# Patient Record
Sex: FEMALE | Race: BLACK OR AFRICAN AMERICAN | NOT HISPANIC OR LATINO | Employment: OTHER | URBAN - METROPOLITAN AREA
[De-identification: names, ages, dates, MRNs, and addresses within clinical notes are randomized per-mention and may not be internally consistent; named-entity substitution may affect disease eponyms.]

---

## 2017-03-16 ENCOUNTER — HOSPITAL ENCOUNTER (EMERGENCY)
Facility: OTHER | Age: 67
Discharge: HOME | End: 2017-03-17
Attending: EMERGENCY MEDICINE
Payer: MEDICARE

## 2017-03-16 DIAGNOSIS — R11.0 NAUSEA ALONE: ICD-10-CM

## 2017-03-16 DIAGNOSIS — R42 DIZZINESS: Primary | ICD-10-CM

## 2017-03-16 DIAGNOSIS — N28.9 ACUTE RENAL INSUFFICIENCY: ICD-10-CM

## 2017-03-16 LAB
ALBUMIN SERPL BCP-MCNC: 3.7 G/DL
ALP SERPL-CCNC: 99 U/L
ALT SERPL W/O P-5'-P-CCNC: 13 U/L
ANION GAP SERPL CALC-SCNC: 10 MMOL/L
AST SERPL-CCNC: 14 U/L
B-OH-BUTYR BLD STRIP-SCNC: 0.2 MMOL/L
BACTERIA #/AREA URNS HPF: ABNORMAL /HPF
BASOPHILS # BLD AUTO: 0.02 K/UL
BASOPHILS NFR BLD: 0.2 %
BILIRUB SERPL-MCNC: 0.7 MG/DL
BILIRUB UR QL STRIP: NEGATIVE
BUN SERPL-MCNC: 18 MG/DL
CALCIUM SERPL-MCNC: 10.2 MG/DL
CHLORIDE SERPL-SCNC: 97 MMOL/L
CLARITY UR: CLEAR
CO2 SERPL-SCNC: 30 MMOL/L
COLOR UR: YELLOW
CREAT SERPL-MCNC: 1.7 MG/DL
DIFFERENTIAL METHOD: NORMAL
EOSINOPHIL # BLD AUTO: 0.2 K/UL
EOSINOPHIL NFR BLD: 1.6 %
ERYTHROCYTE [DISTWIDTH] IN BLOOD BY AUTOMATED COUNT: 13.8 %
EST. GFR  (AFRICAN AMERICAN): 36 ML/MIN/1.73 M^2
EST. GFR  (NON AFRICAN AMERICAN): 31 ML/MIN/1.73 M^2
GLUCOSE SERPL-MCNC: 264 MG/DL
GLUCOSE UR QL STRIP: ABNORMAL
HCT VFR BLD AUTO: 39.7 %
HGB BLD-MCNC: 13.2 G/DL
HGB UR QL STRIP: ABNORMAL
HYALINE CASTS #/AREA URNS LPF: 6 /LPF
KETONES UR QL STRIP: ABNORMAL
LEUKOCYTE ESTERASE UR QL STRIP: NEGATIVE
LIPASE SERPL-CCNC: <3 U/L
LYMPHOCYTES # BLD AUTO: 3.3 K/UL
LYMPHOCYTES NFR BLD: 28 %
MCH RBC QN AUTO: 29.4 PG
MCHC RBC AUTO-ENTMCNC: 33.2 %
MCV RBC AUTO: 88 FL
MICROSCOPIC COMMENT: ABNORMAL
MONOCYTES # BLD AUTO: 0.8 K/UL
MONOCYTES NFR BLD: 6.6 %
NEUTROPHILS # BLD AUTO: 7.4 K/UL
NEUTROPHILS NFR BLD: 63.4 %
NITRITE UR QL STRIP: NEGATIVE
PH UR STRIP: 6 [PH] (ref 5–8)
PLATELET # BLD AUTO: 206 K/UL
PMV BLD AUTO: 12.9 FL
POTASSIUM SERPL-SCNC: 3.6 MMOL/L
PROT SERPL-MCNC: 8.7 G/DL
PROT UR QL STRIP: ABNORMAL
RBC # BLD AUTO: 4.49 M/UL
RBC #/AREA URNS HPF: 8 /HPF (ref 0–4)
SODIUM SERPL-SCNC: 137 MMOL/L
SP GR UR STRIP: 1.02 (ref 1–1.03)
SQUAMOUS #/AREA URNS HPF: 10 /HPF
URN SPEC COLLECT METH UR: ABNORMAL
UROBILINOGEN UR STRIP-ACNC: NEGATIVE EU/DL
WBC # BLD AUTO: 11.6 K/UL
WBC #/AREA URNS HPF: 2 /HPF (ref 0–5)
WBC CLUMPS URNS QL MICRO: ABNORMAL
YEAST URNS QL MICRO: ABNORMAL

## 2017-03-16 PROCEDURE — 99285 EMERGENCY DEPT VISIT HI MDM: CPT | Mod: 25

## 2017-03-16 PROCEDURE — 80053 COMPREHEN METABOLIC PANEL: CPT

## 2017-03-16 PROCEDURE — 85025 COMPLETE CBC W/AUTO DIFF WBC: CPT

## 2017-03-16 PROCEDURE — 84484 ASSAY OF TROPONIN QUANT: CPT

## 2017-03-16 PROCEDURE — 83690 ASSAY OF LIPASE: CPT

## 2017-03-16 PROCEDURE — 96361 HYDRATE IV INFUSION ADD-ON: CPT

## 2017-03-16 PROCEDURE — 96365 THER/PROPH/DIAG IV INF INIT: CPT

## 2017-03-16 PROCEDURE — 63600175 PHARM REV CODE 636 W HCPCS: Performed by: EMERGENCY MEDICINE

## 2017-03-16 PROCEDURE — 81000 URINALYSIS NONAUTO W/SCOPE: CPT

## 2017-03-16 PROCEDURE — 25000003 PHARM REV CODE 250: Performed by: EMERGENCY MEDICINE

## 2017-03-16 PROCEDURE — 93005 ELECTROCARDIOGRAM TRACING: CPT

## 2017-03-16 PROCEDURE — 82962 GLUCOSE BLOOD TEST: CPT

## 2017-03-16 PROCEDURE — 82010 KETONE BODYS QUAN: CPT

## 2017-03-16 PROCEDURE — 96375 TX/PRO/DX INJ NEW DRUG ADDON: CPT

## 2017-03-16 RX ORDER — SODIUM CHLORIDE 9 MG/ML
500 INJECTION, SOLUTION INTRAVENOUS
Status: COMPLETED | OUTPATIENT
Start: 2017-03-16 | End: 2017-03-16

## 2017-03-16 RX ORDER — ONDANSETRON 2 MG/ML
4 INJECTION INTRAMUSCULAR; INTRAVENOUS
Status: COMPLETED | OUTPATIENT
Start: 2017-03-16 | End: 2017-03-16

## 2017-03-16 RX ORDER — SODIUM CHLORIDE 9 MG/ML
1000 INJECTION, SOLUTION INTRAVENOUS
Status: COMPLETED | OUTPATIENT
Start: 2017-03-16 | End: 2017-03-17

## 2017-03-16 RX ADMIN — ONDANSETRON 4 MG: 2 INJECTION, SOLUTION INTRAMUSCULAR; INTRAVENOUS at 10:03

## 2017-03-16 RX ADMIN — SODIUM CHLORIDE 1000 ML: 0.9 INJECTION, SOLUTION INTRAVENOUS at 11:03

## 2017-03-16 RX ADMIN — SODIUM CHLORIDE 500 ML: 0.9 INJECTION, SOLUTION INTRAVENOUS at 10:03

## 2017-03-16 NOTE — ED AVS SNAPSHOT
[facility]  [address]  [facility] [address]               Beatriz Adame   3/16/2017 10:16 PM   ED    Description:  Female : 1950   Department:  [facility]           Your Care was Coordinated By:     Provider Role From To    Lynda Salmeron MD Attending Provider [phone] --      Reason for Visit     Dizziness           Diagnoses this Visit        Comments    Dizziness    -  Primary     Nausea alone         Acute renal insufficiency           ED Disposition     None           To Do List           Follow-up Information     Schedule an appointment as soon as possible for a visit with Your primary care doctor.    Why:  for re-evaluation of your kidney function      [facility]     [facility] -  Assistance  Registered nurses in the [facility] provide clinical advisement, health education, appointment booking, and other advisory services.  Call for this free service at [phone].             Medications           These medications were administered today        Dose Freq    0.9%  NaCl infusion 500 mL ED 1 Time    Sig: Inject 500 mLs into the vein ED 1 Time.    Class: Normal    Route: Intravenous    ondansetron injection 4 mg 4 mg ED 1 Time    Sig: Inject 4 mg into the vein ED 1 Time.    Class: Normal    Route: Intravenous    0.9%  NaCl infusion 1,000 mL ED 1 Time    Sig: Inject 1,000 mLs into the vein ED 1 Time.    Class: Normal    Route: Intravenous    fluconazole tablet 200 mg 200 mg ED 1 Time    Sig: Take 2 tablets (200 mg total) by mouth ED 1 Time.    Class: Normal    Route: Oral    promethazine (PHENERGAN) 12.5 mg in dextrose 5 % 50 mL IVPB 12.5 mg ED 1 Time    Sig: Inject 12.5 mg into the vein ED 1 Time.    Class: Normal    Route: Intravenous      STOP taking these medications     atorvastatin (LIPITOR) 20 MG tablet Take 1 tablet (20 mg total) by mouth every evening.    lisinopril 10 MG tablet Take  "1 tablet (10 mg total) by mouth once daily.           Verify that the below list of medications is an accurate representation of the medications you are currently taking.  If none reported, the list may be blank. If incorrect, please contact your healthcare provider. Carry this list with you in case of emergency.           Current Medications     amlodipine (NORVASC) 2.5 MG tablet Take 2.5 mg by mouth once daily.    carvedilol (COREG) 25 MG tablet Take 1 tablet (25 mg total) by mouth 2 (two) times daily.    gabapentin (NEURONTIN) 300 MG capsule Take 1 capsule (300 mg total) by mouth 3 (three) times daily.    insulin glargine (LANTUS SOLOSTAR) 100 unit/mL (3 mL) InPn pen Inject 15 Units into the skin every evening.    insulin lispro (HUMALOG KWIKPEN) 100 unit/mL InPn pen Inject 3 Units into the skin 3 (three) times daily before meals.    amitriptyline (ELAVIL) 25 MG tablet Take 1 tablet (25 mg total) by mouth nightly as needed for Insomnia.           Clinical Reference Information           Your Vitals Were     BP Pulse Temp Resp Height Weight    188/84 86 98.1 °F (36.7 °C) (Oral) 16 5' 7" (1.702 m) 81.6 kg (180 lb)    SpO2 BMI             97% 28.19 kg/m2         Allergies as of 3/17/2017        Reactions    Tomato (Solanum Lycopersicum) Hives, Itching      Immunizations Administered on Date of Encounter - 3/17/2017     None      ED Micro, Lab, POCT     Start Ordered       Status Ordering Provider    03/17/17 0017 03/17/17 0017    Add-on,   Status:  Canceled      Canceled     03/17/17 0014 03/17/17 0014  POCT glucose  Once      Final result     03/17/17 0003 03/17/17 0002  POCT glucose  Once      Acknowledged     03/16/17 2237 03/16/17 2236  Beta - Hydroxybutyrate, Serum  Once      Final result     03/16/17 2236 03/16/17 2236  Comprehensive metabolic panel  STAT      Final result     03/16/17 2236 03/16/17 2236  Lipase  STAT      Final result     03/16/17 2236 03/16/17 2236  Urinalysis  STAT      Final result     " 03/16/17 2236 03/16/17 2236  Urinalysis Microscopic  Once      Final result     03/16/17 2236 03/16/17 2236  Troponin I  Once      Final result     03/16/17 2235 03/16/17 2236  CBC auto differential  STAT      Final result       ED Imaging Orders     None        Discharge Instructions         Possible Causes of Dizziness or Fainting  Dizziness and fainting can have many causes. Below are some examples of possible causes your healthcare provider will look to rule out.    Benign paroxysmal positional vertigo (BPPV)  BPPV results when calcium crystals inside the inner ear shift into the wrong position. BPPV causes episodes of vertigo (a spinning sensation). Episodes most often happen when the head is moved in a certain way. This is more common in people 65 and older.   Infection or inflammation  The semicircular canals of the ear may become infected or inflamed. In this case, they can send the wrong balance signals. This can cause vertigo.  Meniere disease  Meniere disease happens when there is too much fluid in the semicircular canals. This can cause vertigo. It also can cause hearing problems and buzzing or ringing in the ears (called tinnitus). You may also have a feeling of pressure or fullness in the ear.  Syncope  Syncope is fainting that happens when the brain doesnt get enough oxygen-rich blood. It can be caused by low heart rate or low blood pressure. This is called vasovagal syncope. It can also be caused by sitting or standing up too quickly. This is called orthostatic hypotension. Syncope may also be due to a heart valve problem, an abnormal heart rhythm, or other heart problems. Dizziness can also happen from stroke, hemorrhage in the brain, or other problems in the brain. Your healthcare provider may do certain tests to rule out these conditions.  Other causes  Other causes include:  · Medicines. Certain medicines can cause dizziness and even fainting. In some cases, stopping a medicine too quickly can  lead to withdrawal symptoms, including dizziness and fainting.  · Anxiety. Being anxious can lead to breathing changes, such as hyperventilation. These can lead to dizziness and fainting.  Additional causes for dizziness and fainting also exist. Talk to your healthcare provider for more information.     Date Last Reviewed: 10/6/2015  © 2342-1965 [employer]. [address] [place] [address], [place] [address]. All rights reserved. This information is not intended as a substitute for professional medical care. Always follow your healthcare professional's instructions.          Renal Insufficiency    Your kidneys remove waste products and extra water from your body. When your kidneys dont work as they should, waste products build up in your blood. The early stage of this process is called renal insufficiency. If renal insufficiency gets worse, you can develop chronic renal failure. This allows extra water, waste, and toxic substances to build up in your body. This can become life threatening. You may need dialysis or a kidney transplant. The most serious form of renal insufficiency is end-stage renal disease.  Diabetes is the main cause of renal insufficiency.  Other causes include:  · High blood pressure  · Hardening of the arteries  · Lupus  · Inflammation of the blood vessels (vasculitis)  · Viral or bacterial infection  Some over-the-counter (OTC) pain medicines can cause renal failure if you take them for a long time. These include aspirin, ibuprofen, naproxen, and other nonsteroidal anti-inflammatory drugs (NSAIDs).  Home care  Follow these tips when caring for yourself at home:  · If you have diabetes, talk with your healthcare provider about controlling your blood sugar. Ask if you need to make any changes to your diet, lifestyle, or medicines.  · If you have high blood pressure:  ¨ Take your prescribed medicine. Your goal is to lower your blood pressure to less than 130/80, or as recommended by your  provider.  ¨ Start a regular exercise program that you enjoy. Check with your healthcare provider to be sure your planned exercise program is right for you.  ¨ Cut back on the amount of salt (sodium) you eat. Your healthcare provider can tell you how much salt each day is safe for you.  · If you are overweight, talk with your healthcare provider about a weight loss plan.  · If you smoke, quit. Smoking makes kidney disease worse. Talk with your healthcare provider about ways to help you quit. For more information, visit:  ¨ smokefree.gov/sites/default/files/pdf/clearing-the-air-accessible.pdf  ¨ www.smokefree.gov  ¨ www.cancer.org/healthy/stayawayfromtobacco/guidetoquittingsmoking/  · Talk with your healthcare provider about any restrictions you should make in your diet. In general, you should limit the amount of protein, salt, potassium, and phosphorus. Dont drink too many fluids. Dont add salt at the table, and stay away from salty foods. You may need a calcium supplement to help prevent osteoporosis.  · Talk with your healthcare provider about any medicines you are taking to find out if they need to be reduced or stopped.  · Dont take the following OTC medicines, or talk with your healthcare provider before you take them:  ¨ Aspirin, ibuprofen, naproxen, and other NSAIDs. You may be able to use these for a short time to help with fever or pain.  ¨ Laxatives and antacids with magnesium or aluminum  ¨ Phospho soda enemas with phosphorus  ¨ Certain stomach acid-blocking medicine such as cimetidine or ranitidine  ¨ Decongestants with pseudoephedrine  ¨ Herbal supplements  Follow-up care  Follow up with your healthcare provider, or as advised.  Contact one of the following for more information:  · American Association of Kidney Patients www.aakp.org  · National Kidney Foundation www.kidney.org  · American Kidney Fund www.kidneyfund.org  · National Kidney Disease Education Program www.nkdep.nih.gov  Call 911  Call  911 if any of the following occur:  · Severe weakness, dizziness, fainting, drowsiness, or confusion  · Chest pain or shortness of breath  · Heart beating fast, slowly, or irregularly  When to seek medical advice  Call your healthcare provider right away if any of these occur:  · Nausea or vomiting  · Fever of 100.4°F (38°C) or higher, or as directed by your healthcare provider  · Unexpected weight gain or swelling in the legs, ankles, or around your eyes  · You dont urinate as much as normal, or you arent able to urinate  Date Last Reviewed: 10/1/2016  © 1811-3404 [employer]. [address] [place] [address], [place] [address]. All rights reserved. This information is not intended as a substitute for professional medical care. Always follow your healthcare professional's instructions.          [facility] Sign-Up     Activating your [facility] account is as easy as 1-2-3!     1) Visit [email or web address], select Sign Up Now, enter this activation code and your date of birth, then select Next.  [email or web address]  Expires: 5/1/2017  2:40 AM      2) Create a username and password to use when you visit [facility] in the future and select a security question in case you lose your password and select Next.    3) Enter your e-mail address and click Sign Up!    Additional Information  If you have questions, please e-mail [email or web address] or call [phone] to talk to our [facility] staff. Remember, [facility] is NOT to be used for urgent needs. For medical emergencies, dial 911.          [facility] complies with applicable Federal civil rights laws and does not discriminate on the basis of race, color, national origin, age, disability, or sex.        Language Assistance Services     ATTENTION: Language assistance services are available, free of charge. Please call [phone].      ATENCIÓN: Si habla español, tiene a garza disposición servicios gratuitos de asistencia lingüística. Llame al  [phone].     CHARISMA Ý: N?u b?n nói Ti?ng Vi?t, có các d?ch v? h? tr? ngôn ng? mi?n phí dành cho b?n. G?i s? [phone].

## 2017-03-17 VITALS
TEMPERATURE: 98 F | RESPIRATION RATE: 16 BRPM | DIASTOLIC BLOOD PRESSURE: 84 MMHG | HEIGHT: 67 IN | WEIGHT: 180 LBS | OXYGEN SATURATION: 97 % | SYSTOLIC BLOOD PRESSURE: 188 MMHG | BODY MASS INDEX: 28.25 KG/M2 | HEART RATE: 86 BPM

## 2017-03-17 LAB
POCT GLUCOSE: 267 MG/DL (ref 70–110)
TROPONIN I SERPL DL<=0.01 NG/ML-MCNC: 0.02 NG/ML

## 2017-03-17 PROCEDURE — 63600175 PHARM REV CODE 636 W HCPCS: Performed by: EMERGENCY MEDICINE

## 2017-03-17 PROCEDURE — 93010 ELECTROCARDIOGRAM REPORT: CPT | Mod: ,,, | Performed by: INTERNAL MEDICINE

## 2017-03-17 PROCEDURE — 25000003 PHARM REV CODE 250: Performed by: EMERGENCY MEDICINE

## 2017-03-17 RX ORDER — AMLODIPINE BESYLATE 2.5 MG/1
10 TABLET ORAL DAILY
COMMUNITY
End: 2017-08-14 | Stop reason: SDUPTHER

## 2017-03-17 RX ORDER — FLUCONAZOLE 100 MG/1
200 TABLET ORAL
Status: COMPLETED | OUTPATIENT
Start: 2017-03-17 | End: 2017-03-17

## 2017-03-17 RX ORDER — ONDANSETRON 4 MG/1
4 TABLET, ORALLY DISINTEGRATING ORAL EVERY 8 HOURS PRN
Qty: 1 TABLET | Refills: 0 | Status: SHIPPED | OUTPATIENT
Start: 2017-03-17 | End: 2017-08-14

## 2017-03-17 RX ADMIN — FLUCONAZOLE 200 MG: 100 TABLET ORAL at 12:03

## 2017-03-17 RX ADMIN — PROMETHAZINE HYDROCHLORIDE 12.5 MG: 25 INJECTION INTRAMUSCULAR; INTRAVENOUS at 12:03

## 2017-03-17 NOTE — ED NOTES
Received report from Maryam CLEMENTE, assumed care of pt at this time, pt resting in bed calmly, RR easy non labored, NAD. Negative complaints of pain at this time, side rails up x2, call light within reach, bed in lowest locked position, will continue to monitor for changes

## 2017-03-17 NOTE — DISCHARGE INSTRUCTIONS
Possible Causes of Dizziness or Fainting  Dizziness and fainting can have many causes. Below are some examples of possible causes your healthcare provider will look to rule out.    Benign paroxysmal positional vertigo (BPPV)  BPPV results when calcium crystals inside the inner ear shift into the wrong position. BPPV causes episodes of vertigo (a spinning sensation). Episodes most often happen when the head is moved in a certain way. This is more common in people 65 and older.   Infection or inflammation  The semicircular canals of the ear may become infected or inflamed. In this case, they can send the wrong balance signals. This can cause vertigo.  Meniere disease  Meniere disease happens when there is too much fluid in the semicircular canals. This can cause vertigo. It also can cause hearing problems and buzzing or ringing in the ears (called tinnitus). You may also have a feeling of pressure or fullness in the ear.  Syncope  Syncope is fainting that happens when the brain doesnt get enough oxygen-rich blood. It can be caused by low heart rate or low blood pressure. This is called vasovagal syncope. It can also be caused by sitting or standing up too quickly. This is called orthostatic hypotension. Syncope may also be due to a heart valve problem, an abnormal heart rhythm, or other heart problems. Dizziness can also happen from stroke, hemorrhage in the brain, or other problems in the brain. Your healthcare provider may do certain tests to rule out these conditions.  Other causes  Other causes include:  · Medicines. Certain medicines can cause dizziness and even fainting. In some cases, stopping a medicine too quickly can lead to withdrawal symptoms, including dizziness and fainting.  · Anxiety. Being anxious can lead to breathing changes, such as hyperventilation. These can lead to dizziness and fainting.  Additional causes for dizziness and fainting also exist. Talk to your healthcare provider for more  information.     Date Last Reviewed: 10/6/2015  © 6160-3405 [employer]. [address] [place] [address], [place] [address]. All rights reserved. This information is not intended as a substitute for professional medical care. Always follow your healthcare professional's instructions.          Renal Insufficiency    Your kidneys remove waste products and extra water from your body. When your kidneys dont work as they should, waste products build up in your blood. The early stage of this process is called renal insufficiency. If renal insufficiency gets worse, you can develop chronic renal failure. This allows extra water, waste, and toxic substances to build up in your body. This can become life threatening. You may need dialysis or a kidney transplant. The most serious form of renal insufficiency is end-stage renal disease.  Diabetes is the main cause of renal insufficiency.  Other causes include:  · High blood pressure  · Hardening of the arteries  · Lupus  · Inflammation of the blood vessels (vasculitis)  · Viral or bacterial infection  Some over-the-counter (OTC) pain medicines can cause renal failure if you take them for a long time. These include aspirin, ibuprofen, naproxen, and other nonsteroidal anti-inflammatory drugs (NSAIDs).  Home care  Follow these tips when caring for yourself at home:  · If you have diabetes, talk with your healthcare provider about controlling your blood sugar. Ask if you need to make any changes to your diet, lifestyle, or medicines.  · If you have high blood pressure:  ¨ Take your prescribed medicine. Your goal is to lower your blood pressure to less than 130/80, or as recommended by your provider.  ¨ Start a regular exercise program that you enjoy. Check with your healthcare provider to be sure your planned exercise program is right for you.  ¨ Cut back on the amount of salt (sodium) you eat. Your healthcare provider can tell you how much salt each day is safe for you.  · If  you are overweight, talk with your healthcare provider about a weight loss plan.  · If you smoke, quit. Smoking makes kidney disease worse. Talk with your healthcare provider about ways to help you quit. For more information, visit:  ¨ smokefree.gov/sites/default/files/pdf/clearing-the-air-accessible.pdf  ¨ www.smokefree.gov  ¨ www.cancer.org/healthy/stayawayfromtobacco/guidetoquittingsmoking/  · Talk with your healthcare provider about any restrictions you should make in your diet. In general, you should limit the amount of protein, salt, potassium, and phosphorus. Dont drink too many fluids. Dont add salt at the table, and stay away from salty foods. You may need a calcium supplement to help prevent osteoporosis.  · Talk with your healthcare provider about any medicines you are taking to find out if they need to be reduced or stopped.  · Dont take the following OTC medicines, or talk with your healthcare provider before you take them:  ¨ Aspirin, ibuprofen, naproxen, and other NSAIDs. You may be able to use these for a short time to help with fever or pain.  ¨ Laxatives and antacids with magnesium or aluminum  ¨ Phospho soda enemas with phosphorus  ¨ Certain stomach acid-blocking medicine such as cimetidine or ranitidine  ¨ Decongestants with pseudoephedrine  ¨ Herbal supplements  Follow-up care  Follow up with your healthcare provider, or as advised.  Contact one of the following for more information:  · American Association of Kidney Patients www.aakp.org  · National Kidney Foundation www.kidney.org  · American Kidney Fund www.kidneyfund.org  · National Kidney Disease Education Program www.nkdep.nih.gov  Call 911  Call 911 if any of the following occur:  · Severe weakness, dizziness, fainting, drowsiness, or confusion  · Chest pain or shortness of breath  · Heart beating fast, slowly, or irregularly  When to seek medical advice  Call your healthcare provider right away if any of these occur:  · Nausea or  vomiting  · Fever of 100.4°F (38°C) or higher, or as directed by your healthcare provider  · Unexpected weight gain or swelling in the legs, ankles, or around your eyes  · You dont urinate as much as normal, or you arent able to urinate  Date Last Reviewed: 10/1/2016  © 8373-5672 [employer]. [address] [place] [address], [place] [address]. All rights reserved. This information is not intended as a substitute for professional medical care. Always follow your healthcare professional's instructions.

## 2017-03-17 NOTE — ED NOTES
Pt presents to ED with c/o nausea and vomiting since 1700 today, with vomiting x 2 episodes. Pt reporting she feels nauseas at this time, pt is not actively vomiting. Pt denies diarrhea, abdominal pain, or CP. Pt AAOx4 and appropriate at this time. Respirations even and unlabored. No acute distress noted. Pt denies pain and pt only complaint is nausea and vomiting. Awaiting further orders. Pt updated on POC. Bed is locked and in lowest position with side rails up x2. Call bell within reach and pt oriented to use of call bell. Pt on continuous cardiac monitoring, continuous pulse ox, and continuous BP cuff. Will continue to monitor.

## 2017-03-17 NOTE — ED PROVIDER NOTES
Encounter Date: 3/16/2017    SCRIBE #1 NOTE: I, Angelita Green , am scribing for, and in the presence of, Dr. Salmeron.       History     Chief Complaint   Patient presents with    Dizziness     pt reports dizziness with  2 episodes of n/v.      Review of patient's allergies indicates:   Allergen Reactions    Tomato (solanum lycopersicum) Hives and Itching     HPI Comments: Time seen by provider: 10:39 PM    This is a 66 y.o. female who presents with complaint of dizziness. Symptoms began five hours ago. Pt reports chills, nausea, and vomiting, but denies fever, appetite change, congestion, rhinorrhea, sore throat, abdominal pain, myalgias, or urinary symptoms. Dizziness began after onset of nausea and two episodes of emesis, and is described as constant.  Pt did not consume liquids after emesis. She took one dose of insulin this morning (5 units), but is instructed to use insulin TID. Symptoms are consistent to prior episodes she has experienced secondary to CBG.         The history is provided by the patient.     Past Medical History:   Diagnosis Date    Arthritis     Diabetes mellitus     Encounter for blood transfusion     Hypercholesteremia     Hypertension     Stroke      Past Surgical History:   Procedure Laterality Date    TUBAL LIGATION       Family History   Problem Relation Age of Onset    Arthritis Mother     Diabetes Mother     Heart disease Mother     Hypertension Mother     Kidney disease Mother     Stroke Mother     Vision loss Mother     Alcohol abuse Father     Diabetes Sister     Asthma Brother     Diabetes Brother     Early death Brother     Heart disease Brother      Social History   Substance Use Topics    Smoking status: Never Smoker    Smokeless tobacco: Never Used    Alcohol use No      Comment: socially     Review of Systems   Constitutional: Positive for chills. Negative for appetite change and fever.   HENT: Negative for congestion, rhinorrhea and sore throat.    Eyes:  Negative for redness and visual disturbance.   Respiratory: Negative for cough and shortness of breath.    Cardiovascular: Negative for chest pain and palpitations.   Gastrointestinal: Positive for nausea and vomiting. Negative for abdominal pain and diarrhea.   Genitourinary: Negative for decreased urine volume, difficulty urinating, dysuria, frequency, hematuria and urgency.   Musculoskeletal: Negative for back pain and myalgias.   Skin: Negative for rash.   Neurological: Positive for dizziness. Negative for weakness and headaches.   Psychiatric/Behavioral: Negative for confusion.       Physical Exam   Initial Vitals   BP Pulse Resp Temp SpO2   03/16/17 2222 03/16/17 2222 03/16/17 2222 03/16/17 2222 03/16/17 2222   180/84 78 18 98.1 °F (36.7 °C) 96 %     Physical Exam    Nursing note and vitals reviewed.  Constitutional: She appears well-developed and well-nourished. She is not diaphoretic. No distress.   HENT:   Head: Normocephalic and atraumatic.   Right Ear: External ear normal.   Left Ear: External ear normal.   Eyes: Conjunctivae and EOM are normal. Pupils are equal, round, and reactive to light. Right eye exhibits no discharge. Left eye exhibits no discharge. No scleral icterus.   Neck: Normal range of motion. Neck supple.   Cardiovascular: Normal rate, regular rhythm, normal heart sounds and intact distal pulses. Exam reveals no gallop and no friction rub.    No murmur heard.  Pulmonary/Chest: Breath sounds normal. No stridor. No respiratory distress. She has no wheezes. She has no rhonchi. She has no rales.   Abdominal: Soft. She exhibits no distension. There is no tenderness. There is no rebound and no guarding.   Musculoskeletal: Normal range of motion. She exhibits no edema or tenderness.   Neurological: She is alert and oriented to person, place, and time. She has normal strength. No cranial nerve deficit.   Skin: Skin is warm and dry. No rash noted. No erythema. No pallor.   Psychiatric: She has a  normal mood and affect. Her behavior is normal. Judgment and thought content normal.         ED Course   Procedures  Labs Reviewed   COMPREHENSIVE METABOLIC PANEL - Abnormal; Notable for the following:        Result Value    CO2 30 (*)     Glucose 264 (*)     Creatinine 1.7 (*)     Total Protein 8.7 (*)     eGFR if  36 (*)     eGFR if non  31 (*)     All other components within normal limits   LIPASE - Abnormal; Notable for the following:     Lipase <3 (*)     All other components within normal limits   URINALYSIS - Abnormal; Notable for the following:     Protein, UA 3+ (*)     Glucose, UA 1+ (*)     Ketones, UA Trace (*)     Occult Blood UA 2+ (*)     All other components within normal limits   URINALYSIS MICROSCOPIC - Abnormal; Notable for the following:     RBC, UA 8 (*)     Yeast, UA Occasional (*)     Hyaline Casts, UA 6 (*)     All other components within normal limits   POCT GLUCOSE - Abnormal; Notable for the following:     POCT Glucose 267 (*)     All other components within normal limits   CBC W/ AUTO DIFFERENTIAL   BETA - HYDROXYBUTYRATE, SERUM   TROPONIN I   POCT GLUCOSE MONITORING CONTINUOUS     EKG Readings: (Independently Interpreted)   Normal sinus rhythm at rate of 83 bpm. Normal MT-QRS. No ST-T wave changes.           Medical Decision Making:   Clinical Tests:   Lab Tests: Ordered and Reviewed    Additional MDM:   Comments:  66-year-old diabtc female presents complaining of nausea and vomiting ×2 episodes followed by dizziness.  Symptoms occurred hours prior to her ED visit.  Her initial vital signs were significant for elevated blood pressure.  Her exam was unremarkable.  Blood glucose was less than 300.  CBC, CMP, and lipase were remarkable for a creatinine of 1.7 increased from 1.  There were no other significant lab abnormalities.  Urinalysis showed no evidence of urinary tract infection but there was he presents.  For this she received fluconazole.  Patient  received Zofran and Phenergan as well as 2 L of IV fluids.  On reassessment her nausea and dizziness had resolved and she was able tolerate a by mouth challenge.  Patient was instructed to follow-up with her primary care doctor for further evaluation of her acute renal insufficiency.  She was given a prescription for Zofran and discharged home in stable condition..          Scribe Attestation:   Scribe #1: I performed the above scribed service and the documentation accurately describes the services I performed. I attest to the accuracy of the note.    Attending Attestation:           Physician Attestation for Scribe:  Physician Attestation Statement for Scribe #1: I, Dr. Salmeron, reviewed documentation, as scribed by Angelita Green in my presence, and it is both accurate and complete.                 ED Course     Clinical Impression:     1. Dizziness    2. Nausea alone    3. Acute renal insufficiency                Lynda Salmeron MD  03/17/17 0254

## 2017-08-14 ENCOUNTER — HOSPITAL ENCOUNTER (OUTPATIENT)
Facility: OTHER | Age: 67
Discharge: HOME HEALTH | End: 2017-08-15
Attending: EMERGENCY MEDICINE | Admitting: INTERNAL MEDICINE
Payer: COMMERCIAL

## 2017-08-14 DIAGNOSIS — Z79.4 CONTROLLED TYPE 2 DIABETES MELLITUS WITH HYPERGLYCEMIA, WITH LONG-TERM CURRENT USE OF INSULIN: ICD-10-CM

## 2017-08-14 DIAGNOSIS — E11.65 CONTROLLED TYPE 2 DIABETES MELLITUS WITH HYPERGLYCEMIA, WITH LONG-TERM CURRENT USE OF INSULIN: ICD-10-CM

## 2017-08-14 DIAGNOSIS — R42 DIZZINESS: Primary | ICD-10-CM

## 2017-08-14 PROBLEM — R29.90 STROKE-LIKE SYMPTOMS: Status: ACTIVE | Noted: 2017-08-14

## 2017-08-14 PROBLEM — B37.9 YEAST INFECTION: Status: ACTIVE | Noted: 2017-08-14

## 2017-08-14 LAB
ALBUMIN SERPL BCP-MCNC: 3.3 G/DL
ALP SERPL-CCNC: 104 U/L
ALT SERPL W/O P-5'-P-CCNC: 16 U/L
ANION GAP SERPL CALC-SCNC: 14 MMOL/L
AST SERPL-CCNC: 16 U/L
BACTERIA #/AREA URNS HPF: ABNORMAL /HPF
BASOPHILS # BLD AUTO: 0.03 K/UL
BASOPHILS NFR BLD: 0.2 %
BILIRUB SERPL-MCNC: 0.7 MG/DL
BILIRUB UR QL STRIP: NEGATIVE
BUN SERPL-MCNC: 18 MG/DL
CALCIUM SERPL-MCNC: 9.6 MG/DL
CHLORIDE SERPL-SCNC: 102 MMOL/L
CHOLEST/HDLC SERPL: 4.9 {RATIO}
CLARITY UR: CLEAR
CO2 SERPL-SCNC: 24 MMOL/L
COLOR UR: YELLOW
CREAT SERPL-MCNC: 1.4 MG/DL
DIFFERENTIAL METHOD: ABNORMAL
EOSINOPHIL # BLD AUTO: 0.4 K/UL
EOSINOPHIL NFR BLD: 2.9 %
ERYTHROCYTE [DISTWIDTH] IN BLOOD BY AUTOMATED COUNT: 13.8 %
EST. GFR  (AFRICAN AMERICAN): 45 ML/MIN/1.73 M^2
EST. GFR  (NON AFRICAN AMERICAN): 39 ML/MIN/1.73 M^2
GLUCOSE SERPL-MCNC: 346 MG/DL
GLUCOSE UR QL STRIP: ABNORMAL
HCT VFR BLD AUTO: 33.1 %
HDL/CHOLESTEROL RATIO: 20.5 %
HDLC SERPL-MCNC: 210 MG/DL
HDLC SERPL-MCNC: 43 MG/DL
HGB BLD-MCNC: 11.1 G/DL
HGB UR QL STRIP: ABNORMAL
HYALINE CASTS #/AREA URNS LPF: 0 /LPF
INR PPP: 1
KETONES UR QL STRIP: NEGATIVE
LDLC SERPL CALC-MCNC: 123.2 MG/DL
LEUKOCYTE ESTERASE UR QL STRIP: NEGATIVE
LYMPHOCYTES # BLD AUTO: 4.3 K/UL
LYMPHOCYTES NFR BLD: 35.3 %
MCH RBC QN AUTO: 29.4 PG
MCHC RBC AUTO-ENTMCNC: 33.5 G/DL
MCV RBC AUTO: 88 FL
MICROSCOPIC COMMENT: ABNORMAL
MONOCYTES # BLD AUTO: 0.9 K/UL
MONOCYTES NFR BLD: 7.6 %
NEUTROPHILS # BLD AUTO: 6.6 K/UL
NEUTROPHILS NFR BLD: 53.7 %
NITRITE UR QL STRIP: NEGATIVE
NON-SQ EPI CELLS #/AREA URNS HPF: 2 /HPF
NONHDLC SERPL-MCNC: 167 MG/DL
PH UR STRIP: 6 [PH] (ref 5–8)
PLATELET # BLD AUTO: 189 K/UL
PMV BLD AUTO: 12.8 FL
POCT GLUCOSE: 271 MG/DL (ref 70–110)
POCT GLUCOSE: 274 MG/DL (ref 70–110)
POCT GLUCOSE: 281 MG/DL (ref 70–110)
POCT GLUCOSE: 324 MG/DL (ref 70–110)
POTASSIUM SERPL-SCNC: 4.2 MMOL/L
PROT SERPL-MCNC: 7.7 G/DL
PROT UR QL STRIP: ABNORMAL
PROTHROMBIN TIME: 10.7 SEC
RBC # BLD AUTO: 3.78 M/UL
RBC #/AREA URNS HPF: 2 /HPF (ref 0–4)
SODIUM SERPL-SCNC: 140 MMOL/L
SP GR UR STRIP: 1.02 (ref 1–1.03)
SQUAMOUS #/AREA URNS HPF: 4 /HPF
T4 FREE SERPL-MCNC: 0.93 NG/DL
TRIGL SERPL-MCNC: 219 MG/DL
TROPONIN I SERPL DL<=0.01 NG/ML-MCNC: 0.01 NG/ML
TSH SERPL DL<=0.005 MIU/L-ACNC: 4.17 UIU/ML
URN SPEC COLLECT METH UR: ABNORMAL
UROBILINOGEN UR STRIP-ACNC: NEGATIVE EU/DL
WBC # BLD AUTO: 12.29 K/UL
WBC #/AREA URNS HPF: 3 /HPF (ref 0–5)
YEAST URNS QL MICRO: ABNORMAL

## 2017-08-14 PROCEDURE — 63600175 PHARM REV CODE 636 W HCPCS: Performed by: PHYSICIAN ASSISTANT

## 2017-08-14 PROCEDURE — 99285 EMERGENCY DEPT VISIT HI MDM: CPT | Mod: 25

## 2017-08-14 PROCEDURE — 82962 GLUCOSE BLOOD TEST: CPT

## 2017-08-14 PROCEDURE — 85025 COMPLETE CBC W/AUTO DIFF WBC: CPT

## 2017-08-14 PROCEDURE — 96360 HYDRATION IV INFUSION INIT: CPT

## 2017-08-14 PROCEDURE — 80061 LIPID PANEL: CPT

## 2017-08-14 PROCEDURE — 80053 COMPREHEN METABOLIC PANEL: CPT

## 2017-08-14 PROCEDURE — 81000 URINALYSIS NONAUTO W/SCOPE: CPT

## 2017-08-14 PROCEDURE — G0378 HOSPITAL OBSERVATION PER HR: HCPCS

## 2017-08-14 PROCEDURE — 25500020 PHARM REV CODE 255: Performed by: HOSPITALIST

## 2017-08-14 PROCEDURE — 93005 ELECTROCARDIOGRAM TRACING: CPT

## 2017-08-14 PROCEDURE — 84484 ASSAY OF TROPONIN QUANT: CPT

## 2017-08-14 PROCEDURE — 96361 HYDRATE IV INFUSION ADD-ON: CPT

## 2017-08-14 PROCEDURE — 85610 PROTHROMBIN TIME: CPT

## 2017-08-14 PROCEDURE — 25000003 PHARM REV CODE 250: Performed by: PHYSICIAN ASSISTANT

## 2017-08-14 PROCEDURE — 99220 PR INITIAL OBSERVATION CARE,LEVL III: CPT | Mod: ,,, | Performed by: PHYSICIAN ASSISTANT

## 2017-08-14 PROCEDURE — 84439 ASSAY OF FREE THYROXINE: CPT

## 2017-08-14 PROCEDURE — 84443 ASSAY THYROID STIM HORMONE: CPT

## 2017-08-14 PROCEDURE — 25000003 PHARM REV CODE 250: Performed by: EMERGENCY MEDICINE

## 2017-08-14 PROCEDURE — A9585 GADOBUTROL INJECTION: HCPCS | Performed by: HOSPITALIST

## 2017-08-14 RX ORDER — ENOXAPARIN SODIUM 100 MG/ML
40 INJECTION SUBCUTANEOUS EVERY 24 HOURS
Status: DISCONTINUED | OUTPATIENT
Start: 2017-08-14 | End: 2017-08-15 | Stop reason: HOSPADM

## 2017-08-14 RX ORDER — ACETAMINOPHEN 325 MG/1
650 TABLET ORAL EVERY 8 HOURS PRN
Status: DISCONTINUED | OUTPATIENT
Start: 2017-08-14 | End: 2017-08-15 | Stop reason: HOSPADM

## 2017-08-14 RX ORDER — HYDRALAZINE HYDROCHLORIDE 20 MG/ML
10 INJECTION INTRAMUSCULAR; INTRAVENOUS EVERY 8 HOURS PRN
Status: DISCONTINUED | OUTPATIENT
Start: 2017-08-14 | End: 2017-08-15 | Stop reason: HOSPADM

## 2017-08-14 RX ORDER — ONDANSETRON 8 MG/1
8 TABLET, ORALLY DISINTEGRATING ORAL EVERY 8 HOURS PRN
Status: DISCONTINUED | OUTPATIENT
Start: 2017-08-14 | End: 2017-08-15 | Stop reason: HOSPADM

## 2017-08-14 RX ORDER — AMLODIPINE BESYLATE 5 MG/1
10 TABLET ORAL DAILY
Status: DISCONTINUED | OUTPATIENT
Start: 2017-08-15 | End: 2017-08-15 | Stop reason: HOSPADM

## 2017-08-14 RX ORDER — GABAPENTIN 300 MG/1
300 CAPSULE ORAL 3 TIMES DAILY
Status: DISCONTINUED | OUTPATIENT
Start: 2017-08-14 | End: 2017-08-15 | Stop reason: HOSPADM

## 2017-08-14 RX ORDER — GLUCAGON 1 MG
1 KIT INJECTION
Status: DISCONTINUED | OUTPATIENT
Start: 2017-08-14 | End: 2017-08-15 | Stop reason: HOSPADM

## 2017-08-14 RX ORDER — INSULIN ASPART 100 [IU]/ML
0-5 INJECTION, SOLUTION INTRAVENOUS; SUBCUTANEOUS
Status: DISCONTINUED | OUTPATIENT
Start: 2017-08-14 | End: 2017-08-15 | Stop reason: HOSPADM

## 2017-08-14 RX ORDER — CARVEDILOL 12.5 MG/1
25 TABLET ORAL 2 TIMES DAILY
Status: DISCONTINUED | OUTPATIENT
Start: 2017-08-14 | End: 2017-08-15 | Stop reason: HOSPADM

## 2017-08-14 RX ORDER — AMITRIPTYLINE HYDROCHLORIDE 25 MG/1
25 TABLET, FILM COATED ORAL NIGHTLY PRN
Status: DISCONTINUED | OUTPATIENT
Start: 2017-08-14 | End: 2017-08-15 | Stop reason: HOSPADM

## 2017-08-14 RX ORDER — SODIUM CHLORIDE 9 MG/ML
INJECTION, SOLUTION INTRAVENOUS CONTINUOUS
Status: DISCONTINUED | OUTPATIENT
Start: 2017-08-14 | End: 2017-08-15

## 2017-08-14 RX ORDER — GADOBUTROL 604.72 MG/ML
10 INJECTION INTRAVENOUS
Status: COMPLETED | OUTPATIENT
Start: 2017-08-14 | End: 2017-08-14

## 2017-08-14 RX ORDER — FLUCONAZOLE 150 MG/1
150 TABLET ORAL DAILY
Status: DISCONTINUED | OUTPATIENT
Start: 2017-08-14 | End: 2017-08-15 | Stop reason: HOSPADM

## 2017-08-14 RX ORDER — INSULIN ASPART 100 [IU]/ML
3 INJECTION, SOLUTION INTRAVENOUS; SUBCUTANEOUS
Status: DISCONTINUED | OUTPATIENT
Start: 2017-08-15 | End: 2017-08-15 | Stop reason: HOSPADM

## 2017-08-14 RX ORDER — IBUPROFEN 200 MG
16 TABLET ORAL
Status: DISCONTINUED | OUTPATIENT
Start: 2017-08-14 | End: 2017-08-15 | Stop reason: HOSPADM

## 2017-08-14 RX ORDER — SODIUM CHLORIDE 9 MG/ML
1000 INJECTION, SOLUTION INTRAVENOUS
Status: COMPLETED | OUTPATIENT
Start: 2017-08-14 | End: 2017-08-14

## 2017-08-14 RX ORDER — INSULIN ASPART 100 [IU]/ML
2 INJECTION, SOLUTION INTRAVENOUS; SUBCUTANEOUS
Status: DISCONTINUED | OUTPATIENT
Start: 2017-08-14 | End: 2017-08-14

## 2017-08-14 RX ORDER — ATORVASTATIN CALCIUM 80 MG/1
80 TABLET, FILM COATED ORAL DAILY
Status: ON HOLD | COMMUNITY
End: 2017-08-15 | Stop reason: HOSPADM

## 2017-08-14 RX ORDER — OMEPRAZOLE 20 MG/1
20 CAPSULE, DELAYED RELEASE ORAL DAILY
Status: ON HOLD | COMMUNITY
End: 2017-08-15 | Stop reason: HOSPADM

## 2017-08-14 RX ORDER — IBUPROFEN 200 MG
24 TABLET ORAL
Status: DISCONTINUED | OUTPATIENT
Start: 2017-08-14 | End: 2017-08-15 | Stop reason: HOSPADM

## 2017-08-14 RX ORDER — ASPIRIN 325 MG
325 TABLET ORAL
Status: COMPLETED | OUTPATIENT
Start: 2017-08-14 | End: 2017-08-14

## 2017-08-14 RX ORDER — POLYETHYLENE GLYCOL 3350 17 G/17G
17 POWDER, FOR SOLUTION ORAL DAILY
Status: DISCONTINUED | OUTPATIENT
Start: 2017-08-15 | End: 2017-08-15 | Stop reason: HOSPADM

## 2017-08-14 RX ORDER — FAMOTIDINE 20 MG/1
20 TABLET, FILM COATED ORAL 2 TIMES DAILY
Status: DISCONTINUED | OUTPATIENT
Start: 2017-08-14 | End: 2017-08-15 | Stop reason: HOSPADM

## 2017-08-14 RX ORDER — RAMELTEON 8 MG/1
8 TABLET ORAL NIGHTLY PRN
Status: DISCONTINUED | OUTPATIENT
Start: 2017-08-14 | End: 2017-08-15 | Stop reason: HOSPADM

## 2017-08-14 RX ORDER — AMLODIPINE BESYLATE 10 MG/1
10 TABLET ORAL DAILY
Status: ON HOLD | COMMUNITY
End: 2017-08-15

## 2017-08-14 RX ORDER — ATORVASTATIN CALCIUM 20 MG/1
80 TABLET, FILM COATED ORAL DAILY
Status: DISCONTINUED | OUTPATIENT
Start: 2017-08-15 | End: 2017-08-15 | Stop reason: HOSPADM

## 2017-08-14 RX ADMIN — FLUCONAZOLE 150 MG: 150 TABLET ORAL at 09:08

## 2017-08-14 RX ADMIN — FAMOTIDINE 20 MG: 20 TABLET, FILM COATED ORAL at 09:08

## 2017-08-14 RX ADMIN — CARVEDILOL 25 MG: 12.5 TABLET, FILM COATED ORAL at 09:08

## 2017-08-14 RX ADMIN — ASPIRIN 325 MG ORAL TABLET 325 MG: 325 PILL ORAL at 11:08

## 2017-08-14 RX ADMIN — SODIUM CHLORIDE 1000 ML: 0.9 INJECTION, SOLUTION INTRAVENOUS at 06:08

## 2017-08-14 RX ADMIN — INSULIN ASPART 1 UNITS: 100 INJECTION, SOLUTION INTRAVENOUS; SUBCUTANEOUS at 09:08

## 2017-08-14 RX ADMIN — GABAPENTIN 300 MG: 300 CAPSULE ORAL at 09:08

## 2017-08-14 RX ADMIN — INSULIN ASPART 3 UNITS: 100 INJECTION, SOLUTION INTRAVENOUS; SUBCUTANEOUS at 07:08

## 2017-08-14 RX ADMIN — INSULIN ASPART 3 UNITS: 100 INJECTION, SOLUTION INTRAVENOUS; SUBCUTANEOUS at 02:08

## 2017-08-14 RX ADMIN — GADOBUTROL 10 ML: 604.72 INJECTION INTRAVENOUS at 05:08

## 2017-08-14 RX ADMIN — SODIUM CHLORIDE: 0.9 INJECTION, SOLUTION INTRAVENOUS at 08:08

## 2017-08-14 RX ADMIN — ENOXAPARIN SODIUM 40 MG: 100 INJECTION SUBCUTANEOUS at 07:08

## 2017-08-14 RX ADMIN — AMITRIPTYLINE HYDROCHLORIDE 25 MG: 25 TABLET, FILM COATED ORAL at 09:08

## 2017-08-14 RX ADMIN — SODIUM CHLORIDE 1000 ML: 0.9 INJECTION, SOLUTION INTRAVENOUS at 01:08

## 2017-08-14 NOTE — ASSESSMENT & PLAN NOTE
- including left mouth droop, change in gait   - CT of head negative   - MRI brain / MRA Brain without contrast / MRA neck with contrast    - Neuro consult pending

## 2017-08-14 NOTE — ASSESSMENT & PLAN NOTE
-   Lab Results   Component Value Date    HGBA1C 11.9 (H) 10/28/2016     -likely 2/2 poor compliance with medications and diet   - blood glucose upon admission 346, no DKA (no anion gap)  - 1L NS administered in ED   - 2nd L NS to be administered upon admission, followed by NS running at 75cc/hour  - diabetic diet  - SSRI   - accuchecks AC/HS  - A1C pending

## 2017-08-14 NOTE — HPI
"Ms. Beatriz Adame is a 67 y.o. female, with PMH of CVA (no deficits), IDDM-II with neuropathy, HTN, HLP, who presented to the ED with symptoms reminiscent of her previous stroke on 8/14/17. She states when she awoke, her legs were numb and she was unable to move them to get out of bed. When she did get out of bed, she had a wobbly gait.  Additionally, she noted a crooked smile, with the left side of the mouth drooping, and had "sluggish speech." She denies light headedness, dizziness, headaches, vision changes from baseline (baseline is blurred vision 2/2 uncorrected nearsightedness), or baseline tinnitus.   "

## 2017-08-14 NOTE — ASSESSMENT & PLAN NOTE
- moderately adequate control   - continue home meds:    - amlodipine (NORVASC) 10 MG tablet QD    - carvedilol (COREG) 25 MG tablet BID   - hydralazine for SBP >170

## 2017-08-14 NOTE — ED NOTES
"Patient identifiers verified and correct for Beatriz Adame.    LOC: The patient is awake, alert and aware of environment with an appropriate affect, the patient is oriented x 3 and speaking appropriately.  APPEARANCE: Patient resting comfortably and in no acute distress, patient is clean and well groomed, patient's clothing is properly fastened.  SKIN: The skin is warm and dry, color consistent with ethnicity, lips dry and cracked, skin intact, no breakdown or bruising noted.  MUSCULOSKELETAL: Patient moving all extremities spontaneously, no obvious swelling or deformities noted. Pt reports feeling "wobbly" when walking this morning, upon assessment pt able to ambulate with steady gait   RESPIRATORY: Airway is open and patent, respirations are spontaneous, patient has a normal effort and rate, no accessory muscle use noted, bilateral breath sounds clear and equal, denies SOB  CARDIAC: Patient has a normal rate and regular rhythm, no periphreal edema noted, capillary refill < 3 seconds. Denies CP  ABDOMEN: Soft and non tender to palpation, no distention noted. Denies N/V/D  NEUROLOGIC: PERRL, 2mm bilaterally, eyes open spontaneously, behavior appropriate to situation, follows commands, facial expression symmetrical, bilateral hand grasp equal and even, purposeful motor response noted, normal sensation in all extremities when touched with a finger, no drift on bilateral upper and lower extremities, denies headache, vision changes, pt reports dizziness x 45 minutes    "

## 2017-08-14 NOTE — PLAN OF CARE
08/14/17 1536   ED Admissions Case Approval   ED Admissions Case Approval (!) CM Approved  (OBS )

## 2017-08-14 NOTE — ED PROVIDER NOTES
Encounter Date: 8/14/2017       History     Chief Complaint   Patient presents with    Dizziness     x45 min, orthostatic in nature, ambulatory  for EMS w/ reported steady gait     HPI  Review of patient's allergies indicates:   Allergen Reactions    Tomato (solanum lycopersicum) Hives and Itching     Past Medical History:   Diagnosis Date    Arthritis     Diabetes mellitus     Encounter for blood transfusion     Hypercholesteremia     Hypertension     Stroke      Past Surgical History:   Procedure Laterality Date    TUBAL LIGATION       Family History   Problem Relation Age of Onset    Arthritis Mother     Diabetes Mother     Heart disease Mother     Hypertension Mother     Kidney disease Mother     Stroke Mother     Vision loss Mother     Alcohol abuse Father     Diabetes Sister     Asthma Brother     Diabetes Brother     Early death Brother     Heart disease Brother      Social History   Substance Use Topics    Smoking status: Never Smoker    Smokeless tobacco: Never Used    Alcohol use No      Comment: socially     Review of Systems    Physical Exam     Initial Vitals   BP Pulse Resp Temp SpO2   08/14/17 1117 08/14/17 1117 08/14/17 1117 08/14/17 1117 08/14/17 1117   (!) 168/78 88 16 98.8 °F (37.1 °C) 100 %      MAP       08/14/17 1117       108         Physical Exam    ED Course   Procedures  Labs Reviewed   CBC W/ AUTO DIFFERENTIAL - Abnormal; Notable for the following:        Result Value    RBC 3.78 (*)     Hemoglobin 11.1 (*)     Hematocrit 33.1 (*)     All other components within normal limits   COMPREHENSIVE METABOLIC PANEL - Abnormal; Notable for the following:     Glucose 346 (*)     Albumin 3.3 (*)     eGFR if  45 (*)     eGFR if non  39 (*)     All other components within normal limits   TSH - Abnormal; Notable for the following:     TSH 4.166 (*)     All other components within normal limits   LIPID PANEL - Abnormal; Notable for the following:      Cholesterol 210 (*)     Triglycerides 219 (*)     All other components within normal limits   URINALYSIS - Abnormal; Notable for the following:     Protein, UA 2+ (*)     Glucose, UA 3+ (*)     Occult Blood UA Trace (*)     All other components within normal limits   URINALYSIS MICROSCOPIC - Abnormal; Notable for the following:     Yeast, UA Occasional (*)     Non-Squam Epith 2 (*)     All other components within normal limits   POCT GLUCOSE - Abnormal; Notable for the following:     POCT Glucose 324 (*)     All other components within normal limits   POCT GLUCOSE - Abnormal; Notable for the following:     POCT Glucose 281 (*)     All other components within normal limits   PROTIME-INR   TROPONIN I   T4, FREE   POCT GLUCOSE                               ED Course     Clinical Impression:   The encounter diagnosis was Dizziness.        1. Dizziness

## 2017-08-14 NOTE — SUBJECTIVE & OBJECTIVE
Past Medical History:   Diagnosis Date    Arthritis     Diabetes mellitus     Encounter for blood transfusion     Hypercholesteremia     Hypertension     Stroke        Past Surgical History:   Procedure Laterality Date    TUBAL LIGATION         Review of patient's allergies indicates:   Allergen Reactions    Tomato (solanum lycopersicum) Hives and Itching       No current facility-administered medications on file prior to encounter.      Current Outpatient Prescriptions on File Prior to Encounter   Medication Sig    amitriptyline (ELAVIL) 25 MG tablet Take 1 tablet (25 mg total) by mouth nightly as needed for Insomnia.    carvedilol (COREG) 25 MG tablet Take 1 tablet (25 mg total) by mouth 2 (two) times daily.    gabapentin (NEURONTIN) 300 MG capsule Take 1 capsule (300 mg total) by mouth 3 (three) times daily.    insulin glargine (LANTUS SOLOSTAR) 100 unit/mL (3 mL) InPn pen Inject 15 Units into the skin every evening. (Patient taking differently: Inject 30 Units into the skin every evening. )    insulin lispro (HUMALOG KWIKPEN) 100 unit/mL InPn pen Inject 3 Units into the skin 3 (three) times daily before meals. (Patient taking differently: Inject 5 Units into the skin 3 (three) times daily before meals. )    [DISCONTINUED] amlodipine (NORVASC) 2.5 MG tablet Take 10 mg by mouth once daily.     [DISCONTINUED] ondansetron (ZOFRAN-ODT) 4 MG TbDL Take 1 tablet (4 mg total) by mouth every 8 (eight) hours as needed (nausea).     Family History     Problem Relation (Age of Onset)    Alcohol abuse Father    Arthritis Mother    Asthma Brother    Diabetes Mother, Sister, Brother    Early death Brother    Heart disease Mother, Brother    Hypertension Mother    Kidney disease Mother    Stroke Mother    Vision loss Mother        Social History Main Topics    Smoking status: Never Smoker    Smokeless tobacco: Never Used    Alcohol use No      Comment: socially    Drug use: No    Sexual activity: Not  Currently     Review of Systems   Constitutional: Negative for chills, diaphoresis and fever.   HENT: Positive for tinnitus. Negative for trouble swallowing and voice change.    Eyes: Negative for photophobia and visual disturbance.   Respiratory: Negative for cough, shortness of breath and wheezing.    Cardiovascular: Negative for chest pain, palpitations and leg swelling.   Gastrointestinal: Negative for abdominal pain, constipation, diarrhea, nausea and vomiting.   Genitourinary: Negative for enuresis.   Neurological: Positive for facial asymmetry, speech difficulty and weakness. Negative for dizziness, syncope, light-headedness, numbness and headaches.     Objective:     Vital Signs (Most Recent):  Temp: 98.8 °F (37.1 °C) (08/14/17 1117)  Pulse: 78 (08/14/17 1526)  Resp: 16 (08/14/17 1526)  BP: (!) 159/70 (08/14/17 1526)  SpO2: 99 % (08/14/17 1526) Vital Signs (24h Range):  Temp:  [98.8 °F (37.1 °C)] 98.8 °F (37.1 °C)  Pulse:  [74-88] 78  Resp:  [16-18] 16  SpO2:  [99 %-100 %] 99 %  BP: (136-168)/(65-78) 159/70     Weight: 72.6 kg (160 lb)  Body mass index is 25.06 kg/m².    Physical Exam   Constitutional: She is oriented to person, place, and time. She appears well-developed and well-nourished. No distress.   HENT:   Head: Normocephalic and atraumatic.   Eyes: Conjunctivae and EOM are normal. Pupils are equal, round, and reactive to light. No scleral icterus.   Neck: Normal range of motion. Neck supple. No JVD present. No tracheal deviation present.   Cardiovascular: Normal rate, regular rhythm, normal heart sounds and intact distal pulses.  Exam reveals no gallop and no friction rub.    No murmur heard.  Pulmonary/Chest: Effort normal and breath sounds normal. No stridor. No respiratory distress. She has no wheezes. She has no rales.   Abdominal: Soft. Bowel sounds are normal. She exhibits no distension. There is no tenderness. There is no guarding.   Musculoskeletal: Normal range of motion. She exhibits no  deformity.   Neurological: She is alert and oriented to person, place, and time. A cranial nerve deficit (left sided mouth droop) is present. She exhibits normal muscle tone. Coordination normal.   The patient was alert, relaxed and cooperative with coherent thought process. AAOx4. CN II-XII were intact. The patient had good muscle bulk and tone with 5/5 strength throughout. Good finger-to-nose task ability. Gait had a shortened and shuffling base. Romberg was normal. Sensation was intact to light touch.     Skin: Skin is warm and dry. No rash noted. She is not diaphoretic. No erythema. No pallor.   Psychiatric: She has a normal mood and affect. Her behavior is normal. Judgment and thought content normal.   Nursing note and vitals reviewed.       Significant Labs:   BMP:   Recent Labs  Lab 08/14/17  1112   *      K 4.2      CO2 24   BUN 18   CREATININE 1.4   CALCIUM 9.6     CBC:   Recent Labs  Lab 08/14/17  1112   WBC 12.29   HGB 11.1*   HCT 33.1*        CMP:   Recent Labs  Lab 08/14/17  1112      K 4.2      CO2 24   *   BUN 18   CREATININE 1.4   CALCIUM 9.6   PROT 7.7   ALBUMIN 3.3*   BILITOT 0.7   ALKPHOS 104   AST 16   ALT 16   ANIONGAP 14   EGFRNONAA 39*     All pertinent labs within the past 24 hours have been reviewed.    Significant Imaging: I have reviewed all pertinent imaging results/findings within the past 24 hours.

## 2017-08-14 NOTE — ED NOTES
"Pt to ED c/o dizziness x 45 minutes. Pt reports hx of CVA in past 2 years with no remaining deficits. Pt states "I woke up and was walking around and started feeling like I was drunk just wobbling". Pt denies blurred vision or vision changes, headache, pain, SOB, CP, fever, chills, N/V/D, cough congestion. Pt AAOx4 and appropriate at this time. Respirations even and unlabored. No acute distress noted.  Awaiting further orders. Pt updated on POC. Bed is locked and in lowest position with side rails up x2. Call bell within reach and pt oriented to use of call bell. Pt on continuous cardiac monitoring, continuous pulse ox, and continuous BP cuff. Fall risk and allergy bands applied per protocol. Will continue to monitor.     "

## 2017-08-14 NOTE — ASSESSMENT & PLAN NOTE
-   Lab Results   Component Value Date    CHOL 210 (H) 08/14/2017    CHOL 225 (H) 06/02/2014    CHOL 254 (H) 01/19/2013     Lab Results   Component Value Date    HDL 43 08/14/2017    HDL 41 06/02/2014    HDL 43 01/19/2013     Lab Results   Component Value Date    LDLCALC 123.2 08/14/2017    LDLCALC 155.4 06/02/2014    LDLCALC 185.0 (H) 01/19/2013     Lab Results   Component Value Date    TRIG 219 (H) 08/14/2017    TRIG 143 06/02/2014    TRIG 131 01/19/2013     Lab Results   Component Value Date    CHOLHDL 20.5 08/14/2017    CHOLHDL 18.2 (L) 06/02/2014    CHOLHDL 16.9 (L) 01/19/2013     - continue home meds: atorvastatin (LIPITOR) 80 MG tablet QD

## 2017-08-14 NOTE — ED PROVIDER NOTES
"Encounter Date: 8/14/2017    SCRIBE #1 NOTE: I, Wanda Snider , am scribing for, and in the presence of,  Dr. Naidu . I have scribed the entire note.       History     Chief Complaint   Patient presents with    Dizziness     x45 min, orthostatic in nature, ambulatory  for EMS w/ reported steady gait       08/14/2017  11:01 AM     Chief Complaint: Dizziness       The patient is a 67 y.o. female who is presenting per EMS with the acute onset of dizziness that began x 1 hour PTA after getting up from bed. The pt reports that she feels lightheaded, dizzy, and "off balance when she walks" as well as bilaterally LE weakness. The pt denies experiencing any changes in speech, facial, asymmetry or unilateral focal neurological deficits. No exacerbating factors. Of note pt reports that her previous stroke presented with similar sx. Pertinent PMHx includes arthritis, DM, HTN, and stroke. No pertinent past surgical hx.            The history is provided by the patient, the EMS personnel and medical records.     Review of patient's allergies indicates:   Allergen Reactions    Tomato (solanum lycopersicum) Hives and Itching     Past Medical History:   Diagnosis Date    Arthritis     Diabetes mellitus     Encounter for blood transfusion     Hypercholesteremia     Hypertension     Stroke      Past Surgical History:   Procedure Laterality Date    TUBAL LIGATION       Family History   Problem Relation Age of Onset    Arthritis Mother     Diabetes Mother     Heart disease Mother     Hypertension Mother     Kidney disease Mother     Stroke Mother     Vision loss Mother     Alcohol abuse Father     Diabetes Sister     Asthma Brother     Diabetes Brother     Early death Brother     Heart disease Brother      Social History   Substance Use Topics    Smoking status: Never Smoker    Smokeless tobacco: Never Used    Alcohol use No      Comment: socially     Review of Systems   Constitutional: Negative for fever. "   HENT: Negative for sore throat.    Eyes: Negative for visual disturbance.   Respiratory: Negative for shortness of breath.    Cardiovascular: Negative for chest pain.   Gastrointestinal: Negative for nausea.   Genitourinary: Negative for dysuria.   Musculoskeletal: Negative for back pain.   Skin: Negative for rash.   Neurological: Positive for dizziness, weakness (BLE) and light-headedness.   Hematological: Does not bruise/bleed easily.   Psychiatric/Behavioral: Negative for confusion.       Physical Exam     Initial Vitals   BP Pulse Resp Temp SpO2   -- -- -- -- --      MAP       --         Physical Exam    Nursing note and vitals reviewed.  Constitutional: She appears well-developed and well-nourished.   HENT:   Head: Normocephalic and atraumatic.   Eyes: EOM are normal. Pupils are equal, round, and reactive to light.   Neck: Normal range of motion. Neck supple.   Pulmonary/Chest: Breath sounds normal. She has no wheezes. She has no rhonchi. She has no rales.   Abdominal: Soft. She exhibits no distension.   Musculoskeletal: Normal range of motion.   Neurological: She is alert and oriented to person, place, and time. She has normal strength. No sensory deficit.   Normal speech. No facial droop. Pt had a negative Romberg. Very mild ataxic gait. Full strength and sensation to all extremities.    Skin: Skin is warm and dry. Capillary refill takes less than 2 seconds.         ED Course   Procedures  Labs Reviewed   CBC W/ AUTO DIFFERENTIAL - Abnormal; Notable for the following:        Result Value    RBC 3.78 (*)     Hemoglobin 11.1 (*)     Hematocrit 33.1 (*)     All other components within normal limits   COMPREHENSIVE METABOLIC PANEL - Abnormal; Notable for the following:     Glucose 346 (*)     Albumin 3.3 (*)     eGFR if  45 (*)     eGFR if non  39 (*)     All other components within normal limits   TSH - Abnormal; Notable for the following:     TSH 4.166 (*)     All other  components within normal limits   URINALYSIS - Abnormal; Notable for the following:     Protein, UA 2+ (*)     Glucose, UA 3+ (*)     Occult Blood UA Trace (*)     All other components within normal limits   URINALYSIS MICROSCOPIC - Abnormal; Notable for the following:     Yeast, UA Occasional (*)     Non-Squam Epith 2 (*)     All other components within normal limits   POCT GLUCOSE - Abnormal; Notable for the following:     POCT Glucose 324 (*)     All other components within normal limits   PROTIME-INR   TROPONIN I   T4, FREE   LIPID PANEL   POCT GLUCOSE     EKG Readings: (Independently Interpreted)   Rhythm: Normal Sinus Rhythm. Heart Rate: 68. Ectopy: No Ectopy. Conduction: Normal. ST Segments: Normal ST Segments. T Waves: Normal. Clinical Impression: Normal Sinus Rhythm          Medical Decision Making:   History:   Old Medical Records: I decided to obtain old medical records.  Old Records Summarized: records from previous admission(s).       <> Summary of Records: 2006 the pt was admitted for DKA   2014 TIA and underwent stroke evaluation with unremarkable MRI     ED Management:  Beatriz Adame is a 67 y.o. female  has symptoms consistent with a stroke/TIA. The patient was last seen normal at 1000am  per self . I have independently reviewed the CT brain at 11:22am  and it shows no acute bleed or pathology.  This is a preliminary reading contingent upon confirmation by radiology or vascular neurology prior to tPA.  tPA is not indicated due to to minimal symptoms after discussion with Dr Gale, neurology, at 11:20am. He recommended monitor and MRI/MRA head and neck.     11:10 contacted transfer center about neurological consult.     1:20 PM reviewed the blood work that'll scans.  CT is negative for acute injury.  The patient's glucose is 324 otherwise no significant abnormality.  Patient started on fluids.  We'll speak with Hospital medicine for further admission.  Updated the patient and answered all  questions.                        Scribe Attestation:   Scribe #1: I performed the above scribed service and the documentation accurately describes the services I performed. I attest to the accuracy of the note.    Attending Attestation:           Physician Attestation for Scribe:  Physician Attestation Statement for Scribe #1: I, Dr. Naidu , reviewed documentation, as scribed by Wanda Snider  in my presence, and it is both accurate and complete.                 ED Course     Clinical Impression:     1. Dizziness                              Kash Naidu, DO  08/14/17 1769

## 2017-08-15 VITALS
BODY MASS INDEX: 25.11 KG/M2 | HEART RATE: 78 BPM | WEIGHT: 160 LBS | RESPIRATION RATE: 18 BRPM | OXYGEN SATURATION: 99 % | SYSTOLIC BLOOD PRESSURE: 144 MMHG | HEIGHT: 67 IN | DIASTOLIC BLOOD PRESSURE: 67 MMHG | TEMPERATURE: 99 F

## 2017-08-15 LAB
ANION GAP SERPL CALC-SCNC: 13 MMOL/L
BASOPHILS # BLD AUTO: ABNORMAL K/UL
BASOPHILS NFR BLD: 0 %
BUN SERPL-MCNC: 14 MG/DL
CALCIUM SERPL-MCNC: 9.4 MG/DL
CHLORIDE SERPL-SCNC: 103 MMOL/L
CO2 SERPL-SCNC: 23 MMOL/L
CREAT SERPL-MCNC: 1.2 MG/DL
DIFFERENTIAL METHOD: ABNORMAL
EOSINOPHIL # BLD AUTO: ABNORMAL K/UL
EOSINOPHIL NFR BLD: 3 %
ERYTHROCYTE [DISTWIDTH] IN BLOOD BY AUTOMATED COUNT: 13.8 %
EST. GFR  (AFRICAN AMERICAN): 54 ML/MIN/1.73 M^2
EST. GFR  (NON AFRICAN AMERICAN): 47 ML/MIN/1.73 M^2
ESTIMATED AVG GLUCOSE: 280 MG/DL
GLUCOSE SERPL-MCNC: 311 MG/DL
HBA1C MFR BLD HPLC: 11.4 %
HCT VFR BLD AUTO: 32.9 %
HGB BLD-MCNC: 11.5 G/DL
LYMPHOCYTES # BLD AUTO: ABNORMAL K/UL
LYMPHOCYTES NFR BLD: 34 %
MCH RBC QN AUTO: 30.6 PG
MCHC RBC AUTO-ENTMCNC: 35 G/DL
MCV RBC AUTO: 88 FL
MONOCYTES # BLD AUTO: ABNORMAL K/UL
MONOCYTES NFR BLD: 5 %
NEUTROPHILS # BLD AUTO: ABNORMAL K/UL
NEUTROPHILS NFR BLD: 58 %
PLATELET # BLD AUTO: 172 K/UL
PLATELET BLD QL SMEAR: ABNORMAL
PMV BLD AUTO: 12.7 FL
POCT GLUCOSE: 238 MG/DL (ref 70–110)
POCT GLUCOSE: 267 MG/DL (ref 70–110)
POTASSIUM SERPL-SCNC: 3.5 MMOL/L
RBC # BLD AUTO: 3.76 M/UL
SODIUM SERPL-SCNC: 139 MMOL/L
WBC # BLD AUTO: 12.6 K/UL

## 2017-08-15 PROCEDURE — 97161 PT EVAL LOW COMPLEX 20 MIN: CPT

## 2017-08-15 PROCEDURE — G8988 SELF CARE GOAL STATUS: HCPCS | Mod: CI

## 2017-08-15 PROCEDURE — G8978 MOBILITY CURRENT STATUS: HCPCS | Mod: CJ

## 2017-08-15 PROCEDURE — 36415 COLL VENOUS BLD VENIPUNCTURE: CPT

## 2017-08-15 PROCEDURE — 25000003 PHARM REV CODE 250: Performed by: PHYSICIAN ASSISTANT

## 2017-08-15 PROCEDURE — 97116 GAIT TRAINING THERAPY: CPT

## 2017-08-15 PROCEDURE — 85027 COMPLETE CBC AUTOMATED: CPT

## 2017-08-15 PROCEDURE — G0378 HOSPITAL OBSERVATION PER HR: HCPCS

## 2017-08-15 PROCEDURE — 83036 HEMOGLOBIN GLYCOSYLATED A1C: CPT

## 2017-08-15 PROCEDURE — 97165 OT EVAL LOW COMPLEX 30 MIN: CPT

## 2017-08-15 PROCEDURE — 25000003 PHARM REV CODE 250: Performed by: HOSPITALIST

## 2017-08-15 PROCEDURE — 85007 BL SMEAR W/DIFF WBC COUNT: CPT | Mod: NCS

## 2017-08-15 PROCEDURE — 80048 BASIC METABOLIC PNL TOTAL CA: CPT

## 2017-08-15 PROCEDURE — 99217 PR OBSERVATION CARE DISCHARGE: CPT | Mod: ,,, | Performed by: HOSPITALIST

## 2017-08-15 PROCEDURE — G8989 SELF CARE D/C STATUS: HCPCS | Mod: CI

## 2017-08-15 PROCEDURE — G8987 SELF CARE CURRENT STATUS: HCPCS | Mod: CI

## 2017-08-15 PROCEDURE — G8979 MOBILITY GOAL STATUS: HCPCS | Mod: CI

## 2017-08-15 PROCEDURE — 63600175 PHARM REV CODE 636 W HCPCS: Performed by: PHYSICIAN ASSISTANT

## 2017-08-15 RX ORDER — ASPIRIN 325 MG
325 TABLET, DELAYED RELEASE (ENTERIC COATED) ORAL DAILY
Status: DISCONTINUED | OUTPATIENT
Start: 2017-08-15 | End: 2017-08-15 | Stop reason: HOSPADM

## 2017-08-15 RX ORDER — AMLODIPINE BESYLATE 10 MG/1
10 TABLET ORAL DAILY
Qty: 30 TABLET | Refills: 0 | Status: SHIPPED | OUTPATIENT
Start: 2017-08-15 | End: 2021-01-01

## 2017-08-15 RX ORDER — INSULIN GLARGINE 100 [IU]/ML
30 INJECTION, SOLUTION SUBCUTANEOUS NIGHTLY
Qty: 9 ML | Refills: 0 | Status: SHIPPED | OUTPATIENT
Start: 2017-08-15 | End: 2018-08-15

## 2017-08-15 RX ORDER — ASPIRIN 325 MG
325 TABLET, DELAYED RELEASE (ENTERIC COATED) ORAL DAILY
Refills: 0 | COMMUNITY
Start: 2017-08-15 | End: 2021-01-01

## 2017-08-15 RX ORDER — AMITRIPTYLINE HYDROCHLORIDE 25 MG/1
25 TABLET, FILM COATED ORAL NIGHTLY
Qty: 30 TABLET | Refills: 0 | Status: SHIPPED | OUTPATIENT
Start: 2017-08-15 | End: 2021-01-01

## 2017-08-15 RX ORDER — CARVEDILOL 25 MG/1
25 TABLET ORAL 2 TIMES DAILY
Qty: 60 TABLET | Refills: 0 | Status: ON HOLD | OUTPATIENT
Start: 2017-08-15 | End: 2022-01-01

## 2017-08-15 RX ORDER — ATORVASTATIN CALCIUM 80 MG/1
80 TABLET, FILM COATED ORAL DAILY
Qty: 30 TABLET | Refills: 0 | Status: SHIPPED | OUTPATIENT
Start: 2017-08-15 | End: 2021-01-01

## 2017-08-15 RX ORDER — ACETAMINOPHEN 325 MG/1
650 TABLET ORAL EVERY 8 HOURS PRN
Refills: 0 | COMMUNITY
Start: 2017-08-15 | End: 2021-01-01

## 2017-08-15 RX ORDER — GABAPENTIN 300 MG/1
300 CAPSULE ORAL 3 TIMES DAILY
Qty: 90 CAPSULE | Refills: 0 | Status: ON HOLD | OUTPATIENT
Start: 2017-08-15 | End: 2021-01-01

## 2017-08-15 RX ORDER — INSULIN LISPRO 100 [IU]/ML
5 INJECTION, SOLUTION INTRAVENOUS; SUBCUTANEOUS
Qty: 4.5 ML | Refills: 0 | Status: SHIPPED | OUTPATIENT
Start: 2017-08-15 | End: 2021-01-01

## 2017-08-15 RX ORDER — HYDROCHLOROTHIAZIDE 12.5 MG/1
12.5 TABLET ORAL DAILY
Qty: 30 TABLET | Refills: 0 | Status: SHIPPED | OUTPATIENT
Start: 2017-08-15 | End: 2021-01-01

## 2017-08-15 RX ADMIN — INSULIN DETEMIR 20 UNITS: 100 INJECTION, SOLUTION SUBCUTANEOUS at 08:08

## 2017-08-15 RX ADMIN — INSULIN ASPART 3 UNITS: 100 INJECTION, SOLUTION INTRAVENOUS; SUBCUTANEOUS at 12:08

## 2017-08-15 RX ADMIN — INSULIN ASPART 2 UNITS: 100 INJECTION, SOLUTION INTRAVENOUS; SUBCUTANEOUS at 08:08

## 2017-08-15 RX ADMIN — FAMOTIDINE 20 MG: 20 TABLET, FILM COATED ORAL at 08:08

## 2017-08-15 RX ADMIN — POLYETHYLENE GLYCOL 3350 17 G: 17 POWDER, FOR SOLUTION ORAL at 08:08

## 2017-08-15 RX ADMIN — AMLODIPINE BESYLATE 10 MG: 5 TABLET ORAL at 08:08

## 2017-08-15 RX ADMIN — GABAPENTIN 300 MG: 300 CAPSULE ORAL at 01:08

## 2017-08-15 RX ADMIN — GABAPENTIN 300 MG: 300 CAPSULE ORAL at 05:08

## 2017-08-15 RX ADMIN — CARVEDILOL 25 MG: 12.5 TABLET, FILM COATED ORAL at 08:08

## 2017-08-15 RX ADMIN — ASPIRIN 325 MG: 325 TABLET, DELAYED RELEASE ORAL at 12:08

## 2017-08-15 RX ADMIN — INSULIN ASPART 3 UNITS: 100 INJECTION, SOLUTION INTRAVENOUS; SUBCUTANEOUS at 08:08

## 2017-08-15 RX ADMIN — ATORVASTATIN CALCIUM 80 MG: 20 TABLET, FILM COATED ORAL at 08:08

## 2017-08-15 RX ADMIN — FLUCONAZOLE 150 MG: 150 TABLET ORAL at 08:08

## 2017-08-15 NOTE — DISCHARGE INSTRUCTIONS
Thank you for choosing [facility] as your Health Care Provider. [facility] strives to provide the best healthcare available to you. In the next few days you may receive a Survey, either by mail or email,  asking you to rate our care that was provided to you during your stay.  Please return the survey to us, as your feedback is important. We aim to meet your expectations of safe, quality health care.    From your [facility] Care Team.      Understanding Type 2 Diabetes  When your body is working normally, the food you eat is digested and used as fuel. This fuel supplies energy to the bodys cells. When you have diabetes, the fuel cant enter the cells. Without treatment, diabetes can cause serious long-term health problems.     Your body breaks down the food you eat into glucose.         How the body gets energy  The digestive system breaks down food, resulting in a sugar called glucose. Some of this glucose is stored in the liver. But most of it enters the bloodstream and travels to the cells to be used as fuel. Glucose needs the help of a hormone called insulin to enter the cells. Insulin is made in the pancreas. It is released into the bloodstream in response to the presence of glucose in the blood. Think of insulin as a key. When insulin reaches a cell, it attaches to the cell wall. This signals the cell to create an opening that allows glucose to enter the cell.  When you have type 2 diabetes  Early in type 2 diabetes, your cells dont respond properly to insulin. Because of this, less glucose than normal moves into cells. This is called insulin resistance. In response, the pancreas makes more insulin. But eventually, the pancreas cant produce enough insulin to overcome insulin resistance. As less and less glucose enters cells, it builds up to a harmful level in the bloodstream. This is known as high blood sugar or hyperglycemia. The result is type 2 diabetes. The cells become starved  for energy, which can leave you feeling tired and rundown.  Why high blood sugar is a problem  If high blood sugar is not controlled, blood vessels throughout the body become damaged. Prolonged high blood sugar affects organs, blood vessels, and nerves. As a result, the risks of damage to the heart, kidneys, eyes, and limbs increase. Diabetes also makes other problems, such as high blood pressure and high cholesterol, more dangerous. Over time, people with uncontrolled high blood sugar have an increase in risk of dying of, or being disabled by, heart attack or stroke.  Date Last Reviewed: 7/1/2016  © 6450-7683 [employer]. [address] [place] [address], [place] [address]. All rights reserved. This information is not intended as a substitute for professional medical care. Always follow your healthcare professional's instructions.

## 2017-08-15 NOTE — PLAN OF CARE
Problem: Physical Therapy Goal  Goal: Physical Therapy Goal  Goals to be met by 8-19-17.  1. Sup<>sit mod I  2. Sit<>stand with SC mod I  3. amb 150' with SC mod I  4. Up/down 3 steps B rail mod I  -    Comments: Physical therapy eval completed.  Recommend home with HH PT   Will need RW for home use.

## 2017-08-15 NOTE — PLAN OF CARE
[facility]    HOME HEALTH ORDERS  FACE TO FACE ENCOUNTER    Patient Name: Beatriz Aadme  YOB: 1950    PCP: Medardo Paulino NP   PCP Address: [address] [place] [address] / [place]IVERA  PCP Phone Number: [phone]  PCP Fax: [phone]    Encounter Date: 08/15/2017    Admit to Home Health    Diagnoses:  Active Hospital Problems    Diagnosis  POA    *Stroke-like symptoms [R29.90]  Yes    Yeast infection [B37.9]  Yes    Type 2 diabetes mellitus with hyperosmolarity, uncontrolled [E11.00, E11.65]  Yes    HLD (hyperlipidemia) [E78.5]  Yes    HTN (hypertension) [I10]  Yes      Resolved Hospital Problems    Diagnosis Date Resolved POA   No resolved problems to display.       No future appointments.  Follow-up Information     Medardo Paulino NP. Schedule an appointment as soon as possible for a visit in 1 week.    Specialty:  Family Medicine  Contact information:  [address] [place] [address]  [place] [address]  [phone]             Please follow up.    Why:  Repeat MRI in 2 weeks and improving co-morbidies including hypertension and diabetes.                   I have seen and examined this patient face to face today. My clinical findings that support the need for the home health skilled services and home bound status are the following:  Weakness/numbness causing balance and gait disturbance due to Weakness/Debility making it taxing to leave home.    Allergies:  Review of patient's allergies indicates:   Allergen Reactions    Tomato (solanum lycopersicum) Hives and Itching       Diet: diabetic diet: 1800 calorie    Activities: activity as tolerated    Nursing:   SN to complete comprehensive assessment including routine vital signs. Instruct on disease process and s/s of complications to report to MD. Review/verify medication list sent home with the patient at time of discharge  and instruct patient/caregiver as needed.  Frequency may be adjusted depending on start of care date.    Notify MD if SBP > 160 or < 90; DBP > 90 or < 50; HR > 120 or < 50; Temp > 101;      CONSULTS:    Physical Therapy to evaluate and treat. Evaluate for home safety and equipment needs; Establish/upgrade home exercise program. Perform / instruct on therapeutic exercises, gait training, transfer training, and Range of Motion.  Occupational Therapy to evaluate and treat. Evaluate home environment for safety and equipment needs. Perform/Instruct on transfers, ADL training, ROM, and therapeutic exercises.   to evaluate for community resources/long-range planning.  Aide to provide assistance with personal care, ADLs, and vital signs.    MISCELLANEOUS CARE:  Diabetic Care:   SN to perform and educate Diabetic management with blood glucose monitoring:, Fingerstick blood sugar AC and HS and Report CBG < 60 or > 350 to physician.    Medications: Review discharge medications with patient and family and provide education.      Current Discharge Medication List      START taking these medications    Details   acetaminophen (TYLENOL) 325 MG tablet Take 2 tablets (650 mg total) by mouth every 8 (eight) hours as needed.  Refills: 0      aspirin (ECOTRIN) 325 MG EC tablet Take 1 tablet (325 mg total) by mouth once daily.  Refills: 0      hydrochlorothiazide (HYDRODIURIL) 12.5 MG Tab Take 1 tablet (12.5 mg total) by mouth once daily.  Qty: 30 tablet, Refills: 0         CONTINUE these medications which have CHANGED    Details   amitriptyline (ELAVIL) 25 MG tablet Take 1 tablet (25 mg total) by mouth every evening.  Qty: 30 tablet, Refills: 0      amlodipine (NORVASC) 10 MG tablet Take 1 tablet (10 mg total) by mouth once daily.  Qty: 30 tablet, Refills: 0      atorvastatin (LIPITOR) 80 MG tablet Take 1 tablet (80 mg total) by mouth once daily.  Qty: 30 tablet, Refills: 0      carvedilol (COREG) 25 MG tablet Take 1 tablet (25 mg total) by mouth 2 (two) times  daily.  Qty: 60 tablet, Refills: 0      gabapentin (NEURONTIN) 300 MG capsule Take 1 capsule (300 mg total) by mouth 3 (three) times daily.  Qty: 90 capsule, Refills: 0      insulin glargine (LANTUS SOLOSTAR) 100 unit/mL (3 mL) InPn pen Inject 30 Units into the skin every evening.  Qty: 9 mL, Refills: 0      insulin lispro (HUMALOG KWIKPEN) 100 unit/mL InPn pen Inject 5 Units into the skin 3 (three) times daily before meals.  Qty: 4.5 mL, Refills: 0    Associated Diagnoses: Controlled type 2 diabetes mellitus with hyperglycemia, with long-term current use of insulin         STOP taking these medications       omeprazole (PRILOSEC) 20 MG capsule Comments:   Reason for Stopping:               I certify that this patient is confined to her home and needs intermittent skilled nursing care, physical therapy and occupational therapy.

## 2017-08-15 NOTE — PLAN OF CARE
08/15/17 1302   Final Note   Assessment Type Final Discharge Note   Discharge Disposition Home-Health   Discharge planning education complete? Yes   Hospital Follow Up  Appt(s) scheduled? No   Discharge plans and expectations educations in teach back method with documentation complete? Yes   Offered [facility] -- Bedside Delivery? n/a   Discharge/Hospital Encounter Summary to (non[facility]) PCP n/a   Referral to Outpatient Case Management complete? n/a   Referral to / orders for Home Health Complete? Yes   30 day supply of medicines given at discharge, if documented non-compliance / non-adherence? n/a   Any social issues identified prior to discharge? No   Did you assess the readiness or willingness of the family or caregiver to support self management of care? Yes   Right Care Referral Info   Post Acute Recommendation Home-care

## 2017-08-15 NOTE — PLAN OF CARE
Problem: Patient Care Overview  Goal: Plan of Care Review  Outcome: Ongoing (interventions implemented as appropriate)  Patient lying in bed watching television. Denies any discomfort. No distress noted. Sinus rhythm on telemetry. She has had 2 incontinent episodes of urine where she urinated on the floor. Bed alarm on, call light within reach. Encouraged patient to call for any and all needs. She verbalized understanding of instructions. Will continue to monitor patient.

## 2017-08-15 NOTE — PT/OT/SLP EVAL
Physical Therapy  Evaluation and Treatment    Beatriz Adame   MRN: [id number]   Admitting Diagnosis: Stroke-like symptoms    PT Received On: 08/15/17  PT Start Time: 0844     PT Stop Time: 0915    PT Total Time (min): 31 min       Billable Minutes:  Evaluation 16 and Gait [id number]    Diagnosis: Stroke-like symptoms      Past Medical History:   Diagnosis Date    Arthritis     Diabetes mellitus     Encounter for blood transfusion     Hypercholesteremia     Hypertension     Stroke       Past Surgical History:   Procedure Laterality Date    TUBAL LIGATION         Referring physician: RODERICK Islas  Date referred to PT: 8-15-17    General Precautions: Standard, fall (pt reports recent history of multiple falls)  Orthopedic Precautions: N/A   Braces:              Patient History:  Lives With: alone  Living Arrangements: apartment  Home Accessibility:  (elevator access )  Home Layout: Able to live on 1st floor  Transportation Available: family or friend will provide  Living Environment Comment: has brother that lives nearby to assist with adls  Equipment Currently Used at Home: cane, straight (occasionally)  DME owned (not currently used): none    Previous Level of Function:  Ambulation Skills: independent (has straight cane that she uses occasionally)  Transfer Skills: independent  ADL Skills: independent  Work/Leisure Activity: independent    Subjective:  Communicated with patient prior to session.    Chief Complaint: feels dizzy upon sitting and standing  Patient goals: to go home    Pain/Comfort  Pain Rating 1: 0/10      Objective:   Patient found with: peripheral IV, telemetry     Cognitive Exam:  Oriented to: Person, Place, Time and Situation    Follows Commands/attention: Follows multistep  commands  Communication: clear/fluent  Safety awareness/insight to disability: intact    Physical Exam:  Postural examination/scapula alignment: Head forward    Skin integrity: Visible skin intact  Edema: None noted B  LE    Sensation:   Intact    Upper Extremity Range of Motion:  Right Upper Extremity: WFL  Left Upper Extremity: WFL    Upper Extremity Strength:  Right Upper Extremity: WFL  Left Upper Extremity: WFL except triceps 3/5 and reduced  vs R    Lower Extremity Range of Motion:  Right Lower Extremity: WFL  Left Lower Extremity: WFL    Lower Extremity Strength:  Right Lower Extremity: WFL  Left Lower Extremity: WFL except HS and DF 3/5     Fine motor coordination:  Impaired  LLE heel shin -    Gross motor coordination: WFL    Functional Mobility:  Bed Mobility:  Supine to Sit: Stand by Assistance    Transfers:  Sit <> Stand Assistance: Contact Guard Assistance  Sit <> Stand Assistive Device: No Assistive Device    Gait:   Gait Distance: 75  Assistance 1: Contact Guard Assistance, Minimum assistance  Gait Assistive Device: No device  Gait Pattern: swing-through gait  Gait Deviation(s): decreased jarred, decreased velocity of limb motion, decreased step length, decreased toe-to-floor clearance      Balance:   Static Sit: FAIR+: Able to take MINIMAL challenges from all directions  Dynamic Sit: FAIR+: Maintains balance through MINIMAL excursions of active trunk motion  Static Stand: FAIR: Maintains without assist but unable to take challenges  Dynamic stand: FAIR+: Needs CLOSE SUPERVISION during gait and is able to right self with minor LOB      AM-PAC 6 CLICK MOBILITY  How much help from another person does this patient currently need?   1 = Unable, Total/Dependent Assistance  2 = A lot, Maximum/Moderate Assistance  3 = A little, Minimum/Contact Guard/Supervision  4 = None, Modified [place]/Independent    Turning over in bed (including adjusting bedclothes, sheets and blankets)?: 4  Sitting down on and standing up from a chair with arms (e.g., wheelchair, bedside commode, etc.): 3  Moving from lying on back to sitting on the side of the bed?: 4  Moving to and from a bed to a chair (including a wheelchair)?: 3  Need  to walk in hospital room?: 3  Climbing 3-5 steps with a railing?: 3  Total Score: 20     AM-PAC Raw Score CMS G-Code Modifier Level of Impairment Assistance   6 % Total / Unable   7 - 9 CM 80 - 100% Maximal Assist   10 - 14 CL 60 - 80% Moderate Assist   15 - 19 CK 40 - 60% Moderate Assist   20 - 22 CJ 20 - 40% Minimal Assist   23 CI 1-20% SBA / CGA   24 CH 0% Independent/ Mod I     Patient left up in chair with all lines intact, call button in reach and nurse notified.    Assessment:   Beatriz Adame is a 67 y.o. female with a medical diagnosis of Stroke-like symptoms and presents with strength and coordination deficits on L UE and LE.  She describes dizzyness upon sitting or standing that has presented in the last couple months.  In addition, she reports multiple falls in the last couple months.  Thus, she presents as a high fall risk.  She will benefit from hospital PT and HH PT to improve balance and safety.      Rehab identified problem list/impairments: Rehab identified problem list/impairments: weakness, gait instability, impaired balance, impaired endurance, impaired functional mobilty, decreased lower extremity function, decreased upper extremity function    Rehab potential is excellent.    Activity tolerance: Excellent    Discharge recommendations: Discharge Facility/Level Of Care Needs: home with home health, home health PT, home health OT     Barriers to discharge:      Equipment recommendations: Equipment Needed After Discharge: walker, rolling, bath bench     GOALS:    Physical Therapy Goals        Problem: Physical Therapy Goal    Goal Priority Disciplines Outcome Goal Variances Interventions   Physical Therapy Goal     PT/OT, PT      Description:  Goals to be met by 8-19-17.  1. Sup<>sit mod I  2. Sit<>stand with SC mod I  3. amb 150' with SC mod I  4. Up/down 3 steps B rail mod I                    PLAN:    Patient to be seen daily to address the above listed problems via gait training,  therapeutic activities, therapeutic exercises, neuromuscular re-education  Plan of Care expires: 09/15/17  Plan of Care reviewed with: patient    Functional Assessment Tool Used: AM-PAC  Score: 20  Functional Limitation: Mobility: Walking and moving around  Mobility: Walking and Moving Around Current Status (): FABIOLA  Mobility: Walking and Moving Around Goal Status (): KAREN Cowan, PT  08/15/2017

## 2017-08-15 NOTE — PLAN OF CARE
Problem: Occupational Therapy Goal  Goal: Occupational Therapy Goal  Outcome: Outcome(s) achieved Date Met: 08/15/17  Pt. demonstrated Nel -indep ADLS except simulated bathing with SBA 2/2 fall risk due to impaired standing balance. Recommended pt to sponge bath until HH able to assess and make safety recommendations.  Pt. Verbalized agreement.  OT recommend HHPT/OT/HH aide; DME rec:  Bath bench and RW.  DC OT 2/2 pt. Discharged home  And awaiting transport.

## 2017-08-15 NOTE — PLAN OF CARE
Discharge Planning    VILLAFANA form signed. DC Assessment completed. Referral sent to [facility] via [facility]. Patient agrees with choice of home health.    GAVIN PalN,RN    Care Management Dept.  [facility]  [phone]  8/15/2017 12:58 PM

## 2017-08-15 NOTE — PT/OT/SLP EVAL
Occupational Therapy  Evaluation/Discharge Summary    Beatriz Adame   MRN: [id number]   Admitting Diagnosis: Stroke-like symptoms    OT Date of Treatment: 08/15/17   OT Start Time: 1530  OT Stop Time: 1544  OT Total Time (min): 14 min    Billable Minutes:  Evaluation \14  Total Time 14    Diagnosis: Stroke-like symptoms   The primary encounter diagnosis was Dizziness. A diagnosis of Controlled type 2 diabetes mellitus with hyperglycemia, with long-term current use of insulin was also pertinent to this visit.      Past Medical History:   Diagnosis Date    Arthritis     Diabetes mellitus     Encounter for blood transfusion     Hypercholesteremia     Hypertension     Stroke       Past Surgical History:   Procedure Laterality Date    TUBAL LIGATION         Referring physician: Janel  Date referred to OT: 5/15/2017    General Precautions: Standard, fall  Orthopedic Precautions: N/A  Braces: N/A    Do you have any cultural, spiritual, Holiness conflicts, given your current situation?: none     Patient History:  Living Environment  Lives With: alone  Living Arrangements: apartment  Home Accessibility:  (elevator access)  Home Layout: Able to live on 1st floor  Transportation Available: family or friend will provide  Living Environment Comment: brother and friends provides transportation; pt has tub/shower combo with garb bars  Equipment Currently Used at Home: cane, straight (says she may have a RW but  unsure as to where it is in her home)    Prior level of function:   Bed Mobility/Transfers: needs device  Grooming: independent  Bathing: needs device  Upper Body Dressing: independent  Lower Body Dressing: independent  Toileting: independent  Home Management Skills: needs device  Homemaking Responsibilities: Yes  Meal Prep Responsibility: Primary  Laundry Responsibility: Primary  Cleaning Responsibility: Primary  Bill Paying/Finance Responsibility: Primary  Shopping Responsibility: Primary  Driving License:  No  Mode of Transportation: Friends     Dominant hand: right    Subjective:  Communicated with nurse prior to session.    Chief Complaint: none  Patient/Family stated goals: none    Pain/Comfort  Pain Rating 1: 0/10  Pain Rating Post-Intervention 1: 0/10    Objective:       Cognitive Exam:  Oriented to: Person, Place, Time and Situation  Follows Commands/attention: Follows two-step commands  Communication: clear/fluent  Memory:  Poor immediate recall  Safety awareness/insight to disability: intact  Coping skills/emotional control: Appropriate to situation    Visual/perceptual:  Intact    Physical Exam:  Postural examination/scapula alignment: No postural abnormalities identified  Skin integrity: Visible skin intact  Edema: None noted BUE    Sensation:   Intact    Upper Extremity Range of Motion:  Right Upper Extremity: WFL  Left Upper Extremity: WFL    Upper Extremity Strength:  Right Upper Extremity: WFL  Left Upper Extremity: WFL   Strength: WFL    Fine motor coordination:   Impaired  Left hand, finger to nose slightly    Gross motor coordination: slightly delayed    Functional Mobility:  Bed Mobility:       Transfers:  Sit <> Stand Assistance: Modified Independent  Sit <> Stand Assistive Device: Rolling Walker  Bed <> Chair Technique: Stand Pivot  Bed <> Chair Transfer Assistance: Modified Independent  Bed <> Chair Assistive Device: Rolling Walker  Toilet Transfer Technique: Stand Pivot  Toilet Transfer Assistance: Modified Independent  Toilet Transfer Assistive Device: Rolling Walker, bedside commode (initially vc for hand palcment then repeated trial Nel)    Functional Ambulation: Supervision with RW in room bedside commode>window>sink>window and bedside chair myranda min vc for turns and to keep wheels of walker on floor when turning; slow pace to minimize unsteadiness; slight ataxia    Activities of Daily Living:  Feeding Level of Assistance: Independent  UE Dressing Level of Assistance: Independent  LE  "Dressing Level of Assistance: Independent (socks/shoes)  \Grooming Position: Standing at sink  Grooming Level of Assistance: Independent (with Rw)  Toileting Where Assessed: Bedside commode  Toileting Level of Assistance: Independent  Bathing Where Assessed:  (reviewwd safety and fall risk currently if stands to shower at home; recommended sponge bath until HH assess tub bath and make recommendations)       Balance:   Static Sit: NORMAL: No deviations seen in posture held statically  Dynamic Sit: NORMAL: No deviations seen in posture held dynamically  Static Stand: GOOD: Takes MODERATE challenges from all directions  Dynamic stand: FAIR+: Needs CLOSE SUPERVISION during gait and is able to right self with minor LOB    Therapeutic Activities and Exercises:   Recommended pt to sponge bath until HH able to assess and make safety recommendations. OT recommend HHPT/OT/HH aide; DME rec:  Bath bench and RW.        AM-PAC 6 CLICK ADL  How much help from another person does this patient currently need?  1 = Unable, Total/Dependent Assistance  2 = A lot, Maximum/Moderate Assistance  3 = A little, Minimum/Contact Guard/Supervision  4 = None, Modified [place]/Independent    Putting on and taking off regular lower body clothing? : 4  Bathing (including washing, rinsing, drying)?: 3  Toileting, which includes using toilet, bedpan, or urinal? : 4  Putting on and taking off regular upper body clothing?: 4  Taking care of personal grooming such as brushing teeth?: 4  Eating meals?: 4  Total Score: 23    AM-PAC Raw Score CMS "G-Code Modifier Level of Impairment Assistance   6 % Total / Unable   7 - 9 CM 80 - 100% Maximal Assist   10-14 CL 60 - 80% Moderate Assist   15 - 19 CK 40 - 60% Moderate Assist   20 - 22 CJ 20 - 40% Minimal Assist   23 CI 1-20% SBA / CGA   24 CH 0% Independent/ Mod I       Patient left up in chair with all lines intact, call button in reach and nurse notified    Assessment:  Beatriz Adame is a 67 " y.o. female with a medical diagnosis of Stroke-like symptoms and presents with Nel -indep ADLS except simulated bathing with SBA 2/2 fall risk due to impaired standing balance. Recommended pt to sponge bath until HH able to assess and make safety recommendations.  Pt. Verbalized agreement.  OT recommend HHPT/OT/HH aide; DME rec:  Bath bench and RW.  DC OT 2/2 pt. Discharged home  And awaiting transport.        Rehab identified problem list/impairments: Rehab identified problem list/impairments: gait instability, impaired balance, decreased coordination    Rehab potential is good.    Activity tolerance: Good    Discharge recommendations: Discharge Facility/Level Of Care Needs: home with home health, home, home health PT, home health OT     Barriers to discharge: Barriers to Discharge: None    Equipment recommendations: bath bench, walker, rolling     GOALS:    Occupational Therapy Goals     Not on file          Multidisciplinary Problems (Resolved)        Problem: Occupational Therapy Goal    Goal Priority Disciplines Outcome Interventions   Occupational Therapy Goal   (Resolved)     OT, PT/OT Outcome(s) achieved                    PLAN:  Patient to be seen   to address the above listed problems via    Plan of Care expires:    Plan of Care reviewed with: patient    OT G-codes  Functional Assessment Tool Used: AM-PAC  Score: 23  Functional Limitation: Self care  Self Care Current Status (): CI  Self Care Goal Status (): CI  Self Care Discharge Status (): KAREN Ca OT  08/15/2017

## 2017-08-15 NOTE — NURSING
Discharge instructions reviewed with patient, all questions answered and understanding verbalized.  DM education provided to patient.  Carb counting discussed, keeping a BG and food log encouraged, and checking her BG 4 times a day strongly encouraged.  Diet and lifestyle modifications discussed.  Long term complications of uncontrolled DM reviewed.   Pt states she currently does not have a glucometer at home and has been using her neighbors to check her BG but not on a consistent basis.  Dr. Islas notified and will order glucometer for patient.  IVs removed and catheter tip intact.  Tele box removed and returned to monitor station.  Pt states her brother will be coming to pick her up.  WC transport will be requested when family arrives.      Glucometer given to patient from  closet.

## 2017-08-15 NOTE — PROGRESS NOTES
Physical Therapy Discharge Summary    Beatriz Adame  MRN: [id number]   Stroke-like symptoms   Patient Discharged from acute Physical Therapy on 8-15-17.  Please refer to prior PT noted date on 8-15-17 for functional status.     Assessment:   Patient appropriate for care in another setting.  GOALS:    Physical Therapy Goals        Problem: Physical Therapy Goal    Goal Priority Disciplines Outcome Goal Variances Interventions   Physical Therapy Goal     PT/OT, PT      Description:  Goals to be met by 8-19-17.  1. Sup<>sit mod I  2. Sit<>stand with SC mod I  3. amb 150' with SC mod I  4. Up/down 3 steps B rail mod I                  Reasons for Discontinuation of Therapy Services  Transfer to alternate level of care.      Plan:  Patient Discharged to: Home with Home Health Service.

## 2017-08-15 NOTE — HOSPITAL COURSE
Patient admitted to the hospital under observation due to concern for possible acute stroke.  However MRI of the brain did not reveal evidence of acute stroke.  However there was a 7 mm enhancing lesion in the left cerebellum consistent with either subacute stroke verus possible metastatic disease.  In addition, the MRI revealed stable chronic infarcts in the basal ganglia and right aspect of the magalie. Patient's exam this morning reveals disequilibrium however she reports that she is currently at her baseline neurologic function.  Patient reports that she is unsteady on her feet and normally walks with the assistance of a walker at home.  Case reviewed with Dr. Lopez Gonzales and suspects lesion on MRI most likely prior stroke however he advised that the patient undergo repeat MRI of the brain in about 2 weeks to assess for stability.  Physical and occupational therapy evaluated patient and felt that the patient was most appropriate for therapy in the home setting.  I suspect patient most likely has had recurrent ischemic insults to her brain mostly due to inadequately controlled comorbidities including poorly controlled blood pressure and diabetes due to non-compliance.  Patient counseled on the importance of adhering to recommended treatment.  Physical therapist recommended that patient continue to receive physical therapy in the home setting and I have ordered home health for therapy also to provide some assistance in addressing her non-compliance with prescription medications including her insulin.  Patient provided with glucometer as she reports her prior glucometer was not functional.  Patient discharge home with home health and advised to follow-up with her primary care provider in about 1 week.

## 2017-08-15 NOTE — PLAN OF CARE
Resting comfortably in bed at this time. HTN on RA and afebrile this shift. Telemetry monitored.  Good appetite and minimal UOP this shift, BRP x1 assist.  Repositions self independently in bed and ambulating around room x1 person assist.     Free from injury or skin breakdown; Fall precautions maintained and call light in reach.  POC updated questions answered and comments acknowledged.  Purposeful hourly rounding completed this shift.  Neurology consulted and Christian will round in AM,.

## 2017-08-15 NOTE — DISCHARGE SUMMARY
"[facility] Hospital Medicine  Discharge Summary      Patient Name: Beatriz Adame  MRN: [id number]  Admission Date: 8/14/2017  Hospital Length of Stay: 0 days  Discharge Date and Time:  08/15/2017 3:00 PM  Attending Physician: John Islas MD   Discharging Provider: John Islas MD  Primary Care Provider: Medardo Paulino NP      HPI:   Ms. Beatriz Adame is a 67 y.o. female, with PMH of CVA (no deficits), IDDM-II with neuropathy, HTN, HLP, who presented to the ED with symptoms reminiscent of her previous stroke on 8/14/17. She states when she awoke, her legs were numb and she was unable to move them to get out of bed. When she did get out of bed, she had a wobbly gait.  Additionally, she noted a crooked smile, with the left side of the mouth drooping, and had "sluggish speech." She denies light headedness, dizziness, headaches, vision changes from baseline (baseline is blurred vision 2/2 uncorrected nearsightedness), or baseline tinnitus.      Indwelling Lines/Drains at time of discharge:   Lines/Drains/Airways          No matching active lines, drains, or airways        Hospital Course:   Patient admitted to the hospital under observation due to concern for possible acute stroke.  However MRI of the brain did not reveal evidence of acute stroke.  However there was a 7 mm enhancing lesion in the left cerebellum consistent with either subacute stroke verus possible metastatic disease.  In addition, the MRI revealed stable chronic infarcts in the basal ganglia and right aspect of the magalie. Patient's exam this morning reveals disequilibrium however she reports that she is currently at her baseline neurologic function.  Patient reports that she is unsteady on her feet and normally walks with the assistance of a walker at home.  Case reviewed with Dr. Lopez Gonzales and suspects lesion on MRI most likely prior stroke however he advised that the patient undergo repeat MRI of the brain in " Date & Time: 7/15/2021, 11:22 AM  Patient: Liz Viveros  Encounter Provider(s):    Lisy Topete MD       To Whom It May Concern:    Nasrin Edgar was seen and treated in our department on 7/15/2021.  Hewill be on crutches and needs to rest his left about 2 weeks to assess for stability.  Physical and occupational therapy evaluated patient and felt that the patient was most appropriate for therapy in the home setting.  I suspect patient most likely has had recurrent ischemic insults to her brain mostly due to inadequately controlled comorbidities including poorly controlled blood pressure and diabetes due to non-compliance.  Patient counseled on the importance of adhering to recommended treatment.  Physical therapist recommended that patient continue to receive physical therapy in the home setting and I have ordered home health for therapy also to provide some assistance in addressing her non-compliance with prescription medications including her insulin.  Patient provided with glucometer as she reports her prior glucometer was not functional.  Patient discharge home with home health and advised to follow-up with her primary care provider in about 1 week.     Consults:   Consults         Status Ordering Provider     Inpatient consult to Neurology  Once     Provider:  Albert Gonzales MD    Acknowledged ZHANNA ROSENBERG     Inpatient consult to Telemedicine-Stroke  Once     Provider:  (Not yet assigned)    Acknowledged CHANTELLE OBRIEN        Pending Diagnostic Studies:     Procedure Component Value Units Date/Time    MRA Brain without contrast [[id number]]     Order Status:  Sent Lab Status:  No result         Final Active Diagnoses:    Diagnosis Date Noted POA    PRINCIPAL PROBLEM:  Stroke-like symptoms [R29.90] 08/14/2017 Yes    Yeast infection [B37.9] 08/14/2017 Yes    Type 2 diabetes mellitus with hyperosmolarity, uncontrolled [E11.00, E11.65] 10/27/2016 Yes    HLD (hyperlipidemia) [E78.5]  Yes    HTN (hypertension) [I10]  Yes      Problems Resolved During this Admission:    Diagnosis Date Noted Date Resolved POA      Discharged Condition: Stable    [place] [facility] [place]:  Follow-up Information     Medardo Paulino  NP. Schedule an appointment as soon as possible for a visit in 1 week.    Specialty:  Family Medicine  Contact information:  [address]  [facility] [place]  [place] [address]  [phone]             Please follow up.    Why:  Repeat MRI in 2 weeks and improving co-morbidies including hypertension and diabetes.           [facility].    Specialty:  Home Health Services  Why:  Home Health  Contact information:  [address] [place] [address]  [phone]                 Patient Instructions:     Diet Diabetic 1800 Calories     Activity as tolerated     Call MD for:  temperature >100.4     Call MD for:  persistent nausea and vomiting or diarrhea     Call MD for:  severe uncontrolled pain     Call MD for:  difficulty breathing or increased cough     Call MD for:  severe persistent headache     Call MD for:  worsening rash     Call MD for:  persistent dizziness, light-headedness, or visual disturbances     Call MD for:  increased confusion or weakness       Medications:  Reconciled Home Medications:   Current Discharge Medication List      START taking these medications    Details   acetaminophen (TYLENOL) 325 MG tablet Take 2 tablets (650 mg total) by mouth every 8 (eight) hours as needed.  Refills: 0      aspirin (ECOTRIN) 325 MG EC tablet Take 1 tablet (325 mg total) by mouth once daily.  Refills: 0      hydrochlorothiazide (HYDRODIURIL) 12.5 MG Tab Take 1 tablet (12.5 mg total) by mouth once daily.  Qty: 30 tablet, Refills: 0         CONTINUE these medications which have CHANGED    Details   amitriptyline (ELAVIL) 25 MG tablet Take 1 tablet (25 mg total) by mouth every evening.  Qty: 30 tablet, Refills: 0      amlodipine (NORVASC) 10 MG tablet Take 1 tablet (10 mg total) by mouth once daily.  Qty: 30 tablet, Refills: 0      atorvastatin (LIPITOR) 80 MG tablet Take 1 tablet (80 mg total) by mouth once daily.  Qty: 30 tablet, Refills: 0      carvedilol (COREG) 25 MG  tablet Take 1 tablet (25 mg total) by mouth 2 (two) times daily.  Qty: 60 tablet, Refills: 0      gabapentin (NEURONTIN) 300 MG capsule Take 1 capsule (300 mg total) by mouth 3 (three) times daily.  Qty: 90 capsule, Refills: 0      insulin glargine (LANTUS SOLOSTAR) 100 unit/mL (3 mL) InPn pen Inject 30 Units into the skin every evening.  Qty: 9 mL, Refills: 0      insulin lispro (HUMALOG KWIKPEN) 100 unit/mL InPn pen Inject 5 Units into the skin 3 (three) times daily before meals.  Qty: 4.5 mL, Refills: 0    Associated Diagnoses: Controlled type 2 diabetes mellitus with hyperglycemia, with long-term current use of insulin         STOP taking these medications       omeprazole (PRILOSEC) 20 MG capsule Comments:   Reason for Stopping:             Time spent on the discharge of patient: 35 minutes    John Islas MD  Department of Hospital Medicine  [facility]

## 2017-08-15 NOTE — NURSING
Spoke with Dr. Peres regarding outstanding Neuro consult; Dr. Islas states he has spoken with Dr. Gonzales and no need for pt to be seen by neuro prior to discharge.

## 2017-08-15 NOTE — PLAN OF CARE
08/15/17 1302   Final Note   Assessment Type Final Discharge Note   Discharge Disposition Home-Health   Discharge planning education complete? Yes   Hospital Follow Up  Appt(s) scheduled? No   Discharge plans and expectations educations in teach back method with documentation complete? Yes   Offered [facility] -- Bedside Delivery? n/a   Discharge/Hospital Encounter Summary to (non-[facility]) PCP n/a   Referral to Outpatient Case Management complete? n/a   Referral to / orders for Home Health Complete? Yes   30 day supply of medicines given at discharge, if documented non-compliance / non-adherence? n/a   Any social issues identified prior to discharge? No   Did you assess the readiness or willingness of the family or caregiver to support self management of care? Yes   Right Care Referral Info   Post Acute Recommendation Home-care

## 2017-08-15 NOTE — PLAN OF CARE
08/15/17 1353   Final Note   Assessment Type Final Discharge Note   Discharge Disposition Home-Health   [facility] accepted the patient.

## 2019-07-07 ENCOUNTER — HOSPITAL ENCOUNTER (EMERGENCY)
Facility: OTHER | Age: 69
Discharge: HOME | End: 2019-07-07
Attending: EMERGENCY MEDICINE
Payer: COMMERCIAL

## 2019-07-07 VITALS
HEART RATE: 86 BPM | OXYGEN SATURATION: 96 % | SYSTOLIC BLOOD PRESSURE: 191 MMHG | HEIGHT: 66 IN | DIASTOLIC BLOOD PRESSURE: 85 MMHG | WEIGHT: 180 LBS | TEMPERATURE: 98 F | RESPIRATION RATE: 17 BRPM | BODY MASS INDEX: 28.93 KG/M2

## 2019-07-07 DIAGNOSIS — R73.9 HYPERGLYCEMIA WITHOUT KETOSIS: ICD-10-CM

## 2019-07-07 DIAGNOSIS — R42 DIZZINESS: Primary | ICD-10-CM

## 2019-07-07 LAB
ALBUMIN SERPL BCP-MCNC: 3.1 G/DL (ref 3.5–5.2)
ALP SERPL-CCNC: 108 U/L (ref 55–135)
ALT SERPL W/O P-5'-P-CCNC: 17 U/L (ref 10–44)
ANION GAP SERPL CALC-SCNC: 12 MMOL/L (ref 8–16)
AST SERPL-CCNC: 14 U/L (ref 10–40)
B-OH-BUTYR BLD STRIP-SCNC: 0.1 MMOL/L (ref 0–0.5)
BACTERIA #/AREA URNS HPF: ABNORMAL /HPF
BASOPHILS # BLD AUTO: 0.03 K/UL (ref 0–0.2)
BASOPHILS NFR BLD: 0.3 % (ref 0–1.9)
BILIRUB SERPL-MCNC: 0.4 MG/DL (ref 0.1–1)
BILIRUB UR QL STRIP: NEGATIVE
BUN SERPL-MCNC: 23 MG/DL (ref 8–23)
CALCIUM SERPL-MCNC: 9.5 MG/DL (ref 8.7–10.5)
CHLORIDE SERPL-SCNC: 101 MMOL/L (ref 95–110)
CLARITY UR: ABNORMAL
CO2 SERPL-SCNC: 25 MMOL/L (ref 23–29)
COLOR UR: YELLOW
CREAT SERPL-MCNC: 2.1 MG/DL (ref 0.5–1.4)
DIFFERENTIAL METHOD: ABNORMAL
EOSINOPHIL # BLD AUTO: 0.3 K/UL (ref 0–0.5)
EOSINOPHIL NFR BLD: 3.3 % (ref 0–8)
ERYTHROCYTE [DISTWIDTH] IN BLOOD BY AUTOMATED COUNT: 13.1 % (ref 11.5–14.5)
EST. GFR  (AFRICAN AMERICAN): 27 ML/MIN/1.73 M^2
EST. GFR  (NON AFRICAN AMERICAN): 24 ML/MIN/1.73 M^2
GLUCOSE SERPL-MCNC: 325 MG/DL (ref 70–110)
GLUCOSE UR QL STRIP: ABNORMAL
HCT VFR BLD AUTO: 31.7 % (ref 37–48.5)
HGB BLD-MCNC: 10.6 G/DL (ref 12–16)
HGB UR QL STRIP: ABNORMAL
HYALINE CASTS #/AREA URNS LPF: 0 /LPF
KETONES UR QL STRIP: NEGATIVE
LEUKOCYTE ESTERASE UR QL STRIP: NEGATIVE
LYMPHOCYTES # BLD AUTO: 3.8 K/UL (ref 1–4.8)
LYMPHOCYTES NFR BLD: 38.3 % (ref 18–48)
MAGNESIUM SERPL-MCNC: 1.7 MG/DL (ref 1.6–2.6)
MCH RBC QN AUTO: 29.4 PG (ref 27–31)
MCHC RBC AUTO-ENTMCNC: 33.4 G/DL (ref 32–36)
MCV RBC AUTO: 88 FL (ref 82–98)
MICROSCOPIC COMMENT: ABNORMAL
MONOCYTES # BLD AUTO: 0.5 K/UL (ref 0.3–1)
MONOCYTES NFR BLD: 5.5 % (ref 4–15)
NEUTROPHILS # BLD AUTO: 5.2 K/UL (ref 1.8–7.7)
NEUTROPHILS NFR BLD: 52.6 % (ref 38–73)
NITRITE UR QL STRIP: NEGATIVE
PH UR STRIP: 6 [PH] (ref 5–8)
PHOSPHATE SERPL-MCNC: 3.1 MG/DL (ref 2.7–4.5)
PLATELET # BLD AUTO: 199 K/UL (ref 150–350)
PMV BLD AUTO: 12.7 FL (ref 9.2–12.9)
POCT GLUCOSE: 176 MG/DL (ref 70–110)
POCT GLUCOSE: 293 MG/DL (ref 70–110)
POCT GLUCOSE: 307 MG/DL (ref 70–110)
POTASSIUM SERPL-SCNC: 3.5 MMOL/L (ref 3.5–5.1)
PROT SERPL-MCNC: 7.6 G/DL (ref 6–8.4)
PROT UR QL STRIP: ABNORMAL
RBC # BLD AUTO: 3.61 M/UL (ref 4–5.4)
RBC #/AREA URNS HPF: 4 /HPF (ref 0–4)
SODIUM SERPL-SCNC: 138 MMOL/L (ref 136–145)
SP GR UR STRIP: 1.02 (ref 1–1.03)
SQUAMOUS #/AREA URNS HPF: 39 /HPF
URN SPEC COLLECT METH UR: ABNORMAL
UROBILINOGEN UR STRIP-ACNC: NEGATIVE EU/DL
WBC # BLD AUTO: 9.83 K/UL (ref 3.9–12.7)
WBC #/AREA URNS HPF: 7 /HPF (ref 0–5)
YEAST URNS QL MICRO: ABNORMAL

## 2019-07-07 PROCEDURE — 99284 EMERGENCY DEPT VISIT MOD MDM: CPT | Mod: 25

## 2019-07-07 PROCEDURE — 82010 KETONE BODYS QUAN: CPT

## 2019-07-07 PROCEDURE — 63600175 PHARM REV CODE 636 W HCPCS: Performed by: EMERGENCY MEDICINE

## 2019-07-07 PROCEDURE — 96374 THER/PROPH/DIAG INJ IV PUSH: CPT

## 2019-07-07 PROCEDURE — 93010 EKG 12-LEAD: ICD-10-PCS | Mod: ,,, | Performed by: INTERNAL MEDICINE

## 2019-07-07 PROCEDURE — 85025 COMPLETE CBC W/AUTO DIFF WBC: CPT

## 2019-07-07 PROCEDURE — 83735 ASSAY OF MAGNESIUM: CPT

## 2019-07-07 PROCEDURE — 84100 ASSAY OF PHOSPHORUS: CPT

## 2019-07-07 PROCEDURE — 81000 URINALYSIS NONAUTO W/SCOPE: CPT

## 2019-07-07 PROCEDURE — 80053 COMPREHEN METABOLIC PANEL: CPT

## 2019-07-07 PROCEDURE — 93010 ELECTROCARDIOGRAM REPORT: CPT | Mod: ,,, | Performed by: INTERNAL MEDICINE

## 2019-07-07 PROCEDURE — 93005 ELECTROCARDIOGRAM TRACING: CPT

## 2019-07-07 RX ADMIN — INSULIN HUMAN 4 UNITS: 100 INJECTION, SOLUTION PARENTERAL at 07:07

## 2019-07-07 NOTE — ED PROVIDER NOTES
"Encounter Date: 7/7/2019    SCRIBE #1 NOTE: I, Lennyjessica Negrete, am scribing for, and in the presence of, Dr. Landeros.       History     Chief Complaint   Patient presents with    Dizziness     +dizziness x2 hours. pt reports "blurred vision all the time". no slurred speech/facial droop. pt  per EMS. pt reports taking insulin this morning. pt reports hx DKA      Time seen by provider: 5:26 PM    This is a 69 y.o. female, arriving via EMS, who presents with complaint of dizziness with a spinning sensation persisting since onset two hours ago. The patient reports she was at home sitting on the sofa then proceeded to get up, but she began stumbling as she was trying to walk followed by the dizziness and feeling like she was about to pass out. She then reports waking up and feeling generalized weakness and she continued to stumble as she tried to walk again. The patient reports feeling normal this morning before symptoms began. She reports she lives alone. The patient reports blurry vision, but this is her baseline and "definitely no worse". The patient also reports she sometimes experiences short term memory loss and is unable to recall what she ate earlier today, "but I know it was good". She denies fever, chills, cough, chest pain, shortness of breath, nausea, vomiting, diarrhea, or urinary symptoms. She reports current Plavix use. She denies history of vertigo. She denies known drug allergies. She denies tobacco or drug use.    The history is provided by the patient.     Review of patient's allergies indicates:   Allergen Reactions    Tomato (solanum lycopersicum) Hives and Itching     Past Medical History:   Diagnosis Date    Arthritis     Diabetes mellitus     Encounter for blood transfusion     Hypercholesteremia     Hypertension     Stroke      Past Surgical History:   Procedure Laterality Date    TUBAL LIGATION       Family History   Problem Relation Age of Onset    Arthritis Mother     Diabetes " Mother     Heart disease Mother     Hypertension Mother     Kidney disease Mother     Stroke Mother     Vision loss Mother     Alcohol abuse Father     Diabetes Sister     Asthma Brother     Diabetes Brother     Early death Brother     Heart disease Brother      Social History     Tobacco Use    Smoking status: Never Smoker    Smokeless tobacco: Never Used   Substance Use Topics    Alcohol use: No     Comment: socially    Drug use: No     Review of Systems   Constitutional: Positive for fatigue. Negative for chills and fever.   HENT: Negative for sore throat.    Respiratory: Negative for cough and shortness of breath.    Cardiovascular: Negative for chest pain.   Gastrointestinal: Negative for diarrhea, nausea and vomiting.   Genitourinary: Negative for dysuria, frequency, hematuria and urgency.   Musculoskeletal: Positive for gait problem. Negative for back pain.   Skin: Negative for rash.   Neurological: Positive for dizziness and syncope (near). Negative for weakness.   Hematological: Does not bruise/bleed easily.   Psychiatric/Behavioral: Negative for behavioral problems. The patient is not nervous/anxious.        Physical Exam     Initial Vitals [07/07/19 1709]   BP Pulse Resp Temp SpO2   (!) 149/72 77 18 98.4 °F (36.9 °C) 99 %      MAP       --         Physical Exam    Nursing note and vitals reviewed.  Constitutional: She appears well-developed and well-nourished. She is not diaphoretic. No distress.   HENT:   Head: Normocephalic and atraumatic.   Nose: Nose normal.   Mouth/Throat: Uvula is midline, oropharynx is clear and moist and mucous membranes are normal. No oropharyngeal exudate.   Eyes: Conjunctivae and EOM are normal. Pupils are equal, round, and reactive to light.   No nystagmus.   Neck: Normal range of motion. Neck supple.   Cardiovascular: Normal rate, regular rhythm and normal heart sounds. Exam reveals no gallop and no friction rub.    No murmur heard.  Pulmonary/Chest: Breath  sounds normal. No respiratory distress. She has no wheezes. She has no rhonchi. She has no rales.   Abdominal: Soft. Bowel sounds are normal. She exhibits no distension. There is no tenderness. There is no rebound and no guarding.   Musculoskeletal: Normal range of motion. She exhibits no edema or tenderness.   Neurological: She is alert and oriented to person, place, and time. She has normal strength. No cranial nerve deficit or sensory deficit. GCS score is 15. GCS eye subscore is 4. GCS verbal subscore is 5. GCS motor subscore is 6.   Normal gait, normal finger-to-nose testing.   Skin: Skin is warm and dry. No rash noted. No erythema. No pallor.   Psychiatric: She has a normal mood and affect. Thought content normal.         ED Course   Procedures  Labs Reviewed   CBC W/ AUTO DIFFERENTIAL - Abnormal; Notable for the following components:       Result Value    RBC 3.61 (*)     Hemoglobin 10.6 (*)     Hematocrit 31.7 (*)     All other components within normal limits   COMPREHENSIVE METABOLIC PANEL - Abnormal; Notable for the following components:    Glucose 325 (*)     Creatinine 2.1 (*)     Albumin 3.1 (*)     eGFR if  27 (*)     eGFR if non  24 (*)     All other components within normal limits   URINALYSIS, REFLEX TO URINE CULTURE - Abnormal; Notable for the following components:    Appearance, UA Hazy (*)     Protein, UA 2+ (*)     Glucose, UA 3+ (*)     Occult Blood UA 2+ (*)     All other components within normal limits    Narrative:     Preferred Collection Type->Urine, Clean Catch   URINALYSIS MICROSCOPIC - Abnormal; Notable for the following components:    WBC, UA 7 (*)     Bacteria Moderate (*)     Yeast, UA Rare (*)     All other components within normal limits    Narrative:     Preferred Collection Type->Urine, Clean Catch   POCT GLUCOSE - Abnormal; Notable for the following components:    POCT Glucose 307 (*)     All other components within normal limits   POCT GLUCOSE -  Abnormal; Notable for the following components:    POCT Glucose 293 (*)     All other components within normal limits   POCT GLUCOSE - Abnormal; Notable for the following components:    POCT Glucose 176 (*)     All other components within normal limits   MAGNESIUM   PHOSPHORUS   BETA - HYDROXYBUTYRATE, SERUM   POCT GLUCOSE MONITORING CONTINUOUS     EKG Readings: (Independently Interpreted)   Normal sinus rhythm at a rate of 76 bpm. No STEMI. No ectopy.       Imaging Results    None          Medical Decision Making:   Independently Interpreted Test(s):   I have ordered and independently interpreted EKG Reading(s) - see prior notes  Clinical Tests:   Lab Tests: Ordered and Reviewed  Medical Tests: Ordered and Reviewed  ED Management:  Emergent evaluation of 69-year-old female who presents with chief complaint of dizziness and a near syncopal episode.  Description of dizziness is unclear, some descriptors indicate near syncope, others consistent with vertigo.  Patient has no neurologic abnormality on exam, no nystagmus or cerebellar signs.  She was able to ambulate normally.  Workup reveals hyperglycemia without ketosis, elevated creatinine.  Last creatinine a year ago is considerably less, but no elevated BUN or recent labs, and I suspect this is chronic insufficiency rather than acute kidney injury. EKG showed no acute ischemic change or dysrhythmia.  I am comfortable with discharge home.  She was treated with 1 L IV fluids hung by paramedics and also given 4 units of IV insulin.  She is discharged into the care of her brother with all symptoms resolved and feeling much improved.  I did encourage her to follow up with her PCP or to return for worsening.            Scribe Attestation:   Scribe #1: I performed the above scribed service and the documentation accurately describes the services I performed. I attest to the accuracy of the note.    Attending Attestation:           Physician Attestation for  Scribe:  Physician Attestation Statement for Scribe #1: I, Dr. Landeros, reviewed documentation, as scribed by Franky Negrete in my presence, and it is both accurate and complete.                    Clinical Impression:     1. Dizziness    2. Hyperglycemia without ketosis                                   Maryam Landeros MD  07/07/19 2191

## 2019-07-07 NOTE — ED TRIAGE NOTES
Pt reports feeling like the room is spinning x 2 hrs. She went to get off of the couch and felt dizzy like she was going to pass out. States she has blurred vision but reports this is normal for her. No CP or SOB. No PMH of vertigo. AAO X 3, answers questions appropriately, appears in no acute distress. Placed on continuous cardiac monitor, pulse ox, BP cycling q 30.Bed in lowest, locked position, side rails up x 2, call light within reach.

## 2019-07-08 NOTE — ED NOTES
Received report from CHYNA Red. Assumed care of patient at this time. Patient lying on stretcher with HOB elevated.  AAOx4 and appropriate at this time. Restroom and comfort needs addressed.  Pt updated on POC. RR even and unlabored, NAD noted. No acute distress noted.  SRx2 up, bed in lowest position, call light within reach.  Will continue to monitor.

## 2019-09-15 ENCOUNTER — HOSPITAL ENCOUNTER (EMERGENCY)
Facility: OTHER | Age: 69
Discharge: HOME | End: 2019-09-15
Attending: EMERGENCY MEDICINE
Payer: COMMERCIAL

## 2019-09-15 VITALS
WEIGHT: 180 LBS | BODY MASS INDEX: 28.93 KG/M2 | HEART RATE: 78 BPM | SYSTOLIC BLOOD PRESSURE: 195 MMHG | OXYGEN SATURATION: 100 % | RESPIRATION RATE: 20 BRPM | HEIGHT: 66 IN | DIASTOLIC BLOOD PRESSURE: 93 MMHG

## 2019-09-15 DIAGNOSIS — I10 HYPERTENSION, UNSPECIFIED TYPE: ICD-10-CM

## 2019-09-15 DIAGNOSIS — Z79.4 TYPE 2 DIABETES MELLITUS WITH HYPERGLYCEMIA, WITH LONG-TERM CURRENT USE OF INSULIN: ICD-10-CM

## 2019-09-15 DIAGNOSIS — N28.9 RENAL INSUFFICIENCY: ICD-10-CM

## 2019-09-15 DIAGNOSIS — R11.2 NON-INTRACTABLE VOMITING WITH NAUSEA, UNSPECIFIED VOMITING TYPE: Primary | ICD-10-CM

## 2019-09-15 DIAGNOSIS — R10.9 ABDOMINAL PAIN: ICD-10-CM

## 2019-09-15 DIAGNOSIS — E11.65 TYPE 2 DIABETES MELLITUS WITH HYPERGLYCEMIA, WITH LONG-TERM CURRENT USE OF INSULIN: ICD-10-CM

## 2019-09-15 LAB
ALBUMIN SERPL BCP-MCNC: 3.4 G/DL (ref 3.5–5.2)
ALP SERPL-CCNC: 87 U/L (ref 55–135)
ALT SERPL W/O P-5'-P-CCNC: 11 U/L (ref 10–44)
ANION GAP SERPL CALC-SCNC: 13 MMOL/L (ref 8–16)
AST SERPL-CCNC: 20 U/L (ref 10–40)
BACTERIA #/AREA URNS HPF: ABNORMAL /HPF
BASOPHILS # BLD AUTO: 0.05 K/UL (ref 0–0.2)
BASOPHILS NFR BLD: 0.4 % (ref 0–1.9)
BILIRUB SERPL-MCNC: 0.4 MG/DL (ref 0.1–1)
BILIRUB UR QL STRIP: NEGATIVE
BUN SERPL-MCNC: 26 MG/DL (ref 8–23)
CALCIUM SERPL-MCNC: 9.5 MG/DL (ref 8.7–10.5)
CHLORIDE SERPL-SCNC: 97 MMOL/L (ref 95–110)
CLARITY UR: CLEAR
CO2 SERPL-SCNC: 29 MMOL/L (ref 23–29)
COLOR UR: YELLOW
CREAT SERPL-MCNC: 2.5 MG/DL (ref 0.5–1.4)
DIFFERENTIAL METHOD: NORMAL
EOSINOPHIL # BLD AUTO: 0.3 K/UL (ref 0–0.5)
EOSINOPHIL NFR BLD: 2 % (ref 0–8)
ERYTHROCYTE [DISTWIDTH] IN BLOOD BY AUTOMATED COUNT: 13.9 % (ref 11.5–14.5)
EST. GFR  (AFRICAN AMERICAN): 22 ML/MIN/1.73 M^2
EST. GFR  (NON AFRICAN AMERICAN): 19 ML/MIN/1.73 M^2
GLUCOSE SERPL-MCNC: 293 MG/DL (ref 70–110)
GLUCOSE UR QL STRIP: ABNORMAL
HCT VFR BLD AUTO: 37.6 % (ref 37–48.5)
HGB BLD-MCNC: 12.1 G/DL (ref 12–16)
HGB UR QL STRIP: ABNORMAL
HYALINE CASTS #/AREA URNS LPF: 0 /LPF
IMM GRANULOCYTES # BLD AUTO: 0.03 K/UL (ref 0–0.04)
IMM GRANULOCYTES NFR BLD AUTO: 0.2 % (ref 0–0.5)
KETONES UR QL STRIP: NEGATIVE
LEUKOCYTE ESTERASE UR QL STRIP: NEGATIVE
LIPASE SERPL-CCNC: <3 U/L (ref 4–60)
LYMPHOCYTES # BLD AUTO: 4.3 K/UL (ref 1–4.8)
LYMPHOCYTES NFR BLD: 34.5 % (ref 18–48)
MCH RBC QN AUTO: 29.4 PG (ref 27–31)
MCHC RBC AUTO-ENTMCNC: 32.2 G/DL (ref 32–36)
MCV RBC AUTO: 92 FL (ref 82–98)
MICROSCOPIC COMMENT: ABNORMAL
MONOCYTES # BLD AUTO: 0.9 K/UL (ref 0.3–1)
MONOCYTES NFR BLD: 6.9 % (ref 4–15)
NEUTROPHILS # BLD AUTO: 6.9 K/UL (ref 1.8–7.7)
NEUTROPHILS NFR BLD: 56 % (ref 38–73)
NITRITE UR QL STRIP: NEGATIVE
NRBC BLD-RTO: 0 /100 WBC
PH UR STRIP: 6 [PH] (ref 5–8)
PLATELET # BLD AUTO: 200 K/UL (ref 150–350)
PMV BLD AUTO: 12.9 FL (ref 9.2–12.9)
POCT GLUCOSE: 131 MG/DL (ref 70–110)
POCT GLUCOSE: 255 MG/DL (ref 70–110)
POTASSIUM SERPL-SCNC: 3.3 MMOL/L (ref 3.5–5.1)
PROT SERPL-MCNC: 8 G/DL (ref 6–8.4)
PROT UR QL STRIP: ABNORMAL
RBC # BLD AUTO: 4.11 M/UL (ref 4–5.4)
RBC #/AREA URNS HPF: 5 /HPF (ref 0–4)
SODIUM SERPL-SCNC: 139 MMOL/L (ref 136–145)
SP GR UR STRIP: 1.02 (ref 1–1.03)
SQUAMOUS #/AREA URNS HPF: 2 /HPF
URN SPEC COLLECT METH UR: ABNORMAL
UROBILINOGEN UR STRIP-ACNC: NEGATIVE EU/DL
WBC # BLD AUTO: 12.36 K/UL (ref 3.9–12.7)
WBC #/AREA URNS HPF: 1 /HPF (ref 0–5)

## 2019-09-15 PROCEDURE — 85025 COMPLETE CBC W/AUTO DIFF WBC: CPT

## 2019-09-15 PROCEDURE — 80053 COMPREHEN METABOLIC PANEL: CPT

## 2019-09-15 PROCEDURE — 81000 URINALYSIS NONAUTO W/SCOPE: CPT

## 2019-09-15 PROCEDURE — 93010 ELECTROCARDIOGRAM REPORT: CPT | Mod: ,,, | Performed by: INTERNAL MEDICINE

## 2019-09-15 PROCEDURE — 96374 THER/PROPH/DIAG INJ IV PUSH: CPT

## 2019-09-15 PROCEDURE — 96361 HYDRATE IV INFUSION ADD-ON: CPT

## 2019-09-15 PROCEDURE — 96375 TX/PRO/DX INJ NEW DRUG ADDON: CPT

## 2019-09-15 PROCEDURE — 63600175 PHARM REV CODE 636 W HCPCS: Performed by: EMERGENCY MEDICINE

## 2019-09-15 PROCEDURE — 93010 EKG 12-LEAD: ICD-10-PCS | Mod: ,,, | Performed by: INTERNAL MEDICINE

## 2019-09-15 PROCEDURE — 83690 ASSAY OF LIPASE: CPT

## 2019-09-15 PROCEDURE — 36415 COLL VENOUS BLD VENIPUNCTURE: CPT

## 2019-09-15 PROCEDURE — 99284 EMERGENCY DEPT VISIT MOD MDM: CPT | Mod: 25

## 2019-09-15 PROCEDURE — 82962 GLUCOSE BLOOD TEST: CPT

## 2019-09-15 PROCEDURE — 93005 ELECTROCARDIOGRAM TRACING: CPT

## 2019-09-15 RX ORDER — ONDANSETRON 2 MG/ML
8 INJECTION INTRAMUSCULAR; INTRAVENOUS ONCE
Status: COMPLETED | OUTPATIENT
Start: 2019-09-15 | End: 2019-09-15

## 2019-09-15 RX ORDER — ONDANSETRON 4 MG/1
4 TABLET, ORALLY DISINTEGRATING ORAL EVERY 6 HOURS PRN
Qty: 21 TABLET | Refills: 0 | Status: SHIPPED | OUTPATIENT
Start: 2019-09-15 | End: 2021-01-01

## 2019-09-15 RX ADMIN — ONDANSETRON 8 MG: 2 INJECTION INTRAMUSCULAR; INTRAVENOUS at 07:09

## 2019-09-15 RX ADMIN — SODIUM CHLORIDE 1000 ML: 0.9 INJECTION, SOLUTION INTRAVENOUS at 07:09

## 2019-09-15 RX ADMIN — INSULIN HUMAN 4 UNITS: 100 INJECTION, SOLUTION PARENTERAL at 08:09

## 2019-09-16 NOTE — ED PROVIDER NOTES
Encounter Date: 9/15/2019    SCRIBE #1 NOTE: I, Franky Negrete, am scribing for, and in the presence of, Dr. Narayanan.       History     Chief Complaint   Patient presents with    Abdominal Pain     in epigastric area with nausea and vomiting and hyperglycemia     Time seen by provider: 7:16 PM    This is a 69 y.o. female who presents with complaint of nausea and vomiting that began yesterday. She reports she was unable to keep her food down yesterday or today. She also reports having abdominal pain that has alleviated. She reports her last normal bowel movement was this morning. She denies diarrhea or burning sensaitons when urinating. She denies taking medication for the nausea. She denies sick contact or international travel. She reports compliance with her normal medications. She reports surgical history of a tubal ligation.    The history is provided by the patient.     Review of patient's allergies indicates:   Allergen Reactions    Tomato (solanum lycopersicum) Hives and Itching     Past Medical History:   Diagnosis Date    Arthritis     Diabetes mellitus     Encounter for blood transfusion     Hypercholesteremia     Hypertension     Stroke      Past Surgical History:   Procedure Laterality Date    TUBAL LIGATION       Family History   Problem Relation Age of Onset    Arthritis Mother     Diabetes Mother     Heart disease Mother     Hypertension Mother     Kidney disease Mother     Stroke Mother     Vision loss Mother     Alcohol abuse Father     Diabetes Sister     Asthma Brother     Diabetes Brother     Early death Brother     Heart disease Brother      Social History     Tobacco Use    Smoking status: Never Smoker    Smokeless tobacco: Never Used   Substance Use Topics    Alcohol use: No     Comment: socially    Drug use: No     Review of Systems   Constitutional: Negative for fever.   HENT: Negative for sore throat.    Respiratory: Negative for shortness of breath.     Cardiovascular: Negative for chest pain.   Gastrointestinal: Positive for abdominal pain, nausea and vomiting. Negative for diarrhea.   Genitourinary: Negative for dysuria (Burning sensations.).   Musculoskeletal: Negative for back pain.   Skin: Negative for rash.   Neurological: Negative for weakness.   Hematological: Does not bruise/bleed easily.   All other systems reviewed and are negative.      Physical Exam     Initial Vitals   BP Pulse Resp Temp SpO2   09/15/19 1900 09/15/19 1900 09/15/19 1900 -- 09/15/19 2032   (!) 183/98 88 20  97 %      MAP       --                Physical Exam    Nursing note and vitals reviewed.  Constitutional: She appears well-developed. She is cooperative.   HENT:   Head: Atraumatic.   Dry mucous membranes.   Eyes: Conjunctivae and EOM are normal.   No jaundice. No scleral icterus.   Neck: Normal range of motion and phonation normal. Neck supple.   Cardiovascular: Normal rate and normal pulses.   Abdominal: She exhibits no distension.   Obese abdomen.   Musculoskeletal: Normal range of motion.   Neurological: She is alert.   Skin: No rash noted.   Psychiatric: She has a normal mood and affect. Her speech is normal and behavior is normal.         ED Course   Procedures  Labs Reviewed   COMPREHENSIVE METABOLIC PANEL - Abnormal; Notable for the following components:       Result Value    Potassium 3.3 (*)     Glucose 293 (*)     BUN, Bld 26 (*)     Creatinine 2.5 (*)     Albumin 3.4 (*)     eGFR if  22 (*)     eGFR if non  19 (*)     All other components within normal limits   URINALYSIS, REFLEX TO URINE CULTURE - Abnormal; Notable for the following components:    Protein, UA 3+ (*)     Glucose, UA 2+ (*)     Occult Blood UA 2+ (*)     All other components within normal limits    Narrative:     Preferred Collection Type->Urine, Clean Catch   LIPASE - Abnormal; Notable for the following components:    Lipase <3 (*)     All other components within normal  limits   URINALYSIS MICROSCOPIC - Abnormal; Notable for the following components:    RBC, UA 5 (*)     All other components within normal limits    Narrative:     Preferred Collection Type->Urine, Clean Catch   POCT GLUCOSE - Abnormal; Notable for the following components:    POCT Glucose 255 (*)     All other components within normal limits   POCT GLUCOSE - Abnormal; Notable for the following components:    POCT Glucose 131 (*)     All other components within normal limits   CBC W/ AUTO DIFFERENTIAL     EKG Readings: (Independently Interpreted)   Normal sinus rhythm at a rate of 80 bpm. Normal axis. Normal intervals. No STEMI.       Imaging Results    None          Medical Decision Making:   Initial Assessment:   69-year-old female with hypertension, insulin-dependent diabetes and known CKD here with complaints of nausea and vomiting.  Patient's abdominal exam is unremarkable, vital signs with elevated blood pressure.  Labs obtained and without UTI, no significant metabolic disturbances.  Creatinine near baseline, patient given IV fluids, no emesis in the emergency department, and feeling improved.  I do not suspect serious intra abdominal pathology at this time to warrant further imaging or evaluation, patient discharged home with follow-up PCP, strict return precautions given, and script for antiemetics.  Independently Interpreted Test(s):   I have ordered and independently interpreted EKG Reading(s) - see prior notes  Clinical Tests:   Lab Tests: Ordered and Reviewed  Medical Tests: Ordered and Reviewed            Scribe Attestation:   Scribe #1: I performed the above scribed service and the documentation accurately describes the services I performed. I attest to the accuracy of the note.    Attending Attestation:           Physician Attestation for Scribe:  Physician Attestation Statement for Scribe #1: I, Dr. Narayanan, reviewed documentation, as scribed by Franky Negrete in my presence, and it is both  accurate and complete.                 ED Course as of Sep 16 0001   Sun Sep 15, 2019   1904 69 to F with known htn, IDDM, and ckd here with complaints of abdominal pain and vomiting.       [DM]      ED Course User Index  [DM] Jyotsna Narayanan MD     Clinical Impression:     1. Non-intractable vomiting with nausea, unspecified vomiting type    2. Abdominal pain    3. Type 2 diabetes mellitus with hyperglycemia, with long-term current use of insulin    4. Hypertension, unspecified type    5. Renal insufficiency            Disposition:   Disposition: Discharged  Condition: Fair                        Jyotsna Narayanan MD  09/16/19 0001

## 2019-09-16 NOTE — ED TRIAGE NOTES
Pt to ED with CO nausea and vomiting since Friday. PT states she vomits after eating, and denies constant nausea. PT also reports associated abd pain when vomiting. Pt currently has no symptoms. She is calm cooperative, respirations even and unlabored, NAD noted.

## 2020-12-19 ENCOUNTER — HOSPITAL ENCOUNTER (EMERGENCY)
Facility: OTHER | Age: 70
Discharge: HOME | End: 2020-12-19
Attending: EMERGENCY MEDICINE
Payer: MEDICAID

## 2020-12-19 VITALS
RESPIRATION RATE: 18 BRPM | DIASTOLIC BLOOD PRESSURE: 76 MMHG | TEMPERATURE: 98 F | OXYGEN SATURATION: 99 % | SYSTOLIC BLOOD PRESSURE: 159 MMHG | BODY MASS INDEX: 22.5 KG/M2 | HEART RATE: 76 BPM | WEIGHT: 140 LBS | HEIGHT: 66 IN

## 2020-12-19 DIAGNOSIS — R51.9 NONINTRACTABLE HEADACHE, UNSPECIFIED CHRONICITY PATTERN, UNSPECIFIED HEADACHE TYPE: Primary | ICD-10-CM

## 2020-12-19 LAB
ALBUMIN SERPL BCP-MCNC: 3.3 G/DL (ref 3.5–5.2)
ALP SERPL-CCNC: 134 U/L (ref 55–135)
ALT SERPL W/O P-5'-P-CCNC: 10 U/L (ref 10–44)
ANION GAP SERPL CALC-SCNC: 13 MMOL/L (ref 8–16)
AST SERPL-CCNC: 10 U/L (ref 10–40)
BASOPHILS # BLD AUTO: 0.04 K/UL (ref 0–0.2)
BASOPHILS NFR BLD: 0.3 % (ref 0–1.9)
BILIRUB SERPL-MCNC: 0.3 MG/DL (ref 0.1–1)
BUN SERPL-MCNC: 32 MG/DL (ref 8–23)
CALCIUM SERPL-MCNC: 9.8 MG/DL (ref 8.7–10.5)
CHLORIDE SERPL-SCNC: 98 MMOL/L (ref 95–110)
CO2 SERPL-SCNC: 27 MMOL/L (ref 23–29)
CREAT SERPL-MCNC: 2.5 MG/DL (ref 0.5–1.4)
DIFFERENTIAL METHOD: ABNORMAL
EOSINOPHIL # BLD AUTO: 0.3 K/UL (ref 0–0.5)
EOSINOPHIL NFR BLD: 2.3 % (ref 0–8)
ERYTHROCYTE [DISTWIDTH] IN BLOOD BY AUTOMATED COUNT: 13.2 % (ref 11.5–14.5)
EST. GFR  (AFRICAN AMERICAN): 22 ML/MIN/1.73 M^2
EST. GFR  (NON AFRICAN AMERICAN): 19 ML/MIN/1.73 M^2
GLUCOSE SERPL-MCNC: 190 MG/DL (ref 70–110)
HCT VFR BLD AUTO: 31.3 % (ref 37–48.5)
HGB BLD-MCNC: 10.1 G/DL (ref 12–16)
IMM GRANULOCYTES # BLD AUTO: 0.03 K/UL (ref 0–0.04)
IMM GRANULOCYTES NFR BLD AUTO: 0.3 % (ref 0–0.5)
LYMPHOCYTES # BLD AUTO: 3.1 K/UL (ref 1–4.8)
LYMPHOCYTES NFR BLD: 25.4 % (ref 18–48)
MCH RBC QN AUTO: 28.9 PG (ref 27–31)
MCHC RBC AUTO-ENTMCNC: 32.3 G/DL (ref 32–36)
MCV RBC AUTO: 89 FL (ref 82–98)
MONOCYTES # BLD AUTO: 1.2 K/UL (ref 0.3–1)
MONOCYTES NFR BLD: 9.7 % (ref 4–15)
NEUTROPHILS # BLD AUTO: 7.4 K/UL (ref 1.8–7.7)
NEUTROPHILS NFR BLD: 62 % (ref 38–73)
NRBC BLD-RTO: 0 /100 WBC
PLATELET # BLD AUTO: 204 K/UL (ref 150–350)
PMV BLD AUTO: 12.1 FL (ref 9.2–12.9)
POTASSIUM SERPL-SCNC: 3.6 MMOL/L (ref 3.5–5.1)
PROT SERPL-MCNC: 8.8 G/DL (ref 6–8.4)
RBC # BLD AUTO: 3.5 M/UL (ref 4–5.4)
SODIUM SERPL-SCNC: 138 MMOL/L (ref 136–145)
WBC # BLD AUTO: 12 K/UL (ref 3.9–12.7)

## 2020-12-19 PROCEDURE — 96375 TX/PRO/DX INJ NEW DRUG ADDON: CPT

## 2020-12-19 PROCEDURE — 63600175 PHARM REV CODE 636 W HCPCS: Performed by: EMERGENCY MEDICINE

## 2020-12-19 PROCEDURE — 96374 THER/PROPH/DIAG INJ IV PUSH: CPT

## 2020-12-19 PROCEDURE — 96361 HYDRATE IV INFUSION ADD-ON: CPT

## 2020-12-19 PROCEDURE — 25000003 PHARM REV CODE 250: Performed by: EMERGENCY MEDICINE

## 2020-12-19 PROCEDURE — 80053 COMPREHEN METABOLIC PANEL: CPT

## 2020-12-19 PROCEDURE — 99285 EMERGENCY DEPT VISIT HI MDM: CPT | Mod: 25

## 2020-12-19 PROCEDURE — 85025 COMPLETE CBC W/AUTO DIFF WBC: CPT

## 2020-12-19 RX ORDER — CLOPIDOGREL BISULFATE 75 MG/1
75 TABLET ORAL
Status: ON HOLD | COMMUNITY
Start: 2020-01-09 | End: 2022-01-01

## 2020-12-19 RX ORDER — INSULIN GLARGINE 100 [IU]/ML
8 INJECTION, SOLUTION SUBCUTANEOUS NIGHTLY
Status: ON HOLD | COMMUNITY
End: 2021-01-01 | Stop reason: SDUPTHER

## 2020-12-19 RX ORDER — KETOROLAC TROMETHAMINE 30 MG/ML
10 INJECTION, SOLUTION INTRAMUSCULAR; INTRAVENOUS
Status: COMPLETED | OUTPATIENT
Start: 2020-12-19 | End: 2020-12-19

## 2020-12-19 RX ORDER — BUTALBITAL, ACETAMINOPHEN AND CAFFEINE 50; 325; 40 MG/1; MG/1; MG/1
1 TABLET ORAL EVERY 4 HOURS PRN
Qty: 10 TABLET | Refills: 0 | Status: SHIPPED | OUTPATIENT
Start: 2020-12-19 | End: 2021-01-18

## 2020-12-19 RX ORDER — DIPHENHYDRAMINE HYDROCHLORIDE 50 MG/ML
25 INJECTION INTRAMUSCULAR; INTRAVENOUS
Status: COMPLETED | OUTPATIENT
Start: 2020-12-19 | End: 2020-12-19

## 2020-12-19 RX ADMIN — KETOROLAC TROMETHAMINE 10 MG: 30 INJECTION, SOLUTION INTRAMUSCULAR at 11:12

## 2020-12-19 RX ADMIN — SODIUM CHLORIDE 1000 ML: 0.9 INJECTION, SOLUTION INTRAVENOUS at 11:12

## 2020-12-19 RX ADMIN — DIPHENHYDRAMINE HYDROCHLORIDE 25 MG: 50 INJECTION, SOLUTION INTRAMUSCULAR; INTRAVENOUS at 11:12

## 2020-12-19 NOTE — ED TRIAGE NOTES
Pt presents to the ED w/ c/o headache x 3 days.  Hx of HTN and compliant with medications.  Denies taking any medications OTC for relief.  Denies weakness, fever, chills, n/v, CP, SOB, injury/trauma.  Hx of stroke affecting left side.

## 2020-12-19 NOTE — ED PROVIDER NOTES
Encounter Date: 12/19/2020    SCRIBE #1 NOTE: Yoselin BARROS am scribing for, and in the presence of, Dr. Cabral.       History     Chief Complaint   Patient presents with    Headache     headache for 3 days. No other symptoms reported.      Time seen by provider: 10:52 AM    This is a 70 y.o. female with history of HTN, DM, and stroke with left sided extremity weakness who presents with complaint of gradual onset headache that began 3 days ago. The pain is located to posterior head and worsened this morning. Patient states she has been unable to sleep secondary to pain. States she has only slept for approximately 1.5 hours over the last 3 days. She denies history of insomnia. She denies vision changes, numbness or worsening weakness. She denies fever, chills, nausea, vomiting, cough, SOB or change in taste or smell. She has had no positive COVID contacts. Denies any recent falls or head trauma. She has taken tylenol with no improvement of pain. Patient states her blood glucose levels have been well controlled, but notes her blood pressure has been high despite compliance with her home medications. She denies use of tobacco or alcohol. She denies any known drug allergies. Her PCP is Dr. Qureshi.    The history is provided by the patient.     Review of patient's allergies indicates:   Allergen Reactions    Tomato (solanum lycopersicum) Hives and Itching     Past Medical History:   Diagnosis Date    Arthritis     Diabetes mellitus     Encounter for blood transfusion     Hypercholesteremia     Hypertension     Stroke      Past Surgical History:   Procedure Laterality Date    TUBAL LIGATION       Family History   Problem Relation Age of Onset    Arthritis Mother     Diabetes Mother     Heart disease Mother     Hypertension Mother     Kidney disease Mother     Stroke Mother     Vision loss Mother     Alcohol abuse Father     Diabetes Sister     Asthma Brother     Diabetes Brother     Early death  Brother     Heart disease Brother      Social History     Tobacco Use    Smoking status: Never Smoker    Smokeless tobacco: Never Used   Substance Use Topics    Alcohol use: No     Comment: socially    Drug use: No     Review of Systems   Constitutional: Negative for chills and fever.   HENT: Negative for congestion, sore throat and trouble swallowing.         Negative for change in taste or smell.   Eyes: Negative for photophobia and visual disturbance.   Respiratory: Negative for cough and shortness of breath.    Cardiovascular: Negative for chest pain.   Gastrointestinal: Negative for abdominal pain, diarrhea, nausea and vomiting.   Genitourinary: Negative for dysuria and hematuria.   Musculoskeletal: Negative for back pain and neck pain.   Skin: Negative for rash and wound.   Neurological: Positive for weakness (chronic, left extremities) and headaches. Negative for numbness.   Psychiatric/Behavioral: Negative.        Physical Exam     Initial Vitals [12/19/20 1017]   BP Pulse Resp Temp SpO2   (!) 153/74 74 18 98.2 °F (36.8 °C) 98 %      MAP       --         Physical Exam    Nursing note and vitals reviewed.  Constitutional: She appears well-developed and well-nourished. No distress.   Well appearing.    HENT:   Head: Normocephalic and atraumatic.   Mouth/Throat: Oropharynx is clear and moist.   Mucous membranes are moist.   Eyes: Conjunctivae and EOM are normal. Pupils are equal, round, and reactive to light. No scleral icterus.   No pallor or icterus. Pupils 4 mm. No photophobia.   Neck: Normal range of motion. Neck supple. No JVD present.   No meningismus.   Cardiovascular: Normal rate, regular rhythm and normal heart sounds. Exam reveals no gallop and no friction rub.    No murmur heard.  Pulmonary/Chest: Breath sounds normal. No respiratory distress. She has no wheezes. She has no rhonchi. She has no rales.   Musculoskeletal: Normal range of motion. No tenderness or edema.   Neurological: She is alert  and oriented to person, place, and time.   Chronic weakness left upper greater than lower extremity. 5/5 strength on right. No facial asymmetry. Normal speech.   Skin: Skin is warm and dry. No rash noted.   Psychiatric: She has a normal mood and affect. Her behavior is normal. Judgment and thought content normal.         ED Course   Procedures  Labs Reviewed   CBC W/ AUTO DIFFERENTIAL - Abnormal; Notable for the following components:       Result Value    RBC 3.50 (*)     Hemoglobin 10.1 (*)     Hematocrit 31.3 (*)     Mono # 1.2 (*)     All other components within normal limits   COMPREHENSIVE METABOLIC PANEL - Abnormal; Notable for the following components:    Glucose 190 (*)     BUN 32 (*)     Creatinine 2.5 (*)     Total Protein 8.8 (*)     Albumin 3.3 (*)     eGFR if  22 (*)     eGFR if non  19 (*)     All other components within normal limits          Imaging Results          CT Head Without Contrast (Final result)  Result time 12/19/20 12:18:31    Final result by Marquis Quezada MD (12/19/20 12:18:31)                 Impression:      No acute large vascular territory infarct or intracranial hemorrhage identified.  Further evaluation/follow-up as warranted.    Multifocal remote infarcts, senescent change and chronic microvascular ischemic change as above.      Electronically signed by: Marquis Quezada MD  Date:    12/19/2020  Time:    12:18             Narrative:    EXAMINATION:  CT HEAD WITHOUT CONTRAST    CLINICAL HISTORY:  Headache, acute, normal neuro exam;    TECHNIQUE:  Low dose axial CT images obtained throughout the head without intravenous contrast. Sagittal and coronal reconstructions were performed.    COMPARISON:  MRI brain 08/14/2017 and head CT 08/14/2017    FINDINGS:  Intracranial compartment:    Mild generalized cerebral volume loss.  Remote infarcts at the right basal ganglia, corona radiata, internal and external capsules and thalamus with compensatory ex vacuo  dilatation of the adjacent right lateral ventricle, left basal ganglia, and right magalie appear unchanged.  Interval increased area of volume loss at the left cerebellar hemisphere suggesting temporal evolution of remote infarct.  Superimposed mild degree of patchy hypoattenuation of the subcortical and periventricular white matter elsewhere consistent with chronic microvascular ischemic change.  No definite new focal areas of abnormal parenchymal attenuation.  No extra-axial blood or fluid collections.    No parenchymal mass, hemorrhage, edema or acute major vascular distribution infarct.  Skull base atherosclerotic vascular calcifications noted.    Skull/extracranial contents (limited evaluation): Hyperostosis frontalis interna.  No fracture. Mastoid air cells and paranasal sinuses are essentially clear.                                 Medical Decision Making:   History:   Old Medical Records: I decided to obtain old medical records.  Clinical Tests:   Lab Tests: Ordered and Reviewed  Radiological Study: Ordered and Reviewed            Scribe Attestation:   Scribe #1: I performed the above scribed service and the documentation accurately describes the services I performed. I attest to the accuracy of the note.    Attending Attestation:           Physician Attestation for Scribe:  Physician Attestation Statement for Scribe #1: I, Dr. Cabral, reviewed documentation, as scribed by Yoselin Ross in my presence, and it is both accurate and complete.                 ED Course as of Dec 19 1645   Sat Dec 19, 2020   1246 Pt feels better after analgesics. Pain now a 0 and resting comfortably.  Her PCP Dr. Dimas Qureshi call to check on her and will follow up in clinic on Monday or Tuesday.    [LT]      ED Course User Index  [LT] Yoselin Ross     Patient presents with headache for the past 3 days.  It was not sudden in onset/thunderclap.  It is not the worst of her life, although she does described as unusual for in that has  lasted so long.  Is primarily posterior.  Also with pain radiating down the neck, headache worsened with movement of the neck.  However no fever.  No associated neurologic symptoms.  She did try over-the-counter medication once with inadequate relief.  On my exam she is well-appearing not acutely ill or toxic appearing.  She has no meningeal signs, nor photophobia.  Mild hypertension but vital signs otherwise unremarkable.  Laboratory studies show stable chronic anemia and chronic renal insufficiency.  CT scan of the head unremarkable.  Patient's pain is fully resolved after fluids and analgesics.  I did discuss the case with the primary care physician, no further requests at this time.  He will follow her up as an outpatient.  Return precautions discussed with the patient       Clinical Impression:     ICD-10-CM ICD-9-CM   1. Nonintractable headache, unspecified chronicity pattern, unspecified headache type  R51.9 784.0                          ED Disposition Condition    Discharge Stable        ED Prescriptions     Medication Sig Dispense Start Date End Date Auth. Provider    butalbital-acetaminophen-caffeine -40 mg (FIORICET, ESGIC) -40 mg per tablet Take 1 tablet by mouth every 4 (four) hours as needed for Pain. 10 tablet 12/19/2020 1/18/2021 Albert Cabral II, MD        Follow-up Information     Follow up With Specialties Details Why Contact Info    Dimas Qureshi MD Internal Medicine Call in 2 days  [address]  [facility] [address]  [phone]                                         Albert Cabral II, MD  12/19/20 3747

## 2021-01-01 ENCOUNTER — TELEPHONE (OUTPATIENT)
Dept: ORTHOPEDICS | Facility: CLINIC | Age: 71
End: 2021-01-01

## 2021-01-01 ENCOUNTER — HOSPITAL ENCOUNTER (INPATIENT)
Facility: OTHER | Age: 71
LOS: 6 days | Discharge: HOME HEALTH | DRG: 341 | End: 2021-07-28
Attending: EMERGENCY MEDICINE | Admitting: INTERNAL MEDICINE
Payer: MEDICARE

## 2021-01-01 ENCOUNTER — HOSPITAL ENCOUNTER (INPATIENT)
Facility: OTHER | Age: 71
LOS: 12 days | Discharge: NURSING, REHAB OR LONG-TERM CARE | DRG: 616 | End: 2022-01-14
Attending: EMERGENCY MEDICINE | Admitting: EMERGENCY MEDICINE
Payer: MEDICARE

## 2021-01-01 ENCOUNTER — PATIENT OUTREACH (OUTPATIENT)
Dept: ADMINISTRATIVE | Facility: OTHER | Age: 71
End: 2021-01-01

## 2021-01-01 ENCOUNTER — EXTERNAL HOME HEALTH (OUTPATIENT)
Dept: HOME HEALTH SERVICES | Facility: HOSPITAL | Age: 71
End: 2021-01-01
Payer: MEDICARE

## 2021-01-01 ENCOUNTER — ANESTHESIA (OUTPATIENT)
Dept: SURGERY | Facility: OTHER | Age: 71
DRG: 341 | End: 2021-01-01
Payer: MEDICARE

## 2021-01-01 ENCOUNTER — DOCUMENT SCAN (OUTPATIENT)
Dept: HOME HEALTH SERVICES | Facility: HOSPITAL | Age: 71
End: 2021-01-01
Payer: MEDICARE

## 2021-01-01 ENCOUNTER — HOSPITAL ENCOUNTER (OUTPATIENT)
Facility: OTHER | Age: 71
Discharge: HOME | End: 2021-12-22
Attending: UROLOGY | Admitting: UROLOGY
Payer: MEDICARE

## 2021-01-01 ENCOUNTER — TELEPHONE (OUTPATIENT)
Dept: UROLOGY | Facility: CLINIC | Age: 71
End: 2021-01-01
Payer: MEDICARE

## 2021-01-01 ENCOUNTER — HOSPITAL ENCOUNTER (INPATIENT)
Facility: OTHER | Age: 71
LOS: 5 days | Discharge: HOME HEALTH | DRG: 659 | End: 2021-10-17
Attending: EMERGENCY MEDICINE | Admitting: INTERNAL MEDICINE
Payer: MEDICARE

## 2021-01-01 ENCOUNTER — ANESTHESIA (OUTPATIENT)
Dept: ENDOSCOPY | Facility: HOSPITAL | Age: 71
DRG: 682 | End: 2021-01-01
Payer: MEDICARE

## 2021-01-01 ENCOUNTER — DOCUMENT SCAN (OUTPATIENT)
Dept: HOME HEALTH SERVICES | Facility: HOSPITAL | Age: 71
End: 2021-01-01

## 2021-01-01 ENCOUNTER — HOSPITAL ENCOUNTER (OUTPATIENT)
Dept: PREADMISSION TESTING | Facility: OTHER | Age: 71
Discharge: HOME | End: 2021-12-16
Attending: UROLOGY
Payer: MEDICARE

## 2021-01-01 ENCOUNTER — ANESTHESIA EVENT (OUTPATIENT)
Dept: ENDOSCOPY | Facility: HOSPITAL | Age: 71
DRG: 682 | End: 2021-01-01
Payer: MEDICARE

## 2021-01-01 ENCOUNTER — ANESTHESIA EVENT (OUTPATIENT)
Dept: SURGERY | Facility: OTHER | Age: 71
End: 2021-01-01
Payer: MEDICARE

## 2021-01-01 ENCOUNTER — PATIENT OUTREACH (OUTPATIENT)
Dept: ADMINISTRATIVE | Facility: CLINIC | Age: 71
End: 2021-01-01

## 2021-01-01 ENCOUNTER — ANESTHESIA EVENT (OUTPATIENT)
Dept: SURGERY | Facility: OTHER | Age: 71
DRG: 341 | End: 2021-01-01
Payer: MEDICARE

## 2021-01-01 ENCOUNTER — HOSPITAL ENCOUNTER (INPATIENT)
Facility: HOSPITAL | Age: 71
LOS: 1 days | Discharge: HOME HEALTH | DRG: 682 | End: 2021-11-23
Attending: EMERGENCY MEDICINE | Admitting: INTERNAL MEDICINE
Payer: MEDICARE

## 2021-01-01 ENCOUNTER — ANESTHESIA EVENT (OUTPATIENT)
Dept: SURGERY | Facility: OTHER | Age: 71
DRG: 659 | End: 2021-01-01
Payer: MEDICARE

## 2021-01-01 ENCOUNTER — ANESTHESIA (OUTPATIENT)
Dept: SURGERY | Facility: OTHER | Age: 71
End: 2021-01-01
Payer: MEDICARE

## 2021-01-01 ENCOUNTER — ANESTHESIA (OUTPATIENT)
Dept: SURGERY | Facility: OTHER | Age: 71
DRG: 659 | End: 2021-01-01
Payer: MEDICARE

## 2021-01-01 ENCOUNTER — PATIENT OUTREACH (OUTPATIENT)
Dept: ADMINISTRATIVE | Facility: CLINIC | Age: 71
End: 2021-01-01
Payer: MEDICARE

## 2021-01-01 ENCOUNTER — TELEPHONE (OUTPATIENT)
Dept: ADMINISTRATIVE | Facility: OTHER | Age: 71
End: 2021-01-01

## 2021-01-01 ENCOUNTER — OFFICE VISIT (OUTPATIENT)
Dept: UROLOGY | Facility: CLINIC | Age: 71
End: 2021-01-01
Attending: UROLOGY
Payer: MEDICAID

## 2021-01-01 VITALS
TEMPERATURE: 98 F | HEART RATE: 78 BPM | WEIGHT: 122 LBS | SYSTOLIC BLOOD PRESSURE: 128 MMHG | DIASTOLIC BLOOD PRESSURE: 67 MMHG | HEIGHT: 67 IN | OXYGEN SATURATION: 100 % | RESPIRATION RATE: 16 BRPM | BODY MASS INDEX: 19.15 KG/M2

## 2021-01-01 VITALS
OXYGEN SATURATION: 100 % | BODY MASS INDEX: 19.15 KG/M2 | HEIGHT: 67 IN | DIASTOLIC BLOOD PRESSURE: 63 MMHG | HEART RATE: 79 BPM | WEIGHT: 122 LBS | TEMPERATURE: 99 F | SYSTOLIC BLOOD PRESSURE: 120 MMHG

## 2021-01-01 VITALS
OXYGEN SATURATION: 100 % | SYSTOLIC BLOOD PRESSURE: 146 MMHG | RESPIRATION RATE: 16 BRPM | BODY MASS INDEX: 25.44 KG/M2 | HEIGHT: 66 IN | DIASTOLIC BLOOD PRESSURE: 68 MMHG | TEMPERATURE: 99 F | HEART RATE: 86 BPM | WEIGHT: 158.31 LBS

## 2021-01-01 VITALS
HEIGHT: 66 IN | TEMPERATURE: 98 F | SYSTOLIC BLOOD PRESSURE: 160 MMHG | DIASTOLIC BLOOD PRESSURE: 71 MMHG | OXYGEN SATURATION: 100 % | BODY MASS INDEX: 19.67 KG/M2 | RESPIRATION RATE: 20 BRPM | HEART RATE: 78 BPM | WEIGHT: 122.38 LBS

## 2021-01-01 VITALS
RESPIRATION RATE: 16 BRPM | HEART RATE: 75 BPM | DIASTOLIC BLOOD PRESSURE: 64 MMHG | BODY MASS INDEX: 23.46 KG/M2 | TEMPERATURE: 98 F | HEIGHT: 66 IN | WEIGHT: 145.94 LBS | OXYGEN SATURATION: 99 % | SYSTOLIC BLOOD PRESSURE: 144 MMHG

## 2021-01-01 VITALS
BODY MASS INDEX: 19.69 KG/M2 | WEIGHT: 122.5 LBS | SYSTOLIC BLOOD PRESSURE: 109 MMHG | HEIGHT: 66 IN | DIASTOLIC BLOOD PRESSURE: 55 MMHG

## 2021-01-01 DIAGNOSIS — R11.2 NAUSEA AND VOMITING, INTRACTABILITY OF VOMITING NOT SPECIFIED, UNSPECIFIED VOMITING TYPE: Primary | ICD-10-CM

## 2021-01-01 DIAGNOSIS — E11.621 DIABETIC FOOT ULCER WITH OSTEOMYELITIS: ICD-10-CM

## 2021-01-01 DIAGNOSIS — N30.00 ACUTE CYSTITIS WITHOUT HEMATURIA: ICD-10-CM

## 2021-01-01 DIAGNOSIS — N17.9 ACUTE KIDNEY INJURY: ICD-10-CM

## 2021-01-01 DIAGNOSIS — N13.2 URETERAL STONE WITH HYDRONEPHROSIS: ICD-10-CM

## 2021-01-01 DIAGNOSIS — N13.2 URETERAL STONE WITH HYDRONEPHROSIS: Primary | ICD-10-CM

## 2021-01-01 DIAGNOSIS — N17.9 ACUTE RENAL FAILURE SUPERIMPOSED ON STAGE 4 CHRONIC KIDNEY DISEASE, UNSPECIFIED ACUTE RENAL FAILURE TYPE: ICD-10-CM

## 2021-01-01 DIAGNOSIS — Z01.818 PRE-OP EXAMINATION: ICD-10-CM

## 2021-01-01 DIAGNOSIS — L97.516 DIABETIC ULCER OF TOE OF RIGHT FOOT ASSOCIATED WITH TYPE 1 DIABETES MELLITUS, WITH BONE INVOLVEMENT WITHOUT EVIDENCE OF NECROSIS: Primary | ICD-10-CM

## 2021-01-01 DIAGNOSIS — Z01.818 PRE-OP TESTING: ICD-10-CM

## 2021-01-01 DIAGNOSIS — R07.9 CHEST PAIN: ICD-10-CM

## 2021-01-01 DIAGNOSIS — D64.9 ANEMIA, UNSPECIFIED TYPE: ICD-10-CM

## 2021-01-01 DIAGNOSIS — R06.02 SHORTNESS OF BREATH: ICD-10-CM

## 2021-01-01 DIAGNOSIS — N20.1 RIGHT URETERAL CALCULUS: Primary | ICD-10-CM

## 2021-01-01 DIAGNOSIS — L97.519: ICD-10-CM

## 2021-01-01 DIAGNOSIS — K35.30 ACUTE APPENDICITIS WITH LOCALIZED PERITONITIS, WITHOUT PERFORATION, ABSCESS, OR GANGRENE: Primary | ICD-10-CM

## 2021-01-01 DIAGNOSIS — R10.9 ABDOMINAL PAIN: ICD-10-CM

## 2021-01-01 DIAGNOSIS — N20.1 RIGHT URETERAL CALCULUS: ICD-10-CM

## 2021-01-01 DIAGNOSIS — N17.9 AKI (ACUTE KIDNEY INJURY): Primary | ICD-10-CM

## 2021-01-01 DIAGNOSIS — R11.10 VOMITING: ICD-10-CM

## 2021-01-01 DIAGNOSIS — M86.9 DIABETIC FOOT ULCER WITH OSTEOMYELITIS: ICD-10-CM

## 2021-01-01 DIAGNOSIS — N13.9 ACUTE UNILATERAL OBSTRUCTIVE UROPATHY: ICD-10-CM

## 2021-01-01 DIAGNOSIS — Z01.818 PREOP TESTING: Primary | ICD-10-CM

## 2021-01-01 DIAGNOSIS — R33.9 URINARY RETENTION: ICD-10-CM

## 2021-01-01 DIAGNOSIS — E10.621 DIABETIC ULCER OF TOE OF RIGHT FOOT ASSOCIATED WITH TYPE 1 DIABETES MELLITUS, WITH BONE INVOLVEMENT WITHOUT EVIDENCE OF NECROSIS: Primary | ICD-10-CM

## 2021-01-01 DIAGNOSIS — E11.69 DIABETIC FOOT ULCER WITH OSTEOMYELITIS: ICD-10-CM

## 2021-01-01 DIAGNOSIS — N18.4 ACUTE RENAL FAILURE SUPERIMPOSED ON STAGE 4 CHRONIC KIDNEY DISEASE, UNSPECIFIED ACUTE RENAL FAILURE TYPE: ICD-10-CM

## 2021-01-01 DIAGNOSIS — L97.509 DIABETIC FOOT ULCER WITH OSTEOMYELITIS: ICD-10-CM

## 2021-01-01 LAB
ABO + RH BLD: NORMAL
ABO + RH BLD: NORMAL
ALBUMIN SERPL BCP-MCNC: 2.3 G/DL (ref 3.5–5.2)
ALBUMIN SERPL BCP-MCNC: 2.9 G/DL (ref 3.5–5.2)
ALBUMIN SERPL BCP-MCNC: 3 G/DL (ref 3.5–5.2)
ALBUMIN SERPL BCP-MCNC: 3 G/DL (ref 3.5–5.2)
ALBUMIN SERPL BCP-MCNC: 3.1 G/DL (ref 3.5–5.2)
ALBUMIN SERPL BCP-MCNC: 3.2 G/DL (ref 3.5–5.2)
ALBUMIN SERPL BCP-MCNC: 3.3 G/DL (ref 3.5–5.2)
ALBUMIN SERPL BCP-MCNC: 3.4 G/DL (ref 3.5–5.2)
ALBUMIN SERPL BCP-MCNC: 3.5 G/DL (ref 3.5–5.2)
ALBUMIN SERPL BCP-MCNC: 3.7 G/DL (ref 3.5–5.2)
ALBUMIN SERPL BCP-MCNC: 4.1 G/DL (ref 3.5–5.2)
ALLENS TEST: ABNORMAL
ALP SERPL-CCNC: 48 U/L (ref 55–135)
ALP SERPL-CCNC: 64 U/L (ref 55–135)
ALP SERPL-CCNC: 65 U/L (ref 55–135)
ALP SERPL-CCNC: 68 U/L (ref 55–135)
ALP SERPL-CCNC: 69 U/L (ref 55–135)
ALP SERPL-CCNC: 69 U/L (ref 55–135)
ALP SERPL-CCNC: 74 U/L (ref 55–135)
ALP SERPL-CCNC: 75 U/L (ref 55–135)
ALP SERPL-CCNC: 78 U/L (ref 55–135)
ALP SERPL-CCNC: 78 U/L (ref 55–135)
ALP SERPL-CCNC: 81 U/L (ref 55–135)
ALP SERPL-CCNC: 95 U/L (ref 55–135)
ALT SERPL W/O P-5'-P-CCNC: 11 U/L (ref 10–44)
ALT SERPL W/O P-5'-P-CCNC: 12 U/L (ref 10–44)
ALT SERPL W/O P-5'-P-CCNC: 5 U/L (ref 10–44)
ALT SERPL W/O P-5'-P-CCNC: 8 U/L (ref 10–44)
ALT SERPL W/O P-5'-P-CCNC: 9 U/L (ref 10–44)
ALT SERPL W/O P-5'-P-CCNC: <5 U/L (ref 10–44)
ANION GAP SERPL CALC-SCNC: 10 MMOL/L (ref 8–16)
ANION GAP SERPL CALC-SCNC: 11 MMOL/L (ref 8–16)
ANION GAP SERPL CALC-SCNC: 12 MMOL/L (ref 8–16)
ANION GAP SERPL CALC-SCNC: 13 MMOL/L (ref 8–16)
ANION GAP SERPL CALC-SCNC: 14 MMOL/L (ref 8–16)
ANION GAP SERPL CALC-SCNC: 15 MMOL/L (ref 8–16)
ANION GAP SERPL CALC-SCNC: 18 MMOL/L (ref 8–16)
ANION GAP SERPL CALC-SCNC: 20 MMOL/L (ref 8–16)
APTT BLDCRRT: 26.8 SEC (ref 21–32)
AST SERPL-CCNC: 11 U/L (ref 10–40)
AST SERPL-CCNC: 12 U/L (ref 10–40)
AST SERPL-CCNC: 13 U/L (ref 10–40)
AST SERPL-CCNC: 17 U/L (ref 10–40)
AST SERPL-CCNC: 6 U/L (ref 10–40)
AST SERPL-CCNC: 7 U/L (ref 10–40)
AST SERPL-CCNC: 8 U/L (ref 10–40)
AST SERPL-CCNC: 9 U/L (ref 10–40)
AST SERPL-CCNC: 9 U/L (ref 10–40)
B-OH-BUTYR BLD STRIP-SCNC: 1.2 MMOL/L (ref 0–0.5)
BACTERIA #/AREA URNS AUTO: ABNORMAL /HPF
BACTERIA #/AREA URNS HPF: ABNORMAL /HPF
BACTERIA UR CULT: NO GROWTH
BACTERIA UR CULT: NORMAL
BACTERIA UR CULT: NORMAL
BASOPHILS # BLD AUTO: 0 K/UL (ref 0–0.2)
BASOPHILS # BLD AUTO: 0.02 K/UL (ref 0–0.2)
BASOPHILS # BLD AUTO: 0.03 K/UL (ref 0–0.2)
BASOPHILS # BLD AUTO: 0.04 K/UL (ref 0–0.2)
BASOPHILS # BLD AUTO: 0.05 K/UL (ref 0–0.2)
BASOPHILS # BLD AUTO: 0.06 K/UL (ref 0–0.2)
BASOPHILS # BLD AUTO: 0.06 K/UL (ref 0–0.2)
BASOPHILS # BLD AUTO: 0.07 K/UL (ref 0–0.2)
BASOPHILS # BLD AUTO: 0.07 K/UL (ref 0–0.2)
BASOPHILS # BLD AUTO: 0.08 K/UL (ref 0–0.2)
BASOPHILS NFR BLD: 0 % (ref 0–1.9)
BASOPHILS NFR BLD: 0.1 % (ref 0–1.9)
BASOPHILS NFR BLD: 0.3 % (ref 0–1.9)
BASOPHILS NFR BLD: 0.4 % (ref 0–1.9)
BASOPHILS NFR BLD: 0.5 % (ref 0–1.9)
BASOPHILS NFR BLD: 0.5 % (ref 0–1.9)
BILIRUB SERPL-MCNC: 0.4 MG/DL (ref 0.1–1)
BILIRUB SERPL-MCNC: 0.5 MG/DL (ref 0.1–1)
BILIRUB SERPL-MCNC: 0.6 MG/DL (ref 0.1–1)
BILIRUB SERPL-MCNC: 0.7 MG/DL (ref 0.1–1)
BILIRUB UR QL STRIP: NEGATIVE
BLD GP AB SCN CELLS X3 SERPL QL: NORMAL
BLD GP AB SCN CELLS X3 SERPL QL: NORMAL
BLD PROD TYP BPU: NORMAL
BLD PROD TYP BPU: NORMAL
BLOOD UNIT EXPIRATION DATE: NORMAL
BLOOD UNIT EXPIRATION DATE: NORMAL
BLOOD UNIT TYPE CODE: 5100
BLOOD UNIT TYPE CODE: 5100
BLOOD UNIT TYPE: NORMAL
BLOOD UNIT TYPE: NORMAL
BNP SERPL-MCNC: 24 PG/ML (ref 0–99)
BNP SERPL-MCNC: 75 PG/ML (ref 0–99)
BUN SERPL-MCNC: 13 MG/DL (ref 8–23)
BUN SERPL-MCNC: 16 MG/DL (ref 8–23)
BUN SERPL-MCNC: 17 MG/DL (ref 8–23)
BUN SERPL-MCNC: 19 MG/DL (ref 8–23)
BUN SERPL-MCNC: 19 MG/DL (ref 8–23)
BUN SERPL-MCNC: 20 MG/DL (ref 8–23)
BUN SERPL-MCNC: 21 MG/DL (ref 8–23)
BUN SERPL-MCNC: 22 MG/DL (ref 8–23)
BUN SERPL-MCNC: 22 MG/DL (ref 8–23)
BUN SERPL-MCNC: 24 MG/DL (ref 8–23)
BUN SERPL-MCNC: 24 MG/DL (ref 8–23)
BUN SERPL-MCNC: 25 MG/DL (ref 8–23)
BUN SERPL-MCNC: 26 MG/DL (ref 8–23)
BUN SERPL-MCNC: 27 MG/DL (ref 8–23)
BUN SERPL-MCNC: 31 MG/DL (ref 6–30)
BUN SERPL-MCNC: 31 MG/DL (ref 8–23)
BUN SERPL-MCNC: 35 MG/DL (ref 8–23)
BUN SERPL-MCNC: 36 MG/DL (ref 6–30)
BUN SERPL-MCNC: 36 MG/DL (ref 8–23)
BUN SERPL-MCNC: 38 MG/DL (ref 8–23)
C DIFF GDH STL QL: POSITIVE
C DIFF TOX A+B STL QL IA: POSITIVE
CALCIUM SERPL-MCNC: 10.3 MG/DL (ref 8.7–10.5)
CALCIUM SERPL-MCNC: 7.2 MG/DL (ref 8.7–10.5)
CALCIUM SERPL-MCNC: 8 MG/DL (ref 8.7–10.5)
CALCIUM SERPL-MCNC: 8.3 MG/DL (ref 8.7–10.5)
CALCIUM SERPL-MCNC: 8.7 MG/DL (ref 8.7–10.5)
CALCIUM SERPL-MCNC: 8.8 MG/DL (ref 8.7–10.5)
CALCIUM SERPL-MCNC: 8.9 MG/DL (ref 8.7–10.5)
CALCIUM SERPL-MCNC: 9 MG/DL (ref 8.7–10.5)
CALCIUM SERPL-MCNC: 9.1 MG/DL (ref 8.7–10.5)
CALCIUM SERPL-MCNC: 9.3 MG/DL (ref 8.7–10.5)
CALCIUM SERPL-MCNC: 9.4 MG/DL (ref 8.7–10.5)
CALCIUM SERPL-MCNC: 9.5 MG/DL (ref 8.7–10.5)
CHLORIDE SERPL-SCNC: 103 MMOL/L (ref 95–110)
CHLORIDE SERPL-SCNC: 104 MMOL/L (ref 95–110)
CHLORIDE SERPL-SCNC: 105 MMOL/L (ref 95–110)
CHLORIDE SERPL-SCNC: 106 MMOL/L (ref 95–110)
CHLORIDE SERPL-SCNC: 107 MMOL/L (ref 95–110)
CHLORIDE SERPL-SCNC: 108 MMOL/L (ref 95–110)
CHLORIDE SERPL-SCNC: 109 MMOL/L (ref 95–110)
CHLORIDE SERPL-SCNC: 110 MMOL/L (ref 95–110)
CLARITY UR REFRACT.AUTO: ABNORMAL
CLARITY UR: ABNORMAL
CLARITY UR: ABNORMAL
CLARITY UR: CLEAR
CO2 SERPL-SCNC: 15 MMOL/L (ref 23–29)
CO2 SERPL-SCNC: 18 MMOL/L (ref 23–29)
CO2 SERPL-SCNC: 18 MMOL/L (ref 23–29)
CO2 SERPL-SCNC: 19 MMOL/L (ref 23–29)
CO2 SERPL-SCNC: 19 MMOL/L (ref 23–29)
CO2 SERPL-SCNC: 20 MMOL/L (ref 23–29)
CO2 SERPL-SCNC: 21 MMOL/L (ref 23–29)
CO2 SERPL-SCNC: 21 MMOL/L (ref 23–29)
CO2 SERPL-SCNC: 22 MMOL/L (ref 23–29)
CO2 SERPL-SCNC: 22 MMOL/L (ref 23–29)
CO2 SERPL-SCNC: 23 MMOL/L (ref 23–29)
CO2 SERPL-SCNC: 23 MMOL/L (ref 23–29)
CO2 SERPL-SCNC: 24 MMOL/L (ref 23–29)
CO2 SERPL-SCNC: 25 MMOL/L (ref 23–29)
CO2 SERPL-SCNC: 25 MMOL/L (ref 23–29)
CO2 SERPL-SCNC: 26 MMOL/L (ref 23–29)
CODING SYSTEM: NORMAL
CODING SYSTEM: NORMAL
COLOR UR AUTO: YELLOW
COLOR UR: YELLOW
COMPN STONE: NORMAL
CREAT SERPL-MCNC: 1.7 MG/DL (ref 0.5–1.4)
CREAT SERPL-MCNC: 1.8 MG/DL (ref 0.5–1.4)
CREAT SERPL-MCNC: 2.1 MG/DL (ref 0.5–1.4)
CREAT SERPL-MCNC: 2.1 MG/DL (ref 0.5–1.4)
CREAT SERPL-MCNC: 2.3 MG/DL (ref 0.5–1.4)
CREAT SERPL-MCNC: 2.4 MG/DL (ref 0.5–1.4)
CREAT SERPL-MCNC: 2.5 MG/DL (ref 0.5–1.4)
CREAT SERPL-MCNC: 2.6 MG/DL (ref 0.5–1.4)
CREAT SERPL-MCNC: 2.7 MG/DL (ref 0.5–1.4)
CREAT SERPL-MCNC: 2.7 MG/DL (ref 0.5–1.4)
CREAT SERPL-MCNC: 2.8 MG/DL (ref 0.5–1.4)
CREAT SERPL-MCNC: 2.9 MG/DL (ref 0.5–1.4)
CREAT SERPL-MCNC: 2.9 MG/DL (ref 0.5–1.4)
CREAT SERPL-MCNC: 3 MG/DL (ref 0.5–1.4)
CREAT SERPL-MCNC: 3.4 MG/DL (ref 0.5–1.4)
CREAT SERPL-MCNC: 3.6 MG/DL (ref 0.5–1.4)
CREAT SERPL-MCNC: 3.6 MG/DL (ref 0.5–1.4)
CREAT SERPL-MCNC: 3.9 MG/DL (ref 0.5–1.4)
CREAT SERPL-MCNC: 4 MG/DL (ref 0.5–1.4)
CREAT SERPL-MCNC: 4.1 MG/DL (ref 0.5–1.4)
CREAT SERPL-MCNC: 4.2 MG/DL (ref 0.5–1.4)
CREAT SERPL-MCNC: 4.3 MG/DL (ref 0.5–1.4)
CREAT UR-MCNC: 70.2 MG/DL (ref 15–325)
CRP SERPL-MCNC: 53.3 MG/L (ref 0–8.2)
CTP QC/QA: YES
DELSYS: ABNORMAL
DIFFERENTIAL METHOD: ABNORMAL
DISPENSE STATUS: NORMAL
DISPENSE STATUS: NORMAL
EOSINOPHIL # BLD AUTO: 0 K/UL (ref 0–0.5)
EOSINOPHIL # BLD AUTO: 0.1 K/UL (ref 0–0.5)
EOSINOPHIL # BLD AUTO: 0.2 K/UL (ref 0–0.5)
EOSINOPHIL # BLD AUTO: 0.3 K/UL (ref 0–0.5)
EOSINOPHIL NFR BLD: 0 % (ref 0–8)
EOSINOPHIL NFR BLD: 0.5 % (ref 0–8)
EOSINOPHIL NFR BLD: 0.8 % (ref 0–8)
EOSINOPHIL NFR BLD: 1 % (ref 0–8)
EOSINOPHIL NFR BLD: 1.2 % (ref 0–8)
EOSINOPHIL NFR BLD: 1.3 % (ref 0–8)
EOSINOPHIL NFR BLD: 1.4 % (ref 0–8)
EOSINOPHIL NFR BLD: 1.6 % (ref 0–8)
EOSINOPHIL NFR BLD: 1.7 % (ref 0–8)
EOSINOPHIL NFR BLD: 1.7 % (ref 0–8)
EOSINOPHIL NFR BLD: 1.8 % (ref 0–8)
EOSINOPHIL NFR BLD: 2 % (ref 0–8)
EOSINOPHIL NFR BLD: 2 % (ref 0–8)
EOSINOPHIL NFR BLD: 2.2 % (ref 0–8)
EOSINOPHIL NFR BLD: 2.3 % (ref 0–8)
ERYTHROCYTE [DISTWIDTH] IN BLOOD BY AUTOMATED COUNT: 13.5 % (ref 11.5–14.5)
ERYTHROCYTE [DISTWIDTH] IN BLOOD BY AUTOMATED COUNT: 13.8 % (ref 11.5–14.5)
ERYTHROCYTE [DISTWIDTH] IN BLOOD BY AUTOMATED COUNT: 13.9 % (ref 11.5–14.5)
ERYTHROCYTE [DISTWIDTH] IN BLOOD BY AUTOMATED COUNT: 14 % (ref 11.5–14.5)
ERYTHROCYTE [DISTWIDTH] IN BLOOD BY AUTOMATED COUNT: 14 % (ref 11.5–14.5)
ERYTHROCYTE [DISTWIDTH] IN BLOOD BY AUTOMATED COUNT: 14.1 % (ref 11.5–14.5)
ERYTHROCYTE [DISTWIDTH] IN BLOOD BY AUTOMATED COUNT: 14.3 % (ref 11.5–14.5)
ERYTHROCYTE [DISTWIDTH] IN BLOOD BY AUTOMATED COUNT: 14.3 % (ref 11.5–14.5)
ERYTHROCYTE [DISTWIDTH] IN BLOOD BY AUTOMATED COUNT: 14.7 % (ref 11.5–14.5)
ERYTHROCYTE [DISTWIDTH] IN BLOOD BY AUTOMATED COUNT: 15.1 % (ref 11.5–14.5)
ERYTHROCYTE [DISTWIDTH] IN BLOOD BY AUTOMATED COUNT: 15.5 % (ref 11.5–14.5)
ERYTHROCYTE [DISTWIDTH] IN BLOOD BY AUTOMATED COUNT: 15.6 % (ref 11.5–14.5)
ERYTHROCYTE [DISTWIDTH] IN BLOOD BY AUTOMATED COUNT: 15.6 % (ref 11.5–14.5)
ERYTHROCYTE [DISTWIDTH] IN BLOOD BY AUTOMATED COUNT: 15.8 % (ref 11.5–14.5)
ERYTHROCYTE [SEDIMENTATION RATE] IN BLOOD: 120 MM/HR (ref 0–20)
EST. GFR  (AFRICAN AMERICAN): 11 ML/MIN/1.73 M^2
EST. GFR  (AFRICAN AMERICAN): 12 ML/MIN/1.73 M^2
EST. GFR  (AFRICAN AMERICAN): 12 ML/MIN/1.73 M^2
EST. GFR  (AFRICAN AMERICAN): 13 ML/MIN/1.73 M^2
EST. GFR  (AFRICAN AMERICAN): 14 ML/MIN/1.73 M^2
EST. GFR  (AFRICAN AMERICAN): 14 ML/MIN/1.73 M^2
EST. GFR  (AFRICAN AMERICAN): 15 ML/MIN/1.73 M^2
EST. GFR  (AFRICAN AMERICAN): 17 ML/MIN/1.73 M^2
EST. GFR  (AFRICAN AMERICAN): 17.5 ML/MIN/1.73 M^2
EST. GFR  (AFRICAN AMERICAN): 18 ML/MIN/1.73 M^2
EST. GFR  (AFRICAN AMERICAN): 19 ML/MIN/1.73 M^2
EST. GFR  (AFRICAN AMERICAN): 19.8 ML/MIN/1.73 M^2
EST. GFR  (AFRICAN AMERICAN): 20 ML/MIN/1.73 M^2
EST. GFR  (AFRICAN AMERICAN): 20.8 ML/MIN/1.73 M^2
EST. GFR  (AFRICAN AMERICAN): 21 ML/MIN/1.73 M^2
EST. GFR  (AFRICAN AMERICAN): 21 ML/MIN/1.73 M^2
EST. GFR  (AFRICAN AMERICAN): 21.8 ML/MIN/1.73 M^2
EST. GFR  (AFRICAN AMERICAN): 22.9 ML/MIN/1.73 M^2
EST. GFR  (AFRICAN AMERICAN): 24 ML/MIN/1.73 M^2
EST. GFR  (AFRICAN AMERICAN): 27 ML/MIN/1.73 M^2
EST. GFR  (AFRICAN AMERICAN): 27 ML/MIN/1.73 M^2
EST. GFR  (AFRICAN AMERICAN): 32 ML/MIN/1.73 M^2
EST. GFR  (AFRICAN AMERICAN): 35 ML/MIN/1.73 M^2
EST. GFR  (NON AFRICAN AMERICAN): 10 ML/MIN/1.73 M^2
EST. GFR  (NON AFRICAN AMERICAN): 11 ML/MIN/1.73 M^2
EST. GFR  (NON AFRICAN AMERICAN): 12 ML/MIN/1.73 M^2
EST. GFR  (NON AFRICAN AMERICAN): 12 ML/MIN/1.73 M^2
EST. GFR  (NON AFRICAN AMERICAN): 13 ML/MIN/1.73 M^2
EST. GFR  (NON AFRICAN AMERICAN): 15 ML/MIN/1.73 M^2
EST. GFR  (NON AFRICAN AMERICAN): 15.2 ML/MIN/1.73 M^2
EST. GFR  (NON AFRICAN AMERICAN): 16 ML/MIN/1.73 M^2
EST. GFR  (NON AFRICAN AMERICAN): 16.5 ML/MIN/1.73 M^2
EST. GFR  (NON AFRICAN AMERICAN): 17 ML/MIN/1.73 M^2
EST. GFR  (NON AFRICAN AMERICAN): 17.2 ML/MIN/1.73 M^2
EST. GFR  (NON AFRICAN AMERICAN): 18 ML/MIN/1.73 M^2
EST. GFR  (NON AFRICAN AMERICAN): 18.9 ML/MIN/1.73 M^2
EST. GFR  (NON AFRICAN AMERICAN): 19.9 ML/MIN/1.73 M^2
EST. GFR  (NON AFRICAN AMERICAN): 21 ML/MIN/1.73 M^2
EST. GFR  (NON AFRICAN AMERICAN): 23 ML/MIN/1.73 M^2
EST. GFR  (NON AFRICAN AMERICAN): 23 ML/MIN/1.73 M^2
EST. GFR  (NON AFRICAN AMERICAN): 28 ML/MIN/1.73 M^2
EST. GFR  (NON AFRICAN AMERICAN): 30 ML/MIN/1.73 M^2
ESTIMATED AVG GLUCOSE: 108 MG/DL (ref 68–131)
ESTIMATED AVG GLUCOSE: 134 MG/DL (ref 68–131)
FERRITIN SERPL-MCNC: 463 NG/ML (ref 20–300)
FINAL PATHOLOGIC DIAGNOSIS: NORMAL
FOLATE SERPL-MCNC: 8.3 NG/ML (ref 4–24)
GLUCOSE SERPL-MCNC: 102 MG/DL (ref 70–110)
GLUCOSE SERPL-MCNC: 105 MG/DL (ref 70–110)
GLUCOSE SERPL-MCNC: 105 MG/DL (ref 70–110)
GLUCOSE SERPL-MCNC: 108 MG/DL (ref 70–110)
GLUCOSE SERPL-MCNC: 110 MG/DL (ref 70–110)
GLUCOSE SERPL-MCNC: 115 MG/DL (ref 70–110)
GLUCOSE SERPL-MCNC: 116 MG/DL (ref 70–110)
GLUCOSE SERPL-MCNC: 122 MG/DL (ref 70–110)
GLUCOSE SERPL-MCNC: 135 MG/DL (ref 70–110)
GLUCOSE SERPL-MCNC: 159 MG/DL (ref 70–110)
GLUCOSE SERPL-MCNC: 188 MG/DL (ref 70–110)
GLUCOSE SERPL-MCNC: 196 MG/DL (ref 70–110)
GLUCOSE SERPL-MCNC: 204 MG/DL (ref 70–110)
GLUCOSE SERPL-MCNC: 72 MG/DL (ref 70–110)
GLUCOSE SERPL-MCNC: 82 MG/DL (ref 70–110)
GLUCOSE SERPL-MCNC: 84 MG/DL (ref 70–110)
GLUCOSE SERPL-MCNC: 86 MG/DL (ref 70–110)
GLUCOSE SERPL-MCNC: 87 MG/DL (ref 70–110)
GLUCOSE SERPL-MCNC: 87 MG/DL (ref 70–110)
GLUCOSE SERPL-MCNC: 91 MG/DL (ref 70–110)
GLUCOSE SERPL-MCNC: 93 MG/DL (ref 70–110)
GLUCOSE SERPL-MCNC: 94 MG/DL (ref 70–110)
GLUCOSE SERPL-MCNC: 98 MG/DL (ref 70–110)
GLUCOSE SERPL-MCNC: 99 MG/DL (ref 70–110)
GLUCOSE SERPL-MCNC: 99 MG/DL (ref 70–110)
GLUCOSE UR QL STRIP: NEGATIVE
GROSS: NORMAL
HBA1C MFR BLD: 5.4 % (ref 4–5.6)
HBA1C MFR BLD: 6.3 % (ref 4–5.6)
HCO3 UR-SCNC: 25 MMOL/L (ref 24–28)
HCT VFR BLD AUTO: 19.9 % (ref 37–48.5)
HCT VFR BLD AUTO: 21.2 % (ref 37–48.5)
HCT VFR BLD AUTO: 22.2 % (ref 37–48.5)
HCT VFR BLD AUTO: 22.5 % (ref 37–48.5)
HCT VFR BLD AUTO: 22.9 % (ref 37–48.5)
HCT VFR BLD AUTO: 23.4 % (ref 37–48.5)
HCT VFR BLD AUTO: 23.6 % (ref 37–48.5)
HCT VFR BLD AUTO: 24.3 % (ref 37–48.5)
HCT VFR BLD AUTO: 24.3 % (ref 37–48.5)
HCT VFR BLD AUTO: 24.6 % (ref 37–48.5)
HCT VFR BLD AUTO: 25.6 % (ref 37–48.5)
HCT VFR BLD AUTO: 25.9 % (ref 37–48.5)
HCT VFR BLD AUTO: 26.2 % (ref 37–48.5)
HCT VFR BLD AUTO: 27.2 % (ref 37–48.5)
HCT VFR BLD AUTO: 27.6 % (ref 37–48.5)
HCT VFR BLD AUTO: 28.3 % (ref 37–48.5)
HCT VFR BLD AUTO: 28.5 % (ref 37–48.5)
HCT VFR BLD AUTO: 28.8 % (ref 37–48.5)
HCT VFR BLD AUTO: 28.9 % (ref 37–48.5)
HCT VFR BLD AUTO: 29.2 % (ref 37–48.5)
HCT VFR BLD CALC: 28 %PCV (ref 36–54)
HCT VFR BLD CALC: 29 %PCV (ref 36–54)
HCV AB SERPL QL IA: NEGATIVE
HGB BLD-MCNC: 6.5 G/DL (ref 12–16)
HGB BLD-MCNC: 7.1 G/DL (ref 12–16)
HGB BLD-MCNC: 7.1 G/DL (ref 12–16)
HGB BLD-MCNC: 7.2 G/DL (ref 12–16)
HGB BLD-MCNC: 7.3 G/DL (ref 12–16)
HGB BLD-MCNC: 7.5 G/DL (ref 12–16)
HGB BLD-MCNC: 7.5 G/DL (ref 12–16)
HGB BLD-MCNC: 7.6 G/DL (ref 12–16)
HGB BLD-MCNC: 7.8 G/DL (ref 12–16)
HGB BLD-MCNC: 7.9 G/DL (ref 12–16)
HGB BLD-MCNC: 7.9 G/DL (ref 12–16)
HGB BLD-MCNC: 8.1 G/DL (ref 12–16)
HGB BLD-MCNC: 8.2 G/DL (ref 12–16)
HGB BLD-MCNC: 8.8 G/DL (ref 12–16)
HGB BLD-MCNC: 8.9 G/DL (ref 12–16)
HGB BLD-MCNC: 9.1 G/DL (ref 12–16)
HGB BLD-MCNC: 9.1 G/DL (ref 12–16)
HGB BLD-MCNC: 9.2 G/DL (ref 12–16)
HGB BLD-MCNC: 9.3 G/DL (ref 12–16)
HGB BLD-MCNC: 9.4 G/DL (ref 12–16)
HGB UR QL STRIP: ABNORMAL
HYALINE CASTS #/AREA URNS LPF: 0 /LPF
HYALINE CASTS UR QL AUTO: 0 /LPF
IMM GRANULOCYTES # BLD AUTO: 0.02 K/UL (ref 0–0.04)
IMM GRANULOCYTES # BLD AUTO: 0.02 K/UL (ref 0–0.04)
IMM GRANULOCYTES # BLD AUTO: 0.03 K/UL (ref 0–0.04)
IMM GRANULOCYTES # BLD AUTO: 0.03 K/UL (ref 0–0.04)
IMM GRANULOCYTES # BLD AUTO: 0.04 K/UL (ref 0–0.04)
IMM GRANULOCYTES # BLD AUTO: 0.05 K/UL (ref 0–0.04)
IMM GRANULOCYTES # BLD AUTO: 0.05 K/UL (ref 0–0.04)
IMM GRANULOCYTES # BLD AUTO: 0.08 K/UL (ref 0–0.04)
IMM GRANULOCYTES # BLD AUTO: 0.09 K/UL (ref 0–0.04)
IMM GRANULOCYTES # BLD AUTO: 0.1 K/UL (ref 0–0.04)
IMM GRANULOCYTES # BLD AUTO: 0.11 K/UL (ref 0–0.04)
IMM GRANULOCYTES # BLD AUTO: 0.15 K/UL (ref 0–0.04)
IMM GRANULOCYTES # BLD AUTO: 0.18 K/UL (ref 0–0.04)
IMM GRANULOCYTES NFR BLD AUTO: 0.2 % (ref 0–0.5)
IMM GRANULOCYTES NFR BLD AUTO: 0.3 % (ref 0–0.5)
IMM GRANULOCYTES NFR BLD AUTO: 0.4 % (ref 0–0.5)
IMM GRANULOCYTES NFR BLD AUTO: 0.4 % (ref 0–0.5)
IMM GRANULOCYTES NFR BLD AUTO: 0.5 % (ref 0–0.5)
IMM GRANULOCYTES NFR BLD AUTO: 0.6 % (ref 0–0.5)
IMM GRANULOCYTES NFR BLD AUTO: 0.7 % (ref 0–0.5)
IMM GRANULOCYTES NFR BLD AUTO: 0.8 % (ref 0–0.5)
IMM GRANULOCYTES NFR BLD AUTO: 0.8 % (ref 0–0.5)
IMM GRANULOCYTES NFR BLD AUTO: 0.9 % (ref 0–0.5)
INR PPP: 1 (ref 0.8–1.2)
IRON SERPL-MCNC: 17 UG/DL (ref 30–160)
KETONES UR QL STRIP: NEGATIVE
LACTATE SERPL-SCNC: 1.1 MMOL/L (ref 0.5–2.2)
LACTATE SERPL-SCNC: 1.4 MMOL/L (ref 0.5–2.2)
LEUKOCYTE ESTERASE UR QL STRIP: ABNORMAL
LEUKOCYTE ESTERASE UR QL STRIP: NEGATIVE
LIPASE SERPL-CCNC: 5 U/L (ref 4–60)
LIPASE SERPL-CCNC: 5 U/L (ref 4–60)
LYMPHOCYTES # BLD AUTO: 1 K/UL (ref 1–4.8)
LYMPHOCYTES # BLD AUTO: 1.4 K/UL (ref 1–4.8)
LYMPHOCYTES # BLD AUTO: 1.6 K/UL (ref 1–4.8)
LYMPHOCYTES # BLD AUTO: 1.6 K/UL (ref 1–4.8)
LYMPHOCYTES # BLD AUTO: 1.9 K/UL (ref 1–4.8)
LYMPHOCYTES # BLD AUTO: 2.3 K/UL (ref 1–4.8)
LYMPHOCYTES # BLD AUTO: 2.5 K/UL (ref 1–4.8)
LYMPHOCYTES # BLD AUTO: 2.5 K/UL (ref 1–4.8)
LYMPHOCYTES # BLD AUTO: 2.6 K/UL (ref 1–4.8)
LYMPHOCYTES # BLD AUTO: 2.6 K/UL (ref 1–4.8)
LYMPHOCYTES # BLD AUTO: 2.7 K/UL (ref 1–4.8)
LYMPHOCYTES # BLD AUTO: 3 K/UL (ref 1–4.8)
LYMPHOCYTES # BLD AUTO: 3 K/UL (ref 1–4.8)
LYMPHOCYTES # BLD AUTO: 3.3 K/UL (ref 1–4.8)
LYMPHOCYTES # BLD AUTO: 3.3 K/UL (ref 1–4.8)
LYMPHOCYTES # BLD AUTO: 3.8 K/UL (ref 1–4.8)
LYMPHOCYTES # BLD AUTO: 3.8 K/UL (ref 1–4.8)
LYMPHOCYTES NFR BLD: 12.1 % (ref 18–48)
LYMPHOCYTES NFR BLD: 12.8 % (ref 18–48)
LYMPHOCYTES NFR BLD: 14 % (ref 18–48)
LYMPHOCYTES NFR BLD: 14.1 % (ref 18–48)
LYMPHOCYTES NFR BLD: 15.6 % (ref 18–48)
LYMPHOCYTES NFR BLD: 16.1 % (ref 18–48)
LYMPHOCYTES NFR BLD: 17 % (ref 18–48)
LYMPHOCYTES NFR BLD: 21.3 % (ref 18–48)
LYMPHOCYTES NFR BLD: 21.4 % (ref 18–48)
LYMPHOCYTES NFR BLD: 22.6 % (ref 18–48)
LYMPHOCYTES NFR BLD: 25.1 % (ref 18–48)
LYMPHOCYTES NFR BLD: 25.2 % (ref 18–48)
LYMPHOCYTES NFR BLD: 26.9 % (ref 18–48)
LYMPHOCYTES NFR BLD: 29.3 % (ref 18–48)
LYMPHOCYTES NFR BLD: 34 % (ref 18–48)
LYMPHOCYTES NFR BLD: 35.5 % (ref 18–48)
LYMPHOCYTES NFR BLD: 8.8 % (ref 18–48)
Lab: NORMAL
MAGNESIUM SERPL-MCNC: 1.2 MG/DL (ref 1.6–2.6)
MAGNESIUM SERPL-MCNC: 1.5 MG/DL (ref 1.6–2.6)
MAGNESIUM SERPL-MCNC: 1.6 MG/DL (ref 1.6–2.6)
MAGNESIUM SERPL-MCNC: 1.7 MG/DL (ref 1.6–2.6)
MAGNESIUM SERPL-MCNC: 1.8 MG/DL (ref 1.6–2.6)
MAGNESIUM SERPL-MCNC: 2.2 MG/DL (ref 1.6–2.6)
MCH RBC QN AUTO: 27.7 PG (ref 27–31)
MCH RBC QN AUTO: 27.9 PG (ref 27–31)
MCH RBC QN AUTO: 28 PG (ref 27–31)
MCH RBC QN AUTO: 28.1 PG (ref 27–31)
MCH RBC QN AUTO: 28.1 PG (ref 27–31)
MCH RBC QN AUTO: 28.2 PG (ref 27–31)
MCH RBC QN AUTO: 28.4 PG (ref 27–31)
MCH RBC QN AUTO: 28.4 PG (ref 27–31)
MCH RBC QN AUTO: 28.5 PG (ref 27–31)
MCH RBC QN AUTO: 28.7 PG (ref 27–31)
MCH RBC QN AUTO: 28.8 PG (ref 27–31)
MCH RBC QN AUTO: 28.9 PG (ref 27–31)
MCH RBC QN AUTO: 28.9 PG (ref 27–31)
MCH RBC QN AUTO: 29.1 PG (ref 27–31)
MCH RBC QN AUTO: 29.1 PG (ref 27–31)
MCH RBC QN AUTO: 29.3 PG (ref 27–31)
MCH RBC QN AUTO: 29.7 PG (ref 27–31)
MCH RBC QN AUTO: 30.2 PG (ref 27–31)
MCHC RBC AUTO-ENTMCNC: 30.5 G/DL (ref 32–36)
MCHC RBC AUTO-ENTMCNC: 30.5 G/DL (ref 32–36)
MCHC RBC AUTO-ENTMCNC: 30.9 G/DL (ref 32–36)
MCHC RBC AUTO-ENTMCNC: 31.3 G/DL (ref 32–36)
MCHC RBC AUTO-ENTMCNC: 31.6 G/DL (ref 32–36)
MCHC RBC AUTO-ENTMCNC: 31.8 G/DL (ref 32–36)
MCHC RBC AUTO-ENTMCNC: 31.9 G/DL (ref 32–36)
MCHC RBC AUTO-ENTMCNC: 32 G/DL (ref 32–36)
MCHC RBC AUTO-ENTMCNC: 32 G/DL (ref 32–36)
MCHC RBC AUTO-ENTMCNC: 32.1 G/DL (ref 32–36)
MCHC RBC AUTO-ENTMCNC: 32.2 G/DL (ref 32–36)
MCHC RBC AUTO-ENTMCNC: 32.2 G/DL (ref 32–36)
MCHC RBC AUTO-ENTMCNC: 32.4 G/DL (ref 32–36)
MCHC RBC AUTO-ENTMCNC: 32.6 G/DL (ref 32–36)
MCHC RBC AUTO-ENTMCNC: 32.6 G/DL (ref 32–36)
MCHC RBC AUTO-ENTMCNC: 32.7 G/DL (ref 32–36)
MCHC RBC AUTO-ENTMCNC: 33 G/DL (ref 32–36)
MCHC RBC AUTO-ENTMCNC: 33.5 G/DL (ref 32–36)
MCV RBC AUTO: 87 FL (ref 82–98)
MCV RBC AUTO: 88 FL (ref 82–98)
MCV RBC AUTO: 89 FL (ref 82–98)
MCV RBC AUTO: 90 FL (ref 82–98)
MCV RBC AUTO: 90 FL (ref 82–98)
MCV RBC AUTO: 91 FL (ref 82–98)
MCV RBC AUTO: 91 FL (ref 82–98)
MCV RBC AUTO: 94 FL (ref 82–98)
MCV RBC AUTO: 94 FL (ref 82–98)
MCV RBC AUTO: 95 FL (ref 82–98)
MICROSCOPIC COMMENT: ABNORMAL
MODE: ABNORMAL
MONOCYTES # BLD AUTO: 0.3 K/UL (ref 0.3–1)
MONOCYTES # BLD AUTO: 0.4 K/UL (ref 0.3–1)
MONOCYTES # BLD AUTO: 0.7 K/UL (ref 0.3–1)
MONOCYTES # BLD AUTO: 0.8 K/UL (ref 0.3–1)
MONOCYTES # BLD AUTO: 0.8 K/UL (ref 0.3–1)
MONOCYTES # BLD AUTO: 0.9 K/UL (ref 0.3–1)
MONOCYTES # BLD AUTO: 1 K/UL (ref 0.3–1)
MONOCYTES # BLD AUTO: 1.1 K/UL (ref 0.3–1)
MONOCYTES # BLD AUTO: 1.3 K/UL (ref 0.3–1)
MONOCYTES # BLD AUTO: 1.4 K/UL (ref 0.3–1)
MONOCYTES # BLD AUTO: 1.5 K/UL (ref 0.3–1)
MONOCYTES # BLD AUTO: 1.6 K/UL (ref 0.3–1)
MONOCYTES # BLD AUTO: 1.8 K/UL (ref 0.3–1)
MONOCYTES NFR BLD: 10.2 % (ref 4–15)
MONOCYTES NFR BLD: 10.8 % (ref 4–15)
MONOCYTES NFR BLD: 3.3 % (ref 4–15)
MONOCYTES NFR BLD: 3.6 % (ref 4–15)
MONOCYTES NFR BLD: 4.4 % (ref 4–15)
MONOCYTES NFR BLD: 8 % (ref 4–15)
MONOCYTES NFR BLD: 8.1 % (ref 4–15)
MONOCYTES NFR BLD: 8.3 % (ref 4–15)
MONOCYTES NFR BLD: 8.3 % (ref 4–15)
MONOCYTES NFR BLD: 8.4 % (ref 4–15)
MONOCYTES NFR BLD: 8.5 % (ref 4–15)
MONOCYTES NFR BLD: 8.8 % (ref 4–15)
MONOCYTES NFR BLD: 8.8 % (ref 4–15)
MONOCYTES NFR BLD: 8.9 % (ref 4–15)
MONOCYTES NFR BLD: 8.9 % (ref 4–15)
MONOCYTES NFR BLD: 9.1 % (ref 4–15)
MONOCYTES NFR BLD: 9.5 % (ref 4–15)
NEUTROPHILS # BLD AUTO: 11.6 K/UL (ref 1.8–7.7)
NEUTROPHILS # BLD AUTO: 13.3 K/UL (ref 1.8–7.7)
NEUTROPHILS # BLD AUTO: 13.6 K/UL (ref 1.8–7.7)
NEUTROPHILS # BLD AUTO: 13.6 K/UL (ref 1.8–7.7)
NEUTROPHILS # BLD AUTO: 16.7 K/UL (ref 1.8–7.7)
NEUTROPHILS # BLD AUTO: 5.2 K/UL (ref 1.8–7.7)
NEUTROPHILS # BLD AUTO: 5.3 K/UL (ref 1.8–7.7)
NEUTROPHILS # BLD AUTO: 5.7 K/UL (ref 1.8–7.7)
NEUTROPHILS # BLD AUTO: 6 K/UL (ref 1.8–7.7)
NEUTROPHILS # BLD AUTO: 6.3 K/UL (ref 1.8–7.7)
NEUTROPHILS # BLD AUTO: 6.4 K/UL (ref 1.8–7.7)
NEUTROPHILS # BLD AUTO: 6.5 K/UL (ref 1.8–7.7)
NEUTROPHILS # BLD AUTO: 7.4 K/UL (ref 1.8–7.7)
NEUTROPHILS # BLD AUTO: 8.3 K/UL (ref 1.8–7.7)
NEUTROPHILS # BLD AUTO: 8.3 K/UL (ref 1.8–7.7)
NEUTROPHILS # BLD AUTO: 9.1 K/UL (ref 1.8–7.7)
NEUTROPHILS # BLD AUTO: 9.7 K/UL (ref 1.8–7.7)
NEUTROPHILS NFR BLD: 52.8 % (ref 38–73)
NEUTROPHILS NFR BLD: 53.9 % (ref 38–73)
NEUTROPHILS NFR BLD: 59.1 % (ref 38–73)
NEUTROPHILS NFR BLD: 62.7 % (ref 38–73)
NEUTROPHILS NFR BLD: 63.3 % (ref 38–73)
NEUTROPHILS NFR BLD: 63.5 % (ref 38–73)
NEUTROPHILS NFR BLD: 64.8 % (ref 38–73)
NEUTROPHILS NFR BLD: 67.5 % (ref 38–73)
NEUTROPHILS NFR BLD: 69.3 % (ref 38–73)
NEUTROPHILS NFR BLD: 70.8 % (ref 38–73)
NEUTROPHILS NFR BLD: 72.1 % (ref 38–73)
NEUTROPHILS NFR BLD: 73.5 % (ref 38–73)
NEUTROPHILS NFR BLD: 75.3 % (ref 38–73)
NEUTROPHILS NFR BLD: 77.1 % (ref 38–73)
NEUTROPHILS NFR BLD: 81.1 % (ref 38–73)
NEUTROPHILS NFR BLD: 83.6 % (ref 38–73)
NEUTROPHILS NFR BLD: 86.1 % (ref 38–73)
NITRITE UR QL STRIP: NEGATIVE
NRBC BLD-RTO: 0 /100 WBC
NUM UNITS TRANS PACKED RBC: NORMAL
OSMOLALITY SERPL: 312 MOSM/KG (ref 275–295)
OSMOLALITY UR: 367 MOSM/KG (ref 50–1200)
PCO2 BLDA: 39.7 MMHG (ref 35–45)
PH SMN: 7.41 [PH] (ref 7.35–7.45)
PH UR STRIP: 5 [PH] (ref 5–8)
PH UR STRIP: 6 [PH] (ref 5–8)
PHOSPHATE SERPL-MCNC: 2.4 MG/DL (ref 2.7–4.5)
PHOSPHATE SERPL-MCNC: 2.5 MG/DL (ref 2.7–4.5)
PHOSPHATE SERPL-MCNC: 2.6 MG/DL (ref 2.7–4.5)
PHOSPHATE SERPL-MCNC: 2.7 MG/DL (ref 2.7–4.5)
PHOSPHATE SERPL-MCNC: 3.8 MG/DL (ref 2.7–4.5)
PLATELET # BLD AUTO: 127 K/UL (ref 150–450)
PLATELET # BLD AUTO: 143 K/UL (ref 150–450)
PLATELET # BLD AUTO: 143 K/UL (ref 150–450)
PLATELET # BLD AUTO: 149 K/UL (ref 150–450)
PLATELET # BLD AUTO: 149 K/UL (ref 150–450)
PLATELET # BLD AUTO: 152 K/UL (ref 150–450)
PLATELET # BLD AUTO: 153 K/UL (ref 150–450)
PLATELET # BLD AUTO: 156 K/UL (ref 150–450)
PLATELET # BLD AUTO: 159 K/UL (ref 150–450)
PLATELET # BLD AUTO: 167 K/UL (ref 150–450)
PLATELET # BLD AUTO: 169 K/UL (ref 150–450)
PLATELET # BLD AUTO: 170 K/UL (ref 150–450)
PLATELET # BLD AUTO: 172 K/UL (ref 150–450)
PLATELET # BLD AUTO: 181 K/UL (ref 150–450)
PLATELET # BLD AUTO: 191 K/UL (ref 150–450)
PLATELET # BLD AUTO: 203 K/UL (ref 150–450)
PLATELET # BLD AUTO: 205 K/UL (ref 150–450)
PLATELET # BLD AUTO: 211 K/UL (ref 150–450)
PLATELET # BLD AUTO: 218 K/UL (ref 150–450)
PLATELET # BLD AUTO: 221 K/UL (ref 150–450)
PMV BLD AUTO: 11.4 FL (ref 9.2–12.9)
PMV BLD AUTO: 11.6 FL (ref 9.2–12.9)
PMV BLD AUTO: 11.7 FL (ref 9.2–12.9)
PMV BLD AUTO: 11.9 FL (ref 9.2–12.9)
PMV BLD AUTO: 11.9 FL (ref 9.2–12.9)
PMV BLD AUTO: 12 FL (ref 9.2–12.9)
PMV BLD AUTO: 12.1 FL (ref 9.2–12.9)
PMV BLD AUTO: 12.2 FL (ref 9.2–12.9)
PMV BLD AUTO: 12.2 FL (ref 9.2–12.9)
PMV BLD AUTO: 12.4 FL (ref 9.2–12.9)
PMV BLD AUTO: 12.4 FL (ref 9.2–12.9)
PMV BLD AUTO: 12.5 FL (ref 9.2–12.9)
PMV BLD AUTO: 13 FL (ref 9.2–12.9)
PO2 BLDA: 41 MMHG (ref 40–60)
POC BE: 0 MMOL/L
POC IONIZED CALCIUM: 1.19 MMOL/L (ref 1.06–1.42)
POC IONIZED CALCIUM: 1.23 MMOL/L (ref 1.06–1.42)
POC SATURATED O2: 77 % (ref 95–100)
POC TCO2 (MEASURED): 26 MMOL/L (ref 23–29)
POC TCO2 (MEASURED): 27 MMOL/L (ref 23–29)
POC TCO2: 26 MMOL/L (ref 24–29)
POCT GLUCOSE: 100 MG/DL (ref 70–110)
POCT GLUCOSE: 101 MG/DL (ref 70–110)
POCT GLUCOSE: 101 MG/DL (ref 70–110)
POCT GLUCOSE: 102 MG/DL (ref 70–110)
POCT GLUCOSE: 103 MG/DL (ref 70–110)
POCT GLUCOSE: 105 MG/DL (ref 70–110)
POCT GLUCOSE: 105 MG/DL (ref 70–110)
POCT GLUCOSE: 106 MG/DL (ref 70–110)
POCT GLUCOSE: 107 MG/DL (ref 70–110)
POCT GLUCOSE: 109 MG/DL (ref 70–110)
POCT GLUCOSE: 110 MG/DL (ref 70–110)
POCT GLUCOSE: 112 MG/DL (ref 70–110)
POCT GLUCOSE: 113 MG/DL (ref 70–110)
POCT GLUCOSE: 114 MG/DL (ref 70–110)
POCT GLUCOSE: 114 MG/DL (ref 70–110)
POCT GLUCOSE: 117 MG/DL (ref 70–110)
POCT GLUCOSE: 118 MG/DL (ref 70–110)
POCT GLUCOSE: 123 MG/DL (ref 70–110)
POCT GLUCOSE: 124 MG/DL (ref 70–110)
POCT GLUCOSE: 125 MG/DL (ref 70–110)
POCT GLUCOSE: 126 MG/DL (ref 70–110)
POCT GLUCOSE: 128 MG/DL (ref 70–110)
POCT GLUCOSE: 129 MG/DL (ref 70–110)
POCT GLUCOSE: 130 MG/DL (ref 70–110)
POCT GLUCOSE: 130 MG/DL (ref 70–110)
POCT GLUCOSE: 133 MG/DL (ref 70–110)
POCT GLUCOSE: 134 MG/DL (ref 70–110)
POCT GLUCOSE: 135 MG/DL (ref 70–110)
POCT GLUCOSE: 135 MG/DL (ref 70–110)
POCT GLUCOSE: 137 MG/DL (ref 70–110)
POCT GLUCOSE: 141 MG/DL (ref 70–110)
POCT GLUCOSE: 142 MG/DL (ref 70–110)
POCT GLUCOSE: 145 MG/DL (ref 70–110)
POCT GLUCOSE: 156 MG/DL (ref 70–110)
POCT GLUCOSE: 164 MG/DL (ref 70–110)
POCT GLUCOSE: 166 MG/DL (ref 70–110)
POCT GLUCOSE: 167 MG/DL (ref 70–110)
POCT GLUCOSE: 176 MG/DL (ref 70–110)
POCT GLUCOSE: 188 MG/DL (ref 70–110)
POCT GLUCOSE: 201 MG/DL (ref 70–110)
POCT GLUCOSE: 204 MG/DL (ref 70–110)
POCT GLUCOSE: 228 MG/DL (ref 70–110)
POCT GLUCOSE: 59 MG/DL (ref 70–110)
POCT GLUCOSE: 78 MG/DL (ref 70–110)
POCT GLUCOSE: 81 MG/DL (ref 70–110)
POCT GLUCOSE: 82 MG/DL (ref 70–110)
POCT GLUCOSE: 83 MG/DL (ref 70–110)
POCT GLUCOSE: 84 MG/DL (ref 70–110)
POCT GLUCOSE: 84 MG/DL (ref 70–110)
POCT GLUCOSE: 86 MG/DL (ref 70–110)
POCT GLUCOSE: 87 MG/DL (ref 70–110)
POCT GLUCOSE: 89 MG/DL (ref 70–110)
POCT GLUCOSE: 91 MG/DL (ref 70–110)
POCT GLUCOSE: 91 MG/DL (ref 70–110)
POCT GLUCOSE: 92 MG/DL (ref 70–110)
POCT GLUCOSE: 93 MG/DL (ref 70–110)
POCT GLUCOSE: 94 MG/DL (ref 70–110)
POCT GLUCOSE: 94 MG/DL (ref 70–110)
POCT GLUCOSE: 95 MG/DL (ref 70–110)
POCT GLUCOSE: 96 MG/DL (ref 70–110)
POCT GLUCOSE: 96 MG/DL (ref 70–110)
POCT GLUCOSE: 98 MG/DL (ref 70–110)
POCT GLUCOSE: 99 MG/DL (ref 70–110)
POTASSIUM BLD-SCNC: 3 MMOL/L (ref 3.5–5.1)
POTASSIUM BLD-SCNC: 3.8 MMOL/L (ref 3.5–5.1)
POTASSIUM SERPL-SCNC: 2.9 MMOL/L (ref 3.5–5.1)
POTASSIUM SERPL-SCNC: 3.1 MMOL/L (ref 3.5–5.1)
POTASSIUM SERPL-SCNC: 3.2 MMOL/L (ref 3.5–5.1)
POTASSIUM SERPL-SCNC: 3.3 MMOL/L (ref 3.5–5.1)
POTASSIUM SERPL-SCNC: 3.4 MMOL/L (ref 3.5–5.1)
POTASSIUM SERPL-SCNC: 3.5 MMOL/L (ref 3.5–5.1)
POTASSIUM SERPL-SCNC: 3.6 MMOL/L (ref 3.5–5.1)
POTASSIUM SERPL-SCNC: 3.7 MMOL/L (ref 3.5–5.1)
POTASSIUM SERPL-SCNC: 3.8 MMOL/L (ref 3.5–5.1)
POTASSIUM SERPL-SCNC: 4.4 MMOL/L (ref 3.5–5.1)
POTASSIUM SERPL-SCNC: 4.9 MMOL/L (ref 3.5–5.1)
POTASSIUM SERPL-SCNC: 5.5 MMOL/L (ref 3.5–5.1)
PROT SERPL-MCNC: 5.6 G/DL (ref 6–8.4)
PROT SERPL-MCNC: 6.1 G/DL (ref 6–8.4)
PROT SERPL-MCNC: 6.8 G/DL (ref 6–8.4)
PROT SERPL-MCNC: 6.8 G/DL (ref 6–8.4)
PROT SERPL-MCNC: 6.9 G/DL (ref 6–8.4)
PROT SERPL-MCNC: 7.2 G/DL (ref 6–8.4)
PROT SERPL-MCNC: 7.4 G/DL (ref 6–8.4)
PROT SERPL-MCNC: 7.4 G/DL (ref 6–8.4)
PROT SERPL-MCNC: 7.6 G/DL (ref 6–8.4)
PROT SERPL-MCNC: 8.2 G/DL (ref 6–8.4)
PROT SERPL-MCNC: 8.3 G/DL (ref 6–8.4)
PROT SERPL-MCNC: 9.6 G/DL (ref 6–8.4)
PROT UR QL STRIP: ABNORMAL
PROT UR-MCNC: 280 MG/DL (ref 0–15)
PROT/CREAT UR: 3.99 MG/G{CREAT} (ref 0–0.2)
PROTHROMBIN TIME: 11.1 SEC (ref 9–12.5)
RBC # BLD AUTO: 2.23 M/UL (ref 4–5.4)
RBC # BLD AUTO: 2.35 M/UL (ref 4–5.4)
RBC # BLD AUTO: 2.53 M/UL (ref 4–5.4)
RBC # BLD AUTO: 2.55 M/UL (ref 4–5.4)
RBC # BLD AUTO: 2.58 M/UL (ref 4–5.4)
RBC # BLD AUTO: 2.6 M/UL (ref 4–5.4)
RBC # BLD AUTO: 2.62 M/UL (ref 4–5.4)
RBC # BLD AUTO: 2.65 M/UL (ref 4–5.4)
RBC # BLD AUTO: 2.66 M/UL (ref 4–5.4)
RBC # BLD AUTO: 2.77 M/UL (ref 4–5.4)
RBC # BLD AUTO: 2.81 M/UL (ref 4–5.4)
RBC # BLD AUTO: 2.92 M/UL (ref 4–5.4)
RBC # BLD AUTO: 2.93 M/UL (ref 4–5.4)
RBC # BLD AUTO: 3.06 M/UL (ref 4–5.4)
RBC # BLD AUTO: 3.06 M/UL (ref 4–5.4)
RBC # BLD AUTO: 3.09 M/UL (ref 4–5.4)
RBC # BLD AUTO: 3.2 M/UL (ref 4–5.4)
RBC # BLD AUTO: 3.22 M/UL (ref 4–5.4)
RBC # BLD AUTO: 3.24 M/UL (ref 4–5.4)
RBC # BLD AUTO: 3.25 M/UL (ref 4–5.4)
RBC #/AREA URNS AUTO: 13 /HPF (ref 0–4)
RBC #/AREA URNS HPF: 11 /HPF (ref 0–4)
RBC #/AREA URNS HPF: 20 /HPF (ref 0–4)
RBC #/AREA URNS HPF: 3 /HPF (ref 0–4)
SALICYLATES SERPL-MCNC: <5 MG/DL (ref 15–30)
SAMPLE: ABNORMAL
SARS-COV-2 RDRP RESP QL NAA+PROBE: NEGATIVE
SATURATED IRON: 8 % (ref 20–50)
SITE: ABNORMAL
SODIUM BLD-SCNC: 143 MMOL/L (ref 136–145)
SODIUM BLD-SCNC: 143 MMOL/L (ref 136–145)
SODIUM SERPL-SCNC: 138 MMOL/L (ref 136–145)
SODIUM SERPL-SCNC: 138 MMOL/L (ref 136–145)
SODIUM SERPL-SCNC: 139 MMOL/L (ref 136–145)
SODIUM SERPL-SCNC: 140 MMOL/L (ref 136–145)
SODIUM SERPL-SCNC: 141 MMOL/L (ref 136–145)
SODIUM SERPL-SCNC: 142 MMOL/L (ref 136–145)
SODIUM SERPL-SCNC: 144 MMOL/L (ref 136–145)
SODIUM UR-SCNC: 96 MMOL/L (ref 20–250)
SP GR UR STRIP: 1.01 (ref 1–1.03)
SP GR UR STRIP: 1.02 (ref 1–1.03)
SP GR UR STRIP: 1.02 (ref 1–1.03)
SP GR UR STRIP: >=1.03 (ref 1–1.03)
SPECIMEN SOURCE: NORMAL
SQUAMOUS #/AREA URNS AUTO: 1 /HPF
SQUAMOUS #/AREA URNS HPF: 0 /HPF
SQUAMOUS #/AREA URNS HPF: >100 /HPF
STFR SERPL-MCNC: 3.8 MG/L (ref 1.8–4.6)
STONE ANALYSIS IR-IMP: NORMAL
TOTAL IRON BINDING CAPACITY: 218 UG/DL (ref 250–450)
TRANS ERYTHROCYTES VOL PATIENT: NORMAL ML
TRANSFERRIN SERPL-MCNC: 147 MG/DL (ref 200–375)
TROPONIN I SERPL DL<=0.01 NG/ML-MCNC: 0.01 NG/ML (ref 0–0.03)
TROPONIN I SERPL DL<=0.01 NG/ML-MCNC: 0.01 NG/ML (ref 0–0.03)
TSH SERPL DL<=0.005 MIU/L-ACNC: 1.59 UIU/ML (ref 0.4–4)
URN SPEC COLLECT METH UR: ABNORMAL
UROBILINOGEN UR STRIP-ACNC: NEGATIVE EU/DL
VANCOMYCIN SERPL-MCNC: 5.3 UG/ML
VIT B12 SERPL-MCNC: 1012 PG/ML (ref 210–950)
WBC # BLD AUTO: 10.13 K/UL (ref 3.9–12.7)
WBC # BLD AUTO: 10.75 K/UL (ref 3.9–12.7)
WBC # BLD AUTO: 10.95 K/UL (ref 3.9–12.7)
WBC # BLD AUTO: 11.26 K/UL (ref 3.9–12.7)
WBC # BLD AUTO: 12 K/UL (ref 3.9–12.7)
WBC # BLD AUTO: 13.05 K/UL (ref 3.9–12.7)
WBC # BLD AUTO: 15.25 K/UL (ref 3.9–12.7)
WBC # BLD AUTO: 15.8 K/UL (ref 3.9–12.7)
WBC # BLD AUTO: 15.84 K/UL (ref 3.9–12.7)
WBC # BLD AUTO: 17.99 K/UL (ref 3.9–12.7)
WBC # BLD AUTO: 18.48 K/UL (ref 3.9–12.7)
WBC # BLD AUTO: 21.75 K/UL (ref 3.9–12.7)
WBC # BLD AUTO: 7.67 K/UL (ref 3.9–12.7)
WBC # BLD AUTO: 8.2 K/UL (ref 3.9–12.7)
WBC # BLD AUTO: 8.76 K/UL (ref 3.9–12.7)
WBC # BLD AUTO: 9.14 K/UL (ref 3.9–12.7)
WBC # BLD AUTO: 9.7 K/UL (ref 3.9–12.7)
WBC # BLD AUTO: 9.87 K/UL (ref 3.9–12.7)
WBC # BLD AUTO: 9.98 K/UL (ref 3.9–12.7)
WBC # BLD AUTO: 9.99 K/UL (ref 3.9–12.7)
WBC #/AREA URNS AUTO: 44 /HPF (ref 0–5)
WBC #/AREA URNS HPF: 1 /HPF (ref 0–5)
WBC #/AREA URNS HPF: 5 /HPF (ref 0–5)
WBC #/AREA URNS HPF: 60 /HPF (ref 0–5)
WBC CLUMPS UR QL AUTO: ABNORMAL

## 2021-01-01 PROCEDURE — 37000008 HC ANESTHESIA 1ST 15 MINUTES: Performed by: UROLOGY

## 2021-01-01 PROCEDURE — 63600175 PHARM REV CODE 636 W HCPCS: Performed by: INTERNAL MEDICINE

## 2021-01-01 PROCEDURE — 71000033 HC RECOVERY, INTIAL HOUR: Performed by: UROLOGY

## 2021-01-01 PROCEDURE — 80053 COMPREHEN METABOLIC PANEL: CPT | Performed by: SPECIALIST

## 2021-01-01 PROCEDURE — 83605 ASSAY OF LACTIC ACID: CPT | Performed by: EMERGENCY MEDICINE

## 2021-01-01 PROCEDURE — 88304 TISSUE EXAM BY PATHOLOGIST: CPT | Performed by: PATHOLOGY

## 2021-01-01 PROCEDURE — 83540 ASSAY OF IRON: CPT | Performed by: INTERNAL MEDICINE

## 2021-01-01 PROCEDURE — U0002 COVID-19 LAB TEST NON-CDC: HCPCS | Performed by: EMERGENCY MEDICINE

## 2021-01-01 PROCEDURE — 11000001 HC ACUTE MED/SURG PRIVATE ROOM

## 2021-01-01 PROCEDURE — 83735 ASSAY OF MAGNESIUM: CPT | Performed by: INTERNAL MEDICINE

## 2021-01-01 PROCEDURE — 83605 ASSAY OF LACTIC ACID: CPT | Performed by: STUDENT IN AN ORGANIZED HEALTH CARE EDUCATION/TRAINING PROGRAM

## 2021-01-01 PROCEDURE — 25000003 PHARM REV CODE 250: Performed by: PHYSICIAN ASSISTANT

## 2021-01-01 PROCEDURE — 86920 COMPATIBILITY TEST SPIN: CPT | Performed by: INTERNAL MEDICINE

## 2021-01-01 PROCEDURE — 88304 TISSUE EXAM BY PATHOLOGIST: CPT | Mod: 26,,, | Performed by: PATHOLOGY

## 2021-01-01 PROCEDURE — 25000003 PHARM REV CODE 250: Performed by: INTERNAL MEDICINE

## 2021-01-01 PROCEDURE — 86803 HEPATITIS C AB TEST: CPT | Performed by: EMERGENCY MEDICINE

## 2021-01-01 PROCEDURE — 85025 COMPLETE CBC W/AUTO DIFF WBC: CPT | Performed by: PHYSICIAN ASSISTANT

## 2021-01-01 PROCEDURE — 87449 NOS EACH ORGANISM AG IA: CPT | Performed by: INTERNAL MEDICINE

## 2021-01-01 PROCEDURE — 99233 SBSQ HOSP IP/OBS HIGH 50: CPT | Mod: ,,, | Performed by: INTERNAL MEDICINE

## 2021-01-01 PROCEDURE — 85610 PROTHROMBIN TIME: CPT | Performed by: EMERGENCY MEDICINE

## 2021-01-01 PROCEDURE — 37000008 HC ANESTHESIA 1ST 15 MINUTES: Performed by: STUDENT IN AN ORGANIZED HEALTH CARE EDUCATION/TRAINING PROGRAM

## 2021-01-01 PROCEDURE — 25000003 PHARM REV CODE 250: Performed by: STUDENT IN AN ORGANIZED HEALTH CARE EDUCATION/TRAINING PROGRAM

## 2021-01-01 PROCEDURE — 36000709 HC OR TIME LEV III EA ADD 15 MIN: Performed by: SPECIALIST

## 2021-01-01 PROCEDURE — 83735 ASSAY OF MAGNESIUM: CPT | Performed by: EMERGENCY MEDICINE

## 2021-01-01 PROCEDURE — 80048 BASIC METABOLIC PNL TOTAL CA: CPT | Performed by: ANESTHESIOLOGY

## 2021-01-01 PROCEDURE — 80053 COMPREHEN METABOLIC PANEL: CPT | Performed by: EMERGENCY MEDICINE

## 2021-01-01 PROCEDURE — 80048 BASIC METABOLIC PNL TOTAL CA: CPT | Performed by: PHYSICIAN ASSISTANT

## 2021-01-01 PROCEDURE — 36415 COLL VENOUS BLD VENIPUNCTURE: CPT | Performed by: UROLOGY

## 2021-01-01 PROCEDURE — 1111F DSCHRG MED/CURRENT MED MERGE: CPT | Mod: CPTII,,, | Performed by: INTERNAL MEDICINE

## 2021-01-01 PROCEDURE — 52356 PR CYSTO/URETERO W/LITHOTRIPSY: ICD-10-PCS | Mod: RT,,, | Performed by: UROLOGY

## 2021-01-01 PROCEDURE — 99233 PR SUBSEQUENT HOSPITAL CARE,LEVL III: ICD-10-PCS | Mod: ,,, | Performed by: INTERNAL MEDICINE

## 2021-01-01 PROCEDURE — 84443 ASSAY THYROID STIM HORMONE: CPT | Performed by: INTERNAL MEDICINE

## 2021-01-01 PROCEDURE — 93005 ELECTROCARDIOGRAM TRACING: CPT | Mod: 59

## 2021-01-01 PROCEDURE — 99203 OFFICE O/P NEW LOW 30 MIN: CPT | Mod: ,,, | Performed by: PODIATRIST

## 2021-01-01 PROCEDURE — 63600175 PHARM REV CODE 636 W HCPCS: Performed by: STUDENT IN AN ORGANIZED HEALTH CARE EDUCATION/TRAINING PROGRAM

## 2021-01-01 PROCEDURE — 63600175 PHARM REV CODE 636 W HCPCS: Performed by: SPECIALIST

## 2021-01-01 PROCEDURE — 36415 COLL VENOUS BLD VENIPUNCTURE: CPT | Performed by: PHYSICIAN ASSISTANT

## 2021-01-01 PROCEDURE — A4216 STERILE WATER/SALINE, 10 ML: HCPCS | Performed by: UROLOGY

## 2021-01-01 PROCEDURE — 96361 HYDRATE IV INFUSION ADD-ON: CPT | Mod: 59

## 2021-01-01 PROCEDURE — 25000003 PHARM REV CODE 250: Performed by: NURSE PRACTITIONER

## 2021-01-01 PROCEDURE — 85025 COMPLETE CBC W/AUTO DIFF WBC: CPT | Performed by: UROLOGY

## 2021-01-01 PROCEDURE — 37000009 HC ANESTHESIA EA ADD 15 MINS: Performed by: UROLOGY

## 2021-01-01 PROCEDURE — 63600175 PHARM REV CODE 636 W HCPCS: Performed by: EMERGENCY MEDICINE

## 2021-01-01 PROCEDURE — 25000003 PHARM REV CODE 250: Performed by: UROLOGY

## 2021-01-01 PROCEDURE — 81001 URINALYSIS AUTO W/SCOPE: CPT

## 2021-01-01 PROCEDURE — 83735 ASSAY OF MAGNESIUM: CPT | Performed by: UROLOGY

## 2021-01-01 PROCEDURE — 25000003 PHARM REV CODE 250: Performed by: EMERGENCY MEDICINE

## 2021-01-01 PROCEDURE — 99220 PR INITIAL OBSERVATION CARE,LEVL III: CPT | Mod: ,,, | Performed by: PHYSICIAN ASSISTANT

## 2021-01-01 PROCEDURE — C2617 STENT, NON-COR, TEM W/O DEL: HCPCS | Performed by: UROLOGY

## 2021-01-01 PROCEDURE — 36000706: Performed by: UROLOGY

## 2021-01-01 PROCEDURE — 80048 BASIC METABOLIC PNL TOTAL CA: CPT | Performed by: UROLOGY

## 2021-01-01 PROCEDURE — 84484 ASSAY OF TROPONIN QUANT: CPT

## 2021-01-01 PROCEDURE — 36415 COLL VENOUS BLD VENIPUNCTURE: CPT | Performed by: SPECIALIST

## 2021-01-01 PROCEDURE — 21400001 HC TELEMETRY ROOM

## 2021-01-01 PROCEDURE — 82607 VITAMIN B-12: CPT | Performed by: INTERNAL MEDICINE

## 2021-01-01 PROCEDURE — 94761 N-INVAS EAR/PLS OXIMETRY MLT: CPT

## 2021-01-01 PROCEDURE — 37000008 HC ANESTHESIA 1ST 15 MINUTES: Performed by: SPECIALIST

## 2021-01-01 PROCEDURE — 80053 COMPREHEN METABOLIC PANEL: CPT | Performed by: PHYSICIAN ASSISTANT

## 2021-01-01 PROCEDURE — 99222 PR INITIAL HOSPITAL CARE,LEVL II: ICD-10-PCS | Mod: GC,,, | Performed by: STUDENT IN AN ORGANIZED HEALTH CARE EDUCATION/TRAINING PROGRAM

## 2021-01-01 PROCEDURE — 74420 PR  X-RAY RETROGRADE PYELOGRAM: ICD-10-PCS | Mod: 26,,, | Performed by: UROLOGY

## 2021-01-01 PROCEDURE — 37000009 HC ANESTHESIA EA ADD 15 MINS: Performed by: SPECIALIST

## 2021-01-01 PROCEDURE — 71000039 HC RECOVERY, EACH ADD'L HOUR: Performed by: UROLOGY

## 2021-01-01 PROCEDURE — 99232 SBSQ HOSP IP/OBS MODERATE 35: CPT | Mod: ,,, | Performed by: INTERNAL MEDICINE

## 2021-01-01 PROCEDURE — 25000003 PHARM REV CODE 250: Performed by: SPECIALIST

## 2021-01-01 PROCEDURE — 85025 COMPLETE CBC W/AUTO DIFF WBC: CPT | Performed by: EMERGENCY MEDICINE

## 2021-01-01 PROCEDURE — 85027 COMPLETE CBC AUTOMATED: CPT | Performed by: PHYSICIAN ASSISTANT

## 2021-01-01 PROCEDURE — 63600175 PHARM REV CODE 636 W HCPCS: Performed by: PHYSICIAN ASSISTANT

## 2021-01-01 PROCEDURE — 99223 PR INITIAL HOSPITAL CARE,LEVL III: ICD-10-PCS | Mod: ,,, | Performed by: PHYSICIAN ASSISTANT

## 2021-01-01 PROCEDURE — 86900 BLOOD TYPING SEROLOGIC ABO: CPT | Performed by: INTERNAL MEDICINE

## 2021-01-01 PROCEDURE — 80048 BASIC METABOLIC PNL TOTAL CA: CPT | Performed by: INTERNAL MEDICINE

## 2021-01-01 PROCEDURE — 97535 SELF CARE MNGMENT TRAINING: CPT

## 2021-01-01 PROCEDURE — 1111F PR DISCHARGE MEDS RECONCILED W/ CURRENT OUTPATIENT MED LIST: ICD-10-PCS | Mod: CPTII,,, | Performed by: INTERNAL MEDICINE

## 2021-01-01 PROCEDURE — 83690 ASSAY OF LIPASE: CPT | Performed by: EMERGENCY MEDICINE

## 2021-01-01 PROCEDURE — 99239 HOSP IP/OBS DSCHRG MGMT >30: CPT | Mod: ,,, | Performed by: INTERNAL MEDICINE

## 2021-01-01 PROCEDURE — 99900035 HC TECH TIME PER 15 MIN (STAT)

## 2021-01-01 PROCEDURE — 43235 EGD DIAGNOSTIC BRUSH WASH: CPT | Performed by: STUDENT IN AN ORGANIZED HEALTH CARE EDUCATION/TRAINING PROGRAM

## 2021-01-01 PROCEDURE — 25000003 PHARM REV CODE 250

## 2021-01-01 PROCEDURE — 87040 BLOOD CULTURE FOR BACTERIA: CPT | Mod: 59 | Performed by: EMERGENCY MEDICINE

## 2021-01-01 PROCEDURE — 96361 HYDRATE IV INFUSION ADD-ON: CPT

## 2021-01-01 PROCEDURE — P9021 RED BLOOD CELLS UNIT: HCPCS | Performed by: INTERNAL MEDICINE

## 2021-01-01 PROCEDURE — 99285 EMERGENCY DEPT VISIT HI MDM: CPT | Mod: 25

## 2021-01-01 PROCEDURE — 99223 1ST HOSP IP/OBS HIGH 75: CPT | Mod: ,,, | Performed by: INTERNAL MEDICINE

## 2021-01-01 PROCEDURE — 82010 KETONE BODYS QUAN: CPT | Performed by: EMERGENCY MEDICINE

## 2021-01-01 PROCEDURE — C1769 GUIDE WIRE: HCPCS | Performed by: UROLOGY

## 2021-01-01 PROCEDURE — 36415 COLL VENOUS BLD VENIPUNCTURE: CPT | Performed by: INTERNAL MEDICINE

## 2021-01-01 PROCEDURE — 93010 ELECTROCARDIOGRAM REPORT: CPT | Mod: ,,, | Performed by: INTERNAL MEDICINE

## 2021-01-01 PROCEDURE — 80053 COMPREHEN METABOLIC PANEL: CPT | Performed by: NURSE PRACTITIONER

## 2021-01-01 PROCEDURE — 99220 PR INITIAL OBSERVATION CARE,LEVL III: CPT | Mod: ,,, | Performed by: NURSE PRACTITIONER

## 2021-01-01 PROCEDURE — 99232 PR SUBSEQUENT HOSPITAL CARE,LEVL II: ICD-10-PCS | Mod: GC,,, | Performed by: UROLOGY

## 2021-01-01 PROCEDURE — 85025 COMPLETE CBC W/AUTO DIFF WBC: CPT | Performed by: NURSE PRACTITIONER

## 2021-01-01 PROCEDURE — 71000016 HC POSTOP RECOV ADDL HR: Performed by: UROLOGY

## 2021-01-01 PROCEDURE — 36000708 HC OR TIME LEV III 1ST 15 MIN: Performed by: SPECIALIST

## 2021-01-01 PROCEDURE — 80053 COMPREHEN METABOLIC PANEL: CPT

## 2021-01-01 PROCEDURE — 85025 COMPLETE CBC W/AUTO DIFF WBC: CPT | Performed by: SPECIALIST

## 2021-01-01 PROCEDURE — 97110 THERAPEUTIC EXERCISES: CPT

## 2021-01-01 PROCEDURE — 63600175 PHARM REV CODE 636 W HCPCS: Performed by: NURSE ANESTHETIST, CERTIFIED REGISTERED

## 2021-01-01 PROCEDURE — D9220A PRA ANESTHESIA: ICD-10-PCS | Mod: ANES,,, | Performed by: ANESTHESIOLOGY

## 2021-01-01 PROCEDURE — 99222 1ST HOSP IP/OBS MODERATE 55: CPT | Mod: GC,,, | Performed by: STUDENT IN AN ORGANIZED HEALTH CARE EDUCATION/TRAINING PROGRAM

## 2021-01-01 PROCEDURE — C9399 UNCLASSIFIED DRUGS OR BIOLOG: HCPCS | Performed by: PHYSICIAN ASSISTANT

## 2021-01-01 PROCEDURE — G0378 HOSPITAL OBSERVATION PER HR: HCPCS

## 2021-01-01 PROCEDURE — 96374 THER/PROPH/DIAG INJ IV PUSH: CPT

## 2021-01-01 PROCEDURE — G0180 MD CERTIFICATION HHA PATIENT: HCPCS | Mod: ,,, | Performed by: INTERNAL MEDICINE

## 2021-01-01 PROCEDURE — 99220 PR INITIAL OBSERVATION CARE,LEVL III: CPT | Mod: ,,, | Performed by: INTERNAL MEDICINE

## 2021-01-01 PROCEDURE — 63600175 PHARM REV CODE 636 W HCPCS: Performed by: NURSE PRACTITIONER

## 2021-01-01 PROCEDURE — 99285 PR EMERGENCY DEPT VISIT,LEVEL V: ICD-10-PCS | Mod: CR,CS,, | Performed by: EMERGENCY MEDICINE

## 2021-01-01 PROCEDURE — 71000039 HC RECOVERY, EACH ADD'L HOUR: Performed by: SPECIALIST

## 2021-01-01 PROCEDURE — S5010 5% DEXTROSE AND 0.45% SALINE: HCPCS | Performed by: INTERNAL MEDICINE

## 2021-01-01 PROCEDURE — 43235 PR EGD, FLEX, DIAGNOSTIC: ICD-10-PCS | Mod: GC,,, | Performed by: STUDENT IN AN ORGANIZED HEALTH CARE EDUCATION/TRAINING PROGRAM

## 2021-01-01 PROCEDURE — 99203 PR OFFICE/OUTPT VISIT, NEW, LEVL III, 30-44 MIN: ICD-10-PCS | Mod: ,,, | Performed by: PODIATRIST

## 2021-01-01 PROCEDURE — 71000015 HC POSTOP RECOV 1ST HR: Performed by: UROLOGY

## 2021-01-01 PROCEDURE — 43235 EGD DIAGNOSTIC BRUSH WASH: CPT | Mod: GC,,, | Performed by: STUDENT IN AN ORGANIZED HEALTH CARE EDUCATION/TRAINING PROGRAM

## 2021-01-01 PROCEDURE — D9220A PRA ANESTHESIA: ICD-10-PCS | Mod: CRNA,,, | Performed by: STUDENT IN AN ORGANIZED HEALTH CARE EDUCATION/TRAINING PROGRAM

## 2021-01-01 PROCEDURE — 36415 COLL VENOUS BLD VENIPUNCTURE: CPT | Performed by: NURSE PRACTITIONER

## 2021-01-01 PROCEDURE — 63600175 PHARM REV CODE 636 W HCPCS

## 2021-01-01 PROCEDURE — 25500020 PHARM REV CODE 255: Performed by: UROLOGY

## 2021-01-01 PROCEDURE — D9220A PRA ANESTHESIA: Mod: ANES,,, | Performed by: ANESTHESIOLOGY

## 2021-01-01 PROCEDURE — 97530 THERAPEUTIC ACTIVITIES: CPT

## 2021-01-01 PROCEDURE — 27201423 OPTIME MED/SURG SUP & DEVICES STERILE SUPPLY: Performed by: UROLOGY

## 2021-01-01 PROCEDURE — 84156 ASSAY OF PROTEIN URINE: CPT | Performed by: PHYSICIAN ASSISTANT

## 2021-01-01 PROCEDURE — 82962 GLUCOSE BLOOD TEST: CPT | Performed by: STUDENT IN AN ORGANIZED HEALTH CARE EDUCATION/TRAINING PROGRAM

## 2021-01-01 PROCEDURE — 83735 ASSAY OF MAGNESIUM: CPT | Performed by: SPECIALIST

## 2021-01-01 PROCEDURE — 27201423 OPTIME MED/SURG SUP & DEVICES STERILE SUPPLY: Performed by: SPECIALIST

## 2021-01-01 PROCEDURE — 84100 ASSAY OF PHOSPHORUS: CPT | Performed by: UROLOGY

## 2021-01-01 PROCEDURE — 99232 PR SUBSEQUENT HOSPITAL CARE,LEVL II: ICD-10-PCS | Mod: ,,, | Performed by: INTERNAL MEDICINE

## 2021-01-01 PROCEDURE — 27000221 HC OXYGEN, UP TO 24 HOURS

## 2021-01-01 PROCEDURE — 84100 ASSAY OF PHOSPHORUS: CPT | Performed by: INTERNAL MEDICINE

## 2021-01-01 PROCEDURE — 80179 DRUG ASSAY SALICYLATE: CPT | Performed by: STUDENT IN AN ORGANIZED HEALTH CARE EDUCATION/TRAINING PROGRAM

## 2021-01-01 PROCEDURE — 51702 INSERT TEMP BLADDER CATH: CPT

## 2021-01-01 PROCEDURE — 96376 TX/PRO/DX INJ SAME DRUG ADON: CPT

## 2021-01-01 PROCEDURE — 97161 PT EVAL LOW COMPLEX 20 MIN: CPT

## 2021-01-01 PROCEDURE — 85730 THROMBOPLASTIN TIME PARTIAL: CPT | Performed by: EMERGENCY MEDICINE

## 2021-01-01 PROCEDURE — 52332 PR CYSTOSCOPY,INSERT URETERAL STENT: ICD-10-PCS | Mod: RT,,, | Performed by: UROLOGY

## 2021-01-01 PROCEDURE — C1758 CATHETER, URETERAL: HCPCS | Performed by: UROLOGY

## 2021-01-01 PROCEDURE — 93010 EKG 12-LEAD: ICD-10-PCS | Mod: ,,, | Performed by: INTERNAL MEDICINE

## 2021-01-01 PROCEDURE — 83930 ASSAY OF BLOOD OSMOLALITY: CPT | Performed by: STUDENT IN AN ORGANIZED HEALTH CARE EDUCATION/TRAINING PROGRAM

## 2021-01-01 PROCEDURE — 93005 ELECTROCARDIOGRAM TRACING: CPT

## 2021-01-01 PROCEDURE — 83036 HEMOGLOBIN GLYCOSYLATED A1C: CPT | Performed by: INTERNAL MEDICINE

## 2021-01-01 PROCEDURE — 36415 COLL VENOUS BLD VENIPUNCTURE: CPT | Performed by: EMERGENCY MEDICINE

## 2021-01-01 PROCEDURE — 82365 CALCULUS SPECTROSCOPY: CPT | Performed by: UROLOGY

## 2021-01-01 PROCEDURE — 86140 C-REACTIVE PROTEIN: CPT | Performed by: EMERGENCY MEDICINE

## 2021-01-01 PROCEDURE — 84238 ASSAY NONENDOCRINE RECEPTOR: CPT | Performed by: INTERNAL MEDICINE

## 2021-01-01 PROCEDURE — 83935 ASSAY OF URINE OSMOLALITY: CPT | Performed by: PHYSICIAN ASSISTANT

## 2021-01-01 PROCEDURE — 96365 THER/PROPH/DIAG IV INF INIT: CPT | Mod: 59

## 2021-01-01 PROCEDURE — 80202 ASSAY OF VANCOMYCIN: CPT | Performed by: INTERNAL MEDICINE

## 2021-01-01 PROCEDURE — 99239 PR HOSPITAL DISCHARGE DAY,>30 MIN: ICD-10-PCS | Mod: ,,, | Performed by: PHYSICIAN ASSISTANT

## 2021-01-01 PROCEDURE — 82728 ASSAY OF FERRITIN: CPT | Performed by: INTERNAL MEDICINE

## 2021-01-01 PROCEDURE — 52356 CYSTO/URETERO W/LITHOTRIPSY: CPT | Mod: RT,,, | Performed by: UROLOGY

## 2021-01-01 PROCEDURE — 99239 PR HOSPITAL DISCHARGE DAY,>30 MIN: ICD-10-PCS | Mod: ,,, | Performed by: INTERNAL MEDICINE

## 2021-01-01 PROCEDURE — 63600175 PHARM REV CODE 636 W HCPCS: Performed by: UROLOGY

## 2021-01-01 PROCEDURE — 99232 SBSQ HOSP IP/OBS MODERATE 35: CPT | Mod: GC,,, | Performed by: UROLOGY

## 2021-01-01 PROCEDURE — U0002 COVID-19 LAB TEST NON-CDC: HCPCS

## 2021-01-01 PROCEDURE — 99223 1ST HOSP IP/OBS HIGH 75: CPT | Mod: 25,,, | Performed by: UROLOGY

## 2021-01-01 PROCEDURE — 96375 TX/PRO/DX INJ NEW DRUG ADDON: CPT

## 2021-01-01 PROCEDURE — 99220 PR INITIAL OBSERVATION CARE,LEVL III: ICD-10-PCS | Mod: ,,, | Performed by: INTERNAL MEDICINE

## 2021-01-01 PROCEDURE — 74420 UROGRAPHY RTRGR +-KUB: CPT | Mod: 26,,, | Performed by: UROLOGY

## 2021-01-01 PROCEDURE — 99239 HOSP IP/OBS DSCHRG MGMT >30: CPT | Mod: ,,, | Performed by: PHYSICIAN ASSISTANT

## 2021-01-01 PROCEDURE — 96374 THER/PROPH/DIAG INJ IV PUSH: CPT | Mod: 59

## 2021-01-01 PROCEDURE — 83880 ASSAY OF NATRIURETIC PEPTIDE: CPT

## 2021-01-01 PROCEDURE — 63600175 PHARM REV CODE 636 W HCPCS: Performed by: ANESTHESIOLOGY

## 2021-01-01 PROCEDURE — 36000707: Performed by: UROLOGY

## 2021-01-01 PROCEDURE — 81000 URINALYSIS NONAUTO W/SCOPE: CPT | Performed by: EMERGENCY MEDICINE

## 2021-01-01 PROCEDURE — 83036 HEMOGLOBIN GLYCOSYLATED A1C: CPT | Performed by: PHYSICIAN ASSISTANT

## 2021-01-01 PROCEDURE — 99214 PR OFFICE/OUTPT VISIT, EST, LEVL IV, 30-39 MIN: ICD-10-PCS | Mod: S$GLB,,, | Performed by: NURSE PRACTITIONER

## 2021-01-01 PROCEDURE — 87324 CLOSTRIDIUM AG IA: CPT | Performed by: INTERNAL MEDICINE

## 2021-01-01 PROCEDURE — 82746 ASSAY OF FOLIC ACID SERUM: CPT | Performed by: INTERNAL MEDICINE

## 2021-01-01 PROCEDURE — 96375 TX/PRO/DX INJ NEW DRUG ADDON: CPT | Mod: 59

## 2021-01-01 PROCEDURE — 85025 COMPLETE CBC W/AUTO DIFF WBC: CPT | Performed by: INTERNAL MEDICINE

## 2021-01-01 PROCEDURE — 87086 URINE CULTURE/COLONY COUNT: CPT | Performed by: EMERGENCY MEDICINE

## 2021-01-01 PROCEDURE — 99226 PR SUBSEQUENT OBSERVATION CARE,LEVEL III: ICD-10-PCS | Mod: ,,, | Performed by: PHYSICIAN ASSISTANT

## 2021-01-01 PROCEDURE — 25000003 PHARM REV CODE 250: Performed by: NURSE ANESTHETIST, CERTIFIED REGISTERED

## 2021-01-01 PROCEDURE — 97162 PT EVAL MOD COMPLEX 30 MIN: CPT

## 2021-01-01 PROCEDURE — 99226 PR SUBSEQUENT OBSERVATION CARE,LEVEL III: ICD-10-PCS | Mod: ,,, | Performed by: INTERNAL MEDICINE

## 2021-01-01 PROCEDURE — 82803 BLOOD GASES ANY COMBINATION: CPT

## 2021-01-01 PROCEDURE — 36430 TRANSFUSION BLD/BLD COMPNT: CPT

## 2021-01-01 PROCEDURE — 84132 ASSAY OF SERUM POTASSIUM: CPT | Performed by: UROLOGY

## 2021-01-01 PROCEDURE — 87086 URINE CULTURE/COLONY COUNT: CPT

## 2021-01-01 PROCEDURE — 99223 1ST HOSP IP/OBS HIGH 75: CPT | Mod: ,,, | Performed by: PHYSICIAN ASSISTANT

## 2021-01-01 PROCEDURE — 99223 PR INITIAL HOSPITAL CARE,LEVL III: ICD-10-PCS | Mod: 25,,, | Performed by: UROLOGY

## 2021-01-01 PROCEDURE — 83735 ASSAY OF MAGNESIUM: CPT | Performed by: NURSE PRACTITIONER

## 2021-01-01 PROCEDURE — 25000003 PHARM REV CODE 250: Performed by: ANESTHESIOLOGY

## 2021-01-01 PROCEDURE — 85651 RBC SED RATE NONAUTOMATED: CPT | Performed by: EMERGENCY MEDICINE

## 2021-01-01 PROCEDURE — 51798 US URINE CAPACITY MEASURE: CPT | Performed by: UROLOGY

## 2021-01-01 PROCEDURE — 81000 URINALYSIS NONAUTO W/SCOPE: CPT | Performed by: INTERNAL MEDICINE

## 2021-01-01 PROCEDURE — 99214 OFFICE O/P EST MOD 30 MIN: CPT | Mod: S$GLB,,, | Performed by: NURSE PRACTITIONER

## 2021-01-01 PROCEDURE — 37000009 HC ANESTHESIA EA ADD 15 MINS: Performed by: STUDENT IN AN ORGANIZED HEALTH CARE EDUCATION/TRAINING PROGRAM

## 2021-01-01 PROCEDURE — 99226 PR SUBSEQUENT OBSERVATION CARE,LEVEL III: CPT | Mod: ,,, | Performed by: PHYSICIAN ASSISTANT

## 2021-01-01 PROCEDURE — 84484 ASSAY OF TROPONIN QUANT: CPT | Performed by: EMERGENCY MEDICINE

## 2021-01-01 PROCEDURE — 94760 N-INVAS EAR/PLS OXIMETRY 1: CPT

## 2021-01-01 PROCEDURE — 25500020 PHARM REV CODE 255: Performed by: STUDENT IN AN ORGANIZED HEALTH CARE EDUCATION/TRAINING PROGRAM

## 2021-01-01 PROCEDURE — 85025 COMPLETE CBC W/AUTO DIFF WBC: CPT

## 2021-01-01 PROCEDURE — 84300 ASSAY OF URINE SODIUM: CPT | Performed by: PHYSICIAN ASSISTANT

## 2021-01-01 PROCEDURE — 36415 COLL VENOUS BLD VENIPUNCTURE: CPT | Performed by: ANESTHESIOLOGY

## 2021-01-01 PROCEDURE — 99226 PR SUBSEQUENT OBSERVATION CARE,LEVEL III: CPT | Mod: ,,, | Performed by: INTERNAL MEDICINE

## 2021-01-01 PROCEDURE — 71000033 HC RECOVERY, INTIAL HOUR: Performed by: SPECIALIST

## 2021-01-01 PROCEDURE — G0180 PR HOME HEALTH MD CERTIFICATION: ICD-10-PCS | Mod: ,,, | Performed by: INTERNAL MEDICINE

## 2021-01-01 PROCEDURE — D9220A PRA ANESTHESIA: Mod: CRNA,,, | Performed by: STUDENT IN AN ORGANIZED HEALTH CARE EDUCATION/TRAINING PROGRAM

## 2021-01-01 PROCEDURE — 99285 EMERGENCY DEPT VISIT HI MDM: CPT | Mod: CR,CS,, | Performed by: EMERGENCY MEDICINE

## 2021-01-01 PROCEDURE — 99220 PR INITIAL OBSERVATION CARE,LEVL III: ICD-10-PCS | Mod: ,,, | Performed by: NURSE PRACTITIONER

## 2021-01-01 PROCEDURE — 96365 THER/PROPH/DIAG IV INF INIT: CPT

## 2021-01-01 PROCEDURE — 88304 PR  SURG PATH,LEVEL III: ICD-10-PCS | Mod: 26,,, | Performed by: PATHOLOGY

## 2021-01-01 PROCEDURE — 52332 CYSTOSCOPY AND TREATMENT: CPT | Mod: RT,,, | Performed by: UROLOGY

## 2021-01-01 PROCEDURE — 97166 OT EVAL MOD COMPLEX 45 MIN: CPT

## 2021-01-01 PROCEDURE — 99223 PR INITIAL HOSPITAL CARE,LEVL III: ICD-10-PCS | Mod: ,,, | Performed by: INTERNAL MEDICINE

## 2021-01-01 PROCEDURE — 83880 ASSAY OF NATRIURETIC PEPTIDE: CPT | Performed by: EMERGENCY MEDICINE

## 2021-01-01 PROCEDURE — 85025 COMPLETE CBC W/AUTO DIFF WBC: CPT | Performed by: ANESTHESIOLOGY

## 2021-01-01 PROCEDURE — 82962 GLUCOSE BLOOD TEST: CPT

## 2021-01-01 PROCEDURE — 99220 PR INITIAL OBSERVATION CARE,LEVL III: ICD-10-PCS | Mod: ,,, | Performed by: PHYSICIAN ASSISTANT

## 2021-01-01 PROCEDURE — 85025 COMPLETE CBC W/AUTO DIFF WBC: CPT | Mod: 91 | Performed by: SPECIALIST

## 2021-01-01 DEVICE — STENT URETERAL UNIV 6FR 24CM: Type: IMPLANTABLE DEVICE | Site: URETER | Status: FUNCTIONAL

## 2021-01-01 RX ORDER — HYDROCODONE BITARTRATE AND ACETAMINOPHEN 5; 325 MG/1; MG/1
1 TABLET ORAL EVERY 6 HOURS PRN
Qty: 6 TABLET | Refills: 0 | Status: ON HOLD | OUTPATIENT
Start: 2021-01-01 | End: 2022-01-01 | Stop reason: SDUPTHER

## 2021-01-01 RX ORDER — INSULIN ASPART 100 [IU]/ML
INJECTION, SOLUTION INTRAVENOUS; SUBCUTANEOUS
Status: ON HOLD | COMMUNITY
End: 2021-01-01

## 2021-01-01 RX ORDER — ATORVASTATIN CALCIUM 20 MG/1
80 TABLET, FILM COATED ORAL DAILY
Status: DISCONTINUED | OUTPATIENT
Start: 2021-01-01 | End: 2022-01-01 | Stop reason: HOSPADM

## 2021-01-01 RX ORDER — FENTANYL CITRATE 50 UG/ML
INJECTION, SOLUTION INTRAMUSCULAR; INTRAVENOUS
Status: DISCONTINUED | OUTPATIENT
Start: 2021-01-01 | End: 2021-01-01

## 2021-01-01 RX ORDER — IBUPROFEN 200 MG
16 TABLET ORAL
Status: DISCONTINUED | OUTPATIENT
Start: 2021-01-01 | End: 2021-01-01 | Stop reason: HOSPADM

## 2021-01-01 RX ORDER — ACETAMINOPHEN 325 MG/1
650 TABLET ORAL EVERY 4 HOURS PRN
Status: DISCONTINUED | OUTPATIENT
Start: 2021-01-01 | End: 2022-01-01 | Stop reason: HOSPADM

## 2021-01-01 RX ORDER — ONDANSETRON 2 MG/ML
4 INJECTION INTRAMUSCULAR; INTRAVENOUS
Status: COMPLETED | OUTPATIENT
Start: 2021-01-01 | End: 2021-01-01

## 2021-01-01 RX ORDER — TAMSULOSIN HYDROCHLORIDE 0.4 MG/1
0.4 CAPSULE ORAL DAILY
Status: DISCONTINUED | OUTPATIENT
Start: 2021-01-01 | End: 2022-01-01

## 2021-01-01 RX ORDER — VANCOMYCIN HYDROCHLORIDE 125 MG/1
125 CAPSULE ORAL 4 TIMES DAILY
Qty: 28 CAPSULE | Refills: 0 | Status: SHIPPED | OUTPATIENT
Start: 2021-01-01 | End: 2021-01-01 | Stop reason: HOSPADM

## 2021-01-01 RX ORDER — PROPOFOL 10 MG/ML
VIAL (ML) INTRAVENOUS
Status: DISCONTINUED | OUTPATIENT
Start: 2021-01-01 | End: 2021-01-01

## 2021-01-01 RX ORDER — GLUCAGON 1 MG
1 KIT INJECTION
Status: DISCONTINUED | OUTPATIENT
Start: 2021-01-01 | End: 2022-01-01 | Stop reason: HOSPADM

## 2021-01-01 RX ORDER — TALC
6 POWDER (GRAM) TOPICAL NIGHTLY PRN
Status: DISCONTINUED | OUTPATIENT
Start: 2021-01-01 | End: 2021-01-01

## 2021-01-01 RX ORDER — SIMETHICONE 80 MG
1 TABLET,CHEWABLE ORAL 4 TIMES DAILY PRN
Status: DISCONTINUED | OUTPATIENT
Start: 2021-01-01 | End: 2021-01-01 | Stop reason: HOSPADM

## 2021-01-01 RX ORDER — FERROUS SULFATE 325(65) MG
325 TABLET ORAL
Status: ON HOLD | COMMUNITY
End: 2022-01-01

## 2021-01-01 RX ORDER — POTASSIUM CHLORIDE 7.45 MG/ML
10 INJECTION INTRAVENOUS
Status: COMPLETED | OUTPATIENT
Start: 2021-01-01 | End: 2021-01-01

## 2021-01-01 RX ORDER — PROCHLORPERAZINE EDISYLATE 5 MG/ML
5 INJECTION INTRAMUSCULAR; INTRAVENOUS EVERY 30 MIN PRN
Status: DISCONTINUED | OUTPATIENT
Start: 2021-01-01 | End: 2021-01-01 | Stop reason: HOSPADM

## 2021-01-01 RX ORDER — DEXAMETHASONE SODIUM PHOSPHATE 4 MG/ML
INJECTION, SOLUTION INTRA-ARTICULAR; INTRALESIONAL; INTRAMUSCULAR; INTRAVENOUS; SOFT TISSUE
Status: DISCONTINUED | OUTPATIENT
Start: 2021-01-01 | End: 2021-01-01

## 2021-01-01 RX ORDER — MAGNESIUM SULFATE HEPTAHYDRATE 40 MG/ML
2 INJECTION, SOLUTION INTRAVENOUS ONCE
Status: COMPLETED | OUTPATIENT
Start: 2021-01-01 | End: 2021-01-01

## 2021-01-01 RX ORDER — PHENYLEPHRINE HYDROCHLORIDE 10 MG/ML
INJECTION INTRAVENOUS
Status: DISCONTINUED | OUTPATIENT
Start: 2021-01-01 | End: 2021-01-01

## 2021-01-01 RX ORDER — IBUPROFEN 200 MG
24 TABLET ORAL
Status: DISCONTINUED | OUTPATIENT
Start: 2021-01-01 | End: 2022-01-01 | Stop reason: HOSPADM

## 2021-01-01 RX ORDER — AMLODIPINE BESYLATE 5 MG/1
10 TABLET ORAL DAILY
Status: DISCONTINUED | OUTPATIENT
Start: 2021-01-01 | End: 2021-01-01 | Stop reason: HOSPADM

## 2021-01-01 RX ORDER — GLUCAGON 1 MG
1 KIT INJECTION
Status: DISCONTINUED | OUTPATIENT
Start: 2021-01-01 | End: 2021-01-01 | Stop reason: HOSPADM

## 2021-01-01 RX ORDER — GABAPENTIN 300 MG/1
300 CAPSULE ORAL DAILY
Status: DISCONTINUED | OUTPATIENT
Start: 2021-01-01 | End: 2021-01-01 | Stop reason: HOSPADM

## 2021-01-01 RX ORDER — ASPIRIN 81 MG/1
81 TABLET ORAL DAILY
Status: DISCONTINUED | OUTPATIENT
Start: 2021-01-01 | End: 2021-01-01

## 2021-01-01 RX ORDER — SODIUM CHLORIDE 0.9 % (FLUSH) 0.9 %
10 SYRINGE (ML) INJECTION
Status: DISCONTINUED | OUTPATIENT
Start: 2021-01-01 | End: 2021-01-01

## 2021-01-01 RX ORDER — DULOXETIN HYDROCHLORIDE 60 MG/1
60 CAPSULE, DELAYED RELEASE ORAL DAILY
Status: ON HOLD | COMMUNITY
End: 2021-01-01 | Stop reason: SDUPTHER

## 2021-01-01 RX ORDER — IBUPROFEN 200 MG
16 TABLET ORAL
Status: DISCONTINUED | OUTPATIENT
Start: 2021-01-01 | End: 2022-01-01 | Stop reason: HOSPADM

## 2021-01-01 RX ORDER — DEXTROSE MONOHYDRATE AND SODIUM CHLORIDE 5; .45 G/100ML; G/100ML
INJECTION, SOLUTION INTRAVENOUS CONTINUOUS
Status: DISCONTINUED | OUTPATIENT
Start: 2021-01-01 | End: 2021-01-01

## 2021-01-01 RX ORDER — MEPERIDINE HYDROCHLORIDE 25 MG/ML
12.5 INJECTION INTRAMUSCULAR; INTRAVENOUS; SUBCUTANEOUS ONCE AS NEEDED
Status: DISCONTINUED | OUTPATIENT
Start: 2021-01-01 | End: 2021-01-01 | Stop reason: HOSPADM

## 2021-01-01 RX ORDER — POLYETHYLENE GLYCOL 3350 17 G/17G
17 POWDER, FOR SOLUTION ORAL 2 TIMES DAILY PRN
Status: DISCONTINUED | OUTPATIENT
Start: 2021-01-01 | End: 2022-01-01 | Stop reason: HOSPADM

## 2021-01-01 RX ORDER — MULTIVIT WITH MINERALS/HERBS
1 TABLET ORAL DAILY
Status: ON HOLD | COMMUNITY
End: 2022-01-01

## 2021-01-01 RX ORDER — LIDOCAINE HYDROCHLORIDE 10 MG/ML
0.5 INJECTION, SOLUTION EPIDURAL; INFILTRATION; INTRACAUDAL; PERINEURAL ONCE
Status: CANCELLED | OUTPATIENT
Start: 2021-01-01 | End: 2021-01-01

## 2021-01-01 RX ORDER — ONDANSETRON 2 MG/ML
4 INJECTION INTRAMUSCULAR; INTRAVENOUS EVERY 6 HOURS PRN
Status: DISCONTINUED | OUTPATIENT
Start: 2021-01-01 | End: 2021-01-01 | Stop reason: HOSPADM

## 2021-01-01 RX ORDER — LIDOCAINE HYDROCHLORIDE 20 MG/ML
INJECTION INTRAVENOUS
Status: DISCONTINUED | OUTPATIENT
Start: 2021-01-01 | End: 2021-01-01

## 2021-01-01 RX ORDER — METOCLOPRAMIDE 10 MG/1
10 TABLET ORAL
Status: DISCONTINUED | OUTPATIENT
Start: 2021-01-01 | End: 2021-01-01 | Stop reason: HOSPADM

## 2021-01-01 RX ORDER — CEFAZOLIN SODIUM 1 G/3ML
2 INJECTION, POWDER, FOR SOLUTION INTRAMUSCULAR; INTRAVENOUS
Status: DISCONTINUED | OUTPATIENT
Start: 2021-01-01 | End: 2021-01-01 | Stop reason: HOSPADM

## 2021-01-01 RX ORDER — ROSUVASTATIN CALCIUM 40 MG/1
40 TABLET, COATED ORAL NIGHTLY
Status: ON HOLD | COMMUNITY
Start: 2021-01-01 | End: 2022-01-01

## 2021-01-01 RX ORDER — AMOXICILLIN 250 MG
1 CAPSULE ORAL 2 TIMES DAILY
Status: DISCONTINUED | OUTPATIENT
Start: 2021-01-01 | End: 2022-01-01 | Stop reason: HOSPADM

## 2021-01-01 RX ORDER — ONDANSETRON 2 MG/ML
INJECTION INTRAMUSCULAR; INTRAVENOUS
Status: DISCONTINUED | OUTPATIENT
Start: 2021-01-01 | End: 2021-01-01

## 2021-01-01 RX ORDER — FUROSEMIDE 20 MG/1
20 TABLET ORAL DAILY PRN
Status: ON HOLD | COMMUNITY
End: 2021-01-01 | Stop reason: HOSPADM

## 2021-01-01 RX ORDER — SODIUM,POTASSIUM PHOSPHATES 280-250MG
1 POWDER IN PACKET (EA) ORAL ONCE
Status: COMPLETED | OUTPATIENT
Start: 2021-01-01 | End: 2021-01-01

## 2021-01-01 RX ORDER — POTASSIUM CHLORIDE 20 MEQ/1
40 TABLET, EXTENDED RELEASE ORAL ONCE
Status: COMPLETED | OUTPATIENT
Start: 2021-01-01 | End: 2021-01-01

## 2021-01-01 RX ORDER — LIDOCAINE HYDROCHLORIDE 10 MG/ML
0.5 INJECTION, SOLUTION EPIDURAL; INFILTRATION; INTRACAUDAL; PERINEURAL ONCE
Status: DISCONTINUED | OUTPATIENT
Start: 2021-01-01 | End: 2021-01-01 | Stop reason: HOSPADM

## 2021-01-01 RX ORDER — SODIUM CHLORIDE 0.9 % (FLUSH) 0.9 %
10 SYRINGE (ML) INJECTION
Status: DISCONTINUED | OUTPATIENT
Start: 2021-01-01 | End: 2022-01-01 | Stop reason: HOSPADM

## 2021-01-01 RX ORDER — DIPHENHYDRAMINE HYDROCHLORIDE 50 MG/ML
25 INJECTION INTRAMUSCULAR; INTRAVENOUS EVERY 6 HOURS PRN
Status: DISCONTINUED | OUTPATIENT
Start: 2021-01-01 | End: 2021-01-01 | Stop reason: HOSPADM

## 2021-01-01 RX ORDER — SODIUM CHLORIDE 0.9 % (FLUSH) 0.9 %
10 SYRINGE (ML) INJECTION
Status: DISCONTINUED | OUTPATIENT
Start: 2021-01-01 | End: 2021-01-01 | Stop reason: HOSPADM

## 2021-01-01 RX ORDER — ACETAMINOPHEN 325 MG/1
650 TABLET ORAL EVERY 4 HOURS PRN
Status: DISCONTINUED | OUTPATIENT
Start: 2021-01-01 | End: 2021-01-01 | Stop reason: HOSPADM

## 2021-01-01 RX ORDER — ACETAMINOPHEN 325 MG/1
650 TABLET ORAL EVERY 8 HOURS PRN
Status: DISCONTINUED | OUTPATIENT
Start: 2021-01-01 | End: 2021-01-01 | Stop reason: HOSPADM

## 2021-01-01 RX ORDER — LOSARTAN POTASSIUM 25 MG/1
25 TABLET ORAL DAILY
Status: ON HOLD | COMMUNITY
End: 2021-01-01 | Stop reason: HOSPADM

## 2021-01-01 RX ORDER — CEFTRIAXONE 1 G/1
1 INJECTION, POWDER, FOR SOLUTION INTRAMUSCULAR; INTRAVENOUS
Status: COMPLETED | OUTPATIENT
Start: 2021-01-01 | End: 2021-01-01

## 2021-01-01 RX ORDER — TAMSULOSIN HYDROCHLORIDE 0.4 MG/1
0.4 CAPSULE ORAL DAILY
Qty: 30 CAPSULE | Refills: 1 | Status: ON HOLD | OUTPATIENT
Start: 2021-01-01 | End: 2022-01-01

## 2021-01-01 RX ORDER — OXYCODONE HYDROCHLORIDE 5 MG/1
5 TABLET ORAL
Status: DISCONTINUED | OUTPATIENT
Start: 2021-01-01 | End: 2021-01-01 | Stop reason: HOSPADM

## 2021-01-01 RX ORDER — L. ACIDOPHILUS/L.BULGARICUS 100MM CELL
1 GRANULES IN PACKET (EA) ORAL 2 TIMES DAILY
Status: DISCONTINUED | OUTPATIENT
Start: 2021-01-01 | End: 2021-01-01 | Stop reason: HOSPADM

## 2021-01-01 RX ORDER — POTASSIUM CHLORIDE 7.45 MG/ML
10 INJECTION INTRAVENOUS ONCE
Status: COMPLETED | OUTPATIENT
Start: 2021-01-01 | End: 2021-01-01

## 2021-01-01 RX ORDER — TRAZODONE HYDROCHLORIDE 100 MG/1
100 TABLET ORAL DAILY
Status: ON HOLD | COMMUNITY
End: 2022-01-01 | Stop reason: SDUPTHER

## 2021-01-01 RX ORDER — HYDROCODONE BITARTRATE AND ACETAMINOPHEN 5; 325 MG/1; MG/1
1 TABLET ORAL EVERY 4 HOURS PRN
Status: DISCONTINUED | OUTPATIENT
Start: 2021-01-01 | End: 2022-01-01 | Stop reason: HOSPADM

## 2021-01-01 RX ORDER — ONDANSETRON 2 MG/ML
4 INJECTION INTRAMUSCULAR; INTRAVENOUS EVERY 8 HOURS PRN
Status: DISCONTINUED | OUTPATIENT
Start: 2021-01-01 | End: 2021-01-01 | Stop reason: SDUPTHER

## 2021-01-01 RX ORDER — TALC
6 POWDER (GRAM) TOPICAL NIGHTLY PRN
Status: DISCONTINUED | OUTPATIENT
Start: 2021-01-01 | End: 2022-01-01

## 2021-01-01 RX ORDER — INSULIN ASPART 100 [IU]/ML
1-10 INJECTION, SOLUTION INTRAVENOUS; SUBCUTANEOUS
Status: DISCONTINUED | OUTPATIENT
Start: 2021-01-01 | End: 2021-01-01 | Stop reason: HOSPADM

## 2021-01-01 RX ORDER — POLYETHYLENE GLYCOL 3350 17 G/17G
17 POWDER, FOR SOLUTION ORAL DAILY PRN
Status: DISCONTINUED | OUTPATIENT
Start: 2021-01-01 | End: 2021-01-01 | Stop reason: HOSPADM

## 2021-01-01 RX ORDER — INSULIN GLARGINE 100 [IU]/ML
5 INJECTION, SOLUTION SUBCUTANEOUS DAILY
Refills: 0 | Status: ON HOLD
Start: 2021-01-01 | End: 2022-01-01

## 2021-01-01 RX ORDER — HYDROCODONE BITARTRATE AND ACETAMINOPHEN 10; 325 MG/1; MG/1
1 TABLET ORAL EVERY 4 HOURS PRN
Status: DISCONTINUED | OUTPATIENT
Start: 2021-01-01 | End: 2022-01-01 | Stop reason: HOSPADM

## 2021-01-01 RX ORDER — AMOXICILLIN 250 MG
1 CAPSULE ORAL 2 TIMES DAILY PRN
Status: DISCONTINUED | OUTPATIENT
Start: 2021-01-01 | End: 2021-01-01 | Stop reason: HOSPADM

## 2021-01-01 RX ORDER — VANCOMYCIN HCL IN 5 % DEXTROSE 1G/250ML
1000 PLASTIC BAG, INJECTION (ML) INTRAVENOUS
Status: COMPLETED | OUTPATIENT
Start: 2021-01-01 | End: 2021-01-01

## 2021-01-01 RX ORDER — SODIUM CHLORIDE 9 MG/ML
INJECTION, SOLUTION INTRAVENOUS CONTINUOUS
Status: DISCONTINUED | OUTPATIENT
Start: 2021-01-01 | End: 2021-01-01

## 2021-01-01 RX ORDER — LEVOTHYROXINE SODIUM 75 UG/1
75 TABLET ORAL DAILY
Status: DISCONTINUED | OUTPATIENT
Start: 2021-01-01 | End: 2022-01-01 | Stop reason: HOSPADM

## 2021-01-01 RX ORDER — CALCIUM CARBONATE 200(500)MG
1000 TABLET,CHEWABLE ORAL 3 TIMES DAILY PRN
Status: DISCONTINUED | OUTPATIENT
Start: 2021-01-01 | End: 2021-01-01 | Stop reason: HOSPADM

## 2021-01-01 RX ORDER — TAMSULOSIN HYDROCHLORIDE 0.4 MG/1
0.4 CAPSULE ORAL DAILY
Status: DISCONTINUED | OUTPATIENT
Start: 2021-01-01 | End: 2021-01-01 | Stop reason: HOSPADM

## 2021-01-01 RX ORDER — CARVEDILOL 12.5 MG/1
25 TABLET ORAL 2 TIMES DAILY
Status: DISCONTINUED | OUTPATIENT
Start: 2021-01-01 | End: 2022-01-01 | Stop reason: HOSPADM

## 2021-01-01 RX ORDER — ASPIRIN 81 MG/1
162 TABLET ORAL
Status: COMPLETED | OUTPATIENT
Start: 2021-01-01 | End: 2021-01-01

## 2021-01-01 RX ORDER — ROCURONIUM BROMIDE 10 MG/ML
INJECTION, SOLUTION INTRAVENOUS
Status: DISCONTINUED | OUTPATIENT
Start: 2021-01-01 | End: 2021-01-01

## 2021-01-01 RX ORDER — ONDANSETRON 2 MG/ML
4 INJECTION INTRAMUSCULAR; INTRAVENOUS DAILY PRN
Status: DISCONTINUED | OUTPATIENT
Start: 2021-01-01 | End: 2021-01-01 | Stop reason: HOSPADM

## 2021-01-01 RX ORDER — CEPHALEXIN 500 MG/1
500 CAPSULE ORAL EVERY 12 HOURS
Qty: 4 CAPSULE | Refills: 0 | Status: ON HOLD | OUTPATIENT
Start: 2021-01-01 | End: 2022-01-01 | Stop reason: HOSPADM

## 2021-01-01 RX ORDER — MORPHINE SULFATE 4 MG/ML
4 INJECTION, SOLUTION INTRAMUSCULAR; INTRAVENOUS EVERY 4 HOURS PRN
Status: DISCONTINUED | OUTPATIENT
Start: 2021-01-01 | End: 2021-01-01 | Stop reason: HOSPADM

## 2021-01-01 RX ORDER — TALC
6 POWDER (GRAM) TOPICAL NIGHTLY PRN
Status: DISCONTINUED | OUTPATIENT
Start: 2021-01-01 | End: 2021-01-01 | Stop reason: HOSPADM

## 2021-01-01 RX ORDER — POTASSIUM CHLORIDE 750 MG/1
10 TABLET, EXTENDED RELEASE ORAL ONCE
Status: COMPLETED | OUTPATIENT
Start: 2021-01-01 | End: 2021-01-01

## 2021-01-01 RX ORDER — ONDANSETRON 4 MG/1
4 TABLET, FILM COATED ORAL EVERY 8 HOURS PRN
Qty: 30 TABLET | Refills: 0 | Status: ON HOLD | OUTPATIENT
Start: 2021-01-01 | End: 2021-01-01 | Stop reason: SDUPTHER

## 2021-01-01 RX ORDER — AMLODIPINE BESYLATE 5 MG/1
10 TABLET ORAL NIGHTLY
Status: DISCONTINUED | OUTPATIENT
Start: 2021-01-01 | End: 2022-01-01 | Stop reason: HOSPADM

## 2021-01-01 RX ORDER — CLOPIDOGREL BISULFATE 75 MG/1
75 TABLET ORAL DAILY
Status: DISCONTINUED | OUTPATIENT
Start: 2021-01-01 | End: 2021-01-01

## 2021-01-01 RX ORDER — HYDROCODONE BITARTRATE AND ACETAMINOPHEN 5; 325 MG/1; MG/1
1 TABLET ORAL EVERY 4 HOURS PRN
Status: DISCONTINUED | OUTPATIENT
Start: 2021-01-01 | End: 2021-01-01 | Stop reason: HOSPADM

## 2021-01-01 RX ORDER — ONDANSETRON 2 MG/ML
4 INJECTION INTRAMUSCULAR; INTRAVENOUS EVERY 8 HOURS PRN
Status: DISCONTINUED | OUTPATIENT
Start: 2021-01-01 | End: 2021-01-01 | Stop reason: HOSPADM

## 2021-01-01 RX ORDER — DULOXETIN HYDROCHLORIDE 30 MG/1
30 CAPSULE, DELAYED RELEASE ORAL DAILY
COMMUNITY
Start: 2021-01-01 | End: 2021-01-01

## 2021-01-01 RX ORDER — DULOXETIN HYDROCHLORIDE 30 MG/1
30 CAPSULE, DELAYED RELEASE ORAL DAILY
Status: ON HOLD
Start: 2021-01-01 | End: 2022-01-01

## 2021-01-01 RX ORDER — TRAZODONE HYDROCHLORIDE 100 MG/1
100 TABLET ORAL NIGHTLY
Status: DISCONTINUED | OUTPATIENT
Start: 2021-01-01 | End: 2022-01-01 | Stop reason: HOSPADM

## 2021-01-01 RX ORDER — HYDROMORPHONE HYDROCHLORIDE 2 MG/ML
0.4 INJECTION, SOLUTION INTRAMUSCULAR; INTRAVENOUS; SUBCUTANEOUS EVERY 5 MIN PRN
Status: DISCONTINUED | OUTPATIENT
Start: 2021-01-01 | End: 2021-01-01 | Stop reason: HOSPADM

## 2021-01-01 RX ORDER — ONDANSETRON 2 MG/ML
4 INJECTION INTRAMUSCULAR; INTRAVENOUS EVERY 8 HOURS PRN
Status: DISCONTINUED | OUTPATIENT
Start: 2021-01-01 | End: 2022-01-01 | Stop reason: HOSPADM

## 2021-01-01 RX ORDER — MAG HYDROX/ALUMINUM HYD/SIMETH 200-200-20
30 SUSPENSION, ORAL (FINAL DOSE FORM) ORAL 4 TIMES DAILY PRN
Status: DISCONTINUED | OUTPATIENT
Start: 2021-01-01 | End: 2021-01-01 | Stop reason: HOSPADM

## 2021-01-01 RX ORDER — TRAZODONE HYDROCHLORIDE 100 MG/1
100 TABLET ORAL NIGHTLY
Status: DISCONTINUED | OUTPATIENT
Start: 2021-01-01 | End: 2021-01-01 | Stop reason: HOSPADM

## 2021-01-01 RX ORDER — HEPARIN SODIUM 5000 [USP'U]/ML
5000 INJECTION, SOLUTION INTRAVENOUS; SUBCUTANEOUS EVERY 12 HOURS
Status: DISCONTINUED | OUTPATIENT
Start: 2021-01-01 | End: 2021-01-01 | Stop reason: HOSPADM

## 2021-01-01 RX ORDER — INSULIN ASPART 100 [IU]/ML
1-10 INJECTION, SOLUTION INTRAVENOUS; SUBCUTANEOUS EVERY 6 HOURS PRN
Status: DISCONTINUED | OUTPATIENT
Start: 2021-01-01 | End: 2021-01-01

## 2021-01-01 RX ORDER — ONDANSETRON 4 MG/1
4 TABLET, FILM COATED ORAL EVERY 8 HOURS PRN
Qty: 30 TABLET | Refills: 3 | Status: ON HOLD | OUTPATIENT
Start: 2021-01-01 | End: 2022-01-01

## 2021-01-01 RX ORDER — SODIUM CHLORIDE, SODIUM LACTATE, POTASSIUM CHLORIDE, CALCIUM CHLORIDE 600; 310; 30; 20 MG/100ML; MG/100ML; MG/100ML; MG/100ML
INJECTION, SOLUTION INTRAVENOUS CONTINUOUS
Status: CANCELLED | OUTPATIENT
Start: 2021-01-01

## 2021-01-01 RX ORDER — MORPHINE SULFATE 4 MG/ML
4 INJECTION, SOLUTION INTRAMUSCULAR; INTRAVENOUS
Status: COMPLETED | OUTPATIENT
Start: 2021-01-01 | End: 2021-01-01

## 2021-01-01 RX ORDER — IPRATROPIUM BROMIDE AND ALBUTEROL SULFATE 2.5; .5 MG/3ML; MG/3ML
3 SOLUTION RESPIRATORY (INHALATION) EVERY 4 HOURS PRN
Status: DISCONTINUED | OUTPATIENT
Start: 2021-01-01 | End: 2021-01-01 | Stop reason: HOSPADM

## 2021-01-01 RX ORDER — POTASSIUM CHLORIDE 20 MEQ/1
20 TABLET, EXTENDED RELEASE ORAL ONCE
Status: DISCONTINUED | OUTPATIENT
Start: 2021-01-01 | End: 2021-01-01

## 2021-01-01 RX ORDER — TIZANIDINE 4 MG/1
4 TABLET ORAL NIGHTLY
Status: ON HOLD | COMMUNITY
Start: 2021-01-01 | End: 2022-01-01 | Stop reason: HOSPADM

## 2021-01-01 RX ORDER — SODIUM CHLORIDE, SODIUM LACTATE, POTASSIUM CHLORIDE, CALCIUM CHLORIDE 600; 310; 30; 20 MG/100ML; MG/100ML; MG/100ML; MG/100ML
INJECTION, SOLUTION INTRAVENOUS CONTINUOUS
Status: DISCONTINUED | OUTPATIENT
Start: 2021-01-01 | End: 2021-01-01

## 2021-01-01 RX ORDER — SODIUM CHLORIDE 0.9 % (FLUSH) 0.9 %
10 SYRINGE (ML) INJECTION EVERY 8 HOURS PRN
Status: DISCONTINUED | OUTPATIENT
Start: 2021-01-01 | End: 2021-01-01 | Stop reason: HOSPADM

## 2021-01-01 RX ORDER — LANOLIN ALCOHOL/MO/W.PET/CERES
1 CREAM (GRAM) TOPICAL DAILY
Status: DISCONTINUED | OUTPATIENT
Start: 2021-01-01 | End: 2022-01-01 | Stop reason: HOSPADM

## 2021-01-01 RX ORDER — LIDOCAINE HYDROCHLORIDE 10 MG/ML
1 INJECTION, SOLUTION EPIDURAL; INFILTRATION; INTRACAUDAL; PERINEURAL ONCE AS NEEDED
Status: DISCONTINUED | OUTPATIENT
Start: 2021-01-01 | End: 2021-01-01 | Stop reason: HOSPADM

## 2021-01-01 RX ORDER — ONDANSETRON 2 MG/ML
4 INJECTION INTRAMUSCULAR; INTRAVENOUS
Status: DISCONTINUED | OUTPATIENT
Start: 2021-01-01 | End: 2021-01-01

## 2021-01-01 RX ORDER — L. ACIDOPHILUS/L.BULGARICUS 100MM CELL
1 GRANULES IN PACKET (EA) ORAL 2 TIMES DAILY
Qty: 20 PACKET | Refills: 0 | Status: SHIPPED | OUTPATIENT
Start: 2021-01-01 | End: 2021-01-01 | Stop reason: CLARIF

## 2021-01-01 RX ORDER — LEVOTHYROXINE SODIUM 75 UG/1
75 TABLET ORAL DAILY
Status: DISCONTINUED | OUTPATIENT
Start: 2021-01-01 | End: 2021-01-01 | Stop reason: HOSPADM

## 2021-01-01 RX ORDER — EPHEDRINE SULFATE 50 MG/ML
INJECTION, SOLUTION INTRAVENOUS
Status: DISCONTINUED | OUTPATIENT
Start: 2021-01-01 | End: 2021-01-01

## 2021-01-01 RX ORDER — ASPIRIN 81 MG/1
81 TABLET ORAL DAILY
Status: DISCONTINUED | OUTPATIENT
Start: 2021-01-01 | End: 2021-01-01 | Stop reason: HOSPADM

## 2021-01-01 RX ORDER — AMLODIPINE BESYLATE 5 MG/1
10 TABLET ORAL NIGHTLY
Status: ON HOLD | COMMUNITY
End: 2022-01-01

## 2021-01-01 RX ORDER — POTASSIUM CHLORIDE 20 MEQ/1
20 TABLET, EXTENDED RELEASE ORAL ONCE
Status: COMPLETED | OUTPATIENT
Start: 2021-01-01 | End: 2021-01-01

## 2021-01-01 RX ORDER — METOCLOPRAMIDE 5 MG/1
5 TABLET ORAL EVERY 6 HOURS
Status: DISCONTINUED | OUTPATIENT
Start: 2021-01-01 | End: 2022-01-01 | Stop reason: HOSPADM

## 2021-01-01 RX ORDER — METOCLOPRAMIDE 10 MG/1
10 TABLET ORAL
Qty: 90 TABLET | Refills: 3 | Status: ON HOLD | OUTPATIENT
Start: 2021-01-01 | End: 2022-01-01

## 2021-01-01 RX ORDER — SODIUM CHLORIDE 9 MG/ML
INJECTION, SOLUTION INTRAVENOUS CONTINUOUS
Status: DISCONTINUED | OUTPATIENT
Start: 2021-01-01 | End: 2021-01-01 | Stop reason: HOSPADM

## 2021-01-01 RX ORDER — ATORVASTATIN CALCIUM 20 MG/1
80 TABLET, FILM COATED ORAL DAILY
Status: DISCONTINUED | OUTPATIENT
Start: 2021-01-01 | End: 2021-01-01 | Stop reason: HOSPADM

## 2021-01-01 RX ORDER — METOCLOPRAMIDE 10 MG/1
10 TABLET ORAL
Status: DISCONTINUED | OUTPATIENT
Start: 2021-01-01 | End: 2021-01-01

## 2021-01-01 RX ORDER — GABAPENTIN 300 MG/1
300 CAPSULE ORAL DAILY
Status: DISCONTINUED | OUTPATIENT
Start: 2021-01-01 | End: 2022-01-01 | Stop reason: HOSPADM

## 2021-01-01 RX ORDER — INSULIN ASPART 100 [IU]/ML
1-10 INJECTION, SOLUTION INTRAVENOUS; SUBCUTANEOUS EVERY 6 HOURS PRN
Status: DISCONTINUED | OUTPATIENT
Start: 2021-01-01 | End: 2021-01-01 | Stop reason: HOSPADM

## 2021-01-01 RX ORDER — CLOPIDOGREL BISULFATE 75 MG/1
75 TABLET ORAL DAILY
Status: DISCONTINUED | OUTPATIENT
Start: 2021-01-01 | End: 2022-01-01 | Stop reason: HOSPADM

## 2021-01-01 RX ORDER — BISACODYL 10 MG
10 SUPPOSITORY, RECTAL RECTAL DAILY PRN
Status: DISCONTINUED | OUTPATIENT
Start: 2021-01-01 | End: 2022-01-01 | Stop reason: HOSPADM

## 2021-01-01 RX ORDER — IBUPROFEN 200 MG
24 TABLET ORAL
Status: DISCONTINUED | OUTPATIENT
Start: 2021-01-01 | End: 2021-01-01 | Stop reason: HOSPADM

## 2021-01-01 RX ORDER — TIZANIDINE 4 MG/1
4 TABLET ORAL NIGHTLY
Status: DISCONTINUED | OUTPATIENT
Start: 2021-01-01 | End: 2021-01-01 | Stop reason: HOSPADM

## 2021-01-01 RX ORDER — LACTULOSE 10 G/15ML
10 SOLUTION ORAL; RECTAL 2 TIMES DAILY
Status: ON HOLD | COMMUNITY
End: 2022-01-01

## 2021-01-01 RX ORDER — POTASSIUM CHLORIDE 750 MG/1
40 CAPSULE, EXTENDED RELEASE ORAL ONCE
Status: DISCONTINUED | OUTPATIENT
Start: 2021-01-01 | End: 2021-01-01

## 2021-01-01 RX ORDER — LACTULOSE 10 G/15ML
15 SOLUTION ORAL ONCE
Status: COMPLETED | OUTPATIENT
Start: 2021-01-01 | End: 2021-01-01

## 2021-01-01 RX ORDER — CEFAZOLIN SODIUM 2 G/50ML
2 SOLUTION INTRAVENOUS
Status: DISCONTINUED | OUTPATIENT
Start: 2021-01-01 | End: 2021-01-01

## 2021-01-01 RX ORDER — SODIUM CHLORIDE 0.9 % (FLUSH) 0.9 %
3 SYRINGE (ML) INJECTION
Status: DISCONTINUED | OUTPATIENT
Start: 2021-01-01 | End: 2021-01-01 | Stop reason: HOSPADM

## 2021-01-01 RX ORDER — OMEPRAZOLE 20 MG/1
20 CAPSULE, DELAYED RELEASE ORAL 2 TIMES DAILY
Status: ON HOLD | COMMUNITY
Start: 2021-01-01 | End: 2022-01-01

## 2021-01-01 RX ORDER — METOCLOPRAMIDE 5 MG/1
5 TABLET ORAL 2 TIMES DAILY
Status: ON HOLD | COMMUNITY
End: 2021-01-01 | Stop reason: HOSPADM

## 2021-01-01 RX ORDER — BUPIVACAINE HCL/EPINEPHRINE 0.25-.0005
VIAL (ML) INJECTION
Status: DISCONTINUED | OUTPATIENT
Start: 2021-01-01 | End: 2021-01-01 | Stop reason: HOSPADM

## 2021-01-01 RX ORDER — MORPHINE SULFATE 4 MG/ML
4 INJECTION, SOLUTION INTRAMUSCULAR; INTRAVENOUS
Status: DISCONTINUED | OUTPATIENT
Start: 2021-01-01 | End: 2021-01-01

## 2021-01-01 RX ORDER — SODIUM CHLORIDE 450 MG/100ML
INJECTION, SOLUTION INTRAVENOUS CONTINUOUS
Status: DISCONTINUED | OUTPATIENT
Start: 2021-01-01 | End: 2021-01-01 | Stop reason: HOSPADM

## 2021-01-01 RX ORDER — BISACODYL 10 MG
10 SUPPOSITORY, RECTAL RECTAL ONCE
Status: COMPLETED | OUTPATIENT
Start: 2021-01-01 | End: 2021-01-01

## 2021-01-01 RX ORDER — PANTOPRAZOLE SODIUM 40 MG/1
40 TABLET, DELAYED RELEASE ORAL DAILY
Status: DISCONTINUED | OUTPATIENT
Start: 2021-01-01 | End: 2021-01-01 | Stop reason: HOSPADM

## 2021-01-01 RX ORDER — TAMSULOSIN HYDROCHLORIDE 0.4 MG/1
0.4 CAPSULE ORAL DAILY
Status: DISCONTINUED | OUTPATIENT
Start: 2021-01-01 | End: 2021-01-01

## 2021-01-01 RX ORDER — NALOXONE HCL 0.4 MG/ML
0.02 VIAL (ML) INJECTION
Status: DISCONTINUED | OUTPATIENT
Start: 2021-01-01 | End: 2021-01-01 | Stop reason: HOSPADM

## 2021-01-01 RX ORDER — CARVEDILOL 6.25 MG/1
6.25 TABLET ORAL 2 TIMES DAILY
Status: DISCONTINUED | OUTPATIENT
Start: 2021-01-01 | End: 2021-01-01 | Stop reason: HOSPADM

## 2021-01-01 RX ORDER — DULOXETIN HYDROCHLORIDE 30 MG/1
30 CAPSULE, DELAYED RELEASE ORAL DAILY
Status: DISCONTINUED | OUTPATIENT
Start: 2021-01-01 | End: 2022-01-01 | Stop reason: HOSPADM

## 2021-01-01 RX ORDER — AMOXICILLIN AND CLAVULANATE POTASSIUM 500; 125 MG/1; MG/1
1 TABLET, FILM COATED ORAL 2 TIMES DAILY
Qty: 6 TABLET | Refills: 0 | Status: SHIPPED | OUTPATIENT
Start: 2021-01-01 | End: 2021-01-01 | Stop reason: HOSPADM

## 2021-01-01 RX ORDER — PROPOFOL 10 MG/ML
VIAL (ML) INTRAVENOUS CONTINUOUS PRN
Status: DISCONTINUED | OUTPATIENT
Start: 2021-01-01 | End: 2021-01-01

## 2021-01-01 RX ORDER — ONDANSETRON 8 MG/1
8 TABLET, ORALLY DISINTEGRATING ORAL EVERY 8 HOURS PRN
Status: DISCONTINUED | OUTPATIENT
Start: 2021-01-01 | End: 2021-01-01

## 2021-01-01 RX ORDER — CEPHALEXIN 500 MG/1
500 CAPSULE ORAL EVERY 8 HOURS
Qty: 20 CAPSULE | Refills: 0 | Status: SHIPPED | OUTPATIENT
Start: 2021-01-01 | End: 2021-01-01 | Stop reason: HOSPADM

## 2021-01-01 RX ORDER — ASPIRIN 81 MG/1
81 TABLET ORAL DAILY
Status: ON HOLD | COMMUNITY
End: 2022-01-01

## 2021-01-01 RX ORDER — INSULIN ASPART 100 [IU]/ML
0-5 INJECTION, SOLUTION INTRAVENOUS; SUBCUTANEOUS
Status: DISCONTINUED | OUTPATIENT
Start: 2021-01-01 | End: 2022-01-01 | Stop reason: HOSPADM

## 2021-01-01 RX ORDER — ATORVASTATIN CALCIUM 40 MG/1
40 TABLET, FILM COATED ORAL DAILY
Status: DISCONTINUED | OUTPATIENT
Start: 2021-01-01 | End: 2021-01-01 | Stop reason: HOSPADM

## 2021-01-01 RX ORDER — LEVOTHYROXINE SODIUM 75 UG/1
75 TABLET ORAL DAILY
Status: ON HOLD | COMMUNITY
Start: 2021-01-01 | End: 2022-01-01

## 2021-01-01 RX ORDER — HYDROCODONE BITARTRATE AND ACETAMINOPHEN 500; 5 MG/1; MG/1
TABLET ORAL
Status: DISCONTINUED | OUTPATIENT
Start: 2021-01-01 | End: 2021-01-01 | Stop reason: HOSPADM

## 2021-01-01 RX ORDER — LACTULOSE 10 G/15ML
10 SOLUTION ORAL 2 TIMES DAILY
Status: DISCONTINUED | OUTPATIENT
Start: 2021-01-01 | End: 2022-01-01 | Stop reason: HOSPADM

## 2021-01-01 RX ORDER — CARVEDILOL 25 MG/1
25 TABLET ORAL 2 TIMES DAILY
Status: DISCONTINUED | OUTPATIENT
Start: 2021-01-01 | End: 2021-01-01 | Stop reason: HOSPADM

## 2021-01-01 RX ORDER — SODIUM CHLORIDE 9 MG/ML
INJECTION, SOLUTION INTRAVENOUS
Status: COMPLETED | OUTPATIENT
Start: 2021-01-01 | End: 2021-01-01

## 2021-01-01 RX ORDER — ONDANSETRON 8 MG/1
8 TABLET, ORALLY DISINTEGRATING ORAL EVERY 8 HOURS PRN
Status: DISCONTINUED | OUTPATIENT
Start: 2021-01-01 | End: 2022-01-01 | Stop reason: HOSPADM

## 2021-01-01 RX ORDER — CEFTRIAXONE 1 G/1
1 INJECTION, POWDER, FOR SOLUTION INTRAMUSCULAR; INTRAVENOUS
Status: DISCONTINUED | OUTPATIENT
Start: 2021-01-01 | End: 2021-01-01

## 2021-01-01 RX ORDER — AMITRIPTYLINE HYDROCHLORIDE 25 MG/1
25 TABLET, FILM COATED ORAL NIGHTLY
Status: DISCONTINUED | OUTPATIENT
Start: 2021-01-01 | End: 2021-01-01

## 2021-01-01 RX ORDER — LIDOCAINE HYDROCHLORIDE 10 MG/ML
1 INJECTION, SOLUTION EPIDURAL; INFILTRATION; INTRACAUDAL; PERINEURAL ONCE AS NEEDED
Status: CANCELLED | OUTPATIENT
Start: 2021-01-01 | End: 2033-05-14

## 2021-01-01 RX ORDER — SODIUM CHLORIDE 0.9 % (FLUSH) 0.9 %
10 SYRINGE (ML) INJECTION EVERY 8 HOURS
Status: DISCONTINUED | OUTPATIENT
Start: 2021-01-01 | End: 2021-01-01 | Stop reason: HOSPADM

## 2021-01-01 RX ORDER — ATORVASTATIN CALCIUM 20 MG/1
80 TABLET, FILM COATED ORAL NIGHTLY
Status: DISCONTINUED | OUTPATIENT
Start: 2021-01-01 | End: 2021-01-01 | Stop reason: HOSPADM

## 2021-01-01 RX ORDER — CARVEDILOL 12.5 MG/1
12.5 TABLET ORAL 2 TIMES DAILY
Status: DISCONTINUED | OUTPATIENT
Start: 2021-01-01 | End: 2021-01-01 | Stop reason: HOSPADM

## 2021-01-01 RX ORDER — SODIUM CHLORIDE 9 MG/ML
INJECTION, SOLUTION INTRAVENOUS CONTINUOUS
Status: CANCELLED | OUTPATIENT
Start: 2021-01-01

## 2021-01-01 RX ORDER — OXYCODONE HYDROCHLORIDE 5 MG/1
5 TABLET ORAL EVERY 6 HOURS PRN
Status: DISCONTINUED | OUTPATIENT
Start: 2021-01-01 | End: 2021-01-01 | Stop reason: HOSPADM

## 2021-01-01 RX ORDER — PANTOPRAZOLE SODIUM 40 MG/1
40 TABLET, DELAYED RELEASE ORAL DAILY
Status: DISCONTINUED | OUTPATIENT
Start: 2021-01-01 | End: 2022-01-01 | Stop reason: HOSPADM

## 2021-01-01 RX ORDER — PROCHLORPERAZINE EDISYLATE 5 MG/ML
5 INJECTION INTRAMUSCULAR; INTRAVENOUS EVERY 6 HOURS PRN
Status: DISCONTINUED | OUTPATIENT
Start: 2021-01-01 | End: 2021-01-01 | Stop reason: HOSPADM

## 2021-01-01 RX ORDER — INSULIN ASPART 100 [IU]/ML
0-5 INJECTION, SOLUTION INTRAVENOUS; SUBCUTANEOUS
Status: DISCONTINUED | OUTPATIENT
Start: 2021-01-01 | End: 2021-01-01 | Stop reason: HOSPADM

## 2021-01-01 RX ORDER — HYDROCODONE BITARTRATE AND ACETAMINOPHEN 5; 325 MG/1; MG/1
1 TABLET ORAL EVERY 6 HOURS PRN
Status: DISCONTINUED | OUTPATIENT
Start: 2021-01-01 | End: 2021-01-01 | Stop reason: HOSPADM

## 2021-01-01 RX ORDER — ASPIRIN 81 MG/1
81 TABLET ORAL DAILY
Status: DISCONTINUED | OUTPATIENT
Start: 2021-01-01 | End: 2022-01-01 | Stop reason: HOSPADM

## 2021-01-01 RX ORDER — POTASSIUM CHLORIDE 7.45 MG/ML
10 INJECTION INTRAVENOUS
Status: DISPENSED | OUTPATIENT
Start: 2021-01-01 | End: 2021-01-01

## 2021-01-01 RX ORDER — SODIUM CHLORIDE, SODIUM LACTATE, POTASSIUM CHLORIDE, CALCIUM CHLORIDE 600; 310; 30; 20 MG/100ML; MG/100ML; MG/100ML; MG/100ML
INJECTION, SOLUTION INTRAVENOUS CONTINUOUS
Status: DISCONTINUED | OUTPATIENT
Start: 2021-01-01 | End: 2021-01-01 | Stop reason: HOSPADM

## 2021-01-01 RX ORDER — ONDANSETRON 8 MG/1
8 TABLET, ORALLY DISINTEGRATING ORAL EVERY 6 HOURS PRN
Status: DISCONTINUED | OUTPATIENT
Start: 2021-01-01 | End: 2021-01-01 | Stop reason: HOSPADM

## 2021-01-01 RX ORDER — GABAPENTIN 300 MG/1
300 CAPSULE ORAL DAILY
Qty: 90 CAPSULE | Refills: 0 | Status: ON HOLD
Start: 2021-01-01 | End: 2022-01-01

## 2021-01-01 RX ORDER — SUCCINYLCHOLINE CHLORIDE 20 MG/ML
INJECTION INTRAMUSCULAR; INTRAVENOUS
Status: DISCONTINUED | OUTPATIENT
Start: 2021-01-01 | End: 2021-01-01

## 2021-01-01 RX ORDER — MUPIROCIN 20 MG/G
OINTMENT TOPICAL 2 TIMES DAILY
Status: DISCONTINUED | OUTPATIENT
Start: 2021-01-01 | End: 2021-01-01 | Stop reason: HOSPADM

## 2021-01-01 RX ORDER — CEPHALEXIN 500 MG/1
500 CAPSULE ORAL EVERY 12 HOURS
Qty: 14 CAPSULE | Refills: 0 | Status: SHIPPED | OUTPATIENT
Start: 2021-01-01 | End: 2021-01-01

## 2021-01-01 RX ADMIN — PHENYLEPHRINE HYDROCHLORIDE 100 MCG: 10 INJECTION INTRAVENOUS at 09:11

## 2021-01-01 RX ADMIN — Medication 6 MG: at 08:10

## 2021-01-01 RX ADMIN — GABAPENTIN 300 MG: 300 CAPSULE ORAL at 10:11

## 2021-01-01 RX ADMIN — HEPARIN SODIUM 5000 UNITS: 5000 INJECTION INTRAVENOUS; SUBCUTANEOUS at 08:10

## 2021-01-01 RX ADMIN — Medication 10 ML: at 04:10

## 2021-01-01 RX ADMIN — ONDANSETRON 4 MG: 2 INJECTION INTRAMUSCULAR; INTRAVENOUS at 08:10

## 2021-01-01 RX ADMIN — TIZANIDINE 4 MG: 4 TABLET ORAL at 08:10

## 2021-01-01 RX ADMIN — ASPIRIN 81 MG: 81 TABLET, COATED ORAL at 10:11

## 2021-01-01 RX ADMIN — CARVEDILOL 6.25 MG: 6.25 TABLET, FILM COATED ORAL at 10:07

## 2021-01-01 RX ADMIN — AMLODIPINE BESYLATE 10 MG: 5 TABLET ORAL at 09:10

## 2021-01-01 RX ADMIN — SODIUM CHLORIDE: 9 INJECTION, SOLUTION INTRAVENOUS at 08:11

## 2021-01-01 RX ADMIN — CALCIUM CARBONATE (ANTACID) CHEW TAB 500 MG 1000 MG: 500 CHEW TAB at 12:10

## 2021-01-01 RX ADMIN — ONDANSETRON HYDROCHLORIDE 4 MG: 2 INJECTION INTRAMUSCULAR; INTRAVENOUS at 08:12

## 2021-01-01 RX ADMIN — INSULIN ASPART 4 UNITS: 100 INJECTION, SOLUTION INTRAVENOUS; SUBCUTANEOUS at 07:07

## 2021-01-01 RX ADMIN — Medication 10 ML: at 10:10

## 2021-01-01 RX ADMIN — MUPIROCIN: 20 OINTMENT TOPICAL at 09:10

## 2021-01-01 RX ADMIN — SODIUM CHLORIDE: 0.9 INJECTION, SOLUTION INTRAVENOUS at 05:07

## 2021-01-01 RX ADMIN — INSULIN DETEMIR 6 UNITS: 100 INJECTION, SOLUTION SUBCUTANEOUS at 10:10

## 2021-01-01 RX ADMIN — AMLODIPINE BESYLATE 10 MG: 5 TABLET ORAL at 10:12

## 2021-01-01 RX ADMIN — TAMSULOSIN HYDROCHLORIDE 0.4 MG: 0.4 CAPSULE ORAL at 08:10

## 2021-01-01 RX ADMIN — AMLODIPINE BESYLATE 10 MG: 5 TABLET ORAL at 10:07

## 2021-01-01 RX ADMIN — MUPIROCIN: 20 OINTMENT TOPICAL at 10:10

## 2021-01-01 RX ADMIN — IRON SUCROSE 200 MG: 20 INJECTION, SOLUTION INTRAVENOUS at 04:07

## 2021-01-01 RX ADMIN — POTASSIUM CHLORIDE 10 MEQ: 7.46 INJECTION, SOLUTION INTRAVENOUS at 12:10

## 2021-01-01 RX ADMIN — POTASSIUM CHLORIDE 10 MEQ: 7.46 INJECTION, SOLUTION INTRAVENOUS at 04:10

## 2021-01-01 RX ADMIN — HEPARIN SODIUM 5000 UNITS: 5000 INJECTION INTRAVENOUS; SUBCUTANEOUS at 10:10

## 2021-01-01 RX ADMIN — TAMSULOSIN HYDROCHLORIDE 0.4 MG: 0.4 CAPSULE ORAL at 08:12

## 2021-01-01 RX ADMIN — PANTOPRAZOLE SODIUM 40 MG: 40 TABLET, DELAYED RELEASE ORAL at 08:12

## 2021-01-01 RX ADMIN — ONDANSETRON 4 MG: 2 INJECTION INTRAMUSCULAR; INTRAVENOUS at 09:11

## 2021-01-01 RX ADMIN — PIPERACILLIN AND TAZOBACTAM 4.5 G: 4; .5 INJECTION, POWDER, FOR SOLUTION INTRAVENOUS at 01:07

## 2021-01-01 RX ADMIN — PIPERACILLIN AND TAZOBACTAM 4.5 G: 4; .5 INJECTION, POWDER, FOR SOLUTION INTRAVENOUS at 12:07

## 2021-01-01 RX ADMIN — SODIUM CHLORIDE: 0.9 INJECTION, SOLUTION INTRAVENOUS at 05:10

## 2021-01-01 RX ADMIN — GLYCOPYRROLATE 0.2 MG: 0.2 INJECTION, SOLUTION INTRAMUSCULAR; INTRAVITREAL at 08:12

## 2021-01-01 RX ADMIN — FENTANYL CITRATE 50 MCG: 50 INJECTION, SOLUTION INTRAMUSCULAR; INTRAVENOUS at 04:10

## 2021-01-01 RX ADMIN — Medication 125 MG: at 09:07

## 2021-01-01 RX ADMIN — FENTANYL CITRATE 50 MCG: 50 INJECTION, SOLUTION INTRAMUSCULAR; INTRAVENOUS at 08:12

## 2021-01-01 RX ADMIN — CEFTRIAXONE 1 G: 1 INJECTION, POWDER, FOR SOLUTION INTRAMUSCULAR; INTRAVENOUS at 07:10

## 2021-01-01 RX ADMIN — PROPOFOL 140 MG: 10 INJECTION, EMULSION INTRAVENOUS at 08:12

## 2021-01-01 RX ADMIN — TRAZODONE HYDROCHLORIDE 100 MG: 100 TABLET ORAL at 08:12

## 2021-01-01 RX ADMIN — IOHEXOL 15 ML: 350 INJECTION, SOLUTION INTRAVENOUS at 12:11

## 2021-01-01 RX ADMIN — POTASSIUM & SODIUM PHOSPHATES POWDER PACK 280-160-250 MG 1 PACKET: 280-160-250 PACK at 09:10

## 2021-01-01 RX ADMIN — ATORVASTATIN CALCIUM 80 MG: 20 TABLET, FILM COATED ORAL at 09:10

## 2021-01-01 RX ADMIN — POTASSIUM CHLORIDE 10 MEQ: 750 TABLET, EXTENDED RELEASE ORAL at 02:07

## 2021-01-01 RX ADMIN — EPHEDRINE SULFATE 10 MG: 50 INJECTION INTRAVENOUS at 09:12

## 2021-01-01 RX ADMIN — SODIUM CHLORIDE: 0.45 INJECTION, SOLUTION INTRAVENOUS at 10:10

## 2021-01-01 RX ADMIN — ATORVASTATIN CALCIUM 40 MG: 40 TABLET, FILM COATED ORAL at 10:11

## 2021-01-01 RX ADMIN — CARVEDILOL 12.5 MG: 12.5 TABLET, FILM COATED ORAL at 09:10

## 2021-01-01 RX ADMIN — CARVEDILOL 6.25 MG: 6.25 TABLET, FILM COATED ORAL at 09:07

## 2021-01-01 RX ADMIN — MORPHINE SULFATE 4 MG: 4 INJECTION INTRAVENOUS at 12:10

## 2021-01-01 RX ADMIN — ATORVASTATIN CALCIUM 80 MG: 20 TABLET, FILM COATED ORAL at 09:07

## 2021-01-01 RX ADMIN — AMLODIPINE BESYLATE 10 MG: 5 TABLET ORAL at 08:10

## 2021-01-01 RX ADMIN — LIDOCAINE HYDROCHLORIDE 100 MG: 20 INJECTION, SOLUTION INTRAVENOUS at 04:10

## 2021-01-01 RX ADMIN — PHENYLEPHRINE HYDROCHLORIDE 100 MCG: 10 INJECTION INTRAVENOUS at 08:12

## 2021-01-01 RX ADMIN — TAMSULOSIN HYDROCHLORIDE 0.4 MG: 0.4 CAPSULE ORAL at 08:11

## 2021-01-01 RX ADMIN — HEPARIN SODIUM 5000 UNITS: 5000 INJECTION INTRAVENOUS; SUBCUTANEOUS at 09:10

## 2021-01-01 RX ADMIN — ROCURONIUM BROMIDE 20 MG: 10 INJECTION, SOLUTION INTRAVENOUS at 01:07

## 2021-01-01 RX ADMIN — CARVEDILOL 25 MG: 12.5 TABLET, FILM COATED ORAL at 08:12

## 2021-01-01 RX ADMIN — Medication 6 MG: at 01:07

## 2021-01-01 RX ADMIN — SODIUM CHLORIDE, SODIUM LACTATE, POTASSIUM CHLORIDE, AND CALCIUM CHLORIDE 1000 ML: .6; .31; .03; .02 INJECTION, SOLUTION INTRAVENOUS at 11:11

## 2021-01-01 RX ADMIN — POTASSIUM CHLORIDE 40 MEQ: 1500 TABLET, EXTENDED RELEASE ORAL at 10:10

## 2021-01-01 RX ADMIN — GABAPENTIN 300 MG: 300 CAPSULE ORAL at 08:12

## 2021-01-01 RX ADMIN — TAMSULOSIN HYDROCHLORIDE 0.4 MG: 0.4 CAPSULE ORAL at 09:10

## 2021-01-01 RX ADMIN — ONDANSETRON HYDROCHLORIDE 4 MG: 2 INJECTION INTRAMUSCULAR; INTRAVENOUS at 04:10

## 2021-01-01 RX ADMIN — CARVEDILOL 12.5 MG: 12.5 TABLET, FILM COATED ORAL at 10:10

## 2021-01-01 RX ADMIN — ACETAMINOPHEN 650 MG: 325 TABLET, FILM COATED ORAL at 11:07

## 2021-01-01 RX ADMIN — SODIUM CHLORIDE, SODIUM LACTATE, POTASSIUM CHLORIDE, AND CALCIUM CHLORIDE: .6; .31; .03; .02 INJECTION, SOLUTION INTRAVENOUS at 12:07

## 2021-01-01 RX ADMIN — MELATONIN TAB 3 MG 6 MG: 3 TAB at 10:12

## 2021-01-01 RX ADMIN — POTASSIUM BICARBONATE 50 MEQ: 978 TABLET, EFFERVESCENT ORAL at 12:10

## 2021-01-01 RX ADMIN — IOHEXOL 15 ML: 350 INJECTION, SOLUTION INTRAVENOUS at 01:11

## 2021-01-01 RX ADMIN — POTASSIUM CHLORIDE 20 MEQ: 1500 TABLET, EXTENDED RELEASE ORAL at 10:07

## 2021-01-01 RX ADMIN — DOCUSATE SODIUM - SENNOSIDES 1 TABLET: 50; 8.6 TABLET, FILM COATED ORAL at 10:12

## 2021-01-01 RX ADMIN — POTASSIUM CHLORIDE 10 MEQ: 7.46 INJECTION, SOLUTION INTRAVENOUS at 11:10

## 2021-01-01 RX ADMIN — METOCLOPRAMIDE 5 MG: 5 TABLET ORAL at 05:12

## 2021-01-01 RX ADMIN — TRAZODONE HYDROCHLORIDE 100 MG: 100 TABLET ORAL at 08:10

## 2021-01-01 RX ADMIN — SODIUM CHLORIDE 500 ML: 0.9 INJECTION, SOLUTION INTRAVENOUS at 09:11

## 2021-01-01 RX ADMIN — POTASSIUM CHLORIDE 10 MEQ: 7.46 INJECTION, SOLUTION INTRAVENOUS at 08:10

## 2021-01-01 RX ADMIN — CEFTRIAXONE 1 G: 1 INJECTION, SOLUTION INTRAVENOUS at 10:12

## 2021-01-01 RX ADMIN — LEVOTHYROXINE SODIUM 75 MCG: 75 TABLET ORAL at 08:10

## 2021-01-01 RX ADMIN — ONDANSETRON 4 MG: 2 INJECTION INTRAMUSCULAR; INTRAVENOUS at 09:07

## 2021-01-01 RX ADMIN — ATORVASTATIN CALCIUM 80 MG: 20 TABLET, FILM COATED ORAL at 08:10

## 2021-01-01 RX ADMIN — LIDOCAINE HYDROCHLORIDE 100 MG: 20 INJECTION, SOLUTION INTRAVENOUS at 09:11

## 2021-01-01 RX ADMIN — DOCUSATE SODIUM - SENNOSIDES 1 TABLET: 50; 8.6 TABLET, FILM COATED ORAL at 08:12

## 2021-01-01 RX ADMIN — ACETAMINOPHEN 650 MG: 325 TABLET, FILM COATED ORAL at 03:07

## 2021-01-01 RX ADMIN — CARVEDILOL 12.5 MG: 12.5 TABLET, FILM COATED ORAL at 08:10

## 2021-01-01 RX ADMIN — TIZANIDINE 4 MG: 4 TABLET ORAL at 10:10

## 2021-01-01 RX ADMIN — CLOPIDOGREL BISULFATE 75 MG: 75 TABLET, FILM COATED ORAL at 08:12

## 2021-01-01 RX ADMIN — TRAZODONE HYDROCHLORIDE 100 MG: 100 TABLET ORAL at 10:12

## 2021-01-01 RX ADMIN — DEXAMETHASONE SODIUM PHOSPHATE 4 MG: 4 INJECTION, SOLUTION INTRAMUSCULAR; INTRAVENOUS at 02:07

## 2021-01-01 RX ADMIN — CEFTRIAXONE 1 G: 1 INJECTION, POWDER, FOR SOLUTION INTRAMUSCULAR; INTRAVENOUS at 06:10

## 2021-01-01 RX ADMIN — POTASSIUM BICARBONATE 50 MEQ: 978 TABLET, EFFERVESCENT ORAL at 01:11

## 2021-01-01 RX ADMIN — ACETAMINOPHEN 650 MG: 325 TABLET, FILM COATED ORAL at 01:07

## 2021-01-01 RX ADMIN — LACTULOSE 10 G: 10 SOLUTION ORAL at 10:12

## 2021-01-01 RX ADMIN — ASPIRIN 81 MG: 81 TABLET, DELAYED RELEASE ORAL at 08:12

## 2021-01-01 RX ADMIN — Medication 6 MG: at 10:10

## 2021-01-01 RX ADMIN — PANTOPRAZOLE SODIUM 40 MG: 40 TABLET, DELAYED RELEASE ORAL at 10:07

## 2021-01-01 RX ADMIN — POTASSIUM & SODIUM PHOSPHATES POWDER PACK 280-160-250 MG 1 PACKET: 280-160-250 PACK at 10:10

## 2021-01-01 RX ADMIN — PANTOPRAZOLE SODIUM 40 MG: 40 TABLET, DELAYED RELEASE ORAL at 09:07

## 2021-01-01 RX ADMIN — ONDANSETRON HYDROCHLORIDE 4 MG: 2 INJECTION INTRAMUSCULAR; INTRAVENOUS at 02:07

## 2021-01-01 RX ADMIN — POTASSIUM CHLORIDE 40 MEQ: 1500 TABLET, EXTENDED RELEASE ORAL at 09:10

## 2021-01-01 RX ADMIN — PANTOPRAZOLE SODIUM 40 MG: 40 TABLET, DELAYED RELEASE ORAL at 02:07

## 2021-01-01 RX ADMIN — ONDANSETRON 4 MG: 2 INJECTION INTRAMUSCULAR; INTRAVENOUS at 12:11

## 2021-01-01 RX ADMIN — AMLODIPINE BESYLATE 10 MG: 5 TABLET ORAL at 09:07

## 2021-01-01 RX ADMIN — SODIUM CHLORIDE: 0.45 INJECTION, SOLUTION INTRAVENOUS at 06:10

## 2021-01-01 RX ADMIN — BISACODYL 10 MG: 10 SUPPOSITORY RECTAL at 10:10

## 2021-01-01 RX ADMIN — SODIUM CHLORIDE: 0.45 INJECTION, SOLUTION INTRAVENOUS at 12:10

## 2021-01-01 RX ADMIN — PIPERACILLIN AND TAZOBACTAM 4.5 G: 4; .5 INJECTION, POWDER, LYOPHILIZED, FOR SOLUTION INTRAVENOUS; PARENTERAL at 02:12

## 2021-01-01 RX ADMIN — Medication 6 MG: at 12:10

## 2021-01-01 RX ADMIN — DEXTROSE 2 G: 50 INJECTION, SOLUTION INTRAVENOUS at 04:10

## 2021-01-01 RX ADMIN — PANTOPRAZOLE SODIUM 40 MG: 40 TABLET, DELAYED RELEASE ORAL at 09:10

## 2021-01-01 RX ADMIN — LIDOCAINE HYDROCHLORIDE 40 MG: 20 INJECTION, SOLUTION INTRAVENOUS at 08:12

## 2021-01-01 RX ADMIN — POTASSIUM CHLORIDE 10 MEQ: 7.46 INJECTION, SOLUTION INTRAVENOUS at 03:10

## 2021-01-01 RX ADMIN — GLYCOPYRROLATE 0.2 MG: 0.2 INJECTION, SOLUTION INTRAMUSCULAR; INTRAVITREAL at 08:11

## 2021-01-01 RX ADMIN — ONDANSETRON 4 MG: 2 INJECTION INTRAMUSCULAR; INTRAVENOUS at 12:07

## 2021-01-01 RX ADMIN — SUCCINYLCHOLINE CHLORIDE 100 MG: 20 INJECTION, SOLUTION INTRAMUSCULAR; INTRAVENOUS at 01:07

## 2021-01-01 RX ADMIN — PROPOFOL 120 MG: 10 INJECTION, EMULSION INTRAVENOUS at 01:07

## 2021-01-01 RX ADMIN — INSULIN ASPART 1 UNITS: 100 INJECTION, SOLUTION INTRAVENOUS; SUBCUTANEOUS at 08:10

## 2021-01-01 RX ADMIN — ONDANSETRON 4 MG: 2 INJECTION INTRAMUSCULAR; INTRAVENOUS at 04:07

## 2021-01-01 RX ADMIN — DEXTROSE AND SODIUM CHLORIDE: 5; .45 INJECTION, SOLUTION INTRAVENOUS at 12:10

## 2021-01-01 RX ADMIN — CEFAZOLIN 2 G: 330 INJECTION, POWDER, FOR SOLUTION INTRAMUSCULAR; INTRAVENOUS at 08:12

## 2021-01-01 RX ADMIN — POTASSIUM CHLORIDE 40 MEQ: 1500 TABLET, EXTENDED RELEASE ORAL at 10:11

## 2021-01-01 RX ADMIN — Medication 10 ML: at 07:10

## 2021-01-01 RX ADMIN — POTASSIUM CHLORIDE 10 MEQ: 7.46 INJECTION, SOLUTION INTRAVENOUS at 03:07

## 2021-01-01 RX ADMIN — Medication 6 MG: at 09:07

## 2021-01-01 RX ADMIN — PROPOFOL 200 MCG/KG/MIN: 10 INJECTION, EMULSION INTRAVENOUS at 09:11

## 2021-01-01 RX ADMIN — PROPOFOL 40 MG: 10 INJECTION, EMULSION INTRAVENOUS at 09:11

## 2021-01-01 RX ADMIN — PANTOPRAZOLE SODIUM 40 MG: 40 TABLET, DELAYED RELEASE ORAL at 08:10

## 2021-01-01 RX ADMIN — ROCURONIUM BROMIDE 20 MG: 10 INJECTION, SOLUTION INTRAVENOUS at 02:07

## 2021-01-01 RX ADMIN — DEXAMETHASONE SODIUM PHOSPHATE 4 MG: 4 INJECTION, SOLUTION INTRAMUSCULAR; INTRAVENOUS at 04:10

## 2021-01-01 RX ADMIN — CARVEDILOL 25 MG: 25 TABLET, FILM COATED ORAL at 10:11

## 2021-01-01 RX ADMIN — VANCOMYCIN HYDROCHLORIDE 1000 MG: 1 INJECTION, POWDER, LYOPHILIZED, FOR SOLUTION INTRAVENOUS at 05:12

## 2021-01-01 RX ADMIN — MAGNESIUM SULFATE 2 G: 2 INJECTION INTRAVENOUS at 09:10

## 2021-01-01 RX ADMIN — MAGNESIUM SULFATE 2 G: 2 INJECTION INTRAVENOUS at 12:07

## 2021-01-01 RX ADMIN — ACETAMINOPHEN 650 MG: 325 TABLET ORAL at 09:10

## 2021-01-01 RX ADMIN — SODIUM CHLORIDE 500 ML: 0.9 INJECTION, SOLUTION INTRAVENOUS at 10:10

## 2021-01-01 RX ADMIN — CARVEDILOL 25 MG: 25 TABLET, FILM COATED ORAL at 08:11

## 2021-01-01 RX ADMIN — Medication 125 MG: at 02:07

## 2021-01-01 RX ADMIN — ONDANSETRON 4 MG: 2 INJECTION INTRAMUSCULAR; INTRAVENOUS at 10:10

## 2021-01-01 RX ADMIN — Medication 125 MG: at 10:07

## 2021-01-01 RX ADMIN — LACTULOSE 15 G: 20 SOLUTION ORAL at 10:10

## 2021-01-01 RX ADMIN — ONDANSETRON 4 MG: 2 INJECTION INTRAMUSCULAR; INTRAVENOUS at 12:10

## 2021-01-01 RX ADMIN — TAMSULOSIN HYDROCHLORIDE 0.4 MG: 0.4 CAPSULE ORAL at 10:11

## 2021-01-01 RX ADMIN — Medication 125 MG: at 06:07

## 2021-01-01 RX ADMIN — SODIUM CHLORIDE, SODIUM LACTATE, POTASSIUM CHLORIDE, AND CALCIUM CHLORIDE 1000 ML: .6; .31; .03; .02 INJECTION, SOLUTION INTRAVENOUS at 08:11

## 2021-01-01 RX ADMIN — Medication 125 MG: at 04:07

## 2021-01-01 RX ADMIN — DULOXETINE 30 MG: 30 CAPSULE, DELAYED RELEASE ORAL at 03:12

## 2021-01-01 RX ADMIN — PROCHLORPERAZINE EDISYLATE 5 MG: 5 INJECTION INTRAMUSCULAR; INTRAVENOUS at 02:11

## 2021-01-01 RX ADMIN — CEFTRIAXONE 1 G: 1 INJECTION, POWDER, FOR SOLUTION INTRAMUSCULAR; INTRAVENOUS at 09:11

## 2021-01-01 RX ADMIN — CARVEDILOL 25 MG: 12.5 TABLET, FILM COATED ORAL at 10:12

## 2021-01-01 RX ADMIN — ASPIRIN 81 MG: 81 TABLET, COATED ORAL at 09:11

## 2021-01-01 RX ADMIN — DEXTROSE MONOHYDRATE 12.5 G: 25 INJECTION, SOLUTION INTRAVENOUS at 11:10

## 2021-01-01 RX ADMIN — CARVEDILOL 25 MG: 25 TABLET, FILM COATED ORAL at 09:11

## 2021-01-01 RX ADMIN — FENTANYL CITRATE 50 MCG: 50 INJECTION, SOLUTION INTRAMUSCULAR; INTRAVENOUS at 05:10

## 2021-01-01 RX ADMIN — FENTANYL CITRATE 100 MCG: 50 INJECTION, SOLUTION INTRAMUSCULAR; INTRAVENOUS at 01:07

## 2021-01-01 RX ADMIN — SODIUM CHLORIDE: 0.9 INJECTION, SOLUTION INTRAVENOUS at 12:07

## 2021-01-01 RX ADMIN — ASPIRIN 81 MG: 81 TABLET, COATED ORAL at 08:11

## 2021-01-01 RX ADMIN — CEFAZOLIN SODIUM 2 G: 2 SOLUTION INTRAVENOUS at 05:10

## 2021-01-01 RX ADMIN — POTASSIUM CHLORIDE 40 MEQ: 1500 TABLET, EXTENDED RELEASE ORAL at 12:07

## 2021-01-01 RX ADMIN — POTASSIUM CHLORIDE 10 MEQ: 7.46 INJECTION, SOLUTION INTRAVENOUS at 04:07

## 2021-01-01 RX ADMIN — MUPIROCIN: 20 OINTMENT TOPICAL at 08:10

## 2021-01-01 RX ADMIN — SODIUM CHLORIDE: 0.45 INJECTION, SOLUTION INTRAVENOUS at 01:10

## 2021-01-01 RX ADMIN — ONDANSETRON 8 MG: 8 TABLET, ORALLY DISINTEGRATING ORAL at 05:11

## 2021-01-01 RX ADMIN — ATORVASTATIN CALCIUM 40 MG: 40 TABLET, FILM COATED ORAL at 09:11

## 2021-01-01 RX ADMIN — FERROUS SULFATE TAB 325 MG (65 MG ELEMENTAL FE) 1 EACH: 325 (65 FE) TAB at 08:12

## 2021-01-01 RX ADMIN — ATORVASTATIN CALCIUM 40 MG: 40 TABLET, FILM COATED ORAL at 08:11

## 2021-01-01 RX ADMIN — ACETAMINOPHEN 650 MG: 325 TABLET, FILM COATED ORAL at 09:07

## 2021-01-01 RX ADMIN — ACETAMINOPHEN 650 MG: 325 TABLET ORAL at 10:10

## 2021-01-01 RX ADMIN — PROPOFOL 140 MG: 10 INJECTION, EMULSION INTRAVENOUS at 04:10

## 2021-01-01 RX ADMIN — LEVOTHYROXINE SODIUM 75 MCG: 75 TABLET ORAL at 06:12

## 2021-01-01 RX ADMIN — LACTULOSE 10 G: 10 SOLUTION ORAL at 08:12

## 2021-01-01 RX ADMIN — LEVOTHYROXINE SODIUM 75 MCG: 75 TABLET ORAL at 09:10

## 2021-01-01 RX ADMIN — ONDANSETRON 8 MG: 8 TABLET, ORALLY DISINTEGRATING ORAL at 07:11

## 2021-01-01 RX ADMIN — ASPIRIN 81 MG: 81 TABLET, COATED ORAL at 09:10

## 2021-01-01 RX ADMIN — LACTOBACILLUS ACIDOPHILUS / LACTOBACILLUS BULGARICUS 1 EACH: 100 MILLION CFU STRENGTH GRANULES at 09:10

## 2021-01-01 RX ADMIN — PIPERACILLIN AND TAZOBACTAM 4.5 G: 4; .5 INJECTION, POWDER, LYOPHILIZED, FOR SOLUTION INTRAVENOUS; PARENTERAL at 05:12

## 2021-01-01 RX ADMIN — ACETAMINOPHEN 650 MG: 325 TABLET ORAL at 03:10

## 2021-01-01 RX ADMIN — ASPIRIN 162 MG: 81 TABLET, COATED ORAL at 06:11

## 2021-01-01 RX ADMIN — SODIUM CHLORIDE: 0.9 INJECTION, SOLUTION INTRAVENOUS at 04:07

## 2021-01-01 RX ADMIN — POTASSIUM CHLORIDE 10 MEQ: 7.46 INJECTION, SOLUTION INTRAVENOUS at 09:10

## 2021-01-01 RX ADMIN — POTASSIUM CHLORIDE 10 MEQ: 7.46 INJECTION, SOLUTION INTRAVENOUS at 01:10

## 2021-01-01 RX ADMIN — POTASSIUM CHLORIDE 10 MEQ: 7.46 INJECTION, SOLUTION INTRAVENOUS at 03:11

## 2021-01-01 RX ADMIN — PHENYLEPHRINE HYDROCHLORIDE 100 MCG: 10 INJECTION INTRAVENOUS at 05:10

## 2021-01-01 RX ADMIN — SUGAMMADEX 400 MG: 100 INJECTION, SOLUTION INTRAVENOUS at 02:07

## 2021-01-01 RX ADMIN — Medication 125 MG: at 01:07

## 2021-01-01 RX ADMIN — ACETAMINOPHEN 650 MG: 325 TABLET ORAL at 08:10

## 2021-01-01 RX ADMIN — METOCLOPRAMIDE 10 MG: 10 TABLET ORAL at 06:12

## 2021-01-01 RX ADMIN — SODIUM CHLORIDE, SODIUM LACTATE, POTASSIUM CHLORIDE, AND CALCIUM CHLORIDE: .6; .31; .03; .02 INJECTION, SOLUTION INTRAVENOUS at 01:07

## 2021-01-01 RX ADMIN — TIZANIDINE 4 MG: 4 TABLET ORAL at 12:10

## 2021-01-01 RX ADMIN — AMLODIPINE BESYLATE 10 MG: 5 TABLET ORAL at 12:07

## 2021-01-01 RX ADMIN — GABAPENTIN 300 MG: 300 CAPSULE ORAL at 08:11

## 2021-01-01 RX ADMIN — ASPIRIN 81 MG: 81 TABLET, COATED ORAL at 08:10

## 2021-01-01 RX ADMIN — LACTOBACILLUS ACIDOPHILUS / LACTOBACILLUS BULGARICUS 1 EACH: 100 MILLION CFU STRENGTH GRANULES at 04:10

## 2021-01-01 RX ADMIN — PHENYLEPHRINE HYDROCHLORIDE 200 MCG: 10 INJECTION INTRAVENOUS at 09:12

## 2021-01-01 RX ADMIN — ONDANSETRON 8 MG: 8 TABLET, ORALLY DISINTEGRATING ORAL at 08:11

## 2021-01-01 RX ADMIN — METOCLOPRAMIDE 10 MG: 10 TABLET ORAL at 10:11

## 2021-01-01 RX ADMIN — CEFTRIAXONE 1 G: 1 INJECTION, POWDER, FOR SOLUTION INTRAMUSCULAR; INTRAVENOUS at 11:11

## 2021-01-01 RX ADMIN — CARVEDILOL 6.25 MG: 6.25 TABLET, FILM COATED ORAL at 12:07

## 2021-01-01 RX ADMIN — TRAZODONE HYDROCHLORIDE 100 MG: 100 TABLET ORAL at 12:10

## 2021-01-01 RX ADMIN — SODIUM CHLORIDE, SODIUM LACTATE, POTASSIUM CHLORIDE, AND CALCIUM CHLORIDE 500 ML: .6; .31; .03; .02 INJECTION, SOLUTION INTRAVENOUS at 10:11

## 2021-01-01 RX ADMIN — TRAZODONE HYDROCHLORIDE 100 MG: 100 TABLET ORAL at 10:10

## 2021-01-01 RX ADMIN — PIPERACILLIN AND TAZOBACTAM 4.5 G: 4; .5 INJECTION, POWDER, FOR SOLUTION INTRAVENOUS at 02:07

## 2021-01-01 RX ADMIN — TAMSULOSIN HYDROCHLORIDE 0.4 MG: 0.4 CAPSULE ORAL at 09:11

## 2021-01-01 RX ADMIN — ATORVASTATIN CALCIUM 80 MG: 20 TABLET, FILM COATED ORAL at 08:12

## 2021-01-01 RX ADMIN — ACETAMINOPHEN 650 MG: 325 TABLET, FILM COATED ORAL at 02:07

## 2021-01-01 RX ADMIN — LIDOCAINE HYDROCHLORIDE 100 MG: 20 INJECTION, SOLUTION INTRAVENOUS at 01:07

## 2021-01-01 RX ADMIN — Medication 125 MG: at 07:07

## 2021-01-01 RX ADMIN — PIPERACILLIN AND TAZOBACTAM 4.5 G: 4; .5 INJECTION, POWDER, LYOPHILIZED, FOR SOLUTION INTRAVENOUS; PARENTERAL at 04:07

## 2021-01-01 RX ADMIN — AMLODIPINE BESYLATE 10 MG: 5 TABLET ORAL at 08:12

## 2021-01-01 RX ADMIN — SODIUM CHLORIDE 150 ML/HR: 0.9 INJECTION, SOLUTION INTRAVENOUS at 10:10

## 2021-01-01 RX ADMIN — SODIUM CHLORIDE: 0.45 INJECTION, SOLUTION INTRAVENOUS at 09:10

## 2021-01-01 RX ADMIN — LACTOBACILLUS ACIDOPHILUS / LACTOBACILLUS BULGARICUS 1 EACH: 100 MILLION CFU STRENGTH GRANULES at 08:10

## 2021-01-01 RX ADMIN — SODIUM CHLORIDE: 0.9 INJECTION, SOLUTION INTRAVENOUS at 11:07

## 2021-01-01 RX ADMIN — GABAPENTIN 300 MG: 300 CAPSULE ORAL at 09:11

## 2021-04-18 ENCOUNTER — HOSPITAL ENCOUNTER (EMERGENCY)
Facility: OTHER | Age: 71
Discharge: HOME | End: 2021-04-18
Attending: EMERGENCY MEDICINE
Payer: MEDICARE

## 2021-04-18 VITALS
TEMPERATURE: 98 F | HEART RATE: 81 BPM | WEIGHT: 160 LBS | BODY MASS INDEX: 25.82 KG/M2 | OXYGEN SATURATION: 98 % | SYSTOLIC BLOOD PRESSURE: 160 MMHG | RESPIRATION RATE: 20 BRPM | DIASTOLIC BLOOD PRESSURE: 77 MMHG

## 2021-04-18 DIAGNOSIS — L97.321 SKIN ULCER OF LEFT ANKLE, LIMITED TO BREAKDOWN OF SKIN: Primary | ICD-10-CM

## 2021-04-18 DIAGNOSIS — L03.116 CELLULITIS OF LEFT LOWER EXTREMITY: ICD-10-CM

## 2021-04-18 PROCEDURE — 25000003 PHARM REV CODE 250: Performed by: EMERGENCY MEDICINE

## 2021-04-18 PROCEDURE — 99283 EMERGENCY DEPT VISIT LOW MDM: CPT

## 2021-04-18 RX ORDER — CLINDAMYCIN HYDROCHLORIDE 150 MG/1
300 CAPSULE ORAL
Status: COMPLETED | OUTPATIENT
Start: 2021-04-18 | End: 2021-04-18

## 2021-04-18 RX ORDER — MUPIROCIN 20 MG/G
1 OINTMENT TOPICAL
Status: COMPLETED | OUTPATIENT
Start: 2021-04-18 | End: 2021-04-18

## 2021-04-18 RX ORDER — CLINDAMYCIN HYDROCHLORIDE 300 MG/1
300 CAPSULE ORAL EVERY 8 HOURS
Qty: 15 CAPSULE | Refills: 0 | Status: SHIPPED | OUTPATIENT
Start: 2021-04-18 | End: 2021-01-01

## 2021-04-18 RX ADMIN — MUPIROCIN 22 G: 20 OINTMENT TOPICAL at 12:04

## 2021-04-18 RX ADMIN — CLINDAMYCIN HYDROCHLORIDE 300 MG: 150 CAPSULE ORAL at 10:04

## 2021-07-22 PROBLEM — N18.4 ACUTE RENAL FAILURE SUPERIMPOSED ON STAGE 4 CHRONIC KIDNEY DISEASE: Status: ACTIVE | Noted: 2021-01-01

## 2021-07-22 PROBLEM — K35.30 ACUTE APPENDICITIS WITH LOCALIZED PERITONITIS, WITHOUT PERFORATION, ABSCESS, OR GANGRENE: Status: ACTIVE | Noted: 2021-01-01

## 2021-07-22 PROBLEM — D64.9 NORMOCYTIC ANEMIA: Status: ACTIVE | Noted: 2021-01-01

## 2021-07-22 PROBLEM — Z86.73 HISTORY OF STROKE: Status: ACTIVE | Noted: 2021-01-01

## 2021-07-22 PROBLEM — N17.9 ACUTE RENAL FAILURE SUPERIMPOSED ON STAGE 4 CHRONIC KIDNEY DISEASE: Status: ACTIVE | Noted: 2021-01-01

## 2021-07-25 PROBLEM — A49.8 CLOSTRIDIUM DIFFICILE INFECTION: Status: ACTIVE | Noted: 2021-01-01

## 2021-07-28 PROBLEM — N18.4 ACUTE RENAL FAILURE SUPERIMPOSED ON STAGE 4 CHRONIC KIDNEY DISEASE: Status: RESOLVED | Noted: 2021-01-01 | Resolved: 2021-01-01

## 2021-07-28 PROBLEM — N17.9 ACUTE RENAL FAILURE SUPERIMPOSED ON STAGE 4 CHRONIC KIDNEY DISEASE: Status: RESOLVED | Noted: 2021-01-01 | Resolved: 2021-01-01

## 2021-07-28 PROBLEM — K35.30 ACUTE APPENDICITIS WITH LOCALIZED PERITONITIS, WITHOUT PERFORATION, ABSCESS, OR GANGRENE: Status: RESOLVED | Noted: 2021-01-01 | Resolved: 2021-01-01

## 2021-10-12 PROBLEM — N17.9 AKI (ACUTE KIDNEY INJURY): Status: ACTIVE | Noted: 2021-01-01

## 2021-10-12 PROBLEM — N13.9 ACUTE UNILATERAL OBSTRUCTIVE UROPATHY: Status: ACTIVE | Noted: 2021-01-01

## 2021-10-13 PROBLEM — N20.1 RIGHT URETERAL CALCULUS: Status: ACTIVE | Noted: 2021-01-01

## 2021-10-15 PROBLEM — R11.2 NAUSEA & VOMITING: Status: ACTIVE | Noted: 2021-01-01

## 2021-10-15 PROBLEM — E87.6 HYPOKALEMIA: Status: ACTIVE | Noted: 2021-01-01

## 2021-10-15 PROBLEM — Z71.89 ADVANCE CARE PLANNING: Status: ACTIVE | Noted: 2021-01-01

## 2021-11-20 PROBLEM — E87.29 INCREASED ANION GAP METABOLIC ACIDOSIS: Status: ACTIVE | Noted: 2021-01-01

## 2021-11-20 PROBLEM — Z86.73 HISTORY OF STROKE: Chronic | Status: ACTIVE | Noted: 2021-01-01

## 2021-11-20 PROBLEM — N30.01 ACUTE CYSTITIS WITH HEMATURIA: Status: ACTIVE | Noted: 2021-01-01

## 2021-11-21 PROBLEM — E43 SEVERE MALNUTRITION: Status: ACTIVE | Noted: 2021-01-01

## 2021-11-21 PROBLEM — K90.49 FOOD INTOLERANCE: Status: ACTIVE | Noted: 2021-01-01

## 2021-11-21 PROBLEM — R63.0 ANOREXIA: Status: ACTIVE | Noted: 2021-01-01

## 2021-11-21 PROBLEM — R82.81 STERILE PYURIA: Status: ACTIVE | Noted: 2021-01-01

## 2021-12-30 PROBLEM — E03.9 HYPOTHYROIDISM: Status: ACTIVE | Noted: 2021-01-01

## 2021-12-30 PROBLEM — N18.4 CKD (CHRONIC KIDNEY DISEASE), STAGE IV: Status: ACTIVE | Noted: 2021-01-01

## 2021-12-30 PROBLEM — L97.516: Status: ACTIVE | Noted: 2021-01-01

## 2021-12-30 PROBLEM — E10.621: Status: ACTIVE | Noted: 2021-01-01

## 2021-12-30 NOTE — ED NOTES
Pt to er 1 via ems with c.o wound to 2nd toe on right foot. Home health came by home today and sent pt to er to evaluate for wound infection. Pt states she started noticing foul smell today.  Pt has wound to 2 nd toe right foot with blackened area and drainage, small area on great toe that touches 2nd toe.  3rd digit red.  AAO x  3 NADN skin w.d   Monitors in place

## 2021-12-30 NOTE — ED PROVIDER NOTES
Encounter Date: 12/30/2021    SCRIBE #1 NOTE: I, Graciela Frost, am scribing for, and in the presence of, Maryam Landeros MD .       History     Chief Complaint   Patient presents with    Wound Infection     Pt had home health come by today to change  dressing on a right foot wound. Sent here for possible infection      This is a 71 y.o. female with a hx of DM and stroke who presents with complaint of wound infection to her right 2nd toe.  She reports a chronic wound has been present there for some time, and home health comes twice a week to clean and dress it.  She denies fever, chills, fatigue, arthralgias, cough, sore throat, or diarrhea. She lives by herself but states her neighbors help her take care of herself. She reports no modifying factors or medications.     The history is provided by the patient.     Review of patient's allergies indicates:   Allergen Reactions    Tomato (solanum lycopersicum) Hives and Itching     Past Medical History:   Diagnosis Date    Anemia     Arthritis     Diabetes mellitus     Encounter for blood transfusion     Hypercholesteremia     Hypertension     Renal disorder     kidney stone    Stroke     left side paralysis     Past Surgical History:   Procedure Laterality Date    APPENDECTOMY      CYSTOSCOPY W/ URETERAL STENT PLACEMENT Right 10/13/2021    Procedure: CYSTOSCOPY, WITH URETERAL STENT INSERTION;  Surgeon: Wanda Arroyo MD;  Location: [facility];  Service: Urology;  Laterality: Right;    ESOPHAGOGASTRODUODENOSCOPY N/A 11/23/2021    Procedure: EGD (ESOPHAGOGASTRODUODENOSCOPY);  Surgeon: Obed Jeong MD;  Location: [address]);  Service: Endoscopy;  Laterality: N/A;    LAPAROSCOPIC APPENDECTOMY N/A 7/22/2021    Procedure: APPENDECTOMY, LAPAROSCOPIC;  Surgeon: Paulo Calvo Jr., MD;  Location: [facility];  Service: General;  Laterality: N/A;    TUBAL LIGATION      URETEROSCOPIC REMOVAL OF URETERIC CALCULUS Right 12/22/2021    Procedure: REMOVAL,  CALCULUS, URETER, URETEROSCOPIC;  Surgeon: Wanda Arroyo MD;  Location: [facility];  Service: Urology;  Laterality: Right;     Family History   Problem Relation Age of Onset    Arthritis Mother     Diabetes Mother     Heart disease Mother     Hypertension Mother     Kidney disease Mother     Stroke Mother     Vision loss Mother     Alcohol abuse Father     Diabetes Sister     Asthma Brother     Diabetes Brother     Early death Brother     Heart disease Brother      Social History     Tobacco Use    Smoking status: Never Smoker    Smokeless tobacco: Never Used   Substance Use Topics    Alcohol use: No     Comment: socially    Drug use: No     Review of Systems   Constitutional: Negative for chills, fatigue and fever.   HENT: Negative for congestion and sore throat.    Eyes: Negative for visual disturbance.   Respiratory: Negative for cough and shortness of breath.    Cardiovascular: Negative for chest pain and palpitations.   Gastrointestinal: Negative for abdominal pain, diarrhea and vomiting.   Genitourinary: Negative for decreased urine volume, dysuria and vaginal discharge.   Musculoskeletal: Negative for arthralgias, joint swelling, neck pain and neck stiffness.   Skin: Positive for wound. Negative for rash.   Neurological: Negative for weakness, numbness and headaches.   Psychiatric/Behavioral: Negative for confusion.       Physical Exam     Initial Vitals [12/30/21 1416]   BP Pulse Resp Temp SpO2   137/64 76 17 98.3 °F (36.8 °C) 100 %      MAP       --         Physical Exam    Nursing note and vitals reviewed.  Constitutional: She appears well-developed and well-nourished. No distress.   HENT:   Head: Normocephalic and atraumatic.   Mouth/Throat: Oropharynx is clear and moist. No oropharyngeal exudate.   Eyes: Conjunctivae and EOM are normal. Pupils are equal, round, and reactive to light.   Neck: Neck supple.   Normal range of motion.  Cardiovascular: Normal rate and normal heart sounds.    No murmur heard.  Pulses:       Dorsalis pedis pulses are 1+ on the right side.   Pulmonary/Chest: Breath sounds normal. No respiratory distress. She has no wheezes. She has no rhonchi. She has no rales.   Abdominal: Abdomen is soft. There is no abdominal tenderness. There is no rebound and no guarding.   Musculoskeletal:      Cervical back: Normal range of motion and neck supple.      Comments: RLE: Exposed wound to medial aspect of 2nd toe. Foot warm with mild swelling. Dried gangrene to dorsal aspect of 2nd toe.      Neurological: She is alert and oriented to person, place, and time. She has normal strength. GCS score is 15. GCS eye subscore is 4. GCS verbal subscore is 5. GCS motor subscore is 6.   Skin: Skin is warm and dry. No rash noted.   Psychiatric: She has a normal mood and affect. Thought content normal.             ED Course   Procedures  Labs Reviewed   CBC W/ AUTO DIFFERENTIAL - Abnormal; Notable for the following components:       Result Value    RBC 2.60 (*)     Hemoglobin 7.5 (*)     Hematocrit 24.3 (*)     MCHC 30.9 (*)     Gran # (ANC) 8.3 (*)     All other components within normal limits   COMPREHENSIVE METABOLIC PANEL - Abnormal; Notable for the following components:    Creatinine 1.8 (*)     Albumin 3.2 (*)     ALT <5 (*)     eGFR if  32 (*)     eGFR if non  28 (*)     All other components within normal limits   SEDIMENTATION RATE - Abnormal; Notable for the following components:    Sed Rate 120 (*)     All other components within normal limits   C-REACTIVE PROTEIN - Abnormal; Notable for the following components:    CRP 53.3 (*)     All other components within normal limits   POCT GLUCOSE - Abnormal; Notable for the following components:    POCT Glucose 114 (*)     All other components within normal limits   PROTIME-INR   APTT   SARS-COV-2 RDRP GENE    Narrative:     This test utilizes isothermal nucleic acid amplification   technology to detect the  SARS-CoV-2 RdRp nucleic acid segment.   The analytical sensitivity (limit of detection) is 125 genome   equivalents/mL.   A POSITIVE result implies infection with the SARS-CoV-2 virus;   the patient is presumed to be contagious.     A NEGATIVE result means that SARS-CoV-2 nucleic acids are not   present above the limit of detection. A NEGATIVE result should be   treated as presumptive. It does not rule out the possibility of   COVID-19 and should not be the sole basis for treatment decisions.   If COVID-19 is strongly suspected based on clinical and exposure   history, re-testing using an alternate molecular assay should be   considered.   This test is only for use under the Food and Drug   Administration s Emergency Use Authorization (EUA).   Commercial kits are provided by [employer].   Performance characteristics of the EUA have been independently   verified by [facility] Department of   Pathology and Laboratory Medicine.   _________________________________________________________________   The authorized Fact Sheet for Healthcare Providers and the authorized Fact   Sheet for Patients of the ID NOW COVID-19 are available on the FDA   website:     https://www.fda.gov/media/755652/download  https://www.fda.gov/media/633051/download         POCT GLUCOSE MONITORING CONTINUOUS          Imaging Results          MRI Foot Toes Without Contrast Right (Final result)  Result time 12/30/21 21:10:16    Final result by Lizbeth Bustos MD (12/30/21 21:10:16)                 Impression:      Acute osteomyelitis involving the 2nd toe middle and distal phalanx.      Electronically signed by: Lizbeth Bustos MD  Date:    12/30/2021  Time:    21:10             Narrative:    EXAMINATION:  MRI FOOT TOES WITHOUT CONTRAST RIGHT    CLINICAL HISTORY:  Osteomyelitis, foot;    TECHNIQUE:  Multiplanar, multisequence images were obtained of the right foot without the use of IV contrast.    COMPARISON:  Right foot  radiographs from the same date.    FINDINGS:  Abnormal T1 hypointense marrow signal and osseous edema are seen involving the 2nd toe middle and distal phalanx.  Remaining visualized osseous structures show no fracture, abnormal marrow signal, or osseous edema.  Mild soft tissue edema is seen involving the forefoot and the 2nd toe.  No focal fluid collections or abscess seen, allowing for lack of intravenous contrast.                               X-Ray Foot Complete Right (Final result)  Result time 12/30/21 16:18:31    Final result by Erlin Martinez Jr., MD (12/30/21 16:18:31)                 Impression:      No convincing radiographic evidence of osteomyelitis    Hallux valgus and erosive deformity/remodeling of the medial 1st metatarsal head possibly secondary to sequela of gout or bunion formation      Electronically signed by: Erlin Underwood Jr  Date:    12/30/2021  Time:    16:18             Narrative:    EXAMINATION:  XR FOOT COMPLETE 3 VIEW RIGHT    CLINICAL HISTORY:  Non-pressure chronic ulcer of other part of right foot with unspecified severity    TECHNIQUE:  XR FOOT COMPLETE 3 VIEW RIGHT    COMPARISON:  None    FINDINGS:  There is hallux valgus deformity with well-marginated remottling of the medial cortex of the 1st metatarsal head which may reflect healed erosion related to sequela of gouty arthropathy or sequela of chronic altered weight-bearing.  First MTP joint spaces preserved.  The remainder of the visualized joint spaces are also preserved.  Minimal posterior calcaneal enthesopathic spurring is noted.  There is nonspecific forefoot soft tissue swelling and atherosclerotic vascular calcification.                              X-Rays:   Independently Interpreted Readings:   Other Readings:  Right foot x-ray: No fractures or dislocations. No foreign bodies. Will defer to Radiology.     Medications   melatonin tablet 6 mg (6 mg Oral Given 12/30/21 2233)   piperacillin-tazobactam 4.5 g in  dextrose 5 % 100 mL IVPB (ready to mix system) (has no administration in time range)   vancomycin - pharmacy to dose (has no administration in time range)   amLODIPine tablet 10 mg (10 mg Oral Given 12/30/21 2233)   aspirin EC tablet 81 mg (has no administration in time range)   carvediloL tablet 25 mg (25 mg Oral Given 12/30/21 2233)   clopidogreL tablet 75 mg (has no administration in time range)   DULoxetine DR capsule 30 mg (has no administration in time range)   ferrous sulfate tablet 1 each (has no administration in time range)   gabapentin capsule 300 mg (has no administration in time range)   lactulose 20 gram/30 mL solution Soln 10 g (10 g Oral Given 12/30/21 2237)   levothyroxine tablet 75 mcg (has no administration in time range)   metoclopramide HCl tablet 10 mg (has no administration in time range)   pantoprazole EC tablet 40 mg (has no administration in time range)   atorvastatin tablet 80 mg (has no administration in time range)   tamsulosin 24 hr capsule 0.4 mg (has no administration in time range)   traZODone tablet 100 mg (100 mg Oral Given 12/30/21 2233)   sodium chloride 0.9% flush 10 mL (has no administration in time range)   acetaminophen tablet 650 mg (has no administration in time range)   HYDROcodone-acetaminophen 5-325 mg per tablet 1 tablet (has no administration in time range)   HYDROcodone-acetaminophen  mg per tablet 1 tablet (has no administration in time range)   senna-docusate 8.6-50 mg per tablet 1 tablet (1 tablet Oral Given 12/30/21 2234)   polyethylene glycol packet 17 g (has no administration in time range)   bisacodyL suppository 10 mg (has no administration in time range)   ondansetron disintegrating tablet 8 mg (has no administration in time range)   ondansetron injection 4 mg (has no administration in time range)   glucose chewable tablet 16 g (has no administration in time range)   glucose chewable tablet 24 g (has no administration in time range)   dextrose 50%  injection 12.5 g (has no administration in time range)   dextrose 50% injection 25 g (has no administration in time range)   glucagon (human recombinant) injection 1 mg (has no administration in time range)   insulin aspart U-100 pen 0-5 Units (has no administration in time range)   vancomycin in dextrose 5 % 1 gram/250 mL IVPB 1,000 mg (0 mg Intravenous Stopped 12/30/21 1906)   piperacillin-tazobactam 4.5 g in dextrose 5 % 100 mL IVPB (ready to mix system) (0 g Intravenous Stopped 12/30/21 1745)     Medical Decision Making:   History:   Old Medical Records: I decided to obtain old medical records.  Independently Interpreted Test(s):   I have ordered and independently interpreted X-rays - see prior notes.  Clinical Tests:   Lab Tests: Reviewed  Radiological Study: Ordered and Reviewed  ED Management:  Emergent evaluation of 71-year-old female who presents with complaint of infected diabetic foot ulcer.  She reports that she has had a chronic wound which has become worsened per home health.  She denies any fever chills or pain to the area but does have diabetic neuropathy does not typically feel much sensation to her feet.  She denies any new trauma.  Vital signs are benign, afebrile.  On examination there is exposed bone with a large wound to the right 2nd toe.  There is a distal pulse present, no evidence of vascular compromise.  The foot has some mild swelling and edema as well.  I am suspicious for osteomyelitis versus limb-threatening cellulitis.  X-ray does not show any definite osteomyelitis but given probe to bone I think it is likely.  Will admit for MRI, IV antibiotics, and further care.          Scribe Attestation:   Scribe #1: I performed the above scribed service and the documentation accurately describes the services I performed. I attest to the accuracy of the note.               Physician Attestation for Scribe: I, Maryam Landeros, reviewed documentation as scribed in my presence, which is both accurate  and complete.   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

## 2021-12-31 PROBLEM — E11.621 DIABETIC FOOT ULCER WITH OSTEOMYELITIS: Status: ACTIVE | Noted: 2021-01-01

## 2021-12-31 PROBLEM — M86.9 DIABETIC FOOT ULCER WITH OSTEOMYELITIS: Status: ACTIVE | Noted: 2021-01-01

## 2021-12-31 PROBLEM — E11.69 DIABETIC FOOT ULCER WITH OSTEOMYELITIS: Status: ACTIVE | Noted: 2021-01-01

## 2021-12-31 PROBLEM — L97.509 DIABETIC FOOT ULCER WITH OSTEOMYELITIS: Status: ACTIVE | Noted: 2021-01-01

## 2021-12-31 NOTE — PLAN OF CARE
POC reviewed w/ pt. AAOx4. No significant events this shift. Cardiac monitor maintained. VSS on RA. No complaints of pain. Turn Q2/frequent weight shift assistance provided. No injuries, falls, or trauma occurred during shift. Purposeful rounding completed.. Pt voids and shifts in bed independently. Bed alarm set, bed low and locked, side rails up x3, call light within reach.

## 2021-12-31 NOTE — ASSESSMENT & PLAN NOTE
Right 2nd toe, with necrosis and osteomyelitis of the 2nd intermediate and distal phalanx on MRI, with exposed bone and signs of local infection. Arterial US   Showed slow velocities with abnormal waveforms in the arteries of the calf suggestive of vessel hardening such as from diffuse atherosclerosis. X ray shows erosive changes to the R 1st metatarsal head, likely gout related although patient denies history of gout and symptoms at the 1st MT head.     Plan/Recommendations:   - Recommend Vascular/Interventional cardiology consult to evaluate circulation and determine if revasc is required/may optimize wound healing potential  - Patient will require amputation of the right 2nd digit  - NPO at midnight for possible amputation tomorrow, will update plan tomorrow in the AM regarding surgery   - Dressed in betadine soaked gauze, 4 x 4, kerlix, and tape.  - Currently no WB restrictions    - Nursing wound care routine ordered   - Podiatry will follow

## 2021-12-31 NOTE — CONSULTS
[facility] ([place])  Podiatry  Consult Note    Patient Name: Beatriz Adame  MRN: [id number]  Admission Date: 12/30/2021  Hospital Length of Stay: 0 days  Attending Physician: Kathya Ortiz MD  Primary Care Provider: Primary Doctor No     Inpatient consult to Podiatry  Consult performed by: Haris Doyle DPM  Consult ordered by: Shakira Og NP        Subjective:     History of Present Illness:  71-year-old female with T2 DM, hx of CVA with left sided paralysis, HTN, HLD, CKD, presented with right 2nd toe wound of wound infection to her right 2nd toe. She reports a chronic wound has been present there for some time, and home health comes twice a week to clean and dress it. She was evaluated by home health nurse today and felt wound worsened and needed to be evaluated. Patient reports she developed the wound over the last month or so due to dragging her feet while in her wheelchair. She lives alone; uses wheelchair for mobility, is able to transfer from chair to bed/toilet/chair independently however her neighbors help her with ADLs and outings. Right foot x-ray notes no convincing radiographic evidence of osteomyelitis.  MRI right foot notes acute osteomyelitis involving the 2nd toe middle and distal phalanx. She was initiated on vancomycin and Zosyn. Podiatry was consulted for right foot osteomyelitis.       Scheduled Meds:   amLODIPine  10 mg Oral QHS    aspirin  81 mg Oral Daily    atorvastatin  80 mg Oral Daily    carvediloL  25 mg Oral BID    cefTRIAXone (ROCEPHIN) IVPB  1 g Intravenous Q24H    clopidogreL  75 mg Oral Daily    DULoxetine  30 mg Oral Daily    ferrous sulfate  1 tablet Oral Daily    gabapentin  300 mg Oral Daily    lactulose  10 g Oral BID    levothyroxine  75 mcg Oral Daily    metoclopramide HCl  5 mg Oral Q6H    pantoprazole  40 mg Oral Daily    senna-docusate 8.6-50 mg  1 tablet Oral BID    tamsulosin  0.4 mg Oral Daily    traZODone  100 mg Oral QHS      Continuous Infusions:  PRN Meds:acetaminophen, bisacodyL, dextrose 50%, dextrose 50%, glucagon (human recombinant), glucose, glucose, HYDROcodone-acetaminophen, HYDROcodone-acetaminophen, insulin aspart U-100, melatonin, ondansetron, ondansetron, polyethylene glycol, sodium chloride 0.9%, Pharmacy to dose Vancomycin consult **AND** vancomycin - pharmacy to dose    Review of patient's allergies indicates:   Allergen Reactions    Tomato (solanum lycopersicum) Hives and Itching        Past Medical History:   Diagnosis Date    Anemia     Arthritis     Diabetes mellitus     Encounter for blood transfusion     Hypercholesteremia     Hypertension     Renal disorder     kidney stone    Stroke     left side paralysis     Past Surgical History:   Procedure Laterality Date    APPENDECTOMY      CYSTOSCOPY W/ URETERAL STENT PLACEMENT Right 10/13/2021    Procedure: CYSTOSCOPY, WITH URETERAL STENT INSERTION;  Surgeon: Wanda Arroyo MD;  Location: [facility];  Service: Urology;  Laterality: Right;    ESOPHAGOGASTRODUODENOSCOPY N/A 11/23/2021    Procedure: EGD (ESOPHAGOGASTRODUODENOSCOPY);  Surgeon: Obed Jeong MD;  Location: [address]);  Service: Endoscopy;  Laterality: N/A;    LAPAROSCOPIC APPENDECTOMY N/A 7/22/2021    Procedure: APPENDECTOMY, LAPAROSCOPIC;  Surgeon: Paulo Calvo Jr., MD;  Location: [facility];  Service: General;  Laterality: N/A;    TUBAL LIGATION      URETEROSCOPIC REMOVAL OF URETERIC CALCULUS Right 12/22/2021    Procedure: REMOVAL, CALCULUS, URETER, URETEROSCOPIC;  Surgeon: Wanda Arroyo MD;  Location: [facility];  Service: Urology;  Laterality: Right;       Family History     Problem Relation (Age of Onset)    Alcohol abuse Father    Arthritis Mother    Asthma Brother    Diabetes Mother, Sister, Brother    Early death Brother    Heart disease Mother, Brother    Hypertension Mother    Kidney disease Mother    Stroke Mother    Vision loss Mother        Tobacco Use     Smoking status: Never Smoker    Smokeless tobacco: Never Used   Substance and Sexual Activity    Alcohol use: No     Comment: socially    Drug use: No    Sexual activity: Not Currently     Review of Systems   Constitutional: Negative for activity change, appetite change, chills, diaphoresis, fatigue, fever and unexpected weight change.   HENT: Negative.    Eyes: Negative.    Respiratory: Negative for cough, chest tightness, shortness of breath and wheezing.    Cardiovascular: Negative for chest pain, palpitations and leg swelling.   Gastrointestinal: Negative for abdominal distention, abdominal pain, constipation, diarrhea, nausea and vomiting.   Endocrine: Negative.    Genitourinary: Negative.    Musculoskeletal: Positive for gait problem.   Skin: Positive for color change and wound.   Allergic/Immunologic: Negative.    Neurological: Positive for weakness. Negative for dizziness and numbness.     Objective:     Vital Signs (Most Recent):  Temp: 97.4 °F (36.3 °C) (12/31/21 1137)  Pulse: 73 (12/31/21 1137)  Resp: 16 (12/31/21 1137)  BP: 124/64 (12/31/21 1137)  SpO2: 100 % (12/31/21 1137) Vital Signs (24h Range):  Temp:  [97.4 °F (36.3 °C)-98.6 °F (37 °C)] 97.4 °F (36.3 °C)  Pulse:  [68-75] 73  Resp:  [16-18] 16  SpO2:  [98 %-100 %] 100 %  BP: (124-178)/(59-79) 124/64     Weight: 56 kg (123 lb 7 oz)  Body mass index is 19.33 kg/m².    Foot Exam    General  General Appearance: appears stated age and healthy   Orientation: alert and oriented to person, place, and time   Affect: appropriate       Right Foot/Ankle     Inspection and Palpation  Skin Exam: drainage, maceration, skin changes, abnormal color, ulcer and erythema; skin not intact and no cellulitis     Neurovascular  Dorsalis pedis: absent  Posterior tibial: absent  Saphenous nerve sensation: diminished  Tibial nerve sensation: diminished  Superficial peroneal nerve sensation: diminished  Deep peroneal nerve sensation: diminished  Sural nerve sensation:  diminished    Comments  R 2nd toe   - Mal odor noted  - Mild serous drainage noted   - Necrotic changes appreciated   - Bone exposed at medial aspect, abbuting against hallux and causing wound  - Nails extremely elongated and dystrophic        Left Foot/Ankle      Inspection and Palpation  Ecchymosis: none  Tenderness: none   Swelling: none   Skin Exam: skin intact;     Neurovascular  Dorsalis pedis: absent  Posterior tibial: absent  Saphenous nerve sensation: diminished  Tibial nerve sensation: diminished  Superficial peroneal nerve sensation: diminished  Deep peroneal nerve sensation: diminished  Sural nerve sensation: diminished            Laboratory:  A1C:   Recent Labs   Lab 07/22/21  0648 11/21/21  0334   HGBA1C 6.3* 5.4     CBC:   Recent Labs   Lab 12/31/21  0519   WBC 10.95   RBC 2.77*   HGB 7.9*   HCT 25.9*      MCV 94   MCH 28.5   MCHC 30.5*     CMP:   Recent Labs   Lab 12/31/21  0519   *   CALCIUM 8.9   ALBUMIN 2.9*   PROT 7.2      K 3.8   CO2 24      BUN 19   CREATININE 1.7*   ALKPHOS 74   ALT <5*   AST 6*   BILITOT 0.7     CRP:   Recent Labs   Lab 12/30/21  1525   CRP 53.3*     ESR:   Recent Labs   Lab 12/30/21  1525   SEDRATE 120*     All pertinent labs reviewed within the last 24 hours.    Diagnostic Results:  I have reviewed all pertinent imaging results/findings within the past 24 hours.    Clinical Findings:  R 2nd necrotic toe osteomyelitis with exposed bone                     Assessment/Plan:     Diabetic foot ulcer with osteomyelitis  Right 2nd toe, with necrosis and osteomyelitis of the 2nd intermediate and distal phalanx on MRI, with exposed bone and signs of local infection. Arterial US   Showed slow velocities with abnormal waveforms in the arteries of the calf suggestive of vessel hardening such as from diffuse atherosclerosis. X ray shows erosive changes to the R 1st metatarsal head, likely gout related although patient denies history of gout and symptoms at the  1st MT head.     Plan/Recommendations:   - Recommend Vascular/Interventional cardiology consult to evaluate circulation and determine if revasc is required/may optimize wound healing potential  - Patient will require amputation of the right 2nd digit  - NPO at midnight for possible amputation tomorrow, will update plan tomorrow in the AM regarding surgery   - Dressed in betadine soaked gauze, 4 x 4, kerlix, and tape.  - Currently no WB restrictions    - Nursing wound care routine ordered   - Podiatry will follow           Thank you for your consult. I will follow-up with patient. Please contact us if you have any additional questions.    Haris Doyle DPM  Podiatry  [facility] ([place])

## 2021-12-31 NOTE — ASSESSMENT & PLAN NOTE
-wound **  -at admission CBC with known anemia hemoglobin 7.5 and hematocrit 24.3 (baseline 8-9 and 26-29).  Chemistry with serum creatinine 1.8 (better than recent baseline), BUN 22.  LFTs WNLs.  .  CRP 53.3.  Blood cultures obtained.  COVID negative.  -right foot x-ray notes no convincing radiographic evidence of osteomyelitis  -MRI right foot notes acute osteomyelitis involving the 2nd toe middle and distal phalanx  -initiated on vancomycin and Zosyn  -Podiatry consulted  -PRN supportive care  -monitor

## 2021-12-31 NOTE — NURSING
Patient admitted from ED. Alert and oriented x4. Wears glasses. On face. Verbal; able to make needs known. Speech clear. Denies Pain and discomfort at this time. No signs and symptoms of acute distress noted. Denies SOB. Left hand and arm contracted. Right foot noted with foul odor. Right Great toe has a small area to inner tip of toe side touching 2nd toe. 2nd toe noted with drainage to lateral side, dark in color, drainage grey in color. Foul smelling. 3rd toe noted swollen and redden, tender to touch.

## 2021-12-31 NOTE — HPI
Ms. 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    She presents to the ED with complaint of right 2nd toe wound infection.  She was evaluated by home health nurse today and felt wound worsened and needed to be evaluated.  Patient reports she developed the wound over the last month or so due to dragging her feet while in her wheelchair. She denies fever, chills, fatigue, muscle aches, cough, sore throat, nausea, vomiting, diarrhea, chest pain, shortness of breath, lightheadedness, dizziness, or syncope. She lives alone; uses wheelchair for mobility, is able to transfer from chair to bed/toilet/chair independently however her neighbors help her with ADLs and outings.     In the ED she is hemodynamically stable on room air and afebrile.  CBC with known anemia hemoglobin 7.5 and hematocrit 24.3 (baseline 8-9 and 26-29).  Chemistry with serum creatinine 1.8 (better than recent baseline), BUN 22.  LFTs WNLs.  .  CRP 53.3.  Blood cultures obtained.  COVID negative.  Right foot x-ray notes no convincing radiographic evidence of osteomyelitis.  MRI right foot notes acute osteomyelitis involving the 2nd toe middle and distal phalanx.  She was initiated on vancomycin and Zosyn.  The patient was placed in observation to the Hospital Medicine Service for further evaluation and treatment.

## 2021-12-31 NOTE — ASSESSMENT & PLAN NOTE
-chronic  -BP stable at admission  -continue home amlodipine and Coreg  -dose/medication adjustment as appropriate   -monitor

## 2021-12-31 NOTE — PROGRESS NOTES
Pharmacokinetic Initial Assessment: IV Vancomycin    Assessment/Plan:    Initiate intravenous vancomycin with loading dose of 1000 mg once with subsequent doses when random concentrations are less than 20 mcg/mL  Desired empiric serum trough concentration is 10 to 20 mcg/mL  Draw vancomycin random level on 12/31/21 at 1800.  Pharmacy will continue to follow and monitor vancomycin.      Please contact pharmacy at extension [phone] with any questions regarding this assessment.     Thank you for the consult,   Lucero Lim       Patient brief summary:  Beatriz Adame is a 71 y.o. female initiated on antimicrobial therapy with IV Vancomycin for treatment of suspected bone/joint infection    Drug Allergies:   Review of patient's allergies indicates:   Allergen Reactions    Tomato (solanum lycopersicum) Hives and Itching       Actual Body Weight:   56 kg    Renal Function:   Estimated Creatinine Clearance: 25.7 mL/min (A) (based on SCr of 1.8 mg/dL (H)).,     Dialysis Method (if applicable):  N/A    CBC (last 72 hours):  Recent Labs   Lab Result Units 12/30/21  1525   WBC K/uL 12.00   Hemoglobin g/dL 7.5*   Hematocrit % 24.3*   Platelets K/uL 181   Gran % % 69.3   Lymph % % 21.3   Mono % % 8.0   Eosinophil % % 0.8   Basophil % % 0.3   Differential Method  Automated       Metabolic Panel (last 72 hours):  Recent Labs   Lab Result Units 12/30/21  1525   Sodium mmol/L 141   Potassium mmol/L 4.9   Chloride mmol/L 105   CO2 mmol/L 24   Glucose mg/dL 102   BUN mg/dL 22   Creatinine mg/dL 1.8*   Albumin g/dL 3.2*   Total Bilirubin mg/dL 0.5   Alkaline Phosphatase U/L 74   AST U/L 11   ALT U/L <5*       Drug levels (last 3 results):  No results for input(s): VANCOMYCINRA, VANCOMYCINPE, VANCOMYCINTR in the last 72 hours.    Microbiologic Results:  Microbiology Results (last 7 days)     Procedure Component Value Units Date/Time    Blood Culture #2 **CANNOT BE ORDERED STAT** [[id number]] Collected: 12/30/21 1536    Order Status:  Completed Specimen: Blood from Peripheral, Antecubital, Right Updated: 12/31/21 0315     Blood Culture, Routine No Growth to date    Blood Culture #1 **CANNOT BE ORDERED STAT** [[id number]] Collected: 12/30/21 1523    Order Status: Completed Specimen: Blood from Peripheral, Antecubital, Right Updated: 12/31/21 0315     Blood Culture, Routine No Growth to date

## 2021-12-31 NOTE — SUBJECTIVE & OBJECTIVE
Scheduled Meds:   amLODIPine  10 mg Oral QHS    aspirin  81 mg Oral Daily    atorvastatin  80 mg Oral Daily    carvediloL  25 mg Oral BID    cefTRIAXone (ROCEPHIN) IVPB  1 g Intravenous Q24H    clopidogreL  75 mg Oral Daily    DULoxetine  30 mg Oral Daily    ferrous sulfate  1 tablet Oral Daily    gabapentin  300 mg Oral Daily    lactulose  10 g Oral BID    levothyroxine  75 mcg Oral Daily    metoclopramide HCl  5 mg Oral Q6H    pantoprazole  40 mg Oral Daily    senna-docusate 8.6-50 mg  1 tablet Oral BID    tamsulosin  0.4 mg Oral Daily    traZODone  100 mg Oral QHS     Continuous Infusions:  PRN Meds:acetaminophen, bisacodyL, dextrose 50%, dextrose 50%, glucagon (human recombinant), glucose, glucose, HYDROcodone-acetaminophen, HYDROcodone-acetaminophen, insulin aspart U-100, melatonin, ondansetron, ondansetron, polyethylene glycol, sodium chloride 0.9%, Pharmacy to dose Vancomycin consult **AND** vancomycin - pharmacy to dose    Review of patient's allergies indicates:   Allergen Reactions    Tomato (solanum lycopersicum) Hives and Itching        Past Medical History:   Diagnosis Date    Anemia     Arthritis     Diabetes mellitus     Encounter for blood transfusion     Hypercholesteremia     Hypertension     Renal disorder     kidney stone    Stroke     left side paralysis     Past Surgical History:   Procedure Laterality Date    APPENDECTOMY      CYSTOSCOPY W/ URETERAL STENT PLACEMENT Right 10/13/2021    Procedure: CYSTOSCOPY, WITH URETERAL STENT INSERTION;  Surgeon: Wanda Arroyo MD;  Location: [facility];  Service: Urology;  Laterality: Right;    ESOPHAGOGASTRODUODENOSCOPY N/A 11/23/2021    Procedure: EGD (ESOPHAGOGASTRODUODENOSCOPY);  Surgeon: Obed Jeong MD;  Location: [address]);  Service: Endoscopy;  Laterality: N/A;    LAPAROSCOPIC APPENDECTOMY N/A 7/22/2021    Procedure: APPENDECTOMY, LAPAROSCOPIC;  Surgeon: Paulo Calvo Jr., MD;  Location: [facility];   Service: General;  Laterality: N/A;    TUBAL LIGATION      URETEROSCOPIC REMOVAL OF URETERIC CALCULUS Right 12/22/2021    Procedure: REMOVAL, CALCULUS, URETER, URETEROSCOPIC;  Surgeon: Wanda Arroyo MD;  Location: [facility];  Service: Urology;  Laterality: Right;       Family History     Problem Relation (Age of Onset)    Alcohol abuse Father    Arthritis Mother    Asthma Brother    Diabetes Mother, Sister, Brother    Early death Brother    Heart disease Mother, Brother    Hypertension Mother    Kidney disease Mother    Stroke Mother    Vision loss Mother        Tobacco Use    Smoking status: Never Smoker    Smokeless tobacco: Never Used   Substance and Sexual Activity    Alcohol use: No     Comment: socially    Drug use: No    Sexual activity: Not Currently     Review of Systems   Constitutional: Negative for activity change, appetite change, chills, diaphoresis, fatigue, fever and unexpected weight change.   HENT: Negative.    Eyes: Negative.    Respiratory: Negative for cough, chest tightness, shortness of breath and wheezing.    Cardiovascular: Negative for chest pain, palpitations and leg swelling.   Gastrointestinal: Negative for abdominal distention, abdominal pain, constipation, diarrhea, nausea and vomiting.   Endocrine: Negative.    Genitourinary: Negative.    Musculoskeletal: Positive for gait problem.   Skin: Positive for color change and wound.   Allergic/Immunologic: Negative.    Neurological: Positive for weakness. Negative for dizziness and numbness.     Objective:     Vital Signs (Most Recent):  Temp: 97.4 °F (36.3 °C) (12/31/21 1137)  Pulse: 73 (12/31/21 1137)  Resp: 16 (12/31/21 1137)  BP: 124/64 (12/31/21 1137)  SpO2: 100 % (12/31/21 1137) Vital Signs (24h Range):  Temp:  [97.4 °F (36.3 °C)-98.6 °F (37 °C)] 97.4 °F (36.3 °C)  Pulse:  [68-75] 73  Resp:  [16-18] 16  SpO2:  [98 %-100 %] 100 %  BP: (124-178)/(59-79) 124/64     Weight: 56 kg (123 lb 7 oz)  Body mass index is 19.33  kg/m².    Foot Exam    General  General Appearance: appears stated age and healthy   Orientation: alert and oriented to person, place, and time   Affect: appropriate       Right Foot/Ankle     Inspection and Palpation  Skin Exam: drainage, maceration, skin changes, abnormal color, ulcer and erythema; skin not intact and no cellulitis     Neurovascular  Dorsalis pedis: absent  Posterior tibial: absent  Saphenous nerve sensation: diminished  Tibial nerve sensation: diminished  Superficial peroneal nerve sensation: diminished  Deep peroneal nerve sensation: diminished  Sural nerve sensation: diminished    Comments  R 2nd toe   - Mal odor noted  - Mild serous drainage noted   - Necrotic changes appreciated   - Bone exposed at medial aspect, abbuting against hallux and causing wound  - Nails extremely elongated and dystrophic        Left Foot/Ankle      Inspection and Palpation  Ecchymosis: none  Tenderness: none   Swelling: none   Skin Exam: skin intact;     Neurovascular  Dorsalis pedis: absent  Posterior tibial: absent  Saphenous nerve sensation: diminished  Tibial nerve sensation: diminished  Superficial peroneal nerve sensation: diminished  Deep peroneal nerve sensation: diminished  Sural nerve sensation: diminished            Laboratory:  A1C:   Recent Labs   Lab 07/22/21  0648 11/21/21  0334   HGBA1C 6.3* 5.4     CBC:   Recent Labs   Lab 12/31/21  0519   WBC 10.95   RBC 2.77*   HGB 7.9*   HCT 25.9*      MCV 94   MCH 28.5   MCHC 30.5*     CMP:   Recent Labs   Lab 12/31/21  0519   *   CALCIUM 8.9   ALBUMIN 2.9*   PROT 7.2      K 3.8   CO2 24      BUN 19   CREATININE 1.7*   ALKPHOS 74   ALT <5*   AST 6*   BILITOT 0.7     CRP:   Recent Labs   Lab 12/30/21  1525   CRP 53.3*     ESR:   Recent Labs   Lab 12/30/21  1525   SEDRATE 120*     All pertinent labs reviewed within the last 24 hours.    Diagnostic Results:  I have reviewed all pertinent imaging results/findings within the past 24  hours.    Clinical Findings:  R 2nd necrotic toe osteomyelitis with exposed bone

## 2021-12-31 NOTE — ASSESSMENT & PLAN NOTE
-chronic  -hold home insulin  -begin low-dose sliding scale insulin  -dose/medication adjustment as appropriate   -monitor accuchecks AC/HS and PRN hypoglycemic protocol

## 2021-12-31 NOTE — H&P
[facility] Hospital Medicine  History & Physical    Patient Name: Beatriz Adame  MRN: [id number]  Patient Class: OP- Observation  Admission Date: 12/30/2021  Attending Physician: Kathya Ortiz MD   Primary Care Provider: Primary Doctor No    Patient information was obtained from patient, past medical records and ER records.     Subjective:     Principal Problem:Diabetic ulcer of toe of right foot associated with type 1 diabetes mellitus, with bone involvement without evidence of necrosis    Chief Complaint:   Chief Complaint   Patient presents with    Wound Infection     Pt had home health come by today to change  dressing on a right foot wound. Sent here for possible infection       HPI: Ms. 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    She presents to the ED with complaint of right 2nd toe wound infection.  She was evaluated by home health nurse today and felt wound worsened and needed to be evaluated.  Patient reports she developed the wound over the last month or so due to dragging her feet while in her wheelchair. She denies fever, chills, fatigue, muscle aches, cough, sore throat, nausea, vomiting, diarrhea, chest pain, shortness of breath, lightheadedness, dizziness, or syncope. She lives alone; uses wheelchair for mobility, is able to transfer from chair to bed/toilet/chair independently however her neighbors help her with ADLs and outings.     In the ED she is hemodynamically stable on room air and afebrile.  CBC with known anemia hemoglobin 7.5 and hematocrit 24.3 (baseline 8-9 and 26-29).  Chemistry with serum  creatinine 1.8 (better than recent baseline), BUN 22.  LFTs WNLs.  .  CRP 53.3.  Blood cultures obtained.  COVID negative.  Right foot x-ray notes no convincing radiographic evidence of osteomyelitis.  MRI right foot notes acute osteomyelitis involving the 2nd toe middle and distal phalanx.  She was initiated on vancomycin and Zosyn.  The patient was placed in observation to the Hospital Medicine Service for further evaluation and treatment.     Past Medical History:   Diagnosis Date    Anemia     Arthritis     Diabetes mellitus     Encounter for blood transfusion     Hypercholesteremia     Hypertension     Renal disorder     kidney stone    Stroke     left side paralysis       Past Surgical History:   Procedure Laterality Date    APPENDECTOMY      CYSTOSCOPY W/ URETERAL STENT PLACEMENT Right 10/13/2021    Procedure: CYSTOSCOPY, WITH URETERAL STENT INSERTION;  Surgeon: Wanda Arroyo MD;  Location: [facility];  Service: Urology;  Laterality: Right;    ESOPHAGOGASTRODUODENOSCOPY N/A 11/23/2021    Procedure: EGD (ESOPHAGOGASTRODUODENOSCOPY);  Surgeon: Obed Jeong MD;  Location: [address]);  Service: Endoscopy;  Laterality: N/A;    LAPAROSCOPIC APPENDECTOMY N/A 7/22/2021    Procedure: APPENDECTOMY, LAPAROSCOPIC;  Surgeon: Paulo Calvo Jr., MD;  Location: [facility];  Service: General;  Laterality: N/A;    TUBAL LIGATION      URETEROSCOPIC REMOVAL OF URETERIC CALCULUS Right 12/22/2021    Procedure: REMOVAL, CALCULUS, URETER, URETEROSCOPIC;  Surgeon: Wanda Arroyo MD;  Location: [facility];  Service: Urology;  Laterality: Right;       Review of patient's allergies indicates:   Allergen Reactions    Tomato (solanum lycopersicum) Hives and Itching       No current facility-administered medications on file prior to encounter.     Current Outpatient Medications on File Prior to Encounter   Medication Sig    amLODIPine (NORVASC) 5 MG tablet Take 10 mg by mouth every evening. Take  5 mg every morning and 10 mg at night    aspirin (ECOTRIN) 81 MG EC tablet Take 81 mg by mouth once daily.    b complex vitamins tablet Take 1 tablet by mouth once daily.    carvedilol (COREG) 25 MG tablet Take 1 tablet (25 mg total) by mouth 2 (two) times daily.    cephALEXin (KEFLEX) 500 MG capsule Take 1 capsule (500 mg total) by mouth every 12 (twelve) hours.    clopidogreL (PLAVIX) 75 mg tablet Take 75 mg by mouth.    DULoxetine (CYMBALTA) 30 MG capsule Take 1 capsule (30 mg total) by mouth once daily.    ferrous sulfate (FEOSOL) 325 mg (65 mg iron) Tab tablet Take 325 mg by mouth daily with breakfast. Off at present    gabapentin (NEURONTIN) 300 MG capsule Take 1 capsule (300 mg total) by mouth once daily.    HYDROcodone-acetaminophen (NORCO) 5-325 mg per tablet Take 1 tablet by mouth every 6 (six) hours as needed for Pain.    insulin (BASAGLAR KWIKPEN U-100 INSULIN) glargine 100 units/mL (3mL) SubQ pen Inject 5 Units into the skin once daily.    lactulose (CHRONULAC) 10 gram/15 mL solution Take 10 g by mouth 2 (two) times a day.    levothyroxine (SYNTHROID) 75 MCG tablet Take 75 mcg by mouth once daily.    metoclopramide HCl (REGLAN) 10 MG tablet Take 1 tablet (10 mg total) by mouth 3 (three) times daily before meals.    omeprazole (PRILOSEC) 20 MG capsule Take 20 mg by mouth 2 (two) times daily.    ondansetron (ZOFRAN) 4 MG tablet Take 1 tablet (4 mg total) by mouth every 8 (eight) hours as needed for Nausea.    rosuvastatin (CRESTOR) 40 MG Tab Take 40 mg by mouth once daily.    tamsulosin (FLOMAX) 0.4 mg Cap Take 1 capsule (0.4 mg total) by mouth once daily.    tiZANidine (ZANAFLEX) 4 MG tablet Take 4 mg by mouth nightly.    traZODone (DESYREL) 100 MG tablet Take 100 mg by mouth once daily.     Family History     Problem Relation (Age of Onset)    Alcohol abuse Father    Arthritis Mother    Asthma Brother    Diabetes Mother, Sister, Brother    Early death Brother    Heart disease Mother,  Brother    Hypertension Mother    Kidney disease Mother    Stroke Mother    Vision loss Mother        Tobacco Use    Smoking status: Never Smoker    Smokeless tobacco: Never Used   Substance and Sexual Activity    Alcohol use: No     Comment: socially    Drug use: No    Sexual activity: Not Currently     Review of Systems   Constitutional: Negative for activity change, appetite change, chills, diaphoresis, fatigue, fever and unexpected weight change.   HENT: Negative.    Eyes: Negative.    Respiratory: Negative for cough, chest tightness, shortness of breath and wheezing.    Cardiovascular: Negative for chest pain, palpitations and leg swelling.   Gastrointestinal: Negative for abdominal distention, abdominal pain, constipation, diarrhea, nausea and vomiting.   Endocrine: Negative.    Genitourinary: Negative.    Musculoskeletal: Positive for gait problem.   Skin: Positive for wound.   Allergic/Immunologic: Negative.    Neurological: Negative for dizziness, syncope, weakness, light-headedness and headaches.     Objective:     Vital Signs (Most Recent):  Temp: 97.5 °F (36.4 °C) (12/30/21 1945)  Pulse: 75 (12/30/21 1945)  Resp: 16 (12/30/21 1945)  BP: (!) 178/79 (12/30/21 1945)  SpO2: 99 % (12/30/21 1945) Vital Signs (24h Range):  Temp:  [97.5 °F (36.4 °C)-98.3 °F (36.8 °C)] 97.5 °F (36.4 °C)  Pulse:  [73-76] 75  Resp:  [16-18] 16  SpO2:  [99 %-100 %] 99 %  BP: (137-178)/(64-79) 178/79        There is no height or weight on file to calculate BMI.    Physical Exam  Vitals and nursing note reviewed.   Constitutional:       Appearance: Normal appearance. She is well-developed, normal weight and well-nourished.   HENT:      Head: Normocephalic and atraumatic.      Mouth/Throat:      Mouth: Mucous membranes are normal.      Dentition: Normal dentition.   Eyes:      General: Lids are normal.      Extraocular Movements: Extraocular movements intact.      Conjunctiva/sclera: Conjunctivae normal.   Cardiovascular:       Rate and Rhythm: Normal rate and regular rhythm.      Pulses: Intact distal pulses.      Heart sounds: Normal heart sounds. No murmur heard.  No friction rub.   Pulmonary:      Effort: Pulmonary effort is normal.      Breath sounds: Normal breath sounds.   Chest:      Chest wall: No tenderness.   Abdominal:      General: Bowel sounds are normal. There is no distension.      Palpations: Abdomen is soft.      Tenderness: There is no abdominal tenderness.   Musculoskeletal:         General: No edema.      Cervical back: Neck supple.   Skin:     General: Skin is warm and dry.      Findings: Lesion present. No erythema or rash.      Comments: See media for wound.    Neurological:      Mental Status: She is alert and oriented to person, place, and time.      Cranial Nerves: No cranial nerve deficit.   Psychiatric:         Mood and Affect: Mood and affect normal.              Significant Labs:   All pertinent labs within the past 24 hours have been reviewed.  CBC:   Recent Labs   Lab 12/30/21  1525   WBC 12.00   HGB 7.5*   HCT 24.3*        CMP:   Recent Labs   Lab 12/30/21  1525      K 4.9      CO2 24      BUN 22   CREATININE 1.8*   CALCIUM 9.0   PROT 8.2   ALBUMIN 3.2*   BILITOT 0.5   ALKPHOS 74   AST 11   ALT <5*   ANIONGAP 12   EGFRNONAA 28*       Significant Imaging: I have reviewed all pertinent imaging results/findings within the past 24 hours.    Assessment/Plan:     * Diabetic ulcer of toe of right foot associated with type 1 diabetes mellitus, with bone involvement without evidence of necrosis  -wound **  -at admission CBC with known anemia hemoglobin 7.5 and hematocrit 24.3 (baseline 8-9 and 26-29).  Chemistry with serum creatinine 1.8 (better than recent baseline), BUN 22.  LFTs WNLs.  .  CRP 53.3.  Blood cultures obtained.  COVID negative.  -right foot x-ray notes no convincing radiographic evidence of osteomyelitis  -MRI right foot notes acute osteomyelitis involving the 2nd  toe middle and distal phalanx  -initiated on vancomycin and Zosyn  -Podiatry consulted  -PRN supportive care  -monitor    Diabetes mellitus, type 2  -chronic  -hold home insulin  -begin low-dose sliding scale insulin  -dose/medication adjustment as appropriate   -monitor accuchecks AC/HS and PRN hypoglycemic protocol     Hypertension associated with stage 4 chronic kidney disease due to type 2 diabetes mellitus  -chronic  -BP stable at admission  -continue home amlodipine and Coreg  -dose/medication adjustment as appropriate   -monitor     Hyperlipidemia associated with type 2 diabetes mellitus  History of CVA  -chronic  -continue home ASA, Plavix, and statin    CKD (chronic kidney disease), stage IV  -chronic  -serum creatinine at admission 1.8 which is better than recent baseline  -avoid nephrotoxins and hypotension as able, renally dose medications  -monitor     Normocytic anemia  -chronic  -admission CBC with known anemia hemoglobin 7.5 and hematocrit 24.3 (baseline 8-9 and 26-29)  -monitor over hospital course, plan transfusion if appropriate    Hypothyroidism  -chronic  -continue home levothyroxine       VTE Risk Mitigation (From admission, onward)         Ordered     Place sequential compression device  Until discontinued         12/30/21 1828     Reason for No Pharmacological VTE Prophylaxis  Once        Question:  Reasons:  Answer:  Risk of Bleeding    12/30/21 1828                Shakira Og, BERTRAND, AG-ACNP, BC  Department of Hospital Medicine  [facility]

## 2021-12-31 NOTE — HPI
71-year-old female with T2 DM, hx of CVA with left sided paralysis, HTN, HLD, CKD, presented with right 2nd toe wound of wound infection to her right 2nd toe. She reports a chronic wound has been present there for some time, and home health comes twice a week to clean and dress it. She was evaluated by home health nurse today and felt wound worsened and needed to be evaluated. Patient reports she developed the wound over the last month or so due to dragging her feet while in her wheelchair. She lives alone; uses wheelchair for mobility, is able to transfer from chair to bed/toilet/chair independently however her neighbors help her with ADLs and outings. Right foot x-ray notes no convincing radiographic evidence of osteomyelitis.  MRI right foot notes acute osteomyelitis involving the 2nd toe middle and distal phalanx. She was initiated on vancomycin and Zosyn. Podiatry was consulted for right foot osteomyelitis.

## 2021-12-31 NOTE — ASSESSMENT & PLAN NOTE
-chronic  -serum creatinine at admission 1.8 which is better than recent baseline  -avoid nephrotoxins and hypotension as able, renally dose medications  -monitor

## 2021-12-31 NOTE — ASSESSMENT & PLAN NOTE
-chronic  -admission CBC with known anemia hemoglobin 7.5 and hematocrit 24.3 (baseline 8-9 and 26-29)  -monitor over hospital course, plan transfusion if appropriate

## 2021-12-31 NOTE — NURSING
Transport left floor with patient headed to MRI. No O2 in place. IV site: 20 G RAC. No signs and symptoms of warmth or redness noted to IV site. Denies pain and discomfort to site.

## 2021-12-31 NOTE — PROGRESS NOTES
Pharmacist Renal Dose Adjustment Note    Beatriz Adame is a 71 y.o. female being treated with the medication metoclopramide    Patient Data:    Vital Signs (Most Recent):  Temp: 97.4 °F (36.3 °C) (12/31/21 1137)  Pulse: 73 (12/31/21 1137)  Resp: 16 (12/31/21 1137)  BP: 124/64 (12/31/21 1137)  SpO2: 100 % (12/31/21 1137) Vital Signs (72h Range):  Temp:  [97.4 °F (36.3 °C)-98.6 °F (37 °C)]   Pulse:  [68-76]   Resp:  [16-18]   BP: (124-178)/(59-79)   SpO2:  [98 %-100 %]      Recent Labs   Lab 12/30/21  1525 12/31/21  0519   CREATININE 1.8* 1.7*     Serum creatinine: 1.7 mg/dL (H) 12/31/21 0519  Estimated creatinine clearance: 27.2 mL/min (A)     Medication Dose Route Frequency   Previous Order Metoclopramide 10mg TID before meals.   New Order Metoclopramide 5mg every 6 hours     Renal dose adjustments performed as noted above.  We will continue monitoring and adjusting as necessary.    Pharmacist: Rachael Matthews, PharmD  [phone]

## 2021-12-31 NOTE — H&P (VIEW-ONLY)
[facility] ([place])  Podiatry  Consult Note    Patient Name: Beatriz Adame  MRN: [id number]  Admission Date: 12/30/2021  Hospital Length of Stay: 0 days  Attending Physician: Kathya Ortiz MD  Primary Care Provider: Primary Doctor No     Inpatient consult to Podiatry  Consult performed by: Haris Doyle DPM  Consult ordered by: Shakira Og NP        Subjective:     History of Present Illness:  71-year-old female with T2 DM, hx of CVA with left sided paralysis, HTN, HLD, CKD, presented with right 2nd toe wound of wound infection to her right 2nd toe. She reports a chronic wound has been present there for some time, and home health comes twice a week to clean and dress it. She was evaluated by home health nurse today and felt wound worsened and needed to be evaluated. Patient reports she developed the wound over the last month or so due to dragging her feet while in her wheelchair. She lives alone; uses wheelchair for mobility, is able to transfer from chair to bed/toilet/chair independently however her neighbors help her with ADLs and outings. Right foot x-ray notes no convincing radiographic evidence of osteomyelitis.  MRI right foot notes acute osteomyelitis involving the 2nd toe middle and distal phalanx. She was initiated on vancomycin and Zosyn. Podiatry was consulted for right foot osteomyelitis.       Scheduled Meds:   amLODIPine  10 mg Oral QHS    aspirin  81 mg Oral Daily    atorvastatin  80 mg Oral Daily    carvediloL  25 mg Oral BID    cefTRIAXone (ROCEPHIN) IVPB  1 g Intravenous Q24H    clopidogreL  75 mg Oral Daily    DULoxetine  30 mg Oral Daily    ferrous sulfate  1 tablet Oral Daily    gabapentin  300 mg Oral Daily    lactulose  10 g Oral BID    levothyroxine  75 mcg Oral Daily    metoclopramide HCl  5 mg Oral Q6H    pantoprazole  40 mg Oral Daily    senna-docusate 8.6-50 mg  1 tablet Oral BID    tamsulosin  0.4 mg Oral Daily    traZODone  100 mg Oral QHS      Continuous Infusions:  PRN Meds:acetaminophen, bisacodyL, dextrose 50%, dextrose 50%, glucagon (human recombinant), glucose, glucose, HYDROcodone-acetaminophen, HYDROcodone-acetaminophen, insulin aspart U-100, melatonin, ondansetron, ondansetron, polyethylene glycol, sodium chloride 0.9%, Pharmacy to dose Vancomycin consult **AND** vancomycin - pharmacy to dose    Review of patient's allergies indicates:   Allergen Reactions    Tomato (solanum lycopersicum) Hives and Itching        Past Medical History:   Diagnosis Date    Anemia     Arthritis     Diabetes mellitus     Encounter for blood transfusion     Hypercholesteremia     Hypertension     Renal disorder     kidney stone    Stroke     left side paralysis     Past Surgical History:   Procedure Laterality Date    APPENDECTOMY      CYSTOSCOPY W/ URETERAL STENT PLACEMENT Right 10/13/2021    Procedure: CYSTOSCOPY, WITH URETERAL STENT INSERTION;  Surgeon: Wanda Arroyo MD;  Location: [facility];  Service: Urology;  Laterality: Right;    ESOPHAGOGASTRODUODENOSCOPY N/A 11/23/2021    Procedure: EGD (ESOPHAGOGASTRODUODENOSCOPY);  Surgeon: Obed Jeong MD;  Location: [address]);  Service: Endoscopy;  Laterality: N/A;    LAPAROSCOPIC APPENDECTOMY N/A 7/22/2021    Procedure: APPENDECTOMY, LAPAROSCOPIC;  Surgeon: Paulo Calvo Jr., MD;  Location: [facility];  Service: General;  Laterality: N/A;    TUBAL LIGATION      URETEROSCOPIC REMOVAL OF URETERIC CALCULUS Right 12/22/2021    Procedure: REMOVAL, CALCULUS, URETER, URETEROSCOPIC;  Surgeon: Wanda Arroyo MD;  Location: [facility];  Service: Urology;  Laterality: Right;       Family History     Problem Relation (Age of Onset)    Alcohol abuse Father    Arthritis Mother    Asthma Brother    Diabetes Mother, Sister, Brother    Early death Brother    Heart disease Mother, Brother    Hypertension Mother    Kidney disease Mother    Stroke Mother    Vision loss Mother        Tobacco Use     Smoking status: Never Smoker    Smokeless tobacco: Never Used   Substance and Sexual Activity    Alcohol use: No     Comment: socially    Drug use: No    Sexual activity: Not Currently     Review of Systems   Constitutional: Negative for activity change, appetite change, chills, diaphoresis, fatigue, fever and unexpected weight change.   HENT: Negative.    Eyes: Negative.    Respiratory: Negative for cough, chest tightness, shortness of breath and wheezing.    Cardiovascular: Negative for chest pain, palpitations and leg swelling.   Gastrointestinal: Negative for abdominal distention, abdominal pain, constipation, diarrhea, nausea and vomiting.   Endocrine: Negative.    Genitourinary: Negative.    Musculoskeletal: Positive for gait problem.   Skin: Positive for color change and wound.   Allergic/Immunologic: Negative.    Neurological: Positive for weakness. Negative for dizziness and numbness.     Objective:     Vital Signs (Most Recent):  Temp: 97.4 °F (36.3 °C) (12/31/21 1137)  Pulse: 73 (12/31/21 1137)  Resp: 16 (12/31/21 1137)  BP: 124/64 (12/31/21 1137)  SpO2: 100 % (12/31/21 1137) Vital Signs (24h Range):  Temp:  [97.4 °F (36.3 °C)-98.6 °F (37 °C)] 97.4 °F (36.3 °C)  Pulse:  [68-75] 73  Resp:  [16-18] 16  SpO2:  [98 %-100 %] 100 %  BP: (124-178)/(59-79) 124/64     Weight: 56 kg (123 lb 7 oz)  Body mass index is 19.33 kg/m².    Foot Exam    General  General Appearance: appears stated age and healthy   Orientation: alert and oriented to person, place, and time   Affect: appropriate       Right Foot/Ankle     Inspection and Palpation  Skin Exam: drainage, maceration, skin changes, abnormal color, ulcer and erythema; skin not intact and no cellulitis     Neurovascular  Dorsalis pedis: absent  Posterior tibial: absent  Saphenous nerve sensation: diminished  Tibial nerve sensation: diminished  Superficial peroneal nerve sensation: diminished  Deep peroneal nerve sensation: diminished  Sural nerve sensation:  diminished    Comments  R 2nd toe   - Mal odor noted  - Mild serous drainage noted   - Necrotic changes appreciated   - Bone exposed at medial aspect, abbuting against hallux and causing wound  - Nails extremely elongated and dystrophic        Left Foot/Ankle      Inspection and Palpation  Ecchymosis: none  Tenderness: none   Swelling: none   Skin Exam: skin intact;     Neurovascular  Dorsalis pedis: absent  Posterior tibial: absent  Saphenous nerve sensation: diminished  Tibial nerve sensation: diminished  Superficial peroneal nerve sensation: diminished  Deep peroneal nerve sensation: diminished  Sural nerve sensation: diminished            Laboratory:  A1C:   Recent Labs   Lab 07/22/21  0648 11/21/21  0334   HGBA1C 6.3* 5.4     CBC:   Recent Labs   Lab 12/31/21  0519   WBC 10.95   RBC 2.77*   HGB 7.9*   HCT 25.9*      MCV 94   MCH 28.5   MCHC 30.5*     CMP:   Recent Labs   Lab 12/31/21  0519   *   CALCIUM 8.9   ALBUMIN 2.9*   PROT 7.2      K 3.8   CO2 24      BUN 19   CREATININE 1.7*   ALKPHOS 74   ALT <5*   AST 6*   BILITOT 0.7     CRP:   Recent Labs   Lab 12/30/21  1525   CRP 53.3*     ESR:   Recent Labs   Lab 12/30/21  1525   SEDRATE 120*     All pertinent labs reviewed within the last 24 hours.    Diagnostic Results:  I have reviewed all pertinent imaging results/findings within the past 24 hours.    Clinical Findings:  R 2nd necrotic toe osteomyelitis with exposed bone                     Assessment/Plan:     Diabetic foot ulcer with osteomyelitis  Right 2nd toe, with necrosis and osteomyelitis of the 2nd intermediate and distal phalanx on MRI, with exposed bone and signs of local infection. Arterial US   Showed slow velocities with abnormal waveforms in the arteries of the calf suggestive of vessel hardening such as from diffuse atherosclerosis. X ray shows erosive changes to the R 1st metatarsal head, likely gout related although patient denies history of gout and symptoms at the  1st MT head.     Plan/Recommendations:   - Recommend Vascular/Interventional cardiology consult to evaluate circulation and determine if revasc is required/may optimize wound healing potential  - Patient will require amputation of the right 2nd digit  - NPO at midnight for possible amputation tomorrow, will update plan tomorrow in the AM regarding surgery   - Dressed in betadine soaked gauze, 4 x 4, kerlix, and tape.  - Currently no WB restrictions    - Nursing wound care routine ordered   - Podiatry will follow           Thank you for your consult. I will follow-up with patient. Please contact us if you have any additional questions.    Haris Doyle DPM  Podiatry  [facility] ([place])

## 2021-12-31 NOTE — SUBJECTIVE & OBJECTIVE
Past Medical History:   Diagnosis Date    Anemia     Arthritis     Diabetes mellitus     Encounter for blood transfusion     Hypercholesteremia     Hypertension     Renal disorder     kidney stone    Stroke     left side paralysis       Past Surgical History:   Procedure Laterality Date    APPENDECTOMY      CYSTOSCOPY W/ URETERAL STENT PLACEMENT Right 10/13/2021    Procedure: CYSTOSCOPY, WITH URETERAL STENT INSERTION;  Surgeon: Wanda Arroyo MD;  Location: [facility];  Service: Urology;  Laterality: Right;    ESOPHAGOGASTRODUODENOSCOPY N/A 11/23/2021    Procedure: EGD (ESOPHAGOGASTRODUODENOSCOPY);  Surgeon: Obed Jeong MD;  [facility]  Service: Endoscopy;  Laterality: N/A;    LAPAROSCOPIC APPENDECTOMY N/A 7/22/2021    Procedure: APPENDECTOMY, LAPAROSCOPIC;  Surgeon: Paulo Calvo Jr., MD;  Location: [facility];  Service: General;  Laterality: N/A;    TUBAL LIGATION      URETEROSCOPIC REMOVAL OF URETERIC CALCULUS Right 12/22/2021    Procedure: REMOVAL, CALCULUS, URETER, URETEROSCOPIC;  Surgeon: Wanda Arroyo MD;  Location: [facility];  Service: Urology;  Laterality: Right;       Review of patient's allergies indicates:   Allergen Reactions    Tomato (solanum lycopersicum) Hives and Itching       No current facility-administered medications on file prior to encounter.     Current Outpatient Medications on File Prior to Encounter   Medication Sig    amLODIPine (NORVASC) 5 MG tablet Take 10 mg by mouth every evening. Take 5 mg every morning and 10 mg at night    aspirin (ECOTRIN) 81 MG EC tablet Take 81 mg by mouth once daily.    b complex vitamins tablet Take 1 tablet by mouth once daily.    carvedilol (COREG) 25 MG tablet Take 1 tablet (25 mg total) by mouth 2 (two) times daily.    cephALEXin (KEFLEX) 500 MG capsule Take 1 capsule (500 mg total) by mouth every 12 (twelve) hours.    clopidogreL (PLAVIX) 75 mg tablet Take 75 mg by mouth.    DULoxetine (CYMBALTA)  30 MG capsule Take 1 capsule (30 mg total) by mouth once daily.    ferrous sulfate (FEOSOL) 325 mg (65 mg iron) Tab tablet Take 325 mg by mouth daily with breakfast. Off at present    gabapentin (NEURONTIN) 300 MG capsule Take 1 capsule (300 mg total) by mouth once daily.    HYDROcodone-acetaminophen (NORCO) 5-325 mg per tablet Take 1 tablet by mouth every 6 (six) hours as needed for Pain.    insulin (BASAGLAR KWIKPEN U-100 INSULIN) glargine 100 units/mL (3mL) SubQ pen Inject 5 Units into the skin once daily.    lactulose (CHRONULAC) 10 gram/15 mL solution Take 10 g by mouth 2 (two) times a day.    levothyroxine (SYNTHROID) 75 MCG tablet Take 75 mcg by mouth once daily.    metoclopramide HCl (REGLAN) 10 MG tablet Take 1 tablet (10 mg total) by mouth 3 (three) times daily before meals.    omeprazole (PRILOSEC) 20 MG capsule Take 20 mg by mouth 2 (two) times daily.    ondansetron (ZOFRAN) 4 MG tablet Take 1 tablet (4 mg total) by mouth every 8 (eight) hours as needed for Nausea.    rosuvastatin (CRESTOR) 40 MG Tab Take 40 mg by mouth once daily.    tamsulosin (FLOMAX) 0.4 mg Cap Take 1 capsule (0.4 mg total) by mouth once daily.    tiZANidine (ZANAFLEX) 4 MG tablet Take 4 mg by mouth nightly.    traZODone (DESYREL) 100 MG tablet Take 100 mg by mouth once daily.     Family History     Problem Relation (Age of Onset)    Alcohol abuse Father    Arthritis Mother    Asthma Brother    Diabetes Mother, Sister, Brother    Early death Brother    Heart disease Mother, Brother    Hypertension Mother    Kidney disease Mother    Stroke Mother    Vision loss Mother        Tobacco Use    Smoking status: Never Smoker    Smokeless tobacco: Never Used   Substance and Sexual Activity    Alcohol use: No     Comment: socially    Drug use: No    Sexual activity: Not Currently     Review of Systems   Constitutional: Negative for activity change, appetite change, chills, diaphoresis, fatigue, fever and unexpected weight  change.   HENT: Negative.    Eyes: Negative.    Respiratory: Negative for cough, chest tightness, shortness of breath and wheezing.    Cardiovascular: Negative for chest pain, palpitations and leg swelling.   Gastrointestinal: Negative for abdominal distention, abdominal pain, constipation, diarrhea, nausea and vomiting.   Endocrine: Negative.    Genitourinary: Negative.    Musculoskeletal: Positive for gait problem.   Skin: Positive for wound.   Allergic/Immunologic: Negative.    Neurological: Negative for dizziness, syncope, weakness, light-headedness and headaches.     Objective:     Vital Signs (Most Recent):  Temp: 97.5 °F (36.4 °C) (12/30/21 1945)  Pulse: 75 (12/30/21 1945)  Resp: 16 (12/30/21 1945)  BP: (!) 178/79 (12/30/21 1945)  SpO2: 99 % (12/30/21 1945) Vital Signs (24h Range):  Temp:  [97.5 °F (36.4 °C)-98.3 °F (36.8 °C)] 97.5 °F (36.4 °C)  Pulse:  [73-76] 75  Resp:  [16-18] 16  SpO2:  [99 %-100 %] 99 %  BP: (137-178)/(64-79) 178/79        There is no height or weight on file to calculate BMI.    Physical Exam  Vitals and nursing note reviewed.   Constitutional:       Appearance: Normal appearance. She is well-developed, normal weight and well-nourished.   HENT:      Head: Normocephalic and atraumatic.      Mouth/Throat:      Mouth: Mucous membranes are normal.      Dentition: Normal dentition.   Eyes:      General: Lids are normal.      Extraocular Movements: Extraocular movements intact.      Conjunctiva/sclera: Conjunctivae normal.   Cardiovascular:      Rate and Rhythm: Normal rate and regular rhythm.      Pulses: Intact distal pulses.      Heart sounds: Normal heart sounds. No murmur heard.  No friction rub.   Pulmonary:      Effort: Pulmonary effort is normal.      Breath sounds: Normal breath sounds.   Chest:      Chest wall: No tenderness.   Abdominal:      General: Bowel sounds are normal. There is no distension.      Palpations: Abdomen is soft.      Tenderness: There is no abdominal  tenderness.   Musculoskeletal:         General: No edema.      Cervical back: Neck supple.   Skin:     General: Skin is warm and dry.      Findings: Lesion present. No erythema or rash.      Comments: See media for wound.    Neurological:      Mental Status: She is alert and oriented to person, place, and time.      Cranial Nerves: No cranial nerve deficit.   Psychiatric:         Mood and Affect: Mood and affect normal.              Significant Labs:   All pertinent labs within the past 24 hours have been reviewed.  CBC:   Recent Labs   Lab 12/30/21  1525   WBC 12.00   HGB 7.5*   HCT 24.3*        CMP:   Recent Labs   Lab 12/30/21  1525      K 4.9      CO2 24      BUN 22   CREATININE 1.8*   CALCIUM 9.0   PROT 8.2   ALBUMIN 3.2*   BILITOT 0.5   ALKPHOS 74   AST 11   ALT <5*   ANIONGAP 12   EGFRNONAA 28*       Significant Imaging: I have reviewed all pertinent imaging results/findings within the past 24 hours.

## 2022-01-01 ENCOUNTER — LAB VISIT (OUTPATIENT)
Dept: LAB | Facility: OTHER | Age: 72
End: 2022-01-01
Payer: MEDICARE

## 2022-01-01 ENCOUNTER — HOSPITAL ENCOUNTER (INPATIENT)
Facility: HOSPITAL | Age: 72
LOS: 9 days | Discharge: NURSING, REHAB OR LONG-TERM CARE | DRG: 616 | End: 2022-03-30
Attending: EMERGENCY MEDICINE | Admitting: HOSPITALIST
Payer: MEDICARE

## 2022-01-01 ENCOUNTER — PATIENT OUTREACH (OUTPATIENT)
Dept: ADMINISTRATIVE | Facility: OTHER | Age: 72
End: 2022-01-01
Payer: MEDICARE

## 2022-01-01 ENCOUNTER — DOCUMENT SCAN (OUTPATIENT)
Dept: HOME HEALTH SERVICES | Facility: HOSPITAL | Age: 72
End: 2022-01-01
Payer: MEDICARE

## 2022-01-01 ENCOUNTER — ANESTHESIA (OUTPATIENT)
Dept: SURGERY | Facility: HOSPITAL | Age: 72
DRG: 025 | End: 2022-01-01
Payer: MEDICARE

## 2022-01-01 ENCOUNTER — HOSPITAL ENCOUNTER (EMERGENCY)
Facility: HOSPITAL | Age: 72
Discharge: HOME | End: 2022-01-27
Attending: EMERGENCY MEDICINE
Payer: MEDICARE

## 2022-01-01 ENCOUNTER — EXTERNAL HOSPITAL ADMISSION (OUTPATIENT)
Dept: SKILLED NURSING FACILITY | Facility: HOSPITAL | Age: 72
End: 2022-01-01
Payer: MEDICARE

## 2022-01-01 ENCOUNTER — ANESTHESIA (OUTPATIENT)
Dept: SURGERY | Facility: OTHER | Age: 72
DRG: 616 | End: 2022-01-01
Payer: MEDICARE

## 2022-01-01 ENCOUNTER — TELEPHONE (OUTPATIENT)
Dept: UROLOGY | Facility: CLINIC | Age: 72
End: 2022-01-01
Payer: MEDICARE

## 2022-01-01 ENCOUNTER — ANESTHESIA (OUTPATIENT)
Dept: INTERVENTIONAL RADIOLOGY/VASCULAR | Facility: HOSPITAL | Age: 72
DRG: 025 | End: 2022-01-01
Payer: MEDICARE

## 2022-01-01 ENCOUNTER — ANESTHESIA EVENT (OUTPATIENT)
Dept: NEUROLOGY | Facility: HOSPITAL | Age: 72
DRG: 025 | End: 2022-01-01
Payer: MEDICARE

## 2022-01-01 ENCOUNTER — ANESTHESIA EVENT (OUTPATIENT)
Dept: SURGERY | Facility: HOSPITAL | Age: 72
DRG: 025 | End: 2022-01-01
Payer: MEDICARE

## 2022-01-01 ENCOUNTER — ANESTHESIA EVENT (OUTPATIENT)
Dept: SURGERY | Facility: HOSPITAL | Age: 72
DRG: 616 | End: 2022-01-01
Payer: MEDICARE

## 2022-01-01 ENCOUNTER — ANESTHESIA (OUTPATIENT)
Dept: NEUROLOGY | Facility: HOSPITAL | Age: 72
DRG: 025 | End: 2022-01-01
Payer: MEDICARE

## 2022-01-01 ENCOUNTER — ANESTHESIA (OUTPATIENT)
Dept: SURGERY | Facility: HOSPITAL | Age: 72
DRG: 616 | End: 2022-01-01
Payer: MEDICARE

## 2022-01-01 ENCOUNTER — ANESTHESIA EVENT (OUTPATIENT)
Dept: SURGERY | Facility: OTHER | Age: 72
DRG: 616 | End: 2022-01-01
Payer: MEDICARE

## 2022-01-01 ENCOUNTER — HOSPITAL ENCOUNTER (INPATIENT)
Facility: HOSPITAL | Age: 72
LOS: 32 days | Discharge: HOSPICE | DRG: 025 | End: 2022-05-05
Attending: EMERGENCY MEDICINE | Admitting: PSYCHIATRY & NEUROLOGY
Payer: MEDICARE

## 2022-01-01 ENCOUNTER — HOSPITAL ENCOUNTER (OUTPATIENT)
Facility: HOSPITAL | Age: 72
Discharge: NURSING, REHAB OR LONG-TERM CARE | End: 2022-02-11
Attending: EMERGENCY MEDICINE | Admitting: INTERNAL MEDICINE
Payer: MEDICARE

## 2022-01-01 ENCOUNTER — HOSPITAL ENCOUNTER (EMERGENCY)
Facility: HOSPITAL | Age: 72
Discharge: HOME | End: 2022-01-25
Attending: EMERGENCY MEDICINE
Payer: MEDICARE

## 2022-01-01 VITALS
RESPIRATION RATE: 14 BRPM | SYSTOLIC BLOOD PRESSURE: 118 MMHG | BODY MASS INDEX: 19.37 KG/M2 | OXYGEN SATURATION: 97 % | WEIGHT: 123.44 LBS | TEMPERATURE: 98 F | HEART RATE: 73 BPM | HEIGHT: 67 IN | DIASTOLIC BLOOD PRESSURE: 56 MMHG

## 2022-01-01 VITALS
BODY MASS INDEX: 17.2 KG/M2 | HEIGHT: 67 IN | HEART RATE: 85 BPM | RESPIRATION RATE: 18 BRPM | SYSTOLIC BLOOD PRESSURE: 137 MMHG | TEMPERATURE: 98 F | DIASTOLIC BLOOD PRESSURE: 65 MMHG | OXYGEN SATURATION: 99 % | WEIGHT: 109.56 LBS

## 2022-01-01 VITALS
HEART RATE: 77 BPM | OXYGEN SATURATION: 100 % | BODY MASS INDEX: 19.89 KG/M2 | DIASTOLIC BLOOD PRESSURE: 51 MMHG | RESPIRATION RATE: 16 BRPM | TEMPERATURE: 98 F | WEIGHT: 127 LBS | SYSTOLIC BLOOD PRESSURE: 107 MMHG

## 2022-01-01 VITALS
RESPIRATION RATE: 16 BRPM | OXYGEN SATURATION: 85 % | DIASTOLIC BLOOD PRESSURE: 55 MMHG | SYSTOLIC BLOOD PRESSURE: 99 MMHG | HEART RATE: 90 BPM | TEMPERATURE: 98 F | WEIGHT: 108.94 LBS | BODY MASS INDEX: 17.1 KG/M2 | HEIGHT: 67 IN

## 2022-01-01 VITALS
WEIGHT: 125 LBS | DIASTOLIC BLOOD PRESSURE: 70 MMHG | BODY MASS INDEX: 19.58 KG/M2 | HEART RATE: 67 BPM | RESPIRATION RATE: 17 BRPM | SYSTOLIC BLOOD PRESSURE: 129 MMHG | TEMPERATURE: 98 F | OXYGEN SATURATION: 99 %

## 2022-01-01 VITALS
OXYGEN SATURATION: 100 % | BODY MASS INDEX: 19.58 KG/M2 | HEART RATE: 69 BPM | RESPIRATION RATE: 18 BRPM | DIASTOLIC BLOOD PRESSURE: 65 MMHG | TEMPERATURE: 99 F | WEIGHT: 125 LBS | SYSTOLIC BLOOD PRESSURE: 138 MMHG

## 2022-01-01 DIAGNOSIS — Z20.822 ENCOUNTER FOR LABORATORY TESTING FOR COVID-19 VIRUS: ICD-10-CM
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
## 2022-01-01 DIAGNOSIS — R53.81 DEBILITY: Primary | ICD-10-CM

## 2022-01-01 DIAGNOSIS — R41.0 CONFUSION: ICD-10-CM

## 2022-01-01 DIAGNOSIS — E11.9 TYPE 2 DIABETES MELLITUS, WITH LONG-TERM CURRENT USE OF INSULIN: ICD-10-CM

## 2022-01-01 DIAGNOSIS — D64.9 CHRONIC ANEMIA: Primary | ICD-10-CM

## 2022-01-01 DIAGNOSIS — I96 GANGRENE: ICD-10-CM

## 2022-01-01 DIAGNOSIS — N18.4 CKD (CHRONIC KIDNEY DISEASE), STAGE IV: ICD-10-CM

## 2022-01-01 DIAGNOSIS — Z79.4 TYPE 2 DIABETES MELLITUS, WITH LONG-TERM CURRENT USE OF INSULIN: ICD-10-CM

## 2022-01-01 DIAGNOSIS — E10.621 DIABETIC ULCER OF TOE OF RIGHT FOOT ASSOCIATED WITH TYPE 1 DIABETES MELLITUS, WITH BONE INVOLVEMENT WITHOUT EVIDENCE OF NECROSIS: Primary | ICD-10-CM

## 2022-01-01 DIAGNOSIS — G62.9 PERIPHERAL NEUROPATHY: ICD-10-CM

## 2022-01-01 DIAGNOSIS — D64.9 ACUTE ON CHRONIC ANEMIA: ICD-10-CM

## 2022-01-01 DIAGNOSIS — D50.8 OTHER IRON DEFICIENCY ANEMIA: ICD-10-CM

## 2022-01-01 DIAGNOSIS — I70.229 CRITICAL LOWER LIMB ISCHEMIA: ICD-10-CM

## 2022-01-01 DIAGNOSIS — D64.9 ANEMIA, UNSPECIFIED TYPE: Primary | ICD-10-CM

## 2022-01-01 DIAGNOSIS — E55.9 VITAMIN D DEFICIENCY: Primary | ICD-10-CM

## 2022-01-01 DIAGNOSIS — G93.40 ENCEPHALOPATHY: ICD-10-CM

## 2022-01-01 DIAGNOSIS — M86.271 SUBACUTE OSTEOMYELITIS OF RIGHT FOOT: Primary | ICD-10-CM

## 2022-01-01 DIAGNOSIS — L97.516 DIABETIC ULCER OF TOE OF RIGHT FOOT ASSOCIATED WITH TYPE 1 DIABETES MELLITUS, WITH BONE INVOLVEMENT WITHOUT EVIDENCE OF NECROSIS: Primary | ICD-10-CM

## 2022-01-01 DIAGNOSIS — R33.9 URINARY RETENTION: Primary | ICD-10-CM

## 2022-01-01 DIAGNOSIS — Z79.4 TYPE 2 DIABETES MELLITUS WITH HYPEROSMOLARITY WITHOUT COMA, WITH LONG-TERM CURRENT USE OF INSULIN: ICD-10-CM

## 2022-01-01 DIAGNOSIS — N30.00 ACUTE CYSTITIS WITHOUT HEMATURIA: ICD-10-CM

## 2022-01-01 DIAGNOSIS — M86.9 OSTEOMYELITIS OF RIGHT FOOT: ICD-10-CM

## 2022-01-01 DIAGNOSIS — R07.9 CHEST PAIN: ICD-10-CM

## 2022-01-01 DIAGNOSIS — G40.909 RECURRENT SEIZURES: ICD-10-CM

## 2022-01-01 DIAGNOSIS — Z91.89 AT RISK FOR LONG QT SYNDROME: ICD-10-CM

## 2022-01-01 DIAGNOSIS — R53.81 DEBILITY: ICD-10-CM

## 2022-01-01 DIAGNOSIS — E11.00 TYPE 2 DIABETES MELLITUS WITH HYPEROSMOLARITY WITHOUT COMA, WITH LONG-TERM CURRENT USE OF INSULIN: ICD-10-CM

## 2022-01-01 DIAGNOSIS — S06.5XAA SDH (SUBDURAL HEMATOMA): ICD-10-CM

## 2022-01-01 DIAGNOSIS — N17.9 AKI (ACUTE KIDNEY INJURY): Primary | ICD-10-CM

## 2022-01-01 DIAGNOSIS — D64.9 SEVERE ANEMIA: ICD-10-CM

## 2022-01-01 LAB
ABO + RH BLD: NORMAL
ALBUMIN SERPL BCP-MCNC: 1.6 G/DL (ref 3.5–5.2)
ALBUMIN SERPL BCP-MCNC: 1.7 G/DL (ref 3.5–5.2)
ALBUMIN SERPL BCP-MCNC: 1.7 G/DL (ref 3.5–5.2)
ALBUMIN SERPL BCP-MCNC: 1.8 G/DL (ref 3.5–5.2)
ALBUMIN SERPL BCP-MCNC: 1.9 G/DL (ref 3.5–5.2)
ALBUMIN SERPL BCP-MCNC: 2 G/DL (ref 3.5–5.2)
ALBUMIN SERPL BCP-MCNC: 2.1 G/DL (ref 3.5–5.2)
ALBUMIN SERPL BCP-MCNC: 2.2 G/DL (ref 3.5–5.2)
ALBUMIN SERPL BCP-MCNC: 2.3 G/DL (ref 3.5–5.2)
ALBUMIN SERPL BCP-MCNC: 2.3 G/DL (ref 3.5–5.2)
ALBUMIN SERPL BCP-MCNC: 2.4 G/DL (ref 3.5–5.2)
ALBUMIN SERPL BCP-MCNC: 2.5 G/DL (ref 3.5–5.2)
ALBUMIN SERPL BCP-MCNC: 2.8 G/DL (ref 3.5–5.2)
ALBUMIN SERPL BCP-MCNC: 3 G/DL (ref 3.5–5.2)
ALLENS TEST: ABNORMAL
ALP SERPL-CCNC: 117 U/L (ref 55–135)
ALP SERPL-CCNC: 52 U/L (ref 55–135)
ALP SERPL-CCNC: 52 U/L (ref 55–135)
ALP SERPL-CCNC: 53 U/L (ref 55–135)
ALP SERPL-CCNC: 53 U/L (ref 55–135)
ALP SERPL-CCNC: 55 U/L (ref 55–135)
ALP SERPL-CCNC: 55 U/L (ref 55–135)
ALP SERPL-CCNC: 56 U/L (ref 55–135)
ALP SERPL-CCNC: 56 U/L (ref 55–135)
ALP SERPL-CCNC: 57 U/L (ref 55–135)
ALP SERPL-CCNC: 58 U/L (ref 55–135)
ALP SERPL-CCNC: 59 U/L (ref 55–135)
ALP SERPL-CCNC: 59 U/L (ref 55–135)
ALP SERPL-CCNC: 60 U/L (ref 55–135)
ALP SERPL-CCNC: 60 U/L (ref 55–135)
ALP SERPL-CCNC: 63 U/L (ref 55–135)
ALP SERPL-CCNC: 63 U/L (ref 55–135)
ALP SERPL-CCNC: 64 U/L (ref 55–135)
ALP SERPL-CCNC: 66 U/L (ref 55–135)
ALP SERPL-CCNC: 67 U/L (ref 55–135)
ALP SERPL-CCNC: 69 U/L (ref 55–135)
ALP SERPL-CCNC: 70 U/L (ref 55–135)
ALP SERPL-CCNC: 71 U/L (ref 55–135)
ALP SERPL-CCNC: 71 U/L (ref 55–135)
ALP SERPL-CCNC: 72 U/L (ref 55–135)
ALP SERPL-CCNC: 73 U/L (ref 55–135)
ALP SERPL-CCNC: 74 U/L (ref 55–135)
ALP SERPL-CCNC: 75 U/L (ref 55–135)
ALP SERPL-CCNC: 75 U/L (ref 55–135)
ALP SERPL-CCNC: 76 U/L (ref 55–135)
ALP SERPL-CCNC: 77 U/L (ref 55–135)
ALP SERPL-CCNC: 78 U/L (ref 55–135)
ALP SERPL-CCNC: 80 U/L (ref 55–135)
ALP SERPL-CCNC: 81 U/L (ref 55–135)
ALP SERPL-CCNC: 82 U/L (ref 55–135)
ALP SERPL-CCNC: 82 U/L (ref 55–135)
ALT SERPL W/O P-5'-P-CCNC: 10 U/L (ref 10–44)
ALT SERPL W/O P-5'-P-CCNC: 11 U/L (ref 10–44)
ALT SERPL W/O P-5'-P-CCNC: 12 U/L (ref 10–44)
ALT SERPL W/O P-5'-P-CCNC: 13 U/L (ref 10–44)
ALT SERPL W/O P-5'-P-CCNC: 14 U/L (ref 10–44)
ALT SERPL W/O P-5'-P-CCNC: 14 U/L (ref 10–44)
ALT SERPL W/O P-5'-P-CCNC: 16 U/L (ref 10–44)
ALT SERPL W/O P-5'-P-CCNC: 6 U/L (ref 10–44)
ALT SERPL W/O P-5'-P-CCNC: 7 U/L (ref 10–44)
ALT SERPL W/O P-5'-P-CCNC: 7 U/L (ref 10–44)
ALT SERPL W/O P-5'-P-CCNC: 8 U/L (ref 10–44)
ALT SERPL W/O P-5'-P-CCNC: 9 U/L (ref 10–44)
ALT SERPL W/O P-5'-P-CCNC: <5 U/L (ref 10–44)
ALT SERPL W/O P-5'-P-CCNC: <5 U/L (ref 10–44)
AMORPH CRY UR QL COMP ASSIST: ABNORMAL
ANION GAP SERPL CALC-SCNC: 10 MMOL/L (ref 8–16)
ANION GAP SERPL CALC-SCNC: 12 MMOL/L (ref 8–16)
ANION GAP SERPL CALC-SCNC: 13 MMOL/L (ref 8–16)
ANION GAP SERPL CALC-SCNC: 14 MMOL/L (ref 8–16)
ANION GAP SERPL CALC-SCNC: 4 MMOL/L (ref 8–16)
ANION GAP SERPL CALC-SCNC: 6 MMOL/L (ref 8–16)
ANION GAP SERPL CALC-SCNC: 7 MMOL/L (ref 8–16)
ANION GAP SERPL CALC-SCNC: 8 MMOL/L (ref 8–16)
ANION GAP SERPL CALC-SCNC: 9 MMOL/L (ref 8–16)
ANISOCYTOSIS BLD QL SMEAR: SLIGHT
APTT BLDCRRT: 24.8 SEC (ref 21–32)
APTT BLDCRRT: 25.9 SEC (ref 21–32)
APTT BLDCRRT: 26.2 SEC (ref 21–32)
ASCENDING AORTA: 2.47 CM
AST SERPL-CCNC: 10 U/L (ref 10–40)
AST SERPL-CCNC: 10 U/L (ref 10–40)
AST SERPL-CCNC: 11 U/L (ref 10–40)
AST SERPL-CCNC: 11 U/L (ref 10–40)
AST SERPL-CCNC: 12 U/L (ref 10–40)
AST SERPL-CCNC: 13 U/L (ref 10–40)
AST SERPL-CCNC: 14 U/L (ref 10–40)
AST SERPL-CCNC: 15 U/L (ref 10–40)
AST SERPL-CCNC: 16 U/L (ref 10–40)
AST SERPL-CCNC: 17 U/L (ref 10–40)
AST SERPL-CCNC: 18 U/L (ref 10–40)
AST SERPL-CCNC: 19 U/L (ref 10–40)
AST SERPL-CCNC: 21 U/L (ref 10–40)
AST SERPL-CCNC: 22 U/L (ref 10–40)
AST SERPL-CCNC: 23 U/L (ref 10–40)
AST SERPL-CCNC: 26 U/L (ref 10–40)
AST SERPL-CCNC: 29 U/L (ref 10–40)
AST SERPL-CCNC: 34 U/L (ref 10–40)
AST SERPL-CCNC: 36 U/L (ref 10–40)
AST SERPL-CCNC: 9 U/L (ref 10–40)
AST SERPL-CCNC: 9 U/L (ref 10–40)
AV INDEX (PROSTH): 0.66
AV MEAN GRADIENT: 4 MMHG
AV PEAK GRADIENT: 7 MMHG
AV VALVE AREA: 2.09 CM2
AV VELOCITY RATIO: 0.65
BACTERIA #/AREA URNS AUTO: ABNORMAL /HPF
BACTERIA #/AREA URNS HPF: ABNORMAL /HPF
BACTERIA BLD CULT: NORMAL
BACTERIA SPEC AEROBE CULT: ABNORMAL
BACTERIA SPEC ANAEROBE CULT: NORMAL
BACTERIA SPEC ANAEROBE CULT: NORMAL
BACTERIA TISS AEROBE CULT: ABNORMAL
BACTERIA UR CULT: ABNORMAL
BACTERIA UR CULT: NO GROWTH
BASO STIPL BLD QL SMEAR: ABNORMAL
BASOPHILS # BLD AUTO: 0.01 K/UL (ref 0–0.2)
BASOPHILS # BLD AUTO: 0.02 K/UL (ref 0–0.2)
BASOPHILS # BLD AUTO: 0.03 K/UL (ref 0–0.2)
BASOPHILS # BLD AUTO: 0.04 K/UL (ref 0–0.2)
BASOPHILS # BLD AUTO: 0.05 K/UL (ref 0–0.2)
BASOPHILS # BLD AUTO: 0.06 K/UL (ref 0–0.2)
BASOPHILS # BLD AUTO: 0.07 K/UL (ref 0–0.2)
BASOPHILS NFR BLD: 0.1 % (ref 0–1.9)
BASOPHILS NFR BLD: 0.1 % (ref 0–1.9)
BASOPHILS NFR BLD: 0.2 % (ref 0–1.9)
BASOPHILS NFR BLD: 0.3 % (ref 0–1.9)
BASOPHILS NFR BLD: 0.4 % (ref 0–1.9)
BASOPHILS NFR BLD: 0.5 % (ref 0–1.9)
BASOPHILS NFR BLD: 0.5 % (ref 0–1.9)
BASOPHILS NFR BLD: 0.6 % (ref 0–1.9)
BASOPHILS NFR BLD: 1 % (ref 0–1.9)
BILIRUB SERPL-MCNC: 0.2 MG/DL (ref 0.1–1)
BILIRUB SERPL-MCNC: 0.3 MG/DL (ref 0.1–1)
BILIRUB SERPL-MCNC: 0.4 MG/DL (ref 0.1–1)
BILIRUB SERPL-MCNC: 0.4 MG/DL (ref 0.1–1)
BILIRUB SERPL-MCNC: 0.5 MG/DL (ref 0.1–1)
BILIRUB SERPL-MCNC: 0.6 MG/DL (ref 0.1–1)
BILIRUB SERPL-MCNC: 0.7 MG/DL (ref 0.1–1)
BILIRUB SERPL-MCNC: 0.8 MG/DL (ref 0.1–1)
BILIRUB UR QL STRIP: NEGATIVE
BLD GP AB SCN CELLS X3 SERPL QL: NORMAL
BLD PROD TYP BPU: NORMAL
BLOOD UNIT EXPIRATION DATE: NORMAL
BLOOD UNIT TYPE CODE: 5100
BLOOD UNIT TYPE CODE: 6200
BLOOD UNIT TYPE CODE: 6200
BLOOD UNIT TYPE CODE: 9500
BLOOD UNIT TYPE CODE: 9500
BLOOD UNIT TYPE: NORMAL
BSA FOR ECHO PROCEDURE: 1.53 M2
BUN SERPL-MCNC: 14 MG/DL (ref 8–23)
BUN SERPL-MCNC: 14 MG/DL (ref 8–23)
BUN SERPL-MCNC: 16 MG/DL (ref 8–23)
BUN SERPL-MCNC: 18 MG/DL (ref 8–23)
BUN SERPL-MCNC: 19 MG/DL (ref 8–23)
BUN SERPL-MCNC: 20 MG/DL (ref 8–23)
BUN SERPL-MCNC: 21 MG/DL (ref 8–23)
BUN SERPL-MCNC: 22 MG/DL (ref 8–23)
BUN SERPL-MCNC: 23 MG/DL (ref 8–23)
BUN SERPL-MCNC: 24 MG/DL (ref 8–23)
BUN SERPL-MCNC: 24 MG/DL (ref 8–23)
BUN SERPL-MCNC: 25 MG/DL (ref 8–23)
BUN SERPL-MCNC: 26 MG/DL (ref 8–23)
BUN SERPL-MCNC: 27 MG/DL (ref 8–23)
BUN SERPL-MCNC: 28 MG/DL (ref 8–23)
BUN SERPL-MCNC: 29 MG/DL (ref 8–23)
BUN SERPL-MCNC: 31 MG/DL (ref 8–23)
BUN SERPL-MCNC: 31 MG/DL (ref 8–23)
BUN SERPL-MCNC: 32 MG/DL (ref 8–23)
BUN SERPL-MCNC: 34 MG/DL (ref 8–23)
BUN SERPL-MCNC: 34 MG/DL (ref 8–23)
BUN SERPL-MCNC: 35 MG/DL (ref 8–23)
BUN SERPL-MCNC: 39 MG/DL (ref 8–23)
BUN SERPL-MCNC: 47 MG/DL (ref 8–23)
BUN SERPL-MCNC: 52 MG/DL (ref 8–23)
BUN SERPL-MCNC: 60 MG/DL (ref 8–23)
BURR CELLS BLD QL SMEAR: ABNORMAL
CALCIUM SERPL-MCNC: 7.6 MG/DL (ref 8.7–10.5)
CALCIUM SERPL-MCNC: 7.6 MG/DL (ref 8.7–10.5)
CALCIUM SERPL-MCNC: 7.8 MG/DL (ref 8.7–10.5)
CALCIUM SERPL-MCNC: 7.9 MG/DL (ref 8.7–10.5)
CALCIUM SERPL-MCNC: 8 MG/DL (ref 8.7–10.5)
CALCIUM SERPL-MCNC: 8.1 MG/DL (ref 8.7–10.5)
CALCIUM SERPL-MCNC: 8.2 MG/DL (ref 8.7–10.5)
CALCIUM SERPL-MCNC: 8.3 MG/DL (ref 8.7–10.5)
CALCIUM SERPL-MCNC: 8.4 MG/DL (ref 8.7–10.5)
CALCIUM SERPL-MCNC: 8.5 MG/DL (ref 8.7–10.5)
CALCIUM SERPL-MCNC: 8.6 MG/DL (ref 8.7–10.5)
CALCIUM SERPL-MCNC: 8.7 MG/DL (ref 8.7–10.5)
CALCIUM SERPL-MCNC: 8.7 MG/DL (ref 8.7–10.5)
CALCIUM SERPL-MCNC: 8.8 MG/DL (ref 8.7–10.5)
CALCIUM SERPL-MCNC: 8.8 MG/DL (ref 8.7–10.5)
CALCIUM SERPL-MCNC: 8.9 MG/DL (ref 8.7–10.5)
CALCIUM SERPL-MCNC: 8.9 MG/DL (ref 8.7–10.5)
CALCIUM SERPL-MCNC: 9 MG/DL (ref 8.7–10.5)
CALCIUM SERPL-MCNC: 9.1 MG/DL (ref 8.7–10.5)
CALCIUM SERPL-MCNC: 9.2 MG/DL (ref 8.7–10.5)
CALCIUM SERPL-MCNC: 9.3 MG/DL (ref 8.7–10.5)
CALCIUM SERPL-MCNC: 9.4 MG/DL (ref 8.7–10.5)
CALCIUM SERPL-MCNC: 9.5 MG/DL (ref 8.7–10.5)
CALCIUM SERPL-MCNC: 9.5 MG/DL (ref 8.7–10.5)
CALCIUM SERPL-MCNC: 9.7 MG/DL (ref 8.7–10.5)
CALCIUM SERPL-MCNC: 9.7 MG/DL (ref 8.7–10.5)
CALCIUM SERPL-MCNC: 9.8 MG/DL (ref 8.7–10.5)
CHLORIDE SERPL-SCNC: 103 MMOL/L (ref 95–110)
CHLORIDE SERPL-SCNC: 103 MMOL/L (ref 95–110)
CHLORIDE SERPL-SCNC: 104 MMOL/L (ref 95–110)
CHLORIDE SERPL-SCNC: 105 MMOL/L (ref 95–110)
CHLORIDE SERPL-SCNC: 106 MMOL/L (ref 95–110)
CHLORIDE SERPL-SCNC: 107 MMOL/L (ref 95–110)
CHLORIDE SERPL-SCNC: 108 MMOL/L (ref 95–110)
CHLORIDE SERPL-SCNC: 109 MMOL/L (ref 95–110)
CHLORIDE SERPL-SCNC: 110 MMOL/L (ref 95–110)
CHLORIDE SERPL-SCNC: 111 MMOL/L (ref 95–110)
CHLORIDE SERPL-SCNC: 112 MMOL/L (ref 95–110)
CHLORIDE SERPL-SCNC: 113 MMOL/L (ref 95–110)
CHLORIDE SERPL-SCNC: 113 MMOL/L (ref 95–110)
CHLORIDE SERPL-SCNC: 114 MMOL/L (ref 95–110)
CHLORIDE SERPL-SCNC: 115 MMOL/L (ref 95–110)
CHLORIDE SERPL-SCNC: 116 MMOL/L (ref 95–110)
CHLORIDE SERPL-SCNC: 117 MMOL/L (ref 95–110)
CHLORIDE SERPL-SCNC: 118 MMOL/L (ref 95–110)
CHLORIDE SERPL-SCNC: 118 MMOL/L (ref 95–110)
CHOLEST SERPL-MCNC: 138 MG/DL (ref 120–199)
CHOLEST/HDLC SERPL: 3.6 {RATIO} (ref 2–5)
CK SERPL-CCNC: 499 U/L (ref 20–180)
CLARITY UR REFRACT.AUTO: ABNORMAL
CLARITY UR: ABNORMAL
CO2 SERPL-SCNC: 18 MMOL/L (ref 23–29)
CO2 SERPL-SCNC: 19 MMOL/L (ref 23–29)
CO2 SERPL-SCNC: 20 MMOL/L (ref 23–29)
CO2 SERPL-SCNC: 21 MMOL/L (ref 23–29)
CO2 SERPL-SCNC: 22 MMOL/L (ref 23–29)
CO2 SERPL-SCNC: 23 MMOL/L (ref 23–29)
CO2 SERPL-SCNC: 24 MMOL/L (ref 23–29)
CO2 SERPL-SCNC: 25 MMOL/L (ref 23–29)
CO2 SERPL-SCNC: 26 MMOL/L (ref 23–29)
CO2 SERPL-SCNC: 27 MMOL/L (ref 23–29)
CO2 SERPL-SCNC: 27 MMOL/L (ref 23–29)
CODING SYSTEM: NORMAL
COLOR UR AUTO: YELLOW
COLOR UR: YELLOW
CREAT SERPL-MCNC: 1 MG/DL (ref 0.5–1.4)
CREAT SERPL-MCNC: 1.1 MG/DL (ref 0.5–1.4)
CREAT SERPL-MCNC: 1.2 MG/DL (ref 0.5–1.4)
CREAT SERPL-MCNC: 1.3 MG/DL (ref 0.5–1.4)
CREAT SERPL-MCNC: 1.4 MG/DL (ref 0.5–1.4)
CREAT SERPL-MCNC: 1.5 MG/DL (ref 0.5–1.4)
CREAT SERPL-MCNC: 1.5 MG/DL (ref 0.5–1.4)
CREAT SERPL-MCNC: 1.6 MG/DL (ref 0.5–1.4)
CREAT SERPL-MCNC: 1.6 MG/DL (ref 0.5–1.4)
CREAT SERPL-MCNC: 1.7 MG/DL (ref 0.5–1.4)
CREAT SERPL-MCNC: 1.8 MG/DL (ref 0.5–1.4)
CREAT SERPL-MCNC: 2 MG/DL (ref 0.5–1.4)
CREAT SERPL-MCNC: 2 MG/DL (ref 0.5–1.4)
CREAT SERPL-MCNC: 2.1 MG/DL (ref 0.5–1.4)
CREAT SERPL-MCNC: 2.2 MG/DL (ref 0.5–1.4)
CREAT SERPL-MCNC: 2.3 MG/DL (ref 0.5–1.4)
CREAT SERPL-MCNC: 2.3 MG/DL (ref 0.5–1.4)
CREAT SERPL-MCNC: 2.5 MG/DL (ref 0.5–1.4)
CREAT SERPL-MCNC: 2.5 MG/DL (ref 0.5–1.4)
CREAT SERPL-MCNC: 2.6 MG/DL (ref 0.5–1.4)
CRP SERPL-MCNC: 64 MG/L (ref 0–8.2)
CRP SERPL-MCNC: 85.6 MG/L (ref 0–8.2)
CRP SERPL-MCNC: 87.1 MG/L (ref 0–8.2)
CTP QC/QA: YES
CTP QC/QA: YES
CV ECHO LV RWT: 0.76 CM
DELSYS: ABNORMAL
DIFFERENTIAL METHOD: ABNORMAL
DISPENSE STATUS: NORMAL
DOP CALC AO PEAK VEL: 1.33 M/S
DOP CALC AO VTI: 29.44 CM
DOP CALC LVOT AREA: 3.1 CM2
DOP CALC LVOT DIAMETER: 2 CM
DOP CALC LVOT PEAK VEL: 0.86 M/S
DOP CALC LVOT STROKE VOLUME: 61.45 CM3
DOP CALCLVOT PEAK VEL VTI: 19.57 CM
ECHO LV POSTERIOR WALL: 1.32 CM (ref 0.6–1.1)
EJECTION FRACTION: 65 %
EOSINOPHIL # BLD AUTO: 0 K/UL (ref 0–0.5)
EOSINOPHIL # BLD AUTO: 0.1 K/UL (ref 0–0.5)
EOSINOPHIL # BLD AUTO: 0.2 K/UL (ref 0–0.5)
EOSINOPHIL # BLD AUTO: 0.3 K/UL (ref 0–0.5)
EOSINOPHIL # BLD AUTO: 0.4 K/UL (ref 0–0.5)
EOSINOPHIL NFR BLD: 0 % (ref 0–8)
EOSINOPHIL NFR BLD: 0.1 % (ref 0–8)
EOSINOPHIL NFR BLD: 0.1 % (ref 0–8)
EOSINOPHIL NFR BLD: 0.2 % (ref 0–8)
EOSINOPHIL NFR BLD: 0.3 % (ref 0–8)
EOSINOPHIL NFR BLD: 0.4 % (ref 0–8)
EOSINOPHIL NFR BLD: 0.4 % (ref 0–8)
EOSINOPHIL NFR BLD: 0.5 % (ref 0–8)
EOSINOPHIL NFR BLD: 0.6 % (ref 0–8)
EOSINOPHIL NFR BLD: 0.7 % (ref 0–8)
EOSINOPHIL NFR BLD: 0.9 % (ref 0–8)
EOSINOPHIL NFR BLD: 0.9 % (ref 0–8)
EOSINOPHIL NFR BLD: 1 % (ref 0–8)
EOSINOPHIL NFR BLD: 1.1 % (ref 0–8)
EOSINOPHIL NFR BLD: 1.2 % (ref 0–8)
EOSINOPHIL NFR BLD: 1.2 % (ref 0–8)
EOSINOPHIL NFR BLD: 1.4 % (ref 0–8)
EOSINOPHIL NFR BLD: 1.5 % (ref 0–8)
EOSINOPHIL NFR BLD: 1.5 % (ref 0–8)
EOSINOPHIL NFR BLD: 1.6 % (ref 0–8)
EOSINOPHIL NFR BLD: 1.6 % (ref 0–8)
EOSINOPHIL NFR BLD: 1.7 % (ref 0–8)
EOSINOPHIL NFR BLD: 1.8 % (ref 0–8)
EOSINOPHIL NFR BLD: 1.9 % (ref 0–8)
EOSINOPHIL NFR BLD: 2.1 % (ref 0–8)
EOSINOPHIL NFR BLD: 2.2 % (ref 0–8)
EOSINOPHIL NFR BLD: 2.2 % (ref 0–8)
EOSINOPHIL NFR BLD: 2.3 % (ref 0–8)
EOSINOPHIL NFR BLD: 2.3 % (ref 0–8)
EOSINOPHIL NFR BLD: 2.4 % (ref 0–8)
EOSINOPHIL NFR BLD: 2.4 % (ref 0–8)
EOSINOPHIL NFR BLD: 3.3 % (ref 0–8)
EOSINOPHIL NFR BLD: 3.4 % (ref 0–8)
EOSINOPHIL NFR BLD: 4.8 % (ref 0–8)
ERYTHROCYTE [DISTWIDTH] IN BLOOD BY AUTOMATED COUNT: 12.9 % (ref 11.5–14.5)
ERYTHROCYTE [DISTWIDTH] IN BLOOD BY AUTOMATED COUNT: 12.9 % (ref 11.5–14.5)
ERYTHROCYTE [DISTWIDTH] IN BLOOD BY AUTOMATED COUNT: 13.4 % (ref 11.5–14.5)
ERYTHROCYTE [DISTWIDTH] IN BLOOD BY AUTOMATED COUNT: 13.6 % (ref 11.5–14.5)
ERYTHROCYTE [DISTWIDTH] IN BLOOD BY AUTOMATED COUNT: 13.7 % (ref 11.5–14.5)
ERYTHROCYTE [DISTWIDTH] IN BLOOD BY AUTOMATED COUNT: 13.7 % (ref 11.5–14.5)
ERYTHROCYTE [DISTWIDTH] IN BLOOD BY AUTOMATED COUNT: 13.8 % (ref 11.5–14.5)
ERYTHROCYTE [DISTWIDTH] IN BLOOD BY AUTOMATED COUNT: 13.9 % (ref 11.5–14.5)
ERYTHROCYTE [DISTWIDTH] IN BLOOD BY AUTOMATED COUNT: 14 % (ref 11.5–14.5)
ERYTHROCYTE [DISTWIDTH] IN BLOOD BY AUTOMATED COUNT: 14.1 % (ref 11.5–14.5)
ERYTHROCYTE [DISTWIDTH] IN BLOOD BY AUTOMATED COUNT: 14.2 % (ref 11.5–14.5)
ERYTHROCYTE [DISTWIDTH] IN BLOOD BY AUTOMATED COUNT: 14.3 % (ref 11.5–14.5)
ERYTHROCYTE [DISTWIDTH] IN BLOOD BY AUTOMATED COUNT: 14.4 % (ref 11.5–14.5)
ERYTHROCYTE [DISTWIDTH] IN BLOOD BY AUTOMATED COUNT: 14.4 % (ref 11.5–14.5)
ERYTHROCYTE [DISTWIDTH] IN BLOOD BY AUTOMATED COUNT: 14.5 % (ref 11.5–14.5)
ERYTHROCYTE [DISTWIDTH] IN BLOOD BY AUTOMATED COUNT: 14.5 % (ref 11.5–14.5)
ERYTHROCYTE [DISTWIDTH] IN BLOOD BY AUTOMATED COUNT: 14.6 % (ref 11.5–14.5)
ERYTHROCYTE [DISTWIDTH] IN BLOOD BY AUTOMATED COUNT: 14.7 % (ref 11.5–14.5)
ERYTHROCYTE [DISTWIDTH] IN BLOOD BY AUTOMATED COUNT: 14.8 % (ref 11.5–14.5)
ERYTHROCYTE [DISTWIDTH] IN BLOOD BY AUTOMATED COUNT: 14.9 % (ref 11.5–14.5)
ERYTHROCYTE [DISTWIDTH] IN BLOOD BY AUTOMATED COUNT: 15 % (ref 11.5–14.5)
ERYTHROCYTE [DISTWIDTH] IN BLOOD BY AUTOMATED COUNT: 15.1 % (ref 11.5–14.5)
ERYTHROCYTE [DISTWIDTH] IN BLOOD BY AUTOMATED COUNT: 15.2 % (ref 11.5–14.5)
ERYTHROCYTE [DISTWIDTH] IN BLOOD BY AUTOMATED COUNT: 15.3 % (ref 11.5–14.5)
ERYTHROCYTE [DISTWIDTH] IN BLOOD BY AUTOMATED COUNT: 15.4 % (ref 11.5–14.5)
ERYTHROCYTE [DISTWIDTH] IN BLOOD BY AUTOMATED COUNT: 15.5 % (ref 11.5–14.5)
ERYTHROCYTE [DISTWIDTH] IN BLOOD BY AUTOMATED COUNT: 15.6 % (ref 11.5–14.5)
ERYTHROCYTE [DISTWIDTH] IN BLOOD BY AUTOMATED COUNT: 15.8 % (ref 11.5–14.5)
ERYTHROCYTE [DISTWIDTH] IN BLOOD BY AUTOMATED COUNT: 15.9 % (ref 11.5–14.5)
ERYTHROCYTE [DISTWIDTH] IN BLOOD BY AUTOMATED COUNT: 16 % (ref 11.5–14.5)
ERYTHROCYTE [DISTWIDTH] IN BLOOD BY AUTOMATED COUNT: 16.9 % (ref 11.5–14.5)
ERYTHROCYTE [DISTWIDTH] IN BLOOD BY AUTOMATED COUNT: 17.1 % (ref 11.5–14.5)
ERYTHROCYTE [SEDIMENTATION RATE] IN BLOOD BY WESTERGREN METHOD: 95 MM/HR (ref 0–36)
EST. GFR  (AFRICAN AMERICAN): 20.8 ML/MIN/1.73 M^2
EST. GFR  (AFRICAN AMERICAN): 21.8 ML/MIN/1.73 M^2
EST. GFR  (AFRICAN AMERICAN): 21.8 ML/MIN/1.73 M^2
EST. GFR  (AFRICAN AMERICAN): 24.1 ML/MIN/1.73 M^2
EST. GFR  (AFRICAN AMERICAN): 24.1 ML/MIN/1.73 M^2
EST. GFR  (AFRICAN AMERICAN): 25 ML/MIN/1.73 M^2
EST. GFR  (AFRICAN AMERICAN): 27 ML/MIN/1.73 M^2
EST. GFR  (AFRICAN AMERICAN): 28.5 ML/MIN/1.73 M^2
EST. GFR  (AFRICAN AMERICAN): 29 ML/MIN/1.73 M^2
EST. GFR  (AFRICAN AMERICAN): 32 ML/MIN/1.73 M^2
EST. GFR  (AFRICAN AMERICAN): 32 ML/MIN/1.73 M^2
EST. GFR  (AFRICAN AMERICAN): 32.4 ML/MIN/1.73 M^2
EST. GFR  (AFRICAN AMERICAN): 35 ML/MIN/1.73 M^2
EST. GFR  (AFRICAN AMERICAN): 37.4 ML/MIN/1.73 M^2
EST. GFR  (AFRICAN AMERICAN): 37.4 ML/MIN/1.73 M^2
EST. GFR  (AFRICAN AMERICAN): 40.4 ML/MIN/1.73 M^2
EST. GFR  (AFRICAN AMERICAN): 40.4 ML/MIN/1.73 M^2
EST. GFR  (AFRICAN AMERICAN): 43.9 ML/MIN/1.73 M^2
EST. GFR  (AFRICAN AMERICAN): 44 ML/MIN/1.73 M^2
EST. GFR  (AFRICAN AMERICAN): 48 ML/MIN/1.73 M^2
EST. GFR  (AFRICAN AMERICAN): 52.9 ML/MIN/1.73 M^2
EST. GFR  (AFRICAN AMERICAN): 58.8 ML/MIN/1.73 M^2
EST. GFR  (AFRICAN AMERICAN): >60 ML/MIN/1.73 M^2
EST. GFR  (NON AFRICAN AMERICAN): 18 ML/MIN/1.73 M^2
EST. GFR  (NON AFRICAN AMERICAN): 18.9 ML/MIN/1.73 M^2
EST. GFR  (NON AFRICAN AMERICAN): 18.9 ML/MIN/1.73 M^2
EST. GFR  (NON AFRICAN AMERICAN): 20.9 ML/MIN/1.73 M^2
EST. GFR  (NON AFRICAN AMERICAN): 20.9 ML/MIN/1.73 M^2
EST. GFR  (NON AFRICAN AMERICAN): 22 ML/MIN/1.73 M^2
EST. GFR  (NON AFRICAN AMERICAN): 23 ML/MIN/1.73 M^2
EST. GFR  (NON AFRICAN AMERICAN): 24.8 ML/MIN/1.73 M^2
EST. GFR  (NON AFRICAN AMERICAN): 25 ML/MIN/1.73 M^2
EST. GFR  (NON AFRICAN AMERICAN): 28 ML/MIN/1.73 M^2
EST. GFR  (NON AFRICAN AMERICAN): 28 ML/MIN/1.73 M^2
EST. GFR  (NON AFRICAN AMERICAN): 28.1 ML/MIN/1.73 M^2
EST. GFR  (NON AFRICAN AMERICAN): 30 ML/MIN/1.73 M^2
EST. GFR  (NON AFRICAN AMERICAN): 32.4 ML/MIN/1.73 M^2
EST. GFR  (NON AFRICAN AMERICAN): 32.4 ML/MIN/1.73 M^2
EST. GFR  (NON AFRICAN AMERICAN): 35 ML/MIN/1.73 M^2
EST. GFR  (NON AFRICAN AMERICAN): 35 ML/MIN/1.73 M^2
EST. GFR  (NON AFRICAN AMERICAN): 38 ML/MIN/1.73 M^2
EST. GFR  (NON AFRICAN AMERICAN): 38.1 ML/MIN/1.73 M^2
EST. GFR  (NON AFRICAN AMERICAN): 41.7 ML/MIN/1.73 M^2
EST. GFR  (NON AFRICAN AMERICAN): 45.9 ML/MIN/1.73 M^2
EST. GFR  (NON AFRICAN AMERICAN): 51 ML/MIN/1.73 M^2
EST. GFR  (NON AFRICAN AMERICAN): 57.2 ML/MIN/1.73 M^2
ESTIMATED AVG GLUCOSE: 91 MG/DL (ref 68–131)
ESTIMATED AVG GLUCOSE: 97 MG/DL (ref 68–131)
FERRITIN SERPL-MCNC: 815 NG/ML (ref 20–300)
FINAL PATHOLOGIC DIAGNOSIS: NORMAL
FINAL PATHOLOGIC DIAGNOSIS: NORMAL
FLOW: 2
FOLATE SERPL-MCNC: 2.3 NG/ML (ref 4–24)
FRACTIONAL SHORTENING: 23 % (ref 28–44)
FUNGUS SPEC CULT: NORMAL
FUNGUS SPEC CULT: NORMAL
GLUCOSE SERPL-MCNC: 100 MG/DL (ref 70–110)
GLUCOSE SERPL-MCNC: 101 MG/DL (ref 70–110)
GLUCOSE SERPL-MCNC: 101 MG/DL (ref 70–110)
GLUCOSE SERPL-MCNC: 103 MG/DL (ref 70–110)
GLUCOSE SERPL-MCNC: 104 MG/DL (ref 70–110)
GLUCOSE SERPL-MCNC: 104 MG/DL (ref 70–110)
GLUCOSE SERPL-MCNC: 105 MG/DL (ref 70–110)
GLUCOSE SERPL-MCNC: 107 MG/DL (ref 70–110)
GLUCOSE SERPL-MCNC: 108 MG/DL (ref 70–110)
GLUCOSE SERPL-MCNC: 111 MG/DL (ref 70–110)
GLUCOSE SERPL-MCNC: 113 MG/DL (ref 70–110)
GLUCOSE SERPL-MCNC: 116 MG/DL (ref 70–110)
GLUCOSE SERPL-MCNC: 119 MG/DL (ref 70–110)
GLUCOSE SERPL-MCNC: 121 MG/DL (ref 70–110)
GLUCOSE SERPL-MCNC: 121 MG/DL (ref 70–110)
GLUCOSE SERPL-MCNC: 122 MG/DL (ref 70–110)
GLUCOSE SERPL-MCNC: 123 MG/DL (ref 70–110)
GLUCOSE SERPL-MCNC: 124 MG/DL (ref 70–110)
GLUCOSE SERPL-MCNC: 124 MG/DL (ref 70–110)
GLUCOSE SERPL-MCNC: 126 MG/DL (ref 70–110)
GLUCOSE SERPL-MCNC: 128 MG/DL (ref 70–110)
GLUCOSE SERPL-MCNC: 130 MG/DL (ref 70–110)
GLUCOSE SERPL-MCNC: 132 MG/DL (ref 70–110)
GLUCOSE SERPL-MCNC: 135 MG/DL (ref 70–110)
GLUCOSE SERPL-MCNC: 136 MG/DL (ref 70–110)
GLUCOSE SERPL-MCNC: 144 MG/DL (ref 70–110)
GLUCOSE SERPL-MCNC: 148 MG/DL (ref 70–110)
GLUCOSE SERPL-MCNC: 150 MG/DL (ref 70–110)
GLUCOSE SERPL-MCNC: 164 MG/DL (ref 70–110)
GLUCOSE SERPL-MCNC: 172 MG/DL (ref 70–110)
GLUCOSE SERPL-MCNC: 174 MG/DL (ref 70–110)
GLUCOSE SERPL-MCNC: 175 MG/DL (ref 70–110)
GLUCOSE SERPL-MCNC: 177 MG/DL (ref 70–110)
GLUCOSE SERPL-MCNC: 242 MG/DL (ref 70–110)
GLUCOSE SERPL-MCNC: 66 MG/DL (ref 70–110)
GLUCOSE SERPL-MCNC: 73 MG/DL (ref 70–110)
GLUCOSE SERPL-MCNC: 78 MG/DL (ref 70–110)
GLUCOSE SERPL-MCNC: 79 MG/DL (ref 70–110)
GLUCOSE SERPL-MCNC: 79 MG/DL (ref 70–110)
GLUCOSE SERPL-MCNC: 80 MG/DL (ref 70–110)
GLUCOSE SERPL-MCNC: 84 MG/DL (ref 70–110)
GLUCOSE SERPL-MCNC: 87 MG/DL (ref 70–110)
GLUCOSE SERPL-MCNC: 88 MG/DL (ref 70–110)
GLUCOSE SERPL-MCNC: 89 MG/DL (ref 70–110)
GLUCOSE SERPL-MCNC: 91 MG/DL (ref 70–110)
GLUCOSE SERPL-MCNC: 94 MG/DL (ref 70–110)
GLUCOSE SERPL-MCNC: 96 MG/DL (ref 70–110)
GLUCOSE SERPL-MCNC: 96 MG/DL (ref 70–110)
GLUCOSE SERPL-MCNC: 97 MG/DL (ref 70–110)
GLUCOSE SERPL-MCNC: 97 MG/DL (ref 70–110)
GLUCOSE SERPL-MCNC: 98 MG/DL (ref 70–110)
GLUCOSE UR QL STRIP: NEGATIVE
GRAM STN SPEC: ABNORMAL
GRAM STN SPEC: ABNORMAL
GRAM STN SPEC: NORMAL
HBA1C MFR BLD: 4.8 % (ref 4–5.6)
HBA1C MFR BLD: 5 % (ref 4–5.6)
HCO3 UR-SCNC: 18.1 MMOL/L (ref 24–28)
HCO3 UR-SCNC: 18.7 MMOL/L (ref 24–28)
HCO3 UR-SCNC: 22.9 MMOL/L (ref 24–28)
HCT VFR BLD AUTO: 17.7 % (ref 37–48.5)
HCT VFR BLD AUTO: 19.2 % (ref 37–48.5)
HCT VFR BLD AUTO: 19.2 % (ref 37–48.5)
HCT VFR BLD AUTO: 20.5 % (ref 37–48.5)
HCT VFR BLD AUTO: 21 % (ref 37–48.5)
HCT VFR BLD AUTO: 21.8 % (ref 37–48.5)
HCT VFR BLD AUTO: 21.9 % (ref 37–48.5)
HCT VFR BLD AUTO: 22.1 % (ref 37–48.5)
HCT VFR BLD AUTO: 22.9 % (ref 37–48.5)
HCT VFR BLD AUTO: 23 % (ref 37–48.5)
HCT VFR BLD AUTO: 23 % (ref 37–48.5)
HCT VFR BLD AUTO: 23.2 % (ref 37–48.5)
HCT VFR BLD AUTO: 23.3 % (ref 37–48.5)
HCT VFR BLD AUTO: 23.3 % (ref 37–48.5)
HCT VFR BLD AUTO: 23.5 % (ref 37–48.5)
HCT VFR BLD AUTO: 23.6 % (ref 37–48.5)
HCT VFR BLD AUTO: 23.7 % (ref 37–48.5)
HCT VFR BLD AUTO: 23.8 % (ref 37–48.5)
HCT VFR BLD AUTO: 23.8 % (ref 37–48.5)
HCT VFR BLD AUTO: 23.9 % (ref 37–48.5)
HCT VFR BLD AUTO: 24.1 % (ref 37–48.5)
HCT VFR BLD AUTO: 24.2 % (ref 37–48.5)
HCT VFR BLD AUTO: 24.5 % (ref 37–48.5)
HCT VFR BLD AUTO: 24.6 % (ref 37–48.5)
HCT VFR BLD AUTO: 24.6 % (ref 37–48.5)
HCT VFR BLD AUTO: 24.7 % (ref 37–48.5)
HCT VFR BLD AUTO: 24.7 % (ref 37–48.5)
HCT VFR BLD AUTO: 25.2 % (ref 37–48.5)
HCT VFR BLD AUTO: 25.4 % (ref 37–48.5)
HCT VFR BLD AUTO: 25.7 % (ref 37–48.5)
HCT VFR BLD AUTO: 25.8 % (ref 37–48.5)
HCT VFR BLD AUTO: 26.2 % (ref 37–48.5)
HCT VFR BLD AUTO: 26.2 % (ref 37–48.5)
HCT VFR BLD AUTO: 26.5 % (ref 37–48.5)
HCT VFR BLD AUTO: 26.9 % (ref 37–48.5)
HCT VFR BLD AUTO: 27.2 % (ref 37–48.5)
HCT VFR BLD AUTO: 27.4 % (ref 37–48.5)
HCT VFR BLD AUTO: 27.8 % (ref 37–48.5)
HCT VFR BLD AUTO: 27.9 % (ref 37–48.5)
HCT VFR BLD AUTO: 28.1 % (ref 37–48.5)
HCT VFR BLD AUTO: 28.2 % (ref 37–48.5)
HCT VFR BLD AUTO: 28.3 % (ref 37–48.5)
HCT VFR BLD AUTO: 28.6 % (ref 37–48.5)
HCT VFR BLD AUTO: 28.7 % (ref 37–48.5)
HCT VFR BLD AUTO: 28.7 % (ref 37–48.5)
HCT VFR BLD AUTO: 29.7 % (ref 37–48.5)
HCT VFR BLD AUTO: 29.9 % (ref 37–48.5)
HCT VFR BLD AUTO: 30.5 % (ref 37–48.5)
HCT VFR BLD AUTO: 30.7 % (ref 37–48.5)
HCT VFR BLD AUTO: 31.3 % (ref 37–48.5)
HCT VFR BLD AUTO: 32.2 % (ref 37–48.5)
HCT VFR BLD AUTO: 32.7 % (ref 37–48.5)
HDLC SERPL-MCNC: 38 MG/DL (ref 40–75)
HDLC SERPL: 27.5 % (ref 20–50)
HGB BLD-MCNC: 10.1 G/DL (ref 12–16)
HGB BLD-MCNC: 10.1 G/DL (ref 12–16)
HGB BLD-MCNC: 10.2 G/DL (ref 12–16)
HGB BLD-MCNC: 10.3 G/DL (ref 12–16)
HGB BLD-MCNC: 10.4 G/DL (ref 12–16)
HGB BLD-MCNC: 10.7 G/DL (ref 12–16)
HGB BLD-MCNC: 6.1 G/DL (ref 12–16)
HGB BLD-MCNC: 6.5 G/DL (ref 12–16)
HGB BLD-MCNC: 6.6 G/DL (ref 12–16)
HGB BLD-MCNC: 6.9 G/DL (ref 12–16)
HGB BLD-MCNC: 7 G/DL (ref 12–16)
HGB BLD-MCNC: 7.1 G/DL (ref 12–16)
HGB BLD-MCNC: 7.1 G/DL (ref 12–16)
HGB BLD-MCNC: 7.2 G/DL (ref 12–16)
HGB BLD-MCNC: 7.3 G/DL (ref 12–16)
HGB BLD-MCNC: 7.3 G/DL (ref 12–16)
HGB BLD-MCNC: 7.4 G/DL (ref 12–16)
HGB BLD-MCNC: 7.4 G/DL (ref 12–16)
HGB BLD-MCNC: 7.5 G/DL (ref 12–16)
HGB BLD-MCNC: 7.6 G/DL (ref 12–16)
HGB BLD-MCNC: 7.6 G/DL (ref 12–16)
HGB BLD-MCNC: 7.7 G/DL (ref 12–16)
HGB BLD-MCNC: 7.7 G/DL (ref 12–16)
HGB BLD-MCNC: 7.8 G/DL (ref 12–16)
HGB BLD-MCNC: 7.9 G/DL (ref 12–16)
HGB BLD-MCNC: 8 G/DL (ref 12–16)
HGB BLD-MCNC: 8.1 G/DL (ref 12–16)
HGB BLD-MCNC: 8.2 G/DL (ref 12–16)
HGB BLD-MCNC: 8.2 G/DL (ref 12–16)
HGB BLD-MCNC: 8.3 G/DL (ref 12–16)
HGB BLD-MCNC: 8.4 G/DL (ref 12–16)
HGB BLD-MCNC: 8.5 G/DL (ref 12–16)
HGB BLD-MCNC: 8.6 G/DL (ref 12–16)
HGB BLD-MCNC: 8.7 G/DL (ref 12–16)
HGB BLD-MCNC: 8.7 G/DL (ref 12–16)
HGB BLD-MCNC: 8.9 G/DL (ref 12–16)
HGB BLD-MCNC: 9 G/DL (ref 12–16)
HGB BLD-MCNC: 9 G/DL (ref 12–16)
HGB BLD-MCNC: 9.1 G/DL (ref 12–16)
HGB BLD-MCNC: 9.1 G/DL (ref 12–16)
HGB BLD-MCNC: 9.2 G/DL (ref 12–16)
HGB BLD-MCNC: 9.2 G/DL (ref 12–16)
HGB BLD-MCNC: 9.4 G/DL (ref 12–16)
HGB BLD-MCNC: 9.8 G/DL (ref 12–16)
HGB UR QL STRIP: ABNORMAL
HYALINE CASTS #/AREA URNS LPF: 3 /LPF
HYALINE CASTS UR QL AUTO: 0 /LPF
HYALINE CASTS UR QL AUTO: 3 /LPF
HYPOCHROMIA BLD QL SMEAR: ABNORMAL
HYPOCHROMIA BLD QL SMEAR: ABNORMAL
IMM GRANULOCYTES # BLD AUTO: 0.02 K/UL (ref 0–0.04)
IMM GRANULOCYTES # BLD AUTO: 0.03 K/UL (ref 0–0.04)
IMM GRANULOCYTES # BLD AUTO: 0.04 K/UL (ref 0–0.04)
IMM GRANULOCYTES # BLD AUTO: 0.05 K/UL (ref 0–0.04)
IMM GRANULOCYTES # BLD AUTO: 0.06 K/UL (ref 0–0.04)
IMM GRANULOCYTES # BLD AUTO: 0.07 K/UL (ref 0–0.04)
IMM GRANULOCYTES # BLD AUTO: 0.08 K/UL (ref 0–0.04)
IMM GRANULOCYTES # BLD AUTO: 0.09 K/UL (ref 0–0.04)
IMM GRANULOCYTES # BLD AUTO: 0.09 K/UL (ref 0–0.04)
IMM GRANULOCYTES # BLD AUTO: 0.13 K/UL (ref 0–0.04)
IMM GRANULOCYTES # BLD AUTO: 0.17 K/UL (ref 0–0.04)
IMM GRANULOCYTES # BLD AUTO: 0.17 K/UL (ref 0–0.04)
IMM GRANULOCYTES NFR BLD AUTO: 0.2 % (ref 0–0.5)
IMM GRANULOCYTES NFR BLD AUTO: 0.3 % (ref 0–0.5)
IMM GRANULOCYTES NFR BLD AUTO: 0.4 % (ref 0–0.5)
IMM GRANULOCYTES NFR BLD AUTO: 0.5 % (ref 0–0.5)
IMM GRANULOCYTES NFR BLD AUTO: 0.6 % (ref 0–0.5)
IMM GRANULOCYTES NFR BLD AUTO: 0.7 % (ref 0–0.5)
IMM GRANULOCYTES NFR BLD AUTO: 0.8 % (ref 0–0.5)
IMM GRANULOCYTES NFR BLD AUTO: 0.8 % (ref 0–0.5)
IMM GRANULOCYTES NFR BLD AUTO: 1.1 % (ref 0–0.5)
INR PPP: 1.1 (ref 0.8–1.2)
INTERVENTRICULAR SEPTUM: 1.14 CM (ref 0.6–1.1)
IRON SERPL-MCNC: 24 UG/DL (ref 30–160)
KETONES UR QL STRIP: NEGATIVE
LA MAJOR: 4.8 CM
LA MINOR: 4.8 CM
LA WIDTH: 3.7 CM
LACTATE SERPL-SCNC: 0.7 MMOL/L (ref 0.5–2.2)
LACTATE SERPL-SCNC: 0.9 MMOL/L (ref 0.5–2.2)
LDLC SERPL CALC-MCNC: 81.8 MG/DL (ref 63–159)
LEFT ATRIUM SIZE: 3.7 CM
LEFT ATRIUM VOLUME INDEX: 35.8 ML/M2
LEFT ATRIUM VOLUME: 55.86 CM3
LEFT INTERNAL DIMENSION IN SYSTOLE: 2.67 CM (ref 2.1–4)
LEFT VENTRICLE DIASTOLIC VOLUME INDEX: 32.46 ML/M2
LEFT VENTRICLE DIASTOLIC VOLUME: 50.63 ML
LEFT VENTRICLE MASS INDEX: 90 G/M2
LEFT VENTRICLE SYSTOLIC VOLUME INDEX: 16.8 ML/M2
LEFT VENTRICLE SYSTOLIC VOLUME: 26.26 ML
LEFT VENTRICULAR INTERNAL DIMENSION IN DIASTOLE: 3.49 CM (ref 3.5–6)
LEFT VENTRICULAR MASS: 140.49 G
LEUKOCYTE ESTERASE UR QL STRIP: ABNORMAL
LYMPHOCYTES # BLD AUTO: 1.6 K/UL (ref 1–4.8)
LYMPHOCYTES # BLD AUTO: 1.9 K/UL (ref 1–4.8)
LYMPHOCYTES # BLD AUTO: 2 K/UL (ref 1–4.8)
LYMPHOCYTES # BLD AUTO: 2.1 K/UL (ref 1–4.8)
LYMPHOCYTES # BLD AUTO: 2.2 K/UL (ref 1–4.8)
LYMPHOCYTES # BLD AUTO: 2.3 K/UL (ref 1–4.8)
LYMPHOCYTES # BLD AUTO: 2.4 K/UL (ref 1–4.8)
LYMPHOCYTES # BLD AUTO: 2.5 K/UL (ref 1–4.8)
LYMPHOCYTES # BLD AUTO: 2.5 K/UL (ref 1–4.8)
LYMPHOCYTES # BLD AUTO: 2.6 K/UL (ref 1–4.8)
LYMPHOCYTES # BLD AUTO: 2.6 K/UL (ref 1–4.8)
LYMPHOCYTES # BLD AUTO: 2.7 K/UL (ref 1–4.8)
LYMPHOCYTES # BLD AUTO: 2.8 K/UL (ref 1–4.8)
LYMPHOCYTES # BLD AUTO: 2.9 K/UL (ref 1–4.8)
LYMPHOCYTES # BLD AUTO: 3 K/UL (ref 1–4.8)
LYMPHOCYTES # BLD AUTO: 3.1 K/UL (ref 1–4.8)
LYMPHOCYTES # BLD AUTO: 3.1 K/UL (ref 1–4.8)
LYMPHOCYTES # BLD AUTO: 3.4 K/UL (ref 1–4.8)
LYMPHOCYTES # BLD AUTO: 3.8 K/UL (ref 1–4.8)
LYMPHOCYTES # BLD AUTO: 3.8 K/UL (ref 1–4.8)
LYMPHOCYTES NFR BLD: 10.5 % (ref 18–48)
LYMPHOCYTES NFR BLD: 10.6 % (ref 18–48)
LYMPHOCYTES NFR BLD: 13.3 % (ref 18–48)
LYMPHOCYTES NFR BLD: 13.5 % (ref 18–48)
LYMPHOCYTES NFR BLD: 13.8 % (ref 18–48)
LYMPHOCYTES NFR BLD: 13.9 % (ref 18–48)
LYMPHOCYTES NFR BLD: 14 % (ref 18–48)
LYMPHOCYTES NFR BLD: 15.3 % (ref 18–48)
LYMPHOCYTES NFR BLD: 15.5 % (ref 18–48)
LYMPHOCYTES NFR BLD: 16.7 % (ref 18–48)
LYMPHOCYTES NFR BLD: 16.7 % (ref 18–48)
LYMPHOCYTES NFR BLD: 17 % (ref 18–48)
LYMPHOCYTES NFR BLD: 17.8 % (ref 18–48)
LYMPHOCYTES NFR BLD: 17.8 % (ref 18–48)
LYMPHOCYTES NFR BLD: 18 % (ref 18–48)
LYMPHOCYTES NFR BLD: 18 % (ref 18–48)
LYMPHOCYTES NFR BLD: 18.5 % (ref 18–48)
LYMPHOCYTES NFR BLD: 19.1 % (ref 18–48)
LYMPHOCYTES NFR BLD: 19.2 % (ref 18–48)
LYMPHOCYTES NFR BLD: 19.6 % (ref 18–48)
LYMPHOCYTES NFR BLD: 20.2 % (ref 18–48)
LYMPHOCYTES NFR BLD: 20.5 % (ref 18–48)
LYMPHOCYTES NFR BLD: 20.5 % (ref 18–48)
LYMPHOCYTES NFR BLD: 21.1 % (ref 18–48)
LYMPHOCYTES NFR BLD: 21.4 % (ref 18–48)
LYMPHOCYTES NFR BLD: 22 % (ref 18–48)
LYMPHOCYTES NFR BLD: 22.6 % (ref 18–48)
LYMPHOCYTES NFR BLD: 22.7 % (ref 18–48)
LYMPHOCYTES NFR BLD: 22.9 % (ref 18–48)
LYMPHOCYTES NFR BLD: 23.3 % (ref 18–48)
LYMPHOCYTES NFR BLD: 23.5 % (ref 18–48)
LYMPHOCYTES NFR BLD: 23.7 % (ref 18–48)
LYMPHOCYTES NFR BLD: 23.8 % (ref 18–48)
LYMPHOCYTES NFR BLD: 24.2 % (ref 18–48)
LYMPHOCYTES NFR BLD: 24.3 % (ref 18–48)
LYMPHOCYTES NFR BLD: 24.9 % (ref 18–48)
LYMPHOCYTES NFR BLD: 25.7 % (ref 18–48)
LYMPHOCYTES NFR BLD: 25.9 % (ref 18–48)
LYMPHOCYTES NFR BLD: 26 % (ref 18–48)
LYMPHOCYTES NFR BLD: 26.1 % (ref 18–48)
LYMPHOCYTES NFR BLD: 26.2 % (ref 18–48)
LYMPHOCYTES NFR BLD: 26.3 % (ref 18–48)
LYMPHOCYTES NFR BLD: 27.5 % (ref 18–48)
LYMPHOCYTES NFR BLD: 30.2 % (ref 18–48)
LYMPHOCYTES NFR BLD: 32.6 % (ref 18–48)
LYMPHOCYTES NFR BLD: 32.6 % (ref 18–48)
LYMPHOCYTES NFR BLD: 37.8 % (ref 18–48)
LYMPHOCYTES NFR BLD: 39 % (ref 18–48)
LYMPHOCYTES NFR BLD: 40.9 % (ref 18–48)
LYMPHOCYTES NFR BLD: 42.1 % (ref 18–48)
LYMPHOCYTES NFR BLD: 42.2 % (ref 18–48)
LYMPHOCYTES NFR BLD: 42.4 % (ref 18–48)
Lab: NORMAL
Lab: NORMAL
MAGNESIUM SERPL-MCNC: 1.7 MG/DL (ref 1.6–2.6)
MAGNESIUM SERPL-MCNC: 1.7 MG/DL (ref 1.6–2.6)
MAGNESIUM SERPL-MCNC: 1.8 MG/DL (ref 1.6–2.6)
MAGNESIUM SERPL-MCNC: 1.9 MG/DL (ref 1.6–2.6)
MAGNESIUM SERPL-MCNC: 2 MG/DL (ref 1.6–2.6)
MAGNESIUM SERPL-MCNC: 2.1 MG/DL (ref 1.6–2.6)
MAGNESIUM SERPL-MCNC: 2.1 MG/DL (ref 1.6–2.6)
MAGNESIUM SERPL-MCNC: 2.2 MG/DL (ref 1.6–2.6)
MAGNESIUM SERPL-MCNC: 2.2 MG/DL (ref 1.6–2.6)
MAGNESIUM SERPL-MCNC: 2.3 MG/DL (ref 1.6–2.6)
MCH RBC QN AUTO: 28.1 PG (ref 27–31)
MCH RBC QN AUTO: 28.3 PG (ref 27–31)
MCH RBC QN AUTO: 28.4 PG (ref 27–31)
MCH RBC QN AUTO: 28.5 PG (ref 27–31)
MCH RBC QN AUTO: 28.6 PG (ref 27–31)
MCH RBC QN AUTO: 28.6 PG (ref 27–31)
MCH RBC QN AUTO: 28.7 PG (ref 27–31)
MCH RBC QN AUTO: 28.8 PG (ref 27–31)
MCH RBC QN AUTO: 28.8 PG (ref 27–31)
MCH RBC QN AUTO: 29 PG (ref 27–31)
MCH RBC QN AUTO: 29 PG (ref 27–31)
MCH RBC QN AUTO: 29.1 PG (ref 27–31)
MCH RBC QN AUTO: 29.2 PG (ref 27–31)
MCH RBC QN AUTO: 29.3 PG (ref 27–31)
MCH RBC QN AUTO: 29.4 PG (ref 27–31)
MCH RBC QN AUTO: 29.5 PG (ref 27–31)
MCH RBC QN AUTO: 29.5 PG (ref 27–31)
MCH RBC QN AUTO: 29.7 PG (ref 27–31)
MCH RBC QN AUTO: 29.8 PG (ref 27–31)
MCH RBC QN AUTO: 29.9 PG (ref 27–31)
MCH RBC QN AUTO: 30 PG (ref 27–31)
MCH RBC QN AUTO: 30.1 PG (ref 27–31)
MCH RBC QN AUTO: 30.2 PG (ref 27–31)
MCH RBC QN AUTO: 30.2 PG (ref 27–31)
MCH RBC QN AUTO: 30.3 PG (ref 27–31)
MCH RBC QN AUTO: 30.5 PG (ref 27–31)
MCH RBC QN AUTO: 30.5 PG (ref 27–31)
MCH RBC QN AUTO: 30.6 PG (ref 27–31)
MCH RBC QN AUTO: 30.7 PG (ref 27–31)
MCH RBC QN AUTO: 30.8 PG (ref 27–31)
MCH RBC QN AUTO: 30.9 PG (ref 27–31)
MCH RBC QN AUTO: 31 PG (ref 27–31)
MCH RBC QN AUTO: 31.1 PG (ref 27–31)
MCH RBC QN AUTO: 31.1 PG (ref 27–31)
MCH RBC QN AUTO: 31.2 PG (ref 27–31)
MCH RBC QN AUTO: 31.3 PG (ref 27–31)
MCHC RBC AUTO-ENTMCNC: 28.9 G/DL (ref 32–36)
MCHC RBC AUTO-ENTMCNC: 30.5 G/DL (ref 32–36)
MCHC RBC AUTO-ENTMCNC: 30.6 G/DL (ref 32–36)
MCHC RBC AUTO-ENTMCNC: 30.8 G/DL (ref 32–36)
MCHC RBC AUTO-ENTMCNC: 30.9 G/DL (ref 32–36)
MCHC RBC AUTO-ENTMCNC: 31 G/DL (ref 32–36)
MCHC RBC AUTO-ENTMCNC: 31 G/DL (ref 32–36)
MCHC RBC AUTO-ENTMCNC: 31.1 G/DL (ref 32–36)
MCHC RBC AUTO-ENTMCNC: 31.2 G/DL (ref 32–36)
MCHC RBC AUTO-ENTMCNC: 31.3 G/DL (ref 32–36)
MCHC RBC AUTO-ENTMCNC: 31.3 G/DL (ref 32–36)
MCHC RBC AUTO-ENTMCNC: 31.4 G/DL (ref 32–36)
MCHC RBC AUTO-ENTMCNC: 31.5 G/DL (ref 32–36)
MCHC RBC AUTO-ENTMCNC: 31.5 G/DL (ref 32–36)
MCHC RBC AUTO-ENTMCNC: 31.6 G/DL (ref 32–36)
MCHC RBC AUTO-ENTMCNC: 31.7 G/DL (ref 32–36)
MCHC RBC AUTO-ENTMCNC: 31.7 G/DL (ref 32–36)
MCHC RBC AUTO-ENTMCNC: 31.8 G/DL (ref 32–36)
MCHC RBC AUTO-ENTMCNC: 31.8 G/DL (ref 32–36)
MCHC RBC AUTO-ENTMCNC: 32 G/DL (ref 32–36)
MCHC RBC AUTO-ENTMCNC: 32 G/DL (ref 32–36)
MCHC RBC AUTO-ENTMCNC: 32.1 G/DL (ref 32–36)
MCHC RBC AUTO-ENTMCNC: 32.2 G/DL (ref 32–36)
MCHC RBC AUTO-ENTMCNC: 32.3 G/DL (ref 32–36)
MCHC RBC AUTO-ENTMCNC: 32.4 G/DL (ref 32–36)
MCHC RBC AUTO-ENTMCNC: 32.5 G/DL (ref 32–36)
MCHC RBC AUTO-ENTMCNC: 32.6 G/DL (ref 32–36)
MCHC RBC AUTO-ENTMCNC: 32.7 G/DL (ref 32–36)
MCHC RBC AUTO-ENTMCNC: 32.9 G/DL (ref 32–36)
MCHC RBC AUTO-ENTMCNC: 33 G/DL (ref 32–36)
MCHC RBC AUTO-ENTMCNC: 33.1 G/DL (ref 32–36)
MCHC RBC AUTO-ENTMCNC: 33.2 G/DL (ref 32–36)
MCHC RBC AUTO-ENTMCNC: 33.2 G/DL (ref 32–36)
MCHC RBC AUTO-ENTMCNC: 33.5 G/DL (ref 32–36)
MCHC RBC AUTO-ENTMCNC: 33.8 G/DL (ref 32–36)
MCHC RBC AUTO-ENTMCNC: 34.2 G/DL (ref 32–36)
MCHC RBC AUTO-ENTMCNC: 34.5 G/DL (ref 32–36)
MCHC RBC AUTO-ENTMCNC: 34.8 G/DL (ref 32–36)
MCV RBC AUTO: 88 FL (ref 82–98)
MCV RBC AUTO: 89 FL (ref 82–98)
MCV RBC AUTO: 90 FL (ref 82–98)
MCV RBC AUTO: 91 FL (ref 82–98)
MCV RBC AUTO: 92 FL (ref 82–98)
MCV RBC AUTO: 93 FL (ref 82–98)
MCV RBC AUTO: 94 FL (ref 82–98)
MCV RBC AUTO: 95 FL (ref 82–98)
MCV RBC AUTO: 96 FL (ref 82–98)
MCV RBC AUTO: 96 FL (ref 82–98)
MCV RBC AUTO: 97 FL (ref 82–98)
MCV RBC AUTO: 98 FL (ref 82–98)
MICROSCOPIC COMMENT: ABNORMAL
MODE: ABNORMAL
MONOCYTES # BLD AUTO: 0.5 K/UL (ref 0.3–1)
MONOCYTES # BLD AUTO: 0.6 K/UL (ref 0.3–1)
MONOCYTES # BLD AUTO: 0.7 K/UL (ref 0.3–1)
MONOCYTES # BLD AUTO: 0.8 K/UL (ref 0.3–1)
MONOCYTES # BLD AUTO: 0.9 K/UL (ref 0.3–1)
MONOCYTES # BLD AUTO: 1 K/UL (ref 0.3–1)
MONOCYTES # BLD AUTO: 1.1 K/UL (ref 0.3–1)
MONOCYTES # BLD AUTO: 1.2 K/UL (ref 0.3–1)
MONOCYTES # BLD AUTO: 1.3 K/UL (ref 0.3–1)
MONOCYTES # BLD AUTO: 1.4 K/UL (ref 0.3–1)
MONOCYTES # BLD AUTO: 1.5 K/UL (ref 0.3–1)
MONOCYTES # BLD AUTO: 1.6 K/UL (ref 0.3–1)
MONOCYTES NFR BLD: 10 % (ref 4–15)
MONOCYTES NFR BLD: 10.1 % (ref 4–15)
MONOCYTES NFR BLD: 10.4 % (ref 4–15)
MONOCYTES NFR BLD: 10.5 % (ref 4–15)
MONOCYTES NFR BLD: 10.7 % (ref 4–15)
MONOCYTES NFR BLD: 10.8 % (ref 4–15)
MONOCYTES NFR BLD: 11.5 % (ref 4–15)
MONOCYTES NFR BLD: 11.5 % (ref 4–15)
MONOCYTES NFR BLD: 14 % (ref 4–15)
MONOCYTES NFR BLD: 3.6 % (ref 4–15)
MONOCYTES NFR BLD: 4.7 % (ref 4–15)
MONOCYTES NFR BLD: 5.4 % (ref 4–15)
MONOCYTES NFR BLD: 5.5 % (ref 4–15)
MONOCYTES NFR BLD: 6.7 % (ref 4–15)
MONOCYTES NFR BLD: 6.9 % (ref 4–15)
MONOCYTES NFR BLD: 7 % (ref 4–15)
MONOCYTES NFR BLD: 7.3 % (ref 4–15)
MONOCYTES NFR BLD: 7.3 % (ref 4–15)
MONOCYTES NFR BLD: 7.4 % (ref 4–15)
MONOCYTES NFR BLD: 7.5 % (ref 4–15)
MONOCYTES NFR BLD: 7.5 % (ref 4–15)
MONOCYTES NFR BLD: 7.6 % (ref 4–15)
MONOCYTES NFR BLD: 7.6 % (ref 4–15)
MONOCYTES NFR BLD: 7.7 % (ref 4–15)
MONOCYTES NFR BLD: 7.8 % (ref 4–15)
MONOCYTES NFR BLD: 7.9 % (ref 4–15)
MONOCYTES NFR BLD: 8 % (ref 4–15)
MONOCYTES NFR BLD: 8.1 % (ref 4–15)
MONOCYTES NFR BLD: 8.3 % (ref 4–15)
MONOCYTES NFR BLD: 8.3 % (ref 4–15)
MONOCYTES NFR BLD: 8.4 % (ref 4–15)
MONOCYTES NFR BLD: 8.4 % (ref 4–15)
MONOCYTES NFR BLD: 8.5 % (ref 4–15)
MONOCYTES NFR BLD: 8.5 % (ref 4–15)
MONOCYTES NFR BLD: 8.6 % (ref 4–15)
MONOCYTES NFR BLD: 8.7 % (ref 4–15)
MONOCYTES NFR BLD: 8.7 % (ref 4–15)
MONOCYTES NFR BLD: 8.8 % (ref 4–15)
MONOCYTES NFR BLD: 8.9 % (ref 4–15)
MONOCYTES NFR BLD: 9 % (ref 4–15)
MONOCYTES NFR BLD: 9.2 % (ref 4–15)
MONOCYTES NFR BLD: 9.2 % (ref 4–15)
MONOCYTES NFR BLD: 9.4 % (ref 4–15)
MONOCYTES NFR BLD: 9.5 % (ref 4–15)
MONOCYTES NFR BLD: 9.5 % (ref 4–15)
MONOCYTES NFR BLD: 9.6 % (ref 4–15)
MONOCYTES NFR BLD: 9.7 % (ref 4–15)
NEUTROPHILS # BLD AUTO: 10.1 K/UL (ref 1.8–7.7)
NEUTROPHILS # BLD AUTO: 10.2 K/UL (ref 1.8–7.7)
NEUTROPHILS # BLD AUTO: 10.3 K/UL (ref 1.8–7.7)
NEUTROPHILS # BLD AUTO: 11 K/UL (ref 1.8–7.7)
NEUTROPHILS # BLD AUTO: 11.2 K/UL (ref 1.8–7.7)
NEUTROPHILS # BLD AUTO: 11.7 K/UL (ref 1.8–7.7)
NEUTROPHILS # BLD AUTO: 12 K/UL (ref 1.8–7.7)
NEUTROPHILS # BLD AUTO: 12.2 K/UL (ref 1.8–7.7)
NEUTROPHILS # BLD AUTO: 12.7 K/UL (ref 1.8–7.7)
NEUTROPHILS # BLD AUTO: 12.8 K/UL (ref 1.8–7.7)
NEUTROPHILS # BLD AUTO: 14.9 K/UL (ref 1.8–7.7)
NEUTROPHILS # BLD AUTO: 16.4 K/UL (ref 1.8–7.7)
NEUTROPHILS # BLD AUTO: 3.1 K/UL (ref 1.8–7.7)
NEUTROPHILS # BLD AUTO: 3.1 K/UL (ref 1.8–7.7)
NEUTROPHILS # BLD AUTO: 3.3 K/UL (ref 1.8–7.7)
NEUTROPHILS # BLD AUTO: 4.2 K/UL (ref 1.8–7.7)
NEUTROPHILS # BLD AUTO: 4.3 K/UL (ref 1.8–7.7)
NEUTROPHILS # BLD AUTO: 5 K/UL (ref 1.8–7.7)
NEUTROPHILS # BLD AUTO: 5 K/UL (ref 1.8–7.7)
NEUTROPHILS # BLD AUTO: 5.1 K/UL (ref 1.8–7.7)
NEUTROPHILS # BLD AUTO: 5.1 K/UL (ref 1.8–7.7)
NEUTROPHILS # BLD AUTO: 5.6 K/UL (ref 1.8–7.7)
NEUTROPHILS # BLD AUTO: 5.7 K/UL (ref 1.8–7.7)
NEUTROPHILS # BLD AUTO: 5.8 K/UL (ref 1.8–7.7)
NEUTROPHILS # BLD AUTO: 5.8 K/UL (ref 1.8–7.7)
NEUTROPHILS # BLD AUTO: 6.2 K/UL (ref 1.8–7.7)
NEUTROPHILS # BLD AUTO: 6.5 K/UL (ref 1.8–7.7)
NEUTROPHILS # BLD AUTO: 6.6 K/UL (ref 1.8–7.7)
NEUTROPHILS # BLD AUTO: 6.8 K/UL (ref 1.8–7.7)
NEUTROPHILS # BLD AUTO: 6.8 K/UL (ref 1.8–7.7)
NEUTROPHILS # BLD AUTO: 6.9 K/UL (ref 1.8–7.7)
NEUTROPHILS # BLD AUTO: 7 K/UL (ref 1.8–7.7)
NEUTROPHILS # BLD AUTO: 7.2 K/UL (ref 1.8–7.7)
NEUTROPHILS # BLD AUTO: 7.4 K/UL (ref 1.8–7.7)
NEUTROPHILS # BLD AUTO: 7.4 K/UL (ref 1.8–7.7)
NEUTROPHILS # BLD AUTO: 7.5 K/UL (ref 1.8–7.7)
NEUTROPHILS # BLD AUTO: 7.6 K/UL (ref 1.8–7.7)
NEUTROPHILS # BLD AUTO: 7.7 K/UL (ref 1.8–7.7)
NEUTROPHILS # BLD AUTO: 7.7 K/UL (ref 1.8–7.7)
NEUTROPHILS # BLD AUTO: 7.8 K/UL (ref 1.8–7.7)
NEUTROPHILS # BLD AUTO: 7.8 K/UL (ref 1.8–7.7)
NEUTROPHILS # BLD AUTO: 7.9 K/UL (ref 1.8–7.7)
NEUTROPHILS # BLD AUTO: 8 K/UL (ref 1.8–7.7)
NEUTROPHILS # BLD AUTO: 8.2 K/UL (ref 1.8–7.7)
NEUTROPHILS # BLD AUTO: 8.5 K/UL (ref 1.8–7.7)
NEUTROPHILS # BLD AUTO: 9 K/UL (ref 1.8–7.7)
NEUTROPHILS # BLD AUTO: 9.1 K/UL (ref 1.8–7.7)
NEUTROPHILS # BLD AUTO: 9.4 K/UL (ref 1.8–7.7)
NEUTROPHILS # BLD AUTO: 9.5 K/UL (ref 1.8–7.7)
NEUTROPHILS # BLD AUTO: 9.9 K/UL (ref 1.8–7.7)
NEUTROPHILS NFR BLD: 42.9 % (ref 38–73)
NEUTROPHILS NFR BLD: 43.7 % (ref 38–73)
NEUTROPHILS NFR BLD: 44.6 % (ref 38–73)
NEUTROPHILS NFR BLD: 47.2 % (ref 38–73)
NEUTROPHILS NFR BLD: 48.8 % (ref 38–73)
NEUTROPHILS NFR BLD: 50.4 % (ref 38–73)
NEUTROPHILS NFR BLD: 55.4 % (ref 38–73)
NEUTROPHILS NFR BLD: 55.4 % (ref 38–73)
NEUTROPHILS NFR BLD: 56.7 % (ref 38–73)
NEUTROPHILS NFR BLD: 58.4 % (ref 38–73)
NEUTROPHILS NFR BLD: 60 % (ref 38–73)
NEUTROPHILS NFR BLD: 60.2 % (ref 38–73)
NEUTROPHILS NFR BLD: 61.2 % (ref 38–73)
NEUTROPHILS NFR BLD: 61.6 % (ref 38–73)
NEUTROPHILS NFR BLD: 61.9 % (ref 38–73)
NEUTROPHILS NFR BLD: 63 % (ref 38–73)
NEUTROPHILS NFR BLD: 63.3 % (ref 38–73)
NEUTROPHILS NFR BLD: 63.5 % (ref 38–73)
NEUTROPHILS NFR BLD: 64.1 % (ref 38–73)
NEUTROPHILS NFR BLD: 64.3 % (ref 38–73)
NEUTROPHILS NFR BLD: 64.6 % (ref 38–73)
NEUTROPHILS NFR BLD: 64.9 % (ref 38–73)
NEUTROPHILS NFR BLD: 65.2 % (ref 38–73)
NEUTROPHILS NFR BLD: 65.2 % (ref 38–73)
NEUTROPHILS NFR BLD: 65.3 % (ref 38–73)
NEUTROPHILS NFR BLD: 65.7 % (ref 38–73)
NEUTROPHILS NFR BLD: 66.4 % (ref 38–73)
NEUTROPHILS NFR BLD: 66.6 % (ref 38–73)
NEUTROPHILS NFR BLD: 67.1 % (ref 38–73)
NEUTROPHILS NFR BLD: 67.1 % (ref 38–73)
NEUTROPHILS NFR BLD: 68.2 % (ref 38–73)
NEUTROPHILS NFR BLD: 68.5 % (ref 38–73)
NEUTROPHILS NFR BLD: 68.8 % (ref 38–73)
NEUTROPHILS NFR BLD: 68.9 % (ref 38–73)
NEUTROPHILS NFR BLD: 69.3 % (ref 38–73)
NEUTROPHILS NFR BLD: 69.3 % (ref 38–73)
NEUTROPHILS NFR BLD: 69.6 % (ref 38–73)
NEUTROPHILS NFR BLD: 69.6 % (ref 38–73)
NEUTROPHILS NFR BLD: 71.2 % (ref 38–73)
NEUTROPHILS NFR BLD: 71.4 % (ref 38–73)
NEUTROPHILS NFR BLD: 71.5 % (ref 38–73)
NEUTROPHILS NFR BLD: 71.8 % (ref 38–73)
NEUTROPHILS NFR BLD: 72.9 % (ref 38–73)
NEUTROPHILS NFR BLD: 73.7 % (ref 38–73)
NEUTROPHILS NFR BLD: 74.3 % (ref 38–73)
NEUTROPHILS NFR BLD: 74.7 % (ref 38–73)
NEUTROPHILS NFR BLD: 75.2 % (ref 38–73)
NEUTROPHILS NFR BLD: 75.2 % (ref 38–73)
NEUTROPHILS NFR BLD: 75.6 % (ref 38–73)
NEUTROPHILS NFR BLD: 75.8 % (ref 38–73)
NEUTROPHILS NFR BLD: 77.3 % (ref 38–73)
NEUTROPHILS NFR BLD: 78.7 % (ref 38–73)
NEUTROPHILS NFR BLD: 80.1 % (ref 38–73)
NEUTROPHILS NFR BLD: 80.3 % (ref 38–73)
NEUTROPHILS NFR BLD: 80.3 % (ref 38–73)
NEUTROPHILS NFR BLD: 83.3 % (ref 38–73)
NITRITE UR QL STRIP: NEGATIVE
NITRITE UR QL STRIP: POSITIVE
NITRITE UR QL STRIP: POSITIVE
NONHDLC SERPL-MCNC: 100 MG/DL
NRBC BLD-RTO: 0 /100 WBC
NUM UNITS TRANS PACKED RBC: NORMAL
NUM UNITS TRANS PACKED RBC: NORMAL
OB PNL STL: NEGATIVE
OVALOCYTES BLD QL SMEAR: ABNORMAL
OVALOCYTES BLD QL SMEAR: ABNORMAL
PCO2 BLDA: 29.3 MMHG (ref 35–45)
PCO2 BLDA: 30.1 MMHG (ref 35–45)
PCO2 BLDA: 31.1 MMHG (ref 35–45)
PH SMN: 7.37 [PH] (ref 7.35–7.45)
PH SMN: 7.4 [PH] (ref 7.35–7.45)
PH SMN: 7.5 [PH] (ref 7.35–7.45)
PH UR STRIP: 5 [PH] (ref 5–8)
PH UR STRIP: 6 [PH] (ref 5–8)
PH UR STRIP: 6 [PH] (ref 5–8)
PHOSPHATE SERPL-MCNC: 2.1 MG/DL (ref 2.7–4.5)
PHOSPHATE SERPL-MCNC: 2.3 MG/DL (ref 2.7–4.5)
PHOSPHATE SERPL-MCNC: 2.3 MG/DL (ref 2.7–4.5)
PHOSPHATE SERPL-MCNC: 2.6 MG/DL (ref 2.7–4.5)
PHOSPHATE SERPL-MCNC: 2.8 MG/DL (ref 2.7–4.5)
PHOSPHATE SERPL-MCNC: 2.9 MG/DL (ref 2.7–4.5)
PHOSPHATE SERPL-MCNC: 3 MG/DL (ref 2.7–4.5)
PHOSPHATE SERPL-MCNC: 3.2 MG/DL (ref 2.7–4.5)
PHOSPHATE SERPL-MCNC: 3.2 MG/DL (ref 2.7–4.5)
PHOSPHATE SERPL-MCNC: 3.3 MG/DL (ref 2.7–4.5)
PHOSPHATE SERPL-MCNC: 3.3 MG/DL (ref 2.7–4.5)
PHOSPHATE SERPL-MCNC: 3.4 MG/DL (ref 2.7–4.5)
PHOSPHATE SERPL-MCNC: 3.5 MG/DL (ref 2.7–4.5)
PHOSPHATE SERPL-MCNC: 3.7 MG/DL (ref 2.7–4.5)
PHOSPHATE SERPL-MCNC: 4.6 MG/DL (ref 2.7–4.5)
PISA TR MAX VEL: 2.78 M/S
PLATELET # BLD AUTO: 106 K/UL (ref 150–450)
PLATELET # BLD AUTO: 107 K/UL (ref 150–450)
PLATELET # BLD AUTO: 107 K/UL (ref 150–450)
PLATELET # BLD AUTO: 108 K/UL (ref 150–450)
PLATELET # BLD AUTO: 113 K/UL (ref 150–450)
PLATELET # BLD AUTO: 115 K/UL (ref 150–450)
PLATELET # BLD AUTO: 119 K/UL (ref 150–450)
PLATELET # BLD AUTO: 125 K/UL (ref 150–450)
PLATELET # BLD AUTO: 128 K/UL (ref 150–450)
PLATELET # BLD AUTO: 131 K/UL (ref 150–450)
PLATELET # BLD AUTO: 139 K/UL (ref 150–450)
PLATELET # BLD AUTO: 140 K/UL (ref 150–450)
PLATELET # BLD AUTO: 144 K/UL (ref 150–450)
PLATELET # BLD AUTO: 146 K/UL (ref 150–450)
PLATELET # BLD AUTO: 147 K/UL (ref 150–450)
PLATELET # BLD AUTO: 148 K/UL (ref 150–450)
PLATELET # BLD AUTO: 149 K/UL (ref 150–450)
PLATELET # BLD AUTO: 151 K/UL (ref 150–450)
PLATELET # BLD AUTO: 154 K/UL (ref 150–450)
PLATELET # BLD AUTO: 154 K/UL (ref 150–450)
PLATELET # BLD AUTO: 155 K/UL (ref 150–450)
PLATELET # BLD AUTO: 155 K/UL (ref 150–450)
PLATELET # BLD AUTO: 156 K/UL (ref 150–450)
PLATELET # BLD AUTO: 158 K/UL (ref 150–450)
PLATELET # BLD AUTO: 158 K/UL (ref 150–450)
PLATELET # BLD AUTO: 159 K/UL (ref 150–450)
PLATELET # BLD AUTO: 160 K/UL (ref 150–450)
PLATELET # BLD AUTO: 162 K/UL (ref 150–450)
PLATELET # BLD AUTO: 162 K/UL (ref 150–450)
PLATELET # BLD AUTO: 165 K/UL (ref 150–450)
PLATELET # BLD AUTO: 165 K/UL (ref 150–450)
PLATELET # BLD AUTO: 167 K/UL (ref 150–450)
PLATELET # BLD AUTO: 168 K/UL (ref 150–450)
PLATELET # BLD AUTO: 169 K/UL (ref 150–450)
PLATELET # BLD AUTO: 175 K/UL (ref 150–450)
PLATELET # BLD AUTO: 178 K/UL (ref 150–450)
PLATELET # BLD AUTO: 178 K/UL (ref 150–450)
PLATELET # BLD AUTO: 179 K/UL (ref 150–450)
PLATELET # BLD AUTO: 179 K/UL (ref 150–450)
PLATELET # BLD AUTO: 180 K/UL (ref 150–450)
PLATELET # BLD AUTO: 181 K/UL (ref 150–450)
PLATELET # BLD AUTO: 181 K/UL (ref 150–450)
PLATELET # BLD AUTO: 183 K/UL (ref 150–450)
PLATELET # BLD AUTO: 185 K/UL (ref 150–450)
PLATELET # BLD AUTO: 187 K/UL (ref 150–450)
PLATELET # BLD AUTO: 190 K/UL (ref 150–450)
PLATELET # BLD AUTO: 193 K/UL (ref 150–450)
PLATELET # BLD AUTO: 194 K/UL (ref 150–450)
PLATELET # BLD AUTO: 198 K/UL (ref 150–450)
PLATELET # BLD AUTO: 202 K/UL (ref 150–450)
PLATELET # BLD AUTO: 209 K/UL (ref 150–450)
PLATELET BLD QL SMEAR: ABNORMAL
PLATELET BLD QL SMEAR: ABNORMAL
PMV BLD AUTO: 10.8 FL (ref 9.2–12.9)
PMV BLD AUTO: 11.1 FL (ref 9.2–12.9)
PMV BLD AUTO: 11.2 FL (ref 9.2–12.9)
PMV BLD AUTO: 11.3 FL (ref 9.2–12.9)
PMV BLD AUTO: 11.4 FL (ref 9.2–12.9)
PMV BLD AUTO: 11.6 FL (ref 9.2–12.9)
PMV BLD AUTO: 11.7 FL (ref 9.2–12.9)
PMV BLD AUTO: 11.8 FL (ref 9.2–12.9)
PMV BLD AUTO: 11.9 FL (ref 9.2–12.9)
PMV BLD AUTO: 12 FL (ref 9.2–12.9)
PMV BLD AUTO: 12.1 FL (ref 9.2–12.9)
PMV BLD AUTO: 12.2 FL (ref 9.2–12.9)
PMV BLD AUTO: 12.2 FL (ref 9.2–12.9)
PMV BLD AUTO: 12.3 FL (ref 9.2–12.9)
PMV BLD AUTO: 12.3 FL (ref 9.2–12.9)
PMV BLD AUTO: 12.4 FL (ref 9.2–12.9)
PMV BLD AUTO: 12.4 FL (ref 9.2–12.9)
PMV BLD AUTO: 12.5 FL (ref 9.2–12.9)
PMV BLD AUTO: 12.6 FL (ref 9.2–12.9)
PMV BLD AUTO: 12.7 FL (ref 9.2–12.9)
PMV BLD AUTO: 12.9 FL (ref 9.2–12.9)
PO2 BLDA: 133 MMHG (ref 80–100)
PO2 BLDA: 198 MMHG (ref 80–100)
PO2 BLDA: 66 MMHG (ref 80–100)
POC BE: -6 MMOL/L
POC BE: -7 MMOL/L
POC BE: 0 MMOL/L
POC SATURATED O2: 100 % (ref 95–100)
POC SATURATED O2: 93 % (ref 95–100)
POC SATURATED O2: 99 % (ref 95–100)
POC TCO2: 19 MMOL/L (ref 23–27)
POC TCO2: 20 MMOL/L (ref 23–27)
POC TCO2: 24 MMOL/L (ref 23–27)
POCT GLUCOSE: 100 MG/DL (ref 70–110)
POCT GLUCOSE: 101 MG/DL (ref 70–110)
POCT GLUCOSE: 102 MG/DL (ref 70–110)
POCT GLUCOSE: 103 MG/DL (ref 70–110)
POCT GLUCOSE: 104 MG/DL (ref 70–110)
POCT GLUCOSE: 104 MG/DL (ref 70–110)
POCT GLUCOSE: 105 MG/DL (ref 70–110)
POCT GLUCOSE: 105 MG/DL (ref 70–110)
POCT GLUCOSE: 106 MG/DL (ref 70–110)
POCT GLUCOSE: 106 MG/DL (ref 70–110)
POCT GLUCOSE: 107 MG/DL (ref 70–110)
POCT GLUCOSE: 108 MG/DL (ref 70–110)
POCT GLUCOSE: 109 MG/DL (ref 70–110)
POCT GLUCOSE: 109 MG/DL (ref 70–110)
POCT GLUCOSE: 110 MG/DL (ref 70–110)
POCT GLUCOSE: 110 MG/DL (ref 70–110)
POCT GLUCOSE: 111 MG/DL (ref 70–110)
POCT GLUCOSE: 112 MG/DL (ref 70–110)
POCT GLUCOSE: 113 MG/DL (ref 70–110)
POCT GLUCOSE: 113 MG/DL (ref 70–110)
POCT GLUCOSE: 114 MG/DL (ref 70–110)
POCT GLUCOSE: 115 MG/DL (ref 70–110)
POCT GLUCOSE: 116 MG/DL (ref 70–110)
POCT GLUCOSE: 117 MG/DL (ref 70–110)
POCT GLUCOSE: 118 MG/DL (ref 70–110)
POCT GLUCOSE: 119 MG/DL (ref 70–110)
POCT GLUCOSE: 120 MG/DL (ref 70–110)
POCT GLUCOSE: 121 MG/DL (ref 70–110)
POCT GLUCOSE: 121 MG/DL (ref 70–110)
POCT GLUCOSE: 122 MG/DL (ref 70–110)
POCT GLUCOSE: 123 MG/DL (ref 70–110)
POCT GLUCOSE: 124 MG/DL (ref 70–110)
POCT GLUCOSE: 125 MG/DL (ref 70–110)
POCT GLUCOSE: 126 MG/DL (ref 70–110)
POCT GLUCOSE: 126 MG/DL (ref 70–110)
POCT GLUCOSE: 128 MG/DL (ref 70–110)
POCT GLUCOSE: 128 MG/DL (ref 70–110)
POCT GLUCOSE: 130 MG/DL (ref 70–110)
POCT GLUCOSE: 131 MG/DL (ref 70–110)
POCT GLUCOSE: 132 MG/DL (ref 70–110)
POCT GLUCOSE: 132 MG/DL (ref 70–110)
POCT GLUCOSE: 133 MG/DL (ref 70–110)
POCT GLUCOSE: 134 MG/DL (ref 70–110)
POCT GLUCOSE: 135 MG/DL (ref 70–110)
POCT GLUCOSE: 137 MG/DL (ref 70–110)
POCT GLUCOSE: 137 MG/DL (ref 70–110)
POCT GLUCOSE: 138 MG/DL (ref 70–110)
POCT GLUCOSE: 139 MG/DL (ref 70–110)
POCT GLUCOSE: 141 MG/DL (ref 70–110)
POCT GLUCOSE: 142 MG/DL (ref 70–110)
POCT GLUCOSE: 144 MG/DL (ref 70–110)
POCT GLUCOSE: 145 MG/DL (ref 70–110)
POCT GLUCOSE: 145 MG/DL (ref 70–110)
POCT GLUCOSE: 146 MG/DL (ref 70–110)
POCT GLUCOSE: 146 MG/DL (ref 70–110)
POCT GLUCOSE: 147 MG/DL (ref 70–110)
POCT GLUCOSE: 149 MG/DL (ref 70–110)
POCT GLUCOSE: 149 MG/DL (ref 70–110)
POCT GLUCOSE: 152 MG/DL (ref 70–110)
POCT GLUCOSE: 154 MG/DL (ref 70–110)
POCT GLUCOSE: 155 MG/DL (ref 70–110)
POCT GLUCOSE: 156 MG/DL (ref 70–110)
POCT GLUCOSE: 157 MG/DL (ref 70–110)
POCT GLUCOSE: 157 MG/DL (ref 70–110)
POCT GLUCOSE: 158 MG/DL (ref 70–110)
POCT GLUCOSE: 158 MG/DL (ref 70–110)
POCT GLUCOSE: 159 MG/DL (ref 70–110)
POCT GLUCOSE: 160 MG/DL (ref 70–110)
POCT GLUCOSE: 161 MG/DL (ref 70–110)
POCT GLUCOSE: 162 MG/DL (ref 70–110)
POCT GLUCOSE: 164 MG/DL (ref 70–110)
POCT GLUCOSE: 164 MG/DL (ref 70–110)
POCT GLUCOSE: 165 MG/DL (ref 70–110)
POCT GLUCOSE: 166 MG/DL (ref 70–110)
POCT GLUCOSE: 169 MG/DL (ref 70–110)
POCT GLUCOSE: 169 MG/DL (ref 70–110)
POCT GLUCOSE: 170 MG/DL (ref 70–110)
POCT GLUCOSE: 171 MG/DL (ref 70–110)
POCT GLUCOSE: 173 MG/DL (ref 70–110)
POCT GLUCOSE: 173 MG/DL (ref 70–110)
POCT GLUCOSE: 174 MG/DL (ref 70–110)
POCT GLUCOSE: 175 MG/DL (ref 70–110)
POCT GLUCOSE: 176 MG/DL (ref 70–110)
POCT GLUCOSE: 176 MG/DL (ref 70–110)
POCT GLUCOSE: 177 MG/DL (ref 70–110)
POCT GLUCOSE: 179 MG/DL (ref 70–110)
POCT GLUCOSE: 180 MG/DL (ref 70–110)
POCT GLUCOSE: 186 MG/DL (ref 70–110)
POCT GLUCOSE: 189 MG/DL (ref 70–110)
POCT GLUCOSE: 194 MG/DL (ref 70–110)
POCT GLUCOSE: 196 MG/DL (ref 70–110)
POCT GLUCOSE: 197 MG/DL (ref 70–110)
POCT GLUCOSE: 200 MG/DL (ref 70–110)
POCT GLUCOSE: 204 MG/DL (ref 70–110)
POCT GLUCOSE: 206 MG/DL (ref 70–110)
POCT GLUCOSE: 206 MG/DL (ref 70–110)
POCT GLUCOSE: 217 MG/DL (ref 70–110)
POCT GLUCOSE: 220 MG/DL (ref 70–110)
POCT GLUCOSE: 220 MG/DL (ref 70–110)
POCT GLUCOSE: 223 MG/DL (ref 70–110)
POCT GLUCOSE: 249 MG/DL (ref 70–110)
POCT GLUCOSE: 37 MG/DL (ref 70–110)
POCT GLUCOSE: 67 MG/DL (ref 70–110)
POCT GLUCOSE: 76 MG/DL (ref 70–110)
POCT GLUCOSE: 76 MG/DL (ref 70–110)
POCT GLUCOSE: 77 MG/DL (ref 70–110)
POCT GLUCOSE: 78 MG/DL (ref 70–110)
POCT GLUCOSE: 79 MG/DL (ref 70–110)
POCT GLUCOSE: 80 MG/DL (ref 70–110)
POCT GLUCOSE: 81 MG/DL (ref 70–110)
POCT GLUCOSE: 81 MG/DL (ref 70–110)
POCT GLUCOSE: 82 MG/DL (ref 70–110)
POCT GLUCOSE: 83 MG/DL (ref 70–110)
POCT GLUCOSE: 85 MG/DL (ref 70–110)
POCT GLUCOSE: 86 MG/DL (ref 70–110)
POCT GLUCOSE: 86 MG/DL (ref 70–110)
POCT GLUCOSE: 88 MG/DL (ref 70–110)
POCT GLUCOSE: 89 MG/DL (ref 70–110)
POCT GLUCOSE: 90 MG/DL (ref 70–110)
POCT GLUCOSE: 90 MG/DL (ref 70–110)
POCT GLUCOSE: 91 MG/DL (ref 70–110)
POCT GLUCOSE: 91 MG/DL (ref 70–110)
POCT GLUCOSE: 92 MG/DL (ref 70–110)
POCT GLUCOSE: 92 MG/DL (ref 70–110)
POCT GLUCOSE: 93 MG/DL (ref 70–110)
POCT GLUCOSE: 93 MG/DL (ref 70–110)
POCT GLUCOSE: 95 MG/DL (ref 70–110)
POCT GLUCOSE: 96 MG/DL (ref 70–110)
POCT GLUCOSE: 96 MG/DL (ref 70–110)
POCT GLUCOSE: 97 MG/DL (ref 70–110)
POCT GLUCOSE: 98 MG/DL (ref 70–110)
POCT GLUCOSE: 98 MG/DL (ref 70–110)
POCT GLUCOSE: 99 MG/DL (ref 70–110)
POCT GLUCOSE: <20 MG/DL (ref 70–110)
POIKILOCYTOSIS BLD QL SMEAR: SLIGHT
POLYCHROMASIA BLD QL SMEAR: ABNORMAL
POTASSIUM SERPL-SCNC: 3.1 MMOL/L (ref 3.5–5.1)
POTASSIUM SERPL-SCNC: 3.2 MMOL/L (ref 3.5–5.1)
POTASSIUM SERPL-SCNC: 3.3 MMOL/L (ref 3.5–5.1)
POTASSIUM SERPL-SCNC: 3.4 MMOL/L (ref 3.5–5.1)
POTASSIUM SERPL-SCNC: 3.5 MMOL/L (ref 3.5–5.1)
POTASSIUM SERPL-SCNC: 3.6 MMOL/L (ref 3.5–5.1)
POTASSIUM SERPL-SCNC: 3.7 MMOL/L (ref 3.5–5.1)
POTASSIUM SERPL-SCNC: 3.7 MMOL/L (ref 3.5–5.1)
POTASSIUM SERPL-SCNC: 3.8 MMOL/L (ref 3.5–5.1)
POTASSIUM SERPL-SCNC: 3.9 MMOL/L (ref 3.5–5.1)
POTASSIUM SERPL-SCNC: 4 MMOL/L (ref 3.5–5.1)
POTASSIUM SERPL-SCNC: 4.1 MMOL/L (ref 3.5–5.1)
POTASSIUM SERPL-SCNC: 4.2 MMOL/L (ref 3.5–5.1)
POTASSIUM SERPL-SCNC: 4.3 MMOL/L (ref 3.5–5.1)
POTASSIUM SERPL-SCNC: 4.3 MMOL/L (ref 3.5–5.1)
POTASSIUM SERPL-SCNC: 4.4 MMOL/L (ref 3.5–5.1)
POTASSIUM SERPL-SCNC: 4.6 MMOL/L (ref 3.5–5.1)
POTASSIUM SERPL-SCNC: 4.7 MMOL/L (ref 3.5–5.1)
PROCALCITONIN SERPL IA-MCNC: 0.88 NG/ML
PROT SERPL-MCNC: 5.1 G/DL (ref 6–8.4)
PROT SERPL-MCNC: 5.2 G/DL (ref 6–8.4)
PROT SERPL-MCNC: 5.3 G/DL (ref 6–8.4)
PROT SERPL-MCNC: 5.4 G/DL (ref 6–8.4)
PROT SERPL-MCNC: 5.5 G/DL (ref 6–8.4)
PROT SERPL-MCNC: 5.7 G/DL (ref 6–8.4)
PROT SERPL-MCNC: 5.7 G/DL (ref 6–8.4)
PROT SERPL-MCNC: 5.8 G/DL (ref 6–8.4)
PROT SERPL-MCNC: 5.9 G/DL (ref 6–8.4)
PROT SERPL-MCNC: 6 G/DL (ref 6–8.4)
PROT SERPL-MCNC: 6.1 G/DL (ref 6–8.4)
PROT SERPL-MCNC: 6.3 G/DL (ref 6–8.4)
PROT SERPL-MCNC: 6.4 G/DL (ref 6–8.4)
PROT SERPL-MCNC: 6.4 G/DL (ref 6–8.4)
PROT SERPL-MCNC: 6.5 G/DL (ref 6–8.4)
PROT SERPL-MCNC: 6.5 G/DL (ref 6–8.4)
PROT SERPL-MCNC: 6.7 G/DL (ref 6–8.4)
PROT SERPL-MCNC: 6.8 G/DL (ref 6–8.4)
PROT SERPL-MCNC: 6.8 G/DL (ref 6–8.4)
PROT SERPL-MCNC: 7 G/DL (ref 6–8.4)
PROT SERPL-MCNC: 7.1 G/DL (ref 6–8.4)
PROT SERPL-MCNC: 7.1 G/DL (ref 6–8.4)
PROT SERPL-MCNC: 7.4 G/DL (ref 6–8.4)
PROT SERPL-MCNC: 7.4 G/DL (ref 6–8.4)
PROT SERPL-MCNC: 7.5 G/DL (ref 6–8.4)
PROT SERPL-MCNC: 7.5 G/DL (ref 6–8.4)
PROT SERPL-MCNC: 7.6 G/DL (ref 6–8.4)
PROT UR QL STRIP: ABNORMAL
PROTHROMBIN TIME: 11 SEC (ref 9–12.5)
PROTHROMBIN TIME: 11.3 SEC (ref 9–12.5)
PROTHROMBIN TIME: 11.3 SEC (ref 9–12.5)
RA MAJOR: 4.02 CM
RA WIDTH: 3.32 CM
RBC # BLD AUTO: 1.97 M/UL (ref 4–5.4)
RBC # BLD AUTO: 2.04 M/UL (ref 4–5.4)
RBC # BLD AUTO: 2.04 M/UL (ref 4–5.4)
RBC # BLD AUTO: 2.17 M/UL (ref 4–5.4)
RBC # BLD AUTO: 2.31 M/UL (ref 4–5.4)
RBC # BLD AUTO: 2.33 M/UL (ref 4–5.4)
RBC # BLD AUTO: 2.34 M/UL (ref 4–5.4)
RBC # BLD AUTO: 2.38 M/UL (ref 4–5.4)
RBC # BLD AUTO: 2.44 M/UL (ref 4–5.4)
RBC # BLD AUTO: 2.49 M/UL (ref 4–5.4)
RBC # BLD AUTO: 2.5 M/UL (ref 4–5.4)
RBC # BLD AUTO: 2.5 M/UL (ref 4–5.4)
RBC # BLD AUTO: 2.52 M/UL (ref 4–5.4)
RBC # BLD AUTO: 2.52 M/UL (ref 4–5.4)
RBC # BLD AUTO: 2.54 M/UL (ref 4–5.4)
RBC # BLD AUTO: 2.56 M/UL (ref 4–5.4)
RBC # BLD AUTO: 2.57 M/UL (ref 4–5.4)
RBC # BLD AUTO: 2.57 M/UL (ref 4–5.4)
RBC # BLD AUTO: 2.59 M/UL (ref 4–5.4)
RBC # BLD AUTO: 2.62 M/UL (ref 4–5.4)
RBC # BLD AUTO: 2.65 M/UL (ref 4–5.4)
RBC # BLD AUTO: 2.65 M/UL (ref 4–5.4)
RBC # BLD AUTO: 2.66 M/UL (ref 4–5.4)
RBC # BLD AUTO: 2.69 M/UL (ref 4–5.4)
RBC # BLD AUTO: 2.71 M/UL (ref 4–5.4)
RBC # BLD AUTO: 2.74 M/UL (ref 4–5.4)
RBC # BLD AUTO: 2.75 M/UL (ref 4–5.4)
RBC # BLD AUTO: 2.79 M/UL (ref 4–5.4)
RBC # BLD AUTO: 2.8 M/UL (ref 4–5.4)
RBC # BLD AUTO: 2.82 M/UL (ref 4–5.4)
RBC # BLD AUTO: 2.85 M/UL (ref 4–5.4)
RBC # BLD AUTO: 2.85 M/UL (ref 4–5.4)
RBC # BLD AUTO: 2.86 M/UL (ref 4–5.4)
RBC # BLD AUTO: 2.88 M/UL (ref 4–5.4)
RBC # BLD AUTO: 2.91 M/UL (ref 4–5.4)
RBC # BLD AUTO: 2.93 M/UL (ref 4–5.4)
RBC # BLD AUTO: 3 M/UL (ref 4–5.4)
RBC # BLD AUTO: 3 M/UL (ref 4–5.4)
RBC # BLD AUTO: 3.06 M/UL (ref 4–5.4)
RBC # BLD AUTO: 3.06 M/UL (ref 4–5.4)
RBC # BLD AUTO: 3.07 M/UL (ref 4–5.4)
RBC # BLD AUTO: 3.09 M/UL (ref 4–5.4)
RBC # BLD AUTO: 3.15 M/UL (ref 4–5.4)
RBC # BLD AUTO: 3.17 M/UL (ref 4–5.4)
RBC # BLD AUTO: 3.25 M/UL (ref 4–5.4)
RBC # BLD AUTO: 3.28 M/UL (ref 4–5.4)
RBC # BLD AUTO: 3.29 M/UL (ref 4–5.4)
RBC # BLD AUTO: 3.38 M/UL (ref 4–5.4)
RBC # BLD AUTO: 3.38 M/UL (ref 4–5.4)
RBC # BLD AUTO: 3.42 M/UL (ref 4–5.4)
RBC # BLD AUTO: 3.55 M/UL (ref 4–5.4)
RBC #/AREA URNS AUTO: 13 /HPF (ref 0–4)
RBC #/AREA URNS AUTO: 14 /HPF (ref 0–4)
RBC #/AREA URNS AUTO: 5 /HPF (ref 0–4)
RBC #/AREA URNS AUTO: 7 /HPF (ref 0–4)
RBC #/AREA URNS HPF: 7 /HPF (ref 0–4)
RIGHT VENTRICULAR END-DIASTOLIC DIMENSION: 2.84 CM
SAMPLE: ABNORMAL
SARS-COV-2 RDRP RESP QL NAA+PROBE: NEGATIVE
SARS-COV-2 RNA RESP QL NAA+PROBE: NOT DETECTED
SARS-COV-2- CYCLE NUMBER: NORMAL
SATURATED IRON: 16 % (ref 20–50)
SINUS: 2.49 CM
SITE: ABNORMAL
SODIUM SERPL-SCNC: 133 MMOL/L (ref 136–145)
SODIUM SERPL-SCNC: 134 MMOL/L (ref 136–145)
SODIUM SERPL-SCNC: 135 MMOL/L (ref 136–145)
SODIUM SERPL-SCNC: 137 MMOL/L (ref 136–145)
SODIUM SERPL-SCNC: 137 MMOL/L (ref 136–145)
SODIUM SERPL-SCNC: 138 MMOL/L (ref 136–145)
SODIUM SERPL-SCNC: 139 MMOL/L (ref 136–145)
SODIUM SERPL-SCNC: 140 MMOL/L (ref 136–145)
SODIUM SERPL-SCNC: 141 MMOL/L (ref 136–145)
SODIUM SERPL-SCNC: 142 MMOL/L (ref 136–145)
SODIUM SERPL-SCNC: 143 MMOL/L (ref 136–145)
SODIUM SERPL-SCNC: 144 MMOL/L (ref 136–145)
SODIUM SERPL-SCNC: 145 MMOL/L (ref 136–145)
SODIUM SERPL-SCNC: 146 MMOL/L (ref 136–145)
SODIUM SERPL-SCNC: 148 MMOL/L (ref 136–145)
SP GR UR STRIP: 1 (ref 1–1.03)
SP GR UR STRIP: 1.01 (ref 1–1.03)
SP GR UR STRIP: 1.01 (ref 1–1.03)
SP GR UR STRIP: 1.02 (ref 1–1.03)
SP GR UR STRIP: 1.02 (ref 1–1.03)
SP02: 100
SPHEROCYTES BLD QL SMEAR: ABNORMAL
SQUAMOUS #/AREA URNS AUTO: 0 /HPF
SQUAMOUS #/AREA URNS AUTO: 5 /HPF
SQUAMOUS #/AREA URNS HPF: 5 /HPF
STJ: 2.52 CM
TB INDURATION 48 - 72 HR READ: 0 MM
TDI LATERAL: 0.06 M/S
TDI SEPTAL: 0.07 M/S
TDI: 0.07 M/S
TOTAL IRON BINDING CAPACITY: 152 UG/DL (ref 250–450)
TR MAX PG: 31 MMHG
TRANS ERYTHROCYTES VOL PATIENT: NORMAL ML
TRANSFERRIN SERPL-MCNC: 103 MG/DL (ref 200–375)
TRICUSPID ANNULAR PLANE SYSTOLIC EXCURSION: 3.34 CM
TRIGL SERPL-MCNC: 91 MG/DL (ref 30–150)
TROPONIN I SERPL DL<=0.01 NG/ML-MCNC: 0.02 NG/ML (ref 0–0.03)
TROPONIN I SERPL DL<=0.01 NG/ML-MCNC: 0.03 NG/ML (ref 0–0.03)
TSH SERPL DL<=0.005 MIU/L-ACNC: 1.52 UIU/ML (ref 0.4–4)
TSH SERPL DL<=0.005 MIU/L-ACNC: 2.27 UIU/ML (ref 0.4–4)
UNIT NUMBER: NORMAL
URATE SERPL-MCNC: 7.8 MG/DL (ref 2.4–5.7)
URN SPEC COLLECT METH UR: ABNORMAL
UROBILINOGEN UR STRIP-ACNC: NEGATIVE EU/DL
VANCOMYCIN SERPL-MCNC: 11.6 UG/ML
VANCOMYCIN SERPL-MCNC: 13.1 UG/ML
VANCOMYCIN SERPL-MCNC: 14 UG/ML
VANCOMYCIN SERPL-MCNC: 15.6 UG/ML
VANCOMYCIN SERPL-MCNC: 16.7 UG/ML
VANCOMYCIN SERPL-MCNC: 17 UG/ML
VANCOMYCIN SERPL-MCNC: 18.2 UG/ML
VANCOMYCIN SERPL-MCNC: 18.6 UG/ML
VANCOMYCIN SERPL-MCNC: 18.7 UG/ML
VANCOMYCIN SERPL-MCNC: 18.8 UG/ML
VANCOMYCIN SERPL-MCNC: 19 UG/ML
VANCOMYCIN SERPL-MCNC: 19.3 UG/ML
VANCOMYCIN SERPL-MCNC: 19.4 UG/ML
VANCOMYCIN SERPL-MCNC: 19.7 UG/ML
VANCOMYCIN SERPL-MCNC: 19.9 UG/ML
VANCOMYCIN SERPL-MCNC: 19.9 UG/ML
VANCOMYCIN SERPL-MCNC: 2.7 UG/ML
VANCOMYCIN SERPL-MCNC: 20.8 UG/ML
VANCOMYCIN SERPL-MCNC: 21 UG/ML
VANCOMYCIN SERPL-MCNC: 21.8 UG/ML
VANCOMYCIN SERPL-MCNC: 22 UG/ML
VANCOMYCIN SERPL-MCNC: 23.1 UG/ML
VANCOMYCIN SERPL-MCNC: 23.4 UG/ML
VANCOMYCIN SERPL-MCNC: 25.9 UG/ML
VANCOMYCIN SERPL-MCNC: 26.6 UG/ML
VANCOMYCIN SERPL-MCNC: 27.1 UG/ML
VANCOMYCIN SERPL-MCNC: 3.5 UG/ML
VANCOMYCIN TROUGH SERPL-MCNC: 17.4 UG/ML (ref 10–22)
VANCOMYCIN TROUGH SERPL-MCNC: 19.6 UG/ML (ref 10–22)
VANCOMYCIN TROUGH SERPL-MCNC: 21.2 UG/ML (ref 10–22)
VANCOMYCIN TROUGH SERPL-MCNC: 22 UG/ML (ref 10–22)
VANCOMYCIN TROUGH SERPL-MCNC: 22.3 UG/ML (ref 10–22)
VANCOMYCIN TROUGH SERPL-MCNC: 23.2 UG/ML (ref 10–22)
VANCOMYCIN TROUGH SERPL-MCNC: 24.2 UG/ML (ref 10–22)
VANCOMYCIN TROUGH SERPL-MCNC: 41.3 UG/ML (ref 10–22)
VIT B12 SERPL-MCNC: 390 PG/ML (ref 210–950)
WBC # BLD AUTO: 10 K/UL (ref 3.9–12.7)
WBC # BLD AUTO: 10.01 K/UL (ref 3.9–12.7)
WBC # BLD AUTO: 10.38 K/UL (ref 3.9–12.7)
WBC # BLD AUTO: 10.41 K/UL (ref 3.9–12.7)
WBC # BLD AUTO: 10.45 K/UL (ref 3.9–12.7)
WBC # BLD AUTO: 10.49 K/UL (ref 3.9–12.7)
WBC # BLD AUTO: 10.63 K/UL (ref 3.9–12.7)
WBC # BLD AUTO: 10.65 K/UL (ref 3.9–12.7)
WBC # BLD AUTO: 10.76 K/UL (ref 3.9–12.7)
WBC # BLD AUTO: 10.85 K/UL (ref 3.9–12.7)
WBC # BLD AUTO: 11 K/UL (ref 3.9–12.7)
WBC # BLD AUTO: 11.12 K/UL (ref 3.9–12.7)
WBC # BLD AUTO: 11.22 K/UL (ref 3.9–12.7)
WBC # BLD AUTO: 11.32 K/UL (ref 3.9–12.7)
WBC # BLD AUTO: 11.44 K/UL (ref 3.9–12.7)
WBC # BLD AUTO: 11.46 K/UL (ref 3.9–12.7)
WBC # BLD AUTO: 11.48 K/UL (ref 3.9–12.7)
WBC # BLD AUTO: 11.49 K/UL (ref 3.9–12.7)
WBC # BLD AUTO: 11.83 K/UL (ref 3.9–12.7)
WBC # BLD AUTO: 11.87 K/UL (ref 3.9–12.7)
WBC # BLD AUTO: 11.9 K/UL (ref 3.9–12.7)
WBC # BLD AUTO: 12 K/UL (ref 3.9–12.7)
WBC # BLD AUTO: 12.06 K/UL (ref 3.9–12.7)
WBC # BLD AUTO: 12.08 K/UL (ref 3.9–12.7)
WBC # BLD AUTO: 12.51 K/UL (ref 3.9–12.7)
WBC # BLD AUTO: 12.53 K/UL (ref 3.9–12.7)
WBC # BLD AUTO: 12.62 K/UL (ref 3.9–12.7)
WBC # BLD AUTO: 12.65 K/UL (ref 3.9–12.7)
WBC # BLD AUTO: 12.83 K/UL (ref 3.9–12.7)
WBC # BLD AUTO: 13.05 K/UL (ref 3.9–12.7)
WBC # BLD AUTO: 13.16 K/UL (ref 3.9–12.7)
WBC # BLD AUTO: 13.52 K/UL (ref 3.9–12.7)
WBC # BLD AUTO: 13.58 K/UL (ref 3.9–12.7)
WBC # BLD AUTO: 13.84 K/UL (ref 3.9–12.7)
WBC # BLD AUTO: 14.82 K/UL (ref 3.9–12.7)
WBC # BLD AUTO: 15.38 K/UL (ref 3.9–12.7)
WBC # BLD AUTO: 15.42 K/UL (ref 3.9–12.7)
WBC # BLD AUTO: 15.52 K/UL (ref 3.9–12.7)
WBC # BLD AUTO: 15.82 K/UL (ref 3.9–12.7)
WBC # BLD AUTO: 15.89 K/UL (ref 3.9–12.7)
WBC # BLD AUTO: 16.1 K/UL (ref 3.9–12.7)
WBC # BLD AUTO: 19.3 K/UL (ref 3.9–12.7)
WBC # BLD AUTO: 20.44 K/UL (ref 3.9–12.7)
WBC # BLD AUTO: 7.01 K/UL (ref 3.9–12.7)
WBC # BLD AUTO: 7.23 K/UL (ref 3.9–12.7)
WBC # BLD AUTO: 7.31 K/UL (ref 3.9–12.7)
WBC # BLD AUTO: 8.76 K/UL (ref 3.9–12.7)
WBC # BLD AUTO: 8.9 K/UL (ref 3.9–12.7)
WBC # BLD AUTO: 8.92 K/UL (ref 3.9–12.7)
WBC # BLD AUTO: 8.97 K/UL (ref 3.9–12.7)
WBC # BLD AUTO: 8.98 K/UL (ref 3.9–12.7)
WBC # BLD AUTO: 8.98 K/UL (ref 3.9–12.7)
WBC # BLD AUTO: 9.27 K/UL (ref 3.9–12.7)
WBC # BLD AUTO: 9.39 K/UL (ref 3.9–12.7)
WBC # BLD AUTO: 9.58 K/UL (ref 3.9–12.7)
WBC # BLD AUTO: 9.83 K/UL (ref 3.9–12.7)
WBC #/AREA URNS AUTO: 28 /HPF (ref 0–5)
WBC #/AREA URNS AUTO: 84 /HPF (ref 0–5)
WBC #/AREA URNS AUTO: >100 /HPF (ref 0–5)
WBC #/AREA URNS AUTO: >100 /HPF (ref 0–5)
WBC #/AREA URNS HPF: 35 /HPF (ref 0–5)
WBC CLUMPS UR QL AUTO: ABNORMAL
WBC CLUMPS UR QL AUTO: ABNORMAL
WBC CLUMPS URNS QL MICRO: ABNORMAL
YEAST UR QL AUTO: ABNORMAL
YEAST URNS QL MICRO: ABNORMAL

## 2022-01-01 PROCEDURE — 80053 COMPREHEN METABOLIC PANEL: CPT

## 2022-01-01 PROCEDURE — 63600175 PHARM REV CODE 636 W HCPCS: Performed by: PSYCHIATRY & NEUROLOGY

## 2022-01-01 PROCEDURE — 27000207 HC ISOLATION

## 2022-01-01 PROCEDURE — 85025 COMPLETE CBC W/AUTO DIFF WBC: CPT | Performed by: PHYSICIAN ASSISTANT

## 2022-01-01 PROCEDURE — 99024 PR POST-OP FOLLOW-UP VISIT: ICD-10-PCS | Mod: ,,, | Performed by: PODIATRIST

## 2022-01-01 PROCEDURE — 99223 PR INITIAL HOSPITAL CARE,LEVL III: ICD-10-PCS | Mod: ,,, | Performed by: PODIATRIST

## 2022-01-01 PROCEDURE — 20600001 HC STEP DOWN PRIVATE ROOM

## 2022-01-01 PROCEDURE — 25000003 PHARM REV CODE 250

## 2022-01-01 PROCEDURE — 95720 PR EEG, W/VIDEO, CONT RECORD, I&R, >12<26 HRS: ICD-10-PCS | Mod: ,,, | Performed by: PSYCHIATRY & NEUROLOGY

## 2022-01-01 PROCEDURE — 11000001 HC ACUTE MED/SURG PRIVATE ROOM

## 2022-01-01 PROCEDURE — 25000003 PHARM REV CODE 250: Performed by: NURSE PRACTITIONER

## 2022-01-01 PROCEDURE — 63600175 PHARM REV CODE 636 W HCPCS: Performed by: PHYSICIAN ASSISTANT

## 2022-01-01 PROCEDURE — 86900 BLOOD TYPING SEROLOGIC ABO: CPT | Performed by: INTERNAL MEDICINE

## 2022-01-01 PROCEDURE — 63600175 PHARM REV CODE 636 W HCPCS: Performed by: INTERNAL MEDICINE

## 2022-01-01 PROCEDURE — 80053 COMPREHEN METABOLIC PANEL: CPT | Performed by: STUDENT IN AN ORGANIZED HEALTH CARE EDUCATION/TRAINING PROGRAM

## 2022-01-01 PROCEDURE — 84100 ASSAY OF PHOSPHORUS: CPT | Performed by: NURSE PRACTITIONER

## 2022-01-01 PROCEDURE — 25000003 PHARM REV CODE 250: Performed by: PSYCHIATRY & NEUROLOGY

## 2022-01-01 PROCEDURE — 85025 COMPLETE CBC W/AUTO DIFF WBC: CPT | Performed by: STUDENT IN AN ORGANIZED HEALTH CARE EDUCATION/TRAINING PROGRAM

## 2022-01-01 PROCEDURE — 95720 EEG PHY/QHP EA INCR W/VEEG: CPT | Mod: ,,, | Performed by: PSYCHIATRY & NEUROLOGY

## 2022-01-01 PROCEDURE — 99222 1ST HOSP IP/OBS MODERATE 55: CPT | Mod: ,,, | Performed by: INTERNAL MEDICINE

## 2022-01-01 PROCEDURE — 88307 PR  SURG PATH,LEVEL V: ICD-10-PCS | Mod: 26,,, | Performed by: PATHOLOGY

## 2022-01-01 PROCEDURE — D9220A PRA ANESTHESIA: ICD-10-PCS | Mod: ,,, | Performed by: ANESTHESIOLOGY

## 2022-01-01 PROCEDURE — 99291 PR CRITICAL CARE, E/M 30-74 MINUTES: ICD-10-PCS | Mod: ,,, | Performed by: PSYCHIATRY & NEUROLOGY

## 2022-01-01 PROCEDURE — 99900035 HC TECH TIME PER 15 MIN (STAT)

## 2022-01-01 PROCEDURE — 81001 URINALYSIS AUTO W/SCOPE: CPT | Performed by: PHYSICIAN ASSISTANT

## 2022-01-01 PROCEDURE — 51798 US URINE CAPACITY MEASURE: CPT

## 2022-01-01 PROCEDURE — 99232 SBSQ HOSP IP/OBS MODERATE 35: CPT | Mod: GC,,, | Performed by: PSYCHIATRY & NEUROLOGY

## 2022-01-01 PROCEDURE — 36415 COLL VENOUS BLD VENIPUNCTURE: CPT | Performed by: INTERNAL MEDICINE

## 2022-01-01 PROCEDURE — 25000003 PHARM REV CODE 250: Performed by: STUDENT IN AN ORGANIZED HEALTH CARE EDUCATION/TRAINING PROGRAM

## 2022-01-01 PROCEDURE — 99233 SBSQ HOSP IP/OBS HIGH 50: CPT | Mod: ,,, | Performed by: INTERNAL MEDICINE

## 2022-01-01 PROCEDURE — 94761 N-INVAS EAR/PLS OXIMETRY MLT: CPT

## 2022-01-01 PROCEDURE — 37000009 HC ANESTHESIA EA ADD 15 MINS: Performed by: NEUROLOGICAL SURGERY

## 2022-01-01 PROCEDURE — 87070 CULTURE OTHR SPECIMN AEROBIC: CPT | Mod: 59 | Performed by: PODIATRIST

## 2022-01-01 PROCEDURE — 36415 COLL VENOUS BLD VENIPUNCTURE: CPT | Performed by: PHYSICIAN ASSISTANT

## 2022-01-01 PROCEDURE — 99024 POSTOP FOLLOW-UP VISIT: CPT | Mod: ,,, | Performed by: PODIATRIST

## 2022-01-01 PROCEDURE — 99232 PR SUBSEQUENT HOSPITAL CARE,LEVL II: ICD-10-PCS | Mod: ,,, | Performed by: STUDENT IN AN ORGANIZED HEALTH CARE EDUCATION/TRAINING PROGRAM

## 2022-01-01 PROCEDURE — 25000003 PHARM REV CODE 250: Performed by: INTERNAL MEDICINE

## 2022-01-01 PROCEDURE — 99291 CRITICAL CARE FIRST HOUR: CPT

## 2022-01-01 PROCEDURE — 99231 PR SUBSEQUENT HOSPITAL CARE,LEVL I: ICD-10-PCS | Mod: 95,,, | Performed by: STUDENT IN AN ORGANIZED HEALTH CARE EDUCATION/TRAINING PROGRAM

## 2022-01-01 PROCEDURE — 99233 PR SUBSEQUENT HOSPITAL CARE,LEVL III: ICD-10-PCS | Mod: ,,, | Performed by: NURSE PRACTITIONER

## 2022-01-01 PROCEDURE — 85025 COMPLETE CBC W/AUTO DIFF WBC: CPT

## 2022-01-01 PROCEDURE — 84100 ASSAY OF PHOSPHORUS: CPT

## 2022-01-01 PROCEDURE — 99233 SBSQ HOSP IP/OBS HIGH 50: CPT | Mod: ,,, | Performed by: PHYSICIAN ASSISTANT

## 2022-01-01 PROCEDURE — 36415 COLL VENOUS BLD VENIPUNCTURE: CPT | Performed by: HOSPITALIST

## 2022-01-01 PROCEDURE — 27000221 HC OXYGEN, UP TO 24 HOURS

## 2022-01-01 PROCEDURE — 25000003 PHARM REV CODE 250: Performed by: NEUROLOGICAL SURGERY

## 2022-01-01 PROCEDURE — 63600175 PHARM REV CODE 636 W HCPCS: Performed by: NEUROLOGICAL SURGERY

## 2022-01-01 PROCEDURE — 87077 CULTURE AEROBIC IDENTIFY: CPT | Mod: 59 | Performed by: PHYSICIAN ASSISTANT

## 2022-01-01 PROCEDURE — 25000003 PHARM REV CODE 250: Performed by: HOSPITALIST

## 2022-01-01 PROCEDURE — 94640 AIRWAY INHALATION TREATMENT: CPT

## 2022-01-01 PROCEDURE — 97161 PT EVAL LOW COMPLEX 20 MIN: CPT

## 2022-01-01 PROCEDURE — 83735 ASSAY OF MAGNESIUM: CPT

## 2022-01-01 PROCEDURE — 96372 THER/PROPH/DIAG INJ SC/IM: CPT | Performed by: INTERNAL MEDICINE

## 2022-01-01 PROCEDURE — 95714 VEEG EA 12-26 HR UNMNTR: CPT

## 2022-01-01 PROCEDURE — 99231 SBSQ HOSP IP/OBS SF/LOW 25: CPT | Mod: 95,,, | Performed by: STUDENT IN AN ORGANIZED HEALTH CARE EDUCATION/TRAINING PROGRAM

## 2022-01-01 PROCEDURE — 80202 ASSAY OF VANCOMYCIN: CPT | Performed by: PSYCHIATRY & NEUROLOGY

## 2022-01-01 PROCEDURE — 99233 SBSQ HOSP IP/OBS HIGH 50: CPT | Mod: GC,,, | Performed by: PSYCHIATRY & NEUROLOGY

## 2022-01-01 PROCEDURE — 27000190 HC CPAP FULL FACE MASK W/VALVE

## 2022-01-01 PROCEDURE — 99220 PR INITIAL OBSERVATION CARE,LEVL III: ICD-10-PCS | Mod: ,,, | Performed by: INTERNAL MEDICINE

## 2022-01-01 PROCEDURE — 25000003 PHARM REV CODE 250: Performed by: PHYSICIAN ASSISTANT

## 2022-01-01 PROCEDURE — 63600175 PHARM REV CODE 636 W HCPCS: Performed by: STUDENT IN AN ORGANIZED HEALTH CARE EDUCATION/TRAINING PROGRAM

## 2022-01-01 PROCEDURE — 27100245 HC EEG CAPS, DISPOSABLE

## 2022-01-01 PROCEDURE — 99233 SBSQ HOSP IP/OBS HIGH 50: CPT | Mod: ,,, | Performed by: STUDENT IN AN ORGANIZED HEALTH CARE EDUCATION/TRAINING PROGRAM

## 2022-01-01 PROCEDURE — 27201037 HC PRESSURE MONITORING SET UP

## 2022-01-01 PROCEDURE — 36000707: Performed by: PODIATRIST

## 2022-01-01 PROCEDURE — 99232 SBSQ HOSP IP/OBS MODERATE 35: CPT | Mod: ,,, | Performed by: STUDENT IN AN ORGANIZED HEALTH CARE EDUCATION/TRAINING PROGRAM

## 2022-01-01 PROCEDURE — 97535 SELF CARE MNGMENT TRAINING: CPT

## 2022-01-01 PROCEDURE — U0003 INFECTIOUS AGENT DETECTION BY NUCLEIC ACID (DNA OR RNA); SEVERE ACUTE RESPIRATORY SYNDROME CORONAVIRUS 2 (SARS-COV-2) (CORONAVIRUS DISEASE [COVID-19]), AMPLIFIED PROBE TECHNIQUE, MAKING USE OF HIGH THROUGHPUT TECHNOLOGIES AS DESCRIBED BY CMS-2020-01-R: HCPCS | Performed by: EMERGENCY MEDICINE

## 2022-01-01 PROCEDURE — 99233 PR SUBSEQUENT HOSPITAL CARE,LEVL III: ICD-10-PCS | Mod: ,,, | Performed by: PHYSICIAN ASSISTANT

## 2022-01-01 PROCEDURE — 99024 POSTOP FOLLOW-UP VISIT: CPT | Mod: ,,,

## 2022-01-01 PROCEDURE — 85610 PROTHROMBIN TIME: CPT | Performed by: EMERGENCY MEDICINE

## 2022-01-01 PROCEDURE — 51701 INSERT BLADDER CATHETER: CPT

## 2022-01-01 PROCEDURE — 99233 SBSQ HOSP IP/OBS HIGH 50: CPT | Mod: ,,, | Performed by: HOSPITALIST

## 2022-01-01 PROCEDURE — 84443 ASSAY THYROID STIM HORMONE: CPT | Performed by: INTERNAL MEDICINE

## 2022-01-01 PROCEDURE — 83735 ASSAY OF MAGNESIUM: CPT | Performed by: HOSPITALIST

## 2022-01-01 PROCEDURE — 84100 ASSAY OF PHOSPHORUS: CPT | Performed by: PHYSICIAN ASSISTANT

## 2022-01-01 PROCEDURE — 1111F DSCHRG MED/CURRENT MED MERGE: CPT | Mod: CPTII,,, | Performed by: PHYSICIAN ASSISTANT

## 2022-01-01 PROCEDURE — 87070 CULTURE OTHR SPECIMN AEROBIC: CPT

## 2022-01-01 PROCEDURE — 99220 PR INITIAL OBSERVATION CARE,LEVL III: CPT | Mod: ,,, | Performed by: INTERNAL MEDICINE

## 2022-01-01 PROCEDURE — 63600175 PHARM REV CODE 636 W HCPCS

## 2022-01-01 PROCEDURE — 36600 WITHDRAWAL OF ARTERIAL BLOOD: CPT

## 2022-01-01 PROCEDURE — C9254 INJECTION, LACOSAMIDE: HCPCS | Performed by: NURSE PRACTITIONER

## 2022-01-01 PROCEDURE — 99239 HOSP IP/OBS DSCHRG MGMT >30: CPT | Mod: ,,, | Performed by: HOSPITALIST

## 2022-01-01 PROCEDURE — 71000033 HC RECOVERY, INTIAL HOUR: Performed by: PODIATRIST

## 2022-01-01 PROCEDURE — 93010 EKG 12-LEAD: ICD-10-PCS | Mod: ,,, | Performed by: INTERNAL MEDICINE

## 2022-01-01 PROCEDURE — 83735 ASSAY OF MAGNESIUM: CPT | Performed by: NURSE PRACTITIONER

## 2022-01-01 PROCEDURE — 87070 CULTURE OTHR SPECIMN AEROBIC: CPT | Performed by: INTERNAL MEDICINE

## 2022-01-01 PROCEDURE — 87186 SC STD MICRODIL/AGAR DIL: CPT | Mod: 59

## 2022-01-01 PROCEDURE — 86901 BLOOD TYPING SEROLOGIC RH(D): CPT | Performed by: NEUROLOGICAL SURGERY

## 2022-01-01 PROCEDURE — 85025 COMPLETE CBC W/AUTO DIFF WBC: CPT | Performed by: NEUROLOGICAL SURGERY

## 2022-01-01 PROCEDURE — 99233 PR SUBSEQUENT HOSPITAL CARE,LEVL III: ICD-10-PCS | Mod: ,,, | Performed by: HOSPITALIST

## 2022-01-01 PROCEDURE — 86850 RBC ANTIBODY SCREEN: CPT | Performed by: NURSE PRACTITIONER

## 2022-01-01 PROCEDURE — 80053 COMPREHEN METABOLIC PANEL: CPT | Performed by: INTERNAL MEDICINE

## 2022-01-01 PROCEDURE — 84550 ASSAY OF BLOOD/URIC ACID: CPT | Performed by: INTERNAL MEDICINE

## 2022-01-01 PROCEDURE — 99239 PR HOSPITAL DISCHARGE DAY,>30 MIN: ICD-10-PCS | Mod: ,,, | Performed by: HOSPITALIST

## 2022-01-01 PROCEDURE — 84132 ASSAY OF SERUM POTASSIUM: CPT | Performed by: INTERNAL MEDICINE

## 2022-01-01 PROCEDURE — G0378 HOSPITAL OBSERVATION PER HR: HCPCS

## 2022-01-01 PROCEDURE — 28820 PR AMPUTATION TOE,MT-P JT: ICD-10-PCS | Mod: T6,,, | Performed by: PODIATRIST

## 2022-01-01 PROCEDURE — 27200966 HC CLOSED SUCTION SYSTEM

## 2022-01-01 PROCEDURE — 88307 TISSUE EXAM BY PATHOLOGIST: CPT | Mod: 26,,, | Performed by: PATHOLOGY

## 2022-01-01 PROCEDURE — 99232 SBSQ HOSP IP/OBS MODERATE 35: CPT | Mod: ,,, | Performed by: HOSPITALIST

## 2022-01-01 PROCEDURE — 87205 SMEAR GRAM STAIN: CPT | Performed by: INTERNAL MEDICINE

## 2022-01-01 PROCEDURE — 83735 ASSAY OF MAGNESIUM: CPT | Performed by: PHYSICIAN ASSISTANT

## 2022-01-01 PROCEDURE — 84443 ASSAY THYROID STIM HORMONE: CPT

## 2022-01-01 PROCEDURE — 94799 UNLISTED PULMONARY SVC/PX: CPT

## 2022-01-01 PROCEDURE — 93010 ELECTROCARDIOGRAM REPORT: CPT | Mod: ,,, | Performed by: INTERNAL MEDICINE

## 2022-01-01 PROCEDURE — 83735 ASSAY OF MAGNESIUM: CPT | Mod: 91 | Performed by: PSYCHIATRY & NEUROLOGY

## 2022-01-01 PROCEDURE — 80053 COMPREHEN METABOLIC PANEL: CPT | Performed by: HOSPITALIST

## 2022-01-01 PROCEDURE — 99283 EMERGENCY DEPT VISIT LOW MDM: CPT | Mod: 25

## 2022-01-01 PROCEDURE — 99291 CRITICAL CARE FIRST HOUR: CPT | Mod: CS,,, | Performed by: EMERGENCY MEDICINE

## 2022-01-01 PROCEDURE — 87088 URINE BACTERIA CULTURE: CPT | Performed by: NURSE PRACTITIONER

## 2022-01-01 PROCEDURE — 36415 COLL VENOUS BLD VENIPUNCTURE: CPT

## 2022-01-01 PROCEDURE — C1713 ANCHOR/SCREW BN/BN,TIS/BN: HCPCS | Performed by: NEUROLOGICAL SURGERY

## 2022-01-01 PROCEDURE — 94668 MNPJ CHEST WALL SBSQ: CPT

## 2022-01-01 PROCEDURE — 94760 N-INVAS EAR/PLS OXIMETRY 1: CPT

## 2022-01-01 PROCEDURE — 92526 ORAL FUNCTION THERAPY: CPT

## 2022-01-01 PROCEDURE — 83036 HEMOGLOBIN GLYCOSYLATED A1C: CPT

## 2022-01-01 PROCEDURE — 92507 TX SP LANG VOICE COMM INDIV: CPT

## 2022-01-01 PROCEDURE — 80202 ASSAY OF VANCOMYCIN: CPT | Performed by: NEUROLOGICAL SURGERY

## 2022-01-01 PROCEDURE — 20000000 HC ICU ROOM

## 2022-01-01 PROCEDURE — C1751 CATH, INF, PER/CENT/MIDLINE: HCPCS

## 2022-01-01 PROCEDURE — 85730 THROMBOPLASTIN TIME PARTIAL: CPT | Performed by: EMERGENCY MEDICINE

## 2022-01-01 PROCEDURE — 99232 PR SUBSEQUENT HOSPITAL CARE,LEVL II: ICD-10-PCS | Mod: ,,, | Performed by: HOSPITALIST

## 2022-01-01 PROCEDURE — 61312 PR OPEN SKULL EVAC HEMATOMA, SUPRATENTORIAL, SUB/ EXTRADURAL: ICD-10-PCS | Mod: 58,,, | Performed by: NEUROLOGICAL SURGERY

## 2022-01-01 PROCEDURE — 36430 TRANSFUSION BLD/BLD COMPNT: CPT

## 2022-01-01 PROCEDURE — 99291 PR CRITICAL CARE, E/M 30-74 MINUTES: ICD-10-PCS | Mod: CS,,, | Performed by: EMERGENCY MEDICINE

## 2022-01-01 PROCEDURE — 99232 PR SUBSEQUENT HOSPITAL CARE,LEVL II: ICD-10-PCS | Mod: ,,, | Performed by: INTERNAL MEDICINE

## 2022-01-01 PROCEDURE — 99226 PR SUBSEQUENT OBSERVATION CARE,LEVEL III: ICD-10-PCS | Mod: ,,, | Performed by: INTERNAL MEDICINE

## 2022-01-01 PROCEDURE — 97530 THERAPEUTIC ACTIVITIES: CPT

## 2022-01-01 PROCEDURE — 99291 CRITICAL CARE FIRST HOUR: CPT | Mod: GC,,, | Performed by: PSYCHIATRY & NEUROLOGY

## 2022-01-01 PROCEDURE — 99214 PR OFFICE/OUTPT VISIT, EST, LEVL IV, 30-39 MIN: ICD-10-PCS | Mod: ,,, | Performed by: INTERNAL MEDICINE

## 2022-01-01 PROCEDURE — 99233 SBSQ HOSP IP/OBS HIGH 50: CPT | Mod: ,,,

## 2022-01-01 PROCEDURE — 63600175 PHARM REV CODE 636 W HCPCS: Performed by: NURSE PRACTITIONER

## 2022-01-01 PROCEDURE — 36000706: Performed by: PODIATRIST

## 2022-01-01 PROCEDURE — 63600175 PHARM REV CODE 636 W HCPCS: Performed by: HOSPITALIST

## 2022-01-01 PROCEDURE — 25000003 PHARM REV CODE 250: Performed by: NURSE ANESTHETIST, CERTIFIED REGISTERED

## 2022-01-01 PROCEDURE — 82962 GLUCOSE BLOOD TEST: CPT

## 2022-01-01 PROCEDURE — 85025 COMPLETE CBC W/AUTO DIFF WBC: CPT | Performed by: EMERGENCY MEDICINE

## 2022-01-01 PROCEDURE — 99024 PR POST-OP FOLLOW-UP VISIT: ICD-10-PCS | Mod: ,,, | Performed by: PHYSICIAN ASSISTANT

## 2022-01-01 PROCEDURE — P9035 PLATELET PHERES LEUKOREDUCED: HCPCS | Performed by: STUDENT IN AN ORGANIZED HEALTH CARE EDUCATION/TRAINING PROGRAM

## 2022-01-01 PROCEDURE — D9220A PRA ANESTHESIA: ICD-10-PCS | Mod: CRNA,,, | Performed by: NURSE ANESTHETIST, CERTIFIED REGISTERED

## 2022-01-01 PROCEDURE — 99233 PR SUBSEQUENT HOSPITAL CARE,LEVL III: ICD-10-PCS | Mod: GC,,, | Performed by: PSYCHIATRY & NEUROLOGY

## 2022-01-01 PROCEDURE — 86920 COMPATIBILITY TEST SPIN: CPT | Performed by: STUDENT IN AN ORGANIZED HEALTH CARE EDUCATION/TRAINING PROGRAM

## 2022-01-01 PROCEDURE — 36415 COLL VENOUS BLD VENIPUNCTURE: CPT | Performed by: STUDENT IN AN ORGANIZED HEALTH CARE EDUCATION/TRAINING PROGRAM

## 2022-01-01 PROCEDURE — 88311 DECALCIFY TISSUE: CPT | Performed by: PATHOLOGY

## 2022-01-01 PROCEDURE — 99291 PR CRITICAL CARE, E/M 30-74 MINUTES: ICD-10-PCS | Mod: ,,,

## 2022-01-01 PROCEDURE — 96366 THER/PROPH/DIAG IV INF ADDON: CPT

## 2022-01-01 PROCEDURE — 99232 SBSQ HOSP IP/OBS MODERATE 35: CPT | Mod: 95,,, | Performed by: HOSPITALIST

## 2022-01-01 PROCEDURE — 86580 TB INTRADERMAL TEST: CPT | Performed by: HOSPITALIST

## 2022-01-01 PROCEDURE — 99232 SBSQ HOSP IP/OBS MODERATE 35: CPT | Mod: 95,,, | Performed by: STUDENT IN AN ORGANIZED HEALTH CARE EDUCATION/TRAINING PROGRAM

## 2022-01-01 PROCEDURE — 99900026 HC AIRWAY MAINTENANCE (STAT)

## 2022-01-01 PROCEDURE — 97110 THERAPEUTIC EXERCISES: CPT | Mod: CQ

## 2022-01-01 PROCEDURE — A4216 STERILE WATER/SALINE, 10 ML: HCPCS | Performed by: PHYSICIAN ASSISTANT

## 2022-01-01 PROCEDURE — 80202 ASSAY OF VANCOMYCIN: CPT | Performed by: INTERNAL MEDICINE

## 2022-01-01 PROCEDURE — 80048 BASIC METABOLIC PNL TOTAL CA: CPT | Performed by: PHYSICIAN ASSISTANT

## 2022-01-01 PROCEDURE — 99232 SBSQ HOSP IP/OBS MODERATE 35: CPT | Mod: ,,, | Performed by: INTERNAL MEDICINE

## 2022-01-01 PROCEDURE — 37000008 HC ANESTHESIA 1ST 15 MINUTES: Performed by: PODIATRIST

## 2022-01-01 PROCEDURE — 80048 BASIC METABOLIC PNL TOTAL CA: CPT | Performed by: STUDENT IN AN ORGANIZED HEALTH CARE EDUCATION/TRAINING PROGRAM

## 2022-01-01 PROCEDURE — 99291 CRITICAL CARE FIRST HOUR: CPT | Mod: 25

## 2022-01-01 PROCEDURE — 99223 1ST HOSP IP/OBS HIGH 75: CPT | Mod: ,,, | Performed by: INTERNAL MEDICINE

## 2022-01-01 PROCEDURE — 87086 URINE CULTURE/COLONY COUNT: CPT

## 2022-01-01 PROCEDURE — 80202 ASSAY OF VANCOMYCIN: CPT | Performed by: HOSPITALIST

## 2022-01-01 PROCEDURE — 87186 SC STD MICRODIL/AGAR DIL: CPT | Performed by: NURSE PRACTITIONER

## 2022-01-01 PROCEDURE — P9038 RBC IRRADIATED: HCPCS | Performed by: PHYSICIAN ASSISTANT

## 2022-01-01 PROCEDURE — 99217 PR OBSERVATION CARE DISCHARGE: ICD-10-PCS | Mod: ,,, | Performed by: PHYSICIAN ASSISTANT

## 2022-01-01 PROCEDURE — 80048 BASIC METABOLIC PNL TOTAL CA: CPT | Performed by: INTERNAL MEDICINE

## 2022-01-01 PROCEDURE — 99239 PR HOSPITAL DISCHARGE DAY,>30 MIN: ICD-10-PCS | Mod: ,,, | Performed by: PHYSICIAN ASSISTANT

## 2022-01-01 PROCEDURE — 99233 PR SUBSEQUENT HOSPITAL CARE,LEVL III: ICD-10-PCS | Mod: 24,,, | Performed by: PODIATRIST

## 2022-01-01 PROCEDURE — 82728 ASSAY OF FERRITIN: CPT | Performed by: PHYSICIAN ASSISTANT

## 2022-01-01 PROCEDURE — 97129 THER IVNTJ 1ST 15 MIN: CPT

## 2022-01-01 PROCEDURE — C1729 CATH, DRAINAGE: HCPCS | Performed by: NEUROLOGICAL SURGERY

## 2022-01-01 PROCEDURE — 87040 BLOOD CULTURE FOR BACTERIA: CPT | Mod: 59

## 2022-01-01 PROCEDURE — 88305 TISSUE EXAM BY PATHOLOGIST: ICD-10-PCS | Mod: 26,,, | Performed by: PATHOLOGY

## 2022-01-01 PROCEDURE — 36000710: Performed by: NEUROLOGICAL SURGERY

## 2022-01-01 PROCEDURE — 80202 ASSAY OF VANCOMYCIN: CPT | Performed by: STUDENT IN AN ORGANIZED HEALTH CARE EDUCATION/TRAINING PROGRAM

## 2022-01-01 PROCEDURE — 99223 PR INITIAL HOSPITAL CARE,LEVL III: ICD-10-PCS | Mod: ,,, | Performed by: INTERNAL MEDICINE

## 2022-01-01 PROCEDURE — 86140 C-REACTIVE PROTEIN: CPT | Performed by: PHYSICIAN ASSISTANT

## 2022-01-01 PROCEDURE — 84484 ASSAY OF TROPONIN QUANT: CPT | Mod: 91

## 2022-01-01 PROCEDURE — C9399 UNCLASSIFIED DRUGS OR BIOLOG: HCPCS | Performed by: PHYSICIAN ASSISTANT

## 2022-01-01 PROCEDURE — 99232 PR SUBSEQUENT HOSPITAL CARE,LEVL II: ICD-10-PCS | Mod: GC,,, | Performed by: HOSPITALIST

## 2022-01-01 PROCEDURE — 99233 SBSQ HOSP IP/OBS HIGH 50: CPT | Mod: ,,, | Performed by: NURSE PRACTITIONER

## 2022-01-01 PROCEDURE — 99233 PR SUBSEQUENT HOSPITAL CARE,LEVL III: ICD-10-PCS | Mod: ,,, | Performed by: INTERNAL MEDICINE

## 2022-01-01 PROCEDURE — 99223 1ST HOSP IP/OBS HIGH 75: CPT | Mod: ,,, | Performed by: PODIATRIST

## 2022-01-01 PROCEDURE — 88305 TISSUE EXAM BY PATHOLOGIST: CPT | Performed by: PATHOLOGY

## 2022-01-01 PROCEDURE — 99291 PR CRITICAL CARE, E/M 30-74 MINUTES: ICD-10-PCS | Mod: ,,, | Performed by: EMERGENCY MEDICINE

## 2022-01-01 PROCEDURE — 25000242 PHARM REV CODE 250 ALT 637 W/ HCPCS

## 2022-01-01 PROCEDURE — 99222 PR INITIAL HOSPITAL CARE,LEVL II: ICD-10-PCS | Mod: ,,, | Performed by: INTERNAL MEDICINE

## 2022-01-01 PROCEDURE — 99900031 HC PATIENT EDUCATION (STAT)

## 2022-01-01 PROCEDURE — U0003 INFECTIOUS AGENT DETECTION BY NUCLEIC ACID (DNA OR RNA); SEVERE ACUTE RESPIRATORY SYNDROME CORONAVIRUS 2 (SARS-COV-2) (CORONAVIRUS DISEASE [COVID-19]), AMPLIFIED PROBE TECHNIQUE, MAKING USE OF HIGH THROUGHPUT TECHNOLOGIES AS DESCRIBED BY CMS-2020-01-R: HCPCS | Performed by: STUDENT IN AN ORGANIZED HEALTH CARE EDUCATION/TRAINING PROGRAM

## 2022-01-01 PROCEDURE — 96372 THER/PROPH/DIAG INJ SC/IM: CPT | Performed by: PHYSICIAN ASSISTANT

## 2022-01-01 PROCEDURE — C9399 UNCLASSIFIED DRUGS OR BIOLOG: HCPCS | Performed by: INTERNAL MEDICINE

## 2022-01-01 PROCEDURE — 86920 COMPATIBILITY TEST SPIN: CPT | Performed by: EMERGENCY MEDICINE

## 2022-01-01 PROCEDURE — 99291 CRITICAL CARE FIRST HOUR: CPT | Mod: ,,, | Performed by: PSYCHIATRY & NEUROLOGY

## 2022-01-01 PROCEDURE — 99233 SBSQ HOSP IP/OBS HIGH 50: CPT | Mod: ,,, | Performed by: PSYCHIATRY & NEUROLOGY

## 2022-01-01 PROCEDURE — 80069 RENAL FUNCTION PANEL: CPT | Performed by: STUDENT IN AN ORGANIZED HEALTH CARE EDUCATION/TRAINING PROGRAM

## 2022-01-01 PROCEDURE — P9021 RED BLOOD CELLS UNIT: HCPCS | Performed by: STUDENT IN AN ORGANIZED HEALTH CARE EDUCATION/TRAINING PROGRAM

## 2022-01-01 PROCEDURE — C9254 INJECTION, LACOSAMIDE: HCPCS

## 2022-01-01 PROCEDURE — 99497 ADVNCD CARE PLAN 30 MIN: CPT | Mod: ,,, | Performed by: INTERNAL MEDICINE

## 2022-01-01 PROCEDURE — 83605 ASSAY OF LACTIC ACID: CPT

## 2022-01-01 PROCEDURE — U0002 COVID-19 LAB TEST NON-CDC: HCPCS | Performed by: STUDENT IN AN ORGANIZED HEALTH CARE EDUCATION/TRAINING PROGRAM

## 2022-01-01 PROCEDURE — 86901 BLOOD TYPING SEROLOGIC RH(D): CPT | Performed by: PHYSICIAN ASSISTANT

## 2022-01-01 PROCEDURE — 97164 PT RE-EVAL EST PLAN CARE: CPT

## 2022-01-01 PROCEDURE — 87040 BLOOD CULTURE FOR BACTERIA: CPT | Mod: 59 | Performed by: NURSE PRACTITIONER

## 2022-01-01 PROCEDURE — 99024 POSTOP FOLLOW-UP VISIT: CPT | Mod: ,,, | Performed by: PHYSICIAN ASSISTANT

## 2022-01-01 PROCEDURE — 97110 THERAPEUTIC EXERCISES: CPT

## 2022-01-01 PROCEDURE — 87075 CULTR BACTERIA EXCEPT BLOOD: CPT | Mod: 59 | Performed by: PODIATRIST

## 2022-01-01 PROCEDURE — 80202 ASSAY OF VANCOMYCIN: CPT | Performed by: PHYSICIAN ASSISTANT

## 2022-01-01 PROCEDURE — 25000242 PHARM REV CODE 250 ALT 637 W/ HCPCS: Performed by: STUDENT IN AN ORGANIZED HEALTH CARE EDUCATION/TRAINING PROGRAM

## 2022-01-01 PROCEDURE — D9220A PRA ANESTHESIA: ICD-10-PCS | Mod: CRNA,,, | Performed by: STUDENT IN AN ORGANIZED HEALTH CARE EDUCATION/TRAINING PROGRAM

## 2022-01-01 PROCEDURE — 37799 UNLISTED PX VASCULAR SURGERY: CPT

## 2022-01-01 PROCEDURE — 82746 ASSAY OF FOLIC ACID SERUM: CPT | Performed by: PHYSICIAN ASSISTANT

## 2022-01-01 PROCEDURE — 99233 SBSQ HOSP IP/OBS HIGH 50: CPT | Mod: 95,,, | Performed by: PSYCHIATRY & NEUROLOGY

## 2022-01-01 PROCEDURE — P9021 RED BLOOD CELLS UNIT: HCPCS | Performed by: PHYSICIAN ASSISTANT

## 2022-01-01 PROCEDURE — 87186 SC STD MICRODIL/AGAR DIL: CPT | Performed by: INTERNAL MEDICINE

## 2022-01-01 PROCEDURE — 99226 PR SUBSEQUENT OBSERVATION CARE,LEVEL III: CPT | Mod: ,,, | Performed by: PHYSICIAN ASSISTANT

## 2022-01-01 PROCEDURE — 86850 RBC ANTIBODY SCREEN: CPT | Performed by: EMERGENCY MEDICINE

## 2022-01-01 PROCEDURE — 87077 CULTURE AEROBIC IDENTIFY: CPT | Performed by: PHYSICIAN ASSISTANT

## 2022-01-01 PROCEDURE — 85610 PROTHROMBIN TIME: CPT | Performed by: STUDENT IN AN ORGANIZED HEALTH CARE EDUCATION/TRAINING PROGRAM

## 2022-01-01 PROCEDURE — 96365 THER/PROPH/DIAG IV INF INIT: CPT

## 2022-01-01 PROCEDURE — 87077 CULTURE AEROBIC IDENTIFY: CPT

## 2022-01-01 PROCEDURE — 84484 ASSAY OF TROPONIN QUANT: CPT | Performed by: EMERGENCY MEDICINE

## 2022-01-01 PROCEDURE — 99900017 HC EXTUBATION W/PARAMETERS (STAT)

## 2022-01-01 PROCEDURE — 99232 PR SUBSEQUENT HOSPITAL CARE,LEVL II: ICD-10-PCS | Mod: 57,GC,, | Performed by: NEUROLOGICAL SURGERY

## 2022-01-01 PROCEDURE — 85730 THROMBOPLASTIN TIME PARTIAL: CPT | Performed by: STUDENT IN AN ORGANIZED HEALTH CARE EDUCATION/TRAINING PROGRAM

## 2022-01-01 PROCEDURE — 97112 NEUROMUSCULAR REEDUCATION: CPT

## 2022-01-01 PROCEDURE — 88307 TISSUE EXAM BY PATHOLOGIST: CPT | Performed by: PATHOLOGY

## 2022-01-01 PROCEDURE — 97530 THERAPEUTIC ACTIVITIES: CPT | Mod: CQ

## 2022-01-01 PROCEDURE — 61312 PR OPEN SKULL EVAC HEMATOMA, SUPRATENTORIAL, SUB/ EXTRADURAL: ICD-10-PCS | Mod: ,,, | Performed by: NEUROLOGICAL SURGERY

## 2022-01-01 PROCEDURE — 95718 EEG PHYS/QHP 2-12 HR W/VEEG: CPT | Mod: ,,, | Performed by: PSYCHIATRY & NEUROLOGY

## 2022-01-01 PROCEDURE — 85652 RBC SED RATE AUTOMATED: CPT

## 2022-01-01 PROCEDURE — 99233 PR SUBSEQUENT HOSPITAL CARE,LEVL III: ICD-10-PCS | Mod: ,,, | Performed by: STUDENT IN AN ORGANIZED HEALTH CARE EDUCATION/TRAINING PROGRAM

## 2022-01-01 PROCEDURE — 99291 PR CRITICAL CARE, E/M 30-74 MINUTES: ICD-10-PCS | Mod: ,,, | Performed by: NURSE PRACTITIONER

## 2022-01-01 PROCEDURE — 85025 COMPLETE CBC W/AUTO DIFF WBC: CPT | Performed by: HOSPITALIST

## 2022-01-01 PROCEDURE — 80202 ASSAY OF VANCOMYCIN: CPT | Mod: 91 | Performed by: INTERNAL MEDICINE

## 2022-01-01 PROCEDURE — 85025 COMPLETE CBC W/AUTO DIFF WBC: CPT | Performed by: INTERNAL MEDICINE

## 2022-01-01 PROCEDURE — 95700 EEG CONT REC W/VID EEG TECH: CPT

## 2022-01-01 PROCEDURE — 84100 ASSAY OF PHOSPHORUS: CPT | Mod: 91 | Performed by: PSYCHIATRY & NEUROLOGY

## 2022-01-01 PROCEDURE — 84466 ASSAY OF TRANSFERRIN: CPT | Performed by: PHYSICIAN ASSISTANT

## 2022-01-01 PROCEDURE — 99285 PR EMERGENCY DEPT VISIT,LEVEL V: ICD-10-PCS | Mod: CS,,, | Performed by: EMERGENCY MEDICINE

## 2022-01-01 PROCEDURE — D9220A PRA ANESTHESIA: Mod: ANES,,, | Performed by: ANESTHESIOLOGY

## 2022-01-01 PROCEDURE — 94002 VENT MGMT INPAT INIT DAY: CPT

## 2022-01-01 PROCEDURE — 80053 COMPREHEN METABOLIC PANEL: CPT | Performed by: EMERGENCY MEDICINE

## 2022-01-01 PROCEDURE — 25000003 PHARM REV CODE 250: Performed by: EMERGENCY MEDICINE

## 2022-01-01 PROCEDURE — 92610 EVALUATE SWALLOWING FUNCTION: CPT

## 2022-01-01 PROCEDURE — 97165 OT EVAL LOW COMPLEX 30 MIN: CPT

## 2022-01-01 PROCEDURE — 37000009 HC ANESTHESIA EA ADD 15 MINS: Performed by: PODIATRIST

## 2022-01-01 PROCEDURE — 99291 CRITICAL CARE FIRST HOUR: CPT | Mod: ,,, | Performed by: NURSE PRACTITIONER

## 2022-01-01 PROCEDURE — 86850 RBC ANTIBODY SCREEN: CPT | Performed by: STUDENT IN AN ORGANIZED HEALTH CARE EDUCATION/TRAINING PROGRAM

## 2022-01-01 PROCEDURE — 94667 MNPJ CHEST WALL 1ST: CPT

## 2022-01-01 PROCEDURE — 88305 TISSUE EXAM BY PATHOLOGIST: CPT | Mod: 59 | Performed by: PATHOLOGY

## 2022-01-01 PROCEDURE — 80053 COMPREHEN METABOLIC PANEL: CPT | Performed by: NURSE PRACTITIONER

## 2022-01-01 PROCEDURE — P9021 RED BLOOD CELLS UNIT: HCPCS | Performed by: EMERGENCY MEDICINE

## 2022-01-01 PROCEDURE — 99291 CRITICAL CARE FIRST HOUR: CPT | Mod: ,,, | Performed by: EMERGENCY MEDICINE

## 2022-01-01 PROCEDURE — 87077 CULTURE AEROBIC IDENTIFY: CPT | Performed by: NURSE PRACTITIONER

## 2022-01-01 PROCEDURE — 99239 HOSP IP/OBS DSCHRG MGMT >30: CPT | Mod: 95,,, | Performed by: STUDENT IN AN ORGANIZED HEALTH CARE EDUCATION/TRAINING PROGRAM

## 2022-01-01 PROCEDURE — 80053 COMPREHEN METABOLIC PANEL: CPT | Performed by: NEUROLOGICAL SURGERY

## 2022-01-01 PROCEDURE — 99285 EMERGENCY DEPT VISIT HI MDM: CPT | Mod: CS,,, | Performed by: EMERGENCY MEDICINE

## 2022-01-01 PROCEDURE — 92523 SPEECH SOUND LANG COMPREHEN: CPT

## 2022-01-01 PROCEDURE — 80053 COMPREHEN METABOLIC PANEL: CPT | Performed by: PHYSICIAN ASSISTANT

## 2022-01-01 PROCEDURE — 86920 COMPATIBILITY TEST SPIN: CPT | Performed by: PHYSICIAN ASSISTANT

## 2022-01-01 PROCEDURE — 87088 URINE BACTERIA CULTURE: CPT

## 2022-01-01 PROCEDURE — 36569 INSJ PICC 5 YR+ W/O IMAGING: CPT

## 2022-01-01 PROCEDURE — 99233 PR SUBSEQUENT HOSPITAL CARE,LEVL III: ICD-10-PCS | Mod: GC,,, | Performed by: HOSPITALIST

## 2022-01-01 PROCEDURE — 80048 BASIC METABOLIC PNL TOTAL CA: CPT | Performed by: HOSPITALIST

## 2022-01-01 PROCEDURE — 87206 SMEAR FLUORESCENT/ACID STAI: CPT | Performed by: PODIATRIST

## 2022-01-01 PROCEDURE — D9220A PRA ANESTHESIA: Mod: ,,, | Performed by: ANESTHESIOLOGY

## 2022-01-01 PROCEDURE — 99239 PR HOSPITAL DISCHARGE DAY,>30 MIN: ICD-10-PCS | Mod: 95,,, | Performed by: STUDENT IN AN ORGANIZED HEALTH CARE EDUCATION/TRAINING PROGRAM

## 2022-01-01 PROCEDURE — 25000003 PHARM REV CODE 250: Performed by: PODIATRIST

## 2022-01-01 PROCEDURE — 84100 ASSAY OF PHOSPHORUS: CPT | Performed by: HOSPITALIST

## 2022-01-01 PROCEDURE — 63600175 PHARM REV CODE 636 W HCPCS: Mod: JG | Performed by: STUDENT IN AN ORGANIZED HEALTH CARE EDUCATION/TRAINING PROGRAM

## 2022-01-01 PROCEDURE — 97166 OT EVAL MOD COMPLEX 45 MIN: CPT

## 2022-01-01 PROCEDURE — D9220A PRA ANESTHESIA: ICD-10-PCS | Mod: ANES,,, | Performed by: ANESTHESIOLOGY

## 2022-01-01 PROCEDURE — 80061 LIPID PANEL: CPT

## 2022-01-01 PROCEDURE — 87077 CULTURE AEROBIC IDENTIFY: CPT | Performed by: INTERNAL MEDICINE

## 2022-01-01 PROCEDURE — 36415 COLL VENOUS BLD VENIPUNCTURE: CPT | Performed by: PSYCHIATRY & NEUROLOGY

## 2022-01-01 PROCEDURE — 36000711: Performed by: NEUROLOGICAL SURGERY

## 2022-01-01 PROCEDURE — 71000015 HC POSTOP RECOV 1ST HR: Performed by: PODIATRIST

## 2022-01-01 PROCEDURE — 82803 BLOOD GASES ANY COMBINATION: CPT

## 2022-01-01 PROCEDURE — 99232 PR SUBSEQUENT HOSPITAL CARE,LEVL II: ICD-10-PCS | Mod: 95,,, | Performed by: STUDENT IN AN ORGANIZED HEALTH CARE EDUCATION/TRAINING PROGRAM

## 2022-01-01 PROCEDURE — 99024 PR POST-OP FOLLOW-UP VISIT: ICD-10-PCS | Mod: GC,,, | Performed by: NEUROLOGICAL SURGERY

## 2022-01-01 PROCEDURE — 95718 PR EEG, W/VIDEO, CONT RECORD, I&R, 2-12 HRS: ICD-10-PCS | Mod: ,,, | Performed by: PSYCHIATRY & NEUROLOGY

## 2022-01-01 PROCEDURE — 99233 SBSQ HOSP IP/OBS HIGH 50: CPT | Mod: GC,,, | Performed by: HOSPITALIST

## 2022-01-01 PROCEDURE — U0005 INFEC AGEN DETEC AMPLI PROBE: HCPCS | Performed by: PHYSICIAN ASSISTANT

## 2022-01-01 PROCEDURE — 85025 COMPLETE CBC W/AUTO DIFF WBC: CPT | Performed by: NURSE PRACTITIONER

## 2022-01-01 PROCEDURE — 93005 ELECTROCARDIOGRAM TRACING: CPT

## 2022-01-01 PROCEDURE — 99233 PR SUBSEQUENT HOSPITAL CARE,LEVL III: ICD-10-PCS | Mod: ,,, | Performed by: PSYCHIATRY & NEUROLOGY

## 2022-01-01 PROCEDURE — 28810 PR AMPUTATION METATARSAL+TOE,SINGLE: ICD-10-PCS | Mod: T8,,, | Performed by: PODIATRIST

## 2022-01-01 PROCEDURE — 82962 GLUCOSE BLOOD TEST: CPT | Performed by: PODIATRIST

## 2022-01-01 PROCEDURE — 27201423 OPTIME MED/SURG SUP & DEVICES STERILE SUPPLY: Performed by: NEUROLOGICAL SURGERY

## 2022-01-01 PROCEDURE — 99233 SBSQ HOSP IP/OBS HIGH 50: CPT | Mod: 24,,, | Performed by: PODIATRIST

## 2022-01-01 PROCEDURE — U0005 INFEC AGEN DETEC AMPLI PROBE: HCPCS | Performed by: STUDENT IN AN ORGANIZED HEALTH CARE EDUCATION/TRAINING PROGRAM

## 2022-01-01 PROCEDURE — 88311 DECALCIFY TISSUE: CPT | Mod: 26,,, | Performed by: PATHOLOGY

## 2022-01-01 PROCEDURE — 99497 PR ADVNCD CARE PLAN 30 MIN: ICD-10-PCS | Mod: ,,, | Performed by: INTERNAL MEDICINE

## 2022-01-01 PROCEDURE — 99024 POSTOP FOLLOW-UP VISIT: CPT | Mod: GC,,, | Performed by: NEUROLOGICAL SURGERY

## 2022-01-01 PROCEDURE — 99284 EMERGENCY DEPT VISIT MOD MDM: CPT | Mod: ,,, | Performed by: EMERGENCY MEDICINE

## 2022-01-01 PROCEDURE — 99223 1ST HOSP IP/OBS HIGH 75: CPT | Mod: ,,, | Performed by: PSYCHIATRY & NEUROLOGY

## 2022-01-01 PROCEDURE — 87077 CULTURE AEROBIC IDENTIFY: CPT | Performed by: PODIATRIST

## 2022-01-01 PROCEDURE — 99285 EMERGENCY DEPT VISIT HI MDM: CPT | Mod: 25

## 2022-01-01 PROCEDURE — 99232 SBSQ HOSP IP/OBS MODERATE 35: CPT | Mod: ,,, | Performed by: NURSE PRACTITIONER

## 2022-01-01 PROCEDURE — U0002 COVID-19 LAB TEST NON-CDC: HCPCS

## 2022-01-01 PROCEDURE — 36415 COLL VENOUS BLD VENIPUNCTURE: CPT | Performed by: NEUROLOGICAL SURGERY

## 2022-01-01 PROCEDURE — 99239 HOSP IP/OBS DSCHRG MGMT >30: CPT | Mod: ,,, | Performed by: PHYSICIAN ASSISTANT

## 2022-01-01 PROCEDURE — P9035 PLATELET PHERES LEUKOREDUCED: HCPCS | Performed by: EMERGENCY MEDICINE

## 2022-01-01 PROCEDURE — 80202 ASSAY OF VANCOMYCIN: CPT

## 2022-01-01 PROCEDURE — 83735 ASSAY OF MAGNESIUM: CPT | Performed by: STUDENT IN AN ORGANIZED HEALTH CARE EDUCATION/TRAINING PROGRAM

## 2022-01-01 PROCEDURE — 85025 COMPLETE CBC W/AUTO DIFF WBC: CPT | Mod: 91 | Performed by: NURSE PRACTITIONER

## 2022-01-01 PROCEDURE — 99233 PR SUBSEQUENT HOSPITAL CARE,LEVL III: ICD-10-PCS | Mod: 95,,, | Performed by: PSYCHIATRY & NEUROLOGY

## 2022-01-01 PROCEDURE — 97167 OT EVAL HIGH COMPLEX 60 MIN: CPT

## 2022-01-01 PROCEDURE — 94003 VENT MGMT INPAT SUBQ DAY: CPT

## 2022-01-01 PROCEDURE — U0003 INFECTIOUS AGENT DETECTION BY NUCLEIC ACID (DNA OR RNA); SEVERE ACUTE RESPIRATORY SYNDROME CORONAVIRUS 2 (SARS-COV-2) (CORONAVIRUS DISEASE [COVID-19]), AMPLIFIED PROBE TECHNIQUE, MAKING USE OF HIGH THROUGHPUT TECHNOLOGIES AS DESCRIBED BY CMS-2020-01-R: HCPCS | Performed by: PHYSICIAN ASSISTANT

## 2022-01-01 PROCEDURE — 99232 PR SUBSEQUENT HOSPITAL CARE,LEVL II: ICD-10-PCS | Mod: GC,,, | Performed by: PSYCHIATRY & NEUROLOGY

## 2022-01-01 PROCEDURE — 88311 PR  DECALCIFY TISSUE: ICD-10-PCS | Mod: 26,,, | Performed by: PATHOLOGY

## 2022-01-01 PROCEDURE — D9220A PRA ANESTHESIA: Mod: CRNA,,, | Performed by: STUDENT IN AN ORGANIZED HEALTH CARE EDUCATION/TRAINING PROGRAM

## 2022-01-01 PROCEDURE — 61312 CRNEC/CRNOT STTL XDRL/SDRL: CPT | Mod: ,,, | Performed by: NEUROLOGICAL SURGERY

## 2022-01-01 PROCEDURE — 85025 COMPLETE CBC W/AUTO DIFF WBC: CPT | Mod: 91 | Performed by: STUDENT IN AN ORGANIZED HEALTH CARE EDUCATION/TRAINING PROGRAM

## 2022-01-01 PROCEDURE — 87088 URINE BACTERIA CULTURE: CPT | Performed by: PHYSICIAN ASSISTANT

## 2022-01-01 PROCEDURE — 63600175 PHARM REV CODE 636 W HCPCS: Performed by: NURSE ANESTHETIST, CERTIFIED REGISTERED

## 2022-01-01 PROCEDURE — 96367 TX/PROPH/DG ADDL SEQ IV INF: CPT

## 2022-01-01 PROCEDURE — 99233 PR SUBSEQUENT HOSPITAL CARE,LEVL III: ICD-10-PCS | Mod: ,,,

## 2022-01-01 PROCEDURE — 1111F DSCHRG MED/CURRENT MED MERGE: CPT | Mod: 95,CPTII,, | Performed by: STUDENT IN AN ORGANIZED HEALTH CARE EDUCATION/TRAINING PROGRAM

## 2022-01-01 PROCEDURE — P9016 RBC LEUKOCYTES REDUCED: HCPCS | Performed by: PHYSICIAN ASSISTANT

## 2022-01-01 PROCEDURE — 99226 PR SUBSEQUENT OBSERVATION CARE,LEVEL III: ICD-10-PCS | Mod: ,,, | Performed by: PHYSICIAN ASSISTANT

## 2022-01-01 PROCEDURE — 97168 OT RE-EVAL EST PLAN CARE: CPT

## 2022-01-01 PROCEDURE — 99214 OFFICE O/P EST MOD 30 MIN: CPT | Mod: ,,, | Performed by: PHYSICIAN ASSISTANT

## 2022-01-01 PROCEDURE — 99284 PR EMERGENCY DEPT VISIT,LEVEL IV: ICD-10-PCS | Mod: ,,, | Performed by: EMERGENCY MEDICINE

## 2022-01-01 PROCEDURE — 1111F PR DISCHARGE MEDS RECONCILED W/ CURRENT OUTPATIENT MED LIST: ICD-10-PCS | Mod: 95,CPTII,, | Performed by: STUDENT IN AN ORGANIZED HEALTH CARE EDUCATION/TRAINING PROGRAM

## 2022-01-01 PROCEDURE — 99222 1ST HOSP IP/OBS MODERATE 55: CPT | Mod: ,,, | Performed by: NURSE PRACTITIONER

## 2022-01-01 PROCEDURE — 86140 C-REACTIVE PROTEIN: CPT

## 2022-01-01 PROCEDURE — 63600175 PHARM REV CODE 636 W HCPCS: Mod: JG | Performed by: NURSE PRACTITIONER

## 2022-01-01 PROCEDURE — 51702 INSERT TEMP BLADDER CATH: CPT

## 2022-01-01 PROCEDURE — 84100 ASSAY OF PHOSPHORUS: CPT | Performed by: INTERNAL MEDICINE

## 2022-01-01 PROCEDURE — U0002 COVID-19 LAB TEST NON-CDC: HCPCS | Performed by: INTERNAL MEDICINE

## 2022-01-01 PROCEDURE — 87186 SC STD MICRODIL/AGAR DIL: CPT | Performed by: PODIATRIST

## 2022-01-01 PROCEDURE — 86920 COMPATIBILITY TEST SPIN: CPT | Performed by: NEUROLOGICAL SURGERY

## 2022-01-01 PROCEDURE — 87186 SC STD MICRODIL/AGAR DIL: CPT | Performed by: PHYSICIAN ASSISTANT

## 2022-01-01 PROCEDURE — 1111F PR DISCHARGE MEDS RECONCILED W/ CURRENT OUTPATIENT MED LIST: ICD-10-PCS | Mod: CPTII,,, | Performed by: PHYSICIAN ASSISTANT

## 2022-01-01 PROCEDURE — 82607 VITAMIN B-12: CPT | Performed by: PHYSICIAN ASSISTANT

## 2022-01-01 PROCEDURE — 81001 URINALYSIS AUTO W/SCOPE: CPT

## 2022-01-01 PROCEDURE — 61312 CRNEC/CRNOT STTL XDRL/SDRL: CPT | Mod: 58,,, | Performed by: NEUROLOGICAL SURGERY

## 2022-01-01 PROCEDURE — 30200315 PPD INTRADERMAL TEST REV CODE 302: Performed by: HOSPITALIST

## 2022-01-01 PROCEDURE — 85610 PROTHROMBIN TIME: CPT | Mod: 91 | Performed by: STUDENT IN AN ORGANIZED HEALTH CARE EDUCATION/TRAINING PROGRAM

## 2022-01-01 PROCEDURE — 99233 PR SUBSEQUENT HOSPITAL CARE,LEVL III: ICD-10-PCS | Mod: ,,, | Performed by: PODIATRIST

## 2022-01-01 PROCEDURE — 37000008 HC ANESTHESIA 1ST 15 MINUTES: Performed by: NEUROLOGICAL SURGERY

## 2022-01-01 PROCEDURE — 93923 UPR/LXTR ART STDY 3+ LVLS: CPT | Mod: 26,,, | Performed by: SURGERY

## 2022-01-01 PROCEDURE — 36620 ARTERIAL: ICD-10-PCS | Mod: 59,,, | Performed by: ANESTHESIOLOGY

## 2022-01-01 PROCEDURE — 87086 URINE CULTURE/COLONY COUNT: CPT | Performed by: PHYSICIAN ASSISTANT

## 2022-01-01 PROCEDURE — 80053 COMPREHEN METABOLIC PANEL: CPT | Mod: 91 | Performed by: PSYCHIATRY & NEUROLOGY

## 2022-01-01 PROCEDURE — 81000 URINALYSIS NONAUTO W/SCOPE: CPT | Performed by: NURSE PRACTITIONER

## 2022-01-01 PROCEDURE — 87040 BLOOD CULTURE FOR BACTERIA: CPT | Performed by: PHYSICIAN ASSISTANT

## 2022-01-01 PROCEDURE — 99226 PR SUBSEQUENT OBSERVATION CARE,LEVEL III: CPT | Mod: ,,, | Performed by: INTERNAL MEDICINE

## 2022-01-01 PROCEDURE — 80202 ASSAY OF VANCOMYCIN: CPT | Performed by: EMERGENCY MEDICINE

## 2022-01-01 PROCEDURE — 99499 NO LOS: ICD-10-PCS | Mod: ,,, | Performed by: PHYSICIAN ASSISTANT

## 2022-01-01 PROCEDURE — 82272 OCCULT BLD FECES 1-3 TESTS: CPT | Performed by: STUDENT IN AN ORGANIZED HEALTH CARE EDUCATION/TRAINING PROGRAM

## 2022-01-01 PROCEDURE — 99223 PR INITIAL HOSPITAL CARE,LEVL III: ICD-10-PCS | Mod: ,,, | Performed by: PSYCHIATRY & NEUROLOGY

## 2022-01-01 PROCEDURE — 99214 PR OFFICE/OUTPT VISIT, EST, LEVL IV, 30-39 MIN: ICD-10-PCS | Mod: ,,, | Performed by: PHYSICIAN ASSISTANT

## 2022-01-01 PROCEDURE — 99232 PR SUBSEQUENT HOSPITAL CARE,LEVL II: ICD-10-PCS | Mod: ,,, | Performed by: NURSE PRACTITIONER

## 2022-01-01 PROCEDURE — 82550 ASSAY OF CK (CPK): CPT | Performed by: PHYSICIAN ASSISTANT

## 2022-01-01 PROCEDURE — 99220 PR INITIAL OBSERVATION CARE,LEVL III: ICD-10-PCS | Mod: ,,, | Performed by: PHYSICIAN ASSISTANT

## 2022-01-01 PROCEDURE — D9220A PRA ANESTHESIA: Mod: CRNA,,, | Performed by: NURSE ANESTHETIST, CERTIFIED REGISTERED

## 2022-01-01 PROCEDURE — 99233 SBSQ HOSP IP/OBS HIGH 50: CPT | Mod: 57,,, | Performed by: PODIATRIST

## 2022-01-01 PROCEDURE — 87186 SC STD MICRODIL/AGAR DIL: CPT | Mod: 59 | Performed by: PHYSICIAN ASSISTANT

## 2022-01-01 PROCEDURE — 99233 PR SUBSEQUENT HOSPITAL CARE,LEVL III: ICD-10-PCS | Mod: 57,,, | Performed by: PODIATRIST

## 2022-01-01 PROCEDURE — 86901 BLOOD TYPING SEROLOGIC RH(D): CPT | Performed by: INTERNAL MEDICINE

## 2022-01-01 PROCEDURE — 28810 AMPUTATION TOE & METATARSAL: CPT | Mod: T8,,, | Performed by: PODIATRIST

## 2022-01-01 PROCEDURE — 88305 TISSUE EXAM BY PATHOLOGIST: CPT | Mod: 26,,, | Performed by: PATHOLOGY

## 2022-01-01 PROCEDURE — 99291 PR CRITICAL CARE, E/M 30-74 MINUTES: ICD-10-PCS | Mod: GC,,, | Performed by: PSYCHIATRY & NEUROLOGY

## 2022-01-01 PROCEDURE — 99232 SBSQ HOSP IP/OBS MODERATE 35: CPT | Mod: GC,,, | Performed by: HOSPITALIST

## 2022-01-01 PROCEDURE — 86900 BLOOD TYPING SEROLOGIC ABO: CPT | Performed by: NURSE PRACTITIONER

## 2022-01-01 PROCEDURE — 76937 US GUIDE VASCULAR ACCESS: CPT

## 2022-01-01 PROCEDURE — 93923 PR NON-INVASIVE PHYSIOLOGIC STUDY EXTREMITY 3 LEVELS: ICD-10-PCS | Mod: 26,,, | Performed by: SURGERY

## 2022-01-01 PROCEDURE — 87102 FUNGUS ISOLATION CULTURE: CPT | Performed by: PODIATRIST

## 2022-01-01 PROCEDURE — 80048 BASIC METABOLIC PNL TOTAL CA: CPT | Performed by: EMERGENCY MEDICINE

## 2022-01-01 PROCEDURE — 99233 SBSQ HOSP IP/OBS HIGH 50: CPT | Mod: ,,, | Performed by: PODIATRIST

## 2022-01-01 PROCEDURE — 87205 SMEAR GRAM STAIN: CPT | Performed by: PODIATRIST

## 2022-01-01 PROCEDURE — 87086 URINE CULTURE/COLONY COUNT: CPT | Performed by: NURSE PRACTITIONER

## 2022-01-01 PROCEDURE — 97162 PT EVAL MOD COMPLEX 30 MIN: CPT

## 2022-01-01 PROCEDURE — 99223 1ST HOSP IP/OBS HIGH 75: CPT | Mod: 24,,, | Performed by: PODIATRIST

## 2022-01-01 PROCEDURE — 99220 PR INITIAL OBSERVATION CARE,LEVL III: CPT | Mod: ,,, | Performed by: PHYSICIAN ASSISTANT

## 2022-01-01 PROCEDURE — U0002 COVID-19 LAB TEST NON-CDC: HCPCS | Performed by: HOSPITALIST

## 2022-01-01 PROCEDURE — 99217 PR OBSERVATION CARE DISCHARGE: CPT | Mod: ,,, | Performed by: PHYSICIAN ASSISTANT

## 2022-01-01 PROCEDURE — 99283 EMERGENCY DEPT VISIT LOW MDM: CPT

## 2022-01-01 PROCEDURE — 99291 CRITICAL CARE FIRST HOUR: CPT | Mod: ,,,

## 2022-01-01 PROCEDURE — 99024 PR POST-OP FOLLOW-UP VISIT: ICD-10-PCS | Mod: ,,,

## 2022-01-01 PROCEDURE — 84145 PROCALCITONIN (PCT): CPT | Performed by: STUDENT IN AN ORGANIZED HEALTH CARE EDUCATION/TRAINING PROGRAM

## 2022-01-01 PROCEDURE — 99214 OFFICE O/P EST MOD 30 MIN: CPT | Mod: ,,, | Performed by: INTERNAL MEDICINE

## 2022-01-01 PROCEDURE — 28820 AMPUTATION OF TOE: CPT | Mod: T6,,, | Performed by: PODIATRIST

## 2022-01-01 PROCEDURE — 87116 MYCOBACTERIA CULTURE: CPT | Mod: 59 | Performed by: PODIATRIST

## 2022-01-01 PROCEDURE — 94660 CPAP INITIATION&MGMT: CPT

## 2022-01-01 PROCEDURE — 96372 THER/PROPH/DIAG INJ SC/IM: CPT | Mod: 59

## 2022-01-01 PROCEDURE — 99222 1ST HOSP IP/OBS MODERATE 55: CPT | Mod: GC,,, | Performed by: HOSPITALIST

## 2022-01-01 PROCEDURE — 83735 ASSAY OF MAGNESIUM: CPT | Performed by: INTERNAL MEDICINE

## 2022-01-01 PROCEDURE — 85730 THROMBOPLASTIN TIME PARTIAL: CPT | Mod: 91 | Performed by: STUDENT IN AN ORGANIZED HEALTH CARE EDUCATION/TRAINING PROGRAM

## 2022-01-01 PROCEDURE — 99232 SBSQ HOSP IP/OBS MODERATE 35: CPT | Mod: 57,GC,, | Performed by: NEUROLOGICAL SURGERY

## 2022-01-01 PROCEDURE — 36410 VNPNXR 3YR/> PHY/QHP DX/THER: CPT

## 2022-01-01 PROCEDURE — 99223 PR INITIAL HOSPITAL CARE,LEVL III: ICD-10-PCS | Mod: 24,,, | Performed by: PODIATRIST

## 2022-01-01 PROCEDURE — 99232 PR SUBSEQUENT HOSPITAL CARE,LEVL II: ICD-10-PCS | Mod: 95,,, | Performed by: HOSPITALIST

## 2022-01-01 PROCEDURE — 99222 PR INITIAL HOSPITAL CARE,LEVL II: ICD-10-PCS | Mod: ,,, | Performed by: NURSE PRACTITIONER

## 2022-01-01 PROCEDURE — 36620 INSERTION CATHETER ARTERY: CPT | Mod: 59,,, | Performed by: ANESTHESIOLOGY

## 2022-01-01 PROCEDURE — 99499 UNLISTED E&M SERVICE: CPT | Mod: ,,, | Performed by: PHYSICIAN ASSISTANT

## 2022-01-01 PROCEDURE — 85025 COMPLETE CBC W/AUTO DIFF WBC: CPT | Mod: 91 | Performed by: PHYSICIAN ASSISTANT

## 2022-01-01 PROCEDURE — 99222 PR INITIAL HOSPITAL CARE,LEVL II: ICD-10-PCS | Mod: GC,,, | Performed by: HOSPITALIST

## 2022-01-01 DEVICE — SCREW UN3 AXS SD 1.5X4MM: Type: IMPLANTABLE DEVICE | Site: CRANIAL | Status: FUNCTIONAL

## 2022-01-01 RX ORDER — LACOSAMIDE 10 MG/ML
200 SOLUTION ORAL EVERY 12 HOURS
Status: DISCONTINUED | OUTPATIENT
Start: 2022-01-01 | End: 2022-01-01

## 2022-01-01 RX ORDER — HYDROCODONE BITARTRATE AND ACETAMINOPHEN 500; 5 MG/1; MG/1
TABLET ORAL
Status: DISCONTINUED | OUTPATIENT
Start: 2022-01-01 | End: 2022-01-01 | Stop reason: HOSPADM

## 2022-01-01 RX ORDER — AMLODIPINE BESYLATE 10 MG/1
10 TABLET ORAL NIGHTLY
Status: DISCONTINUED | OUTPATIENT
Start: 2022-01-01 | End: 2022-01-01 | Stop reason: HOSPADM

## 2022-01-01 RX ORDER — HYDROMORPHONE HYDROCHLORIDE 1 MG/ML
0.2 INJECTION, SOLUTION INTRAMUSCULAR; INTRAVENOUS; SUBCUTANEOUS EVERY 5 MIN PRN
Status: DISCONTINUED | OUTPATIENT
Start: 2022-01-01 | End: 2022-01-01 | Stop reason: HOSPADM

## 2022-01-01 RX ORDER — GLUCAGON 1 MG
1 KIT INJECTION
Status: DISCONTINUED | OUTPATIENT
Start: 2022-01-01 | End: 2022-01-01 | Stop reason: HOSPADM

## 2022-01-01 RX ORDER — LEVOFLOXACIN 750 MG/1
750 TABLET ORAL EVERY OTHER DAY
Start: 2022-01-01 | End: 2022-01-01

## 2022-01-01 RX ORDER — LORAZEPAM 2 MG/ML
0.25 INJECTION INTRAMUSCULAR ONCE AS NEEDED
Status: DISCONTINUED | OUTPATIENT
Start: 2022-01-01 | End: 2022-01-01 | Stop reason: HOSPADM

## 2022-01-01 RX ORDER — TALC
6 POWDER (GRAM) TOPICAL NIGHTLY PRN
Status: CANCELLED | OUTPATIENT
Start: 2022-01-01

## 2022-01-01 RX ORDER — NICARDIPINE HYDROCHLORIDE 0.2 MG/ML
0-15 INJECTION INTRAVENOUS CONTINUOUS
Status: DISCONTINUED | OUTPATIENT
Start: 2022-01-01 | End: 2022-01-01

## 2022-01-01 RX ORDER — SODIUM CHLORIDE 9 MG/ML
INJECTION, SOLUTION INTRAVENOUS CONTINUOUS
Status: DISCONTINUED | OUTPATIENT
Start: 2022-01-01 | End: 2022-01-01

## 2022-01-01 RX ORDER — LIDOCAINE HYDROCHLORIDE 20 MG/ML
INJECTION, SOLUTION EPIDURAL; INFILTRATION; INTRACAUDAL; PERINEURAL
Status: DISCONTINUED | OUTPATIENT
Start: 2022-01-01 | End: 2022-01-01

## 2022-01-01 RX ORDER — VANCOMYCIN HCL IN 5 % DEXTROSE 1G/250ML
1000 PLASTIC BAG, INJECTION (ML) INTRAVENOUS ONCE
Start: 2022-01-01 | End: 2022-01-01 | Stop reason: SDUPTHER

## 2022-01-01 RX ORDER — AMOXICILLIN 250 MG/1
500 CAPSULE ORAL EVERY 12 HOURS
Status: DISCONTINUED | OUTPATIENT
Start: 2022-01-01 | End: 2022-01-01 | Stop reason: HOSPADM

## 2022-01-01 RX ORDER — LOSARTAN POTASSIUM 50 MG/1
50 TABLET ORAL DAILY
Status: DISCONTINUED | OUTPATIENT
Start: 2022-01-01 | End: 2022-01-01

## 2022-01-01 RX ORDER — HYDROCODONE BITARTRATE AND ACETAMINOPHEN 500; 5 MG/1; MG/1
TABLET ORAL
Status: DISCONTINUED | OUTPATIENT
Start: 2022-01-01 | End: 2022-01-01

## 2022-01-01 RX ORDER — SODIUM,POTASSIUM PHOSPHATES 280-250MG
2 POWDER IN PACKET (EA) ORAL
Status: DISCONTINUED | OUTPATIENT
Start: 2022-01-01 | End: 2022-01-01

## 2022-01-01 RX ORDER — TAMSULOSIN HYDROCHLORIDE 0.4 MG/1
0.8 CAPSULE ORAL DAILY
Status: DISCONTINUED | OUTPATIENT
Start: 2022-01-01 | End: 2022-01-01 | Stop reason: HOSPADM

## 2022-01-01 RX ORDER — INSULIN ASPART 100 [IU]/ML
0-5 INJECTION, SOLUTION INTRAVENOUS; SUBCUTANEOUS
Status: DISCONTINUED | OUTPATIENT
Start: 2022-01-01 | End: 2022-01-01 | Stop reason: HOSPADM

## 2022-01-01 RX ORDER — VANCOMYCIN HCL IN 5 % DEXTROSE 1G/250ML
1000 PLASTIC BAG, INJECTION (ML) INTRAVENOUS ONCE
Status: COMPLETED | OUTPATIENT
Start: 2022-01-01 | End: 2022-01-01

## 2022-01-01 RX ORDER — SODIUM CHLORIDE 0.9 % (FLUSH) 0.9 %
10 SYRINGE (ML) INJECTION EVERY 8 HOURS
Status: DISCONTINUED | OUTPATIENT
Start: 2022-01-01 | End: 2022-01-01 | Stop reason: HOSPADM

## 2022-01-01 RX ORDER — ACETAMINOPHEN 325 MG/1
650 TABLET ORAL EVERY 4 HOURS PRN
Status: DISCONTINUED | OUTPATIENT
Start: 2022-01-01 | End: 2022-01-01

## 2022-01-01 RX ORDER — ASCORBIC ACID 500 MG
500 TABLET ORAL DAILY
Status: ON HOLD
Start: 2022-01-01 | End: 2022-01-01

## 2022-01-01 RX ORDER — IBUPROFEN 200 MG
24 TABLET ORAL
Status: DISCONTINUED | OUTPATIENT
Start: 2022-01-01 | End: 2022-01-01 | Stop reason: HOSPADM

## 2022-01-01 RX ORDER — HYDROCODONE BITARTRATE AND ACETAMINOPHEN 5; 325 MG/1; MG/1
1 TABLET ORAL EVERY 4 HOURS PRN
Status: DISCONTINUED | OUTPATIENT
Start: 2022-01-01 | End: 2022-01-01

## 2022-01-01 RX ORDER — DEXAMETHASONE SODIUM PHOSPHATE 4 MG/ML
INJECTION, SOLUTION INTRA-ARTICULAR; INTRALESIONAL; INTRAMUSCULAR; INTRAVENOUS; SOFT TISSUE
Status: DISCONTINUED | OUTPATIENT
Start: 2022-01-01 | End: 2022-01-01

## 2022-01-01 RX ORDER — LANOLIN ALCOHOL/MO/W.PET/CERES
1 CREAM (GRAM) TOPICAL DAILY
Status: DISCONTINUED | OUTPATIENT
Start: 2022-01-01 | End: 2022-01-01 | Stop reason: HOSPADM

## 2022-01-01 RX ORDER — SODIUM CHLORIDE 0.9 % (FLUSH) 0.9 %
10 SYRINGE (ML) INJECTION
Status: DISCONTINUED | OUTPATIENT
Start: 2022-01-01 | End: 2022-01-01 | Stop reason: HOSPADM

## 2022-01-01 RX ORDER — POLYETHYLENE GLYCOL 3350 17 G/17G
17 POWDER, FOR SOLUTION ORAL DAILY PRN
Status: DISCONTINUED | OUTPATIENT
Start: 2022-01-01 | End: 2022-01-01 | Stop reason: HOSPADM

## 2022-01-01 RX ORDER — FENTANYL CITRATE 50 UG/ML
INJECTION, SOLUTION INTRAMUSCULAR; INTRAVENOUS
Status: DISCONTINUED | OUTPATIENT
Start: 2022-01-01 | End: 2022-01-01

## 2022-01-01 RX ORDER — LEVETIRACETAM 100 MG/ML
500 SOLUTION ORAL 2 TIMES DAILY
Status: COMPLETED | OUTPATIENT
Start: 2022-01-01 | End: 2022-01-01

## 2022-01-01 RX ORDER — FAMOTIDINE 20 MG/1
20 TABLET, FILM COATED ORAL 2 TIMES DAILY
Status: DISCONTINUED | OUTPATIENT
Start: 2022-01-01 | End: 2022-01-01

## 2022-01-01 RX ORDER — CARVEDILOL 12.5 MG/1
12.5 TABLET ORAL 2 TIMES DAILY
Status: DISCONTINUED | OUTPATIENT
Start: 2022-01-01 | End: 2022-01-01

## 2022-01-01 RX ORDER — BUPIVACAINE HYDROCHLORIDE 5 MG/ML
INJECTION, SOLUTION EPIDURAL; INTRACAUDAL
Status: DISCONTINUED | OUTPATIENT
Start: 2022-01-01 | End: 2022-01-01 | Stop reason: HOSPADM

## 2022-01-01 RX ORDER — GABAPENTIN 300 MG/1
300 CAPSULE ORAL DAILY
Status: DISCONTINUED | OUTPATIENT
Start: 2022-01-01 | End: 2022-01-01 | Stop reason: HOSPADM

## 2022-01-01 RX ORDER — LABETALOL HCL 20 MG/4 ML
10 SYRINGE (ML) INTRAVENOUS
Status: DISCONTINUED | OUTPATIENT
Start: 2022-01-01 | End: 2022-01-01

## 2022-01-01 RX ORDER — AMLODIPINE BESYLATE 5 MG/1
5 TABLET ORAL DAILY
Status: DISCONTINUED | OUTPATIENT
Start: 2022-01-01 | End: 2022-01-01

## 2022-01-01 RX ORDER — ACETYLCYSTEINE 200 MG/ML
2 SOLUTION ORAL; RESPIRATORY (INHALATION)
Status: DISCONTINUED | OUTPATIENT
Start: 2022-01-01 | End: 2022-01-01

## 2022-01-01 RX ORDER — LIDOCAINE HYDROCHLORIDE AND EPINEPHRINE 10; 10 MG/ML; UG/ML
INJECTION, SOLUTION INFILTRATION; PERINEURAL
Status: DISCONTINUED | OUTPATIENT
Start: 2022-01-01 | End: 2022-01-01 | Stop reason: HOSPADM

## 2022-01-01 RX ORDER — FOLIC ACID 1 MG/1
1 TABLET ORAL DAILY
Status: DISCONTINUED | OUTPATIENT
Start: 2022-01-01 | End: 2022-01-01 | Stop reason: HOSPADM

## 2022-01-01 RX ORDER — ASCORBIC ACID 500 MG
500 TABLET ORAL DAILY
Status: DISCONTINUED | OUTPATIENT
Start: 2022-01-01 | End: 2022-01-01 | Stop reason: HOSPADM

## 2022-01-01 RX ORDER — METOCLOPRAMIDE HYDROCHLORIDE 5 MG/ML
5 INJECTION INTRAMUSCULAR; INTRAVENOUS EVERY 6 HOURS PRN
Status: DISCONTINUED | OUTPATIENT
Start: 2022-01-01 | End: 2022-01-01 | Stop reason: HOSPADM

## 2022-01-01 RX ORDER — ATORVASTATIN CALCIUM 40 MG/1
80 TABLET, FILM COATED ORAL NIGHTLY
Status: DISCONTINUED | OUTPATIENT
Start: 2022-01-01 | End: 2022-01-01 | Stop reason: HOSPADM

## 2022-01-01 RX ORDER — CARVEDILOL 25 MG/1
25 TABLET ORAL 2 TIMES DAILY
Status: DISCONTINUED | OUTPATIENT
Start: 2022-01-01 | End: 2022-01-01 | Stop reason: HOSPADM

## 2022-01-01 RX ORDER — ONDANSETRON 2 MG/ML
4 INJECTION INTRAMUSCULAR; INTRAVENOUS EVERY 6 HOURS PRN
Status: DISCONTINUED | OUTPATIENT
Start: 2022-01-01 | End: 2022-01-01 | Stop reason: HOSPADM

## 2022-01-01 RX ORDER — PROPOFOL 10 MG/ML
VIAL (ML) INTRAVENOUS
Status: DISCONTINUED | OUTPATIENT
Start: 2022-01-01 | End: 2022-01-01

## 2022-01-01 RX ORDER — POTASSIUM CHLORIDE 20 MEQ/1
40 TABLET, EXTENDED RELEASE ORAL
Status: COMPLETED | OUTPATIENT
Start: 2022-01-01 | End: 2022-01-01

## 2022-01-01 RX ORDER — LOSARTAN POTASSIUM 25 MG/1
25 TABLET ORAL ONCE
Status: COMPLETED | OUTPATIENT
Start: 2022-01-01 | End: 2022-01-01

## 2022-01-01 RX ORDER — DEXMEDETOMIDINE HYDROCHLORIDE 100 UG/ML
INJECTION, SOLUTION INTRAVENOUS
Status: DISCONTINUED | OUTPATIENT
Start: 2022-01-01 | End: 2022-01-01

## 2022-01-01 RX ORDER — IBUPROFEN 200 MG
16 TABLET ORAL
Status: DISCONTINUED | OUTPATIENT
Start: 2022-01-01 | End: 2022-01-01 | Stop reason: HOSPADM

## 2022-01-01 RX ORDER — ROCURONIUM BROMIDE 10 MG/ML
INJECTION, SOLUTION INTRAVENOUS
Status: DISCONTINUED | OUTPATIENT
Start: 2022-01-01 | End: 2022-01-01

## 2022-01-01 RX ORDER — LEVOTHYROXINE SODIUM 75 UG/1
75 TABLET ORAL
Status: DISCONTINUED | OUTPATIENT
Start: 2022-01-01 | End: 2022-01-01

## 2022-01-01 RX ORDER — MAGNESIUM SULFATE HEPTAHYDRATE 40 MG/ML
2 INJECTION, SOLUTION INTRAVENOUS
Status: DISCONTINUED | OUTPATIENT
Start: 2022-01-01 | End: 2022-01-01

## 2022-01-01 RX ORDER — IODIXANOL 320 MG/ML
200 INJECTION, SOLUTION INTRAVASCULAR
Status: DISCONTINUED | OUTPATIENT
Start: 2022-01-01 | End: 2022-01-01

## 2022-01-01 RX ORDER — LEVETIRACETAM 100 MG/ML
500 SOLUTION ORAL 2 TIMES DAILY
Status: DISCONTINUED | OUTPATIENT
Start: 2022-01-01 | End: 2022-01-01

## 2022-01-01 RX ORDER — CARVEDILOL 25 MG/1
25 TABLET ORAL 2 TIMES DAILY
Status: DISCONTINUED | OUTPATIENT
Start: 2022-01-01 | End: 2022-01-01

## 2022-01-01 RX ORDER — TAMSULOSIN HYDROCHLORIDE 0.4 MG/1
0.4 CAPSULE ORAL DAILY
Status: DISCONTINUED | OUTPATIENT
Start: 2022-01-01 | End: 2022-01-01 | Stop reason: HOSPADM

## 2022-01-01 RX ORDER — ALBUTEROL SULFATE 2.5 MG/.5ML
2.5 SOLUTION RESPIRATORY (INHALATION) EVERY 6 HOURS
Status: DISCONTINUED | OUTPATIENT
Start: 2022-01-01 | End: 2022-01-01

## 2022-01-01 RX ORDER — INSULIN ASPART 100 [IU]/ML
1-10 INJECTION, SOLUTION INTRAVENOUS; SUBCUTANEOUS EVERY 6 HOURS PRN
Status: DISCONTINUED | OUTPATIENT
Start: 2022-01-01 | End: 2022-01-01

## 2022-01-01 RX ORDER — VASOPRESSIN 20 [USP'U]/ML
INJECTION, SOLUTION INTRAMUSCULAR; SUBCUTANEOUS
Status: DISCONTINUED | OUTPATIENT
Start: 2022-01-01 | End: 2022-01-01

## 2022-01-01 RX ORDER — AMLODIPINE BESYLATE 10 MG/1
10 TABLET ORAL DAILY
Status: DISCONTINUED | OUTPATIENT
Start: 2022-01-01 | End: 2022-01-01

## 2022-01-01 RX ORDER — TAMSULOSIN HYDROCHLORIDE 0.4 MG/1
0.4 CAPSULE ORAL ONCE
Status: COMPLETED | OUTPATIENT
Start: 2022-01-01 | End: 2022-01-01

## 2022-01-01 RX ORDER — MAGNESIUM SULFATE HEPTAHYDRATE 40 MG/ML
4 INJECTION, SOLUTION INTRAVENOUS
Status: DISCONTINUED | OUTPATIENT
Start: 2022-01-01 | End: 2022-01-01

## 2022-01-01 RX ORDER — SODIUM CHLORIDE 0.9 % (FLUSH) 0.9 %
2 SYRINGE (ML) INJECTION EVERY 12 HOURS PRN
Status: DISCONTINUED | OUTPATIENT
Start: 2022-01-01 | End: 2022-01-01 | Stop reason: HOSPADM

## 2022-01-01 RX ORDER — ACETAMINOPHEN 10 MG/ML
INJECTION, SOLUTION INTRAVENOUS
Status: DISCONTINUED | OUTPATIENT
Start: 2022-01-01 | End: 2022-01-01

## 2022-01-01 RX ORDER — ACETAMINOPHEN 500 MG
1000 TABLET ORAL 3 TIMES DAILY
Status: COMPLETED | OUTPATIENT
Start: 2022-01-01 | End: 2022-01-01

## 2022-01-01 RX ORDER — HYDROCODONE BITARTRATE AND ACETAMINOPHEN 5; 325 MG/1; MG/1
1 TABLET ORAL EVERY 6 HOURS PRN
Status: DISCONTINUED | OUTPATIENT
Start: 2022-01-01 | End: 2022-01-01 | Stop reason: HOSPADM

## 2022-01-01 RX ORDER — ALBUTEROL SULFATE 2.5 MG/.5ML
2.5 SOLUTION RESPIRATORY (INHALATION) EVERY 4 HOURS PRN
Status: DISCONTINUED | OUTPATIENT
Start: 2022-01-01 | End: 2022-01-01

## 2022-01-01 RX ORDER — IPRATROPIUM BROMIDE AND ALBUTEROL SULFATE 2.5; .5 MG/3ML; MG/3ML
3 SOLUTION RESPIRATORY (INHALATION)
Status: DISCONTINUED | OUTPATIENT
Start: 2022-01-01 | End: 2022-01-01

## 2022-01-01 RX ORDER — ACETYLCYSTEINE 100 MG/ML
4 SOLUTION ORAL; RESPIRATORY (INHALATION)
Status: DISCONTINUED | OUTPATIENT
Start: 2022-01-01 | End: 2022-01-01

## 2022-01-01 RX ORDER — MAG HYDROX/ALUMINUM HYD/SIMETH 200-200-20
30 SUSPENSION, ORAL (FINAL DOSE FORM) ORAL 4 TIMES DAILY PRN
Status: DISCONTINUED | OUTPATIENT
Start: 2022-01-01 | End: 2022-01-01 | Stop reason: HOSPADM

## 2022-01-01 RX ORDER — TRAZODONE HYDROCHLORIDE 100 MG/1
100 TABLET ORAL NIGHTLY PRN
Status: DISCONTINUED | OUTPATIENT
Start: 2022-01-01 | End: 2022-01-01 | Stop reason: HOSPADM

## 2022-01-01 RX ORDER — ACETAMINOPHEN 325 MG/1
650 TABLET ORAL EVERY 4 HOURS PRN
Status: DISCONTINUED | OUTPATIENT
Start: 2022-01-01 | End: 2022-01-01 | Stop reason: HOSPADM

## 2022-01-01 RX ORDER — CEFEPIME HYDROCHLORIDE 1 G/50ML
2 INJECTION, SOLUTION INTRAVENOUS
Status: DISCONTINUED | OUTPATIENT
Start: 2022-01-01 | End: 2022-01-01

## 2022-01-01 RX ORDER — IRON POLYSACCHARIDE COMPLEX 150 MG
150 CAPSULE ORAL EVERY OTHER DAY
Status: DISCONTINUED | OUTPATIENT
Start: 2022-01-01 | End: 2022-01-01 | Stop reason: HOSPADM

## 2022-01-01 RX ORDER — HYDROCODONE BITARTRATE AND ACETAMINOPHEN 5; 325 MG/1; MG/1
1 TABLET ORAL EVERY 6 HOURS PRN
Qty: 10 TABLET | Refills: 0 | Status: ON HOLD | OUTPATIENT
Start: 2022-01-01 | End: 2022-01-01 | Stop reason: HOSPADM

## 2022-01-01 RX ORDER — LEVETIRACETAM 500 MG/5ML
500 INJECTION, SOLUTION, CONCENTRATE INTRAVENOUS EVERY 12 HOURS
Status: DISCONTINUED | OUTPATIENT
Start: 2022-01-01 | End: 2022-01-01

## 2022-01-01 RX ORDER — FAMOTIDINE 20 MG/1
20 TABLET, FILM COATED ORAL DAILY
Status: DISCONTINUED | OUTPATIENT
Start: 2022-01-01 | End: 2022-01-01

## 2022-01-01 RX ORDER — CARVEDILOL 25 MG/1
25 TABLET ORAL 2 TIMES DAILY WITH MEALS
Status: DISCONTINUED | OUTPATIENT
Start: 2022-01-01 | End: 2022-01-01 | Stop reason: HOSPADM

## 2022-01-01 RX ORDER — LIDOCAINE HYDROCHLORIDE 10 MG/ML
INJECTION, SOLUTION EPIDURAL; INFILTRATION; INTRACAUDAL; PERINEURAL
Status: DISCONTINUED | OUTPATIENT
Start: 2022-01-01 | End: 2022-01-01 | Stop reason: HOSPADM

## 2022-01-01 RX ORDER — HEPARIN SODIUM 5000 [USP'U]/ML
5000 INJECTION, SOLUTION INTRAVENOUS; SUBCUTANEOUS EVERY 8 HOURS
Status: DISCONTINUED | OUTPATIENT
Start: 2022-01-01 | End: 2022-01-01

## 2022-01-01 RX ORDER — NALOXONE HCL 0.4 MG/ML
0.02 VIAL (ML) INJECTION
Status: DISCONTINUED | OUTPATIENT
Start: 2022-01-01 | End: 2022-01-01 | Stop reason: HOSPADM

## 2022-01-01 RX ORDER — HYDROMORPHONE HYDROCHLORIDE 1 MG/ML
0.5 INJECTION, SOLUTION INTRAMUSCULAR; INTRAVENOUS; SUBCUTANEOUS EVERY 4 HOURS PRN
Status: DISCONTINUED | OUTPATIENT
Start: 2022-01-01 | End: 2022-01-01 | Stop reason: HOSPADM

## 2022-01-01 RX ORDER — GLUCAGON 1 MG
1 KIT INJECTION
Status: DISCONTINUED | OUTPATIENT
Start: 2022-01-01 | End: 2022-01-01

## 2022-01-01 RX ORDER — ONDANSETRON 2 MG/ML
4 INJECTION INTRAMUSCULAR; INTRAVENOUS DAILY PRN
Status: DISCONTINUED | OUTPATIENT
Start: 2022-01-01 | End: 2022-01-01 | Stop reason: HOSPADM

## 2022-01-01 RX ORDER — LOSARTAN POTASSIUM 25 MG/1
25 TABLET ORAL DAILY
Status: DISCONTINUED | OUTPATIENT
Start: 2022-01-01 | End: 2022-01-01

## 2022-01-01 RX ORDER — LEVOFLOXACIN 750 MG/1
750 TABLET ORAL EVERY OTHER DAY
Status: DISCONTINUED | OUTPATIENT
Start: 2022-01-01 | End: 2022-01-01 | Stop reason: HOSPADM

## 2022-01-01 RX ORDER — LIDOCAINE HYDROCHLORIDE 10 MG/ML
INJECTION INFILTRATION; PERINEURAL
Status: DISCONTINUED | OUTPATIENT
Start: 2022-01-01 | End: 2022-01-01 | Stop reason: HOSPADM

## 2022-01-01 RX ORDER — SODIUM CHLORIDE 0.9 % (FLUSH) 0.9 %
3 SYRINGE (ML) INJECTION
Status: DISCONTINUED | OUTPATIENT
Start: 2022-01-01 | End: 2022-01-01 | Stop reason: HOSPADM

## 2022-01-01 RX ORDER — PHENYLEPHRINE HYDROCHLORIDE 10 MG/ML
INJECTION INTRAVENOUS
Status: DISCONTINUED | OUTPATIENT
Start: 2022-01-01 | End: 2022-01-01

## 2022-01-01 RX ORDER — SILODOSIN 4 MG/1
4 CAPSULE ORAL DAILY
Status: DISCONTINUED | OUTPATIENT
Start: 2022-01-01 | End: 2022-01-01

## 2022-01-01 RX ORDER — HYDROMORPHONE HYDROCHLORIDE 1 MG/ML
0.5 INJECTION, SOLUTION INTRAMUSCULAR; INTRAVENOUS; SUBCUTANEOUS
Status: DISCONTINUED | OUTPATIENT
Start: 2022-01-01 | End: 2022-01-01

## 2022-01-01 RX ORDER — KETOROLAC TROMETHAMINE 15 MG/ML
15 INJECTION, SOLUTION INTRAMUSCULAR; INTRAVENOUS ONCE
Status: COMPLETED | OUTPATIENT
Start: 2022-01-01 | End: 2022-01-01

## 2022-01-01 RX ORDER — TALC
9 POWDER (GRAM) TOPICAL NIGHTLY PRN
Status: DISCONTINUED | OUTPATIENT
Start: 2022-01-01 | End: 2022-01-01 | Stop reason: HOSPADM

## 2022-01-01 RX ORDER — LIDOCAINE HYDROCHLORIDE 20 MG/ML
INJECTION INTRAVENOUS
Status: DISCONTINUED | OUTPATIENT
Start: 2022-01-01 | End: 2022-01-01

## 2022-01-01 RX ORDER — NICARDIPINE HYDROCHLORIDE 2.5 MG/ML
INJECTION INTRAVENOUS
Status: DISCONTINUED | OUTPATIENT
Start: 2022-01-01 | End: 2022-01-01

## 2022-01-01 RX ORDER — AMOXICILLIN 250 MG
1 CAPSULE ORAL 2 TIMES DAILY
Status: DISCONTINUED | OUTPATIENT
Start: 2022-01-01 | End: 2022-01-01

## 2022-01-01 RX ORDER — ACETYLCYSTEINE 100 MG/ML
4 SOLUTION ORAL; RESPIRATORY (INHALATION) EVERY 6 HOURS
Status: DISCONTINUED | OUTPATIENT
Start: 2022-01-01 | End: 2022-01-01

## 2022-01-01 RX ORDER — TALC
6 POWDER (GRAM) TOPICAL NIGHTLY PRN
Status: DISCONTINUED | OUTPATIENT
Start: 2022-01-01 | End: 2022-01-01 | Stop reason: HOSPADM

## 2022-01-01 RX ORDER — POTASSIUM CHLORIDE 20 MEQ/1
40 TABLET, EXTENDED RELEASE ORAL EVERY 4 HOURS
Status: COMPLETED | OUTPATIENT
Start: 2022-01-01 | End: 2022-01-01

## 2022-01-01 RX ORDER — SODIUM CHLORIDE, SODIUM LACTATE, POTASSIUM CHLORIDE, CALCIUM CHLORIDE 600; 310; 30; 20 MG/100ML; MG/100ML; MG/100ML; MG/100ML
INJECTION, SOLUTION INTRAVENOUS CONTINUOUS
Status: DISCONTINUED | OUTPATIENT
Start: 2022-01-01 | End: 2022-01-01

## 2022-01-01 RX ORDER — HYDRALAZINE HYDROCHLORIDE 20 MG/ML
10 INJECTION INTRAMUSCULAR; INTRAVENOUS EVERY 4 HOURS PRN
Status: DISCONTINUED | OUTPATIENT
Start: 2022-01-01 | End: 2022-01-01

## 2022-01-01 RX ORDER — LACOSAMIDE 10 MG/ML
250 SOLUTION ORAL EVERY 12 HOURS
Status: DISCONTINUED | OUTPATIENT
Start: 2022-01-01 | End: 2022-01-01 | Stop reason: HOSPADM

## 2022-01-01 RX ORDER — OXYCODONE HYDROCHLORIDE 5 MG/1
5 TABLET ORAL
Status: DISCONTINUED | OUTPATIENT
Start: 2022-01-01 | End: 2022-01-01 | Stop reason: HOSPADM

## 2022-01-01 RX ORDER — METOCLOPRAMIDE 10 MG/1
10 TABLET ORAL
Status: DISCONTINUED | OUTPATIENT
Start: 2022-01-01 | End: 2022-01-01 | Stop reason: HOSPADM

## 2022-01-01 RX ORDER — TRAZODONE HYDROCHLORIDE 100 MG/1
100 TABLET ORAL DAILY
Qty: 30 TABLET | Refills: 0 | Status: ON HOLD | OUTPATIENT
Start: 2022-01-01 | End: 2022-01-01

## 2022-01-01 RX ORDER — PROCHLORPERAZINE EDISYLATE 5 MG/ML
5 INJECTION INTRAMUSCULAR; INTRAVENOUS EVERY 6 HOURS PRN
Status: DISCONTINUED | OUTPATIENT
Start: 2022-01-01 | End: 2022-01-01 | Stop reason: HOSPADM

## 2022-01-01 RX ORDER — ASPIRIN 81 MG/1
81 TABLET ORAL DAILY
Status: DISCONTINUED | OUTPATIENT
Start: 2022-01-01 | End: 2022-01-01 | Stop reason: HOSPADM

## 2022-01-01 RX ORDER — VANCOMYCIN HCL IN 5 % DEXTROSE 1G/250ML
500 PLASTIC BAG, INJECTION (ML) INTRAVENOUS ONCE
Start: 2022-01-01 | End: 2022-01-01

## 2022-01-01 RX ORDER — AMLODIPINE BESYLATE 5 MG/1
10 TABLET ORAL NIGHTLY
Status: DISCONTINUED | OUTPATIENT
Start: 2022-01-01 | End: 2022-01-01

## 2022-01-01 RX ORDER — LORAZEPAM 2 MG/ML
1 INJECTION INTRAMUSCULAR
Status: DISCONTINUED | OUTPATIENT
Start: 2022-01-01 | End: 2022-01-01 | Stop reason: HOSPADM

## 2022-01-01 RX ORDER — MUPIROCIN 20 MG/G
OINTMENT TOPICAL 2 TIMES DAILY
Status: DISCONTINUED | OUTPATIENT
Start: 2022-01-01 | End: 2022-01-01

## 2022-01-01 RX ORDER — CEFEPIME HYDROCHLORIDE 1 G/50ML
1 INJECTION, SOLUTION INTRAVENOUS
Status: DISCONTINUED | OUTPATIENT
Start: 2022-01-01 | End: 2022-01-01

## 2022-01-01 RX ORDER — ATORVASTATIN CALCIUM 40 MG/1
80 TABLET, FILM COATED ORAL NIGHTLY
Status: DISCONTINUED | OUTPATIENT
Start: 2022-01-01 | End: 2022-01-01

## 2022-01-01 RX ORDER — ONDANSETRON 2 MG/ML
INJECTION INTRAMUSCULAR; INTRAVENOUS
Status: DISCONTINUED | OUTPATIENT
Start: 2022-01-01 | End: 2022-01-01

## 2022-01-01 RX ORDER — ATORVASTATIN CALCIUM 20 MG/1
40 TABLET, FILM COATED ORAL DAILY
Status: DISCONTINUED | OUTPATIENT
Start: 2022-01-01 | End: 2022-01-01

## 2022-01-01 RX ORDER — MORPHINE SULFATE 2 MG/ML
2 INJECTION, SOLUTION INTRAMUSCULAR; INTRAVENOUS
Status: DISCONTINUED | OUTPATIENT
Start: 2022-01-01 | End: 2022-01-01 | Stop reason: HOSPADM

## 2022-01-01 RX ORDER — FENTANYL CITRATE 50 UG/ML
25 INJECTION, SOLUTION INTRAMUSCULAR; INTRAVENOUS
Status: DISCONTINUED | OUTPATIENT
Start: 2022-01-01 | End: 2022-01-01

## 2022-01-01 RX ORDER — LEVETIRACETAM 500 MG/5ML
1000 INJECTION, SOLUTION, CONCENTRATE INTRAVENOUS ONCE
Status: COMPLETED | OUTPATIENT
Start: 2022-01-01 | End: 2022-01-01

## 2022-01-01 RX ORDER — CYANOCOBALAMIN 1000 UG/ML
1000 INJECTION, SOLUTION INTRAMUSCULAR; SUBCUTANEOUS ONCE
Status: COMPLETED | OUTPATIENT
Start: 2022-01-01 | End: 2022-01-01

## 2022-01-01 RX ORDER — PROPOFOL 10 MG/ML
VIAL (ML) INTRAVENOUS CONTINUOUS PRN
Status: DISCONTINUED | OUTPATIENT
Start: 2022-01-01 | End: 2022-01-01

## 2022-01-01 RX ORDER — PANTOPRAZOLE SODIUM 40 MG/1
40 TABLET, DELAYED RELEASE ORAL DAILY
Status: DISCONTINUED | OUTPATIENT
Start: 2022-01-01 | End: 2022-01-01 | Stop reason: HOSPADM

## 2022-01-01 RX ORDER — LIDOCAINE HYDROCHLORIDE 10 MG/ML
INJECTION, SOLUTION EPIDURAL; INFILTRATION; INTRACAUDAL; PERINEURAL
Status: DISPENSED
Start: 2022-01-01 | End: 2022-01-01

## 2022-01-01 RX ORDER — ONDANSETRON 4 MG/1
4 TABLET, FILM COATED ORAL EVERY 8 HOURS PRN
Status: DISCONTINUED | OUTPATIENT
Start: 2022-01-01 | End: 2022-01-01 | Stop reason: HOSPADM

## 2022-01-01 RX ORDER — ONDANSETRON 8 MG/1
8 TABLET, ORALLY DISINTEGRATING ORAL EVERY 8 HOURS PRN
Status: DISCONTINUED | OUTPATIENT
Start: 2022-01-01 | End: 2022-01-01 | Stop reason: HOSPADM

## 2022-01-01 RX ORDER — INSULIN ASPART 100 [IU]/ML
1-10 INJECTION, SOLUTION INTRAVENOUS; SUBCUTANEOUS EVERY 4 HOURS PRN
Status: DISCONTINUED | OUTPATIENT
Start: 2022-01-01 | End: 2022-01-01

## 2022-01-01 RX ORDER — OXYCODONE HYDROCHLORIDE 5 MG/1
5 TABLET ORAL EVERY 6 HOURS PRN
Qty: 20 TABLET | Refills: 0 | Status: SHIPPED | OUTPATIENT
Start: 2022-01-01 | End: 2022-01-01 | Stop reason: SDUPTHER

## 2022-01-01 RX ORDER — OXYCODONE HYDROCHLORIDE 5 MG/1
5 TABLET ORAL EVERY 6 HOURS PRN
Qty: 20 TABLET | Refills: 0 | Status: ON HOLD | OUTPATIENT
Start: 2022-01-01 | End: 2022-01-01

## 2022-01-01 RX ORDER — LEVOTHYROXINE SODIUM 75 UG/1
75 TABLET ORAL DAILY
Status: DISCONTINUED | OUTPATIENT
Start: 2022-01-01 | End: 2022-01-01 | Stop reason: HOSPADM

## 2022-01-01 RX ORDER — SODIUM CHLORIDE 0.9 % (FLUSH) 0.9 %
10 SYRINGE (ML) INJECTION
Status: CANCELLED | OUTPATIENT
Start: 2022-01-01

## 2022-01-01 RX ORDER — HYDRALAZINE HYDROCHLORIDE 20 MG/ML
10 INJECTION INTRAMUSCULAR; INTRAVENOUS EVERY 8 HOURS PRN
Status: DISCONTINUED | OUTPATIENT
Start: 2022-01-01 | End: 2022-01-01

## 2022-01-01 RX ORDER — ONDANSETRON 2 MG/ML
4 INJECTION INTRAMUSCULAR; INTRAVENOUS EVERY 12 HOURS PRN
Status: DISCONTINUED | OUTPATIENT
Start: 2022-01-01 | End: 2022-01-01

## 2022-01-01 RX ORDER — SUCCINYLCHOLINE CHLORIDE 20 MG/ML
INJECTION INTRAMUSCULAR; INTRAVENOUS
Status: DISCONTINUED | OUTPATIENT
Start: 2022-01-01 | End: 2022-01-01

## 2022-01-01 RX ORDER — AMOXICILLIN 500 MG/1
500 CAPSULE ORAL EVERY 12 HOURS
Qty: 6 CAPSULE | Refills: 0 | Status: SHIPPED | OUTPATIENT
Start: 2022-01-01 | End: 2022-01-01

## 2022-01-01 RX ORDER — MEPERIDINE HYDROCHLORIDE 25 MG/ML
12.5 INJECTION INTRAMUSCULAR; INTRAVENOUS; SUBCUTANEOUS ONCE AS NEEDED
Status: ACTIVE | OUTPATIENT
Start: 2022-01-01 | End: 2022-01-01

## 2022-01-01 RX ORDER — CLOPIDOGREL BISULFATE 75 MG/1
75 TABLET ORAL DAILY
Status: DISCONTINUED | OUTPATIENT
Start: 2022-01-01 | End: 2022-01-01 | Stop reason: HOSPADM

## 2022-01-01 RX ORDER — CLOPIDOGREL BISULFATE 75 MG/1
75 TABLET ORAL DAILY
Status: DISCONTINUED | OUTPATIENT
Start: 2022-01-01 | End: 2022-01-01

## 2022-01-01 RX ORDER — LIDOCAINE HYDROCHLORIDE 20 MG/ML
INJECTION, SOLUTION EPIDURAL; INFILTRATION; INTRACAUDAL; PERINEURAL CODE/TRAUMA/SEDATION MEDICATION
Status: COMPLETED | OUTPATIENT
Start: 2022-01-01 | End: 2022-01-01

## 2022-01-01 RX ORDER — METOCLOPRAMIDE 10 MG/1
10 TABLET ORAL
Status: DISCONTINUED | OUTPATIENT
Start: 2022-01-01 | End: 2022-01-01

## 2022-01-01 RX ORDER — FOLIC ACID 1 MG/1
1 TABLET ORAL DAILY
Refills: 0 | Status: ON HOLD
Start: 2022-01-01 | End: 2022-01-01

## 2022-01-01 RX ORDER — SODIUM CHLORIDE 0.9 % (FLUSH) 0.9 %
10 SYRINGE (ML) INJECTION EVERY 6 HOURS
Status: DISCONTINUED | OUTPATIENT
Start: 2022-01-01 | End: 2022-01-01 | Stop reason: HOSPADM

## 2022-01-01 RX ORDER — MORPHINE SULFATE/0.9% NACL/PF 1 MG/ML
2 PLASTIC BAG, INJECTION (ML) INTRAVENOUS CONTINUOUS
Status: DISCONTINUED | OUTPATIENT
Start: 2022-01-01 | End: 2022-01-01 | Stop reason: HOSPADM

## 2022-01-01 RX ORDER — INSULIN ASPART 100 [IU]/ML
1-10 INJECTION, SOLUTION INTRAVENOUS; SUBCUTANEOUS
Status: DISCONTINUED | OUTPATIENT
Start: 2022-01-01 | End: 2022-01-01

## 2022-01-01 RX ORDER — FUROSEMIDE 10 MG/ML
40 INJECTION INTRAMUSCULAR; INTRAVENOUS ONCE
Status: COMPLETED | OUTPATIENT
Start: 2022-01-01 | End: 2022-01-01

## 2022-01-01 RX ORDER — HYDROMORPHONE HYDROCHLORIDE 2 MG/ML
0.4 INJECTION, SOLUTION INTRAMUSCULAR; INTRAVENOUS; SUBCUTANEOUS EVERY 5 MIN PRN
Status: DISCONTINUED | OUTPATIENT
Start: 2022-01-01 | End: 2022-01-01 | Stop reason: HOSPADM

## 2022-01-01 RX ORDER — LEVETIRACETAM 500 MG/5ML
750 INJECTION, SOLUTION, CONCENTRATE INTRAVENOUS EVERY 12 HOURS
Status: DISCONTINUED | OUTPATIENT
Start: 2022-01-01 | End: 2022-01-01

## 2022-01-01 RX ORDER — LOSARTAN POTASSIUM 25 MG/1
25 TABLET ORAL DAILY
Status: ON HOLD | COMMUNITY
Start: 2022-01-01 | End: 2022-01-01

## 2022-01-01 RX ORDER — ONDANSETRON 2 MG/ML
4 INJECTION INTRAMUSCULAR; INTRAVENOUS EVERY 8 HOURS PRN
Status: DISCONTINUED | OUTPATIENT
Start: 2022-01-01 | End: 2022-01-01 | Stop reason: SDUPTHER

## 2022-01-01 RX ORDER — DULOXETIN HYDROCHLORIDE 30 MG/1
30 CAPSULE, DELAYED RELEASE ORAL DAILY
Status: DISCONTINUED | OUTPATIENT
Start: 2022-01-01 | End: 2022-01-01 | Stop reason: HOSPADM

## 2022-01-01 RX ORDER — NICARDIPINE HYDROCHLORIDE 0.2 MG/ML
INJECTION INTRAVENOUS CONTINUOUS PRN
Status: DISCONTINUED | OUTPATIENT
Start: 2022-01-01 | End: 2022-01-01

## 2022-01-01 RX ORDER — IPRATROPIUM BROMIDE AND ALBUTEROL SULFATE 2.5; .5 MG/3ML; MG/3ML
3 SOLUTION RESPIRATORY (INHALATION) EVERY 6 HOURS
Status: DISCONTINUED | OUTPATIENT
Start: 2022-01-01 | End: 2022-01-01

## 2022-01-01 RX ORDER — TRAZODONE HYDROCHLORIDE 50 MG/1
100 TABLET ORAL DAILY
Status: DISCONTINUED | OUTPATIENT
Start: 2022-01-01 | End: 2022-01-01

## 2022-01-01 RX ORDER — IPRATROPIUM BROMIDE AND ALBUTEROL SULFATE 2.5; .5 MG/3ML; MG/3ML
3 SOLUTION RESPIRATORY (INHALATION) EVERY 6 HOURS PRN
Status: DISCONTINUED | OUTPATIENT
Start: 2022-01-01 | End: 2022-01-01 | Stop reason: HOSPADM

## 2022-01-01 RX ORDER — METOCLOPRAMIDE HYDROCHLORIDE 5 MG/ML
10 INJECTION INTRAMUSCULAR; INTRAVENOUS EVERY 10 MIN PRN
Status: DISCONTINUED | OUTPATIENT
Start: 2022-01-01 | End: 2022-01-01 | Stop reason: HOSPADM

## 2022-01-01 RX ORDER — NEOSTIGMINE METHYLSULFATE 0.5 MG/ML
INJECTION, SOLUTION INTRAVENOUS
Status: DISCONTINUED | OUTPATIENT
Start: 2022-01-01 | End: 2022-01-01

## 2022-01-01 RX ORDER — SIMETHICONE 80 MG
1 TABLET,CHEWABLE ORAL 4 TIMES DAILY PRN
Status: DISCONTINUED | OUTPATIENT
Start: 2022-01-01 | End: 2022-01-01 | Stop reason: HOSPADM

## 2022-01-01 RX ORDER — SODIUM CHLORIDE, SODIUM LACTATE, POTASSIUM CHLORIDE, CALCIUM CHLORIDE 600; 310; 30; 20 MG/100ML; MG/100ML; MG/100ML; MG/100ML
INJECTION, SOLUTION INTRAVENOUS CONTINUOUS
Status: ACTIVE | OUTPATIENT
Start: 2022-01-01 | End: 2022-01-01

## 2022-01-01 RX ORDER — LEVETIRACETAM 500 MG/5ML
1500 INJECTION, SOLUTION, CONCENTRATE INTRAVENOUS EVERY 12 HOURS
Status: DISCONTINUED | OUTPATIENT
Start: 2022-01-01 | End: 2022-01-01

## 2022-01-01 RX ORDER — ASPIRIN 81 MG/1
81 TABLET ORAL DAILY
Status: DISCONTINUED | OUTPATIENT
Start: 2022-01-01 | End: 2022-01-01

## 2022-01-01 RX ORDER — DEXTROSE MONOHYDRATE AND SODIUM CHLORIDE 5; .9 G/100ML; G/100ML
INJECTION, SOLUTION INTRAVENOUS CONTINUOUS
Status: DISCONTINUED | OUTPATIENT
Start: 2022-01-01 | End: 2022-01-01

## 2022-01-01 RX ORDER — HEPARIN SODIUM 5000 [USP'U]/ML
5000 INJECTION, SOLUTION INTRAVENOUS; SUBCUTANEOUS EVERY 8 HOURS
Status: DISCONTINUED | OUTPATIENT
Start: 2022-01-01 | End: 2022-01-01 | Stop reason: HOSPADM

## 2022-01-01 RX ORDER — ATORVASTATIN CALCIUM 40 MG/1
80 TABLET, FILM COATED ORAL DAILY
Status: DISCONTINUED | OUTPATIENT
Start: 2022-01-01 | End: 2022-01-01 | Stop reason: HOSPADM

## 2022-01-01 RX ORDER — DEXMEDETOMIDINE HYDROCHLORIDE 4 UG/ML
0-1.4 INJECTION, SOLUTION INTRAVENOUS CONTINUOUS
Status: DISCONTINUED | OUTPATIENT
Start: 2022-01-01 | End: 2022-01-01

## 2022-01-01 RX ORDER — FUROSEMIDE 10 MG/ML
80 INJECTION INTRAMUSCULAR; INTRAVENOUS ONCE
Status: COMPLETED | OUTPATIENT
Start: 2022-01-01 | End: 2022-01-01

## 2022-01-01 RX ORDER — ACETAMINOPHEN 325 MG/1
650 TABLET ORAL EVERY 6 HOURS PRN
Status: DISCONTINUED | OUTPATIENT
Start: 2022-01-01 | End: 2022-01-01 | Stop reason: SDUPTHER

## 2022-01-01 RX ORDER — BACITRACIN ZINC 500 UNIT/G
OINTMENT (GRAM) TOPICAL
Status: DISCONTINUED | OUTPATIENT
Start: 2022-01-01 | End: 2022-01-01 | Stop reason: HOSPADM

## 2022-01-01 RX ORDER — OXYCODONE HYDROCHLORIDE 5 MG/1
5 TABLET ORAL EVERY 4 HOURS PRN
Status: DISCONTINUED | OUTPATIENT
Start: 2022-01-01 | End: 2022-01-01 | Stop reason: HOSPADM

## 2022-01-01 RX ORDER — PHENYLEPHRINE HCL IN 0.9% NACL 1 MG/10 ML
SYRINGE (ML) INTRAVENOUS
Status: DISCONTINUED | OUTPATIENT
Start: 2022-01-01 | End: 2022-01-01

## 2022-01-01 RX ORDER — ACETAMINOPHEN 325 MG/1
650 TABLET ORAL EVERY 6 HOURS PRN
Status: DISCONTINUED | OUTPATIENT
Start: 2022-01-01 | End: 2022-01-01

## 2022-01-01 RX ORDER — LIDOCAINE HYDROCHLORIDE 10 MG/ML
1 INJECTION INFILTRATION; PERINEURAL ONCE
Status: DISCONTINUED | OUTPATIENT
Start: 2022-01-01 | End: 2022-01-01

## 2022-01-01 RX ORDER — VANCOMYCIN HCL IN 5 % DEXTROSE 1G/250ML
20 PLASTIC BAG, INJECTION (ML) INTRAVENOUS ONCE
Status: COMPLETED | OUTPATIENT
Start: 2022-01-01 | End: 2022-01-01

## 2022-01-01 RX ORDER — VANCOMYCIN HCL IN 5 % DEXTROSE 1G/250ML
20 PLASTIC BAG, INJECTION (ML) INTRAVENOUS ONCE
Status: DISCONTINUED | OUTPATIENT
Start: 2022-01-01 | End: 2022-01-01

## 2022-01-01 RX ORDER — LABETALOL HCL 20 MG/4 ML
10 SYRINGE (ML) INTRAVENOUS EVERY 6 HOURS PRN
Status: DISCONTINUED | OUTPATIENT
Start: 2022-01-01 | End: 2022-01-01

## 2022-01-01 RX ORDER — LEVETIRACETAM 100 MG/ML
1500 SOLUTION ORAL 2 TIMES DAILY
Status: DISCONTINUED | OUTPATIENT
Start: 2022-01-01 | End: 2022-01-01 | Stop reason: HOSPADM

## 2022-01-01 RX ADMIN — DULOXETINE 30 MG: 30 CAPSULE, DELAYED RELEASE ORAL at 08:01

## 2022-01-01 RX ADMIN — Medication 10 MG: at 04:04

## 2022-01-01 RX ADMIN — CARVEDILOL 25 MG: 25 TABLET, FILM COATED ORAL at 08:04

## 2022-01-01 RX ADMIN — SODIUM CHLORIDE 200 MG: 9 INJECTION, SOLUTION INTRAVENOUS at 06:04

## 2022-01-01 RX ADMIN — HYDRALAZINE HYDROCHLORIDE 10 MG: 20 INJECTION, SOLUTION INTRAMUSCULAR; INTRAVENOUS at 06:04

## 2022-01-01 RX ADMIN — SODIUM CHLORIDE: 0.9 INJECTION, SOLUTION INTRAVENOUS at 01:04

## 2022-01-01 RX ADMIN — FENTANYL CITRATE 25 MCG: 50 INJECTION INTRAMUSCULAR; INTRAVENOUS at 06:04

## 2022-01-01 RX ADMIN — METOCLOPRAMIDE 5 MG: 5 TABLET ORAL at 05:01

## 2022-01-01 RX ADMIN — POTASSIUM BICARBONATE 50 MEQ: 978 TABLET, EFFERVESCENT ORAL at 04:04

## 2022-01-01 RX ADMIN — VANCOMYCIN HYDROCHLORIDE 750 MG: 750 INJECTION, POWDER, LYOPHILIZED, FOR SOLUTION INTRAVENOUS at 10:04

## 2022-01-01 RX ADMIN — ATORVASTATIN CALCIUM 80 MG: 40 TABLET, FILM COATED ORAL at 10:03

## 2022-01-01 RX ADMIN — DULOXETINE 30 MG: 30 CAPSULE, DELAYED RELEASE ORAL at 10:01

## 2022-01-01 RX ADMIN — METOCLOPRAMIDE 10 MG: 10 TABLET ORAL at 03:03

## 2022-01-01 RX ADMIN — SENNOSIDES AND DOCUSATE SODIUM 1 TABLET: 50; 8.6 TABLET ORAL at 08:04

## 2022-01-01 RX ADMIN — METOCLOPRAMIDE 10 MG: 10 TABLET ORAL at 06:03

## 2022-01-01 RX ADMIN — LEVOTHYROXINE SODIUM 75 MCG: 0.03 TABLET ORAL at 05:04

## 2022-01-01 RX ADMIN — HEPARIN SODIUM 5000 UNITS: 5000 INJECTION INTRAVENOUS; SUBCUTANEOUS at 06:04

## 2022-01-01 RX ADMIN — METOCLOPRAMIDE 5 MG: 5 TABLET ORAL at 12:01

## 2022-01-01 RX ADMIN — ONDANSETRON 4 MG: 2 INJECTION INTRAMUSCULAR; INTRAVENOUS at 09:04

## 2022-01-01 RX ADMIN — CARVEDILOL 25 MG: 12.5 TABLET, FILM COATED ORAL at 09:01

## 2022-01-01 RX ADMIN — INSULIN DETEMIR 3 UNITS: 100 INJECTION, SOLUTION SUBCUTANEOUS at 08:03

## 2022-01-01 RX ADMIN — VANCOMYCIN HYDROCHLORIDE 500 MG: 500 INJECTION, POWDER, LYOPHILIZED, FOR SOLUTION INTRAVENOUS at 10:04

## 2022-01-01 RX ADMIN — LEVETIRACETAM 1500 MG: 500 INJECTION, SOLUTION INTRAVENOUS at 09:04

## 2022-01-01 RX ADMIN — LOSARTAN POTASSIUM 25 MG: 25 TABLET, FILM COATED ORAL at 10:04

## 2022-01-01 RX ADMIN — METOCLOPRAMIDE 5 MG: 5 TABLET ORAL at 11:01

## 2022-01-01 RX ADMIN — METOCLOPRAMIDE 10 MG: 10 TABLET ORAL at 11:03

## 2022-01-01 RX ADMIN — OXYCODONE 5 MG: 5 TABLET ORAL at 09:03

## 2022-01-01 RX ADMIN — ALBUTEROL SULFATE 2.5 MG: 2.5 SOLUTION RESPIRATORY (INHALATION) at 12:04

## 2022-01-01 RX ADMIN — DOCUSATE SODIUM - SENNOSIDES 1 TABLET: 50; 8.6 TABLET, FILM COATED ORAL at 10:01

## 2022-01-01 RX ADMIN — ERTAPENEM 500 MG: 1 INJECTION INTRAMUSCULAR; INTRAVENOUS at 12:03

## 2022-01-01 RX ADMIN — SILODOSIN 4 MG: 4 CAPSULE ORAL at 09:04

## 2022-01-01 RX ADMIN — TAMSULOSIN HYDROCHLORIDE 0.4 MG: 0.4 CAPSULE ORAL at 08:03

## 2022-01-01 RX ADMIN — AMLODIPINE BESYLATE 10 MG: 10 TABLET ORAL at 10:04

## 2022-01-01 RX ADMIN — HEPARIN SODIUM 5000 UNITS: 5000 INJECTION INTRAVENOUS; SUBCUTANEOUS at 03:04

## 2022-01-01 RX ADMIN — SODIUM CHLORIDE 200 MG: 9 INJECTION, SOLUTION INTRAVENOUS at 05:04

## 2022-01-01 RX ADMIN — LEVOTHYROXINE SODIUM 75 MCG: 75 TABLET ORAL at 08:03

## 2022-01-01 RX ADMIN — INSULIN ASPART 4 UNITS: 100 INJECTION, SOLUTION INTRAVENOUS; SUBCUTANEOUS at 11:04

## 2022-01-01 RX ADMIN — SENNOSIDES AND DOCUSATE SODIUM 1 TABLET: 50; 8.6 TABLET ORAL at 09:04

## 2022-01-01 RX ADMIN — VANCOMYCIN HYDROCHLORIDE 500 MG: 500 INJECTION, POWDER, LYOPHILIZED, FOR SOLUTION INTRAVENOUS at 05:04

## 2022-01-01 RX ADMIN — AMLODIPINE BESYLATE 10 MG: 5 TABLET ORAL at 08:01

## 2022-01-01 RX ADMIN — INSULIN DETEMIR 3 UNITS: 100 INJECTION, SOLUTION SUBCUTANEOUS at 09:03

## 2022-01-01 RX ADMIN — PHENYLEPHRINE HYDROCHLORIDE 100 MCG: 10 INJECTION INTRAVENOUS at 10:04

## 2022-01-01 RX ADMIN — SODIUM CHLORIDE, SODIUM LACTATE, POTASSIUM CHLORIDE, AND CALCIUM CHLORIDE: 600; 310; 30; 20 INJECTION, SOLUTION INTRAVENOUS at 06:04

## 2022-01-01 RX ADMIN — HEPARIN SODIUM 5000 UNITS: 5000 INJECTION INTRAVENOUS; SUBCUTANEOUS at 10:03

## 2022-01-01 RX ADMIN — CARVEDILOL 25 MG: 25 TABLET, FILM COATED ORAL at 09:03

## 2022-01-01 RX ADMIN — DOCUSATE SODIUM - SENNOSIDES 1 TABLET: 50; 8.6 TABLET, FILM COATED ORAL at 09:01

## 2022-01-01 RX ADMIN — LIDOCAINE HYDROCHLORIDE 60 MG: 20 INJECTION, SOLUTION INTRAVENOUS at 09:04

## 2022-01-01 RX ADMIN — LACOSAMIDE 250 MG: 10 SOLUTION ORAL at 09:04

## 2022-01-01 RX ADMIN — MUPIROCIN: 20 OINTMENT TOPICAL at 08:04

## 2022-01-01 RX ADMIN — LIDOCAINE HYDROCHLORIDE 100 MG: 20 INJECTION, SOLUTION INTRAVENOUS at 11:04

## 2022-01-01 RX ADMIN — TAMSULOSIN HYDROCHLORIDE 0.4 MG: 0.4 CAPSULE ORAL at 02:01

## 2022-01-01 RX ADMIN — CARVEDILOL 25 MG: 12.5 TABLET, FILM COATED ORAL at 07:02

## 2022-01-01 RX ADMIN — ATORVASTATIN CALCIUM 40 MG: 20 TABLET, FILM COATED ORAL at 09:04

## 2022-01-01 RX ADMIN — CARVEDILOL 25 MG: 12.5 TABLET, FILM COATED ORAL at 08:01

## 2022-01-01 RX ADMIN — LEVETIRACETAM 1500 MG: 500 SOLUTION ORAL at 09:04

## 2022-01-01 RX ADMIN — CARVEDILOL 12.5 MG: 12.5 TABLET, FILM COATED ORAL at 08:04

## 2022-01-01 RX ADMIN — LEVOTHYROXINE SODIUM 75 MCG: 75 TABLET ORAL at 06:04

## 2022-01-01 RX ADMIN — HYDRALAZINE HYDROCHLORIDE 10 MG: 20 INJECTION, SOLUTION INTRAMUSCULAR; INTRAVENOUS at 11:04

## 2022-01-01 RX ADMIN — FERROUS SULFATE TAB 325 MG (65 MG ELEMENTAL FE) 1 EACH: 325 (65 FE) TAB at 09:03

## 2022-01-01 RX ADMIN — GABAPENTIN 300 MG: 300 CAPSULE ORAL at 09:03

## 2022-01-01 RX ADMIN — IPRATROPIUM BROMIDE AND ALBUTEROL SULFATE 3 ML: 2.5; .5 SOLUTION RESPIRATORY (INHALATION) at 07:04

## 2022-01-01 RX ADMIN — SILODOSIN 4 MG: 4 CAPSULE ORAL at 08:04

## 2022-01-01 RX ADMIN — AMLODIPINE BESYLATE 10 MG: 10 TABLET ORAL at 09:03

## 2022-01-01 RX ADMIN — ASPIRIN 81 MG: 81 TABLET, COATED ORAL at 09:03

## 2022-01-01 RX ADMIN — NICARDIPINE HYDROCHLORIDE 5 MG/HR: 0.2 INJECTION INTRAVENOUS at 05:04

## 2022-01-01 RX ADMIN — LEVOTHYROXINE SODIUM 75 MCG: 75 TABLET ORAL at 05:04

## 2022-01-01 RX ADMIN — INSULIN ASPART 0 UNITS: 100 INJECTION, SOLUTION INTRAVENOUS; SUBCUTANEOUS at 09:03

## 2022-01-01 RX ADMIN — PROPOFOL 20 MG: 10 INJECTION, EMULSION INTRAVENOUS at 05:04

## 2022-01-01 RX ADMIN — TAMSULOSIN HYDROCHLORIDE 0.4 MG: 0.4 CAPSULE ORAL at 09:01

## 2022-01-01 RX ADMIN — IPRATROPIUM BROMIDE AND ALBUTEROL SULFATE 3 ML: 2.5; .5 SOLUTION RESPIRATORY (INHALATION) at 03:04

## 2022-01-01 RX ADMIN — PANTOPRAZOLE SODIUM 40 MG: 40 TABLET, DELAYED RELEASE ORAL at 09:01

## 2022-01-01 RX ADMIN — HEPARIN SODIUM 5000 UNITS: 5000 INJECTION INTRAVENOUS; SUBCUTANEOUS at 02:04

## 2022-01-01 RX ADMIN — ASPIRIN 81 MG: 81 TABLET, DELAYED RELEASE ORAL at 08:01

## 2022-01-01 RX ADMIN — INSULIN ASPART 1 UNITS: 100 INJECTION, SOLUTION INTRAVENOUS; SUBCUTANEOUS at 05:05

## 2022-01-01 RX ADMIN — LACOSAMIDE 250 MG: 10 SOLUTION ORAL at 09:05

## 2022-01-01 RX ADMIN — AMLODIPINE BESYLATE 10 MG: 10 TABLET ORAL at 09:05

## 2022-01-01 RX ADMIN — SODIUM CHLORIDE 100 MG: 9 INJECTION, SOLUTION INTRAVENOUS at 05:04

## 2022-01-01 RX ADMIN — METOCLOPRAMIDE 10 MG: 10 TABLET ORAL at 07:03

## 2022-01-01 RX ADMIN — AMOXICILLIN 500 MG: 250 CAPSULE ORAL at 10:01

## 2022-01-01 RX ADMIN — LEVETIRACETAM 1500 MG: 500 INJECTION, SOLUTION INTRAVENOUS at 08:04

## 2022-01-01 RX ADMIN — ACETAMINOPHEN 1000 MG: 500 TABLET ORAL at 03:03

## 2022-01-01 RX ADMIN — METOCLOPRAMIDE 10 MG: 10 TABLET ORAL at 06:02

## 2022-01-01 RX ADMIN — AMOXICILLIN 500 MG: 250 CAPSULE ORAL at 08:01

## 2022-01-01 RX ADMIN — HEPARIN SODIUM 5000 UNITS: 5000 INJECTION INTRAVENOUS; SUBCUTANEOUS at 09:04

## 2022-01-01 RX ADMIN — ACETYLCYSTEINE 2 ML: 200 INHALANT RESPIRATORY (INHALATION) at 09:05

## 2022-01-01 RX ADMIN — SODIUM CHLORIDE 0.5 MCG/KG/MIN: 9 INJECTION, SOLUTION INTRAVENOUS at 04:04

## 2022-01-01 RX ADMIN — CARVEDILOL 25 MG: 25 TABLET, FILM COATED ORAL at 10:04

## 2022-01-01 RX ADMIN — SODIUM CHLORIDE 100 MG: 9 INJECTION, SOLUTION INTRAVENOUS at 06:04

## 2022-01-01 RX ADMIN — VANCOMYCIN HYDROCHLORIDE 750 MG: 750 INJECTION, POWDER, LYOPHILIZED, FOR SOLUTION INTRAVENOUS at 04:04

## 2022-01-01 RX ADMIN — LIDOCAINE HYDROCHLORIDE 5 ML: 20 INJECTION, SOLUTION EPIDURAL; INFILTRATION; INTRACAUDAL; PERINEURAL at 11:04

## 2022-01-01 RX ADMIN — LIDOCAINE HYDROCHLORIDE 40 MG: 20 INJECTION, SOLUTION EPIDURAL; INFILTRATION; INTRACAUDAL at 03:03

## 2022-01-01 RX ADMIN — HYDRALAZINE HYDROCHLORIDE 10 MG: 20 INJECTION, SOLUTION INTRAMUSCULAR; INTRAVENOUS at 07:04

## 2022-01-01 RX ADMIN — HEPARIN SODIUM 5000 UNITS: 5000 INJECTION INTRAVENOUS; SUBCUTANEOUS at 05:04

## 2022-01-01 RX ADMIN — INSULIN ASPART 2 UNITS: 100 INJECTION, SOLUTION INTRAVENOUS; SUBCUTANEOUS at 12:05

## 2022-01-01 RX ADMIN — Medication 10 ML: at 06:02

## 2022-01-01 RX ADMIN — CLOPIDOGREL BISULFATE 75 MG: 75 TABLET, FILM COATED ORAL at 08:01

## 2022-01-01 RX ADMIN — AMLODIPINE BESYLATE 10 MG: 10 TABLET ORAL at 08:04

## 2022-01-01 RX ADMIN — HYDROMORPHONE HYDROCHLORIDE 0.5 MG: 1 INJECTION, SOLUTION INTRAMUSCULAR; INTRAVENOUS; SUBCUTANEOUS at 01:04

## 2022-01-01 RX ADMIN — TRAZODONE HYDROCHLORIDE 100 MG: 100 TABLET ORAL at 11:01

## 2022-01-01 RX ADMIN — PANTOPRAZOLE SODIUM 40 MG: 40 TABLET, DELAYED RELEASE ORAL at 10:01

## 2022-01-01 RX ADMIN — CARVEDILOL 25 MG: 25 TABLET, FILM COATED ORAL at 09:04

## 2022-01-01 RX ADMIN — NICARDIPINE HYDROCHLORIDE 5 MG/HR: 0.2 INJECTION INTRAVENOUS at 09:04

## 2022-01-01 RX ADMIN — LEVETIRACETAM 500 MG: 500 INJECTION, SOLUTION INTRAVENOUS at 09:04

## 2022-01-01 RX ADMIN — SENNOSIDES AND DOCUSATE SODIUM 1 TABLET: 50; 8.6 TABLET ORAL at 10:05

## 2022-01-01 RX ADMIN — HYDROCODONE BITARTRATE AND ACETAMINOPHEN 1 TABLET: 5; 325 TABLET ORAL at 08:04

## 2022-01-01 RX ADMIN — FERROUS SULFATE TAB 325 MG (65 MG ELEMENTAL FE) 1 EACH: 325 (65 FE) TAB at 09:02

## 2022-01-01 RX ADMIN — ACETYLCYSTEINE 4 ML: 100 SOLUTION ORAL; RESPIRATORY (INHALATION) at 08:05

## 2022-01-01 RX ADMIN — ATORVASTATIN CALCIUM 40 MG: 20 TABLET, FILM COATED ORAL at 08:04

## 2022-01-01 RX ADMIN — Medication 10 ML: at 12:03

## 2022-01-01 RX ADMIN — SODIUM CHLORIDE 125 MG: 9 INJECTION, SOLUTION INTRAVENOUS at 11:03

## 2022-01-01 RX ADMIN — TAMSULOSIN HYDROCHLORIDE 0.4 MG: 0.4 CAPSULE ORAL at 08:01

## 2022-01-01 RX ADMIN — LABETALOL HYDROCHLORIDE 10 MG: 5 INJECTION, SOLUTION INTRAVENOUS at 06:04

## 2022-01-01 RX ADMIN — LEVETIRACETAM 1500 MG: 500 SOLUTION ORAL at 08:04

## 2022-01-01 RX ADMIN — POTASSIUM & SODIUM PHOSPHATES POWDER PACK 280-160-250 MG 2 PACKET: 280-160-250 PACK at 05:04

## 2022-01-01 RX ADMIN — CLOPIDOGREL 75 MG: 75 TABLET, FILM COATED ORAL at 09:03

## 2022-01-01 RX ADMIN — ALBUTEROL SULFATE 2.5 MG: 2.5 SOLUTION RESPIRATORY (INHALATION) at 01:04

## 2022-01-01 RX ADMIN — Medication 10 ML: at 11:03

## 2022-01-01 RX ADMIN — IPRATROPIUM BROMIDE AND ALBUTEROL SULFATE 3 ML: 2.5; .5 SOLUTION RESPIRATORY (INHALATION) at 08:04

## 2022-01-01 RX ADMIN — SILODOSIN 4 MG: 4 CAPSULE ORAL at 08:05

## 2022-01-01 RX ADMIN — CEFEPIME HYDROCHLORIDE 1 G: 1 INJECTION, SOLUTION INTRAVENOUS at 11:02

## 2022-01-01 RX ADMIN — ACETYLCYSTEINE 4 ML: 100 SOLUTION ORAL; RESPIRATORY (INHALATION) at 01:04

## 2022-01-01 RX ADMIN — ATORVASTATIN CALCIUM 80 MG: 40 TABLET, FILM COATED ORAL at 09:03

## 2022-01-01 RX ADMIN — LOSARTAN POTASSIUM 25 MG: 25 TABLET, FILM COATED ORAL at 09:04

## 2022-01-01 RX ADMIN — HEPARIN SODIUM 5000 UNITS: 5000 INJECTION INTRAVENOUS; SUBCUTANEOUS at 05:05

## 2022-01-01 RX ADMIN — ASPIRIN 81 MG: 81 TABLET, COATED ORAL at 08:03

## 2022-01-01 RX ADMIN — AMLODIPINE BESYLATE 10 MG: 10 TABLET ORAL at 08:03

## 2022-01-01 RX ADMIN — IPRATROPIUM BROMIDE AND ALBUTEROL SULFATE 3 ML: 2.5; .5 SOLUTION RESPIRATORY (INHALATION) at 01:04

## 2022-01-01 RX ADMIN — CEFTRIAXONE SODIUM 2 G: 1 INJECTION, SOLUTION INTRAVENOUS at 04:04

## 2022-01-01 RX ADMIN — METOCLOPRAMIDE 10 MG: 10 TABLET ORAL at 11:02

## 2022-01-01 RX ADMIN — DEXMEDETOMIDINE HYDROCHLORIDE 0.6 MCG/KG/HR: 4 INJECTION, SOLUTION INTRAVENOUS at 05:04

## 2022-01-01 RX ADMIN — DEXAMETHASONE SODIUM PHOSPHATE 4 MG: 4 INJECTION, SOLUTION INTRAMUSCULAR; INTRAVENOUS at 11:04

## 2022-01-01 RX ADMIN — HYDROCODONE BITARTRATE AND ACETAMINOPHEN 1 TABLET: 5; 325 TABLET ORAL at 08:01

## 2022-01-01 RX ADMIN — HEPARIN SODIUM 5000 UNITS: 5000 INJECTION INTRAVENOUS; SUBCUTANEOUS at 06:03

## 2022-01-01 RX ADMIN — FERROUS SULFATE TAB 325 MG (65 MG ELEMENTAL FE) 1 EACH: 325 (65 FE) TAB at 08:03

## 2022-01-01 RX ADMIN — POTASSIUM BICARBONATE 35 MEQ: 391 TABLET, EFFERVESCENT ORAL at 08:04

## 2022-01-01 RX ADMIN — HEPARIN SODIUM 5000 UNITS: 5000 INJECTION INTRAVENOUS; SUBCUTANEOUS at 10:04

## 2022-01-01 RX ADMIN — ERTAPENEM 500 MG: 1 INJECTION INTRAMUSCULAR; INTRAVENOUS at 01:03

## 2022-01-01 RX ADMIN — LEVOTHYROXINE SODIUM 75 MCG: 75 TABLET ORAL at 05:01

## 2022-01-01 RX ADMIN — GABAPENTIN 300 MG: 300 CAPSULE ORAL at 10:03

## 2022-01-01 RX ADMIN — PHENYLEPHRINE HYDROCHLORIDE 200 MCG: 10 INJECTION INTRAVENOUS at 11:04

## 2022-01-01 RX ADMIN — GABAPENTIN 300 MG: 300 CAPSULE ORAL at 09:01

## 2022-01-01 RX ADMIN — HYDROCODONE BITARTRATE AND ACETAMINOPHEN 1 TABLET: 5; 325 TABLET ORAL at 04:01

## 2022-01-01 RX ADMIN — Medication 10 ML: at 02:02

## 2022-01-01 RX ADMIN — PANTOPRAZOLE SODIUM 40 MG: 40 TABLET, DELAYED RELEASE ORAL at 09:03

## 2022-01-01 RX ADMIN — SODIUM CHLORIDE, SODIUM LACTATE, POTASSIUM CHLORIDE, AND CALCIUM CHLORIDE 1000 ML: .6; .31; .03; .02 INJECTION, SOLUTION INTRAVENOUS at 04:03

## 2022-01-01 RX ADMIN — FOLIC ACID 1 MG: 1 TABLET ORAL at 09:03

## 2022-01-01 RX ADMIN — FERROUS SULFATE TAB 325 MG (65 MG ELEMENTAL FE) 1 EACH: 325 (65 FE) TAB at 08:01

## 2022-01-01 RX ADMIN — AMOXICILLIN 500 MG: 250 CAPSULE ORAL at 09:01

## 2022-01-01 RX ADMIN — TRAZODONE HYDROCHLORIDE 100 MG: 100 TABLET ORAL at 08:01

## 2022-01-01 RX ADMIN — ATORVASTATIN CALCIUM 80 MG: 20 TABLET, FILM COATED ORAL at 09:01

## 2022-01-01 RX ADMIN — LACTULOSE 10 G: 10 SOLUTION ORAL at 10:01

## 2022-01-01 RX ADMIN — LEVOTHYROXINE SODIUM 75 MCG: 75 TABLET ORAL at 09:03

## 2022-01-01 RX ADMIN — HEPARIN SODIUM 5000 UNITS: 5000 INJECTION INTRAVENOUS; SUBCUTANEOUS at 09:05

## 2022-01-01 RX ADMIN — Medication 10 MG: at 08:04

## 2022-01-01 RX ADMIN — ATORVASTATIN CALCIUM 40 MG: 20 TABLET, FILM COATED ORAL at 10:04

## 2022-01-01 RX ADMIN — VANCOMYCIN HYDROCHLORIDE 500 MG: 500 INJECTION, POWDER, LYOPHILIZED, FOR SOLUTION INTRAVENOUS at 09:04

## 2022-01-01 RX ADMIN — PANTOPRAZOLE SODIUM 40 MG: 40 TABLET, DELAYED RELEASE ORAL at 09:02

## 2022-01-01 RX ADMIN — LEVETIRACETAM 500 MG: 500 SOLUTION ORAL at 09:04

## 2022-01-01 RX ADMIN — LACOSAMIDE 200 MG: 10 SOLUTION ORAL at 09:04

## 2022-01-01 RX ADMIN — METOCLOPRAMIDE 10 MG: 10 TABLET ORAL at 12:03

## 2022-01-01 RX ADMIN — ASPIRIN 81 MG: 81 TABLET, DELAYED RELEASE ORAL at 09:01

## 2022-01-01 RX ADMIN — TRAZODONE HYDROCHLORIDE 100 MG: 100 TABLET ORAL at 09:01

## 2022-01-01 RX ADMIN — HEPARIN SODIUM 5000 UNITS: 5000 INJECTION INTRAVENOUS; SUBCUTANEOUS at 09:03

## 2022-01-01 RX ADMIN — CLOPIDOGREL BISULFATE 75 MG: 75 TABLET, FILM COATED ORAL at 10:01

## 2022-01-01 RX ADMIN — FENTANYL CITRATE 25 MCG: 50 INJECTION INTRAMUSCULAR; INTRAVENOUS at 01:04

## 2022-01-01 RX ADMIN — HYDROCODONE BITARTRATE AND ACETAMINOPHEN 1 TABLET: 10; 325 TABLET ORAL at 08:01

## 2022-01-01 RX ADMIN — Medication 10 ML: at 05:03

## 2022-01-01 RX ADMIN — METOCLOPRAMIDE 10 MG: 10 TABLET ORAL at 04:03

## 2022-01-01 RX ADMIN — CARVEDILOL 25 MG: 12.5 TABLET, FILM COATED ORAL at 10:01

## 2022-01-01 RX ADMIN — Medication 1 CAPSULE: at 08:03

## 2022-01-01 RX ADMIN — PANTOPRAZOLE SODIUM 40 MG: 40 TABLET, DELAYED RELEASE ORAL at 08:02

## 2022-01-01 RX ADMIN — ASPIRIN 81 MG: 81 TABLET, DELAYED RELEASE ORAL at 10:01

## 2022-01-01 RX ADMIN — LEVOTHYROXINE SODIUM 75 MCG: 0.03 TABLET ORAL at 06:04

## 2022-01-01 RX ADMIN — ASPIRIN 81 MG: 81 TABLET, COATED ORAL at 10:03

## 2022-01-01 RX ADMIN — LACOSAMIDE 250 MG: 10 SOLUTION ORAL at 08:04

## 2022-01-01 RX ADMIN — AMLODIPINE BESYLATE 10 MG: 10 TABLET ORAL at 09:04

## 2022-01-01 RX ADMIN — SODIUM CHLORIDE, SODIUM LACTATE, POTASSIUM CHLORIDE, AND CALCIUM CHLORIDE: .6; .31; .03; .02 INJECTION, SOLUTION INTRAVENOUS at 02:04

## 2022-01-01 RX ADMIN — SENNOSIDES AND DOCUSATE SODIUM 1 TABLET: 50; 8.6 TABLET ORAL at 10:04

## 2022-01-01 RX ADMIN — NICARDIPINE HYDROCHLORIDE 200 MCG: 2.5 INJECTION INTRAVENOUS at 06:04

## 2022-01-01 RX ADMIN — METOCLOPRAMIDE 10 MG: 10 TABLET ORAL at 05:03

## 2022-01-01 RX ADMIN — METOCLOPRAMIDE 5 MG: 5 TABLET ORAL at 06:01

## 2022-01-01 RX ADMIN — DOCUSATE SODIUM - SENNOSIDES 1 TABLET: 50; 8.6 TABLET, FILM COATED ORAL at 08:01

## 2022-01-01 RX ADMIN — LEVOTHYROXINE SODIUM 75 MCG: 75 TABLET ORAL at 06:01

## 2022-01-01 RX ADMIN — FERROUS SULFATE TAB 325 MG (65 MG ELEMENTAL FE) 1 EACH: 325 (65 FE) TAB at 09:01

## 2022-01-01 RX ADMIN — Medication 1 CAPSULE: at 09:03

## 2022-01-01 RX ADMIN — HYDROMORPHONE HYDROCHLORIDE 0.5 MG: 1 INJECTION, SOLUTION INTRAMUSCULAR; INTRAVENOUS; SUBCUTANEOUS at 09:04

## 2022-01-01 RX ADMIN — LEVETIRACETAM 500 MG: 500 INJECTION, SOLUTION INTRAVENOUS at 10:04

## 2022-01-01 RX ADMIN — SODIUM CHLORIDE, SODIUM LACTATE, POTASSIUM CHLORIDE, AND CALCIUM CHLORIDE: .6; .31; .03; .02 INJECTION, SOLUTION INTRAVENOUS at 09:04

## 2022-01-01 RX ADMIN — LEVOTHYROXINE SODIUM 75 MCG: 75 TABLET ORAL at 08:02

## 2022-01-01 RX ADMIN — ATORVASTATIN CALCIUM 80 MG: 20 TABLET, FILM COATED ORAL at 08:01

## 2022-01-01 RX ADMIN — LEVOTHYROXINE SODIUM 75 MCG: 75 TABLET ORAL at 10:03

## 2022-01-01 RX ADMIN — SODIUM CHLORIDE, SODIUM LACTATE, POTASSIUM CHLORIDE, AND CALCIUM CHLORIDE: .6; .31; .03; .02 INJECTION, SOLUTION INTRAVENOUS at 06:01

## 2022-01-01 RX ADMIN — DEXMEDETOMIDINE HYDROCHLORIDE 0.8 MCG/KG/HR: 4 INJECTION, SOLUTION INTRAVENOUS at 05:04

## 2022-01-01 RX ADMIN — FENTANYL CITRATE 25 MCG: 50 INJECTION INTRAMUSCULAR; INTRAVENOUS at 08:04

## 2022-01-01 RX ADMIN — CEFEPIME HYDROCHLORIDE 1 G: 1 INJECTION, SOLUTION INTRAVENOUS at 02:03

## 2022-01-01 RX ADMIN — FENTANYL CITRATE 100 MCG: 50 INJECTION, SOLUTION INTRAMUSCULAR; INTRAVENOUS at 04:04

## 2022-01-01 RX ADMIN — FERROUS SULFATE TAB 325 MG (65 MG ELEMENTAL FE) 1 EACH: 325 (65 FE) TAB at 10:01

## 2022-01-01 RX ADMIN — POTASSIUM CHLORIDE 40 MEQ: 1500 TABLET, EXTENDED RELEASE ORAL at 05:02

## 2022-01-01 RX ADMIN — INSULIN ASPART 2 UNITS: 100 INJECTION, SOLUTION INTRAVENOUS; SUBCUTANEOUS at 08:04

## 2022-01-01 RX ADMIN — ACETYLCYSTEINE 4 ML: 100 SOLUTION ORAL; RESPIRATORY (INHALATION) at 08:04

## 2022-01-01 RX ADMIN — ACETAMINOPHEN 1000 MG: 10 INJECTION, SOLUTION INTRAVENOUS at 04:04

## 2022-01-01 RX ADMIN — LEVETIRACETAM 1500 MG: 500 SOLUTION ORAL at 10:04

## 2022-01-01 RX ADMIN — VANCOMYCIN HYDROCHLORIDE 750 MG: 750 INJECTION, POWDER, LYOPHILIZED, FOR SOLUTION INTRAVENOUS at 05:05

## 2022-01-01 RX ADMIN — TAMSULOSIN HYDROCHLORIDE 0.4 MG: 0.4 CAPSULE ORAL at 10:01

## 2022-01-01 RX ADMIN — HEPARIN SODIUM 5000 UNITS: 5000 INJECTION INTRAVENOUS; SUBCUTANEOUS at 03:03

## 2022-01-01 RX ADMIN — GABAPENTIN 300 MG: 300 CAPSULE ORAL at 10:01

## 2022-01-01 RX ADMIN — ACETYLCYSTEINE 4 ML: 100 SOLUTION ORAL; RESPIRATORY (INHALATION) at 04:04

## 2022-01-01 RX ADMIN — HYDRALAZINE HYDROCHLORIDE 10 MG: 20 INJECTION, SOLUTION INTRAMUSCULAR; INTRAVENOUS at 08:04

## 2022-01-01 RX ADMIN — INSULIN ASPART 2 UNITS: 100 INJECTION, SOLUTION INTRAVENOUS; SUBCUTANEOUS at 01:04

## 2022-01-01 RX ADMIN — LACTULOSE 10 G: 10 SOLUTION ORAL at 11:01

## 2022-01-01 RX ADMIN — POTASSIUM CHLORIDE 40 MEQ: 1500 TABLET, EXTENDED RELEASE ORAL at 11:03

## 2022-01-01 RX ADMIN — PHENYLEPHRINE HYDROCHLORIDE 50 MCG: 10 INJECTION INTRAVENOUS at 10:04

## 2022-01-01 RX ADMIN — AMLODIPINE BESYLATE 10 MG: 5 TABLET ORAL at 09:01

## 2022-01-01 RX ADMIN — SODIUM CHLORIDE, SODIUM LACTATE, POTASSIUM CHLORIDE, AND CALCIUM CHLORIDE: .6; .31; .03; .02 INJECTION, SOLUTION INTRAVENOUS at 11:04

## 2022-01-01 RX ADMIN — LEVETIRACETAM 500 MG: 500 SOLUTION ORAL at 08:04

## 2022-01-01 RX ADMIN — LEVETIRACETAM 500 MG: 500 SOLUTION ORAL at 10:04

## 2022-01-01 RX ADMIN — INSULIN ASPART 4 UNITS: 100 INJECTION, SOLUTION INTRAVENOUS; SUBCUTANEOUS at 08:05

## 2022-01-01 RX ADMIN — CARVEDILOL 25 MG: 25 TABLET, FILM COATED ORAL at 08:03

## 2022-01-01 RX ADMIN — ACETAMINOPHEN 1000 MG: 500 TABLET ORAL at 09:03

## 2022-01-01 RX ADMIN — IPRATROPIUM BROMIDE AND ALBUTEROL SULFATE 3 ML: 2.5; .5 SOLUTION RESPIRATORY (INHALATION) at 12:04

## 2022-01-01 RX ADMIN — SODIUM CHLORIDE 200 MG: 9 INJECTION, SOLUTION INTRAVENOUS at 09:04

## 2022-01-01 RX ADMIN — LEVOTHYROXINE SODIUM 75 MCG: 75 TABLET ORAL at 07:03

## 2022-01-01 RX ADMIN — CARVEDILOL 25 MG: 25 TABLET, FILM COATED ORAL at 10:03

## 2022-01-01 RX ADMIN — IPRATROPIUM BROMIDE AND ALBUTEROL SULFATE 3 ML: 2.5; .5 SOLUTION RESPIRATORY (INHALATION) at 12:05

## 2022-01-01 RX ADMIN — ONDANSETRON HYDROCHLORIDE 4 MG: 2 INJECTION INTRAMUSCULAR; INTRAVENOUS at 01:01

## 2022-01-01 RX ADMIN — IPRATROPIUM BROMIDE AND ALBUTEROL SULFATE 3 ML: 2.5; .5 SOLUTION RESPIRATORY (INHALATION) at 08:05

## 2022-01-01 RX ADMIN — VANCOMYCIN HYDROCHLORIDE 500 MG: 500 INJECTION, POWDER, LYOPHILIZED, FOR SOLUTION INTRAVENOUS at 01:04

## 2022-01-01 RX ADMIN — LACOSAMIDE 250 MG: 10 SOLUTION ORAL at 10:04

## 2022-01-01 RX ADMIN — NICARDIPINE HYDROCHLORIDE 5 MG/HR: 0.2 INJECTION, SOLUTION INTRAVENOUS at 11:04

## 2022-01-01 RX ADMIN — ACETYLCYSTEINE 4 ML: 100 SOLUTION ORAL; RESPIRATORY (INHALATION) at 10:04

## 2022-01-01 RX ADMIN — ONDANSETRON 4 MG: 2 INJECTION INTRAMUSCULAR; INTRAVENOUS at 01:04

## 2022-01-01 RX ADMIN — MAGNESIUM SULFATE HEPTAHYDRATE 2 G: 2 INJECTION, SOLUTION INTRAVENOUS at 01:04

## 2022-01-01 RX ADMIN — CEFTRIAXONE 1 G: 1 INJECTION, SOLUTION INTRAVENOUS at 10:01

## 2022-01-01 RX ADMIN — ATORVASTATIN CALCIUM 40 MG: 20 TABLET, FILM COATED ORAL at 09:05

## 2022-01-01 RX ADMIN — CLOPIDOGREL 75 MG: 75 TABLET, FILM COATED ORAL at 10:03

## 2022-01-01 RX ADMIN — TAMSULOSIN HYDROCHLORIDE 0.4 MG: 0.4 CAPSULE ORAL at 09:03

## 2022-01-01 RX ADMIN — ACETAMINOPHEN 650 MG: 325 TABLET ORAL at 07:04

## 2022-01-01 RX ADMIN — AMLODIPINE BESYLATE 10 MG: 5 TABLET ORAL at 11:01

## 2022-01-01 RX ADMIN — SODIUM CHLORIDE 75 ML/HR: 0.9 INJECTION, SOLUTION INTRAVENOUS at 01:04

## 2022-01-01 RX ADMIN — TAMSULOSIN HYDROCHLORIDE 0.4 MG: 0.4 CAPSULE ORAL at 10:03

## 2022-01-01 RX ADMIN — SODIUM CHLORIDE, SODIUM GLUCONATE, SODIUM ACETATE, POTASSIUM CHLORIDE, MAGNESIUM CHLORIDE, SODIUM PHOSPHATE, DIBASIC, AND POTASSIUM PHOSPHATE: .53; .5; .37; .037; .03; .012; .00082 INJECTION, SOLUTION INTRAVENOUS at 11:04

## 2022-01-01 RX ADMIN — HEPARIN SODIUM 5000 UNITS: 5000 INJECTION INTRAVENOUS; SUBCUTANEOUS at 11:03

## 2022-01-01 RX ADMIN — METOCLOPRAMIDE 10 MG: 10 TABLET ORAL at 05:02

## 2022-01-01 RX ADMIN — INSULIN ASPART 1 UNITS: 100 INJECTION, SOLUTION INTRAVENOUS; SUBCUTANEOUS at 11:05

## 2022-01-01 RX ADMIN — VANCOMYCIN HYDROCHLORIDE 1000 MG: 1 INJECTION, POWDER, LYOPHILIZED, FOR SOLUTION INTRAVENOUS at 04:03

## 2022-01-01 RX ADMIN — HYDRALAZINE HYDROCHLORIDE 10 MG: 20 INJECTION, SOLUTION INTRAMUSCULAR; INTRAVENOUS at 03:04

## 2022-01-01 RX ADMIN — DOCUSATE SODIUM - SENNOSIDES 1 TABLET: 50; 8.6 TABLET, FILM COATED ORAL at 11:01

## 2022-01-01 RX ADMIN — DULOXETINE 30 MG: 30 CAPSULE, DELAYED RELEASE ORAL at 09:01

## 2022-01-01 RX ADMIN — IPRATROPIUM BROMIDE AND ALBUTEROL SULFATE 3 ML: 2.5; .5 SOLUTION RESPIRATORY (INHALATION) at 10:05

## 2022-01-01 RX ADMIN — IPRATROPIUM BROMIDE AND ALBUTEROL SULFATE 3 ML: 2.5; .5 SOLUTION RESPIRATORY (INHALATION) at 01:05

## 2022-01-01 RX ADMIN — ROCURONIUM BROMIDE 25 MG: 10 INJECTION, SOLUTION INTRAVENOUS at 11:04

## 2022-01-01 RX ADMIN — INSULIN ASPART 2 UNITS: 100 INJECTION, SOLUTION INTRAVENOUS; SUBCUTANEOUS at 06:04

## 2022-01-01 RX ADMIN — LIDOCAINE HYDROCHLORIDE 80 MG: 20 INJECTION, SOLUTION EPIDURAL; INFILTRATION; INTRACAUDAL; PERINEURAL at 04:04

## 2022-01-01 RX ADMIN — AMLODIPINE BESYLATE 5 MG: 5 TABLET ORAL at 08:04

## 2022-01-01 RX ADMIN — Medication 10 ML: at 10:02

## 2022-01-01 RX ADMIN — LACTULOSE 10 G: 10 SOLUTION ORAL at 09:01

## 2022-01-01 RX ADMIN — VANCOMYCIN HYDROCHLORIDE 500 MG: 500 INJECTION, POWDER, LYOPHILIZED, FOR SOLUTION INTRAVENOUS at 12:04

## 2022-01-01 RX ADMIN — ATORVASTATIN CALCIUM 80 MG: 20 TABLET, FILM COATED ORAL at 10:01

## 2022-01-01 RX ADMIN — DEXTROSE 125 ML: 10 SOLUTION INTRAVENOUS at 09:04

## 2022-01-01 RX ADMIN — DESMOPRESSIN ACETATE 18.16 MCG: 4 SOLUTION INTRAVENOUS at 07:04

## 2022-01-01 RX ADMIN — INSULIN ASPART 1 UNITS: 100 INJECTION, SOLUTION INTRAVENOUS; SUBCUTANEOUS at 08:04

## 2022-01-01 RX ADMIN — METOCLOPRAMIDE 10 MG: 10 TABLET ORAL at 10:03

## 2022-01-01 RX ADMIN — ACETAMINOPHEN 650 MG: 325 TABLET ORAL at 09:04

## 2022-01-01 RX ADMIN — ROCURONIUM BROMIDE 10 MG: 10 INJECTION, SOLUTION INTRAVENOUS at 10:04

## 2022-01-01 RX ADMIN — LEVETIRACETAM 1500 MG: 500 INJECTION, SOLUTION INTRAVENOUS at 10:04

## 2022-01-01 RX ADMIN — FOLIC ACID 1 MG: 1 TABLET ORAL at 10:03

## 2022-01-01 RX ADMIN — ACETYLCYSTEINE 4 ML: 100 SOLUTION ORAL; RESPIRATORY (INHALATION) at 01:05

## 2022-01-01 RX ADMIN — ACETYLCYSTEINE 4 ML: 100 SOLUTION ORAL; RESPIRATORY (INHALATION) at 12:04

## 2022-01-01 RX ADMIN — IPRATROPIUM BROMIDE AND ALBUTEROL SULFATE 3 ML: 2.5; .5 SOLUTION RESPIRATORY (INHALATION) at 10:04

## 2022-01-01 RX ADMIN — ATORVASTATIN CALCIUM 40 MG: 20 TABLET, FILM COATED ORAL at 08:05

## 2022-01-01 RX ADMIN — SODIUM CHLORIDE 125 MG: 9 INJECTION, SOLUTION INTRAVENOUS at 10:03

## 2022-01-01 RX ADMIN — IPRATROPIUM BROMIDE AND ALBUTEROL SULFATE 3 ML: 2.5; .5 SOLUTION RESPIRATORY (INHALATION) at 09:05

## 2022-01-01 RX ADMIN — HEPARIN SODIUM 5000 UNITS: 5000 INJECTION INTRAVENOUS; SUBCUTANEOUS at 08:03

## 2022-01-01 RX ADMIN — LEVETIRACETAM 1500 MG: 500 SOLUTION ORAL at 09:05

## 2022-01-01 RX ADMIN — Medication 100 MCG/KG/MIN: at 03:03

## 2022-01-01 RX ADMIN — LEVETIRACETAM 1500 MG: 500 SOLUTION ORAL at 01:05

## 2022-01-01 RX ADMIN — CLOPIDOGREL BISULFATE 75 MG: 75 TABLET, FILM COATED ORAL at 09:01

## 2022-01-01 RX ADMIN — VANCOMYCIN HYDROCHLORIDE 500 MG: 500 INJECTION, POWDER, LYOPHILIZED, FOR SOLUTION INTRAVENOUS at 08:03

## 2022-01-01 RX ADMIN — VANCOMYCIN HYDROCHLORIDE 500 MG: 500 INJECTION, POWDER, LYOPHILIZED, FOR SOLUTION INTRAVENOUS at 03:04

## 2022-01-01 RX ADMIN — Medication 10 ML: at 06:03

## 2022-01-01 RX ADMIN — Medication 100 MCG: at 04:04

## 2022-01-01 RX ADMIN — LABETALOL HYDROCHLORIDE 10 MG: 5 INJECTION, SOLUTION INTRAVENOUS at 04:04

## 2022-01-01 RX ADMIN — LEVETIRACETAM 1500 MG: 500 SOLUTION ORAL at 11:05

## 2022-01-01 RX ADMIN — GABAPENTIN 300 MG: 300 CAPSULE ORAL at 08:03

## 2022-01-01 RX ADMIN — HEPARIN SODIUM 5000 UNITS: 5000 INJECTION INTRAVENOUS; SUBCUTANEOUS at 11:05

## 2022-01-01 RX ADMIN — FENTANYL CITRATE 25 MCG: 50 INJECTION INTRAMUSCULAR; INTRAVENOUS at 05:04

## 2022-01-01 RX ADMIN — ACETYLCYSTEINE 4 ML: 100 SOLUTION ORAL; RESPIRATORY (INHALATION) at 07:04

## 2022-01-01 RX ADMIN — DEXMEDETOMIDINE HYDROCHLORIDE 4 MCG: 100 INJECTION, SOLUTION INTRAVENOUS at 03:03

## 2022-01-01 RX ADMIN — PANTOPRAZOLE SODIUM 40 MG: 40 TABLET, DELAYED RELEASE ORAL at 08:03

## 2022-01-01 RX ADMIN — SODIUM CHLORIDE: 0.9 INJECTION, SOLUTION INTRAVENOUS at 11:04

## 2022-01-01 RX ADMIN — LACOSAMIDE 250 MG: 10 SOLUTION ORAL at 01:05

## 2022-01-01 RX ADMIN — FUROSEMIDE 80 MG: 10 INJECTION, SOLUTION INTRAMUSCULAR; INTRAVENOUS at 11:05

## 2022-01-01 RX ADMIN — VANCOMYCIN HYDROCHLORIDE 500 MG: 500 INJECTION, POWDER, LYOPHILIZED, FOR SOLUTION INTRAVENOUS at 05:03

## 2022-01-01 RX ADMIN — LEVOTHYROXINE SODIUM 75 MCG: 75 TABLET ORAL at 09:02

## 2022-01-01 RX ADMIN — LEVOTHYROXINE SODIUM 75 MCG: 75 TABLET ORAL at 06:05

## 2022-01-01 RX ADMIN — Medication 10 MG: at 05:04

## 2022-01-01 RX ADMIN — METOCLOPRAMIDE 5 MG: 5 TABLET ORAL at 07:01

## 2022-01-01 RX ADMIN — VANCOMYCIN HYDROCHLORIDE 1500 MG: 1.5 INJECTION, POWDER, LYOPHILIZED, FOR SOLUTION INTRAVENOUS at 05:01

## 2022-01-01 RX ADMIN — METOCLOPRAMIDE 5 MG: 5 TABLET ORAL at 01:01

## 2022-01-01 RX ADMIN — LEVOTHYROXINE SODIUM 75 MCG: 75 TABLET ORAL at 05:05

## 2022-01-01 RX ADMIN — ERTAPENEM 500 MG: 1 INJECTION INTRAMUSCULAR; INTRAVENOUS at 02:03

## 2022-01-01 RX ADMIN — PHENYLEPHRINE HYDROCHLORIDE 100 MCG: 10 INJECTION INTRAVENOUS at 09:04

## 2022-01-01 RX ADMIN — INSULIN ASPART 2 UNITS: 100 INJECTION, SOLUTION INTRAVENOUS; SUBCUTANEOUS at 11:05

## 2022-01-01 RX ADMIN — VANCOMYCIN HYDROCHLORIDE 500 MG: 500 INJECTION, POWDER, LYOPHILIZED, FOR SOLUTION INTRAVENOUS at 02:03

## 2022-01-01 RX ADMIN — CARVEDILOL 12.5 MG: 12.5 TABLET, FILM COATED ORAL at 11:04

## 2022-01-01 RX ADMIN — CLOPIDOGREL 75 MG: 75 TABLET, FILM COATED ORAL at 08:03

## 2022-01-01 RX ADMIN — ROCURONIUM BROMIDE 10 MG: 10 INJECTION, SOLUTION INTRAVENOUS at 05:04

## 2022-01-01 RX ADMIN — CYANOCOBALAMIN 1000 MCG: 1000 INJECTION, SOLUTION INTRAMUSCULAR at 09:03

## 2022-01-01 RX ADMIN — HYDROCODONE BITARTRATE AND ACETAMINOPHEN 1 TABLET: 5; 325 TABLET ORAL at 04:04

## 2022-01-01 RX ADMIN — FAMOTIDINE 20 MG: 20 TABLET, FILM COATED ORAL at 01:04

## 2022-01-01 RX ADMIN — CEFEPIME HYDROCHLORIDE 2 G: 2 INJECTION, SOLUTION INTRAVENOUS at 05:04

## 2022-01-01 RX ADMIN — ATORVASTATIN CALCIUM 80 MG: 40 TABLET, FILM COATED ORAL at 08:03

## 2022-01-01 RX ADMIN — ACETAMINOPHEN 650 MG: 325 TABLET ORAL at 12:04

## 2022-01-01 RX ADMIN — SODIUM CHLORIDE, SODIUM LACTATE, POTASSIUM CHLORIDE, AND CALCIUM CHLORIDE 75 ML/HR: 600; 310; 30; 20 INJECTION, SOLUTION INTRAVENOUS at 05:04

## 2022-01-01 RX ADMIN — ACETAMINOPHEN 650 MG: 325 TABLET ORAL at 05:04

## 2022-01-01 RX ADMIN — FERROUS SULFATE TAB 325 MG (65 MG ELEMENTAL FE) 1 EACH: 325 (65 FE) TAB at 08:02

## 2022-01-01 RX ADMIN — GLYCOPYRROLATE 0.4 MG: 0.2 INJECTION, SOLUTION INTRAMUSCULAR; INTRAVENOUS at 12:04

## 2022-01-01 RX ADMIN — Medication 1 CAPSULE: at 10:03

## 2022-01-01 RX ADMIN — SILODOSIN 4 MG: 4 CAPSULE ORAL at 10:05

## 2022-01-01 RX ADMIN — VANCOMYCIN HYDROCHLORIDE 500 MG: 500 INJECTION, POWDER, LYOPHILIZED, FOR SOLUTION INTRAVENOUS at 03:03

## 2022-01-01 RX ADMIN — FENTANYL CITRATE 50 MCG: 50 INJECTION, SOLUTION INTRAMUSCULAR; INTRAVENOUS at 09:04

## 2022-01-01 RX ADMIN — ROCURONIUM BROMIDE 5 MG: 10 INJECTION, SOLUTION INTRAVENOUS at 11:04

## 2022-01-01 RX ADMIN — INSULIN DETEMIR 5 UNITS: 100 INJECTION, SOLUTION SUBCUTANEOUS at 09:02

## 2022-01-01 RX ADMIN — GABAPENTIN 300 MG: 300 CAPSULE ORAL at 08:02

## 2022-01-01 RX ADMIN — INSULIN ASPART 2 UNITS: 100 INJECTION, SOLUTION INTRAVENOUS; SUBCUTANEOUS at 05:04

## 2022-01-01 RX ADMIN — GABAPENTIN 300 MG: 300 CAPSULE ORAL at 08:01

## 2022-01-01 RX ADMIN — SILODOSIN 4 MG: 4 CAPSULE ORAL at 01:04

## 2022-01-01 RX ADMIN — HYDROMORPHONE HYDROCHLORIDE 0.5 MG: 1 INJECTION, SOLUTION INTRAMUSCULAR; INTRAVENOUS; SUBCUTANEOUS at 07:04

## 2022-01-01 RX ADMIN — AMLODIPINE BESYLATE 5 MG: 5 TABLET ORAL at 09:04

## 2022-01-01 RX ADMIN — SODIUM CHLORIDE 200 MG: 9 INJECTION, SOLUTION INTRAVENOUS at 07:04

## 2022-01-01 RX ADMIN — FENTANYL CITRATE 25 MCG: 50 INJECTION, SOLUTION INTRAMUSCULAR; INTRAVENOUS at 04:03

## 2022-01-01 RX ADMIN — VANCOMYCIN HYDROCHLORIDE 1000 MG: 1 INJECTION, POWDER, LYOPHILIZED, FOR SOLUTION INTRAVENOUS at 01:03

## 2022-01-01 RX ADMIN — ATORVASTATIN CALCIUM 40 MG: 20 TABLET, FILM COATED ORAL at 10:05

## 2022-01-01 RX ADMIN — LACOSAMIDE 200 MG: 10 SOLUTION ORAL at 08:04

## 2022-01-01 RX ADMIN — POTASSIUM BICARBONATE 35 MEQ: 391 TABLET, EFFERVESCENT ORAL at 01:04

## 2022-01-01 RX ADMIN — ONDANSETRON 4 MG: 2 INJECTION INTRAMUSCULAR; INTRAVENOUS at 11:04

## 2022-01-01 RX ADMIN — ACETYLCYSTEINE 4 ML: 100 SOLUTION ORAL; RESPIRATORY (INHALATION) at 12:05

## 2022-01-01 RX ADMIN — LOSARTAN POTASSIUM 25 MG: 25 TABLET, FILM COATED ORAL at 08:04

## 2022-01-01 RX ADMIN — CEFEPIME HYDROCHLORIDE 1 G: 1 INJECTION, SOLUTION INTRAVENOUS at 12:03

## 2022-01-01 RX ADMIN — SODIUM CHLORIDE 200 MG: 9 INJECTION, SOLUTION INTRAVENOUS at 11:04

## 2022-01-01 RX ADMIN — VANCOMYCIN HYDROCHLORIDE 750 MG: 750 INJECTION, POWDER, LYOPHILIZED, FOR SOLUTION INTRAVENOUS at 05:04

## 2022-01-01 RX ADMIN — LACOSAMIDE 250 MG: 10 SOLUTION ORAL at 10:05

## 2022-01-01 RX ADMIN — AMLODIPINE BESYLATE 10 MG: 10 TABLET ORAL at 01:04

## 2022-01-01 RX ADMIN — MUPIROCIN: 20 OINTMENT TOPICAL at 09:04

## 2022-01-01 RX ADMIN — TAMSULOSIN HYDROCHLORIDE 0.8 MG: 0.4 CAPSULE ORAL at 08:01

## 2022-01-01 RX ADMIN — GABAPENTIN 300 MG: 300 CAPSULE ORAL at 09:02

## 2022-01-01 RX ADMIN — FENTANYL CITRATE 50 MCG: 50 INJECTION, SOLUTION INTRAMUSCULAR; INTRAVENOUS at 11:04

## 2022-01-01 RX ADMIN — DEXTROSE 1 G: 50 INJECTION, SOLUTION INTRAVENOUS at 01:04

## 2022-01-01 RX ADMIN — POTASSIUM & SODIUM PHOSPHATES POWDER PACK 280-160-250 MG 2 PACKET: 280-160-250 PACK at 01:04

## 2022-01-01 RX ADMIN — SODIUM CHLORIDE 0.2 MCG/KG/MIN: 9 INJECTION, SOLUTION INTRAVENOUS at 10:04

## 2022-01-01 RX ADMIN — INSULIN ASPART 4 UNITS: 100 INJECTION, SOLUTION INTRAVENOUS; SUBCUTANEOUS at 04:05

## 2022-01-01 RX ADMIN — DEXTROSE AND SODIUM CHLORIDE: 5; .9 INJECTION, SOLUTION INTRAVENOUS at 10:04

## 2022-01-01 RX ADMIN — HYDROCODONE BITARTRATE AND ACETAMINOPHEN 1 TABLET: 10; 325 TABLET ORAL at 11:01

## 2022-01-01 RX ADMIN — INSULIN ASPART 2 UNITS: 100 INJECTION, SOLUTION INTRAVENOUS; SUBCUTANEOUS at 04:04

## 2022-01-01 RX ADMIN — HYDROCODONE BITARTRATE AND ACETAMINOPHEN 1 TABLET: 10; 325 TABLET ORAL at 09:01

## 2022-01-01 RX ADMIN — DAPTOMYCIN 285 MG: 350 INJECTION, POWDER, LYOPHILIZED, FOR SOLUTION INTRAVENOUS at 05:03

## 2022-01-01 RX ADMIN — ASPIRIN 81 MG: 81 TABLET, COATED ORAL at 09:02

## 2022-01-01 RX ADMIN — MELATONIN TAB 3 MG 6 MG: 3 TAB at 11:01

## 2022-01-01 RX ADMIN — ASPIRIN 81 MG: 81 TABLET, COATED ORAL at 08:02

## 2022-01-01 RX ADMIN — HYDRALAZINE HYDROCHLORIDE 10 MG: 20 INJECTION, SOLUTION INTRAMUSCULAR; INTRAVENOUS at 12:04

## 2022-01-01 RX ADMIN — VASOPRESSIN 1 UNITS: 20 INJECTION INTRAVENOUS at 11:04

## 2022-01-01 RX ADMIN — PANTOPRAZOLE SODIUM 40 MG: 40 TABLET, DELAYED RELEASE ORAL at 08:01

## 2022-01-01 RX ADMIN — LACTULOSE 10 G: 10 SOLUTION ORAL at 08:01

## 2022-01-01 RX ADMIN — HYDROCODONE BITARTRATE AND ACETAMINOPHEN 1 TABLET: 10; 325 TABLET ORAL at 12:01

## 2022-01-01 RX ADMIN — SODIUM CHLORIDE: 0.9 INJECTION, SOLUTION INTRAVENOUS at 05:04

## 2022-01-01 RX ADMIN — PROPOFOL 100 MG: 10 INJECTION, EMULSION INTRAVENOUS at 09:04

## 2022-01-01 RX ADMIN — SODIUM CHLORIDE 200 MG: 9 INJECTION, SOLUTION INTRAVENOUS at 04:04

## 2022-01-01 RX ADMIN — ACETAMINOPHEN 650 MG: 325 TABLET ORAL at 10:04

## 2022-01-01 RX ADMIN — ACETYLCYSTEINE 4 ML: 100 SOLUTION ORAL; RESPIRATORY (INHALATION) at 06:04

## 2022-01-01 RX ADMIN — SUCCINYLCHOLINE CHLORIDE 120 MG: 20 INJECTION, SOLUTION INTRAMUSCULAR; INTRAVENOUS at 11:04

## 2022-01-01 RX ADMIN — ROCURONIUM BROMIDE 60 MG: 10 INJECTION, SOLUTION INTRAVENOUS at 09:04

## 2022-01-01 RX ADMIN — INSULIN ASPART 1 UNITS: 100 INJECTION, SOLUTION INTRAVENOUS; SUBCUTANEOUS at 12:04

## 2022-01-01 RX ADMIN — FENTANYL CITRATE 25 MCG: 50 INJECTION, SOLUTION INTRAMUSCULAR; INTRAVENOUS at 10:04

## 2022-01-01 RX ADMIN — VANCOMYCIN HYDROCHLORIDE 1500 MG: 1.5 INJECTION, POWDER, LYOPHILIZED, FOR SOLUTION INTRAVENOUS at 03:01

## 2022-01-01 RX ADMIN — LOSARTAN POTASSIUM 25 MG: 25 TABLET, FILM COATED ORAL at 09:05

## 2022-01-01 RX ADMIN — POTASSIUM BICARBONATE 35 MEQ: 391 TABLET, EFFERVESCENT ORAL at 05:04

## 2022-01-01 RX ADMIN — LABETALOL HYDROCHLORIDE 10 MG: 5 INJECTION, SOLUTION INTRAVENOUS at 09:04

## 2022-01-01 RX ADMIN — INSULIN ASPART 2 UNITS: 100 INJECTION, SOLUTION INTRAVENOUS; SUBCUTANEOUS at 09:05

## 2022-01-01 RX ADMIN — HYDROCODONE BITARTRATE AND ACETAMINOPHEN 1 TABLET: 5; 325 TABLET ORAL at 09:01

## 2022-01-01 RX ADMIN — MELATONIN TAB 3 MG 6 MG: 3 TAB at 09:01

## 2022-01-01 RX ADMIN — ALTEPLASE 2 MG: 2.2 INJECTION, POWDER, LYOPHILIZED, FOR SOLUTION INTRAVENOUS at 10:04

## 2022-01-01 RX ADMIN — SODIUM CHLORIDE: 9 INJECTION, SOLUTION INTRAVENOUS at 03:04

## 2022-01-01 RX ADMIN — VANCOMYCIN HYDROCHLORIDE 1000 MG: 1 INJECTION, POWDER, LYOPHILIZED, FOR SOLUTION INTRAVENOUS at 10:04

## 2022-01-01 RX ADMIN — FUROSEMIDE 40 MG: 10 INJECTION, SOLUTION INTRAMUSCULAR; INTRAVENOUS at 09:05

## 2022-01-01 RX ADMIN — PROPOFOL 120 MG: 10 INJECTION, EMULSION INTRAVENOUS at 11:04

## 2022-01-01 RX ADMIN — SODIUM CHLORIDE, SODIUM GLUCONATE, SODIUM ACETATE, POTASSIUM CHLORIDE, MAGNESIUM CHLORIDE, SODIUM PHOSPHATE, DIBASIC, AND POTASSIUM PHOSPHATE: .53; .5; .37; .037; .03; .012; .00082 INJECTION, SOLUTION INTRAVENOUS at 09:04

## 2022-01-01 RX ADMIN — LABETALOL HYDROCHLORIDE 10 MG: 5 INJECTION, SOLUTION INTRAVENOUS at 01:04

## 2022-01-01 RX ADMIN — SODIUM CHLORIDE: 9 INJECTION, SOLUTION INTRAVENOUS at 02:03

## 2022-01-01 RX ADMIN — ATORVASTATIN CALCIUM 80 MG: 40 TABLET, FILM COATED ORAL at 09:02

## 2022-01-01 RX ADMIN — TAMSULOSIN HYDROCHLORIDE 0.8 MG: 0.4 CAPSULE ORAL at 09:01

## 2022-01-01 RX ADMIN — SUGAMMADEX 200 MG: 100 INJECTION, SOLUTION INTRAVENOUS at 06:04

## 2022-01-01 RX ADMIN — VANCOMYCIN HYDROCHLORIDE 750 MG: 750 INJECTION, POWDER, LYOPHILIZED, FOR SOLUTION INTRAVENOUS at 06:03

## 2022-01-01 RX ADMIN — SODIUM CHLORIDE: 0.9 INJECTION, SOLUTION INTRAVENOUS at 10:04

## 2022-01-01 RX ADMIN — VANCOMYCIN HYDROCHLORIDE 750 MG: 750 INJECTION, POWDER, LYOPHILIZED, FOR SOLUTION INTRAVENOUS at 09:01

## 2022-01-01 RX ADMIN — LABETALOL HYDROCHLORIDE 10 MG: 5 INJECTION, SOLUTION INTRAVENOUS at 03:04

## 2022-01-01 RX ADMIN — ATORVASTATIN CALCIUM 40 MG: 20 TABLET, FILM COATED ORAL at 01:04

## 2022-01-01 RX ADMIN — NICARDIPINE HYDROCHLORIDE 200 MCG: 2.5 INJECTION INTRAVENOUS at 05:04

## 2022-01-01 RX ADMIN — PROPOFOL 50 MCG/KG/MIN: 10 INJECTION, EMULSION INTRAVENOUS at 01:01

## 2022-01-01 RX ADMIN — IPRATROPIUM BROMIDE AND ALBUTEROL SULFATE 3 ML: 2.5; .5 SOLUTION RESPIRATORY (INHALATION) at 04:04

## 2022-01-01 RX ADMIN — DEXTROSE 125 ML: 10 SOLUTION INTRAVENOUS at 04:04

## 2022-01-01 RX ADMIN — AMLODIPINE BESYLATE 10 MG: 10 TABLET ORAL at 10:03

## 2022-01-01 RX ADMIN — SODIUM CHLORIDE, SODIUM GLUCONATE, SODIUM ACETATE, POTASSIUM CHLORIDE, MAGNESIUM CHLORIDE, SODIUM PHOSPHATE, DIBASIC, AND POTASSIUM PHOSPHATE: .53; .5; .37; .037; .03; .012; .00082 INJECTION, SOLUTION INTRAVENOUS at 10:04

## 2022-01-01 RX ADMIN — DEXMEDETOMIDINE HYDROCHLORIDE 4 MCG: 100 INJECTION, SOLUTION INTRAVENOUS at 04:03

## 2022-01-01 RX ADMIN — Medication 10 MG: at 06:04

## 2022-01-01 RX ADMIN — FOLIC ACID 1 MG: 1 TABLET ORAL at 08:03

## 2022-01-01 RX ADMIN — HEPARIN SODIUM 5000 UNITS: 5000 INJECTION INTRAVENOUS; SUBCUTANEOUS at 06:05

## 2022-01-01 RX ADMIN — SODIUM CHLORIDE 500 ML: 0.9 INJECTION, SOLUTION INTRAVENOUS at 12:04

## 2022-01-01 RX ADMIN — HEPARIN SODIUM 5000 UNITS: 5000 INJECTION INTRAVENOUS; SUBCUTANEOUS at 08:04

## 2022-01-01 RX ADMIN — POTASSIUM BICARBONATE 50 MEQ: 978 TABLET, EFFERVESCENT ORAL at 05:04

## 2022-01-01 RX ADMIN — ATORVASTATIN CALCIUM 80 MG: 40 TABLET, FILM COATED ORAL at 08:02

## 2022-01-01 RX ADMIN — LEVETIRACETAM 1500 MG: 500 SOLUTION ORAL at 08:05

## 2022-01-01 RX ADMIN — PROPOFOL 50 MCG/KG/MIN: 10 INJECTION, EMULSION INTRAVENOUS at 11:04

## 2022-01-01 RX ADMIN — VANCOMYCIN HYDROCHLORIDE 500 MG: 500 INJECTION, POWDER, LYOPHILIZED, FOR SOLUTION INTRAVENOUS at 09:03

## 2022-01-01 RX ADMIN — FENTANYL CITRATE 25 MCG: 50 INJECTION, SOLUTION INTRAMUSCULAR; INTRAVENOUS at 01:01

## 2022-01-01 RX ADMIN — HYDROCODONE BITARTRATE AND ACETAMINOPHEN 1 TABLET: 5; 325 TABLET ORAL at 09:04

## 2022-01-01 RX ADMIN — HEPARIN SODIUM 5000 UNITS: 5000 INJECTION INTRAVENOUS; SUBCUTANEOUS at 01:03

## 2022-01-01 RX ADMIN — NICARDIPINE HYDROCHLORIDE 2.5 MG/HR: 0.2 INJECTION INTRAVENOUS at 07:04

## 2022-01-01 RX ADMIN — INSULIN DETEMIR 3 UNITS: 100 INJECTION, SOLUTION SUBCUTANEOUS at 10:03

## 2022-01-01 RX ADMIN — DULOXETINE 30 MG: 30 CAPSULE, DELAYED RELEASE ORAL at 09:02

## 2022-01-01 RX ADMIN — SUGAMMADEX 200 MG: 100 INJECTION, SOLUTION INTRAVENOUS at 12:04

## 2022-01-01 RX ADMIN — HEPARIN SODIUM 5000 UNITS: 5000 INJECTION INTRAVENOUS; SUBCUTANEOUS at 02:05

## 2022-01-01 RX ADMIN — HEPARIN SODIUM 5000 UNITS: 5000 INJECTION INTRAVENOUS; SUBCUTANEOUS at 01:04

## 2022-01-01 RX ADMIN — FUROSEMIDE 40 MG: 10 INJECTION, SOLUTION INTRAMUSCULAR; INTRAVENOUS at 06:05

## 2022-01-01 RX ADMIN — FENTANYL CITRATE 25 MCG: 50 INJECTION INTRAMUSCULAR; INTRAVENOUS at 11:04

## 2022-01-01 RX ADMIN — VASOPRESSIN 1 UNITS: 20 INJECTION, SOLUTION INTRAMUSCULAR; SUBCUTANEOUS at 04:04

## 2022-01-01 RX ADMIN — HEPARIN SODIUM 5000 UNITS: 5000 INJECTION INTRAVENOUS; SUBCUTANEOUS at 02:03

## 2022-01-01 RX ADMIN — NEOSTIGMINE METHYLSULFATE 2 MG: 0.5 INJECTION INTRAVENOUS at 12:04

## 2022-01-01 RX ADMIN — POTASSIUM CHLORIDE 40 MEQ: 1500 TABLET, EXTENDED RELEASE ORAL at 09:02

## 2022-01-01 RX ADMIN — AMOXICILLIN 500 MG: 250 CAPSULE ORAL at 12:01

## 2022-01-01 RX ADMIN — TUBERCULIN PURIFIED PROTEIN DERIVATIVE 5 UNITS: 5 INJECTION, SOLUTION INTRADERMAL at 11:01

## 2022-01-01 RX ADMIN — PROPOFOL 30 MG: 10 INJECTION, EMULSION INTRAVENOUS at 03:03

## 2022-01-01 RX ADMIN — ALBUTEROL SULFATE 2.5 MG: 2.5 SOLUTION RESPIRATORY (INHALATION) at 07:04

## 2022-01-01 RX ADMIN — VANCOMYCIN HYDROCHLORIDE 1000 MG: 1 INJECTION, POWDER, LYOPHILIZED, FOR SOLUTION INTRAVENOUS at 06:03

## 2022-01-01 RX ADMIN — SENNOSIDES AND DOCUSATE SODIUM 1 TABLET: 50; 8.6 TABLET ORAL at 09:05

## 2022-01-01 RX ADMIN — INSULIN DETEMIR 5 UNITS: 100 INJECTION, SOLUTION SUBCUTANEOUS at 08:02

## 2022-01-01 RX ADMIN — AMLODIPINE BESYLATE 10 MG: 10 TABLET ORAL at 10:05

## 2022-01-01 RX ADMIN — CARVEDILOL 25 MG: 12.5 TABLET, FILM COATED ORAL at 11:01

## 2022-01-01 RX ADMIN — SODIUM CHLORIDE: 0.9 INJECTION, SOLUTION INTRAVENOUS at 09:04

## 2022-01-01 RX ADMIN — SENNOSIDES AND DOCUSATE SODIUM 1 TABLET: 50; 8.6 TABLET ORAL at 11:05

## 2022-01-01 RX ADMIN — Medication 2 MG/HR: at 10:05

## 2022-01-01 RX ADMIN — HEPARIN SODIUM 5000 UNITS: 5000 INJECTION INTRAVENOUS; SUBCUTANEOUS at 04:03

## 2022-01-01 RX ADMIN — SODIUM CHLORIDE 200 MG: 9 INJECTION, SOLUTION INTRAVENOUS at 01:04

## 2022-01-01 RX ADMIN — FERROUS SULFATE TAB 325 MG (65 MG ELEMENTAL FE) 1 EACH: 325 (65 FE) TAB at 10:03

## 2022-01-01 RX ADMIN — POTASSIUM CHLORIDE 40 MEQ: 1500 TABLET, EXTENDED RELEASE ORAL at 04:03

## 2022-01-01 RX ADMIN — PROPOFOL 30 MG: 10 INJECTION, EMULSION INTRAVENOUS at 10:04

## 2022-01-01 RX ADMIN — LEVETIRACETAM 1500 MG: 500 SOLUTION ORAL at 10:05

## 2022-01-01 RX ADMIN — SODIUM CHLORIDE, SODIUM LACTATE, POTASSIUM CHLORIDE, AND CALCIUM CHLORIDE: 600; 310; 30; 20 INJECTION, SOLUTION INTRAVENOUS at 11:04

## 2022-01-01 RX ADMIN — PROPOFOL 150 MG: 10 INJECTION, EMULSION INTRAVENOUS at 04:04

## 2022-01-01 RX ADMIN — TAMSULOSIN HYDROCHLORIDE 0.4 MG: 0.4 CAPSULE ORAL at 09:02

## 2022-01-01 RX ADMIN — ACETYLCYSTEINE 4 ML: 100 INHALANT RESPIRATORY (INHALATION) at 01:05

## 2022-01-01 RX ADMIN — SILODOSIN 4 MG: 4 CAPSULE ORAL at 09:05

## 2022-01-01 RX ADMIN — ATORVASTATIN CALCIUM 80 MG: 40 TABLET, FILM COATED ORAL at 11:03

## 2022-01-01 RX ADMIN — DEXAMETHASONE SODIUM PHOSPHATE 4 MG: 4 INJECTION, SOLUTION INTRAMUSCULAR; INTRAVENOUS at 10:04

## 2022-01-01 RX ADMIN — HYDROCODONE BITARTRATE AND ACETAMINOPHEN 1 TABLET: 10; 325 TABLET ORAL at 05:01

## 2022-01-01 RX ADMIN — LOSARTAN POTASSIUM 25 MG: 25 TABLET, FILM COATED ORAL at 10:05

## 2022-01-01 RX ADMIN — INSULIN ASPART 2 UNITS: 100 INJECTION, SOLUTION INTRAVENOUS; SUBCUTANEOUS at 01:05

## 2022-01-01 RX ADMIN — KETOROLAC TROMETHAMINE 15 MG: 15 INJECTION, SOLUTION INTRAMUSCULAR; INTRAVENOUS at 03:03

## 2022-01-01 RX ADMIN — INSULIN ASPART 2 UNITS: 100 INJECTION, SOLUTION INTRAVENOUS; SUBCUTANEOUS at 08:05

## 2022-01-01 RX ADMIN — ACETAMINOPHEN 1000 MG: 10 INJECTION INTRAVENOUS at 04:03

## 2022-01-01 RX ADMIN — ONDANSETRON 4 MG: 2 INJECTION, SOLUTION INTRAMUSCULAR; INTRAVENOUS at 11:04

## 2022-01-01 RX ADMIN — NICARDIPINE HYDROCHLORIDE 5 MG/HR: 0.2 INJECTION INTRAVENOUS at 01:04

## 2022-01-01 RX ADMIN — INSULIN ASPART 2 UNITS: 100 INJECTION, SOLUTION INTRAVENOUS; SUBCUTANEOUS at 12:04

## 2022-01-01 RX ADMIN — LACOSAMIDE 250 MG: 10 SOLUTION ORAL at 11:05

## 2022-01-01 RX ADMIN — DULOXETINE 30 MG: 30 CAPSULE, DELAYED RELEASE ORAL at 08:02

## 2022-01-01 RX ADMIN — NICARDIPINE HYDROCHLORIDE 2.5 MG/HR: 0.2 INJECTION INTRAVENOUS at 05:04

## 2022-01-01 RX ADMIN — FAMOTIDINE 20 MG: 20 TABLET, FILM COATED ORAL at 08:04

## 2022-01-01 RX ADMIN — LEVETIRACETAM 1000 MG: 500 INJECTION, SOLUTION INTRAVENOUS at 04:04

## 2022-01-01 RX ADMIN — SILODOSIN 4 MG: 4 CAPSULE ORAL at 10:04

## 2022-01-01 RX ADMIN — CEFTRIAXONE 1 G: 1 INJECTION, SOLUTION INTRAVENOUS at 09:01

## 2022-01-01 RX ADMIN — NICARDIPINE HYDROCHLORIDE 5 MG/HR: 0.2 INJECTION INTRAVENOUS at 06:04

## 2022-01-01 RX ADMIN — LABETALOL HYDROCHLORIDE 10 MG: 5 INJECTION, SOLUTION INTRAVENOUS at 05:04

## 2022-01-01 RX ADMIN — CEFTRIAXONE 1 G: 1 INJECTION, SOLUTION INTRAVENOUS at 04:03

## 2022-01-01 RX ADMIN — VANCOMYCIN HYDROCHLORIDE 500 MG: 500 INJECTION, POWDER, LYOPHILIZED, FOR SOLUTION INTRAVENOUS at 02:04

## 2022-01-01 RX ADMIN — SENNOSIDES AND DOCUSATE SODIUM 1 TABLET: 50; 8.6 TABLET ORAL at 01:04

## 2022-01-01 RX ADMIN — HEPARIN SODIUM 5000 UNITS: 5000 INJECTION INTRAVENOUS; SUBCUTANEOUS at 05:03

## 2022-01-01 RX ADMIN — ROCURONIUM BROMIDE 40 MG: 10 INJECTION, SOLUTION INTRAVENOUS at 04:04

## 2022-01-01 RX ADMIN — CEFEPIME HYDROCHLORIDE 2 G: 2 INJECTION, SOLUTION INTRAVENOUS at 04:04

## 2022-01-01 RX ADMIN — CARVEDILOL 25 MG: 25 TABLET, FILM COATED ORAL at 01:03

## 2022-01-01 RX ADMIN — AMLODIPINE BESYLATE 10 MG: 10 TABLET ORAL at 08:05

## 2022-01-01 RX ADMIN — HEPARIN SODIUM 5000 UNITS: 5000 INJECTION INTRAVENOUS; SUBCUTANEOUS at 01:05

## 2022-01-01 RX ADMIN — LEVETIRACETAM 500 MG: 500 INJECTION, SOLUTION INTRAVENOUS at 08:04

## 2022-01-01 RX ADMIN — SENNOSIDES AND DOCUSATE SODIUM 1 TABLET: 50; 8.6 TABLET ORAL at 08:05

## 2022-01-01 RX ADMIN — CEFEPIME HYDROCHLORIDE 1 G: 1 INJECTION, SOLUTION INTRAVENOUS at 04:03

## 2022-01-01 RX ADMIN — HYDRALAZINE HYDROCHLORIDE 10 MG: 20 INJECTION, SOLUTION INTRAMUSCULAR; INTRAVENOUS at 10:04

## 2022-01-01 RX ADMIN — OXYCODONE HYDROCHLORIDE AND ACETAMINOPHEN 500 MG: 500 TABLET ORAL at 03:03

## 2022-01-01 RX ADMIN — PANTOPRAZOLE SODIUM 40 MG: 40 TABLET, DELAYED RELEASE ORAL at 10:03

## 2022-01-01 RX ADMIN — HYDROMORPHONE HYDROCHLORIDE 0.5 MG: 1 INJECTION, SOLUTION INTRAMUSCULAR; INTRAVENOUS; SUBCUTANEOUS at 12:04

## 2022-01-01 RX ADMIN — ACETAMINOPHEN 650 MG: 325 TABLET ORAL at 11:05

## 2022-01-01 RX ADMIN — SENNOSIDES AND DOCUSATE SODIUM 1 TABLET: 50; 8.6 TABLET ORAL at 01:05

## 2022-01-01 RX ADMIN — HYDROCODONE BITARTRATE AND ACETAMINOPHEN 1 TABLET: 10; 325 TABLET ORAL at 06:01

## 2022-01-01 RX ADMIN — AMLODIPINE BESYLATE 10 MG: 10 TABLET ORAL at 05:02

## 2022-01-01 RX ADMIN — ONDANSETRON 4 MG: 2 INJECTION INTRAMUSCULAR; INTRAVENOUS at 05:04

## 2022-01-01 RX ADMIN — ONDANSETRON 4 MG: 2 INJECTION INTRAMUSCULAR; INTRAVENOUS at 08:04

## 2022-01-01 RX ADMIN — POTASSIUM BICARBONATE 35 MEQ: 391 TABLET, EFFERVESCENT ORAL at 03:04

## 2022-01-01 RX ADMIN — SODIUM CHLORIDE: 0.9 INJECTION, SOLUTION INTRAVENOUS at 07:04

## 2022-01-01 RX ADMIN — ACETAMINOPHEN 650 MG: 325 TABLET ORAL at 08:04

## 2022-01-01 RX ADMIN — HYDROCODONE BITARTRATE AND ACETAMINOPHEN 1 TABLET: 5; 325 TABLET ORAL at 06:01

## 2022-01-01 RX ADMIN — DEXAMETHASONE SODIUM PHOSPHATE 4 MG: 4 INJECTION, SOLUTION INTRAMUSCULAR; INTRAVENOUS at 04:04

## 2022-01-01 RX ADMIN — HYDROCODONE BITARTRATE AND ACETAMINOPHEN 1 TABLET: 10; 325 TABLET ORAL at 10:01

## 2022-01-01 RX ADMIN — SODIUM CHLORIDE: 0.9 INJECTION, SOLUTION INTRAVENOUS at 05:03

## 2022-01-01 NOTE — OP NOTE
[facility] ([place])  Operative Note      Date of Procedure: 1/1/2022     Procedure: Procedure(s) (LRB):  AMPUTATION, TOE (Right)   Staged procedure to allow infection to drain/resolve and lack of soft tissue coverage.  Multiple wound debridements anticipated outpatient for wound healing.    Surgeon(s) and Role:     * Genaro Mason, DPM - Primary       Norman Higgins DPM PGY-2 - Assist    Pre-Operative Diagnosis: Ulcer of right second toe [L97.519]  Diabetic foot ulcer with osteomyelitis [E11.621, E11.69, L97.509, M86.9]    Post-Operative Diagnosis: Post-Op Diagnosis Codes:     * Ulcer of right second toe [L97.519]     * Diabetic foot ulcer with osteomyelitis [E11.621, E11.69, L97.509, M86.9]    Anesthesia: Local MAC    Operative Findings (including complications, if any): Right 2nd toe with dry gangrene, grossly infected, foul odor; amputated at level of healthy grossly healthy tissue at MTPJ    Description of Technical Procedures: The patient was brought to the operating room on a stretcher and remained on the stretcher in supine position for the entirety of the procedure. Anesthesia was induced.   10 mL of a 1:1 mixture of 1% lidocaine plain and 0.50% bupivacaine plain was locally infiltrated. The right foot was prepped and draped in a sterile manner.     Attention was directed to the right 2nd toe. The toe was grasped with a towel clamp. Using a 15 blade a full thickness racquet shaped incision was made at the level of the MTPJ. Soft tissue envelope was sharply elevated from the bone. Using a 15 blade a circumferential capsulotomy was performed including transection of all associated tendons and ligaments, the toe was removed from the field. Tissue specimen collected from proximal phalanx of amputated portion of 2nd toe; sent to microbiology and pathology labs.     The surgical site was irrigated with saline solution via bulb syringe.  Two 3-0 prolene sutures were used to hold skin flaps together, wound  was packed between suture with gauze. Dressed with 4x4 gauze, kerlix, and tape.       The patient was transferred to the recovery room with vital signs stable. Following a period of post op monitoring, the patient will be transferred to the floor with the following postop instructions:   1. Keep dressing clean, dry, and intact until next seen by podiatry.   2. Weightbearing status: nonweightbearing.   3. All necessary prescriptions were ordered and medical management will continue.   4. Contact podiatry with any postop questions or concerns.       Estimated Blood Loss (EBL): 1 mL    Specimens:   Specimen (24h ago, onward)                None                    Condition: Good    Disposition: PACU - hemodynamically stable.    Attestation: I was present and scrubbed for the entire procedure.

## 2022-01-01 NOTE — ANESTHESIA POSTPROCEDURE EVALUATION
Anesthesia Post Evaluation    Patient: Beatriz Adame    Procedure(s) Performed: Procedure(s) (LRB):  AMPUTATION, TOE (Right)    Final Anesthesia Type: general      Patient location during evaluation: PACU  Patient participation: Yes- Able to Participate  Level of consciousness: awake and alert  Post-procedure vital signs: reviewed and stable  Pain management: adequate  Airway patency: patent    PONV status at discharge: No PONV  Anesthetic complications: no      Cardiovascular status: blood pressure returned to baseline  Respiratory status: unassisted and spontaneous ventilation  Hydration status: euvolemic  Follow-up not needed.          Vitals Value Taken Time   /59 01/01/22 1357   Temp 36.5 °C (97.7 °F) 01/01/22 1340   Pulse 74 01/01/22 1357   Resp 16 01/01/22 1340   SpO2 98 % 01/01/22 1357   Vitals shown include unvalidated device data.      No case tracking events are documented in the log.      Pain/Luis Alfredo Score: Luis Alfredo Score: 10 (1/1/2022  1:40 PM)

## 2022-01-01 NOTE — HOSPITAL COURSE
"Ms. Adame presented with right toe ulcer.  Empirically treated with Zosyn and vancomycin.  MRI showed "acute osteomyelitis involving the 2nd toe middle and distal phalanx." Arterial us showed slow velocities with abnormal waveforms in the arteries of the calf suggestive of vessel hardening such as from diffuse atherosclerosis.  Podiatry and Cardiology consulted.  She underwent right 2nd toe amputation on 01/01/2022.  Patient declined further workup with angiogram for PAD at this time.  Podiatry plans on staged debridement and closure. PT and OT working with patient and recommending SNF placement. She was discharged on PO abx to SNF as well as delgado catheter due to issues with urinary retention.  "

## 2022-01-01 NOTE — SUBJECTIVE & OBJECTIVE
Interval History: pain in right foot stable    Review of Systems   Constitutional: Negative for chills and fever.   HENT: Negative for trouble swallowing.    Respiratory: Negative for cough and shortness of breath.    Cardiovascular: Positive for leg swelling. Negative for chest pain.   Gastrointestinal: Negative for abdominal pain, blood in stool, nausea and vomiting.   Genitourinary: Negative for dysuria and hematuria.   Musculoskeletal: Positive for gait problem.   Skin: Positive for wound.   Neurological: Negative for headaches.   Psychiatric/Behavioral: Negative for confusion.     Objective:     Vital Signs (Most Recent):  Temp: 97.8 °F (36.6 °C) (01/01/22 1458)  Pulse: 72 (01/01/22 1458)  Resp: 16 (01/01/22 1458)  BP: (!) 123/59 (01/01/22 1458)  SpO2: 98 % (01/01/22 1458) Vital Signs (24h Range):  Temp:  [97.7 °F (36.5 °C)-98.7 °F (37.1 °C)] 97.8 °F (36.6 °C)  Pulse:  [72-80] 72  Resp:  [16-18] 16  SpO2:  [98 %-100 %] 98 %  BP: (108-133)/(53-63) 123/59     Weight: 56 kg (123 lb 7 oz)  Body mass index is 19.33 kg/m².    Intake/Output Summary (Last 24 hours) at 1/1/2022 1745  Last data filed at 12/31/2021 2044  Gross per 24 hour   Intake 49.89 ml   Output 200 ml   Net -150.11 ml      Physical Exam  Vitals reviewed.   Constitutional:       General: She is not in acute distress.     Appearance: She is well-developed.   HENT:      Head: Normocephalic and atraumatic.   Eyes:      Extraocular Movements: Extraocular movements intact.      Pupils: Pupils are equal, round, and reactive to light.   Cardiovascular:      Rate and Rhythm: Normal rate and regular rhythm.   Pulmonary:      Effort: Pulmonary effort is normal. No respiratory distress.      Breath sounds: Normal breath sounds.   Abdominal:      General: Bowel sounds are normal. There is no distension.      Palpations: Abdomen is soft.      Tenderness: There is no abdominal tenderness.   Musculoskeletal:         General: Swelling present.      Cervical back:  Normal range of motion and neck supple.      Comments: Left arm contracture, left leg strength weaker than right   Skin:     General: Skin is warm.      Comments: Right foot , swollen and dark second toe with some discharge at interdigital space, foul smell, swelling of foot and lower leg.   Neurological:      General: No focal deficit present.      Mental Status: She is alert and oriented to person, place, and time.   Psychiatric:         Behavior: Behavior normal.         Significant Labs: All pertinent labs within the past 24 hours have been reviewed.    Significant Imaging: I have reviewed all pertinent imaging results/findings within the past 24 hours.

## 2022-01-01 NOTE — INTERVAL H&P NOTE
The patient has been examined and the H&P has been reviewed:    I concur with the findings and no changes have occurred since H&P was written.    Surgery risks, benefits and alternative options discussed and understood by patient/family.    Written informed consent obtained.    Staged procedure - allow to drain infection, inadequate soft tissue to cover wound, await vascular optimization this week.    Marked right foot 2nd toe as surgical site.      Active Hospital Problems    Diagnosis  POA    *Diabetic ulcer of toe of right foot associated with type 1 diabetes mellitus, with bone involvement without evidence of necrosis [E10.621, L97.516]  Yes    Diabetic foot ulcer with osteomyelitis [E11.621, E11.69, L97.509, M86.9]  Yes    CKD (chronic kidney disease), stage IV [N18.4]  Yes    Hypothyroidism [E03.9]  Yes    History of stroke [Z86.73]  Not Applicable     Chronic    Normocytic anemia [D64.9]  Yes    Diabetes mellitus, type 2 [E11.9]  Yes    Hyperlipidemia associated with type 2 diabetes mellitus [E11.69, E78.5]  Yes     Chronic    Hypertension associated with stage 4 chronic kidney disease due to type 2 diabetes mellitus [E11.22, I12.9, N18.4]  Yes     Chronic      Resolved Hospital Problems   No resolved problems to display.

## 2022-01-01 NOTE — ASSESSMENT & PLAN NOTE
-chronic  -hold home insulin on 8 units daily  -begin low-dose sliding scale insulin  -dose/medication adjustment as appropriate   -monitor accuchecks AC/HS and PRN hypoglycemic protocol   -   Lab Results   Component Value Date    HGBA1C 5.4 11/21/2021

## 2022-01-01 NOTE — ASSESSMENT & PLAN NOTE
-at admission CBC with known anemia hemoglobin 7.5 and hematocrit 24.3 (baseline 8-9 and 26-29).  Chemistry with serum creatinine 1.8 (better than recent baseline), BUN 22.  LFTs WNLs.  .  CRP 53.3.  Blood cultures obtained.  COVID negative.  -right foot x-ray notes no convincing radiographic evidence of osteomyelitis  -MRI right foot notes acute osteomyelitis involving the 2nd toe middle and distal phalanx  -initiated on vancomycin and Zosyn, changed to rocephin, vanc  -Podiatry consulted  -PRN supportive care  -monitor  - check arterial studies with non palpable pulses.

## 2022-01-01 NOTE — TRANSFER OF CARE
"Anesthesia Transfer of Care Note    Patient: Beatriz Adame    Procedure(s) Performed: Procedure(s) (LRB):  AMPUTATION, TOE (Right)    Patient location: PACU    Anesthesia Type: general    Transport from OR: Transported from OR on 2-3 L/min O2 by NC with adequate spontaneous ventilation    Post pain: adequate analgesia    Post assessment: no apparent anesthetic complications    Post vital signs: stable    Level of consciousness: sedated and responds to stimulation    Nausea/Vomiting: no nausea/vomiting    Complications: none    Transfer of care protocol was followed      Last vitals:   Visit Vitals  BP (!) 116/56 (Patient Position: Lying)   Pulse 80   Temp 36.9 °C (98.5 °F) (Oral)   Resp 18   Ht 5' 7" (1.702 m)   Wt 56 kg (123 lb 7 oz)   SpO2 99%   Breastfeeding No   BMI 19.33 kg/m²     "

## 2022-01-01 NOTE — ANESTHESIA PREPROCEDURE EVALUATION
01/01/2022  Beatriz Adame is a 71 y.o., female.    Anesthesia Evaluation    I have reviewed the Patient Summary Reports.    I have reviewed the Nursing Notes. I have reviewed the NPO Status.   I have reviewed the Medications.     Review of Systems  Anesthesia Hx:  Denies Family Hx of Anesthesia complications.   Denies Personal Hx of Anesthesia complications.   Social:  Non-Smoker    Hematology/Oncology:     Oncology Normal    -- Anemia: Hematology Comments: Chronic anemia  Hct 25.9      Cardiovascular:   Hypertension hyperlipidemia    Pulmonary:  Pulmonary Normal    Renal/:   Chronic Renal Disease, CRI renal calculi    Hepatic/GI:  Hepatic/GI Normal    Neurological:   CVA Neuromuscular Disease,    Endocrine:   Diabetes Hypothyroidism        Physical Exam  General:  Well nourished    Airway/Jaw/Neck:  Airway Findings: Mouth Opening: Normal Tongue: Normal  General Airway Assessment: Adult  Mallampati: II  TM Distance: Normal, at least 6 cm            Mental Status:  Mental Status Findings:  Alert and Oriented, Cooperative         Anesthesia Plan  Type of Anesthesia, risks & benefits discussed:  Anesthesia Type:  MAC    Patient's Preference:   Plan Factors:          Intra-op Monitoring Plan: standard ASA monitors  Intra-op Monitoring Plan Comments:   Post Op Pain Control Plan: multimodal analgesia  Post Op Pain Control Plan Comments:     Induction:   IV  Beta Blocker:         Informed Consent: Patient understands risks and agrees with Anesthesia plan.  Questions answered. Anesthesia consent signed with patient.  ASA Score: 3  emergent   Day of Surgery Review of History & Physical:    H&P update referred to the surgeon.         Ready For Surgery From Anesthesia Perspective.

## 2022-01-01 NOTE — CONSULTS
Patient admitted with diabetic toe ulcer and associated osteomyelitis.  Right leg involvement.  Has left-sided weakness from prior stroke.  Uses right leg to transfer from bed to chair.  Consulted for peripheral arterial disease.  Arterial Doppler shows sluggish monophasic flow below the knee.  No localized areas of stenosis demonstrated.  Patient is currently in surgery for amputation and debridement.  Will tentatively plan for lower extremity angiography on Monday.  (Catheterization lab not available on weekends, nights, or holidays.)  Will discuss in detail with patient tomorrow with full consult to follow at that time.

## 2022-01-01 NOTE — SUBJECTIVE & OBJECTIVE
Interval History: pain in right foot stable    Review of Systems   Constitutional: Negative for chills and fever.   HENT: Negative for trouble swallowing.    Respiratory: Negative for cough and shortness of breath.    Cardiovascular: Positive for leg swelling. Negative for chest pain.   Gastrointestinal: Negative for abdominal pain, blood in stool, nausea and vomiting.   Genitourinary: Negative for dysuria and hematuria.   Musculoskeletal: Positive for gait problem.   Skin: Positive for wound.   Neurological: Negative for headaches.   Psychiatric/Behavioral: Negative for confusion.     Objective:     Vital Signs (Most Recent):  Temp: 97.4 °F (36.3 °C) (12/31/21 1653)  Pulse: 71 (12/31/21 1653)  Resp: 16 (12/31/21 1653)  BP: 122/60 (12/31/21 1653)  SpO2: 98 % (12/31/21 1653) Vital Signs (24h Range):  Temp:  [97.4 °F (36.3 °C)-98.6 °F (37 °C)] 97.4 °F (36.3 °C)  Pulse:  [68-75] 71  Resp:  [16-17] 16  SpO2:  [98 %-100 %] 98 %  BP: (122-178)/(59-79) 122/60     Weight: 56 kg (123 lb 7 oz)  Body mass index is 19.33 kg/m².    Intake/Output Summary (Last 24 hours) at 12/31/2021 1939  Last data filed at 12/31/2021 1840  Gross per 24 hour   Intake 529.89 ml   Output 400 ml   Net 129.89 ml      Physical Exam  Vitals reviewed.   Constitutional:       General: She is not in acute distress.     Appearance: She is well-developed.   HENT:      Head: Normocephalic and atraumatic.   Eyes:      Extraocular Movements: Extraocular movements intact.      Pupils: Pupils are equal, round, and reactive to light.   Cardiovascular:      Rate and Rhythm: Normal rate and regular rhythm.   Pulmonary:      Effort: Pulmonary effort is normal. No respiratory distress.      Breath sounds: Normal breath sounds.   Abdominal:      General: Bowel sounds are normal. There is no distension.      Palpations: Abdomen is soft.      Tenderness: There is no abdominal tenderness.   Musculoskeletal:         General: Swelling present.      Cervical back: Normal  range of motion and neck supple.      Comments: Left arm contracture, left leg strength weaker than right   Skin:     General: Skin is warm.      Comments: Right foot , swollen and dark second toe with some discharge at interdigital space, foul smell, swelling of foot and lower leg.   Neurological:      General: No focal deficit present.      Mental Status: She is alert and oriented to person, place, and time.   Psychiatric:         Behavior: Behavior normal.         Significant Labs: All pertinent labs within the past 24 hours have been reviewed.    Significant Imaging: I have reviewed all pertinent imaging results/findings within the past 24 hours.

## 2022-01-01 NOTE — ASSESSMENT & PLAN NOTE
-chronic  -admission CBC with known anemia hemoglobin 7.5 and hematocrit 24.3 (baseline 8-9 and 26-29)  -monitor over hospital course, plan transfusion if appropriate  Recent iron studies showed iron deficiency with normal b12 and folate

## 2022-01-01 NOTE — PROGRESS NOTES
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    She presents to the ED with complaint of right 2nd toe wound infection.  She was evaluated by home health nurse today and felt wound worsened and needed to be evaluated.  Patient reports she developed the wound over the last month or so due to dragging her feet while in her wheelchair. She denies fever, chills, fatigue, muscle aches, cough, sore throat, nausea, vomiting, diarrhea, chest pain, shortness of breath, lightheadedness, dizziness, or syncope. She lives alone; uses wheelchair for mobility, is able to transfer from chair to bed/toilet/chair independently however her neighbors help her with ADLs and outings.     In the ED she is hemodynamically stable on room air and afebrile.  CBC with known anemia hemoglobin 7.5 and hematocrit 24.3 (baseline 8-9 and 26-29).  Chemistry with serum creatinine 1.8 (better than recent baseline), BUN 22.  LFTs WNLs.  .  CRP 53.3.  Blood cultures obtained.  COVID negative.  Right foot x-ray notes no convincing radiographic evidence of osteomyelitis.  MRI right foot notes acute osteomyelitis involving  the 2nd toe middle and distal phalanx.  She was initiated on vancomycin and Zosyn.  The patient was placed in observation to the Hospital Medicine Service for further evaluation and treatment.       Overview/Hospital Course:  Arterial us showed Slow velocities with abnormal waveforms in the arteries of the calf suggestive of vessel hardening such as from diffuse atherosclerosis.       No new subjective & objective note has been filed under this hospital service since the last note was generated.      Assessment/Plan:      * Diabetic ulcer of toe of right foot associated with type 1 diabetes mellitus, with bone involvement without evidence of necrosis    -at admission CBC with known anemia hemoglobin 7.5 and hematocrit 24.3 (baseline 8-9 and 26-29).  Chemistry with serum creatinine 1.8 (better than recent baseline), BUN 22.  LFTs WNLs.  .  CRP 53.3.  Blood cultures obtained.  COVID negative.  -right foot x-ray notes no convincing radiographic evidence of osteomyelitis  -MRI right foot notes acute osteomyelitis involving the 2nd toe middle and distal phalanx  -initiated on vancomycin and Zosyn, changed to rocephin, vanc  -Podiatry consulted, plan for amputation toe 1/1/21.  -PRN supportive care  -monitor  -  arterial studies with non palpable pulses results above, cards consulted.    Diabetic foot ulcer with osteomyelitis    As above    Hypothyroidism  -chronic  -continue home levothyroxine     CKD (chronic kidney disease), stage IV  -chronic  -serum creatinine at admission 1.8 which is better than recent baseline  -avoid nephrotoxins and hypotension as able, renally dose medications  -monitor     Normocytic anemia  -chronic  -admission CBC with known anemia hemoglobin 7.5 and hematocrit 24.3 (baseline 8-9 and 26-29)  -monitor over hospital course, plan transfusion if appropriate  Recent iron studies showed iron deficiency with normal b12 and folate    History of stroke  With left side weakness  Continue statin  and asa, plavix  - patient states not on plavix, need to confirm med list from brother    Diabetes mellitus, type 2  -chronic  -hold home insulin on 8 units daily  -begin low-dose sliding scale insulin  -dose/medication adjustment as appropriate   -monitor accuchecks AC/HS and PRN hypoglycemic protocol   -   Lab Results   Component Value Date    HGBA1C 5.4 11/21/2021         Hyperlipidemia associated with type 2 diabetes mellitus  History of CVA  -chronic  -continue home ASA, Plavix, and statin    Hypertension associated with stage 4 chronic kidney disease due to type 2 diabetes mellitus  -chronic  -BP stable at admission  -continue home amlodipine and Coreg  -dose/medication adjustment as appropriate   -monitor     VTE Risk Mitigation (From admission, onward)         Ordered     Place sequential compression device  Until discontinued         12/30/21 1828     Reason for No Pharmacological VTE Prophylaxis  Once        Question:  Reasons:  Answer:  Risk of Bleeding    12/30/21 1828                Discharge Planning   MARY:      Code Status: Full Code   Is the patient medically ready for discharge?:     Reason for patient still in hospital (select all that apply): Patient trending condition and Treatment                     Kathya Ortiz MD  Department of Hospital Medicine   [facility] ([place])

## 2022-01-01 NOTE — PROGRESS NOTES
[facility] Medicine  Progress Note    Patient Name: Beatriz Adame  MRN: [id number]  Patient Class: OP- Observation   Admission Date: 12/30/2021  Length of Stay: 0 days  Attending Physician: Kathya Ortiz MD  Primary Care Provider: Primary Doctor No        Subjective:     Principal Problem:Diabetic ulcer of toe of right foot associated with type 1 diabetes mellitus, with bone involvement without evidence of necrosis        HPI:  Ms. 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    She presents to the ED with complaint of right 2nd toe wound infection.  She was evaluated by home health nurse today and felt wound worsened and needed to be evaluated.  Patient reports she developed the wound over the last month or so due to dragging her feet while in her wheelchair. She denies fever, chills, fatigue, muscle aches, cough, sore throat, nausea, vomiting, diarrhea, chest pain, shortness of breath, lightheadedness, dizziness, or syncope. She lives alone; uses wheelchair for mobility, is able to transfer from chair to bed/toilet/chair independently however her neighbors help her with ADLs and outings.     In the ED she is hemodynamically stable on room air and afebrile.  CBC with known anemia hemoglobin 7.5 and hematocrit 24.3 (baseline 8-9 and 26-29).  Chemistry with serum creatinine 1.8 (better than recent baseline), BUN 22.  LFTs WNLs.  .  CRP 53.3.  Blood cultures obtained.  COVID negative.  Right foot x-ray notes no convincing radiographic evidence of osteomyelitis.  MRI right foot notes acute osteomyelitis involving the  2nd toe middle and distal phalanx.  She was initiated on vancomycin and Zosyn.  The patient was placed in observation to the Hospital Medicine Service for further evaluation and treatment.       Overview/Hospital Course:  No notes on file    Interval History: pain in right foot stable    Review of Systems   Constitutional: Negative for chills and fever.   HENT: Negative for trouble swallowing.    Respiratory: Negative for cough and shortness of breath.    Cardiovascular: Positive for leg swelling. Negative for chest pain.   Gastrointestinal: Negative for abdominal pain, blood in stool, nausea and vomiting.   Genitourinary: Negative for dysuria and hematuria.   Musculoskeletal: Positive for gait problem.   Skin: Positive for wound.   Neurological: Negative for headaches.   Psychiatric/Behavioral: Negative for confusion.     Objective:     Vital Signs (Most Recent):  Temp: 97.4 °F (36.3 °C) (12/31/21 1653)  Pulse: 71 (12/31/21 1653)  Resp: 16 (12/31/21 1653)  BP: 122/60 (12/31/21 1653)  SpO2: 98 % (12/31/21 1653) Vital Signs (24h Range):  Temp:  [97.4 °F (36.3 °C)-98.6 °F (37 °C)] 97.4 °F (36.3 °C)  Pulse:  [68-75] 71  Resp:  [16-17] 16  SpO2:  [98 %-100 %] 98 %  BP: (122-178)/(59-79) 122/60     Weight: 56 kg (123 lb 7 oz)  Body mass index is 19.33 kg/m².    Intake/Output Summary (Last 24 hours) at 12/31/2021 1939  Last data filed at 12/31/2021 1840  Gross per 24 hour   Intake 529.89 ml   Output 400 ml   Net 129.89 ml      Physical Exam  Vitals reviewed.   Constitutional:       General: She is not in acute distress.     Appearance: She is well-developed.   HENT:      Head: Normocephalic and atraumatic.   Eyes:      Extraocular Movements: Extraocular movements intact.      Pupils: Pupils are equal, round, and reactive to light.   Cardiovascular:      Rate and Rhythm: Normal rate and regular rhythm.   Pulmonary:      Effort: Pulmonary effort is normal. No respiratory distress.      Breath sounds: Normal breath sounds.    Abdominal:      General: Bowel sounds are normal. There is no distension.      Palpations: Abdomen is soft.      Tenderness: There is no abdominal tenderness.   Musculoskeletal:         General: Swelling present.      Cervical back: Normal range of motion and neck supple.      Comments: Left arm contracture, left leg strength weaker than right   Skin:     General: Skin is warm.      Comments: Right foot , swollen and dark second toe with some discharge at interdigital space, foul smell, swelling of foot and lower leg.   Neurological:      General: No focal deficit present.      Mental Status: She is alert and oriented to person, place, and time.   Psychiatric:         Behavior: Behavior normal.         Significant Labs: All pertinent labs within the past 24 hours have been reviewed.    Significant Imaging: I have reviewed all pertinent imaging results/findings within the past 24 hours.      Assessment/Plan:      * Diabetic ulcer of toe of right foot associated with type 1 diabetes mellitus, with bone involvement without evidence of necrosis    -at admission CBC with known anemia hemoglobin 7.5 and hematocrit 24.3 (baseline 8-9 and 26-29).  Chemistry with serum creatinine 1.8 (better than recent baseline), BUN 22.  LFTs WNLs.  .  CRP 53.3.  Blood cultures obtained.  COVID negative.  -right foot x-ray notes no convincing radiographic evidence of osteomyelitis  -MRI right foot notes acute osteomyelitis involving the 2nd toe middle and distal phalanx  -initiated on vancomycin and Zosyn, changed to rocephin, vanc  -Podiatry consulted  -PRN supportive care  -monitor  - check arterial studies with non palpable pulses.    Diabetic foot ulcer with osteomyelitis    As above    Hypothyroidism  -chronic  -continue home levothyroxine     CKD (chronic kidney disease), stage IV  -chronic  -serum creatinine at admission 1.8 which is better than recent baseline  -avoid nephrotoxins and hypotension as able, renally dose  medications  -monitor     Normocytic anemia  -chronic  -admission CBC with known anemia hemoglobin 7.5 and hematocrit 24.3 (baseline 8-9 and 26-29)  -monitor over hospital course, plan transfusion if appropriate  Recent iron studies showed iron deficiency with normal b12 and folate    History of stroke  With left side weakness  Continue statin and asa, plavix    Diabetes mellitus, type 2  -chronic  -hold home insulin on 8 units daily  -begin low-dose sliding scale insulin  -dose/medication adjustment as appropriate   -monitor accuchecks AC/HS and PRN hypoglycemic protocol   -   Lab Results   Component Value Date    HGBA1C 5.4 11/21/2021         Hyperlipidemia associated with type 2 diabetes mellitus  History of CVA  -chronic  -continue home ASA, Plavix, and statin    Hypertension associated with stage 4 chronic kidney disease due to type 2 diabetes mellitus  -chronic  -BP stable at admission  -continue home amlodipine and Coreg  -dose/medication adjustment as appropriate   -monitor       VTE Risk Mitigation (From admission, onward)         Ordered     Place sequential compression device  Until discontinued         12/30/21 1828     Reason for No Pharmacological VTE Prophylaxis  Once        Question:  Reasons:  Answer:  Risk of Bleeding    12/30/21 1828                Discharge Planning   MARY:      Code Status: Full Code   Is the patient medically ready for discharge?:     Reason for patient still in hospital (select all that apply): Patient trending condition and Treatment                     Kathya Ortiz MD  Department of Hospital Medicine   [facility] ([place])

## 2022-01-01 NOTE — PLAN OF CARE
[facility] - Med Surg ([place])  Initial Discharge Assessment       Primary Care Provider: Dante Olivier MD   Last seen Adeola Lozada NP two months ago      Admission Diagnosis: Ulcer of right second toe [L97.519]  Diabetic ulcer of toe of right foot associated with type 1 diabetes mellitus, with bone involvement without evidence of necrosis [E10.621, L97.516]    Admission Date: 12/30/2021  Expected Discharge Date: TBD  Trending condtion and treatment  Pending Podiatry Consult  IV abx- Van      Payor: [employer] MANAGED MEDICARE / Plan: [employer] (SPECIAL NEEDS PLAN) / Product Type: Medicare Advantage /     Extended Emergency Contact Information  Primary Emergency Contact: Gabino Adame  Mobile Phone: [phone]  Relation: Brother  Preferred language: English   needed? No  Secondary Emergency Contact: Alysa Adame  Mobile Phone: [phone]  Relation: Son    Preferred Pharmacy:    [facility] -   [address] [address]  Phone: [phone] Fax: [phone]    [facility]  [address]  [place] [place] [facility] [address]  Phone: [phone] Fax: [phone]    [id number] - [address]  [place] [address]  Phone: [phone] Fax: [phone]    Discharge planner met with patient at the bedside; AAOX, sitting in bed on personal cell phone.   Active with [facility]; signed patient choice disclosure form to resume; reside at home alone, requires assistance (brother) with bathing,meal prepping and grocery shopping.  BRODERICK explained to patient; patient provided the information needed on Outpatient Observation Status; verbalizes understanding and signed.   12/31/21 1204   Discharge Assessment   Assessment Type Discharge Planning Assessment   Confirmed/corrected address, phone number and insurance Yes   Source of Information Patient    Does patient/caregiver understand observation status Yes   Communicated MARY with  patient/caregiver Yes   Lives With alone   Do you expect to return to your current living situation? Yes   Do you have help at home or someone to help you manage your care at home? Yes   Prior to hospitalization cognitive status: Alert/Oriented   Current cognitive status: Alert/Oriented   Walking or Climbing Stairs Difficulty ambulation difficulty, requires equipment    Dressing/Bathing Difficulty bathing difficulty, requires eqipment   Home Layout Able to live on 1st floor   Equipment Currently Used at Home wheelchair   Readmission within 30 days? No   Patient currently being followed by outpatient case management? No   Do you currently have service(s) that help you manage your care at home? Yes    Do you take prescription medications? Yes   Do you have prescription coverage? Yes   Do you have any problems affording any of your prescribed medications? No    Is the patient taking medications as prescribed? yes   How do you get to doctors appointments? family or friend will provide   Are you on dialysis? No   Do you take coumadin? Yes    Discharge Plan A Home Health   Discharge Plan B Home with family   DME Needed Upon Discharge  TBD   Discharge Plan discussed with: Patient    Discharge Barriers Identified None

## 2022-01-01 NOTE — NURSING
RLE reinfored with kerlix.  No additional bleeding noted.  Patient denies any nausea at this time.  Also remains free from c/o pain at this time. VSS.  NAD noted.  Will continue monitoring.

## 2022-01-01 NOTE — PROGRESS NOTES
Pharmacokinetic Assessment Follow Up: IV Vancomycin    Vancomycin serum concentration assessment(s):    The random level was drawn correctly and can be used to guide therapy at this time. The measurement is below the desired definitive target range of 10 to 20 mcg/mL.    Vancomycin Regimen Plan:    Administer one dose of vancomycin 1500 mg IV now 1/1/22 at 0030. Re-dose when the random level is less than 20 mcg/mL, next level to be drawn at 2300 on 1/1/22    Drug levels (last 3 results):  Recent Labs   Lab Result Units 12/31/21  1945   Vancomycin, Random ug/mL 5.3       Pharmacy will continue to follow and monitor vancomycin.    Please contact pharmacy at extension [phone] for questions regarding this assessment.    Thank you for the consult,   Lucero Lim       Patient brief summary:  Beatriz Adame is a 71 y.o. female initiated on antimicrobial therapy with IV Vancomycin for treatment of bone/joint infection      Drug Allergies:   Review of patient's allergies indicates:   Allergen Reactions    Tomato (solanum lycopersicum) Hives and Itching       Actual Body Weight:   56 kg    Renal Function:   Estimated Creatinine Clearance: 27.2 mL/min (A) (based on SCr of 1.7 mg/dL (H)).,     Dialysis Method (if applicable):  N/A    CBC (last 72 hours):  Recent Labs   Lab Result Units 12/30/21  1525 12/31/21  0519   WBC K/uL 12.00 10.95   Hemoglobin g/dL 7.5* 7.9*   Hematocrit % 24.3* 25.9*   Platelets K/uL 181 170   Gran % % 69.3 67.5   Lymph % % 21.3 21.4   Mono % % 8.0 8.8   Eosinophil % % 0.8 1.6   Basophil % % 0.3 0.3   Differential Method  Automated Automated       Metabolic Panel (last 72 hours):  Recent Labs   Lab Result Units 12/30/21  1525 12/31/21  0519   Sodium mmol/L 141 141   Potassium mmol/L 4.9 3.8   Chloride mmol/L 105 106   CO2 mmol/L 24 24   Glucose mg/dL 102 115*   BUN mg/dL 22 19   Creatinine mg/dL 1.8* 1.7*   Albumin g/dL 3.2* 2.9*   Total Bilirubin mg/dL 0.5 0.7   Alkaline Phosphatase U/L 74 74    AST U/L 11 6*   ALT U/L <5* <5*   Magnesium mg/dL  --  1.8       Vancomycin Administrations:  vancomycin given in the last 96 hours                   vancomycin in dextrose 5 % 1 gram/250 mL IVPB 1,000 mg (mg) 1,000 mg New Bag 12/30/21 1751                Microbiologic Results:  Microbiology Results (last 7 days)     Procedure Component Value Units Date/Time    Blood Culture #2 **CANNOT BE ORDERED STAT** [[id number]] Collected: 12/30/21 1536    Order Status: Completed Specimen: Blood from Peripheral, Antecubital, Right Updated: 12/31/21 2212     Blood Culture, Routine No Growth to date      No Growth to date    Blood Culture #1 **CANNOT BE ORDERED STAT** [[id number]] Collected: 12/30/21 1523    Order Status: Completed Specimen: Blood from Peripheral, Antecubital, Right Updated: 12/31/21 2212     Blood Culture, Routine No Growth to date      No Growth to date

## 2022-01-01 NOTE — ASSESSMENT & PLAN NOTE
- Residual left sided weakness at baseline  - Continue statin and ASA, plavix  - Patient states not on plavix, need to confirm med list from brother

## 2022-01-01 NOTE — ASSESSMENT & PLAN NOTE
-at admission CBC with known anemia hemoglobin 7.5 and hematocrit 24.3 (baseline 8-9 and 26-29).  Chemistry with serum creatinine 1.8 (better than recent baseline), BUN 22.  LFTs WNLs.  .  CRP 53.3.  Blood cultures obtained.  COVID negative.  -right foot x-ray notes no convincing radiographic evidence of osteomyelitis  -MRI right foot notes acute osteomyelitis involving the 2nd toe middle and distal phalanx  -initiated on vancomycin and Zosyn, changed to rocephin, vanc  -Podiatry consulted, plan for amputation toe 1/1/21.  -PRN supportive care  -monitor  -  arterial studies with non palpable pulses results above, cards consulted.

## 2022-01-01 NOTE — NURSING
Patient received on unit from PACU via bed.  AAOx4. VSS. Dsg to rt foot with small amt of serosanguinous drainage noted to kerlix.  Dsg marked, will continue to monitor.

## 2022-01-02 NOTE — PLAN OF CARE
Initial Discharge Planning Assessment:  Patient admitted on: 1/30/21     Chart reviewed, Care plan discussed with treatment team,  attending Dr. Mccloud     PCP updated in Epic: Yes   Pharmacy, updated in Epic: Yes      DME at home: Wheel Chair       Current dispo: Home with family       Transportation: Family can kprovide      Power of  or Living Will: Family      Anticipated DC needs from  perspective:       01/02/22 1447   Discharge Assessment   Assessment Type Discharge Planning Assessment   Confirmed/corrected address, phone number and insurance Yes   Confirmed Demographics Correct on Facesheet   Source of Information family;health record   Lives With child(rupesh), adult   Do you expect to return to your current living situation? Yes   Do you have help at home or someone to help you manage your care at home? Yes   Who are your caregiver(s) and their phone number(s)? Family can provide   Prior to hospitilization cognitive status: Alert/Oriented   Current cognitive status: Alert/Oriented   Walking or Climbing Stairs Difficulty none   Dressing/Bathing Difficulty none   Equipment Currently Used at Home wheelchair   Readmission within 30 days? Yes   Patient currently being followed by outpatient case management? Unable to determine (comments)   Do you currently have service(s) that help you manage your care at home? Yes   Name and Contact number of [facility]   Do you take prescription medications? Yes   Do you have prescription coverage? Yes   Do you have any problems affording any of your prescribed medications? No   Is the patient taking medications as prescribed? yes   How do you get to doctors appointments? family or friend will provide;health plan transportation   Are you on dialysis? No   Do you take coumadin? No   Discharge Plan A Home with family   Discharge Plan B Home Health   DME Needed Upon Discharge  none   Discharge Plan discussed with: Adult children   Discharge Barriers  Identified None

## 2022-01-02 NOTE — SUBJECTIVE & OBJECTIVE
Subjective:     Interval History: 1 day s/p R 2nd toe amputation. Vitals stable. WBC mildly elevated. Pain controlled. Sanguinous strikethrough of dressing. No new pedal complaints.     Follow-up For: Procedure(s) (LRB):  AMPUTATION, TOE (Right)    Post-Operative Day: 1 Day Post-Op    Scheduled Meds:   amLODIPine  10 mg Oral QHS    aspirin  81 mg Oral Daily    atorvastatin  80 mg Oral Daily    carvediloL  25 mg Oral BID    cefTRIAXone (ROCEPHIN) IVPB  1 g Intravenous Q24H    clopidogreL  75 mg Oral Daily    DULoxetine  30 mg Oral Daily    ferrous sulfate  1 tablet Oral Daily    gabapentin  300 mg Oral Daily    lactulose  10 g Oral BID    levothyroxine  75 mcg Oral Daily    metoclopramide HCl  5 mg Oral Q6H    pantoprazole  40 mg Oral Daily    senna-docusate 8.6-50 mg  1 tablet Oral BID    tamsulosin  0.4 mg Oral Daily    traZODone  100 mg Oral QHS     Continuous Infusions:  PRN Meds:acetaminophen, bisacodyL, dextrose 50%, dextrose 50%, glucagon (human recombinant), glucose, glucose, HYDROcodone-acetaminophen, HYDROcodone-acetaminophen, HYDROmorphone, insulin aspart U-100, melatonin, meperidine, ondansetron, ondansetron, ondansetron, oxyCODONE, polyethylene glycol, sodium chloride 0.9%, sodium chloride 0.9%, Pharmacy to dose Vancomycin consult **AND** vancomycin - pharmacy to dose    Review of Systems   Constitutional: Negative for activity change, appetite change, chills, diaphoresis, fatigue, fever and unexpected weight change.   HENT: Negative.    Eyes: Negative.    Respiratory: Negative for cough, chest tightness, shortness of breath and wheezing.    Cardiovascular: Negative for chest pain, palpitations and leg swelling.   Gastrointestinal: Negative for abdominal distention, abdominal pain, constipation, diarrhea, nausea and vomiting.   Endocrine: Negative.    Genitourinary: Negative.    Musculoskeletal: Positive for gait problem.   Skin: Positive for color change and wound.    Allergic/Immunologic: Negative.    Neurological: Positive for weakness. Negative for dizziness and numbness.     Objective:     Vital Signs (Most Recent):  Temp: 98.6 °F (37 °C) (01/02/22 0743)  Pulse: 76 (01/02/22 0743)  Resp: 16 (01/02/22 0743)  BP: 126/60 (01/02/22 0743)  SpO2: 97 % (01/02/22 0743) Vital Signs (24h Range):  Temp:  [97 °F (36.1 °C)-98.6 °F (37 °C)] 98.6 °F (37 °C)  Pulse:  [70-80] 76  Resp:  [12-18] 16  SpO2:  [97 %-100 %] 97 %  BP: (110-131)/(56-63) 126/60     Weight: 56 kg (123 lb 7 oz)  Body mass index is 19.33 kg/m².    Foot Exam    General  General Appearance: appears stated age and healthy   Orientation: alert and oriented to person, place, and time   Affect: appropriate       Right Foot/Ankle     Inspection and Palpation  Tenderness: (Surgical site; mild)  Swelling: (Surgical site; mild)  Skin Exam: drainage (bloody), maceration, skin changes, abnormal color and ulcer; skin not intact, no cellulitis and no erythema     Neurovascular  Dorsalis pedis: absent  Posterior tibial: absent  Saphenous nerve sensation: diminished  Tibial nerve sensation: diminished  Superficial peroneal nerve sensation: diminished  Deep peroneal nerve sensation: diminished  Sural nerve sensation: diminished    Comments        Left Foot/Ankle      Inspection and Palpation  Ecchymosis: none  Tenderness: none   Swelling: none   Skin Exam: skin intact;     Neurovascular  Dorsalis pedis: absent  Posterior tibial: absent  Saphenous nerve sensation: diminished  Tibial nerve sensation: diminished  Superficial peroneal nerve sensation: diminished  Deep peroneal nerve sensation: diminished  Sural nerve sensation: diminished            Laboratory:  Blood Cultures: No results for input(s): LABBLOO in the last 48 hours.  CBC:   Recent Labs   Lab 01/02/22  0511   WBC 13.58*   RBC 2.59*   HGB 7.5*   HCT 24.5*      MCV 95   MCH 29.0   MCHC 30.6*     CMP:   Recent Labs   Lab 12/31/21  0519 01/01/22  0503 01/02/22  0510   GLU  115*   < > 91   CALCIUM 8.9   < > 8.5*   ALBUMIN 2.9*  --   --    PROT 7.2  --   --       < > 139   K 3.8   < > 4.1   CO2 24   < > 21*      < > 105   BUN 19   < > 19   CREATININE 1.7*   < > 2.1*   ALKPHOS 74  --   --    ALT <5*  --   --    AST 6*  --   --    BILITOT 0.7  --   --     < > = values in this interval not displayed.     CRP:   Recent Labs   Lab 12/30/21  1525   CRP 53.3*     ESR:   Recent Labs   Lab 12/30/21  1525   SEDRATE 120*     Microbiology Results (last 7 days)     Procedure Component Value Units Date/Time    Blood Culture #2 **CANNOT BE ORDERED STAT** [[id number]] Collected: 12/30/21 1536    Order Status: Completed Specimen: Blood from Peripheral, Antecubital, Right Updated: 01/01/22 2212     Blood Culture, Routine No Growth to date      No Growth to date      No Growth to date    Blood Culture #1 **CANNOT BE ORDERED STAT** [[id number]] Collected: 12/30/21 1523    Order Status: Completed Specimen: Blood from Peripheral, Antecubital, Right Updated: 01/01/22 2212     Blood Culture, Routine No Growth to date      No Growth to date      No Growth to date    Tissue culture [[id number]] Collected: 01/01/22 1345    Order Status: Completed Specimen: Tissue from Toe, Right Foot Updated: 01/01/22 2141     Gram Stain Result No WBC's      No organisms seen    Tissue culture [[id number]] Collected: 01/01/22 1345    Order Status: Sent Specimen: Tissue from Toe, Right Foot Updated: 01/01/22 1345        Specimen (24h ago, onward)             Start     Ordered    01/01/22 1337  Specimen to Pathology, Surgery Other  Once        Comments: Pre-op Diagnosis: Ulcer of right second toe [L97.519]  Diabetic foot ulcer with osteomyelitis [E11.621, E11.69, L97.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     Clinical Findings:  -s/p R 2nd toe amputation. Retention sutures intact. No active drainage; bloody drainage in dressing. Mild surgical site tenderness and swelling. Erythema improved. No purulent drainage, no malodor.         01/02 01/02 12/31 12/31

## 2022-01-02 NOTE — NURSING
Several BMs over night. VSS on room air. Vancomycin redosed and administered. Dressing reinforced. Turned q 2. Refused SCDs at 0330. Educated on importance of SCDs but still refused. L side remains weak from prior stroke.

## 2022-01-02 NOTE — CONSULTS
[facility]    Date of admission:  12/30/2021     Reason for Consult:    Peripheral arterial disease with gangrene    Chief Complaint    Chief Complaint   Patient presents with    Wound Infection     Pt had home health come by today to change  dressing on a right foot wound. Sent here for possible infection        HPI:    This 71-year-old female developed a wound of her right 2nd toe.  She developed gangrene and osteomyelitis.  Yesterday, she underwent amputation of that toe.  She is mostly in a wheelchair because a prior stroke and left hemiplegia.  She uses her right leg to transfer.  She reports that she injured the toe because she dragged when she is in her wheelchair.    Medications  Current Facility-Administered Medications   Medication Dose Route Frequency Provider Last Rate Last Admin    acetaminophen tablet 650 mg  650 mg Oral Q4H PRN Shakira Og NP        amLODIPine tablet 10 mg  10 mg Oral QHS Shakira Og NP   10 mg at 01/01/22 2132    aspirin EC tablet 81 mg  81 mg Oral Daily Shakira Og NP   81 mg at 01/01/22 0849    atorvastatin tablet 80 mg  80 mg Oral Daily Shakira Og NP   80 mg at 01/01/22 0849    bisacodyL suppository 10 mg  10 mg Rectal Daily PRN Shakira Og NP        carvediloL tablet 25 mg  25 mg Oral BID Shakira Og NP   25 mg at 01/01/22 2132    cefTRIAXone (ROCEPHIN) 1 g/50 mL D5W IVPB  1 g Intravenous Q24H Kathya Ortiz MD   Stopped at 01/01/22 0959    clopidogreL tablet 75 mg  75 mg Oral Daily Shakira Og NP   75 mg at 12/31/21 0841    dextrose 50% injection 12.5 g  12.5 g Intravenous PRN Shakira gO NP        dextrose 50% injection 25 g  25 g Intravenous PRN Shakira Og NP        DULoxetine DR capsule 30 mg  30 mg Oral Daily Shakira Og NP   30 mg at 01/01/22 0849    ferrous sulfate tablet 1 each  1 tablet Oral Daily Shakira Og NP   1 each at 01/01/22 0850    gabapentin  capsule 300 mg  300 mg Oral Daily Shakira Og, NP   300 mg at 01/01/22 0849    glucagon (human recombinant) injection 1 mg  1 mg Intramuscular PRN Shakira Og NP        glucose chewable tablet 16 g  16 g Oral PRN Shakira Og, NP        glucose chewable tablet 24 g  24 g Oral PRN Shakira Og, KARLA        HYDROcodone-acetaminophen  mg per tablet 1 tablet  1 tablet Oral Q4H PRN Shakira Og, KARLA        HYDROcodone-acetaminophen 5-325 mg per tablet 1 tablet  1 tablet Oral Q4H PRN Shakira Og NP        HYDROmorphone (PF) injection 0.4 mg  0.4 mg Intravenous Q5 Min PRN Joshua Bernabe MD        insulin aspart U-100 pen 0-5 Units  0-5 Units Subcutaneous QID (AC + HS) PRN Shakira Og NP        lactulose 20 gram/30 mL solution Soln 10 g  10 g Oral BID Shakira Og NP   10 g at 01/01/22 2132    levothyroxine tablet 75 mcg  75 mcg Oral Daily Shakira Og, NP   75 mcg at 01/02/22 0500    melatonin tablet 6 mg  6 mg Oral Nightly PRN Maryam Landeros MD   6 mg at 01/01/22 2132    meperidine (PF) injection 12.5 mg  12.5 mg Intravenous Once PRN Joshua Bernabe MD        metoclopramide HCl tablet 5 mg  5 mg Oral Q6H Shakira Og, KARLA   5 mg at 01/02/22 0500    ondansetron disintegrating tablet 8 mg  8 mg Oral Q8H PRN Shakira Og NP        ondansetron injection 4 mg  4 mg Intravenous Q8H PRN Shakira Og NP        ondansetron injection 4 mg  4 mg Intravenous Daily PRN Joshua Bernabe MD        oxyCODONE immediate release tablet 5 mg  5 mg Oral Q3H PRN Joshua Bernabe MD        pantoprazole EC tablet 40 mg  40 mg Oral Daily Shakira Og, NP   40 mg at 01/01/22 0849    polyethylene glycol packet 17 g  17 g Oral BID PRN Shakira Og, KARLA        senna-docusate 8.6-50 mg per tablet 1 tablet  1 tablet Oral BID Shakira Og NP   1 tablet at 01/01/22 0729    sodium chloride 0.9% flush 10 mL  10 mL Intravenous  PRN Shakira Og NP        sodium chloride 0.9% flush 3 mL  3 mL Intravenous PRN Joshua Bernabe MD        tamsulosin 24 hr capsule 0.4 mg  0.4 mg Oral Daily Shakira Og NP   0.4 mg at 01/01/22 0849    traZODone tablet 100 mg  100 mg Oral QHS Shakira Og NP   100 mg at 01/01/22 2132    vancomycin - pharmacy to dose   Intravenous pharmacy to manage frequency Shakira Og NP          Prior to Admission medications    Medication Sig Start Date End Date Taking? Authorizing Provider   amLODIPine (NORVASC) 5 MG tablet Take 10 mg by mouth every evening. Take 5 mg every morning and 10 mg at night    Historical Provider   aspirin (ECOTRIN) 81 MG EC tablet Take 81 mg by mouth once daily.    Historical Provider   b complex vitamins tablet Take 1 tablet by mouth once daily.    Historical Provider   carvedilol (COREG) 25 MG tablet Take 1 tablet (25 mg total) by mouth 2 (two) times daily. 8/15/17 7/22/21  John Islas MD   cephALEXin (KEFLEX) 500 MG capsule Take 1 capsule (500 mg total) by mouth every 12 (twelve) hours. 12/22/21   Wanda Arroyo MD   clopidogreL (PLAVIX) 75 mg tablet Take 75 mg by mouth. 1/9/20   Historical Provider   DULoxetine (CYMBALTA) 30 MG capsule Take 1 capsule (30 mg total) by mouth once daily. 10/17/21   Kathya Ortiz MD   ferrous sulfate (FEOSOL) 325 mg (65 mg iron) Tab tablet Take 325 mg by mouth daily with breakfast. Off at present    Historical Provider   gabapentin (NEURONTIN) 300 MG capsule Take 1 capsule (300 mg total) by mouth once daily. 10/17/21 10/17/22  Kathya Ortiz MD   HYDROcodone-acetaminophen (NORCO) 5-325 mg per tablet Take 1 tablet by mouth every 6 (six) hours as needed for Pain. 12/22/21   Wanda Arroyo MD   insulin (BASAGLAR KWIKPEN U-100 INSULIN) glargine 100 units/mL (3mL) SubQ pen Inject 5 Units into the skin once daily. 10/17/21   Kathya Ortiz MD   lactulose (CHRONULAC) 10 gram/15 mL solution Take 10 g by mouth 2 (two)  times a day.    Historical Provider   levothyroxine (SYNTHROID) 75 MCG tablet Take 75 mcg by mouth once daily. 5/16/21   Historical Provider   metoclopramide HCl (REGLAN) 10 MG tablet Take 1 tablet (10 mg total) by mouth 3 (three) times daily before meals. 11/23/21   Kellie Bianchi PA-C   omeprazole (PRILOSEC) 20 MG capsule Take 20 mg by mouth 2 (two) times daily. 5/28/21   Historical Provider   ondansetron (ZOFRAN) 4 MG tablet Take 1 tablet (4 mg total) by mouth every 8 (eight) hours as needed for Nausea. 11/23/21   Kellie Bianchi PA-C   rosuvastatin (CRESTOR) 40 MG Tab Take 40 mg by mouth once daily. 6/23/21   Historical Provider   tamsulosin (FLOMAX) 0.4 mg Cap Take 1 capsule (0.4 mg total) by mouth once daily. 10/18/21   Kathya Ortiz MD   tiZANidine (ZANAFLEX) 4 MG tablet Take 4 mg by mouth nightly. 5/28/21   Historical Provider   traZODone (DESYREL) 100 MG tablet Take 100 mg by mouth once daily.    Historical Provider       History  Past Medical History:   Diagnosis Date    Anemia     Arthritis     Diabetes mellitus     Encounter for blood transfusion     Hypercholesteremia     Hypertension     Renal disorder     kidney stone    Stroke     left side paralysis     Past Surgical History:   Procedure Laterality Date    APPENDECTOMY      CYSTOSCOPY W/ URETERAL STENT PLACEMENT Right 10/13/2021    Procedure: CYSTOSCOPY, WITH URETERAL STENT INSERTION;  Surgeon: Wanda Arroyo MD;  Location: [facility];  Service: Urology;  Laterality: Right;    ESOPHAGOGASTRODUODENOSCOPY N/A 11/23/2021    Procedure: EGD (ESOPHAGOGASTRODUODENOSCOPY);  Surgeon: Obed Jeong MD;  Location: [facility] ([address] [place] [address]);  Service: Endoscopy;  Laterality: N/A;    LAPAROSCOPIC APPENDECTOMY N/A 7/22/2021    Procedure: APPENDECTOMY, LAPAROSCOPIC;  Surgeon: Paulo Calvo Jr., MD;  Location: [facility];  Service: General;  Laterality: N/A;    TUBAL LIGATION      URETEROSCOPIC REMOVAL OF URETERIC CALCULUS Right 12/22/2021     Procedure: REMOVAL, CALCULUS, URETER, URETEROSCOPIC;  Surgeon: Wanda Arroyo MD;  Location: [facility];  Service: Urology;  Laterality: Right;     Social History     Socioeconomic History    Marital status: Single   Tobacco Use    Smoking status: Never Smoker    Smokeless tobacco: Never Used   Substance and Sexual Activity    Alcohol use: No     Comment: socially    Drug use: No    Sexual activity: Not Currently     Social Determinants of Health     Financial Resource Strain: Low Risk     Difficulty of Paying Living Expenses: Not very hard   Food Insecurity: No Food Insecurity    Worried About Running Out of Food in the Last Year: Never true    Ran Out of Food in the Last Year: Never true   Transportation Needs: No Transportation Needs    Lack of Transportation (Medical): No    Lack of Transportation (Non-Medical): No   Physical Activity: Inactive    Days of Exercise per Week: 0 days    Minutes of Exercise per Session: 0 min   Stress: No Stress Concern Present    Feeling of Stress : Not at all   Social Connections: Moderately Isolated    Frequency of Communication with Friends and Family: More than three times a week    Frequency of Social Gatherings with Friends and Family: Once a week    Attends Hoahaoism Services: More than 4 times per year    Active Member of Clubs or Organizations: No    Attends Club or Organization Meetings: Never    Marital Status: Never    Housing Stability: Low Risk     Unable to Pay for Housing in the Last Year: No    Number of Places Lived in the Last Year: 1    Unstable Housing in the Last Year: No     Family History   Problem Relation Age of Onset    Arthritis Mother     Diabetes Mother     Heart disease Mother     Hypertension Mother     Kidney disease Mother     Stroke Mother     Vision loss Mother     Alcohol abuse Father     Diabetes Sister     Asthma Brother     Diabetes Brother     Early death Brother     Heart disease Brother          Allergies  Review of patient's allergies indicates:   Allergen Reactions    Tomato (solanum lycopersicum) Hives and Itching       Review of Systems   Review of Systems   Constitutional: Negative for malaise/fatigue, weight gain and weight loss.   Eyes: Negative for visual disturbance.   Cardiovascular: Negative for chest pain, claudication, cyanosis, dyspnea on exertion, irregular heartbeat, leg swelling, near-syncope, orthopnea, palpitations, paroxysmal nocturnal dyspnea and syncope.   Respiratory: Negative for cough, hemoptysis, shortness of breath, sleep disturbances due to breathing and wheezing.    Hematologic/Lymphatic: Negative for bleeding problem. Does not bruise/bleed easily.   Skin: Positive for poor wound healing.   Musculoskeletal: Negative for muscle cramps and myalgias.   Gastrointestinal: Negative for abdominal pain, anorexia, diarrhea, heartburn, hematemesis, hematochezia, melena, nausea and vomiting.   Genitourinary: Negative for hematuria and nocturia.   Neurological: Negative for excessive daytime sleepiness, dizziness and light-headedness.       Physical Exam    Temp:  [98.2 °F (36.8 °C)-98.6 °F (37 °C)]   Pulse:  [72-77]   Resp:  [16-18]   BP: (110-131)/(58-63)   SpO2:  [97 %-100 %]    Wt Readings from Last 1 Encounters:   12/30/21 56 kg (123 lb 7 oz)     Physical Exam  Vitals and nursing note reviewed.   Constitutional:       General: She is not in acute distress.     Appearance: She is not toxic-appearing or diaphoretic.   HENT:      Head: Normocephalic and atraumatic.      Mouth/Throat:      Lips: Pink.      Mouth: Mucous membranes are moist.   Eyes:      General: No scleral icterus.     Conjunctiva/sclera: Conjunctivae normal.   Neck:      Thyroid: No thyromegaly.      Vascular: No carotid bruit, hepatojugular reflux or JVD.      Trachea: Trachea normal.   Cardiovascular:      Rate and Rhythm: Normal rate and regular rhythm.  No extrasystoles are present.     Chest Wall: PMI is not  displaced.      Pulses:           Carotid pulses are 2+ on the right side and 2+ on the left side with bruit.       Radial pulses are 2+ on the right side.        Right dorsalis pedis pulse not accessible.        Posterior tibial pulses are 2+ on the right side.      Heart sounds: S1 normal and S2 normal. No murmur heard.  No friction rub. Gallop present. S4 sounds present. No S3 sounds.    Pulmonary:      Effort: Pulmonary effort is normal. No accessory muscle usage or respiratory distress.      Breath sounds: Normal breath sounds and air entry. No decreased breath sounds, wheezing, rhonchi or rales.   Abdominal:      General: Bowel sounds are normal. There is no distension or abdominal bruit.      Palpations: Abdomen is soft. There is no hepatomegaly, splenomegaly or pulsatile mass.      Tenderness: There is no abdominal tenderness.   Musculoskeletal:         General: No tenderness or deformity.      Right lower leg: No edema.      Left lower leg: No edema.   Skin:     General: Skin is warm and dry.      Coloration: Skin is not cyanotic or pale.      Nails: There is no clubbing.   Neurological:      Mental Status: She is alert and oriented to person, place, and time.   Psychiatric:         Behavior: Behavior is cooperative.           Labs  Recent Results (from the past 72 hour(s))   POCT glucose    Collection Time: 12/30/21  3:07 PM   Result Value Ref Range    POCT Glucose 114 (H) 70 - 110 mg/dL   Blood Culture #1 **CANNOT BE ORDERED STAT**    Collection Time: 12/30/21  3:23 PM    Specimen: Peripheral, Antecubital, Right; Blood   Result Value Ref Range    Blood Culture, Routine No Growth to date     Blood Culture, Routine No Growth to date     Blood Culture, Routine No Growth to date    CBC auto differential    Collection Time: 12/30/21  3:25 PM   Result Value Ref Range    WBC 12.00 3.90 - 12.70 K/uL    RBC 2.60 (L) 4.00 - 5.40 M/uL    Hemoglobin 7.5 (L) 12.0 - 16.0 g/dL    Hematocrit 24.3 (L) 37.0 - 48.5 %    MCV  94 82 - 98 fL    MCH 28.8 27.0 - 31.0 pg    MCHC 30.9 (L) 32.0 - 36.0 g/dL    RDW 14.3 11.5 - 14.5 %    Platelets 181 150 - 450 K/uL    MPV 12.0 9.2 - 12.9 fL    Immature Granulocytes 0.3 0.0 - 0.5 %    Gran # (ANC) 8.3 (H) 1.8 - 7.7 K/uL    Immature Grans (Abs) 0.04 0.00 - 0.04 K/uL    Lymph # 2.6 1.0 - 4.8 K/uL    Mono # 1.0 0.3 - 1.0 K/uL    Eos # 0.1 0.0 - 0.5 K/uL    Baso # 0.03 0.00 - 0.20 K/uL    nRBC 0 0 /100 WBC    Gran % 69.3 38.0 - 73.0 %    Lymph % 21.3 18.0 - 48.0 %    Mono % 8.0 4.0 - 15.0 %    Eosinophil % 0.8 0.0 - 8.0 %    Basophil % 0.3 0.0 - 1.9 %    Differential Method Automated    Comprehensive metabolic panel    Collection Time: 12/30/21  3:25 PM   Result Value Ref Range    Sodium 141 136 - 145 mmol/L    Potassium 4.9 3.5 - 5.1 mmol/L    Chloride 105 95 - 110 mmol/L    CO2 24 23 - 29 mmol/L    Glucose 102 70 - 110 mg/dL    BUN 22 8 - 23 mg/dL    Creatinine 1.8 (H) 0.5 - 1.4 mg/dL    Calcium 9.0 8.7 - 10.5 mg/dL    Total Protein 8.2 6.0 - 8.4 g/dL    Albumin 3.2 (L) 3.5 - 5.2 g/dL    Total Bilirubin 0.5 0.1 - 1.0 mg/dL    Alkaline Phosphatase 74 55 - 135 U/L    AST 11 10 - 40 U/L    ALT <5 (L) 10 - 44 U/L    Anion Gap 12 8 - 16 mmol/L    eGFR if African American 32 (A) >60 mL/min/1.73 m^2    eGFR if non African American 28 (A) >60 mL/min/1.73 m^2   Protime-INR    Collection Time: 12/30/21  3:25 PM   Result Value Ref Range    Prothrombin Time 11.1 9.0 - 12.5 sec    INR 1.0 0.8 - 1.2   APTT    Collection Time: 12/30/21  3:25 PM   Result Value Ref Range    aPTT 26.8 21.0 - 32.0 sec   Sedimentation rate    Collection Time: 12/30/21  3:25 PM   Result Value Ref Range    Sed Rate 120 (H) 0 - 20 mm/Hr   C-reactive protein    Collection Time: 12/30/21  3:25 PM   Result Value Ref Range    CRP 53.3 (H) 0.0 - 8.2 mg/L   POCT COVID-19 Rapid Screening    Collection Time: 12/30/21  3:32 PM   Result Value Ref Range    POC Rapid COVID Negative Negative     Acceptable Yes    Blood Culture #2  **CANNOT BE ORDERED STAT**    Collection Time: 12/30/21  3:36 PM    Specimen: Peripheral, Antecubital, Right; Blood   Result Value Ref Range    Blood Culture, Routine No Growth to date     Blood Culture, Routine No Growth to date     Blood Culture, Routine No Growth to date    CBC Auto Differential    Collection Time: 12/31/21  5:19 AM   Result Value Ref Range    WBC 10.95 3.90 - 12.70 K/uL    RBC 2.77 (L) 4.00 - 5.40 M/uL    Hemoglobin 7.9 (L) 12.0 - 16.0 g/dL    Hematocrit 25.9 (L) 37.0 - 48.5 %    MCV 94 82 - 98 fL    MCH 28.5 27.0 - 31.0 pg    MCHC 30.5 (L) 32.0 - 36.0 g/dL    RDW 14.1 11.5 - 14.5 %    Platelets 170 150 - 450 K/uL    MPV 12.2 9.2 - 12.9 fL    Immature Granulocytes 0.4 0.0 - 0.5 %    Gran # (ANC) 7.4 1.8 - 7.7 K/uL    Immature Grans (Abs) 0.04 0.00 - 0.04 K/uL    Lymph # 2.3 1.0 - 4.8 K/uL    Mono # 1.0 0.3 - 1.0 K/uL    Eos # 0.2 0.0 - 0.5 K/uL    Baso # 0.03 0.00 - 0.20 K/uL    nRBC 0 0 /100 WBC    Gran % 67.5 38.0 - 73.0 %    Lymph % 21.4 18.0 - 48.0 %    Mono % 8.8 4.0 - 15.0 %    Eosinophil % 1.6 0.0 - 8.0 %    Basophil % 0.3 0.0 - 1.9 %    Differential Method Automated    Comprehensive Metabolic Panel    Collection Time: 12/31/21  5:19 AM   Result Value Ref Range    Sodium 141 136 - 145 mmol/L    Potassium 3.8 3.5 - 5.1 mmol/L    Chloride 106 95 - 110 mmol/L    CO2 24 23 - 29 mmol/L    Glucose 115 (H) 70 - 110 mg/dL    BUN 19 8 - 23 mg/dL    Creatinine 1.7 (H) 0.5 - 1.4 mg/dL    Calcium 8.9 8.7 - 10.5 mg/dL    Total Protein 7.2 6.0 - 8.4 g/dL    Albumin 2.9 (L) 3.5 - 5.2 g/dL    Total Bilirubin 0.7 0.1 - 1.0 mg/dL    Alkaline Phosphatase 74 55 - 135 U/L    AST 6 (L) 10 - 40 U/L    ALT <5 (L) 10 - 44 U/L    Anion Gap 11 8 - 16 mmol/L    eGFR if African American 35 (A) >60 mL/min/1.73 m^2    eGFR if non African American 30 (A) >60 mL/min/1.73 m^2   Magnesium    Collection Time: 12/31/21  5:19 AM   Result Value Ref Range    Magnesium 1.8 1.6 - 2.6 mg/dL   POCT glucose    Collection Time:  12/31/21  7:37 AM   Result Value Ref Range    POCT Glucose 107 70 - 110 mg/dL   POCT glucose    Collection Time: 12/31/21 11:36 AM   Result Value Ref Range    POCT Glucose 118 (H) 70 - 110 mg/dL   POCT glucose    Collection Time: 12/31/21  4:53 PM   Result Value Ref Range    POCT Glucose 96 70 - 110 mg/dL   Vancomycin, random    Collection Time: 12/31/21  7:45 PM   Result Value Ref Range    Vancomycin, Random 5.3 Not established ug/mL   POCT glucose    Collection Time: 12/31/21  8:53 PM   Result Value Ref Range    POCT Glucose 84 70 - 110 mg/dL   Basic Metabolic Panel    Collection Time: 01/01/22  5:03 AM   Result Value Ref Range    Sodium 140 136 - 145 mmol/L    Potassium 3.6 3.5 - 5.1 mmol/L    Chloride 107 95 - 110 mmol/L    CO2 23 23 - 29 mmol/L    Glucose 101 70 - 110 mg/dL    BUN 18 8 - 23 mg/dL    Creatinine 1.8 (H) 0.5 - 1.4 mg/dL    Calcium 8.2 (L) 8.7 - 10.5 mg/dL    Anion Gap 10 8 - 16 mmol/L    eGFR if African American 32 (A) >60 mL/min/1.73 m^2    eGFR if non African American 28 (A) >60 mL/min/1.73 m^2   Uric acid    Collection Time: 01/01/22  5:03 AM   Result Value Ref Range    Uric Acid 7.8 (H) 2.4 - 5.7 mg/dL   POCT glucose    Collection Time: 01/01/22  7:13 AM   Result Value Ref Range    POCT Glucose 90 70 - 110 mg/dL   POCT glucose    Collection Time: 01/01/22 11:21 AM   Result Value Ref Range    POCT Glucose 95 70 - 110 mg/dL   Tissue culture    Collection Time: 01/01/22  1:45 PM    Specimen: Toe, Right Foot; Tissue   Result Value Ref Range    Gram Stain Result No WBC's     Gram Stain Result No organisms seen    POCT glucose    Collection Time: 01/01/22  2:06 PM   Result Value Ref Range    POCT Glucose 86 70 - 110 mg/dL   POCT glucose    Collection Time: 01/01/22  5:17 PM   Result Value Ref Range    POCT Glucose 93 70 - 110 mg/dL   POCT glucose    Collection Time: 01/01/22 10:40 PM   Result Value Ref Range    POCT Glucose 133 (H) 70 - 110 mg/dL   Vancomycin, random    Collection Time: 01/01/22  11:17 PM   Result Value Ref Range    Vancomycin, Random 18.7 Not established ug/mL   Basic Metabolic Panel    Collection Time: 01/02/22  5:10 AM   Result Value Ref Range    Sodium 139 136 - 145 mmol/L    Potassium 4.1 3.5 - 5.1 mmol/L    Chloride 105 95 - 110 mmol/L    CO2 21 (L) 23 - 29 mmol/L    Glucose 91 70 - 110 mg/dL    BUN 19 8 - 23 mg/dL    Creatinine 2.1 (H) 0.5 - 1.4 mg/dL    Calcium 8.5 (L) 8.7 - 10.5 mg/dL    Anion Gap 13 8 - 16 mmol/L    eGFR if African American 27 (A) >60 mL/min/1.73 m^2    eGFR if non African American 23 (A) >60 mL/min/1.73 m^2   Magnesium    Collection Time: 01/02/22  5:10 AM   Result Value Ref Range    Magnesium 1.7 1.6 - 2.6 mg/dL   Phosphorus    Collection Time: 01/02/22  5:10 AM   Result Value Ref Range    Phosphorus 3.2 2.7 - 4.5 mg/dL   CBC Auto Differential    Collection Time: 01/02/22  5:11 AM   Result Value Ref Range    WBC 13.58 (H) 3.90 - 12.70 K/uL    RBC 2.59 (L) 4.00 - 5.40 M/uL    Hemoglobin 7.5 (L) 12.0 - 16.0 g/dL    Hematocrit 24.5 (L) 37.0 - 48.5 %    MCV 95 82 - 98 fL    MCH 29.0 27.0 - 31.0 pg    MCHC 30.6 (L) 32.0 - 36.0 g/dL    RDW 14.1 11.5 - 14.5 %    Platelets 159 150 - 450 K/uL    MPV 12.1 9.2 - 12.9 fL    Immature Granulocytes 0.3 0.0 - 0.5 %    Gran # (ANC) 10.2 (H) 1.8 - 7.7 K/uL    Immature Grans (Abs) 0.04 0.00 - 0.04 K/uL    Lymph # 1.9 1.0 - 4.8 K/uL    Mono # 1.2 (H) 0.3 - 1.0 K/uL    Eos # 0.2 0.0 - 0.5 K/uL    Baso # 0.03 0.00 - 0.20 K/uL    nRBC 0 0 /100 WBC    Gran % 75.2 (H) 38.0 - 73.0 %    Lymph % 13.8 (L) 18.0 - 48.0 %    Mono % 8.9 4.0 - 15.0 %    Eosinophil % 1.6 0.0 - 8.0 %    Basophil % 0.2 0.0 - 1.9 %    Differential Method Automated    POCT glucose    Collection Time: 01/02/22  7:36 AM   Result Value Ref Range    POCT Glucose 103 70 - 110 mg/dL        Imaging  X-Ray Foot Complete Right    Result Date: 1/1/2022  EXAMINATION: XR FOOT COMPLETE 3 VIEW RIGHT CLINICAL HISTORY: s/p 2nd toe amputation;. TECHNIQUE: AP, lateral, and oblique  views of the right foot were performed. COMPARISON: 12/30/2021. FINDINGS: Postoperative changes of 2nd toe amputation beyond the metatarsophalangeal joint.  Expected recent operative changes in the adjacent soft tissues.  Examination is otherwise unchanged when compared with 12/30/2021.  No displaced fracture.  No dislocation.  Advanced vascular calcifications are noted.  No unexpected radiopaque foreign body.     Interval amputation of 2nd toe. Electronically signed by: Abram Chacon MD Date:    01/01/2022 Time:    14:35    X-Ray Foot Complete Right    Result Date: 12/30/2021  EXAMINATION: XR FOOT COMPLETE 3 VIEW RIGHT CLINICAL HISTORY: Non-pressure chronic ulcer of other part of right foot with unspecified severity TECHNIQUE: XR FOOT COMPLETE 3 VIEW RIGHT COMPARISON: None FINDINGS: There is hallux valgus deformity with well-marginated remottling of the medial cortex of the 1st metatarsal head which may reflect healed erosion related to sequela of gouty arthropathy or sequela of chronic altered weight-bearing.  First MTP joint spaces preserved.  The remainder of the visualized joint spaces are also preserved.  Minimal posterior calcaneal enthesopathic spurring is noted.  There is nonspecific forefoot soft tissue swelling and atherosclerotic vascular calcification.     No convincing radiographic evidence of osteomyelitis Hallux valgus and erosive deformity/remodeling of the medial 1st metatarsal head possibly secondary to sequela of gout or bunion formation Electronically signed by: Erlin Underwood Jr Date:    12/30/2021 Time:    16:18    US Lower Extremity Arteries Bilateral    Result Date: 12/31/2021  EXAMINATION: US LOWER EXTREMITY ARTERIES BILATERAL CLINICAL HISTORY: non healing right foot wound; TECHNIQUE: Real-time ultrasound as well as Doppler spectral waveform analysis and color flow imaging was performed of the large vessels of both lower extremities. COMPARISON: None. FINDINGS: Right lower extremity:  CFA: 80 cm/sec, biphasic Profundus: 28, biphasic Prox SFA: 73 cm/sec, biphasic Mid SFA: 94 cm/sec, biphasic Dist SFA: 140 cm/sec, triphasic Pop A: 107 cm/sec, triphasic Peroneal artery: 68, monophasic PTA: 14 cm/sec, monophasic DAVID: 44 cm/sec, monophasic DPA: 22 cm/sec, monophasic Left lower extremity: CFA: 83 cm/sec, biphasic Profundus: 52, biphasic Prox SFA: 82 cm/sec, biphasic Mid SFA: 88 cm/sec, biphasic Dist SFA: 85 cm/sec, biphasic Pop A: 89 cm/sec, biphasic; 68, monophasic distally Peroneal artery: 43, biphasic PTA: 13 cm/sec, monophasic DAVID: 40 cm/sec, biphasic DPA: 18 cm/sec, and monophasic     Slow velocities with abnormal waveforms in the arteries of the calf suggestive of vessel hardening such as from diffuse atherosclerosis. Electronically signed by: Emperatriz Cleaning Date:    12/31/2021 Time:    14:03    MRI Foot Toes Without Contrast Right    Result Date: 12/30/2021  EXAMINATION: MRI FOOT TOES WITHOUT CONTRAST RIGHT CLINICAL HISTORY: Osteomyelitis, foot; TECHNIQUE: Multiplanar, multisequence images were obtained of the right foot without the use of IV contrast. COMPARISON: Right foot radiographs from the same date. FINDINGS: Abnormal T1 hypointense marrow signal and osseous edema are seen involving the 2nd toe middle and distal phalanx.  Remaining visualized osseous structures show no fracture, abnormal marrow signal, or osseous edema.  Mild soft tissue edema is seen involving the forefoot and the 2nd toe.  No focal fluid collections or abscess seen, allowing for lack of intravenous contrast.     Acute osteomyelitis involving the 2nd toe middle and distal phalanx. Electronically signed by: Lizbeth Bustos MD Date:    12/30/2021 Time:    21:10    SURG [place] Surgery Fluoro Usage    Result Date: 12/22/2021  See OP Notes for results. IMPRESSION: See OP Notes for results. This procedure was auto-finalized by: Virtual Radiologist      Assessment    Peripheral arterial disease with gangrene  Now status post  amputation.  Doppler study shows diffuse atherosclerotic disease without localized areas of stenosis.    Left carotid bruit  MRA in 2017 showed no obstructive disease.    Diabetes mellitus, type 2 with chronic kidney disease, stage IV  Need to proceed cautiously with contrast administration.    Plan and Discussion    Doppler interrogation did not show localized areas of stenosis.  She has a palpable posterior tibialis pulse.  Limb salvage is important as this is her functioning leg necessary for bed-to-chair transfers.  Less, would be reasonable to pursue angiography.  I discussed with the patient.  She is not ready to consent.  So, will follow clinically for now.  If any signs of delayed wound healing, that may change her thoughts.  Could consider CTA of the leg.  Definitely needs to be prehydrated prior to any contrast administration.      Paul Piña MD

## 2022-01-02 NOTE — PROGRESS NOTES
[facility] ([place])  Podiatry  Progress Note    Patient Name: Beatriz Adame  MRN: [id number]  Admission Date: 12/30/2021  Hospital Length of Stay: 0 days  Attending Physician: Nasreen Mccloud MD  Primary Care Provider: Dante Olivier MD     Subjective:     Interval History: 1 day s/p R 2nd toe amputation. Vitals stable. WBC mildly elevated. Pain controlled. Sanguinous strikethrough of dressing. No new pedal complaints.     Follow-up For: Procedure(s) (LRB):  AMPUTATION, TOE (Right)    Post-Operative Day: 1 Day Post-Op    Scheduled Meds:   amLODIPine  10 mg Oral QHS    aspirin  81 mg Oral Daily    atorvastatin  80 mg Oral Daily    carvediloL  25 mg Oral BID    cefTRIAXone (ROCEPHIN) IVPB  1 g Intravenous Q24H    clopidogreL  75 mg Oral Daily    DULoxetine  30 mg Oral Daily    ferrous sulfate  1 tablet Oral Daily    gabapentin  300 mg Oral Daily    lactulose  10 g Oral BID    levothyroxine  75 mcg Oral Daily    metoclopramide HCl  5 mg Oral Q6H    pantoprazole  40 mg Oral Daily    senna-docusate 8.6-50 mg  1 tablet Oral BID    tamsulosin  0.4 mg Oral Daily    traZODone  100 mg Oral QHS     Continuous Infusions:  PRN Meds:acetaminophen, bisacodyL, dextrose 50%, dextrose 50%, glucagon (human recombinant), glucose, glucose, HYDROcodone-acetaminophen, HYDROcodone-acetaminophen, HYDROmorphone, insulin aspart U-100, melatonin, meperidine, ondansetron, ondansetron, ondansetron, oxyCODONE, polyethylene glycol, sodium chloride 0.9%, sodium chloride 0.9%, Pharmacy to dose Vancomycin consult **AND** vancomycin - pharmacy to dose    Review of Systems   Constitutional: Negative for activity change, appetite change, chills, diaphoresis, fatigue, fever and unexpected weight change.   HENT: Negative.    Eyes: Negative.    Respiratory: Negative for cough, chest tightness, shortness of breath and wheezing.    Cardiovascular: Negative for chest pain, palpitations and leg swelling.   Gastrointestinal: Negative  for abdominal distention, abdominal pain, constipation, diarrhea, nausea and vomiting.   Endocrine: Negative.    Genitourinary: Negative.    Musculoskeletal: Positive for gait problem.   Skin: Positive for color change and wound.   Allergic/Immunologic: Negative.    Neurological: Positive for weakness. Negative for dizziness and numbness.     Objective:     Vital Signs (Most Recent):  Temp: 98.6 °F (37 °C) (01/02/22 0743)  Pulse: 76 (01/02/22 0743)  Resp: 16 (01/02/22 0743)  BP: 126/60 (01/02/22 0743)  SpO2: 97 % (01/02/22 0743) Vital Signs (24h Range):  Temp:  [97 °F (36.1 °C)-98.6 °F (37 °C)] 98.6 °F (37 °C)  Pulse:  [70-80] 76  Resp:  [12-18] 16  SpO2:  [97 %-100 %] 97 %  BP: (110-131)/(56-63) 126/60     Weight: 56 kg (123 lb 7 oz)  Body mass index is 19.33 kg/m².    Foot Exam    General  General Appearance: appears stated age and healthy   Orientation: alert and oriented to person, place, and time   Affect: appropriate       Right Foot/Ankle     Inspection and Palpation  Tenderness: (Surgical site; mild)  Swelling: (Surgical site; mild)  Skin Exam: drainage (bloody), maceration, skin changes, abnormal color and ulcer; skin not intact, no cellulitis and no erythema     Neurovascular  Dorsalis pedis: absent  Posterior tibial: absent  Saphenous nerve sensation: diminished  Tibial nerve sensation: diminished  Superficial peroneal nerve sensation: diminished  Deep peroneal nerve sensation: diminished  Sural nerve sensation: diminished    Comments        Left Foot/Ankle      Inspection and Palpation  Ecchymosis: none  Tenderness: none   Swelling: none   Skin Exam: skin intact;     Neurovascular  Dorsalis pedis: absent  Posterior tibial: absent  Saphenous nerve sensation: diminished  Tibial nerve sensation: diminished  Superficial peroneal nerve sensation: diminished  Deep peroneal nerve sensation: diminished  Sural nerve sensation: diminished            Laboratory:  Blood Cultures: No results for input(s): LABBLOO in  the last 48 hours.  CBC:   Recent Labs   Lab 01/02/22  0511   WBC 13.58*   RBC 2.59*   HGB 7.5*   HCT 24.5*      MCV 95   MCH 29.0   MCHC 30.6*     CMP:   Recent Labs   Lab 12/31/21  0519 01/01/22  0503 01/02/22  0510   *   < > 91   CALCIUM 8.9   < > 8.5*   ALBUMIN 2.9*  --   --    PROT 7.2  --   --       < > 139   K 3.8   < > 4.1   CO2 24   < > 21*      < > 105   BUN 19   < > 19   CREATININE 1.7*   < > 2.1*   ALKPHOS 74  --   --    ALT <5*  --   --    AST 6*  --   --    BILITOT 0.7  --   --     < > = values in this interval not displayed.     CRP:   Recent Labs   Lab 12/30/21  1525   CRP 53.3*     ESR:   Recent Labs   Lab 12/30/21  1525   SEDRATE 120*     Microbiology Results (last 7 days)     Procedure Component Value Units Date/Time    Blood Culture #2 **CANNOT BE ORDERED STAT** [[id number]] Collected: 12/30/21 1536    Order Status: Completed Specimen: Blood from Peripheral, Antecubital, Right Updated: 01/01/22 2212     Blood Culture, Routine No Growth to date      No Growth to date      No Growth to date    Blood Culture #1 **CANNOT BE ORDERED STAT** [[id number]] Collected: 12/30/21 1523    Order Status: Completed Specimen: Blood from Peripheral, Antecubital, Right Updated: 01/01/22 2212     Blood Culture, Routine No Growth to date      No Growth to date      No Growth to date    Tissue culture [[id number]] Collected: 01/01/22 1345    Order Status: Completed Specimen: Tissue from Toe, Right Foot Updated: 01/01/22 2141     Gram Stain Result No WBC's      No organisms seen    Tissue culture [[id number]] Collected: 01/01/22 1345    Order Status: Sent Specimen: Tissue from Toe, Right Foot Updated: 01/01/22 1345        Specimen (24h ago, onward)             Start     Ordered    01/01/22 1337  Specimen to Pathology, Surgery Other  Once        Comments: Pre-op Diagnosis: Ulcer of right second toe [L97.724]  Diabetic foot ulcer with osteomyelitis [E11.621, E11.69, L97.509,  M86.9]    Procedure(s):  AMPUTATION, TOE     Number of specimens: 1    Name of specimens: 1) right foot second toe   References:    Click here for ordering Quick Tip   Question Answer Comment   Procedure Type: Other    Specimen Class: Routine/Screening    Release to patient Immediate        01/01/22 1345                Diagnostic Results  1/1 R Foot X Ray:   Interval amputation of 2nd toe.     Clinical Findings:  -s/p R 2nd toe amputation. Retention sutures intact. No active drainage; bloody drainage in dressing. Mild surgical site tenderness and swelling. Erythema improved. No purulent drainage, no malodor.         01/02 01/02 12/31 12/31          Assessment/Plan:     Diabetic foot ulcer with osteomyelitis  Plan/Recommendations:   - Staged toe amputation performed 1/1; patient will require OR washout with delayed primary closure of amputation site this week; timing to be determined.   - Antibiotic plan per primary team; OR cultures pending.   - Appreciate cardiology recs; possible angio 1/3.   - NWB RLE except heel WB for transfers; wound is open and will bleed if patient bears weight.    - Dressing changes by nursing.   - Podiatry will follow         Norman Higgins DPM PGY-2  Podiatric Medicine & Surgery  [facility]  Secure Chat Preferred  Mobile: [phone]  Pager: [phone]

## 2022-01-02 NOTE — ASSESSMENT & PLAN NOTE
Plan/Recommendations:   - Staged toe amputation performed 1/2; patient will require OR washout with delayed primary closure of amputation site this week; timing to be determined.   - Antibiotic plan per primary team; OR cultures pending.   - Appreciate cardiology recs; possible angio 1/3.   - NWB RLE except heel WB for transfers; wound is open and will bleed if patient bears weight.    - Dressing changes by nursing.   - Podiatry will follow

## 2022-01-02 NOTE — PROGRESS NOTES
Pharmacokinetic Assessment Follow Up: IV Vancomycin    Vancomycin serum concentration assessment(s):    The random level was drawn correctly and can be used to guide therapy at this time. The measurement is within the desired definitive target range of 10 to 20 mcg/mL.    Vancomycin Regimen Plan:    Re-dose when the random level is less than 20 mcg/mL, next level to be drawn at 0430 on 1/3/22    Drug levels (last 3 results):  Recent Labs   Lab Result Units 12/31/21  1945 01/01/22  2317   Vancomycin, Random ug/mL 5.3 18.7       Pharmacy will continue to follow and monitor vancomycin.    Please contact pharmacy at extension [phone] for questions regarding this assessment.    Thank you for the consult,   Lucero Lim       Patient brief summary:  Beatriz Adame is a 71 y.o. female initiated on antimicrobial therapy with IV Vancomycin for treatment of bone/joint infection    The patient's current regimen is pulse dosing    Drug Allergies:   Review of patient's allergies indicates:   Allergen Reactions    Tomato (solanum lycopersicum) Hives and Itching       Actual Body Weight:   56 kg    Renal Function:   Estimated Creatinine Clearance: 25.7 mL/min (A) (based on SCr of 1.8 mg/dL (H)).,     Dialysis Method (if applicable):  N/A    CBC (last 72 hours):  Recent Labs   Lab Result Units 12/30/21  1525 12/31/21  0519   WBC K/uL 12.00 10.95   Hemoglobin g/dL 7.5* 7.9*   Hematocrit % 24.3* 25.9*   Platelets K/uL 181 170   Gran % % 69.3 67.5   Lymph % % 21.3 21.4   Mono % % 8.0 8.8   Eosinophil % % 0.8 1.6   Basophil % % 0.3 0.3   Differential Method  Automated Automated       Metabolic Panel (last 72 hours):  Recent Labs   Lab Result Units 12/30/21  1525 12/31/21  0519 01/01/22  0503   Sodium mmol/L 141 141 140   Potassium mmol/L 4.9 3.8 3.6   Chloride mmol/L 105 106 107   CO2 mmol/L 24 24 23   Glucose mg/dL 102 115* 101   BUN mg/dL 22 19 18   Creatinine mg/dL 1.8* 1.7* 1.8*   Albumin g/dL 3.2* 2.9*  --    Total Bilirubin  mg/dL 0.5 0.7  --    Alkaline Phosphatase U/L 74 74  --    AST U/L 11 6*  --    ALT U/L <5* <5*  --    Magnesium mg/dL  --  1.8  --        Vancomycin Administrations:  vancomycin given in the last 96 hours                   vancomycin 1.5 g in dextrose 5 % 250 mL IVPB (ready to mix) (mg) 1,500 mg New Bag 01/01/22 0314    vancomycin in dextrose 5 % 1 gram/250 mL IVPB 1,000 mg (mg) 1,000 mg New Bag 12/30/21 5511                Microbiologic Results:  Microbiology Results (last 7 days)     Procedure Component Value Units Date/Time    Blood Culture #2 **CANNOT BE ORDERED STAT** [[id number]] Collected: 12/30/21 1536    Order Status: Completed Specimen: Blood from Peripheral, Antecubital, Right Updated: 01/01/22 2212     Blood Culture, Routine No Growth to date      No Growth to date      No Growth to date    Blood Culture #1 **CANNOT BE ORDERED STAT** [[id number]] Collected: 12/30/21 1523    Order Status: Completed Specimen: Blood from Peripheral, Antecubital, Right Updated: 01/01/22 2212     Blood Culture, Routine No Growth to date      No Growth to date      No Growth to date    Tissue culture [[id number]] Collected: 01/01/22 1345    Order Status: Completed Specimen: Tissue from Toe, Right Foot Updated: 01/01/22 2141     Gram Stain Result No WBC's      No organisms seen    Tissue culture [[id number]] Collected: 01/01/22 1345    Order Status: Sent Specimen: Tissue from Toe, Right Foot Updated: 01/01/22 1340

## 2022-01-03 NOTE — ASSESSMENT & PLAN NOTE
"Osteomyelitis of right 2nd toe  - Right foot x-ray notes no convincing radiographic evidence of osteomyelitis  - Workup with MRI right foot notes "acute osteomyelitis involving the 2nd toe middle and distal phalanx"  - Initiated on vancomycin and Zosyn, then changed to Rocephin and vancomycin  - Arterial studies with non palpable pulses results above, Cardiology consulted  - Patient decline further workup PAD with angiogram at this time  - s/p amputation right 2nd toe on 1/1/22  - Podiatry plan further debridement with closure sometime this week  - Consult PT and OT  "

## 2022-01-03 NOTE — PROGRESS NOTES
[facility] ([place])  Adult Nutrition  Progress Note    SUMMARY       Recommendations   1.Continue 2000kcal DM diet with Boost Glucose Control (chcolate) TID to promote PO intake.   2.Recommend Frederic BID to promote wound healing.   3.Encourage good PO intake.    -monitor PO intake/ tolerance/ ONS/ weight changes.     Goals: Pt to meet 50-75% EEN/EPN via PO intake + ONS by RD f/u.  Nutrition Goal Status: new  Communication of RD Recs:  (POC)    Assessment and Plan    Severe protein-calorie malnutrition  Nutrition Problem:  Severe Protein-Calorie Malnutrition  Malnutrition in the context of Chronic Illness/Injury    Related to (etiology):  Altered GI function- chronic diarrhea/ nausea     Signs and Symptoms (as evidenced by):  Energy Intake: <75% of estimated energy requirement for > 1 month  Body Fat Depletion: mild depletion of orbitals, triceps and thoracic and lumbar region   Muscle Mass Depletion: mild depletion of clavicle region, scapular region, interosseous muscle, lower extremities and severe depletion of temples   Weight Loss: -22.88% x 9 months      Interventions(treatment strategy):  Collaboration with other healthcare providers   DM  ONS    Nutrition Diagnosis Status:  New    Malnutrition Assessment    Malnutrition Type: chronic illness  Energy Intake: severe energy intake  Weight Loss (Malnutrition):  (-22.88% X  9 months)  Energy Intake (Malnutrition): less than or equal to 75% for greater than or equal to 1 month   Orbital Region (Subcutaneous Fat Loss): mild depletion  Upper Arm Region (Subcutaneous Fat Loss): mild depletion  Thoracic and Lumbar Region: well nourished   [place] Region (Muscle Loss): severe depletion  Clavicle Bone Region (Muscle Loss): mild depletion  Clavicle and Acromion Bone Region (Muscle Loss): mild depletion  Scapular Bone Region (Muscle Loss): mild depletion  Dorsal Hand (Muscle Loss): mild depletion  Patellar Region (Muscle Loss): mild depletion  Anterior Thigh Region  "(Muscle Loss): mild depletion  Posterior Calf Region (Muscle Loss): mild depletion   Subcutaneous Fat Loss (Final Summary): mild protein-calorie malnutrition  Muscle Loss Evaluation (Final Summary): mild protein-calorie malnutrition       Reason for Assessment    Reason For Assessment: identified at risk by screening criteria  Diagnosis:  (Diabetic ulcer of toe of right foot associated with type 1 diabetes mellitus, with bone involvement without evidence of necrosis)  Relevant Medical History: osteoarthritis, history of C diff, hypothyroidism, 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, DM type 2, HTN, HLD, CKD, recurrent UTIs, history of left ureteral stent placement in 10/2021 with recent cystoscopy and finding of 7 mm right mid to distal ureteral calculi that was fragmented and removed as well as a stent on string placed, and history of appendectomy   Interdisciplinary Rounds: attended  General Information Comments: 1/3- Pt seen due to MST of 3- unsure of wt loss but reports poor PO intake 2/2 decreased appetite. 2 day S/p  R 2nd toe amputation. Pt on 2000kcal DM diet with no ONS ordered. Reports of chronic diarrhea and nausea. Decreased in appetite is 2/2 the diarrhea and nausea. Reports UBW of 130-150lb. Reports of drinking Glucerna at home approx. 2 a day. NFPE performed- see malnutrition section for details.    Nutrition Discharge Planning: Pt to follow a DM/ cardiac diet with ONS as tolerated.    Nutrition Risk Screen    Nutrition Risk Screen: no indicators present    Nutrition/Diet History    Factors Affecting Nutritional Intake: diarrhea,decreased appetite    Anthropometrics    Temp: 98.1 °F (36.7 °C)  Height: 5' 7" (170.2 cm)  Height (inches): 67 in  Weight: 56 kg (123 lb 7 oz)  Weight (lb): 123.44 lb  Ideal Body Weight (IBW), Female: 135 lb  % Ideal Body Weight, Female (lb): 91.44 %  BMI (Calculated): 19.3  BMI Grade: 18.5-24.9 - normal  Usual Body Weight (UBW), k.6 kg (9 " months)  % Usual Body Weight: 77.28  % Weight Change From Usual Weight: -22.88 %     Lab/Procedures/Meds    Pertinent Labs Reviewed: reviewed  Pertinent Labs Comments: Na 135, creatinine 2.1, H/H 7.5/23.8  Pertinent Medications Reviewed: reviewed  Pertinent Medications Comments: amlodipine, atorvastatin, carvedilol, clopidogrel, ceftriaxone,  duloxetine, ferrous sulfate, lactulose, levothyroxine,  metoclopramide HCl, pantoprazole, senna-docusate, tamsulosin, trazodone    Estimated/Assessed Needs    Weight Used For Calorie Calculations: 56 kg (123 lb 7.3 oz)  Energy Calorie Requirements (kcal): 8508-2116 kcal day (30-40 kcal /kg)  Energy Need Method: Kcal/kg  Protein Requirements: 67-84 g/ day (1.2-1.5 g/kg)  Weight Used For Protein Calculations: 56 kg (123 lb 7.3 oz)  Estimated Fluid Requirement Method: RDA Method  RDA Method (mL): 1680  CHO Requirement: 210 g day    Nutrition Prescription Ordered    Current Diet Order: 2000 kcal DM diet    Evaluation of Received Nutrient/Fluid Intake    Energy Calories Required: not meeting needs  Protein Required: not meeting needs  Fluid Required: not meeting needs  % Intake of Estimated Energy Needs: 25 - 50 %  % Meal Intake: 25 - 50 %    Nutrition Risk    Level of Risk/Frequency of Follow-up:  (1-2 x weekly)     Monitor and Evaluation    Food and Nutrient Intake: food and beverage intake  Food and Nutrient Adminstration: diet order  Knowledge/Beliefs/Attitudes: food and nutrition knowledge/skill  Physical Activity and Function: nutrition-related ADLs and IADLs  Anthropometric Measurements: weight,weight change,body mass index  Biochemical Data, Medical Tests and Procedures: glucose/endocrine profile,electrolyte and renal panel,gastrointestinal profile,inflammatory profile,lipid profile  Nutrition-Focused Physical Findings: overall appearance     Nutrition Follow-Up    RD Follow-up?: Yes

## 2022-01-03 NOTE — ASSESSMENT & PLAN NOTE
- Serum creatinine at admission 1.8 which is better than recent baseline  - Creatinine with slight trend up, closer to her baseline  - Avoid nephrotoxins and hypotension as able, renally dose medications  - Continue to monitor

## 2022-01-03 NOTE — SUBJECTIVE & OBJECTIVE
Interval History:  No acute events, does not really complain of any foot pain to the right leg.    Review of Systems   Constitutional: Negative for chills and fever.   Respiratory: Negative for shortness of breath.    Cardiovascular: Negative for chest pain.     Objective:     Vital Signs (Most Recent):  Temp: 97.5 °F (36.4 °C) (01/03/22 0854)  Pulse: 85 (01/03/22 0854)  Resp: 16 (01/03/22 0854)  BP: (!) 146/69 (01/03/22 0854)  SpO2: 98 % (01/03/22 0854) Vital Signs (24h Range):  Temp:  [97.5 °F (36.4 °C)-99.1 °F (37.3 °C)] 97.5 °F (36.4 °C)  Pulse:  [72-85] 85  Resp:  [16-17] 16  SpO2:  [97 %-99 %] 98 %  BP: (119-146)/(58-69) 146/69     Weight: 56 kg (123 lb 7 oz)  Body mass index is 19.33 kg/m².    Intake/Output Summary (Last 24 hours) at 1/3/2022 1128  Last data filed at 1/3/2022 0614  Gross per 24 hour   Intake 280 ml   Output 800 ml   Net -520 ml      Physical Exam  Vitals reviewed.   Constitutional:       General: She is not in acute distress.     Appearance: She is well-developed.   HENT:      Head: Normocephalic and atraumatic.   Eyes:      Extraocular Movements: Extraocular movements intact.      Pupils: Pupils are equal, round, and reactive to light.   Cardiovascular:      Rate and Rhythm: Normal rate and regular rhythm.   Pulmonary:      Effort: Pulmonary effort is normal. No respiratory distress.      Breath sounds: Normal breath sounds.   Abdominal:      General: Bowel sounds are normal. There is no distension.      Palpations: Abdomen is soft.      Tenderness: There is no abdominal tenderness.   Musculoskeletal:         General: Swelling and deformity present.      Cervical back: Normal range of motion and neck supple.      Comments: Left arm contracture, left leg strength weaker than right   Skin:     General: Skin is warm.      Comments: Right foot s/p 2nd toe amputation covered in dressing   Neurological:      General: No focal deficit present.      Mental Status: She is alert and oriented to  person, place, and time.   Psychiatric:         Mood and Affect: Mood normal.         Behavior: Behavior normal.         Thought Content: Thought content normal.         Significant Labs: All pertinent labs within the past 24 hours have been reviewed.    Significant Imaging: I have reviewed all pertinent imaging results/findings within the past 24 hours.

## 2022-01-03 NOTE — PROGRESS NOTES
[facility] Medicine  Progress Note    Patient Name: Beatriz Adame  MRN: [id number]  Patient Class: IP- Inpatient   Admission Date: 12/30/2021  Length of Stay: 1 days  Attending Physician: Nasreen Mccloud MD  Primary Care Provider: Dante Olivier MD        Subjective:     Principal Problem:Diabetic ulcer of toe of right foot associated with type 1 diabetes mellitus, with bone involvement without evidence of necrosis        HPI:  Ms. 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    She presents to the ED with complaint of right 2nd toe wound infection.  She was evaluated by home health nurse today and felt wound worsened and needed to be evaluated.  Patient reports she developed the wound over the last month or so due to dragging her feet while in her wheelchair. She denies fever, chills, fatigue, muscle aches, cough, sore throat, nausea, vomiting, diarrhea, chest pain, shortness of breath, lightheadedness, dizziness, or syncope. She lives alone; uses wheelchair for mobility, is able to transfer from chair to bed/toilet/chair independently however her neighbors help her with ADLs and outings.     In the ED she is hemodynamically stable on room air and afebrile.  CBC with known anemia hemoglobin 7.5 and hematocrit 24.3 (baseline 8-9 and 26-29).  Chemistry with serum creatinine 1.8 (better than recent baseline), BUN 22.  LFTs WNLs.  .  CRP 53.3.  Blood cultures obtained.  COVID negative.  Right foot x-ray notes no convincing radiographic evidence of osteomyelitis.  MRI right foot notes acute osteomyelitis involving the  "2nd toe middle and distal phalanx.  She was initiated on vancomycin and Zosyn.  The patient was placed in observation to the Hospital Medicine Service for further evaluation and treatment.       Overview/Hospital Course:  Ms. Adame presented with right toe ulcer.  Empirically treated with Zosyn and vancomycin.  MRI showed "acute osteomyelitis involving the 2nd toe middle and distal phalanx." Arterial us showed slow velocities with abnormal waveforms in the arteries of the calf suggestive of vessel hardening such as from diffuse atherosclerosis.  Podiatry and Cardiology consulted.  She underwent right 2nd toe amputation on 01/01/2022.  Patient declined further workup with angiogram for PAD at this time.  Podiatry plans on stage debridement and closure.      Interval History:  No acute events, does not really complain of any foot pain to the right leg.    Review of Systems   Constitutional: Negative for chills and fever.   Respiratory: Negative for shortness of breath.    Cardiovascular: Negative for chest pain.     Objective:     Vital Signs (Most Recent):  Temp: 97.5 °F (36.4 °C) (01/03/22 0854)  Pulse: 85 (01/03/22 0854)  Resp: 16 (01/03/22 0854)  BP: (!) 146/69 (01/03/22 0854)  SpO2: 98 % (01/03/22 0854) Vital Signs (24h Range):  Temp:  [97.5 °F (36.4 °C)-99.1 °F (37.3 °C)] 97.5 °F (36.4 °C)  Pulse:  [72-85] 85  Resp:  [16-17] 16  SpO2:  [97 %-99 %] 98 %  BP: (119-146)/(58-69) 146/69     Weight: 56 kg (123 lb 7 oz)  Body mass index is 19.33 kg/m².    Intake/Output Summary (Last 24 hours) at 1/3/2022 1128  Last data filed at 1/3/2022 0614  Gross per 24 hour   Intake 280 ml   Output 800 ml   Net -520 ml      Physical Exam  Vitals reviewed.   Constitutional:       General: She is not in acute distress.     Appearance: She is well-developed.   HENT:      Head: Normocephalic and atraumatic.   Eyes:      Extraocular Movements: Extraocular movements intact.      Pupils: Pupils are equal, round, and reactive to light. " "  Cardiovascular:      Rate and Rhythm: Normal rate and regular rhythm.   Pulmonary:      Effort: Pulmonary effort is normal. No respiratory distress.      Breath sounds: Normal breath sounds.   Abdominal:      General: Bowel sounds are normal. There is no distension.      Palpations: Abdomen is soft.      Tenderness: There is no abdominal tenderness.   Musculoskeletal:         General: Swelling and deformity present.      Cervical back: Normal range of motion and neck supple.      Comments: Left arm contracture, left leg strength weaker than right   Skin:     General: Skin is warm.      Comments: Right foot s/p 2nd toe amputation covered in dressing   Neurological:      General: No focal deficit present.      Mental Status: She is alert and oriented to person, place, and time.   Psychiatric:         Mood and Affect: Mood normal.         Behavior: Behavior normal.         Thought Content: Thought content normal.         Significant Labs: All pertinent labs within the past 24 hours have been reviewed.    Significant Imaging: I have reviewed all pertinent imaging results/findings within the past 24 hours.      Assessment/Plan:      * Diabetic ulcer of toe of right foot associated with type 1 diabetes mellitus, with bone involvement without evidence of necrosis  Osteomyelitis of right 2nd toe  - Right foot x-ray notes no convincing radiographic evidence of osteomyelitis  - Workup with MRI right foot notes "acute osteomyelitis involving the 2nd toe middle and distal phalanx"  - Initiated on vancomycin and Zosyn, then changed to Rocephin and vancomycin  - Arterial studies with non palpable pulses results above, Cardiology consulted  - Patient decline further workup PAD with angiogram at this time  - s/p amputation right 2nd toe on 1/1/22  - Podiatry plan further debridement with closure sometime this week  - Consult PT and OT    Diabetic foot ulcer with osteomyelitis  - As above    Hypothyroidism  - Continue " levothyroxine     CKD (chronic kidney disease), stage IV  - Serum creatinine at admission 1.8 which is better than recent baseline  - Creatinine with slight trend up, closer to her baseline  - Avoid nephrotoxins and hypotension as able, renally dose medications  - Continue to monitor     Normocytic anemia  - On admission, hemoglobin 7.5 and hematocrit 24.3 (baseline 8-9 and 26-29)  - Recent iron studies showed iron deficiency with normal B12 and folate  - H&H has been stable, no indication for transfusion at this time, continue to monitor    History of stroke  - Residual left sided weakness at baseline  - Continue statin and ASA, plavix  - Patient states not on plavix, need to confirm med list from brother    Diabetes mellitus, type 2  Hemoglobin A1C   Date Value Ref Range Status   11/21/2021 5.4 4.0 - 5.6 % Final   - Hold home insulin, on 8 units daily  - Continue low-dose sliding scale insulin  - Glucose levels were reviewed and currently at targets, goal 140-180 during hospitalization  - Dose/medication adjustment as appropriate   - Monitor accuchecks AC/HS and PRN hypoglycemic protocol     Hyperlipidemia associated with type 2 diabetes mellitus  - Continue atorvastatin    Hypertension associated with stage 4 chronic kidney disease due to type 2 diabetes mellitus  - Continue home amlodipine and carvedilol  - Monitor, adjust medications as needed for adequate blood pressure control      VTE Risk Mitigation (From admission, onward)         Ordered     Place sequential compression device  Until discontinued         12/30/21 1828     Reason for No Pharmacological VTE Prophylaxis  Once        Question:  Reasons:  Answer:  Risk of Bleeding    12/30/21 1828                      Nasreen Mccloud MD  Department of Hospital Medicine   [facility] Surg ([place])

## 2022-01-03 NOTE — PROGRESS NOTES
"[facility] Surg ([place])  Podiatry  Progress Note    Patient Name: Beatriz Adame  MRN: [id number]  Admission Date: 12/30/2021  Hospital Length of Stay: 1 days  Attending Physician: Nasreen Mccloud MD  Primary Care Provider: Dante Olivier MD     S:   Post op right 2nd toe amputation DOS: 1/1/22.  Post op pain controlled.  No calf pain.  Dressings intact from yesterday.     O:  S/p right 2nd toe amputation, partially closed.   Open wound packed.  No active drainage.  Post op swelling as expected.   Pulses diminished.  But no ischemic changes evident at the surgical site at this time.      1/3/2022        MICROBIOLOGY:    Surgical culture final results PENDING      LABS:  CRP   Date Value Ref Range Status   12/30/2021 53.3 (H) 0.0 - 8.2 mg/L Final       Sed Rate   Date Value Ref Range Status   12/30/2021 120 (H) 0 - 20 mm/Hr Final       WBC   Date Value Ref Range Status   01/03/2022 9.27 3.90 - 12.70 K/uL Final       Hemoglobin A1C   Date Value Ref Range Status   11/21/2021 5.4 4.0 - 5.6 % Final     Comment:     ADA Screening Guidelines:  5.7-6.4%  Consistent with prediabetes  >or=6.5%  Consistent with diabetes    High levels of fetal hemoglobin interfere with the HbA1C  assay. Heterozygous hemoglobin variants (HbS, HgC, etc)do  not significantly interfere with this assay.   However, presence of multiple variants may affect accuracy.                   Assessment/Plan:     Diabetic foot ulcer with osteomyelitis, S/p right 2nd toe amputation.     Plan/Recommendations:   - Staged procedure toe amputation performed 1/1;   Further debridement and/or delayed closure TBD.     - Surgical culture results pending.  Continue Antibiotic plan per primary team    - Per cardiology note (Dr. Piña):  "..would be reasonable to pursue angiography.  I discussed with the patient.  She is not ready to consent.  So, will follow clinically for now.  If any signs of delayed wound healing, that may change her thoughts.  Could consider " "CTA of the leg.  Definitely needs to be prehydrated prior to any contrast administration"     - NWB RLE except heel WB for transfers.     - Continue dressing changes by nursing.     - Podiatry will follow               Sheila Duenas DPM  NPI: [id number]         [facility] LOCATION ([facility])  [facility] - MED SURG [facility]  [address] [place] [id number]  Dept: [phone]  Dept Fax: [phone]  Loc: [phone]     "

## 2022-01-03 NOTE — ASSESSMENT & PLAN NOTE
Nutrition Problem:  Severe Protein-Calorie Malnutrition  Malnutrition in the context of Chronic Illness/Injury    Related to (etiology):  Altered GI function- chronic diarrhea/ nausea     Signs and Symptoms (as evidenced by):  Energy Intake: <75% of estimated energy requirement for > 1 month  Body Fat Depletion: mild depletion of orbitals, triceps and thoracic and lumbar region   Muscle Mass Depletion: mild depletion of clavicle region, scapular region, interosseous muscle, lower extremities and severe depletion of temples   Weight Loss: -22.88% x 9 months      Interventions(treatment strategy):  Collaboration with other healthcare providers   DM  ONS    Nutrition Diagnosis Status:  New

## 2022-01-03 NOTE — ASSESSMENT & PLAN NOTE
- On admission, hemoglobin 7.5 and hematocrit 24.3 (baseline 8-9 and 26-29)  - Recent iron studies showed iron deficiency with normal B12 and folate  - H&H has been stable, no indication for transfusion at this time, continue to monitor

## 2022-01-03 NOTE — PLAN OF CARE
Recommendations   1.Continue 2000kcal DM diet with Boost Glucose Control (chcolate) TID to promote PO intake.   2.Recommend Frederic BID to promote wound healing.   3.Encourage good PO intake.    -monitor PO intake/ tolerance/ ONS/ weight changes.     Goals: Pt to meet 50-75% EEN/EPN via PO intake + ONS by RD f/u.  Nutrition Goal Status: new  Communication of RD Recs:  (POC)

## 2022-01-03 NOTE — PLAN OF CARE
POC reviewed with patient. AAOx4. VS stable on room air.  No  complaints verbalized during shift. Turn Q2/frequent weight shift encouraged by RN. Low air loss mattress in use. Instructed to call for help ambulating. No injuries, falls, or trauma occurred during shift. Purposeful rounding completed. Bed low and locked with side rails up x 3 and call light within reach.  No significant events during shift.

## 2022-01-03 NOTE — ASSESSMENT & PLAN NOTE
- Continue home amlodipine and carvedilol  - Monitor, adjust medications as needed for adequate blood pressure control

## 2022-01-03 NOTE — ASSESSMENT & PLAN NOTE
Hemoglobin A1C   Date Value Ref Range Status   11/21/2021 5.4 4.0 - 5.6 % Final   - Hold home insulin, on 8 units daily  - Continue low-dose sliding scale insulin  - Glucose levels were reviewed and currently at targets, goal 140-180 during hospitalization  - Dose/medication adjustment as appropriate   - Monitor accuchecks AC/HS and PRN hypoglycemic protocol

## 2022-01-03 NOTE — PROGRESS NOTES
Pharmacokinetic Assessment Follow Up: IV Vancomycin    Vancomycin serum concentration assessment(s):    The random level was drawn correctly and can be used to guide therapy at this time. The measurement is above the desired definitive target range of 15 to 20 mcg/mL.    Vancomycin Regimen Plan:    Re-dose when the random level is less than 20 mcg/mL, next level to be drawn at 0430 on 1/4/21    Drug levels (last 3 results):  Recent Labs   Lab Result Units 12/31/21  1945 01/01/22  2317 01/03/22  0502   Vancomycin, Random ug/mL 5.3 18.7 27.1       Pharmacy will continue to follow and monitor vancomycin.    Please contact pharmacy at extension [phone] for questions regarding this assessment.    Thank you for the consult,   Angélica Rankin       Patient brief summary:  Beatriz Adame is a 71 y.o. female initiated on antimicrobial therapy with IV Vancomycin for treatment of bone/joint infection    The patient's current regimen is random dosing by levels    Drug Allergies:   Review of patient's allergies indicates:   Allergen Reactions    Tomato (solanum lycopersicum) Hives and Itching       Actual Body Weight:   56 kg    Renal Function:   Estimated Creatinine Clearance: 22 mL/min (A) (based on SCr of 2.1 mg/dL (H)).,     Dialysis Method (if applicable):  N/A    CBC (last 72 hours):  Recent Labs   Lab Result Units 01/02/22  0511 01/03/22  0703   WBC K/uL 13.58* 9.27   Hemoglobin g/dL 7.5* 7.5*   Hematocrit % 24.5* 23.8*   Platelets K/uL 159 167   Gran % % 75.2* 60.2   Lymph % % 13.8* 26.1   Mono % % 8.9 10.8   Eosinophil % % 1.6 2.3   Basophil % % 0.2 0.3   Differential Method  Automated Automated       Metabolic Panel (last 72 hours):  Recent Labs   Lab Result Units 01/01/22  0503 01/02/22  0510 01/03/22  0703   Sodium mmol/L 140 139 135*   Potassium mmol/L 3.6 4.1 3.6   Chloride mmol/L 107 105 103   CO2 mmol/L 23 21* 23   Glucose mg/dL 101 91 104   BUN mg/dL 18 19 18   Creatinine mg/dL 1.8* 2.1* 2.1*   Magnesium  mg/dL  --  1.7 1.8   Phosphorus mg/dL  --  3.2 3.7       Vancomycin Administrations:  vancomycin given in the last 96 hours                     vancomycin 1.5 g in dextrose 5 % 250 mL IVPB (ready to mix) (mg) 1,500 mg New Bag 01/02/22 0500    vancomycin 1.5 g in dextrose 5 % 250 mL IVPB (ready to mix) (mg) 1,500 mg New Bag 01/01/22 0314    vancomycin in dextrose 5 % 1 gram/250 mL IVPB 1,000 mg (mg) 1,000 mg New Bag 12/30/21 1751                    Microbiologic Results:  Microbiology Results (last 7 days)       Procedure Component Value Units Date/Time    Blood Culture #2 **CANNOT BE ORDERED STAT** [[id number]] Collected: 12/30/21 1536    Order Status: Completed Specimen: Blood from Peripheral, Antecubital, Right Updated: 01/02/22 2212     Blood Culture, Routine No Growth to date      No Growth to date      No Growth to date      No Growth to date    Blood Culture #1 **CANNOT BE ORDERED STAT** [[id number]] Collected: 12/30/21 1523    Order Status: Completed Specimen: Blood from Peripheral, Antecubital, Right Updated: 01/02/22 2212     Blood Culture, Routine No Growth to date      No Growth to date      No Growth to date      No Growth to date    Tissue culture [[id number]] Collected: 01/01/22 1345    Order Status: Completed Specimen: Tissue from Toe, Right Foot Updated: 01/01/22 2141     Gram Stain Result No WBC's      No organisms seen    Tissue culture [[id number]] Collected: 01/01/22 1345    Order Status: Sent Specimen: Tissue from Toe, Right Foot Updated: 01/01/22 1345

## 2022-01-04 NOTE — TELEPHONE ENCOUNTER
Spoke to destinee advised per  they should not need her. Destinee states she understands she was calling because pt was assisting. Denice

## 2022-01-04 NOTE — PROGRESS NOTES
[facility] Med Surg ([place])  Wound Care    Patient Name:  Beatriz Adame   MRN:  [id number]  Date: 1/4/2022  Diagnosis: Diabetic ulcer of toe of right foot associated with type 1 diabetes mellitus, with bone involvement without evidence of necrosis    History:     Past Medical History:   Diagnosis Date    Anemia     Arthritis     Diabetes mellitus     Encounter for blood transfusion     Hypercholesteremia     Hypertension     Renal disorder     kidney stone    Stroke     left side paralysis       Social History     Socioeconomic History    Marital status: Single   Tobacco Use    Smoking status: Never Smoker    Smokeless tobacco: Never Used   Substance and Sexual Activity    Alcohol use: No     Comment: socially    Drug use: No    Sexual activity: Not Currently     Social Determinants of Health     Financial Resource Strain: Low Risk     Difficulty of Paying Living Expenses: Not hard at all   Food Insecurity: No Food Insecurity    Worried About Running Out of Food in the Last Year: Never true    Ran Out of Food in the Last Year: Never true   Transportation Needs: No Transportation Needs    Lack of Transportation (Medical): No    Lack of Transportation (Non-Medical): No   Physical Activity: Inactive    Days of Exercise per Week: 0 days    Minutes of Exercise per Session: 0 min   Stress: No Stress Concern Present    Feeling of Stress : Not at all   Social Connections: Moderately Isolated    Frequency of Communication with Friends and Family: More than three times a week    Frequency of Social Gatherings with Friends and Family: Once a week    Attends Mosque Services: More than 4 times per year    Active Member of Clubs or Organizations: No    Attends Club or Organization Meetings: Never    Marital Status: Never    Housing Stability: Low Risk     Unable to Pay for Housing in the Last Year: No    Number of Places Lived in the Last Year: 1    Unstable Housing in the Last Year: No       Precautions:     Allergies  as of 12/30/2021 - Reviewed 12/30/2021   Allergen Reaction Noted    Tomato (solanum lycopersicum) Hives and Itching 06/01/2014       [facility] Assessment Details/Treatment     1/4**HAPI prevention, Tyree <18, PIP orders placed, SPR overlay on mattress, implement with pump       01/03/22 2009   Tyree Risk Assessment   Sensory Perception 3-->slightly limited   Moisture 3-->occasionally moist   Activity 2-->chairfast   Mobility 2-->very limited   Nutrition 3-->adequate   Friction and Shear 1-->problem   Tyree Score 14       Recommendations made to primary team for HAPI prevention. Orders placed.     01/04/2022

## 2022-01-04 NOTE — SUBJECTIVE & OBJECTIVE
Interval History:  No acute events, does not really complain of any foot pain to the right leg, feels okay.    Review of Systems   Constitutional: Negative for chills and fever.   Respiratory: Negative for shortness of breath.    Cardiovascular: Negative for chest pain.     Objective:     Vital Signs (Most Recent):  Temp: 98.6 °F (37 °C) (01/04/22 1138)  Pulse: 73 (01/04/22 1138)  Resp: 18 (01/04/22 1138)  BP: 123/64 (01/04/22 1138)  SpO2: 100 % (01/04/22 1138) Vital Signs (24h Range):  Temp:  [97.9 °F (36.6 °C)-98.6 °F (37 °C)] 98.6 °F (37 °C)  Pulse:  [72-76] 73  Resp:  [16-18] 18  SpO2:  [94 %-100 %] 100 %  BP: (113-133)/(51-68) 123/64     Weight: 56 kg (123 lb 7 oz)  Body mass index is 19.33 kg/m².    Intake/Output Summary (Last 24 hours) at 1/4/2022 1411  Last data filed at 1/3/2022 1958  Gross per 24 hour   Intake 116.79 ml   Output --   Net 116.79 ml      Physical Exam  Vitals reviewed.   Constitutional:       General: She is not in acute distress.     Appearance: She is well-developed.   HENT:      Head: Normocephalic and atraumatic.   Eyes:      Extraocular Movements: Extraocular movements intact.      Pupils: Pupils are equal, round, and reactive to light.   Cardiovascular:      Rate and Rhythm: Normal rate and regular rhythm.   Pulmonary:      Effort: Pulmonary effort is normal. No respiratory distress.      Breath sounds: Normal breath sounds.   Abdominal:      General: Bowel sounds are normal. There is no distension.      Palpations: Abdomen is soft.      Tenderness: There is no abdominal tenderness.   Musculoskeletal:         General: Swelling and deformity present.      Cervical back: Normal range of motion and neck supple.      Comments: Left arm contracture, left leg strength weaker than right   Skin:     General: Skin is warm.      Comments: Right foot s/p 2nd toe amputation covered in dressing   Neurological:      General: No focal deficit present.      Mental Status: She is alert and oriented to  person, place, and time.   Psychiatric:         Mood and Affect: Mood normal.         Behavior: Behavior normal.         Thought Content: Thought content normal.         Significant Labs: All pertinent labs within the past 24 hours have been reviewed.    Significant Imaging: I have reviewed all pertinent imaging results/findings within the past 24 hours.

## 2022-01-04 NOTE — ASSESSMENT & PLAN NOTE
"Osteomyelitis of right 2nd toe  - Right foot x-ray notes no convincing radiographic evidence of osteomyelitis  - Workup with MRI right foot notes "acute osteomyelitis involving the 2nd toe middle and distal phalanx"  - Initiated on vancomycin and Zosyn, then changed to Rocephin and vancomycin  - Arterial studies with non palpable pulses results above, Cardiology consulted  - Patient decline further workup PAD with angiogram at this time  - s/p amputation right 2nd toe on 1/1/22  - Tissue culture from 1/1/22 still pending  - Will consult ID for antibiotic recommendation  - Podiatry plan further debridement with closure sometime this week  - Continue PT and OT  "

## 2022-01-04 NOTE — PROGRESS NOTES
[facility] Medicine  Progress Note    Patient Name: Beatriz Adame  MRN: [id number]  Patient Class: IP- Inpatient   Admission Date: 12/30/2021  Length of Stay: 2 days  Attending Physician: Nasreen Mccloud MD  Primary Care Provider: Dante Olivier MD        Subjective:     Principal Problem:Diabetic ulcer of toe of right foot associated with type 1 diabetes mellitus, with bone involvement without evidence of necrosis        HPI:  Ms. 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    She presents to the ED with complaint of right 2nd toe wound infection.  She was evaluated by home health nurse today and felt wound worsened and needed to be evaluated.  Patient reports she developed the wound over the last month or so due to dragging her feet while in her wheelchair. She denies fever, chills, fatigue, muscle aches, cough, sore throat, nausea, vomiting, diarrhea, chest pain, shortness of breath, lightheadedness, dizziness, or syncope. She lives alone; uses wheelchair for mobility, is able to transfer from chair to bed/toilet/chair independently however her neighbors help her with ADLs and outings.     In the ED she is hemodynamically stable on room air and afebrile.  CBC with known anemia hemoglobin 7.5 and hematocrit 24.3 (baseline 8-9 and 26-29).  Chemistry with serum creatinine 1.8 (better than recent baseline), BUN 22.  LFTs WNLs.  .  CRP 53.3.  Blood cultures obtained.  COVID negative.  Right foot x-ray notes no convincing radiographic evidence of osteomyelitis.  MRI right foot notes acute osteomyelitis involving the  "2nd toe middle and distal phalanx.  She was initiated on vancomycin and Zosyn.  The patient was placed in observation to the Hospital Medicine Service for further evaluation and treatment.       Overview/Hospital Course:  Ms. Adame presented with right toe ulcer.  Empirically treated with Zosyn and vancomycin.  MRI showed "acute osteomyelitis involving the 2nd toe middle and distal phalanx." Arterial us showed slow velocities with abnormal waveforms in the arteries of the calf suggestive of vessel hardening such as from diffuse atherosclerosis.  Podiatry and Cardiology consulted.  She underwent right 2nd toe amputation on 01/01/2022.  Patient declined further workup with angiogram for PAD at this time.  Podiatry plans on stage debridement and closure.      Interval History:  No acute events, does not really complain of any foot pain to the right leg, feels okay.    Review of Systems   Constitutional: Negative for chills and fever.   Respiratory: Negative for shortness of breath.    Cardiovascular: Negative for chest pain.     Objective:     Vital Signs (Most Recent):  Temp: 98.6 °F (37 °C) (01/04/22 1138)  Pulse: 73 (01/04/22 1138)  Resp: 18 (01/04/22 1138)  BP: 123/64 (01/04/22 1138)  SpO2: 100 % (01/04/22 1138) Vital Signs (24h Range):  Temp:  [97.9 °F (36.6 °C)-98.6 °F (37 °C)] 98.6 °F (37 °C)  Pulse:  [72-76] 73  Resp:  [16-18] 18  SpO2:  [94 %-100 %] 100 %  BP: (113-133)/(51-68) 123/64     Weight: 56 kg (123 lb 7 oz)  Body mass index is 19.33 kg/m².    Intake/Output Summary (Last 24 hours) at 1/4/2022 1411  Last data filed at 1/3/2022 1958  Gross per 24 hour   Intake 116.79 ml   Output --   Net 116.79 ml      Physical Exam  Vitals reviewed.   Constitutional:       General: She is not in acute distress.     Appearance: She is well-developed.   HENT:      Head: Normocephalic and atraumatic.   Eyes:      Extraocular Movements: Extraocular movements intact.      Pupils: Pupils are equal, round, and reactive to " "light.   Cardiovascular:      Rate and Rhythm: Normal rate and regular rhythm.   Pulmonary:      Effort: Pulmonary effort is normal. No respiratory distress.      Breath sounds: Normal breath sounds.   Abdominal:      General: Bowel sounds are normal. There is no distension.      Palpations: Abdomen is soft.      Tenderness: There is no abdominal tenderness.   Musculoskeletal:         General: Swelling and deformity present.      Cervical back: Normal range of motion and neck supple.      Comments: Left arm contracture, left leg strength weaker than right   Skin:     General: Skin is warm.      Comments: Right foot s/p 2nd toe amputation covered in dressing   Neurological:      General: No focal deficit present.      Mental Status: She is alert and oriented to person, place, and time.   Psychiatric:         Mood and Affect: Mood normal.         Behavior: Behavior normal.         Thought Content: Thought content normal.         Significant Labs: All pertinent labs within the past 24 hours have been reviewed.    Significant Imaging: I have reviewed all pertinent imaging results/findings within the past 24 hours.      Assessment/Plan:      * Diabetic ulcer of toe of right foot associated with type 1 diabetes mellitus, with bone involvement without evidence of necrosis  Osteomyelitis of right 2nd toe  - Right foot x-ray notes no convincing radiographic evidence of osteomyelitis  - Workup with MRI right foot notes "acute osteomyelitis involving the 2nd toe middle and distal phalanx"  - Initiated on vancomycin and Zosyn, then changed to Rocephin and vancomycin  - Arterial studies with non palpable pulses results above, Cardiology consulted  - Patient decline further workup PAD with angiogram at this time  - s/p amputation right 2nd toe on 1/1/22  - Tissue culture from 1/1/22 still pending  - Will consult ID for antibiotic recommendation  - Podiatry plan further debridement with closure sometime this week  - Continue PT " and OT    Diabetic foot ulcer with osteomyelitis  - As above    Hypothyroidism  - Continue levothyroxine     CKD (chronic kidney disease), stage IV  - Serum creatinine at admission 1.8 which is better than recent baseline  - Creatinine with slight trend up, closer to her baseline  - Avoid nephrotoxins and hypotension as able, renally dose medications  - Continue to monitor     Normocytic anemia  - On admission, hemoglobin 7.5 and hematocrit 24.3 (baseline 8-9 and 26-29)  - Recent iron studies showed iron deficiency with normal B12 and folate  - H&H has been stable, no indication for transfusion at this time, continue to monitor    History of stroke  - Residual left sided weakness at baseline  - Continue statin and ASA, plavix  - Patient states not on plavix, need to confirm med list from brother    Diabetes mellitus, type 2  Hemoglobin A1C   Date Value Ref Range Status   11/21/2021 5.4 4.0 - 5.6 % Final   - Hold home insulin, on 8 units daily  - Continue low-dose sliding scale insulin  - Glucose levels were reviewed and currently at targets, goal 140-180 during hospitalization  - Dose/medication adjustment as appropriate   - Monitor accuchecks AC/HS and PRN hypoglycemic protocol     Hyperlipidemia associated with type 2 diabetes mellitus  - Continue atorvastatin    Hypertension associated with stage 4 chronic kidney disease due to type 2 diabetes mellitus  - Continue home amlodipine and carvedilol  - Monitor, adjust medications as needed for adequate blood pressure control      VTE Risk Mitigation (From admission, onward)         Ordered     Place sequential compression device  Until discontinued         12/30/21 1828     Reason for No Pharmacological VTE Prophylaxis  Once        Question:  Reasons:  Answer:  Risk of Bleeding    12/30/21 1828                    Nasreen Mccloud MD  Department of Hospital Medicine   [facility])

## 2022-01-04 NOTE — PLAN OF CARE
POC reviewed. No significant events this shift. No complaints of pain. Pt has a purewick and shifts in bed with assist. Bed low and locked, side rails up x3, call light within reach.    Problem: Adult Inpatient Plan of Care  Goal: Plan of Care Review  Outcome: Ongoing, Progressing  Goal: Patient-Specific Goal (Individualized)  Outcome: Ongoing, Progressing  Goal: Absence of Hospital-Acquired Illness or Injury  Outcome: Ongoing, Progressing  Goal: Optimal Comfort and Wellbeing  Outcome: Ongoing, Progressing  Goal: Readiness for Transition of Care  Outcome: Ongoing, Progressing     Problem: Diabetes Comorbidity  Goal: Blood Glucose Level Within Targeted Range  Outcome: Ongoing, Progressing     Problem: Fluid and Electrolyte Imbalance (Acute Kidney Injury/Impairment)  Goal: Fluid and Electrolyte Balance  Outcome: Ongoing, Progressing     Problem: Oral Intake Inadequate (Acute Kidney Injury/Impairment)  Goal: Optimal Nutrition Intake  Outcome: Ongoing, Progressing     Problem: Renal Function Impairment (Acute Kidney Injury/Impairment)  Goal: Effective Renal Function  Outcome: Ongoing, Progressing     Problem: Skin Injury Risk Increased  Goal: Skin Health and Integrity  Outcome: Ongoing, Progressing     Problem: Fall Injury Risk  Goal: Absence of Fall and Fall-Related Injury  Outcome: Ongoing, Progressing

## 2022-01-04 NOTE — TELEPHONE ENCOUNTER
I do not know what they need me for. It looks like she is admitted with a foot infection. She is s/p uncomplicated stone treatment by me 12/22/21.

## 2022-01-04 NOTE — PLAN OF CARE
Pt will  have further debridement and wd closure later this week per Podiatry.    Pt is current with [facility] and would like to continue with same at time of discharge. CM to follow for plans and arrangemetns.   01/04/22 1046   Discharge Reassessment   Assessment Type  --    Communicated MARY with patient/caregiver Date not available/Unable to determine   Discharge Plan A Home Health   Discharge Plan B Home Health

## 2022-01-04 NOTE — PROGRESS NOTES
Pt lost IV access over night. Multiple RN's  unable to get new IV access. Dr. Mccloud notified and ordered midline placed. Destinee, PICC line RN attempted to contact Dr. Arroyo (Nephrology) at [facility] for permission but was unable to reach MD. After speaking with Pt, Pt refused to sign consent for midline. CHYNA Felipe place 22 in right hand. MD Rogers made aware.

## 2022-01-04 NOTE — PT/OT/SLP EVAL
Physical Therapy Evaluation    Patient Name:  Beatriz Adame   MRN:  [id number]    Recommendations:     Discharge Recommendations:  nursing facility, skilled   Discharge Equipment Recommendations: wheelchair,other (see comments) (WC with drop arm - needs WC fitting for appropriate WC at next level of care)   Barriers to discharge: Decreased caregiver support, WB status, endurance/strength     Assessment:     Beatriz Adame is a 71 y.o. 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 Pt with CKD4, TIA, DM2, ARTURO, hx of stroke affecting L hemibody, chest pain.  She presents with the following impairments/functional limitations:  weakness,impaired endurance,impaired functional mobilty,gait instability,impaired balance,decreased lower extremity function,pain,impaired skin,orthopedic precautions.    PT orders received. PT evaluation completed and goals/POC established. Pt tolerated evaluation well with no adverse reactions. Pt with decreased alertness and arousal initially and with movement and environmental adjustments such as lighting this improves. Pt is oriented x4 and does have observed hemiparesis on L side with flexion contractures at the LUE. Pt is unaware of NWB precautions of the RLE. Pt is Diego at baseline with ambulation to WC and WC mobility throughout apartment. Pt received IADL assist from brother and neighbor assists in transfers to shower bench when noting sponge bathing in WC. Pt is modA supine to/from sitting, totalAx1 to stand on LLE with knee blocking and RLE propped on foam wedge. PCT in room to change sheets and cleanse with standing. PT will continue to follow and progress goals as tolerated. Recommend discharge to SNF.     Rehab Prognosis: Good; patient would benefit from acute skilled PT services to address these deficits and reach maximum level of function.    Recent  Surgery: Procedure(s) (LRB):  AMPUTATION, TOE (Right) 3 Days Post-Op    Plan:     During this hospitalization, patient to be seen 4 x/week to address the identified rehab impairments via therapeutic activities,gait training,therapeutic exercises,neuromuscular re-education and progress toward the following goals:    · Plan of Care Expires:  02/03/22    Subjective     Chief Complaint: fatigue, pain  Patient/Family Comments/goals: get better   Pain/Comfort:  · Pain Rating 1: other (see comments) (mild pain with standing at LLE; no rating given)    Patients cultural, spiritual, [employer]ian conflicts given the current situation: no    Living Environment:  Home: apt, 1st floor, no steps, lives alone.   Prior to admission, patients level of function was Diego with WC and SPC; brother does shopping, cooking, cleaning; neighbor helps with transfers to bath bench sometimes.  Equipment used at home: bedside commode,bath bench,cane, straight,wheelchair.  DME owned (not currently used): none.  Upon discharge, patient will have assistance from family, neighbor.    Objective:     Communicated with RN prior to session.  Patient found HOB elevated with PureWick,peripheral IV,bed alarm  upon PT entry to room.    General Precautions: Standard, diabetic,fall   Orthopedic Precautions:RLE non weight bearing (heel WB for transfers)   Braces: N/A (may benefit from sx shoe)  Respiratory Status: Room air    Exams:  · Cognitive Exam:  Patient is oriented to Person, Place, Time and Situation  · Postural Exam:  Patient presented with the following abnormalities:    · -       Rounded shoulders  · -       Forward head  · Sensation:    · -       Intact  mild changes with sx site   · RLE ROM: decreased AROM  · RLE Strength: Deficits: generally 4/5 (unable to check ankle)   · LLE ROM: decreased AROM  · LLE Strength: Deficits: generally 4-/5    Functional Mobility:  · Bed Mobility:     · Rolling Left:  minimum assistance  · Rolling Right: minimum  assistance  · Scooting: moderate assistance and of 2 persons  · Supine to Sit: moderate assistance and of 1 persons  · Sit to Supine: moderate assistance and of 1 persons  · Transfers:     · Sit to Stand:  total assistance and of 1 persons with hand-held assist and bear hug with RLE on foam wedge, LLE WB (hemiparetic side from past stroke)  · Gait: unable  · Balance: sitting CGA, standing totalAx1    Therapeutic Activities and Exercises:   bed mobility    transfers   balance   exercise - ankle, SLR x10 B     AM-PAC 6 CLICK MOBILITY  Total Score:12     Patient left HOB elevated with all lines intact, call button in reach, bed alarm off and PCT, OT present.    GOALS:   Multidisciplinary Problems     Physical Therapy Goals        Problem: Physical Therapy Goal    Goal Priority Disciplines Outcome Goal Variances Interventions   Physical Therapy Goal     PT, PT/OT Ongoing, Progressing     Description: Goals to be met by: 2/3/22    Patient will perform the following to increase strength, improve mobility, and return to prior level of function:    1. Rolling L/R with SPV.   2. Supine <> sit with SPV.  3. Sit<>stand with Terri with least restrictive assistive device.  4. Gait x 10 feet with Terri with least restrictive assistive device.                        History:     Past Medical History:   Diagnosis Date    Anemia     Arthritis     Diabetes mellitus     Encounter for blood transfusion     Hypercholesteremia     Hypertension     Renal disorder     kidney stone    Stroke     left side paralysis       Past Surgical History:   Procedure Laterality Date    APPENDECTOMY      CYSTOSCOPY W/ URETERAL STENT PLACEMENT Right 10/13/2021    Procedure: CYSTOSCOPY, WITH URETERAL STENT INSERTION;  Surgeon: Wanda Arroyo MD;  [facility];  Service: Urology;  Laterality: Right;    ESOPHAGOGASTRODUODENOSCOPY N/A 11/23/2021    Procedure: EGD (ESOPHAGOGASTRODUODENOSCOPY);  Surgeon: Obed Jeong MD;  Location:  [facility] (2ND FLR);  Service: Endoscopy;  Laterality: N/A;    LAPAROSCOPIC APPENDECTOMY N/A 7/22/2021    Procedure: APPENDECTOMY, LAPAROSCOPIC;  Surgeon: Paulo Calvo Jr., MD;  Location: [facility];  Service: General;  Laterality: N/A;    TOE AMPUTATION Right 1/1/2022    Procedure: AMPUTATION, TOE;  Surgeon: eGnaro Mason DPM;  Location: [facility];  Service: Podiatry;  Laterality: Right;    TUBAL LIGATION      URETEROSCOPIC REMOVAL OF URETERIC CALCULUS Right 12/22/2021    Procedure: REMOVAL, CALCULUS, URETER, URETEROSCOPIC;  Surgeon: Wanda Arroyo MD;  Location: [facility];  Service: Urology;  Laterality: Right;       Time Tracking:     PT Received On: 01/04/22  PT Start Time: 0900     PT Stop Time: 0930  PT Total Time (min): 30 min     Billable Minutes: Evaluation 20 and Therapeutic Activity 10      01/04/2022

## 2022-01-04 NOTE — TELEPHONE ENCOUNTER
----- Message from Denice Corona MA sent at 1/4/2022 10:04 AM CST -----  Regarding: FW: Call Back Request    ----- Message -----  From: Fam Jackson  Sent: 1/4/2022   9:35 AM CST  To: [facility] Staff  Subject: Call Back Request                                Name of Who is Calling: Destinee hawk [facility] on behalf of MABEL WHITE [[id number]]           What is the request in detail: Nurse is requesting a call back from nurse. They are looking to put a midline in and they need to let Dr. Arroyo know.  Please assist.           Can the clinic reply by [facility]: No           [facility]: [phone]

## 2022-01-04 NOTE — PLAN OF CARE
Problem: Occupational Therapy Goal  Goal: Occupational Therapy Goal  Description: Goals to be met by: 01/18/2022     Patient will increase functional independence with ADLs by performing:    UE Dressing with Stand-by Assistance.  LE Dressing with Moderate Assistance.  Grooming while EOB with Stand-by Assistance.  Toileting from bedside commode with Moderate Assistance for hygiene and clothing management.   Supine to sit with Minimal Assistance.  Increased functional strength to WFL for bed mobility, functional transfers and ADL tasks.    Outcome: Ongoing, Progressing  Initial OT evaluation completed, with treatment to follow.  Recommend continued therapy s/p acute care stay d/t increased difficulty to manage tasks with (R) LE weight bearing status and relying on (L) HP LE.  Continue OT services to maximize patient functioning.

## 2022-01-04 NOTE — NURSING
Oriented x 4. Plan of care reviewed. Vitals stable. Pain denied. Low air loss mattress added. Pure wick in place. Bed locked/lowest position, side rails up x 2, nonskid socks when out of bed, call light within reach. Patient encouraged to call for assistance when needed.

## 2022-01-04 NOTE — PT/OT/SLP EVAL
Occupational Therapy   Evaluation    Name: Beatriz Adame  MRN: [id number]  Admitting Diagnosis:  Diabetic ulcer of toe of right foot associated with type 1 diabetes mellitus, with bone involvement without evidence of necrosis  Recent Surgery: Procedure(s) (LRB):  AMPUTATION, TOE (Right) 3 Days Post-Op    Recommendations:     Discharge Recommendations: nursing facility, skilled  Discharge Equipment Recommendations:  other (see comments) (w/c does not have drop arm arm rests, which patient will need)  Barriers to discharge:  Decreased caregiver support (requires 24/7 assist at this time)    Assessment:     Beatriz Adame is a 71 y.o. 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  She presents with the following performance deficits affecting function: weakness,impaired functional mobilty,decreased safety awareness,impaired coordination,impaired endurance,gait instability,pain,impaired balance,decreased upper extremity function,abnormal tone,impaired self care skills,decreased lower extremity function,decreased ROM,orthopedic precautions.      Initial OT evaluation completed, with treatment to follow.  Recommend continued therapy s/p acute care stay d/t increased difficulty to manage tasks with (R) LE weight bearing status and relying on (L) HP LE.  Continue OT services to maximize patient functioning.     Rehab Prognosis: Fair; patient would benefit from acute skilled OT services to address these deficits and reach maximum level of function.       Plan:     Patient to be seen 5 x/week to address the above listed problems via self-care/home management,therapeutic activities,therapeutic exercises  · Plan of Care Expires: 02/15/22  · Plan of Care Reviewed with: patient    Subjective     Chief Complaint: Pain in (L) heel secondary to heel spur per patient  Patient/Family Comments/goals: States only  required assist for baths and meals PTA    Occupational Profile:  Living Environment: Patient resides alone in 2nd floor apartment with elevator access.  There is a tub/shower combination and BSC frame over toilet.  Previous level of function: Assist for bathing and meals, otherwise states Modified independent from w/c  Roles and Routines: Sister, friend  Equipment Used at Home:  wheelchair,bedside commode,bath bench,cane, straight  Assistance upon Discharge: Neighbor and brother, but not 24/7    Pain/Comfort:  · Pain Rating 1: 5/10  · Location - Side 1: Left  · Location - Orientation 1: generalized  · Location 1: heel (reports h/o heel spur)  · Pain Addressed 1: Reposition,Distraction,Cessation of Activity  · Pain Rating Post-Intervention 1: 5/10    Patients cultural, spiritual, Jewish conflicts given the current situation: no    Objective:     Communicated with: CHYNA Matute prior to session.  Patient found HOB elevated with peripheral IV,bed alarm upon OT entry to room.    General Precautions: Standard, diabetic,fall,other (see comments) (elevate HOB)   Orthopedic Precautions:RLE non weight bearing (may bear weight through heel for transfers)   Braces: N/A (may benefit from surgical shoe)  Respiratory Status: Room air    Occupational Performance:    Bed Mobility:    · Patient completed Scooting/Bridging with maximal assistance  · Patient completed Supine to Sit with maximal assistance  · Patient completed Sit to Supine with maximal assistance    Functional Mobility/Transfers:  · Attempted stand, but unable to elevate buttocks off of bed  · Functional Mobility: NT    Activities of Daily Living:  · Grooming: stand by assistance for washing face seated (no other tasks tested)  · Upper Body Dressing: moderate assistance for donning gown EOB  · Lower Body Dressing: total assistance (L) non skid sock EOB    Cognitive/Visual Perceptual:  Cognitive/Psychosocial Skills:     -       Oriented to: Person, Place and Situation    -       Follows Commands/attention:Follows two-step commands  -       Communication: clear/fluent  -       Memory: No Deficits noted  -       Safety awareness/insight to disability: impaired   -       Mood/Affect/Coping skills/emotional control: Appropriate to situation, Cooperative and Lethargic  Visual/Perceptual:      -No overt deficits noted    Physical Exam:  Postural examination/scapula alignment:    -       Rounded shoulders  Skin integrity: Thin and Dry  Edema:  None noted  Sensation:    -       Intact  light/touch (B) UEs  Upper Extremity Range of Motion:     -       Right Upper Extremity: WFL  -       Left Upper Extremity: Deficits: at all joints secondary to HP  Upper Extremity Strength:    -       Right Upper Extremity: grossly 3+/5  -       Left Upper Extremity: 0/5   Strength:    -       Left Upper Extremity: unable  Fine Motor Coordination:    -       Impaired  (L) UE  Gross motor coordination:   Hemiplegia/paresis (L)    AMPAC 6 Click ADL:  AMPAC Total Score: 14    Treatment & Education:  Educated on role of OT, POC, need to work with therapy services to maximize functioning.  Patient verbalized understanding, but will reinforce.  Education:    Patient left HOB elevated with all lines intact, call button in reach, bed alarm on and RN notified    GOALS:   Multidisciplinary Problems     Occupational Therapy Goals        Problem: Occupational Therapy Goal    Goal Priority Disciplines Outcome Interventions   Occupational Therapy Goal     OT, PT/OT Ongoing, Progressing    Description: Goals to be met by: 01/18/2022     Patient will increase functional independence with ADLs by performing:    UE Dressing with Stand-by Assistance.  LE Dressing with Moderate Assistance.  Grooming while EOB with Stand-by Assistance.  Toileting from bedside commode with Moderate Assistance for hygiene and clothing management.   Supine to sit with Minimal Assistance.  Increased functional strength to WFL for bed mobility,  functional transfers and ADL tasks.                     History:     Past Medical History:   Diagnosis Date    Anemia     Arthritis     Diabetes mellitus     Encounter for blood transfusion     Hypercholesteremia     Hypertension     Renal disorder     kidney stone    Stroke     left side paralysis       Past Surgical History:   Procedure Laterality Date    APPENDECTOMY      CYSTOSCOPY W/ URETERAL STENT PLACEMENT Right 10/13/2021    Procedure: CYSTOSCOPY, WITH URETERAL STENT INSERTION;  Surgeon: Wanda Arroyo MD;  Location: [facility];  Service: Urology;  Laterality: Right;    ESOPHAGOGASTRODUODENOSCOPY N/A 11/23/2021    Procedure: EGD (ESOPHAGOGASTRODUODENOSCOPY);  Surgeon: Obed Jeong MD;  Location: [address]);  Service: Endoscopy;  Laterality: N/A;    LAPAROSCOPIC APPENDECTOMY N/A 7/22/2021    Procedure: APPENDECTOMY, LAPAROSCOPIC;  Surgeon: Paulo Calvo Jr., MD;  Location: [facility];  Service: General;  Laterality: N/A;    TOE AMPUTATION Right 1/1/2022    Procedure: AMPUTATION, TOE;  Surgeon: Genaro Mason DPM;  Location: [facility];  Service: Podiatry;  Laterality: Right;    TUBAL LIGATION      URETEROSCOPIC REMOVAL OF URETERIC CALCULUS Right 12/22/2021    Procedure: REMOVAL, CALCULUS, URETER, URETEROSCOPIC;  Surgeon: Wanda Arroyo MD;  Location: [facility];  Service: Urology;  Laterality: Right;       Time Tracking:     OT Date of Treatment: 01/04/22  OT Start Time: 1033  OT Stop Time: 1050  OT Total Time (min): 17 min    Billable Minutes:Evaluation 17    1/4/2022

## 2022-01-04 NOTE — PROGRESS NOTES
IP Liaison - Initial Visit Note    Patient: Beatriz Adame  MRN:  [id number]  Date of Service:  1/4/2022  Completed by:  PATSY Irving    Reason for Visit   Patient presents with    IP Liaison Initial Visit       RSW called patient over the phone in order to complete SDOH questionnaire and liaison assessment.  Pt has identified no barriers to care.    The following were addressed during this visit:  - Review SDOH Questions   - Complete patient assessment   - Complete initial visit with patient        Patient Summary     IP Liaison Patient Assessment    General  Level of Caregiver support: Member independent and does not need caregiver assistance  Have you had to make a decision between paying for any of the following in the last 2 months?: None  Transportation means: Insurance-provided transportation  Employment status: Retired and not working  Current symptoms: None  Assessments  Was the PHQ Depression Screening completed this visit?: Yes  Was the BABITA-7 Screening completed this visit?: No       PATSY Irving

## 2022-01-04 NOTE — PROGRESS NOTES
Pharmacokinetic Assessment Follow Up: IV Vancomycin    Vancomycin serum concentration assessment(s):    The random level was drawn correctly and can be used to guide therapy at this time. The measurement is above the desired definitive target range of 10 to 20 mcg/mL.    Vancomycin Regimen Plan:    Re-dose when the random level is less than 20 mcg/mL, next level to be drawn at 0430 on 1/5/22    Drug levels (last 3 results):  Recent Labs   Lab Result Units 01/01/22  2317 01/03/22  0502 01/04/22  0512   Vancomycin, Random ug/mL 18.7 27.1 23.4       Pharmacy will continue to follow and monitor vancomycin.    Please contact pharmacy at extension [phone] for questions regarding this assessment.    Thank you for the consult,   Lucero Lmi       Patient brief summary:  Beatriz Adame is a 71 y.o. female initiated on antimicrobial therapy with IV Vancomycin for treatment of bone/joint infection    The patient's current regimen is pulse dosing    Drug Allergies:   Review of patient's allergies indicates:   Allergen Reactions    Tomato (solanum lycopersicum) Hives and Itching       Actual Body Weight:   56 kg    Renal Function:   Estimated Creatinine Clearance: 21 mL/min (A) (based on SCr of 2.2 mg/dL (H)).,     Dialysis Method (if applicable):  N/A    CBC (last 72 hours):  Recent Labs   Lab Result Units 01/02/22  0511 01/03/22  0703   WBC K/uL 13.58* 9.27   Hemoglobin g/dL 7.5* 7.5*   Hematocrit % 24.5* 23.8*   Platelets K/uL 159 167   Gran % % 75.2* 60.2   Lymph % % 13.8* 26.1   Mono % % 8.9 10.8   Eosinophil % % 1.6 2.3   Basophil % % 0.2 0.3   Differential Method  Automated Automated       Metabolic Panel (last 72 hours):  Recent Labs   Lab Result Units 01/02/22  0510 01/03/22  0703 01/04/22  0511   Sodium mmol/L 139 135* 138   Potassium mmol/L 4.1 3.6 3.9   Chloride mmol/L 105 103 105   CO2 mmol/L 21* 23 25   Glucose mg/dL 91 104 98   BUN mg/dL 19 18 18   Creatinine mg/dL 2.1* 2.1* 2.2*   Albumin g/dL  --   --   2.5*   Total Bilirubin mg/dL  --   --  0.3   Alkaline Phosphatase U/L  --   --  67   AST U/L  --   --  9*   ALT U/L  --   --  <5*   Magnesium mg/dL 1.7 1.8 1.7   Phosphorus mg/dL 3.2 3.7 3.5       Vancomycin Administrations:  vancomycin given in the last 96 hours                   vancomycin 1.5 g in dextrose 5 % 250 mL IVPB (ready to mix) (mg) 1,500 mg New Bag 01/02/22 0500    vancomycin 1.5 g in dextrose 5 % 250 mL IVPB (ready to mix) (mg) 1,500 mg New Bag 01/01/22 0314                Microbiologic Results:  Microbiology Results (last 7 days)     Procedure Component Value Units Date/Time    Blood Culture #2 **CANNOT BE ORDERED STAT** [[id number]] Collected: 12/30/21 1536    Order Status: Completed Specimen: Blood from Peripheral, Antecubital, Right Updated: 01/03/22 2212     Blood Culture, Routine No Growth to date      No Growth to date      No Growth to date      No Growth to date      No Growth to date    Blood Culture #1 **CANNOT BE ORDERED STAT** [[id number]] Collected: 12/30/21 1523    Order Status: Completed Specimen: Blood from Peripheral, Antecubital, Right Updated: 01/03/22 2212     Blood Culture, Routine No Growth to date      No Growth to date      No Growth to date      No Growth to date      No Growth to date    Tissue culture [[id number]]  (Abnormal) Collected: 01/01/22 1345    Order Status: Completed Specimen: Tissue from Toe, Right Foot Updated: 01/03/22 0852     Aerobic Culture - Tissue STAPHYLOCOCCUS SPECIES  Few  Identification and susceptibility pending       Gram Stain Result No WBC's      No organisms seen    Tissue culture [[id number]] Collected: 01/01/22 1345    Order Status: Sent Specimen: Tissue from Toe, Right Foot Updated: 01/01/22 1345

## 2022-01-04 NOTE — PLAN OF CARE
Problem: Physical Therapy Goal  Goal: Physical Therapy Goal  Description: Goals to be met by: 2/3/22    Patient will perform the following to increase strength, improve mobility, and return to prior level of function:    1. Rolling L/R with SPV.   2. Supine <> sit with SPV.  3. Sit<>stand with Terri with least restrictive assistive device.  4. Gait x 10 feet with Terri with least restrictive assistive device.       Outcome: Ongoing, Progressing     PT orders received. PT evaluation completed and goals/POC established. Pt tolerated evaluation well with no adverse reactions. Pt with decreased alertness and arousal initially and with movement and environmental adjustments such as lighting this improves. Pt is oriented x4 and does have observed hemiparesis on L side with flexion contractures at the LUE. Pt is unaware of NWB precautions of the RLE. Pt is Diego at baseline with ambulation to  and WC mobility throughout apartment. Pt received IADL assist from brother and neighbor assists in transfers to shower bench when noting sponge bathing in WC. Pt is modA supine to/from sitting, totalAx1 to stand on LLE with knee blocking and RLE propped on foam wedge. PCT in room to change sheets and cleanse with standing. PT will continue to follow and progress goals as tolerated. Recommend discharge to SNF.

## 2022-01-05 NOTE — PROGRESS NOTES
[facility])  Podiatry  Progress Note    Patient Name: Beatriz Adame  MRN: [id number]  Admission Date: 12/30/2021  Hospital Length of Stay: 3 days  Attending Physician: Nasreen Mccloud MD  Primary Care Provider: Dante Olivier MD     Subjective:     Interval History: 4 days s/p toe amputation right.  Dressing CDI.  Patient is non WB.  Denies pain.labx stable.    Follow-up For: Procedure(s) (LRB):  AMPUTATION, TOE (Right)    Post-Operative Day: 4 Days Post-Op    Scheduled Meds:   amLODIPine  10 mg Oral QHS    aspirin  81 mg Oral Daily    atorvastatin  80 mg Oral Daily    carvediloL  25 mg Oral BID    cefTRIAXone (ROCEPHIN) IVPB  1 g Intravenous Q24H    clopidogreL  75 mg Oral Daily    DULoxetine  30 mg Oral Daily    ferrous sulfate  1 tablet Oral Daily    gabapentin  300 mg Oral Daily    lactulose  10 g Oral BID    levothyroxine  75 mcg Oral Daily    metoclopramide HCl  5 mg Oral Q6H    pantoprazole  40 mg Oral Daily    senna-docusate 8.6-50 mg  1 tablet Oral BID    tamsulosin  0.4 mg Oral Daily    traZODone  100 mg Oral QHS     Continuous Infusions:  PRN Meds:acetaminophen, bisacodyL, dextrose 50%, dextrose 50%, glucagon (human recombinant), glucose, glucose, HYDROcodone-acetaminophen, HYDROcodone-acetaminophen, HYDROmorphone, insulin aspart U-100, melatonin, ondansetron, ondansetron, ondansetron, oxyCODONE, polyethylene glycol, sodium chloride 0.9%, sodium chloride 0.9%, Pharmacy to dose Vancomycin consult **AND** vancomycin - pharmacy to dose    Review of Systems  Objective:     Vital Signs (Most Recent):  Temp: 98 °F (36.7 °C) (01/05/22 1641)  Pulse: 74 (01/05/22 1641)  Resp: 16 (01/05/22 1641)  BP: (!) 125/59 (01/05/22 1641)  SpO2: 99 % (01/05/22 1641) Vital Signs (24h Range):  Temp:  [97 °F (36.1 °C)-98.6 °F (37 °C)] 98 °F (36.7 °C)  Pulse:  [70-74] 74  Resp:  [14-18] 16  SpO2:  [97 %-99 %] 99 %  BP: (114-132)/(54-64) 125/59     Weight: 56 kg (123 lb 7 oz)  Body mass index is  19.33 kg/m².    Foot Exam    Laboratory:  A1C:   Recent Labs   Lab 07/22/21  0648 11/21/21  0334   HGBA1C 6.3* 5.4     Blood Cultures: No results for input(s): LABBLOO in the last 48 hours.  BMP:   Recent Labs   Lab 01/05/22  0456   GLU 96      K 3.6      CO2 24   BUN 16   CREATININE 2.1*   CALCIUM 8.4*   MG 1.8     CBC:   Recent Labs   Lab 01/03/22  0703   WBC 9.27   RBC 2.56*   HGB 7.5*   HCT 23.8*      MCV 93   MCH 29.3   MCHC 31.5*     CMP:   Recent Labs   Lab 01/04/22  0511 01/04/22  0511 01/05/22  0456   GLU 98   < > 96   CALCIUM 8.4*   < > 8.4*   ALBUMIN 2.5*  --   --    PROT 6.4  --   --       < > 140   K 3.9   < > 3.6   CO2 25   < > 24      < > 107   BUN 18   < > 16   CREATININE 2.2*   < > 2.1*   ALKPHOS 67  --   --    ALT <5*  --   --    AST 9*  --   --    BILITOT 0.3  --   --     < > = values in this interval not displayed.     Coagulation:   Recent Labs   Lab 12/30/21  1525   INR 1.0   APTT 26.8     CRP:   Recent Labs   Lab 12/30/21  1525   CRP 53.3*     ESR:   Recent Labs   Lab 12/30/21  1525   SEDRATE 120*     Prealbumin: No results for input(s): PREALBUMIN in the last 48 hours.  Wound Cultures: No results for input(s): LABAERO in the last 4320 hours.  Microbiology Results (last 7 days)     Procedure Component Value Units Date/Time    Blood Culture #2 **CANNOT BE ORDERED STAT** [[id number]] Collected: 12/30/21 1536    Order Status: Completed Specimen: Blood from Peripheral, Antecubital, Right Updated: 01/04/22 2212     Blood Culture, Routine No growth after 5 days.    Blood Culture #1 **CANNOT BE ORDERED STAT** [[id number]] Collected: 12/30/21 1523    Order Status: Completed Specimen: Blood from Peripheral, Antecubital, Right Updated: 01/04/22 2212     Blood Culture, Routine No growth after 5 days.    Tissue culture [[id number]]  (Abnormal)  (Susceptibility) Collected: 01/01/22 1345    Order Status: Completed Specimen: Tissue from Toe, Right Foot Updated: 01/04/22 1306      Aerobic Culture - Tissue ENTEROCOCCUS FAECALIS  Few       Gram Stain Result No WBC's      No organisms seen    Tissue culture [[id number]] Collected: 01/01/22 1345    Order Status: Sent Specimen: Tissue from Toe, Right Foot Updated: 01/01/22 1345        Specimen (24h ago, onward)            None          Diagnostic Results:  X-Ray: I have reviewed all pertinent results/findings within the past 24 hours.  no apparent complication    Clinical Findings:  Wound base right 2nd toe well approximated, sutures intact  without ulceration, drainage, pus, tracking, fluctuance, malodor, or cardinal signs infection.    Amp 2nd toe right, hallux valgus asymptomatic.    DP and PT pulses 1/4 bilateral, capillary refill 3 - 4 seconds all toes/distal feet, all toes/both feet warm to touch.  Negative lymphadenopathy bilateral popliteal fossa and tarsal tunnel.  Negavie lower extremity edema bilateral.     Diminished/loss of protective sensation all toes bilateral to 10 gram monofilament.        Assessment/Plan:     Active Diagnoses:    Diagnosis Date Noted POA    PRINCIPAL PROBLEM:  Diabetic ulcer of toe of right foot associated with type 1 diabetes mellitus, with bone involvement without evidence of necrosis [E10.621, L97.516] 12/30/2021 Yes    Diabetic foot ulcer with osteomyelitis [E11.621, E11.69, L97.509, M86.9] 12/31/2021 Yes    CKD (chronic kidney disease), stage IV [N18.4] 12/30/2021 Yes    Hypothyroidism [E03.9] 12/30/2021 Yes    Severe protein-calorie malnutrition [E43] 11/21/2021 Yes    History of stroke [Z86.73] 07/22/2021 Not Applicable     Chronic    Normocytic anemia [D64.9] 07/22/2021 Yes    Diabetes mellitus, type 2 [E11.9] 10/27/2016 Yes    Hyperlipidemia associated with type 2 diabetes mellitus [E11.69, E78.5]  Yes     Chronic    Hypertension associated with stage 4 chronic kidney disease due to type 2 diabetes mellitus [E11.22, I12.9, N18.4]  Yes     Chronic      Problems Resolved During this Admission:        4 days s/p amp right 2nd toe healing well.    Applied dressing dry kerlix/tape.  Repeat same every 3 days.    Follow in office outpatient at \[facility]\"" 1 week, suture removal, wound care as needed.    Genaro Mason DPM  Podiatry  [facility] ([place])

## 2022-01-05 NOTE — PT/OT/SLP PROGRESS
Occupational Therapy   Co-Treatment    Name: Beatriz Adame  MRN: [id number]  Admitting Diagnosis:  Diabetic ulcer of toe of right foot associated with type 1 diabetes mellitus, with bone involvement without evidence of necrosis  4 Days Post-Op    Recommendations:     Discharge Recommendations: nursing facility, skilled  Discharge Equipment Recommendations:  other (see comments) (w/c with swingaway and elevating leg rests, removable arm rests; drop arm BSC)  Barriers to discharge:  Decreased caregiver support    Assessment:     Beatriz Adame is a 71 y.o. 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  She presents with the folowing performance deficits affecting function: weakness,impaired functional mobilty,decreased safety awareness,impaired endurance,gait instability,pain,impaired sensation,impaired balance,decreased upper extremity function,abnormal tone,impaired self care skills,decreased lower extremity function,decreased ROM,orthopedic precautions.     Tolerated and able to maintain (R) heel weight bearing/NWB during sit to stand tranfers from EOB with 2 person assist and use of (R) bedrail.  Appeared more comfortable with transfers with (B) surgical shoes in place.  Continue OT services to maximize patient functioning.     Rehab Prognosis:  Fair; patient would benefit from acute skilled OT services to address these deficits and reach maximum level of function.       Plan:     Patient to be seen 5 x/week to address the above listed problems via self-care/home management,therapeutic activities,therapeutic exercises  · Plan of Care Expires: 02/15/22  · Plan of Care Reviewed with: patient    Subjective     Pain/Comfort:  · Pain Rating 1: other (see comments) (with c/o, did not rate)  · Location - Side 1: Right  · Location 1: other (see comments) (forefoot)  · Pain Addressed 1:  Reposition,Distraction,Cessation of Activity  · Pain Rating Post-Intervention 1: other (see comments) (no change)    Objective:     Communicated with: Carley RN prior to session.  Patient found HOB elevated with peripheral IV,PureWick upon OT entry to room.    General Precautions: Standard, diabetic,fall,other (see comments) (elevate HOB)   Orthopedic Precautions:RLE non weight bearing (able to bear weight through heel for transfers)   Braces:  ((B) surgical shoes)  Respiratory Status: Room air     Occupational Performance:     Bed Mobility:    · Patient completed Scooting/Bridging with moderate assistance  · Patient completed Supine to Sit with maximal assistance and with side rail     Functional Mobility/Transfers:  · Patient completed Sit <> Stand Transfer with minimum assistance, moderate assistance and of 2 persons  with  (R) bedrail x 3 trials  · Functional Mobility: NT    Activities of Daily Living:  · Upper Body Dressing: moderate assistance for gown seated EOB  · Lower Body Dressing: total assistance (B) surgical shoes  · Toileting: total assistance in standing at EOB for hygiene after BM      AMPAC 6 Click ADL: 12    Treatment & Education:  Educated on use of bed rail for sit to/from stand with (R) UE, purpose of (B) surgical shoes, reiterated (L) LE weight bearing status.  Patient verbalized and demonstrated understanding, but will reinforce as needed.    Patient left seated at EOB with all lines intact, call button in reach, RN notified and PTA presentEducation:      GOALS:   Multidisciplinary Problems     Occupational Therapy Goals        Problem: Occupational Therapy Goal    Goal Priority Disciplines Outcome Interventions   Occupational Therapy Goal     OT, PT/OT Ongoing, Progressing    Description: Goals to be met by: 01/18/2022     Patient will increase functional independence with ADLs by performing:    UE Dressing with Stand-by Assistance.  LE Dressing with Moderate Assistance.  Grooming while EOB  with Stand-by Assistance.  Toileting from bedside commode with Moderate Assistance for hygiene and clothing management.   Supine to sit with Minimal Assistance.  Increased functional strength to WFL for bed mobility, functional transfers and ADL tasks.  Sit to/from stand/SPT to BSC/w/c with Moderate assistance of 1 person                     Time Tracking:     OT Date of Treatment: 01/05/22  OT Start Time: 1325  OT Stop Time: 1344  OT Total Time (min): 19 min    Billable Minutes:Self Care/Home Management 19    OT/KRISTYN: OT          1/5/2022

## 2022-01-05 NOTE — PT/OT/SLP PROGRESS
Physical Therapy Treatment    Patient Name:  Beatriz Adame   MRN:  [id number]    Recommendations:     Discharge Recommendations:  nursing facility, skilled   Discharge Equipment Recommendations: wheelchair (with removable arm rests and elevating leg rests)   Barriers to discharge: Decreased caregiver support    Assessment:     Beatriz Adame is a 71 y.o. 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  She presents with the following impairments/functional limitations:  weakness,impaired endurance,impaired self care skills,impaired functional mobilty,impaired balance,decreased lower extremity function,decreased upper extremity function,decreased coordination,decreased safety awareness,decreased ROM,orthopedic precautions ;pt with good mobility today, able to stand at EOB w/ use of bedrail with RUE  W/ min/modA x 2, keeping RLE heelwt bearing. Session limited by loose stools.    Rehab Prognosis: Good; patient would benefit from acute skilled PT services to address these deficits and reach maximum level of function.    Recent Surgery: Procedure(s) (LRB):  AMPUTATION, TOE (Right) 4 Days Post-Op    Plan:     During this hospitalization, patient to be seen 4 x/week to address the identified rehab impairments via therapeutic activities,gait training,therapeutic exercises,neuromuscular re-education and progress toward the following goals:    · Plan of Care Expires:  02/03/22    Subjective     Chief Complaint: only min c/o's pain  Patient/Family Comments/goals: pt agreeable to session,awake and alert, though didn't talk much during session.   Pain/Comfort:  · Pain Rating 1:  (did not rate)  · Location - Side 1: Right  · Location - Orientation 1: generalized  · Location 1: foot  · Pain Addressed 1: Reposition,Distraction  · Pain Rating Post-Intervention 1:  (pt appeared to be pain free at  rest)      Objective:     Communicated with nurse prior to session.  Patient found HOB elevated with peripheral IV,PureWick upon PT entry to room.     General Precautions: Standard, diabetic,fall   Orthopedic Precautions:RLE non weight bearing (can bear wt through heel only for t/f's)   Braces:  (B surgical shoes)  Respiratory Status: Room air     Functional Mobility:  · Bed Mobility:     · Rolling Left:  minimum assistance and moderate assistance  · Rolling Right: minimum assistance and moderate assistance  · Scooting: minimum assistance and at EOB  · Supine to Sit: moderate assistance  · Sit to Supine: moderate assistance  · Transfers:     · Sit to Stand:  minimum assistance, moderate assistance and of 2 persons with pt using bedrail in RUE for support      AM-PAC 6 CLICK MOBILITY  Turning over in bed (including adjusting bedclothes, sheets and blankets)?: 2  Sitting down on and standing up from a chair with arms (e.g., wheelchair, bedside commode, etc.): 2  Moving from lying on back to sitting on the side of the bed?: 2  Moving to and from a bed to a chair (including a wheelchair)?: 2  Need to walk in hospital room?: 1  Climbing 3-5 steps with a railing?: 1  Basic Mobility Total Score: 10       Therapeutic Activities and Exercises:   pt perf'd seated LE ex's of hip flex, LAQ's 2 x 5 reps ea. R Shldr. Flex x 5.  Worked on static standing act's (pt soiled w/ BM and needing hygiene care) w/ RUE on bedrail, and RLE heelwt bearing only in Darco shoes. Pt stood ~:30 sec. Ea.x 3 Trial, seated rest breaks b/w. Pt able to perform scooting along EOB w/ min.A.    Patient left R sidelying 2/2 pt needing to have a BM (refused offer of a bedpan) with call button avail. And  nurse notified..    GOALS:   Multidisciplinary Problems     Physical Therapy Goals        Problem: Physical Therapy Goal    Goal Priority Disciplines Outcome Goal Variances Interventions   Physical Therapy Goal     PT, PT/OT Ongoing, Progressing      Description: Goals to be met by: 2/3/22    Patient will perform the following to increase strength, improve mobility, and return to prior level of function:    1. Rolling L/R with SPV.   2. Supine <> sit with SPV.  3. Sit<>stand with Terri with least restrictive assistive device.  4. Gait x 10 feet with Terri with least restrictive assistive device.                        Time Tracking:     PT Received On: 01/05/22  PT Start Time: 1321     PT Stop Time: 1354  PT Total Time (min): 33 min     Billable Minutes: Therapeutic Activity 20 and Therapeutic Exercise 13    Treatment Type: Treatment  PT/PTA: PTA     PTA Visit Number: 1     01/05/2022

## 2022-01-05 NOTE — PLAN OF CARE
Problem: Occupational Therapy Goal  Goal: Occupational Therapy Goal  Description: Goals to be met by: 01/18/2022     Patient will increase functional independence with ADLs by performing:    UE Dressing with Stand-by Assistance.  LE Dressing with Moderate Assistance.  Grooming while EOB with Stand-by Assistance.  Toileting from bedside commode with Moderate Assistance for hygiene and clothing management.   Supine to sit with Minimal Assistance.  Increased functional strength to WFL for bed mobility, functional transfers and ADL tasks.  Sit to/from stand/SPT to BSC/w/c with Moderate assistance of 1 person    Outcome: Ongoing, Progressing  Tolerated and able to maintain (R) heel weight bearing/NWB during sit to stand tranfers from EOB with 2 person assist and use of (R) bedrail.  Appeared more comfortable with transfers with (B) surgical shoes in place.  Continue OT services to maximize patient functioning.

## 2022-01-05 NOTE — PROGRESS NOTES
RSW called patient over the phone to assess needs. Patient has no additional needs at this time.    PATSY Irving

## 2022-01-05 NOTE — PLAN OF CARE
Pt in bed, no noted acute distress, no complaints of pain, AAO x4, no injuries or falls during shift, VSS on RA, purewick in place, x 1 BM during shift, bed in low locked position, call light in reach, hourly rounds complete, will continue to assess

## 2022-01-05 NOTE — PROGRESS NOTES
Pharmacokinetic Assessment Follow Up: IV Vancomycin    Vancomycin serum concentration assessment(s):    The random level was drawn correctly and can be used to guide therapy at this time. The measurement is within the desired definitive target range of 15 to 20 mcg/mL.    Vancomycin Regimen Plan:  Vanc 15 mg/kg IVPB x1. Next random with am labs on 1/6/22.    Drug levels (last 3 results):  Recent Labs   Lab Result Units 01/03/22  0502 01/04/22  0512 01/05/22  0456   Vancomycin, Random ug/mL 27.1 23.4 19.0       Pharmacy will continue to follow and monitor vancomycin.    Please contact pharmacy at extension [phone] for questions regarding this assessment.    Thank you for the consult,   Francisca Abebe       Patient brief summary:  Beatriz Adame is a 71 y.o. female initiated on antimicrobial therapy with IV Vancomycin for treatment of bone/joint infection.    The patient's current regimen is Pulse dosing.    Drug Allergies:   Review of patient's allergies indicates:   Allergen Reactions    Tomato (solanum lycopersicum) Hives and Itching       Actual Body Weight:   56 kg    Renal Function:   Estimated Creatinine Clearance: 22 mL/min (A) (based on SCr of 2.1 mg/dL (H)).,     Dialysis Method (if applicable):  N/A    CBC (last 72 hours):  Recent Labs   Lab Result Units 01/03/22  0703   WBC K/uL 9.27   Hemoglobin g/dL 7.5*   Hematocrit % 23.8*   Platelets K/uL 167   Gran % % 60.2   Lymph % % 26.1   Mono % % 10.8   Eosinophil % % 2.3   Basophil % % 0.3   Differential Method  Automated       Metabolic Panel (last 72 hours):  Recent Labs   Lab Result Units 01/03/22  0703 01/04/22  0511 01/05/22  0456   Sodium mmol/L 135* 138 140   Potassium mmol/L 3.6 3.9 3.6   Chloride mmol/L 103 105 107   CO2 mmol/L 23 25 24   Glucose mg/dL 104 98 96   BUN mg/dL 18 18 16   Creatinine mg/dL 2.1* 2.2* 2.1*   Albumin g/dL  --  2.5*  --    Total Bilirubin mg/dL  --  0.3  --    Alkaline Phosphatase U/L  --  67  --    AST U/L  --  9*  --    ALT U/L   --  <5*  --    Magnesium mg/dL 1.8 1.7 1.8   Phosphorus mg/dL 3.7 3.5 3.3       Vancomycin Administrations:  vancomycin given in the last 96 hours                     vancomycin 1.5 g in dextrose 5 % 250 mL IVPB (ready to mix) (mg) 1,500 mg New Bag 01/02/22 0500                    Microbiologic Results:  Microbiology Results (last 7 days)       Procedure Component Value Units Date/Time    Blood Culture #2 **CANNOT BE ORDERED STAT** [[id number]] Collected: 12/30/21 1536    Order Status: Completed Specimen: Blood from Peripheral, Antecubital, Right Updated: 01/04/22 2212     Blood Culture, Routine No growth after 5 days.    Blood Culture #1 **CANNOT BE ORDERED STAT** [[id number]] Collected: 12/30/21 1523    Order Status: Completed Specimen: Blood from Peripheral, Antecubital, Right Updated: 01/04/22 2212     Blood Culture, Routine No growth after 5 days.    Tissue culture [[id number]]  (Abnormal)  (Susceptibility) Collected: 01/01/22 1345    Order Status: Completed Specimen: Tissue from Toe, Right Foot Updated: 01/04/22 1306     Aerobic Culture - Tissue ENTEROCOCCUS FAECALIS  Few       Gram Stain Result No WBC's      No organisms seen    Tissue culture [[id number]] Collected: 01/01/22 1345    Order Status: Sent Specimen: Tissue from Toe, Right Foot Updated: 01/01/22 1342

## 2022-01-06 NOTE — ASSESSMENT & PLAN NOTE
"Osteomyelitis of right 2nd toe  - Right foot x-ray notes no convincing radiographic evidence of osteomyelitis  - Workup with MRI right foot notes "acute osteomyelitis involving the 2nd toe middle and distal phalanx"  - Initiated on vancomycin and Zosyn, then changed to Rocephin and vancomycin  - Arterial studies with non palpable pulses, Cardiology consulted  - Patient declined further workup of PAD with angiogram at this time  - s/p amputation right 2nd toe on 1/1/22  - Tissue culture from 1/1/22 with Enterococcus faecalis and pathology with bone margins still pending  - Podiatry initially planned further debridement with closure sometime this week, but case discussed with them today and plan to continue wound care and follow up as outpatient  - Continue PT and OT, with recommendation for SNF placement and will have case management work on placement  - Will consult ID for antibiotic recommendations  "

## 2022-01-06 NOTE — PROGRESS NOTES
[facility], [place] [facility]  Progress Note     Patient Name: Beatriz Adame  MRN: [id number]  Patient Class: IP- Inpatient     Admission Date: 12/30/2021  Attending Physician: Nasreen Mccloud MD  Primary Care Provider: Dante Olivier MD           Subjective:      Principal Problem:Diabetic ulcer of toe of right foot associated with type 1 diabetes mellitus, with bone involvement without evidence of necrosis           HPI:  Ms. 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     She presents to the ED with complaint of right 2nd toe wound infection.  She was evaluated by home health nurse today and felt wound worsened and needed to be evaluated.  Patient reports she developed the wound over the last month or so due to dragging her feet while in her wheelchair. She denies fever, chills, fatigue, muscle aches, cough, sore throat, nausea, vomiting, diarrhea, chest pain, shortness of breath, lightheadedness, dizziness, or syncope. She lives alone; uses wheelchair for mobility, is able to transfer from chair to bed/toilet/chair independently however her neighbors help her with ADLs and outings.      In the ED she is hemodynamically stable on room air and afebrile.  CBC with known anemia hemoglobin 7.5 and hematocrit 24.3 (baseline 8-9 and 26-29).  Chemistry with serum creatinine 1.8 (better than recent baseline), BUN 22.  LFTs WNLs.  .  CRP 53.3.  Blood cultures obtained.  COVID negative.  Right foot x-ray notes no convincing radiographic evidence of osteomyelitis.  MRI right foot notes acute osteomyelitis involving the 2nd toe  "middle and distal phalanx.  She was initiated on vancomycin and Zosyn.  The patient was placed in observation to the Hospital Medicine Service for further evaluation and treatment.         Overview/Hospital Course:  Ms. Adame presented with right toe ulcer.  Empirically treated with Zosyn and vancomycin.  MRI showed "acute osteomyelitis involving the 2nd toe middle and distal phalanx." Arterial us showed slow velocities with abnormal waveforms in the arteries of the calf suggestive of vessel hardening such as from diffuse atherosclerosis.  Podiatry and Cardiology consulted.  She underwent right 2nd toe amputation on 01/01/2022.         Interval History:  No acute events, right foot pain controlled.     Review of Systems   Constitutional: Negative for chills and fever.   Respiratory: Negative for shortness of breath.    Cardiovascular: Negative for chest pain.      Objective:      Vital signs: Reviewed  Weight: 56 kg (123 lb 7 oz)  Body mass index is 19.33 kg/m².  Intake/Output Summary (Last 24 hours): Reviewed     Physical Exam  Vitals reviewed.   Constitutional:       General: She is not in acute distress.     Appearance: She is well-developed.   HENT:      Head: Normocephalic and atraumatic.   Eyes:      Extraocular Movements: Extraocular movements intact.      Pupils: Pupils are equal, round, and reactive to light.   Cardiovascular:      Rate and Rhythm: Normal rate and regular rhythm.   Pulmonary:      Effort: Pulmonary effort is normal. No respiratory distress.      Breath sounds: Normal breath sounds.   Abdominal:      General: Bowel sounds are normal. There is no distension.      Palpations: Abdomen is soft.      Tenderness: There is no abdominal tenderness.   Musculoskeletal:         General: Swelling and deformity present.      Cervical back: Normal range of motion and neck supple.      Comments: Left arm contracture, left leg strength weaker than right   Skin:     General: Skin is warm.      Comments: " "Right foot s/p 2nd toe amputation covered in dressing   Neurological:      General: No focal deficit present.      Mental Status: She is alert and oriented to person, place, and time.   Psychiatric:         Mood and Affect: Mood normal.         Behavior: Behavior normal.         Thought Content: Thought content normal.            Significant Labs: All pertinent labs within the past 24 hours have been reviewed.     Significant Imaging: I have reviewed all pertinent imaging results/findings within the past 24 hours.        Assessment/Plan:      * Diabetic ulcer of toe of right foot associated with type 1 diabetes mellitus, with bone involvement without evidence of necrosis  Osteomyelitis of right 2nd toe  - Right foot x-ray notes no convincing radiographic evidence of osteomyelitis  - Workup with MRI right foot notes "acute osteomyelitis involving the 2nd toe middle and distal phalanx"  - Initiated on vancomycin and Zosyn, then changed to Rocephin and vancomycin  - Arterial studies with non palpable pulses results above, Cardiology consulted  - Patient decline further workup PAD with angiogram at this time  - s/p amputation right 2nd toe on 1/1/22  - Podiatry plan further debridement with closure sometime this week     Diabetic foot ulcer with osteomyelitis  - As above     Hypothyroidism  - Continue levothyroxine      CKD (chronic kidney disease), stage IV  - Serum creatinine at admission 1.8 which is better than recent baseline  - Creatinine with slight trend up, closer to her baseline  - Avoid nephrotoxins and hypotension as able, renally dose medications  - Continue to monitor      Normocytic anemia  - On admission, hemoglobin 7.5 and hematocrit 24.3 (baseline 8-9 and 26-29)  - Recent iron studies showed iron deficiency with normal B12 and folate  - H&H has been stable, no indication for transfusion at this time, continue to monitor     History of stroke  - Residual left sided weakness at baseline  - Continue " statin and ASA, plavix  - Patient states not on plavix, need to confirm med list from brother     Diabetes mellitus, type 2        Hemoglobin A1C   Date Value Ref Range Status   11/21/2021 5.4 4.0 - 5.6 % Final   - Hold home insulin, on 8 units daily  - Continue low-dose sliding scale insulin  - Glucose levels were reviewed and currently at targets, goal 140-180 during hospitalization  - Dose/medication adjustment as appropriate   - Monitor accuchecks AC/HS and PRN hypoglycemic protocol      Hyperlipidemia associated with type 2 diabetes mellitus  - Continue atorvastatin     Hypertension associated with stage 4 chronic kidney disease due to type 2 diabetes mellitus  - Continue home amlodipine and carvedilol  - Monitor, adjust medications as needed for adequate blood pressure control            VTE Risk Mitigation (From admission, onward)                  Ordered        Place sequential compression device  Until discontinued         12/30/21 1828        Reason for No Pharmacological VTE Prophylaxis  Once        Question:  Reasons:  Answer:  Risk of Bleeding    12/30/21 1828                              Nasreen Mccloud MD  Department of Hospital Medicine   [facility]

## 2022-01-06 NOTE — PT/OT/SLP PROGRESS
Occupational Therapy      Patient Name:  Beatriz Adame   MRN:  [id number]    Attempted to see pt at 13:21.  Upon arrival to room pt dozing with lunch tray set up in front of her.  Once awoken pt requested to eat lunch before participating in therapy.  OT unable to attempt again later in afternoon.  Will follow-up at next scheduled therapy session.    1/6/2022

## 2022-01-06 NOTE — PLAN OF CARE
CM spoke with pt's son, Alysa, to discuss SNF. Son is in agreement, locet called in and CM to send referrals thru careport to choices:    [facility], [facility] and [facility].    Pt to have further debridement and wd closing this week.   01/06/22 0950   Discharge Reassessment   Assessment Type Discharge Planning Reassessment   Did the patient's condition or plan change since previous assessment? No   Discharge Plan discussed with: Adult children;Patient   Communicated MARY with patient/caregiver Date not available/Unable to determine   Discharge Plan A Skilled Nursing Facility   Discharge Plan B Skilled Nursing Facility   DME Needed Upon Discharge  none   Why the patient remains in the hospital Requires continued medical care   Post-Acute Status   Post-Acute Authorization Placement   Post-Acute Placement Status Referrals Sent   Patient choice form signed by patient/caregiver List with quality metrics by geographic area provided;List from CMS Compare;List from System Post-Acute Care       CM to follow for plans and arrangements.

## 2022-01-06 NOTE — PT/OT/SLP PROGRESS
Physical Therapy Treatment    Patient Name:  Beatriz Adame   MRN:  [id number]    Recommendations:     Discharge Recommendations:  nursing facility, skilled   Discharge Equipment Recommendations: wheelchair (with remvable arm rests and elevating foot rests)   Barriers to discharge: Decreased caregiver support    Assessment:     Beatriz Adame is a 71 y.o. 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  She presents with the following impairments/functional limitations:  weakness,impaired endurance,impaired self care skills,impaired functional mobilty,gait instability,impaired balance,decreased lower extremity function,decreased upper extremity function,decreased ROM,impaired skin,orthopedic precautions ;pt with good mobility today, needed some encouragement to participate, but did well in session.    Rehab Prognosis: Good; patient would benefit from acute skilled PT services to address these deficits and reach maximum level of function.    Recent Surgery: Procedure(s) (LRB):  AMPUTATION, TOE (Right) 5 Days Post-Op    Plan:     During this hospitalization, patient to be seen 4 x/week to address the identified rehab impairments via gait training,therapeutic activities,therapeutic exercises,neuromuscular re-education and progress toward the following goals:    · Plan of Care Expires:  02/03/22    Subjective     Chief Complaint: pt reported being sleepy  Patient/Family Comments/goals: pt agreeable to session w/ encouragement.  Pain/Comfort:  · Pain Rating 1: 0/10  · Pain Rating Post-Intervention 1: 0/10      Objective:     Communicated with nurse prior to session.  Patient found HOB elevated with peripheral IV,PureWick,bed alarm upon PT entry to room.     General Precautions: Standard, fall,diabetic   Orthopedic Precautions:RLE non weight bearing (can bear wt through the heel only in [facility]  shoes)   Braces:  (B surgical shoes/Darco shoes)  Respiratory Status: Room air     Functional Mobility:  · Bed Mobility:     · Scooting: stand by assistance and contact guard assistance  · Bridging: stand by assistance  · Supine to Sit: minimum assistance  · Sit to Supine: minimum assistance  · Transfers:     · Sit to Stand:  minimum assistance and moderate assistance with RUE assisting on bedrail, and RLE heelwt bearing in Darco shoe      AM-PAC 6 CLICK MOBILITY  Turning over in bed (including adjusting bedclothes, sheets and blankets)?: 3  Sitting down on and standing up from a chair with arms (e.g., wheelchair, bedside commode, etc.): 2  Moving from lying on back to sitting on the side of the bed?: 3  Moving to and from a bed to a chair (including a wheelchair)?: 2  Need to walk in hospital room?: 1  Climbing 3-5 steps with a railing?: 1  Basic Mobility Total Score: 12       Therapeutic Activities and Exercises:   perf'd supine LE ex's of SLR's, hip abd, heelslides; seated LAQ's x 5-10 reps ea. Donned BLE Darco shoes w/ totA.   Pt able to scoot along EOB w/ CGA/SBA. Pt soiled w/ BM, req'd assistance for hygiene. Pt stood ~:30 sec x 2 trials w/ min/modA, keeping RLE heelwt bearing, while hygiene perf'd.    Patient left HOB elevated with all lines intact, call button in reach, nurse notified and breakfast tray warmed and present..    GOALS:   Multidisciplinary Problems     Physical Therapy Goals        Problem: Physical Therapy Goal    Goal Priority Disciplines Outcome Goal Variances Interventions   Physical Therapy Goal     PT, PT/OT Ongoing, Progressing     Description: Goals to be met by: 2/3/22    Patient will perform the following to increase strength, improve mobility, and return to prior level of function:    1. Rolling L/R with SPV.   2. Supine <> sit with SPV.  3. Sit<>stand with Terri with least restrictive assistive device.  4. Gait x 10 feet with Terri with least restrictive assistive device.                         Time Tracking:     PT Received On: 01/06/22  PT Start Time: 1049     PT Stop Time: 1128  PT Total Time (min): 39 min     Billable Minutes: Therapeutic Activity 26 and Therapeutic Exercise 13    Treatment Type: Treatment  PT/PTA: PTA     PTA Visit Number: 2     01/06/2022

## 2022-01-06 NOTE — ASSESSMENT & PLAN NOTE
"Osteomyelitis of right 2nd toe  - Right foot x-ray notes no convincing radiographic evidence of osteomyelitis  - Workup with MRI right foot notes "acute osteomyelitis involving the 2nd toe middle and distal phalanx"  - Initiated on Vancomycin and Zosyn, then changed to Rocephin and Vancomycin. ID consulted  - Arterial studies with non palpable pulses, Cardiology consulted.  Patient declined further workup of PAD with angiogram at this time  - S/p amputation right 2nd toe on 1/1/22  - Tissue culture from 1/1/22 with Enterococcus faecalis and pathology with bone margins still pending  - Podiatry initially planned further debridement with closure sometime this week, but case discussed with them today and plan to continue wound care and follow up as outpatient  - PT/OT say SNF.  She's agreeable.  CXR and PPD ordered 1/6  "

## 2022-01-06 NOTE — CONSULTS
[facility] Medicine  Virtual Tele-Consult Note  [facility]      Patient Name: Beatriz Adame  MRN:  [id number]  Hospital Medicine Team: Networked reference to record PCT  Stephanie Rivera MD  Date of Admission:  12/30/2021     Length of Stay:  LOS: 3 days   Expected Discharge Date:   Principal Problem:  Diabetic ulcer of toe of right foot associated with type 1 diabetes mellitus, with bone involvement without evidence of necrosis        Thank you for your consult to [facility]. We have reviewed the patient's chart. Beatriz Adame does meet criteria for [facility] medicine service at this time. Will assume care 01/06/22 at 6am.      Stephanie Rivera MD  Senior Physician  [facility] Medicine

## 2022-01-06 NOTE — ASSESSMENT & PLAN NOTE
- Residual left sided weakness at baseline  - Continue Lipitor 80mg PO daily  - Continue Aspirin 81mg PO daily  - Continue Plavix 75mg PO daily

## 2022-01-06 NOTE — PLAN OF CARE
Pt in bed, no noted acute distress, some c/o pain relieved with PRN pain medication, pt c/o pain to left heel, placed mepilex and moon boot to area, AAO x4, no injuries or falls during shift, VSS on RA, purewick in place, x 1 BM during shift, bed in low locked position, call light in reach, hourly rounds complete, will continue to assess

## 2022-01-06 NOTE — PROGRESS NOTES
Pharmacokinetic Assessment Follow Up: IV Vancomycin    Vancomycin serum concentration assessment(s):    The random level was drawn correctly and can be used to guide therapy at this time. The measurement is above the desired definitive target range of 10 to 20 mcg/mL.    Vancomycin Regimen Plan:    Re-dose when the random level is less than 20 mcg/mL, next level to be drawn at 0430 on 1/7/22    Drug levels (last 3 results):  Recent Labs   Lab Result Units 01/04/22  0512 01/05/22  0456 01/06/22  0534   Vancomycin, Random ug/mL 23.4 19.0 23.1       Pharmacy will continue to follow and monitor vancomycin.    Please contact pharmacy at extension [phone] for questions regarding this assessment.    Thank you for the consult,   Dustin Virgen       Patient brief summary:  Beatriz Adame is a 71 y.o. female initiated on antimicrobial therapy with IV Vancomycin for treatment of bone/joint infection    The patient's current regimen is pulse dosing    Drug Allergies:   Review of patient's allergies indicates:   Allergen Reactions    Tomato (solanum lycopersicum) Hives and Itching       Actual Body Weight:   56 kg    Renal Function:   Estimated Creatinine Clearance: 22 mL/min (A) (based on SCr of 2.1 mg/dL (H)).,     Dialysis Method (if applicable):  N/A    CBC (last 72 hours):  Recent Labs   Lab Result Units 01/03/22  0703   WBC K/uL 9.27   Hemoglobin g/dL 7.5*   Hematocrit % 23.8*   Platelets K/uL 167   Gran % % 60.2   Lymph % % 26.1   Mono % % 10.8   Eosinophil % % 2.3   Basophil % % 0.3   Differential Method  Automated       Metabolic Panel (last 72 hours):  Recent Labs   Lab Result Units 01/03/22  0703 01/04/22  0511 01/05/22  0456   Sodium mmol/L 135* 138 140   Potassium mmol/L 3.6 3.9 3.6   Chloride mmol/L 103 105 107   CO2 mmol/L 23 25 24   Glucose mg/dL 104 98 96   BUN mg/dL 18 18 16   Creatinine mg/dL 2.1* 2.2* 2.1*   Albumin g/dL  --  2.5*  --    Total Bilirubin mg/dL  --  0.3  --    Alkaline Phosphatase U/L  --   67  --    AST U/L  --  9*  --    ALT U/L  --  <5*  --    Magnesium mg/dL 1.8 1.7 1.8   Phosphorus mg/dL 3.7 3.5 3.3       Vancomycin Administrations:  vancomycin given in the last 96 hours                     vancomycin 750 mg in dextrose 5 % 250 mL IVPB (ready to mix system) (mg) 750 mg New Bag 01/05/22 0920                    Microbiologic Results:  Microbiology Results (last 7 days)       Procedure Component Value Units Date/Time    Blood Culture #2 **CANNOT BE ORDERED STAT** [[id number]] Collected: 12/30/21 1536    Order Status: Completed Specimen: Blood from Peripheral, Antecubital, Right Updated: 01/04/22 2212     Blood Culture, Routine No growth after 5 days.    Blood Culture #1 **CANNOT BE ORDERED STAT** [[id number]] Collected: 12/30/21 1523    Order Status: Completed Specimen: Blood from Peripheral, Antecubital, Right Updated: 01/04/22 2212     Blood Culture, Routine No growth after 5 days.    Tissue culture [[id number]]  (Abnormal)  (Susceptibility) Collected: 01/01/22 1345    Order Status: Completed Specimen: Tissue from Toe, Right Foot Updated: 01/04/22 1306     Aerobic Culture - Tissue ENTEROCOCCUS FAECALIS  Few       Gram Stain Result No WBC's      No organisms seen    Tissue culture [[id number]] Collected: 01/01/22 1345    Order Status: Sent Specimen: Tissue from Toe, Right Foot Updated: 01/01/22 1345

## 2022-01-06 NOTE — SUBJECTIVE & OBJECTIVE
Interval History:  No acute events, does not really complain of any foot pain to the right leg, feels okay.    Review of Systems   Constitutional: Negative for chills and fever.   Respiratory: Negative for shortness of breath.    Cardiovascular: Negative for chest pain.     Objective:     Vital Signs (Most Recent):  Temp: 98 °F (36.7 °C) (01/05/22 1641)  Pulse: 74 (01/05/22 1641)  Resp: 16 (01/05/22 1641)  BP: (!) 125/59 (01/05/22 1641)  SpO2: 99 % (01/05/22 1641) Vital Signs (24h Range):  Temp:  [97 °F (36.1 °C)-98.6 °F (37 °C)] 98 °F (36.7 °C)  Pulse:  [70-74] 74  Resp:  [14-18] 16  SpO2:  [97 %-99 %] 99 %  BP: (114-132)/(54-64) 125/59     Weight: 56 kg (123 lb 7 oz)  Body mass index is 19.33 kg/m².    Intake/Output Summary (Last 24 hours) at 1/5/2022 1814  Last data filed at 1/5/2022 0600  Gross per 24 hour   Intake --   Output 300 ml   Net -300 ml      Physical Exam  Vitals reviewed.   Constitutional:       General: She is not in acute distress.     Appearance: She is well-developed.   HENT:      Head: Normocephalic and atraumatic.   Eyes:      Extraocular Movements: Extraocular movements intact.      Pupils: Pupils are equal, round, and reactive to light.   Cardiovascular:      Rate and Rhythm: Normal rate and regular rhythm.   Pulmonary:      Effort: Pulmonary effort is normal. No respiratory distress.      Breath sounds: Normal breath sounds.   Abdominal:      General: Bowel sounds are normal. There is no distension.      Palpations: Abdomen is soft.      Tenderness: There is no abdominal tenderness.   Musculoskeletal:         General: Swelling and deformity present.      Cervical back: Normal range of motion and neck supple.      Comments: Left arm contracture, left leg strength weaker than right   Skin:     General: Skin is warm.      Comments: Right foot s/p 2nd toe amputation covered in dressing   Neurological:      General: No focal deficit present.      Mental Status: She is alert and oriented to  person, place, and time.   Psychiatric:         Mood and Affect: Mood normal.         Behavior: Behavior normal.         Thought Content: Thought content normal.         Significant Labs: All pertinent labs within the past 24 hours have been reviewed.    Significant Imaging: I have reviewed all pertinent imaging results/findings within the past 24 hours.

## 2022-01-06 NOTE — PROGRESS NOTES
[facility] Medicine  Telemedicine Progress Note    Patient Name: Beatriz Adame  MRN: [id number]  Patient Class: IP- Inpatient   Admission Date: 12/30/2021  Length of Stay: 4 days  Attending Physician: Stephanie Rivera MD  Primary Care Provider: Dante Olivier MD          Subjective:     Principal Problem:Diabetic ulcer of toe of right foot associated with type 1 diabetes mellitus, with bone involvement without evidence of necrosis        HPI:  Ms. 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    She presents to the ED with complaint of right 2nd toe wound infection.  She was evaluated by home health nurse today and felt wound worsened and needed to be evaluated.  Patient reports she developed the wound over the last month or so due to dragging her feet while in her wheelchair. She denies fever, chills, fatigue, muscle aches, cough, sore throat, nausea, vomiting, diarrhea, chest pain, shortness of breath, lightheadedness, dizziness, or syncope. She lives alone; uses wheelchair for mobility, is able to transfer from chair to bed/toilet/chair independently however her neighbors help her with ADLs and outings.     In the ED she is hemodynamically stable on room air and afebrile.  CBC with known anemia hemoglobin 7.5 and hematocrit 24.3 (baseline 8-9 and 26-29).  Chemistry with serum creatinine 1.8 (better than recent baseline), BUN 22.  LFTs WNLs.  .  CRP 53.3.  Blood cultures obtained.  COVID negative.  Right foot x-ray notes no convincing radiographic evidence of osteomyelitis.  MRI right foot notes acute  "osteomyelitis involving the 2nd toe middle and distal phalanx.  She was initiated on vancomycin and Zosyn.  The patient was placed in observation to the Hospital Medicine Service for further evaluation and treatment.       Overview/Hospital Course:  Ms. Adame presented with right toe ulcer.  Empirically treated with Zosyn and vancomycin.  MRI showed "acute osteomyelitis involving the 2nd toe middle and distal phalanx." Arterial us showed slow velocities with abnormal waveforms in the arteries of the calf suggestive of vessel hardening such as from diffuse atherosclerosis.  Podiatry and Cardiology consulted.  She underwent right 2nd toe amputation on 01/01/2022.  Patient declined further workup with angiogram for PAD at this time.  Podiatry plans on staged debridement and closure. PT and OT working with patient and recommending SNF placement.      Interval History: Transferred to [facility] overnight.  No complaints this morning.  Agreeable to SNF.  Pain controlled.    Review of Systems   Constitutional: Negative for chills, fatigue and fever.   Respiratory: Negative for cough and shortness of breath.    Cardiovascular: Negative for chest pain, palpitations and leg swelling.   Gastrointestinal: Negative for abdominal pain, diarrhea, nausea and vomiting.   Genitourinary: Negative for dysuria and urgency.   Neurological: Negative for dizziness and headaches.   All other systems reviewed and are negative.    Objective:     Vital Signs (Most Recent):  Temp: 98 °F (36.7 °C) (01/06/22 0929)  Pulse: 71 (01/06/22 0929)  Resp: 18 (01/06/22 0929)  BP: 122/60 (01/06/22 0929)  SpO2: 98 % (01/06/22 0929) Vital Signs (24h Range):  Temp:  [97.9 °F (36.6 °C)-99.4 °F (37.4 °C)] 98 °F (36.7 °C)  Pulse:  [70-74] 71  Resp:  [14-18] 18  SpO2:  [97 %-99 %] 98 %  BP: (100-147)/(51-76) 122/60     Weight: 56 kg (123 lb 7 oz)  Body mass index is 19.33 kg/m².    Intake/Output Summary (Last 24 hours) at 1/6/2022 1009  Last data filed at 1/6/2022 " "0700  Gross per 24 hour   Intake --   Output 300 ml   Net -300 ml      Physical Exam  Vitals reviewed.   Constitutional:       Appearance: Normal appearance.   HENT:      Head: Normocephalic and atraumatic.   Eyes:      General: No scleral icterus.     Conjunctiva/sclera: Conjunctivae normal.   Pulmonary:      Effort: Pulmonary effort is normal. No respiratory distress.   Skin:     Coloration: Skin is not jaundiced.      Findings: No erythema.   Neurological:      General: No focal deficit present.      Mental Status: She is alert and oriented to person, place, and time.   Psychiatric:         Mood and Affect: Mood normal.         Behavior: Behavior normal.         Significant Labs: All pertinent labs within the past 24 hours have been reviewed.    Significant Imaging: I have reviewed all pertinent imaging results/findings within the past 24 hours.      Assessment/Plan:      * Diabetic ulcer of toe of right foot associated with type 1 diabetes mellitus, with bone involvement without evidence of necrosis  Osteomyelitis of right 2nd toe  - Right foot x-ray notes no convincing radiographic evidence of osteomyelitis  - Workup with MRI right foot notes "acute osteomyelitis involving the 2nd toe middle and distal phalanx"  - Initiated on Vancomycin and Zosyn, then changed to Rocephin and Vancomycin. ID consulted  - Arterial studies with non palpable pulses, Cardiology consulted.  Patient declined further workup of PAD with angiogram at this time  - S/p amputation right 2nd toe on 1/1/22  - Tissue culture from 1/1/22 with Enterococcus faecalis and pathology with bone margins still pending  - Podiatry initially planned further debridement with closure sometime this week, but case discussed with them today and plan to continue wound care and follow up as outpatient  - PT/OT say SNF.  She's agreeable.  CXR and PPD ordered 1/6    Diabetic foot ulcer with osteomyelitis  - As above    Hypothyroidism  -Chronic and " stable  -Continue levothyroxine     CKD (chronic kidney disease), stage IV  - Serum creatinine at admission 1.8 which is better than recent baseline (average around 2.5)  - Avoid nephrotoxins and hypotension as able, renally dose medications  - Continue to monitor     Severe protein-calorie malnutrition  · Boost TID      Normocytic anemia  - On admission, hemoglobin 7.5 and hematocrit 24.3 (baseline 8-9 and 26-29)  - Recent iron studies showed iron deficiency with normal B12 and folate  - H&H has been stable, no indication for transfusion at this time, continue to monitor    History of stroke  - Residual left sided weakness at baseline  - Continue Lipitor 80mg PO daily  - Continue Aspirin 81mg PO daily  - Continue Plavix 75mg PO daily    Diabetes mellitus, type 2  Hemoglobin A1C   Date Value Ref Range Status   11/21/2021 5.4 4.0 - 5.6 % Final   - Hold home insulin, on 8 units daily  - Continue low-dose sliding scale insulin  - Glucose levels were reviewed and currently at targets, goal 140-180 during hospitalization  - Dose/medication adjustment as appropriate   - Monitor accuchecks AC/HS and PRN hypoglycemic protocol     Hyperlipidemia associated with type 2 diabetes mellitus  - Chronic and stable  - Continue Atorvastatin 80mg PO daily    Hypertension associated with stage 4 chronic kidney disease due to type 2 diabetes mellitus  - Chronic and stable  - Continue Norvasc 10mg PO qHS  - Continue Coreg 25mg PO  BID      VTE Risk Mitigation (From admission, onward)         Ordered     Place sequential compression device  Until discontinued         12/30/21 1828     Reason for No Pharmacological VTE Prophylaxis  Once        Question:  Reasons:  Answer:  Risk of Bleeding    12/30/21 1828                      I have assessed these finding virtually using telemed platform and with assistance of bedside nurse                 The attending portion of this evaluation, treatment, and documentation was performed per Stephanie MCGOWAN  MD Nicole via Telemedicine AudioVisual using the secure [employer] software platform with 2 way audio/video. The provider was located off-site and the patient is located in the hospital. The aforementioned video software was utilized to document the relevant history and physical exam    Stephanie Rivera MD  Department of Hospital Medicine   [facility] ([place])

## 2022-01-06 NOTE — ASSESSMENT & PLAN NOTE
- Serum creatinine at admission 1.8 which is better than recent baseline (average around 2.5)  - Creatinine with slight trend up, closer to her baseline and remains stable at 2.1  - Avoid nephrotoxins and hypotension as able, renally dose medications  - Continue to monitor

## 2022-01-06 NOTE — ASSESSMENT & PLAN NOTE
- Serum creatinine at admission 1.8 which is better than recent baseline (average around 2.5)  - Avoid nephrotoxins and hypotension as able, renally dose medications  - Continue to monitor

## 2022-01-06 NOTE — SUBJECTIVE & OBJECTIVE
Interval History: Transferred to [facility] overnight.  No complaints this morning.  Agreeable to SNF.  Pain controlled.    Review of Systems   Constitutional: Negative for chills, fatigue and fever.   Respiratory: Negative for cough and shortness of breath.    Cardiovascular: Negative for chest pain, palpitations and leg swelling.   Gastrointestinal: Negative for abdominal pain, diarrhea, nausea and vomiting.   Genitourinary: Negative for dysuria and urgency.   Neurological: Negative for dizziness and headaches.   All other systems reviewed and are negative.    Objective:     Vital Signs (Most Recent):  Temp: 98 °F (36.7 °C) (01/06/22 0929)  Pulse: 71 (01/06/22 0929)  Resp: 18 (01/06/22 0929)  BP: 122/60 (01/06/22 0929)  SpO2: 98 % (01/06/22 0929) Vital Signs (24h Range):  Temp:  [97.9 °F (36.6 °C)-99.4 °F (37.4 °C)] 98 °F (36.7 °C)  Pulse:  [70-74] 71  Resp:  [14-18] 18  SpO2:  [97 %-99 %] 98 %  BP: (100-147)/(51-76) 122/60     Weight: 56 kg (123 lb 7 oz)  Body mass index is 19.33 kg/m².    Intake/Output Summary (Last 24 hours) at 1/6/2022 1009  Last data filed at 1/6/2022 0700  Gross per 24 hour   Intake --   Output 300 ml   Net -300 ml      Physical Exam  Vitals reviewed.   Constitutional:       Appearance: Normal appearance.   HENT:      Head: Normocephalic and atraumatic.   Eyes:      General: No scleral icterus.     Conjunctiva/sclera: Conjunctivae normal.   Pulmonary:      Effort: Pulmonary effort is normal. No respiratory distress.   Skin:     Coloration: Skin is not jaundiced.      Findings: No erythema.   Neurological:      General: No focal deficit present.      Mental Status: She is alert and oriented to person, place, and time.   Psychiatric:         Mood and Affect: Mood normal.         Behavior: Behavior normal.         Significant Labs: All pertinent labs within the past 24 hours have been reviewed.    Significant Imaging: I have reviewed all pertinent imaging results/findings within the past 24 hours.

## 2022-01-06 NOTE — PROGRESS NOTES
[facility] Medicine  Progress Note    Patient Name: Beatriz Adame  MRN: [id number]  Patient Class: IP- Inpatient   Admission Date: 12/30/2021  Length of Stay: 3 days  Attending Physician: Nasreen Mccloud MD  Primary Care Provider: Dante Olivier MD        Subjective:     Principal Problem:Diabetic ulcer of toe of right foot associated with type 1 diabetes mellitus, with bone involvement without evidence of necrosis        HPI:  Ms. 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    She presents to the ED with complaint of right 2nd toe wound infection.  She was evaluated by home health nurse today and felt wound worsened and needed to be evaluated.  Patient reports she developed the wound over the last month or so due to dragging her feet while in her wheelchair. She denies fever, chills, fatigue, muscle aches, cough, sore throat, nausea, vomiting, diarrhea, chest pain, shortness of breath, lightheadedness, dizziness, or syncope. She lives alone; uses wheelchair for mobility, is able to transfer from chair to bed/toilet/chair independently however her neighbors help her with ADLs and outings.     In the ED she is hemodynamically stable on room air and afebrile.  CBC with known anemia hemoglobin 7.5 and hematocrit 24.3 (baseline 8-9 and 26-29).  Chemistry with serum creatinine 1.8 (better than recent baseline), BUN 22.  LFTs WNLs.  .  CRP 53.3.  Blood cultures obtained.  COVID negative.  Right foot x-ray notes no convincing radiographic evidence of osteomyelitis.  MRI right foot notes acute osteomyelitis involving the  "2nd toe middle and distal phalanx.  She was initiated on vancomycin and Zosyn.  The patient was placed in observation to the Hospital Medicine Service for further evaluation and treatment.       Overview/Hospital Course:  Ms. Adame presented with right toe ulcer.  Empirically treated with Zosyn and vancomycin.  MRI showed "acute osteomyelitis involving the 2nd toe middle and distal phalanx." Arterial us showed slow velocities with abnormal waveforms in the arteries of the calf suggestive of vessel hardening such as from diffuse atherosclerosis.  Podiatry and Cardiology consulted.  She underwent right 2nd toe amputation on 01/01/2022.  Patient declined further workup with angiogram for PAD at this time.  Podiatry plans on staged debridement and closure. PT and OT working with patient and recommending SNF placement.      Interval History:  No acute events, does not really complain of any foot pain to the right leg, feels okay.    Review of Systems   Constitutional: Negative for chills and fever.   Respiratory: Negative for shortness of breath.    Cardiovascular: Negative for chest pain.     Objective:     Vital Signs (Most Recent):  Temp: 98 °F (36.7 °C) (01/05/22 1641)  Pulse: 74 (01/05/22 1641)  Resp: 16 (01/05/22 1641)  BP: (!) 125/59 (01/05/22 1641)  SpO2: 99 % (01/05/22 1641) Vital Signs (24h Range):  Temp:  [97 °F (36.1 °C)-98.6 °F (37 °C)] 98 °F (36.7 °C)  Pulse:  [70-74] 74  Resp:  [14-18] 16  SpO2:  [97 %-99 %] 99 %  BP: (114-132)/(54-64) 125/59     Weight: 56 kg (123 lb 7 oz)  Body mass index is 19.33 kg/m².    Intake/Output Summary (Last 24 hours) at 1/5/2022 1814  Last data filed at 1/5/2022 0600  Gross per 24 hour   Intake --   Output 300 ml   Net -300 ml      Physical Exam  Vitals reviewed.   Constitutional:       General: She is not in acute distress.     Appearance: She is well-developed.   HENT:      Head: Normocephalic and atraumatic.   Eyes:      Extraocular Movements: Extraocular movements " "intact.      Pupils: Pupils are equal, round, and reactive to light.   Cardiovascular:      Rate and Rhythm: Normal rate and regular rhythm.   Pulmonary:      Effort: Pulmonary effort is normal. No respiratory distress.      Breath sounds: Normal breath sounds.   Abdominal:      General: Bowel sounds are normal. There is no distension.      Palpations: Abdomen is soft.      Tenderness: There is no abdominal tenderness.   Musculoskeletal:         General: Swelling and deformity present.      Cervical back: Normal range of motion and neck supple.      Comments: Left arm contracture, left leg strength weaker than right   Skin:     General: Skin is warm.      Comments: Right foot s/p 2nd toe amputation covered in dressing   Neurological:      General: No focal deficit present.      Mental Status: She is alert and oriented to person, place, and time.   Psychiatric:         Mood and Affect: Mood normal.         Behavior: Behavior normal.         Thought Content: Thought content normal.         Significant Labs: All pertinent labs within the past 24 hours have been reviewed.    Significant Imaging: I have reviewed all pertinent imaging results/findings within the past 24 hours.      Assessment/Plan:      * Diabetic ulcer of toe of right foot associated with type 1 diabetes mellitus, with bone involvement without evidence of necrosis  Osteomyelitis of right 2nd toe  - Right foot x-ray notes no convincing radiographic evidence of osteomyelitis  - Workup with MRI right foot notes "acute osteomyelitis involving the 2nd toe middle and distal phalanx"  - Initiated on vancomycin and Zosyn, then changed to Rocephin and vancomycin  - Arterial studies with non palpable pulses, Cardiology consulted  - Patient declined further workup of PAD with angiogram at this time  - s/p amputation right 2nd toe on 1/1/22  - Tissue culture from 1/1/22 with Enterococcus faecalis and pathology with bone margins still pending  - Podiatry initially " planned further debridement with closure sometime this week, but case discussed with them today and plan to continue wound care and follow up as outpatient  - Continue PT and OT, with recommendation for SNF placement and will have case management work on placement  - Will consult ID for antibiotic recommendations    Diabetic foot ulcer with osteomyelitis  - As above    Hypothyroidism  - Continue levothyroxine     CKD (chronic kidney disease), stage IV  - Serum creatinine at admission 1.8 which is better than recent baseline (average around 2.5)  - Creatinine with slight trend up, closer to her baseline and remains stable at 2.1  - Avoid nephrotoxins and hypotension as able, renally dose medications  - Continue to monitor     Normocytic anemia  - On admission, hemoglobin 7.5 and hematocrit 24.3 (baseline 8-9 and 26-29)  - Recent iron studies showed iron deficiency with normal B12 and folate  - H&H has been stable, no indication for transfusion at this time, continue to monitor    History of stroke  - Residual left sided weakness at baseline  - Continue statin and ASA, plavix  - Patient states not on plavix, need to confirm med list from brother    Diabetes mellitus, type 2  Hemoglobin A1C   Date Value Ref Range Status   11/21/2021 5.4 4.0 - 5.6 % Final   - Hold home insulin, on 8 units daily  - Continue low-dose sliding scale insulin  - Glucose levels were reviewed and currently at targets, goal 140-180 during hospitalization  - Dose/medication adjustment as appropriate   - Monitor accuchecks AC/HS and PRN hypoglycemic protocol     Hyperlipidemia associated with type 2 diabetes mellitus  - Continue atorvastatin    Hypertension associated with stage 4 chronic kidney disease due to type 2 diabetes mellitus  - Continue home amlodipine and carvedilol  - Monitor, adjust medications as needed for adequate blood pressure control      VTE Risk Mitigation (From admission, onward)         Ordered     Place sequential  compression device  Until discontinued         12/30/21 1828     Reason for No Pharmacological VTE Prophylaxis  Once        Question:  Reasons:  Answer:  Risk of Bleeding    12/30/21 1828                    Nasreen Mccloud MD  Department of Hospital Medicine   [facility])

## 2022-01-07 NOTE — CONSULTS
"[facility])  Infectious Disease  Consult Note    Patient Name: Beatriz Adame  MRN: [id number]  Admission Date: 12/30/2021  Hospital Length of Stay: 5 days  Attending Physician: Stephanei Rivera MD  Primary Care Provider: Dante Olivier MD     Isolation Status: No active isolations    Patient information was obtained from patient, past medical records and ER records.      Inpatient consult to Infectious Diseases  Consult performed by: Victoriano Rojo MD  Consult ordered by: Stephanie Rivera MD        Assessment/Plan:     Diabetic foot ulcer with osteomyelitis  - as far as I can tell, the osteomyelitis has been completely resected  - pathology pending  - OK to change to Amoxil for SSTI    Thank you for your consult.    Victoriano Rojo MD  Infectious Disease      Subjective:     Principal Problem: Diabetic ulcer of toe of right foot associated with type 1 diabetes mellitus, with bone involvement without evidence of necrosis    HPI: Ms. Adame is a pleasant 70 yo woman who presented with right toe ulcer.  She was admitted and treated empirically treated with Zosyn and vancomycin.  MRI showed "acute osteomyelitis involving the 2nd toe middle and distal phalanx."   Podiatry and Cardiology consulted.  She underwent right 2nd toe amputation on 01/01/2022.  I am consulted for "diabetic foot ulcer." A culture was performed that grew Enterococcus. The patient complains of mild pain but otherwise denies fever or chills.      Past Medical History:   Diagnosis Date    Anemia     Arthritis     Diabetes mellitus     Encounter for blood transfusion     Hypercholesteremia     Hypertension     Renal disorder     kidney stone    Stroke     left side paralysis       Past Surgical History:   Procedure Laterality Date    APPENDECTOMY      CYSTOSCOPY W/ URETERAL STENT PLACEMENT Right 10/13/2021    Procedure: CYSTOSCOPY, WITH URETERAL STENT INSERTION;  Surgeon: Wanda Arroyo MD;  Location: " [facility] OR;  Service: Urology;  Laterality: Right;    ESOPHAGOGASTRODUODENOSCOPY N/A 11/23/2021    Procedure: EGD (ESOPHAGOGASTRODUODENOSCOPY);  Surgeon: Obed Jeong MD;  [facility]  Service: Endoscopy;  Laterality: N/A;    LAPAROSCOPIC APPENDECTOMY N/A 7/22/2021    Procedure: APPENDECTOMY, LAPAROSCOPIC;  Surgeon: Paulo Calvo Jr., MD;  [facility];  Service: General;  Laterality: N/A;    TOE AMPUTATION Right 1/1/2022    Procedure: AMPUTATION, TOE;  Surgeon: Genaro Mason DPM;  [facility];  Service: Podiatry;  Laterality: Right;    TUBAL LIGATION      URETEROSCOPIC REMOVAL OF URETERIC CALCULUS Right 12/22/2021    Procedure: REMOVAL, CALCULUS, URETER, URETEROSCOPIC;  Surgeon: Wanda Arroyo MD;  [facility]: [facility];  Service: Urology;  Laterality: Right;       Review of patient's allergies indicates:   Allergen Reactions    Tomato (solanum lycopersicum) Hives and Itching       Medications:  Medications Prior to Admission   Medication Sig    amLODIPine (NORVASC) 5 MG tablet Take 10 mg by mouth every evening. Take 5 mg every morning and 10 mg at night    aspirin (ECOTRIN) 81 MG EC tablet Take 81 mg by mouth once daily.    b complex vitamins tablet Take 1 tablet by mouth once daily.    carvedilol (COREG) 25 MG tablet Take 1 tablet (25 mg total) by mouth 2 (two) times daily.    cephALEXin (KEFLEX) 500 MG capsule Take 1 capsule (500 mg total) by mouth every 12 (twelve) hours.    clopidogreL (PLAVIX) 75 mg tablet Take 75 mg by mouth.    DULoxetine (CYMBALTA) 30 MG capsule Take 1 capsule (30 mg total) by mouth once daily.    ferrous sulfate (FEOSOL) 325 mg (65 mg iron) Tab tablet Take 325 mg by mouth daily with breakfast. Off at present    gabapentin (NEURONTIN) 300 MG capsule Take 1 capsule (300 mg total) by mouth once daily.    HYDROcodone-acetaminophen (NORCO) 5-325 mg per tablet Take 1 tablet by mouth every 6 (six) hours as needed for Pain.    insulin  "(BASAGLAR KWIKPEN U-100 INSULIN) glargine 100 units/mL (3mL) SubQ pen Inject 5 Units into the skin once daily.    lactulose (CHRONULAC) 10 gram/15 mL solution Take 10 g by mouth 2 (two) times a day.    levothyroxine (SYNTHROID) 75 MCG tablet Take 75 mcg by mouth once daily.    metoclopramide HCl (REGLAN) 10 MG tablet Take 1 tablet (10 mg total) by mouth 3 (three) times daily before meals.    omeprazole (PRILOSEC) 20 MG capsule Take 20 mg by mouth 2 (two) times daily.    ondansetron (ZOFRAN) 4 MG tablet Take 1 tablet (4 mg total) by mouth every 8 (eight) hours as needed for Nausea.    rosuvastatin (CRESTOR) 40 MG Tab Take 40 mg by mouth once daily.    tamsulosin (FLOMAX) 0.4 mg Cap Take 1 capsule (0.4 mg total) by mouth once daily.    tiZANidine (ZANAFLEX) 4 MG tablet Take 4 mg by mouth nightly.    traZODone (DESYREL) 100 MG tablet Take 100 mg by mouth once daily.     Antibiotics (From admission, onward)            Start     Stop Route Frequency Ordered    12/31/21 1015  cefTRIAXone (ROCEPHIN) 1 g/50 mL D5W IVPB         -- IV Every 24 hours (non-standard times) 12/31/21 0911    12/30/21 1917  vancomycin - pharmacy to dose  (vancomycin IVPB)        "And" Linked Group Details    -- IV pharmacy to manage frequency 12/30/21 1818        Antifungals (From admission, onward)            None        Antivirals (From admission, onward)    None           Immunization History   Administered Date(s) Administered    COVID-19, vector-nr, [id number], PF ([facility]) 03/06/2021    PPD Test 01/06/2022       Family History     Problem Relation (Age of Onset)    Alcohol abuse Father    Arthritis Mother    Asthma Brother    Diabetes Mother, Sister, Brother    Early death Brother    Heart disease Mother, Brother    Hypertension Mother    Kidney disease Mother    Stroke Mother    Vision loss Mother        Social History     Socioeconomic History    Marital status: Single   Tobacco Use    Smoking status: Never Smoker    Smokeless " tobacco: Never Used   Substance and Sexual Activity    Alcohol use: No     Comment: socially    Drug use: No    Sexual activity: Not Currently     Social Determinants of Health     Financial Resource Strain: Low Risk     Difficulty of Paying Living Expenses: Not hard at all   Food Insecurity: No Food Insecurity    Worried About Running Out of Food in the Last Year: Never true    Ran Out of Food in the Last Year: Never true   Transportation Needs: No Transportation Needs    Lack of Transportation (Medical): No    Lack of Transportation (Non-Medical): No   Physical Activity: Inactive    Days of Exercise per Week: 0 days    Minutes of Exercise per Session: 0 min   Stress: No Stress Concern Present    Feeling of Stress : Not at all   Social Connections: Moderately Isolated    Frequency of Communication with Friends and Family: More than three times a week    Frequency of Social Gatherings with Friends and Family: Once a week    Attends Rastafarian Services: More than 4 times per year    Active Member of Clubs or Organizations: No    Attends Club or Organization Meetings: Never    Marital Status: Never    Housing Stability: Low Risk     Unable to Pay for Housing in the Last Year: No    Number of Places Lived in the Last Year: 1    Unstable Housing in the Last Year: No     Review of Systems   Constitutional: Negative for chills and fever.   All other systems reviewed and are negative.    Objective:     Vital Signs (Most Recent):  Temp: 98.4 °F (36.9 °C) (01/07/22 0849)  Pulse: 72 (01/07/22 0849)  Resp: 16 (01/07/22 0849)  BP: 130/60 (01/07/22 0849)  SpO2: 98 % (01/07/22 0849) Vital Signs (24h Range):  Temp:  [97.8 °F (36.6 °C)-98.8 °F (37.1 °C)] 98.4 °F (36.9 °C)  Pulse:  [71-76] 72  Resp:  [16-20] 16  SpO2:  [98 %-99 %] 98 %  BP: (124-137)/(60-67) 130/60     Weight: 56 kg (123 lb 7 oz)  Body mass index is 19.33 kg/m².    Estimated Creatinine Clearance: 23.1 mL/min (A) (based on SCr of 2 mg/dL  (H)).    Physical Exam  Vitals and nursing note reviewed.   Constitutional:       General: She is not in acute distress.     Appearance: Normal appearance. She is not ill-appearing or diaphoretic.   HENT:      Head: Normocephalic and atraumatic.   Eyes:      Conjunctiva/sclera: Conjunctivae normal.      Pupils: Pupils are equal, round, and reactive to light.   Pulmonary:      Effort: Pulmonary effort is normal.   Abdominal:      General: Abdomen is flat. Bowel sounds are normal.      Tenderness: There is no right CVA tenderness or left CVA tenderness.   Musculoskeletal:         General: Deformity present.   Skin:     General: Skin is warm and dry.      Findings: No erythema.   Neurological:      Mental Status: She is alert.   Psychiatric:         Mood and Affect: Mood normal.         Behavior: Behavior normal.         Thought Content: Thought content normal.         Judgment: Judgment normal.         Significant Labs:   Blood Culture:   Recent Labs   Lab 12/30/21  1523 12/30/21  1536   LABBLOO No growth after 5 days. No growth after 5 days.     CBC:   Recent Labs   Lab 01/07/22  0502   WBC 8.76   HGB 7.1*   HCT 23.2*        CMP:   Recent Labs   Lab 01/07/22  0502      K 3.9      CO2 24      BUN 14   CREATININE 2.0*   CALCIUM 8.9   PROT 6.8   ALBUMIN 2.8*   BILITOT 0.2   ALKPHOS 73   AST 9*   ALT <5*   ANIONGAP 9   EGFRNONAA 25*     Wound Culture: No results for input(s): LABAERO in the last 4320 hours.    Significant Imaging: None

## 2022-01-07 NOTE — HPI
"Ms. Adame is a pleasant 72 yo woman who presented with right toe ulcer.  She was admitted and treated empirically treated with Zosyn and vancomycin.  MRI showed "acute osteomyelitis involving the 2nd toe middle and distal phalanx."   Podiatry and Cardiology consulted.  She underwent right 2nd toe amputation on 01/01/2022.  I am consulted for "diabetic foot ulcer." A culture was performed that grew Enterococcus. The patient complains of mild pain but otherwise denies fever or chills.  "

## 2022-01-07 NOTE — ASSESSMENT & PLAN NOTE
- Serum creatinine at admission 1.8 which is better than recent baseline (average around 2.5), down to 2  - Avoid nephrotoxins and hypotension as able, renally dose medications  - Continue to monitor

## 2022-01-07 NOTE — PLAN OF CARE
Plan of care reviewed with pt. Pt verbalized understanding. Hourly rounding performed. Purewick in place. Heels elevated with heel boots. Bed lowered and locked. Personal items and call bell with reach. Report given to CHYNA Lindsey

## 2022-01-07 NOTE — PLAN OF CARE
CM called [facility] to inquire about acceptance today. Admissions stated they did not have the referral from [facility] yet.    CM left a message for Emperatriz at [facility] to inquire about referral.    Pt will likely not discharge today pending acceptance at [place].    CM to follow for plans and arrangements.

## 2022-01-07 NOTE — PROGRESS NOTES
[facility] Medicine  Telemedicine Progress Note    Patient Name: Beatriz Adame  MRN: [id number]  Patient Class: IP- Inpatient   Admission Date: 12/30/2021  Length of Stay: 5 days  Attending Physician: Stephanie Rivera MD  Primary Care Provider: Dante Olivier MD          Subjective:     Principal Problem:Diabetic ulcer of toe of right foot associated with type 1 diabetes mellitus, with bone involvement without evidence of necrosis        HPI:  Ms. 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    She presents to the ED with complaint of right 2nd toe wound infection.  She was evaluated by home health nurse today and felt wound worsened and needed to be evaluated.  Patient reports she developed the wound over the last month or so due to dragging her feet while in her wheelchair. She denies fever, chills, fatigue, muscle aches, cough, sore throat, nausea, vomiting, diarrhea, chest pain, shortness of breath, lightheadedness, dizziness, or syncope. She lives alone; uses wheelchair for mobility, is able to transfer from chair to bed/toilet/chair independently however her neighbors help her with ADLs and outings.     In the ED she is hemodynamically stable on room air and afebrile.  CBC with known anemia hemoglobin 7.5 and hematocrit 24.3 (baseline 8-9 and 26-29).  Chemistry with serum creatinine 1.8 (better than recent baseline), BUN 22.  LFTs WNLs.  .  CRP 53.3.  Blood cultures obtained.  COVID negative.  Right foot x-ray notes no convincing radiographic evidence of osteomyelitis.  MRI right foot notes acute  "osteomyelitis involving the 2nd toe middle and distal phalanx.  She was initiated on vancomycin and Zosyn.  The patient was placed in observation to the Hospital Medicine Service for further evaluation and treatment.       Overview/Hospital Course:  Ms. Adame presented with right toe ulcer.  Empirically treated with Zosyn and vancomycin.  MRI showed "acute osteomyelitis involving the 2nd toe middle and distal phalanx." Arterial us showed slow velocities with abnormal waveforms in the arteries of the calf suggestive of vessel hardening such as from diffuse atherosclerosis.  Podiatry and Cardiology consulted.  She underwent right 2nd toe amputation on 01/01/2022.  Patient declined further workup with angiogram for PAD at this time.  Podiatry plans on staged debridement and closure. PT and OT working with patient and recommending SNF placement.      Interval History: No complaints this morning.  Creatinine improving.  Remains on Ceftriaxone/Vanc.  Waiting on SNF.    Review of Systems   Constitutional: Negative for chills, fatigue and fever.   Respiratory: Negative for cough and shortness of breath.    Cardiovascular: Negative for chest pain, palpitations and leg swelling.   Gastrointestinal: Negative for abdominal pain, diarrhea, nausea and vomiting.   Genitourinary: Negative for dysuria and urgency.   Neurological: Negative for dizziness and headaches.   All other systems reviewed and are negative.    Objective:     Vital Signs (Most Recent):  Temp: 98.4 °F (36.9 °C) (01/07/22 0509)  Pulse: 73 (01/07/22 0509)  Resp: 18 (01/07/22 0550)  BP: 137/64 (01/07/22 0509)  SpO2: 98 % (01/07/22 0509) Vital Signs (24h Range):  Temp:  [97.8 °F (36.6 °C)-98.8 °F (37.1 °C)] 98.4 °F (36.9 °C)  Pulse:  [71-76] 73  Resp:  [18-20] 18  SpO2:  [98 %-99 %] 98 %  BP: (122-137)/(60-67) 137/64     Weight: 56 kg (123 lb 7 oz)  Body mass index is 19.33 kg/m².    Intake/Output Summary (Last 24 hours) at 1/7/2022 0819  Last data filed at " "1/6/2022 2000  Gross per 24 hour   Intake 500 ml   Output 600 ml   Net -100 ml      Physical Exam  Vitals reviewed.   Constitutional:       Appearance: Normal appearance.   HENT:      Head: Normocephalic and atraumatic.   Eyes:      General: No scleral icterus.     Conjunctiva/sclera: Conjunctivae normal.   Pulmonary:      Effort: Pulmonary effort is normal. No respiratory distress.   Skin:     Coloration: Skin is not jaundiced.      Findings: No erythema.   Neurological:      General: No focal deficit present.      Mental Status: She is alert and oriented to person, place, and time.   Psychiatric:         Mood and Affect: Mood normal.         Behavior: Behavior normal.         Significant Labs: All pertinent labs within the past 24 hours have been reviewed.    Significant Imaging: I have reviewed all pertinent imaging results/findings within the past 24 hours.      Assessment/Plan:      * Diabetic ulcer of toe of right foot associated with type 1 diabetes mellitus, with bone involvement without evidence of necrosis  Osteomyelitis of right 2nd toe  - Right foot x-ray notes no convincing radiographic evidence of osteomyelitis  - Workup with MRI right foot notes "acute osteomyelitis involving the 2nd toe middle and distal phalanx"  - Initiated on Vancomycin and Zosyn, then changed to Rocephin and Vancomycin. ID consulted  - Arterial studies with non palpable pulses, Cardiology consulted.  Patient declined further workup of PAD with angiogram at this time  - S/p amputation right 2nd toe on 1/1/22  - Tissue culture from 1/1/22 with Enterococcus faecalis and pathology with bone margins still pending  - Podiatry initially planned further debridement with closure sometime this week, but case discussed with them today and plan to continue wound care and follow up as outpatient  - PT/OT say SNF.  She's agreeable.  CXR and PPD ordered 1/6    Diabetic foot ulcer with osteomyelitis  - As above    Hypothyroidism  -Chronic and " stable  -Continue levothyroxine     CKD (chronic kidney disease), stage IV  - Serum creatinine at admission 1.8 which is better than recent baseline (average around 2.5), down to 2  - Avoid nephrotoxins and hypotension as able, renally dose medications  - Continue to monitor     Severe protein-calorie malnutrition  · Boost TID      Normocytic anemia  - On admission, hemoglobin 7.5 and hematocrit 24.3 (baseline 8-9 and 26-29)  - Recent iron studies showed iron deficiency with normal B12 and folate  - H&H has been stable, no indication for transfusion at this time, continue to monitor    History of stroke  - Residual left sided weakness at baseline  - Continue Lipitor 80mg PO daily  - Continue Aspirin 81mg PO daily  - Continue Plavix 75mg PO daily    Diabetes mellitus, type 2  Hemoglobin A1C   Date Value Ref Range Status   11/21/2021 5.4 4.0 - 5.6 % Final   - Hold home insulin, on 8 units daily  - Continue low-dose sliding scale insulin  - Glucose levels were reviewed and currently at targets, goal 140-180 during hospitalization  - Dose/medication adjustment as appropriate   - Monitor accuchecks AC/HS and PRN hypoglycemic protocol     Hyperlipidemia associated with type 2 diabetes mellitus  - Chronic and stable  - Continue Atorvastatin 80mg PO daily    Hypertension associated with stage 4 chronic kidney disease due to type 2 diabetes mellitus  - Chronic and stable  - Continue Norvasc 10mg PO qHS  - Continue Coreg 25mg PO  BID      VTE Risk Mitigation (From admission, onward)         Ordered     Place sequential compression device  Until discontinued         12/30/21 1828     Reason for No Pharmacological VTE Prophylaxis  Once        Question:  Reasons:  Answer:  Risk of Bleeding    12/30/21 1828                      I have assessed these finding virtually using telemed platform and with assistance of bedside nurse                 The attending portion of this evaluation, treatment, and documentation was performed per  Stephanie Rivera MD via Telemedicine AudioVisual using the secure [employer] software platform with 2 way audio/video. The provider was located off-site and the patient is located in the hospital. The aforementioned video software was utilized to document the relevant history and physical exam    Stephanie Rivera MD  Department of Hospital Medicine   [facility] ([place])

## 2022-01-07 NOTE — PROGRESS NOTES
Therapy with Vancomycin complete and/or consult discontinued by provider.  Pharmacy will sign off, please re-consult as needed.    Thank you,   Francisca Abebe, Pharm.D.

## 2022-01-07 NOTE — PLAN OF CARE
Recommendations  1.Continue 2000kcal DM diet with Boost Glucose Control (chcolate) TID to promote PO intake.   2.Recommend Frederic BID to promote wound healing.   3.Encourage good PO intake.    -monitor PO intake/ tolerance/ ONS/ weight changes.    Goals: Pt to meet 50-75% EEN/EPN via PO intake + ONS by RD f/u.  Nutrition Goal Status: goal met  Communication of RD Recs:  (POC)

## 2022-01-07 NOTE — SUBJECTIVE & OBJECTIVE
Interval History: No complaints this morning.  Creatinine improving.  Remains on Ceftriaxone/Vanc.  Waiting on SNF.    Review of Systems   Constitutional: Negative for chills, fatigue and fever.   Respiratory: Negative for cough and shortness of breath.    Cardiovascular: Negative for chest pain, palpitations and leg swelling.   Gastrointestinal: Negative for abdominal pain, diarrhea, nausea and vomiting.   Genitourinary: Negative for dysuria and urgency.   Neurological: Negative for dizziness and headaches.   All other systems reviewed and are negative.    Objective:     Vital Signs (Most Recent):  Temp: 98.4 °F (36.9 °C) (01/07/22 0509)  Pulse: 73 (01/07/22 0509)  Resp: 18 (01/07/22 0550)  BP: 137/64 (01/07/22 0509)  SpO2: 98 % (01/07/22 0509) Vital Signs (24h Range):  Temp:  [97.8 °F (36.6 °C)-98.8 °F (37.1 °C)] 98.4 °F (36.9 °C)  Pulse:  [71-76] 73  Resp:  [18-20] 18  SpO2:  [98 %-99 %] 98 %  BP: (122-137)/(60-67) 137/64     Weight: 56 kg (123 lb 7 oz)  Body mass index is 19.33 kg/m².    Intake/Output Summary (Last 24 hours) at 1/7/2022 0819  Last data filed at 1/6/2022 2000  Gross per 24 hour   Intake 500 ml   Output 600 ml   Net -100 ml      Physical Exam  Vitals reviewed.   Constitutional:       Appearance: Normal appearance.   HENT:      Head: Normocephalic and atraumatic.   Eyes:      General: No scleral icterus.     Conjunctiva/sclera: Conjunctivae normal.   Pulmonary:      Effort: Pulmonary effort is normal. No respiratory distress.   Skin:     Coloration: Skin is not jaundiced.      Findings: No erythema.   Neurological:      General: No focal deficit present.      Mental Status: She is alert and oriented to person, place, and time.   Psychiatric:         Mood and Affect: Mood normal.         Behavior: Behavior normal.         Significant Labs: All pertinent labs within the past 24 hours have been reviewed.    Significant Imaging: I have reviewed all pertinent imaging results/findings within the past 24  hours.

## 2022-01-07 NOTE — ASSESSMENT & PLAN NOTE
Nutrition Problem:  Severe Protein-Calorie Malnutrition  Malnutrition in the context of Chronic Illness/Injury    Related to (etiology):  Altered GI function- chronic diarrhea/ nausea     Signs and Symptoms (as evidenced by):  Energy Intake: <75% of estimated energy requirement for > 1 month  Body Fat Depletion: mild depletion of orbitals, triceps and thoracic and lumbar region   Muscle Mass Depletion: mild depletion of clavicle region, scapular region, interosseous muscle, lower extremities and severe depletion of temples   Weight Loss: -22.88% x 9 months      Interventions(treatment strategy):  Collaboration with other healthcare providers   DM  ONS    Nutrition Diagnosis Status:  Continues

## 2022-01-07 NOTE — SUBJECTIVE & OBJECTIVE
Past Medical History:   Diagnosis Date    Anemia     Arthritis     Diabetes mellitus     Encounter for blood transfusion     Hypercholesteremia     Hypertension     Renal disorder     kidney stone    Stroke     left side paralysis       Past Surgical History:   Procedure Laterality Date    APPENDECTOMY      CYSTOSCOPY W/ URETERAL STENT PLACEMENT Right 10/13/2021    Procedure: CYSTOSCOPY, WITH URETERAL STENT INSERTION;  Surgeon: Wanda Arroyo MD;  Location: [facility];  Service: Urology;  Laterality: Right;    ESOPHAGOGASTRODUODENOSCOPY N/A 11/23/2021    Procedure: EGD (ESOPHAGOGASTRODUODENOSCOPY);  Surgeon: Obed Jeong MD;  [facility]  Service: Endoscopy;  Laterality: N/A;    LAPAROSCOPIC APPENDECTOMY N/A 7/22/2021    Procedure: APPENDECTOMY, LAPAROSCOPIC;  Surgeon: Paulo Calvo Jr., MD;  Location: [facility];  Service: General;  Laterality: N/A;    TOE AMPUTATION Right 1/1/2022    Procedure: AMPUTATION, TOE;  Surgeon: Genaro Mason DPM;  Location: [facility];  Service: Podiatry;  Laterality: Right;    TUBAL LIGATION      URETEROSCOPIC REMOVAL OF URETERIC CALCULUS Right 12/22/2021    Procedure: REMOVAL, CALCULUS, URETER, URETEROSCOPIC;  Surgeon: Wanda Arroyo MD;  Location: [facility];  Service: Urology;  Laterality: Right;       Review of patient's allergies indicates:   Allergen Reactions    Tomato (solanum lycopersicum) Hives and Itching       Medications:  Medications Prior to Admission   Medication Sig    amLODIPine (NORVASC) 5 MG tablet Take 10 mg by mouth every evening. Take 5 mg every morning and 10 mg at night    aspirin (ECOTRIN) 81 MG EC tablet Take 81 mg by mouth once daily.    b complex vitamins tablet Take 1 tablet by mouth once daily.    carvedilol (COREG) 25 MG tablet Take 1 tablet (25 mg total) by mouth 2 (two) times daily.    cephALEXin (KEFLEX) 500 MG capsule Take 1 capsule (500 mg total) by mouth every 12 (twelve) hours.    clopidogreL  "(PLAVIX) 75 mg tablet Take 75 mg by mouth.    DULoxetine (CYMBALTA) 30 MG capsule Take 1 capsule (30 mg total) by mouth once daily.    ferrous sulfate (FEOSOL) 325 mg (65 mg iron) Tab tablet Take 325 mg by mouth daily with breakfast. Off at present    gabapentin (NEURONTIN) 300 MG capsule Take 1 capsule (300 mg total) by mouth once daily.    HYDROcodone-acetaminophen (NORCO) 5-325 mg per tablet Take 1 tablet by mouth every 6 (six) hours as needed for Pain.    insulin (BASAGLAR KWIKPEN U-100 INSULIN) glargine 100 units/mL (3mL) SubQ pen Inject 5 Units into the skin once daily.    lactulose (CHRONULAC) 10 gram/15 mL solution Take 10 g by mouth 2 (two) times a day.    levothyroxine (SYNTHROID) 75 MCG tablet Take 75 mcg by mouth once daily.    metoclopramide HCl (REGLAN) 10 MG tablet Take 1 tablet (10 mg total) by mouth 3 (three) times daily before meals.    omeprazole (PRILOSEC) 20 MG capsule Take 20 mg by mouth 2 (two) times daily.    ondansetron (ZOFRAN) 4 MG tablet Take 1 tablet (4 mg total) by mouth every 8 (eight) hours as needed for Nausea.    rosuvastatin (CRESTOR) 40 MG Tab Take 40 mg by mouth once daily.    tamsulosin (FLOMAX) 0.4 mg Cap Take 1 capsule (0.4 mg total) by mouth once daily.    tiZANidine (ZANAFLEX) 4 MG tablet Take 4 mg by mouth nightly.    traZODone (DESYREL) 100 MG tablet Take 100 mg by mouth once daily.     Antibiotics (From admission, onward)            Start     Stop Route Frequency Ordered    12/31/21 1015  cefTRIAXone (ROCEPHIN) 1 g/50 mL D5W IVPB         -- IV Every 24 hours (non-standard times) 12/31/21 0911    12/30/21 1917  vancomycin - pharmacy to dose  (vancomycin IVPB)        "And" Linked Group Details    -- IV pharmacy to manage frequency 12/30/21 1818        Antifungals (From admission, onward)            None        Antivirals (From admission, onward)    None           Immunization History   Administered Date(s) Administered    COVID-19, vector-nr, [id number], PF " (Matteo) 03/06/2021    PPD Test 01/06/2022       Family History     Problem Relation (Age of Onset)    Alcohol abuse Father    Arthritis Mother    Asthma Brother    Diabetes Mother, Sister, Brother    Early death Brother    Heart disease Mother, Brother    Hypertension Mother    Kidney disease Mother    Stroke Mother    Vision loss Mother        Social History     Socioeconomic History    Marital status: Single   Tobacco Use    Smoking status: Never Smoker    Smokeless tobacco: Never Used   Substance and Sexual Activity    Alcohol use: No     Comment: socially    Drug use: No    Sexual activity: Not Currently     Social Determinants of Health     Financial Resource Strain: Low Risk     Difficulty of Paying Living Expenses: Not hard at all   Food Insecurity: No Food Insecurity    Worried About Running Out of Food in the Last Year: Never true    Ran Out of Food in the Last Year: Never true   Transportation Needs: No Transportation Needs    Lack of Transportation (Medical): No    Lack of Transportation (Non-Medical): No   Physical Activity: Inactive    Days of Exercise per Week: 0 days    Minutes of Exercise per Session: 0 min   Stress: No Stress Concern Present    Feeling of Stress : Not at all   Social Connections: Moderately Isolated    Frequency of Communication with Friends and Family: More than three times a week    Frequency of Social Gatherings with Friends and Family: Once a week    Attends Zoroastrian Services: More than 4 times per year    Active Member of Clubs or Organizations: No    Attends Club or Organization Meetings: Never    Marital Status: Never    Housing Stability: Low Risk     Unable to Pay for Housing in the Last Year: No    Number of Places Lived in the Last Year: 1    Unstable Housing in the Last Year: No     Review of Systems   Constitutional: Negative for chills and fever.   All other systems reviewed and are negative.    Objective:     Vital Signs (Most  Recent):  Temp: 98.4 °F (36.9 °C) (01/07/22 0849)  Pulse: 72 (01/07/22 0849)  Resp: 16 (01/07/22 0849)  BP: 130/60 (01/07/22 0849)  SpO2: 98 % (01/07/22 0849) Vital Signs (24h Range):  Temp:  [97.8 °F (36.6 °C)-98.8 °F (37.1 °C)] 98.4 °F (36.9 °C)  Pulse:  [71-76] 72  Resp:  [16-20] 16  SpO2:  [98 %-99 %] 98 %  BP: (124-137)/(60-67) 130/60     Weight: 56 kg (123 lb 7 oz)  Body mass index is 19.33 kg/m².    Estimated Creatinine Clearance: 23.1 mL/min (A) (based on SCr of 2 mg/dL (H)).    Physical Exam  Vitals and nursing note reviewed.   Constitutional:       General: She is not in acute distress.     Appearance: Normal appearance. She is not ill-appearing or diaphoretic.   HENT:      Head: Normocephalic and atraumatic.   Eyes:      Conjunctiva/sclera: Conjunctivae normal.      Pupils: Pupils are equal, round, and reactive to light.   Pulmonary:      Effort: Pulmonary effort is normal.   Abdominal:      General: Abdomen is flat. Bowel sounds are normal.      Tenderness: There is no right CVA tenderness or left CVA tenderness.   Musculoskeletal:         General: Deformity present.   Skin:     General: Skin is warm and dry.      Findings: No erythema.   Neurological:      Mental Status: She is alert.   Psychiatric:         Mood and Affect: Mood normal.         Behavior: Behavior normal.         Thought Content: Thought content normal.         Judgment: Judgment normal.         Significant Labs:   Blood Culture:   Recent Labs   Lab 12/30/21  1523 12/30/21  1536   LABBLOO No growth after 5 days. No growth after 5 days.     CBC:   Recent Labs   Lab 01/07/22  0502   WBC 8.76   HGB 7.1*   HCT 23.2*        CMP:   Recent Labs   Lab 01/07/22  0502      K 3.9      CO2 24      BUN 14   CREATININE 2.0*   CALCIUM 8.9   PROT 6.8   ALBUMIN 2.8*   BILITOT 0.2   ALKPHOS 73   AST 9*   ALT <5*   ANIONGAP 9   EGFRNONAA 25*     Wound Culture: No results for input(s): LABAERO in the last 4320 hours.    Significant  Imaging: None

## 2022-01-07 NOTE — PLAN OF CARE
Pt is in review for placement at [facility]. Pending return call from [facility] admissions.    Cm to follow for plans and arrangemetns.

## 2022-01-07 NOTE — PROGRESS NOTES
[facility]  Adult Nutrition  Progress Note    SUMMARY       Recommendations  1.Continue 2000kcal DM diet with Boost Glucose Control (chcolate) TID to promote PO intake.   2.Recommend Frederic BID to promote wound healing.   3.Encourage good PO intake.    -monitor PO intake/ tolerance/ ONS/ weight changes.    Goals: Pt to meet 50-75% EEN/EPN via PO intake + ONS by RD f/u.  Nutrition Goal Status: goal met  Communication of RD Recs:  (POC)    Assessment and Plan    Severe protein-calorie malnutrition  Nutrition Problem:  Severe Protein-Calorie Malnutrition  Malnutrition in the context of Chronic Illness/Injury    Related to (etiology):  Altered GI function- chronic diarrhea/ nausea     Signs and Symptoms (as evidenced by):  Energy Intake: <75% of estimated energy requirement for > 1 month  Body Fat Depletion: mild depletion of orbitals, triceps and thoracic and lumbar region   Muscle Mass Depletion: mild depletion of clavicle region, scapular region, interosseous muscle, lower extremities and severe depletion of temples   Weight Loss: -22.88% x 9 months      Interventions(treatment strategy):  Collaboration with other healthcare providers   DM  ONS    Nutrition Diagnosis Status:  Continues    Malnutrition Assessment  Malnutrition Type: chronic illness  Energy Intake: severe energy intake  Skin (Micronutrient):  (Tyree Score 14)  Weight Loss (Malnutrition):  (-22.88% X  9 months)  Energy Intake (Malnutrition): less than or equal to 75% for greater than or equal to 1 month   Orbital Region (Subcutaneous Fat Loss): mild depletion  Upper Arm Region (Subcutaneous Fat Loss): mild depletion  Thoracic and Lumbar Region: well nourished   Religion Region (Muscle Loss): severe depletion  Clavicle Bone Region (Muscle Loss): mild depletion  Clavicle and Acromion Bone Region (Muscle Loss): mild depletion  Scapular Bone Region (Muscle Loss): mild depletion  Dorsal Hand (Muscle Loss): mild depletion  Patellar Region  "(Muscle Loss): mild depletion  Anterior Thigh Region (Muscle Loss): mild depletion  Posterior Calf Region (Muscle Loss): mild depletion   Subcutaneous Fat Loss (Final Summary): mild protein-calorie malnutrition  Muscle Loss Evaluation (Final Summary): mild protein-calorie malnutrition         Reason for Assessment    Reason For Assessment: RD follow-up  Diagnosis:  (Diabetic ulcer of toe of right foot associated with type 1 diabetes mellitus, with bone involvement without evidence of necrosis)  Relevant Medical History: osteoarthritis, history of C diff, hypothyroidism, 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, DM type 2, HTN, HLD, CKD, recurrent UTIs, history of left ureteral stent placement in 10/2021 with recent cystoscopy and finding of 7 mm right mid to distal ureteral calculi that was fragmented and removed as well as a stent on string placed, and history of appendectomy   Interdisciplinary Rounds: attended  General Information Comments: - Pt awaiting SNF. On Dm diet with Boost Glucose. No other ONS. PO intake 100%. Reports good appetite. No complaints other than foot pain.    Nutrition Discharge Planning: Pt to follow a DM/ cardiac diet with ONS as tolerated.    Nutrition Risk Screen    Nutrition Risk Screen: no indicators present    Nutrition/Diet History    Spiritual, Cultural Beliefs, Catholic Practices, Values that Affect Care: no  Factors Affecting Nutritional Intake: diarrhea,decreased appetite    Anthropometrics    Temp: 97.8 °F (36.6 °C)  Height: 5' 7" (170.2 cm)  Height (inches): 67 in  Weight: 56 kg (123 lb 7 oz)  Weight (lb): 123.44 lb  Ideal Body Weight (IBW), Female: 135 lb  % Ideal Body Weight, Female (lb): 91.44 %  BMI (Calculated): 19.3  BMI Grade: 18.5-24.9 - normal  Usual Body Weight (UBW), k.6 kg (9 months)  % Usual Body Weight: 77.28  % Weight Change From Usual Weight: -22.88 %    Lab/Procedures/Meds    Pertinent Labs Reviewed: reviewed  Pertinent Labs " Comments: Creatinine 2.0, H/H 7.1/23.2  Pertinent Medications Reviewed: reviewed  Pertinent Medications Comments: amlodipine, atorvastatin, carvedilol, ceftriaxone, clopidogrel, duloxetine, ferrous sulfate, gabapentin, lactulose, levothyroxine, metoclopramide HCl, pantoprazole, senna-docusate, tamsulosin, trazodone     Estimated/Assessed Needs    Weight Used For Calorie Calculations: 56 kg (123 lb 7.3 oz)  Energy Calorie Requirements (kcal): 7420-9532 kcal day (30-40 kcal /kg)  Energy Need Method: Kcal/kg  Protein Requirements: 67-84 g/ day (1.2-1.5 g/kg)  Weight Used For Protein Calculations: 56 kg (123 lb 7.3 oz)  Estimated Fluid Requirement Method: RDA Method  RDA Method (mL): 1680  CHO Requirement: 210 g day    Nutrition Prescription Ordered    Current Diet Order: 2000 kcal DM diet  Oral Nutrition Supplement: Boost Glucose Control TID    Evaluation of Received Nutrient/Fluid Intake    Energy Calories Required: not meeting needs  Protein Required: not meeting needs  Fluid Required: not meeting needs  % Intake of Estimated Energy Needs: 75 - 100 %  % Meal Intake: 75 - 100 %    Nutrition Risk    Level of Risk/Frequency of Follow-up:  (1-2 x weekly)     Monitor and Evaluation    Food and Nutrient Intake: food and beverage intake  Food and Nutrient Adminstration: diet order  Knowledge/Beliefs/Attitudes: food and nutrition knowledge/skill  Physical Activity and Function: nutrition-related ADLs and IADLs  Anthropometric Measurements: weight,weight change,body mass index  Biochemical Data, Medical Tests and Procedures: glucose/endocrine profile,electrolyte and renal panel,gastrointestinal profile,inflammatory profile,lipid profile  Nutrition-Focused Physical Findings: overall appearance     Nutrition Follow-Up    RD Follow-up?: Yes

## 2022-01-07 NOTE — PROGRESS NOTES
RSW called patient and patient will discharge to SNF when medically cleared. Patient has no additional needs.    PATSY Irving

## 2022-01-07 NOTE — PROGRESS NOTES
Pharmacokinetic Assessment Follow Up: IV Vancomycin    Vancomycin serum concentration assessment(s):    The random level was drawn correctly and can be used to guide therapy at this time. The measurement is within the desired definitive target range of 10 to 20 mcg/mL.    Vancomycin Regimen Plan:    Re-dose when the random level is less than 20 mcg/mL, next level to be drawn at 030 on 1/8/22    Drug levels (last 3 results):  Recent Labs   Lab Result Units 01/05/22  0456 01/06/22  0534 01/07/22  0502   Vancomycin, Random ug/mL 19.0 23.1 18.8       Pharmacy will continue to follow and monitor vancomycin.    Please contact pharmacy at extension [phone] for questions regarding this assessment.    Thank you for the consult,   Dustin Virgen       Patient brief summary:  Beatriz Adame is a 71 y.o. female initiated on antimicrobial therapy with IV Vancomycin for treatment of bone/joint infection    The patient's current regimen is pulse dosing    Drug Allergies:   Review of patient's allergies indicates:   Allergen Reactions    Tomato (solanum lycopersicum) Hives and Itching       Actual Body Weight:   56 kg    Renal Function:   Estimated Creatinine Clearance: 23.1 mL/min (A) (based on SCr of 2 mg/dL (H)).,     Dialysis Method (if applicable):  N/A    CBC (last 72 hours):  Recent Labs   Lab Result Units 01/07/22  0502   WBC K/uL 8.76   Hemoglobin g/dL 7.1*   Hematocrit % 23.2*   Platelets K/uL 190   Gran % % 48.8   Lymph % % 39.0   Mono % % 9.2   Eosinophil % % 2.2   Basophil % % 0.6   Differential Method  Automated       Metabolic Panel (last 72 hours):  Recent Labs   Lab Result Units 01/05/22  0456 01/07/22  0502   Sodium mmol/L 140 138   Potassium mmol/L 3.6 3.9   Chloride mmol/L 107 105   CO2 mmol/L 24 24   Glucose mg/dL 96 100   BUN mg/dL 16 14   Creatinine mg/dL 2.1* 2.0*   Albumin g/dL  --  2.8*   Total Bilirubin mg/dL  --  0.2   Alkaline Phosphatase U/L  --  73   AST U/L  --  9*   ALT U/L  --  <5*   Magnesium  mg/dL 1.8 1.9   Phosphorus mg/dL 3.3 3.0       Vancomycin Administrations:  vancomycin given in the last 96 hours                     vancomycin 750 mg in dextrose 5 % 250 mL IVPB (ready to mix system) (mg) 750 mg New Bag 01/05/22 0920                    Microbiologic Results:  Microbiology Results (last 7 days)       Procedure Component Value Units Date/Time    Blood Culture #2 **CANNOT BE ORDERED STAT** [[id number]] Collected: 12/30/21 1536    Order Status: Completed Specimen: Blood from Peripheral, Antecubital, Right Updated: 01/04/22 2212     Blood Culture, Routine No growth after 5 days.    Blood Culture #1 **CANNOT BE ORDERED STAT** [[id number]] Collected: 12/30/21 1523    Order Status: Completed Specimen: Blood from Peripheral, Antecubital, Right Updated: 01/04/22 2212     Blood Culture, Routine No growth after 5 days.    Tissue culture [[id number]]  (Abnormal)  (Susceptibility) Collected: 01/01/22 1345    Order Status: Completed Specimen: Tissue from Toe, Right Foot Updated: 01/04/22 1306     Aerobic Culture - Tissue ENTEROCOCCUS FAECALIS  Few       Gram Stain Result No WBC's      No organisms seen    Tissue culture [[id number]] Collected: 01/01/22 1345    Order Status: Sent Specimen: Tissue from Toe, Right Foot Updated: 01/01/22 1349

## 2022-01-07 NOTE — ASSESSMENT & PLAN NOTE
- as far as I can tell, the osteomyelitis has been completely resected  - pathology pending  - OK to change to Amoxil [place] SSTI

## 2022-01-08 NOTE — PLAN OF CARE
Problem: Physical Therapy Goal  Goal: Physical Therapy Goal  Description: Goals to be met by: 2/3/22    Patient will perform the following to increase strength, improve mobility, and return to prior level of function:    1. Rolling L/R with SPV.   2. Supine <> sit with SPV.  3. Sit<>stand with Terri with least restrictive assistive device.  4. Gait x 10 feet with Terri with least restrictive assistive device.       Outcome: Ongoing, Progressing   Pt NWB RLE heel contact only. Pt mod A for sit<>stand 2 person, min A for bed mobility. Pt bowel and bladder incontinent. Still appropriate for SNF.

## 2022-01-08 NOTE — ASSESSMENT & PLAN NOTE
"Osteomyelitis of right 2nd toe  - Right foot x-ray notes no convincing radiographic evidence of osteomyelitis  - Workup with MRI right foot notes "acute osteomyelitis involving the 2nd toe middle and distal phalanx"  - Initiated on Vancomycin and Zosyn, then changed to Rocephin and Vancomycin. ID consulted - OK to change to Amoxil for SSTI  - Arterial studies with non palpable pulses, Cardiology consulted.  Patient declined further workup of PAD with angiogram at this time  - S/p amputation right 2nd toe on 1/1/22  - Tissue culture from 1/1/22 with Enterococcus faecalis and pathology with bone margins still pending  - Podiatry initially planned further debridement with closure sometime this week, but case discussed with them today and plan to continue wound care and follow up as outpatient  - PT/OT say SNF.  She's agreeable.  CXR and PPD ordered 1/6  "

## 2022-01-08 NOTE — SUBJECTIVE & OBJECTIVE
Interval History: No complaints this morning. Creatinine improving.  Switched to oral amoxil. Waiting on SNF.    Review of Systems   Constitutional: Negative for chills, fatigue and fever.   Respiratory: Negative for cough and shortness of breath.    Cardiovascular: Negative for chest pain, palpitations and leg swelling.   Gastrointestinal: Negative for abdominal pain, diarrhea, nausea and vomiting.   Genitourinary: Negative for dysuria and urgency.   Neurological: Negative for dizziness and headaches.   All other systems reviewed and are negative.    Objective:     Vital Signs (Most Recent):  Temp: 98.3 °F (36.8 °C) (01/08/22 0517)  Pulse: 75 (01/08/22 0517)  Resp: 18 (01/08/22 0517)  BP: 124/60 (01/08/22 0517)  SpO2: 98 % (01/08/22 0517) Vital Signs (24h Range):  Temp:  [97.8 °F (36.6 °C)-98.4 °F (36.9 °C)] 98.3 °F (36.8 °C)  Pulse:  [67-84] 75  Resp:  [16-18] 18  SpO2:  [97 %-99 %] 98 %  BP: (124-134)/(60-64) 124/60     Weight: 56 kg (123 lb 7 oz)  Body mass index is 19.33 kg/m².    Intake/Output Summary (Last 24 hours) at 1/8/2022 0842  Last data filed at 1/7/2022 1911  Gross per 24 hour   Intake 1048.12 ml   Output --   Net 1048.12 ml      Physical Exam  Vitals reviewed.   Constitutional:       Appearance: Normal appearance.   HENT:      Head: Normocephalic and atraumatic.   Eyes:      General: No scleral icterus.     Conjunctiva/sclera: Conjunctivae normal.   Pulmonary:      Effort: Pulmonary effort is normal. No respiratory distress.   Skin:     Coloration: Skin is not jaundiced.      Findings: No erythema.   Neurological:      General: No focal deficit present.      Mental Status: She is alert and oriented to person, place, and time.   Psychiatric:         Mood and Affect: Mood normal.         Behavior: Behavior normal.         Significant Labs: All pertinent labs within the past 24 hours have been reviewed.    Significant Imaging: I have reviewed all pertinent imaging results/findings within the past 24  hours.

## 2022-01-08 NOTE — PT/OT/SLP PROGRESS
Occupational Therapy   Treatment    Name: Beatriz Adame  MRN: [id number]  Admitting Diagnosis:  Diabetic ulcer of toe of right foot associated with type 1 diabetes mellitus, with bone involvement without evidence of necrosis  7 Days Post-Op    Recommendations:     Discharge Recommendations: nursing facility, skilled  Discharge Equipment Recommendations:  wheelchair  Barriers to discharge:       Assessment:     Beatriz Adame is a 71 y.o. 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  She presents with generalized weakness. Performance deficits affecting function are required mod A supine<>sit. She sat EOB CGA for 5 minutes. Completed bed mobility with min A. Able to complete grooming I while sitting upright in bed with HOB.    .     Rehab Prognosis:  Good; patient would benefit from acute skilled OT services to address these deficits and reach maximum level of function.       Plan:     Patient to be seen 5 x/week to address the above listed problems via self-care/home management,therapeutic exercises  · Plan of Care Expires: 02/15/22  · Plan of Care Reviewed with: patient    Subjective     Pain/Comfort:  · Pain Rating 1: 0/10  · Pain Addressed 1: Pre-medicate for activity,Reposition,Distraction,Cessation of Activity  · Pain Rating Post-Intervention 1: 0/10    Objective:     Communicated with: Donna prior to session.  Patient found left sidelying with PureWick,peripheral IV,pressure relief boots upon OT entry to room.    General Precautions: Standard, diabetic   Orthopedic Precautions:RLE non weight bearing   Braces: N/A  Respiratory Status: Room air     Occupational Performance:     Bed Mobility:    · Patient completed Rolling/Turning to Left with  minimum assistance  · Patient completed Rolling/Turning to Right with minimum assistance  · Patient completed Scooting/Bridging with minimum  assistance  · Patient completed Supine to Sit with moderate assistance  · Patient completed Sit to Supine with minimum assistance     Functional Mobility/Transfers:  · Patient completed Sit <> Stand Transfer with maximal assistance  with  no assistive device   · Functional Mobility: required moderate verbal cues     Activities of Daily Living:  · Grooming: stand by assistance min verbal cues  · Toileting: moderate assistance mod A for mobility to place bed pan in proper position      [facility] 6 Click ADL: 14    Treatment & Education:  - educated on how to order food using food menu   - Verbal cues for removing cap from lotion to apply on bilateral legs     Patient left HOB elevated with all lines intact and call button in reachEducation:      GOALS:   Multidisciplinary Problems     Occupational Therapy Goals        Problem: Occupational Therapy Goal    Goal Priority Disciplines Outcome Interventions   Occupational Therapy Goal     OT, PT/OT Ongoing, Progressing    Description: Goals to be met by: 01/18/2022     Patient will increase functional independence with ADLs by performing:  Ongoing, progress to all goals     UE Dressing with Stand-by Assistance.  LE Dressing with Moderate Assistance.  Grooming while EOB with Stand-by Assistance.  Toileting from bedside commode with Moderate Assistance for hygiene and clothing management.   Supine to sit with Minimal Assistance.  Increased functional strength to WFL for bed mobility, functional transfers and ADL tasks.  Sit to/from stand/SPT to BSC/w/c with Moderate assistance of 1 person                     Time Tracking:     OT Date of Treatment:    OT Start Time:  1335 PM  OT Stop Time:  1415 PM  OT Total Time (min):      Billable Minutes:Self Care/Home Management 40 minutes   Overlap with OT for portions of session due to complex nature of patient and for safety with mobility to decrease fall risk for patient and caregiver injury requiring two skilled therapists to provide  interventions.     OT/KRISTYN: OT          1/8/2022

## 2022-01-08 NOTE — PT/OT/SLP PROGRESS
Physical Therapy Treatment    Patient Name:  Beatriz Adame   MRN:  [id number]    Recommendations:     Discharge Recommendations:  nursing facility, skilled   Discharge Equipment Recommendations: wheelchair   Barriers to discharge: Decreased caregiver support    Assessment:     Beatriz Adame is a 71 y.o. 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  She presents with the following impairments/functional limitations:  weakness,impaired endurance,impaired self care skills,impaired functional mobilty,gait instability,impaired balance,decreased lower extremity function,decreased upper extremity function,decreased ROM,impaired skin,orthopedic precautions     Pt NWB RLE heel contact only. Pt mod A for sit<>stand 2 person, min A for bed mobility. Pt bowel and bladder incontinent. Still appropriate for SNF.    Rehab Prognosis: Good; patient would benefit from acute skilled PT services to address these deficits and reach maximum level of function.    Recent Surgery: Procedure(s) (LRB):  AMPUTATION, TOE (Right) 7 Days Post-Op    Plan:     During this hospitalization, patient to be seen 4 x/week to address the identified rehab impairments via gait training,therapeutic activities,therapeutic exercises,neuromuscular re-education and progress toward the following goals:    · Plan of Care Expires:  02/03/22    Subjective     Chief Complaint: pt reported being sleepy  Patient/Family Comments/goals: pt agreeable to session w/ encouragement.  Pain/Comfort:  Pain Rating 1: 0/10  Pain Addressed 1: Pre-medicate for activity  Pain Rating Post-Intervention 1: 0/10      Objective:     Communicated with nurse prior to session.  Patient found HOB elevated with PureWick,peripheral IV,pressure relief boots upon PT entry to room.     General Precautions: Standard, diabetic,fall   Orthopedic Precautions:RLE non  weight bearing (can bear wt through the heel only in Darco shoes)   Braces: N/A  Respiratory Status: Room air     Functional Mobility:  · Bed Mobility:     · Rolling Left:  stand by assistance  · Rolling Right: stand by assistance  · Scooting: stand by assistance  · Bridging: stand by assistance  · Supine to Sit: minimum assistance  · Sit to Supine: minimum assistance  · Transfers:     · Sit to Stand:  moderate assistance and of 2 persons with hand-held assist, and RLE heelwt bearing in Darco shoe       AM-PAC 6 CLICK MOBILITY  Turning over in bed (including adjusting bedclothes, sheets and blankets)?: 3  Sitting down on and standing up from a chair with arms (e.g., wheelchair, bedside commode, etc.): 2  Moving from lying on back to sitting on the side of the bed?: 3  Moving to and from a bed to a chair (including a wheelchair)?: 2  Need to walk in hospital room?: 1  Climbing 3-5 steps with a railing?: 1  Basic Mobility Total Score: 12       Therapeutic Activities and Exercises:  Pericare, bed linens and gown changed    Patient left HOB elevated with all lines intact, call button in reach and bed alarm on..    GOALS:   Multidisciplinary Problems     Physical Therapy Goals        Problem: Physical Therapy Goal    Goal Priority Disciplines Outcome Goal Variances Interventions   Physical Therapy Goal     PT, PT/OT Ongoing, Progressing     Description: Goals to be met by: 2/3/22    Patient will perform the following to increase strength, improve mobility, and return to prior level of function:    1. Rolling L/R with SPV.   2. Supine <> sit with SPV.  3. Sit<>stand with Terri with least restrictive assistive device.  4. Gait x 10 feet with Terri with least restrictive assistive device.                        Time Tracking:     PT Received On: 01/08/22  PT Start Time: 1333     PT Stop Time: 1408  PT Total Time (min): 35 min     Billable Minutes: Therapeutic Activity 22 CO-TREAT FOR SAFETY    Treatment Type:  Treatment  PT/PTA: PT     PTA Visit Number: 0     01/08/2022

## 2022-01-08 NOTE — PLAN OF CARE
Problem: Occupational Therapy Goal  Goal: Occupational Therapy Goal  Description: Goals to be met by: 01/18/2022     Patient will increase functional independence with ADLs by performing:  Ongoing, progress to all goals     UE Dressing with Stand-by Assistance.  LE Dressing with Moderate Assistance.  Grooming while EOB with Stand-by Assistance.  Toileting from bedside commode with Moderate Assistance for hygiene and clothing management.   Supine to sit with Minimal Assistance.   Increased functional strength to WFL for bed mobility, functional transfers and ADL tasks.  Sit to/from stand/SPT to BSC/w/c with Moderate assistance of 1 person    Outcome: Ongoing, Progressing   Ms. Adame required mod A supine<>sit. She sat EOB CGA for 5 minutes. Completed bed mobility with min A. Able to complete grooming I while sitting upright in bed with HOB.

## 2022-01-08 NOTE — PROGRESS NOTES
[facility] Medicine  Telemedicine Progress Note    Patient Name: Beatriz Adame  MRN: [id number]  Patient Class: IP- Inpatient   Admission Date: 12/30/2021  Length of Stay: 6 days  Attending Physician: Roddy Luna MD  Primary Care Provider: Dante Olivier MD          Subjective:     Principal Problem:Diabetic ulcer of toe of right foot associated with type 1 diabetes mellitus, with bone involvement without evidence of necrosis        HPI:  Ms. 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    She presents to the ED with complaint of right 2nd toe wound infection.  She was evaluated by home health nurse today and felt wound worsened and needed to be evaluated.  Patient reports she developed the wound over the last month or so due to dragging her feet while in her wheelchair. She denies fever, chills, fatigue, muscle aches, cough, sore throat, nausea, vomiting, diarrhea, chest pain, shortness of breath, lightheadedness, dizziness, or syncope. She lives alone; uses wheelchair for mobility, is able to transfer from chair to bed/toilet/chair independently however her neighbors help her with ADLs and outings.     In the ED she is hemodynamically stable on room air and afebrile.  CBC with known anemia hemoglobin 7.5 and hematocrit 24.3 (baseline 8-9 and 26-29).  Chemistry with serum creatinine 1.8 (better than recent baseline), BUN 22.  LFTs WNLs.  .  CRP 53.3.  Blood cultures obtained.  COVID negative.  Right foot x-ray notes no convincing radiographic evidence of osteomyelitis.  MRI right foot notes acute  "osteomyelitis involving the 2nd toe middle and distal phalanx.  She was initiated on vancomycin and Zosyn.  The patient was placed in observation to the Hospital Medicine Service for further evaluation and treatment.       Overview/Hospital Course:  Ms. Adame presented with right toe ulcer.  Empirically treated with Zosyn and vancomycin.  MRI showed "acute osteomyelitis involving the 2nd toe middle and distal phalanx." Arterial us showed slow velocities with abnormal waveforms in the arteries of the calf suggestive of vessel hardening such as from diffuse atherosclerosis.  Podiatry and Cardiology consulted.  She underwent right 2nd toe amputation on 01/01/2022.  Patient declined further workup with angiogram for PAD at this time.  Podiatry plans on staged debridement and closure. PT and OT working with patient and recommending SNF placement.      Interval History: No complaints this morning. Creatinine improving.  Switched to oral amoxil. Waiting on SNF.    Review of Systems   Constitutional: Negative for chills, fatigue and fever.   Respiratory: Negative for cough and shortness of breath.    Cardiovascular: Negative for chest pain, palpitations and leg swelling.   Gastrointestinal: Negative for abdominal pain, diarrhea, nausea and vomiting.   Genitourinary: Negative for dysuria and urgency.   Neurological: Negative for dizziness and headaches.   All other systems reviewed and are negative.    Objective:     Vital Signs (Most Recent):  Temp: 98.3 °F (36.8 °C) (01/08/22 0517)  Pulse: 75 (01/08/22 0517)  Resp: 18 (01/08/22 0517)  BP: 124/60 (01/08/22 0517)  SpO2: 98 % (01/08/22 0517) Vital Signs (24h Range):  Temp:  [97.8 °F (36.6 °C)-98.4 °F (36.9 °C)] 98.3 °F (36.8 °C)  Pulse:  [67-84] 75  Resp:  [16-18] 18  SpO2:  [97 %-99 %] 98 %  BP: (124-134)/(60-64) 124/60     Weight: 56 kg (123 lb 7 oz)  Body mass index is 19.33 kg/m².    Intake/Output Summary (Last 24 hours) at 1/8/2022 0842  Last data filed at 1/7/2022 " "1911  Gross per 24 hour   Intake 1048.12 ml   Output --   Net 1048.12 ml      Physical Exam  Vitals reviewed.   Constitutional:       Appearance: Normal appearance.   HENT:      Head: Normocephalic and atraumatic.   Eyes:      General: No scleral icterus.     Conjunctiva/sclera: Conjunctivae normal.   Pulmonary:      Effort: Pulmonary effort is normal. No respiratory distress.   Skin:     Coloration: Skin is not jaundiced.      Findings: No erythema.   Neurological:      General: No focal deficit present.      Mental Status: She is alert and oriented to person, place, and time.   Psychiatric:         Mood and Affect: Mood normal.         Behavior: Behavior normal.         Significant Labs: All pertinent labs within the past 24 hours have been reviewed.    Significant Imaging: I have reviewed all pertinent imaging results/findings within the past 24 hours.      Assessment/Plan:      * Diabetic ulcer of toe of right foot associated with type 1 diabetes mellitus, with bone involvement without evidence of necrosis  Osteomyelitis of right 2nd toe  - Right foot x-ray notes no convincing radiographic evidence of osteomyelitis  - Workup with MRI right foot notes "acute osteomyelitis involving the 2nd toe middle and distal phalanx"  - Initiated on Vancomycin and Zosyn, then changed to Rocephin and Vancomycin. ID consulted - OK to change to Amoxil for SSTI  - Arterial studies with non palpable pulses, Cardiology consulted.  Patient declined further workup of PAD with angiogram at this time  - S/p amputation right 2nd toe on 1/1/22  - Tissue culture from 1/1/22 with Enterococcus faecalis and pathology with bone margins still pending  - Podiatry initially planned further debridement with closure sometime this week, but case discussed with them today and plan to continue wound care and follow up as outpatient  - PT/OT say SNF.  She's agreeable.  CXR and PPD ordered 1/6    Diabetic foot ulcer with osteomyelitis  - As " above    Hypothyroidism  -Chronic and stable  -Continue levothyroxine     CKD (chronic kidney disease), stage IV  - Serum creatinine at admission 1.8 which is better than recent baseline (average around 2.5), down to 2  - Avoid nephrotoxins and hypotension as able, renally dose medications  - Continue to monitor     Severe protein-calorie malnutrition  · Boost TID      Normocytic anemia  - On admission, hemoglobin 7.5 and hematocrit 24.3 (baseline 8-9 and 26-29)  - Recent iron studies showed iron deficiency with normal B12 and folate  - H&H has been stable, no indication for transfusion at this time, continue to monitor    History of stroke  - Residual left sided weakness at baseline  - Continue Lipitor 80mg PO daily  - Continue Aspirin 81mg PO daily  - Continue Plavix 75mg PO daily    Diabetes mellitus, type 2  Hemoglobin A1C   Date Value Ref Range Status   11/21/2021 5.4 4.0 - 5.6 % Final   - Hold home insulin, on 8 units daily  - Continue low-dose sliding scale insulin  - Glucose levels were reviewed and currently at targets, goal 140-180 during hospitalization  - Dose/medication adjustment as appropriate   - Monitor accuchecks AC/HS and PRN hypoglycemic protocol     Hyperlipidemia associated with type 2 diabetes mellitus  - Chronic and stable  - Continue Atorvastatin 80mg PO daily    Hypertension associated with stage 4 chronic kidney disease due to type 2 diabetes mellitus  - Chronic and stable  - Continue Norvasc 10mg PO qHS  - Continue Coreg 25mg PO  BID      VTE Risk Mitigation (From admission, onward)         Ordered     Place sequential compression device  Until discontinued         12/30/21 1828     Reason for No Pharmacological VTE Prophylaxis  Once        Question:  Reasons:  Answer:  Risk of Bleeding    12/30/21 1828                      I have assessed these finding virtually using telemed platform and with assistance of bedside nurse                 The attending portion of this evaluation,  treatment, and documentation was performed per Roddy Mathur MD via Telemedicine AudioVisual using the secure [employer] software platform with 2 way audio/video. The provider was located off-site and the patient is located in the hospital. The aforementioned video software was utilized to document the relevant history and physical exam    Roddy Mathur MD  Department of Hospital Medicine   [facility] ([place])

## 2022-01-10 NOTE — PROGRESS NOTES
[facility] Medicine  Telemedicine Progress Note    Patient Name: Beatriz Adame  MRN: [id number]  Patient Class: IP- Inpatient   Admission Date: 12/30/2021  Length of Stay: 8 days  Attending Physician: Annel Hernandez MD  Primary Care Provider: Dante Olivier MD          Subjective:     Principal Problem:Diabetic ulcer of toe of right foot associated with type 1 diabetes mellitus, with bone involvement without evidence of necrosis        HPI:  Ms. 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    She presents to the ED with complaint of right 2nd toe wound infection.  She was evaluated by home health nurse today and felt wound worsened and needed to be evaluated.  Patient reports she developed the wound over the last month or so due to dragging her feet while in her wheelchair. She denies fever, chills, fatigue, muscle aches, cough, sore throat, nausea, vomiting, diarrhea, chest pain, shortness of breath, lightheadedness, dizziness, or syncope. She lives alone; uses wheelchair for mobility, is able to transfer from chair to bed/toilet/chair independently however her neighbors help her with ADLs and outings.     In the ED she is hemodynamically stable on room air and afebrile.  CBC with known anemia hemoglobin 7.5 and hematocrit 24.3 (baseline 8-9 and 26-29).  Chemistry with serum creatinine 1.8 (better than recent baseline), BUN 22.  LFTs WNLs.  .  CRP 53.3.  Blood cultures obtained.  COVID negative.  Right foot x-ray notes no convincing radiographic evidence of osteomyelitis.  MRI right foot notes acute  "osteomyelitis involving the 2nd toe middle and distal phalanx.  She was initiated on vancomycin and Zosyn.  The patient was placed in observation to the Hospital Medicine Service for further evaluation and treatment.       Overview/Hospital Course:  Ms. Adame presented with right toe ulcer.  Empirically treated with Zosyn and vancomycin.  MRI showed "acute osteomyelitis involving the 2nd toe middle and distal phalanx." Arterial us showed slow velocities with abnormal waveforms in the arteries of the calf suggestive of vessel hardening such as from diffuse atherosclerosis.  Podiatry and Cardiology consulted.  She underwent right 2nd toe amputation on 01/01/2022.  Patient declined further workup with angiogram for PAD at this time.  Podiatry plans on staged debridement and closure. PT and OT working with patient and recommending SNF placement.      Interval History: No complaints this morning. Creatinine improving.  Switched to oral amoxil. Waiting on SNF. Retaining some urine however < 200, dose of flomax increased and monitoring with bladder scans.    Review of Systems   Constitutional: Negative for chills, fatigue and fever.   Respiratory: Negative for cough and shortness of breath.    Cardiovascular: Negative for chest pain, palpitations and leg swelling.   Gastrointestinal: Negative for abdominal pain, diarrhea, nausea and vomiting.   Genitourinary: Negative for dysuria and urgency.   Neurological: Negative for dizziness and headaches.   All other systems reviewed and are negative.    Objective:     Vital Signs (Most Recent):  Temp: 97.9 °F (36.6 °C) (01/10/22 1108)  Pulse: 84 (01/10/22 1108)  Resp: 16 (01/10/22 1108)  BP: 139/64 (01/10/22 1108)  SpO2: 99 % (01/10/22 1108) Vital Signs (24h Range):  Temp:  [97.2 °F (36.2 °C)-98.8 °F (37.1 °C)] 97.9 °F (36.6 °C)  Pulse:  [73-84] 84  Resp:  [16-18] 16  SpO2:  [96 %-100 %] 99 %  BP: (123-139)/(50-66) 139/64     Weight: 56 kg (123 lb 7 oz)  Body mass index is 19.33 " "kg/m².    Intake/Output Summary (Last 24 hours) at 1/10/2022 1604  Last data filed at 1/9/2022 1800  Gross per 24 hour   Intake 0 ml   Output --   Net 0 ml      Physical Exam  Vitals reviewed.   Constitutional:       Appearance: Normal appearance.   HENT:      Head: Normocephalic and atraumatic.   Eyes:      General: No scleral icterus.     Conjunctiva/sclera: Conjunctivae normal.   Pulmonary:      Effort: Pulmonary effort is normal. No respiratory distress.   Skin:     Coloration: Skin is not jaundiced.      Findings: No erythema.   Neurological:      General: No focal deficit present.      Mental Status: She is alert and oriented to person, place, and time.   Psychiatric:         Mood and Affect: Mood normal.         Behavior: Behavior normal.         Significant Labs: All pertinent labs within the past 24 hours have been reviewed.    Significant Imaging: I have reviewed all pertinent imaging results/findings within the past 24 hours.      Assessment/Plan:      * Diabetic ulcer of toe of right foot associated with type 1 diabetes mellitus, with bone involvement without evidence of necrosis  Osteomyelitis of right 2nd toe  - Right foot x-ray notes no convincing radiographic evidence of osteomyelitis  - Workup with MRI right foot notes "acute osteomyelitis involving the 2nd toe middle and distal phalanx"  - Initiated on Vancomycin and Zosyn, then changed to Rocephin and Vancomycin. ID consulted - OK to change to Amoxil for SSTI  - Arterial studies with non palpable pulses, Cardiology consulted.  Patient declined further workup of PAD with angiogram at this time  - S/p amputation right 2nd toe on 1/1/22  - Tissue culture from 1/1/22 with Enterococcus faecalis and pathology with bone margins still pending  - Podiatry initially planned further debridement with closure sometime this week, but case discussed with them today and plan to continue wound care and follow up as outpatient  - PT/OT say SNF.  She's agreeable.  " CXR and PPD ordered 1/6    Diabetic foot ulcer with osteomyelitis  - As above    Hypothyroidism  -Chronic and stable  -Continue levothyroxine     CKD (chronic kidney disease), stage IV  - Serum creatinine at admission 1.8 which is better than recent baseline (average around 2.5), down to 2  - Avoid nephrotoxins and hypotension as able, renally dose medications  - Continue to monitor     Severe protein-calorie malnutrition  · Boost TID      Normocytic anemia  - On admission, hemoglobin 7.5 and hematocrit 24.3 (baseline 8-9 and 26-29)  - Recent iron studies showed iron deficiency with normal B12 and folate  - H&H has been stable, no indication for transfusion at this time, continue to monitor    History of stroke  - Residual left sided weakness at baseline  - Continue Lipitor 80mg PO daily  - Continue Aspirin 81mg PO daily  - Continue Plavix 75mg PO daily    Diabetes mellitus, type 2  Hemoglobin A1C   Date Value Ref Range Status   11/21/2021 5.4 4.0 - 5.6 % Final   - Hold home insulin, on 8 units daily  - Continue low-dose sliding scale insulin  - Glucose levels were reviewed and currently at targets, goal 140-180 during hospitalization  - Dose/medication adjustment as appropriate   - Monitor accuchecks AC/HS and PRN hypoglycemic protocol     Hyperlipidemia associated with type 2 diabetes mellitus  - Chronic and stable  - Continue Atorvastatin 80mg PO daily    Hypertension associated with stage 4 chronic kidney disease due to type 2 diabetes mellitus  - Chronic and stable  - Continue Norvasc 10mg PO qHS  - Continue Coreg 25mg PO  BID      VTE Risk Mitigation (From admission, onward)         Ordered     Place sequential compression device  Until discontinued         12/30/21 1828     Reason for No Pharmacological VTE Prophylaxis  Once        Question:  Reasons:  Answer:  Risk of Bleeding    12/30/21 1828                      I have assessed these finding virtually using telemed platform and with assistance of bedside  nurse                 The attending portion of this evaluation, treatment, and documentation was performed per Annel Hernandez MD via Telemedicine AudioVisual using the secure [employer] software platform with 2 way audio/video. The provider was located off-site and the patient is located in the hospital. The aforementioned video software was utilized to document the relevant history and physical exam    Annel Hernandez MD  Department of Hospital Medicine   [facility] ([place])

## 2022-01-10 NOTE — PT/OT/SLP PROGRESS
Occupational Therapy   Treatment    Name: Beatriz Adame  MRN: [id number]  Admitting Diagnosis:  Diabetic ulcer of toe of right foot associated with type 1 diabetes mellitus, with bone involvement without evidence of necrosis  9 Days Post-Op    Recommendations:     Discharge Recommendations: nursing facility, skilled  Discharge Equipment Recommendations:  other (see comments),slide board (w/c with removable arm rests and swing away, elevating leg rests)  Barriers to discharge:  Decreased caregiver support    Assessment:     Beatriz Adame is a 71 y.o. 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  She presents with the following performance deficits affecting function: weakness,gait instability,decreased upper extremity function,decreased ROM,impaired endurance,impaired balance,decreased lower extremity function,impaired fine motor,impaired muscle length,impaired self care skills,impaired skin,orthopedic precautions,impaired functional mobilty,abnormal tone.     Continues to demonstrate improved mobility and good understanding of (R) LE weight bearing limitation.  Introduced slide board transfer to/from w/c and patient tolerated well.  Continue OT services to maximize patient functioning.     Rehab Prognosis:  Good; patient would benefit from acute skilled OT services to address these deficits and reach maximum level of function.       Plan:     Patient to be seen 5 x/week to address the above listed problems via self-care/home management,therapeutic activities,therapeutic exercises  · Plan of Care Expires: 02/15/22  · Plan of Care Reviewed with: patient    Subjective     Pain/Comfort:  · Pain Rating 1: 0/10  · Pain Rating Post-Intervention 1: 0/10    Objective:     Communicated with: CHYNA Ngo prior to session.  Patient found HOB elevated with PureWick,peripheral IV,pressure relief boots upon OT  entry to room.    General Precautions: Standard, diabetic,fall,other (see comments) (elevate HOB)   Orthopedic Precautions:RLE non weight bearing (can bear weight through (R) heel for transfers only)   Braces:  (Darco shoes (B))  Respiratory Status: Room air     Occupational Performance:     Bed Mobility:    · Patient completed Scooting/Bridging with stand by assistance and with side rail  · Patient completed Supine to Sit with stand by assistance and with side rail  · Patient completed Sit to Supine with stand by assistance and with side rail     Functional Mobility/Transfers:  · Patient completed Sit <> Stand Transfer with minimum assistance, maximal assistance and of 2 persons  with  hand-held assist   · Patient completed Bed <> Wheelchair Transfer using Stand Pivot with Max assist and Min assist x 2 persons and Slide Board technique with moderate assistance with slide board  · Functional Mobility: NT    Activities of Daily Living:  · Lower Body Dressing: maximal assistance Darco shoes EOB  · Toileting: total assistance for all aspects      [facility] 6 Click ADL: 12    Treatment & Education:  Educated on use of slide board transfer.  Patient verbalized understanding, will reinforce.    Patient left HOB elevated with all lines intact, call button in reach, bed alarm on and RN notifiedEducation:      GOALS:   Multidisciplinary Problems     Occupational Therapy Goals        Problem: Occupational Therapy Goal    Goal Priority Disciplines Outcome Interventions   Occupational Therapy Goal     OT, PT/OT Ongoing, Progressing    Description: Goals to be met by: 01/18/2022     Patient will increase functional independence with ADLs by performing:  Ongoing, progress to all goals     UE Dressing with Stand-by Assistance.  LE Dressing with Moderate Assistance.  Grooming while EOB with Stand-by Assistance.  Toileting from bedside commode with Moderate Assistance for hygiene and clothing management.   Supine to sit with Minimal  Assistance.(MET 01/10/2022)  Increased functional strength to WFL for bed mobility, functional transfers and ADL tasks.  Sit to/from stand/SPT to BSC/w/c with Moderate assistance of 1 person                     Time Tracking:     OT Date of Treatment: 01/10/22  OT Start Time: 0844  OT Stop Time: 0916  OT Total Time (min): 32 min    Billable Minutes:Self Care/Home Management 32    OT/KRISTYN: OT          1/10/2022

## 2022-01-10 NOTE — PT/OT/SLP PROGRESS
Physical Therapy Treatment    Patient Name:  Beatriz Adame   MRN:  [id number]    Recommendations:     Discharge Recommendations:  nursing facility, skilled   Discharge Equipment Recommendations: wheelchair   Barriers to discharge: Decreased caregiver support    Assessment:     Beatriz Adame is a 71 y.o. 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  She presents with the following impairments/functional limitations:  impaired endurance,weakness,impaired functional mobilty,gait instability,impaired balance,decreased lower extremity function,pain,impaired skin,orthopedic precautions.    Pt agreeable to therapy with PT/OT this date. Pt is SPV with bed mobility and sitting balance at SBA. Pt is dependent to marcia B surgical shoes and is NWB to RLE except heel with transfers. Multiple transfers trialed this date to .      Stand Pivot is maxAx1 and minAx1  Squat Pivot with slide board is modAx1  Sit to stand is maxAx1 with OT addressing LE, bedding and cleansing     Pt requires HHA/ bear hug to stand however once in standing is Terri standing mostly on LLE in surgical shoe. Pt would do well with hemiwalker trial and continued use of slide board for WC/commode transfer trials. Continue with current POC and DC recommendations.     Rehab Prognosis: Good; patient would benefit from acute skilled PT services to address these deficits and reach maximum level of function.    Recent Surgery: Procedure(s) (LRB):  AMPUTATION, TOE (Right) 9 Days Post-Op    Plan:     During this hospitalization, patient to be seen 4 x/week to address the identified rehab impairments via gait training,therapeutic activities,therapeutic exercises,neuromuscular re-education and progress toward the following goals:    · Plan of Care Expires:  02/03/22    Subjective     Chief Complaint: pain  Patient/Family Comments/goals:  get better   Pain/Comfort:  · Pain Rating 1: other (see comments) (some pain, no rating at RLE)  · Location - Side 1: Right  · Location 1: foot  · Pain Addressed 1: Reposition,Distraction,Cessation of Activity      Objective:     Communicated with RN prior to session.  Patient found HOB elevated with PureWick,peripheral IV upon PT entry to room.     General Precautions: Standard, fall   Orthopedic Precautions:RLE non weight bearing (heel contact allowed for transfers)   Braces:  (B surgical shoes)  Respiratory Status: Room air     Functional Mobility:  · Bed Mobility:     · Rolling Left:  supervision  · Rolling Right: supervision  · Scooting: supervision  · Supine to Sit: supervision  · Sit to Supine: supervision  · Transfers:     · Sit to Stand:  maximal assistance, of 1 persons and Stand pivot (maxAx1, minAx1); Squat Pivot with slide board (maxAx1) with no AD and hand-held assist  · Balance: sitting SPV, standing maxAx1 HHA      AM-PAC 6 CLICK MOBILITY  Turning over in bed (including adjusting bedclothes, sheets and blankets)?: 3  Sitting down on and standing up from a chair with arms (e.g., wheelchair, bedside commode, etc.): 2  Moving from lying on back to sitting on the side of the bed?: 3  Moving to and from a bed to a chair (including a wheelchair)?: 2  Need to walk in hospital room?: 2  Climbing 3-5 steps with a railing?: 2  Basic Mobility Total Score: 14       Therapeutic Activities and Exercises:   bed mobility   transfers (multiple)   balance     Patient left HOB elevated with all lines intact, call button in reach and bed alarm on..    GOALS:   Multidisciplinary Problems     Physical Therapy Goals        Problem: Physical Therapy Goal    Goal Priority Disciplines Outcome Goal Variances Interventions   Physical Therapy Goal     PT, PT/OT Ongoing, Progressing     Description: Goals to be met by: 2/3/22    Patient will perform the following to increase strength, improve mobility, and return to prior level  of function:    1. Rolling L/R with SPV. - MET 1/10/22  2. Supine <> sit with SPV. - MET 1/10/22  3. Sit<>stand with Terri with least restrictive assistive device.  4. Gait x 10 feet with Terri with least restrictive assistive device.                        Time Tracking:     PT Received On: 01/10/22  PT Start Time: 0843     PT Stop Time: 0917  PT Total Time (min): 34 min     Billable Minutes: Therapeutic Activity 34    Treatment Type: Treatment  PT/PTA: PT     PTA Visit Number: 0     01/10/2022

## 2022-01-10 NOTE — PLAN OF CARE
CM attempted to speak to admissions at [facility], was not able to speak to Renu or Lily. CM then called Emperatriz at [employer], Emperatriz is checking financials and will return call with any discharge plans.    CM to follow for plans and arrangemetns.   01/10/22 6099   Discharge Reassessment   Assessment Type Discharge Planning Reassessment   Did the patient's condition or plan change since previous assessment? No   Discharge Plan discussed with: Patient   Communicated MARY with patient/caregiver Date not available/Unable to determine   Discharge Plan A Skilled Nursing Facility   Discharge Plan B Skilled Nursing Facility

## 2022-01-10 NOTE — PLAN OF CARE
Problem: Physical Therapy Goal  Goal: Physical Therapy Goal  Description: Goals to be met by: 2/3/22    Patient will perform the following to increase strength, improve mobility, and return to prior level of function:    1. Rolling L/R with SPV. - MET 1/10/22  2. Supine <> sit with SPV. - MET 1/10/22  3. Sit<>stand with Terri with least restrictive assistive device.  4. Gait x 10 feet with Terri with least restrictive assistive device.       Outcome: Ongoing, Progressing     Pt agreeable to therapy with PT/OT this date. Pt is SPV with bed mobility and sitting balance at A. Pt is dependent to marcia B surgical shoes and is NWB to RLE except heel with transfers. Multiple transfers trialed this date to .     Stand Pivot is maxAx1 and minAx1  Squat Pivot with slide board is modAx1  Sit to stand is maxAx1 with OT addressing LE, bedding and cleansing    Pt requires HHA/ bear hug to stand however once in standing is Terri standing mostly on LLE in surgical shoe. Pt would do well with hemiwalker trial and continued use of slide board for WC/commode transfer trials. Continue with current POC and DC recommendations.

## 2022-01-10 NOTE — PROGRESS NOTES
[facility]  Telemedicine Progress Note    Patient Name: Beatriz Adame  MRN: [id number]  Patient Class: IP- Inpatient   Admission Date: 12/30/2021  Length of Stay: 7 days  Attending Physician: Annel Hernandez MD  Primary Care Provider: Dante Olivier MD          Subjective:     Principal Problem:Diabetic ulcer of toe of right foot associated with type 1 diabetes mellitus, with bone involvement without evidence of necrosis        HPI:  Ms. 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    She presents to the ED with complaint of right 2nd toe wound infection.  She was evaluated by home health nurse today and felt wound worsened and needed to be evaluated.  Patient reports she developed the wound over the last month or so due to dragging her feet while in her wheelchair. She denies fever, chills, fatigue, muscle aches, cough, sore throat, nausea, vomiting, diarrhea, chest pain, shortness of breath, lightheadedness, dizziness, or syncope. She lives alone; uses wheelchair for mobility, is able to transfer from chair to bed/toilet/chair independently however her neighbors help her with ADLs and outings.     In the ED she is hemodynamically stable on room air and afebrile.  CBC with known anemia hemoglobin 7.5 and hematocrit 24.3 (baseline 8-9 and 26-29).  Chemistry with serum creatinine 1.8 (better than recent baseline), BUN 22.  LFTs WNLs.  .  CRP 53.3.  Blood cultures obtained.  COVID negative.  Right foot x-ray notes no convincing radiographic evidence of osteomyelitis.  MRI right foot notes acute  "osteomyelitis involving the 2nd toe middle and distal phalanx.  She was initiated on vancomycin and Zosyn.  The patient was placed in observation to the Hospital Medicine Service for further evaluation and treatment.       Overview/Hospital Course:  Ms. Adame presented with right toe ulcer.  Empirically treated with Zosyn and vancomycin.  MRI showed "acute osteomyelitis involving the 2nd toe middle and distal phalanx." Arterial us showed slow velocities with abnormal waveforms in the arteries of the calf suggestive of vessel hardening such as from diffuse atherosclerosis.  Podiatry and Cardiology consulted.  She underwent right 2nd toe amputation on 01/01/2022.  Patient declined further workup with angiogram for PAD at this time.  Podiatry plans on staged debridement and closure. PT and OT working with patient and recommending SNF placement.      Interval History: No complaints this morning. Creatinine improving.  Switched to oral amoxil. Waiting on SNF.    Review of Systems   Constitutional: Negative for chills, fatigue and fever.   Respiratory: Negative for cough and shortness of breath.    Cardiovascular: Negative for chest pain, palpitations and leg swelling.   Gastrointestinal: Negative for abdominal pain, diarrhea, nausea and vomiting.   Genitourinary: Negative for dysuria and urgency.   Neurological: Negative for dizziness and headaches.   All other systems reviewed and are negative.    Objective:     Vital Signs (Most Recent):  Temp: 98.4 °F (36.9 °C) (01/09/22 1910)  Pulse: 73 (01/09/22 1910)  Resp: 16 (01/09/22 2001)  BP: 137/62 (01/09/22 1910)  SpO2: 97 % (01/09/22 1910) Vital Signs (24h Range):  Temp:  [97.4 °F (36.3 °C)-98.8 °F (37.1 °C)] 98.4 °F (36.9 °C)  Pulse:  [71-79] 73  Resp:  [16-18] 16  SpO2:  [96 %-100 %] 97 %  BP: (123-156)/(60-71) 137/62     Weight: 56 kg (123 lb 7 oz)  Body mass index is 19.33 kg/m².    Intake/Output Summary (Last 24 hours) at 1/9/2022 0097  Last data filed at 1/9/2022 " "1800  Gross per 24 hour   Intake 0 ml   Output --   Net 0 ml      Physical Exam  Vitals reviewed.   Constitutional:       Appearance: Normal appearance.   HENT:      Head: Normocephalic and atraumatic.   Eyes:      General: No scleral icterus.     Conjunctiva/sclera: Conjunctivae normal.   Pulmonary:      Effort: Pulmonary effort is normal. No respiratory distress.   Skin:     Coloration: Skin is not jaundiced.      Findings: No erythema.   Neurological:      General: No focal deficit present.      Mental Status: She is alert and oriented to person, place, and time.   Psychiatric:         Mood and Affect: Mood normal.         Behavior: Behavior normal.         Significant Labs: All pertinent labs within the past 24 hours have been reviewed.    Significant Imaging: I have reviewed all pertinent imaging results/findings within the past 24 hours.      Assessment/Plan:      * Diabetic ulcer of toe of right foot associated with type 1 diabetes mellitus, with bone involvement without evidence of necrosis  Osteomyelitis of right 2nd toe  - Right foot x-ray notes no convincing radiographic evidence of osteomyelitis  - Workup with MRI right foot notes "acute osteomyelitis involving the 2nd toe middle and distal phalanx"  - Initiated on Vancomycin and Zosyn, then changed to Rocephin and Vancomycin. ID consulted - OK to change to Amoxil for SSTI  - Arterial studies with non palpable pulses, Cardiology consulted.  Patient declined further workup of PAD with angiogram at this time  - S/p amputation right 2nd toe on 1/1/22  - Tissue culture from 1/1/22 with Enterococcus faecalis and pathology with bone margins still pending  - Podiatry initially planned further debridement with closure sometime this week, but case discussed with them today and plan to continue wound care and follow up as outpatient  - PT/OT say SNF.  She's agreeable.  CXR and PPD ordered 1/6    Diabetic foot ulcer with osteomyelitis  - As " above    Hypothyroidism  -Chronic and stable  -Continue levothyroxine     CKD (chronic kidney disease), stage IV  - Serum creatinine at admission 1.8 which is better than recent baseline (average around 2.5), down to 2  - Avoid nephrotoxins and hypotension as able, renally dose medications  - Continue to monitor     Severe protein-calorie malnutrition  · Boost TID      Normocytic anemia  - On admission, hemoglobin 7.5 and hematocrit 24.3 (baseline 8-9 and 26-29)  - Recent iron studies showed iron deficiency with normal B12 and folate  - H&H has been stable, no indication for transfusion at this time, continue to monitor    History of stroke  - Residual left sided weakness at baseline  - Continue Lipitor 80mg PO daily  - Continue Aspirin 81mg PO daily  - Continue Plavix 75mg PO daily    Diabetes mellitus, type 2  Hemoglobin A1C   Date Value Ref Range Status   11/21/2021 5.4 4.0 - 5.6 % Final   - Hold home insulin, on 8 units daily  - Continue low-dose sliding scale insulin  - Glucose levels were reviewed and currently at targets, goal 140-180 during hospitalization  - Dose/medication adjustment as appropriate   - Monitor accuchecks AC/HS and PRN hypoglycemic protocol     Hyperlipidemia associated with type 2 diabetes mellitus  - Chronic and stable  - Continue Atorvastatin 80mg PO daily    Hypertension associated with stage 4 chronic kidney disease due to type 2 diabetes mellitus  - Chronic and stable  - Continue Norvasc 10mg PO qHS  - Continue Coreg 25mg PO  BID      VTE Risk Mitigation (From admission, onward)         Ordered     Place sequential compression device  Until discontinued         12/30/21 1828     Reason for No Pharmacological VTE Prophylaxis  Once        Question:  Reasons:  Answer:  Risk of Bleeding    12/30/21 1828                      I have assessed these finding virtually using telemed platform and with assistance of bedside nurse                 The attending portion of this evaluation,  treatment, and documentation was performed per Annel Hernandez MD via Telemedicine AudioVisual using the secure [employer] software platform with 2 way audio/video. The provider was located off-site and the patient is located in the hospital. The aforementioned video software was utilized to document the relevant history and physical exam    Annel Hernandez MD  Department of Hospital Medicine   [facility] ([place])

## 2022-01-10 NOTE — SUBJECTIVE & OBJECTIVE
Interval History: No complaints this morning. Creatinine improving.  Switched to oral amoxil. Waiting on SNF.    Review of Systems   Constitutional: Negative for chills, fatigue and fever.   Respiratory: Negative for cough and shortness of breath.    Cardiovascular: Negative for chest pain, palpitations and leg swelling.   Gastrointestinal: Negative for abdominal pain, diarrhea, nausea and vomiting.   Genitourinary: Negative for dysuria and urgency.   Neurological: Negative for dizziness and headaches.   All other systems reviewed and are negative.    Objective:     Vital Signs (Most Recent):  Temp: 98.4 °F (36.9 °C) (01/09/22 1910)  Pulse: 73 (01/09/22 1910)  Resp: 16 (01/09/22 2001)  BP: 137/62 (01/09/22 1910)  SpO2: 97 % (01/09/22 1910) Vital Signs (24h Range):  Temp:  [97.4 °F (36.3 °C)-98.8 °F (37.1 °C)] 98.4 °F (36.9 °C)  Pulse:  [71-79] 73  Resp:  [16-18] 16  SpO2:  [96 %-100 %] 97 %  BP: (123-156)/(60-71) 137/62     Weight: 56 kg (123 lb 7 oz)  Body mass index is 19.33 kg/m².    Intake/Output Summary (Last 24 hours) at 1/9/2022 2157  Last data filed at 1/9/2022 1800  Gross per 24 hour   Intake 0 ml   Output --   Net 0 ml      Physical Exam  Vitals reviewed.   Constitutional:       Appearance: Normal appearance.   HENT:      Head: Normocephalic and atraumatic.   Eyes:      General: No scleral icterus.     Conjunctiva/sclera: Conjunctivae normal.   Pulmonary:      Effort: Pulmonary effort is normal. No respiratory distress.   Skin:     Coloration: Skin is not jaundiced.      Findings: No erythema.   Neurological:      General: No focal deficit present.      Mental Status: She is alert and oriented to person, place, and time.   Psychiatric:         Mood and Affect: Mood normal.         Behavior: Behavior normal.         Significant Labs: All pertinent labs within the past 24 hours have been reviewed.    Significant Imaging: I have reviewed all pertinent imaging results/findings within the past 24 hours.

## 2022-01-10 NOTE — SUBJECTIVE & OBJECTIVE
Interval History: No complaints this morning. Creatinine improving.  Switched to oral amoxil. Waiting on SNF. Retaining some urine however < 200, dose of flomax increased and monitoring with bladder scans.    Review of Systems   Constitutional: Negative for chills, fatigue and fever.   Respiratory: Negative for cough and shortness of breath.    Cardiovascular: Negative for chest pain, palpitations and leg swelling.   Gastrointestinal: Negative for abdominal pain, diarrhea, nausea and vomiting.   Genitourinary: Negative for dysuria and urgency.   Neurological: Negative for dizziness and headaches.   All other systems reviewed and are negative.    Objective:     Vital Signs (Most Recent):  Temp: 97.9 °F (36.6 °C) (01/10/22 1108)  Pulse: 84 (01/10/22 1108)  Resp: 16 (01/10/22 1108)  BP: 139/64 (01/10/22 1108)  SpO2: 99 % (01/10/22 1108) Vital Signs (24h Range):  Temp:  [97.2 °F (36.2 °C)-98.8 °F (37.1 °C)] 97.9 °F (36.6 °C)  Pulse:  [73-84] 84  Resp:  [16-18] 16  SpO2:  [96 %-100 %] 99 %  BP: (123-139)/(50-66) 139/64     Weight: 56 kg (123 lb 7 oz)  Body mass index is 19.33 kg/m².    Intake/Output Summary (Last 24 hours) at 1/10/2022 1604  Last data filed at 1/9/2022 1800  Gross per 24 hour   Intake 0 ml   Output --   Net 0 ml      Physical Exam  Vitals reviewed.   Constitutional:       Appearance: Normal appearance.   HENT:      Head: Normocephalic and atraumatic.   Eyes:      General: No scleral icterus.     Conjunctiva/sclera: Conjunctivae normal.   Pulmonary:      Effort: Pulmonary effort is normal. No respiratory distress.   Skin:     Coloration: Skin is not jaundiced.      Findings: No erythema.   Neurological:      General: No focal deficit present.      Mental Status: She is alert and oriented to person, place, and time.   Psychiatric:         Mood and Affect: Mood normal.         Behavior: Behavior normal.         Significant Labs: All pertinent labs within the past 24 hours have been reviewed.    Significant  Imaging: I have reviewed all pertinent imaging results/findings within the past 24 hours.

## 2022-01-10 NOTE — PLAN OF CARE
Problem: Occupational Therapy Goal  Goal: Occupational Therapy Goal  Description: Goals to be met by: 01/18/2022     Patient will increase functional independence with ADLs by performing:  Ongoing, progress to all goals     UE Dressing with Stand-by Assistance.  LE Dressing with Moderate Assistance.  Grooming while EOB with Stand-by Assistance.  Toileting from bedside commode with Moderate Assistance for hygiene and clothing management.   Supine to sit with Minimal Assistance.(MET 01/10/2022)  Increased functional strength to WFL for bed mobility, functional transfers and ADL tasks.  Sit to/from stand/SPT to BSC/w/c with Moderate assistance of 1 person    Outcome: Ongoing, Progressing  Continues to demonstrate improved mobility and good understanding of (R) LE weight bearing limitation.  Introduced slide board transfer to/from w/c and patient tolerated well.  Continue OT services to maximize patient functioning.

## 2022-01-10 NOTE — PLAN OF CARE
AAOx4. POC reviewed. No significant events this shift. No complaints of pain. Pt voids with an external catheter and shifts in bed with assist. Bed low and locked, side rails up x3, call light within reach.    Problem: Adult Inpatient Plan of Care  Goal: Plan of Care Review  Outcome: Ongoing, Progressing  Goal: Patient-Specific Goal (Individualized)  Outcome: Ongoing, Progressing  Goal: Absence of Hospital-Acquired Illness or Injury  Outcome: Ongoing, Progressing  Goal: Optimal Comfort and Wellbeing  Outcome: Ongoing, Progressing  Goal: Readiness for Transition of Care  Outcome: Ongoing, Progressing     Problem: Diabetes Comorbidity  Goal: Blood Glucose Level Within Targeted Range  Outcome: Ongoing, Progressing     Problem: Fluid and Electrolyte Imbalance (Acute Kidney Injury/Impairment)  Goal: Fluid and Electrolyte Balance  Outcome: Ongoing, Progressing     Problem: Oral Intake Inadequate (Acute Kidney Injury/Impairment)  Goal: Optimal Nutrition Intake  Outcome: Ongoing, Progressing     Problem: Renal Function Impairment (Acute Kidney Injury/Impairment)  Goal: Effective Renal Function  Outcome: Ongoing, Progressing     Problem: Skin Injury Risk Increased  Goal: Skin Health and Integrity  Outcome: Ongoing, Progressing     Problem: Fall Injury Risk  Goal: Absence of Fall and Fall-Related Injury  Outcome: Ongoing, Progressing

## 2022-01-10 NOTE — NURSING
MD Hernandez notified of no UOP this shift and second bladder scan showed 239 and patient does not feel the need to void.  Order for one-time straight cath, US retroperitoneal, and LR @ 125/hr

## 2022-01-10 NOTE — NURSING
Patient has not voided this shift. Bladder scan showed 137. MD notified.     Per Dr Hernandez, Cr improving, continue monitoring

## 2022-01-11 NOTE — NURSING
Patient had spontaneous urine output. Tenderness to vaginal area resolved. Purewick catheter applied.

## 2022-01-11 NOTE — NURSING
Pubic area firm/distended. Patient reported tenderness in suprapubic region. I/O catheter performed. Obtained 300 ml becerra urine with small amount of sediment. Patient relieved thereafter.    Removed 22 gauge IV from right antecubital. Inserted 22 gauge IV to right hand. Patient tolerated fairly well. IV fluids resumed.    Patient c/o tenderness to vaginal area. Purewick removed considering no output d/t urinary retention.

## 2022-01-11 NOTE — PLAN OF CARE
JOSE called [facility] to inquire about paperwork. Admit at [facility] states they have not received paperwork from their [employer] office. JOSE spoke to Emperatriz at [employer], Emperatriz is sending paperwork now.    CM to follow with [facility], JOSE called pt's son, Alysa, [phone], to inform him to expect a call from [facility] to complete his portion of paperwork.    CM to follow for plans and arrangements.

## 2022-01-11 NOTE — PROGRESS NOTES
[facility] S[facility] ([place])  Podiatry  Progress Note    Patient Name: Beatriz Adame  MRN: [id number]  Admission Date: 12/30/2021  Hospital Length of Stay: 9 days  Attending Physician: Annel Hernandez MD  Primary Care Provider: Dante Olivier MD     Subjective:     Interval History: s/p Right 2nd toe amputation .  Dressing CDI.  In Prevalon boots bilateral.   DOS:  1/1/2022        Follow-up For: Procedure(s) (LRB):  AMPUTATION, TOE (Right)    Post-Operative Day:   10 DAYS POST OP      Scheduled Meds:   amLODIPine  10 mg Oral QHS    amoxicillin  500 mg Oral Q12H    aspirin  81 mg Oral Daily    atorvastatin  80 mg Oral Daily    carvediloL  25 mg Oral BID    clopidogreL  75 mg Oral Daily    DULoxetine  30 mg Oral Daily    ferrous sulfate  1 tablet Oral Daily    gabapentin  300 mg Oral Daily    lactulose  10 g Oral BID    levothyroxine  75 mcg Oral Daily    metoclopramide HCl  5 mg Oral Q6H    pantoprazole  40 mg Oral Daily    senna-docusate 8.6-50 mg  1 tablet Oral BID    tamsulosin  0.8 mg Oral Daily    traZODone  100 mg Oral QHS       Continuous Infusions:   PRN Meds:acetaminophen, bisacodyL, dextrose 50%, dextrose 50%, glucagon (human recombinant), glucose, glucose, HYDROcodone-acetaminophen, HYDROcodone-acetaminophen, HYDROmorphone, influenza, insulin aspart U-100, melatonin, ondansetron, ondansetron, ondansetron, oxyCODONE, polyethylene glycol, sars-cov-2 (covid-19), sodium chloride 0.9%, sodium chloride 0.9%      Objective:     Vital Signs (Most Recent):  Temp: 98.4 °F (36.9 °C) (01/11/22 1211)  Pulse: 74 (01/11/22 1211)  Resp: 16 (01/11/22 1211)  BP: (!) 164/79 (01/11/22 1211)  SpO2: 97 % (01/11/22 1211) Vital Signs (24h Range):  Temp:  [97.8 °F (36.6 °C)-99.1 °F (37.3 °C)] 98.4 °F (36.9 °C)  Pulse:  [72-74] 74  Resp:  [16-18] 16  SpO2:  [97 %-99 %] 97 %  BP: (120-164)/(54-79) 164/79     Weight: 56 kg (123 lb 7 oz)  Body mass index is 19.33 kg/m².      Foot Exam    Laboratory:  A1C:    Recent Labs   Lab 07/22/21  0648 11/21/21  0334   HGBA1C 6.3* 5.4     Blood Cultures: No results for input(s): LABBLOO in the last 48 hours.  BMP:   Recent Labs   Lab 01/07/22  0502 01/09/22  0818 01/11/22  0541      < > 73      < > 137   K 3.9   < > 4.1      < > 105   CO2 24   < > 20*   BUN 14   < > 19   CREATININE 2.0*   < > 1.4   CALCIUM 8.9   < > 8.2*   MG 1.9  --   --     < > = values in this interval not displayed.     CBC:   Recent Labs   Lab 01/11/22  0542   WBC 7.01   RBC 2.50*   HGB 7.1*   HCT 22.9*      MCV 92   MCH 28.4   MCHC 31.0*     CMP:   Recent Labs   Lab 01/07/22  0502 01/09/22  0818 01/11/22  0541      < > 73   CALCIUM 8.9   < > 8.2*   ALBUMIN 2.8*  --  2.4*   PROT 6.8  --   --       < > 137   K 3.9   < > 4.1   CO2 24   < > 20*      < > 105   BUN 14   < > 19   CREATININE 2.0*   < > 1.4   ALKPHOS 73  --   --    ALT <5*  --   --    AST 9*  --   --    BILITOT 0.2  --   --     < > = values in this interval not displayed.     Coagulation:   No results for input(s): PT, INR, APTT in the last 168 hours.  CRP:   No results for input(s): CRP in the last 168 hours.  ESR:   No results for input(s): SEDRATE in the last 168 hours.  Prealbumin: No results for input(s): PREALBUMIN in the last 48 hours.  Wound Cultures: No results for input(s): LABAERO in the last 4320 hours.  Microbiology Results (last 7 days)       Procedure Component Value Units Date/Time    Urine culture [[id number]] Collected: 01/10/22 0543    Order Status: No result Specimen: Urine Updated: 01/10/22 0813    Blood Culture #2 **CANNOT BE ORDERED STAT** [[id number]] Collected: 12/30/21 1536    Order Status: Completed Specimen: Blood from Peripheral, Antecubital, Right Updated: 01/04/22 2212     Blood Culture, Routine No growth after 5 days.    Blood Culture #1 **CANNOT BE ORDERED STAT** [[id number]] Collected: 12/30/21 1523    Order Status: Completed Specimen: Blood from Peripheral, Antecubital,  Right Updated: 01/04/22 2212     Blood Culture, Routine No growth after 5 days.          Specimen (24h ago, onward)                None            Diagnostic Results:  X-Ray: I have reviewed all pertinent results/findings within the past 24 hours.  no apparent complication    Clinical Findings:  Wound base right 2nd toe well approximated, sutures intact  without ulceration, drainage, pus, tracking, fluctuance, malodor, or cardinal signs infection.    Amp 2nd toe right, hallux valgus asymptomatic.    DP and PT pulses 1/4 bilateral, capillary refill 3 - 4 seconds all toes/distal feet, all toes/both feet warm to touch.  Negative lymphadenopathy bilateral popliteal fossa and tarsal tunnel.  Negavie lower extremity edema bilateral.     Diminished/loss of protective sensation all toes bilateral to 10 gram monofilament.        1/11/2022            Assessment/Plan:     Active Diagnoses:    Diagnosis Date Noted POA    PRINCIPAL PROBLEM:  Diabetic ulcer of toe of right foot associated with type 1 diabetes mellitus, with bone involvement without evidence of necrosis [E10.621, L97.516] 12/30/2021 Yes    Diabetic foot ulcer with osteomyelitis [E11.621, E11.69, L97.509, M86.9] 12/31/2021 Yes    CKD (chronic kidney disease), stage IV [N18.4] 12/30/2021 Yes    Hypothyroidism [E03.9] 12/30/2021 Yes    Severe protein-calorie malnutrition [E43] 11/21/2021 Yes    History of stroke [Z86.73] 07/22/2021 Not Applicable     Chronic    Normocytic anemia [D64.9] 07/22/2021 Yes    Diabetes mellitus, type 2 [E11.9] 10/27/2016 Yes    Hyperlipidemia associated with type 2 diabetes mellitus [E11.69, E78.5]  Yes     Chronic    Hypertension associated with stage 4 chronic kidney disease due to type 2 diabetes mellitus [E11.22, I12.9, N18.4]  Yes     Chronic      Problems Resolved During this Admission:       10 days s/p amp right 2nd toe healing well.   Sutures removed.  Very small area of dehiscence.    Applied xeroform, kerlix, ACE  avoiding tight compression.  Change every 2-3 days.   Weight bearing as tolerated.   Awaiting SNF placement.         Sheila Duenas DPM  Podiatry  [facility] ([place])

## 2022-01-11 NOTE — NURSING
Suprapubic area firm/distended. I/O catheter performed. Obtained 400 ml becerra urine with scant amount of sediment.    Patient had small amount of dark brown unformed stool.    Patient continues to c/o tenderness to vaginal area. Patient has thick, white vaginal discharge. No obvious odor.

## 2022-01-11 NOTE — PLAN OF CARE
AAOx4. POC reviewed. No complaints of pain. Pt is incontinent. Incontinence pads and purewick in use. Bladder scan done. 300 mL retention measured. Pt was straight cath per MD. 300 ml of output collected. Pt shifts with assist.  Bed low and locked, side rails up x3, call light within reach.     Problem: Adult Inpatient Plan of Care  Goal: Plan of Care Review  Outcome: Ongoing, Progressing  Goal: Patient-Specific Goal (Individualized)  Outcome: Ongoing, Progressing  Goal: Absence of Hospital-Acquired Illness or Injury  Outcome: Ongoing, Progressing  Goal: Optimal Comfort and Wellbeing  Outcome: Ongoing, Progressing  Goal: Readiness for Transition of Care  Outcome: Ongoing, Progressing     Problem: Diabetes Comorbidity  Goal: Blood Glucose Level Within Targeted Range  Outcome: Ongoing, Progressing     Problem: Fluid and Electrolyte Imbalance (Acute Kidney Injury/Impairment)  Goal: Fluid and Electrolyte Balance  Outcome: Ongoing, Progressing     Problem: Oral Intake Inadequate (Acute Kidney Injury/Impairment)  Goal: Optimal Nutrition Intake  Outcome: Ongoing, Progressing     Problem: Renal Function Impairment (Acute Kidney Injury/Impairment)  Goal: Effective Renal Function  Outcome: Ongoing, Progressing     Problem: Skin Injury Risk Increased  Goal: Skin Health and Integrity  Outcome: Ongoing, Progressing     Problem: Fall Injury Risk  Goal: Absence of Fall and Fall-Related Injury  Outcome: Ongoing, Progressing     Problem: Infection  Goal: Absence of Infection Signs and Symptoms  Outcome: Ongoing, Progressing

## 2022-01-11 NOTE — PLAN OF CARE
POC reviewed w/ pt. AAOx4. No significant events this shift. VSS on RA. C/o pain treated w/ PRN pain medication per MAR. Turn Q2/frequent weight shift assistance provided. No injuries, falls, or trauma occurred during shift. Purposeful rounding completed. Bed alarm set, bed low and locked, side rails up x3, call light within reach.

## 2022-01-11 NOTE — PROGRESS NOTES
RSW called patient and patient has no additional needs. Patient will discharge to SNF once all required documents are signed.     PATSY Irving

## 2022-01-12 PROBLEM — R33.9 URINARY RETENTION: Status: ACTIVE | Noted: 2022-01-01

## 2022-01-12 NOTE — PHYSICIAN QUERY
PT Name: Beatriz Adame  MR #: [id number]     DOCUMENTATION CLARIFICATION     CDS/: Bhavik Rothman RN, CCDS      Contact information:   [email or web address]      This form is a permanent document in the medical record.     Query Date: January 12, 2022    By submitting this query, we are merely seeking further clarification of documentation to reflect the severity of illness of your patient. Please utilize your independent clinical judgment when addressing the question(s) below.    The medical record reflects the following:     Indicators   Supporting Clinical Findings Location in Medical Record   x Diabetes documented Principal Problem:Diabetic ulcer of toe of right foot associated with type 1 diabetes mellitus, with bone involvement without evidence of necrosis;  ---PMHx: ....DM type 2, HTN, HLD, CKD  - Diabetes mellitus, type 2    Active Hospital Problems:   Diabetic ulcer of toe of right foot associated with type 1 diabetes mellitus, with bone involvement without evidence of necrosis   -- Hyperlipidemia associated with type 2 diabetes mellitus   -Hypertension associated with stage 4 chronic kidney disease due to type 2 diabetes mellitus   -Diabetes mellitus, type 2   12/30 H&P, Hospital medicine             12/31 H&P Podiatry    x Lab Value(s), POCT glucose value(s) 12/30  glucose: 102  12/31  glucose: 115  1/1    Glucose: 101  1/2    Glucose:  91  1/3     Glucose:  104  1/4    Glucose: 98   Labs      Beta-OHxy Butyric Acid levels      Serum Osmolarity      pH      Anion gap/ Bicarb levels     x Treatment/Medication Home meds:   Insulin , glargine, SQ pen   12/30 H&P, Hospital medicine    Other       Provider, due to conflicting documentation, please clarify the type of diabetic condition associated with the above findings:     [   ] Diabetes mellitus Type 1    [ x  ] Diabetes mellitus Type 2    [   ] Other diabetes complication (please specify): ____________   [   ]  Clinically Undetermined        Please document in your progress notes daily for the duration of treatment until resolved, and include in your discharge summary.

## 2022-01-12 NOTE — PT/OT/SLP PROGRESS
Physical Therapy Treatment    Patient Name:  Beatriz Adame   MRN:  [id number]    Recommendations:     Discharge Recommendations:  nursing facility, skilled   Discharge Equipment Recommendations: slide board (pt needs a w/c w/ removable arm rests and elevating leg rests)   Barriers to discharge: Decreased caregiver support    Assessment:     Beatriz Adame is a 71 y.o. 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  She presents with the following impairments/functional limitations:  weakness,impaired self care skills,impaired functional mobilty,impaired balance,gait instability,decreased upper extremity function,decreased lower extremity function,decreased safety awareness,decreased ROM,impaired skin,edema,orthopedic precautions ;pt with fair t/f's today, needing a lot of assistance w/ SPT to commode chair.    Rehab Prognosis: Good; patient would benefit from acute skilled PT services to address these deficits and reach maximum level of function.    Recent Surgery: Procedure(s) (LRB):  AMPUTATION, TOE (Right) 11 Days Post-Op    Plan:     During this hospitalization, patient to be seen 4 x/week to address the identified rehab impairments via gait training,therapeutic activities,therapeutic exercises,neuromuscular re-education and progress toward the following goals:    · Plan of Care Expires:  02/03/22    Subjective     Chief Complaint: none stated  Patient/Family Comments/goals: pt agreeable to session.   Pain/Comfort:  · Pain Rating 1: 0/10  · Pain Rating Post-Intervention 1: 0/10      Objective:     Communicated with nurse prior to session. Nsg. Requesting that therapy t/f pt to commode chair.  Patient found HOB elevated with peripheral IV,PureWick upon PT entry to room.     General Precautions: Standard, fall,diabetic   Orthopedic Precautions:RLE non weight bearing (heel wt bearing  for t/f's)   Braces:  (BLE Darco shoes)  Respiratory Status: Room air     Functional Mobility:  · Bed Mobility:     · Scooting: stand by assistance, contact guard assistance and seated at EOB, and in supine  · Supine to Sit: contact guard assistance  · Sit to Supine: stand by assistance  · Transfers:     · Sit to Stand:  moderate assistance, maximal assistance and of 1 persons with no AD and pt using railing/arm rest for assistance in RUE, with heel wt bearing on RLE  · Bed to Chair: moderate assistance and of 2 persons with  no AD and pt wearing Darco shoes, heel wt bearing on RLE  using  Squat Pivot      AM-PAC 6 CLICK MOBILITY  Turning over in bed (including adjusting bedclothes, sheets and blankets)?: 3  Sitting down on and standing up from a chair with arms (e.g., wheelchair, bedside commode, etc.): 2  Moving from lying on back to sitting on the side of the bed?: 3  Moving to and from a bed to a chair (including a wheelchair)?: 2  Need to walk in hospital room?: 1  Climbing 3-5 steps with a railing?: 1  Basic Mobility Total Score: 12       Therapeutic Activities and Exercises:  Pt sat up ~15 min. Total time on commode chair, nsg. Requested for pt to urinate, however, pt only having loose BM.   pt perf'd multiple t/f's sit to stand (with assistance as noted above) for hygiene purposes. Max. Cueing for keeping RLE heel wt bearing only, totA for hygiene (OT present and assisting), pt stood ~:10 sec. Each trial.     Patient left HOB elevated with all lines intact, call button in reach, nurse notified and Purewick in place..    GOALS:   Multidisciplinary Problems     Physical Therapy Goals        Problem: Physical Therapy Goal    Goal Priority Disciplines Outcome Goal Variances Interventions   Physical Therapy Goal     PT, PT/OT Ongoing, Progressing     Description: Goals to be met by: 2/3/22    Patient will perform the following to increase strength, improve mobility, and return to prior level of function:    1.  Rolling L/R with SPV. - MET 1/10/22  2. Supine <> sit with SPV. - MET 1/10/22  3. Sit<>stand with Terri with least restrictive assistive device.  4. Gait x 10 feet with Terri with least restrictive assistive device.                        Time Tracking:     PT Received On: 01/12/22  PT Start Time: 1527     PT Stop Time: 1609  PT Total Time (min): 42 min     Billable Minutes: Therapeutic Activity 42    Treatment Type: Treatment  PT/PTA: PTA     PTA Visit Number: 1     01/12/2022

## 2022-01-12 NOTE — SUBJECTIVE & OBJECTIVE
Interval History: Patient still with intermittent urinary retention, will attempt to move to bedside commode and if still with urinary retention, will place delgado catheter.     Review of Systems   Constitutional: Negative for chills, fatigue and fever.   Respiratory: Negative for cough and shortness of breath.    Cardiovascular: Negative for chest pain, palpitations and leg swelling.   Gastrointestinal: Negative for abdominal pain, diarrhea, nausea and vomiting.   Genitourinary: Positive for difficulty urinating (retention). Negative for dysuria and urgency.   Neurological: Negative for dizziness and headaches.   All other systems reviewed and are negative.    Objective:     Vital Signs (Most Recent):  Temp: 98 °F (36.7 °C) (01/12/22 1153)  Pulse: 81 (01/12/22 1153)  Resp: 16 (01/12/22 1153)  BP: (!) 140/67 (01/12/22 1153)  SpO2: 99 % (01/12/22 1153) Vital Signs (24h Range):  Temp:  [97.8 °F (36.6 °C)-98.7 °F (37.1 °C)] 98 °F (36.7 °C)  Pulse:  [69-81] 81  Resp:  [16-18] 16  SpO2:  [98 %-99 %] 99 %  BP: (134-154)/(60-69) 140/67     Weight: 56 kg (123 lb 7 oz)  Body mass index is 19.33 kg/m².    Intake/Output Summary (Last 24 hours) at 1/12/2022 1545  Last data filed at 1/11/2022 1600  Gross per 24 hour   Intake 120 ml   Output --   Net 120 ml      Physical Exam  Vitals reviewed.   Constitutional:       Appearance: Normal appearance.   HENT:      Head: Normocephalic and atraumatic.   Eyes:      General: No scleral icterus.     Conjunctiva/sclera: Conjunctivae normal.   Pulmonary:      Effort: Pulmonary effort is normal. No respiratory distress.   Skin:     Coloration: Skin is not jaundiced.      Findings: No erythema.   Neurological:      General: No focal deficit present.      Mental Status: She is alert and oriented to person, place, and time.   Psychiatric:         Mood and Affect: Mood normal.         Behavior: Behavior normal.         Significant Labs: All pertinent labs within the past 24 hours have been  reviewed.    Significant Imaging: I have reviewed all pertinent imaging results/findings within the past 24 hours.

## 2022-01-12 NOTE — SUBJECTIVE & OBJECTIVE
Interval History: No complaints this morning. Creatinine improving.  Switched to oral amoxil. Waiting on SNF. Patient voiding on her own now.     Review of Systems   Constitutional: Negative for chills, fatigue and fever.   Respiratory: Negative for cough and shortness of breath.    Cardiovascular: Negative for chest pain, palpitations and leg swelling.   Gastrointestinal: Negative for abdominal pain, diarrhea, nausea and vomiting.   Genitourinary: Negative for dysuria and urgency.   Neurological: Negative for dizziness and headaches.   All other systems reviewed and are negative.    Objective:     Vital Signs (Most Recent):  Temp: 98.4 °F (36.9 °C) (01/11/22 1913)  Pulse: 75 (01/11/22 1913)  Resp: 16 (01/11/22 1913)  BP: (!) 153/69 (01/11/22 1913)  SpO2: 98 % (01/11/22 1913) Vital Signs (24h Range):  Temp:  [97.8 °F (36.6 °C)-98.7 °F (37.1 °C)] 98.4 °F (36.9 °C)  Pulse:  [69-75] 75  Resp:  [16-18] 16  SpO2:  [97 %-99 %] 98 %  BP: (121-164)/(54-79) 153/69     Weight: 56 kg (123 lb 7 oz)  Body mass index is 19.33 kg/m².    Intake/Output Summary (Last 24 hours) at 1/11/2022 2020  Last data filed at 1/11/2022 1600  Gross per 24 hour   Intake 690 ml   Output 702 ml   Net -12 ml      Physical Exam  Vitals reviewed.   Constitutional:       Appearance: Normal appearance.   HENT:      Head: Normocephalic and atraumatic.   Eyes:      General: No scleral icterus.     Conjunctiva/sclera: Conjunctivae normal.   Pulmonary:      Effort: Pulmonary effort is normal. No respiratory distress.   Skin:     Coloration: Skin is not jaundiced.      Findings: No erythema.   Neurological:      General: No focal deficit present.      Mental Status: She is alert and oriented to person, place, and time.   Psychiatric:         Mood and Affect: Mood normal.         Behavior: Behavior normal.         Significant Labs: All pertinent labs within the past 24 hours have been reviewed.    Significant Imaging: I have reviewed all pertinent imaging  results/findings within the past 24 hours.

## 2022-01-12 NOTE — PROGRESS NOTES
[facility] Medicine  Telemedicine Progress Note    Patient Name: Beatriz Adame  MRN: [id number]  Patient Class: IP- Inpatient   Admission Date: 12/30/2021  Length of Stay: 9 days  Attending Physician: Annel Hernandez MD  Primary Care Provider: Dante Olivier MD          Subjective:     Principal Problem:Diabetic ulcer of toe of right foot associated with type 1 diabetes mellitus, with bone involvement without evidence of necrosis        HPI:  Ms. 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    She presents to the ED with complaint of right 2nd toe wound infection.  She was evaluated by home health nurse today and felt wound worsened and needed to be evaluated.  Patient reports she developed the wound over the last month or so due to dragging her feet while in her wheelchair. She denies fever, chills, fatigue, muscle aches, cough, sore throat, nausea, vomiting, diarrhea, chest pain, shortness of breath, lightheadedness, dizziness, or syncope. She lives alone; uses wheelchair for mobility, is able to transfer from chair to bed/toilet/chair independently however her neighbors help her with ADLs and outings.     In the ED she is hemodynamically stable on room air and afebrile.  CBC with known anemia hemoglobin 7.5 and hematocrit 24.3 (baseline 8-9 and 26-29).  Chemistry with serum creatinine 1.8 (better than recent baseline), BUN 22.  LFTs WNLs.  .  CRP 53.3.  Blood cultures obtained.  COVID negative.  Right foot x-ray notes no convincing radiographic evidence of osteomyelitis.  MRI right foot notes acute  "osteomyelitis involving the 2nd toe middle and distal phalanx.  She was initiated on vancomycin and Zosyn.  The patient was placed in observation to the Hospital Medicine Service for further evaluation and treatment.       Overview/Hospital Course:  Ms. Adame presented with right toe ulcer.  Empirically treated with Zosyn and vancomycin.  MRI showed "acute osteomyelitis involving the 2nd toe middle and distal phalanx." Arterial us showed slow velocities with abnormal waveforms in the arteries of the calf suggestive of vessel hardening such as from diffuse atherosclerosis.  Podiatry and Cardiology consulted.  She underwent right 2nd toe amputation on 01/01/2022.  Patient declined further workup with angiogram for PAD at this time.  Podiatry plans on staged debridement and closure. PT and OT working with patient and recommending SNF placement.      Interval History: No complaints this morning. Creatinine improving.  Switched to oral amoxil. Waiting on SNF. Patient voiding on her own now.     Review of Systems   Constitutional: Negative for chills, fatigue and fever.   Respiratory: Negative for cough and shortness of breath.    Cardiovascular: Negative for chest pain, palpitations and leg swelling.   Gastrointestinal: Negative for abdominal pain, diarrhea, nausea and vomiting.   Genitourinary: Negative for dysuria and urgency.   Neurological: Negative for dizziness and headaches.   All other systems reviewed and are negative.    Objective:     Vital Signs (Most Recent):  Temp: 98.4 °F (36.9 °C) (01/11/22 1913)  Pulse: 75 (01/11/22 1913)  Resp: 16 (01/11/22 1913)  BP: (!) 153/69 (01/11/22 1913)  SpO2: 98 % (01/11/22 1913) Vital Signs (24h Range):  Temp:  [97.8 °F (36.6 °C)-98.7 °F (37.1 °C)] 98.4 °F (36.9 °C)  Pulse:  [69-75] 75  Resp:  [16-18] 16  SpO2:  [97 %-99 %] 98 %  BP: (121-164)/(54-79) 153/69     Weight: 56 kg (123 lb 7 oz)  Body mass index is 19.33 kg/m².    Intake/Output Summary (Last 24 hours) at 1/11/2022 " "2020  Last data filed at 1/11/2022 1600  Gross per 24 hour   Intake 690 ml   Output 702 ml   Net -12 ml      Physical Exam  Vitals reviewed.   Constitutional:       Appearance: Normal appearance.   HENT:      Head: Normocephalic and atraumatic.   Eyes:      General: No scleral icterus.     Conjunctiva/sclera: Conjunctivae normal.   Pulmonary:      Effort: Pulmonary effort is normal. No respiratory distress.   Skin:     Coloration: Skin is not jaundiced.      Findings: No erythema.   Neurological:      General: No focal deficit present.      Mental Status: She is alert and oriented to person, place, and time.   Psychiatric:         Mood and Affect: Mood normal.         Behavior: Behavior normal.         Significant Labs: All pertinent labs within the past 24 hours have been reviewed.    Significant Imaging: I have reviewed all pertinent imaging results/findings within the past 24 hours.      Assessment/Plan:      * Diabetic ulcer of toe of right foot associated with type 1 diabetes mellitus, with bone involvement without evidence of necrosis  Osteomyelitis of right 2nd toe  - Right foot x-ray notes no convincing radiographic evidence of osteomyelitis  - Workup with MRI right foot notes "acute osteomyelitis involving the 2nd toe middle and distal phalanx"  - Initiated on Vancomycin and Zosyn, then changed to Rocephin and Vancomycin. ID consulted - OK to change to Amoxil for SSTI  - Arterial studies with non palpable pulses, Cardiology consulted.  Patient declined further workup of PAD with angiogram at this time  - S/p amputation right 2nd toe on 1/1/22  - Tissue culture from 1/1/22 with Enterococcus faecalis and pathology with bone margins still pending  - Podiatry initially planned further debridement with closure sometime this week, but case discussed with them today and plan to continue wound care and follow up as outpatient  - PT/OT say SNF.  She's agreeable.  CXR and PPD ordered 1/6    Diabetic foot ulcer with " osteomyelitis  - As above    Hypothyroidism  -Chronic and stable  -Continue levothyroxine     CKD (chronic kidney disease), stage IV  - Serum creatinine at admission 1.8 which is better than recent baseline (average around 2.5), down to 2  - Avoid nephrotoxins and hypotension as able, renally dose medications  - Continue to monitor     Severe protein-calorie malnutrition  · Boost TID      Normocytic anemia  - On admission, hemoglobin 7.5 and hematocrit 24.3 (baseline 8-9 and 26-29)  - Recent iron studies showed iron deficiency with normal B12 and folate  - H&H has been stable, no indication for transfusion at this time, continue to monitor    History of stroke  - Residual left sided weakness at baseline  - Continue Lipitor 80mg PO daily  - Continue Aspirin 81mg PO daily  - Continue Plavix 75mg PO daily    Diabetes mellitus, type 2  Hemoglobin A1C   Date Value Ref Range Status   11/21/2021 5.4 4.0 - 5.6 % Final   - Hold home insulin, on 8 units daily  - Continue low-dose sliding scale insulin  - Glucose levels were reviewed and currently at targets, goal 140-180 during hospitalization  - Dose/medication adjustment as appropriate   - Monitor accuchecks AC/HS and PRN hypoglycemic protocol     Hyperlipidemia associated with type 2 diabetes mellitus  - Chronic and stable  - Continue Atorvastatin 80mg PO daily    Hypertension associated with stage 4 chronic kidney disease due to type 2 diabetes mellitus  - Chronic and stable  - Continue Norvasc 10mg PO qHS  - Continue Coreg 25mg PO  BID      VTE Risk Mitigation (From admission, onward)         Ordered     Place sequential compression device  Until discontinued         12/30/21 1828     Reason for No Pharmacological VTE Prophylaxis  Once        Question:  Reasons:  Answer:  Risk of Bleeding    12/30/21 1828                      I have assessed these finding virtually using telemed platform and with assistance of bedside nurse       The attending portion of this evaluation,  treatment, and documentation was performed per Annel Hernandez MD via Telemedicine AudioVisual using the secure gumi software platform with 2 way audio/video. The provider was located off-site and the patient is located in the hospital. The aforementioned video software was utilized to document the relevant history and physical exam    Annel Hernandez MD  Department of Hospital Medicine   [facility] ([place])

## 2022-01-12 NOTE — ASSESSMENT & PLAN NOTE
- intermittent urinary retention during hospital stay, requiring intermittent in/out cath  - may need delgado 1/12/22  - flomax increased

## 2022-01-12 NOTE — AI DETERIORATION ALERT
Patient began experiencing urinary retention again. No output from Purewick catheter. Vaginal area appears normal. Bladder scan revealed 340 ml. I/O catheter performed. Obtained 300 ml clear, yellow urine. Patient tolerated well. Suprapubic area soft/nondistended thereafter.

## 2022-01-12 NOTE — PROGRESS NOTES
[facility] Medicine  Telemedicine Progress Note    Patient Name: Beatriz Adame  MRN: [id number]  Patient Class: IP- Inpatient   Admission Date: 12/30/2021  Length of Stay: 10 days  Attending Physician: Annel Hernandez MD  Primary Care Provider: Dante Olivier MD          Subjective:     Principal Problem:Diabetic ulcer of toe of right foot associated with type 1 diabetes mellitus, with bone involvement without evidence of necrosis        HPI:  Ms. 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    She presents to the ED with complaint of right 2nd toe wound infection.  She was evaluated by home health nurse today and felt wound worsened and needed to be evaluated.  Patient reports she developed the wound over the last month or so due to dragging her feet while in her wheelchair. She denies fever, chills, fatigue, muscle aches, cough, sore throat, nausea, vomiting, diarrhea, chest pain, shortness of breath, lightheadedness, dizziness, or syncope. She lives alone; uses wheelchair for mobility, is able to transfer from chair to bed/toilet/chair independently however her neighbors help her with ADLs and outings.     In the ED she is hemodynamically stable on room air and afebrile.  CBC with known anemia hemoglobin 7.5 and hematocrit 24.3 (baseline 8-9 and 26-29).  Chemistry with serum creatinine 1.8 (better than recent baseline), BUN 22.  LFTs WNLs.  .  CRP 53.3.  Blood cultures obtained.  COVID negative.  Right foot x-ray notes no convincing radiographic evidence of osteomyelitis.  MRI right foot notes acute  "osteomyelitis involving the 2nd toe middle and distal phalanx.  She was initiated on vancomycin and Zosyn.  The patient was placed in observation to the Hospital Medicine Service for further evaluation and treatment.       Overview/Hospital Course:  Ms. Adame presented with right toe ulcer.  Empirically treated with Zosyn and vancomycin.  MRI showed "acute osteomyelitis involving the 2nd toe middle and distal phalanx." Arterial us showed slow velocities with abnormal waveforms in the arteries of the calf suggestive of vessel hardening such as from diffuse atherosclerosis.  Podiatry and Cardiology consulted.  She underwent right 2nd toe amputation on 01/01/2022.  Patient declined further workup with angiogram for PAD at this time.  Podiatry plans on staged debridement and closure. PT and OT working with patient and recommending SNF placement.      Interval History: Patient still with intermittent urinary retention, will attempt to move to bedside commode and if still with urinary retention, will place delgado catheter.     Review of Systems   Constitutional: Negative for chills, fatigue and fever.   Respiratory: Negative for cough and shortness of breath.    Cardiovascular: Negative for chest pain, palpitations and leg swelling.   Gastrointestinal: Negative for abdominal pain, diarrhea, nausea and vomiting.   Genitourinary: Positive for difficulty urinating (retention). Negative for dysuria and urgency.   Neurological: Negative for dizziness and headaches.   All other systems reviewed and are negative.    Objective:     Vital Signs (Most Recent):  Temp: 98 °F (36.7 °C) (01/12/22 1153)  Pulse: 81 (01/12/22 1153)  Resp: 16 (01/12/22 1153)  BP: (!) 140/67 (01/12/22 1153)  SpO2: 99 % (01/12/22 1153) Vital Signs (24h Range):  Temp:  [97.8 °F (36.6 °C)-98.7 °F (37.1 °C)] 98 °F (36.7 °C)  Pulse:  [69-81] 81  Resp:  [16-18] 16  SpO2:  [98 %-99 %] 99 %  BP: (134-154)/(60-69) 140/67     Weight: 56 kg (123 lb 7 oz)  Body mass " "index is 19.33 kg/m².    Intake/Output Summary (Last 24 hours) at 1/12/2022 1545  Last data filed at 1/11/2022 1600  Gross per 24 hour   Intake 120 ml   Output --   Net 120 ml      Physical Exam  Vitals reviewed.   Constitutional:       Appearance: Normal appearance.   HENT:      Head: Normocephalic and atraumatic.   Eyes:      General: No scleral icterus.     Conjunctiva/sclera: Conjunctivae normal.   Pulmonary:      Effort: Pulmonary effort is normal. No respiratory distress.   Skin:     Coloration: Skin is not jaundiced.      Findings: No erythema.   Neurological:      General: No focal deficit present.      Mental Status: She is alert and oriented to person, place, and time.   Psychiatric:         Mood and Affect: Mood normal.         Behavior: Behavior normal.         Significant Labs: All pertinent labs within the past 24 hours have been reviewed.    Significant Imaging: I have reviewed all pertinent imaging results/findings within the past 24 hours.      Assessment/Plan:      * Diabetic ulcer of toe of right foot associated with type 1 diabetes mellitus, with bone involvement without evidence of necrosis  Osteomyelitis of right 2nd toe  - Right foot x-ray notes no convincing radiographic evidence of osteomyelitis  - Workup with MRI right foot notes "acute osteomyelitis involving the 2nd toe middle and distal phalanx"  - Initiated on Vancomycin and Zosyn, then changed to Rocephin and Vancomycin. ID consulted - OK to change to Amoxil for SSTI  - Arterial studies with non palpable pulses, Cardiology consulted.  Patient declined further workup of PAD with angiogram at this time  - S/p amputation right 2nd toe on 1/1/22  - Tissue culture from 1/1/22 with Enterococcus faecalis and pathology with bone margins still pending  - Podiatry initially planned further debridement with closure sometime this week, but case discussed with them today and plan to continue wound care and follow up as outpatient  - PT/OT say SNF.  " She's agreeable.  CXR and PPD ordered 1/6    Urinary retention  - intermittent urinary retention during hospital stay, requiring intermittent in/out cath  - may need delgado 1/12/22  - flomax increased    Diabetic foot ulcer with osteomyelitis  - As above    Hypothyroidism  -Chronic and stable  -Continue levothyroxine     CKD (chronic kidney disease), stage IV  - Serum creatinine at admission 1.8 which is better than recent baseline (average around 2.5), down to 2  - Avoid nephrotoxins and hypotension as able, renally dose medications  - Continue to monitor     Severe protein-calorie malnutrition  · Boost TID      Normocytic anemia  - On admission, hemoglobin 7.5 and hematocrit 24.3 (baseline 8-9 and 26-29)  - Recent iron studies showed iron deficiency with normal B12 and folate  - H&H has been stable, no indication for transfusion at this time, continue to monitor    History of stroke  - Residual left sided weakness at baseline  - Continue Lipitor 80mg PO daily  - Continue Aspirin 81mg PO daily  - Continue Plavix 75mg PO daily    Diabetes mellitus, type 2  Hemoglobin A1C   Date Value Ref Range Status   11/21/2021 5.4 4.0 - 5.6 % Final   - Hold home insulin, on 8 units daily  - Continue low-dose sliding scale insulin  - Glucose levels were reviewed and currently at targets, goal 140-180 during hospitalization  - Dose/medication adjustment as appropriate   - Monitor accuchecks AC/HS and PRN hypoglycemic protocol     Hyperlipidemia associated with type 2 diabetes mellitus  - Chronic and stable  - Continue Atorvastatin 80mg PO daily    Hypertension associated with stage 4 chronic kidney disease due to type 2 diabetes mellitus  - Chronic and stable  - Continue Norvasc 10mg PO qHS  - Continue Coreg 25mg PO  BID      VTE Risk Mitigation (From admission, onward)         Ordered     Place sequential compression device  Until discontinued         12/30/21 8828     Reason for No Pharmacological VTE Prophylaxis  Once         Question:  Reasons:  Answer:  Risk of Bleeding    12/30/21 7124                      I have assessed these finding virtually using telemed platform and with assistance of bedside nurse                 The attending portion of this evaluation, treatment, and documentation was performed per Annel Hernandez MD via Telemedicine AudioVisual using the secure [employer]eng[employer] software platform with 2 way audio/video. The provider was located off-site and the patient is located in the hospital. The aforementioned video software was utilized to document the relevant history and physical exam    Annel Hernandez MD  Department of Hospital Medicine   [facility] ([place])

## 2022-01-13 NOTE — PLAN OF CARE
NURSING HOME ORDERS    01/13/2022  [facility] ([facility])  [facility] ([facility])  [address]  [place] [address]  Dept: [phone]  Loc: [phone]     Admit to Nursing Home:  Skilled Nursing Facility    Diagnoses:  Active Hospital Problems    Diagnosis  POA    *Diabetic ulcer of toe of right foot associated with type 1 diabetes mellitus, with bone involvement without evidence of necrosis [E10.621, L97.516]  Yes    Urinary retention [R33.9]  No    Diabetic foot ulcer with osteomyelitis [E11.621, E11.69, L97.509, M86.9]  Yes    CKD (chronic kidney disease), stage IV [N18.4]  Yes    Hypothyroidism [E03.9]  Yes    Severe protein-calorie malnutrition [E43]  Yes    History of stroke [Z86.73]  Not Applicable     Chronic    Normocytic anemia [D64.9]  Yes    Diabetes mellitus, type 2 [E11.9]  Yes    Hyperlipidemia associated with type 2 diabetes mellitus [E11.69, E78.5]  Yes     Chronic    Hypertension associated with stage 4 chronic kidney disease due to type 2 diabetes mellitus [E11.22, I12.9, N18.4]  Yes     Chronic      Resolved Hospital Problems   No resolved problems to display.       Code Status: Full    Patient is homebound due to:  Diabetic ulcer of toe of right foot associated with type 1 diabetes mellitus, with bone involvement without evidence of necrosis    Allergies:  Review of patient's allergies indicates:   Allergen Reactions    Tomato (solanum lycopersicum) Hives and Itching       Vitals:  Routine    Diet: cardiac diet and diabetic diet: 2000 calorie    Activities:   Activity as tolerated    Labs:  N/A    Nursing Precautions:  Fall and Pressure ulcer prevention    Consults:   PT to evaluate and treat- 4 times a week, OT to evaluate and treat- 4 times a week, Wound Care and Nutrition to evaluate and recommend diet     Miscellaneous Care: Araiza Care: Empty Araiza bag every shift.  Change Araiza every month  Wound Care: yes: R second toe . Applied xeroform, kerlix, ACE  avoiding tight compression.  Change every 2-3 days.                    Diabetes Care:  SN to perform and educate Diabetic management with blood glucose monitoring:, Fingerstick blood sugar AC and HS and Report CBG < 60 or > 350 to physician.      Medications: Discontinue all previous medication orders, if any. See new list below.     Medication List      START taking these medications    amoxicillin 500 MG capsule  Commonly known as: AMOXIL  Take 1 capsule (500 mg total) by mouth every 12 (twelve) hours. for 3 days        CONTINUE taking these medications    amLODIPine 5 MG tablet  Commonly known as: NORVASC  Take 10 mg by mouth every evening. Take 5 mg every morning and 10 mg at night     aspirin 81 MG EC tablet  Commonly known as: ECOTRIN  Take 81 mg by mouth once daily.     b complex vitamins tablet  Take 1 tablet by mouth once daily.     BASAGLAR KWIKPEN U-100 INSULIN glargine 100 units/mL (3mL) SubQ pen  Generic drug: insulin  Inject 5 Units into the skin once daily.     carvediloL 25 MG tablet  Commonly known as: COREG  Take 1 tablet (25 mg total) by mouth 2 (two) times daily.     clopidogreL 75 mg tablet  Commonly known as: PLAVIX  Take 75 mg by mouth daily.     DULoxetine 30 MG capsule  Commonly known as: CYMBALTA  Take 1 capsule (30 mg total) by mouth once daily.     ferrous sulfate 325 mg (65 mg iron) Tab tablet  Commonly known as: FEOSOL  Take 325 mg by mouth daily with breakfast. Off at present     gabapentin 300 MG capsule  Commonly known as: NEURONTIN  Take 1 capsule (300 mg total) by mouth once daily.     HYDROcodone-acetaminophen 5-325 mg per tablet  Commonly known as: NORCO  Take 1 tablet by mouth every 6 (six) hours as needed for Pain.     lactulose 10 gram/15 mL solution  Commonly known as: CHRONULAC  Take 10 g by mouth 2 (two) times a day.     levothyroxine 75 MCG tablet  Commonly known as: SYNTHROID  Take 75 mcg by mouth once daily.     metoclopramide HCl 10 MG tablet  Commonly known as:  REGLAN  Take 1 tablet (10 mg total) by mouth 3 (three) times daily before meals.     omeprazole 20 MG capsule  Commonly known as: PRILOSEC  Take 20 mg by mouth 2 (two) times daily.     ondansetron 4 MG tablet  Commonly known as: ZOFRAN  Take 1 tablet (4 mg total) by mouth every 8 (eight) hours as needed for Nausea.     rosuvastatin 40 MG Tab  Commonly known as: CRESTOR  Take 40 mg by mouth once daily.     tamsulosin 0.4 mg Cap  Commonly known as: FLOMAX  Take 1 capsule (0.4 mg total) by mouth once daily.     traZODone 100 MG tablet  Commonly known as: DESYREL  Take 100 mg by mouth once daily.        STOP taking these medications    cephALEXin 500 MG capsule  Commonly known as: KEFLEX     tiZANidine 4 MG tablet  Commonly known as: ZANAFLEX              Immunizations Administered as of 1/13/2022  Reviewed on 1/6/2022    Name Date Dose VIS Date Route Exp Date    COVID-19, MRNA, LN-S, PF (Pfizer)  Incomplete 0.3 mL 12/12/2020 Intramuscular --    : [employer]     COVID-19, vector-nr, rS-Ad26 (J&J) 3/6/2021 0.5 mL -- -- --    Lot: [id number]     External: Auto Reconciled From Outside Source           _________________________________  Annel Hernandez MD  01/13/2022

## 2022-01-13 NOTE — PT/OT/SLP PROGRESS
Physical Therapy Treatment    Patient Name:  Beatriz Adame   MRN:  [id number]    Recommendations:     Discharge Recommendations:  nursing facility, skilled   Discharge Equipment Recommendations: slide board (w/c w/ removable arm rests and elevating leg rests)   Barriers to discharge: Decreased caregiver support    Assessment:     Beatriz Adame is a 71 y.o. 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  She presents with the following impairments/functional limitations:  weakness,impaired endurance,impaired self care skills,impaired functional mobilty,impaired balance,gait instability,decreased lower extremity function,decreased safety awareness,pain,decreased ROM,impaired skin,edema,orthopedic precautions ;pt with good participation in LE ex's, though limited session 2/2 pt c/o abd discomfort and needing to use bedpan..    Rehab Prognosis: Good; patient would benefit from acute skilled PT services to address these deficits and reach maximum level of function.    Recent Surgery: Procedure(s) (LRB):  AMPUTATION, TOE (Right) 12 Days Post-Op    Plan:     During this hospitalization, patient to be seen 4 x/week to address the identified rehab impairments via gait training,therapeutic activities,therapeutic exercises,neuromuscular re-education and progress toward the following goals:    · Plan of Care Expires:  02/03/22    Subjective     Chief Complaint: some foot pain  Patient/Family Comments/goals: pt agreeable to session  Pain/Comfort:  · Location - Side 1: Right  · Location - Orientation 1: generalized  · Location 1: foot (at toe amputation site)  · Pain Addressed 1: Reposition,Distraction      Objective:     Communicated with nurse prior to session.  Patient found HOB elevated with peripheral IV,PureWick upon PT entry to room.     General Precautions: Standard, fall,diabetic    Orthopedic Precautions:RLE non weight bearing (heel wt bearing for t/f's)   Braces:  (BLE Darco shoes)  Respiratory Status: Room air     Functional Mobility:  · Bed Mobility:     · Rolling Left:  stand by assistance  · Rolling Right: stand by assistance      AM-PAC 6 CLICK MOBILITY  Turning over in bed (including adjusting bedclothes, sheets and blankets)?: 4  Sitting down on and standing up from a chair with arms (e.g., wheelchair, bedside commode, etc.): 3  Moving from lying on back to sitting on the side of the bed?: 3  Moving to and from a bed to a chair (including a wheelchair)?: 2  Need to walk in hospital room?: 1  Climbing 3-5 steps with a railing?: 1  Basic Mobility Total Score: 14       Therapeutic Activities and Exercises:   perf'd supine LE ex's of AP's w/ slight overpressure to initiate a stretch, hip abd, heelslides, SLR's 2 x 5 reps ea.    Patient left supine with all lines intact, call button in reach, nurse notified and pt on bedpain. PCT notified as well...    GOALS:   Multidisciplinary Problems     Physical Therapy Goals        Problem: Physical Therapy Goal    Goal Priority Disciplines Outcome Goal Variances Interventions   Physical Therapy Goal     PT, PT/OT Ongoing, Progressing     Description: Goals to be met by: 2/3/22    Patient will perform the following to increase strength, improve mobility, and return to prior level of function:    1. Rolling L/R with SPV. - MET 1/10/22  2. Supine <> sit with SPV. - MET 1/10/22  3. Sit<>stand with Terri with least restrictive assistive device.  4. Gait x 10 feet with Terri with least restrictive assistive device.                        Time Tracking:     PT Received On: 01/13/22  PT Start Time: 1604     PT Stop Time: 1615  PT Total Time (min): 11 min     Billable Minutes: Therapeutic Exercise 11    Treatment Type: Treatment  PT/PTA: PTA     PTA Visit Number: 2     01/13/2022

## 2022-01-13 NOTE — NURSING
Pt resting in bed throughout day.  Fair food intake.  Pleasant and cooperative.  VSS, no needs expressed, no c/o pain.  Araiza catheter placed after to unable to void and 342ml bladder scan noted.  Up with PT and OT in afternoon to [facility] with max assist.  Dressing to right foot CDI, changed by podiatry 1/11/22, awaiting discharge.  BG WNL.  Call light within reach.  Tray setup.

## 2022-01-13 NOTE — PT/OT/SLP PROGRESS
Occupational Therapy      Patient Name:  Beatriz Adame   MRN:  [id number]    Patient not seen today secondary to Pain (stomach discomfort). This was writer's second attempt (eating on first attempt).  Will follow-up 01/14/2022.    1/13/2022

## 2022-01-13 NOTE — PT/OT/SLP PROGRESS
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  Pt. Requires 2 person assist for functional transfer and ADL tasks in partial standing this day.  Increased cuing needed for RLE positioning during transitional movements to maintain PWB to RLE during transitional movements this day.  Progressing towards goals.  Continued recommendation of post acute OT in SNF.  To benefit from continued acute care OT services to increase independence in self-care/functional transfers.  Continue POC.     Beatriz Adame is a 71 y.o. 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  She presents with below deficits decreasing independence in self-care/functional transfers. Performance deficits affecting function are weakness,impaired endurance,impaired functional mobilty,impaired self care skills,gait instability,decreased lower extremity function,decreased upper extremity function,decreased coordination,impaired balance,decreased safety awareness,decreased ROM,orthopedic precautions.     Rehab Prognosis:  Good; patient would benefit from acute skilled OT services to address these deficits and reach maximum level of function.       Plan:     Patient to be seen 5 x/week to address the above listed problems via self-care/home management,therapeutic activities,therapeutic  exercises  · Plan of Care Expires: 02/15/22  · Plan of Care Reviewed with: patient    Subjective     Pain/Comfort:  · Pain Rating 1: 0/10  · Pain Rating Post-Intervention 1: 0/10    Objective:     Communicated with:  Lucila GONZALEZ RN prior to session.  Patient found HOB elevated with peripheral IV,PureWick upon OT entry to room.    General Precautions: Standard, diabetic,fall   Orthopedic Precautions:RLE partial weight bearing (heel weight bearing)   Braces:  (BLE Darco shoes)  Respiratory Status: Room air     Occupational Performance:     Bed Mobility:    Supine<>sit with SBA, increased time, and HOB elevated when coming into sit.  Scooting towards HOB with BLE heel weight bearing and RUE at overhead railing with cues for task sequencing.      Functional Mobility/Transfers:  Bed<>[facility] stand-pivot transfer with MOD A of 2 persons with handheld assist and increased cuing for safe RUE hand placement during task and increased cues for RLE positioning during transitions to maintain heel WB to RLE.  Partial stands X 5 while seated at [facility] with MOD to MAX A of 1 person to allow for assist with ADL tasks.      Activities of Daily Living:  Able to thread underwear in RLE via cross leg tech though requiring assist for threading LLE and also for pulling clothing to waist with 1 person assist for partial stance and other for pulling clothing up to waist.  Assist needed to don sx shoes to BLE.  Requiring assist for clothing management and thorough back valentin hygiene in partial stance with 1 person assist with partial stand and other for assist with ADL task.        [facility] 6 Click ADL: 13    Treatment & Education:  Supine<>sit with SBA, increased time, and HOB elevated when coming into sit.  Scooting towards HOB with BLE heel weight bearing and RUE at overhead railing with cues for task sequencing.  Bed<>[facility] stand-pivot transfer with MOD A of 2 persons with handheld assist and increased cuing for safe RUE hand placement during task and  increased cues for RLE positioning during transitions to maintain heel WB to RLE.  Partial stands X 5 while seated at [facility] with MOD to MAX A of 1 person to allow for assist with ADL tasks.  Able to thread underwear in RLE via cross leg tech though requiring assist for threading LLE and also for pulling clothing to waist with 1 person assist for partial stance and other for pulling clothing up to waist.  Assist needed to don sx shoes to BLE.  Requiring assist for clothing management and thorough back valentin hygiene in partial stance with 1 person assist with partial stand and other for assist with ADL task.      Patient left HOB elevated with all lines intact, call button in reach and nursing notifiedEducation:      GOALS:   Multidisciplinary Problems     Occupational Therapy Goals        Problem: Occupational Therapy Goal    Goal Priority Disciplines Outcome Interventions   Occupational Therapy Goal     OT, PT/OT Ongoing, Progressing    Description: Goals to be met by: 01/18/2022     Patient will increase functional independence with ADLs by performing:  Ongoing, progress to all goals     UE Dressing with Stand-by Assistance.  LE Dressing with Moderate Assistance.  Grooming while EOB with Stand-by Assistance.  Toileting from bedside commode with Moderate Assistance for hygiene and clothing management.   Supine to sit with Minimal Assistance.(MET 01/10/2022)  Increased functional strength to WFL for bed mobility, functional transfers and ADL tasks.  Sit to/from stand/SPT to BSC/w/c with Moderate assistance of 1 person                     Time Tracking:     OT Date of Treatment: 01/12/22  OT Start Time: 1528  OT Stop Time: 1609  OT Total Time (min): 41 min (overlap with PTA)    Billable Minutes:Self Care/Home Management 25    OT/KRISTYN: OT          1/12/2022

## 2022-01-13 NOTE — PLAN OF CARE
Problem: Adult Inpatient Plan of Care  Goal: Plan of Care Review  Outcome: Ongoing, Progressing  Goal: Patient-Specific Goal (Individualized)  Outcome: Ongoing, Progressing  Goal: Absence of Hospital-Acquired Illness or Injury  Outcome: Ongoing, Progressing  Goal: Optimal Comfort and Wellbeing  Outcome: Ongoing, Progressing  Goal: Readiness for Transition of Care  Outcome: Ongoing, Progressing     Problem: Diabetes Comorbidity  Goal: Blood Glucose Level Within Targeted Range  Outcome: Ongoing, Progressing     Problem: Fluid and Electrolyte Imbalance (Acute Kidney Injury/Impairment)  Goal: Fluid and Electrolyte Balance  Outcome: Ongoing, Progressing     Problem: Oral Intake Inadequate (Acute Kidney Injury/Impairment)  Goal: Optimal Nutrition Intake  Outcome: Ongoing, Progressing     Problem: Renal Function Impairment (Acute Kidney Injury/Impairment)  Goal: Effective Renal Function  Outcome: Ongoing, Progressing     Problem: Skin Injury Risk Increased  Goal: Skin Health and Integrity  Outcome: Ongoing, Progressing     Problem: Fall Injury Risk  Goal: Absence of Fall and Fall-Related Injury  Outcome: Ongoing, Progressing     Problem: Infection  Goal: Absence of Infection Signs and Symptoms  Outcome: Ongoing, Progressing

## 2022-01-13 NOTE — PHYSICIAN QUERY
PT Name: Beatriz Adame  MR #: [id number]    DOCUMENTATION CLARIFICATION      CDS/: Bhavik Rothman RN, CCDS               Contact information:    [email or web address]        This form is a permanent document in the medical record.     Query Date: January 13, 2022    By submitting this query, we are merely seeking further clarification of documentation. Please utilize your independent clinical judgment when addressing the question(s) below.    The Medical Record contains the following:   Indicators   Supporting Clinical Findings Location in Medical Record   x Hypertension associated with diabetes documented Hypertension associated with stage 4 chronic kidney disease due to type 2 diabetes mellitus  -chronic  -BP stable at admission  -continue home amlodipine and Coreg  -dose/medication adjustment as appropriate         Hypertension associated with stage 4 chronic kidney disease due to type 2 diabetes mellitus  - Chronic and stable  - Continue Norvasc 10mg PO qHS  - Continue Coreg 25mg PO  BID   12/30 H&P: Hospital medicine  (HM)                1/12 Hospital medicine     x Chronic condition(s) PMHx of osteoarthritis, history of C diff, hypothyroidism, 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, DM type 2, HTN, HLD, CKD, recurrent UTIs, history of left ureteral stent placement in 10/2021 12/30 H&P: Hospital medicine   x Vital signs   BP: (137-178)/(64-79) 178/79    BP: (122-178)/(59-79) 122/60  BP: (119-146)/(58-69) 146/69  BP: (113-133)/(51-68) 123/64  BP: (114-132)/(54-64) 125/59   12/30     12/31    1/3  HM  1/4    1/5       Treatment/Medication      Other 12/30  Glucose: 102  12/31  Glucose: 115  1/1  Glucose 101  1/2    Glucose 91  1/3   Glucose 104  1/4    Glucose  98  1/5  Glucose 96 Labs       Provider, please clarify the relationship between the Hypertension and Diabetes Mellitus    [   ] Hypertension is a manifestation of Diabetes Mellitus (Secondary Hypertension)   [  x ]   Hypertension is not a manifestation of Diabetes Mellitus (Essential Hypertension)   [   ] Other Clarification (please specify): ____________   [  ] Clinically Undetermined       Please document in your progress notes daily for the duration of treatment, until resolved, and include in your discharge summary.

## 2022-01-14 NOTE — PT/OT/SLP PROGRESS
Occupational Therapy   Co-Treatment    Name: Beatriz Adame  MRN: [id number]  Admitting Diagnosis:  Diabetic ulcer of toe of right foot associated with type 1 diabetes mellitus, with bone involvement without evidence of necrosis  13 Days Post-Op    Recommendations:     Discharge Recommendations: nursing facility, skilled  Discharge Equipment Recommendations:  slide board,other (see comments) (w/c with removable arm rests, swing away leg rests; drop arm BSC)  Barriers to discharge:  Decreased caregiver support    Assessment:     Beatriz Adame is a 71 y.o. 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  She presents with the following performance deficits affecting function: weakness,impaired functional mobilty,decreased safety awareness,impaired endurance,gait instability,impaired balance,decreased upper extremity function,abnormal tone,impaired self care skills,decreased lower extremity function,decreased ROM,orthopedic precautions.     Continues to improve with functional transfers, though requires vc for (R) heel weight bearing only.  Tolerated grooming in w/c up in sink.  Difficulty with donning/doffing Darco shoes, low vision may be a contributing factor.  Continue OT services to maximize patient functioning.     Rehab Prognosis:  Fair; patient would benefit from acute skilled OT services to address these deficits and reach maximum level of function.       Plan:     Patient to be seen 5 x/week to address the above listed problems via self-care/home management,therapeutic activities,therapeutic exercises  · Plan of Care Expires: 02/15/22  · Plan of Care Reviewed with: patient    Subjective     Pain/Comfort:  · Pain Rating 1: 0/10  · Pain Rating Post-Intervention 1: 0/10    Objective:     Communicated with: CHYNA Hernandez prior to session.  Patient found HOB elevated with peripheral IV,PureWick upon  OT entry to room.    General Precautions: Standard, diabetic,fall,other (see comments) (elevate HOB)   Orthopedic Precautions:RLE non weight bearing (except able to heel weight bear for transfers)   Braces:  ((B) Darco shoes)  Respiratory Status: Room air     Occupational Performance:     Bed Mobility:    · Patient completed Scooting/Bridging with minimum assistance  · Patient completed Supine to Sit with minimum assistance  · Patient completed Sit to Supine with minimum assistance     Functional Mobility/Transfers:  · Patient completed Sit <> Stand Transfer with minimum assistance  with  hand-held assist   · Patient completed Bed <> Chair Transfer using Stand Pivot technique with minimum assistance bed to w/c and moderate assistance from w/c to bed  · Functional Mobility: NT    Activities of Daily Living:  · Grooming: stand by assistance seated at sink in w/c for washing face  · Lower Body Dressing: maximal assistance Darco shoes from EOB  · Toileting: total assistance valentin care from EOB, small BM (second person for standing balance)      AMPA 6 Click ADL: 13    Treatment & Education:  Educated of weight bearing for (R) LE.  Will continue to reinforce.    Patient left HOB elevated with all lines intact, call button in reach, bed alarm on and RN notifiedEducation:      GOALS:   Multidisciplinary Problems     Occupational Therapy Goals        Problem: Occupational Therapy Goal    Goal Priority Disciplines Outcome Interventions   Occupational Therapy Goal     OT, PT/OT Ongoing, Progressing    Description: Goals to be met by: 01/18/2022     Patient will increase functional independence with ADLs by performing:  Ongoing, progress to all goals     UE Dressing with Stand-by Assistance.  LE Dressing with Moderate Assistance.  Grooming while EOB with Stand-by Assistance.(MET 01/14/2022)  Toileting from bedside commode with Moderate Assistance for hygiene and clothing management.   Supine to sit with Minimal Assistance.(MET  01/10/2022)  Increased functional strength to WFL for bed mobility, functional transfers and ADL tasks.  Sit to/from stand/SPT to BSC/w/c with Moderate assistance of 1 person (MET 01/14/2022)                     Time Tracking:     OT Date of Treatment: 01/14/22  OT Start Time: 1104  OT Stop Time: 1135  OT Total Time (min): 31 min    Billable Minutes:Self Care/Home Management 31    Co-tx for safety and monitoring (R) LE weight bearing    OT/KRISTYN: OT          1/14/2022

## 2022-01-14 NOTE — PLAN OF CARE
Problem: Occupational Therapy Goal  Goal: Occupational Therapy Goal  Description: Goals to be met by: 01/18/2022     Patient will increase functional independence with ADLs by performing:  Ongoing, progress to all goals     UE Dressing with Stand-by Assistance.  LE Dressing with Moderate Assistance.  Grooming while EOB with Stand-by Assistance.(MET 01/14/2022)  Toileting from bedside commode with Moderate Assistance for hygiene and clothing management.   Supine to sit with Minimal Assistance.(MET 01/10/2022)  Increased functional strength to WFL for bed mobility, functional transfers and ADL tasks.  Sit to/from stand/SPT to BSC/w/c with Moderate assistance of 1 person (MET 01/14/2022)    Outcome: Ongoing, Progressing  Continues to improve with functional transfers, though requires vc for (R) heel weight bearing only.  Tolerated grooming in w/c up in sink.  Difficulty with donning/doffing Darco shoes, low vision may be a contributing factor.  Continue OT services to maximize patient functioning.

## 2022-01-14 NOTE — PROGRESS NOTES
[facility] Medicine  Telemedicine Progress Note    Patient Name: Beatriz Adame  MRN: [id number]  Patient Class: IP- Inpatient   Admission Date: 12/30/2021  Length of Stay: 11 days  Attending Physician: Annel Hernandez MD  Primary Care Provider: Dante Olivier MD          Subjective:     Principal Problem:Diabetic ulcer of toe of right foot associated with type 1 diabetes mellitus, with bone involvement without evidence of necrosis        HPI:  Ms. 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    She presents to the ED with complaint of right 2nd toe wound infection.  She was evaluated by home health nurse today and felt wound worsened and needed to be evaluated.  Patient reports she developed the wound over the last month or so due to dragging her feet while in her wheelchair. She denies fever, chills, fatigue, muscle aches, cough, sore throat, nausea, vomiting, diarrhea, chest pain, shortness of breath, lightheadedness, dizziness, or syncope. She lives alone; uses wheelchair for mobility, is able to transfer from chair to bed/toilet/chair independently however her neighbors help her with ADLs and outings.     In the ED she is hemodynamically stable on room air and afebrile.  CBC with known anemia hemoglobin 7.5 and hematocrit 24.3 (baseline 8-9 and 26-29).  Chemistry with serum creatinine 1.8 (better than recent baseline), BUN 22.  LFTs WNLs.  .  CRP 53.3.  Blood cultures obtained.  COVID negative.  Right foot x-ray notes no convincing radiographic evidence of osteomyelitis.  MRI right foot notes acute  "osteomyelitis involving the 2nd toe middle and distal phalanx.  She was initiated on vancomycin and Zosyn.  The patient was placed in observation to the Hospital Medicine Service for further evaluation and treatment.       Overview/Hospital Course:  Ms. Adame presented with right toe ulcer.  Empirically treated with Zosyn and vancomycin.  MRI showed "acute osteomyelitis involving the 2nd toe middle and distal phalanx." Arterial us showed slow velocities with abnormal waveforms in the arteries of the calf suggestive of vessel hardening such as from diffuse atherosclerosis.  Podiatry and Cardiology consulted.  She underwent right 2nd toe amputation on 01/01/2022.  Patient declined further workup with angiogram for PAD at this time.  Podiatry plans on staged debridement and closure. PT and OT working with patient and recommending SNF placement.      Interval History: Araiza placed overnight, medically ready for discharge for SNF.     Review of Systems   Constitutional: Negative for chills, fatigue and fever.   Respiratory: Negative for cough and shortness of breath.    Cardiovascular: Negative for chest pain, palpitations and leg swelling.   Gastrointestinal: Negative for abdominal pain, diarrhea, nausea and vomiting.   Genitourinary: Positive for difficulty urinating (retention). Negative for dysuria and urgency.   Neurological: Negative for dizziness and headaches.   All other systems reviewed and are negative.    Objective:     Vital Signs (Most Recent):  Temp: 97.7 °F (36.5 °C) (01/13/22 1936)  Pulse: 73 (01/13/22 1936)  Resp: 16 (01/13/22 2015)  BP: 134/64 (01/13/22 1936)  SpO2: 99 % (01/13/22 1936) Vital Signs (24h Range):  Temp:  [97.6 °F (36.4 °C)-98.4 °F (36.9 °C)] 97.7 °F (36.5 °C)  Pulse:  [65-73] 73  Resp:  [16-18] 16  SpO2:  [96 %-100 %] 99 %  BP: (110-135)/(53-64) 134/64     Weight: 56 kg (123 lb 7 oz)  Body mass index is 19.33 kg/m².    Intake/Output Summary (Last 24 hours) at 1/13/2022 2119  Last data " "filed at 1/13/2022 1503  Gross per 24 hour   Intake 420 ml   Output 850 ml   Net -430 ml      Physical Exam  Vitals reviewed.   Constitutional:       Appearance: Normal appearance.   HENT:      Head: Normocephalic and atraumatic.   Eyes:      General: No scleral icterus.     Conjunctiva/sclera: Conjunctivae normal.   Pulmonary:      Effort: Pulmonary effort is normal. No respiratory distress.   Skin:     Coloration: Skin is not jaundiced.      Findings: No erythema.   Neurological:      General: No focal deficit present.      Mental Status: She is alert and oriented to person, place, and time.   Psychiatric:         Mood and Affect: Mood normal.         Behavior: Behavior normal.         Significant Labs: All pertinent labs within the past 24 hours have been reviewed.    Significant Imaging: I have reviewed all pertinent imaging results/findings within the past 24 hours.      Assessment/Plan:      * Diabetic ulcer of toe of right foot associated with type 1 diabetes mellitus, with bone involvement without evidence of necrosis  Osteomyelitis of right 2nd toe  - Right foot x-ray notes no convincing radiographic evidence of osteomyelitis  - Workup with MRI right foot notes "acute osteomyelitis involving the 2nd toe middle and distal phalanx"  - Initiated on Vancomycin and Zosyn, then changed to Rocephin and Vancomycin. ID consulted - OK to change to Amoxil for SSTI  - Arterial studies with non palpable pulses, Cardiology consulted.  Patient declined further workup of PAD with angiogram at this time  - S/p amputation right 2nd toe on 1/1/22  - Tissue culture from 1/1/22 with Enterococcus faecalis and pathology with bone margins still pending  - Podiatry initially planned further debridement with closure sometime this week, but case discussed with them today and plan to continue wound care and follow up as outpatient  - PT/OT say SNF.  She's agreeable.  CXR and PPD ordered 1/6    Urinary retention  - intermittent urinary " retention during hospital stay, requiring intermittent in/out cath  - may need delgado 1/12/22  - flomax increased    Diabetic foot ulcer with osteomyelitis  - As above    Hypothyroidism  -Chronic and stable  -Continue levothyroxine     CKD (chronic kidney disease), stage IV  - Serum creatinine at admission 1.8 which is better than recent baseline (average around 2.5), down to 2  - Avoid nephrotoxins and hypotension as able, renally dose medications  - Continue to monitor     Severe protein-calorie malnutrition  · Boost TID      Normocytic anemia  - On admission, hemoglobin 7.5 and hematocrit 24.3 (baseline 8-9 and 26-29)  - Recent iron studies showed iron deficiency with normal B12 and folate  - H&H has been stable, no indication for transfusion at this time, continue to monitor    History of stroke  - Residual left sided weakness at baseline  - Continue Lipitor 80mg PO daily  - Continue Aspirin 81mg PO daily  - Continue Plavix 75mg PO daily    Diabetes mellitus, type 2  Hemoglobin A1C   Date Value Ref Range Status   11/21/2021 5.4 4.0 - 5.6 % Final   - Hold home insulin, on 8 units daily  - Continue low-dose sliding scale insulin  - Glucose levels were reviewed and currently at targets, goal 140-180 during hospitalization  - Dose/medication adjustment as appropriate   - Monitor accuchecks AC/HS and PRN hypoglycemic protocol     Hyperlipidemia associated with type 2 diabetes mellitus  - Chronic and stable  - Continue Atorvastatin 80mg PO daily    Hypertension associated with stage 4 chronic kidney disease due to type 2 diabetes mellitus  - Chronic and stable  - Continue Norvasc 10mg PO qHS  - Continue Coreg 25mg PO  BID      VTE Risk Mitigation (From admission, onward)         Ordered     Place sequential compression device  Until discontinued         12/30/21 1828     Reason for No Pharmacological VTE Prophylaxis  Once        Question:  Reasons:  Answer:  Risk of Bleeding    12/30/21 1828                      I have  assessed these finding virtually using telemed platform and with assistance of bedside nurse         The attending portion of this evaluation, treatment, and documentation was performed per Annel Hernandez MD via Telemedicine AudioVisual using the secure [employer] software platform with 2 way audio/video. The provider was located off-site and the patient is located in the hospital. The aforementioned video software was utilized to document the relevant history and physical exam    Annel Hernandez MD  Department of Hospital Medicine   [facility] - Med Surg ([place])

## 2022-01-14 NOTE — ASSESSMENT & PLAN NOTE
Nutrition Problem:  Severe Protein-Calorie Malnutrition  Malnutrition in the context of Chronic Illness/Injury    Related to (etiology):  Altered GI function- chronic diarrhea/ nausea     Signs and Symptoms (as evidenced by):  Energy Intake: <75% of estimated energy requirement for > 1 month  Body Fat Depletion: mild depletion of orbitals, triceps and thoracic and lumbar region   Muscle Mass Depletion: mild depletion of clavicle region, scapular region, interosseous muscle, lower extremities and severe depletion of temples   Weight Loss: -22.88% x 9 months      Interventions(treatment strategy):  Collaboration with other healthcare providers   DM  ONS- Boost GC chocolate/Frederic     Nutrition Diagnosis Status:  Continues

## 2022-01-14 NOTE — PLAN OF CARE
Pt remained free of falls and injuries throughout shift. AAOx4. Pt calm and pleasant. Purposeful hourly rounding performed. Pt swallows meds whole. IV flushed and saline locked. Pt has surgical wound to R foot from 2nd toe amputation, dsg CDI. Managed c/o R surgical incision pain with PRN [place]. Patient pivots to BSC with x1 assist, NWB on R foot. Araiza catheter in place to gravity. VSS on room air. Pt sleeping in bed, even rise and fall of chest. Bed low and locked, bed alarm on, call light in reach. Side rails up x2. Report given to oncoming nurse, CHYNA Hernandez.

## 2022-01-14 NOTE — PROGRESS NOTES
IP Liaison - Final Visit Note    Patient: Beatriz Adame  MRN:  [id number]  Date of Service:  1/14/2022  Completed by:  PATSY Irving    Reason for Visit   Patient presents with    IP Liaison Follow-up Visit            Patient Summary     Discharge Date: 1/14/2022  Discharge telephone number/[phone]/ [address] [address]  Follow up provider: n/a  Follow up appointments: n/a  Home Health agency & telephone number: n/a  DME ordered &  name: n/a  Assigned OPCM RN/SW: n/a  Report sent to follow up team (PCP/OPCM) via in basket message: n/a  Community Resources arranged including agency name & contact info: n/a    PATSY Irving

## 2022-01-14 NOTE — PLAN OF CARE
Problem: Adult Inpatient Plan of Care  Goal: Plan of Care Review  1/14/2022 1412 by David Duenas RN  Outcome: Met  1/14/2022 1310 by David Duenas RN  Outcome: Ongoing, Progressing  Goal: Patient-Specific Goal (Individualized)  1/14/2022 1412 by David Duenas RN  Outcome: Met  1/14/2022 1310 by David Duenas RN  Outcome: Ongoing, Progressing  Goal: Absence of Hospital-Acquired Illness or Injury  1/14/2022 1412 by David Duenas RN  Outcome: Met  1/14/2022 1310 by David Duenas RN  Outcome: Ongoing, Progressing  Goal: Optimal Comfort and Wellbeing  1/14/2022 1412 by David Duenas RN  Outcome: Met  1/14/2022 1310 by David Duenas RN  Outcome: Ongoing, Progressing  Goal: Readiness for Transition of Care  1/14/2022 1412 by David Duenas RN  Outcome: Met  1/14/2022 1310 by David Duenas RN  Outcome: Ongoing, Progressing     Problem: Diabetes Comorbidity  Goal: Blood Glucose Level Within Targeted Range  1/14/2022 1412 by David Duenas RN  Outcome: Met  1/14/2022 1310 by David Duenas RN  Outcome: Ongoing, Progressing     Problem: Fluid and Electrolyte Imbalance (Acute Kidney Injury/Impairment)  Goal: Fluid and Electrolyte Balance  1/14/2022 1412 by David Duenas RN  Outcome: Met  1/14/2022 1310 by David Duenas RN  Outcome: Ongoing, Progressing     Problem: Oral Intake Inadequate (Acute Kidney Injury/Impairment)  Goal: Optimal Nutrition Intake  1/14/2022 1412 by David Duenas RN  Outcome: Met  1/14/2022 1310 by David Duenas RN  Outcome: Ongoing, Progressing     Problem: Renal Function Impairment (Acute Kidney Injury/Impairment)  Goal: Effective Renal Function  1/14/2022 1412 by David Duenas RN  Outcome: Met  1/14/2022 1310 by David Duenas RN  Outcome: Ongoing, Progressing     Problem: Skin Injury Risk Increased  Goal: Skin Health and Integrity  1/14/2022 1412 by David Duenas RN  Outcome: Met  1/14/2022 1310 by David Duensa, RN  Outcome: Ongoing, Progressing     Problem: Fall Injury Risk  Goal: Absence of Fall and Fall-Related  Injury  1/14/2022 1412 by David Duenas RN  Outcome: Met  1/14/2022 1310 by David Duenas RN  Outcome: Ongoing, Progressing     Problem: Infection  Goal: Absence of Infection Signs and Symptoms  1/14/2022 1412 by David Duenas RN  Outcome: Met  1/14/2022 1310 by David Duenas RN  Outcome: Ongoing, Progressing

## 2022-01-14 NOTE — PT/OT/SLP PROGRESS
Physical Therapy Treatment    Patient Name:  Beatriz Adame   MRN:  [id number]    Recommendations:     Discharge Recommendations:  nursing facility, skilled   Discharge Equipment Recommendations: slide board (w/c w/ removable arm rests and elevating leg rests)   Barriers to discharge: Decreased caregiver support    Assessment:     Beatriz Adame is a 71 y.o. 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  She presents with the following impairments/functional limitations:  weakness,orthopedic precautions,impaired functional mobilty,impaired self care skills,impaired balance,decreased lower extremity function .    Supine to sit Terri for lifting. Pt performed LAQ & HF x10 ea LE with cues for technique while seated EOB. Sit<>stand with L HHA modA for lifting & assist to maintain WB on L side & be compliant with heel WB only on R side. Pt performed stand pivot transfer to & from w/c with Terri, cues for WB on R LE. Sit to supine CGA, able to clear LEs in bed.    Rehab Prognosis: Good; patient would benefit from acute skilled PT services to address these deficits and reach maximum level of function.    Recent Surgery: Procedure(s) (LRB):  AMPUTATION, TOE (Right) 13 Days Post-Op    Plan:     During this hospitalization, patient to be seen 4 x/week to address the identified rehab impairments via gait training,therapeutic activities,therapeutic exercises,neuromuscular re-education and progress toward the following goals:    · Plan of Care Expires:  02/03/22    Subjective     Chief Complaint: wanting to dc soon  Patient/Family Comments/goals: pt agreeable to therapy session  Pain/Comfort:  · Pain Rating 1: 0/10  · Pain Rating Post-Intervention 1: 0/10      Objective:     Communicated with nurse Hernandez prior to session.  Patient found supine with peripheral IV upon PT entry to room.     General  Precautions: Standard, fall,diabetic   Orthopedic Precautions:RLE non weight bearing (heel wt bearing for t/f's)   Braces:    Respiratory Status: Room air     Functional Mobility:  · Bed mobility- Supine to sit Terri for lifting. Sit to supine CGA, able to clear LEs in bed.  · Transfers- . Sit<>stand with L HHA modA for lifting & assist to maintain WB on L side & be compliant with heel WB only on R side. Pt performed stand pivot transfer to & from w/c with Terri, cues for WB on R LE.       AM-PAC 6 CLICK MOBILITY  Turning over in bed (including adjusting bedclothes, sheets and blankets)?: 4  Sitting down on and standing up from a chair with arms (e.g., wheelchair, bedside commode, etc.): 3  Moving from lying on back to sitting on the side of the bed?: 3  Moving to and from a bed to a chair (including a wheelchair)?: 3  Need to walk in hospital room?: 1  Climbing 3-5 steps with a railing?: 1  Basic Mobility Total Score: 15       Therapeutic Activities and Exercises:  Pt performed LAQ & HF x10 ea LE with cues for technique while seated EOB    Patient left supine with all lines intact, call button in reach and bed alarm on..    GOALS:   Multidisciplinary Problems     Physical Therapy Goals        Problem: Physical Therapy Goal    Goal Priority Disciplines Outcome Goal Variances Interventions   Physical Therapy Goal     PT, PT/OT Ongoing, Progressing     Description: Goals to be met by: 2/3/22    Patient will perform the following to increase strength, improve mobility, and return to prior level of function:    1. Rolling L/R with SPV. - MET 1/10/22  2. Supine <> sit with SPV. - MET 1/10/22  3. Sit<>stand with Terri with least restrictive assistive device.  4. Gait x 10 feet with Terri with least restrictive assistive device.                        Time Tracking:     PT Received On: 01/14/22  PT Start Time: 1100     PT Stop Time: 1127  PT Total Time (min): 27 min     Billable Minutes: Therapeutic Activity 17 and Therapeutic  Exercise 10    Treatment Type: Treatment  PT/PTA: PTA     PTA Visit Number: 3     01/14/2022

## 2022-01-14 NOTE — PLAN OF CARE
CM spoke to pt's son, Alysa, [phone], for final discharge planning assessment.    Pt is ready to transfer to [facility] today.    Transportation is arranged with [facility] for ambulance transport, for any transport issues please call the pt flow center, [phone], op [phone].    Please call report to [facility], [phone], ask for ann Jackson going to room 217A.    Pt ready for discharge from  perspective.   01/14/22 1322   Final Note   Assessment Type Final Discharge Note   Anticipated Discharge Disposition SNF   What phone number can be called within the next 1-3 days to see how you are doing after discharge? [phone]   Hospital Resources/Appts/Education Provided Provided patient/caregiver with written discharge plan information   Post-Acute Status   Post-Acute Authorization Placement   Post-Acute Placement Status Set-up Complete/Auth obtained   Patient choice form signed by patient/caregiver List with quality metrics by geographic area provided;List from CMS Compare;List from System Post-Acute Care   Discharge Delays None known at this time

## 2022-01-14 NOTE — PROGRESS NOTES
First-degree AV block on EKG.  Continue to monitor with telemetry   To being transferred to a long-term care facility at this time.  I offered to change her dressing before she left.  She politely declines evaluation and dressing change as she is in the process of transfer.  She relates that everything has been looking good, she feels positive about things.  She declines dressing change.  And plans arrange follow-up with me next week as an outpatient.

## 2022-01-14 NOTE — PLAN OF CARE
Recommendations    1.Continue 2000kcal DM diet with Boost Glucose Control (chocolate) TID + Frederic BID to promote PO intake/ wound healing.   2.Encourage good PO intake.    -monitor PO intake/ tolerance/ ONS/ weight changes.    Goals: Pt to meet % EEN/EPN via PO intake + ONS by R f/u.  Nutrition Goal Status: new  Communication of RD Recs:  (POC)

## 2022-01-14 NOTE — PROGRESS NOTES
[facility] ([place])  Adult Nutrition  Progress Note    SUMMARY       Recommendations    1.Continue 2000kcal DM diet with Boost Glucose Control (chocolate) TID + Frederic BID to promote PO intake/ wound healing.   2.Encourage good PO intake.    -monitor PO intake/ tolerance/ ONS/ weight changes.    Goals: Pt to meet % EEN/EPN via PO intake + ONS by R f/u.  Nutrition Goal Status: new  Communication of RD Recs:  (POC)    Assessment and Plan    Severe protein-calorie malnutrition  Nutrition Problem:  Severe Protein-Calorie Malnutrition  Malnutrition in the context of Chronic Illness/Injury    Related to (etiology):  Altered GI function- chronic diarrhea/ nausea     Signs and Symptoms (as evidenced by):  Energy Intake: <75% of estimated energy requirement for > 1 month  Body Fat Depletion: mild depletion of orbitals, triceps and thoracic and lumbar region   Muscle Mass Depletion: mild depletion of clavicle region, scapular region, interosseous muscle, lower extremities and severe depletion of temples   Weight Loss: -22.88% x 9 months      Interventions(treatment strategy):  Collaboration with other healthcare providers   DM  ONS- Boost GC chocolate/Frederic     Nutrition Diagnosis Status:  Continues      Malnutrition Assessment  Malnutrition Type: chronic illness  Energy Intake: severe energy intake  Skin (Micronutrient):  (Tyree Score 15)   Weight Loss (Malnutrition):  (-22.88% X  9 months)  Energy Intake (Malnutrition): less than or equal to 75% for greater than or equal to 1 month   Orbital Region (Subcutaneous Fat Loss): mild depletion  Upper Arm Region (Subcutaneous Fat Loss): mild depletion  Thoracic and Lumbar Region: well nourished   [place] Region (Muscle Loss): severe depletion  Clavicle Bone Region (Muscle Loss): mild depletion  Clavicle and Acromion Bone Region (Muscle Loss): mild depletion  Scapular Bone Region (Muscle Loss): mild depletion  Dorsal Hand (Muscle Loss): mild depletion  Patellar Region  "(Muscle Loss): mild depletion  Anterior Thigh Region (Muscle Loss): mild depletion  Posterior Calf Region (Muscle Loss): mild depletion   Subcutaneous Fat Loss (Final Summary): mild protein-calorie malnutrition  Muscle Loss Evaluation (Final Summary): mild protein-calorie malnutrition       Reason for Assessment    Reason For Assessment: RD follow-up  Diagnosis:  (Diabetic ulcer of toe of right foot associated with type 1 diabetes mellitus, with bone involvement without evidence of necrosis)  Relevant Medical History: osteoarthritis, history of C diff, hypothyroidism, 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, DM type 2, HTN, HLD, CKD, recurrent UTIs, history of left ureteral stent placement in 10/2021 with recent cystoscopy and finding of 7 mm right mid to distal ureteral calculi that was fragmented and removed as well as a stent on string placed, and history of appendectomy 07/21  Interdisciplinary Rounds: attended  General Information Comments: 1/14- SNF soon. Remains on same diet with ONS ordered (boost Plus and Frederic). PO intake fluctuates.  Pt was eating lunch at time of f/u. Some ONS next to bedside unopen. Dislike vanilla Boost- fixed in [employer] for pt to receive chocolate. No other comments/ questions/ concerns. Will continue to monitor. No diff in chewing/ swallowing.     Nutrition Discharge Planning: Pt to follow a DM/ cardiac diet with ONS as tolerated.    Nutrition Risk Screen    Nutrition Risk Screen: no indicators present    Nutrition/Diet History    Spiritual, Cultural Beliefs, [employer] Practices, Values that Affect Care: no  Factors Affecting Nutritional Intake: diarrhea,decreased appetite    Anthropometrics    Temp: 97.9 °F (36.6 °C)  Height: 5' 7" (170.2 cm)  Height (inches): 67 in  Weight: 56 kg (123 lb 7 oz)  Weight (lb): 123.44 lb  Ideal Body Weight (IBW), Female: 135 lb  % Ideal Body Weight, Female (lb): 91.44 %  BMI (Calculated): 19.3  BMI Grade: 18.5-24.9 - normal  Usual " Body Weight (UBW), k.6 kg (9 months)  % Usual Body Weight: 77.28  % Weight Change From Usual Weight: -22.88 %       Lab/Procedures/Meds    Pertinent Labs Reviewed: reviewed  Pertinent Labs Comments: Cr 1.7, H/H 7.4/23.5  Pertinent Medications Reviewed: reviewed  Pertinent Medications Comments: amlodipine, atorvastatin, carvedilol, clopidogrel, duloxetine, ferrous sulfate, gabapentin, lactulose, levothyroxine, metoclopramide HCl, pantoprazole, senna-docusate, tamsulosin, trazodone    Estimated/Assessed Needs    Weight Used For Calorie Calculations: 56 kg (123 lb 7.3 oz)  Energy Calorie Requirements (kcal): 4056-5426 kcal day (30-40 kcal /kg)  Energy Need Method: Kcal/kg  Protein Requirements: 67-84 g/ day (1.2-1.5 g/kg)  Weight Used For Protein Calculations: 56 kg (123 lb 7.3 oz)     Estimated Fluid Requirement Method: RDA Method  RDA Method (mL): 1680  CHO Requirement: 210 g day      Nutrition Prescription Ordered    Current Diet Order: 2000 kcal DM diet  Oral Nutrition Supplement: Boost GC TID + Frederic BID    Evaluation of Received Nutrient/Fluid Intake    Energy Calories Required: not meeting needs  Protein Required: not meeting needs  Fluid Required: not meeting needs  % Intake of Estimated Energy Needs: 50 - 75 %  % Meal Intake: 50 - 75 %    Nutrition Risk    Level of Risk/Frequency of Follow-up:  (1 x weekly)     Monitor and Evaluation    Food and Nutrient Intake: food and beverage intake  Food and Nutrient Adminstration: diet order  Knowledge/Beliefs/Attitudes: food and nutrition knowledge/skill  Physical Activity and Function: nutrition-related ADLs and IADLs  Anthropometric Measurements: weight,weight change,body mass index  Biochemical Data, Medical Tests and Procedures: glucose/endocrine profile,electrolyte and renal panel,gastrointestinal profile,inflammatory profile,lipid profile  Nutrition-Focused Physical Findings: overall appearance     Nutrition Follow-Up    RD Follow-up?: Yes

## 2022-01-19 PROBLEM — E55.9 VITAMIN D DEFICIENCY: Status: ACTIVE | Noted: 2022-01-01

## 2022-01-19 PROBLEM — Z87.442 HISTORY OF KIDNEY STONES: Status: ACTIVE | Noted: 2021-01-01

## 2022-01-19 PROBLEM — K21.9 GERD (GASTROESOPHAGEAL REFLUX DISEASE): Status: ACTIVE | Noted: 2022-01-01

## 2022-01-19 PROBLEM — D50.9 IRON DEFICIENCY ANEMIA: Status: ACTIVE | Noted: 2021-01-01

## 2022-01-19 PROBLEM — K31.84 GASTROPARESIS: Status: ACTIVE | Noted: 2022-01-01

## 2022-01-19 PROBLEM — N18.9 ACUTE KIDNEY INJURY SUPERIMPOSED ON CKD: Status: ACTIVE | Noted: 2021-01-01

## 2022-01-19 PROBLEM — R63.0 ANOREXIA: Status: RESOLVED | Noted: 2021-01-01 | Resolved: 2022-01-01

## 2022-01-19 PROBLEM — R53.81 DEBILITY: Status: ACTIVE | Noted: 2022-01-01

## 2022-01-19 PROBLEM — G62.9 PERIPHERAL NEUROPATHY: Status: ACTIVE | Noted: 2022-01-01

## 2022-01-19 NOTE — PROGRESS NOTES
[facility]                                 Skilled Nursing Facility                   Progress Note     Admit Date:1/14/2022  MARY TBD  Principal Problem: Diabetic ulcer of toe of right foot associated with type 1 diabetes mellitus, with bone involvement without evidence of necrosis  HPI obtained from patient interview and chart review     Chief Complaint: Establish Care/ Initial Visit     HPI:   Beatriz Adame is a 71 y.o. 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  Presents to  SNF status post hospitalization at [facility] for right toe also status post amputation.  Per chart review, patient presented to [facility] ED with complaint of right 2nd toe wound infection.Right foot x-ray notes no convincing radiographic evidence of osteomyelitis.  MRI right foot notes acute osteomyelitis involving the 2nd toe middle and distal phalanx. Arterial us showed slow velocities with abnormal waveforms in the arteries of the calf suggestive of vessel hardening such as from diffuse atherosclerosis.  Podiatry and Cardiology consulted.  She underwent right 2nd toe amputation on 01/01/2022.  Patient declined further workup with angiogram for PAD at that time.  Podiatry plans on staged debridement and closure.  Hospital course complicated by urinary retention requiring Araiza placement.    Admission to SNF for secondary weakness and mobility.     Patient will be treated at  SNF with PT and OT to improve functional status and ability to perform ADLs.     Today patient reports pain is controllable to right foot.  She also reports diarrhea..    Past  Medical History: Patient has a past medical history of Anemia, Arthritis, Diabetes mellitus, Encounter for blood transfusion, Hypercholesteremia, Hypertension, Renal disorder, and Stroke.    Past Surgical History: Patient has a past surgical history that includes Tubal ligation; Laparoscopic appendectomy (N/A, 7/22/2021); Cystoscopy w/ ureteral stent placement (Right, 10/13/2021); Esophagogastroduodenoscopy (N/A, 11/23/2021); Appendectomy; Ureteroscopic removal of ureteric calculus (Right, 12/22/2021); and Toe amputation (Right, 1/1/2022).    Social History: Patient reports that she has never smoked. She has never used smokeless tobacco. She reports that she does not drink alcohol and does not use drugs.    Family History: family history includes Alcohol abuse in her father; Arthritis in her mother; Asthma in her brother; Diabetes in her brother, mother, and sister; Early death in her brother; Heart disease in her brother and mother; Hypertension in her mother; Kidney disease in her mother; Stroke in her mother; Vision loss in her mother.    Allergies: Patient is allergic to tomato (solanum lycopersicum).    ROS  Constitutional: Negative for fever   Eyes: Negative for blurred vision, double vision   Respiratory: Negative for cough, shortness of breath   Cardiovascular: Negative for chest pain, palpitations, and leg swelling.   Gastrointestinal:+ diarrhea Negative for abdominal pain, constipation, nausea, vomiting.   Genitourinary: + urinary retention (Araiza) Negative for dysuria, frequency   Musculoskeletal:  + generalized weakness. Negative for back pain and myalgias.   Skin: Negative for itching and rash. + right 2nd toe amputation  Neurological: Negative for dizziness, headaches.   Psychiatric/Behavioral: Negative for depression. The patient is not nervous/anxious.      Vital signs  /64  HR 73  Temp 97.7  RR 16  O2 99% on RA    PEx  Constitutional: + appears ill. Patient appears debilitated.  No distress  noted  HENT:   Head: Normocephalic and atraumatic.   Eyes: Pupils are equal, round  Neck: Normal range of motion. Neck supple.   Cardiovascular: Normal rate, regular rhythm and normal heart sounds.    Pulmonary/Chest: Effort normal and breath sounds are clear  Abdominal: Soft. Bowel sounds are normal.   Musculoskeletal: Normal range of motion.   Neurological:  + left-sided weakness and left facial droop (prior stroke) Alert and oriented to person, place, and time.   Psychiatric: Normal mood and affect. Behavior is normal.   Skin: Skin is warm and dry. Full skin assessment on 1/18    Wound followed by consistent, contracted NP/PA/MD: this NP    Wound location:  Right 2nd toe amputation with very small area of dehiscence to anterior of wound  Appearance of wound:   pink 100%  Drainage characteristic therapy: None  Odor: none  Tissue/wound bed: no granulation tissue present   Wound length: 3cm   Wound width: 0.5cm  Wound depth: 0cm   Undermining/Tunneling: none  Edges:  distinct, outline clearly visible, attached and even with wound base  Danyelle wound area: no edema   Progress:  Healing    Labs   Ref. Range 1/13/2022 08:10   Sodium Latest Ref Range: 136 - 145 mmol/L 138   Potassium Latest Ref Range: 3.5 - 5.1 mmol/L 3.8   Chloride Latest Ref Range: 95 - 110 mmol/L 105   CO2 Latest Ref Range: 23 - 29 mmol/L 26   Anion Gap Latest Ref Range: 8 - 16 mmol/L 7 (L)   BUN Latest Ref Range: 8 - 23 mg/dL 16   Creatinine Latest Ref Range: 0.5 - 1.4 mg/dL 1.7 (H)      Ref. Range 1/13/2022 08:10   WBC Latest Ref Range: 3.90 - 12.70 K/uL 7.31   RBC Latest Ref Range: 4.00 - 5.40 M/uL 2.54 (L)   Hemoglobin Latest Ref Range: 12.0 - 16.0 g/dL 7.4 (L)   Hematocrit Latest Ref Range: 37.0 - 48.5 % 23.5 (L)       Assessment and Plan:  Diabetic ulcer of toe of right foot associated with type 1 diabetes mellitus, with bone involvement without evidence of necrosis  Osteomyelitis of right 2nd toe  Diabetic foot ulcer with osteomyelitis  - S/p  amputation right 2nd toe on 1/1/22  -completed amoxicillin 500 mg q.12 hours, stop date 1/16  - Podiatry  plan to continue wound care and follow up as outpatient  - PT/OT    Surgical incision  - R second toe: Applied xeroform, kerlix, ACE avoiding tight compression.  Change every 2-3 days per podiatry   - Podiatry  plan to continue wound care and follow up as outpatient     Urinary retention  - Aariza in place. Change monthly  - continue flomax    -follow-up urology outpatient for voiding trial     Hypothyroidism  -Continue levothyroxine      CKD (chronic kidney disease), stage IV  - Serum creatinine baseline (average around 2.5)  - Avoid nephrotoxins and hypotension as able, renally dose medications  - Continue to monitor   -1/18 initiate admission labs:  CBC, CMP, Mag, phos, lipid panel, hemoglobin A1c, vitamin-D     Severe protein-calorie malnutrition  -continue Boost TID     Normocytic anemia  - continue to monitor labs  -continue iron tablets     History of stroke  - Residual left sided weakness at baseline  - Continue Crestor 40 mg daily   - Continue Aspirin 81mg PO daily  - Continue Plavix 75mg PO daily     Type 2 diabetes mellitus with neuropathy, with long-term current use of insulin  - type 1 and type 2 diabetes recorded 1 patient's problem list?  Unclear  - A1C 5.4 on 11/21/2021  - home regimen:  8 units daily (long-acting)  - DM education, DM diet  - monitor BG and PO intake  - adjust meds as needed   -not currently on anti hyperglycemics     Hyperlipidemia associated with type 2 diabetes mellitus  - Continue Atorvastatin 80mg PO daily     Hypertension associated with stage 4 chronic kidney disease due to type 2 diabetes mellitus  - Continue Norvasc 5 mg every morning and 10 mg qHS  - Continue Coreg 25mg PO  BID    Diabetic neuropathy  -continue gabapentin and Cymbalta    Nausea   GERD  -continue Zofran and Reglan  -continue Prilosec    Hypothyroidism  -continue Synthroid    Pain  -continue Norco 5-325 mg  q.6 hours p.r.n. pain    Anxiety  -continue home trazodone    Diarrhea  Chronic constipation  1/18 initiate adjusting home lactulose from b.i.d. to b.i.d. p.r.n.    Nutrition  -continue home B complex vitamin    Debility   - Continue with PT/OT for gait training and strengthening and restoration of ADL's   - Encourage mobility, OOB in chair, and early ambulation as appropriate  - Fall precautions   - Monitor for bowel and bladder dysfunction  - Monitor for and prevent skin breakdown and pressure ulcers      I certify that SNF services are required to be given on an inpatient basis because Beatriz Adame needs for skilled nursing care and/or skilled rehabilitation are required on a daily basis and such services can only practically be provided in a skilled nursing facility setting and are for an ongoing condition for which she received inpatient care in the hospital.     Total time of the visit 117 minutes 8921-2681  Non physical exam/ non charting time: 67 minutes   Time spent in the care of new patient (counseling patient on plan of care, orienting to new facility, coordinating patient care, extensive chart review of previous medical records and summarization, medication reconciliation, initiating consults, reviewing and interpreting labs and any pertinent imaging, reviewing all transferring facility orders, reviewing all transferring facility medications, initiating new orders, and documentation. Discussed patient's clinical course and plan of care with primary team      Aren Martinez NP  Department of Hospital Medicine   [facility]     DOS: 1/18/2022      Patient note was created using MModal Dictation.  Any errors in syntax or even information may not have been identified and edited on initial review prior to signing this note.

## 2022-01-20 NOTE — PROGRESS NOTES
[facility]                                                                  Skilled Nursing Facility                                                                          Progress Note      Admit Date:1/14/2022  MARY TBD  Principal Problem: Diabetic ulcer of toe of right foot associated with type 1 diabetes mellitus, with bone involvement without evidence of necrosis  HPI obtained from patient interview and chart review      Chief Complaint: Revaluation of medical treatment and therapy status: insomnia, diarrhea, anemia     HPI:   Beatriz Adame is a 71 y.o. 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  Presents [place]  [place] [facility] [place] also status post amputation.  Per chart review, patient [place] [facility] [place] with complaint of right 2nd toe wound infection.Right foot x-ray notes no convincing radiographic evidence of osteomyelitis.  MRI right foot notes acute osteomyelitis involving the 2nd toe middle and distal phalanx. Arterial us showed slow velocities with abnormal waveforms in the arteries of the calf suggestive of vessel hardening such as from diffuse atherosclerosis.  Podiatry and Cardiology consulted.  She underwent right 2nd toe amputation on 01/01/2022.  Patient declined further workup with angiogram for PAD at that time.  Podiatry plans on staged debridement and closure.  Hospital course complicated by urinary retention requiring Araiza placement.     Admission to SNF for secondary weakness and mobility.      Patient will be treated at  SNF with PT and OT to improve functional status and  ability to perform ADLs.      Interval History:  - Patient seen and examined today lying in bed  - 1/19 hgb 6.9, repeat CBC ordered 1/20, not completed. Will reorder  - started on vit D  For vit D deficiency   - started on melatonin for insomnia   - diarrhea improved.  - No acute signs of distress noted. No acute events reported over night.   - VSS, afebrile.   - Denies SOB, CP, abdominal pain, loss of appetite, constipation, nausea or vomiting.        Past Medical History: Patient has a past medical history of Anemia, Arthritis, Diabetes mellitus, Encounter for blood transfusion, Hypercholesteremia, Hypertension, Renal disorder, and Stroke.     Past Surgical History: Patient has a past surgical history that includes Tubal ligation; Laparoscopic appendectomy (N/A, 7/22/2021); Cystoscopy w/ ureteral stent placement (Right, 10/13/2021); Esophagogastroduodenoscopy (N/A, 11/23/2021); Appendectomy; Ureteroscopic removal of ureteric calculus (Right, 12/22/2021); and Toe amputation (Right, 1/1/2022).     Social History: Patient reports that she has never smoked. She has never used smokeless tobacco. She reports that she does not drink alcohol and does not use drugs.     Family History: family history includes Alcohol abuse in her father; Arthritis in her mother; Asthma in her brother; Diabetes in her brother, mother, and sister; Early death in her brother; Heart disease in her brother and mother; Hypertension in her mother; Kidney disease in her mother; Stroke in her mother; Vision loss in her mother.     Allergies: Patient is allergic to tomato (solanum lycopersicum).     ROS  Constitutional: Negative for fever   Eyes: Negative for blurred vision, double vision   Respiratory: Negative for cough, shortness of breath   Cardiovascular: Negative for chest pain, palpitations, and leg swelling.   Gastrointestinal:Negative for abdominal pain,  diarrhea, constipation, nausea, vomiting.   Genitourinary: + urinary retention (Araiza)  Negative for dysuria, frequency   Musculoskeletal:  + generalized weakness. Negative for back pain and myalgias.   Skin: Negative for itching and rash. + right 2nd toe amputation  Neurological: Negative for dizziness, headaches.   Psychiatric/Behavioral: Negative for depression. The patient is not nervous/anxious.       Vital signs  /61  HR 68  Temp 97.97  RR 18  O2 99% on RA     PEx  Constitutional: + appears ill. Patient appears debilitated.  No distress noted  HENT:   Head: Normocephalic and atraumatic.   Eyes: Pupils are equal, round  Neck: Normal range of motion. Neck supple.   Cardiovascular: Normal rate, regular rhythm and normal heart sounds.    Pulmonary/Chest: Effort normal and breath sounds are clear  Abdominal: Soft. Bowel sounds are normal.   Musculoskeletal: Normal range of motion.   Neurological:  + left-sided weakness and left facial droop (prior stroke) Alert and oriented to person, place, and time.   Psychiatric: Normal mood and affect. Behavior is normal.   Skin: Skin is warm and dry. Full skin assessment on 1/18     Wound followed by consistent, contracted NP/PA/MD: this NP    Wound that you did not assess today: Right 2nd toe amputation with very small area of dehiscence to anterior of wound  Wound location(s)/type(s): see above  Not visualized today. No signs/symptoms of infection or wound-related changes reported.     Labs  1/19/2022:  CMP: Glucose 60, BUN 18, creatinine 1.70, sodium 137, potassium 4.1, chloride 102, CO2 27, calcium 8.8, total protein 6.0, ALT 8, AST 12, alkaline phosphatase 60, total bilirubin 0.4, albumin 2.7, magnesium 2.0, phosphorus 3.6.  Hemoglobin A1c 5.1.  Vitamin D 25 hydroxy <5.0.  CBC: WBC count 8.3, hemoglobin 6.9, hematocrit 20.3, platelet count 146.  Lipid panel: Total cholesterol 140, HDL 38, triglycerides 87, LDL 85        Assessment and Plan:  Diabetic ulcer of toe of right foot associated with type 1 diabetes mellitus, with bone involvement without  evidence of necrosis  Osteomyelitis of right 2nd toe  Diabetic foot ulcer with osteomyelitis  - S/p amputation right 2nd toe on 1/1/22  -Continue NWB to RLE   -completed amoxicillin 500 mg q.12 hours, stop date 1/16  - Podiatry  plan to continue wound care and follow up as outpatient  - PT/OT     Insomnia   - continue melatonin nightly     Surgical incision  - R second toe: Applied xeroform, kerlix, ACE avoiding tight compression.  Change every 2-3 days per podiatry   - Podiatry plan to continue wound care and follow up as outpatient     Urinary retention  - Araiza in place. Change monthly  - continue flomax    -follow-up urology outpatient for voiding trial     Hypothyroidism  -Continue levothyroxine      CKD (chronic kidney disease), stage IV  - Serum creatinine baseline (average around 2.5)  - Avoid nephrotoxins and hypotension as able, renally dose medications  - Continue to monitor     Severe protein-calorie malnutrition  -continue Boost TID     Normocytic anemia  - continue to monitor labs  -continue iron tablets  - 1/19 hgb 6.9, repeat CBC ordered 1/20 (not collected)  - 1/20 initiate reordering CBC  -may need to be sent to [facility] for blood transfusion for hgb < 7.0.      History of stroke  - Residual left sided weakness at baseline  - Continue Crestor 40 mg daily   - Continue Aspirin 81mg PO daily  - Continue Plavix 75mg PO daily     Type 2 diabetes mellitus with neuropathy, with long-term current use of insulin  - type 1 and type 2 diabetes recorded 1 patient's problem list?  Unclear  - A1C 5.4 on 11/21/2021  - home regimen:  8 units daily (long-acting)  - DM education, DM diet  - monitor BG and PO intake  - adjust meds as needed   -not currently on anti hyperglycemics     Hyperlipidemia associated with type 2 diabetes mellitus  - Continue Atorvastatin 80mg PO daily     Hypertension associated with stage 4 chronic kidney disease due to type 2 diabetes mellitus  - Continue Norvasc 5 mg  every morning and 10 mg qHS  - Continue Coreg 25mg PO  BID     Vitamin D deficiency, new 1/19   - continue vitamin D 1000 units PO daily (started 1/19)    Diabetic neuropathy  -continue gabapentin and Cymbalta     Nausea   GERD  -continue Zofran and Reglan  -continue Prilosec     Hypothyroidism  -continue Synthroid     Pain  -continue Norco 5-325 mg q.6 hours p.r.n. pain     Anxiety  -continue home trazodone     Diarrhea  Chronic constipation  1/18 initiate adjusting home lactulose from b.i.d. to b.i.d. p.r.n.  - diarrhea improved.      Nutrition  -continue home B complex vitamin     Debility   - Continue with PT/OT for gait training and strengthening and restoration of ADL's   - Encourage mobility, OOB in chair, and early ambulation as appropriate  - Fall precautions   - Monitor for bowel and bladder dysfunction  - Monitor for and prevent skin breakdown and pressure ulcers        I certify that SNF services are required to be given on an inpatient basis because Beatriz Adame needs for skilled nursing care and/or skilled rehabilitation are required on a daily basis and such services can only practically be provided in a skilled nursing facility setting and are for an ongoing condition for which she received inpatient care in the hospital.        Aren Martinez NP  Department of Hospital Medicine   [facility]      DOS: 1/20/2022        Patient note was created using MModal Dictation.  Any errors in syntax or even information may not have been identified and edited on initial review prior to signing this note.

## 2022-01-25 NOTE — ED PROVIDER NOTES
"Encounter Date: 1/25/2022       History     Chief Complaint   Patient presents with    Abnormal Lab     Pt presents to ED via EMS from [facility] for abnormal lab work earlier today. Per EMS the NH was unable to tell them which blood work was abnormal or what blood work she even had done today. Pt denying all complaints at this time. Hx of anemia and DM     70 yo female with PMH of CKD IV, anemia, DM, CVA w/residual left-sided weakness and HTN presents with abnormal lab. Pt unsure why she was sent here from her SNF but her only complaint is toe soreness where she had a toe amputated last month. EMS states the patient was sent here for "abnormal labs" but they were not told which labs were abnormal. I called [facility] who reports the patient had a hgb of 6.0 earlier today. They were going to repeat the value this evening but the company couldn't come out for labs, so they sent her to the ED. Pt denies sob, fatigue, lightheadedness, dizziness, headache, black/bloody stools, nausea, vomiting, diarrhea, and constipation. She takes tylenol for her pain. Hx of transfusion in October (per chart review).       I spoke with [facility] who reported the patient's hgb was 6.0.     The history is provided by the nursing home, the EMS personnel and the patient.     Review of patient's allergies indicates:   Allergen Reactions    Tomato (solanum lycopersicum) Hives and Itching     Past Medical History:   Diagnosis Date    Gastroparesis     GERD (gastroesophageal reflux disease)     History of Clostridium difficile colitis 07/24/2021    History of kidney stones     History of stroke     left side paralysis    Hyperlipidemia     Hypertension     Hypothyroidism     Iron deficiency anemia     Osteoarthritis     Peripheral neuropathy     Stage IV CKD     Type II diabetes mellitus      Past Surgical History:   Procedure Laterality Date    APPENDECTOMY      CYSTOSCOPY W/ URETERAL STENT " PLACEMENT Right 10/13/2021    Procedure: CYSTOSCOPY, WITH URETERAL STENT INSERTION;  Surgeon: Wanda Arroyo MD;  Location: [facility];  Service: Urology;  Laterality: Right;    ESOPHAGOGASTRODUODENOSCOPY N/A 11/23/2021    Procedure: EGD (ESOPHAGOGASTRODUODENOSCOPY);  Surgeon: Obed Joeng MD;  Location: [facility] ENDO ([address] [place] [address]);  Service: Endoscopy;  Laterality: N/A;    LAPAROSCOPIC APPENDECTOMY N/A 7/22/2021    Procedure: APPENDECTOMY, LAPAROSCOPIC;  Surgeon: Paulo Calvo Jr., MD;  Location: [facility];  Service: General;  Laterality: N/A;    TOE AMPUTATION Right 1/1/2022    Procedure: AMPUTATION, TOE;  Surgeon: Genaro Mason DPM;  Location: [facility];  Service: Podiatry;  Laterality: Right;    TUBAL LIGATION      URETEROSCOPIC REMOVAL OF URETERIC CALCULUS Right 12/22/2021    Procedure: REMOVAL, CALCULUS, URETER, URETEROSCOPIC;  Surgeon: Wanda Arroyo MD;  Location: [facility];  Service: Urology;  Laterality: Right;     Family History   Problem Relation Age of Onset    Arthritis Mother     Diabetes Mother     Heart disease Mother     Hypertension Mother     Kidney disease Mother     Stroke Mother     Vision loss Mother     Heart attack Mother     Alcohol abuse Father     Heart attack Father     Diabetes Sister     Asthma Brother     Diabetes Brother     Heart disease Brother     Heart attack Brother      Social History     Tobacco Use    Smoking status: Never Smoker    Smokeless tobacco: Never Used   Substance Use Topics    Alcohol use: No     Comment: socially    Drug use: No     Review of Systems   Constitutional: Negative for fever.   HENT: Negative for congestion.    Eyes: Negative for pain and visual disturbance.   Respiratory: Negative for cough and shortness of breath.    Cardiovascular: Negative for chest pain and leg swelling.   Gastrointestinal: Negative for abdominal pain, constipation, diarrhea, nausea and vomiting.   Endocrine: Negative for polyuria.   Genitourinary:  Negative for dysuria and hematuria.   Musculoskeletal: Negative for arthralgias and back pain.        Right foot/toe pain   Skin: Negative for color change and rash.   Neurological: Negative for dizziness, weakness, light-headedness and headaches.       Physical Exam     Initial Vitals [01/25/22 0055]   BP Pulse Resp Temp SpO2   (!) 148/64 80 15 98.6 °F (37 °C) 100 %      MAP       --         Physical Exam    Nursing note and vitals reviewed.  Constitutional: She is not diaphoretic. No distress.   Thin female in bed in no acute distress   HENT:   Head: Normocephalic and atraumatic.   Eyes: Conjunctivae and EOM are normal. Pupils are equal, round, and reactive to light.   Neck: Neck supple.   Normal range of motion.  Cardiovascular: Normal rate, regular rhythm, normal heart sounds and intact distal pulses.   No murmur heard.  Pulmonary/Chest: Breath sounds normal. No respiratory distress. She has no wheezes. She has no rhonchi. She has no rales.   No increased work of breathing or tachypnea. CTAB   Abdominal: Abdomen is soft. She exhibits no distension. There is no abdominal tenderness. There is no rebound and no guarding.   Musculoskeletal:         General: No tenderness or edema.      Cervical back: Normal range of motion and neck supple.      Comments: Right foot: second digit s/p amputation, site is c/d/i without erythema or significant TTP.      Neurological: She is alert and oriented to person, place, and time.   Skin: Skin is warm and dry. Capillary refill takes less than 2 seconds.         ED Course   Procedures  Labs Reviewed   CBC W/ AUTO DIFFERENTIAL - Abnormal; Notable for the following components:       Result Value    RBC 2.74 (*)     Hemoglobin 7.9 (*)     Hematocrit 25.2 (*)     MCHC 31.3 (*)     All other components within normal limits   COMPREHENSIVE METABOLIC PANEL - Abnormal; Notable for the following components:    Creatinine 1.8 (*)     Albumin 3.0 (*)     ALT 6 (*)     eGFR if   32.4 (*)     eGFR if non  28.1 (*)     All other components within normal limits   TYPE & SCREEN          Imaging Results    None          Medications - No data to display  Medical Decision Making:   History:   Old Medical Records: I decided to obtain old medical records.  Old Records Summarized: other records.  Initial Assessment:   70 yo female with PMH of CKD IV, anemia, DM, CVA w/residual left-sided weakness and HTN presents with reported low hgb without associated symptoms, hemodynamically stable, PE unremarkable for acute/new findings. Will obtain basic labs and consent for blood, given likely need for transfusion.   Differential Diagnosis:   Anemia of chronic disease, iron deficiency anemia, doubt GI bleed  Clinical Tests:   Lab Tests: Ordered and Reviewed  ED Management:  CBC baseline with stable hgb of 7.9. No need for transfusion at this time. CMP baseline with mild renal impairment. Pt remains hemodynamically and clinically stable and may be discharged at this time. She will be sent back to [facility]. She agrees with and is comfortable with the plan.                       Clinical Impression:   Final diagnoses:  [D64.9] Anemia, unspecified type (Primary)  [N18.4] CKD (chronic kidney disease), stage IV          ED Disposition Condition    Discharge Stable        ED Prescriptions     None        Follow-up Information     Follow up With Specialties Details Why Contact Info    Datne Olivier MD Family Medicine Schedule an appointment as soon as possible for a visit  To discuss your recent ER visit and any additional concerns that you may have [address] [facility] [address]  [phone]      [facility] - Emergency Dept Emergency Medicine Go to  As needed, If symptoms worsen [address] [facility] [id number] [phone]           Luis Enrique Rangel MD  Resident  01/25/22 2669

## 2022-01-27 NOTE — ED PROVIDER NOTES
Source of History:  EMS  Patient  Chart    Chief complaint:  Abnormal Lab (Low H&H from [facility])      HPI:  Beatriz Adame is a 71 y.o. female with history of stroke, hypertension, hyperlipidemia, type 2 diabetes, CKD, GERD, gastroparesis presenting to emergency department with complaint of abnormal H& H.    Per EMS, the patient was sent from [facility] for a low H&H on routine testing earlier today.  No accompanying information was sent with the patient regarding the results.    I spoke with the patient's nursing home attendant.  That individual stated that they with the night nurse, and did not know what had gone on during the daytime.    Patient states that she has no complaints.  She has no chest pain, shortness of breath, dizziness, lightheadedness, palpitations, or bleeding symptoms.    Per chart review, patient was sent here yesterday for the same issue.  She is found have a stable hemoglobin, and an unremarkable workup otherwise.  Her baseline hemoglobin is around 7.5.  She was discharged back to her nursing home yesterday.    ROS: As per HPI and below:  Review of Systems   Constitutional: Negative for fever.   HENT: Negative for sore throat.    Eyes: Negative for double vision.   Respiratory: Negative for cough and shortness of breath.    Cardiovascular: Negative for chest pain.   Gastrointestinal: Negative for abdominal pain and vomiting.   Genitourinary: Negative for dysuria.   Musculoskeletal: Negative for falls.   Skin: Negative for rash.   Neurological: Negative for headaches.       Review of patient's allergies indicates:   Allergen Reactions    Tomato (solanum lycopersicum) Hives and Itching       No current facility-administered medications on file prior to encounter.     Current Outpatient Medications on File Prior to Encounter   Medication Sig Dispense Refill    amLODIPine (NORVASC) 5 MG tablet Take 10 mg by mouth every evening. Take 5 mg every morning and 10 mg at  night      aspirin (ECOTRIN) 81 MG EC tablet Take 81 mg by mouth once daily.      b complex vitamins tablet Take 1 tablet by mouth once daily.      carvedilol (COREG) 25 MG tablet Take 1 tablet (25 mg total) by mouth 2 (two) times daily. 60 tablet 0    clopidogreL (PLAVIX) 75 mg tablet Take 75 mg by mouth.      DULoxetine (CYMBALTA) 30 MG capsule Take 1 capsule (30 mg total) by mouth once daily.      ferrous sulfate (FEOSOL) 325 mg (65 mg iron) Tab tablet Take 325 mg by mouth daily with breakfast. Off at present      gabapentin (NEURONTIN) 300 MG capsule Take 1 capsule (300 mg total) by mouth once daily. 90 capsule 0    HYDROcodone-acetaminophen (NORCO) 5-325 mg per tablet Take 1 tablet by mouth every 6 (six) hours as needed for Pain. 10 tablet 0    insulin (BASAGLAR KWIKPEN U-100 INSULIN) glargine 100 units/mL (3mL) SubQ pen Inject 5 Units into the skin once daily.  0    lactulose (CHRONULAC) 10 gram/15 mL solution Take 10 g by mouth 2 (two) times a day.      levothyroxine (SYNTHROID) 75 MCG tablet Take 75 mcg by mouth once daily.      metoclopramide HCl (REGLAN) 10 MG tablet Take 1 tablet (10 mg total) by mouth 3 (three) times daily before meals. 90 tablet 3    omeprazole (PRILOSEC) 20 MG capsule Take 20 mg by mouth 2 (two) times daily.      ondansetron (ZOFRAN) 4 MG tablet Take 1 tablet (4 mg total) by mouth every 8 (eight) hours as needed for Nausea. 30 tablet 3    rosuvastatin (CRESTOR) 40 MG Tab Take 40 mg by mouth once daily.      tamsulosin (FLOMAX) 0.4 mg Cap Take 1 capsule (0.4 mg total) by mouth once daily. 30 capsule 1    traZODone (DESYREL) 100 MG tablet Take 1 tablet (100 mg total) by mouth once daily. 30 tablet 0       PMH:  As per HPI and below:  Past Medical History:   Diagnosis Date    Gastroparesis     GERD (gastroesophageal reflux disease)     History of Clostridium difficile colitis 07/24/2021    History of kidney stones     History of stroke     left side paralysis     Hyperlipidemia     Hypertension     Hypothyroidism     Iron deficiency anemia     Osteoarthritis     Peripheral neuropathy     Stage IV CKD     Type II diabetes mellitus      Past Surgical History:   Procedure Laterality Date    APPENDECTOMY      CYSTOSCOPY W/ URETERAL STENT PLACEMENT Right 10/13/2021    Procedure: CYSTOSCOPY, WITH URETERAL STENT INSERTION;  Surgeon: Wanda Arroyo MD;  Location: [facility];  Service: Urology;  Laterality: Right;    ESOPHAGOGASTRODUODENOSCOPY N/A 11/23/2021    Procedure: EGD (ESOPHAGOGASTRODUODENOSCOPY);  Surgeon: Obed Jeong MD;  Location: [address]);  Service: Endoscopy;  Laterality: N/A;    LAPAROSCOPIC APPENDECTOMY N/A 7/22/2021    Procedure: APPENDECTOMY, LAPAROSCOPIC;  Surgeon: Paulo Calvo Jr., MD;  Location: [facility];  Service: General;  Laterality: N/A;    TOE AMPUTATION Right 1/1/2022    Procedure: AMPUTATION, TOE;  Surgeon: Genaro Mason DPM;  Location: [facility];  Service: Podiatry;  Laterality: Right;    TUBAL LIGATION      URETEROSCOPIC REMOVAL OF URETERIC CALCULUS Right 12/22/2021    Procedure: REMOVAL, CALCULUS, URETER, URETEROSCOPIC;  Surgeon: Wanda Arroyo MD;  Location: [facility];  Service: Urology;  Laterality: Right;       Social History     Socioeconomic History    Marital status: Single   Tobacco Use    Smoking status: Never Smoker    Smokeless tobacco: Never Used   Substance and Sexual Activity    Alcohol use: No     Comment: socially    Drug use: No    Sexual activity: Not Currently     Social Determinants of Health     Financial Resource Strain: Low Risk     Difficulty of Paying Living Expenses: Not hard at all   Food Insecurity: No Food Insecurity    Worried About Running Out of Food in the Last Year: Never true    Ran Out of Food in the Last Year: Never true   Transportation Needs: No Transportation Needs    Lack of Transportation (Medical): No    Lack of Transportation (Non-Medical): No   Physical  Activity: Inactive    Days of Exercise per Week: 0 days    Minutes of Exercise per Session: 0 min   Stress: No Stress Concern Present    Feeling of Stress : Not at all   Social Connections: Moderately Isolated    Frequency of Communication with Friends and Family: More than three times a week    Frequency of Social Gatherings with Friends and Family: Once a week    Attends Buddhism Services: More than 4 times per year    Active Member of Clubs or Organizations: No    Attends Club or Organization Meetings: Never    Marital Status: Never    Housing Stability: Low Risk     Unable to Pay for Housing in the Last Year: No    Number of Places Lived in the Last Year: 1    Unstable Housing in the Last Year: No       Family History   Problem Relation Age of Onset    Arthritis Mother     Diabetes Mother     Heart disease Mother     Hypertension Mother     Kidney disease Mother     Stroke Mother     Vision loss Mother     Heart attack Mother     Alcohol abuse Father     Heart attack Father     Diabetes Sister     Asthma Brother     Diabetes Brother     Heart disease Brother     Heart attack Brother        Physical Exam:      Vitals:    01/26/22 2324   BP: 129/70   Pulse: 67   Resp: 17   Temp: 98.4 °F (36.9 °C)     Gen: No acute distress.  Nontoxic.  Well appearing. Thin.   Mental Status:  Alert and oriented .  Appropriate, conversant.  Skin: Warm, dry. No rashes seen.  Eyes: No conjunctival injection.  Pulm: CTAB. No increased work of breathing.  No significant tachypnea.  No audible stridor or wheezing.  No conversational dyspnea.    CV: Regular rate. Regular rhythm.   Abd: Soft.  Not distended.  Nontender.   MSK: Good range of motion all joints.  No deformities.    Neuro: Awake. Speech normal. No focal neuro deficit observed.    Laboratory Studies:  Labs Reviewed   CBC W/ AUTO DIFFERENTIAL - Abnormal; Notable for the following components:       Result Value    RBC 2.44 (*)     Hemoglobin 7.2  (*)     Hematocrit 23.6 (*)     MCHC 30.5 (*)     All other components within normal limits     Chart reviewed.     Imaging Results    None         Medications Given:  Medications - No data to display    MDM:    71 y.o. female with chronic anemia, with baseline hemoglobin of 7.5, here with low H& H.   She is afebrile, stable, nontoxic.  She has no complaints, and no bleeding symptoms.    Repeat CBC here demonstrate stable hemoglobin.  No emergency medical condition identified.  Discharged back to nursing home in stable condition.    Diagnostic Impression:    1. Chronic anemia         ED Disposition Condition    Discharge Stable        ED Prescriptions     None        Follow-up Information     Follow up With Specialties Details Why Contact Info    Dante Olivier MD Family Medicine Schedule an appointment as soon as possible for a visit in 2 days  [address] [facility] [address]  [phone]              Marilee Talamantes MD  Emergency Medicine       Marilee Talamantes MD  01/27/22 4312

## 2022-02-02 NOTE — DISCHARGE SUMMARY
[facility] Medicine  Discharge Summary      Patient Name: Beatriz Adame  MRN: [id number]  Patient Class: IP- Inpatient  Admission Date: 12/30/2021  Hospital Length of Stay: 12 days  Discharge Date and Time: 1/14/2022  5:54 PM  Attending Physician: No att. providers found   Discharging Provider: Annel Hernandez MD  Primary Care Provider: Dante Olivier MD      HPI:   Ms. 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    She presents to the ED with complaint of right 2nd toe wound infection.  She was evaluated by home health nurse today and felt wound worsened and needed to be evaluated.  Patient reports she developed the wound over the last month or so due to dragging her feet while in her wheelchair. She denies fever, chills, fatigue, muscle aches, cough, sore throat, nausea, vomiting, diarrhea, chest pain, shortness of breath, lightheadedness, dizziness, or syncope. She lives alone; uses wheelchair for mobility, is able to transfer from chair to bed/toilet/chair independently however her neighbors help her with ADLs and outings.     In the ED she is hemodynamically stable on room air and afebrile.  CBC with known anemia hemoglobin 7.5 and hematocrit 24.3 (baseline 8-9 and 26-29).  Chemistry with serum creatinine 1.8 (better than recent baseline), BUN 22.  LFTs WNLs.  .  CRP 53.3.  Blood cultures obtained.  COVID negative.  Right foot x-ray notes no convincing radiographic evidence of osteomyelitis.  MRI right foot notes acute osteomyelitis involving the 2nd toe middle and distal phalanx.  She was initiated on  "vancomycin and Zosyn.  The patient was placed in observation to the Hospital Medicine Service for further evaluation and treatment.       Procedure(s) (LRB):  AMPUTATION, TOE (Right)      Hospital Course:   Ms. Adame presented with right toe ulcer.  Empirically treated with Zosyn and vancomycin.  MRI showed "acute osteomyelitis involving the 2nd toe middle and distal phalanx." Arterial us showed slow velocities with abnormal waveforms in the arteries of the calf suggestive of vessel hardening such as from diffuse atherosclerosis.  Podiatry and Cardiology consulted.  She underwent right 2nd toe amputation on 01/01/2022.  Patient declined further workup with angiogram for PAD at this time.  Podiatry plans on staged debridement and closure. PT and OT working with patient and recommending SNF placement. She was discharged on PO abx to SNF as well as delgado catheter due to issues with urinary retention.       Goals of Care Treatment Preferences:  Code Status: Full Code      Consults:   Consults (From admission, onward)        Status Ordering Provider     Inpatient consult to Infectious Diseases  Once        Provider:  Rhiannon Cunningham MD    Completed STEPHANIE RIVERA     Inpatient virtual consult to Hospital Medicine  Once        Provider:  Stephanie Rivera MD    Completed YOSELIN COUCH     Inpatient consult to Cardiology  Once        Provider:  Paul Piña MD    Completed MAYTE NIX     Inpatient consult to Podiatry  Once        Provider:  Haris Doyle DPM    Completed WAGNER FERRARI          No new Assessment & Plan notes have been filed under this hospital service since the last note was generated.  Service: Hospital Medicine    Final Active Diagnoses:    Diagnosis Date Noted POA    PRINCIPAL PROBLEM:  Right diabetic foot ulcer [E10.621, L97.516] 12/30/2021 Yes    Urinary retention [R33.9] 01/12/2022 No    Osteomyelitis of right 2nd toe [E11.621, E11.69, L97.509, M86.9] 12/31/2021 Yes    " Hypothyroidism [E03.9] 12/30/2021 Yes    Severe protein-calorie malnutrition [E43] 11/21/2021 Yes    History of stroke [Z86.73] 07/22/2021 Not Applicable     Chronic    Iron deficiency anemia [D50.9] 07/22/2021 Yes    Type II diabetes mellitus [E11.9] 10/27/2016 Yes    Hyperlipidemia [E78.5]  Yes    Hypertension [I10]  Yes      Problems Resolved During this Admission:       Discharged Condition: stable    Disposition: Skilled Nursing Facility    Follow Up:   Follow-up Information     [facility].    Specialties: Nursing Home Agency, SNF Agency  Why: SNF  Contact information:  [address] [place] [address]  [phone]                       Patient Instructions:      Ambulatory referral/consult to Urology   Standing Status: Future   Referral Priority: Routine Referral Type: Consultation   Referral Reason: Specialty Services Required   Requested Specialty: Urology   Number of Visits Requested: 1       Significant Diagnostic Studies: Microbiology:   Blood Culture   Lab Results   Component Value Date    LABBLOO No growth after 5 days. 12/30/2021       Pending Diagnostic Studies:     None         Medications:  Reconciled Home Medications:      Medication List      CONTINUE taking these medications    amLODIPine 5 MG tablet  Commonly known as: NORVASC  Take 10 mg by mouth every evening. Take 5 mg every morning and 10 mg at night     aspirin 81 MG EC tablet  Commonly known as: ECOTRIN  Take 81 mg by mouth once daily.     b complex vitamins tablet  Take 1 tablet by mouth once daily.     BASAGLAR KWIKPEN U-100 INSULIN glargine 100 units/mL (3mL) SubQ pen  Generic drug: insulin  Inject 5 Units into the skin once daily.     carvediloL 25 MG tablet  Commonly known as: COREG  Take 1 tablet (25 mg total) by mouth 2 (two) times daily.     clopidogreL 75 mg tablet  Commonly known as: PLAVIX  Take 75 mg by mouth.     DULoxetine 30 MG capsule  Commonly known as: CYMBALTA  Take 1 capsule (30 mg total)  by mouth once daily.     ferrous sulfate 325 mg (65 mg iron) Tab tablet  Commonly known as: FEOSOL  Take 325 mg by mouth daily with breakfast. Off at present     gabapentin 300 MG capsule  Commonly known as: NEURONTIN  Take 1 capsule (300 mg total) by mouth once daily.     HYDROcodone-acetaminophen 5-325 mg per tablet  Commonly known as: NORCO  Take 1 tablet by mouth every 6 (six) hours as needed for Pain.     lactulose 10 gram/15 mL solution  Commonly known as: CHRONULAC  Take 10 g by mouth 2 (two) times a day.     levothyroxine 75 MCG tablet  Commonly known as: SYNTHROID  Take 75 mcg by mouth once daily.     metoclopramide HCl 10 MG tablet  Commonly known as: REGLAN  Take 1 tablet (10 mg total) by mouth 3 (three) times daily before meals.     omeprazole 20 MG capsule  Commonly known as: PRILOSEC  Take 20 mg by mouth 2 (two) times daily.     ondansetron 4 MG tablet  Commonly known as: ZOFRAN  Take 1 tablet (4 mg total) by mouth every 8 (eight) hours as needed for Nausea.     rosuvastatin 40 MG Tab  Commonly known as: CRESTOR  Take 40 mg by mouth once daily.     tamsulosin 0.4 mg Cap  Commonly known as: FLOMAX  Take 1 capsule (0.4 mg total) by mouth once daily.     traZODone 100 MG tablet  Commonly known as: DESYREL  Take 1 tablet (100 mg total) by mouth once daily.        STOP taking these medications    cephALEXin 500 MG capsule  Commonly known as: KEFLEX     tiZANidine 4 MG tablet  Commonly known as: ZANAFLEX        ASK your doctor about these medications    amoxicillin 500 MG capsule  Commonly known as: AMOXIL  Take 1 capsule (500 mg total) by mouth every 12 (twelve) hours. for 3 days  Ask about: Should I take this medication?            Indwelling Lines/Drains at time of discharge:   Lines/Drains/Airways     None                 Time spent on the discharge of patient: > 35 minutes         The attending portion of this evaluation, treatment, and documentation was performed per Annel Hernandez MD via  Telemedicine AudioVisual using the secure [email or web address] software platform with 2 way audio/video. The provider was located off-site and the patient is located in the hospital. The aforementioned video software was utilized to document the relevant history and physical exam    Annel Hernandez MD  Department of Hospital Medicine  [facility] ([place])

## 2022-02-02 NOTE — PROGRESS NOTES
[facility]                                                                  Skilled Nursing Facility                                                                          Progress Note      Admit Date:1/14/2022  MARY TBD  Principal Problem: Diabetic ulcer of toe of right foot associated with type 2 diabetes mellitus, with bone involvement without evidence of necrosis  HPI obtained from patient interview and chart review      Chief Complaint: Revaluation of medical treatment and therapy status     HPI:   Beatriz Adame is a 71 y.o. female w/ PMHx of osteoarthritis, history of C diff, hypothyroidism, 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, DM type 2, HTN, HLD, CKD, recurrent UTIs, history of left ureteral stent placement in 10/2021 with recent cystoscopy and finding of 7 mm right mid to distal ureteral calculi that was fragmented and removed as well as a stent on string placed, and history of appendectomy 07/21.  Presents to  SNF status post hospitalization at [facility] for right toe also status post amputation.  Per chart review, patient presented to [facility] ED with complaint of right 2nd toe wound infection.Right foot x-ray notes no convincing radiographic evidence of osteomyelitis.  MRI right foot notes acute osteomyelitis involving the 2nd toe middle and distal phalanx. Arterial us showed slow velocities with abnormal waveforms in the arteries of the calf suggestive of vessel hardening such as from diffuse atherosclerosis.  Podiatry and Cardiology consulted.  She underwent right 2nd toe amputation on 01/01/2022.  Patient declined further workup with angiogram for PAD at that time.  Podiatry plans on staged debridement and closure.  Hospital course complicated by urinary retention requiring Araiza placement.     Admission to SNF for secondary weakness and mobility.      Patient will be treated at  SNF with PT and OT to improve functional status and ability to perform ADLs.     SNF  course:   - sent to the ER on 01/25 and 1/27 for worsening anemia with a hemoglobin of 6.6 and hematocrit 19.51/27.  Repeat hemoglobin and hematocrit were 7.2 and 23.6 and she never required blood transfusion.  She was sent back to the facility she reports weakness and fatigue.      Interval History:  - Patient seen and examined today lying in bed.  She states her right foot bandage she needs to be changed.  - she reports working well with therapy.    - No acute signs of distress noted. No acute events reported over night.   - VSS, afebrile.   - Denies SOB, CP, abdominal pain, loss of appetite, constipation, diarrhea, nausea or vomiting.         Past Medical History: Patient has a past medical history of Anemia, Arthritis, Diabetes mellitus, Encounter for blood transfusion, Hypercholesteremia, Hypertension, Renal disorder, and Stroke.     Past Surgical History: Patient has a past surgical history that includes Tubal ligation; Laparoscopic appendectomy (N/A, 7/22/2021); Cystoscopy w/ ureteral stent placement (Right, 10/13/2021); Esophagogastroduodenoscopy (N/A, 11/23/2021); Appendectomy; Ureteroscopic removal of ureteric calculus (Right, 12/22/2021); and Toe amputation (Right, 1/1/2022).     Social History: Patient reports that she has never smoked. She has never used smokeless tobacco. She reports that she does not drink alcohol and does not use drugs.     Family History: family history includes Alcohol abuse in her father; Arthritis in her mother; Asthma in her brother; Diabetes in her brother, mother, and sister; Early death in her brother; Heart disease in her brother and mother; Hypertension in her mother; Kidney disease in her mother; Stroke in her mother; Vision loss in her mother.     Allergies: Patient is allergic to tomato (solanum lycopersicum).     ROS  Constitutional: Negative for fever   Eyes: Negative for blurred vision, double vision   Respiratory: Negative for cough, shortness of breath    Cardiovascular: Negative for chest pain, palpitations, and leg swelling.   Gastrointestinal:Negative for abdominal pain,  diarrhea, constipation, nausea, vomiting.   Genitourinary: + urinary retention (Araiza) Negative for dysuria, frequency   Musculoskeletal:  + generalized weakness. Negative for back pain and myalgias.   Skin: Negative for itching and rash. + right 2nd toe amputation  Neurological: Negative for dizziness, headaches.   Psychiatric/Behavioral: Negative for depression. The patient is not nervous/anxious.       Vital signs  BP 129/70  HR 66  Temp 98.4  RR 18  O2 99% on RA     PEx  Constitutional: + appears ill. Patient appears debilitated.  No distress noted  HENT:   Head: Normocephalic and atraumatic.   Eyes: Pupils are equal, round  Neck: Normal range of motion. Neck supple.   Cardiovascular: Normal rate, regular rhythm and normal heart sounds.    Pulmonary/Chest: Effort normal and breath sounds are clear  Abdominal: Soft. Bowel sounds are normal.   Musculoskeletal: Normal range of motion.   Neurological:  + left-sided weakness and left facial droop (prior stroke) Alert and oriented to person, place, and time.   Psychiatric: Normal mood and affect. Behavior is normal.   Skin: Skin is warm and dry. Full skin assessment on 1/18     Wound followed by consistent, contracted NP/PA/MD: this NP     Wound that you did not assess today: Right 2nd toe amputation with very small area of dehiscence to anterior of wound  Wound location(s)/type(s): see above  Not visualized today. No signs/symptoms of infection or wound-related changes reported.     Labs  01/26/2022:  CBC:  WBC 9.4, hemoglobin she 6.6, hematocrit 19.5, platelet 126  01/25/2022:  CBC:  WBC 8.1, hemoglobin 7.1, hematocrit 21.0, platelet 145  01/24/2022:  CBC:  WBC 7.1, hemoglobin 6.9, hematocrit 20.2, platelet 136  01/23/2022:   CBC:  WBC 10.6 H, hemoglobin 7.1, hematocrit 21.1, platelet 144  01/20/2022  CBC:  WBC 9.3, hemoglobin 9.1, hematocrit  27.3, platelet 172    1/19/2022:  CMP: Glucose 60, BUN 18, creatinine 1.70, sodium 137, potassium 4.1, chloride 102, CO2 27, calcium 8.8, total protein 6.0, ALT 8, AST 12, alkaline phosphatase 60, total bilirubin 0.4, albumin 2.7, magnesium 2.0, phosphorus 3.6.  Hemoglobin A1c 5.1.  Vitamin D 25 hydroxy <5.0.  CBC: WBC count 8.3, hemoglobin 6.9, hematocrit 20.3, platelet count 146.  Lipid panel: Total cholesterol 140, HDL 38, triglycerides 87, LDL 85        Assessment and Plan:  Diabetic ulcer of toe of right foot associated with type 2 diabetes mellitus, with bone involvement without evidence of necrosis  Osteomyelitis of right 2nd toe  Diabetic foot ulcer with osteomyelitis  - S/p amputation right 2nd toe on 1/1/22  -Continue NWB to RLE   -completed amoxicillin 500 mg q.12 hours, stop date 1/16  - Podiatry plan to continue wound care and follow up as outpatient  - PT/OT     Insomnia   - continue melatonin nightly      Surgical incision  - R second toe: Applied xeroform, kerlix, ACE avoiding tight compression.  Change every 2-3 days per podiatry   - Podiatry plan to continue wound care and follow up as outpatient     Urinary retention  - Araiza in place. Change monthly  - continue flomax    -follow-up urology outpatient for voiding trial     Hypothyroidism  -Continue levothyroxine      CKD (chronic kidney disease), stage IV  - Serum creatinine baseline (average around 2.5)  - Avoid nephrotoxins and hypotension as able, renally dose medications  - Continue to monitor      Severe protein-calorie malnutrition  -continue Boost TID     Chronic Normocytic anemia  - continue to monitor labs  -continue iron tablets  - 1/19 hgb 6.9, repeat CBC ordered 1/20 (not collected)  - 1/20 initiate reordering CBC  -may need to be sent to [facility] for blood transfusion for hgb < 7.0.      History of stroke  - Residual left sided weakness at baseline  - Continue Crestor 40 mg daily   - Continue Aspirin 81mg PO  daily  - Continue Plavix 75mg PO daily     Type 2 diabetes mellitus with neuropathy, with long-term current use of insulin  - type 1 and type 2 diabetes recorded 1 patient's problem list?  Unclear  - A1C 5.4 on 11/21/2021  - home regimen:  8 units daily (long-acting)  - DM education, DM diet  - monitor BG and PO intake  - adjust meds as needed   -not currently on anti hyperglycemics     Hyperlipidemia associated with type 2 diabetes mellitus  - Continue Atorvastatin 80mg PO daily     Hypertension associated with stage 4 chronic kidney disease due to type 2 diabetes mellitus  - Continue Norvasc 5 mg every morning and 10 mg qHS  - Continue Coreg 25mg PO  BID     Stable but ongoing problems     Vitamin D deficiency, new 1/19   - continue vitamin D 1000 units PO daily (started 1/19)     Diabetic neuropathy  -continue gabapentin and Cymbalta     Nausea   GERD  -continue Zofran and Reglan  -continue Prilosec     Hypothyroidism  -continue Synthroid     Pain  -continue Norco 5-325 mg q.6 hours p.r.n. pain     Anxiety  -continue home trazodone     Diarrhea  Chronic constipation  1/18 initiate adjusting home lactulose from b.i.d. to b.i.d. p.r.n.  - diarrhea improved.      Nutrition  -continue home B complex vitamin     Debility   - Continue with PT/OT for gait training and strengthening and restoration of ADL's   - Encourage mobility, OOB in chair, and early ambulation as appropriate  - Fall precautions   - Monitor for bowel and bladder dysfunction  - Monitor for and prevent skin breakdown and pressure ulcers        I certify that SNF services are required to be given on an inpatient basis because Beatriz Adame needs for skilled nursing care and/or skilled rehabilitation are required on a daily basis and such services can only practically be provided in a skilled nursing facility setting and are for an ongoing condition for which she received inpatient care in the hospital.         Aren Martinez NP  Department of  [facility]      DOS: 2/1/2022        Patient note was created using MModal Dictation.  Any errors in syntax or even information may not have been identified and edited on initial review prior to signing this note.

## 2022-02-07 NOTE — PROGRESS NOTES
[facility]                                                                  Skilled Nursing Facility                                                                          Progress Note      Admit Date:1/14/2022  MARY TBD  Principal Problem: Diabetic ulcer of toe of right foot associated with type 2 diabetes mellitus, with bone involvement without evidence of necrosis  HPI obtained from patient interview and chart review      Chief Complaint: Revaluation of medical treatment and therapy status     HPI:   Beatriz Adame is a 71 y.o. female w/ PMHx of osteoarthritis, history of C diff, hypothyroidism, 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, DM type 2, HTN, HLD, CKD, recurrent UTIs, history of left ureteral stent placement in 10/2021 with recent cystoscopy and finding of 7 mm right mid to distal ureteral calculi that was fragmented and removed as well as a stent on string placed, and history of appendectomy 07/21.  Presents to  SNF status post hospitalization at [facility] for right toe also status post amputation.  Per chart review, patient presented to [facility] ED with complaint of right 2nd toe wound infection.Right foot x-ray notes no convincing radiographic evidence of osteomyelitis.  MRI right foot notes acute osteomyelitis involving the 2nd toe middle and distal phalanx. Arterial us showed slow velocities with abnormal waveforms in the arteries of the calf suggestive of vessel hardening such as from diffuse atherosclerosis.  Podiatry and Cardiology consulted.  She underwent right 2nd toe amputation on 01/01/2022.  Patient declined further workup with angiogram for PAD at that time.  Podiatry plans on staged debridement and closure.  Hospital course complicated by urinary retention requiring Araiza placement.     Admission to SNF for secondary weakness and mobility.      Patient will be treated at  SNF with PT and OT to improve functional status and ability to perform ADLs.      SNF  course:   - sent to the ER on 01/25 and 1/27 for worsening anemia with a hemoglobin of 6.6 and hematocrit 19.51/27.  Repeat hemoglobin and hematocrit were 7.2 and 23.6 and she never required blood transfusion.  She was sent back to the facility she reports weakness and fatigue.      Interval History:  - Patient seen and examined today working with therapy.  - therapy states she is making improvement.   - last SNF day 2/11, plan to discharge 2/12  - No acute signs of distress noted. No acute events reported over night.   - VSS, afebrile.   - Denies SOB, CP, abdominal pain, loss of appetite, constipation, diarrhea, nausea or vomiting.         Past Medical History: Patient has a past medical history of Anemia, Arthritis, Diabetes mellitus, Encounter for blood transfusion, Hypercholesteremia, Hypertension, Renal disorder, and Stroke.     Past Surgical History: Patient has a past surgical history that includes Tubal ligation; Laparoscopic appendectomy (N/A, 7/22/2021); Cystoscopy w/ ureteral stent placement (Right, 10/13/2021); Esophagogastroduodenoscopy (N/A, 11/23/2021); Appendectomy; Ureteroscopic removal of ureteric calculus (Right, 12/22/2021); and Toe amputation (Right, 1/1/2022).     Social History: Patient reports that she has never smoked. She has never used smokeless tobacco. She reports that she does not drink alcohol and does not use drugs.     Family History: family history includes Alcohol abuse in her father; Arthritis in her mother; Asthma in her brother; Diabetes in her brother, mother, and sister; Early death in her brother; Heart disease in her brother and mother; Hypertension in her mother; Kidney disease in her mother; Stroke in her mother; Vision loss in her mother.     Allergies: Patient is allergic to tomato (solanum lycopersicum).     ROS  Constitutional: Negative for fever   Eyes: Negative for blurred vision, double vision   Respiratory: Negative for cough, shortness of breath   Cardiovascular:  Negative for chest pain, palpitations, and leg swelling.   Gastrointestinal:Negative for abdominal pain,  diarrhea, constipation, nausea, vomiting.   Genitourinary: + urinary retention (Araiza) Negative for dysuria, frequency   Musculoskeletal:  + generalized weakness. Negative for back pain and myalgias.   Skin: Negative for itching and rash. + right 2nd toe amputation  Neurological: Negative for dizziness, headaches.   Psychiatric/Behavioral: Negative for depression. The patient is not nervous/anxious.       Vital signs  BP 121/71  HR 75  Temp 98.4  RR 18  O2 96% on RA     PEx  Constitutional: + appears ill. Patient appears debilitated.  No distress noted  HENT:   Head: Normocephalic and atraumatic.   Eyes: Pupils are equal, round  Neck: Normal range of motion. Neck supple.   Cardiovascular: Normal rate, regular rhythm and normal heart sounds.    Pulmonary/Chest: Effort normal and breath sounds are clear  Abdominal: Soft. Bowel sounds are normal.   Musculoskeletal: Normal range of motion.   Neurological:  + left-sided weakness and left facial droop (prior stroke) Alert and oriented to person, place, and time.   Psychiatric: Normal mood and affect. Behavior is normal.   Skin: Skin is warm and dry. Full skin assessment on 1/18     Wound followed by consistent, contracted NP/PA/MD: this NP     Wound that you did not assess today: Right 2nd toe amputation with very small area of dehiscence to anterior of wound  Wound location(s)/type(s): see above  Not visualized today. No signs/symptoms of infection or wound-related changes reported.     [place]  [place]22[place] [facility] [place]:  WBC 10.01, hemoglobin 7.2, hematocrit 23.6, platelet 158  01/26/2022:  CBC:  WBC 9.4, hemoglobin 6.6, hematocrit 19.5, platelet 126  01/25/2022:  CBC:  WBC 8.1, hemoglobin 7.1, hematocrit 21.0, platelet 145  01/24/2022:  CBC:  WBC 7.1, hemoglobin 6.9, hematocrit 20.2, platelet 136  01/23/2022:   CBC:  WBC 10.6 H, hemoglobin 7.1, hematocrit 21.1, platelet  144  01/20/2022  CBC:  WBC 9.3, hemoglobin 9.1, hematocrit 27.3, platelet 172     1/19/2022:  CMP: Glucose 60, BUN 18, creatinine 1.70, sodium 137, potassium 4.1, chloride 102, CO2 27, calcium 8.8, total protein 6.0, ALT 8, AST 12, alkaline phosphatase 60, total bilirubin 0.4, albumin 2.7, magnesium 2.0, phosphorus 3.6.  Hemoglobin A1c 5.1.  Vitamin D 25 hydroxy <5.0.  CBC: WBC count 8.3, hemoglobin 6.9, hematocrit 20.3, platelet count 146.  Lipid panel: Total cholesterol 140, HDL 38, triglycerides 87, LDL 85        Assessment and Plan:  Diabetic ulcer of toe of right foot associated with type 2 diabetes mellitus, with bone involvement without evidence of necrosis  Osteomyelitis of right 2nd toe  Diabetic foot ulcer with osteomyelitis  - S/p amputation right 2nd toe on 1/1/22  -Continue NWB to RLE   -completed amoxicillin 500 mg q.12 hours, stop date 1/16  - Podiatry plan to continue wound care and follow up as outpatient  - PT/OT     Insomnia   - continue melatonin nightly      Surgical incision  - R second toe: Applied xeroform, kerlix, ACE avoiding tight compression.  Change every 2-3 days per podiatry   - Podiatry plan to continue wound care and follow up as outpatient     Urinary retention  - Araiza in place. Change monthly  - continue flomax    -follow-up urology outpatient for voiding trial     Hypothyroidism  -Continue levothyroxine      CKD (chronic kidney disease), stage IV  - Serum creatinine baseline (average around 2.5)  - Avoid nephrotoxins and hypotension as able, renally dose medications  - Continue to monitor      Severe protein-calorie malnutrition  -continue Boost TID     Chronic Normocytic anemia  - continue to monitor labs  -continue iron tablets  - 1/19 hgb 6.9, repeat CBC ordered 1/20 (not collected)  - 1/20 initiate reordering CBC  -1/26 hemoglobin 7.2[place] [facility] [place]  -may need to be sent to [facility] for blood transfusion for hgb < 7.0.      History of  stroke  - Residual left sided weakness at baseline  - Continue Crestor 40 mg daily   - Continue Aspirin 81mg PO daily  - Continue Plavix 75mg PO daily     Type 2 diabetes mellitus with neuropathy, with long-term current use of insulin  - type 1 and type 2 diabetes recorded 1 patient's problem list?  Unclear  - A1C 5.4 on 11/21/2021  - home regimen:  8 units daily (long-acting)  - DM education, DM diet  - monitor BG and PO intake  - adjust meds as needed   -not currently on anti hyperglycemics     Hyperlipidemia associated with type 2 diabetes mellitus  - Continue Atorvastatin 80mg PO daily     Hypertension associated with stage 4 chronic kidney disease due to type 2 diabetes mellitus  - Continue Norvasc 5 mg every morning and 10 mg qHS  - Continue Coreg 25mg PO  BID     Stable but ongoing problems      Vitamin D deficiency, new 1/19   - continue vitamin D 1000 units PO daily (started 1/19)     Diabetic neuropathy  -continue gabapentin and Cymbalta     Nausea   GERD  -continue Zofran and Reglan  -continue Prilosec     Hypothyroidism  -continue Synthroid     Pain  -continue Norco 5-325 mg q.6 hours p.r.n. pain     Anxiety  -continue home trazodone     Diarrhea  Chronic constipation  1/18 initiate adjusting home lactulose from b.i.d. to b.i.d. p.r.n.  - diarrhea improved.      Nutrition  -continue home B complex vitamin     Debility   - Continue with PT/OT for gait training and strengthening and restoration of ADL's   - Encourage mobility, OOB in chair, and early ambulation as appropriate  - Fall precautions   - Monitor for bowel and bladder dysfunction  - Monitor for and prevent skin breakdown and pressure ulcers        I certify that SNF services are required to be given on an inpatient basis because Beatriz Adame needs for skilled nursing care and/or skilled rehabilitation are required on a daily basis and such services can only practically be provided in a skilled nursing facility setting and are for an ongoing  condition for which she received inpatient care in the hospital.         Aren Martinez NP  Department of Hospital Medicine   [facility]      DOS: 2/3/2022        Patient note was created using MModal Dictation.  Any errors in syntax or even information may not have been identified and edited on initial review prior to signing this note.

## 2022-02-09 NOTE — PROGRESS NOTES
[facility]                                                                  Skilled Nursing Facility                                                                          Progress Note      Admit Date:1/14/2022  MARY TBD  Principal Problem: Diabetic ulcer of toe of right foot associated with type 2 diabetes mellitus, with bone involvement without evidence of necrosis  HPI obtained from patient interview and chart review      Chief Complaint: Revaluation of medical treatment and therapy status: suspected UTI     HPI:   Beatriz Adame is a 71 y.o. female w/ PMHx of osteoarthritis, history of C diff, hypothyroidism, 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, DM type 2, HTN, HLD, CKD, recurrent UTIs, history of left ureteral stent placement in 10/2021 with recent cystoscopy and finding of 7 mm right mid to distal ureteral calculi that was fragmented and removed as well as a stent on string placed, and history of appendectomy 07/21.  Presents to  SNF status post hospitalization at [facility] for right toe also status post amputation.  Per chart review, patient presented to [facility] ED with complaint of right 2nd toe wound infection.Right foot x-ray notes no convincing radiographic evidence of osteomyelitis.  MRI right foot notes acute osteomyelitis involving the 2nd toe middle and distal phalanx. Arterial us showed slow velocities with abnormal waveforms in the arteries of the calf suggestive of vessel hardening such as from diffuse atherosclerosis.  Podiatry and Cardiology consulted.  She underwent right 2nd toe amputation on 01/01/2022.  Patient declined further workup with angiogram for PAD at that time.  Podiatry plans on staged debridement and closure.  Hospital course complicated by urinary retention requiring Araiza placement.     Admission to SNF for secondary weakness and mobility.      Patient will be treated at  SNF with PT and OT to improve functional status and ability to perform  ADLs.      SNF course:   - sent to the ER on 01/25 and 1/27 for worsening anemia with a hemoglobin of 6.6 and hematocrit 19.51/27.  Repeat hemoglobin and hematocrit were 7.2 and 23.6 and she never required blood transfusion.  She was sent back to the facility she reports weakness and fatigue.      Interval History:  - Nurse Mica reports pus from pt's delgado   - orders placed to change delgado, obtain UA and UCx, CBC, CMP  - informed nurse Mica to ensure urology f/u is scheduled.   - on exam urine is yellow with sediment present, appears to have white pus like drainage in tubing as well.   - last SNF day 2/11, plan to discharge 2/12  - No acute signs of distress noted. No acute events reported over night.   - VSS, afebrile.   - Denies dysuria, SOB, CP, abdominal pain, loss of appetite, constipation, diarrhea, nausea or vomiting.         Past Medical History: Patient has a past medical history of Anemia, Arthritis, Diabetes mellitus, Encounter for blood transfusion, Hypercholesteremia, Hypertension, Renal disorder, and Stroke.     Past Surgical History: Patient has a past surgical history that includes Tubal ligation; Laparoscopic appendectomy (N/A, 7/22/2021); Cystoscopy w/ ureteral stent placement (Right, 10/13/2021); Esophagogastroduodenoscopy (N/A, 11/23/2021); Appendectomy; Ureteroscopic removal of ureteric calculus (Right, 12/22/2021); and Toe amputation (Right, 1/1/2022).     Social History: Patient reports that she has never smoked. She has never used smokeless tobacco. She reports that she does not drink alcohol and does not use drugs.     Family History: family history includes Alcohol abuse in her father; Arthritis in her mother; Asthma in her brother; Diabetes in her brother, mother, and sister; Early death in her brother; Heart disease in her brother and mother; Hypertension in her mother; Kidney disease in her mother; Stroke in her mother; Vision loss in her mother.     Allergies: Patient is allergic to  tomato (solanum lycopersicum).     ROS  Constitutional: Negative for fever   Eyes: Negative for blurred vision, double vision   Respiratory: Negative for cough, shortness of breath   Cardiovascular: Negative for chest pain, palpitations, and leg swelling.   Gastrointestinal:Negative for abdominal pain,  diarrhea, constipation, nausea, vomiting.   Genitourinary: + urinary retention (Araiza- yellow, pus, sediment) Negative for dysuria, frequency   Musculoskeletal:  + generalized weakness. Negative for back pain and myalgias.   Skin: Negative for itching and rash. + right 2nd toe amputation  Neurological: Negative for dizziness, headaches.   Psychiatric/Behavioral: Negative for depression. The patient is not nervous/anxious.       Vital signs  BP 134/84  HR 75  Temp 98.2  RR 16  O2 98% on RA     PEx  Constitutional: + appears ill. Patient appears debilitated.  No distress noted  HENT:   Head: Normocephalic and atraumatic.   Eyes: Pupils are equal, round  Neck: Normal range of motion. Neck supple.   Cardiovascular: Normal rate, regular rhythm and normal heart sounds.    Pulmonary/Chest: Effort normal and breath sounds are clear  Abdominal: Soft. Bowel sounds are normal.   Musculoskeletal: Normal range of motion.   Neurological:  + left-sided weakness and left facial droop (prior stroke) Alert and oriented to person, place, and time.   Psychiatric: Normal mood and affect. Behavior is normal.   Skin: Skin is warm and dry. Full skin assessment on 1/18     Wound followed by consistent, contracted NP/PA/MD: this NP     Wound that you did not assess today: Right 2nd toe amputation with very small area of dehiscence to anterior of wound  Wound location(s)/type(s): see above  Not visualized today. No signs/symptoms of infection or wound-related changes reported.     Labs  01/26/2022: C  CBC:  WBC 10.01, hemoglobin 7.2, hematocrit 23.6, platelet 158  01/26/2022:  CBC:  WBC 9.4, hemoglobin 6.6, hematocrit 19.5, platelet  126  01/25/2022:  CBC:  WBC 8.1, hemoglobin 7.1, hematocrit 21.0, platelet 145  01/24/2022:  CBC:  WBC 7.1, hemoglobin 6.9, hematocrit 20.2, platelet 136  01/23/2022:   CBC:  WBC 10.6 H, hemoglobin 7.1, hematocrit 21.1, platelet 144  01/20/2022  CBC:  WBC 9.3, hemoglobin 9.1, hematocrit 27.3, platelet 172     1/19/2022:  CMP: Glucose 60, BUN 18, creatinine 1.70, sodium 137, potassium 4.1, chloride 102, CO2 27, calcium 8.8, total protein 6.0, ALT 8, AST 12, alkaline phosphatase 60, total bilirubin 0.4, albumin 2.7, magnesium 2.0, phosphorus 3.6.  Hemoglobin A1c 5.1.  Vitamin D 25 hydroxy <5.0.  CBC: WBC count 8.3, hemoglobin 6.9, hematocrit 20.3, platelet count 146.  Lipid panel: Total cholesterol 140, HDL 38, triglycerides 87, LDL 85        Assessment and Plan:  Diabetic ulcer of toe of right foot associated with type 2 diabetes mellitus, with bone involvement without evidence of necrosis  Osteomyelitis of right 2nd toe  Diabetic foot ulcer with osteomyelitis  - S/p amputation right 2nd toe on 1/1/22  -Continue NWB to RLE   -completed amoxicillin 500 mg q.12 hours, stop date 1/16  - Podiatry plan to continue wound care and follow up as outpatient  - PT/OT     Insomnia   - continue melatonin nightly      Surgical incision  - R second toe: Applied xeroform, kerlix, ACE avoiding tight compression.  Change every 2-3 days per podiatry   - Podiatry plan to continue wound care and follow up as outpatient     Urinary retention  Suspected UTI  - Delgado in place. Change monthly  - continue flomax    -follow-up urology outpatient for voiding trial  -2/8 initiate change delgado, obtain UA and UCx, CBC, CMP     Hypothyroidism  -Continue levothyroxine      CKD (chronic kidney disease), stage IV  - Serum creatinine baseline (average around 2.5)  - Avoid nephrotoxins and hypotension as able, renally dose medications  - Continue to monitor      Severe protein-calorie malnutrition  -continue Boost TID     Chronic Normocytic anemia  -  continue to monitor labs  -continue iron tablets  - 1/19 hgb 6.9, repeat CBC ordered 1/20 (not collected)  - 1/20 initiate reordering CBC  -1/26 hemoglobin 7.2, hematocrit 23.6 at [facility]  -may need to be sent to [facility] for blood transfusion for hgb < 7.0.      History of stroke  - Residual left sided weakness at baseline  - Continue Crestor 40 mg daily   - Continue Aspirin 81mg PO daily  - Continue Plavix 75mg PO daily     Type 2 diabetes mellitus with neuropathy, with long-term current use of insulin  - type 1 and type 2 diabetes recorded 1 patient's problem list?  Unclear  - A1C 5.4 on 11/21/2021  - home regimen:  8 units daily (long-acting)  - DM education, DM diet  - monitor BG and PO intake  - adjust meds as needed   -not currently on anti hyperglycemics     Hyperlipidemia associated with type 2 diabetes mellitus  - Continue Atorvastatin 80mg PO daily     Hypertension associated with stage 4 chronic kidney disease due to type 2 diabetes mellitus  - Continue Norvasc 5 mg every morning and 10 mg qHS  - Continue Coreg 25mg PO  BID     Stable but ongoing problems      Vitamin D deficiency, new 1/19   - continue vitamin D 1000 units PO daily (started 1/19)     Diabetic neuropathy  -continue gabapentin and Cymbalta     Nausea   GERD  -continue Zofran and Reglan  -continue Prilosec     Hypothyroidism  -continue Synthroid     Pain  -continue Norco 5-325 mg q.6 hours p.r.n. pain     Anxiety  -continue home trazodone     Diarrhea  Chronic constipation  1/18 initiate adjusting home lactulose from b.i.d. to b.i.d. p.r.n.  - diarrhea improved.      Nutrition  -continue home B complex vitamin     Debility   - Continue with PT/OT for gait training and strengthening and restoration of ADL's   - Encourage mobility, OOB in chair, and early ambulation as appropriate  - Fall precautions   - Monitor for bowel and bladder dysfunction  - Monitor for and prevent skin breakdown and pressure ulcers        I  certify that SNF services are required to be given on an inpatient basis because Beatriz Adame needs for skilled nursing care and/or skilled rehabilitation are required on a daily basis and such services can only practically be provided in a skilled nursing facility setting and are for an ongoing condition for which she received inpatient care in the hospital.         Aren Martinez NP  Department of Hospital Medicine   [facility]      DOS: 2/8/2022        Patient note was created using MModal Dictation.  Any errors in syntax or even information may not have been identified and edited on initial review prior to signing this note.

## 2022-02-10 PROBLEM — D64.9 ACUTE ON CHRONIC ANEMIA: Status: ACTIVE | Noted: 2022-01-01

## 2022-02-10 PROBLEM — Z89.422 S/P AMPUTATION OF LESSER TOE, LEFT: Status: ACTIVE | Noted: 2022-01-01

## 2022-02-10 PROBLEM — Z79.02 LONG TERM (CURRENT) USE OF ANTITHROMBOTICS/ANTIPLATELETS: Status: ACTIVE | Noted: 2021-01-01

## 2022-02-10 PROBLEM — G47.00 INSOMNIA: Status: ACTIVE | Noted: 2022-01-01

## 2022-02-10 PROBLEM — Z89.421 S/P AMPUTATION OF LESSER TOE, RIGHT: Status: ACTIVE | Noted: 2022-01-01

## 2022-02-10 PROBLEM — N18.4 CHRONIC KIDNEY DISEASE, STAGE 4 (SEVERE): Status: ACTIVE | Noted: 2021-01-01

## 2022-02-10 PROBLEM — E87.6 HYPOKALEMIA: Status: ACTIVE | Noted: 2020-01-08

## 2022-02-10 NOTE — CARE UPDATE
Patient admitted for symptomatic anemia. Hgb responded appropriately to 1 unit of pRBC. UA +, started Cefepime given last UCx with pseudomonas and with her poor kidney function, avoiding Zosyn. She reports dysuria, reduced urine output. Follow UCx. PTOT rec SNF    Maia Nicholson PA-C

## 2022-02-10 NOTE — ED PROVIDER NOTES
History:  Beatriz Adame is a 71 y.o. female who presents to the ED with Dehydration (Pt brought to ED via [facility] from [facility]. Per nursing staff, pt dehydrated and unable to get IV access, so pt sen to ED. )    Described as 71-year-old female with a history of iron deficiency anemia, hypertension, hyperlipidemia, diabetes, CKD presenting to the emergency department with concern for dehydration.  Per her nursing facility, [facility], the patient was dehydrated and they were unable to start a line.  Patient reports she has been eating and drinking well, denies any vomiting or diarrhea.  She denies any abdominal pain.  She believes she was sent to the emergency department because they were unable to obtain basic labs at her nursing facility.  She has no complaints at this time and wants to go back to her SNF.    Review of Systems: All systems reviewed and are negative except as per history of present illness.  Constitutional: Negative for fever.   HENT: Negative for congestion.    Respiratory: Negative for shortness of breath.    Cardiovascular: Negative for chest pain.   Gastrointestinal: Negative for abdominal pain.   Genitourinary: Negative for dysuria.   Musculoskeletal: Negative for myalgias.   Skin: Negative for rash.   Neurological: Negative for focal weakness.   Psychiatric/Behavioral: Negative for memory loss.     Medications:   Previous Medications    AMLODIPINE (NORVASC) 5 MG TABLET    Take 10 mg by mouth every evening. Take 5 mg every morning and 10 mg at night    ASPIRIN (ECOTRIN) 81 MG EC TABLET    Take 81 mg by mouth once daily.    B COMPLEX VITAMINS TABLET    Take 1 tablet by mouth once daily.    CARVEDILOL (COREG) 25 MG TABLET    Take 1 tablet (25 mg total) by mouth 2 (two) times daily.    CLOPIDOGREL (PLAVIX) 75 MG TABLET    Take 75 mg by mouth.    DULOXETINE (CYMBALTA) 30 MG CAPSULE    Take 1 capsule (30 mg total) by mouth once daily.    FERROUS SULFATE (FEOSOL) 325 MG (65 MG IRON)  TAB TABLET    Take 325 mg by mouth daily with breakfast. Off at present    GABAPENTIN (NEURONTIN) 300 MG CAPSULE    Take 1 capsule (300 mg total) by mouth once daily.    HYDROCODONE-ACETAMINOPHEN (NORCO) 5-325 MG PER TABLET    Take 1 tablet by mouth every 6 (six) hours as needed for Pain.    INSULIN (BASAGLAR KWIKPEN U-100 INSULIN) GLARGINE 100 UNITS/ML (3ML) SUBQ PEN    Inject 5 Units into the skin once daily.    LACTULOSE (CHRONULAC) 10 GRAM/15 ML SOLUTION    Take 10 g by mouth 2 (two) times a day.    LEVOTHYROXINE (SYNTHROID) 75 MCG TABLET    Take 75 mcg by mouth once daily.    METOCLOPRAMIDE HCL (REGLAN) 10 MG TABLET    Take 1 tablet (10 mg total) by mouth 3 (three) times daily before meals.    OMEPRAZOLE (PRILOSEC) 20 MG CAPSULE    Take 20 mg by mouth 2 (two) times daily.    ONDANSETRON (ZOFRAN) 4 MG TABLET    Take 1 tablet (4 mg total) by mouth every 8 (eight) hours as needed for Nausea.    ROSUVASTATIN (CRESTOR) 40 MG TAB    Take 40 mg by mouth once daily.    TAMSULOSIN (FLOMAX) 0.4 MG CAP    Take 1 capsule (0.4 mg total) by mouth once daily.    TRAZODONE (DESYREL) 100 MG TABLET    Take 1 tablet (100 mg total) by mouth once daily.       PMH:   Past Medical History:   Diagnosis Date    Gastroparesis     GERD (gastroesophageal reflux disease)     History of Clostridium difficile colitis 07/24/2021    History of kidney stones     History of stroke     left side paralysis    Hyperlipidemia     Hypertension     Hypothyroidism     Iron deficiency anemia     Osteoarthritis     Peripheral neuropathy     Stage IV CKD     Type II diabetes mellitus      PSH:   Past Surgical History:   Procedure Laterality Date    APPENDECTOMY      CYSTOSCOPY W/ URETERAL STENT PLACEMENT Right 10/13/2021    Procedure: CYSTOSCOPY, WITH URETERAL STENT INSERTION;  Surgeon: Wanda Arroyo MD;  Location: [facility];  Service: Urology;  Laterality: Right;    ESOPHAGOGASTRODUODENOSCOPY N/A 11/23/2021    Procedure: EGD  (ESOPHAGOGASTRODUODENOSCOPY);  Surgeon: Obed Jeong MD;  Location: [facility] ENDO ([address] [place] [address]);  Service: Endoscopy;  Laterality: N/A;    LAPAROSCOPIC APPENDECTOMY N/A 7/22/2021    Procedure: APPENDECTOMY, LAPAROSCOPIC;  Surgeon: Paulo Calvo Jr., MD;  Location: [facility];  Service: General;  Laterality: N/A;    TOE AMPUTATION Right 1/1/2022    Procedure: AMPUTATION, TOE;  Surgeon: Genaro Mason DPM;  Location: [facility];  Service: Podiatry;  Laterality: Right;    TUBAL LIGATION      URETEROSCOPIC REMOVAL OF URETERIC CALCULUS Right 12/22/2021    Procedure: REMOVAL, CALCULUS, URETER, URETEROSCOPIC;  Surgeon: Wanda Arroyo MD;  Location: [facility];  Service: Urology;  Laterality: Right;     Allergies: She is allergic to tomato (solanum lycopersicum).  Social History: Marital Status: single. She  reports that she has never smoked. She has never used smokeless tobacco.. She  reports no history of alcohol use..       Exam:  VITAL SIGNS:   Vitals:    02/10/22 0519 02/10/22 0522 02/10/22 0527 02/10/22 0532   BP: (!) 143/65 (!) 147/65 133/62 131/60   Pulse:  83 81 79   Resp:  16 16 16   Temp:  99.1 °F (37.3 °C) 99.1 °F (37.3 °C) 99.3 °F (37.4 °C)   TempSrc:       SpO2:  100% 100% 100%   Weight:         Const: Awake and alert, NAD, lying comfortably in bed, chronically ill appearing  Head: Atraumatic  Eyes: Normal Conjunctiva  ENT: Normal External Ears, Nose and Mouth.  Neck: Full range of motion. No meningismus.  Resp: Normal respiratory effort, No distress  Cardio: Equal and intact distal pulses  Abd: Soft, non tender, non distended.   Skin: No petechiae or rashes  Ext: No cyanosis, or edema  Neur: Awake and alert, LUE contracture, CN grossly intact  Psych: Normal Mood and Affect    Data:  Results for orders placed or performed during the hospital encounter of 02/09/22   CBC auto differential   Result Value Ref Range    WBC 11.12 3.90 - 12.70 K/uL    RBC 2.31 (L) 4.00 - 5.40 M/uL    Hemoglobin 6.6 (L) 12.0 - 16.0  g/dL    Hematocrit 21.0 (L) 37.0 - 48.5 %    MCV 91 82 - 98 fL    MCH 28.6 27.0 - 31.0 pg    MCHC 31.4 (L) 32.0 - 36.0 g/dL    RDW 12.9 11.5 - 14.5 %    Platelets 169 150 - 450 K/uL    MPV 11.8 9.2 - 12.9 fL    Immature Granulocytes 0.4 0.0 - 0.5 %    Gran # (ANC) 6.8 1.8 - 7.7 K/uL    Immature Grans (Abs) 0.05 (H) 0.00 - 0.04 K/uL    Lymph # 2.7 1.0 - 4.8 K/uL    Mono # 1.3 (H) 0.3 - 1.0 K/uL    Eos # 0.3 0.0 - 0.5 K/uL    Baso # 0.03 0.00 - 0.20 K/uL    nRBC 0 0 /100 WBC    Gran % 61.2 38.0 - 73.0 %    Lymph % 24.3 18.0 - 48.0 %    Mono % 11.5 4.0 - 15.0 %    Eosinophil % 2.3 0.0 - 8.0 %    Basophil % 0.3 0.0 - 1.9 %    Platelet Estimate Appears normal     Aniso Slight     Poik Slight     Poly Occasional     Ovalocytes Occasional     Christel Cells Occasional     Basophilic Stippling Occasional     Differential Method Automated    Comprehensive metabolic panel   Result Value Ref Range    Sodium 137 136 - 145 mmol/L    Potassium 3.1 (L) 3.5 - 5.1 mmol/L    Chloride 106 95 - 110 mmol/L    CO2 21 (L) 23 - 29 mmol/L    Glucose 104 70 - 110 mg/dL    BUN 21 8 - 23 mg/dL    Creatinine 2.6 (H) 0.5 - 1.4 mg/dL    Calcium 8.4 (L) 8.7 - 10.5 mg/dL    Total Protein 6.5 6.0 - 8.4 g/dL    Albumin 2.4 (L) 3.5 - 5.2 g/dL    Total Bilirubin 0.3 0.1 - 1.0 mg/dL    Alkaline Phosphatase 82 55 - 135 U/L    AST 10 10 - 40 U/L    ALT 6 (L) 10 - 44 U/L    Anion Gap 10 8 - 16 mmol/L    eGFR if African American 20.8 (A) >60 mL/min/1.73 m^2    eGFR if non  18.0 (A) >60 mL/min/1.73 m^2   Basic Metabolic Panel (BMP)   Result Value Ref Range    Sodium 134 (L) 136 - 145 mmol/L    Potassium 3.2 (L) 3.5 - 5.1 mmol/L    Chloride 103 95 - 110 mmol/L    CO2 24 23 - 29 mmol/L    Glucose 126 (H) 70 - 110 mg/dL    BUN 19 8 - 23 mg/dL    Creatinine 2.5 (H) 0.5 - 1.4 mg/dL    Calcium 9.2 8.7 - 10.5 mg/dL    Anion Gap 7 (L) 8 - 16 mmol/L    eGFR if African American 21.8 (A) >60 mL/min/1.73 m^2    eGFR if non  18.9 (A) >60  mL/min/1.73 m^2   Magnesium   Result Value Ref Range    Magnesium 2.0 1.6 - 2.6 mg/dL   Phosphorus   Result Value Ref Range    Phosphorus 2.6 (L) 2.7 - 4.5 mg/dL   Urinalysis, Reflex to Urine Culture Urine, Clean Catch    Specimen: Urine   Result Value Ref Range    Specimen UA Urine, Catheterized     Color, UA Yellow Yellow, Straw, Meron    Appearance, UA Hazy (A) Clear    pH, UA 6.0 5.0 - 8.0    Specific Gravity, UA 1.005 1.005 - 1.030    Protein, UA 1+ (A) Negative    Glucose, UA Negative Negative    Ketones, UA Negative Negative    Bilirubin (UA) Negative Negative    Occult Blood UA 2+ (A) Negative    Nitrite, UA Positive (A) Negative    Leukocytes, UA 3+ (A) Negative   Urinalysis Microscopic   Result Value Ref Range    RBC, UA 14 (H) 0 - 4 /hpf    WBC, UA 84 (H) 0 - 5 /hpf    Bacteria Many (A) None-Occ /hpf    Yeast, UA Occasional (A) None    Squam Epithel, UA 0 /hpf    Hyaline Casts, UA 0 0-1/lpf /lpf    Microscopic Comment SEE COMMENT    Type & Screen   Result Value Ref Range    Group & Rh O POS     Indirect Bisi NEG    Prepare RBC 1 Unit   Result Value Ref Range    UNIT NUMBER [id number]     Product Code I8780C72     DISPENSE STATUS ISSUED     CODING SYSTEM [id number]     Unit Blood Type Code 5100     Unit Blood Type O POS     Unit Expiration [id number]      Labs & Imaging studies were reviewed independently by me.     Medical Decision Makin-year-old female presenting from her skilled nursing facility with concern for dehydration.  Patient was found to be severely anemic, hemoglobin 6.6, baseline 7.1-7.5.  She was consented and crossmatched for 1 unit packed red blood cells.  She denies any acute GI bleeds or other symptoms.  On chart review, she did require a blood transfusion in October of last year secondary to iron deficiency.  She did have an ARTURO, possibly due to dehydration, possibly contributing to her worsening anemia.  Patient was admitted to hospitalist Medicine for further evaluation  and treatment.    Critical Care Time: 31 minutes  Treatments/Evaluations: Close monitoring and treatment of unstable vital signs, cardiorespiratory, and neurologic status, while maintaining tight balance of fluid, respiratory, and cardiac interventions. This time includes discussing the case with the patient and the patients family. This time does not include all procedures stated elsewhere in this record. This time also includes reviewing old records, labs and radiological studies. This time includes examining and re-examining the patient. Additionally, this time also includes arranging care with admitting and consulting physicians.     Clinical Impression:  1. Severe anemia    2. ARTURO             Shanda Lara MD  02/10/22 0744

## 2022-02-10 NOTE — SUBJECTIVE & OBJECTIVE
Scheduled Meds:   aspirin  81 mg Oral Daily    atorvastatin  80 mg Oral Daily    carvediloL  25 mg Oral BID WM    ceFEPime (MAXIPIME) IVPB  1 g Intravenous Q24H    DULoxetine  30 mg Oral Daily    ferrous sulfate  1 tablet Oral Daily    gabapentin  300 mg Oral Daily    insulin detemir U-100  5 Units Subcutaneous Daily    levothyroxine  75 mcg Oral Daily    metoclopramide HCl  10 mg Oral TID AC    pantoprazole  40 mg Oral Daily    sodium chloride 0.9%  10 mL Intravenous Q8H    tamsulosin  0.4 mg Oral Daily     Continuous Infusions:  PRN Meds:sodium chloride, acetaminophen, albuterol-ipratropium, aluminum-magnesium hydroxide-simethicone, dextrose 50%, dextrose 50%, glucagon (human recombinant), glucose, glucose, HYDROcodone-acetaminophen, insulin aspart U-100, melatonin, naloxone, ondansetron, polyethylene glycol, prochlorperazine, simethicone, traZODone    Review of patient's allergies indicates:   Allergen Reactions    Tomato (solanum lycopersicum) Hives and Itching        Past Medical History:   Diagnosis Date    Gastroparesis     GERD (gastroesophageal reflux disease)     History of Clostridium difficile colitis 07/24/2021    History of kidney stones     History of stroke     left side paralysis    Hyperlipidemia     Hypertension     Hypothyroidism     Iron deficiency anemia     Osteoarthritis     Peripheral neuropathy     Stage IV CKD     Type II diabetes mellitus      Past Surgical History:   Procedure Laterality Date    APPENDECTOMY      CYSTOSCOPY W/ URETERAL STENT PLACEMENT Right 10/13/2021    Procedure: CYSTOSCOPY, WITH URETERAL STENT INSERTION;  Surgeon: Wanda Arroyo MD;  Location: [facility];  Service: Urology;  Laterality: Right;    ESOPHAGOGASTRODUODENOSCOPY N/A 11/23/2021    Procedure: EGD (ESOPHAGOGASTRODUODENOSCOPY);  Surgeon: Obed Jeong MD;  Location: [address]);  Service: Endoscopy;  Laterality: N/A;    LAPAROSCOPIC APPENDECTOMY N/A 7/22/2021     Procedure: APPENDECTOMY, LAPAROSCOPIC;  Surgeon: Paulo Calvo Jr., MD;  Location: [facility];  Service: General;  Laterality: N/A;    TOE AMPUTATION Right 1/1/2022    Procedure: AMPUTATION, TOE;  Surgeon: Genaro Mason DPM;  Location: [facility];  Service: Podiatry;  Laterality: Right;    TUBAL LIGATION      URETEROSCOPIC REMOVAL OF URETERIC CALCULUS Right 12/22/2021    Procedure: REMOVAL, CALCULUS, URETER, URETEROSCOPIC;  Surgeon: Wanda Arroyo MD;  Location: [facility];  Service: Urology;  Laterality: Right;       Family History     Problem Relation (Age of Onset)    Alcohol abuse Father    Arthritis Mother    Asthma Brother    Diabetes Mother, Sister, Brother    Heart attack Mother, Father, Brother    Heart disease Mother, Brother    Hypertension Mother    Kidney disease Mother    Stroke Mother    Vision loss Mother        Tobacco Use    Smoking status: Never Smoker    Smokeless tobacco: Never Used   Substance and Sexual Activity    Alcohol use: No     Comment: socially    Drug use: No    Sexual activity: Not Currently     Review of Systems   Constitutional: Positive for activity change and fatigue. Negative for chills, diaphoresis and fever.   HENT: Negative for congestion, facial swelling, rhinorrhea and sore throat.    Eyes: Negative for photophobia, itching and visual disturbance.   Respiratory: Negative for cough, chest tightness, shortness of breath and wheezing.    Cardiovascular: Negative for chest pain, palpitations and leg swelling.   Gastrointestinal: Positive for diarrhea. Negative for abdominal distention, abdominal pain, blood in stool, constipation, nausea and vomiting.   Genitourinary: Positive for difficulty urinating. Negative for dysuria, frequency, hematuria and urgency.   Musculoskeletal: Negative for arthralgias, back pain and neck stiffness.   Skin: Negative for color change and wound.   Neurological: Positive for weakness, light-headedness and headaches. Negative for dizziness,  tremors, seizures, syncope and numbness.   Psychiatric/Behavioral: Negative for agitation, confusion and hallucinations.     Objective:     Vital Signs (Most Recent):  Temp: 98.8 °F (37.1 °C) (02/10/22 1238)  Pulse: 90 (02/10/22 1238)  Resp: 16 (02/10/22 1238)  BP: (!) 194/97 (02/10/22 1238)  SpO2: 99 % (02/10/22 1238) Vital Signs (24h Range):  Temp:  [98.8 °F (37.1 °C)-99.3 °F (37.4 °C)] 98.8 °F (37.1 °C)  Pulse:  [78-90] 90  Resp:  [16-20] 16  SpO2:  [99 %-100 %] 99 %  BP: (108-194)/(55-97) 194/97     Weight: 57.6 kg (127 lb)  Body mass index is 19.89 kg/m².    Foot Exam    General  Orientation: alert and oriented to person, place, and time       Right Foot/Ankle     Inspection and Palpation  Ecchymosis: none  Tenderness: none   Swelling: none   Skin Exam: skin intact; no cellulitis, no ulcer and no erythema     Neurovascular  Dorsalis pedis: absent  Posterior tibial: absent    Comments  R 2nd toe amputated    Left Foot/Ankle      Inspection and Palpation  Ecchymosis: none  Tenderness: none   Swelling: none   Skin Exam: skin intact; no cellulitis, no ulcer and no erythema     Neurovascular  Dorsalis pedis: absent  Posterior tibial: absent            Laboratory:  CBC:   Recent Labs   Lab 02/10/22  0547   WBC 12.53   RBC 2.75*   HGB 7.9*   HCT 24.7*      MCV 90   MCH 28.7   MCHC 32.0     CRP: No results for input(s): CRP in the last 168 hours.  ESR: No results for input(s): SEDRATE in the last 168 hours.  Wound Cultures: No results for input(s): LABAERO in the last 4320 hours.  Microbiology Results (last 7 days)     Procedure Component Value Units Date/Time    Urine culture [[id number]] Collected: 02/10/22 0529    Order Status: No result Specimen: Urine Updated: 02/10/22 0552        Specimen (24h ago, onward)            None          Diagnostic Results:  I have reviewed all pertinent imaging results/findings within the past 24 hours.    Clinical Findings:  R 2nd toe amputated. Surgical site well healed. No  open wounds, no erythema, no drainage. Interdigital maceration between toes 4 and 5 right foot.

## 2022-02-10 NOTE — PROGRESS NOTES
Pharmacist Renal Dose Adjustment Note    Beatriz Adame is a 71 y.o. female being treated with the medication Cefepime.    Patient Data:    Vital Signs (Most Recent):  Temp: 99 °F (37.2 °C) (02/10/22 0829)  Pulse: 90 (02/10/22 0829)  Resp: 18 (02/10/22 0829)  BP: (!) 109/55 (02/10/22 0829)  SpO2: 100 % (02/10/22 0829) Vital Signs (72h Range):  Temp:  [99 °F (37.2 °C)-99.3 °F (37.4 °C)]   Pulse:  [78-90]   Resp:  [16-20]   BP: (108-147)/(55-68)   SpO2:  [99 %-100 %]      Recent Labs   Lab 02/10/22  0122 02/10/22  0547   CREATININE 2.6* 2.5*     Serum creatinine: 2.5 mg/dL (H) 02/10/22 0547  Estimated creatinine clearance: 19 mL/min (A)    Cefepime 1g every 12 hours will be changed to 1g every 24 hours.    Pharmacist's Name: Jose Garces  Pharmacist's Extension: [phone]

## 2022-02-10 NOTE — CONSULTS
[facility] - Emergency Dept  Podiatry  Consult Note    Patient Name: Beatriz Adame  MRN: [id number]  Admission Date: 2/9/2022  Hospital Length of Stay: 0 days  Attending Physician: Jessica Rocha MD  Primary Care Provider: Dante Olivier MD     Inpatient consult to Podiatry  Consult performed by: Yumiko Morales MD  Consult ordered by: Misty Anderson PA-C        Subjective:     History of Present Illness:  71 y.o F with PMHx of osteoarthritis, history of C diff, hypothyroidism, 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, DM type 2, HTN, HLD, CKD, recurrent UTIs, history of left ureteral stent placement in 10/2021, right 2nd toe osteomyelitis s/p amputation 1/2022 who was admitted to [facility] 2/9/2022 for symptomatic anemia.     Podiatry consulted for management of right 2nd toe amputation site. Patient underwent right 2nd toe amputation 1/1/2022 with Dr. Mason and was discharged to SNF. She has not followed up with him outpatient. She denies any foot pain.     In ED, Pt AFVSS. WBC 12k. H/H 6.6/21.              Scheduled Meds:   aspirin  81 mg Oral Daily    atorvastatin  80 mg Oral Daily    carvediloL  25 mg Oral BID WM    ceFEPime (MAXIPIME) IVPB  1 g Intravenous Q24H    DULoxetine  30 mg Oral Daily    ferrous sulfate  1 tablet Oral Daily    gabapentin  300 mg Oral Daily    insulin detemir U-100  5 Units Subcutaneous Daily    levothyroxine  75 mcg Oral Daily    metoclopramide HCl  10 mg Oral TID AC    pantoprazole  40 mg Oral Daily    sodium chloride 0.9%  10 mL Intravenous Q8H    tamsulosin  0.4 mg Oral Daily     Continuous Infusions:  PRN Meds:sodium chloride, acetaminophen, albuterol-ipratropium, aluminum-magnesium hydroxide-simethicone, dextrose 50%, dextrose 50%, glucagon (human recombinant), glucose, glucose, HYDROcodone-acetaminophen, insulin aspart U-100, melatonin, naloxone, ondansetron, polyethylene glycol, prochlorperazine, simethicone, traZODone    Review of  patient's allergies indicates:   Allergen Reactions    Tomato (solanum lycopersicum) Hives and Itching        Past Medical History:   Diagnosis Date    Gastroparesis     GERD (gastroesophageal reflux disease)     History of Clostridium difficile colitis 07/24/2021    History of kidney stones     History of stroke     left side paralysis    Hyperlipidemia     Hypertension     Hypothyroidism     Iron deficiency anemia     Osteoarthritis     Peripheral neuropathy     Stage IV CKD     Type II diabetes mellitus      Past Surgical History:   Procedure Laterality Date    APPENDECTOMY      CYSTOSCOPY W/ URETERAL STENT PLACEMENT Right 10/13/2021    Procedure: CYSTOSCOPY, WITH URETERAL STENT INSERTION;  Surgeon: Wanda Arroyo MD;  Location: [facility];  Service: Urology;  Laterality: Right;    ESOPHAGOGASTRODUODENOSCOPY N/A 11/23/2021    Procedure: EGD (ESOPHAGOGASTRODUODENOSCOPY);  Surgeon: Obed Jeong MD;  Location: [address]);  Service: Endoscopy;  Laterality: N/A;    LAPAROSCOPIC APPENDECTOMY N/A 7/22/2021    Procedure: APPENDECTOMY, LAPAROSCOPIC;  Surgeon: Paulo Calvo Jr., MD;  Location: [facility];  Service: General;  Laterality: N/A;    TOE AMPUTATION Right 1/1/2022    Procedure: AMPUTATION, TOE;  Surgeon: Genaro Mason DPM;  Location: [facility];  Service: Podiatry;  Laterality: Right;    TUBAL LIGATION      URETEROSCOPIC REMOVAL OF URETERIC CALCULUS Right 12/22/2021    Procedure: REMOVAL, CALCULUS, URETER, URETEROSCOPIC;  Surgeon: Wanda Arroyo MD;  Location: [facility];  Service: Urology;  Laterality: Right;       Family History     Problem Relation (Age of Onset)    Alcohol abuse Father    Arthritis Mother    Asthma Brother    Diabetes Mother, Sister, Brother    Heart attack Mother, Father, Brother    Heart disease Mother, Brother    Hypertension Mother    Kidney disease Mother    Stroke Mother    Vision loss Mother        Tobacco Use    Smoking status: Never Smoker     Smokeless tobacco: Never Used   Substance and Sexual Activity    Alcohol use: No     Comment: socially    Drug use: No    Sexual activity: Not Currently     Review of Systems   Constitutional: Positive for activity change and fatigue. Negative for chills, diaphoresis and fever.   HENT: Negative for congestion, facial swelling, rhinorrhea and sore throat.    Eyes: Negative for photophobia, itching and visual disturbance.   Respiratory: Negative for cough, chest tightness, shortness of breath and wheezing.    Cardiovascular: Negative for chest pain, palpitations and leg swelling.   Gastrointestinal: Positive for diarrhea. Negative for abdominal distention, abdominal pain, blood in stool, constipation, nausea and vomiting.   Genitourinary: Positive for difficulty urinating. Negative for dysuria, frequency, hematuria and urgency.   Musculoskeletal: Negative for arthralgias, back pain and neck stiffness.   Skin: Negative for color change and wound.   Neurological: Positive for weakness, light-headedness and headaches. Negative for dizziness, tremors, seizures, syncope and numbness.   Psychiatric/Behavioral: Negative for agitation, confusion and hallucinations.     Objective:     Vital Signs (Most Recent):  Temp: 98.8 °F (37.1 °C) (02/10/22 1238)  Pulse: 90 (02/10/22 1238)  Resp: 16 (02/10/22 1238)  BP: (!) 194/97 (02/10/22 1238)  SpO2: 99 % (02/10/22 1238) Vital Signs (24h Range):  Temp:  [98.8 °F (37.1 °C)-99.3 °F (37.4 °C)] 98.8 °F (37.1 °C)  Pulse:  [78-90] 90  Resp:  [16-20] 16  SpO2:  [99 %-100 %] 99 %  BP: (108-194)/(55-97) 194/97     Weight: 57.6 kg (127 lb)  Body mass index is 19.89 kg/m².    Foot Exam    General  Orientation: alert and oriented to person, place, and time       Right Foot/Ankle     Inspection and Palpation  Ecchymosis: none  Tenderness: none   Swelling: none   Skin Exam: skin intact; no cellulitis, no ulcer and no erythema     Neurovascular  Dorsalis pedis: absent  Posterior tibial:  absent    Comments  R 2nd toe amputated    Left Foot/Ankle      Inspection and Palpation  Ecchymosis: none  Tenderness: none   Swelling: none   Skin Exam: skin intact; no cellulitis, no ulcer and no erythema     Neurovascular  Dorsalis pedis: absent  Posterior tibial: absent            Laboratory:  CBC:   Recent Labs   Lab 02/10/22  0547   WBC 12.53   RBC 2.75*   HGB 7.9*   HCT 24.7*      MCV 90   MCH 28.7   MCHC 32.0     CRP: No results for input(s): CRP in the last 168 hours.  ESR: No results for input(s): SEDRATE in the last 168 hours.  Wound Cultures: No results for input(s): LABAERO in the last 4320 hours.  Microbiology Results (last 7 days)     Procedure Component Value Units Date/Time    Urine culture [[id number]] Collected: 02/10/22 0529    Order Status: No result Specimen: Urine Updated: 02/10/22 0552        Specimen (24h ago, onward)            None          Diagnostic Results:  I have reviewed all pertinent imaging results/findings within the past 24 hours.    Clinical Findings:  R 2nd toe amputated. Surgical site well healed. No open wounds, no erythema, no drainage. Interdigital maceration between toes 4 and 5 right foot.           Assessment/Plan:     S/P amputation of lesser toe, right  S/p right 2nd toe amp 1/1/2022. Surgical site well healed. No open wounds. Mild maceration between 4th and 5th toes.     Protective dressing with gauze and kerlix placed between toes and secured with tape. Interdigital spaces painted with betadine. ]    No weightbearing restrictions    Recommend follow up with Dr. Isabella HASSAN upon discharge    Podiatry will sign off.             Thank you for your consult. I will sign off. Please contact us if you have any additional questions.    Yumiko Morales DPM  [facility]  Podiatry PGY3  Pager: [phone]  Spectra:[phone]

## 2022-02-10 NOTE — PT/OT/SLP EVAL
Occupational Therapy   Evaluation and Treatment    Name: Beatriz Adame  MRN: [id number]  Admitting Diagnosis:  Acute on chronic anemia    Length of Stay: 0 days    Recommendations:     Discharge Recommendations: nursing facility, skilled  Discharge Equipment Recommendations:  other (see comments) (TBD (progress pending))  Barriers to discharge:  Other (Comment) (Increased skilled assistance required)    Plan:     Patient to be seen 3 x/week to address the above listed problems via self-care/home management,therapeutic activities,therapeutic exercises  · Plan of Care Expires: 03/12/22  · Plan of Care Reviewed with: patient    Assessment:     Beatriz Adame is a 71 y.o. female with a medical diagnosis of Acute on chronic anemia.  She presents with the following performance deficits affecting function: weakness,impaired endurance,impaired self care skills,impaired functional mobilty,gait instability,impaired balance,decreased safety awareness,abnormal tone,decreased ROM,decreased upper extremity function,decreased lower extremity function,impaired coordination,impaired fine motor,decreased coordination.      Pt presenting with decreased activity tolerance, increased weakness, increased fatigue, impaired balance, impaired LUE function, and decreased safety awareness upon evaluation this date, requiring increased time and assistance to complete functional tasks. Patient required Mod A for bed mobility while HOB elevated. Patient tolerated EOB sitting with SBA for ~10 minutes. Patient presents with residual LUE contracture, increased tone, limiting PROM. Patient completed sit>Stand transfer with Max A using RW, unable to achieve steps this date. Pt would benefit from continued acute skilled OT services at this time to increase functional performance, and improve quality of life. OT to recommend SNF (return) at discharge once medically appropriate for increased functional independence and to improve patient safety  "before returning home.    Rehab Prognosis: Fair; patient would benefit from acute skilled OT services to address these deficits and reach maximum level of function.       Subjective   Patient states: " I can usually dress myself "    Communicated with: Nurse prior to session.  Patient found HOB elevated with peripheral IV,telemetry,delgado catheter upon OT entry to room.    Chief Complaint: Dehydration (Pt brought to ED via [facility] from [facility]. Per nursing staff, pt dehydrated and unable to get IV access, so pt sen to ED. )    Patient/Family Comments/goals: to return to [facility] with therapy services    Pain/Comfort:  Pain Rating 1: 8/10  Location - Side 1: Right  Location 1: foot  Pain Rating Post-Intervention 1: other (see comments) (patient did not rate)    Patients cultural, spiritual, Confucianism conflicts given the current situation: no    Occupational Profile:  Resident of [facility]F, recieves assistance for bed mobility and transfers to wheelchair. Reports primarily remains in bed unless in therapy session where she has been trialling walking with RW.  Patient uses DME as follows: walker, rolling,wheelchair,hospital bed,shower chair.   DME owned (not currently used): none.  Roles/Repsonsibilities:   Hand Dominance: right   Work: no.   Drive: no.   Managing Medicines/Managing Home: yes.   Equipment Used at Home:  walker, rolling,wheelchair,hospital bed,shower chair    Patient reports they will have assistance from SNF staff upon discharge.      Objective:     Patient found with: peripheral IV,telemetry,delgado catheter   General Precautions: Standard, Cardiac fall   Orthopedic Precautions:N/A (Missing RLE DARCO shoe post toe amputation)   Braces: N/A   Oxygen Device: Room Air  Vitals: BP (!) 109/55   Pulse 90   Temp 99 °F (37.2 °C) (Oral)   Resp 18   Wt 57.6 kg (127 lb)   SpO2 100%   BMI 19.89 kg/m²     Cognitive and Psychosocial Function:   · AxOx4 -- Person, Place, Time " and Situation   · Follows Commands/attention:follows one-step commands  · Communication:  clear/fluent  · Memory: No Deficits noted  · Safety awareness/insight to disability: impaired   · Mood/Affect/Coping skills/emotional control: Appropriate to situation    Hearing: Intact    Vision:  Intact visual fields    Physical Exam:  Postural examination/scapula alignment:    -       Rounded shoulders  -       Affected scapula elevated  -       Affected scapula adducted  Skin integrity: Visible skin intact     Left UE Right UE   UE Edema absent absent   UE ROM PROM limited by increased tone AROM WFL   UE Strength Deferred 3+/5    Strength no composite grasp present fair composite grasp   Sensation LUE INTACT:light/touch RUE INTACT: WFL   Fine Motor Coordination:  LUE IMPAIRED:  RUE INTACT: WFL   Gross Motor Coordination: LUE IMPAIRED: hemiplegia/paresis RUE INTACT:WFL     Occupational Performance:  Bed Mobility:    · Patient completed Rolling/Turning to Right with maximal assistance  · Patient completed Supine to Sit with moderate assistance on R side of bed  · Scooting anteriorly to EOB to have both feet planted on floor: moderate assistance  · Patient completed Sit to Supine with minimum assistance on R side of bed  · Scooting to HOB in supine: dependent and drawsheet pull    Functional Mobility/Transfers:   Static Sitting EOB: SBA   Patient completed Sit <> Stand Transfer from EOB with maximal assistance  with  rolling walker    Static Standing Balance: Max A   Patient completed Right lateral scoots along EOB with CGA    Activities of Daily Living:  · Upper Body Dressing: maximal assistance to doff/don clean gown  · Lower Body Dressing: maximal assistance to don sock; patient doffed sock with Supervision while HOB elevated      AMPAC 6 Click ADL:  AMPAC Total Score: 16    Treatment & Education:  -Pt education on OT role and POC.  -Importance of E/OOB activity with staff assistance, emphasis on daily  participation  -Safety during functional transfer and mobility ensured  -Patient provided with education on importance of RUE/BLB integration during functional tasks for improvement in functional performance.   -Education provided/reviewed, questions answered within OT scope of practice.   -Patient demonstrates understanding and learning this date.         Patient left HOB elevated with all lines intact, call button in reach and nurse notified    GOALS:   Multidisciplinary Problems     Occupational Therapy Goals        Problem: Occupational Therapy Goal    Goal Priority Disciplines Outcome Interventions   Occupational Therapy Goal     OT, PT/OT Ongoing, Progressing    Description: Goals set on 2/10, with expiration date 3/3:  Patient will increase functional independence with ADLs by performing:    Bed mobility with CGA  Grooming while seated EOB with CGA  UB Dressing with CGA.  LB Dressing with CGA.  Toileting from bedside commode with CGA for hygiene and clothing management.   Functional mobility of household and community distance with Min A and AD as needed                     History:     Past Medical History:   Diagnosis Date    Gastroparesis     GERD (gastroesophageal reflux disease)     History of Clostridium difficile colitis 07/24/2021    History of kidney stones     History of stroke     left side paralysis    Hyperlipidemia     Hypertension     Hypothyroidism     Iron deficiency anemia     Osteoarthritis     Peripheral neuropathy     Stage IV CKD     Type II diabetes mellitus          Past Surgical History:   Procedure Laterality Date    APPENDECTOMY      CYSTOSCOPY W/ URETERAL STENT PLACEMENT Right 10/13/2021    Procedure: CYSTOSCOPY, WITH URETERAL STENT INSERTION;  Surgeon: Wanda Arroyo MD;  Location: [facility];  Service: Urology;  Laterality: Right;    ESOPHAGOGASTRODUODENOSCOPY N/A 11/23/2021    Procedure: EGD (ESOPHAGOGASTRODUODENOSCOPY);  Surgeon: Obed Jeong MD;   Location: [facility] (2ND FLR);  Service: Endoscopy;  Laterality: N/A;    LAPAROSCOPIC APPENDECTOMY N/A 7/22/2021    Procedure: APPENDECTOMY, LAPAROSCOPIC;  Surgeon: Paulo Calvo Jr., MD;  Location: [facility];  Service: General;  Laterality: N/A;    TOE AMPUTATION Right 1/1/2022    Procedure: AMPUTATION, TOE;  Surgeon: Genaro Mason DPM;  Location: [facility];  Service: Podiatry;  Laterality: Right;    TUBAL LIGATION      URETEROSCOPIC REMOVAL OF URETERIC CALCULUS Right 12/22/2021    Procedure: REMOVAL, CALCULUS, URETER, URETEROSCOPIC;  Surgeon: Wanda Arroyo MD;  Location: [facility];  Service: Urology;  Laterality: Right;       Time Tracking:       OT Date of Treatment: 02/10/22  OT Start Time: 0825  OT Stop Time: 0846  OT Total Time (min): 21 min    Billable Minutes:Evaluation 10  Self Care/Home Management 11      2/10/2022

## 2022-02-10 NOTE — ED NOTES
Pt received to [id number]. Pt arrives with PRBC infusion. Needs am labs drawn / will draw from alternate access.

## 2022-02-10 NOTE — PLAN OF CARE
OT evaluation completed, POC established as appropriate. OT recommending return to SNF once medically stable for discharge.    Problem: Occupational Therapy Goal  Goal: Occupational Therapy Goal  Description: Goals set on 2/10, with expiration date 3/3:  Patient will increase functional independence with ADLs by performing:    Bed mobility with CGA  Grooming while seated EOB with CGA  UB Dressing with CGA.  LB Dressing with CGA.  Toileting from bedside commode with CGA for hygiene and clothing management.   Functional mobility of household and community distance with Min A and AD as needed    Outcome: Ongoing, Progressing

## 2022-02-10 NOTE — ASSESSMENT & PLAN NOTE
-hold A1C 5.4 on 11/21/2021  -hold home regimen:  8 units daily glargine  -hos regimen: detemir 5 units qd, low dose SSI  -q4h accuchecks  -diabetic diet

## 2022-02-10 NOTE — ASSESSMENT & PLAN NOTE
S/p right 2nd toe amp 1/1/2022. Surgical site well healed. No open wounds. Mild maceration between 4th and 5th toes.     Protective dressing with gauze and kerlix placed between toes and secured with tape. Interdigital spaces painted with betadine. ]    No weightbearing restrictions    Recommend follow up with Dr. Isabella HASSAN upon discharge    Podiatry will sign off.

## 2022-02-10 NOTE — ED NOTES
Pt arrives with 2nd u/s IV access site out on bed with blood transfusing onto linens. Blood transfusion stopped at this time. CHYNA Abdullahi attempting 3rd ultrasound IV access. Pt with contracted left arm. Pt complaint of abdominal discomfort at this time. Pt able to tolerate po medication without difficulty. Will resume transfusion upon successful IV access.

## 2022-02-10 NOTE — ASSESSMENT & PLAN NOTE
-continue with PT/OT for gait training and strengthening and restoration of ADL's   -encourage mobility, OOB in chair, and early ambulation as appropriate  -fall precautions

## 2022-02-10 NOTE — ASSESSMENT & PLAN NOTE
Patient's anemia is currently uncontrolled. Transfusing 1 unit of PRBCs. Etiology likely d/t CKD4  Current CBC reviewed-   Lab Results   Component Value Date    HGB 6.6 (L) 02/10/2022    HCT 21.0 (L) 02/10/2022     Monitor serial CBC and transfuse if patient becomes hemodynamically unstable, symptomatic or H/H drops below 7/21.   -continue iron supplement  -holding AC during acute anemia, resume when hgb stable

## 2022-02-10 NOTE — HPI
Ms. Adame is 71 y.o. female w/ PMHx of osteoarthritis, hypothyroidism, CVA with left-sided residual deficit, DM2, HTN, HLD, CKD, chronic anemia, recurrent UTIs, left ureteral stent placement in 10/2021, s/p R toe amputation presenting w/ complaints of symptomatic anemia. She reports that she has been feeling lightheadedness and increasing fatigue for about 2 days now. States that she also has headaches and some diarrhea, but has not had any vomiting, actively bleeding, melena, hematochezia. No exacerbating or alleviating factors. Pt denies syncope, visual disturbance, chest pain, SOB, n/v, abdominal pain.     In ED, Pt AFVSS. H/H 6.6/21. K 3.1, Cr 2.6. Consented for blood transfusion and type & screened.

## 2022-02-10 NOTE — SUBJECTIVE & OBJECTIVE
Past Medical History:   Diagnosis Date    Gastroparesis     GERD (gastroesophageal reflux disease)     History of Clostridium difficile colitis 07/24/2021    History of kidney stones     History of stroke     left side paralysis    Hyperlipidemia     Hypertension     Hypothyroidism     Iron deficiency anemia     Osteoarthritis     Peripheral neuropathy     Stage IV CKD     Type II diabetes mellitus        Past Surgical History:   Procedure Laterality Date    APPENDECTOMY      CYSTOSCOPY W/ URETERAL STENT PLACEMENT Right 10/13/2021    Procedure: CYSTOSCOPY, WITH URETERAL STENT INSERTION;  Surgeon: Wanda Arroyo MD;  Location: [facility];  Service: Urology;  Laterality: Right;    ESOPHAGOGASTRODUODENOSCOPY N/A 11/23/2021    Procedure: EGD (ESOPHAGOGASTRODUODENOSCOPY);  Surgeon: Obed Jeong MD;  Location: [facility] ENDO ([address] [place] [address]);  Service: Endoscopy;  Laterality: N/A;    LAPAROSCOPIC APPENDECTOMY N/A 7/22/2021    Procedure: APPENDECTOMY, LAPAROSCOPIC;  Surgeon: Paulo Calvo Jr., MD;  Location: [facility];  Service: General;  Laterality: N/A;    TOE AMPUTATION Right 1/1/2022    Procedure: AMPUTATION, TOE;  Surgeon: Genaro Mason DPM;  Location: [facility];  Service: Podiatry;  Laterality: Right;    TUBAL LIGATION      URETEROSCOPIC REMOVAL OF URETERIC CALCULUS Right 12/22/2021    Procedure: REMOVAL, CALCULUS, URETER, URETEROSCOPIC;  Surgeon: Wanda Arroyo MD;  Location: [facility];  Service: Urology;  Laterality: Right;       Review of patient's allergies indicates:   Allergen Reactions    Tomato (solanum lycopersicum) Hives and Itching       No current facility-administered medications on file prior to encounter.     Current Outpatient Medications on File Prior to Encounter   Medication Sig    amLODIPine (NORVASC) 5 MG tablet Take 10 mg by mouth every evening. Take 5 mg every morning and 10 mg at night    aspirin (ECOTRIN) 81 MG EC tablet Take 81 mg by mouth once daily.    b complex  vitamins tablet Take 1 tablet by mouth once daily.    carvedilol (COREG) 25 MG tablet Take 1 tablet (25 mg total) by mouth 2 (two) times daily.    clopidogreL (PLAVIX) 75 mg tablet Take 75 mg by mouth.    DULoxetine (CYMBALTA) 30 MG capsule Take 1 capsule (30 mg total) by mouth once daily.    ferrous sulfate (FEOSOL) 325 mg (65 mg iron) Tab tablet Take 325 mg by mouth daily with breakfast. Off at present    gabapentin (NEURONTIN) 300 MG capsule Take 1 capsule (300 mg total) by mouth once daily.    HYDROcodone-acetaminophen (NORCO) 5-325 mg per tablet Take 1 tablet by mouth every 6 (six) hours as needed for Pain.    insulin (BASAGLAR KWIKPEN U-100 INSULIN) glargine 100 units/mL (3mL) SubQ pen Inject 5 Units into the skin once daily.    lactulose (CHRONULAC) 10 gram/15 mL solution Take 10 g by mouth 2 (two) times a day.    levothyroxine (SYNTHROID) 75 MCG tablet Take 75 mcg by mouth once daily.    metoclopramide HCl (REGLAN) 10 MG tablet Take 1 tablet (10 mg total) by mouth 3 (three) times daily before meals.    omeprazole (PRILOSEC) 20 MG capsule Take 20 mg by mouth 2 (two) times daily.    ondansetron (ZOFRAN) 4 MG tablet Take 1 tablet (4 mg total) by mouth every 8 (eight) hours as needed for Nausea.    rosuvastatin (CRESTOR) 40 MG Tab Take 40 mg by mouth once daily.    tamsulosin (FLOMAX) 0.4 mg Cap Take 1 capsule (0.4 mg total) by mouth once daily.    traZODone (DESYREL) 100 MG tablet Take 1 tablet (100 mg total) by mouth once daily.     Family History     Problem Relation (Age of Onset)    Alcohol abuse Father    Arthritis Mother    Asthma Brother    Diabetes Mother, Sister, Brother    Heart attack Mother, Father, Brother    Heart disease Mother, Brother    Hypertension Mother    Kidney disease Mother    Stroke Mother    Vision loss Mother        Tobacco Use    Smoking status: Never Smoker    Smokeless tobacco: Never Used   Substance and Sexual Activity    Alcohol use: No     Comment: socially     Drug use: No    Sexual activity: Not Currently     Review of Systems   Constitutional: Positive for activity change and fatigue. Negative for chills, diaphoresis and fever.   HENT: Negative for congestion, facial swelling, rhinorrhea and sore throat.    Eyes: Negative for photophobia, itching and visual disturbance.   Respiratory: Negative for cough, chest tightness, shortness of breath and wheezing.    Cardiovascular: Negative for chest pain, palpitations and leg swelling.   Gastrointestinal: Positive for diarrhea. Negative for abdominal distention, abdominal pain, blood in stool, constipation, nausea and vomiting.   Genitourinary: Positive for difficulty urinating. Negative for dysuria, frequency, hematuria and urgency.   Musculoskeletal: Negative for arthralgias, back pain and neck stiffness.   Neurological: Positive for weakness, light-headedness and headaches. Negative for dizziness, tremors, seizures, syncope and numbness.   Psychiatric/Behavioral: Negative for agitation, confusion and hallucinations.     Objective:     Vital Signs (Most Recent):  Temp: 99 °F (37.2 °C) (02/10/22 0350)  Pulse: 78 (02/10/22 0315)  Resp: 20 (02/10/22 0315)  BP: 111/68 (02/10/22 0007)  SpO2: 100 % (02/10/22 0315) Vital Signs (24h Range):  Temp:  [99 °F (37.2 °C)] 99 °F (37.2 °C)  Pulse:  [78-82] 78  Resp:  [18-20] 20  SpO2:  [99 %-100 %] 100 %  BP: (108-111)/(62-68) 111/68     Weight: 57.6 kg (127 lb)  Body mass index is 19.89 kg/m².    Physical Exam  Vitals and nursing note reviewed.   Constitutional:       General: She is not in acute distress.     Appearance: She is well-developed. She is ill-appearing. She is not diaphoretic.      Comments: Frail   HENT:      Head: Normocephalic and atraumatic.      Right Ear: External ear normal.      Left Ear: External ear normal.      Nose: Nose normal. No congestion.      Mouth/Throat:      Pharynx: Oropharynx is clear.   Eyes:      General: No scleral icterus.     Extraocular  Movements: Extraocular movements intact.   Cardiovascular:      Rate and Rhythm: Normal rate and regular rhythm.      Pulses: Normal pulses.      Heart sounds: Normal heart sounds. No murmur heard.      Pulmonary:      Effort: Pulmonary effort is normal. No respiratory distress.      Breath sounds: Normal breath sounds. No wheezing or rales.   Abdominal:      General: Bowel sounds are normal. There is no distension.      Palpations: Abdomen is soft.      Tenderness: There is no abdominal tenderness. There is no guarding or rebound.   Genitourinary:     Comments: Deferred   Musculoskeletal:         General: Deformity (L arm contracted) present.      Cervical back: Normal range of motion.      Right lower leg: No edema.      Left lower leg: No edema.      Comments: R foot w/ dry intact bandages from R toe amputation   Skin:     General: Skin is warm and dry.      Capillary Refill: Capillary refill takes less than 2 seconds.   Neurological:      General: No focal deficit present.      Mental Status: She is alert and oriented to person, place, and time. Mental status is at baseline.      Motor: Weakness (LUE residual from remote CVA) present.   Psychiatric:         Mood and Affect: Mood normal.         Behavior: Behavior normal.         Thought Content: Thought content normal.             Significant Labs:   All pertinent labs within the past 24 hours have been reviewed.  CBC:   Recent Labs   Lab 02/10/22  0122   WBC 11.12   HGB 6.6*   HCT 21.0*        CMP:   Recent Labs   Lab 02/10/22  0122      K 3.1*      CO2 21*      BUN 21   CREATININE 2.6*   CALCIUM 8.4*   PROT 6.5   ALBUMIN 2.4*   BILITOT 0.3   ALKPHOS 82   AST 10   ALT 6*   ANIONGAP 10   EGFRNONAA 18.0*       Significant Imaging: I have reviewed all pertinent imaging results/findings within the past 24 hours.

## 2022-02-10 NOTE — H&P
[facility]  [facility]    Patient Name: Beatriz Adame  MRN: [id number]  Patient Class: Emergency  Admission Date: 2/9/2022  Attending Physician: Jessica Rocha MD   Primary Care Provider: Dante Olivier MD         Patient information was obtained from patient, past medical records and ER records.     Subjective:     Principal Problem:Acute on chronic anemia    Chief Complaint:   Chief Complaint   Patient presents with    Dehydration     Pt brought to ED via [facility] from [facility]. Per nursing staff, pt dehydrated and unable to get IV access, so pt sen to ED.         HPI: Ms. Adame is 71 y.o. female w/ PMHx of osteoarthritis, hypothyroidism, CVA with left-sided residual deficit, DM2, HTN, HLD, CKD, chronic anemia, recurrent UTIs, left ureteral stent placement in 10/2021, s/p R toe amputation presenting w/ complaints of symptomatic anemia. She reports that she has been feeling lightheadedness and increasing fatigue for about 2 days now. States that she also has headaches and some diarrhea, but has not had any vomiting, actively bleeding, melena, hematochezia. No exacerbating or alleviating factors. Pt denies syncope, visual disturbance, chest pain, SOB, n/v, abdominal pain.     In ED, Pt AFVSS. H/H 6.6/21. K 3.1, Cr 2.6. Consented for blood transfusion and type & screened.       Past Medical History:   Diagnosis Date    Gastroparesis     GERD (gastroesophageal reflux disease)     History of Clostridium difficile colitis 07/24/2021    History of kidney stones     History of stroke     left side paralysis    Hyperlipidemia     Hypertension     Hypothyroidism     Iron deficiency anemia     Osteoarthritis     Peripheral neuropathy     Stage IV CKD     Type II diabetes mellitus        Past Surgical History:   Procedure Laterality Date    APPENDECTOMY      CYSTOSCOPY W/ URETERAL STENT PLACEMENT Right 10/13/2021    Procedure: CYSTOSCOPY, WITH  URETERAL STENT INSERTION;  Surgeon: Wanda Arroyo MD;  Location: [facility];  Service: Urology;  Laterality: Right;    ESOPHAGOGASTRODUODENOSCOPY N/A 11/23/2021    Procedure: EGD (ESOPHAGOGASTRODUODENOSCOPY);  Surgeon: Obed Jeong MD;  Location: [facility] ENDO (2ND FLR);  Service: Endoscopy;  Laterality: N/A;    LAPAROSCOPIC APPENDECTOMY N/A 7/22/2021    Procedure: APPENDECTOMY, LAPAROSCOPIC;  Surgeon: Paulo Calvo Jr., MD;  Location: [facility];  Service: General;  Laterality: N/A;    TOE AMPUTATION Right 1/1/2022    Procedure: AMPUTATION, TOE;  Surgeon: Genaro Mason DPM;  Location: [facility];  Service: Podiatry;  Laterality: Right;    TUBAL LIGATION      URETEROSCOPIC REMOVAL OF URETERIC CALCULUS Right 12/22/2021    Procedure: REMOVAL, CALCULUS, URETER, URETEROSCOPIC;  Surgeon: Wanda Arroyo MD;  Location: [facility];  Service: Urology;  Laterality: Right;       Review of patient's allergies indicates:   Allergen Reactions    Tomato (solanum lycopersicum) Hives and Itching       No current facility-administered medications on file prior to encounter.     Current Outpatient Medications on File Prior to Encounter   Medication Sig    amLODIPine (NORVASC) 5 MG tablet Take 10 mg by mouth every evening. Take 5 mg every morning and 10 mg at night    aspirin (ECOTRIN) 81 MG EC tablet Take 81 mg by mouth once daily.    b complex vitamins tablet Take 1 tablet by mouth once daily.    carvedilol (COREG) 25 MG tablet Take 1 tablet (25 mg total) by mouth 2 (two) times daily.    clopidogreL (PLAVIX) 75 mg tablet Take 75 mg by mouth.    DULoxetine (CYMBALTA) 30 MG capsule Take 1 capsule (30 mg total) by mouth once daily.    ferrous sulfate (FEOSOL) 325 mg (65 mg iron) Tab tablet Take 325 mg by mouth daily with breakfast. Off at present    gabapentin (NEURONTIN) 300 MG capsule Take 1 capsule (300 mg total) by mouth once daily.    HYDROcodone-acetaminophen (NORCO) 5-325 mg per tablet Take 1 tablet by  mouth every 6 (six) hours as needed for Pain.    insulin (BASAGLAR KWIKPEN U-100 INSULIN) glargine 100 units/mL (3mL) SubQ pen Inject 5 Units into the skin once daily.    lactulose (CHRONULAC) 10 gram/15 mL solution Take 10 g by mouth 2 (two) times a day.    levothyroxine (SYNTHROID) 75 MCG tablet Take 75 mcg by mouth once daily.    metoclopramide HCl (REGLAN) 10 MG tablet Take 1 tablet (10 mg total) by mouth 3 (three) times daily before meals.    omeprazole (PRILOSEC) 20 MG capsule Take 20 mg by mouth 2 (two) times daily.    ondansetron (ZOFRAN) 4 MG tablet Take 1 tablet (4 mg total) by mouth every 8 (eight) hours as needed for Nausea.    rosuvastatin (CRESTOR) 40 MG Tab Take 40 mg by mouth once daily.    tamsulosin (FLOMAX) 0.4 mg Cap Take 1 capsule (0.4 mg total) by mouth once daily.    traZODone (DESYREL) 100 MG tablet Take 1 tablet (100 mg total) by mouth once daily.     Family History     Problem Relation (Age of Onset)    Alcohol abuse Father    Arthritis Mother    Asthma Brother    Diabetes Mother, Sister, Brother    Heart attack Mother, Father, Brother    Heart disease Mother, Brother    Hypertension Mother    Kidney disease Mother    Stroke Mother    Vision loss Mother        Tobacco Use    Smoking status: Never Smoker    Smokeless tobacco: Never Used   Substance and Sexual Activity    Alcohol use: No     Comment: socially    Drug use: No    Sexual activity: Not Currently     Review of Systems   Constitutional: Positive for activity change and fatigue. Negative for chills, diaphoresis and fever.   HENT: Negative for congestion, facial swelling, rhinorrhea and sore throat.    Eyes: Negative for photophobia, itching and visual disturbance.   Respiratory: Negative for cough, chest tightness, shortness of breath and wheezing.    Cardiovascular: Negative for chest pain, palpitations and leg swelling.   Gastrointestinal: Positive for diarrhea. Negative for abdominal distention, abdominal pain,  blood in stool, constipation, nausea and vomiting.   Genitourinary: Positive for difficulty urinating. Negative for dysuria, frequency, hematuria and urgency.   Musculoskeletal: Negative for arthralgias, back pain and neck stiffness.   Neurological: Positive for weakness, light-headedness and headaches. Negative for dizziness, tremors, seizures, syncope and numbness.   Psychiatric/Behavioral: Negative for agitation, confusion and hallucinations.     Objective:     Vital Signs (Most Recent):  Temp: 99 °F (37.2 °C) (02/10/22 0350)  Pulse: 78 (02/10/22 0315)  Resp: 20 (02/10/22 0315)  BP: 111/68 (02/10/22 0007)  SpO2: 100 % (02/10/22 0315) Vital Signs (24h Range):  Temp:  [99 °F (37.2 °C)] 99 °F (37.2 °C)  Pulse:  [78-82] 78  Resp:  [18-20] 20  SpO2:  [99 %-100 %] 100 %  BP: (108-111)/(62-68) 111/68     Weight: 57.6 kg (127 lb)  Body mass index is 19.89 kg/m².    Physical Exam  Vitals and nursing note reviewed.   Constitutional:       General: She is not in acute distress.     Appearance: She is well-developed. She is ill-appearing. She is not diaphoretic.      Comments: Frail   HENT:      Head: Normocephalic and atraumatic.      Right Ear: External ear normal.      Left Ear: External ear normal.      Nose: Nose normal. No congestion.      Mouth/Throat:      Pharynx: Oropharynx is clear.   Eyes:      General: No scleral icterus.     Extraocular Movements: Extraocular movements intact.   Cardiovascular:      Rate and Rhythm: Normal rate and regular rhythm.      Pulses: Normal pulses.      Heart sounds: Normal heart sounds. No murmur heard.      Pulmonary:      Effort: Pulmonary effort is normal. No respiratory distress.      Breath sounds: Normal breath sounds. No wheezing or rales.   Abdominal:      General: Bowel sounds are normal. There is no distension.      Palpations: Abdomen is soft.      Tenderness: There is no abdominal tenderness. There is no guarding or rebound.   Genitourinary:     Comments: Deferred    Musculoskeletal:         General: Deformity (L arm contracted) present.      Cervical back: Normal range of motion.      Right lower leg: No edema.      Left lower leg: No edema.      Comments: R foot w/ dry intact bandages from R toe amputation   Skin:     General: Skin is warm and dry.      Capillary Refill: Capillary refill takes less than 2 seconds.   Neurological:      General: No focal deficit present.      Mental Status: She is alert and oriented to person, place, and time. Mental status is at baseline.      Motor: Weakness (LUE residual from remote CVA) present.   Psychiatric:         Mood and Affect: Mood normal.         Behavior: Behavior normal.         Thought Content: Thought content normal.             Significant Labs:   All pertinent labs within the past 24 hours have been reviewed.  CBC:   Recent Labs   Lab 02/10/22  0122   WBC 11.12   HGB 6.6*   HCT 21.0*        CMP:   Recent Labs   Lab 02/10/22  0122      K 3.1*      CO2 21*      BUN 21   CREATININE 2.6*   CALCIUM 8.4*   PROT 6.5   ALBUMIN 2.4*   BILITOT 0.3   ALKPHOS 82   AST 10   ALT 6*   ANIONGAP 10   EGFRNONAA 18.0*       Significant Imaging: I have reviewed all pertinent imaging results/findings within the past 24 hours.     Assessment/Plan:     * Acute on chronic anemia  Patient's anemia is currently uncontrolled. Transfusing 1 unit of PRBCs. Etiology likely d/t CKD4  Current CBC reviewed-   Lab Results   Component Value Date    HGB 6.6 (L) 02/10/2022    HCT 21.0 (L) 02/10/2022     Monitor serial CBC and transfuse if patient becomes hemodynamically unstable, symptomatic or H/H drops below 7/21.   -continue iron supplement  -holding AC during acute anemia, resume when hgb stable      Chronic kidney disease, stage 4 (severe)  -likely 2/2 dehydration  -Cr 2.6, bl ~2.5  -avoid ACE/ARB  -monitor BMP        Osteomyelitis of right 2nd toe  R diabetic foot ulcer  -s/p R 2nd toe amputation  -podiatry wound care  consulted  -percocet 5 mg q6h prn      Hypertension  -continue Norvasc 5 mg every morning and 10 mg qHS, coreg 25 mg bid      History of stroke  -residual left sided weakness at baseline  -Continue statin, asa, plavix 75 mg qd      Type II diabetes mellitus  -hold A1C 5.4 on 11/21/2021  -hold home regimen:  8 units daily glargine  -hos regimen: detemir 5 units qd, low dose SSI  -q4h accuchecks  -diabetic diet      Insomnia  -continue trazadone       Hypokalemia  -K 3.1, replaced      GERD (gastroesophageal reflux disease)  -continue PPI      Gastroparesis  -continue metoclopramide 10 mg tid      Peripheral neuropathy  -continue gabapentin 300 mg qd, duloxetine 30 mg qd    Debility  -continue with PT/OT for gait training and strengthening and restoration of ADL's   -encourage mobility, OOB in chair, and early ambulation as appropriate  -fall precautions         Urinary retention  -continue flomax   -UA pending      Hypothyroidism  -continue home synthroid      Severe protein-calorie malnutrition  -continue boost tid      Iron deficiency anemia  -continue supplemental iron      Acute kidney injury superimposed on CKD stage IV        Hyperlipidemia  -continue statin      VTE Risk Mitigation (From admission, onward)         Ordered     IP VTE HIGH RISK PATIENT  Once         02/10/22 0333     Place sequential compression device  Until discontinued         02/10/22 0333                   Misty Anderson PA-C  Department of Hospital Medicine   [facility] - Emergency Dept

## 2022-02-10 NOTE — HPI
71 y.o F with PMHx of osteoarthritis, history of C diff, hypothyroidism, 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, DM type 2, HTN, HLD, CKD, recurrent UTIs, history of left ureteral stent placement in 10/2021, right 2nd toe osteomyelitis s/p amputation 1/2022 who was admitted to [facility] 2/9/2022 for symptomatic anemia.     Podiatry consulted for management of right 2nd toe amputation site. Patient underwent right 2nd toe amputation 1/1/2022 with Dr. Mason and was discharged to SNF. She has not followed up with him outpatient. She denies any foot pain.     In ED, Pt AFVSS. WBC 12k. H/H 6.6/21.

## 2022-02-10 NOTE — ASSESSMENT & PLAN NOTE
R diabetic foot ulcer  -s/p R 2nd toe amputation  -podiatry wound care consulted  -percocet 5 mg q6h prn

## 2022-02-11 NOTE — PLAN OF CARE
Problem: Adult Inpatient Plan of Care  Goal: Plan of Care Review  Outcome: Ongoing, Progressing  Goal: Patient-Specific Goal (Individualized)  Outcome: Ongoing, Progressing  Goal: Absence of Hospital-Acquired Illness or Injury  Outcome: Ongoing, Progressing  Goal: Optimal Comfort and Wellbeing  Outcome: Ongoing, Progressing  Goal: Readiness for Transition of Care  Outcome: Ongoing, Progressing     Problem: Infection  Goal: Absence of Infection Signs and Symptoms  Outcome: Ongoing, Progressing     Problem: Diabetes Comorbidity  Goal: Blood Glucose Level Within Targeted Range  Outcome: Ongoing, Progressing     Problem: Renal Function Impairment (Acute Kidney Injury/Impairment)  Goal: Effective Renal Function  Outcome: Ongoing, Progressing     Problem: Impaired Wound Healing  Goal: Optimal Wound Healing  Outcome: Ongoing, Progressing     Problem: Skin Injury Risk Increased  Goal: Skin Health and Integrity  Outcome: Ongoing, Progressing

## 2022-02-11 NOTE — PLAN OF CARE
[facility] - Telemetry Stepdown ([facility])  Discharge Assessment    Primary Care Provider: Dante Olivier MD     Discharge Assessment (most recent)     BRIEF DISCHARGE ASSESSMENT - 02/11/22 1227        Discharge Planning    Assessment Type Discharge Planning Brief Assessment     Support Systems Children     Equipment Currently Used at Home walker, rolling;wheelchair;hospital bed;shower chair     Current Living Arrangements home/apartment/condo     Patient/Family Anticipates Transition to home     DME Needed Upon Discharge  none     Discharge Plan A Home Health;Home     Discharge Plan B Other   TBD            Spoke to patient's son.  Patient lives alone. Post hospital stay son will be her support person and patient transportation at discharge. There have been  hospitalizations within the last 30 days per son. Verified patient's PCP and preferred Pharmacy.  Son states not on Coumadin and is not receiving dialysis.  All questions answered regarding Case Management Discharge Planning, son verbalized understanding.      Zabrina Saunders RN, CM  Case Management  [id number]             -CT as above   -plan as above

## 2022-02-11 NOTE — CONSULTS
[facility] - Telemetry Stepdown ([place] [address])  Wound Care    Patient Name:  Beatriz Adame   MRN:  [id number]  Date: 2/11/2022  Diagnosis: Acute on chronic anemia    History:     Past Medical History:   Diagnosis Date    Gastroparesis     GERD (gastroesophageal reflux disease)     History of Clostridium difficile colitis 07/24/2021    History of kidney stones     History of stroke     left side paralysis    Hyperlipidemia     Hypertension     Hypothyroidism     Iron deficiency anemia     Osteoarthritis     Peripheral neuropathy     Stage IV CKD     Type II diabetes mellitus        Social History     Socioeconomic History    Marital status: Single   Tobacco Use    Smoking status: Never Smoker    Smokeless tobacco: Never Used   Substance and Sexual Activity    Alcohol use: No     Comment: socially    Drug use: No    Sexual activity: Not Currently     Social Determinants of Health     Financial Resource Strain: Low Risk     Difficulty of Paying Living Expenses: Not hard at all   Food Insecurity: No Food Insecurity    Worried About Running Out of Food in the Last Year: Never true    Ran Out of Food in the Last Year: Never true   Transportation Needs: No Transportation Needs    Lack of Transportation (Medical): No    Lack of Transportation (Non-Medical): No   Physical Activity: Inactive    Days of Exercise per Week: 0 days    Minutes of Exercise per Session: 0 min   Stress: No Stress Concern Present    Feeling of Stress : Not at all   Social Connections: Moderately Isolated    Frequency of Communication with Friends and Family: More than three times a week    Frequency of Social Gatherings with Friends and Family: Once a week    Attends Cheondoism Services: More than 4 times per year    Active Member of Clubs or Organizations: No    Attends Club or Organization Meetings: Never    Marital Status: Never    Housing Stability: Low Risk     Unable to Pay for Housing in the Last Year: No     Number of Places Lived in the Last Year: 1    Unstable Housing in the Last Year: No       Precautions:     Allergies as of 02/09/2022 - Reviewed 02/09/2022   Allergen Reaction Noted    Tomato (solanum lycopersicum) Hives and Itching 06/01/2014       [facility] Assessment Details/Treatment     Wound care consult received from RN for assessment of buttocks and vaginal area. Upon assessment noted incontinence associated dermatitis. Recommend antifungal moisture barrier ointment BID to promote healing and manage moisture/yeast. Nursing and MD team notified with recommendations. Orders placed. Nursing to maintain pressure injury prevention interventions. Wound care to sign off. Please re-consult for any concerns.     Gluteal cleft/ buttocks- multiple scattered areas of resolving incontinence associated dermatitis- no odor- scant serosanguinous drainage         02/11/2022

## 2022-02-11 NOTE — PLAN OF CARE
[facility] - Telemetry Stepdown ([facility]-7)  Discharge Final Note    Primary Care Provider: Dante Olivier MD    Expected Discharge Date: 2/11/2022    Final Discharge Note (most recent)     Final Note - 02/11/22 1242        Final Note    Assessment Type Final Discharge Note     Anticipated Discharge Disposition Nursing Facility with Planned Readmission     Hospital Resources/Appts/Education Provided Provided patient/caregiver with written discharge plan information        Post-Acute Status    Post-Acute Authorization Placement     Post-Acute Placement Status --   Return to [facility]    Discharge Delays None known at this time               Patient discharged back to [facility].  Transportation secured through [facility].  Information given to him per medical staff for f/u and symptoms to notify provider.             PCP:  Dante Olivier MD  [phone]        Pharmacy:    [facility] - [place], [place] - [address]  [address] [address]  Phone: [phone] Fax: [phone]    [facility]  [address]  [facility] [address]  Phone: [phone] Fax: [phone]    [id number] - [place], [place] - [address]  [address]  [place] [address]  Phone: [phone] Fax: [phone]    [facility] - [place], [place] - [address] [id number]  [facility] [address]  Phone: [phone] Fax: [phone]        Emergency Contacts:  Extended Emergency Contact Information  Primary Emergency Contact: Gabino Adame  Mobile Phone: [phone]  Relation: Brother  Preferred language: English   needed? No  Secondary Emergency Contact: Wendi Socratesrichard  Mobile Phone: [phone]  Relation: Son      Insurance:    Payor: HUMANA MANAGED MEDICARE / Plan: HUMANA SNP (SPECIAL NEEDS PLAN) / Product Type: Medicare Advantage /       02/11/2022  12:45 PM      Zabrina Saunders RN,  CM  Case Management  [id number]              Important Message from Medicare

## 2022-02-11 NOTE — NURSING
Report called to Sofi at [facility]. Questions encouraged and answered. IV removed. VSS. No fall or injuries noted during shift. Pt dressed appropriately for weather. Pt waiting transportation services. Will continue to monitor.

## 2022-02-11 NOTE — PLAN OF CARE
NURSING HOME ORDERS    02/11/2022  [facility]  [facility] - TELEMETRY STEPDOWN ([facility]-7)  [address]  [facility] [address]  Dept: [phone]  Loc: [phone]     Admit to Nursing Home:  [facility]    Diagnoses:  Active Hospital Problems    Diagnosis  POA    *Acute on chronic anemia [D64.9]  Yes    Insomnia [G47.00]  Yes    S/P amputation of lesser toe, right [Z89.421]  Not Applicable    Debility [R53.81]  Yes    GERD (gastroesophageal reflux disease) [K21.9]  Yes    Peripheral neuropathy [G62.9]  Yes    Gastroparesis [K31.84]  Yes    Urinary retention [R33.9]  Yes    Osteomyelitis of right 2nd toe [E11.621, E11.69, L97.509, M86.9]  Yes    Hypothyroidism [E03.9]  Yes    Right diabetic foot ulcer [E10.621, L97.516]  Yes    Severe protein-calorie malnutrition [E43]  Yes    Chronic kidney disease, stage 4 (severe) [N18.4]  Yes    History of stroke [Z86.73]  Not Applicable     Chronic    Iron deficiency anemia [D50.9]  Yes    Hypokalemia [E87.6]  Yes    Type 2 diabetes mellitus, with long-term current use of insulin [E11.9, Z79.4]  Not Applicable    Hypertension [I10]  Yes    Hyperlipidemia [E78.5]  Yes      Resolved Hospital Problems   No resolved problems to display.       Code Status: FULL CODE    Patient is homebound due to:  Acute on chronic anemia    Allergies:  Review of patient's allergies indicates:   Allergen Reactions    Tomato (solanum lycopersicum) Hives and Itching       Vitals:  Routine    Diet: diabetic diet: 2000 calorie    Activities:   Activity as tolerated    Labs:  Per facility protocol    Nursing Precautions:  Fall and Pressure ulcer prevention    Consults:   PT to evaluate and treat- 3 times a week, OT to evaluate and treat- 3 times a week, Wound Care and Nutrition to evaluate and recommend diet     Miscellaneous Care: Araiza Care: Empty Araiza bag every shift.  Change Araiza every month  Routine Skin for  Bedridden Patients:  Apply moisture barrier cream to all  Wound Care: yes:  es: R second toe . Applied xeroform, kerlix, ACE avoiding tight compression.  Change every 2-3 days.   Diabetes Care: Diabetes: Check blood sugar. Fingerstick blood sugar AC and HS  Sliding Scale/Hypoglycemia Protocol: For FSG<80, give 1 amp D50 or 1 glucose tablet. For FSG , do nothing. For -200, give 1 unit of novolog in addition to pre-meal insulin. For -250, give 2 units of novolog in addition to pre-meal insulin. For -300, give 3 units of novolog in addition to pre-meal insulin. For 301-350, give 4 units of novolog in addition to pre-meal insulin. For 351-400, give 5 units of novolog in addition to pre-meal insulin. For FSG >400, give 5 units of novolog in addition to pre-meal insulin and please call MD                   Diabetes Care:  SN to perform and educate Diabetic management with blood glucose monitoring:      Medications: Discontinue all previous medication orders, if any. See new list below.     Medication List      START taking these medications    levoFLOXacin 750 MG tablet  Commonly known as: LEVAQUIN  Take 1 tablet (750 mg total) by mouth every other day. Last Dose 2/18/2022 for 7 days  Start taking on: February 12, 2022        CONTINUE taking these medications    amLODIPine 5 MG tablet  Commonly known as: NORVASC  Take 10 mg by mouth every evening. Take 5 mg every morning and 10 mg at night     aspirin 81 MG EC tablet  Commonly known as: ECOTRIN  Take 81 mg by mouth once daily.     b complex vitamins tablet  Take 1 tablet by mouth once daily.     BASAGLAR KWIKPEN U-100 INSULIN glargine 100 units/mL (3mL) SubQ pen  Generic drug: insulin  Inject 5 Units into the skin once daily.     carvediloL 25 MG tablet  Commonly known as: COREG  Take 1 tablet (25 mg total) by mouth 2 (two) times daily.     clopidogreL 75 mg tablet  Commonly known as: PLAVIX  Take 75 mg by mouth once daily.     DULoxetine 30 MG  capsule  Commonly known as: CYMBALTA  Take 1 capsule (30 mg total) by mouth once daily.     ferrous sulfate 325 mg (65 mg iron) Tab tablet  Commonly known as: FEOSOL  Take 325 mg by mouth daily with breakfast. Off at present     gabapentin 300 MG capsule  Commonly known as: NEURONTIN  Take 1 capsule (300 mg total) by mouth once daily.     HYDROcodone-acetaminophen 5-325 mg per tablet  Commonly known as: NORCO  Take 1 tablet by mouth every 6 (six) hours as needed for Pain.     lactulose 10 gram/15 mL solution  Commonly known as: CHRONULAC  Take 10 g by mouth 2 (two) times a day.     levothyroxine 75 MCG tablet  Commonly known as: SYNTHROID  Take 75 mcg by mouth once daily.     metoclopramide HCl 10 MG tablet  Commonly known as: REGLAN  Take 1 tablet (10 mg total) by mouth 3 (three) times daily before meals.     omeprazole 20 MG capsule  Commonly known as: PRILOSEC  Take 20 mg by mouth 2 (two) times daily.     ondansetron 4 MG tablet  Commonly known as: ZOFRAN  Take 1 tablet (4 mg total) by mouth every 8 (eight) hours as needed for Nausea.     rosuvastatin 40 MG Tab  Commonly known as: CRESTOR  Take 40 mg by mouth once daily.     tamsulosin 0.4 mg Cap  Commonly known as: FLOMAX  Take 1 capsule (0.4 mg total) by mouth once daily.     traZODone 100 MG tablet  Commonly known as: DESYREL  Take 1 tablet (100 mg total) by mouth once daily.              Immunizations Administered as of 2/11/2022     Name Date Dose VIS Date Route Exp Date    COVID-19, vector-nr, rS-Ad26 (J&J) 3/6/2021 0.5 mL -- -- --    Lot: [id number]     External: Auto Reconciled From Outside Source             Some patients may experience side effects after vaccination.  These may include fever, headache, muscle or joint aches.  Most symptoms resolve with 24-48 hours and do not require urgent medical evaluation unless they persist for more than 72 hours or symptoms are concerning for an unrelated medical condition.           _________________________________  Jessica Rocha MD  02/11/2022

## 2022-02-11 NOTE — NURSING
Pt arrived to the unit at shift change awake and alert. Pt has had no complaints this shift. She had a bowel movement and has some small areas of breakdown on her bottom and vagina. Call bell is within reach. IV intact.

## 2022-02-11 NOTE — PLAN OF CARE
Problem: Physical Therapy Goal  Goal: Physical Therapy Goal  Description: Goals to be met by: 3/15/22    Patient will increase functional independence with mobility by performing:    . Supine to sit with MInimal Assistance  . Sit to supine with MInimal Assistance  . Sit to stand transfer with Moderate Assistance  . Bed to chair transfer with Minimal Assistance using Andrew-walker  . Gait  x 10 feet with Moderate Assistance using Andrew-walker.     Outcome: Ongoing, Progressing

## 2022-02-11 NOTE — PT/OT/SLP EVAL
Physical Therapy Evaluation    Patient Name:  Beatriz Adame   MRN:  [id number]    Recommendations:     Discharge Recommendations:  nursing facility, skilled   Discharge Equipment Recommendations:  (TBD)   Barriers to discharge: None    Assessment:     Beatriz Adame is a 71 y.o. female admitted with a medical diagnosis of Acute on chronic anemia.  She presents with the following impairments/functional limitations:  weakness,impaired endurance,impaired self care skills,impaired functional mobilty,gait instability,impaired balance,decreased upper extremity function,decreased coordination,decreased lower extremity function,abnormal tone,impaired coordination,impaired fine motor,impaired cardiopulmonary response to activity,impaired muscle length . Pt admitted from [facility] where she was receiving ongoing PT for her L sided weakness. Pt currently is Mod A for bed mobility, and Max A for transfers. Pt will therefore benefit from continued snf rehab to continue working on improving her functional mobility.    Rehab Prognosis: Good; patient would benefit from acute skilled PT services to address these deficits and reach maximum level of function.    Recent Surgery: * No surgery found *      Plan:     During this hospitalization, patient to be seen 3 x/week to address the identified rehab impairments via gait training,therapeutic activities,therapeutic exercises,neuromuscular re-education and progress toward the following goals:    · Plan of Care Expires:  03/15/22    Subjective     Chief Complaint: weakness  Patient/Family Comments/goals: none noted by pt.  Pain/Comfort:  · Pain Rating 1: 8/10  · Location - Side 1: Right  · Location - Orientation 1: generalized  · Location 1: foot  · Pain Rating Post-Intervention 1: 8/10    Patients cultural, spiritual, Jain conflicts given the current situation: no    Living Environment:(pt. Confirmed OT eval information to be correct)    Resident of [facility]  SNF, recieves assistance for bed mobility and transfers to wheelchair. Reports primarily remains in bed unless in therapy session where she has been trialling walking with RW.  Patient uses DME as follows: walker, rolling,wheelchair,hospital bed,shower chair.   DME owned (not currently used): none.  Equipment Used at Home:  walker, rolling,wheelchair,hospital bed,shower chair     Patient reports they will have assistance from SNF staff upon discharge.      Objective:     Communicated with pt's nurse prior to session.  Patient found supine with telemetry,delgado catheter,peripheral IV  upon PT entry to room.    General Precautions: Standard, fall   Orthopedic Precautions:N/A   Braces: N/A  Respiratory Status: Room air    Exams:  · Cognitive Exam:  Patient is oriented to Person, Place and Situation  · Gross Motor Coordination:  decrease LLE d.t L sided weakness  · Sensation:    · -       Intact  · Skin Integrity/Edema:      · -       Skin integrity: Visible skin intact  · RLE ROM: WFL  · RLE Strength: grossly 3+/5  · LLE ROM: WFL except pt with decrease knee extension d.t tight hamstrings  · LLE Strength: grossly 3-/5 except ankle 1/5   · Pt with MARIAELENA/E flexion contracture    Functional Mobility:  · Bed Mobility:     · Supine to Sit: moderate assistance  · Sit to Supine: moderate assistance  · Transfers:     · Sit to Stand:  maximal assistance with hand-held assist R  · Gait: ~ 4 side steps towards the HOB with HHA R 2* to pt with L sided weakness and LUE contracture.  · Balance: CGA for static sitting at the EOB; Max A for static standing with R HHA.    Education:   Patient provided with daily orientation and goals of this PT session.   Patient educated to transfer to bedside chair/bedside commode/bathroom with RN/PCT present.    Patient educated on Fall risk and plan of care by explanation.    Patient Verbalized Understanding.   Time provided for therapeutic counseling and discussion of current health disposition.  All questions answered to satisfaction, within scope of PT practice; voiced no other concerns. White board updated in patient's room, RN notified of session.      AM-PAC 6 CLICK MOBILITY  Total Score:11     Patient left HOB elevated with all lines intact and call button in reach.    GOALS:   Multidisciplinary Problems     Physical Therapy Goals        Problem: Physical Therapy Goal    Goal Priority Disciplines Outcome Goal Variances Interventions   Physical Therapy Goal     PT, PT/OT Ongoing, Progressing     Description: Goals to be met by: 3/15/22    Patient will increase functional independence with mobility by performing:    . Supine to sit with MInimal Assistance  . Sit to supine with MInimal Assistance  . Sit to stand transfer with Moderate Assistance  . Bed to chair transfer with Minimal Assistance using Andrew-walker  . Gait  x 10 feet with Moderate Assistance using Andrew-walker.                      History:     Past Medical History:   Diagnosis Date    Gastroparesis     GERD (gastroesophageal reflux disease)     History of Clostridium difficile colitis 07/24/2021    History of kidney stones     History of stroke     left side paralysis    Hyperlipidemia     Hypertension     Hypothyroidism     Iron deficiency anemia     Osteoarthritis     Peripheral neuropathy     Stage IV CKD     Type II diabetes mellitus        Past Surgical History:   Procedure Laterality Date    APPENDECTOMY      CYSTOSCOPY W/ URETERAL STENT PLACEMENT Right 10/13/2021    Procedure: CYSTOSCOPY, WITH URETERAL STENT INSERTION;  Surgeon: Wanda Arroyo MD;  Location: [facility];  Service: Urology;  Laterality: Right;    ESOPHAGOGASTRODUODENOSCOPY N/A 11/23/2021    Procedure: EGD (ESOPHAGOGASTRODUODENOSCOPY);  Surgeon: Obed Jeong MD;  Location: [address]);  Service: Endoscopy;  Laterality: N/A;    LAPAROSCOPIC APPENDECTOMY N/A 7/22/2021    Procedure: APPENDECTOMY, LAPAROSCOPIC;  Surgeon: Paulo Calvo Jr.,  MD;  Location: [facility];  Service: General;  Laterality: N/A;    TOE AMPUTATION Right 1/1/2022    Procedure: AMPUTATION, TOE;  Surgeon: Genaro Mason DPM;  Location: [facility];  Service: Podiatry;  Laterality: Right;    TUBAL LIGATION      URETEROSCOPIC REMOVAL OF URETERIC CALCULUS Right 12/22/2021    Procedure: REMOVAL, CALCULUS, URETER, URETEROSCOPIC;  Surgeon: Wanda Arroyo MD;  Location: [facility];  Service: Urology;  Laterality: Right;       Time Tracking:     PT Received On: 02/10/22  PT Start Time: 1010     PT Stop Time: 1024  PT Total Time (min): 14 min     Billable Minutes: Evaluation 5mins and Therapeutic Activity 9mins      02/10/2022

## 2022-02-15 NOTE — HOSPITAL COURSE
Patient admitted for anemia, suspected secondary to anemia of chronic disease. No signs of active bleeding, hgb 6.6 on admission, given 1 unit pRBC. Hgb 8.6 at d/c. Patient also complained of dysuria, Ucx showing pan senstivie pseudomonas. Pt stable for dc with levaquin back to NH. Return precautions discussed, no further questions

## 2022-02-15 NOTE — ASSESSMENT & PLAN NOTE
Patient's anemia is currently uncontrolled. Transfusing 1 unit of PRBCs. Etiology likely d/t CKD4  Current CBC reviewed-   Lab Results   Component Value Date    HGB 8.6 (L) 02/11/2022    HCT 25.7 (L) 02/11/2022     Monitor serial CBC and transfuse if patient becomes hemodynamically unstable, symptomatic or H/H drops below 7/21.   -continue iron supplement  -holding AC during acute anemia, resume when hgb stable: resume, no active signs of bleeding and she responded appropriately

## 2022-02-15 NOTE — DISCHARGE SUMMARY
[facility] [address]  [facility] Medicine  Discharge Summary      Patient Name: Beatriz Adame  MRN: [id number]  Patient Class: OP- Observation  Admission Date: 2/9/2022  Hospital Length of Stay: 0 days  Discharge Date and Time: 2/11/2022  4:52 PM  Attending Physician: Kylie att. providers found   Discharging Provider: Maia Nicholson PA-C  Primary Care Provider: Dante Olivier MD  [facility] Medicine Team: [facility] Maia Nicholson PA-C    HPI:   Ms. Adame is 71 y.o. female w/ PMHx of osteoarthritis, hypothyroidism, CVA with left-sided residual deficit, DM2, HTN, HLD, CKD, chronic anemia, recurrent UTIs, left ureteral stent placement in 10/2021, s/p R toe amputation presenting w/ complaints of symptomatic anemia. She reports that she has been feeling lightheadedness and increasing fatigue for about 2 days now. States that she also has headaches and some diarrhea, but has not had any vomiting, actively bleeding, melena, hematochezia. No exacerbating or alleviating factors. Pt denies syncope, visual disturbance, chest pain, SOB, n/v, abdominal pain.     In ED, Pt AFVSS. H/H 6.6/21. K 3.1, Cr 2.6. Consented for blood transfusion and type & screened.       * No surgery found *      Hospital Course:   Patient admitted for anemia, suspected secondary to anemia of chronic disease. No signs of active bleeding, hgb 6.6 on admission, given 1 unit pRBC. Hgb 8.6 at d/c. Patient also complained of dysuria, Ucx showing pan senstivie pseudomonas. Pt stable for dc with levaquin back to NH. Return precautions discussed, no further questions       Goals of Care Treatment Preferences:  Code Status: Full Code      Consults:   Consults (From admission, onward)        Status Ordering Provider     Inpatient consult to Podiatry  Once        Provider:  (Not yet assigned)    Completed DEWAYNE RODRÍGUEZ          * Acute on chronic anemia  Patient's anemia is currently uncontrolled. Transfusing 1 unit of  PRBCs. Etiology likely d/t CKD4  Current CBC reviewed-   Lab Results   Component Value Date    HGB 8.6 (L) 02/11/2022    HCT 25.7 (L) 02/11/2022     Monitor serial CBC and transfuse if patient becomes hemodynamically unstable, symptomatic or H/H drops below 7/21.   -continue iron supplement  -holding AC during acute anemia, resume when hgb stable: resume, no active signs of bleeding and she responded appropriately       Urinary retention  -continue flomax   -UA infectious, pan sens pseudomonas  - dc with levaquin      Final Active Diagnoses:    Diagnosis Date Noted POA    PRINCIPAL PROBLEM:  Acute on chronic anemia [D64.9] 02/10/2022 Yes    Urinary retention [R33.9] 01/12/2022 Yes    Insomnia [G47.00] 02/10/2022 Yes    S/P amputation of lesser toe, right [Z89.421] 02/10/2022 Not Applicable    Debility [R53.81] 01/19/2022 Yes    GERD (gastroesophageal reflux disease) [K21.9] 01/19/2022 Yes    Peripheral neuropathy [G62.9] 01/19/2022 Yes    Gastroparesis [K31.84] 01/19/2022 Yes    Osteomyelitis of right 2nd toe [E11.621, E11.69, L97.509, M86.9] 12/31/2021 Yes    Hypothyroidism [E03.9] 12/30/2021 Yes    Right diabetic foot ulcer [E10.621, L97.516] 12/30/2021 Yes    Severe protein-calorie malnutrition [E43] 11/21/2021 Yes    Chronic kidney disease, stage 4 (severe) [N18.4] 10/17/2021 Yes    History of stroke [Z86.73] 07/22/2021 Not Applicable     Chronic    Iron deficiency anemia [D50.9] 07/22/2021 Yes    Hypokalemia [E87.6] 01/08/2020 Yes    Type 2 diabetes mellitus, with long-term current use of insulin [E11.9, Z79.4] 10/27/2016 Not Applicable    Hypertension [I10]  Yes    Hyperlipidemia [E78.5]  Yes      Problems Resolved During this Admission:       Discharged Condition: stable    Disposition: Skilled Nursing Facility    Follow Up:    Patient Instructions:      Diet diabetic     Notify your health care provider if you experience any of the following:  temperature >100.4     Activity as tolerated        Significant Diagnostic Studies: Microbiology:   Urine Culture    Lab Results   Component Value Date    LABURIN PSEUDOMONAS AERUGINOSA  50,000 - 99,999 cfu/ml   (A) 02/10/2022       Pending Diagnostic Studies:     None         Medications:  Reconciled Home Medications:      Medication List      START taking these medications    levoFLOXacin 750 MG tablet  Commonly known as: LEVAQUIN  Take 1 tablet (750 mg total) by mouth every other day. Last Dose 2/18/2022 for 7 days        CONTINUE taking these medications    amLODIPine 5 MG tablet  Commonly known as: NORVASC  Take 10 mg by mouth every evening. Take 5 mg every morning and 10 mg at night     aspirin 81 MG EC tablet  Commonly known as: ECOTRIN  Take 81 mg by mouth once daily.     b complex vitamins tablet  Take 1 tablet by mouth once daily.     BASAGLAR KWIKPEN U-100 INSULIN glargine 100 units/mL (3mL) SubQ pen  Generic drug: insulin  Inject 5 Units into the skin once daily.     carvediloL 25 MG tablet  Commonly known as: COREG  Take 1 tablet (25 mg total) by mouth 2 (two) times daily.     clopidogreL 75 mg tablet  Commonly known as: PLAVIX  Take 75 mg by mouth.     DULoxetine 30 MG capsule  Commonly known as: CYMBALTA  Take 1 capsule (30 mg total) by mouth once daily.     ferrous sulfate 325 mg (65 mg iron) Tab tablet  Commonly known as: FEOSOL  Take 325 mg by mouth daily with breakfast. Off at present     gabapentin 300 MG capsule  Commonly known as: NEURONTIN  Take 1 capsule (300 mg total) by mouth once daily.     HYDROcodone-acetaminophen 5-325 mg per tablet  Commonly known as: NORCO  Take 1 tablet by mouth every 6 (six) hours as needed for Pain.     lactulose 10 gram/15 mL solution  Commonly known as: CHRONULAC  Take 10 g by mouth 2 (two) times a day.     levothyroxine 75 MCG tablet  Commonly known as: SYNTHROID  Take 75 mcg by mouth once daily.     metoclopramide HCl 10 MG tablet  Commonly known as: REGLAN  Take 1 tablet (10 mg total) by mouth 3 (three)  times daily before meals.     omeprazole 20 MG capsule  Commonly known as: PRILOSEC  Take 20 mg by mouth 2 (two) times daily.     ondansetron 4 MG tablet  Commonly known as: ZOFRAN  Take 1 tablet (4 mg total) by mouth every 8 (eight) hours as needed for Nausea.     rosuvastatin 40 MG Tab  Commonly known as: CRESTOR  Take 40 mg by mouth once daily.     tamsulosin 0.4 mg Cap  Commonly known as: FLOMAX  Take 1 capsule (0.4 mg total) by mouth once daily.     traZODone 100 MG tablet  Commonly known as: DESYREL  Take 1 tablet (100 mg total) by mouth once daily.            Indwelling Lines/Drains at time of discharge:   Lines/Drains/Airways     Drain                 Urethral Catheter 02/10/22 1852 4 days                Time spent on the discharge of patient: >35 minutes         Maia Nicholson PA-C  Department of Hospital Medicine  [facility] - Telemetry Stepdown ([place])

## 2022-02-18 NOTE — PROGRESS NOTES
[facility]                                                                  Skilled Nursing Facility                                                                          Progress Note      Admit Date:1/14/2022  MARY TBD  Principal Problem: Diabetic ulcer of toe of right foot associated with type 2 diabetes mellitus, with bone involvement without evidence of necrosis  HPI obtained from patient interview and chart review      Chief Complaint: Revaluation of medical treatment and therapy status: s/p hospitalization anemia and UTI     HPI:   Beatriz Adame is a 71 y.o. female w/ PMHx of osteoarthritis, history of C diff, hypothyroidism, 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, DM type 2, HTN, HLD, CKD, recurrent UTIs, history of left ureteral stent placement in 10/2021 with recent cystoscopy and finding of 7 mm right mid to distal ureteral calculi that was fragmented and removed as well as a stent on string placed, and history of appendectomy 07/21.  Presents to  SNF [place] [facility] [place] also status post amputation.  Per chart review, patient [place] [facility] [place] with complaint of right 2nd toe wound infection.Right foot x-ray notes no convincing radiographic evidence of osteomyelitis.  MRI right foot notes acute osteomyelitis involving the 2nd toe middle and distal phalanx. Arterial us showed slow velocities with abnormal waveforms in the arteries of the calf suggestive of vessel hardening such as from diffuse atherosclerosis.  Podiatry and Cardiology consulted.  She underwent right 2nd toe amputation on 01/01/2022.  Patient declined further workup with angiogram for PAD at that time.  Podiatry plans on staged debridement and closure.  Hospital course complicated by urinary retention requiring Araiza placement.     Admission to SNF for secondary weakness and mobility.      Patient will be treated at  SNF with PT and OT to improve functional status and  ability to perform ADLs.      SNF course:   - sent to the ER on 01/25 and 1/27 for worsening anemia with a hemoglobin of 6.6 and hematocrit 19.51/27.  Repeat hemoglobin and hematocrit were 7.2 and 23.6 and she never required blood transfusion.  She was sent back to the facility she reports weakness and fatigue.   - s/p hospitalization anemia and UTI 2/9-2/11. Received 1 unit PRBC. Ucx showing pan senstivie pseudomonas treated with levaquin, end 2/18.        Interval History:  - 2/9 UTI and ARTURO noted on labs, ordered 1L NS and renally dosed PO cipro for UTI, nursing unable to place IV, sent to ER  - s/p hospitalization anemia and UTI 2/9-2/11. Received 1 unit PRBC. Ucx showing pan senstivie pseudomonas treated with levaquin, end 2/18.   - pain currently 8/10, seen by podiatry during hospitalization, no new recommendations to plan. No WB restrictions. Follow up with Dr. Mason DPM   - No acute signs of distress noted. No acute events reported over night.   - VSS, afebrile.   - Denies dysuria, SOB, CP, abdominal pain, loss of appetite, constipation, diarrhea, nausea or vomiting.         Past Medical History: Patient has a past medical history of Anemia, Arthritis, Diabetes mellitus, Encounter for blood transfusion, Hypercholesteremia, Hypertension, Renal disorder, and Stroke.     Past Surgical History: Patient has a past surgical history that includes Tubal ligation; Laparoscopic appendectomy (N/A, 7/22/2021); Cystoscopy w/ ureteral stent placement (Right, 10/13/2021); Esophagogastroduodenoscopy (N/A, 11/23/2021); Appendectomy; Ureteroscopic removal of ureteric calculus (Right, 12/22/2021); and Toe amputation (Right, 1/1/2022).     Social History: Patient reports that she has never smoked. She has never used smokeless tobacco. She reports that she does not drink alcohol and does not use drugs.     Family History: family history includes Alcohol abuse in her father; Arthritis in her mother; Asthma in her brother;  Diabetes in her brother, mother, and sister; Early death in her brother; Heart disease in her brother and mother; Hypertension in her mother; Kidney disease in her mother; Stroke in her mother; Vision loss in her mother.     Allergies: Patient is allergic to tomato (solanum lycopersicum).     ROS  Constitutional: Negative for fever   Eyes: Negative for blurred vision, double vision   Respiratory: Negative for cough, shortness of breath   Cardiovascular: Negative for chest pain, palpitations, and leg swelling.   Gastrointestinal:Negative for abdominal pain,  diarrhea, constipation, nausea, vomiting.   Genitourinary: + urinary retention (Araiza- yellow, pus, sediment) Negative for dysuria, frequency   Musculoskeletal:  + generalized weakness. Negative for back pain and myalgias.   Skin: Negative for itching and rash. + right 2nd toe amputation  Neurological: Negative for dizziness, headaches.   Psychiatric/Behavioral: Negative for depression. The patient is not nervous/anxious.       Vital signs  BP 104/62  HR 73  Temp 98  RR 16  O2 98% on RA     PEx  Constitutional: + appears ill. Patient appears debilitated.  No distress noted  HENT:   Head: Normocephalic and atraumatic.   Eyes: Pupils are equal, round  Neck: Normal range of motion. Neck supple.   Cardiovascular: Normal rate, regular rhythm and normal heart sounds.    Pulmonary/Chest: Effort normal and breath sounds are clear  Abdominal: Soft. Bowel sounds are normal.   Musculoskeletal: Normal range of motion.   Neurological:  + left-sided weakness and left facial droop (prior stroke) Alert and oriented to person, place, and time.   Psychiatric: Normal mood and affect. Behavior is normal.   Skin: Skin is warm and dry. Full skin assessment on 1/18     Wound followed by consistent, contracted NP/PA/MD: this NP     Wound that you did not assess today: Right 2nd toe amputation with very small area of dehiscence to anterior of wound  Wound location(s)/type(s): see  above  Not visualized today. No signs/symptoms of infection or wound-related changes reported.     Labs  -labs review 2/9 (collected 2/9:  CMP:  Glucose 151, BUN 25, creatinine 2.92, sodium 136, potassium 3.6 ALT 9, AST 15  CBC:  WBC 12.2, RBC 2.42, hemoglobin 7.2, hematocrit 21.4, platelets 170    01/26/2022: [facility]  CBC:  WBC 10.01, hemoglobin 7.2, hematocrit 23.6, platelet 158  01/26/2022:  CBC:  WBC 9.4, hemoglobin 6.6, hematocrit 19.5, platelet 126  01/25/2022:  CBC:  WBC 8.1, hemoglobin 7.1, hematocrit 21.0, platelet 145  01/24/2022:  CBC:  WBC 7.1, hemoglobin 6.9, hematocrit 20.2, platelet 136  01/23/2022:   CBC:  WBC 10.6 H, hemoglobin 7.1, hematocrit 21.1, platelet 144  01/20/2022  CBC:  WBC 9.3, hemoglobin 9.1, hematocrit 27.3, platelet 172     1/19/2022:  CMP: Glucose 60, BUN 18, creatinine 1.70, sodium 137, potassium 4.1, chloride 102, CO2 27, calcium 8.8, total protein 6.0, ALT 8, AST 12, alkaline phosphatase 60, total bilirubin 0.4, albumin 2.7, magnesium 2.0, phosphorus 3.6.  Hemoglobin A1c 5.1.  Vitamin D 25 hydroxy <5.0.  CBC: WBC count 8.3, hemoglobin 6.9, hematocrit 20.3, platelet count 146.  Lipid panel: Total cholesterol 140, HDL 38, triglycerides 87, LDL 85        Assessment and Plan:  Diabetic ulcer of toe of right foot associated with type 2 diabetes mellitus, with bone involvement without evidence of necrosis  Osteomyelitis of right 2nd toe  Diabetic foot ulcer with osteomyelitis  - S/p amputation right 2nd toe on 1/1/22  -completed amoxicillin 500 mg q.12 hours, stop date 1/16  - Podiatry plan to continue wound care and follow up as outpatient  - PT/OT  - seen by podiatry during hospitalization 2/10, no new recommendations to plan. No WB restrictions. Follow up with Dr. Isabella HASSAN      Insomnia   - continue melatonin nightly      Surgical incision  - R second toe: Applied xeroform, kerlix, ACE avoiding tight compression.  Change every 2-3 days per podiatry   - Podiatry plan to continue wound  care and follow up as outpatient     Urinary retention  Suspected UTI  - Delgado in place. Change monthly  - continue flomax    -follow-up urology outpatient for voiding trial  -2/8 initiate change delgado, obtain UA and UCx, CBC, CMP  - 2/9 UTI and ARTURO noted on labs, ordered 1L NS and renally dosed PO cipro for UTI, nursing unable to place IV, sent to ER and admitted.   - [facility]: Ucx showing pan senstivie pseudomonas treated with levaquin, end 2/18.      Hypothyroidism  -Continue levothyroxine      CKD (chronic kidney disease), stage IV  - Serum creatinine baseline (average around 2.5)  - Avoid nephrotoxins and hypotension as able, renally dose medications  - Continue to monitor   - 2/9 UTI and ARTURO noted on labs, ordered 1L NS and renally dosed PO cipro for UTI, nursing unable to place IV, sent to ER.     Severe protein-calorie malnutrition  -continue Boost TID     Chronic Normocytic anemia  - continue to monitor labs  -continue iron tablets  - 1/19 hgb 6.9, repeat CBC ordered 1/20 (not collected)  - 1/20 initiate reordering CBC  -1/26 hemoglobin 7.2, hematocrit 23.6 at [facility] ED  -may need to be sent to [facility] for blood transfusion for hgb < 7.0.   - s/p hospitalization anemia and UTI 2/9-2/11. Received 1 unit PRBC.   - monitor labs      History of stroke  - Residual left sided weakness at baseline  - Continue Crestor 40 mg daily   - Continue Aspirin 81mg PO daily  - Continue Plavix 75mg PO daily     Type 2 diabetes mellitus with neuropathy, with long-term current use of insulin  - type 1 and type 2 diabetes recorded 1 patient's problem list?  Unclear  - A1C 5.4 on 11/21/2021  - home regimen:  8 units daily (long-acting)  - DM education, DM diet  - monitor BG and PO intake  - adjust meds as needed   -not currently on anti hyperglycemics     Hyperlipidemia associated with type 2 diabetes mellitus  - Continue Atorvastatin 80mg PO daily     Hypertension associated with stage 4 chronic kidney disease due  to type 2 diabetes mellitus  - Continue Norvasc 5 mg every morning and 10 mg qHS  - Continue Coreg 25mg PO  BID     Stable but ongoing problems      Vitamin D deficiency, new 1/19   - continue vitamin D 1000 units PO daily (started 1/19)     Diabetic neuropathy  -continue gabapentin and Cymbalta     Nausea   GERD  -continue Zofran and Reglan  -continue Prilosec     Hypothyroidism  -continue Synthroid     Pain  -continue Norco 5-325 mg q.6 hours p.r.n. pain     Anxiety  -continue home trazodone     Diarrhea  Chronic constipation  1/18 initiate adjusting home lactulose from b.i.d. to b.i.d. p.r.n.  - diarrhea improved.      Nutrition  -continue home B complex vitamin     Debility   - Continue with PT/OT for gait training and strengthening and restoration of ADL's   - Encourage mobility, OOB in chair, and early ambulation as appropriate  - Fall precautions   - Monitor for bowel and bladder dysfunction  - Monitor for and prevent skin breakdown and pressure ulcers        I certify that SNF services are required to be given on an inpatient basis because Beatriz Adame needs for skilled nursing care and/or skilled rehabilitation are required on a daily basis and such services can only practically be provided in a skilled nursing facility setting and are for an ongoing condition for which she received inpatient care in the hospital.         Aren Martinez NP  Department of Hospital Medicine   [facility]      DOS: 2/15/2022        Patient note was created using MModal Dictation.  Any errors in syntax or even information may not have been identified and edited on initial review prior to signing this note.

## 2022-02-18 NOTE — PROGRESS NOTES
[facility]                                                                  Skilled Nursing Facility                                                                          Progress Note      Admit Date:1/14/2022  MARY TBD  Principal Problem: Diabetic ulcer of toe of right foot associated with type 2 diabetes mellitus, with bone involvement without evidence of necrosis  HPI obtained from patient interview and chart review      Chief Complaint: Revaluation of medical treatment and therapy status: lab review     HPI:   Beatriz Adame is a 71 y.o. female w/ PMHx of osteoarthritis, history of C diff, hypothyroidism, 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, DM type 2, HTN, HLD, CKD, recurrent UTIs, history of left ureteral stent placement in 10/2021 with recent cystoscopy and finding of 7 mm right mid to distal ureteral calculi that was fragmented and removed as well as a stent on string placed, and history of appendectomy 07/21.  Presents to  SNF status post hospitalization at [facility] for right toe also status post amputation.  Per chart review, patient presented to [facility] ED with complaint of right 2nd toe wound infection.Right foot x-ray notes no convincing radiographic evidence of osteomyelitis.  MRI right foot notes acute osteomyelitis involving the 2nd toe middle and distal phalanx. Arterial us showed slow velocities with abnormal waveforms in the arteries of the calf suggestive of vessel hardening such as from diffuse atherosclerosis.  Podiatry and Cardiology consulted.  She underwent right 2nd toe amputation on 01/01/2022.  Patient declined further workup with angiogram for PAD at that time.  Podiatry plans on staged debridement and closure.  Hospital course complicated by urinary retention requiring Araiza placement.     Admission to SNF for secondary weakness and mobility.      Patient will be treated at  SNF with PT and OT to improve functional status and ability to perform  ADLs.      SNF course:   - sent to the ER on 01/25 and 1/27 for worsening anemia with a hemoglobin of 6.6 and hematocrit 19.51/27.  Repeat hemoglobin and hematocrit were 7.2 and 23.6 and she never required blood transfusion.  She was sent back to the facility she reports weakness and fatigue.   - s/p hospitalization anemia and UTI 2/9-2/11. Received 1 unit PRBC. Ucx showing pan senstivie pseudomonas treated with levaquin, end 2/18.         Interval History:  - labs reviewed. No critical results. See below.   - remains on levaquin for UTI, end 2/18.   - No acute signs of distress noted. No acute events reported over night.   - VSS, afebrile.   - Denies dysuria, SOB, CP, abdominal pain, loss of appetite, constipation, diarrhea, nausea or vomiting.         Past Medical History: Patient has a past medical history of Anemia, Arthritis, Diabetes mellitus, Encounter for blood transfusion, Hypercholesteremia, Hypertension, Renal disorder, and Stroke.     Past Surgical History: Patient has a past surgical history that includes Tubal ligation; Laparoscopic appendectomy (N/A, 7/22/2021); Cystoscopy w/ ureteral stent placement (Right, 10/13/2021); Esophagogastroduodenoscopy (N/A, 11/23/2021); Appendectomy; Ureteroscopic removal of ureteric calculus (Right, 12/22/2021); and Toe amputation (Right, 1/1/2022).     Social History: Patient reports that she has never smoked. She has never used smokeless tobacco. She reports that she does not drink alcohol and does not use drugs.     Family History: family history includes Alcohol abuse in her father; Arthritis in her mother; Asthma in her brother; Diabetes in her brother, mother, and sister; Early death in her brother; Heart disease in her brother and mother; Hypertension in her mother; Kidney disease in her mother; Stroke in her mother; Vision loss in her mother.     Allergies: Patient is allergic to tomato (solanum lycopersicum).     ROS  Constitutional: Negative for fever   Eyes:  Negative for blurred vision, double vision   Respiratory: Negative for cough, shortness of breath   Cardiovascular: Negative for chest pain, palpitations, and leg swelling.   Gastrointestinal:Negative for abdominal pain,  diarrhea, constipation, nausea, vomiting.   Genitourinary: + urinary retention (Araiza- yellow, pus, sediment) Negative for dysuria, frequency   Musculoskeletal:  + generalized weakness. Negative for back pain and myalgias.   Skin: Negative for itching and rash. + right 2nd toe amputation  Neurological: Negative for dizziness, headaches.   Psychiatric/Behavioral: Negative for depression. The patient is not nervous/anxious.       Vital signs  BP 104/62  HR 73  Temp 98  RR 16  O2 98% on RA     PEx  Constitutional: + appears ill. Patient appears debilitated.  No distress noted  HENT:   Head: Normocephalic and atraumatic.   Eyes: Pupils are equal, round  Neck: Normal range of motion. Neck supple.   Cardiovascular: Normal rate, regular rhythm and normal heart sounds.    Pulmonary/Chest: Effort normal and breath sounds are clear  Abdominal: Soft. Bowel sounds are normal.   Musculoskeletal: Normal range of motion.   Neurological:  + left-sided weakness and left facial droop (prior stroke) Alert and oriented to person, place, and time.   Psychiatric: Normal mood and affect. Behavior is normal.   Skin: Skin is warm and dry. Full skin assessment on 1/18     Wound followed by consistent, contracted NP/PA/MD: this NP     Wound that you did not assess today: Right 2nd toe amputation with very small area of dehiscence to anterior of wound  Wound location(s)/type(s): see above  Not visualized today. No signs/symptoms of infection or wound-related changes reported.     Labs  Labs reviewed 2/17 (collected 2/16):  BMP:  Glucose 83, BUN 23, creatinine 2.59, potassium 3.4, sodium 140,  Magnesium 1.9  CBC:  WBC 10.3, RBC 2.85, hemoglobin 8.5, hematocrit 24.6, platelets 156    -labs review 2/9 (collected 2/9:  CMP:   Glucose 151, BUN 25, creatinine 2.92, sodium 136, potassium 3.6 ALT 9, AST 15  CBC:  WBC 12.2, RBC 2.42, hemoglobin 7.2, hematocrit 21.4, platelets 170     01/26/2022: [facility]  CBC:  WBC 10.01, hemoglobin 7.2, hematocrit 23.6, platelet 158  01/26/2022:  CBC:  WBC 9.4, hemoglobin 6.6, hematocrit 19.5, platelet 126  01/25/2022:  CBC:  WBC 8.1, hemoglobin 7.1, hematocrit 21.0, platelet 145  01/24/2022:  CBC:  WBC 7.1, hemoglobin 6.9, hematocrit 20.2, platelet 136  01/23/2022:   CBC:  WBC 10.6 H, hemoglobin 7.1, hematocrit 21.1, platelet 144  01/20/2022  CBC:  WBC 9.3, hemoglobin 9.1, hematocrit 27.3, platelet 172     1/19/2022:  CMP: Glucose 60, BUN 18, creatinine 1.70, sodium 137, potassium 4.1, chloride 102, CO2 27, calcium 8.8, total protein 6.0, ALT 8, AST 12, alkaline phosphatase 60, total bilirubin 0.4, albumin 2.7, magnesium 2.0, phosphorus 3.6.  Hemoglobin A1c 5.1.  Vitamin D 25 hydroxy <5.0.  CBC: WBC count 8.3, hemoglobin 6.9, hematocrit 20.3, platelet count 146.  Lipid panel: Total cholesterol 140, HDL 38, triglycerides 87, LDL 85        Assessment and Plan:  Diabetic ulcer of toe of right foot associated with type 2 diabetes mellitus, with bone involvement without evidence of necrosis  Osteomyelitis of right 2nd toe  Diabetic foot ulcer with osteomyelitis  - S/p amputation right 2nd toe on 1/1/22  -completed amoxicillin 500 mg q.12 hours, stop date 1/16  - Podiatry plan to continue wound care and follow up as outpatient  - PT/OT  - seen by podiatry during hospitalization 2/10, no new recommendations to plan. No WB restrictions. Follow up with Dr. Isabella HASSAN      Insomnia   - continue melatonin nightly      Surgical incision  - R second toe: Applied xeroform, kerlix, ACE avoiding tight compression.  Change every 2-3 days per podiatry   - Podiatry plan to continue wound care and follow up as outpatient     Urinary retention  UTI  - Araiza in place. Change monthly  - continue flomax    -follow-up urology outpatient  for voiding trial  -2/8 initiate change delgado, obtain UA and UCx, CBC, CMP  - 2/9 UTI and ARTURO noted on labs, ordered 1L NS and renally dosed PO cipro for UTI, nursing unable to place IV, sent to ER and admitted.   - [facility] [place]: Ucx showing pan senstivie pseudomonas treated with levaquin, end 2/18.      Hypothyroidism  -Continue levothyroxine      CKD (chronic kidney disease), stage IV  - Serum creatinine baseline (average around 2.5)  - Avoid nephrotoxins and hypotension as able, renally dose medications  - Continue to monitor   - 2/9 UTI and ARTURO noted on labs, ordered 1L NS and renally dosed PO cipro for UTI, nursing unable to place IV, sent to ER.   -2/16 Cr 2.5     Severe protein-calorie malnutrition  -continue Boost TID     Chronic Normocytic anemia  - continue to monitor labs  -continue iron tablets  - 1/19 hgb 6.9, repeat CBC ordered 1/20 (not collected)  - 1/20 initiate reordering CBC  -1/26 hemoglobin 7.2, hematocrit 23.6 at [facility] ED  -may need to be sent to [facility] for blood transfusion for hgb < 7.0.   - s/p hospitalization anemia and UTI 2/9-2/11. Received 1 unit PRBC.   - monitor labs, H/H stable 2/16      History of stroke  - Residual left sided weakness at baseline  - Continue Crestor 40 mg daily   - Continue Aspirin 81mg PO daily  - Continue Plavix 75mg PO daily     Type 2 diabetes mellitus with neuropathy, with long-term current use of insulin  - type 1 and type 2 diabetes recorded 1 patient's problem list?  Unclear  - A1C 5.4 on 11/21/2021  - home regimen:  8 units daily (long-acting)  - DM education, DM diet  - monitor BG and PO intake  - adjust meds as needed   -not currently on anti hyperglycemics     Hyperlipidemia associated with type 2 diabetes mellitus  - Continue Atorvastatin 80mg PO daily     Hypertension associated with stage 4 chronic kidney disease due to type 2 diabetes mellitus  - Continue Norvasc 5 mg every morning and 10 mg qHS  - Continue Coreg 25mg PO  BID     Stable  but ongoing problems      Vitamin D deficiency, new 1/19   - continue vitamin D 1000 units PO daily (started 1/19)     Diabetic neuropathy  -continue gabapentin and Cymbalta     Nausea   GERD  -continue Zofran and Reglan  -continue Prilosec     Hypothyroidism  -continue Synthroid     Pain  -continue Norco 5-325 mg q.6 hours p.r.n. pain     Anxiety  -continue home trazodone     Diarrhea  Chronic constipation  1/18 initiate adjusting home lactulose from b.i.d. to b.i.d. p.r.n.  - diarrhea improved.      Nutrition  -continue home B complex vitamin     Debility   - Continue with PT/OT for gait training and strengthening and restoration of ADL's   - Encourage mobility, OOB in chair, and early ambulation as appropriate  - Fall precautions   - Monitor for bowel and bladder dysfunction  - Monitor for and prevent skin breakdown and pressure ulcers        I certify that SNF services are required to be given on an inpatient basis because Beatriz Adame needs for skilled nursing care and/or skilled rehabilitation are required on a daily basis and such services can only practically be provided in a skilled nursing facility setting and are for an ongoing condition for which she received inpatient care in the hospital.         Aren Martinez NP  Department of Hospital Medicine   [facility]      DOS: 2/17/2022        Patient note was created using MModal Dictation.  Any errors in syntax or even information may not have been identified and edited on initial review prior to signing this note.

## 2022-03-04 NOTE — PROGRESS NOTES
[facility]                                                                  Skilled Nursing Facility                                                                          Progress Note      Admit Date:1/14/2022  MARY TBD  Principal Problem: Diabetic ulcer of toe of right foot associated with type 2 diabetes mellitus, with bone involvement without evidence of necrosis  HPI obtained from patient interview and chart review      Chief Complaint: Revaluation of medical treatment and therapy status     HPI:   Beatriz Adame is a 71 y.o. female w/ PMHx of osteoarthritis, history of C diff, hypothyroidism, 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, DM type 2, HTN, HLD, CKD, recurrent UTIs, history of left ureteral stent placement in 10/2021 with recent cystoscopy and finding of 7 mm right mid to distal ureteral calculi that was fragmented and removed as well as a stent on string placed, and history of appendectomy 07/21.  Presents to  SNF [place] [facility] [place] also status post amputation.  Per chart review, patient [place] [facility] [place] with complaint of right 2nd toe wound infection.Right foot x-ray notes no convincing radiographic evidence of osteomyelitis.  MRI right foot notes acute osteomyelitis involving the 2nd toe middle and distal phalanx. Arterial us showed slow velocities with abnormal waveforms in the arteries of the calf suggestive of vessel hardening such as from diffuse atherosclerosis.  Podiatry and Cardiology consulted.  She underwent right 2nd toe amputation on 01/01/2022.  Patient declined further workup with angiogram for PAD at that time.  Podiatry plans on staged debridement and closure.  Hospital course complicated by urinary retention requiring Araiza placement.     Admission to SNF for secondary weakness and mobility.      Patient will be treated at  SNF with PT and OT to improve functional status and ability to perform ADLs.      SNF  course:   - sent to the ER on 01/25 and 1/27 for worsening anemia with a hemoglobin of 6.6 and hematocrit 19.51/27.  Repeat hemoglobin and hematocrit were 7.2 and 23.6 and she never required blood transfusion.  She was sent back to the facility she reports weakness and fatigue.   - s/p hospitalization anemia and UTI 2/9-2/11. Received 1 unit PRBC. Ucx showing pan senstivie pseudomonas treated with levaquin, end 2/18.         Interval History:  -patient seen and examined lying in bed.   - she states she felt weak with therapy but has been ambulating on parallel bars, unclear distance.   - No acute signs of distress noted. No acute events reported over night.   - VSS, afebrile.   - Denies dysuria, SOB, CP, abdominal pain, loss of appetite, constipation, diarrhea, nausea or vomiting.         Past Medical History: Patient has a past medical history of Anemia, Arthritis, Diabetes mellitus, Encounter for blood transfusion, Hypercholesteremia, Hypertension, Renal disorder, and Stroke.     Past Surgical History: Patient has a past surgical history that includes Tubal ligation; Laparoscopic appendectomy (N/A, 7/22/2021); Cystoscopy w/ ureteral stent placement (Right, 10/13/2021); Esophagogastroduodenoscopy (N/A, 11/23/2021); Appendectomy; Ureteroscopic removal of ureteric calculus (Right, 12/22/2021); and Toe amputation (Right, 1/1/2022).     Social History: Patient reports that she has never smoked. She has never used smokeless tobacco. She reports that she does not drink alcohol and does not use drugs.     Family History: family history includes Alcohol abuse in her father; Arthritis in her mother; Asthma in her brother; Diabetes in her brother, mother, and sister; Early death in her brother; Heart disease in her brother and mother; Hypertension in her mother; Kidney disease in her mother; Stroke in her mother; Vision loss in her mother.     Allergies: Patient is allergic to tomato (solanum  lycopersicum).     ROS  Constitutional: Negative for fever   Eyes: Negative for blurred vision, double vision   Respiratory: Negative for cough, shortness of breath   Cardiovascular: Negative for chest pain, palpitations, and leg swelling.   Gastrointestinal:Negative for abdominal pain,  diarrhea, constipation, nausea, vomiting.   Genitourinary: + urinary retention (Araiza- yellow, pus, sediment) Negative for dysuria, frequency   Musculoskeletal:  + generalized weakness. Negative for back pain and myalgias.   Skin: Negative for itching and rash. + right 2nd toe amputation  Neurological: Negative for dizziness, headaches.   Psychiatric/Behavioral: Negative for depression. The patient is not nervous/anxious.       Vital signs  BP 131/85  HR 72  Temp 98  RR 16  O2 97% on RA     PEx  Constitutional: + appears ill. Patient appears debilitated.  No distress noted  HENT:   Head: Normocephalic and atraumatic.   Eyes: Pupils are equal, round  Neck: Normal range of motion. Neck supple.   Cardiovascular: Normal rate, regular rhythm and normal heart sounds.    Pulmonary/Chest: Effort normal and breath sounds are clear  Abdominal: Soft. Bowel sounds are normal.   Musculoskeletal: Normal range of motion.   Neurological:  + left-sided weakness and left facial droop (prior stroke) Alert and oriented to person, place, and time.   Psychiatric: Normal mood and affect. Behavior is normal.   Skin: Skin is warm and dry. Full skin assessment on 1/18     Wound followed by consistent, contracted NP/PA/MD: this NP     Wound that you did not assess today: Right 2nd toe amputation with very small area of dehiscence to anterior of wound  Wound location(s)/type(s): see above  Not visualized today. No signs/symptoms of infection or wound-related changes reported.     Labs  Labs reviewed 2/17 (collected 2/16):  BMP:  Glucose 83, BUN 23, creatinine 2.59, potassium 3.4, sodium 140,  Magnesium 1.9  CBC:  WBC 10.3, RBC 2.85, hemoglobin 8.5, hematocrit  24.6, platelets 156     -labs review 2/9 (collected 2/9:  CMP:  Glucose 151, BUN 25, creatinine 2.92, sodium 136, potassium 3.6 ALT 9, AST 15  CBC:  WBC 12.2, RBC 2.42, hemoglobin 7.2, hematocrit 21.4, platelets 170     01/26/2022: [facility]  CBC:  WBC 10.01, hemoglobin 7.2, hematocrit 23.6, platelet 158  01/26/2022:  CBC:  WBC 9.4, hemoglobin 6.6, hematocrit 19.5, platelet 126  01/25/2022:  CBC:  WBC 8.1, hemoglobin 7.1, hematocrit 21.0, platelet 145  01/24/2022:  CBC:  WBC 7.1, hemoglobin 6.9, hematocrit 20.2, platelet 136  01/23/2022:   CBC:  WBC 10.6 H, hemoglobin 7.1, hematocrit 21.1, platelet 144  01/20/2022  CBC:  WBC 9.3, hemoglobin 9.1, hematocrit 27.3, platelet 172     1/19/2022:  CMP: Glucose 60, BUN 18, creatinine 1.70, sodium 137, potassium 4.1, chloride 102, CO2 27, calcium 8.8, total protein 6.0, ALT 8, AST 12, alkaline phosphatase 60, total bilirubin 0.4, albumin 2.7, magnesium 2.0, phosphorus 3.6.  Hemoglobin A1c 5.1.  Vitamin D 25 hydroxy <5.0.  CBC: WBC count 8.3, hemoglobin 6.9, hematocrit 20.3, platelet count 146.  Lipid panel: Total cholesterol 140, HDL 38, triglycerides 87, LDL 85        Assessment and Plan:  Diabetic ulcer of toe of right foot associated with type 2 diabetes mellitus, with bone involvement without evidence of necrosis  Osteomyelitis of right 2nd toe  Diabetic foot ulcer with osteomyelitis  - S/p amputation right 2nd toe on 1/1/22  -completed amoxicillin 500 mg q.12 hours, stop date 1/16  - Podiatry plan to continue wound care and follow up as outpatient  - PT/OT  - seen by podiatry during hospitalization 2/10, no new recommendations to plan. No WB restrictions. Follow up with Dr. Isabella HASSAN      Insomnia   - continue melatonin nightly      Surgical incision  - R second toe: Applied xeroform, kerlix, ACE avoiding tight compression.  Change every 2-3 days per podiatry   - Podiatry plan to continue wound care and follow up as outpatient     Urinary retention  UTI  - Araiza in place.  Change monthly  - continue flomax    -follow-up urology outpatient for voiding trial  -2/8 initiate change delgado, obtain UA and UCx, CBC, CMP  - 2/9 UTI and ARTURO noted on labs, ordered 1L NS and renally dosed PO cipro for UTI, nursing unable to place IV, sent to ER and admitted.   - [facility] [place]: Ucx showing pan senstivie pseudomonas treated with levaquin, end 2/18.      Hypothyroidism  -Continue levothyroxine      CKD (chronic kidney disease), stage IV  - Serum creatinine baseline (average around 2.5)  - Avoid nephrotoxins and hypotension as able, renally dose medications  - Continue to monitor   - 2/9 UTI and ARTURO noted on labs, ordered 1L NS and renally dosed PO cipro for UTI, nursing unable to place IV, sent to ER.   -2/16 Cr 2.5     Severe protein-calorie malnutrition  -continue Boost TID     Chronic Normocytic anemia  - continue to monitor labs  -continue iron tablets  - 1/19 hgb 6.9, repeat CBC ordered 1/20 (not collected)  - 1/20 initiate reordering CBC  -1/26 hemoglobin 7.2, hematocrit 23.6 at [facility] ED  -may need to be sent to [facility] for blood transfusion for hgb < 7.0.   - s/p hospitalization anemia and UTI 2/9-2/11. Received 1 unit PRBC.   - monitor labs, H/H stable 2/16      History of stroke  - Residual left sided weakness at baseline  - Continue Crestor 40 mg daily   - Continue Aspirin 81mg PO daily  - Continue Plavix 75mg PO daily     Type 2 diabetes mellitus with neuropathy, with long-term current use of insulin  - type 1 and type 2 diabetes recorded 1 patient's problem list?  Unclear  - A1C 5.4 on 11/21/2021  - home regimen:  8 units daily (long-acting)  - DM education, DM diet  - monitor BG and PO intake  - adjust meds as needed   -not currently on anti hyperglycemics     Hyperlipidemia associated with type 2 diabetes mellitus  - Continue Atorvastatin 80mg PO daily     Hypertension associated with stage 4 chronic kidney disease due to type 2 diabetes mellitus  - Continue Norvasc 5 mg  every morning and 10 mg qHS  - Continue Coreg 25mg PO  BID     Stable but ongoing problems      Vitamin D deficiency, new 1/19   - continue vitamin D 1000 units PO daily (started 1/19)     Diabetic neuropathy  -continue gabapentin and Cymbalta     Nausea   GERD  -continue Zofran and Reglan  -continue Prilosec     Hypothyroidism  -continue Synthroid     Pain  -continue Norco 5-325 mg q.6 hours p.r.n. pain     Anxiety  -continue home trazodone     Diarrhea  Chronic constipation  1/18 initiate adjusting home lactulose from b.i.d. to b.i.d. p.r.n.  - diarrhea improved.      Nutrition  -continue home B complex vitamin     Debility   - Continue with PT/OT for gait training and strengthening and restoration of ADL's   - Encourage mobility, OOB in chair, and early ambulation as appropriate  - Fall precautions   - Monitor for bowel and bladder dysfunction  - Monitor for and prevent skin breakdown and pressure ulcers        I certify that SNF services are required to be given on an inpatient basis because Beatriz Adame needs for skilled nursing care and/or skilled rehabilitation are required on a daily basis and such services can only practically be provided in a skilled nursing facility setting and are for an ongoing condition for which she received inpatient care in the hospital.         Aren Martinez NP  Department of Hospital Medicine   [facility]      DOS: 3/3/2022        Patient note was created using MModal Dictation.  Any errors in syntax or even information may not have been identified and edited on initial review prior to signing this note.

## 2022-03-08 NOTE — PROGRESS NOTES
[facility]                                                                  Skilled Nursing Facility                                                                          Progress Note      Admit Date:1/14/2022  MARY TBD  Principal Problem: Diabetic ulcer of toe of right foot associated with type 2 diabetes mellitus, with bone involvement without evidence of necrosis  HPI obtained from patient interview and chart review      Chief Complaint: Revaluation of medical treatment and therapy status: lab review     HPI:   Beatriz Adame is a 71 y.o. female w/ PMHx of osteoarthritis, history of C diff, hypothyroidism, 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, DM type 2, HTN, HLD, CKD, recurrent UTIs, history of left ureteral stent placement in 10/2021 with recent cystoscopy and finding of 7 mm right mid to distal ureteral calculi that was fragmented and removed as well as a stent on string placed, and history of appendectomy 07/21.  Presents to  SNF status [place] [facility] [place] also status post amputation.  Per chart review, patient presented to [facility] [place] ED with complaint of right 2nd toe wound infection.Right foot x-ray notes no convincing radiographic evidence of osteomyelitis.  MRI right foot notes acute osteomyelitis involving the 2nd toe middle and distal phalanx. Arterial us showed slow velocities with abnormal waveforms in the arteries of the calf suggestive of vessel hardening such as from diffuse atherosclerosis.  Podiatry and Cardiology consulted.  She underwent right 2nd toe amputation on 01/01/2022.  Patient declined further workup with angiogram for PAD at that time.  Podiatry plans on staged debridement and closure.  Hospital course complicated by urinary retention requiring Araiza placement.     Admission to SNF for secondary weakness and mobility.      Patient will be treated at  SNF with PT and OT to improve functional status and ability to perform  ADLs.      SNF course:   - sent to the ER on 01/25 and 1/27 for worsening anemia with a hemoglobin of 6.6 and hematocrit 19.51/27.  Repeat hemoglobin and hematocrit were 7.2 and 23.6 and she never required blood transfusion.  She was sent back to the facility she reports weakness and fatigue.   - s/p hospitalization anemia and UTI 2/9-2/11. Received 1 unit PRBC. Ucx showing pan senstivie pseudomonas treated with levaquin, end 2/18.         Interval History:  - labs reviewed. no critical results. See below.    - No acute signs of distress noted. No acute events reported over night.   - VSS, afebrile.   - Denies dysuria, SOB, CP, abdominal pain, loss of appetite, constipation, diarrhea, nausea or vomiting.         Past Medical History: Patient has a past medical history of Anemia, Arthritis, Diabetes mellitus, Encounter for blood transfusion, Hypercholesteremia, Hypertension, Renal disorder, and Stroke.     Past Surgical History: Patient has a past surgical history that includes Tubal ligation; Laparoscopic appendectomy (N/A, 7/22/2021); Cystoscopy w/ ureteral stent placement (Right, 10/13/2021); Esophagogastroduodenoscopy (N/A, 11/23/2021); Appendectomy; Ureteroscopic removal of ureteric calculus (Right, 12/22/2021); and Toe amputation (Right, 1/1/2022).     Social History: Patient reports that she has never smoked. She has never used smokeless tobacco. She reports that she does not drink alcohol and does not use drugs.     Family History: family history includes Alcohol abuse in her father; Arthritis in her mother; Asthma in her brother; Diabetes in her brother, mother, and sister; Early death in her brother; Heart disease in her brother and mother; Hypertension in her mother; Kidney disease in her mother; Stroke in her mother; Vision loss in her mother.     Allergies: Patient is allergic to tomato (solanum lycopersicum).     ROS  Constitutional: Negative for fever   Eyes: Negative for blurred vision, double vision    Respiratory: Negative for cough, shortness of breath   Cardiovascular: Negative for chest pain, palpitations, and leg swelling.   Gastrointestinal:Negative for abdominal pain,  diarrhea, constipation, nausea, vomiting.   Genitourinary: + urinary retention (Araiza- yellow, pus, sediment) Negative for dysuria, frequency   Musculoskeletal:  + generalized weakness. Negative for back pain and myalgias.   Skin: Negative for itching and rash. + right 2nd toe amputation  Neurological: Negative for dizziness, headaches.   Psychiatric/Behavioral: Negative for depression. The patient is not nervous/anxious.       Vital signs  BP 104/60  HR 76  Temp 98  RR 20  O2 97% on RA     PEx  Constitutional: + appears ill. Patient appears debilitated.  No distress noted  HENT:   Head: Normocephalic and atraumatic.   Eyes: Pupils are equal, round  Neck: Normal range of motion. Neck supple.   Cardiovascular: Normal rate, regular rhythm and normal heart sounds.    Pulmonary/Chest: Effort normal and breath sounds are clear  Abdominal: Soft. Bowel sounds are normal.   Musculoskeletal: Normal range of motion.   Neurological:  + left-sided weakness and left facial droop (prior stroke) Alert and oriented to person, place, and time.   Psychiatric: Normal mood and affect. Behavior is normal.   Skin: Skin is warm and dry. Full skin assessment on 1/18     Wound followed by consistent, contracted NP/PA/MD: this NP     Wound that you did not assess today: Right 2nd toe amputation with very small area of dehiscence to anterior of wound  Wound location(s)/type(s): see above  Not visualized today. No signs/symptoms of infection or wound-related changes reported.     Labs  - labs reviewed 3/8 (collected 3/3) CMP:  Glucose 158, BUN 25, creatinine 2.17, sodium 137, potassium 3.4, ALT 9, AST 12  CBC:  WBC 9.7, H/H 9.0/26.0, platelet 175    -Labs reviewed 2/17 (collected 2/16):  BMP:  Glucose 83, BUN 23, creatinine 2.59, potassium 3.4, sodium 140,  Magnesium  1.9  CBC:  WBC 10.3, RBC 2.85, hemoglobin 8.5, hematocrit 24.6, platelets 156     -labs review 2/9 (collected 2/9:  CMP:  Glucose 151, BUN 25, creatinine 2.92, sodium 136, potassium 3.6 ALT 9, AST 15  CBC:  WBC 12.2, RBC 2.42, hemoglobin 7.2, hematocrit 21.4, platelets 170     01/26/2022: [facility]  CBC:  WBC 10.01, hemoglobin 7.2, hematocrit 23.6, platelet 158  01/26/2022:  CBC:  WBC 9.4, hemoglobin 6.6, hematocrit 19.5, platelet 126  01/25/2022:  CBC:  WBC 8.1, hemoglobin 7.1, hematocrit 21.0, platelet 145  01/24/2022:  CBC:  WBC 7.1, hemoglobin 6.9, hematocrit 20.2, platelet 136  01/23/2022:   CBC:  WBC 10.6 H, hemoglobin 7.1, hematocrit 21.1, platelet 144  01/20/2022  CBC:  WBC 9.3, hemoglobin 9.1, hematocrit 27.3, platelet 172     1/19/2022:  CMP: Glucose 60, BUN 18, creatinine 1.70, sodium 137, potassium 4.1, chloride 102, CO2 27, calcium 8.8, total protein 6.0, ALT 8, AST 12, alkaline phosphatase 60, total bilirubin 0.4, albumin 2.7, magnesium 2.0, phosphorus 3.6.  Hemoglobin A1c 5.1.  Vitamin D 25 hydroxy <5.0.  CBC: WBC count 8.3, hemoglobin 6.9, hematocrit 20.3, platelet count 146.  Lipid panel: Total cholesterol 140, HDL 38, triglycerides 87, LDL 85        Assessment and Plan:  Diabetic ulcer of toe of right foot associated with type 2 diabetes mellitus, with bone involvement without evidence of necrosis  Osteomyelitis of right 2nd toe  Diabetic foot ulcer with osteomyelitis  - S/p amputation right 2nd toe on 1/1/22  -completed amoxicillin 500 mg q.12 hours, stop date 1/16  - Podiatry plan to continue wound care and follow up as outpatient  - PT/OT  - seen by podiatry during hospitalization 2/10, no new recommendations to plan. No WB restrictions. Follow up with Dr. Isabella HASSAN      Insomnia   - continue melatonin nightly      Surgical incision  - R second toe: Applied xeroform, kerlix, ACE avoiding tight compression.  Change every 2-3 days per podiatry   - Podiatry plan to continue wound care and follow up as  outpatient     Urinary retention  UTI  - Delgado in place. Change monthly  - continue flomax    -follow-up urology outpatient for voiding trial  -2/8 initiate change delgado, obtain UA and UCx, CBC, CMP  - 2/9 UTI and ARTURO noted on labs, ordered 1L NS and renally dosed PO cipro for UTI, nursing unable to place IV, sent to ER and admitted.   - [facility] [place]: Ucx showing pan senstivie pseudomonas treated with levaquin, end 2/18.      Hypothyroidism  -Continue levothyroxine      CKD (chronic kidney disease), stage IV  - Serum creatinine baseline (average around 2.5)  - Avoid nephrotoxins and hypotension as able, renally dose medications  - Continue to monitor   - 2/9 UTI and ARTURO noted on labs, ordered 1L NS and renally dosed PO cipro for UTI, nursing unable to place IV, sent to ER.   -Cr 2.17 on most recent labs      Severe protein-calorie malnutrition  -continue Boost TID     Chronic Normocytic anemia  - continue to monitor labs  -continue iron tablets  - 1/19 hgb 6.9, repeat CBC ordered 1/20 (not collected)  - 1/20 initiate reordering CBC  -1/26 hemoglobin 7.2, hematocrit 23.6 at [facility] ED  -may need to be sent to [facility] for blood transfusion for hgb < 7.0.   - s/p hospitalization anemia and UTI 2/9-2/11. Received 1 unit PRBC.   - monitor labs, H/H stable 3/3     History of stroke  - Residual left sided weakness at baseline  - Continue Crestor 40 mg daily   - Continue Aspirin 81mg PO daily  - Continue Plavix 75mg PO daily     Type 2 diabetes mellitus with neuropathy, with long-term current use of insulin  - type 1 and type 2 diabetes recorded 1 patient's problem list?  Unclear  - A1C 5.4 on 11/21/2021  - home regimen:  8 units daily (long-acting)  - DM education, DM diet  - monitor BG and PO intake  - adjust meds as needed   -not currently on anti hyperglycemics     Hyperlipidemia associated with type 2 diabetes mellitus  - Continue Atorvastatin 80mg PO daily     Hypertension associated with stage 4 chronic  kidney disease due to type 2 diabetes mellitus  - Continue Norvasc 5 mg every morning and 10 mg qHS  - Continue Coreg 25mg PO  BID     Stable but ongoing problems      Vitamin D deficiency, new 1/19   - continue vitamin D 1000 units PO daily (started 1/19)     Diabetic neuropathy  -continue gabapentin and Cymbalta     Nausea   GERD  -continue Zofran and Reglan  -continue Prilosec     Hypothyroidism  -continue Synthroid     Pain  -continue Norco 5-325 mg q.6 hours p.r.n. pain     Anxiety  -continue home trazodone     Diarrhea  Chronic constipation  1/18 initiate adjusting home lactulose from b.i.d. to b.i.d. p.r.n.  - diarrhea improved.      Nutrition  -continue home B complex vitamin     Debility   - Continue with PT/OT for gait training and strengthening and restoration of ADL's   - Encourage mobility, OOB in chair, and early ambulation as appropriate  - Fall precautions   - Monitor for bowel and bladder dysfunction  - Monitor for and prevent skin breakdown and pressure ulcers        I certify that SNF services are required to be given on an inpatient basis because Beatriz Adame needs for skilled nursing care and/or skilled rehabilitation are required on a daily basis and such services can only practically be provided in a skilled nursing facility setting and are for an ongoing condition for which she received inpatient care in the hospital.         Aren Martinez NP  Department of Hospital Medicine   [facility]      DOS: 3/8/2022        Patient note was created using MModal Dictation.  Any errors in syntax or even information may not have been identified and edited on initial review prior to signing this note.

## 2022-03-10 NOTE — PROGRESS NOTES
[facility]                                                                  Skilled Nursing Facility                                                                          Progress Note      Admit Date:1/14/2022  MARY TBD  Principal Problem: Diabetic ulcer of toe of right foot associated with type 2 diabetes mellitus, with bone involvement without evidence of necrosis  HPI obtained from patient interview and chart review      Chief Complaint: Revaluation of medical treatment and therapy status     HPI:   Beatriz Adame is a 71 y.o. female w/ PMHx of osteoarthritis, history of C diff, hypothyroidism, 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, DM type 2, HTN, HLD, CKD, recurrent UTIs, history of left ureteral stent placement in 10/2021 with recent cystoscopy and finding of 7 mm right mid to distal ureteral calculi that was fragmented and removed as well as a stent on string placed, and history of appendectomy 07/21.  Presents to  SNF status post hospitalization at [facility] [place] for [place] also status post amputation.  Per chart review, patient presented to [facility] ED with complaint of right 2nd toe wound infection.Right foot x-ray notes no convincing radiographic evidence of osteomyelitis.  MRI right foot notes acute osteomyelitis involving the 2nd toe middle and distal phalanx. Arterial us showed slow velocities with abnormal waveforms in the arteries of the calf suggestive of vessel hardening such as from diffuse atherosclerosis.  Podiatry and Cardiology consulted.  She underwent right 2nd toe amputation on 01/01/2022.  Patient declined further workup with angiogram for PAD at that time.  Podiatry plans on staged debridement and closure.  Hospital course complicated by urinary retention requiring Araiza placement.     Admission to SNF for secondary weakness and mobility.      Patient will be treated at  SNF with PT and OT to improve functional status and ability to perform ADLs.      SNF  "course:   - sent to the ER on 01/25 and 1/27 for worsening anemia with a hemoglobin of 6.6 and hematocrit 19.51/27.  Repeat hemoglobin and hematocrit were 7.2 and 23.6 and she never required blood transfusion.  She was sent back to the facility she reports weakness and fatigue.   - s/p hospitalization anemia and UTI 2/9-2/11. Received 1 unit PRBC. Ucx showing pan senstivie pseudomonas treated with levaquin, end 2/18.         Interval History:  - patient seen and examined lying in bed.   - she reports some dizziness with therapy but reports it resolves spontaneously "really quick."  - No acute signs of distress noted. No acute events reported over night.   - VSS, afebrile.   - Denies dysuria, SOB, CP, abdominal pain, loss of appetite, constipation, diarrhea, nausea or vomiting.         Past Medical History: Patient has a past medical history of Anemia, Arthritis, Diabetes mellitus, Encounter for blood transfusion, Hypercholesteremia, Hypertension, Renal disorder, and Stroke.     Past Surgical History: Patient has a past surgical history that includes Tubal ligation; Laparoscopic appendectomy (N/A, 7/22/2021); Cystoscopy w/ ureteral stent placement (Right, 10/13/2021); Esophagogastroduodenoscopy (N/A, 11/23/2021); Appendectomy; Ureteroscopic removal of ureteric calculus (Right, 12/22/2021); and Toe amputation (Right, 1/1/2022).     Social History: Patient reports that she has never smoked. She has never used smokeless tobacco. She reports that she does not drink alcohol and does not use drugs.     Family History: family history includes Alcohol abuse in her father; Arthritis in her mother; Asthma in her brother; Diabetes in her brother, mother, and sister; Early death in her brother; Heart disease in her brother and mother; Hypertension in her mother; Kidney disease in her mother; Stroke in her mother; Vision loss in her mother.     Allergies: Patient is allergic to tomato (solanum " lycopersicum).     ROS  Constitutional: Negative for fever   Eyes: Negative for blurred vision, double vision   Respiratory: Negative for cough, shortness of breath   Cardiovascular: Negative for chest pain, palpitations, and leg swelling.   Gastrointestinal:Negative for abdominal pain,  diarrhea, constipation, nausea, vomiting.   Genitourinary: + urinary retention (Araiza- yellow, pus, sediment) Negative for dysuria, frequency   Musculoskeletal:  + generalized weakness. Negative for back pain and myalgias.   Skin: Negative for itching and rash. + right 2nd toe amputation  Neurological: +intermittent dizziness w/ therapy Negative for  headaches.   Psychiatric/Behavioral: Negative for depression. The patient is not nervous/anxious.       Vital signs  BP 130/84  HR 70  Temp 97.5  RR 18  O2 99% on RA     PEx  Constitutional: + appears ill. Patient appears debilitated.  No distress noted  HENT:   Head: Normocephalic and atraumatic.   Eyes: Pupils are equal, round  Neck: Normal range of motion. Neck supple.   Cardiovascular: Normal rate, regular rhythm and normal heart sounds.    Pulmonary/Chest: Effort normal and breath sounds are clear  Abdominal: Soft. Bowel sounds are normal.   Musculoskeletal: Normal range of motion.   Neurological:  + left-sided weakness and left facial droop (prior stroke) Alert and oriented to person, place, and time.   Psychiatric: Normal mood and affect. Behavior is normal.   Skin: Skin is warm and dry. Full skin assessment on 1/18     Wound followed by consistent, contracted NP/PA/MD: this NP     Wound that you did not assess today: Right 2nd toe amputation with very small area of dehiscence to anterior of wound  Wound location(s)/type(s): see above  Not visualized today. No signs/symptoms of infection or wound-related changes reported.     Labs  - labs reviewed 3/8 (collected 3/3) CMP:  Glucose 158, BUN 25, creatinine 2.17, sodium 137, potassium 3.4, ALT 9, AST 12  CBC:  WBC 9.7, H/H 9.0/26.0,  platelet 175     -Labs reviewed 2/17 (collected 2/16):  BMP:  Glucose 83, BUN 23, creatinine 2.59, potassium 3.4, sodium 140,  Magnesium 1.9  CBC:  WBC 10.3, RBC 2.85, hemoglobin 8.5, hematocrit 24.6, platelets 156     -labs review 2/9 (collected 2/9:  CMP:  Glucose 151, BUN 25, creatinine 2.92, sodium 136, potassium 3.6 ALT 9, AST 15  CBC:  WBC 12.2, RBC 2.42, hemoglobin 7.2, hematocrit 21.4, platelets 170     [place]26/2022[place] [facility] [place]  CBC:  WBC 10.01, hemoglobin 7.2, hematocrit 23.6, platelet 158  01/26/2022:  CBC:  WBC 9.4, hemoglobin 6.6, hematocrit 19.5, platelet 126  01/25/2022:  CBC:  WBC 8.1, hemoglobin 7.1, hematocrit 21.0, platelet 145  01/24/2022:  CBC:  WBC 7.1, hemoglobin 6.9, hematocrit 20.2, platelet 136  01/23/2022:   CBC:  WBC 10.6 H, hemoglobin 7.1, hematocrit 21.1, platelet 144  01/20/2022  CBC:  WBC 9.3, hemoglobin 9.1, hematocrit 27.3, platelet 172     1/19/2022:  CMP: Glucose 60, BUN 18, creatinine 1.70, sodium 137, potassium 4.1, chloride 102, CO2 27, calcium 8.8, total protein 6.0, ALT 8, AST 12, alkaline phosphatase 60, total bilirubin 0.4, albumin 2.7, magnesium 2.0, phosphorus 3.6.  Hemoglobin A1c 5.1.  Vitamin D 25 hydroxy <5.0.  CBC: WBC count 8.3, hemoglobin 6.9, hematocrit 20.3, platelet count 146.  Lipid panel: Total cholesterol 140, HDL 38, triglycerides 87, LDL 85        Assessment and Plan:  Diabetic ulcer of toe of right foot associated with type 2 diabetes mellitus, with bone involvement without evidence of necrosis  Osteomyelitis of right 2nd toe  Diabetic foot ulcer with osteomyelitis  - S/p amputation right 2nd toe on 1/1/22  -completed amoxicillin 500 mg q.12 hours, stop date 1/16  - Podiatry plan to continue wound care and follow up as outpatient  - PT/OT  - seen by podiatry during hospitalization 2/10, no new recommendations to plan. No WB restrictions. Follow up with Dr. Isabella HASSAN      Insomnia   - continue melatonin nightly      Surgical incision  - R second  toe: Applied xeroform, kerlix, ACE avoiding tight compression.  Change every 2-3 days per podiatry   - Podiatry plan to continue wound care and follow up as outpatient     Urinary retention  UTI  - Delgado in place. Change monthly  - continue flomax    -follow-up urology outpatient for voiding trial  -2/8 initiate change delgado, obtain UA and UCx, CBC, CMP  - 2/9 UTI and ARTURO noted on labs, ordered 1L NS and renally dosed PO cipro for UTI, nursing unable to place IV, sent to ER and admitted.   - [facility] [place] [place]: Ucx showing pan senstivie pseudomonas treated with levaquin, end 2/18.      Hypothyroidism  -Continue levothyroxine      CKD (chronic kidney disease), stage IV  - Serum creatinine baseline (average around 2.5)  - Avoid nephrotoxins and hypotension as able, renally dose medications  - Continue to monitor   - 2/9 UTI and ARTURO noted on labs, ordered 1L NS and renally dosed PO cipro for UTI, nursing unable to place IV, sent to ER.   -Cr 2.17 on most recent labs      Severe protein-calorie malnutrition  -continue Boost TID     Chronic Normocytic anemia  - continue to monitor labs  -continue iron tablets  - 1/19 hgb 6.9, repeat CBC ordered 1/20 (not collected)  - 1/20 initiate reordering CBC  -1/26 hemoglobin 7.2, hematocrit 23.6 at [facility] [place] [place] ED  -may need to be sent to [facility] for blood transfusion for hgb < 7.0.   - s/p hospitalization anemia and UTI 2/9-2/11. Received 1 unit PRBC.   - monitor labs, H/H stable 3/3     History of stroke  - Residual left sided weakness at baseline  - Continue Crestor 40 mg daily   - Continue Aspirin 81mg PO daily  - Continue Plavix 75mg PO daily     Type 2 diabetes mellitus with neuropathy, with long-term current use of insulin  - type 1 and type 2 diabetes recorded 1 patient's problem list?  Unclear  - A1C 5.4 on 11/21/2021  - home regimen:  8 units daily (long-acting)  - DM education, DM diet  - monitor BG and PO intake  - adjust meds as needed   -not currently on anti  hyperglycemics     Hyperlipidemia associated with type 2 diabetes mellitus  - Continue Atorvastatin 80mg PO daily     Hypertension associated with stage 4 chronic kidney disease due to type 2 diabetes mellitus  - Continue Norvasc 5 mg every morning and 10 mg qHS  - Continue Coreg 25mg PO  BID     Stable but ongoing problems      Vitamin D deficiency, new 1/19   - continue vitamin D 1000 units PO daily (started 1/19)     Diabetic neuropathy  -continue gabapentin and Cymbalta     Nausea   GERD  -continue Zofran and Reglan  -continue Prilosec     Hypothyroidism  -continue Synthroid     Pain  -continue Norco 5-325 mg q.6 hours p.r.n. pain     Anxiety  -continue home trazodone     Diarrhea  Chronic constipation  1/18 initiate adjusting home lactulose from b.i.d. to b.i.d. p.r.n.  - diarrhea improved.      Nutrition  -continue home B complex vitamin     Debility   - Continue with PT/OT for gait training and strengthening and restoration of ADL's   - Encourage mobility, OOB in chair, and early ambulation as appropriate  - Fall precautions   - Monitor for bowel and bladder dysfunction  - Monitor for and prevent skin breakdown and pressure ulcers        I certify that SNF services are required to be given on an inpatient basis because Beatriz Adame needs for skilled nursing care and/or skilled rehabilitation are required on a daily basis and such services can only practically be provided in a skilled nursing facility setting and are for an ongoing condition for which she received inpatient care in the hospital.         Aren Martinez NP  Department of Hospital Medicine   [facility]      DOS: 3/10/2022        Patient note was created using MModal Dictation.  Any errors in syntax or even information may not have been identified and edited on initial review prior to signing this note.

## 2022-03-16 NOTE — PROGRESS NOTES
[facility]                                                                  Skilled Nursing Facility                                                                          Progress Note      Admit Date:1/14/2022  MARY TBD  Principal Problem: Diabetic ulcer of toe of right foot associated with type 2 diabetes mellitus, with bone involvement without evidence of necrosis  HPI obtained from patient interview and chart review      Chief Complaint: Revaluation of medical treatment and therapy status     HPI:   Beatriz Adame is a 71 y.o. female w/ PMHx of osteoarthritis, history of C diff, hypothyroidism, 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, DM type 2, HTN, HLD, CKD, recurrent UTIs, history of left ureteral stent placement in 10/2021 with recent cystoscopy and finding of 7 mm right mid to distal ureteral calculi that was fragmented and removed as well as a stent on string placed, and history of appendectomy 07/21.  Presents to  SNF status post hospitalization at [facility] for right toe also status post amputation.  Per chart review, patient presented to [facility] ED with complaint of right 2nd toe wound infection.Right foot x-ray notes no convincing radiographic evidence of osteomyelitis.  MRI right foot notes acute osteomyelitis involving the 2nd toe middle and distal phalanx. Arterial us showed slow velocities with abnormal waveforms in the arteries of the calf suggestive of vessel hardening such as from diffuse atherosclerosis.  Podiatry and Cardiology consulted.  She underwent right 2nd toe amputation on 01/01/2022.  Patient declined further workup with angiogram for PAD at that time.  Podiatry plans on staged debridement and closure.  Hospital course complicated by urinary retention requiring Araiza placement.     Admission to SNF for secondary weakness and mobility.      Patient will be treated at  SNF with PT and OT to improve functional status and ability to perform ADLs.      SNF  course:   - sent to the ER on 01/25 and 1/27 for worsening anemia with a hemoglobin of 6.6 and hematocrit 19.51/27.  Repeat hemoglobin and hematocrit were 7.2 and 23.6 and she never required blood transfusion.  She was sent back to the facility she reports weakness and fatigue.   - s/p hospitalization anemia and UTI 2/9-2/11. Received 1 unit PRBC. Ucx showing pan senstivie pseudomonas treated with levaquin, end 2/18.         Interval History:  - patient seen and examined lying in bed.   -she reports doing well with therapy. Unable to reach therapist today to get report on progress. Will f/u  - No acute signs of distress noted. No acute events reported over night.   - VSS, afebrile.   - Denies dysuria, SOB, CP, abdominal pain, loss of appetite, constipation, diarrhea, nausea or vomiting.         Past Medical History: Patient has a past medical history of Anemia, Arthritis, Diabetes mellitus, Encounter for blood transfusion, Hypercholesteremia, Hypertension, Renal disorder, and Stroke.     Past Surgical History: Patient has a past surgical history that includes Tubal ligation; Laparoscopic appendectomy (N/A, 7/22/2021); Cystoscopy w/ ureteral stent placement (Right, 10/13/2021); Esophagogastroduodenoscopy (N/A, 11/23/2021); Appendectomy; Ureteroscopic removal of ureteric calculus (Right, 12/22/2021); and Toe amputation (Right, 1/1/2022).     Social History: Patient reports that she has never smoked. She has never used smokeless tobacco. She reports that she does not drink alcohol and does not use drugs.     Family History: family history includes Alcohol abuse in her father; Arthritis in her mother; Asthma in her brother; Diabetes in her brother, mother, and sister; Early death in her brother; Heart disease in her brother and mother; Hypertension in her mother; Kidney disease in her mother; Stroke in her mother; Vision loss in her mother.     Allergies: Patient is allergic to tomato (solanum  lycopersicum).     ROS  Constitutional: Negative for fever   Eyes: Negative for blurred vision, double vision   Respiratory: Negative for cough, shortness of breath   Cardiovascular: Negative for chest pain, palpitations, and leg swelling.   Gastrointestinal:Negative for abdominal pain,  diarrhea, constipation, nausea, vomiting.   Genitourinary: + urinary retention (Araiza- yellow, pus, sediment) Negative for dysuria, frequency   Musculoskeletal:  + generalized weakness. Negative for back pain and myalgias.   Skin: Negative for itching and rash. + right 2nd toe amputation  Neurological: +intermittent dizziness w/ therapy Negative for  headaches.   Psychiatric/Behavioral: Negative for depression. The patient is not nervous/anxious.       Vital signs  BP 142/94  HR 74  Temp 97.5  RR 18  O2 99% on RA     PEx  Constitutional: + appears ill. Patient appears debilitated.  No distress noted  HENT:   Head: Normocephalic and atraumatic.   Eyes: Pupils are equal, round  Neck: Normal range of motion. Neck supple.   Cardiovascular: Normal rate, regular rhythm and normal heart sounds.    Pulmonary/Chest: Effort normal and breath sounds are clear  Abdominal: Soft. Bowel sounds are normal.   Musculoskeletal: Normal range of motion.   Neurological:  + left-sided weakness and left facial droop (prior stroke) Alert and oriented to person, place, and time.   Psychiatric: Normal mood and affect. Behavior is normal.   Skin: Skin is warm and dry. Full skin assessment on 1/18     Wound followed by consistent, contracted NP/PA/MD: this NP     Wound that you did not assess today: Right 2nd toe amputation with very small area of dehiscence to anterior of wound  Wound location(s)/type(s): see above  Not visualized today. No signs/symptoms of infection or wound-related changes reported.     Labs  - labs reviewed 3/8 (collected 3/3) CMP:  Glucose 158, BUN 25, creatinine 2.17, sodium 137, potassium 3.4, ALT 9, AST 12  CBC:  WBC 9.7, H/H 9.0/26.0,  platelet 175     -Labs reviewed 2/17 (collected 2/16):  BMP:  Glucose 83, BUN 23, creatinine 2.59, potassium 3.4, sodium 140,  Magnesium 1.9  CBC:  WBC 10.3, RBC 2.85, hemoglobin 8.5, hematocrit 24.6, platelets 156     -labs review 2/9 (collected 2/9:  CMP:  Glucose 151, BUN 25, creatinine 2.92, sodium 136, potassium 3.6 ALT 9, AST 15  CBC:  WBC 12.2, RBC 2.42, hemoglobin 7.2, hematocrit 21.4, platelets 170     01/26/2022: [facility]  CBC:  WBC 10.01, hemoglobin 7.2, hematocrit 23.6, platelet 158  01/26/2022:  CBC:  WBC 9.4, hemoglobin 6.6, hematocrit 19.5, platelet 126  01/25/2022:  CBC:  WBC 8.1, hemoglobin 7.1, hematocrit 21.0, platelet 145  01/24/2022:  CBC:  WBC 7.1, hemoglobin 6.9, hematocrit 20.2, platelet 136  01/23/2022:   CBC:  WBC 10.6 H, hemoglobin 7.1, hematocrit 21.1, platelet 144  01/20/2022  CBC:  WBC 9.3, hemoglobin 9.1, hematocrit 27.3, platelet 172     1/19/2022:  CMP: Glucose 60, BUN 18, creatinine 1.70, sodium 137, potassium 4.1, chloride 102, CO2 27, calcium 8.8, total protein 6.0, ALT 8, AST 12, alkaline phosphatase 60, total bilirubin 0.4, albumin 2.7, magnesium 2.0, phosphorus 3.6.  Hemoglobin A1c 5.1.  Vitamin D 25 hydroxy <5.0.  CBC: WBC count 8.3, hemoglobin 6.9, hematocrit 20.3, platelet count 146.  Lipid panel: Total cholesterol 140, HDL 38, triglycerides 87, LDL 85        Assessment and Plan:  Diabetic ulcer of toe of right foot associated with type 2 diabetes mellitus, with bone involvement without evidence of necrosis  Osteomyelitis of right 2nd toe  Diabetic foot ulcer with osteomyelitis  - S/p amputation right 2nd toe on 1/1/22  -completed amoxicillin 500 mg q.12 hours, stop date 1/16  - Podiatry plan to continue wound care and follow up as outpatient  - PT/OT  - seen by podiatry during hospitalization 2/10, no new recommendations to plan. No WB restrictions. Follow up with Dr. Isabella HASSAN      Insomnia   - continue melatonin nightly      Surgical incision  - R second  toe: Applied xeroform, kerlix, ACE avoiding tight compression.  Change every 2-3 days per podiatry   - Podiatry plan to continue wound care and follow up as outpatient     Urinary retention  UTI  - Delgado in place. Change monthly  - continue flomax    -follow-up urology outpatient for voiding trial  -2/8 initiate change delgado, obtain UA and UCx, CBC, CMP  - 2/9 UTI and ARTURO noted on labs, ordered 1L NS and renally dosed PO cipro for UTI, nursing unable to place IV, sent to ER and admitted.   - [facility]: Ucx showing pan senstivie pseudomonas treated with levaquin, end 2/18.      Hypothyroidism  -Continue levothyroxine      CKD (chronic kidney disease), stage IV  - Serum creatinine baseline (average around 2.5)  - Avoid nephrotoxins and hypotension as able, renally dose medications  - Continue to monitor   - 2/9 UTI and ARTURO noted on labs, ordered 1L NS and renally dosed PO cipro for UTI, nursing unable to place IV, sent to ER.   -Cr 2.17 on most recent labs      Severe protein-calorie malnutrition  -continue Boost TID     Chronic Normocytic anemia  - continue to monitor labs  -continue iron tablets  - 1/19 hgb 6.9, repeat CBC ordered 1/20 (not collected)  - 1/20 initiate reordering CBC  -1/26 hemoglobin 7.2, hematocrit 23.6 at [facility] ED  -may need to be sent to [facility] for blood transfusion for hgb < 7.0.   - s/p hospitalization anemia and UTI 2/9-2/11. Received 1 unit PRBC.   - monitor labs, H/H stable 3/3     History of stroke  - Residual left sided weakness at baseline  - Continue Crestor 40 mg daily   - Continue Aspirin 81mg PO daily  - Continue Plavix 75mg PO daily     Type 2 diabetes mellitus with neuropathy, with long-term current use of insulin  - type 1 and type 2 diabetes recorded 1 patient's problem list?  Unclear  - A1C 5.4 on 11/21/2021  - home regimen:  8 units daily (long-acting)  - DM education, DM diet  - monitor BG and PO intake  - adjust meds as needed   -not currently on anti  hyperglycemics     Hyperlipidemia associated with type 2 diabetes mellitus  - Continue Atorvastatin 80mg PO daily     Hypertension associated with stage 4 chronic kidney disease due to type 2 diabetes mellitus  - Continue Norvasc 5 mg every morning and 10 mg qHS  - Continue Coreg 25mg PO  BID     Stable but ongoing problems      Vitamin D deficiency, new 1/19   - continue vitamin D 1000 units PO daily (started 1/19)     Diabetic neuropathy  -continue gabapentin and Cymbalta     Nausea   GERD  -continue Zofran and Reglan  -continue Prilosec     Hypothyroidism  -continue Synthroid     Pain  -continue Norco 5-325 mg q.6 hours p.r.n. pain     Anxiety  -continue home trazodone     Diarrhea  Chronic constipation  1/18 initiate adjusting home lactulose from b.i.d. to b.i.d. p.r.n.  - diarrhea improved.      Nutrition  -continue home B complex vitamin     Debility   - Continue with PT/OT for gait training and strengthening and restoration of ADL's   - Encourage mobility, OOB in chair, and early ambulation as appropriate  - Fall precautions   - Monitor for bowel and bladder dysfunction  - Monitor for and prevent skin breakdown and pressure ulcers        I certify that SNF services are required to be given on an inpatient basis because Beatriz Adame needs for skilled nursing care and/or skilled rehabilitation are required on a daily basis and such services can only practically be provided in a skilled nursing facility setting and are for an ongoing condition for which she received inpatient care in the hospital.         Aren Martinez NP  Department of Hospital Medicine   [facility]      DOS: 3/15/2022        Patient note was created using MModal Dictation.  Any errors in syntax or even information may not have been identified and edited on initial review prior to signing this note.

## 2022-03-18 PROBLEM — G93.41 ACUTE METABOLIC ENCEPHALOPATHY: Status: ACTIVE | Noted: 2022-01-01

## 2022-03-18 PROBLEM — N30.00 ACUTE CYSTITIS WITHOUT HEMATURIA: Status: ACTIVE | Noted: 2022-01-01

## 2022-03-18 NOTE — ED PROVIDER NOTES
"Encounter Date: 3/18/2022       History     Chief Complaint   Patient presents with    Altered Mental Status     Pt from [facility] for AMS that occurred 1 hour ago; pt was receiving therapy and "went out of it" for about 1 min; aaox4 at present     Ms. Adame is a 71 year old female with a history of T2DM, hypothyroidism, CVA w/ left sided deficits, HTN,. HLD, CKD, anemia, recurrent UTIs, L ureteral stent 10/21 and recent R toe amputation 2/2 osteomyelitis 1/1/2022 who presents today from SNF facility w/ concerns for altered mental status. Patient states she is unaware why she it at the hospital or provide me with any details regarding her most recent hospital admission. She endorses increased thirst and hunger but denies HA, vision changes, SOB, CP, abdominal pain, dysuria, hematuria, bloody stools, nausea, vomiting, fevers or chills. Patient states she lived by herself prior to admission to SNF. Currently is unable to ambulate without assistance. She was admitted to SNF for PT/OT to improve functional ability. Of note, patient appears to be a poor historian. Upon chart review, patient was most recently admitted 2/9 - 2/11/2022 for symptomatic anemia.           Review of patient's allergies indicates:   Allergen Reactions    Tomato (solanum lycopersicum) Hives and Itching     Past Medical History:   Diagnosis Date    Gastroparesis     GERD (gastroesophageal reflux disease)     History of Clostridium difficile colitis 07/24/2021    History of kidney stones     History of stroke     left side paralysis    Hyperlipidemia     Hypertension     Hypothyroidism     Iron deficiency anemia     Osteoarthritis     Peripheral neuropathy     Stage IV CKD     Type II diabetes mellitus      Past Surgical History:   Procedure Laterality Date    APPENDECTOMY      CYSTOSCOPY W/ URETERAL STENT PLACEMENT Right 10/13/2021    Procedure: CYSTOSCOPY, WITH URETERAL STENT INSERTION;  Surgeon: Wanda PUTNAM" MD Cruz;  Location: [facility] OR;  Service: Urology;  Laterality: Right;    ESOPHAGOGASTRODUODENOSCOPY N/A 11/23/2021    Procedure: EGD (ESOPHAGOGASTRODUODENOSCOPY);  Surgeon: Obed Jeong MD;  [facility]  Service: Endoscopy;  Laterality: N/A;    LAPAROSCOPIC APPENDECTOMY N/A 7/22/2021    Procedure: APPENDECTOMY, LAPAROSCOPIC;  Surgeon: Paulo Calvo Jr., MD;  Location: [facility];  Service: General;  Laterality: N/A;    TOE AMPUTATION Right 1/1/2022    Procedure: AMPUTATION, TOE;  Surgeon: Genaro Mason DPM;  Location: [facility];  Service: Podiatry;  Laterality: Right;    TUBAL LIGATION      URETEROSCOPIC REMOVAL OF URETERIC CALCULUS Right 12/22/2021    Procedure: REMOVAL, CALCULUS, URETER, URETEROSCOPIC;  Surgeon: Wanda Arroyo MD;  Location: [facility];  Service: Urology;  Laterality: Right;     Family History   Problem Relation Age of Onset    Arthritis Mother     Diabetes Mother     Heart disease Mother     Hypertension Mother     Kidney disease Mother     Stroke Mother     Vision loss Mother     Heart attack Mother     Alcohol abuse Father     Heart attack Father     Diabetes Sister     Asthma Brother     Diabetes Brother     Heart disease Brother     Heart attack Brother      Social History     Tobacco Use    Smoking status: Never Smoker    Smokeless tobacco: Never Used   Substance Use Topics    Alcohol use: No     Comment: socially    Drug use: No     Review of Systems   Constitutional: Negative for activity change, appetite change, chills, fatigue and fever.   HENT: Negative for congestion, sinus pressure, sneezing and trouble swallowing.    Eyes: Negative for photophobia and visual disturbance.   Respiratory: Negative for apnea, cough, chest tightness, shortness of breath and wheezing.    Cardiovascular: Negative for chest pain, palpitations and leg swelling.   Gastrointestinal: Negative for abdominal distention, abdominal pain, constipation, diarrhea,  nausea and vomiting.   Genitourinary: Negative for decreased urine volume, difficulty urinating, hematuria and urgency.   Musculoskeletal: Positive for gait problem. Negative for arthralgias, back pain and myalgias.   Neurological: Positive for weakness. Negative for dizziness, syncope and facial asymmetry.   Psychiatric/Behavioral: Negative for agitation and behavioral problems.       Physical Exam     Initial Vitals [03/18/22 1415]   BP Pulse Resp Temp SpO2   (!) 103/53 65 16 97.8 °F (36.6 °C) 100 %      MAP       --         Physical Exam    Constitutional: She is not diaphoretic. No distress.   Frail appearing    HENT:   Head: Normocephalic and atraumatic.   Right Ear: External ear normal.   Left Ear: External ear normal.   Eyes: Conjunctivae and EOM are normal. Right eye exhibits no discharge. Left eye exhibits no discharge.   Neck: Neck supple. No JVD present.   Normal range of motion.  Cardiovascular: Normal rate and regular rhythm. Exam reveals no friction rub.    No murmur heard.  Pulmonary/Chest: Breath sounds normal. No stridor. No respiratory distress. She has no wheezes.   Abdominal: Abdomen is soft. She exhibits no distension. There is no abdominal tenderness.   Araiza present from SNF facility  There is no rebound.   Musculoskeletal:         General: No tenderness or edema. Normal range of motion.      Cervical back: Normal range of motion and neck supple.      Comments: Right foot 2nd toe amputation site healing well. 5th digit appears gangrenous w/ erythema at proximal aspect of toe. Purulent discharge between 4th and 5th digit.      Neurological: She is alert and oriented to person, place, and time. A cranial nerve deficit is present.   Chronic L sided weakness from previous CVA including L arm contraction    Skin: Skin is warm and dry.   Psychiatric: She has a normal mood and affect. Thought content normal.         ED Course   Procedures  Labs Reviewed   CBC W/ AUTO DIFFERENTIAL - Abnormal; Notable  for the following components:       Result Value    WBC 13.84 (*)     RBC 2.54 (*)     Hemoglobin 7.3 (*)     Hematocrit 23.7 (*)     MCHC 30.8 (*)     RDW 14.6 (*)     Gran # (ANC) 10.3 (*)     Gran % 74.7 (*)     Lymph % 17.0 (*)     All other components within normal limits   COMPREHENSIVE METABOLIC PANEL - Abnormal; Notable for the following components:    Glucose 136 (*)     BUN 28 (*)     Creatinine 2.3 (*)     Albumin 2.4 (*)     eGFR if  24.1 (*)     eGFR if non  20.9 (*)     All other components within normal limits   URINALYSIS, REFLEX TO URINE CULTURE - Abnormal; Notable for the following components:    Appearance, UA Cloudy (*)     Protein, UA 2+ (*)     Occult Blood UA 1+ (*)     Nitrite, UA Positive (*)     Leukocytes, UA 2+ (*)     All other components within normal limits    Narrative:     Specimen Source->Urine   C-REACTIVE PROTEIN - Abnormal; Notable for the following components:    CRP 64.0 (*)     All other components within normal limits   SEDIMENTATION RATE - Abnormal; Notable for the following components:    Sed Rate 95 (*)     All other components within normal limits   URINALYSIS MICROSCOPIC - Abnormal; Notable for the following components:    RBC, UA 13 (*)     WBC, UA >100 (*)     WBC Clumps, UA Many (*)     Bacteria Many (*)     All other components within normal limits    Narrative:     Specimen Source->Urine   CULTURE, BLOOD   CULTURE, BLOOD   CULTURE, AEROBIC  (SPECIFY SOURCE)   CULTURE, URINE   LACTIC ACID, PLASMA   MAGNESIUM   PHOSPHORUS   LACTIC ACID, PLASMA   SARS-COV-2 RDRP GENE          Imaging Results          MRI Foot Toes Without Contrast Right (In process)                X-Ray Foot Complete Right (Final result)  Result time 03/18/22 17:40:08    Final result by Bridget Rasmussen MD (03/18/22 17:40:08)                 Impression:      No acute bony abnormalities detected.      Electronically signed by: Bridget  Grady  Date:    03/18/2022  Time:    17:40             Narrative:    EXAMINATION:  THREE VIEWS OF THE RIGHT FOOT    CLINICAL HISTORY:  Gangrene, not elsewhere classified    TECHNIQUE:  AP, lateral, and oblique view of the right foot    COMPARISON:  01/01/2022    FINDINGS:  Postsurgical amputation of the 2nd ray is seen.  Three views of the right foot demonstrate no acute fracture or dislocation.  There is hallux valgus deformity none.  There is mild sclerosing, osteophyte formation and narrowing at the 1st metatarsophalangeal joint consistent with degenerative change.  A small Achilles spur is seen.  The bones are diffusely osteopenic.  Incidental note is made of vascular calcifications.                               X-Ray Chest AP Portable (Final result)  Result time 03/18/22 17:51:52    Final result by Mylene Hernandez MD (03/18/22 17:51:52)                 Impression:      No acute abnormalities or evidence of cardiomegaly.  Lungs appear clear.      Electronically signed by: Mylene Hernandez  Date:    03/18/2022  Time:    17:51             Narrative:    EXAMINATION:  XR CHEST AP PORTABLE    CLINICAL HISTORY:  Sepsis;    TECHNIQUE:  Single frontal view of the chest was performed.    COMPARISON:  01/06/2022, 11/19/2021 chest x-ray    FINDINGS:  Patient is rotated to the left.  The cardiomediastinal silhouette is stable and not significantly enlarged.  Lungs appear clear.  There is no evidence of pneumothorax or pleural effusion.  Osseous structures appear stable with degenerative changes of the spine and shoulder noted.                                 Medications   ferrous sulfate tablet 1 each (has no administration in time range)   gabapentin capsule 300 mg (has no administration in time range)   levothyroxine tablet 75 mcg (has no administration in time range)   metoclopramide HCl tablet 10 mg (has no administration in time range)   pantoprazole EC tablet 40 mg (has no administration in time range)    ondansetron tablet 4 mg (has no administration in time range)   atorvastatin tablet 80 mg (has no administration in time range)   tamsulosin 24 hr capsule 0.4 mg (has no administration in time range)   sodium chloride 0.9% flush 2 mL (has no administration in time range)   naloxone 0.4 mg/mL injection 0.02 mg (has no administration in time range)   glucose chewable tablet 16 g (has no administration in time range)   glucose chewable tablet 24 g (has no administration in time range)   glucagon (human recombinant) injection 1 mg (has no administration in time range)   heparin (porcine) injection 5,000 Units (has no administration in time range)   insulin detemir U-100 pen 3 Units (has no administration in time range)   insulin aspart U-100 pen 0-5 Units (has no administration in time range)   dextrose 10% bolus 125 mL (has no administration in time range)   dextrose 10% bolus 250 mL (has no administration in time range)   lactated ringers bolus 1,000 mL (0 mLs Intravenous Stopped 3/18/22 1846)   cefTRIAXone (ROCEPHIN) 1 g/50 mL D5W IVPB (0 g Intravenous Stopped 3/18/22 1846)   vancomycin 750 mg in dextrose 5 % 250 mL IVPB (ready to mix system) (0 mg/kg × 55.3 kg Intravenous Stopped 3/18/22 2051)     Medical Decision Making:   Initial Assessment:   71 year old female with history of HTN, T2DM, CVA w/ left sided residual deficits, HLD, CKD, anemia, recurrent UTIs, ureteral stent 10/21 and s/p R 2nd toe amputation due to osteomyelitis on 1/1/2022 who presents w/ AMS.   Differential Diagnosis:   Differentials include UTI, osteomyelitis, TIA, metabolic encephalopathy.   Clinical Tests:   Lab Tests: Ordered  Radiological Study: Ordered  ED Management:  In the ED, afebrile and borderline hypotension w/ /53. On exam, patient appears AA x 3 but unable to communicate reason for ER presentation or recall reason for prior admissions. Denies any symptoms including signs of infection. On exam, patient appears to have wet  gangrene vs diabetic ulcer of R foot 5th digit. Additionally, patient presented w/ delgado marked as placed in early February. Cause of acute mental status change likely 2/2 infectious etiology - UTI vs diabetic foot ulcer. CBC w/ leukocytosis. CRP and ESR elevated. Chemistries unremarkable. R foot XR w/ no signs of osteomyelitis. MRI pending. Will admit to hospital medicine given UTI w/ delgado catheter & diabetic foot ulcer.             Attending Attestation:   Physician Attestation Statement for Resident:  As the supervising MD   Physician Attestation Statement: I have personally seen and examined this patient.   I agree with the above history. -: 71-year-old woman presents from the inpatient facility in which she resides for evaluation of altered mental status as well as skin changes noted to her right foot.   As the supervising MD I agree with the above PE.    As the supervising MD I agree with the above treatment, course, plan, and disposition.  I have reviewed and agree with the residents interpretation of the following: lab data and x-rays.  I have reviewed the following: old records at this facility.                ED Course as of 03/18/22 2253   Fri Mar 18, 2022   1906 Microscopic Comment: SEE COMMENT [VETO]      ED Course User Index  [VETO] Graciela Beckwith MD             Clinical Impression:   Final diagnoses:  [R41.0] Confusion  [I96] Gangrene          ED Disposition Condition    Observation               Graciela Beckwith MD  Resident  03/18/22 2235       Victoriano Duke MD  03/18/22 2254

## 2022-03-19 PROBLEM — M86.9 OSTEOMYELITIS OF RIGHT FOOT: Status: ACTIVE | Noted: 2022-01-01

## 2022-03-19 PROBLEM — I70.229 CRITICAL LOWER LIMB ISCHEMIA: Status: ACTIVE | Noted: 2022-01-01

## 2022-03-19 NOTE — H&P
"[facility] Medicine  History & Physical    Patient Name: Beatriz Adame  MRN: [id number]  Patient Class: OP- Observation  Admission Date: 3/18/2022  Attending Physician: Arash Saini MD   Primary Care Provider: Dante Olivier MD         Patient information was obtained from patient, past medical records and ER records.     Subjective:     Principal Problem:Acute metabolic encephalopathy    Chief Complaint:   Chief Complaint   Patient presents with    Altered Mental Status     Pt from [facility] for AMS that occurred 1 hour ago; pt was receiving therapy and "went out of it" for about 1 min; aaox4 at present        HPI: Patient is a 71 year old female with osteoarthritis, hypothyroidism, CVA with left-sided residual deficit, DM2, HTN, HLD, CKD, chronic anemia, recurrent pseudomonal UTI, nephroliathiasis s/p left ureteral stent placement in 10/2021, urinary retention with chronic indwelling delgado catheter, diabetic foot ulcer s/p R toe amputation, chronic anemia, who presents with acute metabolic encephalopathy secondary to catheter associated urinary tract infection and possible right toe infection.   She is being admitted for [facility], [facility].     Initially in ED patient was found to have a white count of 13,000 and a left shift / neutrophilic predominance.  While not overtly septic, sepsis protocol was initiated - patient pancultured and broad-spectrum antibiotics with vancomycin and ceftriaxone was initiated.  Vital signs were within normal limits however blood pressure was at the lower range of normal for this patient with a systolic blood pressure of 110.  Lactic acid was obtained and was within normal limits.     Patient is alert on my examination she is able to describe that she is at the hospital for a catheter exchange."  The her catheter has been exchanged in the ED and she has had the benefit of broad-spectrum antibiotics since her admission there.  It " is clear that her encephalopathy has already improved with the management of her very obvious catheter associated urinary tract infection.  The ED provider also had a concern for a right 5th toe infection - which appears to be chronically devascularized and gangrenous on examination.  Plain films of the affected foot were unrevealing however patient has just returned from MRI and interpretation is pending.  Inflammatory markers are notably elevated.      She will be admitted to Hospital Medicine for ongoing management of her catheter associated urinary tract infection and possible right foot infection.          Past Medical History:   Diagnosis Date    Gastroparesis     GERD (gastroesophageal reflux disease)     History of Clostridium difficile colitis 07/24/2021    History of kidney stones     History of stroke     left side paralysis    Hyperlipidemia     Hypertension     Hypothyroidism     Iron deficiency anemia     Osteoarthritis     Peripheral neuropathy     Stage IV CKD     Type II diabetes mellitus        Past Surgical History:   Procedure Laterality Date    APPENDECTOMY      CYSTOSCOPY W/ URETERAL STENT PLACEMENT Right 10/13/2021    Procedure: CYSTOSCOPY, WITH URETERAL STENT INSERTION;  Surgeon: Wanda Arroyo MD;  Location: [facility];  Service: Urology;  Laterality: Right;    ESOPHAGOGASTRODUODENOSCOPY N/A 11/23/2021    Procedure: EGD (ESOPHAGOGASTRODUODENOSCOPY);  Surgeon: Obed Jeong MD;  Location: [address]);  Service: Endoscopy;  Laterality: N/A;    LAPAROSCOPIC APPENDECTOMY N/A 7/22/2021    Procedure: APPENDECTOMY, LAPAROSCOPIC;  Surgeon: Paulo Calvo Jr., MD;  Location: [facility];  Service: General;  Laterality: N/A;    TOE AMPUTATION Right 1/1/2022    Procedure: AMPUTATION, TOE;  Surgeon: Genaro Mason DPM;  Location: [facility];  Service: Podiatry;  Laterality: Right;    TUBAL LIGATION      URETEROSCOPIC REMOVAL OF URETERIC CALCULUS Right 12/22/2021     Procedure: REMOVAL, CALCULUS, URETER, URETEROSCOPIC;  Surgeon: Wanda Arroyo MD;  Location: [facility];  Service: Urology;  Laterality: Right;       Review of patient's allergies indicates:   Allergen Reactions    Tomato (solanum lycopersicum) Hives and Itching       No current facility-administered medications on file prior to encounter.     Current Outpatient Medications on File Prior to Encounter   Medication Sig    amLODIPine (NORVASC) 5 MG tablet Take 10 mg by mouth every evening. Take 5 mg every morning and 10 mg at night    aspirin (ECOTRIN) 81 MG EC tablet Take 81 mg by mouth once daily.    b complex vitamins tablet Take 1 tablet by mouth once daily.    carvedilol (COREG) 25 MG tablet Take 1 tablet (25 mg total) by mouth 2 (two) times daily.    clopidogreL (PLAVIX) 75 mg tablet Take 75 mg by mouth.    DULoxetine (CYMBALTA) 30 MG capsule Take 1 capsule (30 mg total) by mouth once daily.    ferrous sulfate (FEOSOL) 325 mg (65 mg iron) Tab tablet Take 325 mg by mouth daily with breakfast. Off at present    gabapentin (NEURONTIN) 300 MG capsule Take 1 capsule (300 mg total) by mouth once daily.    HYDROcodone-acetaminophen (NORCO) 5-325 mg per tablet Take 1 tablet by mouth every 6 (six) hours as needed for Pain.    insulin (BASAGLAR KWIKPEN U-100 INSULIN) glargine 100 units/mL (3mL) SubQ pen Inject 5 Units into the skin once daily.    lactulose (CHRONULAC) 10 gram/15 mL solution Take 10 g by mouth 2 (two) times a day.    levothyroxine (SYNTHROID) 75 MCG tablet Take 75 mcg by mouth once daily.    metoclopramide HCl (REGLAN) 10 MG tablet Take 1 tablet (10 mg total) by mouth 3 (three) times daily before meals.    omeprazole (PRILOSEC) 20 MG capsule Take 20 mg by mouth 2 (two) times daily.    ondansetron (ZOFRAN) 4 MG tablet Take 1 tablet (4 mg total) by mouth every 8 (eight) hours as needed for Nausea.    rosuvastatin (CRESTOR) 40 MG Tab Take 40 mg by mouth once daily.    tamsulosin (FLOMAX)  0.4 mg Cap Take 1 capsule (0.4 mg total) by mouth once daily.    traZODone (DESYREL) 100 MG tablet Take 1 tablet (100 mg total) by mouth once daily.     Family History       Problem Relation (Age of Onset)    Alcohol abuse Father    Arthritis Mother    Asthma Brother    Diabetes Mother, Sister, Brother    Heart attack Mother, Father, Brother    Heart disease Mother, Brother    Hypertension Mother    Kidney disease Mother    Stroke Mother    Vision loss Mother          Tobacco Use    Smoking status: Never Smoker    Smokeless tobacco: Never Used   Substance and Sexual Activity    Alcohol use: No     Comment: socially    Drug use: No    Sexual activity: Not Currently     Review of Systems   Unable to perform ROS: Mental status change   Objective:     Vital Signs (Most Recent):  Temp: 98.4 °F (36.9 °C) (03/18/22 2200)  Pulse: 71 (03/18/22 2200)  Resp: 17 (03/18/22 2200)  BP: 116/62 (03/18/22 2200)  SpO2: 95 % (03/18/22 2200) Vital Signs (24h Range):  Temp:  [97.8 °F (36.6 °C)-98.4 °F (36.9 °C)] 98.4 °F (36.9 °C)  Pulse:  [65-71] 71  Resp:  [16-17] 17  SpO2:  [95 %-100 %] 95 %  BP: (103-118)/(53-62) 116/62     Weight: 55.3 kg (122 lb)  Body mass index is 19.11 kg/m².    Physical Exam  Constitutional:       General: She is not in acute distress.     Appearance: She is well-developed. She is ill-appearing. She is not toxic-appearing or diaphoretic.   HENT:      Head: Atraumatic. No abrasion, contusion or laceration.      Nose: Nose normal.      Mouth/Throat:      Pharynx: No oropharyngeal exudate.   Eyes:      General: No scleral icterus.        Right eye: No discharge.         Left eye: No discharge.      Conjunctiva/sclera: Conjunctivae normal.      Pupils: Pupils are equal, round, and reactive to light.   Neck:      Vascular: No JVD.   Cardiovascular:      Rate and Rhythm: Normal rate and regular rhythm.      Heart sounds: Normal heart sounds. No murmur heard.    No friction rub. No gallop.   Pulmonary:       Effort: Pulmonary effort is normal. No accessory muscle usage or respiratory distress.      Breath sounds: Normal breath sounds. No wheezing.   Abdominal:      General: Bowel sounds are normal. There is no distension.      Palpations: Abdomen is soft. There is no mass.      Tenderness: There is no abdominal tenderness. There is no guarding or rebound.   Genitourinary:     Comments: Urinary catheter in place draining light yellow urine  Musculoskeletal:         General: No tenderness or deformity. Normal range of motion.      Cervical back: Normal range of motion and neck supple. No rigidity.      Comments: Righ 5th toe, dry gangrene - see photo.    Lymphadenopathy:      Cervical: No cervical adenopathy.   Skin:     General: Skin is warm and dry.      Capillary Refill: Capillary refill takes less than 2 seconds.      Findings: No bruising, ecchymosis, erythema, petechiae or rash.      Nails: There is no clubbing.   Neurological:      Mental Status: She is alert and oriented to person, place, and time.      GCS: GCS eye subscore is 4. GCS verbal subscore is 5. GCS motor subscore is 6.      Cranial Nerves: No cranial nerve deficit.      Sensory: No sensory deficit.      Motor: No abnormal muscle tone.      Coordination: Coordination normal.      Deep Tendon Reflexes: Reflexes normal.   Psychiatric:         Speech: Speech normal.         Behavior: Behavior normal.         Thought Content: Thought content normal.         Judgment: Judgment normal.         CRANIAL NERVES     CN III, IV, VI   Pupils are equal, round, and reactive to light.           Significant Labs: All pertinent labs within the past 24 hours have been reviewed.  CBC:   Recent Labs   Lab 03/18/22  1640   WBC 13.84*   HGB 7.3*   HCT 23.7*        CMP:   Recent Labs   Lab 03/18/22  1640      K 3.6      CO2 27   *   BUN 28*   CREATININE 2.3*   CALCIUM 9.3   PROT 7.6   ALBUMIN 2.4*   BILITOT 0.2   ALKPHOS 75   AST 14   ALT 10    ANIONGAP 9   EGFRNONAA 20.9*     Lactic Acid:   Recent Labs   Lab 03/18/22  1640 03/18/22 2011   LACTATE 0.9 0.7       Significant Imaging: I have reviewed all pertinent imaging results/findings within the past 24 hours.  EKG: I have reviewed all pertinent results/findings within the past 24 hours and my personal findings are:  Normal sinus rhythm rate 79  of oral    Assessment/Plan:     * Acute metabolic encephalopathy  Catheter associated UTI-present on admission   Possible right foot infection    · Patient initiated on broad-spectrum antibiotics in the ED - vancomycin ceftriaxone  · Will continue with broad-spectrum antibiotics and will change ceftriaxone to Cefepime in light of possible diabetic foot infection  · Patient has a history of pansensitive Klebsiella UTIs  · Nitrite positive urinalysis with gross parameters of infection on UA, urine culture pending  · Araiza catheter exchanged  · Blood cultures pending  · Encephalopathy improved with goal directed medical therapy for infections  · MRI of right foot-read pending   · Will need podiatry assessment - consult placed   · Delirium precautions, neuro checks, aspiration precautions  · Inflammatory markers elevated, ESR-95, CRP-64      Urinary retention  · Patient with history of urinary retention and Araiza catheter in place since at least January of 2022  · Araiza catheter last exchanged 02/22/2022  · Araiza catheter exchanged in ED  · Patient continues Flomax per home dose        Type 2 diabetes mellitus, with long-term current use of insulin  · Diabetic diet  · Accu-Cheks qac,qhs  · 3 units determir, low dose SSI      History of stroke  · On DAPT at home  · Hold for possible podiatry intervention       Hypothyroidism  · Holding home agents including Coreg and amlodipine due to soft blood pressures on admission, can be reintroduced as blood pressure currency rebuilds      Severe protein-calorie malnutrition  · registered dietitian consult      Chronic  kidney disease, stage 4 (severe)  · Stable at baseline, avoid any nephrotoxic agents      Peripheral neuropathy  · home gabapentin 300 mg once daily      Hyperlipidemia  · Atorvastatin 80 formulary substitution for home Crestor 40        VTE Risk Mitigation (From admission, onward)         Ordered     heparin (porcine) injection 5,000 Units  Every 8 hours         03/18/22 2111     IP VTE HIGH RISK PATIENT  Once         03/18/22 2110     Place sequential compression device  Until discontinued         03/18/22 2110                   Emile Hunt MD  Department of Hospital Medicine   [facility] - Emergency Dept

## 2022-03-19 NOTE — SUBJECTIVE & OBJECTIVE
Past Medical History:   Diagnosis Date    Gastroparesis     GERD (gastroesophageal reflux disease)     History of Clostridium difficile colitis 07/24/2021    History of kidney stones     History of stroke     left side paralysis    Hyperlipidemia     Hypertension     Hypothyroidism     Iron deficiency anemia     Osteoarthritis     Peripheral neuropathy     Stage IV CKD     Type II diabetes mellitus        Past Surgical History:   Procedure Laterality Date    APPENDECTOMY      CYSTOSCOPY W/ URETERAL STENT PLACEMENT Right 10/13/2021    Procedure: CYSTOSCOPY, WITH URETERAL STENT INSERTION;  Surgeon: Wanda Arroyo MD;  Location: [facility];  Service: Urology;  Laterality: Right;    ESOPHAGOGASTRODUODENOSCOPY N/A 11/23/2021    Procedure: EGD (ESOPHAGOGASTRODUODENOSCOPY);  Surgeon: Obed Jeong MD;  Location: [facility] ENDO ([address] [place] [address]);  Service: Endoscopy;  Laterality: N/A;    LAPAROSCOPIC APPENDECTOMY N/A 7/22/2021    Procedure: APPENDECTOMY, LAPAROSCOPIC;  Surgeon: Paulo Calvo Jr., MD;  Location: [facility];  Service: General;  Laterality: N/A;    TOE AMPUTATION Right 1/1/2022    Procedure: AMPUTATION, TOE;  Surgeon: Genaro Mason DPM;  Location: [facility];  Service: Podiatry;  Laterality: Right;    TUBAL LIGATION      URETEROSCOPIC REMOVAL OF URETERIC CALCULUS Right 12/22/2021    Procedure: REMOVAL, CALCULUS, URETER, URETEROSCOPIC;  Surgeon: Wanda Arroyo MD;  Location: [facility];  Service: Urology;  Laterality: Right;       Review of patient's allergies indicates:   Allergen Reactions    Tomato (solanum lycopersicum) Hives and Itching       No current facility-administered medications on file prior to encounter.     Current Outpatient Medications on File Prior to Encounter   Medication Sig    amLODIPine (NORVASC) 5 MG tablet Take 10 mg by mouth every evening. Take 5 mg every morning and 10 mg at night    aspirin (ECOTRIN) 81 MG EC tablet Take 81 mg by mouth once daily.    b complex vitamins tablet Take 1  tablet by mouth once daily.    carvedilol (COREG) 25 MG tablet Take 1 tablet (25 mg total) by mouth 2 (two) times daily.    clopidogreL (PLAVIX) 75 mg tablet Take 75 mg by mouth.    DULoxetine (CYMBALTA) 30 MG capsule Take 1 capsule (30 mg total) by mouth once daily.    ferrous sulfate (FEOSOL) 325 mg (65 mg iron) Tab tablet Take 325 mg by mouth daily with breakfast. Off at present    gabapentin (NEURONTIN) 300 MG capsule Take 1 capsule (300 mg total) by mouth once daily.    HYDROcodone-acetaminophen (NORCO) 5-325 mg per tablet Take 1 tablet by mouth every 6 (six) hours as needed for Pain.    insulin (BASAGLAR KWIKPEN U-100 INSULIN) glargine 100 units/mL (3mL) SubQ pen Inject 5 Units into the skin once daily.    lactulose (CHRONULAC) 10 gram/15 mL solution Take 10 g by mouth 2 (two) times a day.    levothyroxine (SYNTHROID) 75 MCG tablet Take 75 mcg by mouth once daily.    metoclopramide HCl (REGLAN) 10 MG tablet Take 1 tablet (10 mg total) by mouth 3 (three) times daily before meals.    omeprazole (PRILOSEC) 20 MG capsule Take 20 mg by mouth 2 (two) times daily.    ondansetron (ZOFRAN) 4 MG tablet Take 1 tablet (4 mg total) by mouth every 8 (eight) hours as needed for Nausea.    rosuvastatin (CRESTOR) 40 MG Tab Take 40 mg by mouth once daily.    tamsulosin (FLOMAX) 0.4 mg Cap Take 1 capsule (0.4 mg total) by mouth once daily.    traZODone (DESYREL) 100 MG tablet Take 1 tablet (100 mg total) by mouth once daily.     Family History       Problem Relation (Age of Onset)    Alcohol abuse Father    Arthritis Mother    Asthma Brother    Diabetes Mother, Sister, Brother    Heart attack Mother, Father, Brother    Heart disease Mother, Brother    Hypertension Mother    Kidney disease Mother    Stroke Mother    Vision loss Mother          Tobacco Use    Smoking status: Never Smoker    Smokeless tobacco: Never Used   Substance and Sexual Activity    Alcohol use: No     Comment: socially    Drug use: No    Sexual activity: Not  Currently     Review of Systems   Unable to perform ROS: Mental status change   Objective:     Vital Signs (Most Recent):  Temp: 98.4 °F (36.9 °C) (03/18/22 2200)  Pulse: 71 (03/18/22 2200)  Resp: 17 (03/18/22 2200)  BP: 116/62 (03/18/22 2200)  SpO2: 95 % (03/18/22 2200) Vital Signs (24h Range):  Temp:  [97.8 °F (36.6 °C)-98.4 °F (36.9 °C)] 98.4 °F (36.9 °C)  Pulse:  [65-71] 71  Resp:  [16-17] 17  SpO2:  [95 %-100 %] 95 %  BP: (103-118)/(53-62) 116/62     Weight: 55.3 kg (122 lb)  Body mass index is 19.11 kg/m².    Physical Exam  Constitutional:       General: She is not in acute distress.     Appearance: She is well-developed. She is ill-appearing. She is not toxic-appearing or diaphoretic.   HENT:      Head: Atraumatic. No abrasion, contusion or laceration.      Nose: Nose normal.      Mouth/Throat:      Pharynx: No oropharyngeal exudate.   Eyes:      General: No scleral icterus.        Right eye: No discharge.         Left eye: No discharge.      Conjunctiva/sclera: Conjunctivae normal.      Pupils: Pupils are equal, round, and reactive to light.   Neck:      Vascular: No JVD.   Cardiovascular:      Rate and Rhythm: Normal rate and regular rhythm.      Heart sounds: Normal heart sounds. No murmur heard.    No friction rub. No gallop.   Pulmonary:      Effort: Pulmonary effort is normal. No accessory muscle usage or respiratory distress.      Breath sounds: Normal breath sounds. No wheezing.   Abdominal:      General: Bowel sounds are normal. There is no distension.      Palpations: Abdomen is soft. There is no mass.      Tenderness: There is no abdominal tenderness. There is no guarding or rebound.   Genitourinary:     Comments: Urinary catheter in place draining light yellow urine  Musculoskeletal:         General: No tenderness or deformity. Normal range of motion.      Cervical back: Normal range of motion and neck supple. No rigidity.      Comments: Righ 5th toe, dry gangrene - see photo.    Lymphadenopathy:       Cervical: No cervical adenopathy.   Skin:     General: Skin is warm and dry.      Capillary Refill: Capillary refill takes less than 2 seconds.      Findings: No bruising, ecchymosis, erythema, petechiae or rash.      Nails: There is no clubbing.   Neurological:      Mental Status: She is alert and oriented to person, place, and time.      GCS: GCS eye subscore is 4. GCS verbal subscore is 5. GCS motor subscore is 6.      Cranial Nerves: No cranial nerve deficit.      Sensory: No sensory deficit.      Motor: No abnormal muscle tone.      Coordination: Coordination normal.      Deep Tendon Reflexes: Reflexes normal.   Psychiatric:         Speech: Speech normal.         Behavior: Behavior normal.         Thought Content: Thought content normal.         Judgment: Judgment normal.         CRANIAL NERVES     CN III, IV, VI   Pupils are equal, round, and reactive to light.           Significant Labs: All pertinent labs within the past 24 hours have been reviewed.  CBC:   Recent Labs   Lab 03/18/22  1640   WBC 13.84*   HGB 7.3*   HCT 23.7*        CMP:   Recent Labs   Lab 03/18/22  1640      K 3.6      CO2 27   *   BUN 28*   CREATININE 2.3*   CALCIUM 9.3   PROT 7.6   ALBUMIN 2.4*   BILITOT 0.2   ALKPHOS 75   AST 14   ALT 10   ANIONGAP 9   EGFRNONAA 20.9*     Lactic Acid:   Recent Labs   Lab 03/18/22  1640 03/18/22  2011   LACTATE 0.9 0.7       Significant Imaging: I have reviewed all pertinent imaging results/findings within the past 24 hours.  EKG: I have reviewed all pertinent results/findings within the past 24 hours and my personal findings are:  Normal sinus rhythm rate 79  of oral

## 2022-03-19 NOTE — HPI
72 yo F with Hx of DM, CKD, CVA admitted 03/18 for encephalopathy. Podiatry consulted for right 5th toe dry gangrene and adjacent wound. Patient reports discoloration and pain in her right foot starting in January. She denies rest pain but admits to pain in the right forefoot on manipulation. She admits to prior right 2nd toe amputation by Dr Mason in January for infection.

## 2022-03-19 NOTE — ASSESSMENT & PLAN NOTE
-UA infectious  -previous UC growing pansensitive pseudomonas aeruginosa  -empiric cefepime initiated while UC pending

## 2022-03-19 NOTE — PROGRESS NOTES
Pharmacokinetic Initial Assessment & Plan: IV Vancomycin    IV Vancomycin 750 mg x 1 given in the ED @ 1851 on 03/18. Subsequent doses based on random Vancomycin levels.  Draw a Vancomycin random on 03/19 @ 1200. Desired empiric serum trough concentration is 10 to 20 mcg/mL    Pharmacy will continue to follow and monitor vancomycin.    [id number] with any questions regarding this assessment.     Thank you for the consult,   Desirae Camargo       Patient brief summary:  Beatriz Adame is a 71 y.o. female initiated on antimicrobial therapy with IV Vancomycin for treatment of suspected skin & soft tissue infection    Drug Allergies:   Review of patient's allergies indicates:   Allergen Reactions    Tomato (solanum lycopersicum) Hives and Itching       Actual Body Weight:   55.3 kg    Renal Function:   Estimated Creatinine Clearance: 19.9 mL/min (A) (based on SCr of 2.3 mg/dL (H)).,     Dialysis Method (if applicable):  N/A    CBC (last 72 hours):  Recent Labs   Lab Result Units 03/18/22  1640   WBC K/uL 13.84*   Hemoglobin g/dL 7.3*   Hematocrit % 23.7*   Platelets K/uL 156   Gran % % 74.7*   Lymph % % 17.0*   Mono % % 6.7   Eosinophil % % 1.1   Basophil % % 0.2   Differential Method  Automated       Metabolic Panel (last 72 hours):  Recent Labs   Lab Result Units 03/18/22  1640 03/18/22  1659   Sodium mmol/L 141  --    Potassium mmol/L 3.6  --    Chloride mmol/L 105  --    CO2 mmol/L 27  --    Glucose mg/dL 136*  --    Glucose, UA   --  Negative   BUN mg/dL 28*  --    Creatinine mg/dL 2.3*  --    Albumin g/dL 2.4*  --    Total Bilirubin mg/dL 0.2  --    Alkaline Phosphatase U/L 75  --    AST U/L 14  --    ALT U/L 10  --    Magnesium mg/dL 2.3  --    Phosphorus mg/dL 3.3  --        Drug levels (last 3 results):  No results for input(s): VANCOMYCINRA, VANCOMYCINPE, VANCOMYCINTR in the last 72 hours.    Microbiologic Results:  Microbiology Results (last 7 days)     Procedure Component Value Units Date/Time    Urine culture  [[id number]] Collected: 03/18/22 1659    Order Status: No result Specimen: Urine Updated: 03/18/22 1817    Blood culture x two cultures. Draw prior to antibiotics. [[id number]] Collected: 03/18/22 1641    Order Status: Sent Specimen: Blood from Peripheral, Antecubital, Right Updated: 03/18/22 1652    Blood culture x two cultures. Draw prior to antibiotics. [[id number]] Collected: 03/18/22 1641    Order Status: Sent Specimen: Blood from Peripheral, Antecubital, Right Updated: 03/18/22 1652    Aerobic culture [[id number]] Collected: 03/18/22 1620    Order Status: Sent Specimen: Wound from Toe, Right Foot Updated: 03/18/22 1648

## 2022-03-19 NOTE — CONSULTS
[facility] - Telemetry Stepdown ([place] [id number])  Infectious Disease  Consult Note    Patient Name: Beatriz Adame  MRN: [id number]  Admission Date: 3/18/2022  Hospital Length of Stay: 0 days  Attending Physician: Arash Saini MD  Primary Care Provider: Dante Olivier MD         Inpatient consult to Infectious Diseases  Consult performed by: Olga Lidia Sood PA-C  Consult ordered by: Misty Anderson PA-C        Assessment/Plan:     Osteomyelitis of right foot  ID consult received. Chart reviewed. Continue empiric Vancomycin and Cefepime (renal dosing). Vanc random ordered. Full consult note with recommendations to follow.      In the interim, please call or secure chat with any questions.    Thank you,  Olga Lidia Sood PA-C  ID [id number] Spectra: [id number]

## 2022-03-19 NOTE — SUBJECTIVE & OBJECTIVE
Past Medical History:   Diagnosis Date    Gastroparesis     GERD (gastroesophageal reflux disease)     History of Clostridium difficile colitis 07/24/2021    History of kidney stones     History of stroke     left side paralysis    Hyperlipidemia     Hypertension     Hypothyroidism     Iron deficiency anemia     Osteoarthritis     Peripheral neuropathy     Stage IV CKD     Type II diabetes mellitus        Past Surgical History:   Procedure Laterality Date    APPENDECTOMY      CYSTOSCOPY W/ URETERAL STENT PLACEMENT Right 10/13/2021    Procedure: CYSTOSCOPY, WITH URETERAL STENT INSERTION;  Surgeon: Wanda Arroyo MD;  Location: [facility];  Service: Urology;  Laterality: Right;    ESOPHAGOGASTRODUODENOSCOPY N/A 11/23/2021    Procedure: EGD (ESOPHAGOGASTRODUODENOSCOPY);  Surgeon: Obed Jeong MD;  Location: [facility] ENDO ([address] [place] [address]);  Service: Endoscopy;  Laterality: N/A;    LAPAROSCOPIC APPENDECTOMY N/A 7/22/2021    Procedure: APPENDECTOMY, LAPAROSCOPIC;  Surgeon: Paulo Calvo Jr., MD;  Location: [facility];  Service: General;  Laterality: N/A;    TOE AMPUTATION Right 1/1/2022    Procedure: AMPUTATION, TOE;  Surgeon: Genaro Mason DPM;  Location: [facility];  Service: Podiatry;  Laterality: Right;    TUBAL LIGATION      URETEROSCOPIC REMOVAL OF URETERIC CALCULUS Right 12/22/2021    Procedure: REMOVAL, CALCULUS, URETER, URETEROSCOPIC;  Surgeon: Wanda Arroyo MD;  Location: [facility];  Service: Urology;  Laterality: Right;       Review of patient's allergies indicates:   Allergen Reactions    Tomato (solanum lycopersicum) Hives and Itching       Medications:  Medications Prior to Admission   Medication Sig    amLODIPine (NORVASC) 5 MG tablet Take 10 mg by mouth every evening. Take 5 mg every morning and 10 mg at night    aspirin (ECOTRIN) 81 MG EC tablet Take 81 mg by mouth once daily.    b complex vitamins tablet Take 1 tablet by mouth once daily.    carvedilol (COREG) 25 MG tablet Take 1 tablet (25 mg total)  "by mouth 2 (two) times daily.    clopidogreL (PLAVIX) 75 mg tablet Take 75 mg by mouth.    DULoxetine (CYMBALTA) 30 MG capsule Take 1 capsule (30 mg total) by mouth once daily.    ferrous sulfate (FEOSOL) 325 mg (65 mg iron) Tab tablet Take 325 mg by mouth daily with breakfast. Off at present    gabapentin (NEURONTIN) 300 MG capsule Take 1 capsule (300 mg total) by mouth once daily.    HYDROcodone-acetaminophen (NORCO) 5-325 mg per tablet Take 1 tablet by mouth every 6 (six) hours as needed for Pain.    insulin (BASAGLAR KWIKPEN U-100 INSULIN) glargine 100 units/mL (3mL) SubQ pen Inject 5 Units into the skin once daily.    lactulose (CHRONULAC) 10 gram/15 mL solution Take 10 g by mouth 2 (two) times a day.    levothyroxine (SYNTHROID) 75 MCG tablet Take 75 mcg by mouth once daily.    metoclopramide HCl (REGLAN) 10 MG tablet Take 1 tablet (10 mg total) by mouth 3 (three) times daily before meals.    omeprazole (PRILOSEC) 20 MG capsule Take 20 mg by mouth 2 (two) times daily.    ondansetron (ZOFRAN) 4 MG tablet Take 1 tablet (4 mg total) by mouth every 8 (eight) hours as needed for Nausea.    rosuvastatin (CRESTOR) 40 MG Tab Take 40 mg by mouth once daily.    tamsulosin (FLOMAX) 0.4 mg Cap Take 1 capsule (0.4 mg total) by mouth once daily.    traZODone (DESYREL) 100 MG tablet Take 1 tablet (100 mg total) by mouth once daily.     Antibiotics (From admission, onward)                Start     Stop Route Frequency Ordered    03/19/22 0100  cefepime in dextrose 5 % 1 gram/50 mL IVPB 1 g         -- IV Every 12 hours (non-standard times) 03/18/22 2325 03/19/22 0025  vancomycin - pharmacy to dose  (vancomycin IVPB)        "And" Linked Group Details    -- IV pharmacy to manage frequency 03/18/22 2325          Antifungals (From admission, onward)                None          Antivirals (From admission, onward)      None             Immunization History   Administered Date(s) Administered    COVID-19, vector-nr, rS-Ad26, PF " (Matteo) 03/06/2021    PPD Test 01/06/2022       Family History       Problem Relation (Age of Onset)    Alcohol abuse Father    Arthritis Mother    Asthma Brother    Diabetes Mother, Sister, Brother    Heart attack Mother, Father, Brother    Heart disease Mother, Brother    Hypertension Mother    Kidney disease Mother    Stroke Mother    Vision loss Mother          Social History     Socioeconomic History    Marital status: Single   Tobacco Use    Smoking status: Never Smoker    Smokeless tobacco: Never Used   Substance and Sexual Activity    Alcohol use: No     Comment: socially    Drug use: No    Sexual activity: Not Currently     Social Determinants of Health     Financial Resource Strain: Low Risk     Difficulty of Paying Living Expenses: Not hard at all   Food Insecurity: No Food Insecurity    Worried About Running Out of Food in the Last Year: Never true    Ran Out of Food in the Last Year: Never true   Transportation Needs: No Transportation Needs    Lack of Transportation (Medical): No    Lack of Transportation (Non-Medical): No   Physical Activity: Inactive    Days of Exercise per Week: 0 days    Minutes of Exercise per Session: 0 min   Stress: No Stress Concern Present    Feeling of Stress : Not at all   Social Connections: Moderately Isolated    Frequency of Communication with Friends and Family: More than three times a week    Frequency of Social Gatherings with Friends and Family: Once a week    Attends Jainism Services: More than 4 times per year    Active Member of Clubs or Organizations: No    Attends Club or Organization Meetings: Never    Marital Status: Never    Housing Stability: Low Risk     Unable to Pay for Housing in the Last Year: No    Number of Places Lived in the Last Year: 1    Unstable Housing in the Last Year: No     Review of Systems   Unable to perform ROS: Mental status change   Constitutional:  Negative for chills, diaphoresis and fever.   Eyes:  Negative for pain.    Respiratory:  Negative for cough and shortness of breath.    Cardiovascular:  Negative for chest pain and leg swelling.   Gastrointestinal:  Negative for abdominal pain, constipation, diarrhea, nausea and vomiting.   Genitourinary:  Positive for difficulty urinating (chronic delgado). Negative for dysuria.   Musculoskeletal:  Positive for arthralgias and gait problem.   Skin:  Positive for color change and wound.   Neurological:  Positive for numbness. Negative for dizziness and headaches.   Psychiatric/Behavioral:  Positive for confusion. Negative for agitation. The patient is not nervous/anxious.    Objective:     Vital Signs (Most Recent):  Temp: 98.4 °F (36.9 °C) (03/19/22 1227)  Pulse: 79 (03/19/22 1227)  Resp: 18 (03/19/22 1227)  BP: (!) 149/66 (03/19/22 1227)  SpO2: 99 % (03/19/22 1227)   Vital Signs (24h Range):  Temp:  [97.8 °F (36.6 °C)-98.6 °F (37 °C)] 98.4 °F (36.9 °C)  Pulse:  [65-87] 79  Resp:  [12-20] 18  SpO2:  [95 %-100 %] 99 %  BP: (103-149)/(53-66) 149/66     Weight: 52.5 kg (115 lb 11.9 oz)  Body mass index is 18.13 kg/m².    Estimated Creatinine Clearance: 18.9 mL/min (A) (based on SCr of 2.3 mg/dL (H)).    Physical Exam  Constitutional:       General: She is not in acute distress.     Appearance: She is not ill-appearing, toxic-appearing or diaphoretic.   HENT:      Head: Normocephalic and atraumatic.   Eyes:      General: No scleral icterus.     Conjunctiva/sclera: Conjunctivae normal.   Cardiovascular:      Rate and Rhythm: Normal rate and regular rhythm.      Pulses: Decreased pulses.   Pulmonary:      Effort: Pulmonary effort is normal. No respiratory distress.   Abdominal:      General: There is no distension.      Palpations: Abdomen is soft.      Tenderness: There is no abdominal tenderness.   Musculoskeletal:         General: Deformity (right 2nd toe amputation) present.      Right lower leg: No edema.      Left lower leg: No edema.      Comments: Ischemic changes to right fifth toe.   Right interdigit wound. No bone exposure. No drainage.   Skin:     General: Skin is warm and dry.   Neurological:      Mental Status: She is alert.   Psychiatric:         Mood and Affect: Mood normal.         Behavior: Behavior normal.       Significant Labs: Urine Culture:   Recent Labs   Lab 10/12/21  1333 11/19/21  2230 01/10/22  0543 02/10/22  0529   LABURIN Multiple organisms isolated. None in predominance.  Repeat if  clinically necessary. No growth PSEUDOMONAS AERUGINOSA  10,000 - 49,999 cfu/ml  * PSEUDOMONAS AERUGINOSA  50,000 - 99,999 cfu/ml  *     Urine Studies:   Recent Labs   Lab 01/10/22  0543 02/10/22  0529 03/18/22  1659   COLORU Yellow Yellow Yellow   APPEARANCEUA Hazy* Hazy* Cloudy*   PHUR 6.0 6.0 5.0   SPECGRAV 1.025 1.005 1.020   PROTEINUA 2+* 1+* 2+*   GLUCUA Negative Negative Negative   KETONESU Negative Negative Negative   BILIRUBINUA Negative Negative Negative   OCCULTUA 1+* 2+* 1+*   NITRITE Negative Positive* Positive*   UROBILINOGEN Negative  --   --    LEUKOCYTESUR 2+* 3+* 2+*   RBCUA 7* 14* 13*   WBCUA 35* 84* >100*   BACTERIA Rare Many* Many*   SQUAMEPITHEL 5 0  --    HYALINECASTS 3* 0 0     Wound Culture: No results for input(s): LABAERO in the last 4320 hours.  All pertinent labs within the past 24 hours have been reviewed.  Recent Lab Results  (Last 5 results in the past 24 hours)        03/19/22  1225   03/19/22  0842   03/19/22  0451   03/19/22  0213   03/18/22  2225        Albumin     2.1           Alkaline Phosphatase     71           ALT     9           Anion Gap     9           AST     12           Baso #     0.03           Basophil %     0.3           BILIRUBIN TOTAL     0.2  Comment: For infants and newborns, interpretation of results should be based  on gestational age, weight and in agreement with clinical  observations.    Premature Infant recommended reference ranges:  Up to 24 hours.............<8.0 mg/dL  Up to 48 hours............<12.0 mg/dL  3-5  days..................<15.0 mg/dL  6-29 days.................<15.0 mg/dL             BUN     27           Calcium     9.0           Chloride     106           CO2     26           Creatinine     2.3           Differential Method     Automated           eGFR if      24.1           eGFR if non      20.9  Comment: Calculation used to obtain the estimated glomerular filtration  rate (eGFR) is the CKD-EPI equation.              Eos #     0.2           Eosinophil %     1.8           Glucose     144           Gran # (ANC)     7.0           Gran %     64.1           Hematocrit     22.1           Hemoglobin     6.9           Immature Grans (Abs)     0.03  Comment: Mild elevation in immature granulocytes is non specific and   can be seen in a variety of conditions including stress response,   acute inflammation, trauma and pregnancy. Correlation with other   laboratory and clinical findings is essential.             Immature Granulocytes     0.3           Lymph #     2.8           Lymph %     26.0           MCH     29.0           MCHC     31.2           MCV     93           Mono #     0.8           Mono %     7.5           MPV     12.2           nRBC     0           Platelets     165           POCT Glucose 116   115     142         Potassium     3.1           PROTEIN TOTAL     6.8            Acceptable         Yes       RBC     2.38           RDW     14.6           SARS-CoV-2 RNA, Amplification, Qual         Negative       Sodium     141           TSH     1.525           WBC     10.85                                  Significant Imaging: I have reviewed all pertinent imaging results/findings within the past 24 hours.

## 2022-03-19 NOTE — HPI
Ms. Adame is a 71 year old female with osteoarthritis, hypothyroidism, CVA with left-sided residual deficit, DM2, HTN, HLD, CKD, nephroliathiasis s/p left ureteral stent placement in 10/2021, urinary retention managed with chronic indwelling delgado, recurrent pseudomonal UTI, right second toe osteomyelitis s/p R toe amputation 1/2022 who presented to [facility] with acute metabolic encephalopathy secondary to catheter associated urinary tract infection and possible right toe infection.     Afebrile in the ED. WBC 13K. Inflammatory markers elevated. Cr 2.3 lactate WNL. UA positive for pyuria. Delgado exchanged in ED. Started empiric Vanc/Cefepime. Blood/urine cultures are pending. Noted to have gangrenous changes of her right fifth toe. MRI right foot reviewed and is concerning for osteomyelitis of the 4th and 5th distal metatarsals and phalanges.  ID consulted for antibiotic recommendations. Podiatry consulted. Patient mentation has improved.

## 2022-03-19 NOTE — ED NOTES
"Patient identifiers verified and correct for   LOC: The patient is awake, alert and aware of environment with an appropriate affect, the patient is oriented x 3 and speaking appropriately.   APPEARANCE: Patient appears comfortable and in no acute distress, patient is clean and well groomed.  SKIN: The skin is warm and dry, color consistent with ethnicity, patient has normal skin turgor and moist mucus membranes.   MUSCULOSKELETAL: Patient had left sided deficit from a stroke 18 years ago.  RESPIRATORY: Airway is open and patent, respirations are spontaneous, patient has a normal effort and rate, no accessory muscle use noted, pt placed on continuous pulse ox with O2 sats noted at 96% on room air.  CARDIAC: Pt  Placed on the cardiac monitor.   GASTRO: Soft and non tender to palpation, no distention noted, normoactive bowel sounds present in all four quadrants. Pt states bowel movements have been regular.  : Pt denies any pain or frequency with urination. Pt arrived with delgado catheter in place from rehab dated 02/09/2022.  NEURO: Pt opens eyes spontaneously, behavior appropriate to situation. Pt had stroke 18 years ago with left sided deficit.  Beatriz Adame, a 71 y.o. female presents to the ED w/ complaint of LOC while with PT at rehab.    Triage note:  Chief Complaint   Patient presents with    Altered Mental Status     Pt from [facility] for AMS that occurred 1 hour ago; pt was receiving therapy and "went out of it" for about 1 min; aaox4 at present     Review of patient's allergies indicates:   Allergen Reactions    Tomato (solanum lycopersicum) Hives and Itching     Past Medical History:   Diagnosis Date    Gastroparesis     GERD (gastroesophageal reflux disease)     History of Clostridium difficile colitis 07/24/2021    History of kidney stones     History of stroke     left side paralysis    Hyperlipidemia     Hypertension     Hypothyroidism     Iron deficiency anemia     " Osteoarthritis     Peripheral neuropathy     Stage IV CKD     Type II diabetes mellitus

## 2022-03-19 NOTE — HOSPITAL COURSE
71 y.o. who was admitted to hospital medicine for acute metabolic encephalopathy in setting of UTI and osteomyelitis. Pt was AFVSS and hemodynamically stable. Leukocytosis resolved w/ initiation of IV abx. UA infectious and initially started on IV cefepime. UC grew ESBL and patient was transitioned to IV ertapenem started 3/21 (7 day course). MRI of R foot and toes revealed osteomyelitis of the right 4th and 5th distal metatarsals and phalanges. Cx growing MRSA started on IV vancomycin 3/18. ID followed and provided recs. Podiatry completed successful amputation on 3/24. Patient discharged to SNF.

## 2022-03-19 NOTE — ED NOTES
Telemetry Verification   Patient placed on Telemetry Box  Verified with War Room  Box # [id number]   Monitor Tech    Rate 68   Rhythm NSR

## 2022-03-19 NOTE — ASSESSMENT & PLAN NOTE
Catheter associated UTI-present on admission   Possible right foot infection    · Patient initiated on broad-spectrum antibiotics in the ED - vancomycin ceftriaxone  · Will continue with broad-spectrum antibiotics and will change ceftriaxone to Cefepime in light of possible diabetic foot infection  · Patient has a history of pansensitive Klebsiella UTIs  · Nitrite positive urinalysis with gross parameters of infection on UA, urine culture pending  · Araiza catheter exchanged  · Blood cultures pending  · Encephalopathy improved with goal directed medical therapy for infections  · MRI of right foot-read pending   · Will need podiatry assessment - consult placed   · Delirium precautions, neuro checks, aspiration precautions  · Inflammatory markers elevated, ESR-95, CRP-64

## 2022-03-19 NOTE — ASSESSMENT & PLAN NOTE
Acute cystitis without hematuria    RESOLVED  · Patient initiated on broad-spectrum antibiotics in the ED - vancomycin ceftriaxone  · Will continue with broad-spectrum antibiotics and will change ceftriaxone to Cefepime in light of possible diabetic foot infection  · Patient has a history of pansensitive Klebsiella UTIs  · Nitrite positive urinalysis with gross parameters of infection on UA, urine culture pending  · Araiza catheter exchanged  · Blood cultures pending  · Encephalopathy improved with goal directed medical therapy for infections  · MRI of right foot reveals Osteomyelitis involving the right 4th and 5th metatarsals distally and the phalanges of the 4th and 5th toes.   · podiatry following  · Delirium precautions, neuro checks, aspiration precautions  · Inflammatory markers elevated, ESR-95, CRP-64

## 2022-03-19 NOTE — ASSESSMENT & PLAN NOTE
Assessment: Right 4th interdigital space wound with 4th-5th metatarsal and phalangeal osteomyelitis per MRI, wound stable. Chronic limb threatening ischemia with dry gangrene of right 5th digit.     Plan:  -Options may include (1) right 4th-5th ray amputations with shorter course of antibiotics and wound care, (2) bone biopsy with longer course of antibiotics and wound care, or (3) palliative care. Current peripheral circulation may not be adequate to clear infection or heal a surgical wound. Vascular workup required prior to further planning.  -Noninvasive vascular studies ordered, consult vascular surgery once resulted. Do not modify orders.  -Antibiotic plan per ID.   -WBAT LLE, WB to heel for transfers or short distances RLE.   -Dressing changes by nursing, ordered.   -Podiatry will follow.

## 2022-03-19 NOTE — SUBJECTIVE & OBJECTIVE
Interval History: NAEON. Pt seen and evaluated at bedside this am. Pt AAOx3. Seems to be at bl. Hgb 6.9 will transfuse 1 unit PRBC. MRI confirms osteomyelitis of R 4&5 toes. ID consulted. Podiatry following. Will continue IV vanc and cefepime at this time. UC pending    Review of Systems   Constitutional:  Negative for activity change, chills, diaphoresis, fatigue and fever.   HENT:  Negative for congestion, facial swelling, rhinorrhea and sore throat.    Eyes:  Negative for photophobia, pain, itching and visual disturbance.   Respiratory:  Negative for cough, chest tightness, shortness of breath and wheezing.    Cardiovascular:  Negative for chest pain, palpitations and leg swelling.   Gastrointestinal:  Negative for abdominal distention, abdominal pain, blood in stool, constipation, diarrhea, nausea and vomiting.   Genitourinary:  Positive for difficulty urinating (chronic delgado). Negative for dysuria, frequency, hematuria and urgency.   Musculoskeletal:  Positive for arthralgias and gait problem. Negative for back pain and neck stiffness.   Skin:  Positive for color change and wound.   Neurological:  Positive for numbness. Negative for dizziness, tremors, seizures, syncope, weakness, light-headedness and headaches.   Psychiatric/Behavioral:  Positive for confusion. Negative for agitation and hallucinations. The patient is not nervous/anxious.    Objective:     Vital Signs (Most Recent):  Temp: 98.6 °F (37 °C) (03/19/22 1655)  Pulse: 80 (03/19/22 1655)  Resp: 18 (03/19/22 1655)  BP: (!) 156/69 (03/19/22 1655)  SpO2: 99 % (03/19/22 1655)   Vital Signs (24h Range):  Temp:  [98 °F (36.7 °C)-98.6 °F (37 °C)] 98.6 °F (37 °C)  Pulse:  [66-87] 80  Resp:  [12-20] 18  SpO2:  [95 %-100 %] 99 %  BP: (116-156)/(58-69) 156/69     Weight: 52.5 kg (115 lb 11.9 oz)  Body mass index is 18.13 kg/m².    Intake/Output Summary (Last 24 hours) at 3/19/2022 1704  Last data filed at 3/18/2022 2051  Gross per 24 hour   Intake 1300 ml    Output --   Net 1300 ml      Physical Exam  Constitutional:       General: She is not in acute distress.     Appearance: She is well-developed. She is ill-appearing. She is not toxic-appearing or diaphoretic.   HENT:      Head: Atraumatic. No abrasion, contusion or laceration.      Nose: Nose normal.      Mouth/Throat:      Pharynx: No oropharyngeal exudate.   Eyes:      General: No scleral icterus.        Right eye: No discharge.         Left eye: No discharge.      Conjunctiva/sclera: Conjunctivae normal.      Pupils: Pupils are equal, round, and reactive to light.   Neck:      Vascular: No JVD.   Cardiovascular:      Rate and Rhythm: Normal rate and regular rhythm.      Heart sounds: Normal heart sounds. No murmur heard.    No friction rub. No gallop.   Pulmonary:      Effort: Pulmonary effort is normal. No accessory muscle usage or respiratory distress.      Breath sounds: Normal breath sounds. No wheezing.   Abdominal:      General: Bowel sounds are normal. There is no distension.      Palpations: Abdomen is soft. There is no mass.      Tenderness: There is no abdominal tenderness. There is no guarding or rebound.   Genitourinary:     Comments: Urinary catheter in place draining light yellow urine  Musculoskeletal:         General: No tenderness or deformity. Normal range of motion.      Cervical back: Normal range of motion and neck supple. No rigidity.      Comments: Righ 5th toe, dry gangrene - see photo.    Lymphadenopathy:      Cervical: No cervical adenopathy.   Skin:     General: Skin is warm and dry.      Capillary Refill: Capillary refill takes less than 2 seconds.      Findings: No bruising, ecchymosis, erythema, petechiae or rash.      Nails: There is no clubbing.   Neurological:      Mental Status: She is alert and oriented to person, place, and time.      Cranial Nerves: No cranial nerve deficit.      Sensory: No sensory deficit.      Motor: No abnormal muscle tone.      Coordination:  Coordination normal.      Deep Tendon Reflexes: Reflexes normal.   Psychiatric:         Speech: Speech normal.         Behavior: Behavior normal.         Thought Content: Thought content normal.         Judgment: Judgment normal.       Significant Labs: All pertinent labs within the past 24 hours have been reviewed.    Significant Imaging: I have reviewed all pertinent imaging results/findings within the past 24 hours.

## 2022-03-19 NOTE — PLAN OF CARE
Pt arrived from the ER via transport. Pt oriented to room and care plan. Pt A&Ox4, VSS, no chest pain,no N/V. BM today, soft, black tarry stool. Left sided deficits to upper extremity, limited mobility to LE, dry scaly skin to both feet, L foot infection between 4th & 5th metatarsal, R foot toe amputation. Skin breakdown visible at sacrum. Pt requires 2 person assist. Pt currently on a delgado catheter, placed today. Diminished lung sounds to bilateral bases, Spo2 98% room air, no SOB.  mg/dl. Will continue to monitor.     Problem: Adult Inpatient Plan of Care  Goal: Plan of Care Review  Outcome: Ongoing, Progressing  Goal: Patient-Specific Goal (Individualized)  Outcome: Ongoing, Progressing  Goal: Absence of Hospital-Acquired Illness or Injury  Outcome: Ongoing, Progressing  Goal: Optimal Comfort and Wellbeing  Outcome: Ongoing, Progressing  Goal: Readiness for Transition of Care  Outcome: Ongoing, Progressing     Problem: Diabetes Comorbidity  Goal: Blood Glucose Level Within Targeted Range  Outcome: Ongoing, Progressing     Problem: Fluid and Electrolyte Imbalance (Acute Kidney Injury/Impairment)  Goal: Fluid and Electrolyte Balance  Outcome: Ongoing, Progressing     Problem: Oral Intake Inadequate (Acute Kidney Injury/Impairment)  Goal: Optimal Nutrition Intake  Outcome: Ongoing, Progressing     Problem: Renal Function Impairment (Acute Kidney Injury/Impairment)  Goal: Effective Renal Function  Outcome: Ongoing, Progressing     Problem: Impaired Wound Healing  Goal: Optimal Wound Healing  Outcome: Ongoing, Progressing     Problem: Fall Injury Risk  Goal: Absence of Fall and Fall-Related Injury  Outcome: Ongoing, Progressing     Problem: Skin Injury Risk Increased  Goal: Skin Health and Integrity  Outcome: Ongoing, Progressing

## 2022-03-19 NOTE — CONSULTS
[facility] - Telemetry Stepdown ([place] [address])  Podiatry  Consult Note    Patient Name: Beatriz Adame  MRN: [id number]  Admission Date: 3/18/2022  Hospital Length of Stay: 0 days  Attending Physician: Arash Saini MD  Primary Care Provider: Dante Olivier MD     Inpatient consult to Podiatry  Consult performed by: Norman Higgins MD  Consult ordered by: Emile Hunt MD        Subjective:     History of Present Illness:  70 yo F with Hx of DM, CKD, CVA admitted 03/18 for encephalopathy. Podiatry consulted for right 5th toe dry gangrene and adjacent wound. Patient reports discoloration and pain in her right foot starting in January. She denies rest pain but admits to pain in the right forefoot on manipulation. She admits to prior right 2nd toe amputation by Dr Mason in January for infection.        Scheduled Meds:   atorvastatin  80 mg Oral QHS    ceFEPime (MAXIPIME) IVPB  1 g Intravenous Q12H    ferrous sulfate  1 tablet Oral Daily    gabapentin  300 mg Oral Daily    heparin (porcine)  5,000 Units Subcutaneous Q8H    insulin detemir U-100  3 Units Subcutaneous Daily    Lactobacillus rhamnosus GG  1 capsule Oral Daily    levothyroxine  75 mcg Oral Daily    metoclopramide HCl  10 mg Oral TID AC    pantoprazole  40 mg Oral Daily    potassium chloride  40 mEq Oral Q4H    tamsulosin  0.4 mg Oral Daily     Continuous Infusions:  PRN Meds:dextrose 10%, dextrose 10%, glucagon (human recombinant), glucose, glucose, insulin aspart U-100, naloxone, ondansetron, sodium chloride 0.9%, Pharmacy to dose Vancomycin consult **AND** vancomycin - pharmacy to dose    Review of patient's allergies indicates:   Allergen Reactions    Tomato (solanum lycopersicum) Hives and Itching        Past Medical History:   Diagnosis Date    Gastroparesis     GERD (gastroesophageal reflux disease)     History of Clostridium difficile colitis 07/24/2021    History of kidney stones     History of stroke     left side  paralysis    Hyperlipidemia     Hypertension     Hypothyroidism     Iron deficiency anemia     Osteoarthritis     Peripheral neuropathy     Stage IV CKD     Type II diabetes mellitus      Past Surgical History:   Procedure Laterality Date    APPENDECTOMY      CYSTOSCOPY W/ URETERAL STENT PLACEMENT Right 10/13/2021    Procedure: CYSTOSCOPY, WITH URETERAL STENT INSERTION;  Surgeon: Wanda Arroyo MD;  Location: [facility];  Service: Urology;  Laterality: Right;    ESOPHAGOGASTRODUODENOSCOPY N/A 11/23/2021    Procedure: EGD (ESOPHAGOGASTRODUODENOSCOPY);  Surgeon: Obed Jeong MD;  Location: [facility] ENDO ([address] [place] [address]);  Service: Endoscopy;  Laterality: N/A;    LAPAROSCOPIC APPENDECTOMY N/A 7/22/2021    Procedure: APPENDECTOMY, LAPAROSCOPIC;  Surgeon: Paulo Calvo Jr., MD;  Location: [facility];  Service: General;  Laterality: N/A;    TOE AMPUTATION Right 1/1/2022    Procedure: AMPUTATION, TOE;  Surgeon: Genaro Mason DPM;  Location: [facility];  Service: Podiatry;  Laterality: Right;    TUBAL LIGATION      URETEROSCOPIC REMOVAL OF URETERIC CALCULUS Right 12/22/2021    Procedure: REMOVAL, CALCULUS, URETER, URETEROSCOPIC;  Surgeon: Wanda Arroyo MD;  Location: [facility];  Service: Urology;  Laterality: Right;       Family History       Problem Relation (Age of Onset)    Alcohol abuse Father    Arthritis Mother    Asthma Brother    Diabetes Mother, Sister, Brother    Heart attack Mother, Father, Brother    Heart disease Mother, Brother    Hypertension Mother    Kidney disease Mother    Stroke Mother    Vision loss Mother          Tobacco Use    Smoking status: Never Smoker    Smokeless tobacco: Never Used   Substance and Sexual Activity    Alcohol use: No     Comment: socially    Drug use: No    Sexual activity: Not Currently     Review of Systems   Unable to perform ROS: Mental status change   Constitutional:  Negative for fever.   HENT:  Negative for ear pain.    Eyes:  Negative for pain.    Respiratory:  Negative for shortness of breath.    Cardiovascular:  Negative for chest pain.   Gastrointestinal:  Negative for abdominal pain, constipation, diarrhea, nausea and vomiting.   Genitourinary:  Negative for dysuria.   Musculoskeletal:  Positive for gait problem.   Skin:  Positive for color change and wound.   Neurological:  Positive for numbness. Negative for headaches.   Psychiatric/Behavioral:  Positive for confusion. Negative for agitation.    Objective:     Vital Signs (Most Recent):  Temp: 98.6 °F (37 °C) (03/19/22 0836)  Pulse: 83 (03/19/22 0836)  Resp: 17 (03/19/22 0836)  BP: 130/62 (03/19/22 0836)  SpO2: 98 % (03/19/22 0836) Vital Signs (24h Range):  Temp:  [97.8 °F (36.6 °C)-98.6 °F (37 °C)] 98.6 °F (37 °C)  Pulse:  [65-83] 83  Resp:  [12-20] 17  SpO2:  [95 %-100 %] 98 %  BP: (103-132)/(53-62) 130/62     Weight: 52.5 kg (115 lb 11.9 oz)  Body mass index is 18.13 kg/m².    Foot Exam    Right Foot/Ankle     Inspection and Palpation  Ecchymosis: none  Tenderness: (lateral forefoot)  Swelling: none   Skin Exam: ulcer; no drainage, no cellulitis and no erythema     Neurovascular  Dorsalis pedis: absent  Posterior tibial: absent  Saphenous nerve sensation: diminished  Tibial nerve sensation: diminished  Superficial peroneal nerve sensation: diminished  Deep peroneal nerve sensation: diminished  Sural nerve sensation: diminished    Comments  R 2nd toe amputated    Left Foot/Ankle      Inspection and Palpation  Ecchymosis: none  Tenderness: none   Swelling: none   Skin Exam: skin intact; no cellulitis, no ulcer and no erythema     Neurovascular  Dorsalis pedis: absent  Posterior tibial: absent  Saphenous nerve sensation: diminished  Tibial nerve sensation: diminished  Superficial peroneal nerve sensation: diminished  Deep peroneal nerve sensation: diminished  Sural nerve sensation: diminished        Laboratory:  Blood Cultures:   Recent Labs   Lab 03/18/22  1641   LABBLOO No Growth to date  No Growth  to date     CBC:   Recent Labs   Lab 03/19/22 0451   WBC 10.85   RBC 2.38*   HGB 6.9*   HCT 22.1*      MCV 93   MCH 29.0   MCHC 31.2*     CMP:   Recent Labs   Lab 03/19/22 0451   *   CALCIUM 9.0   ALBUMIN 2.1*   PROT 6.8      K 3.1*   CO2 26      BUN 27*   CREATININE 2.3*   ALKPHOS 71   ALT 9*   AST 12   BILITOT 0.2     CRP:   Recent Labs   Lab 03/18/22  1640   CRP 64.0*     ESR:   Recent Labs   Lab 03/18/22  1640   SEDRATE 95*     Microbiology Results (last 7 days)       Procedure Component Value Units Date/Time    Blood culture x two cultures. Draw prior to antibiotics. [[id number]] Collected: 03/18/22 1641    Order Status: Completed Specimen: Blood from Peripheral, Antecubital, Right Updated: 03/19/22 0115     Blood Culture, Routine No Growth to date    Narrative:      Aerobic and anaerobic    Blood culture x two cultures. Draw prior to antibiotics. [[id number]] Collected: 03/18/22 1641    Order Status: Completed Specimen: Blood from Peripheral, Antecubital, Right Updated: 03/19/22 0115     Blood Culture, Routine No Growth to date    Narrative:      Aerobic and anaerobic    Urine culture [[id number]] Collected: 03/18/22 1659    Order Status: No result Specimen: Urine Updated: 03/18/22 1817    Aerobic culture [[id number]] Collected: 03/18/22 1620    Order Status: Sent Specimen: Wound from Toe, Right Foot Updated: 03/18/22 1648          Specimen (24h ago, onward)                None            Diagnostic Results:  I have reviewed all pertinent imaging results/findings within the past 24 hours.    Clinical Findings:  -Right prior 2nd toe amputation, site healed.   -Right 4th interdigital wound extending into subcutaneous tissue, no direct probe to bone. No drainage, no malodor, no cellulitis. Tender.   -Right 5th toe and lateral forefoot ischemic changes with dry gangrene of 5th toe.   -Chronic ischemic changes of left foot.                             Assessment/Plan:     Critical lower limb  ischemia  See rest of plan    Osteomyelitis of right foot  Assessment: Right 4th interdigital space wound with 4th-5th metatarsal and phalangeal osteomyelitis per MRI, wound stable. Chronic limb threatening ischemia with dry gangrene of right 5th digit.     Plan:  -Options may include (1) right 4th-5th ray amputations with shorter course of antibiotics and wound care, (2) bone biopsy with longer course of antibiotics and wound care, or (3) palliative care. Current peripheral circulation may not be adequate to clear infection or heal a surgical wound. Vascular workup required prior to further planning.  -Noninvasive vascular studies ordered, consult vascular surgery once resulted. Do not modify orders.  -Antibiotic plan per ID.   -WBAT LLE, WB to heel for transfers or short distances RLE.   -Dressing changes by nursing, ordered.   -Podiatry will follow.        Thank you for your consult. I will follow-up with patient. Please contact us if you have any additional questions.      Norman Higgins DPM PGY-2  Podiatric Medicine & Surgery  [facility]  Secure Chat Preferred  Mobile: [phone]  Pager: [phone]

## 2022-03-19 NOTE — CONSULTS
[facility] - Telemetry Stepdown ([place] [id number])  Infectious Disease  Consult Note    Patient Name: Beatriz Adame  MRN: [id number]  Admission Date: 3/18/2022  Hospital Length of Stay: 0 days  Attending Physician: Stephanie Henry MD  Primary Care Provider: Dante Olivier MD     Isolation Status: No active isolations    Patient information was obtained from patient and past medical records.      Consults  Assessment/Plan:     Osteomyelitis of right foot     71 year old female with CVA, DM2, HTN, CKD, nephroliathiasis s/p left ureteral stent placement in 10/2021, urinary retention managed with chronic indwelling delgado, recurrent UTIs, right second toe osteomyelitis s/p R toe amputation 1/2022 who presented to [facility] with acute metabolic encephalopathy likely secondary to UTI and right foot infection.     Delgado exchanged in ED. Patient noted to have gangrenous changes of her right fifth toe. MRI right foot reviewed and is concerning for osteomyelitis of the 4th and 5th digits. Patient started empiric Vanc/Cefepime. Blood/urine cultures are pending.  ID consulted for antibiotic recommendations. Podiatry consulted. Patient mentation has improved on antibiotics.    Recommendations  · Agree with empiric Vancomycin and Cefepime (renal dosing). Monitor renal function closely  · Follow blood/urine cultures  · Vascular studies ordered in setting of ischemic changes on exam  · Will follow up podiatry surgical plans and make further recommendations accordingly  · If patient undergoes fourth and fifth toe amputations, this would achieve source control and long term antibiotics would not likely be indicated   · ID will follow.            Please call  or secure chat for any questions. Thank you.  Olga Lidia Sood PA-C  ID MAHENDRA Spectra: [id number]      Subjective:     Principal Problem: Acute metabolic encephalopathy    HPI: Ms. Aadme is a 71 year old female with osteoarthritis, hypothyroidism, CVA with left-sided  residual deficit, DM2, HTN, HLD, CKD, nephroliathiasis s/p left ureteral stent placement in 10/2021, urinary retention managed with chronic indwelling delgado, recurrent pseudomonal UTI, right second toe osteomyelitis s/p R toe amputation 1/2022 who presented to [facility] with acute metabolic encephalopathy secondary to catheter associated urinary tract infection and possible right toe infection.     Afebrile in the ED. WBC 13K. Inflammatory markers elevated. Cr 2.3 lactate WNL. UA positive for pyuria. Delgado exchanged in ED. Started empiric Vanc/Cefepime. Blood/urine cultures are pending. Noted to have gangrenous changes of her right fifth toe. MRI right foot reviewed and is concerning for osteomyelitis of the 4th and 5th distal metatarsals and phalanges.  ID consulted for antibiotic recommendations. Podiatry consulted. Patient mentation has improved.      Past Medical History:   Diagnosis Date    Gastroparesis     GERD (gastroesophageal reflux disease)     History of Clostridium difficile colitis 07/24/2021    History of kidney stones     History of stroke     left side paralysis    Hyperlipidemia     Hypertension     Hypothyroidism     Iron deficiency anemia     Osteoarthritis     Peripheral neuropathy     Stage IV CKD     Type II diabetes mellitus        Past Surgical History:   Procedure Laterality Date    APPENDECTOMY      CYSTOSCOPY W/ URETERAL STENT PLACEMENT Right 10/13/2021    Procedure: CYSTOSCOPY, WITH URETERAL STENT INSERTION;  Surgeon: Wanda Arroyo MD;  Location: [facility];  Service: Urology;  Laterality: Right;    ESOPHAGOGASTRODUODENOSCOPY N/A 11/23/2021    Procedure: EGD (ESOPHAGOGASTRODUODENOSCOPY);  Surgeon: Obed Jeong MD;  Location: [address]);  Service: Endoscopy;  Laterality: N/A;    LAPAROSCOPIC APPENDECTOMY N/A 7/22/2021    Procedure: APPENDECTOMY, LAPAROSCOPIC;  Surgeon: Paulo Calvo Jr., MD;  Location: [facility];  Service: General;   Laterality: N/A;    TOE AMPUTATION Right 1/1/2022    Procedure: AMPUTATION, TOE;  Surgeon: Genaro Mason DPM;  Location: [facility] OR;  Service: Podiatry;  Laterality: Right;    TUBAL LIGATION      URETEROSCOPIC REMOVAL OF URETERIC CALCULUS Right 12/22/2021    Procedure: REMOVAL, CALCULUS, URETER, URETEROSCOPIC;  Surgeon: Wanda Arroyo MD;  Location: [facility] OR;  Service: Urology;  Laterality: Right;       Review of patient's allergies indicates:   Allergen Reactions    Tomato (solanum lycopersicum) Hives and Itching       Medications:  Medications Prior to Admission   Medication Sig    amLODIPine (NORVASC) 5 MG tablet Take 10 mg by mouth every evening. Take 5 mg every morning and 10 mg at night    aspirin (ECOTRIN) 81 MG EC tablet Take 81 mg by mouth once daily.    b complex vitamins tablet Take 1 tablet by mouth once daily.    carvedilol (COREG) 25 MG tablet Take 1 tablet (25 mg total) by mouth 2 (two) times daily.    clopidogreL (PLAVIX) 75 mg tablet Take 75 mg by mouth.    DULoxetine (CYMBALTA) 30 MG capsule Take 1 capsule (30 mg total) by mouth once daily.    ferrous sulfate (FEOSOL) 325 mg (65 mg iron) Tab tablet Take 325 mg by mouth daily with breakfast. Off at present    gabapentin (NEURONTIN) 300 MG capsule Take 1 capsule (300 mg total) by mouth once daily.    HYDROcodone-acetaminophen (NORCO) 5-325 mg per tablet Take 1 tablet by mouth every 6 (six) hours as needed for Pain.    insulin (BASAGLAR KWIKPEN U-100 INSULIN) glargine 100 units/mL (3mL) SubQ pen Inject 5 Units into the skin once daily.    lactulose (CHRONULAC) 10 gram/15 mL solution Take 10 g by mouth 2 (two) times a day.    levothyroxine (SYNTHROID) 75 MCG tablet Take 75 mcg by mouth once daily.    metoclopramide HCl (REGLAN) 10 MG tablet Take 1 tablet (10 mg total) by mouth 3 (three) times daily before meals.    omeprazole (PRILOSEC) 20 MG capsule Take 20 mg by mouth 2 (two) times daily.    ondansetron (ZOFRAN) 4 MG tablet  "Take 1 tablet (4 mg total) by mouth every 8 (eight) hours as needed for Nausea.    rosuvastatin (CRESTOR) 40 MG Tab Take 40 mg by mouth once daily.    tamsulosin (FLOMAX) 0.4 mg Cap Take 1 capsule (0.4 mg total) by mouth once daily.    traZODone (DESYREL) 100 MG tablet Take 1 tablet (100 mg total) by mouth once daily.     Antibiotics (From admission, onward)                Start     Stop Route Frequency Ordered    03/19/22 0100  cefepime in dextrose 5 % 1 gram/50 mL IVPB 1 g         -- IV Every 12 hours (non-standard times) 03/18/22 2325 03/19/22 0025  vancomycin - pharmacy to dose  (vancomycin IVPB)        "And" Linked Group Details    -- IV pharmacy to manage frequency 03/18/22 2325          Antifungals (From admission, onward)                None          Antivirals (From admission, onward)      None             Immunization History   Administered Date(s) Administered    COVID-19, vector-nr, rS-Ad26, PF (ProteoMediX) 03/06/2021    PPD Test 01/06/2022       Family History       Problem Relation (Age of Onset)    Alcohol abuse Father    Arthritis Mother    Asthma Brother    Diabetes Mother, Sister, Brother    Heart attack Mother, Father, Brother    Heart disease Mother, Brother    Hypertension Mother    Kidney disease Mother    Stroke Mother    Vision loss Mother          Social History     Socioeconomic History    Marital status: Single   Tobacco Use    Smoking status: Never Smoker    Smokeless tobacco: Never Used   Substance and Sexual Activity    Alcohol use: No     Comment: socially    Drug use: No    Sexual activity: Not Currently     Social Determinants of Health     Financial Resource Strain: Low Risk     Difficulty of Paying Living Expenses: Not hard at all   Food Insecurity: No Food Insecurity    Worried About Running Out of Food in the Last Year: Never true    Ran Out of Food in the Last Year: Never true   Transportation Needs: No Transportation Needs    Lack of Transportation (Medical): No "    Lack of Transportation (Non-Medical): No   Physical Activity: Inactive    Days of Exercise per Week: 0 days    Minutes of Exercise per Session: 0 min   Stress: No Stress Concern Present    Feeling of Stress : Not at all   Social Connections: Moderately Isolated    Frequency of Communication with Friends and Family: More than three times a week    Frequency of Social Gatherings with Friends and Family: Once a week    Attends Yarsani Services: More than 4 times per year    Active Member of Clubs or Organizations: No    Attends Club or Organization Meetings: Never    Marital Status: Never    Housing Stability: Low Risk     Unable to Pay for Housing in the Last Year: No    Number of Places Lived in the Last Year: 1    Unstable Housing in the Last Year: No     Review of Systems   Unable to perform ROS: Mental status change   Constitutional:  Negative for chills, diaphoresis and fever.   Eyes:  Negative for pain.   Respiratory:  Negative for cough and shortness of breath.    Cardiovascular:  Negative for chest pain and leg swelling.   Gastrointestinal:  Negative for abdominal pain, constipation, diarrhea, nausea and vomiting.   Genitourinary:  Positive for difficulty urinating (chronic delgado). Negative for dysuria.   Musculoskeletal:  Positive for arthralgias and gait problem.   Skin:  Positive for color change and wound.   Neurological:  Positive for numbness. Negative for dizziness and headaches.   Psychiatric/Behavioral:  Positive for confusion. Negative for agitation. The patient is not nervous/anxious.    Objective:     Vital Signs (Most Recent):  Temp: 98.4 °F (36.9 °C) (03/19/22 1227)  Pulse: 79 (03/19/22 1227)  Resp: 18 (03/19/22 1227)  BP: (!) 149/66 (03/19/22 1227)  SpO2: 99 % (03/19/22 1227)   Vital Signs (24h Range):  Temp:  [97.8 °F (36.6 °C)-98.6 °F (37 °C)] 98.4 °F (36.9 °C)  Pulse:  [65-87] 79  Resp:  [12-20] 18  SpO2:  [95 %-100 %] 99 %  BP: (103-149)/(53-66) 149/66     Weight:  52.5 kg (115 lb 11.9 oz)  Body mass index is 18.13 kg/m².    Estimated Creatinine Clearance: 18.9 mL/min (A) (based on SCr of 2.3 mg/dL (H)).    Physical Exam  Constitutional:       General: She is not in acute distress.     Appearance: She is not ill-appearing, toxic-appearing or diaphoretic.   HENT:      Head: Normocephalic and atraumatic.   Eyes:      General: No scleral icterus.     Conjunctiva/sclera: Conjunctivae normal.   Cardiovascular:      Rate and Rhythm: Normal rate and regular rhythm.      Pulses: Decreased pulses.   Pulmonary:      Effort: Pulmonary effort is normal. No respiratory distress.   Abdominal:      General: There is no distension.      Palpations: Abdomen is soft.      Tenderness: There is no abdominal tenderness.   Musculoskeletal:         General: Deformity (right 2nd toe amputation) present.      Right lower leg: No edema.      Left lower leg: No edema.      Comments: Ischemic changes to right fifth toe.  Right interdigit wound. No bone exposure. No drainage.   Skin:     General: Skin is warm and dry.   Neurological:      Mental Status: She is alert.   Psychiatric:         Mood and Affect: Mood normal.         Behavior: Behavior normal.       Significant Labs: Urine Culture:   Recent Labs   Lab 10/12/21  1333 11/19/21  2230 01/10/22  0543 02/10/22  0529   LABURIN Multiple organisms isolated. None in predominance.  Repeat if  clinically necessary. No growth PSEUDOMONAS AERUGINOSA  10,000 - 49,999 cfu/ml  * PSEUDOMONAS AERUGINOSA  50,000 - 99,999 cfu/ml  *     Urine Studies:   Recent Labs   Lab 01/10/22  0543 02/10/22  0529 03/18/22  1659   COLORU Yellow Yellow Yellow   APPEARANCEUA Hazy* Hazy* Cloudy*   PHUR 6.0 6.0 5.0   SPECGRAV 1.025 1.005 1.020   PROTEINUA 2+* 1+* 2+*   GLUCUA Negative Negative Negative   KETONESU Negative Negative Negative   BILIRUBINUA Negative Negative Negative   OCCULTUA 1+* 2+* 1+*   NITRITE Negative Positive* Positive*   UROBILINOGEN Negative  --   --     LEUKOCYTESUR 2+* 3+* 2+*   RBCUA 7* 14* 13*   WBCUA 35* 84* >100*   BACTERIA Rare Many* Many*   SQUAMEPITHEL 5 0  --    HYALINECASTS 3* 0 0     Wound Culture: No results for input(s): LABAERO in the last 4320 hours.  All pertinent labs within the past 24 hours have been reviewed.  Recent Lab Results  (Last 5 results in the past 24 hours)        03/19/22  1225   03/19/22  0842   03/19/22  0451   03/19/22  0213   03/18/22  2225        Albumin     2.1           Alkaline Phosphatase     71           ALT     9           Anion Gap     9           AST     12           Baso #     0.03           Basophil %     0.3           BILIRUBIN TOTAL     0.2  Comment: For infants and newborns, interpretation of results should be based  on gestational age, weight and in agreement with clinical  observations.    Premature Infant recommended reference ranges:  Up to 24 hours.............<8.0 mg/dL  Up to 48 hours............<12.0 mg/dL  3-5 days..................<15.0 mg/dL  6-29 days.................<15.0 mg/dL             BUN     27           Calcium     9.0           Chloride     106           CO2     26           Creatinine     2.3           Differential Method     Automated           eGFR if      24.1           eGFR if non      20.9  Comment: Calculation used to obtain the estimated glomerular filtration  rate (eGFR) is the CKD-EPI equation.              Eos #     0.2           Eosinophil %     1.8           Glucose     144           Gran # (ANC)     7.0           Gran %     64.1           Hematocrit     22.1           Hemoglobin     6.9           Immature Grans (Abs)     0.03  Comment: Mild elevation in immature granulocytes is non specific and   can be seen in a variety of conditions including stress response,   acute inflammation, trauma and pregnancy. Correlation with other   laboratory and clinical findings is essential.             Immature Granulocytes     0.3           Lymph #     2.8            Lymph %     26.0           MCH     29.0           MCHC     31.2           MCV     93           Mono #     0.8           Mono %     7.5           MPV     12.2           nRBC     0           Platelets     165           POCT Glucose 116   115     142         Potassium     3.1           PROTEIN TOTAL     6.8            Acceptable         Yes       RBC     2.38           RDW     14.6           SARS-CoV-2 RNA, Amplification, Qual         Negative       Sodium     141           TSH     1.525           WBC     10.85                                  Significant Imaging: I have reviewed all pertinent imaging results/findings within the past 24 hours.

## 2022-03-19 NOTE — SUBJECTIVE & OBJECTIVE
Scheduled Meds:   atorvastatin  80 mg Oral QHS    ceFEPime (MAXIPIME) IVPB  1 g Intravenous Q12H    ferrous sulfate  1 tablet Oral Daily    gabapentin  300 mg Oral Daily    heparin (porcine)  5,000 Units Subcutaneous Q8H    insulin detemir U-100  3 Units Subcutaneous Daily    Lactobacillus rhamnosus GG  1 capsule Oral Daily    levothyroxine  75 mcg Oral Daily    metoclopramide HCl  10 mg Oral TID AC    pantoprazole  40 mg Oral Daily    potassium chloride  40 mEq Oral Q4H    tamsulosin  0.4 mg Oral Daily     Continuous Infusions:  PRN Meds:dextrose 10%, dextrose 10%, glucagon (human recombinant), glucose, glucose, insulin aspart U-100, naloxone, ondansetron, sodium chloride 0.9%, Pharmacy to dose Vancomycin consult **AND** vancomycin - pharmacy to dose    Review of patient's allergies indicates:   Allergen Reactions    Tomato (solanum lycopersicum) Hives and Itching        Past Medical History:   Diagnosis Date    Gastroparesis     GERD (gastroesophageal reflux disease)     History of Clostridium difficile colitis 07/24/2021    History of kidney stones     History of stroke     left side paralysis    Hyperlipidemia     Hypertension     Hypothyroidism     Iron deficiency anemia     Osteoarthritis     Peripheral neuropathy     Stage IV CKD     Type II diabetes mellitus      Past Surgical History:   Procedure Laterality Date    APPENDECTOMY      CYSTOSCOPY W/ URETERAL STENT PLACEMENT Right 10/13/2021    Procedure: CYSTOSCOPY, WITH URETERAL STENT INSERTION;  Surgeon: Wanda Arroyo MD;  Location: [facility];  Service: Urology;  Laterality: Right;    ESOPHAGOGASTRODUODENOSCOPY N/A 11/23/2021    Procedure: EGD (ESOPHAGOGASTRODUODENOSCOPY);  Surgeon: Obed Jeong MD;  Location: [address];  Service: Endoscopy;  Laterality: N/A;    LAPAROSCOPIC APPENDECTOMY N/A 7/22/2021    Procedure: APPENDECTOMY, LAPAROSCOPIC;  Surgeon: Paulo Calvo Jr., MD;  Location: [facility];  Service: General;  Laterality: N/A;     TOE AMPUTATION Right 1/1/2022    Procedure: AMPUTATION, TOE;  Surgeon: Genaro Mason DPM;  Location: [facility] OR;  Service: Podiatry;  Laterality: Right;    TUBAL LIGATION      URETEROSCOPIC REMOVAL OF URETERIC CALCULUS Right 12/22/2021    Procedure: REMOVAL, CALCULUS, URETER, URETEROSCOPIC;  Surgeon: Wanda Arroyo MD;  Location: [facility];  Service: Urology;  Laterality: Right;       Family History       Problem Relation (Age of Onset)    Alcohol abuse Father    Arthritis Mother    Asthma Brother    Diabetes Mother, Sister, Brother    Heart attack Mother, Father, Brother    Heart disease Mother, Brother    Hypertension Mother    Kidney disease Mother    Stroke Mother    Vision loss Mother          Tobacco Use    Smoking status: Never Smoker    Smokeless tobacco: Never Used   Substance and Sexual Activity    Alcohol use: No     Comment: socially    Drug use: No    Sexual activity: Not Currently     Review of Systems   Unable to perform ROS: Mental status change   Constitutional:  Negative for fever.   HENT:  Negative for ear pain.    Eyes:  Negative for pain.   Respiratory:  Negative for shortness of breath.    Cardiovascular:  Negative for chest pain.   Gastrointestinal:  Negative for abdominal pain, constipation, diarrhea, nausea and vomiting.   Genitourinary:  Negative for dysuria.   Musculoskeletal:  Positive for gait problem.   Skin:  Positive for color change and wound.   Neurological:  Positive for numbness. Negative for headaches.   Psychiatric/Behavioral:  Positive for confusion. Negative for agitation.    Objective:     Vital Signs (Most Recent):  Temp: 98.6 °F (37 °C) (03/19/22 0836)  Pulse: 83 (03/19/22 0836)  Resp: 17 (03/19/22 0836)  BP: 130/62 (03/19/22 0836)  SpO2: 98 % (03/19/22 0836) Vital Signs (24h Range):  Temp:  [97.8 °F (36.6 °C)-98.6 °F (37 °C)] 98.6 °F (37 °C)  Pulse:  [65-83] 83  Resp:  [12-20] 17  SpO2:  [95 %-100 %] 98 %  BP: (103-132)/(53-62) 130/62     Weight: 52.5 kg (115 lb 11.9  oz)  Body mass index is 18.13 kg/m².    Foot Exam    Right Foot/Ankle     Inspection and Palpation  Ecchymosis: none  Tenderness: (lateral forefoot)  Swelling: none   Skin Exam: ulcer; no drainage, no cellulitis and no erythema     Neurovascular  Dorsalis pedis: absent  Posterior tibial: absent  Saphenous nerve sensation: diminished  Tibial nerve sensation: diminished  Superficial peroneal nerve sensation: diminished  Deep peroneal nerve sensation: diminished  Sural nerve sensation: diminished    Comments  R 2nd toe amputated    Left Foot/Ankle      Inspection and Palpation  Ecchymosis: none  Tenderness: none   Swelling: none   Skin Exam: skin intact; no cellulitis, no ulcer and no erythema     Neurovascular  Dorsalis pedis: absent  Posterior tibial: absent  Saphenous nerve sensation: diminished  Tibial nerve sensation: diminished  Superficial peroneal nerve sensation: diminished  Deep peroneal nerve sensation: diminished  Sural nerve sensation: diminished        Laboratory:  Blood Cultures:   Recent Labs   Lab 03/18/22  1641   LABBLOO No Growth to date  No Growth to date     CBC:   Recent Labs   Lab 03/19/22  0451   WBC 10.85   RBC 2.38*   HGB 6.9*   HCT 22.1*      MCV 93   MCH 29.0   MCHC 31.2*     CMP:   Recent Labs   Lab 03/19/22  0451   *   CALCIUM 9.0   ALBUMIN 2.1*   PROT 6.8      K 3.1*   CO2 26      BUN 27*   CREATININE 2.3*   ALKPHOS 71   ALT 9*   AST 12   BILITOT 0.2     CRP:   Recent Labs   Lab 03/18/22  1640   CRP 64.0*     ESR:   Recent Labs   Lab 03/18/22  1640   SEDRATE 95*     Microbiology Results (last 7 days)       Procedure Component Value Units Date/Time    Blood culture x two cultures. Draw prior to antibiotics. [[id number]] Collected: 03/18/22 1641    Order Status: Completed Specimen: Blood from Peripheral, Antecubital, Right Updated: 03/19/22 0115     Blood Culture, Routine No Growth to date    Narrative:      Aerobic and anaerobic    Blood culture x two cultures.  Draw prior to antibiotics. [[id number]] Collected: 03/18/22 1641    Order Status: Completed Specimen: Blood from Peripheral, Antecubital, Right Updated: 03/19/22 0115     Blood Culture, Routine No Growth to date    Narrative:      Aerobic and anaerobic    Urine culture [[id number]] Collected: 03/18/22 1659    Order Status: No result Specimen: Urine Updated: 03/18/22 1817    Aerobic culture [[id number]] Collected: 03/18/22 1620    Order Status: Sent Specimen: Wound from Toe, Right Foot Updated: 03/18/22 1648          Specimen (24h ago, onward)                None            Diagnostic Results:  I have reviewed all pertinent imaging results/findings within the past 24 hours.    Clinical Findings:  -Right prior 2nd toe amputation, site healed.   -Right 4th interdigital wound extending into subcutaneous tissue, no direct probe to bone. No drainage, no malodor, no cellulitis. Tender.   -Right 5th toe and lateral forefoot ischemic changes with dry gangrene of 5th toe.   -Chronic ischemic changes of left foot.

## 2022-03-19 NOTE — ASSESSMENT & PLAN NOTE
ID consult received. Chart reviewed. Continue empiric Vancomycin and Cefepime (renal dosing). Vanc random ordered. Full consult note with recommendations to follow.      In the interim, please call or secure chat with any questions.    Thank you,  Olga Lidia Sood PA-C  ID MAHENDRA Spectra: [id number]

## 2022-03-19 NOTE — ASSESSMENT & PLAN NOTE
· Holding home agents including Coreg and amlodipine due to soft blood pressures on admission, can be reintroduced as blood pressure currency rebuilds

## 2022-03-19 NOTE — PROGRESS NOTES
Pharmacokinetic Assessment Follow Up: IV Vancomycin    Vancomycin serum concentration assessment(s):    · The random level was drawn correctly and can be used to guide therapy at this time. The measurement is below the desired definitive target range of 10 to 20 mcg/mL.    Vancomycin Regimen Plan:    · Give IV Vancomycin 1000 mg x 1   · Draw a vancomycin random on 03/20 @ 14:30  · Desired empiric serum concentration is 10 - 20     Drug levels (last 3 results):  Recent Labs   Lab Result Units 03/19/22  1141   Vancomycin, Random ug/mL 2.7       Pharmacy will continue to follow and monitor vancomycin.    Please contact pharmacy at extension [phone] for questions regarding this assessment.    Thank you for the consult,   Marcella Blackwell       Patient brief summary:  Beatriz Adame is a 71 y.o. female initiated on antimicrobial therapy with IV Vancomycin for treatment of   skin & soft tissue infection    The patient's current regimen is Vancomycin pulse dosing.     Drug Allergies:   Review of patient's allergies indicates:   Allergen Reactions    Tomato (solanum lycopersicum) Hives and Itching       Actual Body Weight:   52.5 Kg.    Renal Function:   Estimated Creatinine Clearance: 18.9 mL/min (A) (based on SCr of 2.3 mg/dL (H)).,     Dialysis Method (if applicable):  N/A    CBC (last 72 hours):  Recent Labs   Lab Result Units 03/18/22  1640 03/19/22  0451   WBC K/uL 13.84* 10.85   Hemoglobin g/dL 7.3* 6.9*   Hematocrit % 23.7* 22.1*   Platelets K/uL 156 165   Gran % % 74.7* 64.1   Lymph % % 17.0* 26.0   Mono % % 6.7 7.5   Eosinophil % % 1.1 1.8   Basophil % % 0.2 0.3   Differential Method  Automated Automated       Metabolic Panel (last 72 hours):  Recent Labs   Lab Result Units 03/18/22  1640 03/18/22  1659 03/19/22  0451   Sodium mmol/L 141  --  141   Potassium mmol/L 3.6  --  3.1*   Chloride mmol/L 105  --  106   CO2 mmol/L 27  --  26   Glucose mg/dL 136*  --  144*   Glucose, UA   --  Negative  --    BUN mg/dL 28*   --  27*   Creatinine mg/dL 2.3*  --  2.3*   Albumin g/dL 2.4*  --  2.1*   Total Bilirubin mg/dL 0.2  --  0.2   Alkaline Phosphatase U/L 75  --  71   AST U/L 14  --  12   ALT U/L 10  --  9*   Magnesium mg/dL 2.3  --   --    Phosphorus mg/dL 3.3  --   --        Vancomycin Administrations:  vancomycin given in the last 96 hours                   vancomycin 750 mg in dextrose 5 % 250 mL IVPB (ready to mix system) (mg) 750 mg New Bag 03/18/22 1851                Microbiologic Results:  Microbiology Results (last 7 days)     Procedure Component Value Units Date/Time    Blood culture x two cultures. Draw prior to antibiotics. [[id number]] Collected: 03/18/22 1641    Order Status: Completed Specimen: Blood from Peripheral, Antecubital, Right Updated: 03/19/22 0115     Blood Culture, Routine No Growth to date    Narrative:      Aerobic and anaerobic    Blood culture x two cultures. Draw prior to antibiotics. [[id number]] Collected: 03/18/22 1641    Order Status: Completed Specimen: Blood from Peripheral, Antecubital, Right Updated: 03/19/22 0115     Blood Culture, Routine No Growth to date    Narrative:      Aerobic and anaerobic    Urine culture [[id number]] Collected: 03/18/22 1659    Order Status: No result Specimen: Urine Updated: 03/18/22 1817    Aerobic culture [[id number]] Collected: 03/18/22 1620    Order Status: Sent Specimen: Wound from Toe, Right Foot Updated: 03/18/22 1648

## 2022-03-19 NOTE — HPI
"Patient is a 71 year old female with osteoarthritis, hypothyroidism, CVA with left-sided residual deficit, DM2, HTN, HLD, CKD, chronic anemia, recurrent pseudomonal UTI, nephroliathiasis s/p left ureteral stent placement in 10/2021, urinary retention with chronic indwelling delgado catheter, diabetic foot ulcer s/p R toe amputation, chronic anemia, who presents with acute metabolic encephalopathy secondary to catheter associated urinary tract infection and possible right toe infection.   She is being admitted for [facility].     Initially in ED patient was found to have a white count of 13,000 and a left shift / neutrophilic predominance.  While not overtly septic, sepsis protocol was initiated - patient pancultured and broad-spectrum antibiotics with vancomycin and ceftriaxone was initiated.  Vital signs were within normal limits however blood pressure was at the lower range of normal for this patient with a systolic blood pressure of 110.  Lactic acid was obtained and was within normal limits.     Patient is alert on my examination she is able to describe that she is at the hospital for a catheter exchange."  The her catheter has been exchanged in the ED and she has had the benefit of broad-spectrum antibiotics since her admission there.  It is clear that her encephalopathy has already improved with the management of her very obvious catheter associated urinary tract infection.  The ED provider also had a concern for a right 5th toe infection - which appears to be chronically devascularized and gangrenous on examination.  Plain films of the affected foot were unrevealing however patient has just returned from MRI and interpretation is pending.  Inflammatory markers are notably elevated.      She will be admitted to Hospital Medicine for ongoing management of her catheter associated urinary tract infection and possible right foot infection.      "

## 2022-03-19 NOTE — ASSESSMENT & PLAN NOTE
· Patient with history of urinary retention and Araiza catheter in place since at least January of 2022  · Araiza catheter last exchanged 02/22/2022  · Araiza catheter exchanged in ED  · Patient continues Flomax per home dose

## 2022-03-19 NOTE — ASSESSMENT & PLAN NOTE
[FreeTextEntry1] : Pt with above medical issues which requires extra work in addition to the well visit.\par Labs sent\par Lexapro raised - cally to be adjusted.\par Health counseling given\par FU in [place] · home gabapentin 300 mg once daily

## 2022-03-19 NOTE — ASSESSMENT & PLAN NOTE
Osteomyelitis of R 2nd toe    -MRI revealed Osteomyelitis involving the right 4th and 5th metatarsals distally and the phalanges of the 4th and 5th toes.   -XR R foot showed no acute bony abnormalities  -podiatry consulted, appreciate recs   -Options may include (1) right 4th-5th ray amputations with shorter course of antibiotics and wound care, (2) bone biopsy with longer course of antibiotics and wound care, or (3) palliative care. Current peripheral circulation may not be adequate to clear infection or heal a surgical wound. Vascular workup required prior to further planning.   -Noninvasive vascular studies ordered, consult vascular surgery once resulted. Do not modify orders.   -Antibiotic plan per ID.    -WBAT LLE, WB to heel for transfers or short distances RLE.   -ID consulted, appreciate recs   -empiric Vancomycin and Cefepime (renal dosing). Monitor renal function closely  -Follow blood/urine cultures

## 2022-03-19 NOTE — PROGRESS NOTES
"[facility] - Telemetry Stepdown ([address] Medicine  Progress Note    Patient Name: Beatriz Adame  MRN: [id number]  Patient Class: OP- Observation   Admission Date: 3/18/2022  Length of Stay: 0 days  Attending Physician: Stephanie Henry MD  Primary Care Provider: Dante Olivier MD        Subjective:     Principal Problem:Osteomyelitis of right foot        HPI:  Patient is a 71 year old female with osteoarthritis, hypothyroidism, CVA with left-sided residual deficit, DM2, HTN, HLD, CKD, chronic anemia, recurrent pseudomonal UTI, nephroliathiasis s/p left ureteral stent placement in 10/2021, urinary retention with chronic indwelling delgado catheter, diabetic foot ulcer s/p R toe amputation, chronic anemia, who presents with acute metabolic encephalopathy secondary to catheter associated urinary tract infection and possible right toe infection.   She is being admitted for [facility].     Initially in ED patient was found to have a white count of 13,000 and a left shift / neutrophilic predominance.  While not overtly septic, sepsis protocol was initiated - patient pancultured and broad-spectrum antibiotics with vancomycin and ceftriaxone was initiated.  Vital signs were within normal limits however blood pressure was at the lower range of normal for this patient with a systolic blood pressure of 110.  Lactic acid was obtained and was within normal limits.     Patient is alert on my examination she is able to describe that she is at the hospital for a catheter exchange."  The her catheter has been exchanged in the ED and she has had the benefit of broad-spectrum antibiotics since her admission there.  It is clear that her encephalopathy has already improved with the management of her very obvious catheter associated urinary tract infection.  The ED provider also had a concern for a right 5th toe infection - which appears to be chronically devascularized and gangrenous on examination.  Plain " films of the affected foot were unrevealing however patient has just returned from MRI and interpretation is pending.  Inflammatory markers are notably elevated.      She will be admitted to Hospital Medicine for ongoing management of her catheter associated urinary tract infection and possible right foot infection.          Overview/Hospital Course:  Pt placed in observation for acute metabolic encephalopathy in setting of UTI and osteomyelitis. Started on IV abx. ID and podiatry following.      Interval History: NAEON. Pt seen and evaluated at bedside this am. Pt AAOx3. Seems to be at bl. Hgb 6.9 will transfuse 1 unit PRBC. MRI confirms osteomyelitis of R 4&5 toes. ID consulted. Podiatry following. Will continue IV vanc and cefepime at this time. UC pending    Review of Systems   Constitutional:  Negative for activity change, chills, diaphoresis, fatigue and fever.   HENT:  Negative for congestion, facial swelling, rhinorrhea and sore throat.    Eyes:  Negative for photophobia, pain, itching and visual disturbance.   Respiratory:  Negative for cough, chest tightness, shortness of breath and wheezing.    Cardiovascular:  Negative for chest pain, palpitations and leg swelling.   Gastrointestinal:  Negative for abdominal distention, abdominal pain, blood in stool, constipation, diarrhea, nausea and vomiting.   Genitourinary:  Positive for difficulty urinating (chronic delgado). Negative for dysuria, frequency, hematuria and urgency.   Musculoskeletal:  Positive for arthralgias and gait problem. Negative for back pain and neck stiffness.   Skin:  Positive for color change and wound.   Neurological:  Positive for numbness. Negative for dizziness, tremors, seizures, syncope, weakness, light-headedness and headaches.   Psychiatric/Behavioral:  Positive for confusion. Negative for agitation and hallucinations. The patient is not nervous/anxious.    Objective:     Vital Signs (Most Recent):  Temp: 98.6 °F (37 °C) (03/19/22  1655)  Pulse: 80 (03/19/22 1655)  Resp: 18 (03/19/22 1655)  BP: (!) 156/69 (03/19/22 1655)  SpO2: 99 % (03/19/22 1655)   Vital Signs (24h Range):  Temp:  [98 °F (36.7 °C)-98.6 °F (37 °C)] 98.6 °F (37 °C)  Pulse:  [66-87] 80  Resp:  [12-20] 18  SpO2:  [95 %-100 %] 99 %  BP: (116-156)/(58-69) 156/69     Weight: 52.5 kg (115 lb 11.9 oz)  Body mass index is 18.13 kg/m².    Intake/Output Summary (Last 24 hours) at 3/19/2022 1704  Last data filed at 3/18/2022 2051  Gross per 24 hour   Intake 1300 ml   Output --   Net 1300 ml      Physical Exam  Constitutional:       General: She is not in acute distress.     Appearance: She is well-developed. She is ill-appearing. She is not toxic-appearing or diaphoretic.   HENT:      Head: Atraumatic. No abrasion, contusion or laceration.      Nose: Nose normal.      Mouth/Throat:      Pharynx: No oropharyngeal exudate.   Eyes:      General: No scleral icterus.        Right eye: No discharge.         Left eye: No discharge.      Conjunctiva/sclera: Conjunctivae normal.      Pupils: Pupils are equal, round, and reactive to light.   Neck:      Vascular: No JVD.   Cardiovascular:      Rate and Rhythm: Normal rate and regular rhythm.      Heart sounds: Normal heart sounds. No murmur heard.    No friction rub. No gallop.   Pulmonary:      Effort: Pulmonary effort is normal. No accessory muscle usage or respiratory distress.      Breath sounds: Normal breath sounds. No wheezing.   Abdominal:      General: Bowel sounds are normal. There is no distension.      Palpations: Abdomen is soft. There is no mass.      Tenderness: There is no abdominal tenderness. There is no guarding or rebound.   Genitourinary:     Comments: Urinary catheter in place draining light yellow urine  Musculoskeletal:         General: No tenderness or deformity. Normal range of motion.      Cervical back: Normal range of motion and neck supple. No rigidity.      Comments: Righ 5th toe, dry gangrene - see photo.     Lymphadenopathy:      Cervical: No cervical adenopathy.   Skin:     General: Skin is warm and dry.      Capillary Refill: Capillary refill takes less than 2 seconds.      Findings: No bruising, ecchymosis, erythema, petechiae or rash.      Nails: There is no clubbing.   Neurological:      Mental Status: She is alert and oriented to person, place, and time.      Cranial Nerves: No cranial nerve deficit.      Sensory: No sensory deficit.      Motor: No abnormal muscle tone.      Coordination: Coordination normal.      Deep Tendon Reflexes: Reflexes normal.   Psychiatric:         Speech: Speech normal.         Behavior: Behavior normal.         Thought Content: Thought content normal.         Judgment: Judgment normal.       Significant Labs: All pertinent labs within the past 24 hours have been reviewed.    Significant Imaging: I have reviewed all pertinent imaging results/findings within the past 24 hours.      Assessment/Plan:      * Osteomyelitis of right foot  Osteomyelitis of R 2nd toe    -MRI revealed Osteomyelitis involving the right 4th and 5th metatarsals distally and the phalanges of the 4th and 5th toes.   -XR R foot showed no acute bony abnormalities  -podiatry consulted, appreciate recs   -Options may include (1) right 4th-5th ray amputations with shorter course of antibiotics and wound care, (2) bone biopsy with longer course of antibiotics and wound care, or (3) palliative care. Current peripheral circulation may not be adequate to clear infection or heal a surgical wound. Vascular workup required prior to further planning.   -Noninvasive vascular studies ordered, consult vascular surgery once resulted. Do not modify orders.   -Antibiotic plan per ID.    -WBAT LLE, WB to heel for transfers or short distances RLE.   -ID consulted, appreciate recs   -empiric Vancomycin and Cefepime (renal dosing). Monitor renal function closely  -Follow blood/urine cultures        Acute metabolic encephalopathy  Acute  cystitis without hematuria    RESOLVED  · Patient initiated on broad-spectrum antibiotics in the ED - vancomycin ceftriaxone  · Will continue with broad-spectrum antibiotics and will change ceftriaxone to Cefepime in light of possible diabetic foot infection  · Patient has a history of pansensitive Klebsiella UTIs  · Nitrite positive urinalysis with gross parameters of infection on UA, urine culture pending  · Araiza catheter exchanged  · Blood cultures pending  · Encephalopathy improved with goal directed medical therapy for infections  · MRI of right foot reveals Osteomyelitis involving the right 4th and 5th metatarsals distally and the phalanges of the 4th and 5th toes.   · podiatry following  · Delirium precautions, neuro checks, aspiration precautions  · Inflammatory markers elevated, ESR-95, CRP-64      Acute cystitis without hematuria  -UA infectious  -previous UC growing pansensitive pseudomonas aeruginosa  -empiric cefepime initiated while UC pending    Hypertension  -continue home coreg and amlodipine    Chronic kidney disease, stage 4 (severe)  · Stable at baseline, avoid any nephrotoxic agents      History of stroke  -continue home asa and plavix      Type 2 diabetes mellitus, with long-term current use of insulin  · Diabetic diet  · Accu-Cheks qac,qhs  · 3 units determir, low dose SSI      Peripheral neuropathy  · home gabapentin 300 mg once daily      Debility  -PTOT      Urinary retention  · Patient with history of urinary retention and Araiza catheter in place since at least January of 2022  · Araiza catheter last exchanged 02/22/2022  · Araiza catheter exchanged in ED  · Patient continues Flomax per home dose        Hypothyroidism  -continue levothyroxine      Severe protein-calorie malnutrition  · registered dietitian consult      Hyperlipidemia  · Atorvastatin 80 formulary substitution for home Crestor 40        VTE Risk Mitigation (From admission, onward)         Ordered     heparin (porcine)  injection 5,000 Units  Every 8 hours         03/18/22 2111     IP VTE HIGH RISK PATIENT  Once         03/18/22 2110     Place sequential compression device  Until discontinued         03/18/22 2110                Discharge Planning   MARY: 3/22/2022     Code Status: Full Code   Is the patient medically ready for discharge?: No    Reason for patient still in hospital (select all that apply): Patient trending condition, Laboratory test, Treatment, Consult recommendations and PT / OT recommendations                     Misty Anderson PA-C  Department of Hospital Medicine   [facility] - Telemetry Stepdown ([place]-)

## 2022-03-19 NOTE — ASSESSMENT & PLAN NOTE
71 year old female with CVA, DM2, HTN, CKD, nephroliathiasis s/p left ureteral stent placement in 10/2021, urinary retention managed with chronic indwelling delgado, recurrent UTIs, right second toe osteomyelitis s/p R toe amputation 1/2022 who presented to [facility] from [facility] with acute metabolic encephalopathy likely secondary to UTI and right foot infection.     Delgado exchanged in ED. Patient noted to have gangrenous changes of her right fifth toe. MRI right foot reviewed and is concerning for osteomyelitis of the 4th and 5th digits. Patient started empiric Vanc/Cefepime. Blood/urine cultures are pending.  ID consulted for antibiotic recommendations. Podiatry consulted. Patient mentation has improved on antibiotics.    Recommendations  · Agree with empiric Vancomycin and Cefepime (renal dosing). Monitor renal function closely  · Follow blood/urine cultures  · Vascular studies ordered in setting of ischemic changes on exam  · Will follow up podiatry surgical plans and make further recommendations accordingly  · If patient undergoes fourth and fifth toe amputations, this would achieve source control and long term antibiotics would not likely be indicated   · ID will follow.

## 2022-03-20 NOTE — NURSING
Patient lying in bed with head elevated. VSS. Patient denies having any pain. BMx1. Voiding. Will continue to monitor.

## 2022-03-20 NOTE — ASSESSMENT & PLAN NOTE
Osteomyelitis of R 2nd toe  Critical lower limb ischemia  -MRI revealed Osteomyelitis involving the right 4th and 5th metatarsals distally and the phalanges of the 4th and 5th toes.   -XR R foot showed no acute bony abnormalities  -podiatry consulted, appreciate recs   -Options may include (1) right 4th-5th ray amputations with shorter course of antibiotics and wound care, (2) bone biopsy with longer course of antibiotics and wound care, or (3) palliative care. Current peripheral circulation may not be adequate to clear infection or heal a surgical wound. Vascular workup required prior to further planning.   -Noninvasive vascular studies ordered, consult vascular surgery once resulted. Do not modify orders.   -Antibiotic plan per ID.    -WBAT LLE, WB to heel for transfers or short distances RLE.   -ID consulted, appreciate recs   -empiric Vancomycin and Cefepime (renal dosing). Monitor renal function closely  -Follow blood/urine cultures

## 2022-03-20 NOTE — CONSULTS
[facility] - Telemetry Stepdown ([facility]-)  Adult Nutrition  Consult Note    SUMMARY     Recommendations  1) Liberalize diet if poor po intake remains   2) Add Novasource BID to optimize calorie intake   3) Add Frederic BID x 14 days to promote adequate wound healing  4) Add MVI + Vitamin C + Zinc to aid in wound healing     Goals: Pt to receive ONS by RD f/u.  Nutrition Goal Status: new  Communication of RD Recs: other (POC)    Assessment and Plan  Severe Protein-Calorie Malnutrition  Malnutrition in the context of Chronic Illness/Injury     Related to (etiology):  Decreased appetite     Signs and Symptoms (as evidenced by):  Energy Intake: <75% of estimated energy requirement for >1 month.  Body Fat Depletion: severe depletion of orbitals and triceps   Muscle Mass Depletion: severe depletion of temples, clavicle region, scapular region, interosseous muscle and lower extremities   Weight Loss: supposed 20% weight loss x 5 months     Interventions(treatment strategy):  Collaboration of nutrition care with other providers.  Commercial beverage  Vit C and Zinc for wound healing      Malnutrition Assessment  Malnutrition Type: chronic illness  Energy Intake: severe energy intake  Skin (Micronutrient): bruised       Weight Loss (Malnutrition): 10% in 6 months  Energy Intake (Malnutrition): less than 75% for greater than or equal to 3 months  Subcutaneous Fat (Malnutrition): severe depletion  Muscle Mass (Malnutrition): severe depletion  Fluid Accumulation (Malnutrition): mild  Hand  Strength, Left (Malnutrition): moderate  Hand  Strength, Right (Malnutrition): moderate   Orbital Region (Subcutaneous Fat Loss): mild depletion  Upper Arm Region (Subcutaneous Fat Loss): severe depletion  Thoracic and Lumbar Region: severe depletion   Appling Region (Muscle Loss): moderate depletion  Clavicle Bone Region (Muscle Loss): severe depletion  Clavicle and Acromion Bone Region (Muscle Loss): severe depletion  Scapular  "Bone Region (Muscle Loss): moderate depletion  Dorsal Hand (Muscle Loss): moderate depletion  Patellar Region (Muscle Loss): severe depletion  Anterior Thigh Region (Muscle Loss): severe depletion  Posterior Calf Region (Muscle Loss): severe depletion       Subcutaneous Fat Loss (Final Summary): severe protein-calorie malnutrition  Muscle Loss Evaluation (Final Summary): severe protein-calorie malnutrition  Fluid Accumulation Evaluation: mild    Severe Weight Loss (Malnutrition): greater than 10% in 6 months    Reason for Assessment    Reason For Assessment: consult  Diagnosis: infection/sepsis  Relevant Medical History: hypothyroidism, CVA with left-sided residual deficit, DM2, HTN, HLD, CKD, chronic anemia, recurrent pseudomonal UTI, nephroliathiasis s/p left ureteral stent placement in 10/2021, diabetic foot ulcer s/p R toe amputation  Interdisciplinary Rounds: did not attend  General Information Comments: RD consulted for BMI of 19. Pt Hx consists of Diabetic foot ulcer with osteomyelitis. Amputation on right toe noted per pt chart.  Spoke w/ pt at bedside. Reports variable intake at this time~states"she feels hungry some days and other days has no appetite". States that she does not like ONS~boost, RD to recommend alternative ONS for extra protein and calories, additionally RD discussed adequate protein intake for promotion of wound healing/preserving skin integrity. Pt reports her UBW falls at 145-150#. No issues chewing/swallowing at this time. No n/v/d/c. NFPE conducted 3/20/22  this RD feels that pt meets the criteria for severe malnutriton at this time. LBM-3/20/22  Nutrition Discharge Planning: pending medical course    Nutrition Risk Screen    Nutrition Risk Screen: large or nonhealing wound, burn or pressure injury    Nutrition/Diet History    Patient Reported Diet/Restrictions/Preferences: diabetic diet  Food Allergies: NKFA    Anthropometrics    Temp: 98.2 °F (36.8 °C)  Height Method: Stated  Height: " "5' 7" (170.2 cm)  Height (inches): 67 in  Weight Method: Bed Scale  Weight: 52.5 kg (115 lb 11.9 oz)  Weight (lb): 115.74 lb  Ideal Body Weight (IBW), Female: 135 lb  % Ideal Body Weight, Female (lb): 85.73 %  BMI (Calculated): 18.1  BMI Grade: 17 - 18.4 protein-energy malnutrition grade I  Usual Body Weight (UBW), k.9 kg  % Usual Body Weight: 79.83  % Weight Change From Usual Weight: -20.33 %     Lab/Procedures/Meds    Pertinent Labs Reviewed: reviewed  Pertinent Labs Comments: BUN 29, Cr 2.0, Glucose 164, Cr 2.0  Pertinent Medications Reviewed: reviewed  Pertinent Medications Comments: ferrous sulfate, vancomycin      Estimated/Assessed Needs    Weight Used For Calorie Calculations: 52.5 kg (115 lb 11.9 oz)  Energy Calorie Requirements (kcal): 1575  Energy Need Method: Kcal/kg (30 kcal/kg)  Protein Requirements: 63-78  Weight Used For Protein Calculations: 52.5 kg (115 lb 11.9 oz) (1.2-1.5 g/kg)    RDA Method (mL): 1575     Nutrition Prescription Ordered    Current Diet Order: diabetic diet  Nutrition Order Comments: 2000 kcal    Evaluation of Received Nutrient/Fluid Intake    I/O: +1.5 L since admit  Energy Calories Required: not meeting needs  Protein Required: not meeting needs  Fluid Required: not meeting needs  Tolerance: not tolerating  % Intake of Estimated Energy Needs: 0%   Meal Intake: 0%    Nutrition Risk    Level of Risk/Frequency of Follow-up: low       Monitor and Evaluation    Food and Nutrient Intake: energy intake, food and beverage intake  Food and Nutrient Adminstration: diet order  Knowledge/Beliefs/Attitudes: food and nutrition knowledge/skill, beliefs and attitudes  Physical Activity and Function: nutrition-related ADLs and IADLs, factors affecting access to physical activity  Anthropometric Measurements: height/length, weight, weight change, body mass index  Biochemical Data, Medical Tests and Procedures: electrolyte and renal panel, gastrointestinal profile, glucose/endocrine profile, " inflammatory profile, lipid profile  Nutrition-Focused Physical Findings: overall appearance     Nutrition Follow-Up    RD Follow-up?: Yes     By Phoebe FOX

## 2022-03-20 NOTE — SUBJECTIVE & OBJECTIVE
Interval History: NAEON. Pt seen and evaluated at bedside this am. Pt AAOx3. AFVSS. No leukocytosis. Wound cx growing S. Aureus and UC growing GNR. Will continue vanc and cefepime at this time.     Review of Systems   Constitutional:  Negative for activity change, chills, diaphoresis, fatigue and fever.   HENT:  Negative for congestion, facial swelling, rhinorrhea and sore throat.    Eyes:  Negative for photophobia, pain, itching and visual disturbance.   Respiratory:  Negative for cough, chest tightness, shortness of breath and wheezing.    Cardiovascular:  Negative for chest pain, palpitations and leg swelling.   Gastrointestinal:  Negative for abdominal distention, abdominal pain, blood in stool, constipation, diarrhea, nausea and vomiting.   Genitourinary:  Positive for difficulty urinating (chronic delgado). Negative for dysuria, frequency, hematuria and urgency.   Musculoskeletal:  Positive for gait problem. Negative for arthralgias, back pain and neck stiffness.   Skin:  Positive for color change and wound.   Neurological:  Negative for dizziness, tremors, seizures, syncope, weakness, light-headedness, numbness and headaches.   Psychiatric/Behavioral:  Negative for agitation, confusion and hallucinations. The patient is not nervous/anxious.    Objective:     Vital Signs (Most Recent):  Temp: 98 °F (36.7 °C) (03/20/22 0832)  Pulse: 82 (03/20/22 1529)  Resp: 17 (03/20/22 0832)  BP: (!) 171/77 (03/20/22 0832)  SpO2: (!) 93 % (03/20/22 0832)   Vital Signs (24h Range):  Temp:  [97.3 °F (36.3 °C)-98.6 °F (37 °C)] 98 °F (36.7 °C)  Pulse:  [77-86] 82  Resp:  [12-20] 17  SpO2:  [93 %-100 %] 93 %  BP: (133-171)/(63-77) 171/77     Weight: 52.5 kg (115 lb 11.9 oz)  Body mass index is 18.13 kg/m².    Intake/Output Summary (Last 24 hours) at 3/20/2022 1539  Last data filed at 3/19/2022 1800  Gross per 24 hour   Intake --   Output 650 ml   Net -650 ml      Physical Exam  Constitutional:       General: She is not in acute  distress.     Appearance: She is well-developed. She is not ill-appearing, toxic-appearing or diaphoretic.   HENT:      Head: Atraumatic. No abrasion, contusion or laceration.      Nose: Nose normal.      Mouth/Throat:      Pharynx: No oropharyngeal exudate.   Eyes:      General: No scleral icterus.        Right eye: No discharge.         Left eye: No discharge.      Conjunctiva/sclera: Conjunctivae normal.      Pupils: Pupils are equal, round, and reactive to light.   Neck:      Vascular: No JVD.   Cardiovascular:      Rate and Rhythm: Normal rate and regular rhythm.      Heart sounds: Normal heart sounds. No murmur heard.    No friction rub. No gallop.   Pulmonary:      Effort: Pulmonary effort is normal. No accessory muscle usage or respiratory distress.      Breath sounds: Normal breath sounds. No wheezing.   Abdominal:      General: Bowel sounds are normal. There is no distension.      Palpations: Abdomen is soft. There is no mass.      Tenderness: There is no abdominal tenderness. There is no guarding or rebound.   Genitourinary:     Comments: Urinary catheter in place draining light yellow urine  Musculoskeletal:         General: No tenderness or deformity. Normal range of motion.      Cervical back: Normal range of motion and neck supple. No rigidity.      Comments: Righ 5th toe, dry gangrene - see photo.    Lymphadenopathy:      Cervical: No cervical adenopathy.   Skin:     General: Skin is warm and dry.      Capillary Refill: Capillary refill takes less than 2 seconds.      Findings: No bruising, ecchymosis, erythema, petechiae or rash.      Nails: There is no clubbing.   Neurological:      Mental Status: She is alert and oriented to person, place, and time.      Cranial Nerves: No cranial nerve deficit.      Sensory: No sensory deficit.      Motor: No abnormal muscle tone.      Coordination: Coordination normal.      Deep Tendon Reflexes: Reflexes normal.   Psychiatric:         Speech: Speech normal.          Behavior: Behavior normal.         Thought Content: Thought content normal.         Judgment: Judgment normal.       Significant Labs: All pertinent labs within the past 24 hours have been reviewed.    Significant Imaging: I have reviewed all pertinent imaging results/findings within the past 24 hours.

## 2022-03-20 NOTE — ASSESSMENT & PLAN NOTE
71 year old female with CVA, DM2, HTN, CKD, nephroliathiasis s/p left ureteral stent placement, urinary retention managed with chronic indwelling delgado, recurrent UTIs, right second toe osteomyelitis s/p R toe amputation 1/2022 who presented to [facility] from [facility] with acute metabolic encephalopathy likely secondary to UTI and right foot infection.     Delgado exchanged in ED. Patient noted to have gangrenous changes of her right fifth toe and interdigit wound. MRI right foot reviewed and is concerning for osteomyelitis of the 4th and 5th digits. Podiatry consulted and ordered vascular studies prior to making surgical plans. Patient started empiric Vanc/Cefepime. Blood cx NGTD. Urine cx positive for GNR. Wound cx positive for Staph aureus. Patient mentation has improved on antibiotics. Afebrile and HDS.    Recommendations  · Continue empiric Vancomycin and Cefepime (renal dosing). Monitor renal function closely  · Follow blood/urine/wound cultures to guide antibiotics   · Vascular studies ordered  · Will follow up podiatry surgical plans and make further recommendations accordingly  · If patient undergoes fourth and fifth toe amputations, this would achieve source control and long term antibiotics would not likely be indicated   · Discussed plan with ID staff. ID will follow.

## 2022-03-20 NOTE — ASSESSMENT & PLAN NOTE
Acute cystitis without hematuria    RESOLVED  -Patient initiated on broad-spectrum antibiotics in the ED - vancomycin ceftriaxone  -Will continue with broad-spectrum antibiotics and will change ceftriaxone to Cefepime in light of possible diabetic foot infection  -Patient has a history of pansensitive Klebsiella UTIs  -Nitrite positive urinalysis with gross parameters of infection on UA, urine culture growing GNR  -Araiza catheter exchanged  -Blood cultures NGTD  -Encephalopathy improved with goal directed medical therapy for infections  -MRI of right foot reveals Osteomyelitis involving the right 4th and 5th metatarsals distally and the phalanges of the 4th and 5th toes.   -podiatry following  -Delirium precautions, neuro checks, aspiration precautions  -Inflammatory markers elevated, ESR-95, CRP-64

## 2022-03-20 NOTE — PROGRESS NOTES
"[facility] - Telemetry Stepdown ([address] Medicine  Progress Note    Patient Name: Beatriz Adame  MRN: [id number]  Patient Class: OP- Observation   Admission Date: 3/18/2022  Length of Stay: 0 days  Attending Physician: Stephanie Henry MD  Primary Care Provider: Dante Olivier MD        Subjective:     Principal Problem:Osteomyelitis of right foot        HPI:  Patient is a 71 year old female with osteoarthritis, hypothyroidism, CVA with left-sided residual deficit, DM2, HTN, HLD, CKD, chronic anemia, recurrent pseudomonal UTI, nephroliathiasis s/p left ureteral stent placement in 10/2021, urinary retention with chronic indwelling delgado catheter, diabetic foot ulcer s/p R toe amputation, chronic anemia, who presents with acute metabolic encephalopathy secondary to catheter associated urinary tract infection and possible right toe infection.   She is being admitted for [facility].     Initially in ED patient was found to have a white count of 13,000 and a left shift / neutrophilic predominance.  While not overtly septic, sepsis protocol was initiated - patient pancultured and broad-spectrum antibiotics with vancomycin and ceftriaxone was initiated.  Vital signs were within normal limits however blood pressure was at the lower range of normal for this patient with a systolic blood pressure of 110.  Lactic acid was obtained and was within normal limits.     Patient is alert on my examination she is able to describe that she is at the hospital for a catheter exchange."  The her catheter has been exchanged in the ED and she has had the benefit of broad-spectrum antibiotics since her admission there.  It is clear that her encephalopathy has already improved with the management of her very obvious catheter associated urinary tract infection.  The ED provider also had a concern for a right 5th toe infection - which appears to be chronically devascularized and gangrenous on examination.  Plain " films of the affected foot were unrevealing however patient has just returned from MRI and interpretation is pending.  Inflammatory markers are notably elevated.      She will be admitted to Hospital Medicine for ongoing management of her catheter associated urinary tract infection and possible right foot infection.          Overview/Hospital Course:  Pt placed in observation for acute metabolic encephalopathy in setting of UTI and osteomyelitis. Started on IV abx. ID and podiatry following.      Interval History: NAEON. Pt seen and evaluated at bedside this am. Pt AAOx3. AFVSS. No leukocytosis. Wound cx growing S. Aureus and UC growing GNR. Will continue vanc and cefepime at this time.     Review of Systems   Constitutional:  Negative for activity change, chills, diaphoresis, fatigue and fever.   HENT:  Negative for congestion, facial swelling, rhinorrhea and sore throat.    Eyes:  Negative for photophobia, pain, itching and visual disturbance.   Respiratory:  Negative for cough, chest tightness, shortness of breath and wheezing.    Cardiovascular:  Negative for chest pain, palpitations and leg swelling.   Gastrointestinal:  Negative for abdominal distention, abdominal pain, blood in stool, constipation, diarrhea, nausea and vomiting.   Genitourinary:  Positive for difficulty urinating (chronic delgado). Negative for dysuria, frequency, hematuria and urgency.   Musculoskeletal:  Positive for gait problem. Negative for arthralgias, back pain and neck stiffness.   Skin:  Positive for color change and wound.   Neurological:  Negative for dizziness, tremors, seizures, syncope, weakness, light-headedness, numbness and headaches.   Psychiatric/Behavioral:  Negative for agitation, confusion and hallucinations. The patient is not nervous/anxious.    Objective:     Vital Signs (Most Recent):  Temp: 98 °F (36.7 °C) (03/20/22 0832)  Pulse: 82 (03/20/22 1529)  Resp: 17 (03/20/22 0832)  BP: (!) 171/77 (03/20/22 0832)  SpO2: (!)  93 % (03/20/22 0832)   Vital Signs (24h Range):  Temp:  [97.3 °F (36.3 °C)-98.6 °F (37 °C)] 98 °F (36.7 °C)  Pulse:  [77-86] 82  Resp:  [12-20] 17  SpO2:  [93 %-100 %] 93 %  BP: (133-171)/(63-77) 171/77     Weight: 52.5 kg (115 lb 11.9 oz)  Body mass index is 18.13 kg/m².    Intake/Output Summary (Last 24 hours) at 3/20/2022 1539  Last data filed at 3/19/2022 1800  Gross per 24 hour   Intake --   Output 650 ml   Net -650 ml      Physical Exam  Constitutional:       General: She is not in acute distress.     Appearance: She is well-developed. She is not ill-appearing, toxic-appearing or diaphoretic.   HENT:      Head: Atraumatic. No abrasion, contusion or laceration.      Nose: Nose normal.      Mouth/Throat:      Pharynx: No oropharyngeal exudate.   Eyes:      General: No scleral icterus.        Right eye: No discharge.         Left eye: No discharge.      Conjunctiva/sclera: Conjunctivae normal.      Pupils: Pupils are equal, round, and reactive to light.   Neck:      Vascular: No JVD.   Cardiovascular:      Rate and Rhythm: Normal rate and regular rhythm.      Heart sounds: Normal heart sounds. No murmur heard.    No friction rub. No gallop.   Pulmonary:      Effort: Pulmonary effort is normal. No accessory muscle usage or respiratory distress.      Breath sounds: Normal breath sounds. No wheezing.   Abdominal:      General: Bowel sounds are normal. There is no distension.      Palpations: Abdomen is soft. There is no mass.      Tenderness: There is no abdominal tenderness. There is no guarding or rebound.   Genitourinary:     Comments: Urinary catheter in place draining light yellow urine  Musculoskeletal:         General: No tenderness or deformity. Normal range of motion.      Cervical back: Normal range of motion and neck supple. No rigidity.      Comments: Righ 5th toe, dry gangrene - see photo.    Lymphadenopathy:      Cervical: No cervical adenopathy.   Skin:     General: Skin is warm and dry.       Capillary Refill: Capillary refill takes less than 2 seconds.      Findings: No bruising, ecchymosis, erythema, petechiae or rash.      Nails: There is no clubbing.   Neurological:      Mental Status: She is alert and oriented to person, place, and time.      Cranial Nerves: No cranial nerve deficit.      Sensory: No sensory deficit.      Motor: No abnormal muscle tone.      Coordination: Coordination normal.      Deep Tendon Reflexes: Reflexes normal.   Psychiatric:         Speech: Speech normal.         Behavior: Behavior normal.         Thought Content: Thought content normal.         Judgment: Judgment normal.       Significant Labs: All pertinent labs within the past 24 hours have been reviewed.    Significant Imaging: I have reviewed all pertinent imaging results/findings within the past 24 hours.      Assessment/Plan:      * Osteomyelitis of right foot  Osteomyelitis of R 2nd toe  Critical lower limb ischemia  -MRI revealed Osteomyelitis involving the right 4th and 5th metatarsals distally and the phalanges of the 4th and 5th toes.   -XR R foot showed no acute bony abnormalities  -podiatry consulted, appreciate recs   -Options may include (1) right 4th-5th ray amputations with shorter course of antibiotics and wound care, (2) bone biopsy with longer course of antibiotics and wound care, or (3) palliative care. Current peripheral circulation may not be adequate to clear infection or heal a surgical wound. Vascular workup required prior to further planning.   -Noninvasive vascular studies ordered, consult vascular surgery once resulted. Do not modify orders.   -Antibiotic plan per ID.    -WBAT LLE, WB to heel for transfers or short distances RLE.   -ID consulted, appreciate recs   -empiric Vancomycin and Cefepime (renal dosing). Monitor renal function closely  -Follow blood/urine cultures        Acute metabolic encephalopathy  Acute cystitis without hematuria    RESOLVED  -Patient initiated on broad-spectrum  antibiotics in the ED - vancomycin ceftriaxone  -Will continue with broad-spectrum antibiotics and will change ceftriaxone to Cefepime in light of possible diabetic foot infection  -Patient has a history of pansensitive Klebsiella UTIs  -Nitrite positive urinalysis with gross parameters of infection on UA, urine culture growing GNR  -Araiza catheter exchanged  -Blood cultures NGTD  -Encephalopathy improved with goal directed medical therapy for infections  -MRI of right foot reveals Osteomyelitis involving the right 4th and 5th metatarsals distally and the phalanges of the 4th and 5th toes.   -podiatry following  -Delirium precautions, neuro checks, aspiration precautions  -Inflammatory markers elevated, ESR-95, CRP-64      Acute cystitis without hematuria  -UA infectious  -previous UC growing pansensitive pseudomonas aeruginosa  -empiric cefepime initiated while UC growing GNR    Hypertension  -continue home coreg and amlodipine    Chronic kidney disease, stage 4 (severe)  -Stable at baseline, avoid any nephrotoxic agents      History of stroke  -continue home asa and plavix      Type 2 diabetes mellitus, with long-term current use of insulin  -Diabetic diet  -Accu-Cheks qac,qhs  -3 units determir, low dose SSI      Peripheral neuropathy  -home gabapentin 300 mg once daily      Debility  -PTOT      Urinary retention  -Patient with history of urinary retention and Araiza catheter in place since at least January of 2022  -Araiza catheter last exchanged 02/22/2022  -Araiza catheter exchanged in ED  -Patient continues Flomax per home dose        Hypothyroidism  -continue levothyroxine      Severe protein-calorie malnutrition  -registered dietitian consult      Hyperlipidemia  -Atorvastatin 80 formulary substitution for home Crestor 40        VTE Risk Mitigation (From admission, onward)         Ordered     heparin (porcine) injection 5,000 Units  Every 8 hours         03/18/22 2111     IP VTE HIGH RISK PATIENT  Once          03/18/22 2110     Place sequential compression device  Until discontinued         03/18/22 2110                Discharge Planning   MARY: 3/22/2022     Code Status: Full Code   Is the patient medically ready for discharge?: No    Reason for patient still in hospital (select all that apply): Patient trending condition, Laboratory test, Treatment, Consult recommendations, PT / OT recommendations and Pending disposition                     Misty Anderson PA-C  Department of Hospital Medicine   [facility] - Telemetry Stepdown ([place]-)

## 2022-03-20 NOTE — ASSESSMENT & PLAN NOTE
-Patient with history of urinary retention and Araiza catheter in place since at least January of 2022  -Araiza catheter last exchanged 02/22/2022  -Araiza catheter exchanged in ED  -Patient continues Flomax per home dose

## 2022-03-20 NOTE — PROGRESS NOTES
Pharmacokinetic Assessment Follow Up: IV Vancomycin    Vancomycin Regimen Assessment & Plan:  - Vancomycin 23-hour random level resulted at 3.5 mcg/mL, which is considered subtherapeutic (goal: 15-20 mcg/mL)  - CKD; baseline serum creatinine ranges from 1.8-2.1 mg/dL  - Continue vancomycin pulse dosing; re-dose with vancomycin 1000 mg IV x 1 today  - Next vancomycin level scheduled approximately 12 hours post-dose on 3/21 with AM labs to evaluate tolerability of Q12H dosing    Drug levels (last 3 results):  Recent Labs   Lab Result Units 03/19/22  1141 03/20/22  1536   Vancomycin, Random ug/mL 2.7 3.5       Pharmacy will continue to follow and monitor vancomycin.    Please contact pharmacy at extension [phone] for questions regarding this assessment.    Thank you for the consult,   Lupe Aguiar       Patient brief summary:  Beatriz Adame is a 71 y.o. female initiated on antimicrobial therapy with IV Vancomycin for treatment of bone/joint infection      Drug Allergies:   Review of patient's allergies indicates:   Allergen Reactions    Tomato (solanum lycopersicum) Hives and Itching       Actual Body Weight:   52.5 kg    Renal Function:   Estimated Creatinine Clearance: 21.7 mL/min (A) (based on SCr of 2 mg/dL (H)).     Dialysis Method (if applicable):  N/A    CBC (last 72 hours):  Recent Labs   Lab Result Units 03/18/22  1640 03/19/22  0451 03/20/22  1254   WBC K/uL 13.84* 10.85 11.46   Hemoglobin g/dL 7.3* 6.9* 8.7*   Hematocrit % 23.7* 22.1* 28.1*   Platelets K/uL 156 165 194   Gran % % 74.7* 64.1 65.7   Lymph % % 17.0* 26.0 24.3   Mono % % 6.7 7.5 7.5   Eosinophil % % 1.1 1.8 1.8   Basophil % % 0.2 0.3 0.4   Differential Method  Automated Automated Automated       Metabolic Panel (last 72 hours):  Recent Labs   Lab Result Units 03/18/22  1640 03/18/22  1659 03/19/22  0451 03/20/22  1254   Sodium mmol/L 141  --  141 140   Potassium mmol/L 3.6  --  3.1* 4.4   Chloride mmol/L 105  --  106 109   CO2 mmol/L 27  --  26 24    Glucose mg/dL 136*  --  144* 164*   Glucose, UA   --  Negative  --   --    BUN mg/dL 28*  --  27* 29*   Creatinine mg/dL 2.3*  --  2.3* 2.0*   Albumin g/dL 2.4*  --  2.1* 2.4*   Total Bilirubin mg/dL 0.2  --  0.2 0.5   Alkaline Phosphatase U/L 75  --  71 117   AST U/L 14  --  12 11   ALT U/L 10  --  9* 11   Magnesium mg/dL 2.3  --   --   --    Phosphorus mg/dL 3.3  --   --   --        Vancomycin Administrations:  vancomycin given in the last 96 hours                   vancomycin in dextrose 5 % 1 gram/250 mL IVPB 1,000 mg (mg) 1,000 mg New Bag 03/19/22 1625    vancomycin 750 mg in dextrose 5 % 250 mL IVPB (ready to mix system) (mg) 750 mg New Bag 03/18/22 1851                Microbiologic Results:  Microbiology Results (last 7 days)     Procedure Component Value Units Date/Time    Aerobic culture [[id number]]  (Abnormal) Collected: 03/18/22 1620    Order Status: Completed Specimen: Wound from Toe, Right Foot Updated: 03/20/22 0924     Aerobic Bacterial Culture STAPHYLOCOCCUS AUREUS  Many  Susceptibility pending      Urine culture [[id number]]  (Abnormal) Collected: 03/18/22 1659    Order Status: Completed Specimen: Urine Updated: 03/19/22 2126     Urine Culture, Routine GRAM NEGATIVE MARYAN  > 100,000 cfu/ml  Identification and susceptibility pending      Narrative:      Specimen Source->Urine    Blood culture x two cultures. Draw prior to antibiotics. [[id number]] Collected: 03/18/22 1641    Order Status: Completed Specimen: Blood from Peripheral, Antecubital, Right Updated: 03/19/22 2012     Blood Culture, Routine No Growth to date      No Growth to date    Narrative:      Aerobic and anaerobic    Blood culture x two cultures. Draw prior to antibiotics. [[id number]] Collected: 03/18/22 1641    Order Status: Completed Specimen: Blood from Peripheral, Antecubital, Right Updated: 03/19/22 2012     Blood Culture, Routine No Growth to date      No Growth to date    Narrative:      Aerobic and anaerobic

## 2022-03-20 NOTE — PROGRESS NOTES
[facility] - Telemetry Stepdown ([place] [id number])  Infectious Disease  Progress Note    Patient Name: Beatriz Adame  MRN: [id number]  Admission Date: 3/18/2022  Length of Stay: 0 days  Attending Physician: Stephanie Henry MD  Primary Care Provider: Dante Olivier MD    Isolation Status: No active isolations  Assessment/Plan:      * Osteomyelitis of right foot     71 year old female with CVA, DM2, HTN, CKD, nephroliathiasis s/p left ureteral stent placement, urinary retention managed with chronic indwelling delgado, recurrent UTIs, right second toe osteomyelitis s/p R toe amputation 1/2022 who presented to [facility] from [facility] with acute metabolic encephalopathy likely secondary to UTI and right foot infection.     Delgado exchanged in ED. Patient noted to have gangrenous changes of her right fifth toe and interdigit wound. MRI right foot reviewed and is concerning for osteomyelitis of the 4th and 5th digits. Podiatry consulted and ordered vascular studies prior to making surgical plans. Patient started empiric Vanc/Cefepime. Blood cx NGTD. Urine cx positive for GNR. Wound cx positive for Staph aureus. Patient mentation has improved on antibiotics. Afebrile and HDS.    Recommendations  · Continue empiric Vancomycin and Cefepime (renal dosing). Monitor renal function closely  · Follow blood/urine/wound cultures to guide antibiotics   · Vascular studies ordered  · Will follow up podiatry surgical plans and make further recommendations accordingly  · If patient undergoes fourth and fifth toe amputations, this would achieve source control and long term antibiotics would not likely be indicated   · Discussed plan with ID staff. ID will follow.          Please call  or secure chat for any questions. Thank you.  Olga Lidia Sood PA-C  ID [id number] Spectra: [id number]      Subjective:     Principal Problem:Osteomyelitis of right foot    HPI: Ms. Adame is a 71 year old female with osteoarthritis, hypothyroidism, CVA with  left-sided residual deficit, DM2, HTN, HLD, CKD, nephroliathiasis s/p left ureteral stent placement in 10/2021, urinary retention managed with chronic indwelling delgado, recurrent pseudomonal UTI, right second toe osteomyelitis s/p R toe amputation 1/2022 who [facility] with acute metabolic encephalopathy secondary to catheter associated urinary tract infection and possible right toe infection.     Afebrile in the ED. WBC 13K. Inflammatory markers elevated. Cr 2.3 lactate WNL. UA positive for pyuria. Delgado exchanged in ED. Started empiric Vanc/Cefepime. Blood/urine cultures are pending. Noted to have gangrenous changes of her right fifth toe. MRI right foot reviewed and is concerning for osteomyelitis of the 4th and 5th distal metatarsals and phalanges.  ID consulted for antibiotic recommendations. Podiatry consulted. Patient mentation has improved.    Interval History: NAEON. Afebrile. Leukocytosis resolved. Awaiting vascualr studies to be completed. Patient is tolerating antibiotics.   Urine - GNR  Wound  - Stpah aureus     Review of Systems   Unable to perform ROS: Other (limited)   Constitutional:  Negative for chills, diaphoresis and fever.   HENT:  Negative for congestion and sore throat.    Respiratory:  Negative for cough and shortness of breath.    Cardiovascular:  Negative for chest pain and leg swelling.   Gastrointestinal:  Negative for abdominal pain, diarrhea and vomiting.   Genitourinary:  Positive for difficulty urinating (chronic delgado). Negative for dysuria.   Musculoskeletal:  Positive for arthralgias and gait problem.   Skin:  Positive for color change and wound.   Neurological:  Positive for numbness. Negative for headaches.   Psychiatric/Behavioral:  Positive for confusion. Negative for agitation. The patient is not nervous/anxious.    Objective:     Vital Signs (Most Recent):  Temp: 98 °F (36.7 °C) (03/20/22 0832)  Pulse: 86 (03/20/22 0832)  Resp: 17 (03/20/22  0832)  BP: (!) 171/77 (03/20/22 0832)  SpO2: (!) 93 % (03/20/22 0832)   Vital Signs (24h Range):  Temp:  [97.3 °F (36.3 °C)-98.6 °F (37 °C)] 98 °F (36.7 °C)  Pulse:  [77-86] 86  Resp:  [12-20] 17  SpO2:  [93 %-100 %] 93 %  BP: (133-171)/(63-77) 171/77     Weight: 52.5 kg (115 lb 11.9 oz)  Body mass index is 18.13 kg/m².    Estimated Creatinine Clearance: 18.9 mL/min (A) (based on SCr of 2.3 mg/dL (H)).    Physical Exam  Constitutional:       General: She is not in acute distress.     Appearance: She is not ill-appearing, toxic-appearing or diaphoretic.   HENT:      Head: Normocephalic and atraumatic.      Mouth/Throat:      Mouth: Mucous membranes are dry.   Eyes:      General: No scleral icterus.     Conjunctiva/sclera: Conjunctivae normal.   Cardiovascular:      Rate and Rhythm: Normal rate and regular rhythm.      Pulses: Decreased pulses.   Pulmonary:      Effort: Pulmonary effort is normal. No respiratory distress.   Abdominal:      General: There is no distension.      Palpations: Abdomen is soft.      Tenderness: There is no abdominal tenderness.   Musculoskeletal:         General: Deformity (right 2nd toe amputation) present.      Right lower leg: No edema.      Left lower leg: No edema.      Comments: Ischemic changes to right fifth toe.  Right interdigit wound. No bone exposure. No drainage.   Skin:     General: Skin is warm and dry.   Neurological:      Mental Status: She is alert.   Psychiatric:         Mood and Affect: Mood normal.         Behavior: Behavior normal.       Significant Labs: Blood Culture:   Recent Labs   Lab 12/30/21  1523 12/30/21  1536 03/18/22  1641   LABBLOO No growth after 5 days. No growth after 5 days. No Growth to date  No Growth to date  No Growth to date  No Growth to date     BMP:   Recent Labs   Lab 03/18/22  1640 03/19/22  0451   * 144*    141   K 3.6 3.1*    106   CO2 27 26   BUN 28* 27*   CREATININE 2.3* 2.3*   CALCIUM 9.3 9.0   MG 2.3  --      CBC:    Recent Labs   Lab 03/18/22  1640 03/19/22  0451   WBC 13.84* 10.85   HGB 7.3* 6.9*   HCT 23.7* 22.1*    165     CMP:   Recent Labs   Lab 03/18/22  1640 03/19/22  0451    141   K 3.6 3.1*    106   CO2 27 26   * 144*   BUN 28* 27*   CREATININE 2.3* 2.3*   CALCIUM 9.3 9.0   PROT 7.6 6.8   ALBUMIN 2.4* 2.1*   BILITOT 0.2 0.2   ALKPHOS 75 71   AST 14 12   ALT 10 9*   ANIONGAP 9 9   EGFRNONAA 20.9* 20.9*     Microbiology Results (last 7 days)       Procedure Component Value Units Date/Time    Aerobic culture [[id number]]  (Abnormal) Collected: 03/18/22 1620    Order Status: Completed Specimen: Wound from Toe, Right Foot Updated: 03/20/22 0924     Aerobic Bacterial Culture STAPHYLOCOCCUS AUREUS  Many  Susceptibility pending      Urine culture [[id number]]  (Abnormal) Collected: 03/18/22 1659    Order Status: Completed Specimen: Urine Updated: 03/19/22 2126     Urine Culture, Routine GRAM NEGATIVE MARYAN  > 100,000 cfu/ml  Identification and susceptibility pending      Narrative:      Specimen Source->Urine    Blood culture x two cultures. Draw prior to antibiotics. [[id number]] Collected: 03/18/22 1641    Order Status: Completed Specimen: Blood from Peripheral, Antecubital, Right Updated: 03/19/22 2012     Blood Culture, Routine No Growth to date      No Growth to date    Narrative:      Aerobic and anaerobic    Blood culture x two cultures. Draw prior to antibiotics. [[id number]] Collected: 03/18/22 1641    Order Status: Completed Specimen: Blood from Peripheral, Antecubital, Right Updated: 03/19/22 2012     Blood Culture, Routine No Growth to date      No Growth to date    Narrative:      Aerobic and anaerobic          Pathology Results  (Last 10 years)                 01/01/22 1345  Specimen to Pathology, Surgery Other Final result    Narrative:  Pre-op Diagnosis: Ulcer of right second toe [L97.519]   Diabetic foot ulcer with osteomyelitis [E11.621, E11.69,   L97.509, M86.9]   Procedure(s):    AMPUTATION, TOE   Number of specimens: 1   Name of specimens: 1) right foot second toe   Release to patient->Immediate   Specimen total (fresh, frozen, permanent):->1       07/22/21 1446  Specimen to Pathology, Surgery General Surgery Final result    Narrative:  Pre-op Diagnosis: Acute appendicitis with localized   peritonitis, without perforation, abscess, or gangrene   [K35.30]   Procedure(s):   APPENDECTOMY, LAPAROSCOPIC   Number of specimens: 1   Name of specimens: 1. Appendix   Release to patient->Immediate   Specimen total (fresh, frozen, permanent):->1             Urine Culture:   Recent Labs   Lab 10/12/21  1333 11/19/21  2230 01/10/22  0543 02/10/22  0529 03/18/22  1659   LABURIN Multiple organisms isolated. None in predominance.  Repeat if  clinically necessary. No growth PSEUDOMONAS AERUGINOSA  10,000 - 49,999 cfu/ml  * PSEUDOMONAS AERUGINOSA  50,000 - 99,999 cfu/ml  * GRAM NEGATIVE MARYAN  > 100,000 cfu/ml  Identification and susceptibility pending  *     Urine Studies:   Recent Labs   Lab 01/10/22  0543 02/10/22  0529 03/18/22  1659   COLORU Yellow Yellow Yellow   APPEARANCEUA Hazy* Hazy* Cloudy*   PHUR 6.0 6.0 5.0   SPECGRAV 1.025 1.005 1.020   PROTEINUA 2+* 1+* 2+*   GLUCUA Negative Negative Negative   KETONESU Negative Negative Negative   BILIRUBINUA Negative Negative Negative   OCCULTUA 1+* 2+* 1+*   NITRITE Negative Positive* Positive*   UROBILINOGEN Negative  --   --    LEUKOCYTESUR 2+* 3+* 2+*   RBCUA 7* 14* 13*   WBCUA 35* 84* >100*   BACTERIA Rare Many* Many*   SQUAMEPITHEL 5 0  --    HYALINECASTS 3* 0 0     Wound Culture:   Recent Labs   Lab 03/18/22  1620   LABAERO STAPHYLOCOCCUS AUREUS  Many  Susceptibility pending  *       Significant Imaging:   MRI Foot Toes Without Contrast Right [[id number]] Resulted: 03/19/22 0128   Order Status: Completed Updated: 03/19/22 0130   Narrative:     EXAMINATION:   MRI FOOT TOES WITHOUT CONTRAST RIGHT     CLINICAL HISTORY:   Osteomyelitis, foot;      TECHNIQUE:   Multiplanar multisequence MRI examination of the right foot and toes performed without IV contrast.     COMPARISON:   Right foot radiograph 03/18/2022.     MRI foot right 12/30/2021.     FINDINGS:   There is an open wound overlying the right 5th phalanges.     There is abnormal bone marrow edema signal within the distal heads of the 4th and 5th metatarsal as well as the phalanges of the 4th and 5th toes more extensive in the 5th.     There is scattered subcutaneous edema around this site as well as throughout the foot.  No large drainable fluid collection.  Postsurgical changes of right 2nd ray amputation.    Impression:       Osteomyelitis involving the right 4th and 5th metatarsals distally and the phalanges of the 4th and 5th toes.     Electronically signed by resident: Dimas Zapata   Date: 03/19/2022   Time: 01:04     Electronically signed by: Erlin Hernandez   Date: 03/19/2022   Time: 01:28

## 2022-03-20 NOTE — SUBJECTIVE & OBJECTIVE
Interval History: NAEON. Afebrile. Leukocytosis resolved. Awaiting vascualr studies to be completed. Patient is tolerating antibiotics.   Urine - GNR  Wound  - Stpah aureus     Review of Systems   Unable to perform ROS: Other (limited)   Constitutional:  Negative for chills, diaphoresis and fever.   HENT:  Negative for congestion and sore throat.    Respiratory:  Negative for cough and shortness of breath.    Cardiovascular:  Negative for chest pain and leg swelling.   Gastrointestinal:  Negative for abdominal pain, diarrhea and vomiting.   Genitourinary:  Positive for difficulty urinating (chronic delgado). Negative for dysuria.   Musculoskeletal:  Positive for arthralgias and gait problem.   Skin:  Positive for color change and wound.   Neurological:  Positive for numbness. Negative for headaches.   Psychiatric/Behavioral:  Positive for confusion. Negative for agitation. The patient is not nervous/anxious.    Objective:     Vital Signs (Most Recent):  Temp: 98 °F (36.7 °C) (03/20/22 0832)  Pulse: 86 (03/20/22 0832)  Resp: 17 (03/20/22 0832)  BP: (!) 171/77 (03/20/22 0832)  SpO2: (!) 93 % (03/20/22 0832)   Vital Signs (24h Range):  Temp:  [97.3 °F (36.3 °C)-98.6 °F (37 °C)] 98 °F (36.7 °C)  Pulse:  [77-86] 86  Resp:  [12-20] 17  SpO2:  [93 %-100 %] 93 %  BP: (133-171)/(63-77) 171/77     Weight: 52.5 kg (115 lb 11.9 oz)  Body mass index is 18.13 kg/m².    Estimated Creatinine Clearance: 18.9 mL/min (A) (based on SCr of 2.3 mg/dL (H)).    Physical Exam  Constitutional:       General: She is not in acute distress.     Appearance: She is not ill-appearing, toxic-appearing or diaphoretic.   HENT:      Head: Normocephalic and atraumatic.      Mouth/Throat:      Mouth: Mucous membranes are dry.   Eyes:      General: No scleral icterus.     Conjunctiva/sclera: Conjunctivae normal.   Cardiovascular:      Rate and Rhythm: Normal rate and regular rhythm.      Pulses: Decreased pulses.   Pulmonary:      Effort: Pulmonary effort  is normal. No respiratory distress.   Abdominal:      General: There is no distension.      Palpations: Abdomen is soft.      Tenderness: There is no abdominal tenderness.   Musculoskeletal:         General: Deformity (right 2nd toe amputation) present.      Right lower leg: No edema.      Left lower leg: No edema.      Comments: Ischemic changes to right fifth toe.  Right interdigit wound. No bone exposure. No drainage.   Skin:     General: Skin is warm and dry.   Neurological:      Mental Status: She is alert.   Psychiatric:         Mood and Affect: Mood normal.         Behavior: Behavior normal.       Significant Labs: Blood Culture:   Recent Labs   Lab 12/30/21  1523 12/30/21  1536 03/18/22  1641   LABBLOO No growth after 5 days. No growth after 5 days. No Growth to date  No Growth to date  No Growth to date  No Growth to date     BMP:   Recent Labs   Lab 03/18/22  1640 03/19/22  0451   * 144*    141   K 3.6 3.1*    106   CO2 27 26   BUN 28* 27*   CREATININE 2.3* 2.3*   CALCIUM 9.3 9.0   MG 2.3  --      CBC:   Recent Labs   Lab 03/18/22  1640 03/19/22  0451   WBC 13.84* 10.85   HGB 7.3* 6.9*   HCT 23.7* 22.1*    165     CMP:   Recent Labs   Lab 03/18/22  1640 03/19/22  0451    141   K 3.6 3.1*    106   CO2 27 26   * 144*   BUN 28* 27*   CREATININE 2.3* 2.3*   CALCIUM 9.3 9.0   PROT 7.6 6.8   ALBUMIN 2.4* 2.1*   BILITOT 0.2 0.2   ALKPHOS 75 71   AST 14 12   ALT 10 9*   ANIONGAP 9 9   EGFRNONAA 20.9* 20.9*     Microbiology Results (last 7 days)       Procedure Component Value Units Date/Time    Aerobic culture [[id number]]  (Abnormal) Collected: 03/18/22 1620    Order Status: Completed Specimen: Wound from Toe, Right Foot Updated: 03/20/22 0924     Aerobic Bacterial Culture STAPHYLOCOCCUS AUREUS  Many  Susceptibility pending      Urine culture [[id number]]  (Abnormal) Collected: 03/18/22 1659    Order Status: Completed Specimen: Urine Updated: 03/19/22 2126     Urine  Culture, Routine GRAM NEGATIVE MARYAN  > 100,000 cfu/ml  Identification and susceptibility pending      Narrative:      Specimen Source->Urine    Blood culture x two cultures. Draw prior to antibiotics. [[id number]] Collected: 03/18/22 1641    Order Status: Completed Specimen: Blood from Peripheral, Antecubital, Right Updated: 03/19/22 2012     Blood Culture, Routine No Growth to date      No Growth to date    Narrative:      Aerobic and anaerobic    Blood culture x two cultures. Draw prior to antibiotics. [[id number]] Collected: 03/18/22 1641    Order Status: Completed Specimen: Blood from Peripheral, Antecubital, Right Updated: 03/19/22 2012     Blood Culture, Routine No Growth to date      No Growth to date    Narrative:      Aerobic and anaerobic          Pathology Results  (Last 10 years)                 01/01/22 1345  Specimen to Pathology, Surgery Other Final result    Narrative:  Pre-op Diagnosis: Ulcer of right second toe [L97.519]   Diabetic foot ulcer with osteomyelitis [E11.621, E11.69,   L97.509, M86.9]   Procedure(s):   AMPUTATION, TOE   Number of specimens: 1   Name of specimens: 1) right foot second toe   Release to patient->Immediate   Specimen total (fresh, frozen, permanent):->1       07/22/21 1446  Specimen to Pathology, Surgery General Surgery Final result    Narrative:  Pre-op Diagnosis: Acute appendicitis with localized   peritonitis, without perforation, abscess, or gangrene   [K35.30]   Procedure(s):   APPENDECTOMY, LAPAROSCOPIC   Number of specimens: 1   Name of specimens: 1. Appendix   Release to patient->Immediate   Specimen total (fresh, frozen, permanent):->1             Urine Culture:   Recent Labs   Lab 10/12/21  1333 11/19/21  2230 01/10/22  0543 02/10/22  0529 03/18/22  1659   LABURIN Multiple organisms isolated. None in predominance.  Repeat if  clinically necessary. No growth PSEUDOMONAS AERUGINOSA  10,000 - 49,999 cfu/ml  * PSEUDOMONAS AERUGINOSA  50,000 - 99,999 cfu/ml  * GRAM  NEGATIVE MARYAN  > 100,000 cfu/ml  Identification and susceptibility pending  *     Urine Studies:   Recent Labs   Lab 01/10/22  0543 02/10/22  0529 03/18/22  1659   COLORU Yellow Yellow Yellow   APPEARANCEUA Hazy* Hazy* Cloudy*   PHUR 6.0 6.0 5.0   SPECGRAV 1.025 1.005 1.020   PROTEINUA 2+* 1+* 2+*   GLUCUA Negative Negative Negative   KETONESU Negative Negative Negative   BILIRUBINUA Negative Negative Negative   OCCULTUA 1+* 2+* 1+*   NITRITE Negative Positive* Positive*   UROBILINOGEN Negative  --   --    LEUKOCYTESUR 2+* 3+* 2+*   RBCUA 7* 14* 13*   WBCUA 35* 84* >100*   BACTERIA Rare Many* Many*   SQUAMEPITHEL 5 0  --    HYALINECASTS 3* 0 0     Wound Culture:   Recent Labs   Lab 03/18/22  1620   LABAERO STAPHYLOCOCCUS AUREUS  Many  Susceptibility pending  *       Significant Imaging:   MRI Foot Toes Without Contrast Right [[id number]] Resulted: 03/19/22 0128   Order Status: Completed Updated: 03/19/22 0130   Narrative:     EXAMINATION:   MRI FOOT TOES WITHOUT CONTRAST RIGHT     CLINICAL HISTORY:   Osteomyelitis, foot;     TECHNIQUE:   Multiplanar multisequence MRI examination of the right foot and toes performed without IV contrast.     COMPARISON:   Right foot radiograph 03/18/2022.     MRI foot right 12/30/2021.     FINDINGS:   There is an open wound overlying the right 5th phalanges.     There is abnormal bone marrow edema signal within the distal heads of the 4th and 5th metatarsal as well as the phalanges of the 4th and 5th toes more extensive in the 5th.     There is scattered subcutaneous edema around this site as well as throughout the foot.  No large drainable fluid collection.  Postsurgical changes of right 2nd ray amputation.    Impression:       Osteomyelitis involving the right 4th and 5th metatarsals distally and the phalanges of the 4th and 5th toes.     Electronically signed by resident: Dimas Zapata   Date: 03/19/2022   Time: 01:04     Electronically signed by: Erlin Hernandez   Date: 03/19/2022    Time: 01:28

## 2022-03-20 NOTE — ASSESSMENT & PLAN NOTE
-UA infectious  -previous UC growing pansensitive pseudomonas aeruginosa  -empiric cefepime initiated while UC growing GNR

## 2022-03-21 NOTE — SUBJECTIVE & OBJECTIVE
Interval History: NAEON. Pt seen and evaluated at bedside this am. Pt is doing well this am. UC growing ESBL, switched from cefepime to ertapenem. Wound cx growing S. Aureus continuing IV vanc as well.     Review of Systems   Constitutional:  Negative for activity change, chills, diaphoresis, fatigue and fever.   HENT:  Negative for congestion, facial swelling, rhinorrhea and sore throat.    Eyes:  Negative for photophobia, pain, itching and visual disturbance.   Respiratory:  Negative for cough, chest tightness, shortness of breath and wheezing.    Cardiovascular:  Negative for chest pain, palpitations and leg swelling.   Gastrointestinal:  Negative for abdominal distention, abdominal pain, blood in stool, constipation, diarrhea, nausea and vomiting.   Genitourinary:  Positive for difficulty urinating (chronic delgado). Negative for dysuria, frequency, hematuria and urgency.   Musculoskeletal:  Positive for gait problem. Negative for arthralgias, back pain and neck stiffness.   Skin:  Positive for color change and wound.   Neurological:  Negative for dizziness, tremors, seizures, syncope, weakness, light-headedness, numbness and headaches.   Psychiatric/Behavioral:  Negative for agitation, confusion and hallucinations. The patient is not nervous/anxious.    Objective:     Vital Signs (Most Recent):  Temp: 98.1 °F (36.7 °C) (03/21/22 1534)  Pulse: 79 (03/21/22 1534)  Resp: 16 (03/21/22 1534)  BP: 138/67 (03/21/22 1534)  SpO2: 100 % (03/21/22 1534) Vital Signs (24h Range):  Temp:  [97.6 °F (36.4 °C)-98.4 °F (36.9 °C)] 98.1 °F (36.7 °C)  Pulse:  [73-82] 79  Resp:  [12-17] 16  SpO2:  [95 %-100 %] 100 %  BP: (131-161)/(65-77) 138/67     Weight: 47.6 kg (104 lb 15 oz)  Body mass index is 16.44 kg/m².    Intake/Output Summary (Last 24 hours) at 3/21/2022 1613  Last data filed at 3/21/2022 1100  Gross per 24 hour   Intake --   Output 1950 ml   Net -1950 ml      Physical Exam  Constitutional:       General: She is not in  acute distress.     Appearance: She is well-developed. She is not ill-appearing, toxic-appearing or diaphoretic.   HENT:      Head: Atraumatic. No abrasion, contusion or laceration.      Nose: Nose normal.      Mouth/Throat:      Pharynx: No oropharyngeal exudate.   Eyes:      General: No scleral icterus.        Right eye: No discharge.         Left eye: No discharge.      Conjunctiva/sclera: Conjunctivae normal.      Pupils: Pupils are equal, round, and reactive to light.   Neck:      Vascular: No JVD.   Cardiovascular:      Rate and Rhythm: Normal rate and regular rhythm.      Heart sounds: Normal heart sounds. No murmur heard.    No friction rub. No gallop.   Pulmonary:      Effort: Pulmonary effort is normal. No accessory muscle usage or respiratory distress.      Breath sounds: Normal breath sounds. No wheezing.   Abdominal:      General: Bowel sounds are normal. There is no distension.      Palpations: Abdomen is soft. There is no mass.      Tenderness: There is no abdominal tenderness. There is no guarding or rebound.   Genitourinary:     Comments: Urinary catheter in place draining light yellow urine  Musculoskeletal:         General: No tenderness or deformity. Normal range of motion.      Cervical back: Normal range of motion and neck supple. No rigidity.      Comments: Righ 5th toe, dry gangrene - see photo.    Lymphadenopathy:      Cervical: No cervical adenopathy.   Skin:     General: Skin is warm and dry.      Capillary Refill: Capillary refill takes less than 2 seconds.      Findings: No bruising, ecchymosis, erythema, petechiae or rash.      Nails: There is no clubbing.   Neurological:      Mental Status: She is alert and oriented to person, place, and time.      Cranial Nerves: No cranial nerve deficit.      Sensory: No sensory deficit.      Motor: No abnormal muscle tone.      Coordination: Coordination normal.      Deep Tendon Reflexes: Reflexes normal.   Psychiatric:         Speech: Speech  normal.         Behavior: Behavior normal.         Thought Content: Thought content normal.         Judgment: Judgment normal.       Significant Labs: All pertinent labs within the past 24 hours have been reviewed.    Significant Imaging: I have reviewed all pertinent imaging results/findings within the past 24 hours.

## 2022-03-21 NOTE — PLAN OF CARE
Recommendations  1) Liberalize diet if poor po intake remains   2) Add Novasource BID to optimize calorie intake   3) Add Frederic BID x 14 days to promote adequate wound healing  4) Add MVI + Vitamin C + Zinc to aid in wound healing     Goals: Pt to receive ONS by RD f/u.  Nutrition Goal Status: new  Communication of RD Recs: other (POC)    By Phoebe FOX

## 2022-03-21 NOTE — PLAN OF CARE
Problem: Adult Inpatient Plan of Care  Goal: Plan of Care Review  Outcome: Ongoing, Progressing  Goal: Patient-Specific Goal (Individualized)  Outcome: Ongoing, Progressing  Goal: Absence of Hospital-Acquired Illness or Injury  Outcome: Ongoing, Progressing  Goal: Optimal Comfort and Wellbeing  Outcome: Ongoing, Progressing  Goal: Readiness for Transition of Care  Outcome: Ongoing, Progressing     Problem: Diabetes Comorbidity  Goal: Blood Glucose Level Within Targeted Range  Outcome: Ongoing, Progressing     Problem: Fluid and Electrolyte Imbalance (Acute Kidney Injury/Impairment)  Goal: Fluid and Electrolyte Balance  Outcome: Ongoing, Progressing     Problem: Oral Intake Inadequate (Acute Kidney Injury/Impairment)  Goal: Optimal Nutrition Intake  Outcome: Ongoing, Progressing     Problem: Renal Function Impairment (Acute Kidney Injury/Impairment)  Goal: Effective Renal Function  Outcome: Ongoing, Progressing     Problem: Impaired Wound Healing  Goal: Optimal Wound Healing  Outcome: Ongoing, Progressing     Problem: Fall Injury Risk  Goal: Absence of Fall and Fall-Related Injury  Outcome: Ongoing, Progressing     Problem: Skin Injury Risk Increased  Goal: Skin Health and Integrity  Outcome: Ongoing, Progressing

## 2022-03-21 NOTE — PLAN OF CARE
[facility] - Telemetry Stepdown ([facility]-7)  Discharge Assessment    Primary Care Provider: Dante Olivier MD     Discharge Assessment (most recent)     BRIEF DISCHARGE ASSESSMENT - 03/21/22 1417        Discharge Planning    Assessment Type Discharge Planning Brief Assessment     Resource/Environmental Concerns none     Support Systems Family members     Equipment Currently Used at Home wheelchair     Current Living Arrangements home/apartment/condo     Patient/Family Anticipated Services at Transition skilled nursing     DME Needed Upon Discharge  none     Discharge Plan A Skilled Nursing Facility     Discharge Plan B Skilled Nursing Facility               Pt. Is a 71 y.o. female admitted with Osteomyelitis of R foot and has a PMH of osteoarthritis and CVA w/ L sided residual weakness. She is currently a patient at [facility]. She lives in a 1st floor apartment by herself. She states she is able to perform ADLs independently. He brother assists with transportation. Plan is to return to [facility]. [facility] Packet given to patient and/or family with understanding verbalized.   name and number and estimated discharge date written on white board in patient's room with request to call for any questions or concerns.  Will continue to follow for needs.  Paulo Lind RN,BSN

## 2022-03-21 NOTE — PROGRESS NOTES
"[facility] - Telemetry Stepdown ([address] Medicine  Progress Note    Patient Name: Beatriz Adame  MRN: [id number]  Patient Class: IP- Inpatient   Admission Date: 3/18/2022  Length of Stay: 0 days  Attending Physician: Arash Saini MD  Primary Care Provider: Dante Olivier MD        Subjective:     Principal Problem:Osteomyelitis of right foot        HPI:  Patient is a 71 year old female with osteoarthritis, hypothyroidism, CVA with left-sided residual deficit, DM2, HTN, HLD, CKD, chronic anemia, recurrent pseudomonal UTI, nephroliathiasis s/p left ureteral stent placement in 10/2021, urinary retention with chronic indwelling delgado catheter, diabetic foot ulcer s/p R toe amputation, chronic anemia, who presents with acute metabolic encephalopathy secondary to catheter associated urinary tract infection and possible right toe infection.   She is being admitted for [facility].     Initially in ED patient was found to have a white count of 13,000 and a left shift / neutrophilic predominance.  While not overtly septic, sepsis protocol was initiated - patient pancultured and broad-spectrum antibiotics with vancomycin and ceftriaxone was initiated.  Vital signs were within normal limits however blood pressure was at the lower range of normal for this patient with a systolic blood pressure of 110.  Lactic acid was obtained and was within normal limits.     Patient is alert on my examination she is able to describe that she is at the hospital for a catheter exchange."  The her catheter has been exchanged in the ED and she has had the benefit of broad-spectrum antibiotics since her admission there.  It is clear that her encephalopathy has already improved with the management of her very obvious catheter associated urinary tract infection.  The ED provider also had a concern for a right 5th toe infection - which appears to be chronically devascularized and gangrenous on examination.  " Plain films of the affected foot were unrevealing however patient has just returned from MRI and interpretation is pending.  Inflammatory markers are notably elevated.      She will be admitted to Hospital Medicine for ongoing management of her catheter associated urinary tract infection and possible right foot infection.          Overview/Hospital Course:  Pt placed in observation for acute metabolic encephalopathy in setting of UTI and osteomyelitis. Pt was AFVSS and hemodynamically stable. Leukocytosis resolved w/ initiation of IV abx. UA infectious and initially started on IV cefepime. UC grew ESBL and patient was transitioned to IV ertapenem. MRI of R foot and toes revealed osteomyelitis of the right 4th and 5th distal metatarsals and phalanges. Podiatry and ID following.      Interval History: NAEON. Pt seen and evaluated at bedside this am. Pt is doing well this am. UC growing ESBL, switched from cefepime to ertapenem. Wound cx growing S. Aureus continuing IV vanc as well.     Review of Systems   Constitutional:  Negative for activity change, chills, diaphoresis, fatigue and fever.   HENT:  Negative for congestion, facial swelling, rhinorrhea and sore throat.    Eyes:  Negative for photophobia, pain, itching and visual disturbance.   Respiratory:  Negative for cough, chest tightness, shortness of breath and wheezing.    Cardiovascular:  Negative for chest pain, palpitations and leg swelling.   Gastrointestinal:  Negative for abdominal distention, abdominal pain, blood in stool, constipation, diarrhea, nausea and vomiting.   Genitourinary:  Positive for difficulty urinating (chronic delgado). Negative for dysuria, frequency, hematuria and urgency.   Musculoskeletal:  Positive for gait problem. Negative for arthralgias, back pain and neck stiffness.   Skin:  Positive for color change and wound.   Neurological:  Negative for dizziness, tremors, seizures, syncope, weakness, light-headedness, numbness and headaches.    Psychiatric/Behavioral:  Negative for agitation, confusion and hallucinations. The patient is not nervous/anxious.    Objective:     Vital Signs (Most Recent):  Temp: 98.1 °F (36.7 °C) (03/21/22 1534)  Pulse: 79 (03/21/22 1534)  Resp: 16 (03/21/22 1534)  BP: 138/67 (03/21/22 1534)  SpO2: 100 % (03/21/22 1534) Vital Signs (24h Range):  Temp:  [97.6 °F (36.4 °C)-98.4 °F (36.9 °C)] 98.1 °F (36.7 °C)  Pulse:  [73-82] 79  Resp:  [12-17] 16  SpO2:  [95 %-100 %] 100 %  BP: (131-161)/(65-77) 138/67     Weight: 47.6 kg (104 lb 15 oz)  Body mass index is 16.44 kg/m².    Intake/Output Summary (Last 24 hours) at 3/21/2022 1613  Last data filed at 3/21/2022 1100  Gross per 24 hour   Intake --   Output 1950 ml   Net -1950 ml      Physical Exam  Constitutional:       General: She is not in acute distress.     Appearance: She is well-developed. She is not ill-appearing, toxic-appearing or diaphoretic.   HENT:      Head: Atraumatic. No abrasion, contusion or laceration.      Nose: Nose normal.      Mouth/Throat:      Pharynx: No oropharyngeal exudate.   Eyes:      General: No scleral icterus.        Right eye: No discharge.         Left eye: No discharge.      Conjunctiva/sclera: Conjunctivae normal.      Pupils: Pupils are equal, round, and reactive to light.   Neck:      Vascular: No JVD.   Cardiovascular:      Rate and Rhythm: Normal rate and regular rhythm.      Heart sounds: Normal heart sounds. No murmur heard.    No friction rub. No gallop.   Pulmonary:      Effort: Pulmonary effort is normal. No accessory muscle usage or respiratory distress.      Breath sounds: Normal breath sounds. No wheezing.   Abdominal:      General: Bowel sounds are normal. There is no distension.      Palpations: Abdomen is soft. There is no mass.      Tenderness: There is no abdominal tenderness. There is no guarding or rebound.   Genitourinary:     Comments: Urinary catheter in place draining light yellow urine  Musculoskeletal:         General:  No tenderness or deformity. Normal range of motion.      Cervical back: Normal range of motion and neck supple. No rigidity.      Comments: Righ 5th toe, dry gangrene - see photo.    Lymphadenopathy:      Cervical: No cervical adenopathy.   Skin:     General: Skin is warm and dry.      Capillary Refill: Capillary refill takes less than 2 seconds.      Findings: No bruising, ecchymosis, erythema, petechiae or rash.      Nails: There is no clubbing.   Neurological:      Mental Status: She is alert and oriented to person, place, and time.      Cranial Nerves: No cranial nerve deficit.      Sensory: No sensory deficit.      Motor: No abnormal muscle tone.      Coordination: Coordination normal.      Deep Tendon Reflexes: Reflexes normal.   Psychiatric:         Speech: Speech normal.         Behavior: Behavior normal.         Thought Content: Thought content normal.         Judgment: Judgment normal.       Significant Labs: All pertinent labs within the past 24 hours have been reviewed.    Significant Imaging: I have reviewed all pertinent imaging results/findings within the past 24 hours.      Assessment/Plan:      * Osteomyelitis of right foot  Osteomyelitis of R 2nd toe  Critical lower limb ischemia  -MRI revealed Osteomyelitis involving the right 4th and 5th metatarsals distally and the phalanges of the 4th and 5th toes.   -XR R foot showed no acute bony abnormalities  -podiatry consulted, appreciate recs   -Options may include (1) right 4th-5th ray amputations with shorter course of antibiotics and wound care, (2) bone biopsy with longer course of antibiotics and wound care, or (3) palliative care. Current peripheral circulation may not be adequate to clear infection or heal a surgical wound. Vascular workup required prior to further planning.   -Noninvasive vascular studies ordered, consult vascular surgery once resulted. Do not modify orders.   -Antibiotic plan per ID.    -WBAT LLE, WB to heel for transfers or  short distances RLE.   -Cx growing s. aureus  -ID consulted, appreciate recs   -empiric Vancomycin and ertapenem (renal dosing). Monitor renal function closely  -Follow blood/urine cultures        Acute metabolic encephalopathy  Acute cystitis without hematuria    RESOLVED  -Patient initiated on broad-spectrum antibiotics in the ED - vancomycin ceftriaxone  -Will continue with broad-spectrum antibiotics and will change ceftriaxone to Cefepime in light of possible diabetic foot infection  -Patient has a history of pansensitive Klebsiella UTIs  -Nitrite positive urinalysis with gross parameters of infection on UA, urine culture growing ESBL  -Araiza catheter exchanged  -Blood cultures NGTD  -Encephalopathy improved with goal directed medical therapy for infections  -MRI of right foot reveals Osteomyelitis involving the right 4th and 5th metatarsals distally and the phalanges of the 4th and 5th toes.   -podiatry following  -Delirium precautions, neuro checks, aspiration precautions  -Inflammatory markers elevated, ESR-95, CRP-64      Acute cystitis without hematuria  -UA infectious  -previous UC growing pansensitive pseudomonas aeruginosa  -empiric cefepime initiated while UC growing ESBL, cefepime switched to ertapenem    Hypertension  -continue home coreg and amlodipine    Chronic kidney disease, stage 4 (severe)  -Stable at baseline, avoid any nephrotoxic agents      History of stroke  -continue home asa and plavix      Type 2 diabetes mellitus, with long-term current use of insulin  -Diabetic diet  -Accu-Cheks qac,qhs  -3 units determir, low dose SSI      Peripheral neuropathy  -home gabapentin 300 mg once daily      Debility  -PTOT      Urinary retention  -Patient with history of urinary retention and Araiza catheter in place since at least January of 2022  -Araiza catheter last exchanged 02/22/2022  -Araiza catheter exchanged in ED  -Patient continues Flomax per home dose        Hypothyroidism  -continue  levothyroxine      Severe protein-calorie malnutrition  -registered dietitian consult      Hyperlipidemia  -Atorvastatin 80 formulary substitution for home Crestor 40        VTE Risk Mitigation (From admission, onward)         Ordered     heparin (porcine) injection 5,000 Units  Every 8 hours         03/18/22 2111     IP VTE HIGH RISK PATIENT  Once         03/18/22 2110     Place sequential compression device  Until discontinued         03/18/22 2110                Discharge Planning   MARY: 3/24/2022     Code Status: Full Code   Is the patient medically ready for discharge?: No    Reason for patient still in hospital (select all that apply): Patient trending condition, Laboratory test, Treatment, Consult recommendations and Pending disposition  Discharge Plan A: Skilled Nursing Facility                  Misty Anderson PA-C  Department of Hospital Medicine   [facility] - Telemetry Stepdown ([place]-7)

## 2022-03-21 NOTE — ASSESSMENT & PLAN NOTE
-UA infectious  -previous UC growing pansensitive pseudomonas aeruginosa  -empiric cefepime initiated while UC growing ESBL, cefepime switched to ertapenem

## 2022-03-21 NOTE — ASSESSMENT & PLAN NOTE
Acute cystitis without hematuria    RESOLVED  -Patient initiated on broad-spectrum antibiotics in the ED - vancomycin ceftriaxone  -Will continue with broad-spectrum antibiotics and will change ceftriaxone to Cefepime in light of possible diabetic foot infection  -Patient has a history of pansensitive Klebsiella UTIs  -Nitrite positive urinalysis with gross parameters of infection on UA, urine culture growing ESBL  -Araiza catheter exchanged  -Blood cultures NGTD  -Encephalopathy improved with goal directed medical therapy for infections  -MRI of right foot reveals Osteomyelitis involving the right 4th and 5th metatarsals distally and the phalanges of the 4th and 5th toes.   -podiatry following  -Delirium precautions, neuro checks, aspiration precautions  -Inflammatory markers elevated, ESR-95, CRP-64

## 2022-03-21 NOTE — PROGRESS NOTES
Pharmacist Renal Dose Adjustment Note    Beatriz Adame is a 71 y.o. female being treated with the medication Ertapenem    Patient Data:    Vital Signs (Most Recent):  Temp: 97.6 °F (36.4 °C) (03/21/22 0905)  Pulse: 77 (03/21/22 0905)  Resp: 16 (03/21/22 0905)  BP: (!) 148/68 (03/21/22 0905)  SpO2: 100 % (03/21/22 0905)   Vital Signs (72h Range):  Temp:  [97.3 °F (36.3 °C)-98.6 °F (37 °C)]   Pulse:  [65-87]   Resp:  [12-20]   BP: (103-171)/(53-77)   SpO2:  [93 %-100 %]      Recent Labs   Lab 03/19/22  0451 03/20/22  1254 03/21/22  0515   CREATININE 2.3* 2.0* 1.8*     Serum creatinine: 1.8 mg/dL (H) 03/21/22 0515  Estimated creatinine clearance: 24.1 mL/min (A)    Ertapenem 1 g Q24H will be changed to Ertapenem 500 mg Q24H    Pharmacist's Name: Jeni Deng  Pharmacist's Extension: [id number]

## 2022-03-21 NOTE — ASSESSMENT & PLAN NOTE
Osteomyelitis of R 2nd toe  Critical lower limb ischemia  -MRI revealed Osteomyelitis involving the right 4th and 5th metatarsals distally and the phalanges of the 4th and 5th toes.   -XR R foot showed no acute bony abnormalities  -podiatry consulted, appreciate recs   -Options may include (1) right 4th-5th ray amputations with shorter course of antibiotics and wound care, (2) bone biopsy with longer course of antibiotics and wound care, or (3) palliative care. Current peripheral circulation may not be adequate to clear infection or heal a surgical wound. Vascular workup required prior to further planning.   -Noninvasive vascular studies ordered, consult vascular surgery once resulted. Do not modify orders.   -Antibiotic plan per ID.    -WBAT LLE, WB to heel for transfers or short distances RLE.   -Cx growing s. aureus  -ID consulted, appreciate recs   -empiric Vancomycin and ertapenem (renal dosing). Monitor renal function closely  -Follow blood/urine cultures

## 2022-03-21 NOTE — PLAN OF CARE
Problem: Adult Inpatient Plan of Care  Goal: Plan of Care Review  Outcome: Ongoing, Progressing  Goal: Patient-Specific Goal (Individualized)  Outcome: Ongoing, Progressing  Goal: Absence of Hospital-Acquired Illness or Injury  Outcome: Ongoing, Progressing  Goal: Optimal Comfort and Wellbeing  Outcome: Ongoing, Progressing  Goal: Readiness for Transition of Care  Outcome: Ongoing, Progressing     Problem: Diabetes Comorbidity  Goal: Blood Glucose Level Within Targeted Range  Outcome: Ongoing, Progressing     Problem: Fluid and Electrolyte Imbalance (Acute Kidney Injury/Impairment)  Goal: Fluid and Electrolyte Balance  Outcome: Ongoing, Progressing     Problem: Oral Intake Inadequate (Acute Kidney Injury/Impairment)  Goal: Optimal Nutrition Intake  Outcome: Ongoing, Progressing     Problem: Renal Function Impairment (Acute Kidney Injury/Impairment)  Goal: Effective Renal Function  Outcome: Ongoing, Progressing     Problem: Impaired Wound Healing  Goal: Optimal Wound Healing  Outcome: Ongoing, Progressing     Problem: Fall Injury Risk  Goal: Absence of Fall and Fall-Related Injury  Outcome: Ongoing, Progressing     Problem: Skin Injury Risk Increased  Goal: Skin Health and Integrity  Outcome: Ongoing, Progressing    Pt lying supine in bed with eyes open. AAOx3 and able to voice needs to staff. Contracture noted in LUE. Incontinent of bowel. Araiza cath present w/o any kinks noted. No s/s of hypo/hyperglycemia. Call light w/in reach. SR up x2 for safety. Will cont to monitor.

## 2022-03-22 NOTE — ASSESSMENT & PLAN NOTE
-UA infectious  -previous UC growing pansensitive pseudomonas aeruginosa  -UC growing ESBL, continuing IV ertapenem

## 2022-03-22 NOTE — SUBJECTIVE & OBJECTIVE
Subjective:     Interval History:   NAEON. Afebrile, vital signs stable. Leukocytosis resolved. Right foot wound culture + MRSA. MRI right forefoot demonstrating osteomyelitis involving the right 4th and 5th metatarsals distally and the phalanges of the 4th and 5th toes. R SONAL 0.98 R TBI 0.16. RLE PVR waveforms/ ABIs normal suggestive of no significant PAD.        Scheduled Meds:   amLODIPine  10 mg Oral QHS    aspirin  81 mg Oral Daily    atorvastatin  80 mg Oral QHS    carvediloL  25 mg Oral BID    clopidogreL  75 mg Oral Daily    ertapenem (INVANZ) IVPB  500 mg Intravenous Q24H    ferrous sulfate  1 tablet Oral Daily    gabapentin  300 mg Oral Daily    heparin (porcine)  5,000 Units Subcutaneous Q8H    insulin detemir U-100  3 Units Subcutaneous Daily    Lactobacillus rhamnosus GG  1 capsule Oral Daily    levothyroxine  75 mcg Oral Daily    metoclopramide HCl  10 mg Oral TID AC    pantoprazole  40 mg Oral Daily    tamsulosin  0.4 mg Oral Daily     Continuous Infusions:  PRN Meds:sodium chloride, dextrose 10%, dextrose 10%, glucagon (human recombinant), glucose, glucose, insulin aspart U-100, naloxone, ondansetron, sodium chloride 0.9%, Pharmacy to dose Vancomycin consult **AND** vancomycin - pharmacy to dose    Review of Systems   Constitutional:  Negative for activity change, chills, diaphoresis, fatigue and fever.   HENT:  Negative for congestion, facial swelling, rhinorrhea and sore throat.    Eyes:  Negative for photophobia, pain, itching and visual disturbance.   Respiratory:  Negative for cough, chest tightness, shortness of breath and wheezing.    Cardiovascular:  Negative for chest pain, palpitations and leg swelling.   Gastrointestinal:  Negative for abdominal distention, abdominal pain, blood in stool, constipation, diarrhea, nausea and vomiting.   Genitourinary:  Positive for difficulty urinating (chronic delgado). Negative for dysuria, frequency, hematuria and urgency.   Musculoskeletal:  Positive for  gait problem. Negative for arthralgias, back pain and neck stiffness.   Skin:  Positive for color change and wound.   Neurological:  Negative for dizziness, tremors, seizures, syncope, weakness, light-headedness, numbness and headaches.   Psychiatric/Behavioral:  Negative for agitation, confusion and hallucinations. The patient is not nervous/anxious.    Objective:     Vital Signs (Most Recent):  Temp: 98.3 °F (36.8 °C) (03/22/22 1209)  Pulse: 76 (03/22/22 1209)  Resp: 18 (03/22/22 1209)  BP: 131/61 (03/22/22 1209)  SpO2: 98 % (03/22/22 1209)   Vital Signs (24h Range):  Temp:  [97.7 °F (36.5 °C)-98.8 °F (37.1 °C)] 98.3 °F (36.8 °C)  Pulse:  [76-83] 76  Resp:  [16-18] 18  SpO2:  [94 %-100 %] 98 %  BP: ()/(51-68) 131/61     Weight: 47.6 kg (104 lb 15 oz)  Body mass index is 16.44 kg/m².    Foot Exam    Right Foot/Ankle     Inspection and Palpation  Tenderness: none   Swelling: none   Skin Exam: drainage, abnormal color and ulcer; skin not intact and no erythema     Neurovascular  Dorsalis pedis: absent  Posterior tibial: absent    Comments  R 2nd toe amputated    Left Foot/Ankle      Inspection and Palpation  Tenderness: none   Swelling: none   Skin Exam: skin intact; no drainage, no cellulitis and no erythema     Neurovascular  Dorsalis pedis: absent  Posterior tibial: absent        Laboratory:  CBC:   Recent Labs   Lab 03/22/22  0944   WBC 10.76   RBC 3.17*   HGB 8.9*   HCT 28.6*      MCV 90   MCH 28.1   MCHC 31.1*       CRP:   Recent Labs   Lab 03/18/22  1640   CRP 64.0*       ESR:   Recent Labs   Lab 03/18/22  1640   SEDRATE 95*       Wound Cultures:   Recent Labs   Lab 03/18/22  1620   LABAERO METHICILLIN RESISTANT STAPHYLOCOCCUS AUREUS  Many  *       Microbiology Results (last 7 days)       Procedure Component Value Units Date/Time    Blood culture x two cultures. Draw prior to antibiotics. [[id number]] Collected: 03/18/22 1641    Order Status: Completed Specimen: Blood from Peripheral, Antecubital,  Right Updated: 03/21/22 2012     Blood Culture, Routine No Growth to date      No Growth to date      No Growth to date      No Growth to date    Narrative:      Aerobic and anaerobic    Blood culture x two cultures. Draw prior to antibiotics. [[id number]] Collected: 03/18/22 1641    Order Status: Completed Specimen: Blood from Peripheral, Antecubital, Right Updated: 03/21/22 2012     Blood Culture, Routine No Growth to date      No Growth to date      No Growth to date      No Growth to date    Narrative:      Aerobic and anaerobic    Aerobic culture [[id number]]  (Abnormal)  (Susceptibility) Collected: 03/18/22 1620    Order Status: Completed Specimen: Wound from Toe, Right Foot Updated: 03/21/22 1226     Aerobic Bacterial Culture METHICILLIN RESISTANT STAPHYLOCOCCUS AUREUS  Many      Urine culture [[id number]]  (Abnormal)  (Susceptibility) Collected: 03/18/22 1659    Order Status: Completed Specimen: Urine Updated: 03/21/22 0736     Urine Culture, Routine ESCHERICHIA COLI ESBL  > 100,000 cfu/ml      Narrative:      Specimen Source->Urine          Specimen (24h ago, onward)                None            Diagnostic Results:  I have reviewed all pertinent imaging results/findings within the past 24 hours.    Clinical Findings:  Ulceration in the 4th interspace right foot. Wound base fibrotic, probes to bone, fibrinous exudate. R 5th toe dry gangrene. No erythema, no edema, no crepitus or fluctuance. DP PT pulses nonpalpable.

## 2022-03-22 NOTE — PROGRESS NOTES
Pharmacokinetic Assessment Follow Up: IV Vancomycin    Vancomycin serum concentration assessment(s):    The random level was drawn correctly and can be used to guide therapy at this time. The measurement is above the desired definitive target range of 15 to 20 mcg/mL.    Vancomycin Regimen Plan:    Re-dose when the random level is less than 20 mcg/mL, next level to be drawn at 0430 (with AM labs) on 3/23/22.    Drug levels (last 3 results):  Recent Labs   Lab Result Units 03/20/22  1536 03/21/22  0515 03/22/22  0544   Vancomycin, Random ug/mL 3.5 16.7 26.6       Pharmacy will continue to follow and monitor vancomycin.    Please contact pharmacy at extension [phone] for questions regarding this assessment.    Thank you for the consult,   Jeni Deng     Patient brief summary:  Beatriz Adame is a 71 y.o. female initiated on antimicrobial therapy with IV Vancomycin for treatment of bone/joint infection.    Drug Allergies:   Review of patient's allergies indicates:   Allergen Reactions    Tomato (solanum lycopersicum) Hives and Itching       Actual Body Weight:   47.6 kg    Renal Function:   Estimated Creatinine Clearance: 21.9 mL/min (A) (based on SCr of 1.8 mg/dL (H)).,     Dialysis Method (if applicable):  N/A    CBC (last 72 hours):  Recent Labs   Lab Result Units 03/20/22  1254 03/21/22  0515   WBC K/uL 11.46 11.83   Hemoglobin g/dL 8.7* 9.0*   Hematocrit % 28.1* 28.1*   Platelets K/uL 194 175   Gran % % 65.7 65.2   Lymph % % 24.3 23.7   Mono % % 7.5 8.3   Eosinophil % % 1.8 2.1   Basophil % % 0.4 0.4   Differential Method  Automated Automated       Metabolic Panel (last 72 hours):  Recent Labs   Lab Result Units 03/20/22  1254 03/21/22  0515   Sodium mmol/L 140 141   Potassium mmol/L 4.4 4.3   Chloride mmol/L 109 105   CO2 mmol/L 24 27   Glucose mg/dL 164* 124*   BUN mg/dL 29* 26*   Creatinine mg/dL 2.0* 1.8*   Albumin g/dL 2.4* 2.3*   Total Bilirubin mg/dL 0.5 0.5   Alkaline Phosphatase U/L 117 76   AST  U/L 11 12   ALT U/L 11 10       Vancomycin Administrations:  vancomycin given in the last 96 hours                   vancomycin in dextrose 5 % 1 gram/250 mL IVPB 1,000 mg (mg) 1,000 mg New Bag 03/21/22 1348    vancomycin in dextrose 5 % 1 gram/250 mL IVPB 1,000 mg (mg) 1,000 mg New Bag 03/20/22 1840    vancomycin in dextrose 5 % 1 gram/250 mL IVPB 1,000 mg (mg) 1,000 mg New Bag 03/19/22 1625    vancomycin 750 mg in dextrose 5 % 250 mL IVPB (ready to mix system) (mg) 750 mg New Bag 03/18/22 1851                Microbiologic Results:  Microbiology Results (last 7 days)     Procedure Component Value Units Date/Time    Blood culture x two cultures. Draw prior to antibiotics. [[id number]] Collected: 03/18/22 1641    Order Status: Completed Specimen: Blood from Peripheral, Antecubital, Right Updated: 03/21/22 2012     Blood Culture, Routine No Growth to date      No Growth to date      No Growth to date      No Growth to date    Narrative:      Aerobic and anaerobic    Blood culture x two cultures. Draw prior to antibiotics. [[id number]] Collected: 03/18/22 1641    Order Status: Completed Specimen: Blood from Peripheral, Antecubital, Right Updated: 03/21/22 2012     Blood Culture, Routine No Growth to date      No Growth to date      No Growth to date      No Growth to date    Narrative:      Aerobic and anaerobic    Aerobic culture [[id number]]  (Abnormal)  (Susceptibility) Collected: 03/18/22 1620    Order Status: Completed Specimen: Wound from Toe, Right Foot Updated: 03/21/22 1226     Aerobic Bacterial Culture METHICILLIN RESISTANT STAPHYLOCOCCUS AUREUS  Many      Urine culture [[id number]]  (Abnormal)  (Susceptibility) Collected: 03/18/22 1659    Order Status: Completed Specimen: Urine Updated: 03/21/22 0736     Urine Culture, Routine ESCHERICHIA COLI ESBL  > 100,000 cfu/ml      Narrative:      Specimen Source->Urine

## 2022-03-22 NOTE — ASSESSMENT & PLAN NOTE
MRI right forefoot demonstrating osteomyelitis involving the right 4th and 5th metatarsals distally and the phalanges of the 4th and 5th toes.    Plan:  -Discussed options with patient which may include (1) right 4th-5th ray amputations  (2) bone biopsy with 6 weeks and antibiotics and wound care. Risks, benefits, post op course discussed in detail. Patient would like to proceed with amputation  -Patient scheduled for right foot partial 4th and 5th ray amputation 3/24/2022. Will obtain intraop proximal bone margins for antibiotic tailoring.   -Will plan to repeat COVID testing for preop 3/23.   -Arterial studies reviewed. RLE ABIs/ PVR waveforms suggest no evidence of PAD. Rt Great Toe: The PPG waveform is blunted with TBI of 0.16 and a toe pressure of 25 mmHg which suggests severe microvascular disease.   -Continue Antibiotic plan per ID. Wound cultures +MRSA  -WBAT LLE, WB to heel for transfers or short distances RLE.   -Dressing changes by nursing, ordered.   -Podiatry will follow.

## 2022-03-22 NOTE — PROGRESS NOTES
[facility] - Telemetry Stepdown ([place] [address])  Infectious Disease  Progress Note    Patient Name: Beatriz Adame  MRN: [id number]  Admission Date: 3/18/2022  Length of Stay: 1 days  Attending Physician: Emile Hunt MD  Primary Care Provider: Dante Olivier MD    Isolation Status: Contact  Assessment/Plan:      * Osteomyelitis of right foot     71 year old female with CVA, DM2, HTN, CKD, nephroliathiasis s/p left ureteral stent placement, urinary retention managed with chronic indwelling delgado, recurrent UTIs, right second toe osteomyelitis s/p R toe amputation 1/2022 who presented to [facility] from [facility] with acute metabolic encephalopathy likely secondary to UTI and right foot infection.  Delgado exchanged in ED. Urine cx + for ESBL E Coli     Noted to have gangrenous changes of her right fifth toe and interdigit wound between toes 4/5.   MRI right foot reviewed and is concerning for osteomyelitis of the 4th and 5th digits.  Wound cultures + for MRSA.  Vascular studies performed and  reviewed. No significant PAD. Toe pressures significant for severe microvascular disease.  Podiatry has discussed with patient who wishes to proceed with right foot partial 4th and 5th ray amputations - scheduled for  3/24/2022.        Patient mentation has improved on antibiotics. Afebrile, no leukocytosis, and HDS.    Recommendations  · Continue IV Vancomycin (pharmacy to dose. Trough goal 15-20).  Random level this morning 26.  Pharmacy to re-dose when <20.  Creatinine improving.   · Continue  ertapenem 500 mg IV q 24 hours for ESBL E coli (renally adjusted).  Recommend 7 days ertapenem given history of stents  · Follow up clean margins after surgery.  Hopefully source control will be achieved with amputation and will only need short course of abx after amputation for SSTI  · Will follow     Data reviewed and plan discussed with ID staff, Dr. Saxena  Secure chat/Discussed above plan with Primary Team, SOCRATES Anderson ,  [place]        Acute cystitis without hematuria  See assessment/plan above,  Ertapenem x 7 days.       Thank you.   Please call for any questions or concerns.  Chela Castaneda, LAUREL, ANP-C  Spectra [phone]    Subjective:     Principal Problem:Osteomyelitis of right foot    HPI: Ms. Adame is a 71 year old female with osteoarthritis, hypothyroidism, CVA with left-sided residual deficit, DM2, HTN, HLD, CKD, nephroliathiasis s/p left ureteral stent placement in 10/2021, urinary retention managed with chronic indwelling delgado, recurrent pseudomonal UTI, right second toe osteomyelitis s/p R toe amputation 1/2022 who presented to [facility] with acute metabolic encephalopathy secondary to catheter associated urinary tract infection and possible right toe infection.     Afebrile in the ED. WBC 13K. Inflammatory markers elevated. Cr 2.3 lactate WNL. UA positive for pyuria. Delgado exchanged in ED. Started empiric Vanc/Cefepime. Blood/urine cultures are pending. Noted to have gangrenous changes of her right fifth toe. MRI right foot reviewed and is concerning for osteomyelitis of the 4th and 5th distal metatarsals and phalanges.  ID consulted for antibiotic recommendations. Podiatry consulted. Patient mentation has improved.    Interval History: NAEON. Afebrile. Leukocytosis resolved.   Urine Cx showing ESBL E coli   Wound cx with MRSA.   Vanc trough 22.6  Right SONAL wnl, but right TBI suggestive of microvascular disease.   Podiatry  has discussed with patient and planning on partial 4th and 5th ray amputations.     Review of Systems   Constitutional:  Negative for activity change, appetite change, chills, diaphoresis and fever.   HENT: Negative.     Eyes: Negative.    Respiratory:  Negative for cough and shortness of breath.    Cardiovascular:  Negative for chest pain and leg swelling.   Gastrointestinal:  Negative for abdominal pain, constipation, diarrhea, nausea and vomiting.   Genitourinary:  Positive for  difficulty urinating (chronic delgado). Negative for dysuria and flank pain.   Musculoskeletal:  Positive for arthralgias and gait problem.   Skin:  Positive for color change and wound.   Neurological:  Positive for numbness. Negative for headaches.   Psychiatric/Behavioral:  Negative for agitation and confusion. The patient is not nervous/anxious.    Objective:     Vital Signs (Most Recent):  Temp: 97.7 °F (36.5 °C) (03/22/22 0807)  Pulse: 76 (03/22/22 0807)  Resp: 16 (03/22/22 0807)  BP: (!) 110/56 (03/22/22 0807)  SpO2: 98 % (03/22/22 0807)   Vital Signs (24h Range):  Temp:  [97.7 °F (36.5 °C)-98.8 °F (37.1 °C)] 97.7 °F (36.5 °C)  Pulse:  [76-83] 76  Resp:  [16-17] 16  SpO2:  [94 %-100 %] 98 %  BP: ()/(51-68) 110/56     Weight: 47.6 kg (104 lb 15 oz)  Body mass index is 16.44 kg/m².    Estimated Creatinine Clearance: 21.9 mL/min (A) (based on SCr of 1.8 mg/dL (H)).    Physical Exam  Vitals and nursing note reviewed.   Constitutional:       General: She is not in acute distress.     Appearance: Normal appearance. She is not ill-appearing, toxic-appearing or diaphoretic.   HENT:      Head: Normocephalic and atraumatic.      Mouth/Throat:      Mouth: Mucous membranes are dry.   Eyes:      General: No scleral icterus.     Conjunctiva/sclera: Conjunctivae normal.   Cardiovascular:      Rate and Rhythm: Normal rate and regular rhythm.      Pulses: Decreased pulses.   Pulmonary:      Effort: Pulmonary effort is normal. No respiratory distress.      Breath sounds: Normal breath sounds.   Abdominal:      General: There is no distension.      Palpations: Abdomen is soft.      Tenderness: There is no abdominal tenderness. There is no right CVA tenderness, left CVA tenderness or guarding.   Musculoskeletal:         General: Deformity (right 2nd toe amputation) present.      Right lower leg: No edema.      Left lower leg: No edema.      Comments: Right foot wrapped.  No ascending cellulitis/warmth  See Podiatry photos  below   Skin:     General: Skin is warm and dry.   Neurological:      Mental Status: She is alert.      Comments: Alert/conversant   Psychiatric:         Mood and Affect: Mood normal.         Behavior: Behavior normal.                 Significant Labs: Blood Culture:   Recent Labs   Lab 12/30/21  1523 12/30/21  1536 03/18/22  1641   LABBLOO No growth after 5 days. No growth after 5 days. No Growth to date  No Growth to date  No Growth to date  No Growth to date  No Growth to date  No Growth to date  No Growth to date  No Growth to date       BMP:   Recent Labs   Lab 03/21/22  0515   *      K 4.3      CO2 27   BUN 26*   CREATININE 1.8*   CALCIUM 9.8       CBC:   Recent Labs   Lab 03/20/22  1254 03/21/22  0515   WBC 11.46 11.83   HGB 8.7* 9.0*   HCT 28.1* 28.1*    175       CMP:   Recent Labs   Lab 03/20/22  1254 03/21/22  0515    141   K 4.4 4.3    105   CO2 24 27   * 124*   BUN 29* 26*   CREATININE 2.0* 1.8*   CALCIUM 9.5 9.8   PROT 7.5 7.4   ALBUMIN 2.4* 2.3*   BILITOT 0.5 0.5   ALKPHOS 117 76   AST 11 12   ALT 11 10   ANIONGAP 7* 9   EGFRNONAA 24.8* 28.1*       Microbiology Results (last 7 days)       Procedure Component Value Units Date/Time    Blood culture x two cultures. Draw prior to antibiotics. [[id number]] Collected: 03/18/22 1641    Order Status: Completed Specimen: Blood from Peripheral, Antecubital, Right Updated: 03/21/22 2012     Blood Culture, Routine No Growth to date      No Growth to date      No Growth to date      No Growth to date    Narrative:      Aerobic and anaerobic    Blood culture x two cultures. Draw prior to antibiotics. [[id number]] Collected: 03/18/22 1641    Order Status: Completed Specimen: Blood from Peripheral, Antecubital, Right Updated: 03/21/22 2012     Blood Culture, Routine No Growth to date      No Growth to date      No Growth to date      No Growth to date    Narrative:      Aerobic and anaerobic    Aerobic culture  [[id number]]  (Abnormal)  (Susceptibility) Collected: 03/18/22 1620    Order Status: Completed Specimen: Wound from Toe, Right Foot Updated: 03/21/22 1226     Aerobic Bacterial Culture METHICILLIN RESISTANT STAPHYLOCOCCUS AUREUS  Many      Urine culture [[id number]]  (Abnormal)  (Susceptibility) Collected: 03/18/22 1659    Order Status: Completed Specimen: Urine Updated: 03/21/22 0736     Urine Culture, Routine ESCHERICHIA COLI ESBL  > 100,000 cfu/ml      Narrative:      Specimen Source->Urine          Pathology Results  (Last 10 years)                 01/01/22 1345  Specimen to Pathology, Surgery Other Final result    Narrative:  Pre-op Diagnosis: Ulcer of right second toe [L97.519]   Diabetic foot ulcer with osteomyelitis [E11.621, E11.69,   L97.509, M86.9]   Procedure(s):   AMPUTATION, TOE   Number of specimens: 1   Name of specimens: 1) right foot second toe   Release to patient->Immediate   Specimen total (fresh, frozen, permanent):->1       07/22/21 1446  Specimen to Pathology, Surgery General Surgery Final result    Narrative:  Pre-op Diagnosis: Acute appendicitis with localized   peritonitis, without perforation, abscess, or gangrene   [K35.30]   Procedure(s):   APPENDECTOMY, LAPAROSCOPIC   Number of specimens: 1   Name of specimens: 1. Appendix   Release to patient->Immediate   Specimen total (fresh, frozen, permanent):->1             Urine Culture:   Recent Labs   Lab 10/12/21  1333 11/19/21  2230 01/10/22  0543 02/10/22  0529 03/18/22  1659   LABURIN Multiple organisms isolated. None in predominance.  Repeat if  clinically necessary. No growth PSEUDOMONAS AERUGINOSA  10,000 - 49,999 cfu/ml  * PSEUDOMONAS AERUGINOSA  50,000 - 99,999 cfu/ml  * ESCHERICHIA COLI ESBL  > 100,000 cfu/ml  *       Urine Studies:   Recent Labs   Lab 01/10/22  0543 02/10/22  0529 03/18/22  1659   COLORU Yellow Yellow Yellow   APPEARANCEUA Hazy* Hazy* Cloudy*   PHUR 6.0 6.0 5.0   SPECGRAV 1.025 1.005 1.020   PROTEINUA 2+* 1+* 2+*    GLUCUA Negative Negative Negative   KETONESU Negative Negative Negative   BILIRUBINUA Negative Negative Negative   OCCULTUA 1+* 2+* 1+*   NITRITE Negative Positive* Positive*   UROBILINOGEN Negative  --   --    LEUKOCYTESUR 2+* 3+* 2+*   RBCUA 7* 14* 13*   WBCUA 35* 84* >100*   BACTERIA Rare Many* Many*   SQUAMEPITHEL 5 0  --    HYALINECASTS 3* 0 0       Wound Culture:   Recent Labs   Lab 03/18/22  1620   LABAERO METHICILLIN RESISTANT STAPHYLOCOCCUS AUREUS  Many  *         Significant Imaging:   MRI Foot Toes Without Contrast Right [[id number]] Resulted: 03/19/22 0128   Order Status: Completed Updated: 03/19/22 0130   Narrative:     EXAMINATION:   MRI FOOT TOES WITHOUT CONTRAST RIGHT     CLINICAL HISTORY:   Osteomyelitis, foot;     TECHNIQUE:   Multiplanar multisequence MRI examination of the right foot and toes performed without IV contrast.     COMPARISON:   Right foot radiograph 03/18/2022.     MRI foot right 12/30/2021.     FINDINGS:   There is an open wound overlying the right 5th phalanges.     There is abnormal bone marrow edema signal within the distal heads of the 4th and 5th metatarsal as well as the phalanges of the 4th and 5th toes more extensive in the 5th.     There is scattered subcutaneous edema around this site as well as throughout the foot.  No large drainable fluid collection.  Postsurgical changes of right 2nd ray amputation.    Impression:       Osteomyelitis involving the right 4th and 5th metatarsals distally and the phalanges of the 4th and 5th toes.     Electronically signed by resident: Dimas Zapata   Date: 03/19/2022   Time: 01:04     Electronically signed by: Erlin Hernandez   Date: 03/19/2022   Time: 01:28

## 2022-03-22 NOTE — PROGRESS NOTES
[facility] - Telemetry Stepdown ([place] [address])  Podiatry  Progress Note    Patient Name: Beatriz Adame  MRN: [id number]  Admission Date: 3/18/2022  Hospital Length of Stay: 1 days  Attending Physician: Emile uHnt MD  Primary Care Provider: Dante Olivier MD     Subjective:     Interval History:   NAEON. Afebrile, vital signs stable. Leukocytosis resolved. Right foot wound culture + MRSA. MRI right forefoot demonstrating osteomyelitis involving the right 4th and 5th metatarsals distally and the phalanges of the 4th and 5th toes. R SONAL 0.98 R TBI 0.16. RLE PVR waveforms/ ABIs normal suggestive of no significant PAD.        Scheduled Meds:   amLODIPine  10 mg Oral QHS    aspirin  81 mg Oral Daily    atorvastatin  80 mg Oral QHS    carvediloL  25 mg Oral BID    clopidogreL  75 mg Oral Daily    ertapenem (INVANZ) IVPB  500 mg Intravenous Q24H    ferrous sulfate  1 tablet Oral Daily    gabapentin  300 mg Oral Daily    heparin (porcine)  5,000 Units Subcutaneous Q8H    insulin detemir U-100  3 Units Subcutaneous Daily    Lactobacillus rhamnosus GG  1 capsule Oral Daily    levothyroxine  75 mcg Oral Daily    metoclopramide HCl  10 mg Oral TID AC    pantoprazole  40 mg Oral Daily    tamsulosin  0.4 mg Oral Daily     Continuous Infusions:  PRN Meds:sodium chloride, dextrose 10%, dextrose 10%, glucagon (human recombinant), glucose, glucose, insulin aspart U-100, naloxone, ondansetron, sodium chloride 0.9%, Pharmacy to dose Vancomycin consult **AND** vancomycin - pharmacy to dose    Review of Systems   Constitutional:  Negative for activity change, chills, diaphoresis, fatigue and fever.   HENT:  Negative for congestion, facial swelling, rhinorrhea and sore throat.    Eyes:  Negative for photophobia, pain, itching and visual disturbance.   Respiratory:  Negative for cough, chest tightness, shortness of breath and wheezing.    Cardiovascular:  Negative for chest pain, palpitations and leg swelling.    Gastrointestinal:  Negative for abdominal distention, abdominal pain, blood in stool, constipation, diarrhea, nausea and vomiting.   Genitourinary:  Positive for difficulty urinating (chronic delgado). Negative for dysuria, frequency, hematuria and urgency.   Musculoskeletal:  Positive for gait problem. Negative for arthralgias, back pain and neck stiffness.   Skin:  Positive for color change and wound.   Neurological:  Negative for dizziness, tremors, seizures, syncope, weakness, light-headedness, numbness and headaches.   Psychiatric/Behavioral:  Negative for agitation, confusion and hallucinations. The patient is not nervous/anxious.    Objective:     Vital Signs (Most Recent):  Temp: 98.3 °F (36.8 °C) (03/22/22 1209)  Pulse: 76 (03/22/22 1209)  Resp: 18 (03/22/22 1209)  BP: 131/61 (03/22/22 1209)  SpO2: 98 % (03/22/22 1209)   Vital Signs (24h Range):  Temp:  [97.7 °F (36.5 °C)-98.8 °F (37.1 °C)] 98.3 °F (36.8 °C)  Pulse:  [76-83] 76  Resp:  [16-18] 18  SpO2:  [94 %-100 %] 98 %  BP: ()/(51-68) 131/61     Weight: 47.6 kg (104 lb 15 oz)  Body mass index is 16.44 kg/m².    Foot Exam    Right Foot/Ankle     Inspection and Palpation  Tenderness: none   Swelling: none   Skin Exam: drainage, abnormal color and ulcer; skin not intact and no erythema     Neurovascular  Dorsalis pedis: absent  Posterior tibial: absent    Comments  R 2nd toe amputated    Left Foot/Ankle      Inspection and Palpation  Tenderness: none   Swelling: none   Skin Exam: skin intact; no drainage, no cellulitis and no erythema     Neurovascular  Dorsalis pedis: absent  Posterior tibial: absent        Laboratory:  CBC:   Recent Labs   Lab 03/22/22  0944   WBC 10.76   RBC 3.17*   HGB 8.9*   HCT 28.6*      MCV 90   MCH 28.1   MCHC 31.1*       CRP:   Recent Labs   Lab 03/18/22  1640   CRP 64.0*       ESR:   Recent Labs   Lab 03/18/22  1640   SEDRATE 95*       Wound Cultures:   Recent Labs   Lab 03/18/22  1620   LABAERO METHICILLIN RESISTANT  STAPHYLOCOCCUS AUREUS  Many  *       Microbiology Results (last 7 days)       Procedure Component Value Units Date/Time    Blood culture x two cultures. Draw prior to antibiotics. [[id number]] Collected: 03/18/22 1641    Order Status: Completed Specimen: Blood from Peripheral, Antecubital, Right Updated: 03/21/22 2012     Blood Culture, Routine No Growth to date      No Growth to date      No Growth to date      No Growth to date    Narrative:      Aerobic and anaerobic    Blood culture x two cultures. Draw prior to antibiotics. [[id number]] Collected: 03/18/22 1641    Order Status: Completed Specimen: Blood from Peripheral, Antecubital, Right Updated: 03/21/22 2012     Blood Culture, Routine No Growth to date      No Growth to date      No Growth to date      No Growth to date    Narrative:      Aerobic and anaerobic    Aerobic culture [[id number]]  (Abnormal)  (Susceptibility) Collected: 03/18/22 1620    Order Status: Completed Specimen: Wound from Toe, Right Foot Updated: 03/21/22 1226     Aerobic Bacterial Culture METHICILLIN RESISTANT STAPHYLOCOCCUS AUREUS  Many      Urine culture [[id number]]  (Abnormal)  (Susceptibility) Collected: 03/18/22 1659    Order Status: Completed Specimen: Urine Updated: 03/21/22 0736     Urine Culture, Routine ESCHERICHIA COLI ESBL  > 100,000 cfu/ml      Narrative:      Specimen Source->Urine          Specimen (24h ago, onward)                None            Diagnostic Results:  I have reviewed all pertinent imaging results/findings within the past 24 hours.    Clinical Findings:  Ulceration in the 4th interspace right foot. Wound base fibrotic, probes to bone, fibrinous exudate. R 5th toe dry gangrene. No erythema, no edema, no crepitus or fluctuance. DP PT pulses nonpalpable.         Assessment/Plan:     * Osteomyelitis of right foot  MRI right forefoot demonstrating osteomyelitis involving the right 4th and 5th metatarsals distally and the phalanges of the 4th and 5th  toes.    Plan:  -Discussed options with patient which may include (1) right 4th-5th ray amputations  (2) bone biopsy with 6 weeks and antibiotics and wound care. Risks, benefits, post op course discussed in detail. Patient would like to proceed with amputation  -Patient scheduled for right foot partial 4th and 5th ray amputation 3/24/2022. Will obtain intraop proximal bone margins for antibiotic tailoring.   -Will plan to repeat COVID testing for preop 3/23.   -Arterial studies reviewed. RLE ABIs/ PVR waveforms suggest no evidence of PAD. Rt Great Toe: The PPG waveform is blunted with TBI of 0.16 and a toe pressure of 25 mmHg which suggests severe microvascular disease.   -Continue Antibiotic plan per ID. Wound cultures +MRSA  -WBAT LLE, WB to heel for transfers or short distances RLE.   -Dressing changes by nursing, ordered.   -Podiatry will follow.      Yumiko Morales DPM  [facility]  Podiatry PGY3  Pager: [phone]  Spectra:[phone]

## 2022-03-22 NOTE — PLAN OF CARE
Patient is alert and oriented with  episodes of confusion. POC reviewed with patient. Med given. Q 2 hrs turns and repositioning. No acute  changes noted from previous shift. Patient was instructed to call for assistance. Bed in low position call light within reach will continue to monitor.

## 2022-03-22 NOTE — PROGRESS NOTES
[facility] - Telemetry Stepdown ([place] [address])  Infectious Disease  Progress Note    Patient Name: Beatriz Adame  MRN: [id number]  Admission Date: 3/18/2022  Length of Stay: 0 days  Attending Physician: Arash Saini MD  Primary Care Provider: Dante Olivier MD    Isolation Status: No active isolations  Assessment/Plan:      * Osteomyelitis of right foot     71 year old female with CVA, DM2, HTN, CKD, nephroliathiasis s/p left ureteral stent placement, urinary retention managed with chronic indwelling delgado, recurrent UTIs, right second toe osteomyelitis s/p R toe amputation 1/2022 who presented to [facility] from [facility] with acute metabolic encephalopathy likely secondary to UTI and right foot infection.      Delgado exchanged in ED. Patient noted to have gangrenous changes of her right fifth toe and interdigit wound. MRI right foot reviewed and is concerning for osteomyelitis of the 4th and 5th digits. Podiatry consulted and ordered vascular studies prior to making surgical plans.  Patient currently on empiric Vanc/Cefepime. Blood cx NGTD. Urine cx positive for ESBL E Coli. Wound cx positive for MRSA.      Patient mentation has improved on antibiotics. Afebrile, no leukocytosis, and HDS.    Recommendations  · Continue empiric Vancomycin (pharmacy to dose.  Trough goal 15-20).  Last trough late (10.5 hours) and 16.7.  Dose adjusted to 24 hour dosing by Pharmacy.   · Discontinue cefepime and start ertapenem 500 mg IV q 24 hours (renallly adjusted)  · Follow up pending vascular studies  · Will follow up podiatry surgical plans and make further recommendations accordingly  · If patient undergoes fourth and fifth toe amputations, this would achieve source control and long term antibiotics would not likely be indicated     Data reviewed and plan discussed with ID staff, Dr. Saxena  Secure chat/Discussed above plan with Primary Team, EBONY Dooley        Acute cystitis without hematuria  See assessment/plan  above        Thank you.   Please call for any questions or concerns.  Chela Castaneda, LAUREL, ANP-C  Spectra [phone]    Subjective:     Principal Problem:Osteomyelitis of right foot    HPI: Ms. Adame is a 71 year old female with osteoarthritis, hypothyroidism, CVA with left-sided residual deficit, DM2, HTN, HLD, CKD, nephroliathiasis s/p left ureteral stent placement in 10/2021, urinary retention managed with chronic indwelling delgado, recurrent pseudomonal UTI, right second toe osteomyelitis s/p R toe amputation 1/2022 who presented to [facility] from [facility] with acute metabolic encephalopathy secondary to catheter associated urinary tract infection and possible right toe infection.     Afebrile in the ED. WBC 13K. Inflammatory markers elevated. Cr 2.3 lactate WNL. UA positive for pyuria. Delgado exchanged in ED. Started empiric Vanc/Cefepime. Blood/urine cultures are pending. Noted to have gangrenous changes of her right fifth toe. MRI right foot reviewed and is concerning for osteomyelitis of the 4th and 5th distal metatarsals and phalanges.  ID consulted for antibiotic recommendations. Podiatry consulted. Patient mentation has improved.    Interval History: NAEON. Afebrile. Leukocytosis resolved.   Urine Cx showing ESBL E coli   Wound cx with MRSA.   Vascular studies still pending.    Vanc trough 16.7    Review of Systems   Constitutional:  Negative for activity change, appetite change, chills, diaphoresis and fever.   HENT: Negative.     Eyes: Negative.    Respiratory:  Negative for cough and shortness of breath.    Cardiovascular:  Negative for chest pain and leg swelling.   Gastrointestinal:  Negative for abdominal pain, constipation, diarrhea, nausea and vomiting.   Genitourinary:  Positive for difficulty urinating (chronic delgado). Negative for dysuria and flank pain.   Musculoskeletal:  Positive for arthralgias and gait problem.   Skin:  Positive for color change and wound.   Neurological:  Positive for  numbness. Negative for headaches.   Psychiatric/Behavioral:  Positive for confusion. Negative for agitation. The patient is not nervous/anxious.    Objective:     Vital Signs (Most Recent):  Temp: 98.2 °F (36.8 °C) (03/21/22 1918)  Pulse: 76 (03/21/22 1918)  Resp: 16 (03/21/22 1918)  BP: (!) 151/68 (03/21/22 1918)  SpO2: 99 % (03/21/22 1918)   Vital Signs (24h Range):  Temp:  [97.6 °F (36.4 °C)-98.2 °F (36.8 °C)] 98.2 °F (36.8 °C)  Pulse:  [73-82] 76  Resp:  [12-16] 16  SpO2:  [95 %-100 %] 99 %  BP: (131-161)/(65-71) 151/68     Weight: 47.6 kg (104 lb 15 oz)  Body mass index is 16.44 kg/m².    Estimated Creatinine Clearance: 21.9 mL/min (A) (based on SCr of 1.8 mg/dL (H)).    Physical Exam  Constitutional:       General: She is not in acute distress.     Appearance: Normal appearance. She is not ill-appearing, toxic-appearing or diaphoretic.   HENT:      Head: Normocephalic and atraumatic.      Mouth/Throat:      Mouth: Mucous membranes are dry.   Eyes:      General: No scleral icterus.     Conjunctiva/sclera: Conjunctivae normal.   Cardiovascular:      Rate and Rhythm: Normal rate and regular rhythm.      Pulses: Decreased pulses.   Pulmonary:      Effort: Pulmonary effort is normal. No respiratory distress.      Breath sounds: Normal breath sounds.   Abdominal:      General: There is no distension.      Palpations: Abdomen is soft.      Tenderness: There is no abdominal tenderness. There is no right CVA tenderness, left CVA tenderness or guarding.   Musculoskeletal:         General: Deformity (right 2nd toe amputation) present.      Right lower leg: No edema.      Left lower leg: No edema.      Comments: Right foot wrapped.  No ascending cellulitis/warmth  See Podiatry photos below   Skin:     General: Skin is warm and dry.   Neurological:      Mental Status: She is alert.      Comments: Alert/conversant   Psychiatric:         Mood and Affect: Mood normal.         Behavior: Behavior normal.                  Significant Labs: Blood Culture:   Recent Labs   Lab 12/30/21  1523 12/30/21  1536 03/18/22  1641   LABBLOO No growth after 5 days. No growth after 5 days. No Growth to date  No Growth to date  No Growth to date  No Growth to date  No Growth to date  No Growth to date  No Growth to date  No Growth to date       BMP:   Recent Labs   Lab 03/21/22  0515   *      K 4.3      CO2 27   BUN 26*   CREATININE 1.8*   CALCIUM 9.8       CBC:   Recent Labs   Lab 03/20/22  1254 03/21/22  0515   WBC 11.46 11.83   HGB 8.7* 9.0*   HCT 28.1* 28.1*    175       CMP:   Recent Labs   Lab 03/20/22  1254 03/21/22  0515    141   K 4.4 4.3    105   CO2 24 27   * 124*   BUN 29* 26*   CREATININE 2.0* 1.8*   CALCIUM 9.5 9.8   PROT 7.5 7.4   ALBUMIN 2.4* 2.3*   BILITOT 0.5 0.5   ALKPHOS 117 76   AST 11 12   ALT 11 10   ANIONGAP 7* 9   EGFRNONAA 24.8* 28.1*       Microbiology Results (last 7 days)       Procedure Component Value Units Date/Time    Blood culture x two cultures. Draw prior to antibiotics. [[id number]] Collected: 03/18/22 1641    Order Status: Completed Specimen: Blood from Peripheral, Antecubital, Right Updated: 03/21/22 2012     Blood Culture, Routine No Growth to date      No Growth to date      No Growth to date      No Growth to date    Narrative:      Aerobic and anaerobic    Blood culture x two cultures. Draw prior to antibiotics. [[id number]] Collected: 03/18/22 1641    Order Status: Completed Specimen: Blood from Peripheral, Antecubital, Right Updated: 03/21/22 2012     Blood Culture, Routine No Growth to date      No Growth to date      No Growth to date      No Growth to date    Narrative:      Aerobic and anaerobic    Aerobic culture [[id number]]  (Abnormal)  (Susceptibility) Collected: 03/18/22 1620    Order Status: Completed Specimen: Wound from Toe, Right Foot Updated: 03/21/22 1226     Aerobic Bacterial Culture METHICILLIN RESISTANT STAPHYLOCOCCUS  AUREUS  Many      Urine culture [[id number]]  (Abnormal)  (Susceptibility) Collected: 03/18/22 1659    Order Status: Completed Specimen: Urine Updated: 03/21/22 0736     Urine Culture, Routine ESCHERICHIA COLI ESBL  > 100,000 cfu/ml      Narrative:      Specimen Source->Urine          Pathology Results  (Last 10 years)                 01/01/22 1345  Specimen to Pathology, Surgery Other Final result    Narrative:  Pre-op Diagnosis: Ulcer of right second toe [L97.519]   Diabetic foot ulcer with osteomyelitis [E11.621, E11.69,   L97.509, M86.9]   Procedure(s):   AMPUTATION, TOE   Number of specimens: 1   Name of specimens: 1) right foot second toe   Release to patient->Immediate   Specimen total (fresh, frozen, permanent):->1       07/22/21 1446  Specimen to Pathology, Surgery General Surgery Final result    Narrative:  Pre-op Diagnosis: Acute appendicitis with localized   peritonitis, without perforation, abscess, or gangrene   [K35.30]   Procedure(s):   APPENDECTOMY, LAPAROSCOPIC   Number of specimens: 1   Name of specimens: 1. Appendix   Release to patient->Immediate   Specimen total (fresh, frozen, permanent):->1             Urine Culture:   Recent Labs   Lab 10/12/21  1333 11/19/21  2230 01/10/22  0543 02/10/22  0529 03/18/22  1659   LABURIN Multiple organisms isolated. None in predominance.  Repeat if  clinically necessary. No growth PSEUDOMONAS AERUGINOSA  10,000 - 49,999 cfu/ml  * PSEUDOMONAS AERUGINOSA  50,000 - 99,999 cfu/ml  * ESCHERICHIA COLI ESBL  > 100,000 cfu/ml  *       Urine Studies:   Recent Labs   Lab 01/10/22  0543 02/10/22  0529 03/18/22  1659   COLORU Yellow Yellow Yellow   APPEARANCEUA Hazy* Hazy* Cloudy*   PHUR 6.0 6.0 5.0   SPECGRAV 1.025 1.005 1.020   PROTEINUA 2+* 1+* 2+*   GLUCUA Negative Negative Negative   KETONESU Negative Negative Negative   BILIRUBINUA Negative Negative Negative   OCCULTUA 1+* 2+* 1+*   NITRITE Negative Positive* Positive*   UROBILINOGEN Negative  --   --     LEUKOCYTESUR 2+* 3+* 2+*   RBCUA 7* 14* 13*   WBCUA 35* 84* >100*   BACTERIA Rare Many* Many*   SQUAMEPITHEL 5 0  --    HYALINECASTS 3* 0 0       Wound Culture:   Recent Labs   Lab 03/18/22  1620   LABAERO METHICILLIN RESISTANT STAPHYLOCOCCUS AUREUS  Many  *         Significant Imaging:   MRI Foot Toes Without Contrast Right [[id number]] Resulted: 03/19/22 0128   Order Status: Completed Updated: 03/19/22 0130   Narrative:     EXAMINATION:   MRI FOOT TOES WITHOUT CONTRAST RIGHT     CLINICAL HISTORY:   Osteomyelitis, foot;     TECHNIQUE:   Multiplanar multisequence MRI examination of the right foot and toes performed without IV contrast.     COMPARISON:   Right foot radiograph 03/18/2022.     MRI foot right 12/30/2021.     FINDINGS:   There is an open wound overlying the right 5th phalanges.     There is abnormal bone marrow edema signal within the distal heads of the 4th and 5th metatarsal as well as the phalanges of the 4th and 5th toes more extensive in the 5th.     There is scattered subcutaneous edema around this site as well as throughout the foot.  No large drainable fluid collection.  Postsurgical changes of right 2nd ray amputation.    Impression:       Osteomyelitis involving the right 4th and 5th metatarsals distally and the phalanges of the 4th and 5th toes.     Electronically signed by resident: Dimas Zapata   Date: 03/19/2022   Time: 01:04     Electronically signed by: Erlin Hernandez   Date: 03/19/2022   Time: 01:28

## 2022-03-22 NOTE — PROGRESS NOTES
"[facility] - Telemetry Stepdown ([address] Medicine  Progress Note    Patient Name: Beatriz Adame  MRN: [id number]  Patient Class: IP- Inpatient   Admission Date: 3/18/2022  Length of Stay: 1 days  Attending Physician: Emile Hunt MD  Primary Care Provider: Dante Olivier MD        Subjective:     Principal Problem:Osteomyelitis of right foot        HPI:  Patient is a 71 year old female with osteoarthritis, hypothyroidism, CVA with left-sided residual deficit, DM2, HTN, HLD, CKD, chronic anemia, recurrent pseudomonal UTI, nephroliathiasis s/p left ureteral stent placement in 10/2021, urinary retention with chronic indwelling delgado catheter, diabetic foot ulcer s/p R toe amputation, chronic anemia, who presents with acute metabolic encephalopathy secondary to catheter associated urinary tract infection and possible right toe infection.   She is being admitted for [facility].     Initially in ED patient was found to have a white count of 13,000 and a left shift / neutrophilic predominance.  While not overtly septic, sepsis protocol was initiated - patient pancultured and broad-spectrum antibiotics with vancomycin and ceftriaxone was initiated.  Vital signs were within normal limits however blood pressure was at the lower range of normal for this patient with a systolic blood pressure of 110.  Lactic acid was obtained and was within normal limits.     Patient is alert on my examination she is able to describe that she is at the hospital for a catheter exchange."  The her catheter has been exchanged in the ED and she has had the benefit of broad-spectrum antibiotics since her admission there.  It is clear that her encephalopathy has already improved with the management of her very obvious catheter associated urinary tract infection.  The ED provider also had a concern for a right 5th toe infection - which appears to be chronically devascularized and gangrenous on examination.  Plain " films of the affected foot were unrevealing however patient has just returned from MRI and interpretation is pending.  Inflammatory markers are notably elevated.      She will be admitted to Hospital Medicine for ongoing management of her catheter associated urinary tract infection and possible right foot infection.          Overview/Hospital Course:  Pt placed in observation for acute metabolic encephalopathy in setting of UTI and osteomyelitis. Pt was AFVSS and hemodynamically stable. Leukocytosis resolved w/ initiation of IV abx. UA infectious and initially started on IV cefepime. UC grew ESBL and patient was transitioned to IV ertapenem. MRI of R foot and toes revealed osteomyelitis of the right 4th and 5th distal metatarsals and phalanges. Podiatry and ID following.      Interval History: NAEON. Pt seen and evaluated at bedside this am. Reports doing well. Pt remains AFVSS, No leukocytosis or electrolyte abnormalities. Foot cx growing vanc sensitive staph aureus. Continuing IV ertapenem and IV vancomycin for UTI and osteo respectively. Updated son, Alysa of hospital course this am.    Review of Systems   Constitutional:  Negative for activity change, appetite change, chills, diaphoresis, fatigue and fever.   HENT: Negative.  Negative for congestion, facial swelling, rhinorrhea and sore throat.    Eyes: Negative.  Negative for photophobia, itching and visual disturbance.   Respiratory:  Negative for cough, chest tightness, shortness of breath and wheezing.    Cardiovascular:  Negative for chest pain, palpitations and leg swelling.   Gastrointestinal:  Negative for abdominal distention, abdominal pain, blood in stool, constipation, diarrhea, nausea and vomiting.   Genitourinary:  Positive for difficulty urinating (chronic delgado). Negative for dysuria, flank pain, frequency, hematuria and urgency.   Musculoskeletal:  Positive for arthralgias and gait problem. Negative for back pain and neck stiffness.   Skin:   Positive for color change and wound.   Neurological:  Positive for numbness. Negative for dizziness, tremors, seizures, syncope, weakness, light-headedness and headaches.   Psychiatric/Behavioral:  Positive for confusion. Negative for agitation and hallucinations. The patient is not nervous/anxious.    Objective:     Vital Signs (Most Recent):  Temp: 97.7 °F (36.5 °C) (03/22/22 0807)  Pulse: 76 (03/22/22 0807)  Resp: 16 (03/22/22 0807)  BP: (!) 110/56 (03/22/22 0807)  SpO2: 98 % (03/22/22 0807)   Vital Signs (24h Range):  Temp:  [97.7 °F (36.5 °C)-98.8 °F (37.1 °C)] 97.7 °F (36.5 °C)  Pulse:  [76-83] 76  Resp:  [16-17] 16  SpO2:  [94 %-100 %] 98 %  BP: ()/(51-68) 110/56     Weight: 47.6 kg (104 lb 15 oz)  Body mass index is 16.44 kg/m².    Intake/Output Summary (Last 24 hours) at 3/22/2022 1120  Last data filed at 3/22/2022 0817  Gross per 24 hour   Intake 200 ml   Output 900 ml   Net -700 ml      Physical Exam  Constitutional:       General: She is not in acute distress.     Appearance: Normal appearance. She is not ill-appearing, toxic-appearing or diaphoretic.   HENT:      Head: Normocephalic and atraumatic.      Mouth/Throat:      Mouth: Mucous membranes are dry.   Eyes:      General: No scleral icterus.     Conjunctiva/sclera: Conjunctivae normal.   Cardiovascular:      Rate and Rhythm: Normal rate and regular rhythm.      Pulses: Decreased pulses.   Pulmonary:      Effort: Pulmonary effort is normal. No respiratory distress.      Breath sounds: Normal breath sounds.   Abdominal:      General: There is no distension.      Palpations: Abdomen is soft.      Tenderness: There is no abdominal tenderness. There is no right CVA tenderness, left CVA tenderness or guarding.   Musculoskeletal:         General: Deformity (right 2nd toe amputation) present.      Right lower leg: No edema.      Left lower leg: No edema.      Comments: Right foot wrapped.  No ascending cellulitis/warmth  See Podiatry photos below    Skin:     General: Skin is warm and dry.   Neurological:      Mental Status: She is alert. Mental status is at baseline.   Psychiatric:         Mood and Affect: Mood normal.         Behavior: Behavior normal.       Significant Labs: All pertinent labs within the past 24 hours have been reviewed.    Significant Imaging: I have reviewed all pertinent imaging results/findings within the past 24 hours.      Assessment/Plan:      * Osteomyelitis of right foot  Osteomyelitis of R 2nd toe  Critical lower limb ischemia  -MRI revealed Osteomyelitis involving the right 4th and 5th metatarsals distally and the phalanges of the 4th and 5th toes.   -XR R foot showed no acute bony abnormalities  -podiatry consulted, appreciate recs   -Options may include (1) right 4th-5th ray amputations with shorter course of antibiotics and wound care, (2) bone biopsy with longer course of antibiotics and wound care, or (3) palliative care. Current peripheral circulation may not be adequate to clear infection or heal a surgical wound. Vascular workup required prior to further planning.   -Noninvasive vascular studies ordered, consult vascular surgery once resulted. Do not modify orders.   -Antibiotic plan per ID.    -WBAT LLE, WB to heel for transfers or short distances RLE.   -Cx growing s. Aureus, with sensitivity to vanc, will continue IV  -ID consulted, appreciate recs   -empiric Vancomycin and ertapenem (renal dosing). Monitor renal function closely  -Follow blood/urine cultures        Acute metabolic encephalopathy  Acute cystitis without hematuria    RESOLVED  -Patient initiated on broad-spectrum antibiotics in the ED - vancomycin ceftriaxone  -Will continue with broad-spectrum antibiotics and will change ceftriaxone to Cefepime in light of possible diabetic foot infection  -Patient has a history of pansensitive Klebsiella UTIs  -Nitrite positive urinalysis with gross parameters of infection on UA, urine culture growing ESBL  -Araiza  catheter exchanged  -Blood cultures NGTD  -Encephalopathy improved with goal directed medical therapy for infections  -MRI of right foot reveals Osteomyelitis involving the right 4th and 5th metatarsals distally and the phalanges of the 4th and 5th toes.   -podiatry following  -Delirium precautions, neuro checks, aspiration precautions  -Inflammatory markers elevated, ESR-95, CRP-64      Acute cystitis without hematuria  -UA infectious  -previous UC growing pansensitive pseudomonas aeruginosa  -UC growing ESBL, continuing IV ertapenem    Hypertension  -continue home coreg and amlodipine    Chronic kidney disease, stage 4 (severe)  -Stable at baseline, avoid any nephrotoxic agents      History of stroke  -continue home asa and plavix      Type 2 diabetes mellitus, with long-term current use of insulin  -Diabetic diet  -Accu-Cheks qac,qhs  -3 units determir, low dose SSI      Peripheral neuropathy  -home gabapentin 300 mg once daily      Debility  -PTOT      Urinary retention  -Patient with history of urinary retention and Araiza catheter in place since at least January of 2022  -Araiza catheter last exchanged 02/22/2022  -Araiza catheter exchanged in ED  -Patient continues Flomax per home dose        Hypothyroidism  -continue levothyroxine      Severe protein-calorie malnutrition  -registered dietitian consult      Hyperlipidemia  -Atorvastatin 80 formulary substitution for home Crestor 40        VTE Risk Mitigation (From admission, onward)         Ordered     heparin (porcine) injection 5,000 Units  Every 8 hours         03/18/22 2111     IP VTE HIGH RISK PATIENT  Once         03/18/22 2110     Place sequential compression device  Until discontinued         03/18/22 2110                Discharge Planning   MARY: 3/24/2022     Code Status: Full Code   Is the patient medically ready for discharge?: No    Reason for patient still in hospital (select all that apply): Patient trending condition, Laboratory test, Treatment  and Consult recommendations  Discharge Plan A: Skilled Nursing Facility                  Misty Anderson PA-C  Department of Hospital Medicine   [facility] - Telemetry Stepdown ([place])

## 2022-03-22 NOTE — SUBJECTIVE & OBJECTIVE
Subjective:     Interval History:   NAEON. Afebrile, vital signs stable. Leukocytosis resolved. Right foot wound culture + MRSA. MRI right forefoot demonstrating osteomyelitis involving the right 4th and 5th metatarsals distally and the phalanges of the 4th and 5th toes.        Scheduled Meds:   amLODIPine  10 mg Oral QHS    aspirin  81 mg Oral Daily    atorvastatin  80 mg Oral QHS    carvediloL  25 mg Oral BID    clopidogreL  75 mg Oral Daily    ertapenem (INVANZ) IVPB  500 mg Intravenous Q24H    ferrous sulfate  1 tablet Oral Daily    gabapentin  300 mg Oral Daily    heparin (porcine)  5,000 Units Subcutaneous Q8H    insulin detemir U-100  3 Units Subcutaneous Daily    Lactobacillus rhamnosus GG  1 capsule Oral Daily    levothyroxine  75 mcg Oral Daily    metoclopramide HCl  10 mg Oral TID AC    pantoprazole  40 mg Oral Daily    tamsulosin  0.4 mg Oral Daily     Continuous Infusions:  PRN Meds:sodium chloride, dextrose 10%, dextrose 10%, glucagon (human recombinant), glucose, glucose, insulin aspart U-100, naloxone, ondansetron, sodium chloride 0.9%, Pharmacy to dose Vancomycin consult **AND** vancomycin - pharmacy to dose    Review of Systems   Constitutional:  Negative for activity change, chills, diaphoresis, fatigue and fever.   HENT:  Negative for congestion, facial swelling, rhinorrhea and sore throat.    Eyes:  Negative for photophobia, pain, itching and visual disturbance.   Respiratory:  Negative for cough, chest tightness, shortness of breath and wheezing.    Cardiovascular:  Negative for chest pain, palpitations and leg swelling.   Gastrointestinal:  Negative for abdominal distention, abdominal pain, blood in stool, constipation, diarrhea, nausea and vomiting.   Genitourinary:  Positive for difficulty urinating (chronic delgado). Negative for dysuria, frequency, hematuria and urgency.   Musculoskeletal:  Positive for gait problem. Negative for arthralgias, back pain and neck stiffness.   Skin:  Positive  for color change and wound.   Neurological:  Negative for dizziness, tremors, seizures, syncope, weakness, light-headedness, numbness and headaches.   Psychiatric/Behavioral:  Negative for agitation, confusion and hallucinations. The patient is not nervous/anxious.    Objective:     Vital Signs (Most Recent):  Temp: 98.2 °F (36.8 °C) (03/21/22 1918)  Pulse: 76 (03/21/22 1918)  Resp: 16 (03/21/22 1918)  BP: (!) 151/68 (03/21/22 1918)  SpO2: 99 % (03/21/22 1918)   Vital Signs (24h Range):  Temp:  [97.6 °F (36.4 °C)-98.2 °F (36.8 °C)] 98.2 °F (36.8 °C)  Pulse:  [73-82] 76  Resp:  [12-17] 16  SpO2:  [95 %-100 %] 99 %  BP: (131-161)/(65-71) 151/68     Weight: 47.6 kg (104 lb 15 oz)  Body mass index is 16.44 kg/m².    Foot Exam    Right Foot/Ankle     Inspection and Palpation  Tenderness: none   Swelling: none   Skin Exam: drainage, abnormal color and ulcer; skin not intact and no erythema     Neurovascular  Dorsalis pedis: absent  Posterior tibial: absent    Comments  R 2nd toe amputated    Left Foot/Ankle      Inspection and Palpation  Tenderness: none   Swelling: none   Skin Exam: skin intact; no drainage, no cellulitis and no erythema     Neurovascular  Dorsalis pedis: absent  Posterior tibial: absent        Laboratory:  CBC:   Recent Labs   Lab 03/21/22  0515   WBC 11.83   RBC 3.15*   HGB 9.0*   HCT 28.1*      MCV 89   MCH 28.6   MCHC 32.0     CRP:   Recent Labs   Lab 03/18/22  1640   CRP 64.0*     ESR:   Recent Labs   Lab 03/18/22  1640   SEDRATE 95*     Wound Cultures:   Recent Labs   Lab 03/18/22  1620   LABAERO METHICILLIN RESISTANT STAPHYLOCOCCUS AUREUS  Many  *     Microbiology Results (last 7 days)       Procedure Component Value Units Date/Time    Blood culture x two cultures. Draw prior to antibiotics. [[id number]] Collected: 03/18/22 1641    Order Status: Completed Specimen: Blood from Peripheral, Antecubital, Right Updated: 03/21/22 2012     Blood Culture, Routine No Growth to date      No Growth to  date      No Growth to date      No Growth to date    Narrative:      Aerobic and anaerobic    Blood culture x two cultures. Draw prior to antibiotics. [[id number]] Collected: 03/18/22 1641    Order Status: Completed Specimen: Blood from Peripheral, Antecubital, Right Updated: 03/21/22 2012     Blood Culture, Routine No Growth to date      No Growth to date      No Growth to date      No Growth to date    Narrative:      Aerobic and anaerobic    Aerobic culture [[id number]]  (Abnormal)  (Susceptibility) Collected: 03/18/22 1620    Order Status: Completed Specimen: Wound from Toe, Right Foot Updated: 03/21/22 1226     Aerobic Bacterial Culture METHICILLIN RESISTANT STAPHYLOCOCCUS AUREUS  Many      Urine culture [[id number]]  (Abnormal)  (Susceptibility) Collected: 03/18/22 1659    Order Status: Completed Specimen: Urine Updated: 03/21/22 0736     Urine Culture, Routine ESCHERICHIA COLI ESBL  > 100,000 cfu/ml      Narrative:      Specimen Source->Urine          Specimen (24h ago, onward)                None            Diagnostic Results:  I have reviewed all pertinent imaging results/findings within the past 24 hours.    Clinical Findings:  Ulceration in the 4th interspace right foot. Wound base fibrotic, probes to bone, fibrinous exudate. R 5th toe dry gangrene. No erythema, no edema, no crepitus or fluctuance. DP PT pulses nonpalpable.

## 2022-03-22 NOTE — SUBJECTIVE & OBJECTIVE
Interval History: NAEON. Afebrile. Leukocytosis resolved.   Urine Cx showing ESBL E coli   Wound cx with MRSA.   Vanc trough 22.6  Right SONAL wnl, but right TBI suggestive of microvascular disease.   Podiatry  has discussed with patient and planning on partial 4th and 5th ray amputations.     Review of Systems   Constitutional:  Negative for activity change, appetite change, chills, diaphoresis and fever.   HENT: Negative.     Eyes: Negative.    Respiratory:  Negative for cough and shortness of breath.    Cardiovascular:  Negative for chest pain and leg swelling.   Gastrointestinal:  Negative for abdominal pain, constipation, diarrhea, nausea and vomiting.   Genitourinary:  Positive for difficulty urinating (chronic delgado). Negative for dysuria and flank pain.   Musculoskeletal:  Positive for arthralgias and gait problem.   Skin:  Positive for color change and wound.   Neurological:  Positive for numbness. Negative for headaches.   Psychiatric/Behavioral:  Negative for agitation and confusion. The patient is not nervous/anxious.    Objective:     Vital Signs (Most Recent):  Temp: 97.7 °F (36.5 °C) (03/22/22 0807)  Pulse: 76 (03/22/22 0807)  Resp: 16 (03/22/22 0807)  BP: (!) 110/56 (03/22/22 0807)  SpO2: 98 % (03/22/22 0807)   Vital Signs (24h Range):  Temp:  [97.7 °F (36.5 °C)-98.8 °F (37.1 °C)] 97.7 °F (36.5 °C)  Pulse:  [76-83] 76  Resp:  [16-17] 16  SpO2:  [94 %-100 %] 98 %  BP: ()/(51-68) 110/56     Weight: 47.6 kg (104 lb 15 oz)  Body mass index is 16.44 kg/m².    Estimated Creatinine Clearance: 21.9 mL/min (A) (based on SCr of 1.8 mg/dL (H)).    Physical Exam  Vitals and nursing note reviewed.   Constitutional:       General: She is not in acute distress.     Appearance: Normal appearance. She is not ill-appearing, toxic-appearing or diaphoretic.   HENT:      Head: Normocephalic and atraumatic.      Mouth/Throat:      Mouth: Mucous membranes are dry.   Eyes:      General: No scleral icterus.      Conjunctiva/sclera: Conjunctivae normal.   Cardiovascular:      Rate and Rhythm: Normal rate and regular rhythm.      Pulses: Decreased pulses.   Pulmonary:      Effort: Pulmonary effort is normal. No respiratory distress.      Breath sounds: Normal breath sounds.   Abdominal:      General: There is no distension.      Palpations: Abdomen is soft.      Tenderness: There is no abdominal tenderness. There is no right CVA tenderness, left CVA tenderness or guarding.   Musculoskeletal:         General: Deformity (right 2nd toe amputation) present.      Right lower leg: No edema.      Left lower leg: No edema.      Comments: Right foot wrapped.  No ascending cellulitis/warmth  See Podiatry photos below   Skin:     General: Skin is warm and dry.   Neurological:      Mental Status: She is alert.      Comments: Alert/conversant   Psychiatric:         Mood and Affect: Mood normal.         Behavior: Behavior normal.                 Significant Labs: Blood Culture:   Recent Labs   Lab 12/30/21  1523 12/30/21  1536 03/18/22  1641   LABBLOO No growth after 5 days. No growth after 5 days. No Growth to date  No Growth to date  No Growth to date  No Growth to date  No Growth to date  No Growth to date  No Growth to date  No Growth to date       BMP:   Recent Labs   Lab 03/21/22  0515   *      K 4.3      CO2 27   BUN 26*   CREATININE 1.8*   CALCIUM 9.8       CBC:   Recent Labs   Lab 03/20/22  1254 03/21/22  0515   WBC 11.46 11.83   HGB 8.7* 9.0*   HCT 28.1* 28.1*    175       CMP:   Recent Labs   Lab 03/20/22  1254 03/21/22  0515    141   K 4.4 4.3    105   CO2 24 27   * 124*   BUN 29* 26*   CREATININE 2.0* 1.8*   CALCIUM 9.5 9.8   PROT 7.5 7.4   ALBUMIN 2.4* 2.3*   BILITOT 0.5 0.5   ALKPHOS 117 76   AST 11 12   ALT 11 10   ANIONGAP 7* 9   EGFRNONAA 24.8* 28.1*       Microbiology Results (last 7 days)       Procedure Component Value Units Date/Time    Blood culture x two cultures.  Draw prior to antibiotics. [[id number]] Collected: 03/18/22 1641    Order Status: Completed Specimen: Blood from Peripheral, Antecubital, Right Updated: 03/21/22 2012     Blood Culture, Routine No Growth to date      No Growth to date      No Growth to date      No Growth to date    Narrative:      Aerobic and anaerobic    Blood culture x two cultures. Draw prior to antibiotics. [[id number]] Collected: 03/18/22 1641    Order Status: Completed Specimen: Blood from Peripheral, Antecubital, Right Updated: 03/21/22 2012     Blood Culture, Routine No Growth to date      No Growth to date      No Growth to date      No Growth to date    Narrative:      Aerobic and anaerobic    Aerobic culture [[id number]]  (Abnormal)  (Susceptibility) Collected: 03/18/22 1620    Order Status: Completed Specimen: Wound from Toe, Right Foot Updated: 03/21/22 1226     Aerobic Bacterial Culture METHICILLIN RESISTANT STAPHYLOCOCCUS AUREUS  Many      Urine culture [[id number]]  (Abnormal)  (Susceptibility) Collected: 03/18/22 1659    Order Status: Completed Specimen: Urine Updated: 03/21/22 0736     Urine Culture, Routine ESCHERICHIA COLI ESBL  > 100,000 cfu/ml      Narrative:      Specimen Source->Urine          Pathology Results  (Last 10 years)                 01/01/22 1345  Specimen to Pathology, Surgery Other Final result    Narrative:  Pre-op Diagnosis: Ulcer of right second toe [L97.519]   Diabetic foot ulcer with osteomyelitis [E11.621, E11.69,   L97.509, M86.9]   Procedure(s):   AMPUTATION, TOE   Number of specimens: 1   Name of specimens: 1) right foot second toe   Release to patient->Immediate   Specimen total (fresh, frozen, permanent):->1       07/22/21 1446  Specimen to Pathology, Surgery General Surgery Final result    Narrative:  Pre-op Diagnosis: Acute appendicitis with localized   peritonitis, without perforation, abscess, or gangrene   [K35.30]   Procedure(s):   APPENDECTOMY, LAPAROSCOPIC   Number of specimens: 1   Name of  specimens: 1. Appendix   Release to patient->Immediate   Specimen total (fresh, frozen, permanent):->1             Urine Culture:   Recent Labs   Lab 10/12/21  1333 11/19/21  2230 01/10/22  0543 02/10/22  0529 03/18/22  1659   LABURIN Multiple organisms isolated. None in predominance.  Repeat if  clinically necessary. No growth PSEUDOMONAS AERUGINOSA  10,000 - 49,999 cfu/ml  * PSEUDOMONAS AERUGINOSA  50,000 - 99,999 cfu/ml  * ESCHERICHIA COLI ESBL  > 100,000 cfu/ml  *       Urine Studies:   Recent Labs   Lab 01/10/22  0543 02/10/22  0529 03/18/22  1659   COLORU Yellow Yellow Yellow   APPEARANCEUA Hazy* Hazy* Cloudy*   PHUR 6.0 6.0 5.0   SPECGRAV 1.025 1.005 1.020   PROTEINUA 2+* 1+* 2+*   GLUCUA Negative Negative Negative   KETONESU Negative Negative Negative   BILIRUBINUA Negative Negative Negative   OCCULTUA 1+* 2+* 1+*   NITRITE Negative Positive* Positive*   UROBILINOGEN Negative  --   --    LEUKOCYTESUR 2+* 3+* 2+*   RBCUA 7* 14* 13*   WBCUA 35* 84* >100*   BACTERIA Rare Many* Many*   SQUAMEPITHEL 5 0  --    HYALINECASTS 3* 0 0       Wound Culture:   Recent Labs   Lab 03/18/22  1620   LABAERO METHICILLIN RESISTANT STAPHYLOCOCCUS AUREUS  Many  *         Significant Imaging:   MRI Foot Toes Without Contrast Right [[id number]] Resulted: 03/19/22 0128   Order Status: Completed Updated: 03/19/22 0130   Narrative:     EXAMINATION:   MRI FOOT TOES WITHOUT CONTRAST RIGHT     CLINICAL HISTORY:   Osteomyelitis, foot;     TECHNIQUE:   Multiplanar multisequence MRI examination of the right foot and toes performed without IV contrast.     COMPARISON:   Right foot radiograph 03/18/2022.     MRI foot right 12/30/2021.     FINDINGS:   There is an open wound overlying the right 5th phalanges.     There is abnormal bone marrow edema signal within the distal heads of the 4th and 5th metatarsal as well as the phalanges of the 4th and 5th toes more extensive in the 5th.     There is scattered subcutaneous edema around this site as  well as throughout the foot.  No large drainable fluid collection.  Postsurgical changes of right 2nd ray amputation.    Impression:       Osteomyelitis involving the right 4th and 5th metatarsals distally and the phalanges of the 4th and 5th toes.     Electronically signed by resident: Dimas Zapata   Date: 03/19/2022   Time: 01:04     Electronically signed by: Erlin Hernandez   Date: 03/19/2022   Time: 01:28

## 2022-03-22 NOTE — ASSESSMENT & PLAN NOTE
MRI right forefoot demonstrating osteomyelitis involving the right 4th and 5th metatarsals distally and the phalanges of the 4th and 5th toes.    Plan:  -Options may include (1) right 4th-5th ray amputations with shorter course of antibiotics and wound care, (2) bone biopsy with longer course of antibiotics and wound care  Current peripheral circulation may not be adequate to clear infection or heal a surgical wound. Vascular workup required prior to further planning.  -Noninvasive vascular studies ordered, consult vascular surgery if significant PAD appreciatred. Do not modify orders.  -Antibiotic plan per ID. Wound cultures +MRSA  -WBAT LLE, WB to heel for transfers or short distances RLE.   -Dressing changes by nursing, ordered.   -Podiatry will follow.

## 2022-03-22 NOTE — ASSESSMENT & PLAN NOTE
71 year old female with CVA, DM2, HTN, CKD, nephroliathiasis s/p left ureteral stent placement, urinary retention managed with chronic indwelling delgado, recurrent UTIs, right second toe osteomyelitis s/p R toe amputation 1/2022 who presented to [facility] [place] [facility] with acute metabolic encephalopathy likely secondary to UTI and right foot infection.  Delgado exchanged in ED. Urine cx + for ESBL E Coli     Noted to have gangrenous changes of her right fifth toe and interdigit wound between toes 4/5.   MRI right foot reviewed and is concerning for osteomyelitis of the 4th and 5th digits.  Wound cultures + for MRSA.  Vascular studies performed and  reviewed. No significant PAD. Toe pressures significant for severe microvascular disease.  Podiatry has discussed with patient who wishes to proceed with right foot partial 4th and 5th ray amputations - scheduled for  3/24/2022.        Patient mentation has improved on antibiotics. Afebrile, no leukocytosis, and HDS.    Recommendations  · Continue IV Vancomycin (pharmacy to dose. Trough goal 15-20).  Random level this morning 26.  Pharmacy to re-dose when <20.  Creatinine improving.   · Continue  ertapenem 500 mg IV q 24 hours for ESBL E coli (renally adjusted).  Recommend 7 days ertapenem given history of stents  · Follow up clean margins after surgery.  Hopefully source control will be achieved with amputation and will only need short course of abx after amputation for SSTI  · Will follow     Data reviewed and plan discussed with ID staff, Dr. Saxena  Secure chat/Discussed above plan with Primary Team, EBONY Dooley

## 2022-03-22 NOTE — ASSESSMENT & PLAN NOTE
Osteomyelitis of R 2nd toe  Critical lower limb ischemia  -MRI revealed Osteomyelitis involving the right 4th and 5th metatarsals distally and the phalanges of the 4th and 5th toes.   -XR R foot showed no acute bony abnormalities  -podiatry consulted, appreciate recs   -Options may include (1) right 4th-5th ray amputations with shorter course of antibiotics and wound care, (2) bone biopsy with longer course of antibiotics and wound care, or (3) palliative care. Current peripheral circulation may not be adequate to clear infection or heal a surgical wound. Vascular workup required prior to further planning.   -Noninvasive vascular studies ordered, consult vascular surgery once resulted. Do not modify orders.   -Antibiotic plan per ID.    -WBAT LLE, WB to heel for transfers or short distances RLE.   -Cx growing s. Aureus, with sensitivity to vanc, will continue IV  -ID consulted, appreciate recs   -empiric Vancomycin and ertapenem (renal dosing). Monitor renal function closely  -Follow blood/urine cultures

## 2022-03-22 NOTE — SUBJECTIVE & OBJECTIVE
Interval History: NAEON. Pt seen and evaluated at bedside this am. Reports doing well. Pt remains AFVSS, No leukocytosis or electrolyte abnormalities. Foot cx growing vanc sensitive staph aureus. Continuing IV ertapenem and IV vancomycin for UTI and osteo respectively. Updated son, Alysa of hospital course this am.    Review of Systems   Constitutional:  Negative for activity change, appetite change, chills, diaphoresis, fatigue and fever.   HENT: Negative.  Negative for congestion, facial swelling, rhinorrhea and sore throat.    Eyes: Negative.  Negative for photophobia, itching and visual disturbance.   Respiratory:  Negative for cough, chest tightness, shortness of breath and wheezing.    Cardiovascular:  Negative for chest pain, palpitations and leg swelling.   Gastrointestinal:  Negative for abdominal distention, abdominal pain, blood in stool, constipation, diarrhea, nausea and vomiting.   Genitourinary:  Positive for difficulty urinating (chronic delgado). Negative for dysuria, flank pain, frequency, hematuria and urgency.   Musculoskeletal:  Positive for arthralgias and gait problem. Negative for back pain and neck stiffness.   Skin:  Positive for color change and wound.   Neurological:  Positive for numbness. Negative for dizziness, tremors, seizures, syncope, weakness, light-headedness and headaches.   Psychiatric/Behavioral:  Positive for confusion. Negative for agitation and hallucinations. The patient is not nervous/anxious.    Objective:     Vital Signs (Most Recent):  Temp: 97.7 °F (36.5 °C) (03/22/22 0807)  Pulse: 76 (03/22/22 0807)  Resp: 16 (03/22/22 0807)  BP: (!) 110/56 (03/22/22 0807)  SpO2: 98 % (03/22/22 0807)   Vital Signs (24h Range):  Temp:  [97.7 °F (36.5 °C)-98.8 °F (37.1 °C)] 97.7 °F (36.5 °C)  Pulse:  [76-83] 76  Resp:  [16-17] 16  SpO2:  [94 %-100 %] 98 %  BP: ()/(51-68) 110/56     Weight: 47.6 kg (104 lb 15 oz)  Body mass index is 16.44 kg/m².    Intake/Output Summary (Last 24  hours) at 3/22/2022 1120  Last data filed at 3/22/2022 0817  Gross per 24 hour   Intake 200 ml   Output 900 ml   Net -700 ml      Physical Exam  Constitutional:       General: She is not in acute distress.     Appearance: Normal appearance. She is not ill-appearing, toxic-appearing or diaphoretic.   HENT:      Head: Normocephalic and atraumatic.      Mouth/Throat:      Mouth: Mucous membranes are dry.   Eyes:      General: No scleral icterus.     Conjunctiva/sclera: Conjunctivae normal.   Cardiovascular:      Rate and Rhythm: Normal rate and regular rhythm.      Pulses: Decreased pulses.   Pulmonary:      Effort: Pulmonary effort is normal. No respiratory distress.      Breath sounds: Normal breath sounds.   Abdominal:      General: There is no distension.      Palpations: Abdomen is soft.      Tenderness: There is no abdominal tenderness. There is no right CVA tenderness, left CVA tenderness or guarding.   Musculoskeletal:         General: Deformity (right 2nd toe amputation) present.      Right lower leg: No edema.      Left lower leg: No edema.      Comments: Right foot wrapped.  No ascending cellulitis/warmth  See Podiatry photos below   Skin:     General: Skin is warm and dry.   Neurological:      Mental Status: She is alert. Mental status is at baseline.   Psychiatric:         Mood and Affect: Mood normal.         Behavior: Behavior normal.       Significant Labs: All pertinent labs within the past 24 hours have been reviewed.    Significant Imaging: I have reviewed all pertinent imaging results/findings within the past 24 hours.

## 2022-03-22 NOTE — PROGRESS NOTES
[facility] - Telemetry Stepdown ([place] [id number])  Podiatry  Progress Note    Patient Name: Beatriz Adame  MRN: [id number]  Admission Date: 3/18/2022  Hospital Length of Stay: 0 days  Attending Physician: Arash Saini MD  Primary Care Provider: Dante Olivier MD     Subjective:     Interval History:   NAEON. Afebrile, vital signs stable. Leukocytosis resolved. Right foot wound culture + MRSA. MRI right forefoot demonstrating osteomyelitis involving the right 4th and 5th metatarsals distally and the phalanges of the 4th and 5th toes.        Scheduled Meds:   amLODIPine  10 mg Oral QHS    aspirin  81 mg Oral Daily    atorvastatin  80 mg Oral QHS    carvediloL  25 mg Oral BID    clopidogreL  75 mg Oral Daily    ertapenem (INVANZ) IVPB  500 mg Intravenous Q24H    ferrous sulfate  1 tablet Oral Daily    gabapentin  300 mg Oral Daily    heparin (porcine)  5,000 Units Subcutaneous Q8H    insulin detemir U-100  3 Units Subcutaneous Daily    Lactobacillus rhamnosus GG  1 capsule Oral Daily    levothyroxine  75 mcg Oral Daily    metoclopramide HCl  10 mg Oral TID AC    pantoprazole  40 mg Oral Daily    tamsulosin  0.4 mg Oral Daily     Continuous Infusions:  PRN Meds:sodium chloride, dextrose 10%, dextrose 10%, glucagon (human recombinant), glucose, glucose, insulin aspart U-100, naloxone, ondansetron, sodium chloride 0.9%, Pharmacy to dose Vancomycin consult **AND** vancomycin - pharmacy to dose    Review of Systems   Constitutional:  Negative for activity change, chills, diaphoresis, fatigue and fever.   HENT:  Negative for congestion, facial swelling, rhinorrhea and sore throat.    Eyes:  Negative for photophobia, pain, itching and visual disturbance.   Respiratory:  Negative for cough, chest tightness, shortness of breath and wheezing.    Cardiovascular:  Negative for chest pain, palpitations and leg swelling.   Gastrointestinal:  Negative for abdominal distention, abdominal pain, blood in stool,  constipation, diarrhea, nausea and vomiting.   Genitourinary:  Positive for difficulty urinating (chronic delgado). Negative for dysuria, frequency, hematuria and urgency.   Musculoskeletal:  Positive for gait problem. Negative for arthralgias, back pain and neck stiffness.   Skin:  Positive for color change and wound.   Neurological:  Negative for dizziness, tremors, seizures, syncope, weakness, light-headedness, numbness and headaches.   Psychiatric/Behavioral:  Negative for agitation, confusion and hallucinations. The patient is not nervous/anxious.    Objective:     Vital Signs (Most Recent):  Temp: 98.2 °F (36.8 °C) (03/21/22 1918)  Pulse: 76 (03/21/22 1918)  Resp: 16 (03/21/22 1918)  BP: (!) 151/68 (03/21/22 1918)  SpO2: 99 % (03/21/22 1918)   Vital Signs (24h Range):  Temp:  [97.6 °F (36.4 °C)-98.2 °F (36.8 °C)] 98.2 °F (36.8 °C)  Pulse:  [73-82] 76  Resp:  [12-17] 16  SpO2:  [95 %-100 %] 99 %  BP: (131-161)/(65-71) 151/68     Weight: 47.6 kg (104 lb 15 oz)  Body mass index is 16.44 kg/m².    Foot Exam    Right Foot/Ankle     Inspection and Palpation  Tenderness: none   Swelling: none   Skin Exam: drainage, abnormal color and ulcer; skin not intact and no erythema     Neurovascular  Dorsalis pedis: absent  Posterior tibial: absent    Comments  R 2nd toe amputated    Left Foot/Ankle      Inspection and Palpation  Tenderness: none   Swelling: none   Skin Exam: skin intact; no drainage, no cellulitis and no erythema     Neurovascular  Dorsalis pedis: absent  Posterior tibial: absent        Laboratory:  CBC:   Recent Labs   Lab 03/21/22  0515   WBC 11.83   RBC 3.15*   HGB 9.0*   HCT 28.1*      MCV 89   MCH 28.6   MCHC 32.0     CRP:   Recent Labs   Lab 03/18/22  1640   CRP 64.0*     ESR:   Recent Labs   Lab 03/18/22  1640   SEDRATE 95*     Wound Cultures:   Recent Labs   Lab 03/18/22  1620   LABAERO METHICILLIN RESISTANT STAPHYLOCOCCUS AUREUS  Many  *     Microbiology Results (last 7 days)       Procedure  Component Value Units Date/Time    Blood culture x two cultures. Draw prior to antibiotics. [[id number]] Collected: 03/18/22 1641    Order Status: Completed Specimen: Blood from Peripheral, Antecubital, Right Updated: 03/21/22 2012     Blood Culture, Routine No Growth to date      No Growth to date      No Growth to date      No Growth to date    Narrative:      Aerobic and anaerobic    Blood culture x two cultures. Draw prior to antibiotics. [[id number]] Collected: 03/18/22 1641    Order Status: Completed Specimen: Blood from Peripheral, Antecubital, Right Updated: 03/21/22 2012     Blood Culture, Routine No Growth to date      No Growth to date      No Growth to date      No Growth to date    Narrative:      Aerobic and anaerobic    Aerobic culture [[id number]]  (Abnormal)  (Susceptibility) Collected: 03/18/22 1620    Order Status: Completed Specimen: Wound from Toe, Right Foot Updated: 03/21/22 1226     Aerobic Bacterial Culture METHICILLIN RESISTANT STAPHYLOCOCCUS AUREUS  Many      Urine culture [[id number]]  (Abnormal)  (Susceptibility) Collected: 03/18/22 1659    Order Status: Completed Specimen: Urine Updated: 03/21/22 0736     Urine Culture, Routine ESCHERICHIA COLI ESBL  > 100,000 cfu/ml      Narrative:      Specimen Source->Urine          Specimen (24h ago, onward)                None            Diagnostic Results:  I have reviewed all pertinent imaging results/findings within the past 24 hours.    Clinical Findings:  Ulceration in the 4th interspace right foot. Wound base fibrotic, probes to bone, fibrinous exudate. R 5th toe dry gangrene. No erythema, no edema, no crepitus or fluctuance. DP PT pulses nonpalpable.         Assessment/Plan:     * Osteomyelitis of right foot  MRI right forefoot demonstrating osteomyelitis involving the right 4th and 5th metatarsals distally and the phalanges of the 4th and 5th toes.    Plan:  -Options may include (1) right 4th-5th ray amputations with shorter course of  antibiotics and wound care, (2) bone biopsy with longer course of antibiotics and wound care  Current peripheral circulation may not be adequate to clear infection or heal a surgical wound. Vascular workup required prior to further planning.  -Noninvasive vascular studies ordered, consult vascular surgery if significant PAD appreciatred. Do not modify orders.  -Antibiotic plan per ID. Wound cultures +MRSA  -WBAT LLE, WB to heel for transfers or short distances RLE.   -Dressing changes by nursing, ordered.   -Podiatry will follow.      Yumiko Morales DPM  [facility]  Podiatry PGY3  Pager: [phone]  Spectra:[phone]

## 2022-03-22 NOTE — ASSESSMENT & PLAN NOTE
71 year old female with CVA, DM2, HTN, CKD, nephroliathiasis s/p left ureteral stent placement, urinary retention managed with chronic indwelling delgado, recurrent UTIs, right second toe osteomyelitis s/p R toe amputation 1/2022 who presented to [facility] from [facility] with acute metabolic encephalopathy likely secondary to UTI and right foot infection.      Delgado exchanged in ED. Patient noted to have gangrenous changes of her right fifth toe and interdigit wound. MRI right foot reviewed and is concerning for osteomyelitis of the 4th and 5th digits. Podiatry consulted and ordered vascular studies prior to making surgical plans.  Patient currently on empiric Vanc/Cefepime. Blood cx NGTD. Urine cx positive for ESBL E Coli. Wound cx positive for MRSA.      Patient mentation has improved on antibiotics. Afebrile, no leukocytosis, and HDS.    Recommendations  · Continue empiric Vancomycin (pharmacy to dose.  Trough goal 15-20).  Last trough late (10.5 hours) and 16.7.  Dose adjusted to 24 hour dosing by Pharmacy.   · Discontinue cefepime and start ertapenem 500 mg IV q 24 hours (renallly adjusted)  · Follow up pending vascular studies  · Will follow up podiatry surgical plans and make further recommendations accordingly  · If patient undergoes fourth and fifth toe amputations, this would achieve source control and long term antibiotics would not likely be indicated     Data reviewed and plan discussed with ID staff, Dr. Saxena  Secure chat/Discussed above plan with Primary Team, EBONY Dooley

## 2022-03-22 NOTE — SUBJECTIVE & OBJECTIVE
Interval History: NAEON. Afebrile. Leukocytosis resolved.   Urine Cx showing ESBL E coli   Wound cx with MRSA.   Vascular studies still pending.    Vanc trough 16.7    Review of Systems   Constitutional:  Negative for activity change, appetite change, chills, diaphoresis and fever.   HENT: Negative.     Eyes: Negative.    Respiratory:  Negative for cough and shortness of breath.    Cardiovascular:  Negative for chest pain and leg swelling.   Gastrointestinal:  Negative for abdominal pain, constipation, diarrhea, nausea and vomiting.   Genitourinary:  Positive for difficulty urinating (chronic delgado). Negative for dysuria and flank pain.   Musculoskeletal:  Positive for arthralgias and gait problem.   Skin:  Positive for color change and wound.   Neurological:  Positive for numbness. Negative for headaches.   Psychiatric/Behavioral:  Positive for confusion. Negative for agitation. The patient is not nervous/anxious.    Objective:     Vital Signs (Most Recent):  Temp: 98.2 °F (36.8 °C) (03/21/22 1918)  Pulse: 76 (03/21/22 1918)  Resp: 16 (03/21/22 1918)  BP: (!) 151/68 (03/21/22 1918)  SpO2: 99 % (03/21/22 1918)   Vital Signs (24h Range):  Temp:  [97.6 °F (36.4 °C)-98.2 °F (36.8 °C)] 98.2 °F (36.8 °C)  Pulse:  [73-82] 76  Resp:  [12-16] 16  SpO2:  [95 %-100 %] 99 %  BP: (131-161)/(65-71) 151/68     Weight: 47.6 kg (104 lb 15 oz)  Body mass index is 16.44 kg/m².    Estimated Creatinine Clearance: 21.9 mL/min (A) (based on SCr of 1.8 mg/dL (H)).    Physical Exam  Constitutional:       General: She is not in acute distress.     Appearance: Normal appearance. She is not ill-appearing, toxic-appearing or diaphoretic.   HENT:      Head: Normocephalic and atraumatic.      Mouth/Throat:      Mouth: Mucous membranes are dry.   Eyes:      General: No scleral icterus.     Conjunctiva/sclera: Conjunctivae normal.   Cardiovascular:      Rate and Rhythm: Normal rate and regular rhythm.      Pulses: Decreased pulses.   Pulmonary:       Effort: Pulmonary effort is normal. No respiratory distress.      Breath sounds: Normal breath sounds.   Abdominal:      General: There is no distension.      Palpations: Abdomen is soft.      Tenderness: There is no abdominal tenderness. There is no right CVA tenderness, left CVA tenderness or guarding.   Musculoskeletal:         General: Deformity (right 2nd toe amputation) present.      Right lower leg: No edema.      Left lower leg: No edema.      Comments: Right foot wrapped.  No ascending cellulitis/warmth  See Podiatry photos below   Skin:     General: Skin is warm and dry.   Neurological:      Mental Status: She is alert.      Comments: Alert/conversant   Psychiatric:         Mood and Affect: Mood normal.         Behavior: Behavior normal.                 Significant Labs: Blood Culture:   Recent Labs   Lab 12/30/21  1523 12/30/21  1536 03/18/22  1641   LABBLOO No growth after 5 days. No growth after 5 days. No Growth to date  No Growth to date  No Growth to date  No Growth to date  No Growth to date  No Growth to date  No Growth to date  No Growth to date       BMP:   Recent Labs   Lab 03/21/22  0515   *      K 4.3      CO2 27   BUN 26*   CREATININE 1.8*   CALCIUM 9.8       CBC:   Recent Labs   Lab 03/20/22  1254 03/21/22  0515   WBC 11.46 11.83   HGB 8.7* 9.0*   HCT 28.1* 28.1*    175       CMP:   Recent Labs   Lab 03/20/22  1254 03/21/22  0515    141   K 4.4 4.3    105   CO2 24 27   * 124*   BUN 29* 26*   CREATININE 2.0* 1.8*   CALCIUM 9.5 9.8   PROT 7.5 7.4   ALBUMIN 2.4* 2.3*   BILITOT 0.5 0.5   ALKPHOS 117 76   AST 11 12   ALT 11 10   ANIONGAP 7* 9   EGFRNONAA 24.8* 28.1*       Microbiology Results (last 7 days)       Procedure Component Value Units Date/Time    Blood culture x two cultures. Draw prior to antibiotics. [[id number]] Collected: 03/18/22 1641    Order Status: Completed Specimen: Blood from Peripheral, Antecubital, Right Updated: 03/21/22  2012     Blood Culture, Routine No Growth to date      No Growth to date      No Growth to date      No Growth to date    Narrative:      Aerobic and anaerobic    Blood culture x two cultures. Draw prior to antibiotics. [[id number]] Collected: 03/18/22 1641    Order Status: Completed Specimen: Blood from Peripheral, Antecubital, Right Updated: 03/21/22 2012     Blood Culture, Routine No Growth to date      No Growth to date      No Growth to date      No Growth to date    Narrative:      Aerobic and anaerobic    Aerobic culture [[id number]]  (Abnormal)  (Susceptibility) Collected: 03/18/22 1620    Order Status: Completed Specimen: Wound from Toe, Right Foot Updated: 03/21/22 1226     Aerobic Bacterial Culture METHICILLIN RESISTANT STAPHYLOCOCCUS AUREUS  Many      Urine culture [[id number]]  (Abnormal)  (Susceptibility) Collected: 03/18/22 1659    Order Status: Completed Specimen: Urine Updated: 03/21/22 0736     Urine Culture, Routine ESCHERICHIA COLI ESBL  > 100,000 cfu/ml      Narrative:      Specimen Source->Urine          Pathology Results  (Last 10 years)                 01/01/22 1345  Specimen to Pathology, Surgery Other Final result    Narrative:  Pre-op Diagnosis: Ulcer of right second toe [L97.519]   Diabetic foot ulcer with osteomyelitis [E11.621, E11.69,   L97.509, M86.9]   Procedure(s):   AMPUTATION, TOE   Number of specimens: 1   Name of specimens: 1) right foot second toe   Release to patient->Immediate   Specimen total (fresh, frozen, permanent):->1       07/22/21 1446  Specimen to Pathology, Surgery General Surgery Final result    Narrative:  Pre-op Diagnosis: Acute appendicitis with localized   peritonitis, without perforation, abscess, or gangrene   [K35.30]   Procedure(s):   APPENDECTOMY, LAPAROSCOPIC   Number of specimens: 1   Name of specimens: 1. Appendix   Release to patient->Immediate   Specimen total (fresh, frozen, permanent):->1             Urine Culture:   Recent Labs   Lab 10/12/21  1333  11/19/21  2230 01/10/22  0543 02/10/22  0529 03/18/22  1659   LABURIN Multiple organisms isolated. None in predominance.  Repeat if  clinically necessary. No growth PSEUDOMONAS AERUGINOSA  10,000 - 49,999 cfu/ml  * PSEUDOMONAS AERUGINOSA  50,000 - 99,999 cfu/ml  * ESCHERICHIA COLI ESBL  > 100,000 cfu/ml  *       Urine Studies:   Recent Labs   Lab 01/10/22  0543 02/10/22  0529 03/18/22  1659   COLORU Yellow Yellow Yellow   APPEARANCEUA Hazy* Hazy* Cloudy*   PHUR 6.0 6.0 5.0   SPECGRAV 1.025 1.005 1.020   PROTEINUA 2+* 1+* 2+*   GLUCUA Negative Negative Negative   KETONESU Negative Negative Negative   BILIRUBINUA Negative Negative Negative   OCCULTUA 1+* 2+* 1+*   NITRITE Negative Positive* Positive*   UROBILINOGEN Negative  --   --    LEUKOCYTESUR 2+* 3+* 2+*   RBCUA 7* 14* 13*   WBCUA 35* 84* >100*   BACTERIA Rare Many* Many*   SQUAMEPITHEL 5 0  --    HYALINECASTS 3* 0 0       Wound Culture:   Recent Labs   Lab 03/18/22  1620   LABAERO METHICILLIN RESISTANT STAPHYLOCOCCUS AUREUS  Many  *         Significant Imaging:   MRI Foot Toes Without Contrast Right [[id number]] Resulted: 03/19/22 0128   Order Status: Completed Updated: 03/19/22 0130   Narrative:     EXAMINATION:   MRI FOOT TOES WITHOUT CONTRAST RIGHT     CLINICAL HISTORY:   Osteomyelitis, foot;     TECHNIQUE:   Multiplanar multisequence MRI examination of the right foot and toes performed without IV contrast.     COMPARISON:   Right foot radiograph 03/18/2022.     MRI foot right 12/30/2021.     FINDINGS:   There is an open wound overlying the right 5th phalanges.     There is abnormal bone marrow edema signal within the distal heads of the 4th and 5th metatarsal as well as the phalanges of the 4th and 5th toes more extensive in the 5th.     There is scattered subcutaneous edema around this site as well as throughout the foot.  No large drainable fluid collection.  Postsurgical changes of right 2nd ray amputation.    Impression:       Osteomyelitis involving  the right 4th and 5th metatarsals distally and the phalanges of the 4th and 5th toes.     Electronically signed by resident: Dimas Zapata   Date: 03/19/2022   Time: 01:04     Electronically signed by: Erlin Hernandez   Date: 03/19/2022   Time: 01:28

## 2022-03-23 NOTE — PROGRESS NOTES
Pharmacokinetic Assessment Follow Up: IV Vancomycin    Vancomycin serum concentration assessment(s):    The random level was drawn correctly and can be used to guide therapy at this time. The measurement is above the desired definitive target range of 15 to 20 mcg/mL.    Vancomycin Regimen Plan:    · Give vancomycin 500 mg once today.  · Re-dose when the random level is less than 20 mcg/mL, next level to be drawn ~24 hrs post dose at 1300 on 3/24/2022  · The patient's current regimen is pulse dosing. If renal function remains stable, consider maintenance dosing.    Drug levels (last 3 results):  Recent Labs   Lab Result Units 03/21/22  0515 03/22/22  0544 03/23/22  0317   Vancomycin, Random ug/mL 16.7 26.6 21.0       Pharmacy will continue to follow and monitor vancomycin.    Please contact pharmacy at extension [phone] for questions regarding this assessment.    Thank you for the consult,   Tesha John       Patient brief summary:  Beatriz Adame is a 71 y.o. female initiated on antimicrobial therapy with IV Vancomycin for treatment of bone/joint infection      Drug Allergies:   Review of patient's allergies indicates:   Allergen Reactions    Tomato (solanum lycopersicum) Hives and Itching       Actual Body Weight:       Renal Function:   Estimated Creatinine Clearance: 21.9 mL/min (A) (based on SCr of 1.8 mg/dL (H)).,     Dialysis Method (if applicable):  N/A    CBC (last 72 hours):  Recent Labs   Lab Result Units 03/20/22  1254 03/21/22  0515 03/22/22  0944 03/23/22  0932   WBC K/uL 11.46 11.83 10.76 10.49   Hemoglobin g/dL 8.7* 9.0* 8.9* 7.9*   Hematocrit % 28.1* 28.1* 28.6* 25.4*   Platelets K/uL 194 175 179 158   Gran % % 65.7 65.2 61.6 64.9   Lymph % % 24.3 23.7 26.3 23.3   Mono % % 7.5 8.3 9.5 8.5   Eosinophil % % 1.8 2.1 1.9 2.1   Basophil % % 0.4 0.4 0.4 0.5   Differential Method  Automated Automated Automated Automated       Metabolic Panel (last 72 hours):  Recent Labs   Lab Result Units  03/20/22  1254 03/21/22  0515 03/22/22  0944 03/23/22  0932   Sodium mmol/L 140 141 141 141   Potassium mmol/L 4.4 4.3 4.0 3.5   Chloride mmol/L 109 105 107 108   CO2 mmol/L 24 27 24 26   Glucose mg/dL 164* 124* 121* 148*   BUN mg/dL 29* 26* 27* 28*   Creatinine mg/dL 2.0* 1.8* 1.6* 1.8*   Albumin g/dL 2.4* 2.3*  --   --    Total Bilirubin mg/dL 0.5 0.5  --   --    Alkaline Phosphatase U/L 117 76  --   --    AST U/L 11 12  --   --    ALT U/L 11 10  --   --        Vancomycin Administrations:  vancomycin given in the last 96 hours                   vancomycin in dextrose 5 % 1 gram/250 mL IVPB 1,000 mg (mg) 1,000 mg New Bag 03/21/22 1348    vancomycin in dextrose 5 % 1 gram/250 mL IVPB 1,000 mg (mg) 1,000 mg New Bag 03/20/22 1840    vancomycin in dextrose 5 % 1 gram/250 mL IVPB 1,000 mg (mg) 1,000 mg New Bag 03/19/22 1625                Microbiologic Results:  Microbiology Results (last 7 days)     Procedure Component Value Units Date/Time    Blood culture x two cultures. Draw prior to antibiotics. [[id number]] Collected: 03/18/22 1641    Order Status: Completed Specimen: Blood from Peripheral, Antecubital, Right Updated: 03/22/22 2012     Blood Culture, Routine No Growth to date      No Growth to date      No Growth to date      No Growth to date      No Growth to date    Narrative:      Aerobic and anaerobic    Blood culture x two cultures. Draw prior to antibiotics. [[id number]] Collected: 03/18/22 1641    Order Status: Completed Specimen: Blood from Peripheral, Antecubital, Right Updated: 03/22/22 2012     Blood Culture, Routine No Growth to date      No Growth to date      No Growth to date      No Growth to date      No Growth to date    Narrative:      Aerobic and anaerobic    Aerobic culture [[id number]]  (Abnormal)  (Susceptibility) Collected: 03/18/22 1620    Order Status: Completed Specimen: Wound from Toe, Right Foot Updated: 03/21/22 1226     Aerobic Bacterial Culture METHICILLIN RESISTANT STAPHYLOCOCCUS  AUREUS  Many      Urine culture [[id number]]  (Abnormal)  (Susceptibility) Collected: 03/18/22 4719    Order Status: Completed Specimen: Urine Updated: 03/21/22 0736     Urine Culture, Routine ESCHERICHIA COLI ESBL  > 100,000 cfu/ml      Narrative:      Specimen Source->Urine

## 2022-03-23 NOTE — ASSESSMENT & PLAN NOTE
-UA infectious  -previous UC growing pansensitive pseudomonas aeruginosa  -UC growing ESBL, continuing IV ertapenem x7 days (day 3)

## 2022-03-23 NOTE — ANESTHESIA PREPROCEDURE EVALUATION
[facility]  Anesthesia Pre-Operative Evaluation         Patient Name/: Beatriz Adame, 1950  MRN: [id number]    SUBJECTIVE:     Pre-operative evaluation for Procedure(s) (LRB):  AMPUTATION, FOOT, PARTIAL 4th and 5th ray (Right)     2022    Beatriz Adame is a 71 y.o. female w/ a significant PMHx of hypothyroidism, CVA with left-sided residual deficit, DM2, HTN, HLD, CKD, chronic anemia, recurrent pseudomonal UTI, nephroliathiasis s/p left ureteral stent placement in 10/2021, urinary retention with chronic indwelling delgado catheter, diabetic foot ulcer s/p R toe amputation, chronic anemia. Pt presented with acute metabolic encephalopathy secondary to catheter associated urinary tract infection and osteomyelitis of R foot. .    Patient now presents for the above procedure(s).      Prev airway:   Induction:  Rapid sequence induction    Intubated:  Postinduction    Mask Ventilation:  N/a    Attempts:  1    Attempted By:  CRNA    Method of Intubation:  Video laryngoscopy    Blade:  Shen 3    Laryngeal View Grade: Grade I - full view of chords      Difficult Airway Encountered?: No      Complications:  None    Airway Device:  Oral endotracheal tube    Airway Device Size:  7.0    Style/Cuff Inflation:  Cuffed (inflated to minimal occlusive pressure)    Secured at:  The lips    Placement Verified By:  Capnometry    Complicating Factors:  None    LDA:        Peripheral IV - Single Lumen 22 1033 20 G;1 3/4 in Right;Anterior Forearm (Active)   Site Assessment Clean;Dry;Intact 22   Extremity Assessment Distal to IV No abnormal discoloration 22   Line Status Blood return noted 22   Dressing Status Clean;Dry;Intact 22   Dressing Intervention Integrity maintained 22   Site Change Due 22 1000   Number of days: 1            Urethral Catheter 22 Non-latex 16 Fr. (Active)   Site Assessment Clean;Intact;Dry  03/22/22 1911   Collection Container Urimeter 03/22/22 1911   Securement Method secured to top of thigh w/ adhesive device 03/22/22 1911   Catheter Care Performed yes 03/22/22 1911   Number of days: 4       Drips: None documented.      Patient Active Problem List   Diagnosis    TIA (transient ischemic attack)    Hypertension    Hyperlipidemia    Type 2 diabetes mellitus, with long-term current use of insulin    Acute kidney injury superimposed on CKD stage IV    History of stroke    Iron deficiency anemia    History of kidney stones    Severe protein-calorie malnutrition    Right diabetic foot ulcer    Hypothyroidism    Osteomyelitis of right 2nd toe    Urinary retention    Vitamin D deficiency    Debility    Peripheral neuropathy    Gastroparesis    GERD (gastroesophageal reflux disease)    Long term (current) use of antithrombotics/antiplatelets    Hypokalemia    Insomnia    Chronic kidney disease, stage 4 (severe)    Acute on chronic anemia    S/P amputation of lesser toe, right    Acute metabolic encephalopathy    Acute cystitis without hematuria    Osteomyelitis of right foot    Critical lower limb ischemia       Review of patient's allergies indicates:   Allergen Reactions    Tomato (solanum lycopersicum) Hives and Itching       Current Inpatient Medications:    amLODIPine  10 mg Oral QHS    aspirin  81 mg Oral Daily    atorvastatin  80 mg Oral QHS    carvediloL  25 mg Oral BID    clopidogreL  75 mg Oral Daily    ertapenem (INVANZ) IVPB  500 mg Intravenous Q24H    ferrous sulfate  1 tablet Oral Daily    gabapentin  300 mg Oral Daily    heparin (porcine)  5,000 Units Subcutaneous Q8H    insulin detemir U-100  3 Units Subcutaneous Daily    Lactobacillus rhamnosus GG  1 capsule Oral Daily    levothyroxine  75 mcg Oral Daily    metoclopramide HCl  10 mg Oral TID AC    pantoprazole  40 mg Oral Daily    tamsulosin  0.4 mg Oral Daily    vancomycin (VANCOCIN) IVPB  500 mg  Intravenous Once       No current facility-administered medications on file prior to encounter.     Current Outpatient Medications on File Prior to Encounter   Medication Sig Dispense Refill    amLODIPine (NORVASC) 5 MG tablet Take 10 mg by mouth every evening. Take 5 mg every morning and 10 mg at night      tamsulosin (FLOMAX) 0.4 mg Cap Take 1 capsule (0.4 mg total) by mouth once daily. 30 capsule 1    aspirin (ECOTRIN) 81 MG EC tablet Take 81 mg by mouth once daily.      b complex vitamins tablet Take 1 tablet by mouth once daily.      carvedilol (COREG) 25 MG tablet Take 1 tablet (25 mg total) by mouth 2 (two) times daily. 60 tablet 0    clopidogreL (PLAVIX) 75 mg tablet Take 75 mg by mouth.      DULoxetine (CYMBALTA) 30 MG capsule Take 1 capsule (30 mg total) by mouth once daily.      ferrous sulfate (FEOSOL) 325 mg (65 mg iron) Tab tablet Take 325 mg by mouth daily with breakfast. Off at present      gabapentin (NEURONTIN) 300 MG capsule Take 1 capsule (300 mg total) by mouth once daily. 90 capsule 0    HYDROcodone-acetaminophen (NORCO) 5-325 mg per tablet Take 1 tablet by mouth every 6 (six) hours as needed for Pain. 10 tablet 0    insulin (BASAGLAR KWIKPEN U-100 INSULIN) glargine 100 units/mL (3mL) SubQ pen Inject 5 Units into the skin once daily.  0    lactulose (CHRONULAC) 10 gram/15 mL solution Take 10 g by mouth 2 (two) times a day.      levothyroxine (SYNTHROID) 75 MCG tablet Take 75 mcg by mouth once daily.      metoclopramide HCl (REGLAN) 10 MG tablet Take 1 tablet (10 mg total) by mouth 3 (three) times daily before meals. 90 tablet 3    omeprazole (PRILOSEC) 20 MG capsule Take 20 mg by mouth 2 (two) times daily.      ondansetron (ZOFRAN) 4 MG tablet Take 1 tablet (4 mg total) by mouth every 8 (eight) hours as needed for Nausea. 30 tablet 3    rosuvastatin (CRESTOR) 40 MG Tab Take 40 mg by mouth once daily.      traZODone (DESYREL) 100 MG tablet Take 1 tablet (100 mg total) by mouth  once daily. 30 tablet 0       Past Surgical History:   Procedure Laterality Date    APPENDECTOMY      CYSTOSCOPY W/ URETERAL STENT PLACEMENT Right 10/13/2021    Procedure: CYSTOSCOPY, WITH URETERAL STENT INSERTION;  Surgeon: Wanda Arroyo MD;  Location: [facility];  Service: Urology;  Laterality: Right;    ESOPHAGOGASTRODUODENOSCOPY N/A 11/23/2021    Procedure: EGD (ESOPHAGOGASTRODUODENOSCOPY);  Surgeon: Obed Jeong MD;  Location: [address]);  Service: Endoscopy;  Laterality: N/A;    LAPAROSCOPIC APPENDECTOMY N/A 7/22/2021    Procedure: APPENDECTOMY, LAPAROSCOPIC;  Surgeon: Paulo Calvo Jr., MD;  Location: [facility];  Service: General;  Laterality: N/A;    TOE AMPUTATION Right 1/1/2022    Procedure: AMPUTATION, TOE;  Surgeon: Genaro Mason DPM;  Location: [facility];  Service: Podiatry;  Laterality: Right;    TUBAL LIGATION      URETEROSCOPIC REMOVAL OF URETERIC CALCULUS Right 12/22/2021    Procedure: REMOVAL, CALCULUS, URETER, URETEROSCOPIC;  Surgeon: Wanda Arroyo MD;  Location: [facility];  Service: Urology;  Laterality: Right;       Social History:  Tobacco Use: Low Risk     Smoking Tobacco Use: Never Smoker    Smokeless Tobacco Use: Never Used       Alcohol Use: Not At Risk    Frequency of Alcohol Consumption: Never    Average Number of Drinks: Not on file    Frequency of Binge Drinking: Never       OBJECTIVE:     Vital Signs Range:  BMI Readings from Last 1 Encounters:   03/21/22 16.44 kg/m²       Temp:  [36.4 °C (97.5 °F)-37.1 °C (98.7 °F)]   Pulse:  [77-86]   Resp:  [16-18]   BP: (132-133)/(61-63)   SpO2:  [95 %-99 %]        Significant Labs:        Component Value Date/Time    WBC 10.49 03/23/2022 0932    HGB 7.9 (L) 03/23/2022 0932    HCT 25.4 (L) 03/23/2022 0932    HCT 28 (L) 11/20/2021 0315     03/23/2022 0932     03/23/2022 0932    K 3.5 03/23/2022 0932     03/23/2022 0932    CO2 26 03/23/2022 0932     (H) 03/23/2022 0932    BUN 28 (H)  03/23/2022 0932    CREATININE 1.8 (H) 03/23/2022 0932    MG 2.3 03/18/2022 1640    PHOS 3.3 03/18/2022 1640    CALCIUM 9.5 03/23/2022 0932    ALBUMIN 2.3 (L) 03/21/2022 0515    PROT 7.4 03/21/2022 0515    ALKPHOS 76 03/21/2022 0515    BILITOT 0.5 03/21/2022 0515    AST 12 03/21/2022 0515    ALT 10 03/21/2022 0515    INR 1.0 12/30/2021 1525    HGBA1C 5.4 11/21/2021 0334        Please see Results Review for additional labs.     Diagnostic Studies: No relevant studies.    EKG:   Results for orders placed or performed during the hospital encounter of 02/09/22   EKG 12-lead    Collection Time: 02/11/22 12:19 PM    Narrative    Test Reason : Z91.89,    Vent. Rate : 079 BPM     Atrial Rate : 079 BPM     P-R Int : 128 ms          QRS Dur : 068 ms      QT Int : 370 ms       P-R-T Axes : 079 016 068 degrees     QTc Int : 424 ms    Normal sinus rhythm  Normal ECG  When compared with ECG of 02-JAN-2022 13:15,  No significant change was found  Confirmed by Raimundo CANDELARIO MD ([id number]) on 2/11/2022 3:49:14 PM    Referred By: [email or web address]   SELF           Confirmed By:Raimundo CANDELARIO MD       ECHO:  No results found for this or any previous visit.        ASSESSMENT/PLAN:       Pre-op Assessment    I have reviewed the Patient Summary Reports.     I have reviewed the Nursing Notes. I have reviewed the NPO Status.   I have reviewed the Medications.     Review of Systems  Anesthesia Hx:  Denies Family Hx of Anesthesia complications.   Denies Personal Hx of Anesthesia complications.   Social:  Non-Smoker    Hematology/Oncology:     Oncology Normal    -- Anemia: Hematology Comments: Chronic anemia  Hct 25.4    Cardiovascular:   Hypertension hyperlipidemia    Pulmonary:  Pulmonary Normal    Renal/:   Chronic Renal Disease, CRI renal calculi    Hepatic/GI:  Hepatic/GI Normal    Neurological:   CVA Neuromuscular Disease,    Endocrine:   Diabetes Hypothyroidism        Physical Exam  General: Well nourished and Cooperative    Airway:  Mallampati: II    Mouth Opening: Normal  TM Distance: Normal  Tongue: Normal  Neck ROM: Normal ROM    Dental:  Dentures, Periodontal disease    Chest/Lungs:  Clear to auscultation, Normal Respiratory Rate    Heart:  Rate: Normal  Rhythm: Regular Rhythm        Anesthesia Plan  Type of Anesthesia, risks & benefits discussed:    Anesthesia Type: Gen ETT  Intra-op Monitoring Plan: Standard ASA Monitors  Post Op Pain Control Plan: multimodal analgesia and IV/PO Opioids PRN  Induction:  IV  Airway Plan: Direct, Post-Induction  Informed Consent: Informed consent signed with the Patient and all parties understand the risks and agree with anesthesia plan.  All questions answered.   ASA Score: 3  Day of Surgery Review of History & Physical: H&P Update referred to the surgeon/provider.    Ready For Surgery From Anesthesia Perspective.     .

## 2022-03-23 NOTE — PLAN OF CARE
Problem: Adult Inpatient Plan of Care  Goal: Plan of Care Review  Outcome: Ongoing, Progressing  Goal: Patient-Specific Goal (Individualized)  Outcome: Ongoing, Progressing  Goal: Absence of Hospital-Acquired Illness or Injury  Outcome: Ongoing, Progressing  Goal: Optimal Comfort and Wellbeing  Outcome: Ongoing, Progressing  Goal: Readiness for Transition of Care  Outcome: Ongoing, Progressing     Problem: Diabetes Comorbidity  Goal: Blood Glucose Level Within Targeted Range  Outcome: Ongoing, Progressing     Problem: Fluid and Electrolyte Imbalance (Acute Kidney Injury/Impairment)  Goal: Fluid and Electrolyte Balance  Outcome: Ongoing, Progressing     Problem: Oral Intake Inadequate (Acute Kidney Injury/Impairment)  Goal: Optimal Nutrition Intake  Outcome: Ongoing, Progressing     Problem: Renal Function Impairment (Acute Kidney Injury/Impairment)  Goal: Effective Renal Function  Outcome: Ongoing, Progressing     Problem: Impaired Wound Healing  Goal: Optimal Wound Healing  Outcome: Ongoing, Progressing     Problem: Fall Injury Risk  Goal: Absence of Fall and Fall-Related Injury  Outcome: Ongoing, Progressing     Problem: Skin Injury Risk Increased  Goal: Skin Health and Integrity  Outcome: Ongoing, Progressing     Problem: Infection  Goal: Absence of Infection Signs and Symptoms  Outcome: Ongoing, Progressing

## 2022-03-23 NOTE — PROGRESS NOTES
"[facility] - Telemetry Stepdown ([address] Medicine  Progress Note    Patient Name: Beatriz Adame  MRN: [id number]  Patient Class: IP- Inpatient   Admission Date: 3/18/2022  Length of Stay: 2 days  Attending Physician: Emile Hunt MD  Primary Care Provider: Dante Olivier MD        Subjective:     Principal Problem:Osteomyelitis of right foot        HPI:  Patient is a 71 year old female with osteoarthritis, hypothyroidism, CVA with left-sided residual deficit, DM2, HTN, HLD, CKD, chronic anemia, recurrent pseudomonal UTI, nephroliathiasis s/p left ureteral stent placement in 10/2021, urinary retention with chronic indwelling delgado catheter, diabetic foot ulcer s/p R toe amputation, chronic anemia, who presents with acute metabolic encephalopathy secondary to catheter associated urinary tract infection and possible right toe infection.   She is being admitted for [facility].     Initially in ED patient was found to have a white count of 13,000 and a left shift / neutrophilic predominance.  While not overtly septic, sepsis protocol was initiated - patient pancultured and broad-spectrum antibiotics with vancomycin and ceftriaxone was initiated.  Vital signs were within normal limits however blood pressure was at the lower range of normal for this patient with a systolic blood pressure of 110.  Lactic acid was obtained and was within normal limits.     Patient is alert on my examination she is able to describe that she is at the hospital for a catheter exchange."  The her catheter has been exchanged in the ED and she has had the benefit of broad-spectrum antibiotics since her admission there.  It is clear that her encephalopathy has already improved with the management of her very obvious catheter associated urinary tract infection.  The ED provider also had a concern for a right 5th toe infection - which appears to be chronically devascularized and gangrenous on examination.  Plain " films of the affected foot were unrevealing however patient has just returned from MRI and interpretation is pending.  Inflammatory markers are notably elevated.      She will be admitted to Hospital Medicine for ongoing management of her catheter associated urinary tract infection and possible right foot infection.          Overview/Hospital Course:  Pt placed in observation for acute metabolic encephalopathy in setting of UTI and osteomyelitis. Pt was AFVSS and hemodynamically stable. Leukocytosis resolved w/ initiation of IV abx. UA infectious and initially started on IV cefepime. UC grew ESBL and patient was transitioned to IV ertapenem started 3/21 (7 day course). MRI of R foot and toes revealed osteomyelitis of the right 4th and 5th distal metatarsals and phalanges. Cx growing MRSA started on IV vancomycin 3/18. ID following. Podiatry planning for amputation 3/24.      Interval History: MABLE. Pt seen and evaluated at bedside this am. Pt w/o new complaints. Pt remains AFVSS, no leukocytosis. Podiatry is planning for amputation of 4&5 toes tomorrow. NPO at midnight. Pt continuing IV vanc and IV ertapenem.     Review of Systems   Constitutional:  Negative for activity change, appetite change, chills, diaphoresis, fatigue and fever.   HENT: Negative.  Negative for congestion, facial swelling, rhinorrhea and sore throat.    Eyes: Negative.  Negative for photophobia, itching and visual disturbance.   Respiratory:  Negative for cough, chest tightness, shortness of breath and wheezing.    Cardiovascular:  Negative for chest pain, palpitations and leg swelling.   Gastrointestinal:  Negative for abdominal distention, abdominal pain, blood in stool, constipation, diarrhea, nausea and vomiting.   Genitourinary:  Positive for difficulty urinating (chronic delgado). Negative for dysuria, flank pain, frequency, hematuria and urgency.   Musculoskeletal:  Positive for arthralgias and gait problem. Negative for back pain and  neck stiffness.   Skin:  Positive for color change and wound.   Neurological:  Positive for numbness. Negative for dizziness, tremors, seizures, syncope, weakness, light-headedness and headaches.   Psychiatric/Behavioral:  Negative for agitation, confusion and hallucinations. The patient is not nervous/anxious.    Objective:     Vital Signs (Most Recent):  Temp: 98.7 °F (37.1 °C) (03/23/22 0745)  Pulse: 86 (03/23/22 0745)  Resp: 16 (03/23/22 0745)  BP: 132/62 (03/23/22 0745)  SpO2: 99 % (03/23/22 0745) Vital Signs (24h Range):  Temp:  [97.5 °F (36.4 °C)-98.7 °F (37.1 °C)] 98.7 °F (37.1 °C)  Pulse:  [72-86] 86  Resp:  [16-18] 16  SpO2:  [98 %-100 %] 99 %  BP: (131-142)/(61-63) 132/62     Weight: 47.6 kg (104 lb 15 oz)  Body mass index is 16.44 kg/m².    Intake/Output Summary (Last 24 hours) at 3/23/2022 1030  Last data filed at 3/23/2022 0602  Gross per 24 hour   Intake --   Output 450 ml   Net -450 ml      Physical Exam  Constitutional:       General: She is not in acute distress.     Appearance: Normal appearance. She is not ill-appearing, toxic-appearing or diaphoretic.   HENT:      Head: Normocephalic and atraumatic.      Mouth/Throat:      Mouth: Mucous membranes are dry.   Eyes:      General: No scleral icterus.     Conjunctiva/sclera: Conjunctivae normal.   Cardiovascular:      Rate and Rhythm: Normal rate and regular rhythm.      Pulses: Decreased pulses.   Pulmonary:      Effort: Pulmonary effort is normal. No respiratory distress.      Breath sounds: Normal breath sounds.   Abdominal:      General: There is no distension.      Palpations: Abdomen is soft.      Tenderness: There is no abdominal tenderness. There is no right CVA tenderness, left CVA tenderness or guarding.   Musculoskeletal:         General: Deformity (right 2nd toe amputation) present.      Right lower leg: No edema.      Left lower leg: No edema.      Comments: Right foot wrapped.  No ascending cellulitis/warmth  See Podiatry photos below    Skin:     General: Skin is warm and dry.   Neurological:      Mental Status: She is alert. Mental status is at baseline.   Psychiatric:         Mood and Affect: Mood normal.         Behavior: Behavior normal.       Significant Labs: All pertinent labs within the past 24 hours have been reviewed.    Significant Imaging: I have reviewed all pertinent imaging results/findings within the past 24 hours.      Assessment/Plan:      * Osteomyelitis of right foot  Osteomyelitis of R 2nd toe  Critical lower limb ischemia  -MRI revealed Osteomyelitis involving the right 4th and 5th metatarsals distally and the phalanges of the 4th and 5th toes.   -XR R foot showed no acute bony abnormalities  -podiatry consulted, appreciate recs   Patient scheduled for right foot partial 4th and 5th ray amputation 3/24/2022. Will obtain intraop proximal bone margins for antibiotic tailoring.   -Will plan to repeat COVID testing for preop 3/23.    -Noninvasive vascular studies ordered, consult vascular surgery once resulted. Do not modify orders.   -Antibiotic plan per ID.    -WBAT LLE, WB to heel for transfers or short distances RLE.   -Arterial studies reviewed. RLE ABIs/ PVR waveforms suggest no evidence of PAD. Rt Great Toe: The PPG waveform is blunted with TBI of 0.16 and a toe pressure of 25 mmHg which suggests severe microvascular disease.   -Cx growing MRSA with sensitivity to vanc, will continue IV  -ID consulted, appreciate recs   -empiric Vancomycin and ertapenem (renal dosing). Monitor renal function closely  -Follow blood/urine cultures    Acute metabolic encephalopathy  Acute cystitis without hematuria    RESOLVED  -Patient initiated on broad-spectrum antibiotics in the ED - vancomycin ceftriaxone  -Will continue with broad-spectrum antibiotics and will change ceftriaxone to Cefepime in light of possible diabetic foot infection  -Patient has a history of pansensitive Klebsiella UTIs  -Nitrite positive urinalysis with gross  parameters of infection on UA, urine culture growing ESBL  -Araiza catheter exchanged  -Blood cultures NGTD  -Encephalopathy improved with goal directed medical therapy for infections  -MRI of right foot reveals Osteomyelitis involving the right 4th and 5th metatarsals distally and the phalanges of the 4th and 5th toes.   -podiatry following  -Delirium precautions, neuro checks, aspiration precautions  -Inflammatory markers elevated, ESR-95, CRP-64    Acute cystitis without hematuria  -UA infectious  -previous UC growing pansensitive pseudomonas aeruginosa  -UC growing ESBL, continuing IV ertapenem x7 days (day 3)    Hypertension  -continue home coreg and amlodipine    Chronic kidney disease, stage 4 (severe)  -Stable at baseline, avoid any nephrotoxic agents    History of stroke  -continue home asa and plavix    Type 2 diabetes mellitus, with long-term current use of insulin  -Diabetic diet  -Accu-Cheks qac,qhs  -3 units determir, low dose SSI    Peripheral neuropathy  -home gabapentin 300 mg once daily    Debility  -PTOT s/p surgery     Urinary retention  -Patient with history of urinary retention and Araiza catheter in place since at least January of 2022  -Araiza catheter last exchanged 02/22/2022  -Araiza catheter exchanged in ED  -Patient continues Flomax per home dose    Hypothyroidism  -continue levothyroxine    Severe protein-calorie malnutrition  -registered dietitian consult    Hyperlipidemia  -Atorvastatin 80 formulary substitution for home Crestor 40        VTE Risk Mitigation (From admission, onward)         Ordered     heparin (porcine) injection 5,000 Units  Every 8 hours         03/18/22 2111     IP VTE HIGH RISK PATIENT  Once         03/18/22 2110     Place sequential compression device  Until discontinued         03/18/22 2110                Discharge Planning   MARY: 3/26/2022     Code Status: Full Code   Is the patient medically ready for discharge?: No    Reason for patient still in hospital (select  all that apply): Patient trending condition, Laboratory test, Treatment, Consult recommendations and PT / OT recommendations  Discharge Plan A: Skilled Nursing Facility                  Misty Anderson PA-C  Department of Hospital Medicine   [facility] - Telemetry Stepdown ([place])

## 2022-03-23 NOTE — PLAN OF CARE
Q 2 hrs turns and repositioning. No acute  changes noted from previous shift. Med given.Safety maintained throughout shift. Bed in low position call light within reach will continue to monitor.

## 2022-03-23 NOTE — SUBJECTIVE & OBJECTIVE
Interval History: NAEONN. Pt seen and evaluated at bedside this am. Pt w/o new complaints. Pt remains AFVSS, no leukocytosis. Podiatry is planning for amputation of 4&5 toes tomorrow. NPO at midnight. Pt continuing IV vanc and IV ertapenem.     Review of Systems   Constitutional:  Negative for activity change, appetite change, chills, diaphoresis, fatigue and fever.   HENT: Negative.  Negative for congestion, facial swelling, rhinorrhea and sore throat.    Eyes: Negative.  Negative for photophobia, itching and visual disturbance.   Respiratory:  Negative for cough, chest tightness, shortness of breath and wheezing.    Cardiovascular:  Negative for chest pain, palpitations and leg swelling.   Gastrointestinal:  Negative for abdominal distention, abdominal pain, blood in stool, constipation, diarrhea, nausea and vomiting.   Genitourinary:  Positive for difficulty urinating (chronic delgado). Negative for dysuria, flank pain, frequency, hematuria and urgency.   Musculoskeletal:  Positive for arthralgias and gait problem. Negative for back pain and neck stiffness.   Skin:  Positive for color change and wound.   Neurological:  Positive for numbness. Negative for dizziness, tremors, seizures, syncope, weakness, light-headedness and headaches.   Psychiatric/Behavioral:  Negative for agitation, confusion and hallucinations. The patient is not nervous/anxious.    Objective:     Vital Signs (Most Recent):  Temp: 98.7 °F (37.1 °C) (03/23/22 0745)  Pulse: 86 (03/23/22 0745)  Resp: 16 (03/23/22 0745)  BP: 132/62 (03/23/22 0745)  SpO2: 99 % (03/23/22 0745) Vital Signs (24h Range):  Temp:  [97.5 °F (36.4 °C)-98.7 °F (37.1 °C)] 98.7 °F (37.1 °C)  Pulse:  [72-86] 86  Resp:  [16-18] 16  SpO2:  [98 %-100 %] 99 %  BP: (131-142)/(61-63) 132/62     Weight: 47.6 kg (104 lb 15 oz)  Body mass index is 16.44 kg/m².    Intake/Output Summary (Last 24 hours) at 3/23/2022 1030  Last data filed at 3/23/2022 0602  Gross per 24 hour   Intake --    Output 450 ml   Net -450 ml      Physical Exam  Constitutional:       General: She is not in acute distress.     Appearance: Normal appearance. She is not ill-appearing, toxic-appearing or diaphoretic.   HENT:      Head: Normocephalic and atraumatic.      Mouth/Throat:      Mouth: Mucous membranes are dry.   Eyes:      General: No scleral icterus.     Conjunctiva/sclera: Conjunctivae normal.   Cardiovascular:      Rate and Rhythm: Normal rate and regular rhythm.      Pulses: Decreased pulses.   Pulmonary:      Effort: Pulmonary effort is normal. No respiratory distress.      Breath sounds: Normal breath sounds.   Abdominal:      General: There is no distension.      Palpations: Abdomen is soft.      Tenderness: There is no abdominal tenderness. There is no right CVA tenderness, left CVA tenderness or guarding.   Musculoskeletal:         General: Deformity (right 2nd toe amputation) present.      Right lower leg: No edema.      Left lower leg: No edema.      Comments: Right foot wrapped.  No ascending cellulitis/warmth  See Podiatry photos below   Skin:     General: Skin is warm and dry.   Neurological:      Mental Status: She is alert. Mental status is at baseline.   Psychiatric:         Mood and Affect: Mood normal.         Behavior: Behavior normal.       Significant Labs: All pertinent labs within the past 24 hours have been reviewed.    Significant Imaging: I have reviewed all pertinent imaging results/findings within the past 24 hours.

## 2022-03-23 NOTE — ASSESSMENT & PLAN NOTE
Osteomyelitis of R 2nd toe  Critical lower limb ischemia  -MRI revealed Osteomyelitis involving the right 4th and 5th metatarsals distally and the phalanges of the 4th and 5th toes.   -XR R foot showed no acute bony abnormalities  -podiatry consulted, appreciate recs   Patient scheduled for right foot partial 4th and 5th ray amputation 3/24/2022. Will obtain intraop proximal bone margins for antibiotic tailoring.   -Will plan to repeat COVID testing for preop 3/23.    -Noninvasive vascular studies ordered, consult vascular surgery once resulted. Do not modify orders.   -Antibiotic plan per ID.    -WBAT LLE, WB to heel for transfers or short distances RLE.   -Arterial studies reviewed. RLE ABIs/ PVR waveforms suggest no evidence of PAD. Rt Great Toe: The PPG waveform is blunted with TBI of 0.16 and a toe pressure of 25 mmHg which suggests severe microvascular disease.   -Cx growing MRSA with sensitivity to vanc, will continue IV  -ID consulted, appreciate recs   -empiric Vancomycin and ertapenem (renal dosing). Monitor renal function closely  -Follow blood/urine cultures

## 2022-03-23 NOTE — PLAN OF CARE
ID follow up:    Patient not seen today.  Chart reviewed.   Pending toe amputations tomorrow for osteo.  Cx + MRSA  Remains afebrile, no leukocytosis, hemodynamically stable.      Plan/recommendations:  1.  Continue IV Vancomycin (pharmacy to dose. Trough goal 15-20).  Random level this morning 21.  Pharmacy to re-dose when <20.  Creatinine improving.   2.  Continue  ertapenem 500 mg IV q 24 hours for ESBL E coli (renally adjusted).  Recommend 7 days ertapenem given history of stents - through 3/2  3.  Follow up clean margins after surgery.  Hopefully source control will be achieved with amputation and will only need short course of abx after amputation for SSTI  4.  Will follow up tomorrow after surgery.     Thank you.   Please call for any questions or concerns.  LAUREL Langley, ANP-C  Spectra [phone]

## 2022-03-24 NOTE — PROGRESS NOTES
"[facility] - Telemetry Stepdown ([address] Medicine  Progress Note    Patient Name: Beatriz Adame  MRN: [id number]  Patient Class: IP- Inpatient   Admission Date: 3/18/2022  Length of Stay: 3 days  Attending Physician: Jessica Rocha MD  Primary Care Provider: Dante Olivier MD        Subjective:     Principal Problem:Osteomyelitis of right foot        HPI:  Patient is a 71 year old female with osteoarthritis, hypothyroidism, CVA with left-sided residual deficit, DM2, HTN, HLD, CKD, chronic anemia, recurrent pseudomonal UTI, nephroliathiasis s/p left ureteral stent placement in 10/2021, urinary retention with chronic indwelling delgado catheter, diabetic foot ulcer s/p R toe amputation, chronic anemia, who presents with acute metabolic encephalopathy secondary to catheter associated urinary tract infection and possible right toe infection.   She is being admitted for [facility].     Initially in ED patient was found to have a white count of 13,000 and a left shift / neutrophilic predominance.  While not overtly septic, sepsis protocol was initiated - patient pancultured and broad-spectrum antibiotics with vancomycin and ceftriaxone was initiated.  Vital signs were within normal limits however blood pressure was at the lower range of normal for this patient with a systolic blood pressure of 110.  Lactic acid was obtained and was within normal limits.     Patient is alert on my examination she is able to describe that she is at the hospital for a catheter exchange."  The her catheter has been exchanged in the ED and she has had the benefit of broad-spectrum antibiotics since her admission there.  It is clear that her encephalopathy has already improved with the management of her very obvious catheter associated urinary tract infection.  The ED provider also had a concern for a right 5th toe infection - which appears to be chronically devascularized and gangrenous on examination.  " Plain films of the affected foot were unrevealing however patient has just returned from MRI and interpretation is pending.  Inflammatory markers are notably elevated.      She will be admitted to Hospital Medicine for ongoing management of her catheter associated urinary tract infection and possible right foot infection.          Overview/Hospital Course:  Pt placed in observation for acute metabolic encephalopathy in setting of UTI and osteomyelitis. Pt was AFVSS and hemodynamically stable. Leukocytosis resolved w/ initiation of IV abx. UA infectious and initially started on IV cefepime. UC grew ESBL and patient was transitioned to IV ertapenem started 3/21 (7 day course). MRI of R foot and toes revealed osteomyelitis of the right 4th and 5th distal metatarsals and phalanges. Cx growing MRSA started on IV vancomycin 3/18. ID following. Podiatry planning for amputation 3/24.      Interval History: NAEON. OR today.    Review of Systems   Constitutional:  Negative for activity change, appetite change, chills, diaphoresis, fatigue and fever.   HENT: Negative.  Negative for congestion, facial swelling, rhinorrhea and sore throat.    Eyes: Negative.  Negative for photophobia, itching and visual disturbance.   Respiratory:  Negative for cough, chest tightness, shortness of breath and wheezing.    Cardiovascular:  Negative for chest pain, palpitations and leg swelling.   Gastrointestinal:  Negative for abdominal distention, abdominal pain, blood in stool, constipation, diarrhea, nausea and vomiting.   Genitourinary:  Positive for difficulty urinating (chronic delgado). Negative for dysuria, flank pain, frequency, hematuria and urgency.   Musculoskeletal:  Positive for arthralgias and gait problem. Negative for back pain and neck stiffness.   Skin:  Positive for color change and wound.   Neurological:  Positive for numbness. Negative for dizziness, tremors, seizures, syncope, weakness, light-headedness and headaches.    Psychiatric/Behavioral:  Negative for agitation, confusion and hallucinations. The patient is not nervous/anxious.    Objective:     Vital Signs (Most Recent):  Temp: 97 °F (36.1 °C) (03/24/22 1638)  Pulse: 66 (03/24/22 1700)  Resp: 18 (03/24/22 1700)  BP: (!) 145/69 (03/24/22 1700)  SpO2: 100 % (03/24/22 1700)   Vital Signs (24h Range):  Temp:  [97 °F (36.1 °C)-98.5 °F (36.9 °C)] 97 °F (36.1 °C)  Pulse:  [66-88] 66  Resp:  [16-18] 18  SpO2:  [94 %-100 %] 100 %  BP: (120-162)/(59-76) 145/69     Weight: 47.6 kg (104 lb 15 oz)  Body mass index is 16.44 kg/m².    Intake/Output Summary (Last 24 hours) at 3/24/2022 1733  Last data filed at 3/24/2022 1635  Gross per 24 hour   Intake 300 ml   Output 302 ml   Net -2 ml      Physical Exam  Constitutional:       General: She is not in acute distress.     Appearance: Normal appearance. She is not ill-appearing, toxic-appearing or diaphoretic.   HENT:      Head: Normocephalic and atraumatic.      Mouth/Throat:      Mouth: Mucous membranes are dry.   Eyes:      General: No scleral icterus.     Conjunctiva/sclera: Conjunctivae normal.   Cardiovascular:      Rate and Rhythm: Normal rate and regular rhythm.      Pulses: Decreased pulses.   Pulmonary:      Effort: Pulmonary effort is normal. No respiratory distress.      Breath sounds: Normal breath sounds.   Abdominal:      General: There is no distension.      Palpations: Abdomen is soft.      Tenderness: There is no abdominal tenderness. There is no right CVA tenderness, left CVA tenderness or guarding.   Musculoskeletal:         General: Deformity (right 2nd toe amputation) present.      Right lower leg: No edema.      Left lower leg: No edema.      Comments: Right foot wrapped.  No ascending cellulitis/warmth  See Podiatry photos below   Skin:     General: Skin is warm and dry.   Neurological:      Mental Status: She is alert. Mental status is at baseline.   Psychiatric:         Mood and Affect: Mood normal.         Behavior:  Behavior normal.       Significant Labs: All pertinent labs within the past 24 hours have been reviewed.    Significant Imaging: I have reviewed all pertinent imaging results/findings within the past 24 hours.      Assessment/Plan:      * Osteomyelitis of right foot  Osteomyelitis of R 2nd toe  Critical lower limb ischemia  -MRI revealed Osteomyelitis involving the right 4th and 5th metatarsals distally and the phalanges of the 4th and 5th toes.   -XR R foot showed no acute bony abnormalities  -podiatry consulted, appreciate recs   Patient scheduled for right foot partial 4th and 5th ray amputation 3/24/2022. Will obtain intraop proximal bone margins for antibiotic tailoring.   -Will plan to repeat COVID testing for preop 3/23.    -Noninvasive vascular studies ordered, consult vascular surgery once resulted. Do not modify orders.   -Antibiotic plan per ID.    -WBAT LLE, WB to heel for transfers or short distances RLE.   -Arterial studies reviewed. RLE ABIs/ PVR waveforms suggest no evidence of PAD. Rt Great Toe: The PPG waveform is blunted with TBI of 0.16 and a toe pressure of 25 mmHg which suggests severe microvascular disease.   -Cx growing MRSA with sensitivity to vanc, will continue IV  -ID consulted, appreciate recs   -empiric Vancomycin and ertapenem (renal dosing). Monitor renal function closely  -Follow blood/urine cultures        Acute cystitis without hematuria  -UA infectious  -previous UC growing pansensitive pseudomonas aeruginosa  -UC growing ESBL, continuing IV ertapenem x7 days (day 3)    Acute metabolic encephalopathy  Acute cystitis without hematuria    RESOLVED  -Patient initiated on broad-spectrum antibiotics in the ED - vancomycin ceftriaxone  -Will continue with broad-spectrum antibiotics and will change ceftriaxone to Cefepime in light of possible diabetic foot infection  -Patient has a history of pansensitive Klebsiella UTIs  -Nitrite positive urinalysis with gross parameters of infection on  UA, urine culture growing ESBL  -Araiza catheter exchanged  -Blood cultures NGTD  -Encephalopathy improved with goal directed medical therapy for infections  -MRI of right foot reveals Osteomyelitis involving the right 4th and 5th metatarsals distally and the phalanges of the 4th and 5th toes.   -podiatry following  -Delirium precautions, neuro checks, aspiration precautions  -Inflammatory markers elevated, ESR-95, CRP-64      Chronic kidney disease, stage 4 (severe)  -Stable at baseline, avoid any nephrotoxic agents      Peripheral neuropathy  -home gabapentin 300 mg once daily      Debility  -PTOT s/p surgery     Urinary retention  -Patient with history of urinary retention and Araiza catheter in place since at least January of 2022  -Araiza catheter last exchanged 02/22/2022  -Araiza catheter exchanged in ED  -Patient continues Flomax per home dose        Hypothyroidism  -continue levothyroxine      Right diabetic foot ulcer        Severe protein-calorie malnutrition  -registered dietitian consult      History of stroke  -continue home asa and plavix      Type 2 diabetes mellitus, with long-term current use of insulin  -Diabetic diet  -Accu-Cheks qac,qhs  -3 units determir, low dose SSI      Hyperlipidemia  -Atorvastatin 80 formulary substitution for home Crestor 40      Hypertension  -continue home coreg and amlodipine    VTE Risk Mitigation (From admission, onward)         Ordered     heparin (porcine) injection 5,000 Units  Every 8 hours         03/18/22 2111     IP VTE HIGH RISK PATIENT  Once         03/18/22 2110     Place sequential compression device  Until discontinued         03/18/22 2110                Discharge Planning   MARY: 3/29/2022     Code Status: Full Code   Is the patient medically ready for discharge?: No    Reason for patient still in hospital (select all that apply): Laboratory test, Treatment and Consult recommendations  Discharge Plan A: Skilled Nursing Facility                  Laxmi ACEVEDO  ROD Hernandez  Department of Hospital Medicine   [facility] - Telemetry Stepdown ([place])

## 2022-03-24 NOTE — SUBJECTIVE & OBJECTIVE
Interval History: NAEON. OR today.    Review of Systems   Constitutional:  Negative for activity change, appetite change, chills, diaphoresis, fatigue and fever.   HENT: Negative.  Negative for congestion, facial swelling, rhinorrhea and sore throat.    Eyes: Negative.  Negative for photophobia, itching and visual disturbance.   Respiratory:  Negative for cough, chest tightness, shortness of breath and wheezing.    Cardiovascular:  Negative for chest pain, palpitations and leg swelling.   Gastrointestinal:  Negative for abdominal distention, abdominal pain, blood in stool, constipation, diarrhea, nausea and vomiting.   Genitourinary:  Positive for difficulty urinating (chronic delgado). Negative for dysuria, flank pain, frequency, hematuria and urgency.   Musculoskeletal:  Positive for arthralgias and gait problem. Negative for back pain and neck stiffness.   Skin:  Positive for color change and wound.   Neurological:  Positive for numbness. Negative for dizziness, tremors, seizures, syncope, weakness, light-headedness and headaches.   Psychiatric/Behavioral:  Negative for agitation, confusion and hallucinations. The patient is not nervous/anxious.    Objective:     Vital Signs (Most Recent):  Temp: 97 °F (36.1 °C) (03/24/22 1638)  Pulse: 66 (03/24/22 1700)  Resp: 18 (03/24/22 1700)  BP: (!) 145/69 (03/24/22 1700)  SpO2: 100 % (03/24/22 1700)   Vital Signs (24h Range):  Temp:  [97 °F (36.1 °C)-98.5 °F (36.9 °C)] 97 °F (36.1 °C)  Pulse:  [66-88] 66  Resp:  [16-18] 18  SpO2:  [94 %-100 %] 100 %  BP: (120-162)/(59-76) 145/69     Weight: 47.6 kg (104 lb 15 oz)  Body mass index is 16.44 kg/m².    Intake/Output Summary (Last 24 hours) at 3/24/2022 1733  Last data filed at 3/24/2022 1635  Gross per 24 hour   Intake 300 ml   Output 302 ml   Net -2 ml      Physical Exam  Constitutional:       General: She is not in acute distress.     Appearance: Normal appearance. She is not ill-appearing, toxic-appearing or diaphoretic.    HENT:      Head: Normocephalic and atraumatic.      Mouth/Throat:      Mouth: Mucous membranes are dry.   Eyes:      General: No scleral icterus.     Conjunctiva/sclera: Conjunctivae normal.   Cardiovascular:      Rate and Rhythm: Normal rate and regular rhythm.      Pulses: Decreased pulses.   Pulmonary:      Effort: Pulmonary effort is normal. No respiratory distress.      Breath sounds: Normal breath sounds.   Abdominal:      General: There is no distension.      Palpations: Abdomen is soft.      Tenderness: There is no abdominal tenderness. There is no right CVA tenderness, left CVA tenderness or guarding.   Musculoskeletal:         General: Deformity (right 2nd toe amputation) present.      Right lower leg: No edema.      Left lower leg: No edema.      Comments: Right foot wrapped.  No ascending cellulitis/warmth  See Podiatry photos below   Skin:     General: Skin is warm and dry.   Neurological:      Mental Status: She is alert. Mental status is at baseline.   Psychiatric:         Mood and Affect: Mood normal.         Behavior: Behavior normal.       Significant Labs: All pertinent labs within the past 24 hours have been reviewed.    Significant Imaging: I have reviewed all pertinent imaging results/findings within the past 24 hours.

## 2022-03-24 NOTE — NURSING TRANSFER
Nursing Transfer Note      3/24/2022     Reason patient is being transferred: post op    Transfer To: [id number]    Transfer via bed    Transfer with cardiac monitoring    Transported by PCT X 2    Medicines sent: None    Any special needs or follow-up needed: Post op    Chart send with patient: Yes    Notified: daughter    Patient reassessed at: 3/24/2022 1715    Upon arrival to floor: cardiac monitor applied, patient oriented to room, call bell in reach and bed in lowest position

## 2022-03-24 NOTE — TRANSFER OF CARE
"Anesthesia Transfer of Care Note    Patient: Beatriz Adame    Procedure(s) Performed: Procedure(s) (LRB):  AMPUTATION, FOOT, PARTIAL 4th and 5th ray (Right)    Patient location: PACU    Anesthesia Type: general    Transport from OR: Transported from OR on room air with adequate spontaneous ventilation    Post pain: adequate analgesia    Post assessment: no apparent anesthetic complications and tolerated procedure well    Post vital signs: stable    Level of consciousness: sedated and responds to stimulation    Nausea/Vomiting: no nausea/vomiting    Complications: none    Transfer of care protocol was followedComments: Bedside report to PACU RN, opportunity for questions given.       Last vitals:   Visit Vitals  BP (!) 120/59 (BP Location: Right arm, Patient Position: Lying)   Pulse 73   Temp 36.1 °C (97 °F) (Temporal)   Resp 16   Ht 5' 7" (1.702 m)   Wt 47.6 kg (104 lb 15 oz)   SpO2 99%   Breastfeeding No   BMI 16.44 kg/m²     "

## 2022-03-24 NOTE — BRIEF OP NOTE
[facility] - Surgery ([facility])  Brief Operative Note    SUMMARY     Surgery Date: 3/24/2022     Surgeon(s) and Role:     * Rodrigo Logan DPM - Primary     * Yumiko Morales DPM - Resident - Assisting        Pre-op Diagnosis:  Gangrene [I96]  Subacute osteomyelitis of right foot [M86.271]    Post-op Diagnosis:  Post-Op Diagnosis Codes:     * Gangrene [I96]     * Subacute osteomyelitis of right foot [M86.271]    Procedure(s) (LRB):  AMPUTATION, FOOT, PARTIAL 4th and 5th ray (Right)    Anesthesia: Local MAC    Operative Findings: as above. Clean margins obtained from the 4th and 5th metatarsal stump sent for culture and sensitivity and pathology.     Estimated Blood Loss: 25 mL    Estimated Blood Loss has been documented.         Specimens:   Specimen (24h ago, onward)             Start     Ordered    03/24/22 7152  Specimen to Pathology, Surgery Orthopedics  Once        Comments: Pre-op Diagnosis: Gangrene [I96]  Subacute osteomyelitis of right foot [M86.271]    Post-op Diagnosis: Same     Procedure(s):  AMPUTATION, FOOT, PARTIAL 4th and 5th ray     Number of specimens: 4    Name of specimens:   1. 4th ray right - permanent  2. 5th ray right - permanent  3. 4th metatarsal clean margins right - permanent  4. 5th metatarsal clean margins right - permanent   References:    Click here for ordering Quick Tip   Question Answer Comment   Procedure Type: Orthopedics    Specimen Class: Routine/Screening        03/24/22 4346                [id number]

## 2022-03-24 NOTE — PLAN OF CARE
ID follow up:    Patient off floor in OR this afternoon for partial amputation 4th and 5th rays and not seen.  Chart reviewed.   Remains afebrile, no leukocytosis, hemodynamically stable.      Plan/recommendations:  1.  Continue IV Vancomycin (pharmacy to dose. Trough goal 15-20).  Random level this morning 21.  Pharmacy to re-dose when <20.  Creatinine improving.   2.  Continue  ertapenem 500 mg IV q 24 hours for ESBL E coli (renally adjusted).  Recommend 7 days ertapenem given history of stents - through 3/2  3.  Follow up clean margin cx and path after surgery.  Hopefully source control will be achieved with amputation and will only need short course of oral abx after amputation for SSTI  (?? Linezolid??  Isolate is doxycycline R.  Bactrim not good option given underlying kidney function)  4.  Will follow up tomorrow    Thank you.   Please call for any questions or concerns.  LAUREL Langley, ANP-C  Spectra [phone]

## 2022-03-24 NOTE — PLAN OF CARE
Problem: Impaired Wound Healing  Goal: Optimal Wound Healing  Outcome: Ongoing, Progressing     Problem: Fall Injury Risk  Goal: Absence of Fall and Fall-Related Injury  Outcome: Ongoing, Progressing  Intervention: Identify and Manage Contributors  Flowsheets (Taken 3/24/2022 1240)  Self-Care Promotion:   independence encouraged   BADL personal objects within reach  Medication Review/Management: medications reviewed

## 2022-03-24 NOTE — NURSING
Pt lying in bed asleep but easily arousable. Call light with in reach and bed in the lowest setting. Denies pain or discomfort. Pt verbalized understanding of not being able to eat or drink. NAD noted. Will continue to monitor.    1406-Pt off the floor to surgery.    1746-Pt back on the floor. Denies pain or discomfort. Call light within reach. VSS. Will continue to monitor.

## 2022-03-24 NOTE — SUBJECTIVE & OBJECTIVE
Interval History: NAEON. Afebrile. No leukocytosis. Tolerated surgery well. Main complaint at this time is foot pain. She didn't eat much of her breakfast. Clean margin bone cultures are positive for Staph aureus.     Review of Systems   Constitutional:  Negative for chills, diaphoresis and fever.   HENT: Negative.     Eyes: Negative.    Respiratory:  Negative for cough and shortness of breath.    Cardiovascular:  Negative for chest pain and leg swelling.   Gastrointestinal:  Negative for abdominal pain, diarrhea, nausea and vomiting.   Genitourinary:  Positive for difficulty urinating (chronic delgado). Negative for dysuria and flank pain.   Musculoskeletal:  Positive for arthralgias and gait problem.   Skin:  Positive for color change and wound.   Neurological:  Positive for numbness. Negative for headaches.   Psychiatric/Behavioral:  Negative for agitation and confusion. The patient is not nervous/anxious.    Objective:     Vital Signs (Most Recent):  Temp: 98.3 °F (36.8 °C) (03/24/22 0540)  Pulse: 79 (03/24/22 0540)  Resp: 18 (03/24/22 0540)  BP: 134/64 (03/24/22 0540)  SpO2: 100 % (03/24/22 0540)   Vital Signs (24h Range):  Temp:  [97.4 °F (36.3 °C)-98.7 °F (37.1 °C)] 98.3 °F (36.8 °C)  Pulse:  [68-86] 79  Resp:  [16-18] 18  SpO2:  [95 %-100 %] 100 %  BP: (132-157)/(62-69) 134/64     Weight: 47.6 kg (104 lb 15 oz)  Body mass index is 16.44 kg/m².    Estimated Creatinine Clearance: 21.9 mL/min (A) (based on SCr of 1.8 mg/dL (H)).    Physical Exam  Vitals and nursing note reviewed.   Constitutional:       General: She is not in acute distress.     Appearance: Normal appearance. She is not ill-appearing, toxic-appearing or diaphoretic.   HENT:      Head: Normocephalic and atraumatic.      Mouth/Throat:      Mouth: Mucous membranes are dry.   Eyes:      General: No scleral icterus.     Conjunctiva/sclera: Conjunctivae normal.   Cardiovascular:      Rate and Rhythm: Normal rate and regular rhythm.      Pulses:  Decreased pulses.   Pulmonary:      Effort: Pulmonary effort is normal. No respiratory distress.      Breath sounds: Normal breath sounds.   Abdominal:      General: There is no distension.      Palpations: Abdomen is soft.      Tenderness: There is no abdominal tenderness. There is no right CVA tenderness, left CVA tenderness or guarding.   Musculoskeletal:         General: Deformity (right 2nd toe amputation) present.      Right lower leg: No edema.      Left lower leg: No edema.      Comments: Right foot wrapped.  No ascending cellulitis/warmth  See Podiatry photos below   Skin:     General: Skin is warm and dry.   Neurological:      Mental Status: She is alert.      Comments: Alert/conversant   Psychiatric:         Mood and Affect: Mood normal.         Behavior: Behavior normal.     Pre-op              Significant Labs: Blood Culture:   Recent Labs   Lab 12/30/21  1523 12/30/21  1536 03/18/22  1641   LABBLOO No growth after 5 days. No growth after 5 days. No growth after 5 days.  No growth after 5 days.       BMP:   Recent Labs   Lab 03/23/22  0932   *      K 3.5      CO2 26   BUN 28*   CREATININE 1.8*   CALCIUM 9.5       CBC:   Recent Labs   Lab 03/22/22  0944 03/23/22  0932   WBC 10.76 10.49   HGB 8.9* 7.9*   HCT 28.6* 25.4*    158       CMP:   Recent Labs   Lab 03/22/22  0944 03/23/22  0932    141   K 4.0 3.5    108   CO2 24 26   * 148*   BUN 27* 28*   CREATININE 1.6* 1.8*   CALCIUM 9.7 9.5   ANIONGAP 10 7*   EGFRNONAA 32.4* 28.1*       Microbiology Results (last 7 days)       Procedure Component Value Units Date/Time    Blood culture x two cultures. Draw prior to antibiotics. [[id number]] Collected: 03/18/22 1641    Order Status: Completed Specimen: Blood from Peripheral, Antecubital, Right Updated: 03/23/22 2012     Blood Culture, Routine No growth after 5 days.    Narrative:      Aerobic and anaerobic    Blood culture x two cultures. Draw prior to antibiotics.  [[id number]] Collected: 03/18/22 1641    Order Status: Completed Specimen: Blood from Peripheral, Antecubital, Right Updated: 03/23/22 2012     Blood Culture, Routine No growth after 5 days.    Narrative:      Aerobic and anaerobic    Aerobic culture [[id number]]  (Abnormal)  (Susceptibility) Collected: 03/18/22 1620    Order Status: Completed Specimen: Wound from Toe, Right Foot Updated: 03/21/22 1226     Aerobic Bacterial Culture METHICILLIN RESISTANT STAPHYLOCOCCUS AUREUS  Many      Urine culture [[id number]]  (Abnormal)  (Susceptibility) Collected: 03/18/22 1659    Order Status: Completed Specimen: Urine Updated: 03/21/22 0736     Urine Culture, Routine ESCHERICHIA COLI ESBL  > 100,000 cfu/ml      Narrative:      Specimen Source->Urine          Pathology Results  (Last 10 years)                 01/01/22 1345  Specimen to Pathology, Surgery Other Final result    Narrative:  Pre-op Diagnosis: Ulcer of right second toe [L97.519]   Diabetic foot ulcer with osteomyelitis [E11.621, E11.69,   L97.509, M86.9]   Procedure(s):   AMPUTATION, TOE   Number of specimens: 1   Name of specimens: 1) right foot second toe   Release to patient->Immediate   Specimen total (fresh, frozen, permanent):->1       07/22/21 1446  Specimen to Pathology, Surgery General Surgery Final result    Narrative:  Pre-op Diagnosis: Acute appendicitis with localized   peritonitis, without perforation, abscess, or gangrene   [K35.30]   Procedure(s):   APPENDECTOMY, LAPAROSCOPIC   Number of specimens: 1   Name of specimens: 1. Appendix   Release to patient->Immediate   Specimen total (fresh, frozen, permanent):->1             Urine Culture:   Recent Labs   Lab 10/12/21  1333 11/19/21  2230 01/10/22  0543 02/10/22  0529 03/18/22  1659   LABURIN Multiple organisms isolated. None in predominance.  Repeat if  clinically necessary. No growth PSEUDOMONAS AERUGINOSA  10,000 - 49,999 cfu/ml  * PSEUDOMONAS AERUGINOSA  50,000 - 99,999 cfu/ml  * ESCHERICHIA COLI  ESBL  > 100,000 cfu/ml  *       Urine Studies:   Recent Labs   Lab 01/10/22  0543 02/10/22  0529 03/18/22  1659   COLORU Yellow Yellow Yellow   APPEARANCEUA Hazy* Hazy* Cloudy*   PHUR 6.0 6.0 5.0   SPECGRAV 1.025 1.005 1.020   PROTEINUA 2+* 1+* 2+*   GLUCUA Negative Negative Negative   KETONESU Negative Negative Negative   BILIRUBINUA Negative Negative Negative   OCCULTUA 1+* 2+* 1+*   NITRITE Negative Positive* Positive*   UROBILINOGEN Negative  --   --    LEUKOCYTESUR 2+* 3+* 2+*   RBCUA 7* 14* 13*   WBCUA 35* 84* >100*   BACTERIA Rare Many* Many*   SQUAMEPITHEL 5 0  --    HYALINECASTS 3* 0 0       Wound Culture:   Recent Labs   Lab 03/18/22  1620   LABAERO METHICILLIN RESISTANT STAPHYLOCOCCUS AUREUS  Many  *         Significant Imaging:   MRI Foot Toes Without Contrast Right [[id number]] Resulted: 03/19/22 0128   Order Status: Completed Updated: 03/19/22 0130   Narrative:     EXAMINATION:   MRI FOOT TOES WITHOUT CONTRAST RIGHT     CLINICAL HISTORY:   Osteomyelitis, foot;     TECHNIQUE:   Multiplanar multisequence MRI examination of the right foot and toes performed without IV contrast.     COMPARISON:   Right foot radiograph 03/18/2022.     MRI foot right 12/30/2021.     FINDINGS:   There is an open wound overlying the right 5th phalanges.     There is abnormal bone marrow edema signal within the distal heads of the 4th and 5th metatarsal as well as the phalanges of the 4th and 5th toes more extensive in the 5th.     There is scattered subcutaneous edema around this site as well as throughout the foot.  No large drainable fluid collection.  Postsurgical changes of right 2nd ray amputation.    Impression:       Osteomyelitis involving the right 4th and 5th metatarsals distally and the phalanges of the 4th and 5th toes.     Electronically signed by resident: Dimas Zapata   Date: 03/19/2022   Time: 01:04     Electronically signed by: Erlin Hernandez   Date: 03/19/2022   Time: 01:28

## 2022-03-25 PROBLEM — E10.621: Status: RESOLVED | Noted: 2021-01-01 | Resolved: 2022-01-01

## 2022-03-25 PROBLEM — N30.00 ACUTE CYSTITIS WITHOUT HEMATURIA: Status: RESOLVED | Noted: 2022-01-01 | Resolved: 2022-01-01

## 2022-03-25 PROBLEM — L97.516: Status: RESOLVED | Noted: 2021-01-01 | Resolved: 2022-01-01

## 2022-03-25 NOTE — ASSESSMENT & PLAN NOTE
- UA infectious  - previous UC growing pansensitive pseudomonas aeruginosa  - UC growing ESBL, continuing IV ertapenem x7 days (day 3)

## 2022-03-25 NOTE — ASSESSMENT & PLAN NOTE
MRI right forefoot demonstrating osteomyelitis involving the right 4th and 5th metatarsals distally and the phalanges of the 4th and 5th toes. Now S/P 4th and 5th ray resection/partial amputation 03/24     Plan:  -Obtained intraop proximal bone margins for antibiotic tailoring obtained.   -Arterial studies reviewed. RLE ABIs/ PVR waveforms suggest no evidence of PAD. Rt Great Toe: The PPG waveform is blunted with TBI of 0.16 and a toe pressure of 25 mmHg which suggests severe microvascular disease.   -Continue Antibiotic plan per ID. Wound cultures +MRSA  -WBAT LLE, WB to heel for transfers or short distances RLE.   -Dressing changes by nursing, ordered.   -Podiatry will follow.

## 2022-03-25 NOTE — OP NOTE
[facility] - Telemetry Stepdown ([place] [id number])  Operative Note      Date of Procedure: 3/24/2022     Procedure: Procedure(s) (LRB):  AMPUTATION, FOOT, PARTIAL 4th and 5th ray (Right)     Surgeon(s) and Role:     * Rodrigo Logan DPM - Primary     * Yumiko Morales DPM - Resident - Assisting        Pre-Operative Diagnosis: Gangrene [I96]  Subacute osteomyelitis of right foot [M86.271]    Post-Operative Diagnosis: Post-Op Diagnosis Codes:     * Gangrene [I96]     * Subacute osteomyelitis of right foot [M86.271]    Anesthesia: Local MAC    Description of Technical Procedures:   The patient was brought to the operating room on a stretcher and transferred onto the operating table in a supine position. Following the successful induction of MAC anesthesia, a right ankle block was administered consisting of 20 mL of a 1:1 mixture of 1% lidocaine plain and 0.5% bupivacaine plain. A well padded pneumatic tourniquet was applied to the right ankle. The right foot was prepped and draped in a sterile manner. A timeout was performed verifying the patient's identity, surgical site, allergies, and medications. All were in agreement. An esmarch bandage was used to exsanguinate the right foot. Tourniquet was inflated to 250 mmHg.     Attention was directed to the dry gangrenous changes along the right 5th toe and lateral 5th metatarsal and 4th interspace wound.  A #15 scalpel was used to create a modified fishmouth incision encompassing all the necrotic tissue along the lateral 5th metatarsal as well as encompassing the 4th and 5th toes from lateral to medial and salvaging as much healthy proximal tissue as possible for flap closure.    At this time, a makeshift tourniquet was applied after the left foot was exsanguinated with an esmarch bandage which remained tied to prevent bleeding to allow better visualization of the surgical site.      The entire incision was deepened through the subcutaneous tissue down to bone severing all  neurovascular and tendonous structures. The dissection was taken proximally along the bases of the proximal phalanx until the 4th and 5th MTP joints were visualized. All soft tissue attachments to the 4th and 5th digit were released with a #15 blade, and toes 4 and 5 were disarticulated at the level of their respective MTP joints. The disarticulated toes were sent for pathology. Next, a key elevator was utilized to reflect the soft tissue and periosteum overlying the 4th and 5th metatarsal.  A microsagittal saw was used to resect approximately 3.5 cm of the distal 4th metatarsal to midshaft level and approximately 7 cm of the distal 5th metatarsal was resected in the same manner leaving the 5th metatarsal base intact.    Each resected metatarsal was dissected free from the plantar tissue and the resected metatarsals were sent for pathology with their respective amputated toes. All flexor and extensor tendons were excised as far proximally as possible. Excess soft tissue/plantar plate was debulked from the plantar flap to assist with primary closure.  Copious irrigation to the surgical site was achieved with normal sterile saline solution. At this time, new sterile surgical gloves and instruments were obtained to prevent cross contamination of the surgical site. There was no purulence, abscess or acute signs of infection along the  surgical site and remaining tissue appeared viable. A rongeur was used to obtain a fragment of bone from the remaining 4th metatarsal stump and the fragment of bone was split in half and sent for microbiology and pathology as clean margins. A fragment of bone from the remaining 5th metatarsal stump was obtained in the same manner and sent for microbiology as pathology as clean margins. Small bleeding vessels were cauterized as necessary. Deep subcutaneous tissue closure was achieved with 3-0 vicryl suture, superficial subcutaneous tissue was closed with 4-0 vicryl and skin was  reapproximated with 3-0 nylon suture using a combination of simple, interrupted sutures and horizontal mattress suture.      Ankle tourniquet was deflated with brisk capillary refill noted to return to remaining digits on the right foot. Incision site was covered with xeroform, 4x4 gauze, and a dry sterile dressing consisting of cast padding and ACE bandage was applied to the right foot. Patient tolerated the procedure well with no apparent complications.     The patient was transferred to the recovery room with vital signs stable and vascular status intact. Following a period of post op monitoring, the patient was readmitted to the hospital floor with the following post op instructions:   1. Keep dressing dry and intact until Podiatry follow up.   2.Weight bearing status: nonweightbearing right foot  3. All necessary prescriptions ordered and medical management will continue.   4. Contact Podiatry for all post op follow up care and if any problems arise.       Estimated Blood Loss (EBL): 25 mL           Implants: * No implants in log *    Specimens:   Specimen (24h ago, onward)             Start     Ordered    03/24/22 5780  Specimen to Pathology, Surgery Orthopedics  Once        Comments: Pre-op Diagnosis: Gangrene [I96]  Subacute osteomyelitis of right foot [M86.271]    Post-op Diagnosis: Same     Procedure(s):  AMPUTATION, FOOT, PARTIAL 4th and 5th ray     Number of specimens: 4    Name of specimens:   1. 4th ray right - permanent  2. 5th ray right - permanent  3. 4th metatarsal clean margins right - permanent  4. 5th metatarsal clean margins right - permanent   References:    Click here for ordering Quick Tip   Question Answer Comment   Procedure Type: Orthopedics    Specimen Class: Routine/Screening        03/24/22 2207                        Condition: Good    Disposition: PACU - hemodynamically stable.    Attestation: I was present and scrubbed for the entire procedure.

## 2022-03-25 NOTE — PROGRESS NOTES
[facility] - Telemetry Stepdown ([place] [address])  Podiatry  Progress Note    Patient Name: Beatriz Adame  MRN: [id number]  Admission Date: 3/18/2022  Hospital Length of Stay: 4 days  Attending Physician: Jessica Rocha MD  Primary Care Provider: Dante Olivier MD     Subjective:     Interval History: NAEON, Afebrile, VSS. S/P 4th and 5th ray resections 03/24, patient relays minor tenderness at incision site, no other complaints. Dressings clean, dry, and intact.     Follow-up For: Procedure(s) (LRB):  AMPUTATION, FOOT, PARTIAL 4th and 5th ray (Right)    Post-Operative Day: 1 Day Post-Op    Scheduled Meds:   acetaminophen  1,000 mg Oral TID    amLODIPine  10 mg Oral QHS    aspirin  81 mg Oral Daily    atorvastatin  80 mg Oral QHS    carvediloL  25 mg Oral BID    clopidogreL  75 mg Oral Daily    ertapenem (INVANZ) IVPB  500 mg Intravenous Q24H    ferrous sulfate  1 tablet Oral Daily    gabapentin  300 mg Oral Daily    heparin (porcine)  5,000 Units Subcutaneous Q8H    insulin detemir U-100  3 Units Subcutaneous Daily    ketorolac  15 mg Intravenous Once    Lactobacillus rhamnosus GG  1 capsule Oral Daily    levothyroxine  75 mcg Oral Daily    metoclopramide HCl  10 mg Oral TID AC    pantoprazole  40 mg Oral Daily    tamsulosin  0.4 mg Oral Daily    vancomycin (VANCOCIN) IVPB  500 mg Intravenous Q24H     Continuous Infusions:   sodium chloride 0.9%       PRN Meds:sodium chloride, dextrose 10%, dextrose 10%, glucagon (human recombinant), glucose, glucose, HYDROmorphone, insulin aspart U-100, naloxone, ondansetron, oxyCODONE, sodium chloride 0.9%, sodium chloride 0.9%, Pharmacy to dose Vancomycin consult **AND** vancomycin - pharmacy to dose    Review of Systems   Constitutional:  Negative for activity change, appetite change, chills, diaphoresis, fatigue and fever.   HENT: Negative.  Negative for congestion, facial swelling, rhinorrhea and sore throat.    Eyes: Negative.  Negative for  photophobia, itching and visual disturbance.   Respiratory:  Negative for cough, chest tightness, shortness of breath and wheezing.    Cardiovascular:  Negative for chest pain, palpitations and leg swelling.   Gastrointestinal:  Negative for abdominal distention, abdominal pain, blood in stool, constipation, diarrhea, nausea and vomiting.   Genitourinary:  Positive for difficulty urinating (chronic delgado). Negative for dysuria, flank pain, frequency, hematuria and urgency.   Musculoskeletal:  Positive for arthralgias and gait problem. Negative for back pain and neck stiffness.   Skin:  Positive for color change and wound.   Neurological:  Positive for numbness. Negative for dizziness, tremors, seizures, syncope, weakness, light-headedness and headaches.   Psychiatric/Behavioral:  Negative for agitation, confusion and hallucinations. The patient is not nervous/anxious.    Objective:     Vital Signs (Most Recent):  Temp: 98.1 °F (36.7 °C) (03/25/22 1151)  Pulse: 79 (03/25/22 1500)  Resp: 19 (03/25/22 1151)  BP: (!) 151/72 (03/25/22 1151)  SpO2: 100 % (03/25/22 1151)   Vital Signs (24h Range):  Temp:  [97 °F (36.1 °C)-98.4 °F (36.9 °C)] 98.1 °F (36.7 °C)  Pulse:  [63-86] 79  Resp:  [16-19] 19  SpO2:  [95 %-100 %] 100 %  BP: (120-155)/(59-72) 151/72     Weight: 47.6 kg (104 lb 15 oz)  Body mass index is 16.44 kg/m².    Foot Exam    Right Foot/Ankle     Inspection and Palpation  Tenderness: none   Swelling: none   Skin Exam: and ulcer (surgical incision site) ; skin not intact and no erythema     Neurovascular  Dorsalis pedis: absent  Posterior tibial: absent    Comments  R 2nd toe amputated    S/P 4th and 5th ray resection 03/24    Sutures clean, dry, and intact   Skin edges well approximated with no signs of gapping or dehiscence  No signs of soft tissue infection     Left Foot/Ankle      Inspection and Palpation  Tenderness: none   Swelling: none   Skin Exam: skin intact; no drainage, no cellulitis and no erythema      Neurovascular  Dorsalis pedis: absent  Posterior tibial: absent        Laboratory:  CBC:   Recent Labs   Lab 03/25/22  0843   WBC 10.63   RBC 2.93*   HGB 8.3*   HCT 28.7*      MCV 98   MCH 28.3   MCHC 28.9*     CMP:   Recent Labs   Lab 03/25/22  0843   GLU 97   CALCIUM 9.7   ALBUMIN 2.4*   PROT 7.4      K 3.5   CO2 24      BUN 22   CREATININE 1.1   ALKPHOS 74   ALT 7*   AST 10   BILITOT 0.3     CRP:   Recent Labs   Lab 03/18/22  1640   CRP 64.0*     ESR:   Recent Labs   Lab 03/18/22  1640   SEDRATE 95*     All pertinent labs reviewed within the last 24 hours.    Diagnostic Results:  I have reviewed all pertinent imaging results/findings within the past 24 hours.    Clinical Findings:        Assessment/Plan:     * Osteomyelitis of right foot  MRI right forefoot demonstrating osteomyelitis involving the right 4th and 5th metatarsals distally and the phalanges of the 4th and 5th toes. Now S/P 4th and 5th ray resection/partial amputation 03/24     Plan:  -Obtained intraop proximal bone margins for antibiotic tailoring obtained.   -Arterial studies reviewed. RLE ABIs/ PVR waveforms suggest no evidence of PAD. Rt Great Toe: The PPG waveform is blunted with TBI of 0.16 and a toe pressure of 25 mmHg which suggests severe microvascular disease.   -Continue Antibiotic plan per ID. Wound cultures +MRSA  -NWB RLE  -Dressing changes by nursing, ordered.   -Podiatry will follow.        Haris Doyle DPM  Podiatry  [facility] - Telemetry Stepdown ([place])

## 2022-03-25 NOTE — ASSESSMENT & PLAN NOTE
Osteomyelitis of R 2nd toe  Critical lower limb ischemia    POD 1 s/p AMPUTATION, FOOT, PARTIAL 4th and 5th R  - MRI revealed Osteomyelitis involving the right 4th and 5th metatarsals distally and the phalanges of the 4th and 5th toes.   - XR R foot showed no acute bony abnormalities  - podiatry consulted, appreciate recs  -Will plan to repeat COVID testing for preop 3/23.    -Noninvasive vascular studies ordered, consult vascular surgery once resulted. Do not modify orders.   -Antibiotic plan per ID.    -WBAT LLE, WB to heel for transfers or short distances RLE.   -Arterial studies reviewed. RLE ABIs/ PVR waveforms suggest no evidence of PAD. Rt Great Toe: The PPG waveform is blunted with TBI of 0.16 and a toe pressure of 25 mmHg which suggests severe microvascular disease.   -Cx growing MRSA with sensitivity to vanc, will continue IV  -ID consulted, appreciate recs   -empiric Vancomycin and ertapenem (renal dosing). Monitor renal function closely  -Follow blood/urine cultures

## 2022-03-25 NOTE — PLAN OF CARE
Updated [facility] with new MARY via [facility].   Will continue to update plan as needed.  Paulo Lind RN,BSN

## 2022-03-25 NOTE — PLAN OF CARE
Problem: Occupational Therapy  Goal: Occupational Therapy Goal  Description: Goals to be met by: 4/8/22     Patient will increase functional independence with ADLs by performing:    UE Dressing with Eva.  LE Dressing with Modified Eva.  Grooming while seated with Eva and Set-up Assistance.  Toileting from bedside commode with Minimal Assistance for hygiene and clothing management.   Toilet transfer to bedside commode with Minimal Assistance.    Outcome: Ongoing, Progressing     OT evaluation complete. DC: SNF.

## 2022-03-25 NOTE — ASSESSMENT & PLAN NOTE
71 year old female with CVA, DM2, HTN, CKD, nephroliathiasis s/p left ureteral stent placement, urinary retention managed with chronic indwelling delgado, recurrent UTIs, right second toe osteomyelitis s/p R toe amputation 1/2022 who presented to [facility] from Loc[facility] with acute metabolic encephalopathy likely secondary to UTI and right foot infection.       Delgado exchanged in ED. Urine cx + for ESBL E Coli. Completed Ertapenem.     Noted to have gangrenous changes of her right fifth toe and interdigit wound between toes 4/5 and MRI right foot is concerning for osteomyelitis of the 4th and 5th digits.  Wound cultures + for MRSA.  Vascular studies reviewed. No significant PAD. Toe pressures significant for severe microvascular disease.  Podiatry performed right foot partial 4th and 5th ray amputations on 3/24. Clean margin bone cultures are also positive for MRSA.     Patient is is on Vancomycin. Afebrile, no leukocytosis, and HDS. Renal function worsened, likely due to supratherapeutic van levels. Will change to Daptomycin given plan for prolonged therapy,  difficulty achieving a steady therapeutic Vanc level and decline in renal function.      Recommendations  Outpatient Antibiotic Therapy Plan:    Please send referral to [facility].    1) Infection: MRSA osteomyelitis     2) Discharge Antibiotics:    Intravenous antibiotics:  · Daptomycin 6 mg/kg IV q 48 hours (renal dosing). Please hold statin while on Daptomycin.      3) Therapy Duration:  6 weeks    Estimated end date of IV antibiotics: 5/5/22    4) Outpatient Weekly Labs:    Order the following labs to be drawn on Mondays:    CBC   CMP    ESR    CRP   CPK    5) Fax Lab Results to Infectious Diseases Provider: Olga Lidia Sood    [facility] Fax Number: [phone]    6) Outpatient Infectious Diseases Follow-up     Follow-up appointment will be arranged by the ID clinic and will be found in the patient's  appointments tab.     Prior to discharge, please ensure the patient's follow-up has been scheduled.     If there is still no follow-up scheduled prior to discharge, please send an EPIC message to Khloe Stoll in Infectious Diseases.     Discussed plan with ID staff and hospital medicine. ID will sign off.

## 2022-03-25 NOTE — PLAN OF CARE
PT kevin completed. Pt will begin PT POC.  Aishwarya Gross, PT  3/25/2022    Problem: Physical Therapy  Goal: Physical Therapy Goal  Description: Goals to be met by: 22     Patient will increase functional independence with mobility by performin. Supine to sit with MInimal Assistance  2. Sit to supine with MInimal Assistance  3. Bed to chair transfer with Moderate Assistance using Slideboard  4. Wheelchair propulsion x50 feet with Stand-by Assistance using bilateral uppper extremities  5. Sitting at edge of bed x10 minutes with Stand-by Assistance    Outcome: Ongoing, Progressing

## 2022-03-25 NOTE — PROGRESS NOTES
[facility] - Telemetry Stepdown ([place] [address])  Infectious Disease  Progress Note    Patient Name: Beatriz Adame  MRN: [id number]  Admission Date: 3/18/2022  Length of Stay: 4 days  Attending Physician: Jessica Rocha MD  Primary Care Provider: Dante Olivier MD    Isolation Status: Contact  Assessment/Plan:     Acute cystitis without hematuria     See assessment/plan below - planning for Ertapenem x 7 days     Osteomyelitis of right foot     71 year old female with CVA, DM2, HTN, CKD, nephroliathiasis s/p left ureteral stent placement, urinary retention managed with chronic indwelling delgado, recurrent UTIs, right second toe osteomyelitis s/p R toe amputation 1/2022 who presented to [facility] [place] [facility] with acute metabolic encephalopathy likely secondary to UTI and right foot infection.       Delgado exchanged in ED. Urine cx + for ESBL E Coli.      Noted to have gangrenous changes of her right fifth toe and interdigit wound between toes 4/5 and MRI right foot is concerning for osteomyelitis of the 4th and 5th digits.  Wound cultures + for MRSA.  Vascular studies reviewed. No significant PAD. Toe pressures significant for severe microvascular disease.  Podiatry performed right foot partial 4th and 5th ray amputations on 3/24. Clean margin bone cultures are also showing Staph aureus.      Patient is is on Vancomycin and Ertapenem. Mentation has improved on antibiotics. Afebrile, no leukocytosis, and HDS.    Recommendations  · Continue IV Vancomycin (pharmacy to dose. Trough goal 15-20). Monitor renal function closely (Cr 1.1 today). If renal function declines and have ongoing difficulty achieving therapeutic Vanc levels, would change Vancomycin to IV Daptomycin.   · Continue Ertapenem 500 mg IV q 24 hours for ESBL E coli x 7 days total  (renally adjusted)   · Follow up clean margin bone cultures to guide antibiotics. Given positive cultures, anticipate 6 weeks of antibiotic therapy.  · Will follow      Data reviewed and plan discussed with ID staff, Dr. Saxena          Please call  or secure chat for any questions. Thank you.  Olga Lidia Sood PA-C  ID [id number] Spectra: [id number]      Subjective:     Principal Problem:Osteomyelitis of right foot    HPI: Ms. Adame is a 71 year old female with osteoarthritis, hypothyroidism, CVA with left-sided residual deficit, DM2, HTN, HLD, CKD, nephroliathiasis s/p left ureteral stent placement in 10/2021, urinary retention managed with chronic indwelling delgado, recurrent pseudomonal UTI, right second toe osteomyelitis s/p R toe amputation 1/2022 who presented to [facility] from [facility] with acute metabolic encephalopathy secondary to catheter associated urinary tract infection and possible right toe infection.     Afebrile in the ED. WBC 13K. Inflammatory markers elevated. Cr 2.3 lactate WNL. UA positive for pyuria. Delgado exchanged in ED. Started empiric Vanc/Cefepime. Blood/urine cultures are pending. Noted to have gangrenous changes of her right fifth toe. MRI right foot reviewed and is concerning for osteomyelitis of the 4th and 5th distal metatarsals and phalanges.  ID consulted for antibiotic recommendations. Podiatry consulted. Patient mentation has improved.    Interval History: NAEON. Afebrile. No leukocytosis. Tolerated surgery well. Main complaint at this time is foot pain. She didn't eat much of her breakfast. Clean margin bone cultures are positive for Staph aureus.     Review of Systems   Constitutional:  Negative for chills, diaphoresis and fever.   HENT: Negative.     Eyes: Negative.    Respiratory:  Negative for cough and shortness of breath.    Cardiovascular:  Negative for chest pain and leg swelling.   Gastrointestinal:  Negative for abdominal pain, diarrhea, nausea and vomiting.   Genitourinary:  Positive for difficulty urinating (chronic delgado). Negative for dysuria and flank pain.   Musculoskeletal:  Positive for arthralgias and gait problem.   Skin:   Positive for color change and wound.   Neurological:  Positive for numbness. Negative for headaches.   Psychiatric/Behavioral:  Negative for agitation and confusion. The patient is not nervous/anxious.    Objective:     Vital Signs (Most Recent):  Temp: 98.3 °F (36.8 °C) (03/24/22 0540)  Pulse: 79 (03/24/22 0540)  Resp: 18 (03/24/22 0540)  BP: 134/64 (03/24/22 0540)  SpO2: 100 % (03/24/22 0540)   Vital Signs (24h Range):  Temp:  [97.4 °F (36.3 °C)-98.7 °F (37.1 °C)] 98.3 °F (36.8 °C)  Pulse:  [68-86] 79  Resp:  [16-18] 18  SpO2:  [95 %-100 %] 100 %  BP: (132-157)/(62-69) 134/64     Weight: 47.6 kg (104 lb 15 oz)  Body mass index is 16.44 kg/m².    Estimated Creatinine Clearance: 21.9 mL/min (A) (based on SCr of 1.8 mg/dL (H)).    Physical Exam  Vitals and nursing note reviewed.   Constitutional:       General: She is not in acute distress.     Appearance: Normal appearance. She is not ill-appearing, toxic-appearing or diaphoretic.   HENT:      Head: Normocephalic and atraumatic.      Mouth/Throat:      Mouth: Mucous membranes are dry.   Eyes:      General: No scleral icterus.     Conjunctiva/sclera: Conjunctivae normal.   Cardiovascular:      Rate and Rhythm: Normal rate and regular rhythm.      Pulses: Decreased pulses.   Pulmonary:      Effort: Pulmonary effort is normal. No respiratory distress.      Breath sounds: Normal breath sounds.   Abdominal:      General: There is no distension.      Palpations: Abdomen is soft.      Tenderness: There is no abdominal tenderness. There is no right CVA tenderness, left CVA tenderness or guarding.   Musculoskeletal:         General: Deformity (right 2nd toe amputation) present.      Right lower leg: No edema.      Left lower leg: No edema.      Comments: Right foot wrapped.  No ascending cellulitis/warmth  See Podiatry photos below   Skin:     General: Skin is warm and dry.   Neurological:      Mental Status: She is alert.      Comments: Alert/conversant   Psychiatric:          Mood and Affect: Mood normal.         Behavior: Behavior normal.     Pre-op              Significant Labs: Blood Culture:   Recent Labs   Lab 12/30/21  1523 12/30/21  1536 03/18/22  1641   LABBLOO No growth after 5 days. No growth after 5 days. No growth after 5 days.  No growth after 5 days.       BMP:   Recent Labs   Lab 03/23/22  0932   *      K 3.5      CO2 26   BUN 28*   CREATININE 1.8*   CALCIUM 9.5       CBC:   Recent Labs   Lab 03/22/22  0944 03/23/22  0932   WBC 10.76 10.49   HGB 8.9* 7.9*   HCT 28.6* 25.4*    158       CMP:   Recent Labs   Lab 03/22/22  0944 03/23/22  0932    141   K 4.0 3.5    108   CO2 24 26   * 148*   BUN 27* 28*   CREATININE 1.6* 1.8*   CALCIUM 9.7 9.5   ANIONGAP 10 7*   EGFRNONAA 32.4* 28.1*       Microbiology Results (last 7 days)       Procedure Component Value Units Date/Time    Blood culture x two cultures. Draw prior to antibiotics. [[id number]] Collected: 03/18/22 1641    Order Status: Completed Specimen: Blood from Peripheral, Antecubital, Right Updated: 03/23/22 2012     Blood Culture, Routine No growth after 5 days.    Narrative:      Aerobic and anaerobic    Blood culture x two cultures. Draw prior to antibiotics. [[id number]] Collected: 03/18/22 1641    Order Status: Completed Specimen: Blood from Peripheral, Antecubital, Right Updated: 03/23/22 2012     Blood Culture, Routine No growth after 5 days.    Narrative:      Aerobic and anaerobic    Aerobic culture [[id number]]  (Abnormal)  (Susceptibility) Collected: 03/18/22 1620    Order Status: Completed Specimen: Wound from Toe, Right Foot Updated: 03/21/22 1226     Aerobic Bacterial Culture METHICILLIN RESISTANT STAPHYLOCOCCUS AUREUS  Many      Urine culture [[id number]]  (Abnormal)  (Susceptibility) Collected: 03/18/22 1659    Order Status: Completed Specimen: Urine Updated: 03/21/22 0736     Urine Culture, Routine ESCHERICHIA COLI ESBL  > 100,000 cfu/ml      Narrative:       Specimen Source->Urine          Pathology Results  (Last 10 years)                 01/01/22 1345  Specimen to Pathology, Surgery Other Final result    Narrative:  Pre-op Diagnosis: Ulcer of right second toe [L97.519]   Diabetic foot ulcer with osteomyelitis [E11.621, E11.69,   L97.509, M86.9]   Procedure(s):   AMPUTATION, TOE   Number of specimens: 1   Name of specimens: 1) right foot second toe   Release to patient->Immediate   Specimen total (fresh, frozen, permanent):->1       07/22/21 1446  Specimen to Pathology, Surgery General Surgery Final result    Narrative:  Pre-op Diagnosis: Acute appendicitis with localized   peritonitis, without perforation, abscess, or gangrene   [K35.30]   Procedure(s):   APPENDECTOMY, LAPAROSCOPIC   Number of specimens: 1   Name of specimens: 1. Appendix   Release to patient->Immediate   Specimen total (fresh, frozen, permanent):->1             Urine Culture:   Recent Labs   Lab 10/12/21  1333 11/19/21  2230 01/10/22  0543 02/10/22  0529 03/18/22  1659   LABURIN Multiple organisms isolated. None in predominance.  Repeat if  clinically necessary. No growth PSEUDOMONAS AERUGINOSA  10,000 - 49,999 cfu/ml  * PSEUDOMONAS AERUGINOSA  50,000 - 99,999 cfu/ml  * ESCHERICHIA COLI ESBL  > 100,000 cfu/ml  *       Urine Studies:   Recent Labs   Lab 01/10/22  0543 02/10/22  0529 03/18/22  1659   COLORU Yellow Yellow Yellow   APPEARANCEUA Hazy* Hazy* Cloudy*   PHUR 6.0 6.0 5.0   SPECGRAV 1.025 1.005 1.020   PROTEINUA 2+* 1+* 2+*   GLUCUA Negative Negative Negative   KETONESU Negative Negative Negative   BILIRUBINUA Negative Negative Negative   OCCULTUA 1+* 2+* 1+*   NITRITE Negative Positive* Positive*   UROBILINOGEN Negative  --   --    LEUKOCYTESUR 2+* 3+* 2+*   RBCUA 7* 14* 13*   WBCUA 35* 84* >100*   BACTERIA Rare Many* Many*   SQUAMEPITHEL 5 0  --    HYALINECASTS 3* 0 0       Wound Culture:   Recent Labs   Lab 03/18/22  1620   LABAERO METHICILLIN RESISTANT STAPHYLOCOCCUS AUREUS  Many  *          Significant Imaging:   MRI Foot Toes Without Contrast Right [[id number]] Resulted: 03/19/22 0128   Order Status: Completed Updated: 03/19/22 0130   Narrative:     EXAMINATION:   MRI FOOT TOES WITHOUT CONTRAST RIGHT     CLINICAL HISTORY:   Osteomyelitis, foot;     TECHNIQUE:   Multiplanar multisequence MRI examination of the right foot and toes performed without IV contrast.     COMPARISON:   Right foot radiograph 03/18/2022.     MRI foot right 12/30/2021.     FINDINGS:   There is an open wound overlying the right 5th phalanges.     There is abnormal bone marrow edema signal within the distal heads of the 4th and 5th metatarsal as well as the phalanges of the 4th and 5th toes more extensive in the 5th.     There is scattered subcutaneous edema around this site as well as throughout the foot.  No large drainable fluid collection.  Postsurgical changes of right 2nd ray amputation.    Impression:       Osteomyelitis involving the right 4th and 5th metatarsals distally and the phalanges of the 4th and 5th toes.     Electronically signed by resident: Dimas Zapata   Date: 03/19/2022   Time: 01:04     Electronically signed by: Erlin Hernandez   Date: 03/19/2022   Time: 01:28

## 2022-03-25 NOTE — ASSESSMENT & PLAN NOTE
Improved   - hold ACE-I or ARB while renal function dynamic  - Monitor UOP and follow serial BMP   - Adjust drugs to GFR/CrCL; avoid nephrotoxic drugs   -  Estimated Creatinine Clearance: 35.8 mL/min (based on SCr of 1.1 mg/dL).   - Monitor electrolytes, phos levels daily

## 2022-03-25 NOTE — PROGRESS NOTES
"[facility] - Telemetry Stepdown ([address] Medicine  Progress Note    Patient Name: Beatriz Adame  MRN: [id number]  Patient Class: IP- Inpatient   Admission Date: 3/18/2022  Length of Stay: 4 days  Attending Physician: Jessica Rocha MD  Primary Care Provider: Dante Olivier MD        Subjective:     Principal Problem:Osteomyelitis of right foot        HPI:  Patient is a 71 year old female with osteoarthritis, hypothyroidism, CVA with left-sided residual deficit, DM2, HTN, HLD, CKD, chronic anemia, recurrent pseudomonal UTI, nephroliathiasis s/p left ureteral stent placement in 10/2021, urinary retention with chronic indwelling delgado catheter, diabetic foot ulcer s/p R toe amputation, chronic anemia, who presents with acute metabolic encephalopathy secondary to catheter associated urinary tract infection and possible right toe infection.   She is being admitted for [facility].     Initially in ED patient was found to have a white count of 13,000 and a left shift / neutrophilic predominance.  While not overtly septic, sepsis protocol was initiated - patient pancultured and broad-spectrum antibiotics with vancomycin and ceftriaxone was initiated.  Vital signs were within normal limits however blood pressure was at the lower range of normal for this patient with a systolic blood pressure of 110.  Lactic acid was obtained and was within normal limits.     Patient is alert on my examination she is able to describe that she is at the hospital for a catheter exchange."  The her catheter has been exchanged in the ED and she has had the benefit of broad-spectrum antibiotics since her admission there.  It is clear that her encephalopathy has already improved with the management of her very obvious catheter associated urinary tract infection.  The ED provider also had a concern for a right 5th toe infection - which appears to be chronically devascularized and gangrenous on examination.  " Plain films of the affected foot were unrevealing however patient has just returned from MRI and interpretation is pending.  Inflammatory markers are notably elevated.      She will be admitted to Hospital Medicine for ongoing management of her catheter associated urinary tract infection and possible right foot infection.          Overview/Hospital Course:  71 y.o. who was admitted to hospital medicine for acute metabolic encephalopathy in setting of UTI and osteomyelitis. Pt was AFVSS and hemodynamically stable. Leukocytosis resolved w/ initiation of IV abx. UA infectious and initially started on IV cefepime. UC grew ESBL and patient was transitioned to IV ertapenem started 3/21 (7 day course). MRI of R foot and toes revealed osteomyelitis of the right 4th and 5th distal metatarsals and phalanges. Cx growing MRSA started on IV vancomycin 3/18. ID following. Podiatry completed successful amputation on 3/24. Patient to discharge to SNF when medically stable.       Interval History: NAEON. Patient resting in bed, no distress noted though she does report significant pain in her foot. Pain medications ordered. Labs grossly stable, VSS.antibiotics infusing, will continue to monitor.     Review of Systems   Constitutional:  Negative for activity change, appetite change, chills, diaphoresis, fatigue and fever.   HENT: Negative.  Negative for congestion, facial swelling, rhinorrhea and sore throat.    Eyes: Negative.  Negative for photophobia, itching and visual disturbance.   Respiratory:  Negative for cough, chest tightness, shortness of breath and wheezing.    Cardiovascular:  Negative for chest pain, palpitations and leg swelling.   Gastrointestinal:  Negative for abdominal distention, abdominal pain, blood in stool, constipation, diarrhea, nausea and vomiting.   Genitourinary:  Positive for difficulty urinating (chronic delgado). Negative for dysuria, flank pain, frequency, hematuria and urgency.   Musculoskeletal:   Positive for arthralgias and gait problem. Negative for back pain and neck stiffness.   Skin:  Positive for color change and wound.   Neurological:  Positive for numbness. Negative for dizziness, tremors, seizures, syncope, weakness, light-headedness and headaches.   Psychiatric/Behavioral:  Negative for agitation, confusion and hallucinations. The patient is not nervous/anxious.    Objective:     Vital Signs (Most Recent):  Temp: 98.1 °F (36.7 °C) (03/25/22 1151)  Pulse: 82 (03/25/22 1151)  Resp: 19 (03/25/22 1151)  BP: (!) 151/72 (03/25/22 1151)  SpO2: 100 % (03/25/22 1151)   Vital Signs (24h Range):  Temp:  [97 °F (36.1 °C)-98.4 °F (36.9 °C)] 98.1 °F (36.7 °C)  Pulse:  [63-86] 82  Resp:  [16-19] 19  SpO2:  [95 %-100 %] 100 %  BP: (120-162)/(59-76) 151/72     Weight: 47.6 kg (104 lb 15 oz)  Body mass index is 16.44 kg/m².    Intake/Output Summary (Last 24 hours) at 3/25/2022 1342  Last data filed at 3/25/2022 0500  Gross per 24 hour   Intake 300 ml   Output 900 ml   Net -600 ml      Physical Exam  Vitals and nursing note reviewed.   Constitutional:       General: She is not in acute distress.     Appearance: Normal appearance. She is not ill-appearing, toxic-appearing or diaphoretic.   HENT:      Head: Normocephalic and atraumatic.      Mouth/Throat:      Mouth: Mucous membranes are moist.   Eyes:      General: No scleral icterus.     Conjunctiva/sclera: Conjunctivae normal.   Cardiovascular:      Rate and Rhythm: Normal rate and regular rhythm.      Pulses: Decreased pulses.   Pulmonary:      Effort: Pulmonary effort is normal. No respiratory distress.      Breath sounds: Normal breath sounds.   Abdominal:      General: There is no distension.      Palpations: Abdomen is soft.      Tenderness: There is no abdominal tenderness. There is no right CVA tenderness, left CVA tenderness or guarding.   Musculoskeletal:         General: Deformity (s/p amputation) present.      Right lower leg: No edema.      Left lower  leg: No edema.      Comments: Right foot wrapped.  No ascending cellulitis/warmth  See Podiatry photos below   Skin:     General: Skin is warm and dry.   Neurological:      Mental Status: She is alert. Mental status is at baseline.   Psychiatric:         Mood and Affect: Mood normal.         Behavior: Behavior normal.       Significant Labs: All pertinent labs within the past 24 hours have been reviewed.    Significant Imaging: I have reviewed all pertinent imaging results/findings within the past 24 hours.      Assessment/Plan:      * Osteomyelitis of right foot  Osteomyelitis of R 2nd toe  Critical lower limb ischemia    POD 1 s/p AMPUTATION, FOOT, PARTIAL 4th and 5th R  - MRI revealed Osteomyelitis involving the right 4th and 5th metatarsals distally and the phalanges of the 4th and 5th toes.   - XR R foot showed no acute bony abnormalities  - podiatry consulted, appreciate recs  -Will plan to repeat COVID testing for preop 3/23.    -Noninvasive vascular studies ordered, consult vascular surgery once resulted. Do not modify orders.   -Antibiotic plan per ID.    -WBAT LLE, WB to heel for transfers or short distances RLE.   -Arterial studies reviewed. RLE ABIs/ PVR waveforms suggest no evidence of PAD. Rt Great Toe: The PPG waveform is blunted with TBI of 0.16 and a toe pressure of 25 mmHg which suggests severe microvascular disease.   -Cx growing MRSA with sensitivity to vanc, will continue IV  -ID consulted, appreciate recs   -empiric Vancomycin and ertapenem (renal dosing). Monitor renal function closely  -Follow blood/urine cultures    Acute metabolic encephalopathy  Acute cystitis without hematuria    Improved, continue to monitor. Encephalopathy improved with goal directed medical therapy for infections  -Patient initiated on broad-spectrum antibiotics in the ED - vancomycin, ceftriaxone  -Patient has a history of pansensitive Klebsiella UTIs  -Nitrite positive urinalysis with gross parameters of infection on  UA, urine culture growing ESCHERICHIA COLI ESBL   -Araiza catheter exchanged  -Blood cultures NGTD  - MRI of right foot reveals Osteomyelitis involving the right 4th and 5th metatarsals distally and the phalanges of the 4th and 5th toes ---  podiatry following, see above  - Delirium precautions, neuro checks, aspiration precautions    Chronic kidney disease, stage 4 (severe)  Improved   - hold ACE-I or ARB while renal function dynamic  - Monitor UOP and follow serial BMP   - Adjust drugs to GFR/CrCL; avoid nephrotoxic drugs   -  Estimated Creatinine Clearance: 35.8 mL/min (based on SCr of 1.1 mg/dL).   - Monitor electrolytes, phos levels daily    Hypertension  - amLODIPine (NORVASC) 5 MG tablet daily   - carvedilol (COREG) 25 MG tablet BID    Type 2 diabetes mellitus, with long-term current use of insulin  Lab Results   Component Value Date    HGBA1C 5.4 11/21/2021     - BG goal 140 - 180   - inpatient regimen: detemir 3 U  - low dose SSI, ACHS acchuchecks  - Diabetic diet 2000 calories   - monitor for hypoglycemia    Peripheral neuropathy  -home gabapentin 300 mg once daily      Debility  - PTOT s/p surgery     Urinary retention  - Patient with history of urinary retention and Araiza catheter in place since at least January of 2022  - Araiza catheter last exchanged 02/22/2022  - Araiza catheter exchanged in ED  - Patient continues Flomax per home dose    Hypothyroidism  - continue levothyroxine    Severe protein-calorie malnutrition  -registered dietitian consult      History of stroke  -continue home asa and plavix      Hyperlipidemia  - Atorvastatin 80 formulary substitution for home Crestor 40    VTE Risk Mitigation (From admission, onward)         Ordered     heparin (porcine) injection 5,000 Units  Every 8 hours         03/18/22 2111     IP VTE HIGH RISK PATIENT  Once         03/18/22 2110     Place sequential compression device  Until discontinued         03/18/22 2110                Discharge Planning   MARY:  3/29/2022     Code Status: Full Code   Is the patient medically ready for discharge?: No    Reason for patient still in hospital (select all that apply): Laboratory test, Treatment, Consult recommendations and Pending disposition  Discharge Plan A: Skilled Nursing Facility                  Laxmi Hernandez PA-C  Department of Hospital Medicine   [facility] - Telemetry Stepdown ([place] [address])

## 2022-03-25 NOTE — ANESTHESIA POSTPROCEDURE EVALUATION
Anesthesia Post Evaluation    Patient: Beatriz Adame    Procedure(s) Performed: Procedure(s) (LRB):  AMPUTATION, FOOT, PARTIAL 4th and 5th ray (Right)    Final Anesthesia Type: general      Patient location during evaluation: floor  Patient participation: Yes- Able to Participate  Level of consciousness: awake and alert and awake  Post-procedure vital signs: reviewed and stable  Pain management: adequate  Airway patency: patent    PONV status at discharge: No PONV  Anesthetic complications: no      Cardiovascular status: blood pressure returned to baseline  Respiratory status: unassisted and spontaneous ventilation  Hydration status: euvolemic  Follow-up not needed.          Vitals Value Taken Time   /64 03/25/22 0420   Temp 36.9 °C (98.4 °F) 03/25/22 0420   Pulse 84 03/25/22 0555   Resp 16 03/25/22 0555   SpO2 95 % 03/25/22 0420   Vitals shown include unvalidated device data.      Event Time   Out of Recovery 03/24/2022 17:00:00         Pain/Luis Alfredo Score: Luis Alfredo Score: 10 (3/24/2022  5:15 PM)

## 2022-03-25 NOTE — ASSESSMENT & PLAN NOTE
Lab Results   Component Value Date    HGBA1C 5.4 11/21/2021     - BG goal 140 - 180   - inpatient regimen: detemir 3 U  - low dose SSI, ACHS acchuchecks  - Diabetic diet 2000 calories   - monitor for hypoglycemia

## 2022-03-25 NOTE — ASSESSMENT & PLAN NOTE
- Patient with history of urinary retention and Araiza catheter in place since at least January of 2022  - Araiza catheter last exchanged 02/22/2022  - Araiza catheter exchanged in ED  - Patient continues Flomax per home dose

## 2022-03-25 NOTE — SUBJECTIVE & OBJECTIVE
Interval History: NAEON. Patient resting in bed, no distress noted though she does report significant pain in her foot. Pain medications ordered. Labs grossly stable, VSS.antibiotics infusing, will continue to monitor.     Review of Systems   Constitutional:  Negative for activity change, appetite change, chills, diaphoresis, fatigue and fever.   HENT: Negative.  Negative for congestion, facial swelling, rhinorrhea and sore throat.    Eyes: Negative.  Negative for photophobia, itching and visual disturbance.   Respiratory:  Negative for cough, chest tightness, shortness of breath and wheezing.    Cardiovascular:  Negative for chest pain, palpitations and leg swelling.   Gastrointestinal:  Negative for abdominal distention, abdominal pain, blood in stool, constipation, diarrhea, nausea and vomiting.   Genitourinary:  Positive for difficulty urinating (chronic delgado). Negative for dysuria, flank pain, frequency, hematuria and urgency.   Musculoskeletal:  Positive for arthralgias and gait problem. Negative for back pain and neck stiffness.   Skin:  Positive for color change and wound.   Neurological:  Positive for numbness. Negative for dizziness, tremors, seizures, syncope, weakness, light-headedness and headaches.   Psychiatric/Behavioral:  Negative for agitation, confusion and hallucinations. The patient is not nervous/anxious.    Objective:     Vital Signs (Most Recent):  Temp: 98.1 °F (36.7 °C) (03/25/22 1151)  Pulse: 82 (03/25/22 1151)  Resp: 19 (03/25/22 1151)  BP: (!) 151/72 (03/25/22 1151)  SpO2: 100 % (03/25/22 1151)   Vital Signs (24h Range):  Temp:  [97 °F (36.1 °C)-98.4 °F (36.9 °C)] 98.1 °F (36.7 °C)  Pulse:  [63-86] 82  Resp:  [16-19] 19  SpO2:  [95 %-100 %] 100 %  BP: (120-162)/(59-76) 151/72     Weight: 47.6 kg (104 lb 15 oz)  Body mass index is 16.44 kg/m².    Intake/Output Summary (Last 24 hours) at 3/25/2022 1342  Last data filed at 3/25/2022 0500  Gross per 24 hour   Intake 300 ml   Output 900 ml    Net -600 ml      Physical Exam  Vitals and nursing note reviewed.   Constitutional:       General: She is not in acute distress.     Appearance: Normal appearance. She is not ill-appearing, toxic-appearing or diaphoretic.   HENT:      Head: Normocephalic and atraumatic.      Mouth/Throat:      Mouth: Mucous membranes are moist.   Eyes:      General: No scleral icterus.     Conjunctiva/sclera: Conjunctivae normal.   Cardiovascular:      Rate and Rhythm: Normal rate and regular rhythm.      Pulses: Decreased pulses.   Pulmonary:      Effort: Pulmonary effort is normal. No respiratory distress.      Breath sounds: Normal breath sounds.   Abdominal:      General: There is no distension.      Palpations: Abdomen is soft.      Tenderness: There is no abdominal tenderness. There is no right CVA tenderness, left CVA tenderness or guarding.   Musculoskeletal:         General: Deformity (s/p amputation) present.      Right lower leg: No edema.      Left lower leg: No edema.      Comments: Right foot wrapped.  No ascending cellulitis/warmth  See Podiatry photos below   Skin:     General: Skin is warm and dry.   Neurological:      Mental Status: She is alert. Mental status is at baseline.   Psychiatric:         Mood and Affect: Mood normal.         Behavior: Behavior normal.       Significant Labs: All pertinent labs within the past 24 hours have been reviewed.    Significant Imaging: I have reviewed all pertinent imaging results/findings within the past 24 hours.

## 2022-03-25 NOTE — SUBJECTIVE & OBJECTIVE
Subjective:     Interval History: NAEON, Afebrile, VSS. S/P 4th and 5th ray resections 03/24, patient relays minor tenderness at incision site, no other complaints. Dressings clean, dry, and intact.     Follow-up For: Procedure(s) (LRB):  AMPUTATION, FOOT, PARTIAL 4th and 5th ray (Right)    Post-Operative Day: 1 Day Post-Op    Scheduled Meds:   acetaminophen  1,000 mg Oral TID    amLODIPine  10 mg Oral QHS    aspirin  81 mg Oral Daily    atorvastatin  80 mg Oral QHS    carvediloL  25 mg Oral BID    clopidogreL  75 mg Oral Daily    ertapenem (INVANZ) IVPB  500 mg Intravenous Q24H    ferrous sulfate  1 tablet Oral Daily    gabapentin  300 mg Oral Daily    heparin (porcine)  5,000 Units Subcutaneous Q8H    insulin detemir U-100  3 Units Subcutaneous Daily    ketorolac  15 mg Intravenous Once    Lactobacillus rhamnosus GG  1 capsule Oral Daily    levothyroxine  75 mcg Oral Daily    metoclopramide HCl  10 mg Oral TID AC    pantoprazole  40 mg Oral Daily    tamsulosin  0.4 mg Oral Daily    vancomycin (VANCOCIN) IVPB  500 mg Intravenous Q24H     Continuous Infusions:   sodium chloride 0.9%       PRN Meds:sodium chloride, dextrose 10%, dextrose 10%, glucagon (human recombinant), glucose, glucose, HYDROmorphone, insulin aspart U-100, naloxone, ondansetron, oxyCODONE, sodium chloride 0.9%, sodium chloride 0.9%, Pharmacy to dose Vancomycin consult **AND** vancomycin - pharmacy to dose    Review of Systems   Constitutional:  Negative for activity change, appetite change, chills, diaphoresis, fatigue and fever.   HENT: Negative.  Negative for congestion, facial swelling, rhinorrhea and sore throat.    Eyes: Negative.  Negative for photophobia, itching and visual disturbance.   Respiratory:  Negative for cough, chest tightness, shortness of breath and wheezing.    Cardiovascular:  Negative for chest pain, palpitations and leg swelling.   Gastrointestinal:  Negative for abdominal distention, abdominal pain, blood in stool,  constipation, diarrhea, nausea and vomiting.   Genitourinary:  Positive for difficulty urinating (chronic delgado). Negative for dysuria, flank pain, frequency, hematuria and urgency.   Musculoskeletal:  Positive for arthralgias and gait problem. Negative for back pain and neck stiffness.   Skin:  Positive for color change and wound.   Neurological:  Positive for numbness. Negative for dizziness, tremors, seizures, syncope, weakness, light-headedness and headaches.   Psychiatric/Behavioral:  Negative for agitation, confusion and hallucinations. The patient is not nervous/anxious.    Objective:     Vital Signs (Most Recent):  Temp: 98.1 °F (36.7 °C) (03/25/22 1151)  Pulse: 79 (03/25/22 1500)  Resp: 19 (03/25/22 1151)  BP: (!) 151/72 (03/25/22 1151)  SpO2: 100 % (03/25/22 1151)   Vital Signs (24h Range):  Temp:  [97 °F (36.1 °C)-98.4 °F (36.9 °C)] 98.1 °F (36.7 °C)  Pulse:  [63-86] 79  Resp:  [16-19] 19  SpO2:  [95 %-100 %] 100 %  BP: (120-155)/(59-72) 151/72     Weight: 47.6 kg (104 lb 15 oz)  Body mass index is 16.44 kg/m².    Foot Exam    Right Foot/Ankle     Inspection and Palpation  Tenderness: none   Swelling: none   Skin Exam: drainage, abnormal color and ulcer; skin not intact and no erythema     Neurovascular  Dorsalis pedis: absent  Posterior tibial: absent    Comments  R 2nd toe amputated    S/P 4th and 5th ray resection 03/24    Sutures clean, dry, and intact   Skin edges well approximated with no signs of gapping or dehiscence  No signs of soft tissue infection     Left Foot/Ankle      Inspection and Palpation  Tenderness: none   Swelling: none   Skin Exam: skin intact; no drainage, no cellulitis and no erythema     Neurovascular  Dorsalis pedis: absent  Posterior tibial: absent        Laboratory:  CBC:   Recent Labs   Lab 03/25/22  0843   WBC 10.63   RBC 2.93*   HGB 8.3*   HCT 28.7*      MCV 98   MCH 28.3   MCHC 28.9*     CMP:   Recent Labs   Lab 03/25/22  0843   GLU 97   CALCIUM 9.7   ALBUMIN 2.4*    PROT 7.4      K 3.5   CO2 24      BUN 22   CREATININE 1.1   ALKPHOS 74   ALT 7*   AST 10   BILITOT 0.3     CRP:   Recent Labs   Lab 03/18/22  1640   CRP 64.0*     ESR:   Recent Labs   Lab 03/18/22  1640   SEDRATE 95*     All pertinent labs reviewed within the last 24 hours.    Diagnostic Results:  I have reviewed all pertinent imaging results/findings within the past 24 hours.    Clinical Findings:

## 2022-03-25 NOTE — PLAN OF CARE
[facility] - Telemetry Stepdown ([place]  Discharge Reassessment    Primary Care Provider: Dante Olivier MD    Expected Discharge Date: 3/29/2022    Reassessment (most recent)     Discharge Reassessment - 03/25/22 1500        Discharge Reassessment    Assessment Type Discharge Planning Assessment     Did the patient's condition or plan change since previous assessment? No     Discharge Plan discussed with: Patient     Communicated MARY with patient/caregiver Yes     Discharge Plan A Skilled Nursing Facility     Discharge Plan B Skilled Nursing Facility     DME Needed Upon Discharge  none     Discharge Barriers Identified None     Why the patient remains in the hospital Requires continued medical care        Post-Acute Status    Post-Acute Authorization Placement     Post-Acute Placement Status Referrals Sent     Discharge Delays None known at this time               71 y.o. who was admitted to hospital medicine for acute metabolic encephalopathy in setting of UTI and osteomyelitis. Pt was AFVSS and hemodynamically stable. Leukocytosis resolved w/ initiation of IV abx. UA infectious and initially started on IV cefepime. UC grew ESBL and patient was transitioned to IV ertapenem started 3/21 (7 day course). MRI of R foot and toes revealed osteomyelitis of the right 4th and 5th distal metatarsals and phalanges. Cx growing MRSA started on IV vancomycin 3/18. ID following. Podiatry completed successful amputation on 3/24. Patient to discharge to SNF when medically stable.         Interval History: NAEON. Patient resting in bed, no distress noted though she does report significant pain in her foot. Pain medications ordered. Labs grossly stable, VSS.antibiotics infusing, will continue to monitor.     Will continue to update plan as needed.  Paulo Lind RN,BSN

## 2022-03-25 NOTE — PT/OT/SLP EVAL
Occupational Therapy   Evaluation    Name: Beatriz Adame  MRN: [id number]  Admitting Diagnosis:  Osteomyelitis of right foot  Recent Surgery: Procedure(s) (LRB):  AMPUTATION, FOOT, PARTIAL 4th and 5th ray (Right) 1 Day Post-Op    Recommendations:     Discharge Recommendations: nursing facility, skilled  Discharge Equipment Recommendations:  hospital bed, slide board, bedside commode  Barriers to discharge:  Other (Comment) (decreased independence with ADLs, assist required for transfers, below functional baseline)    Assessment:     Beatriz Adame is a 71 y.o. female with a medical diagnosis of Osteomyelitis of right foot.  She presents with weakness, impaired endurance, impaired sensation, impaired functional mobilty, impaired self care skills, gait instability, impaired balance, decreased coordination, decreased upper extremity function, decreased lower extremity function, pain, decreased safety awareness, impaired skin, orthopedic precautions.      Rehab Prognosis: Good; patient would benefit from acute skilled OT services to address these deficits and reach maximum level of function.       Plan:     Patient to be seen 3 x/week to address the above listed problems via self-care/home management, therapeutic activities, therapeutic exercises  · Plan of Care Expires: 04/24/22  · Plan of Care Reviewed with:      Subjective     Chief Complaint: pain in B feet  Patient/Family Comments/goals: to return to SNF    Occupational Profile:  Living Environment: Pt presents from SNF. Before hospitalization, pt was living in a 1st floor apartment alone.   Previous level of function: At baseline, independent with ADLs and IADLs. Brother assists with grocery shopping. Pt recently from SNF and working on transfers, ADLs and ambulation with OT/PT.   Roles and Routines: homemaker  Equipment Used at Home:  wheelchair  Assistance upon Discharge: none    Pain/Comfort:  Pain Rating 1: 8/10  Location - Side 1: Bilateral  Location 1:  foot  Pain Addressed 1: Pre-medicate for activity, Reposition, Distraction    Patients cultural, spiritual, Confucianism conflicts given the current situation: no    Objective:     Communicated with: RN prior to session.  Patient found HOB elevated with telemetry, delgado catheter upon OT entry to room.    General Precautions: Standard, fall, contact   Orthopedic Precautions:RLE non weight bearing   Braces: N/A  Respiratory Status: Room air    Occupational Performance:    Bed Mobility:    · Sup <> sitting: mod A x1    Functional Mobility/Transfers:  · EOB only this date, not appropriate for standing trials 2/2 LLE weakness and pain. Pt NWB on RLE. Appropriate for slide board transfers to w/c or BS. OT to progress  · Functional Mobility: Pt sat EOB with SBA and intermittent min A for midline alignment. Pt with R lateral lean, requiring physical and verbal cues for body positioning at EOB.     Activities of Daily Living:  · EOB sitting performed to improve strength/endurance for improved participation with ADLs.     Cognitive/Visual Perceptual:  Cognitive/Psychosocial Skills:     -       Oriented to: not formally assessed, no deficits noted   -       Follows Commands/attention:Follows one-step commands  -       Communication: clear/fluent  -       Memory: No Deficits noted  -       Safety awareness/insight to disability: intact   -       Mood/Affect/Coping skills/emotional control: Pleasant  Visual/Perceptual:      -Intact no deficits noted    Physical Exam:  Upper Extremity Range of Motion:     -       Right Upper Extremity: WFL  -       Left Upper Extremity: WFL  Upper Extremity Strength:    -       Right Upper Extremity: WFL  -       Left Upper Extremity: WFL  Pt reports numbness to touch on LUE. Pt reports normal sensation in RUE.    AMPAC 6 Click ADL:  AMPAC Total Score: 16    Treatment & Education:  OT role, plan of care, progression of goals, importance of continued OOB activity, ADL retraining, fall prevention,  safe mobility techniques, transfer retraining, pain management, weight bearing   Education:    Patient left supine with all lines intact, call button in reach and RN notified    GOALS:   Multidisciplinary Problems     Occupational Therapy Goals        Problem: Occupational Therapy    Goal Priority Disciplines Outcome Interventions   Occupational Therapy Goal     OT, PT/OT Ongoing, Progressing    Description: Goals to be met by: 4/8/22     Patient will increase functional independence with ADLs by performing:    UE Dressing with Crisp.  LE Dressing with Modified Crisp.  Grooming while seated with Crisp and Set-up Assistance.  Toileting from bedside commode with Minimal Assistance for hygiene and clothing management.   Toilet transfer to bedside commode with Minimal Assistance.                     History:     Past Medical History:   Diagnosis Date    Gastroparesis     GERD (gastroesophageal reflux disease)     History of Clostridium difficile colitis 07/24/2021    History of kidney stones     History of stroke     left side paralysis    Hyperlipidemia     Hypertension     Hypothyroidism     Iron deficiency anemia     Osteoarthritis     Peripheral neuropathy     Stage IV CKD     Type II diabetes mellitus          Past Surgical History:   Procedure Laterality Date    APPENDECTOMY      CYSTOSCOPY W/ URETERAL STENT PLACEMENT Right 10/13/2021    Procedure: CYSTOSCOPY, WITH URETERAL STENT INSERTION;  Surgeon: Wanda Arroyo MD;  Location: [facility];  Service: Urology;  Laterality: Right;    ESOPHAGOGASTRODUODENOSCOPY N/A 11/23/2021    Procedure: EGD (ESOPHAGOGASTRODUODENOSCOPY);  Surgeon: Obed Jeong MD;  Location: [address]);  Service: Endoscopy;  Laterality: N/A;    FOOT AMPUTATION THROUGH METATARSAL Right 3/24/2022    Procedure: AMPUTATION, FOOT, PARTIAL 4th and 5th ray;  Surgeon: Rodrigo Logan DPM;  Location: [address];  Service: Podiatry;  Laterality:  Right;    LAPAROSCOPIC APPENDECTOMY N/A 7/22/2021    Procedure: APPENDECTOMY, LAPAROSCOPIC;  Surgeon: Paulo Calvo Jr., MD;  [facility];  Service: General;  Laterality: N/A;    TOE AMPUTATION Right 1/1/2022    Procedure: AMPUTATION, TOE;  Surgeon: Genaro Mason DPM;  Location: [facility];  Service: Podiatry;  Laterality: Right;    TUBAL LIGATION      URETEROSCOPIC REMOVAL OF URETERIC CALCULUS Right 12/22/2021    Procedure: REMOVAL, CALCULUS, URETER, URETEROSCOPIC;  Surgeon: Wanda Arroyo MD;  Location: [facility];  Service: Urology;  Laterality: Right;       Time Tracking:     OT Date of Treatment: 03/25/22  OT Start Time: 0935  OT Stop Time: 0955  OT Total Time (min): 20 min    Billable Minutes:Evaluation 12 minutes  Self Care/Home Management 8 minutes     Co evaluation with PT for session due to complex nature of pt and need for increased safety.  Two skilled therapists needed during functional mobility to decrease patient fall risk and decrease risk of caregiver injury.        3/25/2022

## 2022-03-25 NOTE — PROGRESS NOTES
Pharmacokinetic Assessment Follow Up: IV Vancomycin    Vancomycin serum concentration assessment(s):    The random level was drawn correctly and can be used to guide therapy at this time. The measurement is within the desired definitive target range of 15 to 20 mcg/mL.    Vancomycin Regimen Plan:    Change regimen to Vancomycin 500  mg IV every 24 hours with next serum trough concentration measured at 0645 on 3/27    Drug levels (last 3 results):  Recent Labs   Lab Result Units 03/23/22  0317 03/24/22  1327   Vancomycin, Random ug/mL 21.0 19.9       Pharmacy will continue to follow and monitor vancomycin.    Please contact pharmacy at extension [phone] for questions regarding this assessment.    Thank you for the consult,   Cady Nielsen       Patient brief summary:  Beatriz Adame is a 71 y.o. female initiated on antimicrobial therapy with IV Vancomycin for treatment of bone/joint infection      Drug Allergies:   Review of patient's allergies indicates:   Allergen Reactions    Tomato (solanum lycopersicum) Hives and Itching       Actual Body Weight:   47.6 kg    Renal Function:   Estimated Creatinine Clearance: 21.9 mL/min (A) (based on SCr of 1.8 mg/dL (H)).     Dialysis Method (if applicable):  N/A    CBC (last 72 hours):  Recent Labs   Lab Result Units 03/22/22  0944 03/23/22  0932   WBC K/uL 10.76 10.49   Hemoglobin g/dL 8.9* 7.9*   Hematocrit % 28.6* 25.4*   Platelets K/uL 179 158   Gran % % 61.6 64.9   Lymph % % 26.3 23.3   Mono % % 9.5 8.5   Eosinophil % % 1.9 2.1   Basophil % % 0.4 0.5   Differential Method  Automated Automated       Metabolic Panel (last 72 hours):  Recent Labs   Lab Result Units 03/22/22  0944 03/23/22  0932   Sodium mmol/L 141 141   Potassium mmol/L 4.0 3.5   Chloride mmol/L 107 108   CO2 mmol/L 24 26   Glucose mg/dL 121* 148*   BUN mg/dL 27* 28*   Creatinine mg/dL 1.6* 1.8*       Vancomycin Administrations:  vancomycin given in the last 96 hours                   vancomycin 500 mg  in dextrose 5 % 100 mL IVPB (ready to mix system) (mg) 500 mg New Bag 03/23/22 1544    vancomycin in dextrose 5 % 1 gram/250 mL IVPB 1,000 mg (mg) 1,000 mg New Bag 03/21/22 1348                Microbiologic Results:  Microbiology Results (last 7 days)     Procedure Component Value Units Date/Time    Gram stain [[id number]] Collected: 03/24/22 1538    Order Status: Completed Specimen: Bone from Foot, Right Updated: 03/24/22 1652     Gram Stain Result No WBC's      No organisms seen    Narrative:      5th metatarsal bone clean margins    Gram stain [[id number]] Collected: 03/24/22 1538    Order Status: Completed Specimen: Bone from Foot, Right Updated: 03/24/22 1650     Gram Stain Result No WBC's      No organisms seen    Narrative:      4th metatarsal bone clean margins    Fungus culture [[id number]] Collected: 03/24/22 1538    Order Status: Sent Specimen: Bone from Foot, Right Updated: 03/24/22 1553    Culture, Anaerobe [[id number]] Collected: 03/24/22 1538    Order Status: Sent Specimen: Bone from Foot, Right Updated: 03/24/22 1551    Aerobic culture [[id number]] Collected: 03/24/22 1538    Order Status: Sent Specimen: Bone from Foot, Right Updated: 03/24/22 1550    Aerobic culture [[id number]] Collected: 03/24/22 1538    Order Status: Sent Specimen: Bone from Foot, Right Updated: 03/24/22 1549    Culture, Anaerobe [[id number]] Collected: 03/24/22 1538    Order Status: Sent Specimen: Bone from Foot, Right Updated: 03/24/22 1549    AFB Culture & Smear [[id number]] Collected: 03/24/22 1538    Order Status: Sent Specimen: Bone from Foot, Right Updated: 03/24/22 1548    Fungus culture [[id number]] Collected: 03/24/22 1538    Order Status: Sent Specimen: Bone from Foot, Right Updated: 03/24/22 1548    AFB Culture & Smear [[id number]] Collected: 03/24/22 1538    Order Status: Sent Specimen: Bone from Foot, Right Updated: 03/24/22 1547    Blood culture x two cultures. Draw prior to antibiotics. [[id number]] Collected: 03/18/22 4322     Order Status: Completed Specimen: Blood from Peripheral, Antecubital, Right Updated: 03/23/22 2012     Blood Culture, Routine No growth after 5 days.    Narrative:      Aerobic and anaerobic    Blood culture x two cultures. Draw prior to antibiotics. [[id number]] Collected: 03/18/22 1641    Order Status: Completed Specimen: Blood from Peripheral, Antecubital, Right Updated: 03/23/22 2012     Blood Culture, Routine No growth after 5 days.    Narrative:      Aerobic and anaerobic    Aerobic culture [[id number]]  (Abnormal)  (Susceptibility) Collected: 03/18/22 1620    Order Status: Completed Specimen: Wound from Toe, Right Foot Updated: 03/21/22 1226     Aerobic Bacterial Culture METHICILLIN RESISTANT STAPHYLOCOCCUS AUREUS  Many      Urine culture [[id number]]  (Abnormal)  (Susceptibility) Collected: 03/18/22 1659    Order Status: Completed Specimen: Urine Updated: 03/21/22 0736     Urine Culture, Routine ESCHERICHIA COLI ESBL  > 100,000 cfu/ml      Narrative:      Specimen Source->Urine

## 2022-03-25 NOTE — ASSESSMENT & PLAN NOTE
71 year old female with CVA, DM2, HTN, CKD, nephroliathiasis s/p left ureteral stent placement, urinary retention managed with chronic indwelling delgado, recurrent UTIs, right second toe osteomyelitis s/p R toe amputation 1/2022 who presented to [facility] with acute metabolic encephalopathy likely secondary to UTI and right foot infection.       Delgado exchanged in ED. Urine cx + for ESBL E Coli.      Noted to have gangrenous changes of her right fifth toe and interdigit wound between toes 4/5 and MRI right foot is concerning for osteomyelitis of the 4th and 5th digits.  Wound cultures + for MRSA.  Vascular studies reviewed. No significant PAD. Toe pressures significant for severe microvascular disease.  Podiatry performed right foot partial 4th and 5th ray amputations on 3/24. Clean margin bone cultures are also showing Staph aureus.      Patient is is on Vancomycin and Ertapenem. Mentation has improved on antibiotics. Afebrile, no leukocytosis, and HDS.    Recommendations  · Continue IV Vancomycin (pharmacy to dose. Trough goal 15-20). Monitor renal function closely (Cr 1.1 today).   · Continue Ertapenem 500 mg IV q 24 hours for ESBL E coli x 7 days total  (renally adjusted)   · Follow up clean margin bone cultures to guide antibiotics. Given positive cultures, anticipate 6 weeks of antibiotic therapy.  · Will follow     Data reviewed and plan discussed with ID staff, Dr. Saxena

## 2022-03-25 NOTE — ASSESSMENT & PLAN NOTE
Acute cystitis without hematuria    Improved, continue to monitor. Encephalopathy improved with goal directed medical therapy for infections  -Patient initiated on broad-spectrum antibiotics in the ED - vancomycin, ceftriaxone  -Patient has a history of pansensitive Klebsiella UTIs  -Nitrite positive urinalysis with gross parameters of infection on UA, urine culture growing ESCHERICHIA COLI ESBL   -Araiza catheter exchanged  -Blood cultures NGTD  - MRI of right foot reveals Osteomyelitis involving the right 4th and 5th metatarsals distally and the phalanges of the 4th and 5th toes ---  podiatry following, see above  - Delirium precautions, neuro checks, aspiration precautions

## 2022-03-26 NOTE — PLAN OF CARE
Problem: Adult Inpatient Plan of Care  Goal: Plan of Care Review  Outcome: Ongoing, Progressing  Goal: Patient-Specific Goal (Individualized)  Outcome: Ongoing, Progressing  Goal: Absence of Hospital-Acquired Illness or Injury  Outcome: Ongoing, Progressing  Goal: Optimal Comfort and Wellbeing  Outcome: Ongoing, Progressing  Goal: Readiness for Transition of Care  Outcome: Ongoing, Progressing     Problem: Diabetes Comorbidity  Goal: Blood Glucose Level Within Targeted Range  Outcome: Ongoing, Progressing     Problem: Fluid and Electrolyte Imbalance (Acute Kidney Injury/Impairment)  Goal: Fluid and Electrolyte Balance  Outcome: Ongoing, Progressing     Problem: Oral Intake Inadequate (Acute Kidney Injury/Impairment)  Goal: Optimal Nutrition Intake  Outcome: Ongoing, Progressing     Problem: Renal Function Impairment (Acute Kidney Injury/Impairment)  Goal: Effective Renal Function  Outcome: Ongoing, Progressing     Problem: Impaired Wound Healing  Goal: Optimal Wound Healing  Outcome: Ongoing, Progressing     Problem: Fall Injury Risk  Goal: Absence of Fall and Fall-Related Injury  Outcome: Ongoing, Progressing     Problem: Skin Injury Risk Increased  Goal: Skin Health and Integrity  Outcome: Ongoing, Progressing     Problem: Infection  Goal: Absence of Infection Signs and Symptoms  Outcome: Ongoing, Progressing     Pt lying supine in bed with eyes open. Resp even and unlabored. Cont to encourage TCDB exercises. Bandage intact to left foot at present. No s/s of bleeding or drainage noted. Pt alert and verbally responsive to staff. Requires total assistance with ADLs and set up with meals. No s/s of adverse reactions from ABX therapy. Araiza cath patent and intact without kinks noted. Yellow urine noted with cloudy sediment at times in drainage line noted. Call light within reach. Pt son verbalizes understanding of pt care, ABX therapy, post op care. Will cont to monitor.

## 2022-03-26 NOTE — SUBJECTIVE & OBJECTIVE
Interval History: patient resting in bed, no complaints. Continue current care plan.     Review of Systems   Constitutional:  Negative for chills, fatigue and fever.   HENT: Negative.  Negative for congestion, facial swelling, rhinorrhea and sore throat.    Eyes: Negative.  Negative for photophobia, itching and visual disturbance.   Respiratory:  Negative for cough, chest tightness, shortness of breath and wheezing.    Cardiovascular:  Negative for chest pain, palpitations and leg swelling.   Gastrointestinal:  Negative for constipation, diarrhea, nausea and vomiting.   Genitourinary:  Positive for difficulty urinating (chronic delgado). Negative for dysuria, flank pain, frequency, hematuria and urgency.   Musculoskeletal:  Positive for arthralgias and gait problem. Negative for back pain and neck stiffness.   Skin:  Positive for color change and wound.   Neurological:  Positive for numbness. Negative for dizziness, seizures, weakness and light-headedness.   Psychiatric/Behavioral:  Negative for agitation, confusion and hallucinations. The patient is not nervous/anxious.    Objective:     Vital Signs (Most Recent):  Temp: 97.8 °F (36.6 °C) (03/26/22 1215)  Pulse: (!) 150 (03/26/22 1344)  Resp: 18 (03/26/22 1215)  BP: (!) 130/59 (03/26/22 1215)  SpO2: 99 % (03/26/22 1427)   Vital Signs (24h Range):  Temp:  [96.7 °F (35.9 °C)-98.9 °F (37.2 °C)] 97.8 °F (36.6 °C)  Pulse:  [] 150  Resp:  [16-19] 18  SpO2:  [98 %-100 %] 99 %  BP: (125-157)/(59-74) 130/59     Weight: 47.6 kg (104 lb 15 oz)  Body mass index is 16.44 kg/m².    Intake/Output Summary (Last 24 hours) at 3/26/2022 1439  Last data filed at 3/26/2022 1430  Gross per 24 hour   Intake 240 ml   Output 1425 ml   Net -1185 ml      Physical Exam  Vitals and nursing note reviewed.   Constitutional:       General: She is not in acute distress.     Appearance: Normal appearance. She is not ill-appearing, toxic-appearing or diaphoretic.   HENT:      Head: Normocephalic  and atraumatic.      Mouth/Throat:      Mouth: Mucous membranes are moist.   Eyes:      General: No scleral icterus.     Conjunctiva/sclera: Conjunctivae normal.   Cardiovascular:      Rate and Rhythm: Normal rate and regular rhythm.      Pulses: Decreased pulses.   Pulmonary:      Effort: Pulmonary effort is normal. No respiratory distress.      Breath sounds: Normal breath sounds.   Abdominal:      General: There is no distension.      Palpations: Abdomen is soft.      Tenderness: There is no abdominal tenderness. There is no right CVA tenderness, left CVA tenderness or guarding.   Musculoskeletal:         General: Deformity (s/p amputation) present.      Right lower leg: No edema.      Left lower leg: No edema.      Comments: Right foot wrapped.  No ascending cellulitis/warmth  See Podiatry photos below   Skin:     General: Skin is warm and dry.   Neurological:      Mental Status: She is alert. Mental status is at baseline.   Psychiatric:         Mood and Affect: Mood normal.         Behavior: Behavior normal.       Significant Labs: All pertinent labs within the past 24 hours have been reviewed.    Significant Imaging: I have reviewed all pertinent imaging results/findings within the past 24 hours.

## 2022-03-26 NOTE — PROGRESS NOTES
"[facility] - Telemetry Stepdown ([address] Medicine  Progress Note    Patient Name: Beatriz Adame  MRN: [id number]  Patient Class: IP- Inpatient   Admission Date: 3/18/2022  Length of Stay: 5 days  Attending Physician: Jessica Rocha MD  Primary Care Provider: Dante Olivier MD        Subjective:     Principal Problem:Osteomyelitis of right foot        HPI:  Patient is a 71 year old female with osteoarthritis, hypothyroidism, CVA with left-sided residual deficit, DM2, HTN, HLD, CKD, chronic anemia, recurrent pseudomonal UTI, nephroliathiasis s/p left ureteral stent placement in 10/2021, urinary retention with chronic indwelling delgado catheter, diabetic foot ulcer s/p R toe amputation, chronic anemia, who presents with acute metabolic encephalopathy secondary to catheter associated urinary tract infection and possible right toe infection.   She is being admitted for [facility].     Initially in ED patient was found to have a white count of 13,000 and a left shift / neutrophilic predominance.  While not overtly septic, sepsis protocol was initiated - patient pancultured and broad-spectrum antibiotics with vancomycin and ceftriaxone was initiated.  Vital signs were within normal limits however blood pressure was at the lower range of normal for this patient with a systolic blood pressure of 110.  Lactic acid was obtained and was within normal limits.     Patient is alert on my examination she is able to describe that she is at the hospital for a catheter exchange."  The her catheter has been exchanged in the ED and she has had the benefit of broad-spectrum antibiotics since her admission there.  It is clear that her encephalopathy has already improved with the management of her very obvious catheter associated urinary tract infection.  The ED provider also had a concern for a right 5th toe infection - which appears to be chronically devascularized and gangrenous on examination.  " Plain films of the affected foot were unrevealing however patient has just returned from MRI and interpretation is pending.  Inflammatory markers are notably elevated.      She will be admitted to Hospital Medicine for ongoing management of her catheter associated urinary tract infection and possible right foot infection.          Overview/Hospital Course:  71 y.o. who was admitted to hospital medicine for acute metabolic encephalopathy in setting of UTI and osteomyelitis. Pt was AFVSS and hemodynamically stable. Leukocytosis resolved w/ initiation of IV abx. UA infectious and initially started on IV cefepime. UC grew ESBL and patient was transitioned to IV ertapenem started 3/21 (7 day course). MRI of R foot and toes revealed osteomyelitis of the right 4th and 5th distal metatarsals and phalanges. Cx growing MRSA started on IV vancomycin 3/18. ID following. Podiatry completed successful amputation on 3/24. Patient to discharge to SNF when medically stable.       Interval History: patient resting in bed, no complaints. Continue current care plan.     Review of Systems   Constitutional:  Negative for chills, fatigue and fever.   HENT: Negative.  Negative for congestion, facial swelling, rhinorrhea and sore throat.    Eyes: Negative.  Negative for photophobia, itching and visual disturbance.   Respiratory:  Negative for cough, chest tightness, shortness of breath and wheezing.    Cardiovascular:  Negative for chest pain, palpitations and leg swelling.   Gastrointestinal:  Negative for constipation, diarrhea, nausea and vomiting.   Genitourinary:  Positive for difficulty urinating (chronic delgado). Negative for dysuria, flank pain, frequency, hematuria and urgency.   Musculoskeletal:  Positive for arthralgias and gait problem. Negative for back pain and neck stiffness.   Skin:  Positive for color change and wound.   Neurological:  Positive for numbness. Negative for dizziness, seizures, weakness and light-headedness.    Psychiatric/Behavioral:  Negative for agitation, confusion and hallucinations. The patient is not nervous/anxious.    Objective:     Vital Signs (Most Recent):  Temp: 97.8 °F (36.6 °C) (03/26/22 1215)  Pulse: (!) 150 (03/26/22 1344)  Resp: 18 (03/26/22 1215)  BP: (!) 130/59 (03/26/22 1215)  SpO2: 99 % (03/26/22 1427)   Vital Signs (24h Range):  Temp:  [96.7 °F (35.9 °C)-98.9 °F (37.2 °C)] 97.8 °F (36.6 °C)  Pulse:  [] 150  Resp:  [16-19] 18  SpO2:  [98 %-100 %] 99 %  BP: (125-157)/(59-74) 130/59     Weight: 47.6 kg (104 lb 15 oz)  Body mass index is 16.44 kg/m².    Intake/Output Summary (Last 24 hours) at 3/26/2022 1439  Last data filed at 3/26/2022 1430  Gross per 24 hour   Intake 240 ml   Output 1425 ml   Net -1185 ml      Physical Exam  Vitals and nursing note reviewed.   Constitutional:       General: She is not in acute distress.     Appearance: Normal appearance. She is not ill-appearing, toxic-appearing or diaphoretic.   HENT:      Head: Normocephalic and atraumatic.      Mouth/Throat:      Mouth: Mucous membranes are moist.   Eyes:      General: No scleral icterus.     Conjunctiva/sclera: Conjunctivae normal.   Cardiovascular:      Rate and Rhythm: Normal rate and regular rhythm.      Pulses: Decreased pulses.   Pulmonary:      Effort: Pulmonary effort is normal. No respiratory distress.      Breath sounds: Normal breath sounds.   Abdominal:      General: There is no distension.      Palpations: Abdomen is soft.      Tenderness: There is no abdominal tenderness. There is no right CVA tenderness, left CVA tenderness or guarding.   Musculoskeletal:         General: Deformity (s/p amputation) present.      Right lower leg: No edema.      Left lower leg: No edema.      Comments: Right foot wrapped.  No ascending cellulitis/warmth  See Podiatry photos below   Skin:     General: Skin is warm and dry.   Neurological:      Mental Status: She is alert. Mental status is at baseline.   Psychiatric:         Mood  and Affect: Mood normal.         Behavior: Behavior normal.       Significant Labs: All pertinent labs within the past 24 hours have been reviewed.    Significant Imaging: I have reviewed all pertinent imaging results/findings within the past 24 hours.      Assessment/Plan:      * Osteomyelitis of right foot  Osteomyelitis of R 2nd toe  Critical lower limb ischemia    POD 1 s/p AMPUTATION, FOOT, PARTIAL 4th and 5th R  - MRI revealed Osteomyelitis involving the right 4th and 5th metatarsals distally and the phalanges of the 4th and 5th toes.   - XR R foot showed no acute bony abnormalities  - podiatry consulted, appreciate recs  -Will plan to repeat COVID testing for preop 3/23.    -Noninvasive vascular studies ordered, consult vascular surgery once resulted. Do not modify orders.   -Antibiotic plan per ID.    -WBAT LLE, WB to heel for transfers or short distances RLE.   -Arterial studies reviewed. RLE ABIs/ PVR waveforms suggest no evidence of PAD. Rt Great Toe: The PPG waveform is blunted with TBI of 0.16 and a toe pressure of 25 mmHg which suggests severe microvascular disease.   -Cx growing MRSA with sensitivity to vanc, will continue IV  -ID consulted, appreciate recs   -empiric Vancomycin and ertapenem (renal dosing). Monitor renal function closely  -Follow blood/urine cultures    Acute metabolic encephalopathy  Acute cystitis without hematuria    Improved, continue to monitor. Encephalopathy improved with goal directed medical therapy for infections  -Patient initiated on broad-spectrum antibiotics in the ED - vancomycin, ceftriaxone  -Patient has a history of pansensitive Klebsiella UTIs  -Nitrite positive urinalysis with gross parameters of infection on UA, urine culture growing ESCHERICHIA COLI ESBL   -Araiza catheter exchanged  -Blood cultures NGTD  - MRI of right foot reveals Osteomyelitis involving the right 4th and 5th metatarsals distally and the phalanges of the 4th and 5th toes ---  podiatry  following, see above  - Delirium precautions, neuro checks, aspiration precautions    Chronic kidney disease, stage 4 (severe)  Improved   - hold ACE-I or ARB while renal function dynamic  - Monitor UOP and follow serial BMP   - Adjust drugs to GFR/CrCL; avoid nephrotoxic drugs   -  Estimated Creatinine Clearance: 35.8 mL/min (based on SCr of 1.1 mg/dL).   - Monitor electrolytes, phos levels daily    Hypertension  - amLODIPine (NORVASC) 5 MG tablet daily   - carvedilol (COREG) 25 MG tablet BID    Type 2 diabetes mellitus, with long-term current use of insulin  Lab Results   Component Value Date    HGBA1C 5.4 11/21/2021     - BG goal 140 - 180   - inpatient regimen: detemir 3 U  - low dose SSI, ACHS acchuchecks  - Diabetic diet 2000 calories   - monitor for hypoglycemia    Peripheral neuropathy  -home gabapentin 300 mg once daily      Debility  - PTOT s/p surgery     Urinary retention  - Patient with history of urinary retention and Araiza catheter in place since at least January of 2022  - Araiza catheter last exchanged 02/22/2022  - Araiza catheter exchanged in ED  - Patient continues Flomax per home dose    Hypothyroidism  - continue levothyroxine    Severe protein-calorie malnutrition  -registered dietitian consult      History of stroke  -continue home asa and plavix      Hyperlipidemia  - Atorvastatin 80 formulary substitution for home Crestor 40    VTE Risk Mitigation (From admission, onward)         Ordered     heparin (porcine) injection 5,000 Units  Every 8 hours         03/18/22 2111     IP VTE HIGH RISK PATIENT  Once         03/18/22 2110     Place sequential compression device  Until discontinued         03/18/22 2110                Discharge Planning   MARY: 3/29/2022     Code Status: Full Code   Is the patient medically ready for discharge?: No    Reason for patient still in hospital (select all that apply): Patient trending condition, Laboratory test, Treatment and Pending disposition  Discharge Plan A:  Skilled Nursing Facility   Discharge Delays: None known at this time              Laxmi Hernandez PA-C  Department of Hospital Medicine   [facility] - Telemetry Stepdown ([place]-[facility])

## 2022-03-27 NOTE — PROGRESS NOTES
"[facility] - Telemetry Stepdown ([address] Medicine  Progress Note    Patient Name: Beatriz Adame  MRN: [id number]  Patient Class: IP- Inpatient   Admission Date: 3/18/2022  Length of Stay: 6 days  Attending Physician: Jessica Rocha MD  Primary Care Provider: Dante Olivier MD        Subjective:     Principal Problem:Osteomyelitis of right foot        HPI:  Patient is a 71 year old female with osteoarthritis, hypothyroidism, CVA with left-sided residual deficit, DM2, HTN, HLD, CKD, chronic anemia, recurrent pseudomonal UTI, nephroliathiasis s/p left ureteral stent placement in 10/2021, urinary retention with chronic indwelling delgado catheter, diabetic foot ulcer s/p R toe amputation, chronic anemia, who presents with acute metabolic encephalopathy secondary to catheter associated urinary tract infection and possible right toe infection.   She is being admitted for [facility].     Initially in ED patient was found to have a white count of 13,000 and a left shift / neutrophilic predominance.  While not overtly septic, sepsis protocol was initiated - patient pancultured and broad-spectrum antibiotics with vancomycin and ceftriaxone was initiated.  Vital signs were within normal limits however blood pressure was at the lower range of normal for this patient with a systolic blood pressure of 110.  Lactic acid was obtained and was within normal limits.     Patient is alert on my examination she is able to describe that she is at the hospital for a catheter exchange."  The her catheter has been exchanged in the ED and she has had the benefit of broad-spectrum antibiotics since her admission there.  It is clear that her encephalopathy has already improved with the management of her very obvious catheter associated urinary tract infection.  The ED provider also had a concern for a right 5th toe infection - which appears to be chronically devascularized and gangrenous on examination.  " Plain films of the affected foot were unrevealing however patient has just returned from MRI and interpretation is pending.  Inflammatory markers are notably elevated.      She will be admitted to Hospital Medicine for ongoing management of her catheter associated urinary tract infection and possible right foot infection.          Overview/Hospital Course:  71 y.o. who was admitted to hospital medicine for acute metabolic encephalopathy in setting of UTI and osteomyelitis. Pt was AFVSS and hemodynamically stable. Leukocytosis resolved w/ initiation of IV abx. UA infectious and initially started on IV cefepime. UC grew ESBL and patient was transitioned to IV ertapenem started 3/21 (7 day course). MRI of R foot and toes revealed osteomyelitis of the right 4th and 5th distal metatarsals and phalanges. Cx growing MRSA started on IV vancomycin 3/18. ID following. Podiatry completed successful amputation on 3/24. Patient to discharge to SNF when medically stable.       Interval History: NAEON. Patient sleeping, easily aroused. No complaints.     Review of Systems   Constitutional:  Negative for chills, fatigue and fever.   HENT: Negative.  Negative for congestion, facial swelling, rhinorrhea and sore throat.    Eyes: Negative.  Negative for photophobia, itching and visual disturbance.   Respiratory:  Negative for cough, chest tightness, shortness of breath and wheezing.    Cardiovascular:  Negative for chest pain, palpitations and leg swelling.   Gastrointestinal:  Negative for constipation, diarrhea, nausea and vomiting.   Genitourinary:  Positive for difficulty urinating (chronic delgado). Negative for dysuria, flank pain, frequency, hematuria and urgency.   Musculoskeletal:  Positive for arthralgias and gait problem. Negative for back pain and neck stiffness.   Skin:  Positive for color change and wound.   Neurological:  Positive for numbness. Negative for dizziness, seizures, weakness and light-headedness.    Psychiatric/Behavioral:  Negative for agitation, confusion and hallucinations. The patient is not nervous/anxious.    Objective:     Vital Signs (Most Recent):  Temp: 98.3 °F (36.8 °C) (03/27/22 1622)  Pulse: 79 (03/27/22 1622)  Resp: 16 (03/27/22 1622)  BP: (!) 115/58 (03/27/22 1622)  SpO2: 98 % (03/27/22 1622)   Vital Signs (24h Range):  Temp:  [97.5 °F (36.4 °C)-99.1 °F (37.3 °C)] 98.3 °F (36.8 °C)  Pulse:  [74-91] 79  Resp:  [14-18] 16  SpO2:  [97 %-99 %] 98 %  BP: (115-141)/(58-64) 115/58     Weight: 47.6 kg (104 lb 15 oz)  Body mass index is 16.44 kg/m².    Intake/Output Summary (Last 24 hours) at 3/27/2022 1647  Last data filed at 3/27/2022 0614  Gross per 24 hour   Intake 190 ml   Output 321 ml   Net -131 ml      Physical Exam  Vitals and nursing note reviewed.   Constitutional:       General: She is not in acute distress.     Appearance: Normal appearance. She is not ill-appearing, toxic-appearing or diaphoretic.   HENT:      Head: Normocephalic and atraumatic.      Mouth/Throat:      Mouth: Mucous membranes are moist.   Eyes:      General: No scleral icterus.     Conjunctiva/sclera: Conjunctivae normal.   Cardiovascular:      Rate and Rhythm: Normal rate and regular rhythm.      Pulses: Decreased pulses.   Pulmonary:      Effort: Pulmonary effort is normal. No respiratory distress.      Breath sounds: Normal breath sounds.   Abdominal:      General: There is no distension.      Palpations: Abdomen is soft.      Tenderness: There is no abdominal tenderness. There is no right CVA tenderness, left CVA tenderness or guarding.   Musculoskeletal:         General: Deformity (s/p amputation) present.      Right lower leg: No edema.      Left lower leg: No edema.      Comments: Right foot wrapped.  No ascending cellulitis/warmth  See Podiatry photos below   Skin:     General: Skin is warm and dry.   Neurological:      Mental Status: She is alert. Mental status is at baseline.   Psychiatric:         Mood and  Affect: Mood normal.         Behavior: Behavior normal.       Significant Labs: All pertinent labs within the past 24 hours have been reviewed.    Significant Imaging: I have reviewed all pertinent imaging results/findings within the past 24 hours.      Assessment/Plan:      * Osteomyelitis of right foot  Osteomyelitis of R 2nd toe  Critical lower limb ischemia     s/p AMPUTATION, FOOT, PARTIAL 4th and 5th R  - MRI revealed Osteomyelitis involving the right 4th and 5th metatarsals distally and the phalanges of the 4th and 5th toes.   - XR R foot showed no acute bony abnormalities  - podiatry consulted, appreciate recs  -Will plan to repeat COVID testing for preop 3/23.    -Noninvasive vascular studies ordered, consult vascular surgery once resulted. Do not modify orders.   -Antibiotic plan per ID.    -WBAT LLE, WB to heel for transfers or short distances RLE.   -Arterial studies reviewed. RLE ABIs/ PVR waveforms suggest no evidence of PAD. Rt Great Toe: The PPG waveform is blunted with TBI of 0.16 and a toe pressure of 25 mmHg which suggests severe microvascular disease.   -Cx growing MRSA with sensitivity to vanc, will continue IV  -ID consulted, appreciate recs   -empiric Vancomycin and ertapenem (renal dosing). Monitor renal function closely  -Follow blood/urine cultures    Acute metabolic encephalopathy  Acute cystitis without hematuria    Improved, continue to monitor. Encephalopathy improved with goal directed medical therapy for infections  -Patient initiated on broad-spectrum antibiotics in the ED - vancomycin, ceftriaxone  -Patient has a history of pansensitive Klebsiella UTIs  -Nitrite positive urinalysis with gross parameters of infection on UA, urine culture growing ESCHERICHIA COLI ESBL   -Araiza catheter exchanged  -Blood cultures NGTD  - MRI of right foot reveals Osteomyelitis involving the right 4th and 5th metatarsals distally and the phalanges of the 4th and 5th toes ---  podiatry following, see  above  - Delirium precautions, neuro checks, aspiration precautions    Chronic kidney disease, stage 4 (severe)  Improved   - hold ACE-I or ARB while renal function dynamic  - Monitor UOP and follow serial BMP   - Adjust drugs to GFR/CrCL; avoid nephrotoxic drugs   -  Estimated Creatinine Clearance: 35.8 mL/min (based on SCr of 1.1 mg/dL).   - Monitor electrolytes, phos levels daily    Hypertension  - amLODIPine (NORVASC) 5 MG tablet daily   - carvedilol (COREG) 25 MG tablet BID    Type 2 diabetes mellitus, with long-term current use of insulin  Lab Results   Component Value Date    HGBA1C 5.4 11/21/2021     - BG goal 140 - 180   - inpatient regimen: detemir 3 U  - low dose SSI, ACHS acchuchecks  - Diabetic diet 2000 calories   - monitor for hypoglycemia    Peripheral neuropathy  -home gabapentin 300 mg once daily      Debility  - PTOT s/p surgery     Urinary retention  - Patient with history of urinary retention and Araiza catheter in place since at least January of 2022  - Araiza catheter last exchanged 02/22/2022  - Araiza catheter exchanged in ED  - Patient continues Flomax per home dose    Hypothyroidism  - continue levothyroxine    Severe protein-calorie malnutrition  -registered dietitian consult      History of stroke  -continue home asa and plavix      Hyperlipidemia  - Atorvastatin 80 formulary substitution for home Crestor 40    VTE Risk Mitigation (From admission, onward)         Ordered     heparin (porcine) injection 5,000 Units  Every 8 hours         03/18/22 2111     IP VTE HIGH RISK PATIENT  Once         03/18/22 2110     Place sequential compression device  Until discontinued         03/18/22 2110                Discharge Planning   MARY: 3/29/2022     Code Status: Full Code   Is the patient medically ready for discharge?: No    Reason for patient still in hospital (select all that apply): Treatment and Pending disposition  Discharge Plan A: Skilled Nursing Facility   Discharge Delays: None known at  this time              Laxmi Hernandez PA-C  Department of Hospital Medicine   [facility] - Telemetry Stepdown ([place]-[facility])

## 2022-03-27 NOTE — SUBJECTIVE & OBJECTIVE
Interval History: NAEON. Patient sleeping, easily aroused. No complaints.     Review of Systems   Constitutional:  Negative for chills, fatigue and fever.   HENT: Negative.  Negative for congestion, facial swelling, rhinorrhea and sore throat.    Eyes: Negative.  Negative for photophobia, itching and visual disturbance.   Respiratory:  Negative for cough, chest tightness, shortness of breath and wheezing.    Cardiovascular:  Negative for chest pain, palpitations and leg swelling.   Gastrointestinal:  Negative for constipation, diarrhea, nausea and vomiting.   Genitourinary:  Positive for difficulty urinating (chronic delgado). Negative for dysuria, flank pain, frequency, hematuria and urgency.   Musculoskeletal:  Positive for arthralgias and gait problem. Negative for back pain and neck stiffness.   Skin:  Positive for color change and wound.   Neurological:  Positive for numbness. Negative for dizziness, seizures, weakness and light-headedness.   Psychiatric/Behavioral:  Negative for agitation, confusion and hallucinations. The patient is not nervous/anxious.    Objective:     Vital Signs (Most Recent):  Temp: 98.3 °F (36.8 °C) (03/27/22 1622)  Pulse: 79 (03/27/22 1622)  Resp: 16 (03/27/22 1622)  BP: (!) 115/58 (03/27/22 1622)  SpO2: 98 % (03/27/22 1622)   Vital Signs (24h Range):  Temp:  [97.5 °F (36.4 °C)-99.1 °F (37.3 °C)] 98.3 °F (36.8 °C)  Pulse:  [74-91] 79  Resp:  [14-18] 16  SpO2:  [97 %-99 %] 98 %  BP: (115-141)/(58-64) 115/58     Weight: 47.6 kg (104 lb 15 oz)  Body mass index is 16.44 kg/m².    Intake/Output Summary (Last 24 hours) at 3/27/2022 1647  Last data filed at 3/27/2022 0614  Gross per 24 hour   Intake 190 ml   Output 321 ml   Net -131 ml      Physical Exam  Vitals and nursing note reviewed.   Constitutional:       General: She is not in acute distress.     Appearance: Normal appearance. She is not ill-appearing, toxic-appearing or diaphoretic.   HENT:      Head: Normocephalic and atraumatic.       Mouth/Throat:      Mouth: Mucous membranes are moist.   Eyes:      General: No scleral icterus.     Conjunctiva/sclera: Conjunctivae normal.   Cardiovascular:      Rate and Rhythm: Normal rate and regular rhythm.      Pulses: Decreased pulses.   Pulmonary:      Effort: Pulmonary effort is normal. No respiratory distress.      Breath sounds: Normal breath sounds.   Abdominal:      General: There is no distension.      Palpations: Abdomen is soft.      Tenderness: There is no abdominal tenderness. There is no right CVA tenderness, left CVA tenderness or guarding.   Musculoskeletal:         General: Deformity (s/p amputation) present.      Right lower leg: No edema.      Left lower leg: No edema.      Comments: Right foot wrapped.  No ascending cellulitis/warmth  See Podiatry photos below   Skin:     General: Skin is warm and dry.   Neurological:      Mental Status: She is alert. Mental status is at baseline.   Psychiatric:         Mood and Affect: Mood normal.         Behavior: Behavior normal.       Significant Labs: All pertinent labs within the past 24 hours have been reviewed.    Significant Imaging: I have reviewed all pertinent imaging results/findings within the past 24 hours.

## 2022-03-27 NOTE — ASSESSMENT & PLAN NOTE
Osteomyelitis of R 2nd toe  Critical lower limb ischemia     s/p AMPUTATION, FOOT, PARTIAL 4th and 5th R  - MRI revealed Osteomyelitis involving the right 4th and 5th metatarsals distally and the phalanges of the 4th and 5th toes.   - XR R foot showed no acute bony abnormalities  - podiatry consulted, appreciate recs  -Will plan to repeat COVID testing for preop 3/23.    -Noninvasive vascular studies ordered, consult vascular surgery once resulted. Do not modify orders.   -Antibiotic plan per ID.    -WBAT LLE, WB to heel for transfers or short distances RLE.   -Arterial studies reviewed. RLE ABIs/ PVR waveforms suggest no evidence of PAD. Rt Great Toe: The PPG waveform is blunted with TBI of 0.16 and a toe pressure of 25 mmHg which suggests severe microvascular disease.   -Cx growing MRSA with sensitivity to vanc, will continue IV  -ID consulted, appreciate recs   -empiric Vancomycin and ertapenem (renal dosing). Monitor renal function closely  -Follow blood/urine cultures

## 2022-03-27 NOTE — NURSING
Pt lying supine in bed with eyes open. Resp even and unlabored. Cont to encourage TCDB exercises. Bandage intact to left foot at present. Cont wound care as ordered. No s/s of bleeding or drainage noted to right foot at present. Pt alert and verbally responsive to staff. Requires total assistance with ADLs and set up with meals. Pt awake and alert more today than yesterday. No s/s of adverse reactions from ABX therapy. Vanc d/c'd this shift. Trough 22.3 prior to AM dose. Araiza cath patent and intact without kinks noted. Yellow urine noted with cloudy sediment at times in drainage line noted. Call light within reach. Pt son and granddtr visited today. Pt son verbalizes understanding of pt care, ABX therapy, post op care. Call light within reach. Will cont to monitor.

## 2022-03-27 NOTE — PROCEDURES
"Beatriz Adame is a 71 y.o. female patient.    Temp: 97.5 °F (36.4 °C) (03/26/22 1647)  Pulse: 79 (03/26/22 1900)  Resp: 18 (03/26/22 1647)  BP: 131/62 (03/26/22 1647)  SpO2: 98 % (03/26/22 1834)  Weight: 47.6 kg (104 lb 15 oz) (03/21/22 1300)  Height: 5' 7" (170.2 cm) (03/19/22 0048)    PICC  Date/Time: 3/26/2022 7:05 PM  Performed by: Ambrocio Mahoney RN  Consent Done: Yes  Time out: Immediately prior to procedure a time out was called to verify the correct patient, procedure, equipment, support staff and site/side marked as required  Indications: med administration and vascular access  Anesthesia: local infiltration  Local anesthetic: lidocaine 1% without epinephrine  Anesthetic Total (mL): 2  Preparation: skin prepped with chlorhexidine (without alcohol)  Skin prep agent dried: skin prep agent completely dried prior to procedure  Sterile barriers: all five maximum sterile barriers used - cap, mask, sterile gown, sterile gloves, and large sterile sheet  Hand hygiene: hand hygiene performed prior to central venous catheter insertion  Location details: right basilic  Catheter type: double lumen  Catheter size: 5 Fr  Catheter Length: 33 (1 cm external (33 cm total))cm    Ultrasound guidance: yes  Vessel Caliber: medium and patent, compressibility normal  Vascular Doppler: not done  Needle advanced into vessel with real time Ultrasound guidance.  Guidewire confirmed in vessel.  Sterile sheath used.  no esophageal manometryNumber of attempts: 1  Post-procedure: blood return through all ports, chlorhexidine patch and sterile dressing applied            Name Ambrocio Mahoney RN  3/26/2022    "

## 2022-03-27 NOTE — PLAN OF CARE
Patient Alert x 4, but sleepy. She denies pain.  Slept without distress. Oral intake encouraged but still poor.    Problem: Oral Intake Inadequate (Acute Kidney Injury/Impairment)  Goal: Optimal Nutrition Intake  Outcome: Ongoing, Progressing     Problem: Impaired Wound Healing  Goal: Optimal Wound Healing  Outcome: Ongoing, Progressing

## 2022-03-27 NOTE — PROGRESS NOTES
Pharmacokinetic Assessment Follow Up: IV Vancomycin    Vancomycin serum concentration assessment(s):    The trough level was drawn correctly and can be used to guide therapy at this time. The measurement is above the desired definitive target range of 15 to 20 mcg/mL.    Vancomycin Regimen Plan:    Discontinue the scheduled vancomycin regimen and re-dose when the random level is less than 20 mcg/mL, next level to be drawn at 20:00 on 3/27/22.    Drug levels (last 3 results):  Recent Labs   Lab Result Units 03/24/22  1327 03/27/22  0806   Vancomycin, Random ug/mL 19.9  --    Vancomycin-Trough ug/mL  --  22.3*       Pharmacy will continue to follow and monitor vancomycin.    Please contact pharmacy at extension [phone] for questions regarding this assessment.    Thank you for the consult,   Wilver Valdes       Patient brief summary:  Beatriz Adame is a 71 y.o. female initiated on antimicrobial therapy with IV Vancomycin for treatment of bone/joint infection    The patient's current regimen is Vancomycin 500 mg IV every 24 hours    Drug Allergies:   Review of patient's allergies indicates:   Allergen Reactions    Tomato (solanum lycopersicum) Hives and Itching       Actual Body Weight:   47.6 kg    Renal Function:   Estimated Creatinine Clearance: 28.1 mL/min (based on SCr of 1.4 mg/dL).,     Dialysis Method (if applicable):  N/A    CBC (last 72 hours):  Recent Labs   Lab Result Units 03/25/22  0843 03/26/22  0329 03/27/22  0806   WBC K/uL 10.63 11.32 11.87   Hemoglobin g/dL 8.3* 7.6* 7.9*   Hemoglobin A1C %  --  4.8  --    Hematocrit % 28.7* 24.6* 25.8*   Platelets K/uL 183 147* 168   Gran % % 69.6 61.9 69.3   Lymph % % 19.2 24.2 20.5   Mono % % 8.7 11.5 8.4   Eosinophil % % 1.7 1.6 1.1   Basophil % % 0.5 0.4 0.3   Differential Method  Automated Automated Automated       Metabolic Panel (last 72 hours):  Recent Labs   Lab Result Units 03/25/22  0843 03/26/22  0329 03/27/22  0806   Sodium mmol/L 138 138 141   Potassium  mmol/L 3.5 3.5 3.8   Chloride mmol/L 106 105 110   CO2 mmol/L 24 25 22*   Glucose mg/dL 97 96 87   BUN mg/dL 22 29* 27*   Creatinine mg/dL 1.1 1.3 1.4   Albumin g/dL 2.4*  --  2.1*   Total Bilirubin mg/dL 0.3  --  0.3   Alkaline Phosphatase U/L 74  --  72   AST U/L 10  --  15   ALT U/L 7*  --  7*   Magnesium mg/dL 2.1  --  1.9       Vancomycin Administrations:  vancomycin given in the last 96 hours                   vancomycin 500 mg in dextrose 5 % 100 mL IVPB (ready to mix system) (mg) 500 mg New Bag 03/26/22 0957     500 mg New Bag 03/25/22 0819    vancomycin 500 mg in dextrose 5 % 100 mL IVPB (ready to mix system) (mg) 500 mg New Bag 03/23/22 1544                Microbiologic Results:  Microbiology Results (last 7 days)     Procedure Component Value Units Date/Time    Aerobic culture [[id number]]  (Abnormal)  (Susceptibility) Collected: 03/24/22 1538    Order Status: Completed Specimen: Bone from Foot, Right Updated: 03/26/22 1424     Aerobic Bacterial Culture METHICILLIN RESISTANT STAPHYLOCOCCUS AUREUS  Few      Narrative:      5th metatarsal bone clean margins    Aerobic culture [[id number]]  (Abnormal)  (Susceptibility) Collected: 03/24/22 1538    Order Status: Completed Specimen: Bone from Foot, Right Updated: 03/26/22 1423     Aerobic Bacterial Culture METHICILLIN RESISTANT STAPHYLOCOCCUS AUREUS  Few      Narrative:      4th metatarsal bone clean margins    AFB Culture & Smear [[id number]] Collected: 03/24/22 1538    Order Status: Completed Specimen: Bone from Foot, Right Updated: 03/25/22 2127     AFB Culture & Smear Culture in progress     AFB CULTURE STAIN No acid fast bacilli seen.    Narrative:      4th metatarsal bone clean margins    AFB Culture & Smear [[id number]] Collected: 03/24/22 1538    Order Status: Completed Specimen: Bone from Foot, Right Updated: 03/25/22 2127     AFB Culture & Smear Culture in progress     AFB CULTURE STAIN No acid fast bacilli seen.    Narrative:      5th metatarsal bone  clean margins    Culture, Anaerobe [[id number]] Collected: 03/24/22 1538    Order Status: Completed Specimen: Bone from Foot, Right Updated: 03/25/22 0747     Anaerobic Culture Culture in progress    Narrative:      4th metatarsal bone clean margins    Culture, Anaerobe [[id number]] Collected: 03/24/22 1538    Order Status: Completed Specimen: Bone from Foot, Right Updated: 03/25/22 0747     Anaerobic Culture Culture in progress    Narrative:      5th metatarsal bone clean margins    Gram stain [[id number]] Collected: 03/24/22 1538    Order Status: Completed Specimen: Bone from Foot, Right Updated: 03/24/22 1652     Gram Stain Result No WBC's      No organisms seen    Narrative:      5th metatarsal bone clean margins    Gram stain [[id number]] Collected: 03/24/22 1538    Order Status: Completed Specimen: Bone from Foot, Right Updated: 03/24/22 1650     Gram Stain Result No WBC's      No organisms seen    Narrative:      4th metatarsal bone clean margins    Fungus culture [[id number]] Collected: 03/24/22 1538    Order Status: Sent Specimen: Bone from Foot, Right Updated: 03/24/22 1553    Fungus culture [[id number]] Collected: 03/24/22 1538    Order Status: Sent Specimen: Bone from Foot, Right Updated: 03/24/22 1548    Blood culture x two cultures. Draw prior to antibiotics. [[id number]] Collected: 03/18/22 1641    Order Status: Completed Specimen: Blood from Peripheral, Antecubital, Right Updated: 03/23/22 2012     Blood Culture, Routine No growth after 5 days.    Narrative:      Aerobic and anaerobic    Blood culture x two cultures. Draw prior to antibiotics. [[id number]] Collected: 03/18/22 1641    Order Status: Completed Specimen: Blood from Peripheral, Antecubital, Right Updated: 03/23/22 2012     Blood Culture, Routine No growth after 5 days.    Narrative:      Aerobic and anaerobic    Aerobic culture [[id number]]  (Abnormal)  (Susceptibility) Collected: 03/18/22 1620    Order Status: Completed Specimen: Wound from Toe,  Right Foot Updated: 03/21/22 1226     Aerobic Bacterial Culture METHICILLIN RESISTANT STAPHYLOCOCCUS AUREUS  Many      Urine culture [[id number]]  (Abnormal)  (Susceptibility) Collected: 03/18/22 1659    Order Status: Completed Specimen: Urine Updated: 03/21/22 0736     Urine Culture, Routine ESCHERICHIA COLI ESBL  > 100,000 cfu/ml      Narrative:      Specimen Source->Urine

## 2022-03-28 NOTE — ASSESSMENT & PLAN NOTE
Acute cystitis without hematuria    Improved, continue to monitor. Encephalopathy improved with goal directed medical therapy for infections  - Patient initiated on broad-spectrum antibiotics in the ED - vancomycin, ceftriaxone  - transitioned and completed 7 day course of ertapenam  - Patient has a history of pansensitive Klebsiella UTIs  - Nitrite positive urinalysis with gross parameters of infection on UA, urine culture growing ESCHERICHIA COLI ESBL   - Araiza catheter exchanged  - Blood cultures NGTD  - MRI of right foot reveals Osteomyelitis involving the right 4th and 5th metatarsals distally and the phalanges of the 4th and 5th toes ---  podiatry following, see above  - Delirium precautions, neuro checks, aspiration precautions

## 2022-03-28 NOTE — PROGRESS NOTES
[facility] - Telemetry Stepdown ([place] [id number])  Infectious Disease  Progress Note    Patient Name: Beatriz Adame  MRN: [id number]  Admission Date: 3/18/2022  Length of Stay: 7 days  Attending Physician: Jessica Rocha MD  Primary Care Provider: Dante Olivier MD    Isolation Status: Contact  Assessment/Plan:      * Osteomyelitis of right foot     71 year old female with CVA, DM2, HTN, CKD, nephroliathiasis s/p left ureteral stent placement, urinary retention managed with chronic indwelling delgado, recurrent UTIs, right second toe osteomyelitis s/p R toe amputation 1/2022 who [place] [facility] [place] [facility] with acute metabolic encephalopathy likely secondary to UTI and right foot infection.       Delgado exchanged in ED. Urine cx + for ESBL E Coli. On Ertapenem x 7 days.     Noted to have gangrenous changes of her right fifth toe and interdigit wound between toes 4/5 and MRI right foot is concerning for osteomyelitis of the 4th and 5th digits.  Wound cultures + for MRSA.  Vascular studies reviewed. No significant PAD. Podiatry performed right foot partial 4th and 5th ray amputations on 3/24. Clean margin bone cultures are also positive for MRSA.     Patient is is on Vancomycin. Afebrile, no leukocytosis, and HDS. Mental status improved. Renal function worsened, likely due to supratherapeutic van levels. Will change to Daptomycin given plan for prolonged therapy,  difficulty achieving a steady therapeutic Vanc level and decline in renal function.          Outpatient Antibiotic Therapy Plan:    Please send referral to [facility].    1) Infection: MRSA osteomyelitis     2) Discharge Antibiotics:    Intravenous antibiotics:  · Daptomycin 6 mg/kg IV q 48 hours (renal dosing). Please hold statin while on Daptomycin.      3) Therapy Duration:  6 weeks    Estimated end date of IV antibiotics: 5/5/22    4) Outpatient Weekly Labs:    Order the following labs to be drawn on  Mondays:    CBC   CMP    ESR    CRP   CPK    5) Fax Lab Results to Infectious Diseases Provider: Olga Lidia Sood    [facility] Fax Number: [phone]    6) Outpatient Infectious Diseases Follow-up     Follow-up appointment will be arranged by the ID clinic and will be found in the patient's appointments tab.     Prior to discharge, please ensure the patient's follow-up has been scheduled.     If there is still no follow-up scheduled prior to discharge, please send an EPIC message to Khloe Stoll in Infectious Diseases.     Discussed plan with ID staff and hospital medicine. ID will sign off.                Please call  or secure chat for any questions. Thank you.  Olga Lidia Sood PA-C  ID [id number] Spectra: [id number]    Subjective:     Principal Problem:Osteomyelitis of right foot    HPI: Ms. Adame is a 71 year old female with osteoarthritis, hypothyroidism, CVA with left-sided residual deficit, DM2, HTN, HLD, CKD, nephroliathiasis s/p left ureteral stent placement in 10/2021, urinary retention managed with chronic indwelling delgado, recurrent pseudomonal UTI, right second toe osteomyelitis s/p R toe amputation 1/2022 who presented to [facility] from [facility] with acute metabolic encephalopathy secondary to catheter associated urinary tract infection and possible right toe infection.     Afebrile in the ED. WBC 13K. Inflammatory markers elevated. Cr 2.3 lactate WNL. UA positive for pyuria. Delgado exchanged in ED. Started empiric Vanc/Cefepime. Blood/urine cultures are pending. Noted to have gangrenous changes of her right fifth toe. MRI right foot reviewed and is concerning for osteomyelitis of the 4th and 5th distal metatarsals and phalanges.  ID consulted for antibiotic recommendations. Podiatry consulted. Patient mentation has improved.    Interval History: NAEON. Afebrile. No leukocytosis. Cr 1 > 1.5, likely due to supratherapeutic vanc levels. No new complaints today.Clean margin bone cultures are  positive for MRSA    Review of Systems   Constitutional:  Negative for chills, diaphoresis and fever.   HENT: Negative.     Eyes: Negative.    Respiratory:  Negative for cough and shortness of breath.    Cardiovascular:  Negative for chest pain and leg swelling.   Gastrointestinal:  Negative for abdominal pain, diarrhea, nausea and vomiting.   Genitourinary:  Positive for difficulty urinating (chronic delgado). Negative for dysuria and flank pain.   Musculoskeletal:  Positive for arthralgias and gait problem.   Skin:  Positive for color change and wound.   Neurological:  Positive for numbness. Negative for headaches.   Psychiatric/Behavioral:  Negative for agitation and confusion. The patient is not nervous/anxious.    Objective:     Vital Signs (Most Recent):  Temp: 98.1 °F (36.7 °C) (03/28/22 1156)  Pulse: 77 (03/28/22 1309)  Resp: 13 (03/28/22 1309)  BP: (!) 123/58 (03/28/22 1156)  SpO2: 100 % (03/28/22 1309)   Vital Signs (24h Range):  Temp:  [97 °F (36.1 °C)-99.2 °F (37.3 °C)] 98.1 °F (36.7 °C)  Pulse:  [77-86] 77  Resp:  [13-18] 13  SpO2:  [95 %-100 %] 100 %  BP: (115-135)/(58-64) 123/58     Weight: 47.6 kg (104 lb 15 oz)  Body mass index is 16.44 kg/m².    Estimated Creatinine Clearance: 26.2 mL/min (A) (based on SCr of 1.5 mg/dL (H)).    Physical Exam  Vitals and nursing note reviewed.   Constitutional:       General: She is not in acute distress.     Appearance: Normal appearance. She is not ill-appearing, toxic-appearing or diaphoretic.   HENT:      Head: Normocephalic and atraumatic.      Mouth/Throat:      Mouth: Mucous membranes are dry.   Eyes:      General: No scleral icterus.     Conjunctiva/sclera: Conjunctivae normal.   Cardiovascular:      Rate and Rhythm: Normal rate and regular rhythm.      Pulses: Decreased pulses.   Pulmonary:      Effort: Pulmonary effort is normal. No respiratory distress.      Breath sounds: Normal breath sounds.   Abdominal:      General: There is no distension.       Palpations: Abdomen is soft.      Tenderness: There is no abdominal tenderness. There is no guarding.   Musculoskeletal:         General: Deformity (right 2nd toe amputation) present.      Right lower leg: No edema.      Left lower leg: No edema.      Comments: Right foot wrapped.  No ascending cellulitis/warmth  See Podiatry photos below   Skin:     General: Skin is warm and dry.   Neurological:      Mental Status: She is alert.      Comments: Alert/conversant   Psychiatric:         Mood and Affect: Mood normal.         Behavior: Behavior normal.     Post op        Pre-op              Significant Labs: Blood Culture:   Recent Labs   Lab 12/30/21  1523 12/30/21  1536 03/18/22  1641   LABBLOO No growth after 5 days. No growth after 5 days. No growth after 5 days.  No growth after 5 days.       BMP:   Recent Labs   Lab 03/27/22  0806 03/28/22  1123   GLU 87 103    140   K 3.8 3.6    109   CO2 22* 25   BUN 27* 28*   CREATININE 1.4 1.5*   CALCIUM 9.1 9.0   MG 1.9  --        CBC:   Recent Labs   Lab 03/27/22  0806   WBC 11.87   HGB 7.9*   HCT 25.8*          CMP:   Recent Labs   Lab 03/27/22  0806 03/28/22  1123    140   K 3.8 3.6    109   CO2 22* 25   GLU 87 103   BUN 27* 28*   CREATININE 1.4 1.5*   CALCIUM 9.1 9.0   PROT 6.7  --    ALBUMIN 2.1*  --    BILITOT 0.3  --    ALKPHOS 72  --    AST 15  --    ALT 7*  --    ANIONGAP 9 6*   EGFRNONAA 38.1* 35.0*       Microbiology Results (last 7 days)       Procedure Component Value Units Date/Time    Aerobic culture [[id number]]  (Abnormal)  (Susceptibility) Collected: 03/24/22 1538    Order Status: Completed Specimen: Bone from Foot, Right Updated: 03/28/22 1001     Aerobic Bacterial Culture METHICILLIN RESISTANT STAPHYLOCOCCUS AUREUS  Few      Narrative:      5th metatarsal bone clean margins    Culture, Anaerobe [[id number]] Collected: 03/24/22 1538    Order Status: Completed Specimen: Bone from Foot, Right Updated: 03/28/22 0735     Anaerobic  Culture No anaerobes isolated    Narrative:      5th metatarsal bone clean margins    Culture, Anaerobe [[id number]] Collected: 03/24/22 1538    Order Status: Completed Specimen: Bone from Foot, Right Updated: 03/28/22 0734     Anaerobic Culture No anaerobes isolated    Narrative:      4th metatarsal bone clean margins    Aerobic culture [[id number]]  (Abnormal)  (Susceptibility) Collected: 03/24/22 1538    Order Status: Completed Specimen: Bone from Foot, Right Updated: 03/26/22 1423     Aerobic Bacterial Culture METHICILLIN RESISTANT STAPHYLOCOCCUS AUREUS  Few      Narrative:      4th metatarsal bone clean margins    AFB Culture & Smear [[id number]] Collected: 03/24/22 1538    Order Status: Completed Specimen: Bone from Foot, Right Updated: 03/25/22 2127     AFB Culture & Smear Culture in progress     AFB CULTURE STAIN No acid fast bacilli seen.    Narrative:      4th metatarsal bone clean margins    AFB Culture & Smear [[id number]] Collected: 03/24/22 1538    Order Status: Completed Specimen: Bone from Foot, Right Updated: 03/25/22 2127     AFB Culture & Smear Culture in progress     AFB CULTURE STAIN No acid fast bacilli seen.    Narrative:      5th metatarsal bone clean margins    Gram stain [[id number]] Collected: 03/24/22 1538    Order Status: Completed Specimen: Bone from Foot, Right Updated: 03/24/22 1652     Gram Stain Result No WBC's      No organisms seen    Narrative:      5th metatarsal bone clean margins    Gram stain [[id number]] Collected: 03/24/22 1538    Order Status: Completed Specimen: Bone from Foot, Right Updated: 03/24/22 1650     Gram Stain Result No WBC's      No organisms seen    Narrative:      4th metatarsal bone clean margins    Fungus culture [[id number]] Collected: 03/24/22 1538    Order Status: Sent Specimen: Bone from Foot, Right Updated: 03/24/22 1553    Fungus culture [[id number]] Collected: 03/24/22 1538    Order Status: Sent Specimen: Bone from Foot, Right Updated: 03/24/22 1548     Blood culture x two cultures. Draw prior to antibiotics. [[id number]] Collected: 03/18/22 1641    Order Status: Completed Specimen: Blood from Peripheral, Antecubital, Right Updated: 03/23/22 2012     Blood Culture, Routine No growth after 5 days.    Narrative:      Aerobic and anaerobic    Blood culture x two cultures. Draw prior to antibiotics. [[id number]] Collected: 03/18/22 1641    Order Status: Completed Specimen: Blood from Peripheral, Antecubital, Right Updated: 03/23/22 2012     Blood Culture, Routine No growth after 5 days.    Narrative:      Aerobic and anaerobic          Pathology Results  (Last 10 years)                 01/01/22 1345  Specimen to Pathology, Surgery Other Final result    Narrative:  Pre-op Diagnosis: Ulcer of right second toe [L97.519]   Diabetic foot ulcer with osteomyelitis [E11.621, E11.69,   L97.509, M86.9]   Procedure(s):   AMPUTATION, TOE   Number of specimens: 1   Name of specimens: 1) right foot second toe   Release to patient->Immediate   Specimen total (fresh, frozen, permanent):->1       07/22/21 1446  Specimen to Pathology, Surgery General Surgery Final result    Narrative:  Pre-op Diagnosis: Acute appendicitis with localized   peritonitis, without perforation, abscess, or gangrene   [K35.30]   Procedure(s):   APPENDECTOMY, LAPAROSCOPIC   Number of specimens: 1   Name of specimens: 1. Appendix   Release to patient->Immediate   Specimen total (fresh, frozen, permanent):->1             Urine Culture:   Recent Labs   Lab 10/12/21  1333 11/19/21  2230 01/10/22  0543 02/10/22  0529 03/18/22  1659   LABURIN Multiple organisms isolated. None in predominance.  Repeat if  clinically necessary. No growth PSEUDOMONAS AERUGINOSA  10,000 - 49,999 cfu/ml  * PSEUDOMONAS AERUGINOSA  50,000 - 99,999 cfu/ml  * ESCHERICHIA COLI ESBL  > 100,000 cfu/ml  *       Urine Studies:   Recent Labs   Lab 01/10/22  0543 02/10/22  0529 03/18/22  1659   COLORU Yellow Yellow Yellow   APPEARANCEUA Hazy* Hazy*  Cloudy*   PHUR 6.0 6.0 5.0   SPECGRAV 1.025 1.005 1.020   PROTEINUA 2+* 1+* 2+*   GLUCUA Negative Negative Negative   KETONESU Negative Negative Negative   BILIRUBINUA Negative Negative Negative   OCCULTUA 1+* 2+* 1+*   NITRITE Negative Positive* Positive*   UROBILINOGEN Negative  --   --    LEUKOCYTESUR 2+* 3+* 2+*   RBCUA 7* 14* 13*   WBCUA 35* 84* >100*   BACTERIA Rare Many* Many*   SQUAMEPITHEL 5 0  --    HYALINECASTS 3* 0 0       Wound Culture:   Recent Labs   Lab 03/18/22  1620 03/24/22  1538   LABAERO METHICILLIN RESISTANT STAPHYLOCOCCUS AUREUS  Many  * METHICILLIN RESISTANT STAPHYLOCOCCUS AUREUS  Few  *  METHICILLIN RESISTANT STAPHYLOCOCCUS AUREUS  Few  *         Significant Imaging:   MRI Foot Toes Without Contrast Right [[id number]] Resulted: 03/19/22 0128   Order Status: Completed Updated: 03/19/22 0130   Narrative:     EXAMINATION:   MRI FOOT TOES WITHOUT CONTRAST RIGHT     CLINICAL HISTORY:   Osteomyelitis, foot;     TECHNIQUE:   Multiplanar multisequence MRI examination of the right foot and toes performed without IV contrast.     COMPARISON:   Right foot radiograph 03/18/2022.     MRI foot right 12/30/2021.     FINDINGS:   There is an open wound overlying the right 5th phalanges.     There is abnormal bone marrow edema signal within the distal heads of the 4th and 5th metatarsal as well as the phalanges of the 4th and 5th toes more extensive in the 5th.     There is scattered subcutaneous edema around this site as well as throughout the foot.  No large drainable fluid collection.  Postsurgical changes of right 2nd ray amputation.    Impression:       Osteomyelitis involving the right 4th and 5th metatarsals distally and the phalanges of the 4th and 5th toes.     Electronically signed by resident: Dimas Zapata   Date: 03/19/2022   Time: 01:04     Electronically signed by: Erlin Hernandez   Date: 03/19/2022   Time: 01:28

## 2022-03-28 NOTE — PT/OT/SLP PROGRESS
Physical Therapy Treatment    Patient Name:  Beatriz Adame   MRN:  [id number]    Recommendations:     Discharge Recommendations:  nursing facility, skilled   Discharge Equipment Recommendations: slide board, hospital bed, bedside commode   Barriers to discharge: None (if pt returns to NH SNF)    Assessment:     Beatriz Adame is a 71 y.o. female admitted with a medical diagnosis of Osteomyelitis of right foot.  She presents with the following impairments/functional limitations:  weakness, impaired endurance, impaired sensation, impaired functional mobilty, impaired self care skills, gait instability, impaired balance, impaired cognition, decreased lower extremity function, decreased ROM, decreased upper extremity function, impaired joint extensibility.    Pt met left side-lying, no family present, and agreeable to participate in PT/OT treatment on this date after education and encouragement. Pt required between maximum to total assistance for bed mobility with frequent verbal and tactile cues to maintain sitting balance while at edge of bed. Pt with decreased participation and activity tolerance throughout today's session. Pt will continue to benefit from skilled PT services to maximize level of function.    Rehab Prognosis: Fair; patient would benefit from acute skilled PT services to address these deficits and reach maximum level of function.    Recent Surgery: Procedure(s) (LRB):  AMPUTATION, FOOT, PARTIAL 4th and 5th ray (Right) 4 Days Post-Op    Plan:     During this hospitalization, patient to be seen 3 x/week to address the identified rehab impairments via therapeutic activities, therapeutic exercises, neuromuscular re-education, wheelchair management/training and progress toward the following goals:    · Plan of Care Expires:  04/23/22    Subjective     Chief Complaint: tightness in (L) UE and (L) LE  Patient/Family Comments/goals: none  Pain/Comfort:  · Pain Rating 1:  (did not rate pain)  · Location -  Side 1: Left  · Location - Orientation 1: generalized (tightness)  · Location 1: leg  · Pain Addressed 1: Reposition, Cessation of Activity, Distraction  · Pain Rating Post-Intervention 1:  (pt did not rate pain)  · Pain Rating 2:  (pt did not rate)  · Location - Side 2: Left  · Location 2: wrist (elbow)  · Pain Addressed 2: Reposition, Distraction  · Pain Rating Post-Intervention 2:  (pt did not rate)      Objective:     Communicated with RN prior to session.  Patient found left sidelying with telemetry, peripheral IV, delgado catheter upon PT entry to room.     General Precautions: Standard, fall, contact   Orthopedic Precautions:RLE non weight bearing   Braces: N/A  Respiratory Status: Room air     Functional Mobility:  · Bed Mobility:     · Scooting EOB: maximum assistance  · Scooting HOB: maximum assistance of 2 persons  · Supine <> Sit: maximum assistance  · Transfers: did not attempt due to pt's decreased sitting balance  · Balance:  · Static sitting at EOB: Poor; pt unable to maintain midline positioning. Pt with forward trunk flexion. Total assistance  · Dynamic sitting at EOB: Poor; pt unable to maintain midline positioning. Lateral LOB to (L) when performing ipsilateral reaching to R and ADLs. Pt required between maximum to total assistance      AM-PAC 6 CLICK MOBILITY  Turning over in bed (including adjusting bedclothes, sheets and blankets)?: 2  Sitting down on and standing up from a chair with arms (e.g., wheelchair, bedside commode, etc.): 1  Moving from lying on back to sitting on the side of the bed?: 2  Moving to and from a bed to a chair (including a wheelchair)?: 1  Need to walk in hospital room?: 1  Climbing 3-5 steps with a railing?: 1  Basic Mobility Total Score: 8       Therapeutic Activities and Exercises:  Pt educated in purpose of therapy, goals of the session, weight-bearing status, and PT POC.    Pt instructed in use of the call button and importance of calling nursing staff for assitance  with mobility.    Patient left right sidelying with all lines intact, call button in reach and RN notified..    GOALS:   Multidisciplinary Problems     Physical Therapy Goals        Problem: Physical Therapy    Goal Priority Disciplines Outcome Goal Variances Interventions   Physical Therapy Goal     PT, PT/OT Ongoing, Progressing     Description: Goals to be met by: 22     Patient will increase functional independence with mobility by performin. Supine to sit with MInimal Assistance  2. Sit to supine with MInimal Assistance  3. Bed to chair transfer with Moderate Assistance using Slideboard  4. Wheelchair propulsion x50 feet with Stand-by Assistance using bilateral uppper extremities  5. Sitting at edge of bed x10 minutes with Stand-by Assistance                     Time Tracking:     PT Received On: 22  PT Start Time: 1038     PT Stop Time: 1108  PT Total Time (min): 30 min     Billable Minutes: Therapeutic Activity 30    Co-treatment performed due to patient's multiple deficits requiring two skilled therapists to appropriately and safely assess patient's strength and endurance while facilitating functional tasks in addition to accommodating for patient's activity tolerance.     Treatment Type: Treatment  PT/PTA: PTA     PTA Visit Number: 1     2022

## 2022-03-28 NOTE — ASSESSMENT & PLAN NOTE
Nutrition Problem:  Severe Protein-Calorie Malnutrition  Malnutrition in the context of Chronic Illness/Injury    Related to (etiology):  Decreased ability to feed self    Signs and Symptoms (as evidenced by):  Energy Intake: <75% of estimated energy requirement for 3 months  Body Fat Depletion: moderate and severe depletion of orbitals, triceps and thoracic and lumbar region   Muscle Mass Depletion: moderate and severe depletion of temples, clavicle region, scapular region, interosseous muscle and lower extremities   Weight Loss: 20% x 6 months     Interventions(treatment strategy):  Collaboration with other providers  [employer]    Nutrition Diagnosis Status:  New

## 2022-03-28 NOTE — PLAN OF CARE
03/28/22 1559   Post-Acute Status   Post-Acute Authorization Placement   Post-Acute Placement Status Pending post-acute provider review/more information requested   Discharge Plan   Discharge Plan A Skilled Nursing Facility   Discharge Plan B Return to Nursing Home     MARIO sent the orders to [facility] for them to review.  MARIO will f/u as needed.    Ester Glasgow LCSW   PRN

## 2022-03-28 NOTE — PLAN OF CARE
NURSING HOME ORDERS    03/28/2022  [facility]  [facility] ([facility]-)  [address]  [facility] [address]  Dept: [phone]  Loc: [phone]     Admit to Nursing Home:      Diagnoses:  Active Hospital Problems    Diagnosis  POA    *Osteomyelitis of right foot [M86.9]  Yes    Acute metabolic encephalopathy [G93.41]  Yes     Priority: 2     Chronic kidney disease, stage 4 (severe) [N18.4]  Yes     Priority: 4     Hypertension [I10]  Yes     Priority: 4     Type 2 diabetes mellitus, with long-term current use of insulin [E11.9, Z79.4]  Not Applicable     Priority: 5     Critical lower limb ischemia [I70.229]  Yes    Anemia [D64.9]  Yes    Debility [R53.81]  Yes    Peripheral neuropathy [G62.9]  Yes    Urinary retention [R33.9]  Yes    Osteomyelitis of right 2nd toe [E11.621, E11.69, L97.509, M86.9]  Yes    Hypothyroidism [E03.9]  Yes    Severe protein-calorie malnutrition [E43]  Yes    History of stroke [Z86.73]  Not Applicable     Chronic    Hyperlipidemia [E78.5]  Yes      Resolved Hospital Problems    Diagnosis Date Resolved POA    Acute cystitis without hematuria [N30.00] 03/25/2022 Yes    Right diabetic foot ulcer [E10.621, L97.516] 03/25/2022 Yes       Code Status: full code    Patient is homebound due to:  Osteomyelitis of right foot    Allergies:  Review of patient's allergies indicates:   Allergen Reactions    Tomato (solanum lycopersicum) Hives and Itching       Vitals:  Routine    Diet: cardiac diet and diabetic diet: 2000 calorie    Activities:   Up in a chair each morning as tolerated and Activity as tolerated    Labs:    Order the following labs to be drawn twice weekly and fax Lab Results to Infectious Diseases Provider: Olga Lidia Sood [facility] Fax Number: [phone]  · CMP  · Vancomycin trough, target 15-20 --- please obtain vancomycin trough prior to next dose     Order the following labs to be drawn weekly and  fax Lab Results to Infectious Diseases Provider: Olga Lidia Sood [facility] Fax Number: [phone]  · CBC  · ESR   · CRP  · CPK  · Vancomycin trough. Target 15-20    Infusion:   vancomycin 500 mg in dextrose 5 % 100 mL IVPB (ready to mix system)  Dose: 500 mg  Freq: every 48 hours Route: IV  Indications of Use: Bone/Joint  Last Dose: 500 mg (03/30/22 1430)  Next dose: 03/31/22 1430   End date of treatment: 05/05/22 1430      Nursing Precautions:  Aspiration  and Fall    Consults:   PT to evaluate and treat- 3 times a week, OT to evaluate and treat- 3 times a week and Wound Care     Miscellaneous Care: Routine Skin for Bedridden Patients:  Apply moisture barrier cream to all  Wound Care: yes:  Surgical Wound:  Location: R foot    Consult ET nurse        Apply the following to wound:    Home health/facility to do dressing changes every MWF and prn as follows:  Cleanse right foot incision site with saline or wound cleanser, paint incision site with betadine, followed by xeroform, 4x4 gauze, cast padding x 2, ACE bandage.                   Diabetes Care:  SN to perform and educate Diabetic management with blood glucose monitoring:, Fingerstick blood sugar AC and HS and Report CBG < 60 or > 350 to physician.      Medications: Discontinue all previous medication orders, if any. See new list below.     Medication List      START taking these medications    ascorbic acid (vitamin C) 500 MG tablet  Commonly known as: VITAMIN C  Take 1 tablet (500 mg total) by mouth once daily.     folic acid 1 MG tablet  Commonly known as: FOLVITE  Take 1 tablet (1 mg total) by mouth once daily.     Lactobacillus rhamnosus GG 10 billion cell capsule  Commonly known as: CULTURELLE  Take 1 capsule by mouth once daily.     oxyCODONE 5 MG immediate release tablet  Commonly known as: ROXICODONE  Take 1 tablet (5 mg total) by mouth every 6 (six) hours as needed for Pain.     vancomycin in dextrose 5 % 1 gram/250 mL Soln  Inject 125 mLs (500  mg total) into the vein once. for 1 dose        CONTINUE taking these medications    amLODIPine 5 MG tablet  Commonly known as: NORVASC  Take 10 mg by mouth every evening. Take 5 mg every morning and 10 mg at night     aspirin 81 MG EC tablet  Commonly known as: ECOTRIN  Take 81 mg by mouth once daily.     b complex vitamins tablet  Take 1 tablet by mouth once daily.     BASAGLAR KWIKPEN U-100 INSULIN glargine 100 units/mL (3mL) SubQ pen  Generic drug: insulin  Inject 5 Units into the skin once daily.     carvediloL 25 MG tablet  Commonly known as: COREG  Take 1 tablet (25 mg total) by mouth 2 (two) times daily.     clopidogreL 75 mg tablet  Commonly known as: PLAVIX  Take 75 mg by mouth.     DULoxetine 30 MG capsule  Commonly known as: CYMBALTA  Take 1 capsule (30 mg total) by mouth once daily.     ferrous sulfate 325 mg (65 mg iron) Tab tablet  Commonly known as: FEOSOL  Take 325 mg by mouth daily with breakfast. Off at present     gabapentin 300 MG capsule  Commonly known as: NEURONTIN  Take 1 capsule (300 mg total) by mouth once daily.     lactulose 10 gram/15 mL solution  Commonly known as: CHRONULAC  Take 10 g by mouth 2 (two) times a day.     levothyroxine 75 MCG tablet  Commonly known as: SYNTHROID  Take 75 mcg by mouth once daily.     metoclopramide HCl 10 MG tablet  Commonly known as: REGLAN  Take 1 tablet (10 mg total) by mouth 3 (three) times daily before meals.     omeprazole 20 MG capsule  Commonly known as: PRILOSEC  Take 20 mg by mouth 2 (two) times daily.     ondansetron 4 MG tablet  Commonly known as: ZOFRAN  Take 1 tablet (4 mg total) by mouth every 8 (eight) hours as needed for Nausea.     rosuvastatin 40 MG Tab  Commonly known as: CRESTOR  Take 40 mg by mouth once daily.     tamsulosin 0.4 mg Cap  Commonly known as: FLOMAX  Take 1 capsule (0.4 mg total) by mouth once daily.     traZODone 100 MG tablet  Commonly known as: DESYREL  Take 1 tablet (100 mg total) by mouth once daily.        STOP  taking these medications    HYDROcodone-acetaminophen 5-325 mg per tablet  Commonly known as: NORCO              Immunizations Administered as of 3/28/2022     Name Date Dose VIS Date Route Exp Date    COVID-19, vector-nr, rS-Ad26 (J&J) 3/6/2021 0.5 mL -- -- --    Lot: [id number]     External: Auto Reconciled From Outside Source           This patient has had both covid vaccinations    Some patients may experience side effects after vaccination.  These may include fever, headache, muscle or joint aches.  Most symptoms resolve with 24-48 hours and do not require urgent medical evaluation unless they persist for more than 72 hours or symptoms are concerning for an unrelated medical condition.          _________________________________  Laxmi Hernandez PA-C  03/28/2022

## 2022-03-28 NOTE — SUBJECTIVE & OBJECTIVE
Interval History: NAEON. Afebrile. No leukocytosis. Cr 1 > 1.5, likely due to supratherapeutic vanc levels. No new complaints today.Clean margin bone cultures are positive for MRSA    Review of Systems   Constitutional:  Negative for chills, diaphoresis and fever.   HENT: Negative.     Eyes: Negative.    Respiratory:  Negative for cough and shortness of breath.    Cardiovascular:  Negative for chest pain and leg swelling.   Gastrointestinal:  Negative for abdominal pain, diarrhea, nausea and vomiting.   Genitourinary:  Positive for difficulty urinating (chronic delgado). Negative for dysuria and flank pain.   Musculoskeletal:  Positive for arthralgias and gait problem.   Skin:  Positive for color change and wound.   Neurological:  Positive for numbness. Negative for headaches.   Psychiatric/Behavioral:  Negative for agitation and confusion. The patient is not nervous/anxious.    Objective:     Vital Signs (Most Recent):  Temp: 98.1 °F (36.7 °C) (03/28/22 1156)  Pulse: 77 (03/28/22 1309)  Resp: 13 (03/28/22 1309)  BP: (!) 123/58 (03/28/22 1156)  SpO2: 100 % (03/28/22 1309)   Vital Signs (24h Range):  Temp:  [97 °F (36.1 °C)-99.2 °F (37.3 °C)] 98.1 °F (36.7 °C)  Pulse:  [77-86] 77  Resp:  [13-18] 13  SpO2:  [95 %-100 %] 100 %  BP: (115-135)/(58-64) 123/58     Weight: 47.6 kg (104 lb 15 oz)  Body mass index is 16.44 kg/m².    Estimated Creatinine Clearance: 26.2 mL/min (A) (based on SCr of 1.5 mg/dL (H)).    Physical Exam  Vitals and nursing note reviewed.   Constitutional:       General: She is not in acute distress.     Appearance: Normal appearance. She is not ill-appearing, toxic-appearing or diaphoretic.   HENT:      Head: Normocephalic and atraumatic.      Mouth/Throat:      Mouth: Mucous membranes are dry.   Eyes:      General: No scleral icterus.     Conjunctiva/sclera: Conjunctivae normal.   Cardiovascular:      Rate and Rhythm: Normal rate and regular rhythm.      Pulses: Decreased pulses.   Pulmonary:       Effort: Pulmonary effort is normal. No respiratory distress.      Breath sounds: Normal breath sounds.   Abdominal:      General: There is no distension.      Palpations: Abdomen is soft.      Tenderness: There is no abdominal tenderness. There is no guarding.   Musculoskeletal:         General: Deformity (right 2nd toe amputation) present.      Right lower leg: No edema.      Left lower leg: No edema.      Comments: Right foot wrapped.  No ascending cellulitis/warmth  See Podiatry photos below   Skin:     General: Skin is warm and dry.   Neurological:      Mental Status: She is alert.      Comments: Alert/conversant   Psychiatric:         Mood and Affect: Mood normal.         Behavior: Behavior normal.     Post op        Pre-op              Significant Labs: Blood Culture:   Recent Labs   Lab 12/30/21  1523 12/30/21  1536 03/18/22  1641   LABBLOO No growth after 5 days. No growth after 5 days. No growth after 5 days.  No growth after 5 days.       BMP:   Recent Labs   Lab 03/27/22  0806 03/28/22  1123   GLU 87 103    140   K 3.8 3.6    109   CO2 22* 25   BUN 27* 28*   CREATININE 1.4 1.5*   CALCIUM 9.1 9.0   MG 1.9  --        CBC:   Recent Labs   Lab 03/27/22  0806   WBC 11.87   HGB 7.9*   HCT 25.8*          CMP:   Recent Labs   Lab 03/27/22  0806 03/28/22  1123    140   K 3.8 3.6    109   CO2 22* 25   GLU 87 103   BUN 27* 28*   CREATININE 1.4 1.5*   CALCIUM 9.1 9.0   PROT 6.7  --    ALBUMIN 2.1*  --    BILITOT 0.3  --    ALKPHOS 72  --    AST 15  --    ALT 7*  --    ANIONGAP 9 6*   EGFRNONAA 38.1* 35.0*       Microbiology Results (last 7 days)       Procedure Component Value Units Date/Time    Aerobic culture [[id number]]  (Abnormal)  (Susceptibility) Collected: 03/24/22 1538    Order Status: Completed Specimen: Bone from Foot, Right Updated: 03/28/22 1001     Aerobic Bacterial Culture METHICILLIN RESISTANT STAPHYLOCOCCUS AUREUS  Few      Narrative:      5th metatarsal bone clean  margins    Culture, Anaerobe [[id number]] Collected: 03/24/22 1538    Order Status: Completed Specimen: Bone from Foot, Right Updated: 03/28/22 0735     Anaerobic Culture No anaerobes isolated    Narrative:      5th metatarsal bone clean margins    Culture, Anaerobe [[id number]] Collected: 03/24/22 1538    Order Status: Completed Specimen: Bone from Foot, Right Updated: 03/28/22 0734     Anaerobic Culture No anaerobes isolated    Narrative:      4th metatarsal bone clean margins    Aerobic culture [[id number]]  (Abnormal)  (Susceptibility) Collected: 03/24/22 1538    Order Status: Completed Specimen: Bone from Foot, Right Updated: 03/26/22 1423     Aerobic Bacterial Culture METHICILLIN RESISTANT STAPHYLOCOCCUS AUREUS  Few      Narrative:      4th metatarsal bone clean margins    AFB Culture & Smear [[id number]] Collected: 03/24/22 1538    Order Status: Completed Specimen: Bone from Foot, Right Updated: 03/25/22 2127     AFB Culture & Smear Culture in progress     AFB CULTURE STAIN No acid fast bacilli seen.    Narrative:      4th metatarsal bone clean margins    AFB Culture & Smear [[id number]] Collected: 03/24/22 1538    Order Status: Completed Specimen: Bone from Foot, Right Updated: 03/25/22 2127     AFB Culture & Smear Culture in progress     AFB CULTURE STAIN No acid fast bacilli seen.    Narrative:      5th metatarsal bone clean margins    Gram stain [[id number]] Collected: 03/24/22 1538    Order Status: Completed Specimen: Bone from Foot, Right Updated: 03/24/22 1652     Gram Stain Result No WBC's      No organisms seen    Narrative:      5th metatarsal bone clean margins    Gram stain [[id number]] Collected: 03/24/22 1538    Order Status: Completed Specimen: Bone from Foot, Right Updated: 03/24/22 1650     Gram Stain Result No WBC's      No organisms seen    Narrative:      4th metatarsal bone clean margins    Fungus culture [[id number]] Collected: 03/24/22 1538    Order Status: Sent Specimen: Bone from Foot, Right  Updated: 03/24/22 1553    Fungus culture [[id number]] Collected: 03/24/22 1538    Order Status: Sent Specimen: Bone from Foot, Right Updated: 03/24/22 1548    Blood culture x two cultures. Draw prior to antibiotics. [[id number]] Collected: 03/18/22 1641    Order Status: Completed Specimen: Blood from Peripheral, Antecubital, Right Updated: 03/23/22 2012     Blood Culture, Routine No growth after 5 days.    Narrative:      Aerobic and anaerobic    Blood culture x two cultures. Draw prior to antibiotics. [[id number]] Collected: 03/18/22 1641    Order Status: Completed Specimen: Blood from Peripheral, Antecubital, Right Updated: 03/23/22 2012     Blood Culture, Routine No growth after 5 days.    Narrative:      Aerobic and anaerobic          Pathology Results  (Last 10 years)                 01/01/22 1345  Specimen to Pathology, Surgery Other Final result    Narrative:  Pre-op Diagnosis: Ulcer of right second toe [L97.519]   Diabetic foot ulcer with osteomyelitis [E11.621, E11.69,   L97.509, M86.9]   Procedure(s):   AMPUTATION, TOE   Number of specimens: 1   Name of specimens: 1) right foot second toe   Release to patient->Immediate   Specimen total (fresh, frozen, permanent):->1       07/22/21 1446  Specimen to Pathology, Surgery General Surgery Final result    Narrative:  Pre-op Diagnosis: Acute appendicitis with localized   peritonitis, without perforation, abscess, or gangrene   [K35.30]   Procedure(s):   APPENDECTOMY, LAPAROSCOPIC   Number of specimens: 1   Name of specimens: 1. Appendix   Release to patient->Immediate   Specimen total (fresh, frozen, permanent):->1             Urine Culture:   Recent Labs   Lab 10/12/21  1333 11/19/21  2230 01/10/22  0543 02/10/22  0529 03/18/22  1659   LABURIN Multiple organisms isolated. None in predominance.  Repeat if  clinically necessary. No growth PSEUDOMONAS AERUGINOSA  10,000 - 49,999 cfu/ml  * PSEUDOMONAS AERUGINOSA  50,000 - 99,999 cfu/ml  * ESCHERICHIA COLI ESBL  >  100,000 cfu/ml  *       Urine Studies:   Recent Labs   Lab 01/10/22  0543 02/10/22  0529 03/18/22  1659   COLORU Yellow Yellow Yellow   APPEARANCEUA Hazy* Hazy* Cloudy*   PHUR 6.0 6.0 5.0   SPECGRAV 1.025 1.005 1.020   PROTEINUA 2+* 1+* 2+*   GLUCUA Negative Negative Negative   KETONESU Negative Negative Negative   BILIRUBINUA Negative Negative Negative   OCCULTUA 1+* 2+* 1+*   NITRITE Negative Positive* Positive*   UROBILINOGEN Negative  --   --    LEUKOCYTESUR 2+* 3+* 2+*   RBCUA 7* 14* 13*   WBCUA 35* 84* >100*   BACTERIA Rare Many* Many*   SQUAMEPITHEL 5 0  --    HYALINECASTS 3* 0 0       Wound Culture:   Recent Labs   Lab 03/18/22  1620 03/24/22  1538   LABAERO METHICILLIN RESISTANT STAPHYLOCOCCUS AUREUS  Many  * METHICILLIN RESISTANT STAPHYLOCOCCUS AUREUS  Few  *  METHICILLIN RESISTANT STAPHYLOCOCCUS AUREUS  Few  *         Significant Imaging:   MRI Foot Toes Without Contrast Right [[id number]] Resulted: 03/19/22 0128   Order Status: Completed Updated: 03/19/22 0130   Narrative:     EXAMINATION:   MRI FOOT TOES WITHOUT CONTRAST RIGHT     CLINICAL HISTORY:   Osteomyelitis, foot;     TECHNIQUE:   Multiplanar multisequence MRI examination of the right foot and toes performed without IV contrast.     COMPARISON:   Right foot radiograph 03/18/2022.     MRI foot right 12/30/2021.     FINDINGS:   There is an open wound overlying the right 5th phalanges.     There is abnormal bone marrow edema signal within the distal heads of the 4th and 5th metatarsal as well as the phalanges of the 4th and 5th toes more extensive in the 5th.     There is scattered subcutaneous edema around this site as well as throughout the foot.  No large drainable fluid collection.  Postsurgical changes of right 2nd ray amputation.    Impression:       Osteomyelitis involving the right 4th and 5th metatarsals distally and the phalanges of the 4th and 5th toes.     Electronically signed by resident: Dimas Zapata   Date: 03/19/2022   Time: 01:04      Electronically signed by: Erlin Hernandez   Date: 03/19/2022   Time: 01:28

## 2022-03-28 NOTE — ASSESSMENT & PLAN NOTE
MRI right forefoot demonstrating osteomyelitis involving the right 4th and 5th metatarsals distally and the phalanges of the 4th and 5th toes. Now S/P 4th and 5th ray resection/partial amputation 03/24     Plan:  -Intraop proximal bone margins +MRSA. Anticipate 6 weeks of IV Vancomycin for residual osteomyelitis of the right foot. ID on board. Appreciate reccs.   -WBAT LLE, WB to heel for transfers or short distances RLE.   -Dressing changes by nursing, ordered.   -Recommend offloading heel protector boots while patient is in bed to prevent pressure wounds. Ordered/   -Ok to discharge patient from podiatry standpoint once final antibiotic plan established  -Podiatry will follow.    DC Instructions:  Patient is to follow up with podiatry within 10 days of discharge with Dr. Logan. Podiatry will arrange an appointment.  Home health/facility to do dressing changes every MWF and prn as follows:  Cleanse right foot incision site with saline or wound cleanser, paint incision site with betadine, followed by xeroform, 4x4 gauze, cast padding x 2, ACE bandage.

## 2022-03-28 NOTE — PROGRESS NOTES
[facility] - Tele[facility] Stepdown ([place] [address])  Adult Nutrition  Progress Note    SUMMARY       Recommendations    1. Continue Diabetic 2000 kcal diet     2. Consider Boost Pudding BID to increase PO intake     3. Add Frederic BID x 14 days to aid in wound healing    4. RD to monitor and follow    Goals: Pt will meet and tolerate >75% EEN/EPN by RD f/u  Nutrition Goal Status: progressing towards goal    Communication of RD Recs: reviewed with RN    Assessment and Plan    Nutrition Problem:  Severe Protein-Calorie Malnutrition  Malnutrition in the context of Chronic Illness/Injury    Related to (etiology):  Decreased ability to feed self    Signs and Symptoms (as evidenced by):  Energy Intake: <75% of estimated energy requirement for 3 months  Body Fat Depletion: moderate and severe depletion of orbitals, triceps and thoracic and lumbar region   Muscle Mass Depletion: moderate and severe depletion of temples, clavicle region, scapular region, interosseous muscle and lower extremities   Weight Loss: 20% x 6 months     Interventions(treatment strategy):  Collaboration with other providers  Evino    Nutrition Diagnosis Status:  Continues    Malnutrition Assessment    Malnutrition Type: chronic illness  Energy Intake: severe energy intake  Skin (Micronutrient): bruised       Weight Loss (Malnutrition): 10% in 6 months  Energy Intake (Malnutrition): less than 75% for greater than or equal to 3 months  Subcutaneous Fat (Malnutrition): severe depletion  Muscle Mass (Malnutrition): severe depletion  Fluid Accumulation (Malnutrition): mild  Hand  Strength, Left (Malnutrition): moderate  Hand  Strength, Right (Malnutrition): moderate   Orbital Region (Subcutaneous Fat Loss): mild depletion  Upper Arm Region (Subcutaneous Fat Loss): severe depletion  Thoracic and Lumbar Region: severe depletion   [place] Region (Muscle Loss): moderate depletion  Clavicle Bone Region (Muscle Loss): severe depletion  Clavicle and  "Acromion Bone Region (Muscle Loss): severe depletion  Scapular Bone Region (Muscle Loss): moderate depletion  Dorsal Hand (Muscle Loss): moderate depletion  Patellar Region (Muscle Loss): severe depletion  Anterior Thigh Region (Muscle Loss): severe depletion  Posterior Calf Region (Muscle Loss): severe depletion       Subcutaneous Fat Loss (Final Summary): severe protein-calorie malnutrition  Muscle Loss Evaluation (Final Summary): severe protein-calorie malnutrition  Fluid Accumulation Evaluation: mild    Severe Weight Loss (Malnutrition): greater than 10% in 6 months    Reason for Assessment    Reason For Assessment: RD follow-up  Diagnosis: infection/sepsis  Relevant Medical History: hypothyroidism, CVA with left-sided residual deficit, DM2, HTN, HLD, CKD, chronic anemia, recurrent pseudomonal UTI, nephroliathiasis s/p left ureteral stent placement in 10/2021, diabetic foot ulcer s/p R toe amputation  Interdisciplinary Rounds: did not attend    General Information Comments: S/p toe amputations. Plan to d/c to SNF once medically able. Pt soundly sleeping at time of visit. Per chart, pt's appetite improving, tolerating 25-50% meals since last RD assessment. No c/o n/v/d/c or difficulties chewing/swallowing noted. Requires assistance with meal setup. Pt does not like ONS. -145#; NFPE completed 3/20; pt meets criteria for severe malnutrition in the context of chronic illness.    Nutrition Discharge Planning: Diabetic diet, adequate intake    Nutrition Risk Screen    Nutrition Risk Screen: large or nonhealing wound, burn or pressure injury    Nutrition/Diet History    Patient Reported Diet/Restrictions/Preferences: diabetic diet  Spiritual, Cultural Beliefs, Episcopal Practices, Values that Affect Care: no  Food Allergies:  (tomato)  Factors Affecting Nutritional Intake: decreased appetite, inability to feed self    Anthropometrics    Temp: 97 °F (36.1 °C)  Height Method: Stated  Height: 5' 7" (170.2 " cm)  Height (inches): 67 in  Weight Method: Bed Scale  Weight: 47.6 kg (104 lb 15 oz)  Weight (lb): 104.94 lb  Ideal Body Weight (IBW), Female: 135 lb  % Ideal Body Weight, Female (lb): 85.73 %  BMI (Calculated): 16.4  BMI Grade: 16 - 16.9 protein-energy malnutrition grade II  Usual Body Weight (UBW), k.9 kg  % Usual Body Weight: 79.83  % Weight Change From Usual Weight: -20.33 %       Lab/Procedures/Meds    Pertinent Labs Reviewed: reviewed  Pertinent Labs Comments: BUN 27, GFR 43.9, ALT 7  Pertinent Medications Reviewed: reviewed  Pertinent Medications Comments: folic acid, insulin, probiotic, levothyroxine, pantoprazole    Estimated/Assessed Needs    Weight Used For Calorie Calculations: 47.2 kg (104 lb)  Energy Calorie Requirements (kcal): 0591-6833 kcal/day  Energy Need Method: Kcal/kg (30-35 kcal/kg)  Protein Requirements: 57-71 g/day (1.2-1.5 g/kg)  Weight Used For Protein Calculations: 47.2 kg (104 lb)  Fluid Requirements (mL): 1 mL/kcal or per MD  Estimated Fluid Requirement Method: RDA Method  RDA Method (mL): 1415       Nutrition Prescription Ordered    Current Diet Order: Diabetic 2000 kcal  Nutrition Order Comments: 2000 kcal    Evaluation of Received Nutrient/Fluid Intake    I/O: -4.9L since admit  Energy Calories Required: not meeting needs  Protein Required: not meeting needs  Fluid Required: not meeting needs  Comments: LBM 3/26  Tolerance: tolerating  % Intake of Estimated Energy Needs: 25 - 50 %  % Meal Intake: 25 - 50 %    Nutrition Risk    Level of Risk/Frequency of Follow-up: low     Monitor and Evaluation    Food and Nutrient Intake: energy intake, food and beverage intake  Food and Nutrient Adminstration: diet order  Knowledge/Beliefs/Attitudes: food and nutrition knowledge/skill  Physical Activity and Function: nutrition-related ADLs and IADLs  Anthropometric Measurements: weight, weight change  Biochemical Data, Medical Tests and Procedures: electrolyte and renal panel,  gastrointestinal profile, glucose/endocrine profile, inflammatory profile, lipid profile  Nutrition-Focused Physical Findings: overall appearance, extremities, muscles and bones     Nutrition Follow-Up    RD Follow-up?: Yes

## 2022-03-28 NOTE — PROGRESS NOTES
Pharmacokinetic Assessment Follow Up: IV Vancomycin    Vancomycin serum concentration assessment & plan:  · Vancomycin random level resulted at 19.7 mcg/ml which is within target range of 15 - 20 mcg/ml  · Level decreased from 22.3 mcg/ml to 19.7 mcg/ml over ~12 hours (estimated half life of ~67 hours)  · Level will remain therapeutic til AM, will redose Vancomycin 500mg x1 tomorrow 3/28 @ 0500  · Obtain random level in 48 hours - due 3/30 with AM labs      Drug levels (last 3 results):  Recent Labs   Lab Result Units 03/27/22  0806 03/27/22 2005   Vancomycin, Random ug/mL  --  19.7   Vancomycin-Trough ug/mL 22.3*  --        Pharmacy will continue to follow and monitor vancomycin.    Please contact pharmacy at extension [phone] for questions regarding this assessment.    Thank you for the consult,   Angelita Castaneda       Patient brief summary:  Beatriz Adame is a 71 y.o. female initiated on antimicrobial therapy with IV Vancomycin for treatment of bone/joint infection    The patient's current regimen is pulse dosing    Drug Allergies:   Review of patient's allergies indicates:   Allergen Reactions    Tomato (solanum lycopersicum) Hives and Itching       Actual Body Weight:   47.6 kg    Renal Function:   Estimated Creatinine Clearance: 28.1 mL/min (based on SCr of 1.4 mg/dL).,     Dialysis Method (if applicable):  N/A    CBC (last 72 hours):  Recent Labs   Lab Result Units 03/25/22  0843 03/26/22  0329 03/27/22  0806   WBC K/uL 10.63 11.32 11.87   Hemoglobin g/dL 8.3* 7.6* 7.9*   Hemoglobin A1C %  --  4.8  --    Hematocrit % 28.7* 24.6* 25.8*   Platelets K/uL 183 147* 168   Gran % % 69.6 61.9 69.3   Lymph % % 19.2 24.2 20.5   Mono % % 8.7 11.5 8.4   Eosinophil % % 1.7 1.6 1.1   Basophil % % 0.5 0.4 0.3   Differential Method  Automated Automated Automated       Metabolic Panel (last 72 hours):  Recent Labs   Lab Result Units 03/25/22  0843 03/26/22  0329 03/27/22  0806   Sodium mmol/L 138 138 141   Potassium mmol/L  3.5 3.5 3.8   Chloride mmol/L 106 105 110   CO2 mmol/L 24 25 22*   Glucose mg/dL 97 96 87   BUN mg/dL 22 29* 27*   Creatinine mg/dL 1.1 1.3 1.4   Albumin g/dL 2.4*  --  2.1*   Total Bilirubin mg/dL 0.3  --  0.3   Alkaline Phosphatase U/L 74  --  72   AST U/L 10  --  15   ALT U/L 7*  --  7*   Magnesium mg/dL 2.1  --  1.9       Vancomycin Administrations:  vancomycin given in the last 96 hours                   vancomycin 500 mg in dextrose 5 % 100 mL IVPB (ready to mix system) (mg) 500 mg New Bag 03/26/22 0957     500 mg New Bag 03/25/22 0819                Microbiologic Results:  Microbiology Results (last 7 days)     Procedure Component Value Units Date/Time    Aerobic culture [[id number]]  (Abnormal)  (Susceptibility) Collected: 03/24/22 1538    Order Status: Completed Specimen: Bone from Foot, Right Updated: 03/26/22 1424     Aerobic Bacterial Culture METHICILLIN RESISTANT STAPHYLOCOCCUS AUREUS  Few      Narrative:      5th metatarsal bone clean margins    Aerobic culture [[id number]]  (Abnormal)  (Susceptibility) Collected: 03/24/22 1538    Order Status: Completed Specimen: Bone from Foot, Right Updated: 03/26/22 1423     Aerobic Bacterial Culture METHICILLIN RESISTANT STAPHYLOCOCCUS AUREUS  Few      Narrative:      4th metatarsal bone clean margins    AFB Culture & Smear [[id number]] Collected: 03/24/22 1538    Order Status: Completed Specimen: Bone from Foot, Right Updated: 03/25/22 2127     AFB Culture & Smear Culture in progress     AFB CULTURE STAIN No acid fast bacilli seen.    Narrative:      4th metatarsal bone clean margins    AFB Culture & Smear [[id number]] Collected: 03/24/22 1538    Order Status: Completed Specimen: Bone from Foot, Right Updated: 03/25/22 2127     AFB Culture & Smear Culture in progress     AFB CULTURE STAIN No acid fast bacilli seen.    Narrative:      5th metatarsal bone clean margins    Culture, Anaerobe [[id number]] Collected: 03/24/22 1538    Order Status: Completed Specimen: Bone  from Foot, Right Updated: 03/25/22 0747     Anaerobic Culture Culture in progress    Narrative:      4th metatarsal bone clean margins    Culture, Anaerobe [[id number]] Collected: 03/24/22 1538    Order Status: Completed Specimen: Bone from Foot, Right Updated: 03/25/22 0747     Anaerobic Culture Culture in progress    Narrative:      5th metatarsal bone clean margins    Gram stain [[id number]] Collected: 03/24/22 1538    Order Status: Completed Specimen: Bone from Foot, Right Updated: 03/24/22 1652     Gram Stain Result No WBC's      No organisms seen    Narrative:      5th metatarsal bone clean margins    Gram stain [[id number]] Collected: 03/24/22 1538    Order Status: Completed Specimen: Bone from Foot, Right Updated: 03/24/22 1650     Gram Stain Result No WBC's      No organisms seen    Narrative:      4th metatarsal bone clean margins    Fungus culture [[id number]] Collected: 03/24/22 1538    Order Status: Sent Specimen: Bone from Foot, Right Updated: 03/24/22 1553    Fungus culture [[id number]] Collected: 03/24/22 1538    Order Status: Sent Specimen: Bone from Foot, Right Updated: 03/24/22 1548    Blood culture x two cultures. Draw prior to antibiotics. [[id number]] Collected: 03/18/22 1641    Order Status: Completed Specimen: Blood from Peripheral, Antecubital, Right Updated: 03/23/22 2012     Blood Culture, Routine No growth after 5 days.    Narrative:      Aerobic and anaerobic    Blood culture x two cultures. Draw prior to antibiotics. [[id number]] Collected: 03/18/22 1641    Order Status: Completed Specimen: Blood from Peripheral, Antecubital, Right Updated: 03/23/22 2012     Blood Culture, Routine No growth after 5 days.    Narrative:      Aerobic and anaerobic    Aerobic culture [[id number]]  (Abnormal)  (Susceptibility) Collected: 03/18/22 1620    Order Status: Completed Specimen: Wound from Toe, Right Foot Updated: 03/21/22 1226     Aerobic Bacterial Culture METHICILLIN RESISTANT STAPHYLOCOCCUS  AUREUS  Many      Urine culture [[id number]]  (Abnormal)  (Susceptibility) Collected: 03/18/22 8747    Order Status: Completed Specimen: Urine Updated: 03/21/22 0736     Urine Culture, Routine ESCHERICHIA COLI ESBL  > 100,000 cfu/ml      Narrative:      Specimen Source->Urine

## 2022-03-28 NOTE — PROGRESS NOTES
[facility] - Telemetry Stepdown ([place] [id number])  Podiatry  Progress Note    Patient Name: Beatriz Adame  MRN: [id number]  Admission Date: 3/18/2022  Hospital Length of Stay: 7 days  Attending Physician: Jessica Rocha MD  Primary Care Provider: Dante Olivier MD     Subjective:     Interval History:   S/p partial 4th and 5th ray amputation right foot on 3/24/2022. NAEON. Remains afebrile, vital signs stable. Patient denies any foot pain. Denies NVFC. Intraop proximal bone margins +MRSA.     Follow-up For: Procedure(s) (LRB):  AMPUTATION, FOOT, PARTIAL 4th and 5th ray (Right)    Post-Operative Day: 4 Days Post-Op    Scheduled Meds:   amLODIPine  10 mg Oral QHS    aspirin  81 mg Oral Daily    atorvastatin  80 mg Oral QHS    carvediloL  25 mg Oral BID    clopidogreL  75 mg Oral Daily    ertapenem (INVANZ) IVPB  500 mg Intravenous Q24H    ferrous sulfate  1 tablet Oral Daily    folic acid  1 mg Oral Daily    gabapentin  300 mg Oral Daily    heparin (porcine)  5,000 Units Subcutaneous Q8H    insulin detemir U-100  3 Units Subcutaneous Daily    Lactobacillus rhamnosus GG  1 capsule Oral Daily    levothyroxine  75 mcg Oral Daily    metoclopramide HCl  10 mg Oral TID AC    pantoprazole  40 mg Oral Daily    sodium chloride 0.9%  10 mL Intravenous Q6H    tamsulosin  0.4 mg Oral Daily     Continuous Infusions:   sodium chloride 0.9%       PRN Meds:sodium chloride, dextrose 10%, dextrose 10%, glucagon (human recombinant), glucose, glucose, HYDROmorphone, insulin aspart U-100, naloxone, ondansetron, oxyCODONE, Flushing PICC Protocol **AND** sodium chloride 0.9% **AND** sodium chloride 0.9%, sodium chloride 0.9%, sodium chloride 0.9%, Pharmacy to dose Vancomycin consult **AND** vancomycin - pharmacy to dose    Review of Systems   Constitutional:  Negative for activity change, chills, diaphoresis, fatigue and fever.   HENT:  Negative for congestion, facial swelling, rhinorrhea and sore throat.    Eyes:   Negative for photophobia, pain, itching and visual disturbance.   Respiratory:  Negative for cough, chest tightness, shortness of breath and wheezing.    Cardiovascular:  Negative for chest pain, palpitations and leg swelling.   Gastrointestinal:  Negative for abdominal distention, abdominal pain, blood in stool, constipation, diarrhea, nausea and vomiting.   Genitourinary:  Positive for difficulty urinating (chronic delgado). Negative for dysuria, frequency, hematuria and urgency.   Musculoskeletal:  Positive for gait problem. Negative for arthralgias, back pain and neck stiffness.   Skin:  Positive for color change and wound.   Neurological:  Negative for dizziness, tremors, seizures, syncope, weakness, light-headedness, numbness and headaches.   Psychiatric/Behavioral:  Negative for agitation, confusion and hallucinations. The patient is not nervous/anxious.    Objective:     Vital Signs (Most Recent):  Temp: 97 °F (36.1 °C) (03/28/22 0835)  Pulse: 86 (03/28/22 0835)  Resp: 18 (03/28/22 0835)  BP: 133/64 (03/28/22 0835)  SpO2: 96 % (03/28/22 0835) Vital Signs (24h Range):  Temp:  [97 °F (36.1 °C)-99.2 °F (37.3 °C)] 97 °F (36.1 °C)  Pulse:  [79-86] 86  Resp:  [13-18] 18  SpO2:  [95 %-98 %] 96 %  BP: (115-135)/(58-64) 133/64     Weight: 47.6 kg (104 lb 15 oz)  Body mass index is 16.44 kg/m².    Foot Exam    Right Foot/Ankle     Inspection and Palpation  Tenderness: none   Swelling: none   Skin Exam: drainage, abnormal color and ulcer; skin not intact and no erythema     Neurovascular  Dorsalis pedis: absent  Posterior tibial: absent    Comments  R 2nd toe amputated    S/P 4th and 5th ray resection 03/24    Sutures clean, dry, and intact   Skin edges well approximated with no signs of gapping or dehiscence  No signs of soft tissue infection     Left Foot/Ankle      Inspection and Palpation  Tenderness: none   Swelling: none   Skin Exam: skin intact; no drainage, no cellulitis and no erythema      Neurovascular  Dorsalis pedis: absent  Posterior tibial: absent        Laboratory:  CBC:   Recent Labs   Lab 03/27/22  0806   WBC 11.87   RBC 2.69*   HGB 7.9*   HCT 25.8*      MCV 96   MCH 29.4   MCHC 30.6*     CRP: No results for input(s): CRP in the last 168 hours.  ESR: No results for input(s): SEDRATE in the last 168 hours.  Wound Cultures:   Recent Labs   Lab 03/18/22  1620 03/24/22 1538   LABAERO METHICILLIN RESISTANT STAPHYLOCOCCUS AUREUS  Many  * METHICILLIN RESISTANT STAPHYLOCOCCUS AUREUS  Few  *  METHICILLIN RESISTANT STAPHYLOCOCCUS AUREUS  Few  *     Microbiology Results (last 7 days)       Procedure Component Value Units Date/Time    Aerobic culture [[id number]]  (Abnormal)  (Susceptibility) Collected: 03/24/22 1538    Order Status: Completed Specimen: Bone from Foot, Right Updated: 03/28/22 1001     Aerobic Bacterial Culture METHICILLIN RESISTANT STAPHYLOCOCCUS AUREUS  Few      Narrative:      5th metatarsal bone clean margins    Culture, Anaerobe [[id number]] Collected: 03/24/22 1538    Order Status: Completed Specimen: Bone from Foot, Right Updated: 03/28/22 0735     Anaerobic Culture No anaerobes isolated    Narrative:      5th metatarsal bone clean margins    Culture, Anaerobe [[id number]] Collected: 03/24/22 1538    Order Status: Completed Specimen: Bone from Foot, Right Updated: 03/28/22 0734     Anaerobic Culture No anaerobes isolated    Narrative:      4th metatarsal bone clean margins    Aerobic culture [[id number]]  (Abnormal)  (Susceptibility) Collected: 03/24/22 1538    Order Status: Completed Specimen: Bone from Foot, Right Updated: 03/26/22 1423     Aerobic Bacterial Culture METHICILLIN RESISTANT STAPHYLOCOCCUS AUREUS  Few      Narrative:      4th metatarsal bone clean margins    AFB Culture & Smear [[id number]] Collected: 03/24/22 1538    Order Status: Completed Specimen: Bone from Foot, Right Updated: 03/25/22 2127     AFB Culture & Smear Culture in progress     AFB CULTURE STAIN  No acid fast bacilli seen.    Narrative:      4th metatarsal bone clean margins    AFB Culture & Smear [[id number]] Collected: 03/24/22 1538    Order Status: Completed Specimen: Bone from Foot, Right Updated: 03/25/22 2127     AFB Culture & Smear Culture in progress     AFB CULTURE STAIN No acid fast bacilli seen.    Narrative:      5th metatarsal bone clean margins    Gram stain [[id number]] Collected: 03/24/22 1538    Order Status: Completed Specimen: Bone from Foot, Right Updated: 03/24/22 1652     Gram Stain Result No WBC's      No organisms seen    Narrative:      5th metatarsal bone clean margins    Gram stain [[id number]] Collected: 03/24/22 1538    Order Status: Completed Specimen: Bone from Foot, Right Updated: 03/24/22 1650     Gram Stain Result No WBC's      No organisms seen    Narrative:      4th metatarsal bone clean margins    Fungus culture [[id number]] Collected: 03/24/22 1538    Order Status: Sent Specimen: Bone from Foot, Right Updated: 03/24/22 1553    Fungus culture [[id number]] Collected: 03/24/22 1538    Order Status: Sent Specimen: Bone from Foot, Right Updated: 03/24/22 1548    Blood culture x two cultures. Draw prior to antibiotics. [[id number]] Collected: 03/18/22 1641    Order Status: Completed Specimen: Blood from Peripheral, Antecubital, Right Updated: 03/23/22 2012     Blood Culture, Routine No growth after 5 days.    Narrative:      Aerobic and anaerobic    Blood culture x two cultures. Draw prior to antibiotics. [[id number]] Collected: 03/18/22 1641    Order Status: Completed Specimen: Blood from Peripheral, Antecubital, Right Updated: 03/23/22 2012     Blood Culture, Routine No growth after 5 days.    Narrative:      Aerobic and anaerobic    Aerobic culture [[id number]]  (Abnormal)  (Susceptibility) Collected: 03/18/22 1620    Order Status: Completed Specimen: Wound from Toe, Right Foot Updated: 03/21/22 1226     Aerobic Bacterial Culture METHICILLIN RESISTANT STAPHYLOCOCCUS AUREUS  Many             Specimen (24h ago, onward)                None            Diagnostic Results:  I have reviewed all pertinent imaging results/findings within the past 24 hours.    Clinical Findings:  S/p right foot partial 4th and 5th ray amputation on 3/24/2022. Sutures intact, no wound dehiscence or gapping, no necrosis or hematoma formation. No drainage, no erythema, localized edema surrounding surgical site.         Assessment/Plan:     * Osteomyelitis of right foot  MRI right forefoot demonstrating osteomyelitis involving the right 4th and 5th metatarsals distally and the phalanges of the 4th and 5th toes. Now S/P 4th and 5th ray resection/partial amputation 03/24     Plan:  -Intraop proximal bone margins +MRSA. Anticipate 6 weeks of IV Vancomycin for residual osteomyelitis of the right foot. ID on board. Appreciate reccs.   -WBAT LLE, WB to heel for transfers or short distances RLE.   -Dressing changes by nursing, ordered.   -Recommend offloading heel protector boots while patient is in bed to prevent pressure wounds. Ordered/   -Ok to discharge patient from podiatry standpoint once final antibiotic plan established  -Podiatry will follow.    DC Instructions:  Patient is to follow up with podiatry within 10 days of discharge with Dr. Logan. Podiatry will arrange an appointment.  Home health/facility to do dressing changes every MWF and prn as follows:  Cleanse right foot incision site with saline or wound cleanser, paint incision site with betadine, followed by xeroform, 4x4 gauze, cast padding x 2, ACE bandage.           Yumiko Morales DPM  [facility]  Podiatry PGY3  Pager: [phone]  Spectra:[phone]

## 2022-03-28 NOTE — PLAN OF CARE
Patient alert x 4, talkative during beginning of the shift. Reports  increased appetite ,drinking when fluid offered.  Foot pain managed with positioning and medicine.  Slept without distress.    Problem: Impaired Wound Healing  Goal: Optimal Wound Healing  3/27/2022 2318 by Marisa Euceda RN  Outcome: Ongoing, Progressing  3/27/2022 2318 by Marisa Euceda RN  Outcome: Ongoing, Progressing     Problem: Oral Intake Inadequate (Acute Kidney Injury/Impairment)  Goal: Optimal Nutrition Intake  3/27/2022 2318 by Marisa Euceda RN  Outcome: Ongoing, Progressing  3/27/2022 2318 by Marisa Euceda RN  Outcome: Ongoing, Progressing

## 2022-03-28 NOTE — SUBJECTIVE & OBJECTIVE
Subjective:     Interval History:   S/p partial 4th and 5th ray amputation right foot on 3/24/2022. NAEON. Remains afebrile, vital signs stable. Patient denies any foot pain. Denies NVFC. Intraop proximal bone margins +MRSA.     Follow-up For: Procedure(s) (LRB):  AMPUTATION, FOOT, PARTIAL 4th and 5th ray (Right)    Post-Operative Day: 4 Days Post-Op    Scheduled Meds:   amLODIPine  10 mg Oral QHS    aspirin  81 mg Oral Daily    atorvastatin  80 mg Oral QHS    carvediloL  25 mg Oral BID    clopidogreL  75 mg Oral Daily    ertapenem (INVANZ) IVPB  500 mg Intravenous Q24H    ferrous sulfate  1 tablet Oral Daily    folic acid  1 mg Oral Daily    gabapentin  300 mg Oral Daily    heparin (porcine)  5,000 Units Subcutaneous Q8H    insulin detemir U-100  3 Units Subcutaneous Daily    Lactobacillus rhamnosus GG  1 capsule Oral Daily    levothyroxine  75 mcg Oral Daily    metoclopramide HCl  10 mg Oral TID AC    pantoprazole  40 mg Oral Daily    sodium chloride 0.9%  10 mL Intravenous Q6H    tamsulosin  0.4 mg Oral Daily     Continuous Infusions:   sodium chloride 0.9%       PRN Meds:sodium chloride, dextrose 10%, dextrose 10%, glucagon (human recombinant), glucose, glucose, HYDROmorphone, insulin aspart U-100, naloxone, ondansetron, oxyCODONE, Flushing PICC Protocol **AND** sodium chloride 0.9% **AND** sodium chloride 0.9%, sodium chloride 0.9%, sodium chloride 0.9%, Pharmacy to dose Vancomycin consult **AND** vancomycin - pharmacy to dose    Review of Systems   Constitutional:  Negative for activity change, chills, diaphoresis, fatigue and fever.   HENT:  Negative for congestion, facial swelling, rhinorrhea and sore throat.    Eyes:  Negative for photophobia, pain, itching and visual disturbance.   Respiratory:  Negative for cough, chest tightness, shortness of breath and wheezing.    Cardiovascular:  Negative for chest pain, palpitations and leg swelling.   Gastrointestinal:  Negative for abdominal distention, abdominal  pain, blood in stool, constipation, diarrhea, nausea and vomiting.   Genitourinary:  Positive for difficulty urinating (chronic delgado). Negative for dysuria, frequency, hematuria and urgency.   Musculoskeletal:  Positive for gait problem. Negative for arthralgias, back pain and neck stiffness.   Skin:  Positive for color change and wound.   Neurological:  Negative for dizziness, tremors, seizures, syncope, weakness, light-headedness, numbness and headaches.   Psychiatric/Behavioral:  Negative for agitation, confusion and hallucinations. The patient is not nervous/anxious.    Objective:     Vital Signs (Most Recent):  Temp: 97 °F (36.1 °C) (03/28/22 0835)  Pulse: 86 (03/28/22 0835)  Resp: 18 (03/28/22 0835)  BP: 133/64 (03/28/22 0835)  SpO2: 96 % (03/28/22 0835) Vital Signs (24h Range):  Temp:  [97 °F (36.1 °C)-99.2 °F (37.3 °C)] 97 °F (36.1 °C)  Pulse:  [79-86] 86  Resp:  [13-18] 18  SpO2:  [95 %-98 %] 96 %  BP: (115-135)/(58-64) 133/64     Weight: 47.6 kg (104 lb 15 oz)  Body mass index is 16.44 kg/m².    Foot Exam    Right Foot/Ankle     Inspection and Palpation  Tenderness: none   Swelling: none   Skin Exam: drainage, abnormal color and ulcer; skin not intact and no erythema     Neurovascular  Dorsalis pedis: absent  Posterior tibial: absent    Comments  R 2nd toe amputated    S/P 4th and 5th ray resection 03/24    Sutures clean, dry, and intact   Skin edges well approximated with no signs of gapping or dehiscence  No signs of soft tissue infection     Left Foot/Ankle      Inspection and Palpation  Tenderness: none   Swelling: none   Skin Exam: skin intact; no drainage, no cellulitis and no erythema     Neurovascular  Dorsalis pedis: absent  Posterior tibial: absent        Laboratory:  CBC:   Recent Labs   Lab 03/27/22  0806   WBC 11.87   RBC 2.69*   HGB 7.9*   HCT 25.8*      MCV 96   MCH 29.4   MCHC 30.6*     CRP: No results for input(s): CRP in the last 168 hours.  ESR: No results for input(s): SEDRATE  in the last 168 hours.  Wound Cultures:   Recent Labs   Lab 03/18/22  1620 03/24/22 1538   LABAERO METHICILLIN RESISTANT STAPHYLOCOCCUS AUREUS  Many  * METHICILLIN RESISTANT STAPHYLOCOCCUS AUREUS  Few  *  METHICILLIN RESISTANT STAPHYLOCOCCUS AUREUS  Few  *     Microbiology Results (last 7 days)       Procedure Component Value Units Date/Time    Aerobic culture [[id number]]  (Abnormal)  (Susceptibility) Collected: 03/24/22 1538    Order Status: Completed Specimen: Bone from Foot, Right Updated: 03/28/22 1001     Aerobic Bacterial Culture METHICILLIN RESISTANT STAPHYLOCOCCUS AUREUS  Few      Narrative:      5th metatarsal bone clean margins    Culture, Anaerobe [[id number]] Collected: 03/24/22 1538    Order Status: Completed Specimen: Bone from Foot, Right Updated: 03/28/22 0735     Anaerobic Culture No anaerobes isolated    Narrative:      5th metatarsal bone clean margins    Culture, Anaerobe [[id number]] Collected: 03/24/22 1538    Order Status: Completed Specimen: Bone from Foot, Right Updated: 03/28/22 0734     Anaerobic Culture No anaerobes isolated    Narrative:      4th metatarsal bone clean margins    Aerobic culture [[id number]]  (Abnormal)  (Susceptibility) Collected: 03/24/22 1538    Order Status: Completed Specimen: Bone from Foot, Right Updated: 03/26/22 1423     Aerobic Bacterial Culture METHICILLIN RESISTANT STAPHYLOCOCCUS AUREUS  Few      Narrative:      4th metatarsal bone clean margins    AFB Culture & Smear [[id number]] Collected: 03/24/22 1538    Order Status: Completed Specimen: Bone from Foot, Right Updated: 03/25/22 2127     AFB Culture & Smear Culture in progress     AFB CULTURE STAIN No acid fast bacilli seen.    Narrative:      4th metatarsal bone clean margins    AFB Culture & Smear [[id number]] Collected: 03/24/22 1538    Order Status: Completed Specimen: Bone from Foot, Right Updated: 03/25/22 2127     AFB Culture & Smear Culture in progress     AFB CULTURE STAIN No acid fast bacilli seen.     Narrative:      5th metatarsal bone clean margins    Gram stain [[id number]] Collected: 03/24/22 1538    Order Status: Completed Specimen: Bone from Foot, Right Updated: 03/24/22 1652     Gram Stain Result No WBC's      No organisms seen    Narrative:      5th metatarsal bone clean margins    Gram stain [[id number]] Collected: 03/24/22 1538    Order Status: Completed Specimen: Bone from Foot, Right Updated: 03/24/22 1650     Gram Stain Result No WBC's      No organisms seen    Narrative:      4th metatarsal bone clean margins    Fungus culture [[id number]] Collected: 03/24/22 1538    Order Status: Sent Specimen: Bone from Foot, Right Updated: 03/24/22 1553    Fungus culture [[id number]] Collected: 03/24/22 1538    Order Status: Sent Specimen: Bone from Foot, Right Updated: 03/24/22 1548    Blood culture x two cultures. Draw prior to antibiotics. [[id number]] Collected: 03/18/22 1641    Order Status: Completed Specimen: Blood from Peripheral, Antecubital, Right Updated: 03/23/22 2012     Blood Culture, Routine No growth after 5 days.    Narrative:      Aerobic and anaerobic    Blood culture x two cultures. Draw prior to antibiotics. [[id number]] Collected: 03/18/22 1641    Order Status: Completed Specimen: Blood from Peripheral, Antecubital, Right Updated: 03/23/22 2012     Blood Culture, Routine No growth after 5 days.    Narrative:      Aerobic and anaerobic    Aerobic culture [[id number]]  (Abnormal)  (Susceptibility) Collected: 03/18/22 1620    Order Status: Completed Specimen: Wound from Toe, Right Foot Updated: 03/21/22 1226     Aerobic Bacterial Culture METHICILLIN RESISTANT STAPHYLOCOCCUS AUREUS  Many            Specimen (24h ago, onward)                None            Diagnostic Results:  I have reviewed all pertinent imaging results/findings within the past 24 hours.    Clinical Findings:  S/p right foot partial 4th and 5th ray amputation on 3/24/2022. Sutures intact, no wound dehiscence or gapping, no necrosis or  hematoma formation. No drainage, no erythema, localized edema surrounding surgical site.

## 2022-03-28 NOTE — ASSESSMENT & PLAN NOTE
Stable   - hold ACE-I or ARB while renal function dynamic  - Monitor UOP and follow serial BMP   - Adjust drugs to GFR/CrCL; avoid nephrotoxic drugs   -  Estimated Creatinine Clearance: 27.4 mL/min (A) (based on SCr of 1.5 mg/dL (H)).   - Monitor electrolytes, phos levels daily

## 2022-03-28 NOTE — ASSESSMENT & PLAN NOTE
Osteomyelitis of R 2nd toe  Critical lower limb ischemia     s/p AMPUTATION, FOOT, PARTIAL 4th and 5th R  - MRI revealed Osteomyelitis involving the right 4th and 5th metatarsals distally and the phalanges of the 4th and 5th toes.   - XR R foot showed no acute bony abnormalities  - ID consulted, appreciate recs    - started Daptomycin 6 mg/kg IV q 48 hours (renal dosing). Please hold statin while on Daptomycin.    Therapy Duration:  6 weeks     Estimated end date of IV antibiotics: 5/5/22  -  - podiatry consulted, appreciate recs   -WBAT LLE, WB to heel for transfers or short distances RLE.   - Arterial studies reviewed. RLE ABIs/ PVR waveforms suggest no evidence of PAD. Rt Great Toe: The PPG waveform is blunted with TBI of 0.16 and a toe pressure of 25 mmHg which suggests severe microvascular disease.   - Cx growing MRSA

## 2022-03-28 NOTE — SUBJECTIVE & OBJECTIVE
Interval History: NAEON. Patient resting in bed, sleeping but easily aroused. She reports pain in her foot which is controlled with PRN meds. Vancomycin dosing difficult in setting of CKD, ID changed to daptomycin today. Orders placed for SNF, CM aware. Continue to monitor.      Review of Systems   Constitutional:  Negative for chills, fatigue and fever.   HENT: Negative.  Negative for congestion, facial swelling, rhinorrhea and sore throat.    Eyes: Negative.  Negative for photophobia, itching and visual disturbance.   Respiratory:  Negative for cough, chest tightness, shortness of breath and wheezing.    Cardiovascular:  Negative for chest pain, palpitations and leg swelling.   Gastrointestinal:  Negative for constipation, diarrhea, nausea and vomiting.   Genitourinary:  Positive for difficulty urinating (chronic delgado). Negative for dysuria, flank pain, frequency, hematuria and urgency.   Musculoskeletal:  Positive for arthralgias and gait problem. Negative for back pain and neck stiffness.   Skin:  Positive for color change and wound.   Neurological:  Positive for numbness. Negative for dizziness, seizures, weakness and light-headedness.   Psychiatric/Behavioral:  Negative for agitation, confusion and hallucinations. The patient is not nervous/anxious.    Objective:     Vital Signs (Most Recent):  Temp: 98 °F (36.7 °C) (03/28/22 1511)  Pulse: 80 (03/28/22 1511)  Resp: 16 (03/28/22 1511)  BP: (!) 120/55 (03/28/22 1511)  SpO2: 97 % (03/28/22 1511)   Vital Signs (24h Range):  Temp:  [97 °F (36.1 °C)-99.2 °F (37.3 °C)] 98 °F (36.7 °C)  Pulse:  [77-86] 80  Resp:  [13-18] 16  SpO2:  [95 %-100 %] 97 %  BP: (115-135)/(55-64) 120/55     Weight: 49.7 kg (109 lb 9.1 oz)  Body mass index is 17.16 kg/m².    Intake/Output Summary (Last 24 hours) at 3/28/2022 1553  Last data filed at 3/28/2022 1511  Gross per 24 hour   Intake 220 ml   Output 500 ml   Net -280 ml      Physical Exam  Vitals and nursing note reviewed.    Constitutional:       General: She is not in acute distress.     Appearance: Normal appearance. She is not ill-appearing, toxic-appearing or diaphoretic.   HENT:      Head: Normocephalic and atraumatic.      Mouth/Throat:      Mouth: Mucous membranes are moist.   Eyes:      General: No scleral icterus.     Conjunctiva/sclera: Conjunctivae normal.   Cardiovascular:      Rate and Rhythm: Normal rate and regular rhythm.   Pulmonary:      Effort: Pulmonary effort is normal. No respiratory distress.      Breath sounds: Normal breath sounds.   Abdominal:      General: There is no distension.      Palpations: Abdomen is soft.      Tenderness: There is no abdominal tenderness. There is no right CVA tenderness, left CVA tenderness or guarding.   Musculoskeletal:         General: Deformity (s/p amputation) present.      Right lower leg: No edema.      Left lower leg: No edema.      Comments: Right foot dressing, CDI   Skin:     General: Skin is warm and dry.   Neurological:      Mental Status: She is alert. Mental status is at baseline.   Psychiatric:         Mood and Affect: Mood normal.         Behavior: Behavior normal.       Significant Labs: All pertinent labs within the past 24 hours have been reviewed.    Significant Imaging: I have reviewed all pertinent imaging results/findings within the past 24 hours.

## 2022-03-28 NOTE — PROGRESS NOTES
"[facility] - Telemetry Stepdown ([address] Medicine  Progress Note    Patient Name: Beatriz Adame  MRN: [id number]  Patient Class: IP- Inpatient   Admission Date: 3/18/2022  Length of Stay: 7 days  Attending Physician: Jessica Rocha MD  Primary Care Provider: Dante Olivier MD        Subjective:     Principal Problem:Osteomyelitis of right foot        HPI:  Patient is a 71 year old female with osteoarthritis, hypothyroidism, CVA with left-sided residual deficit, DM2, HTN, HLD, CKD, chronic anemia, recurrent pseudomonal UTI, nephroliathiasis s/p left ureteral stent placement in 10/2021, urinary retention with chronic indwelling delgado catheter, diabetic foot ulcer s/p R toe amputation, chronic anemia, who presents with acute metabolic encephalopathy secondary to catheter associated urinary tract infection and possible right toe infection.   She is being admitted for [facility].     Initially in ED patient was found to have a white count of 13,000 and a left shift / neutrophilic predominance.  While not overtly septic, sepsis protocol was initiated - patient pancultured and broad-spectrum antibiotics with vancomycin and ceftriaxone was initiated.  Vital signs were within normal limits however blood pressure was at the lower range of normal for this patient with a systolic blood pressure of 110.  Lactic acid was obtained and was within normal limits.     Patient is alert on my examination she is able to describe that she is at the hospital for a catheter exchange."  The her catheter has been exchanged in the ED and she has had the benefit of broad-spectrum antibiotics since her admission there.  It is clear that her encephalopathy has already improved with the management of her very obvious catheter associated urinary tract infection.  The ED provider also had a concern for a right 5th toe infection - which appears to be chronically devascularized and gangrenous on examination.  " Plain films of the affected foot were unrevealing however patient has just returned from MRI and interpretation is pending.  Inflammatory markers are notably elevated.      She will be admitted to Hospital Medicine for ongoing management of her catheter associated urinary tract infection and possible right foot infection.          Overview/Hospital Course:  71 y.o. who was admitted to hospital medicine for acute metabolic encephalopathy in setting of UTI and osteomyelitis. Pt was AFVSS and hemodynamically stable. Leukocytosis resolved w/ initiation of IV abx. UA infectious and initially started on IV cefepime. UC grew ESBL and patient was transitioned to IV ertapenem started 3/21 (7 day course). MRI of R foot and toes revealed osteomyelitis of the right 4th and 5th distal metatarsals and phalanges. Cx growing MRSA started on IV vancomycin 3/18. ID following. Podiatry completed successful amputation on 3/24. Patient to discharge to SNF when medically stable.       Interval History: NAEON. Patient resting in bed, sleeping but easily aroused. She reports pain in her foot which is controlled with PRN meds. Vancomycin dosing difficult in setting of CKD, ID changed to daptomycin today. Orders placed for SNF, CM aware. Continue to monitor.      Review of Systems   Constitutional:  Negative for chills, fatigue and fever.   HENT: Negative.  Negative for congestion, facial swelling, rhinorrhea and sore throat.    Eyes: Negative.  Negative for photophobia, itching and visual disturbance.   Respiratory:  Negative for cough, chest tightness, shortness of breath and wheezing.    Cardiovascular:  Negative for chest pain, palpitations and leg swelling.   Gastrointestinal:  Negative for constipation, diarrhea, nausea and vomiting.   Genitourinary:  Positive for difficulty urinating (chronic delgado). Negative for dysuria, flank pain, frequency, hematuria and urgency.   Musculoskeletal:  Positive for arthralgias and gait problem.  Negative for back pain and neck stiffness.   Skin:  Positive for color change and wound.   Neurological:  Positive for numbness. Negative for dizziness, seizures, weakness and light-headedness.   Psychiatric/Behavioral:  Negative for agitation, confusion and hallucinations. The patient is not nervous/anxious.    Objective:     Vital Signs (Most Recent):  Temp: 98 °F (36.7 °C) (03/28/22 1511)  Pulse: 80 (03/28/22 1511)  Resp: 16 (03/28/22 1511)  BP: (!) 120/55 (03/28/22 1511)  SpO2: 97 % (03/28/22 1511)   Vital Signs (24h Range):  Temp:  [97 °F (36.1 °C)-99.2 °F (37.3 °C)] 98 °F (36.7 °C)  Pulse:  [77-86] 80  Resp:  [13-18] 16  SpO2:  [95 %-100 %] 97 %  BP: (115-135)/(55-64) 120/55     Weight: 49.7 kg (109 lb 9.1 oz)  Body mass index is 17.16 kg/m².    Intake/Output Summary (Last 24 hours) at 3/28/2022 1553  Last data filed at 3/28/2022 1511  Gross per 24 hour   Intake 220 ml   Output 500 ml   Net -280 ml      Physical Exam  Vitals and nursing note reviewed.   Constitutional:       General: She is not in acute distress.     Appearance: Normal appearance. She is not ill-appearing, toxic-appearing or diaphoretic.   HENT:      Head: Normocephalic and atraumatic.      Mouth/Throat:      Mouth: Mucous membranes are moist.   Eyes:      General: No scleral icterus.     Conjunctiva/sclera: Conjunctivae normal.   Cardiovascular:      Rate and Rhythm: Normal rate and regular rhythm.   Pulmonary:      Effort: Pulmonary effort is normal. No respiratory distress.      Breath sounds: Normal breath sounds.   Abdominal:      General: There is no distension.      Palpations: Abdomen is soft.      Tenderness: There is no abdominal tenderness. There is no right CVA tenderness, left CVA tenderness or guarding.   Musculoskeletal:         General: Deformity (s/p amputation) present.      Right lower leg: No edema.      Left lower leg: No edema.      Comments: Right foot dressing, CDI   Skin:     General: Skin is warm and dry.    Neurological:      Mental Status: She is alert. Mental status is at baseline.   Psychiatric:         Mood and Affect: Mood normal.         Behavior: Behavior normal.       Significant Labs: All pertinent labs within the past 24 hours have been reviewed.    Significant Imaging: I have reviewed all pertinent imaging results/findings within the past 24 hours.      Assessment/Plan:      * Osteomyelitis of right foot  Osteomyelitis of R 2nd toe  Critical lower limb ischemia     s/p AMPUTATION, FOOT, PARTIAL 4th and 5th R  - MRI revealed Osteomyelitis involving the right 4th and 5th metatarsals distally and the phalanges of the 4th and 5th toes.   - XR R foot showed no acute bony abnormalities  - ID consulted, appreciate recs    - started Daptomycin 6 mg/kg IV q 48 hours (renal dosing). Please hold statin while on Daptomycin.    Therapy Duration:  6 weeks     Estimated end date of IV antibiotics: 5/5/22  -  - podiatry consulted, appreciate recs   -WBAT LLE, WB to heel for transfers or short distances RLE.   - Arterial studies reviewed. RLE ABIs/ PVR waveforms suggest no evidence of PAD. Rt Great Toe: The PPG waveform is blunted with TBI of 0.16 and a toe pressure of 25 mmHg which suggests severe microvascular disease.   - Cx growing MRSA     Acute metabolic encephalopathy  Acute cystitis without hematuria    Improved, continue to monitor. Encephalopathy improved with goal directed medical therapy for infections  - Patient initiated on broad-spectrum antibiotics in the ED - vancomycin, ceftriaxone  - transitioned and completed 7 day course of ertapenam  - Patient has a history of pansensitive Klebsiella UTIs  - Nitrite positive urinalysis with gross parameters of infection on UA, urine culture growing ESCHERICHIA COLI ESBL   - Araiza catheter exchanged  - Blood cultures NGTD  - MRI of right foot reveals Osteomyelitis involving the right 4th and 5th metatarsals distally and the phalanges of the 4th and 5th toes ---   podiatry following, see above  - Delirium precautions, neuro checks, aspiration precautions    Chronic kidney disease, stage 4 (severe)  Stable   - hold ACE-I or ARB while renal function dynamic  - Monitor UOP and follow serial BMP   - Adjust drugs to GFR/CrCL; avoid nephrotoxic drugs   -  Estimated Creatinine Clearance: 27.4 mL/min (A) (based on SCr of 1.5 mg/dL (H)).   - Monitor electrolytes, phos levels daily    Hypertension  - amLODIPine (NORVASC) 5 MG tablet daily   - carvedilol (COREG) 25 MG tablet BID    Type 2 diabetes mellitus, with long-term current use of insulin  Lab Results   Component Value Date    HGBA1C 5.4 11/21/2021     - BG goal 140 - 180   - inpatient regimen: detemir 3 U  - low dose SSI, ACHS acchuchecks  - Diabetic diet 2000 calories   - monitor for hypoglycemia    Peripheral neuropathy  -home gabapentin 300 mg once daily      Debility  - PTOT s/p surgery     Urinary retention  - Patient with history of urinary retention and Araiza catheter in place since at least January of 2022  - Araiza catheter last exchanged 02/22/2022  - Araiza catheter exchanged in ED  - Patient continues Flomax per home dose    Hypothyroidism  - continue levothyroxine    Severe protein-calorie malnutrition  -registered dietitian consult      History of stroke  -continue home asa and plavix      Hyperlipidemia  - Atorvastatin 80 formulary substitution for home Crestor 40    VTE Risk Mitigation (From admission, onward)         Ordered     heparin (porcine) injection 5,000 Units  Every 8 hours         03/18/22 2111     IP VTE HIGH RISK PATIENT  Once         03/18/22 2110     Place sequential compression device  Until discontinued         03/18/22 2110                Discharge Planning   MARY: 3/29/2022     Code Status: Full Code   Is the patient medically ready for discharge?: No    Reason for patient still in hospital (select all that apply): Patient trending condition, Laboratory test, Treatment, Imaging, Consult  recommendations and Pending disposition  Discharge Plan A: Skilled Nursing Facility   Discharge Delays: None known at this time              Laxmi Hernandez PA-C  Department of Hospital Medicine   [facility] - Telemetry Stepdown ([facility])

## 2022-03-28 NOTE — PT/OT/SLP PROGRESS
Occupational Therapy   Co-Treatment    Name: Beatriz Adame  MRN: [id number]  Admitting Diagnosis:  Osteomyelitis of right foot  4 Days Post-Op    Recommendations:     Discharge Recommendations: nursing facility, skilled  Discharge Equipment Recommendations:  slide board, bedside commode, hospital bed  Barriers to discharge:   (decreased independence with ADLs, below functional baseline,assist required for transfers)    Assessment:     Beatriz Adame is a 71 y.o. female with a medical diagnosis of Osteomyelitis of right foot. Performance deficits affecting function are weakness, impaired endurance, impaired self care skills, impaired functional mobilty, gait instability, impaired balance, impaired joint extensibility, decreased upper extremity function, decreased lower extremity function, decreased ROM, impaired sensation, impaired cognition. Patient would benefit from continued skilled acute OT 3x/wk to improve functional mobility, increase independence with ADLs, and address established goals. Recommending SNF once medically appropriate for discharge to increase maximal independence, reduce burden of care, and ensure safety.     Rehab Prognosis:  Good; patient would benefit from acute skilled OT services to address these deficits and reach maximum level of function.       Plan:     Patient to be seen 3 x/week to address the above listed problems via self-care/home management, therapeutic activities, therapeutic exercises  · Plan of Care Expires: 04/24/22  · Plan of Care Reviewed with: patient    Subjective     Pain/Comfort:  · Pain Rating 1:  (patient did not rate)  · Location - Side 1: Left  · Location - Orientation 1: generalized  · Location 1: leg  · Pain Addressed 1: Reposition, Distraction, Cessation of Activity  · Pain Rating Post-Intervention 1:  (patient did not rate)    Objective:     Communicated with: NSG prior to session.  Patient found HOB elevated with telemetry, peripheral IV, delgado catheter upon  OT entry to room.    General Precautions: Standard, fall, contact   Orthopedic Precautions:RLE non weight bearing   Braces: N/A  Respiratory Status: Room air     Occupational Performance:     Bed Mobility:    · Patient completed Scooting/Bridging with maximal assistance to EOB; max A of 2 persons to HOB supine  · Patient completed Supine to Sit with maximal assistance  · Patient completed Sit to Supine with maximal assistance     Activities of Daily Living:  · Grooming: moderate assistance oral care and washing face sitting EOB   · Toileting: total assistance      AMPAC 6 Click ADL: 13    Treatment & Education:  Role of OT and POC  ADL retraining  Functional mobility training  Safety    Sitting balance EOB with a lateral and forward flexion noted needing maximal assitance<> total assistance to maintain midline position. Including g/h task. Patient fatigued.     Co-treatment performed due to patient's multiple deficits requiring two skilled therapists to appropriately and safely assess patient's strength and endurance while facilitating functional tasks in addition to accommodating for patient's activity tolerance.     Patient left HOB elevated with all lines intact, call button in reach and all needs met. Education:      GOALS:   Multidisciplinary Problems     Occupational Therapy Goals        Problem: Occupational Therapy    Goal Priority Disciplines Outcome Interventions   Occupational Therapy Goal     OT, PT/OT Ongoing, Progressing    Description: Goals to be met by: 4/8/22     Patient will increase functional independence with ADLs by performing:    UE Dressing with Glenn.  LE Dressing with Modified Glenn.  Grooming while seated with Glenn and Set-up Assistance.  Toileting from bedside commode with Minimal Assistance for hygiene and clothing management.   Toilet transfer to bedside commode with Minimal Assistance.                     Time Tracking:     OT Date of Treatment: 03/28/22  OT  Start Time: 1038  OT Stop Time: 1108  OT Total Time (min): 30 min    Billable Minutes:Self Care/Home Management 30               3/28/2022

## 2022-03-28 NOTE — PLAN OF CARE
Problem: Adult Inpatient Plan of Care  Goal: Plan of Care Review  Outcome: Ongoing, Progressing  Goal: Patient-Specific Goal (Individualized)  Outcome: Ongoing, Progressing  Goal: Absence of Hospital-Acquired Illness or Injury  Outcome: Ongoing, Progressing  Goal: Optimal Comfort and Wellbeing  Outcome: Ongoing, Progressing  Goal: Readiness for Transition of Care  Outcome: Ongoing, Progressing     Problem: Diabetes Comorbidity  Goal: Blood Glucose Level Within Targeted Range  Outcome: Ongoing, Progressing     Problem: Fluid and Electrolyte Imbalance (Acute Kidney Injury/Impairment)  Goal: Fluid and Electrolyte Balance  Outcome: Ongoing, Progressing     Problem: Oral Intake Inadequate (Acute Kidney Injury/Impairment)  Goal: Optimal Nutrition Intake  Outcome: Ongoing, Progressing     Problem: Renal Function Impairment (Acute Kidney Injury/Impairment)  Goal: Effective Renal Function  Outcome: Ongoing, Progressing     Problem: Impaired Wound Healing  Goal: Optimal Wound Healing  Outcome: Ongoing, Progressing     Problem: Fall Injury Risk  Goal: Absence of Fall and Fall-Related Injury  Outcome: Ongoing, Progressing     Problem: Skin Injury Risk Increased  Goal: Skin Health and Integrity  Outcome: Ongoing, Progressing     Problem: Infection  Goal: Absence of Infection Signs and Symptoms  Outcome: Ongoing, Progressing     Pt lying in bed with eyes closed. Resp even and unlabored. Bandage intact to right foot. No s/s of drainage noted at present. Totally dependent of ADLs except feeding. Set up with meals. PICC line intact to RUE. No s/s infection, bleeding or other drainage noted. Incontinent of bowel. 16Fr Araiza cath patent and intact without any kinks noted draining yellow urine with cloudy sediment noted. No s/s of adverse reactions to ABX therapy. Call light within reach. Will cont to monitor.

## 2022-03-28 NOTE — CONSULTS
Therapy with vancomycin complete and/or consult discontinued by provider.  Pharmacy will sign off, please re-consult as needed.    Angelita Castaneda, PharmD, [facility]  Clinical Pharmacist  Extension: [phone]

## 2022-03-29 NOTE — PLAN OF CARE
TC from [facility] RE: Daptomycin. Admissions stated they can not pay for med, cost is 1400/week. Notified EBONY Ignacio. Will continue to update plan as needed.  Paulo Lind RN,BSN

## 2022-03-29 NOTE — PROGRESS NOTES
"[facility] - Telemetry Stepdown ([address] Medicine  Progress Note    Patient Name: Beatriz Adame  MRN: [id number]  Patient Class: IP- Inpatient   Admission Date: 3/18/2022  Length of Stay: 8 days  Attending Physician: Jessica Rocha MD  Primary Care Provider: Dante Olivier MD        Subjective:     Principal Problem:Osteomyelitis of right foot        HPI:  Patient is a 71 year old female with osteoarthritis, hypothyroidism, CVA with left-sided residual deficit, DM2, HTN, HLD, CKD, chronic anemia, recurrent pseudomonal UTI, nephroliathiasis s/p left ureteral stent placement in 10/2021, urinary retention with chronic indwelling delgado catheter, diabetic foot ulcer s/p R toe amputation, chronic anemia, who presents with acute metabolic encephalopathy secondary to catheter associated urinary tract infection and possible right toe infection.   She is being admitted for [facility].     Initially in ED patient was found to have a white count of 13,000 and a left shift / neutrophilic predominance.  While not overtly septic, sepsis protocol was initiated - patient pancultured and broad-spectrum antibiotics with vancomycin and ceftriaxone was initiated.  Vital signs were within normal limits however blood pressure was at the lower range of normal for this patient with a systolic blood pressure of 110.  Lactic acid was obtained and was within normal limits.     Patient is alert on my examination she is able to describe that she is at the hospital for a catheter exchange."  The her catheter has been exchanged in the ED and she has had the benefit of broad-spectrum antibiotics since her admission there.  It is clear that her encephalopathy has already improved with the management of her very obvious catheter associated urinary tract infection.  The ED provider also had a concern for a right 5th toe infection - which appears to be chronically devascularized and gangrenous on examination.  " Plain films of the affected foot were unrevealing however patient has just returned from MRI and interpretation is pending.  Inflammatory markers are notably elevated.      She will be admitted to Hospital Medicine for ongoing management of her catheter associated urinary tract infection and possible right foot infection.          Overview/Hospital Course:  71 y.o. who was admitted to hospital medicine for acute metabolic encephalopathy in setting of UTI and osteomyelitis. Pt was AFVSS and hemodynamically stable. Leukocytosis resolved w/ initiation of IV abx. UA infectious and initially started on IV cefepime. UC grew ESBL and patient was transitioned to IV ertapenem started 3/21 (7 day course). MRI of R foot and toes revealed osteomyelitis of the right 4th and 5th distal metatarsals and phalanges. Cx growing MRSA started on IV vancomycin 3/18. ID following. Podiatry completed successful amputation on 3/24. Patient to discharge to SNF when medically stable.       Interval History: NAEON. Nursing home is not willing to cover the cost of Daptomycin, will discuss with ID. If not alternative available, will differ to CM. Case and updates provided to family.     H/H trending down, patient fatigued. Additional unit transfused, will give IV iron on tomorrow.     Review of Systems   Constitutional:  Positive for fatigue. Negative for chills and fever.   HENT: Negative.  Negative for congestion, facial swelling, rhinorrhea and sore throat.    Eyes: Negative.  Negative for photophobia, itching and visual disturbance.   Respiratory:  Negative for cough, chest tightness, shortness of breath and wheezing.    Cardiovascular:  Negative for chest pain, palpitations and leg swelling.   Gastrointestinal:  Negative for constipation, diarrhea, nausea and vomiting.   Genitourinary:  Positive for difficulty urinating (chronic delgado). Negative for dysuria, flank pain, frequency, hematuria and urgency.   Musculoskeletal:  Positive for  arthralgias and gait problem. Negative for back pain and neck stiffness.   Skin:  Positive for color change and wound.   Neurological:  Positive for numbness. Negative for dizziness, seizures, weakness and light-headedness.   Psychiatric/Behavioral:  Negative for agitation, confusion and hallucinations. The patient is not nervous/anxious.    Objective:     Vital Signs (Most Recent):  Temp: 98.9 °F (37.2 °C) (03/29/22 1543)  Pulse: 83 (03/29/22 1543)  Resp: 14 (03/29/22 1543)  BP: 138/63 (03/29/22 1543)  SpO2: (!) 94 % (03/29/22 1543)   Vital Signs (24h Range):  Temp:  [97.7 °F (36.5 °C)-100.1 °F (37.8 °C)] 98.9 °F (37.2 °C)  Pulse:  [83-92] 83  Resp:  [14-18] 14  SpO2:  [94 %-98 %] 94 %  BP: (114-142)/(54-63) 138/63     Weight: 49.7 kg (109 lb 9.1 oz)  Body mass index is 17.16 kg/m².    Intake/Output Summary (Last 24 hours) at 3/29/2022 1658  Last data filed at 3/29/2022 0630  Gross per 24 hour   Intake 10 ml   Output 700 ml   Net -690 ml      Physical Exam  Vitals and nursing note reviewed.   Constitutional:       General: She is not in acute distress.     Appearance: Normal appearance. She is not ill-appearing, toxic-appearing or diaphoretic.   HENT:      Head: Normocephalic and atraumatic.      Mouth/Throat:      Mouth: Mucous membranes are moist.   Eyes:      General: No scleral icterus.     Conjunctiva/sclera: Conjunctivae normal.   Cardiovascular:      Rate and Rhythm: Normal rate and regular rhythm.   Pulmonary:      Effort: Pulmonary effort is normal. No respiratory distress.      Breath sounds: Normal breath sounds.   Abdominal:      General: There is no distension.      Palpations: Abdomen is soft.      Tenderness: There is no abdominal tenderness. There is no right CVA tenderness, left CVA tenderness or guarding.   Musculoskeletal:         General: Deformity (s/p amputation) present.      Right lower leg: No edema.      Left lower leg: No edema.      Comments: Right foot dressing, CDI   Skin:     General:  Skin is warm and dry.   Neurological:      Mental Status: She is alert. Mental status is at baseline.   Psychiatric:         Mood and Affect: Mood normal.         Behavior: Behavior normal.       Significant Labs: All pertinent labs within the past 24 hours have been reviewed.    Significant Imaging: I have reviewed all pertinent imaging results/findings within the past 24 hours.      Assessment/Plan:      * Osteomyelitis of right foot  Osteomyelitis of R 2nd toe  Critical lower limb ischemia     s/p AMPUTATION, FOOT, PARTIAL 4th and 5th R  - MRI revealed Osteomyelitis involving the right 4th and 5th metatarsals distally and the phalanges of the 4th and 5th toes.   - XR R foot showed no acute bony abnormalities  - ID consulted, appreciate recs    - started Daptomycin 6 mg/kg IV q 48 hours (renal dosing). Please hold statin while on Daptomycin.    Therapy Duration:  6 weeks     Estimated end date of IV antibiotics: 5/5/22  -  - podiatry consulted, appreciate recs   -WBAT LLE, WB to heel for transfers or short distances RLE.   - Arterial studies reviewed. RLE ABIs/ PVR waveforms suggest no evidence of PAD. Rt Great Toe: The PPG waveform is blunted with TBI of 0.16 and a toe pressure of 25 mmHg which suggests severe microvascular disease.   - Cx growing MRSA     Acute metabolic encephalopathy  Acute cystitis without hematuria    Improved, continue to monitor. Encephalopathy improved with goal directed medical therapy for infections  - Patient initiated on broad-spectrum antibiotics in the ED - vancomycin, ceftriaxone  - transitioned and completed 7 day course of ertapenam  - Patient has a history of pansensitive Klebsiella UTIs  - Nitrite positive urinalysis with gross parameters of infection on UA, urine culture growing ESCHERICHIA COLI ESBL   - Araiza catheter exchanged  - Blood cultures NGTD  - MRI of right foot reveals Osteomyelitis involving the right 4th and 5th metatarsals distally and the phalanges of the 4th  and 5th toes ---  podiatry following, see above  - Delirium precautions, neuro checks, aspiration precautions    Chronic kidney disease, stage 4 (severe)  Stable   - hold ACE-I or ARB while renal function dynamic  - Monitor UOP and follow serial BMP   - Adjust drugs to GFR/CrCL; avoid nephrotoxic drugs   -  Estimated Creatinine Clearance: 27.4 mL/min (A) (based on SCr of 1.5 mg/dL (H)).   - Monitor electrolytes, phos levels daily    Hypertension  - amLODIPine (NORVASC) 5 MG tablet daily   - carvedilol (COREG) 25 MG tablet BID    Type 2 diabetes mellitus, with long-term current use of insulin  Lab Results   Component Value Date    HGBA1C 5.4 11/21/2021     - BG goal 140 - 180   - inpatient regimen: detemir 3 U  - low dose SSI, ACHS acchuchecks  - Diabetic diet 2000 calories   - monitor for hypoglycemia    Peripheral neuropathy  -home gabapentin 300 mg once daily      Debility  - PTOT s/p surgery     Urinary retention  - Patient with history of urinary retention and Araiza catheter in place since at least January of 2022  - Araiza catheter last exchanged 02/22/2022  - Araiza catheter exchanged in ED  - Patient continues Flomax per home dose    Hypothyroidism  - continue levothyroxine    Severe protein-calorie malnutrition  -registered dietitian consult      History of stroke  -continue home asa and plavix      Hyperlipidemia  - Atorvastatin 80 formulary substitution for home Crestor 40    VTE Risk Mitigation (From admission, onward)         Ordered     heparin (porcine) injection 5,000 Units  Every 8 hours         03/18/22 2111     IP VTE HIGH RISK PATIENT  Once         03/18/22 2110     Place sequential compression device  Until discontinued         03/18/22 2110                Discharge Planning   MARY: 3/29/2022     Code Status: Full Code   Is the patient medically ready for discharge?: No    Reason for patient still in hospital (select all that apply): Patient new problem, Patient trending condition, Consult  recommendations and Pending disposition  Discharge Plan A: Skilled Nursing Facility   Discharge Delays: None known at this time              Laxmi Hernandez PA-C  Department of Hospital Medicine   [facility] - Telemetry Stepdown ([place]-[facility])

## 2022-03-29 NOTE — PLAN OF CARE
Pt's sister requested that the pt be not sent back to [facility]. Referrals sent to SNF via [facility].      03/29/22 4983   Post-Acute Status   Post-Acute Authorization Placement   Post-Acute Placement Status Referrals Sent   Discharge Plan   Discharge Plan A Skilled Nursing Facility   Discharge Plan B Skilled Nursing Facility     Paulo Lind, RN,BSN

## 2022-03-29 NOTE — SUBJECTIVE & OBJECTIVE
Interval History: NAEON. Nursing home is not willing to cover the cost of Daptomycin, will discuss with ID. If not alternative available, will differ to CM. Case and updates provided to family.     H/H trending down, patient fatigued. Additional unit transfused, will give IV iron on tomorrow.     Review of Systems   Constitutional:  Positive for fatigue. Negative for chills and fever.   HENT: Negative.  Negative for congestion, facial swelling, rhinorrhea and sore throat.    Eyes: Negative.  Negative for photophobia, itching and visual disturbance.   Respiratory:  Negative for cough, chest tightness, shortness of breath and wheezing.    Cardiovascular:  Negative for chest pain, palpitations and leg swelling.   Gastrointestinal:  Negative for constipation, diarrhea, nausea and vomiting.   Genitourinary:  Positive for difficulty urinating (chronic delgado). Negative for dysuria, flank pain, frequency, hematuria and urgency.   Musculoskeletal:  Positive for arthralgias and gait problem. Negative for back pain and neck stiffness.   Skin:  Positive for color change and wound.   Neurological:  Positive for numbness. Negative for dizziness, seizures, weakness and light-headedness.   Psychiatric/Behavioral:  Negative for agitation, confusion and hallucinations. The patient is not nervous/anxious.    Objective:     Vital Signs (Most Recent):  Temp: 98.9 °F (37.2 °C) (03/29/22 1543)  Pulse: 83 (03/29/22 1543)  Resp: 14 (03/29/22 1543)  BP: 138/63 (03/29/22 1543)  SpO2: (!) 94 % (03/29/22 1543)   Vital Signs (24h Range):  Temp:  [97.7 °F (36.5 °C)-100.1 °F (37.8 °C)] 98.9 °F (37.2 °C)  Pulse:  [83-92] 83  Resp:  [14-18] 14  SpO2:  [94 %-98 %] 94 %  BP: (114-142)/(54-63) 138/63     Weight: 49.7 kg (109 lb 9.1 oz)  Body mass index is 17.16 kg/m².    Intake/Output Summary (Last 24 hours) at 3/29/2022 1657  Last data filed at 3/29/2022 0630  Gross per 24 hour   Intake 10 ml   Output 700 ml   Net -690 ml      Physical Exam  Vitals  and nursing note reviewed.   Constitutional:       General: She is not in acute distress.     Appearance: Normal appearance. She is not ill-appearing, toxic-appearing or diaphoretic.   HENT:      Head: Normocephalic and atraumatic.      Mouth/Throat:      Mouth: Mucous membranes are moist.   Eyes:      General: No scleral icterus.     Conjunctiva/sclera: Conjunctivae normal.   Cardiovascular:      Rate and Rhythm: Normal rate and regular rhythm.   Pulmonary:      Effort: Pulmonary effort is normal. No respiratory distress.      Breath sounds: Normal breath sounds.   Abdominal:      General: There is no distension.      Palpations: Abdomen is soft.      Tenderness: There is no abdominal tenderness. There is no right CVA tenderness, left CVA tenderness or guarding.   Musculoskeletal:         General: Deformity (s/p amputation) present.      Right lower leg: No edema.      Left lower leg: No edema.      Comments: Right foot dressing, CDI   Skin:     General: Skin is warm and dry.   Neurological:      Mental Status: She is alert. Mental status is at baseline.   Psychiatric:         Mood and Affect: Mood normal.         Behavior: Behavior normal.       Significant Labs: All pertinent labs within the past 24 hours have been reviewed.    Significant Imaging: I have reviewed all pertinent imaging results/findings within the past 24 hours.

## 2022-03-29 NOTE — PLAN OF CARE
Updated orders sent to [facility] via [facility], Oxycodone Rx faxed to [phone]  Will continue to update plan as needed.  Paulo Lind RN,BSN

## 2022-03-30 NOTE — PROGRESS NOTES
[facility] - Telemetry Stepdown ([place] [address])  Infectious Disease  Progress Note    Patient Name: Beatriz Adame  MRN: [id number]  Admission Date: 3/18/2022  Length of Stay: 9 days  Attending Physician: Jessica Rocha MD  Primary Care Provider: Dante Oilvier MD    Isolation Status: Contact  Assessment/Plan:      * Osteomyelitis of right foot     71 year old female with CVA, DM2, HTN, CKD, nephroliathiasis s/p left ureteral stent placement, urinary retention managed with chronic indwelling delgado, recurrent UTIs, right second toe osteomyelitis s/p R toe amputation 1/2022 who [place] [facility] [place] [facility] with acute metabolic encephalopathy likely secondary to UTI and right foot infection.       Delgado exchanged in ED. Urine cx + for ESBL E Coli. Completed Ertapenem.     Noted to have gangrenous changes of her right fifth toe and interdigit wound between toes 4/5 and MRI right foot is concerning for osteomyelitis of the 4th and 5th digits.  Wound cultures + for MRSA.  Vascular studies reviewed. No significant PAD. Podiatry performed right foot partial 4th and 5th ray amputations on 3/24. Clean margin bone cultures are also positive for MRSA.     Patient had been on IV Vancomycin, though this was switched to Daptomycin due to difficulty achieving a steady therapeutic Vanc level and decline in renal function. I was informed today that Daptomycin is cost prohibitive and primary team has asked for alternative recommendation.      Outpatient Antibiotic Therapy Plan:    Please send referral to [facility].    1) Infection: MRSA osteomyelitis     2) Discharge Antibiotics:    Intravenous antibiotics:  · Vancomycin 500 mg IV (pulse dosing)    3) Therapy Duration:  6 weeks    Estimated end date of IV antibiotics: 5/5/22    4) Outpatient Weekly Labs:     CBC   CMP    ESR    CRP   Vancomycin trough. Target 15-20    If discharged on vancomycin IV, order the following  additional labs to be drawn at least twice weekly:   [facility]    Vancomycin trough. Target 15-20    If vancomycin trough is not at target (15-20) prior to discharge, schedule vancomycin trough to be drawn before their next outpatient dose.    5) Fax Lab Results to Infectious Diseases Provider: Olga Lidia Sood    [facility] Fax Number: [phone]    6) Outpatient Infectious Diseases Follow-up     Follow-up appointment will be arranged by the ID clinic and will be found in the patient's appointments tab.     Prior to discharge, please ensure the patient's follow-up has been scheduled.     If there is still no follow-up scheduled prior to discharge, please send an EPIC message to Khloe Stoll in Infectious Diseases.     Discussed with ID staff. ID will sign off.        Please call  or secure chat for any questions. Thank you.  Olga Lidia Sood PA-C  ID MAHENDRA Spectra: [id number]      Subjective:     Principal Problem:Osteomyelitis of right foot    HPI: Ms. Adame is a 71 year old female with osteoarthritis, hypothyroidism, CVA with left-sided residual deficit, DM2, HTN, HLD, CKD, nephroliathiasis s/p left ureteral stent placement in 10/2021, urinary retention managed with chronic indwelling delgado, recurrent pseudomonal UTI, right second toe osteomyelitis s/p R toe amputation 1/2022 who [place] [facility] [place] [facility] with acute metabolic encephalopathy secondary to catheter associated urinary tract infection and possible right toe infection.     Afebrile in the ED. WBC 13K. Inflammatory markers elevated. Cr 2.3 lactate WNL. UA positive for pyuria. Delgado exchanged in ED. Started empiric Vanc/Cefepime. Blood/urine cultures are pending. Noted to have gangrenous changes of her right fifth toe. MRI right foot reviewed and is concerning for osteomyelitis of the 4th and 5th distal metatarsals and phalanges.  ID consulted for antibiotic recommendations. Podiatry consulted. Patient mentation has  improved.    Interval History: NAEON. Afebrile. No leukocytosis. Cr 1.4. Patient resting comfortably.  Informed by primary team Daptomycin is cost prohibitive and requested updated antibiotic recommendations.    Review of Systems   Constitutional:  Positive for fatigue. Negative for chills, diaphoresis and fever.   HENT: Negative.     Eyes: Negative.    Respiratory:  Negative for cough and shortness of breath.    Cardiovascular:  Negative for chest pain and leg swelling.   Gastrointestinal:  Negative for abdominal pain, diarrhea and nausea.   Genitourinary:  Positive for difficulty urinating (chronic delgado).   Musculoskeletal:  Positive for arthralgias and gait problem.   Skin:  Positive for color change and wound.   Neurological:  Positive for numbness. Negative for headaches.   Psychiatric/Behavioral:  Negative for agitation and behavioral problems. The patient is not nervous/anxious.    Objective:     Vital Signs (Most Recent):  Temp: 99.7 °F (37.6 °C) (03/30/22 0815)  Pulse: 87 (03/30/22 0815)  Resp: 18 (03/30/22 0815)  BP: (!) 148/64 (03/30/22 0815)  SpO2: 96 % (03/30/22 0815)   Vital Signs (24h Range):  Temp:  [97.7 °F (36.5 °C)-99.7 °F (37.6 °C)] 99.7 °F (37.6 °C)  Pulse:  [83-92] 87  Resp:  [14-18] 18  SpO2:  [94 %-99 %] 96 %  BP: (114-160)/(55-70) 148/64     Weight: 49.7 kg (109 lb 9.1 oz)  Body mass index is 17.16 kg/m².    Estimated Creatinine Clearance: 29.3 mL/min (based on SCr of 1.4 mg/dL).    Physical Exam  Vitals and nursing note reviewed.   Constitutional:       General: She is not in acute distress.     Appearance: Normal appearance. She is not ill-appearing, toxic-appearing or diaphoretic.   HENT:      Head: Normocephalic and atraumatic.      Mouth/Throat:      Mouth: Mucous membranes are dry.   Eyes:      General: No scleral icterus.     Conjunctiva/sclera: Conjunctivae normal.   Cardiovascular:      Rate and Rhythm: Normal rate and regular rhythm.      Pulses: Decreased pulses.   Pulmonary:       Effort: Pulmonary effort is normal. No respiratory distress.      Breath sounds: Normal breath sounds.   Abdominal:      General: There is no distension.      Palpations: Abdomen is soft.      Tenderness: There is no abdominal tenderness. There is no guarding.   Musculoskeletal:         General: Deformity (right 2nd toe amputation) present.      Right lower leg: No edema.      Left lower leg: No edema.      Comments: Right foot wrapped.  No ascending cellulitis/warmth  See Podiatry photos below   Skin:     General: Skin is warm and dry.      Findings: No rash.   Neurological:      Mental Status: She is alert.   Psychiatric:         Mood and Affect: Mood normal.         Behavior: Behavior normal.     Post op        Pre-op              Significant Labs: Blood Culture:   Recent Labs   Lab 12/30/21  1523 12/30/21  1536 03/18/22  1641   LABBLOO No growth after 5 days. No growth after 5 days. No growth after 5 days.  No growth after 5 days.       BMP:   Recent Labs   Lab 03/29/22  0504 03/29/22  0924     --      --    K 3.5  --      --    CO2 24  --    BUN 26*  --    CREATININE 1.4  --    CALCIUM 8.8  --    MG  --  1.9       CBC:   Recent Labs   Lab 03/29/22  0924   WBC 9.83   HGB 7.5*   HCT 24.1*          CMP:   Recent Labs   Lab 03/28/22  1123 03/29/22  0504    139   K 3.6 3.5    108   CO2 25 24    107   BUN 28* 26*   CREATININE 1.5* 1.4   CALCIUM 9.0 8.8   ANIONGAP 6* 7*   EGFRNONAA 35.0* 38.1*       Microbiology Results (last 7 days)       Procedure Component Value Units Date/Time    Aerobic culture [[id number]]  (Abnormal)  (Susceptibility) Collected: 03/24/22 1538    Order Status: Completed Specimen: Bone from Foot, Right Updated: 03/28/22 1001     Aerobic Bacterial Culture METHICILLIN RESISTANT STAPHYLOCOCCUS AUREUS  Few      Narrative:      5th metatarsal bone clean margins    Culture, Anaerobe [[id number]] Collected: 03/24/22 1538    Order Status: Completed Specimen:  Bone from Foot, Right Updated: 03/28/22 0735     Anaerobic Culture No anaerobes isolated    Narrative:      5th metatarsal bone clean margins    Culture, Anaerobe [[id number]] Collected: 03/24/22 1538    Order Status: Completed Specimen: Bone from Foot, Right Updated: 03/28/22 0734     Anaerobic Culture No anaerobes isolated    Narrative:      4th metatarsal bone clean margins    Aerobic culture [[id number]]  (Abnormal)  (Susceptibility) Collected: 03/24/22 1538    Order Status: Completed Specimen: Bone from Foot, Right Updated: 03/26/22 1423     Aerobic Bacterial Culture METHICILLIN RESISTANT STAPHYLOCOCCUS AUREUS  Few      Narrative:      4th metatarsal bone clean margins    AFB Culture & Smear [[id number]] Collected: 03/24/22 1538    Order Status: Completed Specimen: Bone from Foot, Right Updated: 03/25/22 2127     AFB Culture & Smear Culture in progress     AFB CULTURE STAIN No acid fast bacilli seen.    Narrative:      4th metatarsal bone clean margins    AFB Culture & Smear [[id number]] Collected: 03/24/22 1538    Order Status: Completed Specimen: Bone from Foot, Right Updated: 03/25/22 2127     AFB Culture & Smear Culture in progress     AFB CULTURE STAIN No acid fast bacilli seen.    Narrative:      5th metatarsal bone clean margins    Gram stain [[id number]] Collected: 03/24/22 1538    Order Status: Completed Specimen: Bone from Foot, Right Updated: 03/24/22 1652     Gram Stain Result No WBC's      No organisms seen    Narrative:      5th metatarsal bone clean margins    Gram stain [[id number]] Collected: 03/24/22 1538    Order Status: Completed Specimen: Bone from Foot, Right Updated: 03/24/22 1650     Gram Stain Result No WBC's      No organisms seen    Narrative:      4th metatarsal bone clean margins    Fungus culture [[id number]] Collected: 03/24/22 1538    Order Status: Sent Specimen: Bone from Foot, Right Updated: 03/24/22 1553    Fungus culture [[id number]] Collected: 03/24/22 1538    Order Status: Sent  Specimen: Bone from Foot, Right Updated: 03/24/22 1548    Blood culture x two cultures. Draw prior to antibiotics. [[id number]] Collected: 03/18/22 1641    Order Status: Completed Specimen: Blood from Peripheral, Antecubital, Right Updated: 03/23/22 2012     Blood Culture, Routine No growth after 5 days.    Narrative:      Aerobic and anaerobic    Blood culture x two cultures. Draw prior to antibiotics. [[id number]] Collected: 03/18/22 1641    Order Status: Completed Specimen: Blood from Peripheral, Antecubital, Right Updated: 03/23/22 2012     Blood Culture, Routine No growth after 5 days.    Narrative:      Aerobic and anaerobic          Pathology Results  (Last 10 years)                 01/01/22 1345  Specimen to Pathology, Surgery Other Final result    Narrative:  Pre-op Diagnosis: Ulcer of right second toe [L97.519]   Diabetic foot ulcer with osteomyelitis [E11.621, E11.69,   L97.509, M86.9]   Procedure(s):   AMPUTATION, TOE   Number of specimens: 1   Name of specimens: 1) right foot second toe   Release to patient->Immediate   Specimen total (fresh, frozen, permanent):->1       07/22/21 1446  Specimen to Pathology, Surgery General Surgery Final result    Narrative:  Pre-op Diagnosis: Acute appendicitis with localized   peritonitis, without perforation, abscess, or gangrene   [K35.30]   Procedure(s):   APPENDECTOMY, LAPAROSCOPIC   Number of specimens: 1   Name of specimens: 1. Appendix   Release to patient->Immediate   Specimen total (fresh, frozen, permanent):->1             Urine Culture:   Recent Labs   Lab 10/12/21  1333 11/19/21  2230 01/10/22  0543 02/10/22  0529 03/18/22  1659   LABURIN Multiple organisms isolated. None in predominance.  Repeat if  clinically necessary. No growth PSEUDOMONAS AERUGINOSA  10,000 - 49,999 cfu/ml  * PSEUDOMONAS AERUGINOSA  50,000 - 99,999 cfu/ml  * ESCHERICHIA COLI ESBL  > 100,000 cfu/ml  *       Urine Studies:   Recent Labs   Lab 01/10/22  0543 02/10/22  0529 03/18/22  1659    COLORU Yellow Yellow Yellow   APPEARANCEUA Hazy* Hazy* Cloudy*   PHUR 6.0 6.0 5.0   SPECGRAV 1.025 1.005 1.020   PROTEINUA 2+* 1+* 2+*   GLUCUA Negative Negative Negative   KETONESU Negative Negative Negative   BILIRUBINUA Negative Negative Negative   OCCULTUA 1+* 2+* 1+*   NITRITE Negative Positive* Positive*   UROBILINOGEN Negative  --   --    LEUKOCYTESUR 2+* 3+* 2+*   RBCUA 7* 14* 13*   WBCUA 35* 84* >100*   BACTERIA Rare Many* Many*   SQUAMEPITHEL 5 0  --    HYALINECASTS 3* 0 0       Wound Culture:   Recent Labs   Lab 03/18/22  1620 03/24/22  1538   LABAERO METHICILLIN RESISTANT STAPHYLOCOCCUS AUREUS  Many  * METHICILLIN RESISTANT STAPHYLOCOCCUS AUREUS  Few  *  METHICILLIN RESISTANT STAPHYLOCOCCUS AUREUS  Few  *         Significant Imaging:   MRI Foot Toes Without Contrast Right [[id number]] Resulted: 03/19/22 0128   Order Status: Completed Updated: 03/19/22 0130   Narrative:     EXAMINATION:   MRI FOOT TOES WITHOUT CONTRAST RIGHT     CLINICAL HISTORY:   Osteomyelitis, foot;     TECHNIQUE:   Multiplanar multisequence MRI examination of the right foot and toes performed without IV contrast.     COMPARISON:   Right foot radiograph 03/18/2022.     MRI foot right 12/30/2021.     FINDINGS:   There is an open wound overlying the right 5th phalanges.     There is abnormal bone marrow edema signal within the distal heads of the 4th and 5th metatarsal as well as the phalanges of the 4th and 5th toes more extensive in the 5th.     There is scattered subcutaneous edema around this site as well as throughout the foot.  No large drainable fluid collection.  Postsurgical changes of right 2nd ray amputation.    Impression:       Osteomyelitis involving the right 4th and 5th metatarsals distally and the phalanges of the 4th and 5th toes.     Electronically signed by resident: Dimas Zapata   Date: 03/19/2022   Time: 01:04     Electronically signed by: Erlin Hernandez   Date: 03/19/2022   Time: 01:28

## 2022-03-30 NOTE — PLAN OF CARE
Updated SNF orders faxed to [facility] @ [phone].  Will continue to update plan as needed.  Paulo Lind RN,BSN

## 2022-03-30 NOTE — PLAN OF CARE
CM was notified of placement. Patient was accepted to return to [facility] on 3/30/22. Report can be called to Adriana @ [phone]    CM arranged ambulance transport via [facility]. Requested  time is 1330  Requested  time does not guarantee arrival time.  If transport does not arrive by 1630 please contact assigned SW or on-call for assistance.     03/30/22 1435   Post-Acute Status   Post-Acute Authorization Placement   Post-Acute Placement Status Set-up Complete/Auth obtained   Hospital Resources/Appts/Education Provided Provided patient/caregiver with written discharge plan information;Appointments scheduled and added to AVS   Discharge Plan   Discharge Plan A Skilled Nursing Facility   Discharge Plan B Skilled Nursing Facility     Future Appointments   Date Time Provider Department Center   4/13/2022 10:00 AM [facility]   5/5/2022  3:00 PM Silva Duenas PA-C [facility] Lind, RN,BSN

## 2022-03-30 NOTE — ASSESSMENT & PLAN NOTE
71 year old female with CVA, DM2, HTN, CKD, nephroliathiasis s/p left ureteral stent placement, urinary retention managed with chronic indwelling delgado, recurrent UTIs, right second toe osteomyelitis s/p R toe amputation 1/2022 who presented to [facility] with acute metabolic encephalopathy likely secondary to UTI and right foot infection.       Delgado exchanged in ED. Urine cx + for ESBL E Coli. Completed Ertapenem.     Noted to have gangrenous changes of her right fifth toe and interdigit wound between toes 4/5 and MRI right foot is concerning for osteomyelitis of the 4th and 5th digits.  Wound cultures + for MRSA.  Vascular studies reviewed. No significant PAD. Podiatry performed right foot partial 4th and 5th ray amputations on 3/24. Clean margin bone cultures are also positive for MRSA.     Patient had been on IV Vancomycin, though this was switched to Daptomycin due to difficulty achieving a steady therapeutic Vanc level and decline in renal function. I was informed today that Daptomycin is cost prohibitive and primary team has asked for alternative recommendation.      Outpatient Antibiotic Therapy Plan:    Please send referral to [facility].    1) Infection: MRSA osteomyelitis     2) Discharge Antibiotics:    Intravenous antibiotics:  · Vancomycin 500 mg IV (pulse dosing)    3) Therapy Duration:  6 weeks    Estimated end date of IV antibiotics: 5/5/22    4) Outpatient Weekly Labs:     CBC   CMP    ESR    CRP   Vancomycin trough. Target 15-20    If discharged on vancomycin IV, order the following additional labs to be drawn at least twice weekly:   CMP    Vancomycin trough. Target 15-20    If vancomycin trough is not at target (15-20) prior to discharge, schedule vancomycin trough to be drawn before their next outpatient dose.    5) Fax Lab Results to Infectious Diseases Provider: Olga Lidia Sood    [facility] Fax Number: [phone]    6)  Outpatient Infectious Diseases Follow-up     Follow-up appointment will be arranged by the ID clinic and will be found in the patient's appointments tab.     Prior to discharge, please ensure the patient's follow-up has been scheduled.     If there is still no follow-up scheduled prior to discharge, please send an EPIC message to Khloe Stoll in Infectious Diseases.     Discussed with ID staff. ID will sign off.

## 2022-03-30 NOTE — NURSING
Pt leaving with [facility] transport to [facility] with paper script and belongings.

## 2022-03-30 NOTE — SUBJECTIVE & OBJECTIVE
Interval History: NAEON. Afebrile. No leukocytosis. Cr 1.4. Patient resting comfortably.  Informed by primary team Daptomycin is cost prohibitive and requested updated antibiotic recommendations.    Review of Systems   Constitutional:  Positive for fatigue. Negative for chills, diaphoresis and fever.   HENT: Negative.     Eyes: Negative.    Respiratory:  Negative for cough and shortness of breath.    Cardiovascular:  Negative for chest pain and leg swelling.   Gastrointestinal:  Negative for abdominal pain, diarrhea and nausea.   Genitourinary:  Positive for difficulty urinating (chronic delgado).   Musculoskeletal:  Positive for arthralgias and gait problem.   Skin:  Positive for color change and wound.   Neurological:  Positive for numbness. Negative for headaches.   Psychiatric/Behavioral:  Negative for agitation and behavioral problems. The patient is not nervous/anxious.    Objective:     Vital Signs (Most Recent):  Temp: 99.7 °F (37.6 °C) (03/30/22 0815)  Pulse: 87 (03/30/22 0815)  Resp: 18 (03/30/22 0815)  BP: (!) 148/64 (03/30/22 0815)  SpO2: 96 % (03/30/22 0815)   Vital Signs (24h Range):  Temp:  [97.7 °F (36.5 °C)-99.7 °F (37.6 °C)] 99.7 °F (37.6 °C)  Pulse:  [83-92] 87  Resp:  [14-18] 18  SpO2:  [94 %-99 %] 96 %  BP: (114-160)/(55-70) 148/64     Weight: 49.7 kg (109 lb 9.1 oz)  Body mass index is 17.16 kg/m².    Estimated Creatinine Clearance: 29.3 mL/min (based on SCr of 1.4 mg/dL).    Physical Exam  Vitals and nursing note reviewed.   Constitutional:       General: She is not in acute distress.     Appearance: Normal appearance. She is not ill-appearing, toxic-appearing or diaphoretic.   HENT:      Head: Normocephalic and atraumatic.      Mouth/Throat:      Mouth: Mucous membranes are dry.   Eyes:      General: No scleral icterus.     Conjunctiva/sclera: Conjunctivae normal.   Cardiovascular:      Rate and Rhythm: Normal rate and regular rhythm.      Pulses: Decreased pulses.   Pulmonary:      Effort:  Pulmonary effort is normal. No respiratory distress.      Breath sounds: Normal breath sounds.   Abdominal:      General: There is no distension.      Palpations: Abdomen is soft.      Tenderness: There is no abdominal tenderness. There is no guarding.   Musculoskeletal:         General: Deformity (right 2nd toe amputation) present.      Right lower leg: No edema.      Left lower leg: No edema.      Comments: Right foot wrapped.  No ascending cellulitis/warmth  See Podiatry photos below   Skin:     General: Skin is warm and dry.      Findings: No rash.   Neurological:      Mental Status: She is alert.   Psychiatric:         Mood and Affect: Mood normal.         Behavior: Behavior normal.     Post op        Pre-op              Significant Labs: Blood Culture:   Recent Labs   Lab 12/30/21  1523 12/30/21  1536 03/18/22  1641   LABBLOO No growth after 5 days. No growth after 5 days. No growth after 5 days.  No growth after 5 days.       BMP:   Recent Labs   Lab 03/29/22  0504 03/29/22  0924     --      --    K 3.5  --      --    CO2 24  --    BUN 26*  --    CREATININE 1.4  --    CALCIUM 8.8  --    MG  --  1.9       CBC:   Recent Labs   Lab 03/29/22  0924   WBC 9.83   HGB 7.5*   HCT 24.1*          CMP:   Recent Labs   Lab 03/28/22  1123 03/29/22  0504    139   K 3.6 3.5    108   CO2 25 24    107   BUN 28* 26*   CREATININE 1.5* 1.4   CALCIUM 9.0 8.8   ANIONGAP 6* 7*   EGFRNONAA 35.0* 38.1*       Microbiology Results (last 7 days)       Procedure Component Value Units Date/Time    Aerobic culture [[id number]]  (Abnormal)  (Susceptibility) Collected: 03/24/22 1538    Order Status: Completed Specimen: Bone from Foot, Right Updated: 03/28/22 1001     Aerobic Bacterial Culture METHICILLIN RESISTANT STAPHYLOCOCCUS AUREUS  Few      Narrative:      5th metatarsal bone clean margins    Culture, Anaerobe [[id number]] Collected: 03/24/22 1538    Order Status: Completed Specimen: Bone from  Foot, Right Updated: 03/28/22 0735     Anaerobic Culture No anaerobes isolated    Narrative:      5th metatarsal bone clean margins    Culture, Anaerobe [[id number]] Collected: 03/24/22 1538    Order Status: Completed Specimen: Bone from Foot, Right Updated: 03/28/22 0734     Anaerobic Culture No anaerobes isolated    Narrative:      4th metatarsal bone clean margins    Aerobic culture [[id number]]  (Abnormal)  (Susceptibility) Collected: 03/24/22 1538    Order Status: Completed Specimen: Bone from Foot, Right Updated: 03/26/22 1423     Aerobic Bacterial Culture METHICILLIN RESISTANT STAPHYLOCOCCUS AUREUS  Few      Narrative:      4th metatarsal bone clean margins    AFB Culture & Smear [[id number]] Collected: 03/24/22 1538    Order Status: Completed Specimen: Bone from Foot, Right Updated: 03/25/22 2127     AFB Culture & Smear Culture in progress     AFB CULTURE STAIN No acid fast bacilli seen.    Narrative:      4th metatarsal bone clean margins    AFB Culture & Smear [[id number]] Collected: 03/24/22 1538    Order Status: Completed Specimen: Bone from Foot, Right Updated: 03/25/22 2127     AFB Culture & Smear Culture in progress     AFB CULTURE STAIN No acid fast bacilli seen.    Narrative:      5th metatarsal bone clean margins    Gram stain [[id number]] Collected: 03/24/22 1538    Order Status: Completed Specimen: Bone from Foot, Right Updated: 03/24/22 1652     Gram Stain Result No WBC's      No organisms seen    Narrative:      5th metatarsal bone clean margins    Gram stain [[id number]] Collected: 03/24/22 1538    Order Status: Completed Specimen: Bone from Foot, Right Updated: 03/24/22 1650     Gram Stain Result No WBC's      No organisms seen    Narrative:      4th metatarsal bone clean margins    Fungus culture [[id number]] Collected: 03/24/22 1538    Order Status: Sent Specimen: Bone from Foot, Right Updated: 03/24/22 1553    Fungus culture [[id number]] Collected: 03/24/22 1538    Order Status: Sent Specimen:  Bone from Foot, Right Updated: 03/24/22 1548    Blood culture x two cultures. Draw prior to antibiotics. [[id number]] Collected: 03/18/22 1641    Order Status: Completed Specimen: Blood from Peripheral, Antecubital, Right Updated: 03/23/22 2012     Blood Culture, Routine No growth after 5 days.    Narrative:      Aerobic and anaerobic    Blood culture x two cultures. Draw prior to antibiotics. [[id number]] Collected: 03/18/22 1641    Order Status: Completed Specimen: Blood from Peripheral, Antecubital, Right Updated: 03/23/22 2012     Blood Culture, Routine No growth after 5 days.    Narrative:      Aerobic and anaerobic          Pathology Results  (Last 10 years)                 01/01/22 1345  Specimen to Pathology, Surgery Other Final result    Narrative:  Pre-op Diagnosis: Ulcer of right second toe [L97.519]   Diabetic foot ulcer with osteomyelitis [E11.621, E11.69,   L97.509, M86.9]   Procedure(s):   AMPUTATION, TOE   Number of specimens: 1   Name of specimens: 1) right foot second toe   Release to patient->Immediate   Specimen total (fresh, frozen, permanent):->1       07/22/21 1446  Specimen to Pathology, Surgery General Surgery Final result    Narrative:  Pre-op Diagnosis: Acute appendicitis with localized   peritonitis, without perforation, abscess, or gangrene   [K35.30]   Procedure(s):   APPENDECTOMY, LAPAROSCOPIC   Number of specimens: 1   Name of specimens: 1. Appendix   Release to patient->Immediate   Specimen total (fresh, frozen, permanent):->1             Urine Culture:   Recent Labs   Lab 10/12/21  1333 11/19/21  2230 01/10/22  0543 02/10/22  0529 03/18/22  1659   LABURIN Multiple organisms isolated. None in predominance.  Repeat if  clinically necessary. No growth PSEUDOMONAS AERUGINOSA  10,000 - 49,999 cfu/ml  * PSEUDOMONAS AERUGINOSA  50,000 - 99,999 cfu/ml  * ESCHERICHIA COLI ESBL  > 100,000 cfu/ml  *       Urine Studies:   Recent Labs   Lab 01/10/22  0543 02/10/22  0529 03/18/22  1659   COLORU  Yellow Yellow Yellow   APPEARANCEUA Hazy* Hazy* Cloudy*   PHUR 6.0 6.0 5.0   SPECGRAV 1.025 1.005 1.020   PROTEINUA 2+* 1+* 2+*   GLUCUA Negative Negative Negative   KETONESU Negative Negative Negative   BILIRUBINUA Negative Negative Negative   OCCULTUA 1+* 2+* 1+*   NITRITE Negative Positive* Positive*   UROBILINOGEN Negative  --   --    LEUKOCYTESUR 2+* 3+* 2+*   RBCUA 7* 14* 13*   WBCUA 35* 84* >100*   BACTERIA Rare Many* Many*   SQUAMEPITHEL 5 0  --    HYALINECASTS 3* 0 0       Wound Culture:   Recent Labs   Lab 03/18/22  1620 03/24/22  1538   LABAERO METHICILLIN RESISTANT STAPHYLOCOCCUS AUREUS  Many  * METHICILLIN RESISTANT STAPHYLOCOCCUS AUREUS  Few  *  METHICILLIN RESISTANT STAPHYLOCOCCUS AUREUS  Few  *         Significant Imaging:   MRI Foot Toes Without Contrast Right [[id number]] Resulted: 03/19/22 0128   Order Status: Completed Updated: 03/19/22 0130   Narrative:     EXAMINATION:   MRI FOOT TOES WITHOUT CONTRAST RIGHT     CLINICAL HISTORY:   Osteomyelitis, foot;     TECHNIQUE:   Multiplanar multisequence MRI examination of the right foot and toes performed without IV contrast.     COMPARISON:   Right foot radiograph 03/18/2022.     MRI foot right 12/30/2021.     FINDINGS:   There is an open wound overlying the right 5th phalanges.     There is abnormal bone marrow edema signal within the distal heads of the 4th and 5th metatarsal as well as the phalanges of the 4th and 5th toes more extensive in the 5th.     There is scattered subcutaneous edema around this site as well as throughout the foot.  No large drainable fluid collection.  Postsurgical changes of right 2nd ray amputation.    Impression:       Osteomyelitis involving the right 4th and 5th metatarsals distally and the phalanges of the 4th and 5th toes.     Electronically signed by resident: Dimas Zapata   Date: 03/19/2022   Time: 01:04     Electronically signed by: Erlin Hernandez   Date: 03/19/2022   Time: 01:28

## 2022-03-30 NOTE — PT/OT/SLP PROGRESS
Physical Therapy Treatment    Patient Name:  Beatriz Adame   MRN:  [id number]    Recommendations:     Discharge Recommendations:  nursing facility, skilled   Discharge Equipment Recommendations: slide board, hospital bed, bedside commode   Barriers to discharge: None    Assessment:     Beatriz Adame is a 71 y.o. female admitted with a medical diagnosis of Osteomyelitis of right foot.  She presents with the following impairments/functional limitations:  weakness, impaired endurance, impaired self care skills, impaired functional mobilty, gait instability, impaired balance, decreased upper extremity function, decreased lower extremity function, decreased safety awareness, pain, impaired cognition, decreased ROM, impaired sensation, orthopedic precautions.    Pt met with HOB elevated and agreeable to participate in PT treatment on this date. Pt educated in functional transfers to the wheelchair using the slide board, stand pivot transfers, and squat pivot transfers. Pt also educated in the purpose of stretching the (L) UE and (L) LE to reduce the risk of contractures and to properly perform transfers and ambulation. Pt required between contact guard to total assistance with bed mobility and functional transfers. Pt will continue to benefit from skilled PT services to address the functional limitations listed above.    Rehab Prognosis: Fair; patient would benefit from acute skilled PT services to address these deficits and reach maximum level of function.    Recent Surgery: Procedure(s) (LRB):  AMPUTATION, FOOT, PARTIAL 4th and 5th ray (Right) 6 Days Post-Op    Plan:     During this hospitalization, patient to be seen 3 x/week to address the identified rehab impairments via therapeutic activities, therapeutic exercises, neuromuscular re-education, wheelchair management/training and progress toward the following goals:    · Plan of Care Expires:  04/23/22    Subjective     Chief Complaint: (L) UE and (L) LE  pain  Patient/Family Comments/goals: agreeable to mobilize with therapist  Pain/Comfort:  · Pain Rating 1:  (unrated)  · Location - Side 1: Left  · Location - Orientation 1: generalized  · Location 1: leg  · Pain Addressed 1: Reposition, Distraction, Cessation of Activity, Nurse notified  · Pain Rating Post-Intervention 1:  (unrated)      Objective:     Communicated with RN prior to session.  Patient found HOB elevated with telemetry, delgado catheter upon PT entry to room.     General Precautions: Standard, fall, contact   Orthopedic Precautions:RLE toe touch weight bearing (WB to (R) LE heel for transfers or short distances)   Braces: N/A  Respiratory Status: Room air     Functional Mobility:  · Bed Mobility:     · Rolling Right: minimum assistance  · Scooting: maximal assistance  · Supine to Sit: moderate assistance  · Sit to Supine: maximal assistance  · Transfers:     · Bed <> Wheelchair: pt required total assistance using Squat Pivot. Pt unable to use B UE or LE to complete transfer. PT followed cues to ensure safety.  · Balance: Sitting: CGA to Min A. Pt unable to self-correct, required cues to weight shift and maintain upright trunk      AM-PAC 6 CLICK MOBILITY  Turning over in bed (including adjusting bedclothes, sheets and blankets)?: 2  Sitting down on and standing up from a chair with arms (e.g., wheelchair, bedside commode, etc.): 1  Moving from lying on back to sitting on the side of the bed?: 2  Moving to and from a bed to a chair (including a wheelchair)?: 1  Need to walk in hospital room?: 1  Climbing 3-5 steps with a railing?: 1  Basic Mobility Total Score: 8       Therapeutic Activities and Exercises:  Pt educated on goals of the session, PT POC, and transfer/ safety techniques. Pt also educated and updated on weight-bearing status.    Patient left HOB elevated with all lines intact, call button in reach and nurse notified.    GOALS:   Multidisciplinary Problems     Physical Therapy Goals         Problem: Physical Therapy    Goal Priority Disciplines Outcome Goal Variances Interventions   Physical Therapy Goal     PT, PT/OT Ongoing, Progressing     Description: Goals to be met by: 22     Patient will increase functional independence with mobility by performin. Supine to sit with MInimal Assistance  2. Sit to supine with MInimal Assistance  3. Bed to chair transfer with Moderate Assistance using Slideboard  4. Wheelchair propulsion x50 feet with Stand-by Assistance using bilateral uppper extremities  5. Sitting at edge of bed x10 minutes with Stand-by Assistance                     Time Tracking:     PT Received On: 22  PT Start Time: 1330     PT Stop Time: 1411  PT Total Time (min): 41 min     Billable Minutes: Therapeutic Activity 41    Treatment Type: Treatment  PT/PTA: PTA     PTA Visit Number: 2     2022

## 2022-03-30 NOTE — PLAN OF CARE
Report called to Tabitha at [facility] at [phone].  Problem: Renal Function Impairment (Acute Kidney Injury/Impairment)  Goal: Effective Renal Function  3/30/2022 1524 by Tesha Freitas RN  Outcome: Met  3/30/2022 1524 by Tesha Freitas RN  Outcome: Adequate for Care Transition     Problem: Impaired Wound Healing  Goal: Optimal Wound Healing  3/30/2022 1524 by Tesha Freitas RN  Outcome: Met  3/30/2022 1524 by Tesha Freitas RN  Outcome: Adequate for Care Transition     Problem: Skin Injury Risk Increased  Goal: Skin Health and Integrity  3/30/2022 1524 by Tesha Freitas RN  Outcome: Met  3/30/2022 1524 by Tesha Freitas RN  Outcome: Adequate for Care Transition     Problem: Infection  Goal: Absence of Infection Signs and Symptoms  3/30/2022 1524 by Tesha Freitas RN  Outcome: Met  3/30/2022 1524 by Tesha Freitas RN  Outcome: Adequate for Care Transition

## 2022-03-30 NOTE — PROGRESS NOTES
Pharmacokinetic Assessment Follow Up: IV Vancomycin    Vancomycin serum concentration assessment(s):    · Patient was previously taking vancomycin then switched to dapto. Last dose received @0500 on 3/28/22.   · The random level of 13.1 ug/ml was drawn ~2 days post dose. The measurement is below the desired definitive target range of 15 to 20 mcg/mL.    Vancomycin Regimen Plan:    · Give one time dose of Vancomycin 500 mg today.   · Re-dose when the random level is less than 20 mcg/mL, next level to be drawn at 1400 on 03/31/2022    Drug levels (last 3 results):  Recent Labs   Lab Result Units 03/27/22 2005 03/30/22  1210   Vancomycin, Random ug/mL 19.7 13.1       Pharmacy will continue to follow and monitor vancomycin.    Please contact pharmacy at extension [phone] for questions regarding this assessment.    Thank you for the consult,   Tesha John       Patient brief summary:  Beatriz Adame is a 71 y.o. female initiated on antimicrobial therapy with IV Vancomycin for treatment of bone/joint infection        Drug Allergies:   Review of patient's allergies indicates:   Allergen Reactions    Tomato (solanum lycopersicum) Hives and Itching       Actual Body Weight:   49.7 kg    Renal Function:   Estimated Creatinine Clearance: 31.6 mL/min (based on SCr of 1.3 mg/dL).,     Dialysis Method (if applicable):  N/A    CBC (last 72 hours):  Recent Labs   Lab Result Units 03/29/22  0924 03/30/22  0905   WBC K/uL 9.83 10.38   Hemoglobin g/dL 7.5* 9.2*   Hematocrit % 24.1* 28.7*   Platelets K/uL 160 146*   Gran % % 63.3 66.4   Lymph % % 25.9 22.6   Mono % % 8.7 8.1   Eosinophil % % 1.4 2.1   Basophil % % 0.3 0.4   Differential Method  Automated Automated       Metabolic Panel (last 72 hours):  Recent Labs   Lab Result Units 03/28/22  1123 03/29/22  0504 03/29/22  0924 03/30/22  0905   Sodium mmol/L 140 139  --  143   Potassium mmol/L 3.6 3.5  --  4.6   Chloride mmol/L 109 108  --  111*   CO2 mmol/L 25 24  --  24    Glucose mg/dL 103 107  --  94   BUN mg/dL 28* 26*  --  28*   Creatinine mg/dL 1.5* 1.4  --  1.3   Magnesium mg/dL  --   --  1.9  --        Vancomycin Administrations:  vancomycin given in the last 96 hours                   vancomycin 500 mg in dextrose 5 % 100 mL IVPB (ready to mix system) (mg) 500 mg New Bag 03/28/22 0507                Microbiologic Results:  Microbiology Results (last 7 days)     Procedure Component Value Units Date/Time    Fungus culture [[id number]] Collected: 03/24/22 1538    Order Status: Completed Specimen: Bone from Foot, Right Updated: 03/30/22 1055     Fungus (Mycology) Culture Culture in progress    Narrative:      4th metatarsal bone clean margins    Fungus culture [[id number]] Collected: 03/24/22 1538    Order Status: Completed Specimen: Bone from Foot, Right Updated: 03/30/22 1055     Fungus (Mycology) Culture Culture in progress    Narrative:      5th metatarsal bone clean margins    Aerobic culture [[id number]]  (Abnormal)  (Susceptibility) Collected: 03/24/22 1538    Order Status: Completed Specimen: Bone from Foot, Right Updated: 03/28/22 1001     Aerobic Bacterial Culture METHICILLIN RESISTANT STAPHYLOCOCCUS AUREUS  Few      Narrative:      5th metatarsal bone clean margins    Culture, Anaerobe [[id number]] Collected: 03/24/22 1538    Order Status: Completed Specimen: Bone from Foot, Right Updated: 03/28/22 0735     Anaerobic Culture No anaerobes isolated    Narrative:      5th metatarsal bone clean margins    Culture, Anaerobe [[id number]] Collected: 03/24/22 1538    Order Status: Completed Specimen: Bone from Foot, Right Updated: 03/28/22 0734     Anaerobic Culture No anaerobes isolated    Narrative:      4th metatarsal bone clean margins    Aerobic culture [[id number]]  (Abnormal)  (Susceptibility) Collected: 03/24/22 1538    Order Status: Completed Specimen: Bone from Foot, Right Updated: 03/26/22 1423     Aerobic Bacterial Culture METHICILLIN RESISTANT STAPHYLOCOCCUS  AUREUS  Few      Narrative:      4th metatarsal bone clean margins    AFB Culture & Smear [[id number]] Collected: 03/24/22 1538    Order Status: Completed Specimen: Bone from Foot, Right Updated: 03/25/22 2127     AFB Culture & Smear Culture in progress     AFB CULTURE STAIN No acid fast bacilli seen.    Narrative:      4th metatarsal bone clean margins    AFB Culture & Smear [[id number]] Collected: 03/24/22 1538    Order Status: Completed Specimen: Bone from Foot, Right Updated: 03/25/22 2127     AFB Culture & Smear Culture in progress     AFB CULTURE STAIN No acid fast bacilli seen.    Narrative:      5th metatarsal bone clean margins    Gram stain [[id number]] Collected: 03/24/22 1538    Order Status: Completed Specimen: Bone from Foot, Right Updated: 03/24/22 1652     Gram Stain Result No WBC's      No organisms seen    Narrative:      5th metatarsal bone clean margins    Gram stain [[id number]] Collected: 03/24/22 1538    Order Status: Completed Specimen: Bone from Foot, Right Updated: 03/24/22 1650     Gram Stain Result No WBC's      No organisms seen    Narrative:      4th metatarsal bone clean margins    Blood culture x two cultures. Draw prior to antibiotics. [[id number]] Collected: 03/18/22 1641    Order Status: Completed Specimen: Blood from Peripheral, Antecubital, Right Updated: 03/23/22 2012     Blood Culture, Routine No growth after 5 days.    Narrative:      Aerobic and anaerobic    Blood culture x two cultures. Draw prior to antibiotics. [[id number]] Collected: 03/18/22 1641    Order Status: Completed Specimen: Blood from Peripheral, Antecubital, Right Updated: 03/23/22 2012     Blood Culture, Routine No growth after 5 days.    Narrative:      Aerobic and anaerobic

## 2022-03-30 NOTE — PLAN OF CARE
Pt in bed, resting.Denies pain and does not appear to be in any distress. VVS. Hourly rounding performed.1 Unit PRBC administering. Bed in lowest position, wheels are locked and call light is within reach. Will continue to monitor for any acute changes.           Problem: Adult Inpatient Plan of Care  Goal: Plan of Care Review  Outcome: Ongoing, Progressing  Goal: Patient-Specific Goal (Individualized)  Outcome: Ongoing, Progressing  Goal: Absence of Hospital-Acquired Illness or Injury  Outcome: Ongoing, Progressing  Goal: Optimal Comfort and Wellbeing  Outcome: Ongoing, Progressing  Goal: Readiness for Transition of Care  Outcome: Ongoing, Progressing     Problem: Diabetes Comorbidity  Goal: Blood Glucose Level Within Targeted Range  Outcome: Ongoing, Progressing     Problem: Fluid and Electrolyte Imbalance (Acute Kidney Injury/Impairment)  Goal: Fluid and Electrolyte Balance  Outcome: Ongoing, Progressing     Problem: Oral Intake Inadequate (Acute Kidney Injury/Impairment)  Goal: Optimal Nutrition Intake  Outcome: Ongoing, Progressing     Problem: Renal Function Impairment (Acute Kidney Injury/Impairment)  Goal: Effective Renal Function  Outcome: Ongoing, Progressing     Problem: Impaired Wound Healing  Goal: Optimal Wound Healing  Outcome: Ongoing, Progressing     Problem: Fall Injury Risk  Goal: Absence of Fall and Fall-Related Injury  Outcome: Ongoing, Progressing     Problem: Skin Injury Risk Increased  Goal: Skin Health and Integrity  Outcome: Ongoing, Progressing     Problem: Infection  Goal: Absence of Infection Signs and Symptoms  Outcome: Ongoing, Progressing

## 2022-04-02 NOTE — ASSESSMENT & PLAN NOTE
Osteomyelitis of R 2nd toe  Critical lower limb ischemia     s/p AMPUTATION, FOOT, PARTIAL 4th and 5th R  - MRI revealed Osteomyelitis involving the right 4th and 5th metatarsals distally and the phalanges of the 4th and 5th toes.   - XR R foot showed no acute bony abnormalities  - ID consulted, appreciate recs   ·  - started Vancomycin 500 mg IV (pulse dosing)    Estimated end date of IV antibiotics: 5/5/22  - podiatry consulted, appreciate recs   -WBAT LLE, WB to heel for transfers or short distances RLE.   - Arterial studies reviewed. RLE ABIs/ PVR waveforms suggest no evidence of PAD. Rt Great Toe: The PPG waveform is blunted with TBI of 0.16 and a toe pressure of 25 mmHg which suggests severe microvascular disease.   - Cx growing MRSA

## 2022-04-02 NOTE — DISCHARGE SUMMARY
"[facility] [facility]  Discharge Summary      Patient Name: Beatriz Adame  MRN: [id number]  Patient Class: IP- Inpatient  Admission Date: 3/18/2022  Hospital Length of Stay: 9 days  Discharge Date and Time: 3/30/2022  6:39 PM  Attending Physician: ALBARO NASSAR   Discharging Provider: Laxmi Hernandez PA-C  Primary Care Provider: Dante Olivier MD  [facility] Medicine Team: [facility] Laxmi Hernandez PA-C    HPI:   Patient is a 71 year old female with osteoarthritis, hypothyroidism, CVA with left-sided residual deficit, DM2, HTN, HLD, CKD, chronic anemia, recurrent pseudomonal UTI, nephroliathiasis s/p left ureteral stent placement in 10/2021, urinary retention with chronic indwelling delgado catheter, diabetic foot ulcer s/p R toe amputation, chronic anemia, who presents with acute metabolic encephalopathy secondary to catheter associated urinary tract infection and possible right toe infection.   She is being admitted for [facility].     Initially in ED patient was found to have a white count of 13,000 and a left shift / neutrophilic predominance.  While not overtly septic, sepsis protocol was initiated - patient pancultured and broad-spectrum antibiotics with vancomycin and ceftriaxone was initiated.  Vital signs were within normal limits however blood pressure was at the lower range of normal for this patient with a systolic blood pressure of 110.  Lactic acid was obtained and was within normal limits.     Patient is alert on my examination she is able to describe that she is at the hospital for a catheter exchange."  The her catheter has been exchanged in the ED and she has had the benefit of broad-spectrum antibiotics since her admission there.  It is clear that her encephalopathy has already improved with the management of her very obvious catheter associated urinary tract infection.  The ED provider also had a concern for a right 5th toe " infection - which appears to be chronically devascularized and gangrenous on examination.  Plain films of the affected foot were unrevealing however patient has just returned from MRI and interpretation is pending.  Inflammatory markers are notably elevated.      She will be admitted to Hospital Medicine for ongoing management of her catheter associated urinary tract infection and possible right foot infection.          Procedure(s) (LRB):  AMPUTATION, FOOT, PARTIAL 4th and 5th ray (Right)      Hospital Course:   71 y.o. who was admitted to hospital medicine for acute metabolic encephalopathy in setting of UTI and osteomyelitis. Pt was AFVSS and hemodynamically stable. Leukocytosis resolved w/ initiation of IV abx. UA infectious and initially started on IV cefepime. UC grew ESBL and patient was transitioned to IV ertapenem started 3/21 (7 day course). MRI of R foot and toes revealed osteomyelitis of the right 4th and 5th distal metatarsals and phalanges. Cx growing MRSA started on IV vancomycin 3/18. ID followed and provided recs. Podiatry completed successful amputation on 3/24. Patient discharged to SNF.       Goals of Care Treatment Preferences:  Code Status: Full Code      Consults:   Consults (From admission, onward)        Status Ordering Provider     [facility]  Once        Provider:  (Not yet assigned)    Completed MARIBELL QUINTANA     Inpatient [facility] team [facility]  Once        Provider:  (Not yet assigned)    Completed EVELIN RAMOS     Inpatient consult to Infectious Diseases  Once        Provider:  Olga Lidia Sood PA-C    Completed DEWAYNE RODRÍGUEZ     Inpatient consult to Podiatry  Once        Provider:  Yumiko Morales MD    Completed EVELIN RAMOS     Inpatient consult to Registered Dietitian/Nutritionist  Once        Provider:  (Not yet assigned)    EVELIN Laureano.          * Osteomyelitis of right foot  Osteomyelitis of R 2nd  toe  Critical lower limb ischemia     s/p AMPUTATION, FOOT, PARTIAL 4th and 5th R  - MRI revealed Osteomyelitis involving the right 4th and 5th metatarsals distally and the phalanges of the 4th and 5th toes.   - XR R foot showed no acute bony abnormalities  - ID consulted, appreciate recs   ·  - started Vancomycin 500 mg IV (pulse dosing)    Estimated end date of IV antibiotics: 5/5/22  - podiatry consulted, appreciate recs   -WBAT LLE, WB to heel for transfers or short distances RLE.   - Arterial studies reviewed. RLE ABIs/ PVR waveforms suggest no evidence of PAD. Rt Great Toe: The PPG waveform is blunted with TBI of 0.16 and a toe pressure of 25 mmHg which suggests severe microvascular disease.   - Cx growing MRSA       Final Active Diagnoses:    Diagnosis Date Noted POA    PRINCIPAL PROBLEM:  Osteomyelitis of right foot [M86.9] 03/19/2022 Yes    Acute metabolic encephalopathy [G93.41] 03/18/2022 Yes    Chronic kidney disease, stage 4 (severe) [N18.4] 10/17/2021 Yes    Hypertension [I10]  Yes    Type 2 diabetes mellitus, with long-term current use of insulin [E11.9, Z79.4] 10/27/2016 Not Applicable    Body mass index (BMI) less than 19 [Z68.1] 03/29/2022 Not Applicable    Critical lower limb ischemia [I70.229] 03/19/2022 Yes    Anemia [D64.9] 02/10/2022 Yes    Debility [R53.81] 01/19/2022 Yes    Peripheral neuropathy [G62.9] 01/19/2022 Yes    Urinary retention [R33.9] 01/12/2022 Yes    Osteomyelitis of right 2nd toe [E11.621, E11.69, L97.509, M86.9] 12/31/2021 Yes    Hypothyroidism [E03.9] 12/30/2021 Yes    Severe protein-calorie malnutrition [E43] 11/21/2021 Yes    History of stroke [Z86.73] 07/22/2021 Not Applicable     Chronic    Hyperlipidemia [E78.5]  Yes      Problems Resolved During this Admission:    Diagnosis Date Noted Date Resolved POA    Acute cystitis without hematuria [N30.00] 03/18/2022 03/25/2022 Yes    Right diabetic foot ulcer [E10.621, L97.516] 12/30/2021 03/25/2022 Yes        Discharged Condition: stable    Disposition: Skilled Nursing Facility    Follow Up:    Patient Instructions:      Diet Cardiac     Diet diabetic     Notify your health care provider if you experience any of the following:  temperature >100.4     Notify your health care provider if you experience any of the following:  severe uncontrolled pain     Activity as tolerated       Significant Diagnostic Studies: Labs: All labs within the past 24 hours have been reviewed    Pending Diagnostic Studies:     Procedure Component Value Units Date/Time    COVID-19 Routine Screening [[id number]] Collected: 03/24/22 0917    Order Status: Sent Lab Status: In process Updated: 03/24/22 0918    Specimen: Nasopharyngeal          Medications:  Reconciled Home Medications:      Medication List      START taking these medications    ascorbic acid (vitamin C) 500 MG tablet  Commonly known as: VITAMIN C  Take 1 tablet (500 mg total) by mouth once daily.     folic acid 1 MG tablet  Commonly known as: FOLVITE  Take 1 tablet (1 mg total) by mouth once daily.     Lactobacillus rhamnosus GG 10 billion cell capsule  Commonly known as: CULTURELLE  Take 1 capsule by mouth once daily.     oxyCODONE 5 MG immediate release tablet  Commonly known as: ROXICODONE  Take 1 tablet (5 mg total) by mouth every 6 (six) hours as needed for Pain.        CONTINUE taking these medications    amLODIPine 5 MG tablet  Commonly known as: NORVASC  Take 10 mg by mouth every evening. Take 5 mg every morning and 10 mg at night     aspirin 81 MG EC tablet  Commonly known as: ECOTRIN  Take 81 mg by mouth once daily.     b complex vitamins tablet  Take 1 tablet by mouth once daily.     BASAGLAR KWIKPEN U-100 INSULIN glargine 100 units/mL (3mL) SubQ pen  Generic drug: insulin  Inject 5 Units into the skin once daily.     carvediloL 25 MG tablet  Commonly known as: COREG  Take 1 tablet (25 mg total) by mouth 2 (two) times daily.     clopidogreL 75 mg tablet  Commonly known  as: PLAVIX  Take 75 mg by mouth.     DULoxetine 30 MG capsule  Commonly known as: CYMBALTA  Take 1 capsule (30 mg total) by mouth once daily.     ferrous sulfate 325 mg (65 mg iron) Tab tablet  Commonly known as: FEOSOL  Take 325 mg by mouth daily with breakfast. Off at present     gabapentin 300 MG capsule  Commonly known as: NEURONTIN  Take 1 capsule (300 mg total) by mouth once daily.     lactulose 10 gram/15 mL solution  Commonly known as: CHRONULAC  Take 10 g by mouth 2 (two) times a day.     levothyroxine 75 MCG tablet  Commonly known as: SYNTHROID  Take 75 mcg by mouth once daily.     metoclopramide HCl 10 MG tablet  Commonly known as: REGLAN  Take 1 tablet (10 mg total) by mouth 3 (three) times daily before meals.     omeprazole 20 MG capsule  Commonly known as: PRILOSEC  Take 20 mg by mouth 2 (two) times daily.     ondansetron 4 MG tablet  Commonly known as: ZOFRAN  Take 1 tablet (4 mg total) by mouth every 8 (eight) hours as needed for Nausea.     rosuvastatin 40 MG Tab  Commonly known as: CRESTOR  Take 40 mg by mouth once daily.     tamsulosin 0.4 mg Cap  Commonly known as: FLOMAX  Take 1 capsule (0.4 mg total) by mouth once daily.     traZODone 100 MG tablet  Commonly known as: DESYREL  Take 1 tablet (100 mg total) by mouth once daily.        STOP taking these medications    HYDROcodone-acetaminophen 5-325 mg per tablet  Commonly known as: NORCO        ASK your doctor about these medications    vancomycin in dextrose 5 % 1 gram/250 mL Soln  Inject 125 mLs (500 mg total) into the vein once. for 1 dose  Ask about: Should I take this medication?            Indwelling Lines/Drains at time of discharge:   Lines/Drains/Airways     Peripherally Inserted Central Catheter Line  Duration           PICC Double Lumen 03/26/22 1905 right basilic 6 days          Drain  Duration                Urethral Catheter 03/18/22 2210 Non-latex 16 Fr. 14 days                Time spent on the discharge of patient: >54 minutes          Laxmi Hernandez PA-C  Department of Hospital Medicine  [facility] - Telemetry Stepdown ([place]-[facility])

## 2022-04-03 PROBLEM — Z89.431: Status: ACTIVE | Noted: 2022-01-01

## 2022-04-03 PROBLEM — R79.89 ELEVATED TROPONIN I LEVEL: Status: ACTIVE | Noted: 2022-01-01

## 2022-04-03 PROBLEM — S06.5XAA SDH (SUBDURAL HEMATOMA): Status: ACTIVE | Noted: 2022-01-01

## 2022-04-03 PROBLEM — I62.03 ACUTE ON CHRONIC INTRACRANIAL SUBDURAL HEMATOMA: Status: ACTIVE | Noted: 2022-01-01

## 2022-04-03 PROBLEM — I62.01 ACUTE ON CHRONIC INTRACRANIAL SUBDURAL HEMATOMA: Status: ACTIVE | Noted: 2022-01-01

## 2022-04-03 NOTE — HPI
71 year old female with , hypothyroidism, CVA with left-sided residual deficit on ASA/Plavix, DM2, HTN, HLD, CKD, chronic anemia, presenting from SNF after being found down next to wheelchair after unwitnessed fall, consulted to NSGY for right 2 cm SDH without midline shift. She is altered at baseline and unable to provide to history, but family at bedside says she is more confused and less interactive than usual.

## 2022-04-03 NOTE — ED NOTES
Patient identifiers verified and correct for   LOC: The patient is awake, and has a baseline of dementia.  APPEARANCE: Patient appears comfortable and in no acute distress, pt is a bit disheveled.  SKIN: The skin is warm and dry, color consistent with ethnicity, noted skin breakdown to the right  Foot.  MUSCULOSKELETAL: Patient not bale to move all extremities. Pt is contracted to the left side of her body from a previous stroke.  RESPIRATORY: Airway is open and patent, respirations are spontaneous, patient has a normal effort and rate, no accessory muscle use noted, pt placed on continuous pulse ox with O2 sats noted at 95% on room air.  CARDIAC: Pt will be placed on a portable B/P machine with pulse oximeter.  GASTRO: Soft and non tender to palpation, no distention noted, normoactive bowel sounds present in all four quadrants. Pt states bowel movements have been regular. Pt has a diaper present.  : Pt has Araiza in place PTA.  NEURO: Pt opens eyes spontaneously, behavior is dementia at baseline, responds to pain.  Beatriz Adame, a 71 y.o. female presents to the ED w/ complaint of GLF from her w/c to the left side of her body/face/head.    Triage note:  Chief Complaint   Patient presents with    Fall     Pt arrives from [facility]. Pt had a witnessed fall out of her wheelchair. Confused at baseline. Negative for LOC; pt on blood thinners.      Review of patient's allergies indicates:   Allergen Reactions    Tomato (solanum lycopersicum) Hives and Itching     Past Medical History:   Diagnosis Date    Gastroparesis     GERD (gastroesophageal reflux disease)     History of Clostridium difficile colitis 07/24/2021    History of kidney stones     History of stroke     left side paralysis    Hyperlipidemia     Hypertension     Hypothyroidism     Iron deficiency anemia     Osteoarthritis     Peripheral neuropathy     Stage IV CKD     Type II diabetes mellitus

## 2022-04-03 NOTE — ASSESSMENT & PLAN NOTE
S/p R partial foot amputation on vancomycin at SNF  -restart vancomycin  -bandage, stitches intact   -Afebrile, WBC WNL

## 2022-04-03 NOTE — Clinical Note
A pre-sedation assessment was completed by the physician immediately prior to sedation start.   Procedure being doing with anesthesia. MARCOS and Dr. Nova in room to accept pt upon arrival from ICU.

## 2022-04-03 NOTE — ASSESSMENT & PLAN NOTE
71 year old female with , hypothyroidism, CVA with left-sided residual deficit on ASA/Plavix, DM2, HTN, HLD, CKD, chronic anemia, presenting from SNF after being found down next to wheelchair after unwitnessed fall, consulted to NSGY for right 2 cm SDH mostly acute, membranous and heterogenous appearing, some chronic component, without midline shift.     -- Admit to neuro ICU  -- q1 hour vitals/neurochecks  -- SBP < 140  -- Na goal eunatremia  -- Keppra 500 BID  -- ASA/plavix usage: recommend reversal, pending NCC evaluation of troponemia  -- Rpt CTH in 6 hours for stability  -- NPO in case of operative interention  -- Pre op labs: CBC/CMP/PT/INR/PTT/type and screen pending  -- PPx: SCDs, BR, IS, hold SQH in setting of acute bleed    -- Dispo: pending possible operative intervention.

## 2022-04-03 NOTE — ASSESSMENT & PLAN NOTE
-Admit to NCC, NSGY following  -q1h neuro checks, vital checks  -EKG, ECHO, CXR  -A1c, TSH, lipid panel, coag panel  -Daily CBC, CMP, mag, phos  -SBP < 160, prn labetalol and hydralazine  -SCDs, hold DVT chemoppx in setting of acute bleed  -DAPT at home  -CT with R SDH and 3-4 mm MLS  -Keppra 500 bid  -PT/OT/SLP  -6 hour stability scan pending

## 2022-04-03 NOTE — SUBJECTIVE & OBJECTIVE
Past Medical History:   Diagnosis Date    Gastroparesis     GERD (gastroesophageal reflux disease)     History of Clostridium difficile colitis 07/24/2021    History of kidney stones     History of stroke     left side paralysis    Hyperlipidemia     Hypertension     Hypothyroidism     Iron deficiency anemia     Osteoarthritis     Peripheral neuropathy     Stage IV CKD     Type II diabetes mellitus        Past Surgical History:   Procedure Laterality Date    APPENDECTOMY      CYSTOSCOPY W/ URETERAL STENT PLACEMENT Right 10/13/2021    Procedure: CYSTOSCOPY, WITH URETERAL STENT INSERTION;  Surgeon: Wanda Arroyo MD;  Location: [facility];  Service: Urology;  Laterality: Right;    ESOPHAGOGASTRODUODENOSCOPY N/A 11/23/2021    Procedure: EGD (ESOPHAGOGASTRODUODENOSCOPY);  Surgeon: Obed Jeong MD;  Location: [facility] ENDO (2ND FLR);  Service: Endoscopy;  Laterality: N/A;    FOOT AMPUTATION THROUGH METATARSAL Right 3/24/2022    Procedure: AMPUTATION, FOOT, PARTIAL 4th and 5th ray;  Surgeon: Rodrigo Logan DPM;  [address];  Service: Podiatry;  Laterality: Right;    LAPAROSCOPIC APPENDECTOMY N/A 7/22/2021    Procedure: APPENDECTOMY, LAPAROSCOPIC;  Surgeon: Paulo Calvo Jr., MD;  Location: [facility];  Service: General;  Laterality: N/A;    TOE AMPUTATION Right 1/1/2022    Procedure: AMPUTATION, TOE;  Surgeon: Genaro Mason DPM;  Location: [facility];  Service: Podiatry;  Laterality: Right;    TUBAL LIGATION      URETEROSCOPIC REMOVAL OF URETERIC CALCULUS Right 12/22/2021    Procedure: REMOVAL, CALCULUS, URETER, URETEROSCOPIC;  Surgeon: Wanda Arroyo MD;  Location: [facility];  Service: Urology;  Laterality: Right;       Social History     Socioeconomic History    Marital status: Single   Tobacco Use    Smoking status: Never Smoker    Smokeless tobacco: Never Used   Substance and Sexual Activity    Alcohol use: No     Comment: socially    Drug use: No    Sexual activity: Not Currently     Social  Determinants of Health     Financial Resource Strain: Low Risk     Difficulty of Paying Living Expenses: Not hard at all   Food Insecurity: No Food Insecurity    Worried About Running Out of Food in the Last Year: Never true    Ran Out of Food in the Last Year: Never true   Transportation Needs: No Transportation Needs    Lack of Transportation (Medical): No    Lack of Transportation (Non-Medical): No   Physical Activity: Inactive    Days of Exercise per Week: 0 days    Minutes of Exercise per Session: 0 min   Stress: No Stress Concern Present    Feeling of Stress : Not at all   Social Connections: Moderately Isolated    Frequency of Communication with Friends and Family: More than three times a week    Frequency of Social Gatherings with Friends and Family: Once a week    Attends [employer] Services: More than 4 times per year    Active Member of Clubs or Organizations: No    Attends Club or Organization Meetings: Never    Marital Status: Never    Housing Stability: Low Risk     Unable to Pay for Housing in the Last Year: No    Number of Places Lived in the Last Year: 1    Unstable Housing in the Last Year: No       Family History   Problem Relation Age of Onset    Arthritis Mother     Diabetes Mother     Heart disease Mother     Hypertension Mother     Kidney disease Mother     Stroke Mother     Vision loss Mother     Heart attack Mother     Alcohol abuse Father     Heart attack Father     Diabetes Sister     Asthma Brother     Diabetes Brother     Heart disease Brother     Heart attack Brother        Review of patient's allergies indicates:   Allergen Reactions    Tomato (solanum lycopersicum) Hives and Itching         Medications:  Continuous Infusions:   sodium chloride 0.9%       Scheduled Meds:   [START ON 4/4/2022] atorvastatin  40 mg Oral Daily    levetiracetam IV  500 mg Intravenous Q12H    senna-docusate 8.6-50 mg  1 tablet Oral BID     PRN Meds:sodium chloride, acetaminophen, dextrose 10%, glucagon  (human recombinant), insulin aspart U-100, labetalol, ondansetron, sodium chloride 0.9%, Pharmacy to dose Vancomycin consult **AND** vancomycin - pharmacy to dose     Review of Systems    Unable to obtain: AMS    Objective:     Weight: 45.4 kg (100 lb)  Body mass index is 15.66 kg/m².  Vital Signs (Most Recent):  Temp: 98 °F (36.7 °C) (04/03/22 1608)  Pulse: 78 (04/03/22 1608)  Resp: 16 (04/03/22 1608)  BP: (!) 155/70 (04/03/22 1608)  SpO2: 98 % (04/03/22 1608)   Vital Signs (24h Range):  Temp:  [97.9 °F (36.6 °C)-98.9 °F (37.2 °C)] 98 °F (36.7 °C)  Pulse:  [69-78] 78  Resp:  [15-18] 16  SpO2:  [97 %-99 %] 98 %  BP: (105-155)/(52-71) 155/70     Date 04/03/22 0700 - 04/04/22 0659   Shift 0698-6988 7905-0090 5111-6161 24 Hour Total   INTAKE   Shift Total(mL/kg)       OUTPUT   Urine(mL/kg/hr)  350  350   Shift Total(mL/kg)  350(7.7)  350(7.7)   Weight (kg) 45.4 45.4 45.4 45.4                        Urethral Catheter 03/18/22 2210 Non-latex 16 Fr. (Active)       Physical Exam    Neurosurgery Physical Exam    Physical Exam:    Constitutional: No distress.     HEENT: atraumatic/normocephalic    Cardiovascular: Regular rhythm.     Pulm: aerating well, saturating well    Abdominal: Soft.     Psych/Behavior: He is alert.     E4V4M6  Aox3  Confusion with higher conversation  PERRL  EOMI  Face Symmetric  Right full strength  Left contracted  No drift      Significant Labs:  Recent Labs   Lab 04/03/22  1400         K 3.6   *   CO2 20*   BUN 25*   CREATININE 1.2   CALCIUM 8.7     Recent Labs   Lab 04/03/22  1400   WBC 12.65   HGB 8.5*   HCT 26.5*   *     Recent Labs   Lab 04/03/22  1522 04/03/22  1552   INR 1.1 1.1   APTT 25.9 24.8     Microbiology Results (last 7 days)       ** No results found for the last 168 hours. **          All pertinent labs from the last 24 hours have been reviewed.    Significant Diagnostics:  I have reviewed and interpreted all pertinent imaging results/findings within the  past 24 hours.

## 2022-04-03 NOTE — HPI
Ms. Adame is a 70 yo female with hx of dementia, R MCA stroke with left side contraction/hemiparesis presented to [facility] with SDH now s/p evac. Stepped down from [facility]. On 4/13 she developed worsening headache and CTH showed re-accumulation of R SDH and 4-5mm MLS. She was noted to be more slow to follow commands. Stepped back up to [facility] for closer monitoring.    Original HPI below:  Ms. Adame is a 72 y/o F with a PMHx of baseline dementia, GERD, R MCA stroke with residual LUE weakness, HLD, HTN, Stage IV CKD, TIIDM who presents to [facility] from [facility] with a AoC SDH after an unwitnessed fall from her wheel chair without loss of consciousness. CT spine negative for fracture. CT head with R SDH and 3-4 mm MLS. She is on DAPT at home. Of note, she has been staying at [facility] after a partial R foot amputation, stitches still intact, for which she is receiving vancomycin.

## 2022-04-03 NOTE — CONSULTS
[facility] - Emergency Dept  Neurosurgery  Consult Note    Subjective:     History of Present Illness: 71 year old female with , hypothyroidism, CVA with left-sided residual deficit on ASA/Plavix, DM2, HTN, HLD, CKD, chronic anemia, presenting from SNF after being found down next to wheelchair after unwitnessed fall, consulted to NSGY for right 2 cm SDH without midline shift. She is altered at baseline and unable to provide to history, but family at bedside says she is more confused and less interactive than usual.       Post-Op Info:  * No surgery found *         Past Medical History:   Diagnosis Date    Gastroparesis     GERD (gastroesophageal reflux disease)     History of Clostridium difficile colitis 07/24/2021    History of kidney stones     History of stroke     left side paralysis    Hyperlipidemia     Hypertension     Hypothyroidism     Iron deficiency anemia     Osteoarthritis     Peripheral neuropathy     Stage IV CKD     Type II diabetes mellitus        Past Surgical History:   Procedure Laterality Date    APPENDECTOMY      CYSTOSCOPY W/ URETERAL STENT PLACEMENT Right 10/13/2021    Procedure: CYSTOSCOPY, WITH URETERAL STENT INSERTION;  Surgeon: Wanda Arroyo MD;  Location: [facility];  Service: Urology;  Laterality: Right;    ESOPHAGOGASTRODUODENOSCOPY N/A 11/23/2021    Procedure: EGD (ESOPHAGOGASTRODUODENOSCOPY);  Surgeon: Obed Jeong MD;  Location: [facility] ([address] [place] [address]);  Service: Endoscopy;  Laterality: N/A;    FOOT AMPUTATION THROUGH METATARSAL Right 3/24/2022    Procedure: AMPUTATION, FOOT, PARTIAL 4th and 5th ray;  Surgeon: Rodrigo Logan DPM;  Location: [address];  Service: Podiatry;  Laterality: Right;    LAPAROSCOPIC APPENDECTOMY N/A 7/22/2021    Procedure: APPENDECTOMY, LAPAROSCOPIC;  Surgeon: Paulo Calvo Jr., MD;  Location: [facility];  Service: General;  Laterality: N/A;    TOE AMPUTATION Right 1/1/2022    Procedure: AMPUTATION, TOE;  Surgeon: Genaro Mason  DPM;  Location: [facility];  Service: Podiatry;  Laterality: Right;    TUBAL LIGATION      URETEROSCOPIC REMOVAL OF URETERIC CALCULUS Right 12/22/2021    Procedure: REMOVAL, CALCULUS, URETER, URETEROSCOPIC;  Surgeon: Wanda Arroyo MD;  Location: [facility];  Service: Urology;  Laterality: Right;       Social History     Socioeconomic History    Marital status: Single   Tobacco Use    Smoking status: Never Smoker    Smokeless tobacco: Never Used   Substance and Sexual Activity    Alcohol use: No     Comment: socially    Drug use: No    Sexual activity: Not Currently     Social Determinants of Health     Financial Resource Strain: Low Risk     Difficulty of Paying Living Expenses: Not hard at all   Food Insecurity: No Food Insecurity    Worried About Running Out of Food in the Last Year: Never true    Ran Out of Food in the Last Year: Never true   Transportation Needs: No Transportation Needs    Lack of Transportation (Medical): No    Lack of Transportation (Non-Medical): No   Physical Activity: Inactive    Days of Exercise per Week: 0 days    Minutes of Exercise per Session: 0 min   Stress: No Stress Concern Present    Feeling of Stress : Not at all   Social Connections: Moderately Isolated    Frequency of Communication with Friends and Family: More than three times a week    Frequency of Social Gatherings with Friends and Family: Once a week    Attends Zoroastrian Services: More than 4 times per year    Active Member of Clubs or Organizations: No    Attends Club or Organization Meetings: Never    Marital Status: Never    Housing Stability: Low Risk     Unable to Pay for Housing in the Last Year: No    Number of Places Lived in the Last Year: 1    Unstable Housing in the Last Year: No       Family History   Problem Relation Age of Onset    Arthritis Mother     Diabetes Mother     Heart disease Mother     Hypertension Mother     Kidney disease Mother     Stroke Mother     Vision  loss Mother     Heart attack Mother     Alcohol abuse Father     Heart attack Father     Diabetes Sister     Asthma Brother     Diabetes Brother     Heart disease Brother     Heart attack Brother        Review of patient's allergies indicates:   Allergen Reactions    Tomato (solanum lycopersicum) Hives and Itching         Medications:  Continuous Infusions:   sodium chloride 0.9%       Scheduled Meds:   [START ON 4/4/2022] atorvastatin  40 mg Oral Daily    levetiracetam IV  500 mg Intravenous Q12H    senna-docusate 8.6-50 mg  1 tablet Oral BID     PRN Meds:sodium chloride, acetaminophen, dextrose 10%, glucagon (human recombinant), insulin aspart U-100, labetalol, ondansetron, sodium chloride 0.9%, Pharmacy to dose Vancomycin consult **AND** vancomycin - pharmacy to dose     Review of Systems    Unable to obtain: AMS    Objective:     Weight: 45.4 kg (100 lb)  Body mass index is 15.66 kg/m².  Vital Signs (Most Recent):  Temp: 98 °F (36.7 °C) (04/03/22 1608)  Pulse: 78 (04/03/22 1608)  Resp: 16 (04/03/22 1608)  BP: (!) 155/70 (04/03/22 1608)  SpO2: 98 % (04/03/22 1608)   Vital Signs (24h Range):  Temp:  [97.9 °F (36.6 °C)-98.9 °F (37.2 °C)] 98 °F (36.7 °C)  Pulse:  [69-78] 78  Resp:  [15-18] 16  SpO2:  [97 %-99 %] 98 %  BP: (105-155)/(52-71) 155/70     Date 04/03/22 0700 - 04/04/22 0659   Shift 7056-4391 7834-0158 5135-0517 24 Hour Total   INTAKE   Shift Total(mL/kg)       OUTPUT   Urine(mL/kg/hr)  350  350   Shift Total(mL/kg)  350(7.7)  350(7.7)   Weight (kg) 45.4 45.4 45.4 45.4                        Urethral Catheter 03/18/22 2210 Non-latex 16 Fr. (Active)       Physical Exam    Neurosurgery Physical Exam    Physical Exam:    Constitutional: No distress.     HEENT: atraumatic/normocephalic    Cardiovascular: Regular rhythm.     Pulm: aerating well, saturating well    Abdominal: Soft.     Psych/Behavior: He is alert.     E4V4M6  Aox3  Confusion with higher conversation  PERRL  EOMI  Face  Symmetric  Right full strength  Left contracted  No drift      Significant Labs:  Recent Labs   Lab 04/03/22  1400         K 3.6   *   CO2 20*   BUN 25*   CREATININE 1.2   CALCIUM 8.7     Recent Labs   Lab 04/03/22  1400   WBC 12.65   HGB 8.5*   HCT 26.5*   *     Recent Labs   Lab 04/03/22  1522 04/03/22  1552   INR 1.1 1.1   APTT 25.9 24.8     Microbiology Results (last 7 days)       ** No results found for the last 168 hours. **          All pertinent labs from the last 24 hours have been reviewed.    Significant Diagnostics:  I have reviewed and interpreted all pertinent imaging results/findings within the past 24 hours.    Assessment/Plan:     * SDH (subdural hematoma)  71 year old female with , hypothyroidism, CVA with left-sided residual deficit on ASA/Plavix, DM2, HTN, HLD, CKD, chronic anemia, presenting from SNF after being found down next to wheelchair after unwitnessed fall, consulted to NSGY for right 2 cm SDH mostly acute, membranous and heterogenous appearing, some chronic component, without midline shift.     -- Admit to neuro ICU  -- q1 hour vitals/neurochecks  -- SBP < 140  -- Na goal eunatremia  -- Keppra 500 BID  -- ASA/plavix usage: recommend reversal, pending NCC evaluation of troponemia  -- Rpt CTH in 6 hours for stability  -- NPO in case of operative interention  -- Pre op labs: CBC/CMP/PT/INR/PTT/type and screen pending  -- PPx: SCDs, BR, IS, hold SQH in setting of acute bleed    -- Dispo: pending possible operative intervention.           Jose Aguayo MD  Neurosurgery  [facility] - Emergency Dept

## 2022-04-03 NOTE — SUBJECTIVE & OBJECTIVE
Past Medical History:   Diagnosis Date    Gastroparesis     GERD (gastroesophageal reflux disease)     History of Clostridium difficile colitis 07/24/2021    History of kidney stones     History of stroke     left side paralysis    Hyperlipidemia     Hypertension     Hypothyroidism     Iron deficiency anemia     Osteoarthritis     Peripheral neuropathy     Stage IV CKD     Type II diabetes mellitus      Past Surgical History:   Procedure Laterality Date    APPENDECTOMY      CYSTOSCOPY W/ URETERAL STENT PLACEMENT Right 10/13/2021    Procedure: CYSTOSCOPY, WITH URETERAL STENT INSERTION;  Surgeon: Wanda Arroyo MD;  Location: [facility];  Service: Urology;  Laterality: Right;    ESOPHAGOGASTRODUODENOSCOPY N/A 11/23/2021    Procedure: EGD (ESOPHAGOGASTRODUODENOSCOPY);  Surgeon: Obed Jeong MD;  Location: [facility] ENDO (2ND FLR);  Service: Endoscopy;  Laterality: N/A;    FOOT AMPUTATION THROUGH METATARSAL Right 3/24/2022    Procedure: AMPUTATION, FOOT, PARTIAL 4th and 5th ray;  Surgeon: Rodrigo Logan DPM;  Location: [facility] 2ND FLR;  Service: Podiatry;  Laterality: Right;    LAPAROSCOPIC APPENDECTOMY N/A 7/22/2021    Procedure: APPENDECTOMY, LAPAROSCOPIC;  Surgeon: Paulo Calvo Jr., MD;  Location: [facility];  Service: General;  Laterality: N/A;    TOE AMPUTATION Right 1/1/2022    Procedure: AMPUTATION, TOE;  Surgeon: Genaro Mason DPM;  Location: [facility];  Service: Podiatry;  Laterality: Right;    TUBAL LIGATION      URETEROSCOPIC REMOVAL OF URETERIC CALCULUS Right 12/22/2021    Procedure: REMOVAL, CALCULUS, URETER, URETEROSCOPIC;  Surgeon: Wanda Arroyo MD;  Location: [facility];  Service: Urology;  Laterality: Right;      No current facility-administered medications on file prior to encounter.     Current Outpatient Medications on File Prior to Encounter   Medication Sig Dispense Refill    amLODIPine (NORVASC) 5 MG tablet Take 10 mg by mouth every evening. Take 5 mg every morning and 10 mg at night       ascorbic acid, vitamin C, (VITAMIN C) 500 MG tablet Take 1 tablet (500 mg total) by mouth once daily.      aspirin (ECOTRIN) 81 MG EC tablet Take 81 mg by mouth once daily.      b complex vitamins tablet Take 1 tablet by mouth once daily.      carvedilol (COREG) 25 MG tablet Take 1 tablet (25 mg total) by mouth 2 (two) times daily. 60 tablet 0    clopidogreL (PLAVIX) 75 mg tablet Take 75 mg by mouth.      DULoxetine (CYMBALTA) 30 MG capsule Take 1 capsule (30 mg total) by mouth once daily.      ferrous sulfate (FEOSOL) 325 mg (65 mg iron) Tab tablet Take 325 mg by mouth daily with breakfast. Off at present      folic acid (FOLVITE) 1 MG tablet Take 1 tablet (1 mg total) by mouth once daily.  0    gabapentin (NEURONTIN) 300 MG capsule Take 1 capsule (300 mg total) by mouth once daily. 90 capsule 0    insulin (BASAGLAR KWIKPEN U-100 INSULIN) glargine 100 units/mL (3mL) SubQ pen Inject 5 Units into the skin once daily.  0    Lactobacillus rhamnosus GG (CULTURELLE) 10 billion cell capsule Take 1 capsule by mouth once daily.      lactulose (CHRONULAC) 10 gram/15 mL solution Take 10 g by mouth 2 (two) times a day.      levothyroxine (SYNTHROID) 75 MCG tablet Take 75 mcg by mouth once daily.      metoclopramide HCl (REGLAN) 10 MG tablet Take 1 tablet (10 mg total) by mouth 3 (three) times daily before meals. 90 tablet 3    omeprazole (PRILOSEC) 20 MG capsule Take 20 mg by mouth 2 (two) times daily.      ondansetron (ZOFRAN) 4 MG tablet Take 1 tablet (4 mg total) by mouth every 8 (eight) hours as needed for Nausea. 30 tablet 3    oxyCODONE (ROXICODONE) 5 MG immediate release tablet Take 1 tablet (5 mg total) by mouth every 6 (six) hours as needed for Pain. 20 tablet 0    rosuvastatin (CRESTOR) 40 MG Tab Take 40 mg by mouth once daily.      tamsulosin (FLOMAX) 0.4 mg Cap Take 1 capsule (0.4 mg total) by mouth once daily. 30 capsule 1    traZODone (DESYREL) 100 MG tablet Take 1 tablet (100 mg total) by mouth once daily. 30  tablet 0      Allergies: Tomato (solanum lycopersicum)    Family History   Problem Relation Age of Onset    Arthritis Mother     Diabetes Mother     Heart disease Mother     Hypertension Mother     Kidney disease Mother     Stroke Mother     Vision loss Mother     Heart attack Mother     Alcohol abuse Father     Heart attack Father     Diabetes Sister     Asthma Brother     Diabetes Brother     Heart disease Brother     Heart attack Brother      Social History     Tobacco Use    Smoking status: Never Smoker    Smokeless tobacco: Never Used   Substance Use Topics    Alcohol use: No     Comment: socially    Drug use: No     Review of Systems   Constitutional: Negative.    HENT: Negative.     Respiratory: Negative.     Cardiovascular: Negative.    Gastrointestinal: Negative.    Genitourinary: Negative.    Musculoskeletal:  Positive for back pain.   Neurological: Negative.    Objective:     Vitals:    Temp: 98.9 °F (37.2 °C)  Pulse: 69  BP: 105/71  Resp: 15  SpO2: 99 %  O2 Device (Oxygen Therapy): room air    Temp  Min: 97.9 °F (36.6 °C)  Max: 98.9 °F (37.2 °C)  Pulse  Min: 69  Max: 72  BP  Min: 105/71  Max: 134/52  Resp  Min: 15  Max: 18  SpO2  Min: 97 %  Max: 99 %    No intake/output data recorded.           Physical Exam  Constitutional: Well-nourished, well-developed. No obvious distress.  Eyes: Clear conjunctiva. Anicteric. No discharge. Lids without lesions.  HEENT: MMM. Nose, external ears atraumatic.  Cardio: RRR. Pulses intact. Capillary refill time < 2 seconds.  Respiratory: Clear to auscultation. Regular effort.  GI: Bowel sounds present. Soft, non-distended, non-tender.  MSK: R partial foot amputation with stitches and bandage in place. C/d/I.     Neurologic:  E4V4M6  AAOx3 (waxing and waning)  Confused  Dysarthria  Follows commands   PERRL, EOMI  Pupils brisk  Sensation intact   SEBAS except LUE (baseline)   LUE WD    Today I personally reviewed pertinent medications, lines/drains/airways, imaging,  cardiology results, laboratory results, microbiology results, notably:CTH

## 2022-04-03 NOTE — PROGRESS NOTES
Pharmacokinetic Initial Assessment & Plan: IV Vancomycin      Pt was receiving Vancomycin at NH facility s/p amputation/subacute Osteomyelitis of right foot. Per NH records pt was receiving Vancomycin 1 g daily. Received Vancomycin 1 g on 04/03 at 10:44 am. The Vancomycin random obtained upon arrival cannot be use to determine dose regimen at this time. Will Obtain Vanc random tomorrow 04/04 at 0600 with AM Labs. May restart dose tomorrow after random drawn.   Desired empiric serum trough concentration is 15 to 20 mcg/mL    Pharmacy will continue to follow and monitor vancomycin.    [phone] [id number] with any questions regarding this assessment.     Thank you for the consult,   Desirae Camargo       Patient brief summary:  Beatriz Adame is a 71 y.o. female initiated on antimicrobial therapy with IV Vancomycin for treatment of suspected Osteomyelitis     Drug Allergies:   Review of patient's allergies indicates:   Allergen Reactions    Tomato (solanum lycopersicum) Hives and Itching       Actual Body Weight:   45.4 kg    Renal Function:   Estimated Creatinine Clearance: 31.3 mL/min (based on SCr of 1.2 mg/dL).,     Dialysis Method (if applicable):  N/A    CBC (last 72 hours):  Recent Labs   Lab Result Units 04/03/22  1400 04/03/22  1621   WBC K/uL 12.65  --    Hemoglobin g/dL 8.5*  --    Hemoglobin A1C %  --  5.0   Hematocrit % 26.5*  --    Platelets K/uL 148*  --    Gran % % 71.2  --    Lymph % % 18.5  --    Mono % % 8.6  --    Eosinophil % % 1.2  --    Basophil % % 0.3  --    Differential Method  Automated  --        Metabolic Panel (last 72 hours):  Recent Labs   Lab Result Units 04/03/22  1400 04/03/22  1621 04/03/22  1626   Sodium mmol/L 143  --   --    Potassium mmol/L 3.6  --   --    Chloride mmol/L 114*  --   --    CO2 mmol/L 20*  --   --    Glucose mg/dL 107  --   --    Glucose, UA   --   --  Negative   BUN mg/dL 25*  --   --    Creatinine mg/dL 1.2  --   --    Magnesium mg/dL  --  2.0  --    Phosphorus mg/dL  --   3.2  --        Drug levels (last 3 results):  Recent Labs   Lab Result Units 04/03/22  1400   Vancomycin, Random ug/mL 14.0       Microbiologic Results:  Microbiology Results (last 7 days)     Procedure Component Value Units Date/Time    Urine culture [[id number]] Collected: 04/03/22 1626    Order Status: No result Specimen: Urine Updated: 04/03/22 9161

## 2022-04-03 NOTE — ED PROVIDER NOTES
CC: Fall (Pt arrives from [facility]. Pt had a witnessed fall out of her wheelchair. Confused at baseline. Negative for LOC; pt on blood thinners. )      History provided by:   Patient   [facility]lica [facility]  brother    HPI: Beatriz Adame is a 71 y.o. year old female who presents to the ED for fall out of wheelchair    Patient with dementia. From [facility]    Spoke to Deepali from [facility], patient was sitting in wheelchair, found on the floor, awake, found only a few minutes after the fall  She was lying on her left side when Angélica found her, she thinks the patient could not have been on the floor for more than a few minutes as her roommate reported she had just fallen. She does not believe the patient lost consciousness        Past Medical History:   Diagnosis Date    Gastroparesis     GERD (gastroesophageal reflux disease)     History of Clostridium difficile colitis 07/24/2021    History of kidney stones     History of stroke     left side paralysis    Hyperlipidemia     Hypertension     Hypothyroidism     Iron deficiency anemia     Osteoarthritis     Peripheral neuropathy     Stage IV CKD     Type II diabetes mellitus      Past Surgical History:   Procedure Laterality Date    APPENDECTOMY      CYSTOSCOPY W/ URETERAL STENT PLACEMENT Right 10/13/2021    Procedure: CYSTOSCOPY, WITH URETERAL STENT INSERTION;  Surgeon: Wanda Arroyo MD;  Location: [facility];  Service: Urology;  Laterality: Right;    ESOPHAGOGASTRODUODENOSCOPY N/A 11/23/2021    Procedure: EGD (ESOPHAGOGASTRODUODENOSCOPY);  Surgeon: Obed Jeong MD;  Location: [address]);  Service: Endoscopy;  Laterality: N/A;    FOOT AMPUTATION THROUGH METATARSAL Right 3/24/2022    Procedure: AMPUTATION, FOOT, PARTIAL 4th and 5th ray;  Surgeon: Rodrigo Logan DPM;  [address];  Service: Podiatry;  Laterality: Right;    LAPAROSCOPIC APPENDECTOMY N/A 7/22/2021    Procedure:  APPENDECTOMY, LAPAROSCOPIC;  Surgeon: Paulo Calvo Jr., MD;  Location: [facility];  Service: General;  Laterality: N/A;    TOE AMPUTATION Right 1/1/2022    Procedure: AMPUTATION, TOE;  Surgeon: Genaro Mason DPM;  Location: [facility];  Service: Podiatry;  Laterality: Right;    TUBAL LIGATION      URETEROSCOPIC REMOVAL OF URETERIC CALCULUS Right 12/22/2021    Procedure: REMOVAL, CALCULUS, URETER, URETEROSCOPIC;  Surgeon: Wanda Arroyo MD;  Location: [facility];  Service: Urology;  Laterality: Right;     Family History   Problem Relation Age of Onset    Arthritis Mother     Diabetes Mother     Heart disease Mother     Hypertension Mother     Kidney disease Mother     Stroke Mother     Vision loss Mother     Heart attack Mother     Alcohol abuse Father     Heart attack Father     Diabetes Sister     Asthma Brother     Diabetes Brother     Heart disease Brother     Heart attack Brother      No current facility-administered medications on file prior to encounter.     Current Outpatient Medications on File Prior to Encounter   Medication Sig Dispense Refill    amLODIPine (NORVASC) 5 MG tablet Take 10 mg by mouth every evening. Take 5 mg every morning and 10 mg at night      ascorbic acid, vitamin C, (VITAMIN C) 500 MG tablet Take 1 tablet (500 mg total) by mouth once daily.      aspirin (ECOTRIN) 81 MG EC tablet Take 81 mg by mouth once daily.      b complex vitamins tablet Take 1 tablet by mouth once daily.      carvedilol (COREG) 25 MG tablet Take 1 tablet (25 mg total) by mouth 2 (two) times daily. 60 tablet 0    clopidogreL (PLAVIX) 75 mg tablet Take 75 mg by mouth.      DULoxetine (CYMBALTA) 30 MG capsule Take 1 capsule (30 mg total) by mouth once daily.      ferrous sulfate (FEOSOL) 325 mg (65 mg iron) Tab tablet Take 325 mg by mouth daily with breakfast. Off at present      folic acid (FOLVITE) 1 MG tablet Take 1 tablet (1 mg total) by mouth once daily.  0    gabapentin (NEURONTIN)  300 MG capsule Take 1 capsule (300 mg total) by mouth once daily. 90 capsule 0    insulin (BASAGLAR KWIKPEN U-100 INSULIN) glargine 100 units/mL (3mL) SubQ pen Inject 5 Units into the skin once daily.  0    Lactobacillus rhamnosus GG (CULTURELLE) 10 billion cell capsule Take 1 capsule by mouth once daily.      lactulose (CHRONULAC) 10 gram/15 mL solution Take 10 g by mouth 2 (two) times a day.      levothyroxine (SYNTHROID) 75 MCG tablet Take 75 mcg by mouth once daily.      metoclopramide HCl (REGLAN) 10 MG tablet Take 1 tablet (10 mg total) by mouth 3 (three) times daily before meals. 90 tablet 3    omeprazole (PRILOSEC) 20 MG capsule Take 20 mg by mouth 2 (two) times daily.      ondansetron (ZOFRAN) 4 MG tablet Take 1 tablet (4 mg total) by mouth every 8 (eight) hours as needed for Nausea. 30 tablet 3    oxyCODONE (ROXICODONE) 5 MG immediate release tablet Take 1 tablet (5 mg total) by mouth every 6 (six) hours as needed for Pain. 20 tablet 0    rosuvastatin (CRESTOR) 40 MG Tab Take 40 mg by mouth once daily.      tamsulosin (FLOMAX) 0.4 mg Cap Take 1 capsule (0.4 mg total) by mouth once daily. 30 capsule 1    traZODone (DESYREL) 100 MG tablet Take 1 tablet (100 mg total) by mouth once daily. 30 tablet 0     Tomato (solanum lycopersicum)  Social History     Socioeconomic History    Marital status: Single   Tobacco Use    Smoking status: Never Smoker    Smokeless tobacco: Never Used   Substance and Sexual Activity    Alcohol use: No     Comment: socially    Drug use: No    Sexual activity: Not Currently     Social Determinants of Health     Financial Resource Strain: Low Risk     Difficulty of Paying Living Expenses: Not hard at all   Food Insecurity: No Food Insecurity    Worried About Running Out of Food in the Last Year: Never true    Ran Out of Food in the Last Year: Never true   Transportation Needs: No Transportation Needs    Lack of Transportation (Medical): No    Lack of  Transportation (Non-Medical): No   Physical Activity: Inactive    Days of Exercise per Week: 0 days    Minutes of Exercise per Session: 0 min   Stress: No Stress Concern Present    Feeling of Stress : Not at all   Social Connections: Moderately Isolated    Frequency of Communication with Friends and Family: More than three times a week    Frequency of Social Gatherings with Friends and Family: Once a week    Attends Hinduism Services: More than 4 times per year    Active Member of Clubs or Organizations: No    Attends Club or Organization Meetings: Never    Marital Status: Never    Housing Stability: Low Risk     Unable to Pay for Housing in the Last Year: No    Number of Places Lived in the Last Year: 1    Unstable Housing in the Last Year: No       ROS:     Hx of dementia, unable to provide hx  ALL: Tomato (solanum lycopersicum)    PHYSICAL EXAM:  Vitals:    04/03/22 1257   BP: (!) 134/52   Pulse: 72   Resp: 18   Temp: 97.9 °F (36.6 °C)     VS: triage VS reviewed    general: comfortable, in no acute distress, frail, chronically sick  HENT: neck symmetric, trachea midline  Eyes: pupils equal does not open her eyes and does not allow us to open her eyes,no conjunctival injection   CV: RRR, no  murmurs, no rubs, no gallops, no LE edema  Resp: comfortable breathing, CTAB, no wheezing, no crackles, no ronchi  ABD:  soft, ND, no masses, + normal BS, NT  Renal: + delgado catheter with clear yellow urine  Neuro: AAO x 0,   Left upper and left lower extremity contracted  Right upper extremity with PICC line moves it with no difficulty  Right upper extremity with no erythema, edema, deformity, tenderness to palpation  Right lower extremity with no deformity, erythema or pain with palpation  No midline CTL tenderness palpation, no chest, back, abdomen ecchymosis or tenderness to palpation  Right foot partial amputation stitches in place, no purulent discharge, not foul smelling foot           MSK: NC/AT,  extremities w/out deformity   Skin: warm, dry            DATA & INTERVENTIONS:    LABS reviewed:  Labs Reviewed - No data to display    RADIOLOGY reviewed:  Imaging Results    None         MEDICATIONS/FLUIDS:  Medications - No data to display      MDM:  Beatriz Adame is a 71 y.o. year old female who presents to the ED for fall from the wheelchair, found on the floor    Patient with history of dementia unable to provide history  History of osteomyelitis on antibiotics vancomycin, on Plavix, aspirin    Her brother arrived to the ED, reports patient without hx of dementia, usually conversant. This is a new mental status change for the patient    CT with SDH. on asa and plavix at the NH.  NSGY and NCC consulted, per NSGY, to wait for NCC consult for recs re: platelet and DDAVP       Labs ordered and reviewed:   Trop 0.031  Bmp no gross abn  Cbc no gross abn  inr wnl  Mg wnl  Phos wnl    Medication given in the ED: n/a    Ct head (ordered and reviewed): 1. Right acute subdural hematoma resulting in mass effect upon the right convexity and 3-4 mm of leftward midline shift near the vertex.  No hydrocephalus at this time.  Neuro surgical consultation advised.   2. Sequela of chronic microvascular ischemic change and senescent change noting stable encephalomalacia in the right MCA territory as well as within the left cerebellum  CT C spine: 1. No acute fracture or subluxation of the cervical spine.   2. 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 Hx of CVA w/ LUE and LLE contractures      Dispo:  admit    Aggregate Critical Care Time by Attending (exclusive of procedural time) = 45 minutes         During the Emergency Depment visit, there was a high probability of imminent or life threatening deterioration in the patient's condition necessitating medical decision  making of a high complexity. Organ systems that were compromised/potentially compromised                      central nervous system                        Pt required constant monitoring because of the potential for them to deteriorate at any moment. Critical care was time spent personally by me on the following activities:  Development of treatment plan with patient or surrogate; discussion with consultant, evaluation of patient's response to treatment, examination of patient, obtaining history from patient or surrogate, ordering and performing treatments and interventions, ordering and reviewing of laboratory studies, ordering and review of radiographic studies, vitals, re-evaluation of patient' condition and review of old charts            The failure to initiate the interventions performed in the Emergency Department on an urgent basis would have likely resulted in sudden, clinically significant or life threatening deterioration in the patient's condition.                              Domitila Hernandez MD  04/04/22 2190

## 2022-04-03 NOTE — H&P
[facility] Emergency Dept  Neurocritical Care  History & Physical    Admit Date: 4/3/2022  Service Date: 04/03/2022  Length of Stay: 0    Subjective:     Chief Complaint: Acute on chronic intracranial subdural hematoma    History of Present Illness: Ms. Adame is a 70 y/o F with a PMHx of baseline dementia, GERD, R MCA stroke with residual LUE weakness, HLD, HTN, Stage IV CKD, TIIDM who presents to [facility] from [facility] with a AoC SDH after an unwitnessed fall from her wheel chair without loss of consciousness. CT spine negative for fracture. CT head with R SDH and 3-4 mm MLS. She is on DAPT at home. Of note, she has been staying at [facility] after a partial R foot amputation, stitches still intact, for which she is receiving vancomycin. VSS. She will be admitted to [facility] for hourly neuromonitoring and a higher level of care.                 Past Medical History:   Diagnosis Date    Gastroparesis     GERD (gastroesophageal reflux disease)     History of Clostridium difficile colitis 07/24/2021    History of kidney stones     History of stroke     left side paralysis    Hyperlipidemia     Hypertension     Hypothyroidism     Iron deficiency anemia     Osteoarthritis     Peripheral neuropathy     Stage IV CKD     Type II diabetes mellitus      Past Surgical History:   Procedure Laterality Date    APPENDECTOMY      CYSTOSCOPY W/ URETERAL STENT PLACEMENT Right 10/13/2021    Procedure: CYSTOSCOPY, WITH URETERAL STENT INSERTION;  Surgeon: Wanda Arroyo MD;  Location: [facility];  Service: Urology;  Laterality: Right;    ESOPHAGOGASTRODUODENOSCOPY N/A 11/23/2021    Procedure: EGD (ESOPHAGOGASTRODUODENOSCOPY);  Surgeon: Obed Jeong MD;  Location: [address]);  Service: Endoscopy;  Laterality: N/A;    FOOT AMPUTATION THROUGH METATARSAL Right 3/24/2022    Procedure: AMPUTATION, FOOT, PARTIAL 4th and 5th ray;  Surgeon: Rodrigo Logan DPM;  Location: [address];   Service: Podiatry;  Laterality: Right;    LAPAROSCOPIC APPENDECTOMY N/A 7/22/2021    Procedure: APPENDECTOMY, LAPAROSCOPIC;  Surgeon: Paulo Calvo Jr., MD;  Location: [facility];  Service: General;  Laterality: N/A;    TOE AMPUTATION Right 1/1/2022    Procedure: AMPUTATION, TOE;  Surgeon: Genaro Mason DPM;  Location: [facility];  Service: Podiatry;  Laterality: Right;    TUBAL LIGATION      URETEROSCOPIC REMOVAL OF URETERIC CALCULUS Right 12/22/2021    Procedure: REMOVAL, CALCULUS, URETER, URETEROSCOPIC;  Surgeon: Wanda Arroyo MD;  Location: [facility];  Service: Urology;  Laterality: Right;      No current facility-administered medications on file prior to encounter.     Current Outpatient Medications on File Prior to Encounter   Medication Sig Dispense Refill    amLODIPine (NORVASC) 5 MG tablet Take 10 mg by mouth every evening. Take 5 mg every morning and 10 mg at night      ascorbic acid, vitamin C, (VITAMIN C) 500 MG tablet Take 1 tablet (500 mg total) by mouth once daily.      aspirin (ECOTRIN) 81 MG EC tablet Take 81 mg by mouth once daily.      b complex vitamins tablet Take 1 tablet by mouth once daily.      carvedilol (COREG) 25 MG tablet Take 1 tablet (25 mg total) by mouth 2 (two) times daily. 60 tablet 0    clopidogreL (PLAVIX) 75 mg tablet Take 75 mg by mouth.      DULoxetine (CYMBALTA) 30 MG capsule Take 1 capsule (30 mg total) by mouth once daily.      ferrous sulfate (FEOSOL) 325 mg (65 mg iron) Tab tablet Take 325 mg by mouth daily with breakfast. Off at present      folic acid (FOLVITE) 1 MG tablet Take 1 tablet (1 mg total) by mouth once daily.  0    gabapentin (NEURONTIN) 300 MG capsule Take 1 capsule (300 mg total) by mouth once daily. 90 capsule 0    insulin (BASAGLAR KWIKPEN U-100 INSULIN) glargine 100 units/mL (3mL) SubQ pen Inject 5 Units into the skin once daily.  0    Lactobacillus rhamnosus GG (CULTURELLE) 10 billion cell capsule Take 1 capsule by mouth once daily.       lactulose (CHRONULAC) 10 gram/15 mL solution Take 10 g by mouth 2 (two) times a day.      levothyroxine (SYNTHROID) 75 MCG tablet Take 75 mcg by mouth once daily.      metoclopramide HCl (REGLAN) 10 MG tablet Take 1 tablet (10 mg total) by mouth 3 (three) times daily before meals. 90 tablet 3    omeprazole (PRILOSEC) 20 MG capsule Take 20 mg by mouth 2 (two) times daily.      ondansetron (ZOFRAN) 4 MG tablet Take 1 tablet (4 mg total) by mouth every 8 (eight) hours as needed for Nausea. 30 tablet 3    oxyCODONE (ROXICODONE) 5 MG immediate release tablet Take 1 tablet (5 mg total) by mouth every 6 (six) hours as needed for Pain. 20 tablet 0    rosuvastatin (CRESTOR) 40 MG Tab Take 40 mg by mouth once daily.      tamsulosin (FLOMAX) 0.4 mg Cap Take 1 capsule (0.4 mg total) by mouth once daily. 30 capsule 1    traZODone (DESYREL) 100 MG tablet Take 1 tablet (100 mg total) by mouth once daily. 30 tablet 0      Allergies: Tomato (solanum lycopersicum)    Family History   Problem Relation Age of Onset    Arthritis Mother     Diabetes Mother     Heart disease Mother     Hypertension Mother     Kidney disease Mother     Stroke Mother     Vision loss Mother     Heart attack Mother     Alcohol abuse Father     Heart attack Father     Diabetes Sister     Asthma Brother     Diabetes Brother     Heart disease Brother     Heart attack Brother      Social History     Tobacco Use    Smoking status: Never Smoker    Smokeless tobacco: Never Used   Substance Use Topics    Alcohol use: No     Comment: socially    Drug use: No     Review of Systems   Constitutional: Negative.    HENT: Negative.     Respiratory: Negative.     Cardiovascular: Negative.    Gastrointestinal: Negative.    Genitourinary: Negative.    Musculoskeletal:  Positive for back pain.   Neurological: Negative.    Objective:     Vitals:    Temp: 98.9 °F (37.2 °C)  Pulse: 69  BP: 105/71  Resp: 15  SpO2: 99 %  O2 Device (Oxygen Therapy):  room air    Temp  Min: 97.9 °F (36.6 °C)  Max: 98.9 °F (37.2 °C)  Pulse  Min: 69  Max: 72  BP  Min: 105/71  Max: 134/52  Resp  Min: 15  Max: 18  SpO2  Min: 97 %  Max: 99 %    No intake/output data recorded.           Physical Exam  Constitutional: Well-nourished, well-developed. No obvious distress.  Eyes: Clear conjunctiva. Anicteric. No discharge. Lids without lesions.  HEENT: MMM. Nose, external ears atraumatic.  Cardio: RRR. Pulses intact. Capillary refill time < 2 seconds.  Respiratory: Clear to auscultation. Regular effort.  GI: Bowel sounds present. Soft, non-distended, non-tender.  MSK: R partial foot amputation with stitches and bandage in place. C/d/I.     Neurologic:  E4V4M6  AAOx3 (waxing and waning)  Confused  Dysarthria  Follows commands   PERRL, EOMI  Pupils brisk  Sensation intact   SEBAS except LUE (baseline)   LUE WD    Today I personally reviewed pertinent medications, lines/drains/airways, imaging, cardiology results, laboratory results, microbiology results, notably:CTH          Assessment/Plan:     Neuro  Acute on chronic intracranial subdural hematoma  -Admit to [facility], NSGY following  -q1h neuro checks, vital checks  -EKG, ECHO, CXR  -A1c, TSH, lipid panel, coag panel  -Daily CBC, CMP, mag, phos  -SBP < 160, prn labetalol and hydralazine  -SCDs, hold DVT chemoppx in setting of acute bleed  -DAPT at home  -CT with R SDH and 3-4 mm MLS  -Keppra 500 bid  -PT/OT/SLP  -6 hour stability scan pending      History of stroke  R MCA stroke (2013) with residual LUE flaccid paralysis  DAPT at home  Statin      Cardiac/Vascular  Elevated troponin I level  0.031 on admission  EKG, CXR, ECHO   DAPT at home, hold in setting of acute bleed     Critical lower limb ischemia  See osteomyelitis    Hyperlipidemia  Statin    Hypertension  SBP < 160  PRN labetalol, hydralazine  Confirm home meds    Renal/  Acute kidney injury superimposed on CKD stage IV  Daily CMP  IVF   Hourly UOP    ID  Osteomyelitis of right  foot  S/p R partial foot amputation on vancomycin at [facility]  -restart vancomycin  -bandage, stitches intact   -Afebrile, WBC WNL    Endocrine  Body mass index (BMI) less than 19  Nutrition consult    Type 2 diabetes mellitus, with long-term current use of insulin  A1c pending   SSI    Orthopedic  Status post partial amputation of right foot  See osteomyelitis    Other  Debility  Baseline flaccid paralysis of LUE 2/2 previous R MCA stroke   PT/OT          The patient is being Prophylaxed for:  Venous Thromboembolism with: Mechanical  Stress Ulcer with: None  Ventilator Pneumonia with: none    Activity Orders          Turn patient starting at 04/03 1800    Elevate HOB starting at 04/03 1603    Diet NPO: NPO starting at 04/03 1603        Full Code     Critical condition in that Patient has a condition that poses threat to life and bodily function: AoC SDH in setting of home DAPT, R foot osteomyelitis with recent R foot partial amputation on vancomycin, previous R MCA stroke, Type II DM, HTN, HLD    35 minutes of Critical care time was spent personally by me on the following activities: development of treatment plan with patient or surrogate and bedside caregivers, discussions with consultants, evaluation of patient's response to treatment, examination of patient, ordering and performing treatments and interventions, ordering and review of laboratory studies, ordering and review of radiographic studies, pulse oximetry, antibiotic titration if applicable, vasopressor titration if applicable, re-evaluation of patient's condition. This critical care time did not overlap with that of any other provider or involve time for any procedures. There is high probability for acute neurological change leading to clinical and possibly life-threatening deterioration requiring highest level of physician preparedness for urgent intervention.    Maegan Dsouza PA-C  Neurocritical Care  [facility] - Emergency Dept

## 2022-04-04 NOTE — PLAN OF CARE
Recommendations    1. Suggest Diabetic 2000 kcal diet with texture per SLP    2. Consider Boost Pudding BID to increase PO intake      3. Add Frederic BID x 14 days to aid in wound healing     4. If TF warranted, recommend TF of Isosource advancing as tolerated to goal rate of 45 mL/hr to provide 1620 kcal, 73 gm protein, and 825 mL free fluid    5. RD following     Goals: receive nutrition by RD follow-up  Nutrition Goal Status: new

## 2022-04-04 NOTE — CONSULTS
Inpatient consult to Physical Medicine Rehab  Consult performed by: Cady Burr NP  Consult ordered by: Maegan Dsouza PA-C  Reason for consult: Assess rehab needs      Reviewed patient history and current admission.  Rehab team following.  Full consult to follow once closer to medical stability and able to participate with therapy.    LAUREL Gold, FNP-C  Physical Medicine & Rehabilitation   04/04/2022

## 2022-04-04 NOTE — PROGRESS NOTES
[facility] - Neuro Critical Care  Neurocritical Care  Progress Note    Admit Date: 4/3/2022  Service Date: 04/04/2022  Length of Stay: 1    Subjective:     Chief Complaint: SDH (subdural hematoma)    History of Present Illness: Ms. Adame is a 70 y/o F with a PMHx of baseline dementia, GERD, R MCA stroke with residual LUE weakness, HLD, HTN, Stage IV CKD, TIIDM who presents to [facility] from [facility] with a AoC SDH after an unwitnessed fall from her wheel chair without loss of consciousness. CT spine negative for fracture. CT head with R SDH and 3-4 mm MLS. She is on DAPT at home. Of note, she has been staying at [facility] after a partial R foot amputation, stitches still intact, for which she is receiving vancomycin. VSS. She will be admitted to [facility] for hourly neuromonitoring and a higher level of care.                 Hospital Course: 04/04/2022: OR today for clot evac. Patient returned to ICU intubated on precedex. Post op scan stable. Wean propofol and initiated precedex for likely extubation tomorrow.       Interval History:  See above.     Review of Systems   Unable to perform ROS: Intubated     Objective:     Vitals:  Pulse: 68  Rhythm: normal sinus rhythm  BP: 133/67  MAP (mmHg): 85  Resp: 18  SpO2: 100 %  Oxygen Concentration (%): 100  O2 Device (Oxygen Therapy): ventilator  Vent Mode: A/C  Set Rate: 18 BPM  Vt Set: 450 mL  PEEP/CPAP: 5 cmH20  Peak Airway Pressure: 24 cmH2O  Mean Airway Pressure: 0 cmH20  Plateau Pressure: 0 cmH20    Temp  Min: 98.7 °F (37.1 °C)  Max: 100.3 °F (37.9 °C)  Pulse  Min: 68  Max: 90  BP  Min: 119/55  Max: 182/74  MAP (mmHg)  Min: 79  Max: 128  Resp  Min: 9  Max: 18  SpO2  Min: 95 %  Max: 100 %  Oxygen Concentration (%)  Min: 100  Max: 100    04/03 0701 - 04/04 0700  In: 407.4 [I.V.:357.4]  Out: 751 [Urine:750]   Unmeasured Output  Stool Occurrence: 1  Pad Count: 1       Physical Exam  Constitutional: Well-nourished, well-developed. No obvious distress.  Eyes:  Clear conjunctiva. Anicteric. No discharge. Lids without lesions.  HEENT: MMM. Nose, external ears atraumatic.  Cardio: RRR. Pulses intact. Capillary refill time < 2 seconds.  Respiratory: Clear to auscultation. Regular effort. Intubated.   GI: Bowel sounds present. Soft, non-distended, non-tender.  MSK: R partial foot amputation with stitches and bandage in place. C/d/I.      Neurologic:  Sedation: propofol  [id number]  PERRL  Pupils brisk  WD Rashaad LE  Localizes RUE  No movement LUE      Medications:  Continuoussodium chloride 0.9%, Last Rate: 100 mL/hr at 04/04/22 1300  niCARdipine, Last Rate: 5 mg/hr (04/04/22 1301)    Scheduled[START ON 4/5/2022] atorvastatin, 40 mg, Daily  [START ON 4/5/2022] famotidine, 20 mg, Daily  levetiracetam IV, 500 mg, Q12H  LIDOcaine (PF) 10 mg/ml (1%), ,   LIDOcaine HCL 10 mg/ml (1%), 1 mL, Once  mupirocin, , BID  senna-docusate 8.6-50 mg, 1 tablet, BID    PRNsodium chloride, , Q24H PRN  sodium chloride, , Q24H PRN  sodium chloride, , Q24H PRN  sodium chloride, , Q24H PRN  sodium chloride, , Q24H PRN  sodium chloride, , Q24H PRN  sodium chloride, , Q24H PRN  acetaminophen, 650 mg, Q4H PRN  dextrose 10%, 12.5 g, PRN  glucagon (human recombinant), 1 mg, PRN  HYDROcodone-acetaminophen, 1 tablet, Q4H PRN  HYDROmorphone, 0.5 mg, Q3H PRN  insulin aspart U-100, 1-10 Units, Q6H PRN  labetalol, 10 mg, Q15 Min PRN  metoclopramide HCl, 5 mg, Q6H PRN  ondansetron, 4 mg, Q12H PRN  sodium chloride 0.9%, 10 mL, PRN  vancomycin - pharmacy to dose, , pharmacy to manage frequency      Today I personally reviewed pertinent medications, lines/drains/airways, imaging, cardiology results, laboratory results, microbiology results, notably:    Diet  Diet NPO  Diet NPO        Assessment/Plan:     Neuro  * SDH (subdural hematoma)  -Admit to NCC, NSGY following  -q1h neuro checks, vital checks  -EKG, ECHO, CXR  -A1c, TSH, lipid panel, coag panel  -Daily CBC, CMP, mag, phos  -SBP < 160, prn labetalol and  hydralazine  -SCDs, hold DVT chemoppx in setting of acute bleed  -DAPT at home, DDAVP in ED and plts prior to OR  -CT with R SDH and 3-4 mm MLS  -Keppra 500 bid  -PT/OT/SLP  -Crani 4/4, post op CT stable  -Remains intubated post op, wean sedation      History of stroke  R MCA stroke (2013) with residual LUE flaccid paralysis  DAPT at home  Statin      Cardiac/Vascular  Elevated troponin I level  0.031 on admission  EKG, CXR, ECHO   DAPT at home, hold in setting of acute bleed   Resolved    Critical lower limb ischemia  See osteomyelitis    Hyperlipidemia  Statin    Hypertension  SBP < 160, cardene  PRN labetalol, hydralazine  Confirm home meds    Renal/  Acute kidney injury superimposed on CKD stage IV  Daily CMP  IVF   Hourly UOP    ID  Osteomyelitis of right foot  S/p R partial foot amputation on vancomycin at [facility]  -restart vancomycin  -bandage, stitches intact   -Afebrile, WBC 16    Endocrine  Body mass index (BMI) less than 19  Nutrition consult    Type 2 diabetes mellitus, with long-term current use of insulin  A1c 5  SSI    Orthopedic  Status post partial amputation of right foot  See osteomyelitis    Other  Debility  Baseline flaccid paralysis of LUE 2/2 previous R MCA stroke   PT/OT          The patient is being Prophylaxed for:  Venous Thromboembolism with: Mechanical  Stress Ulcer with: None  Ventilator Pneumonia with: none    Activity Orders          Elevate HOB Flat starting at 04/04 1228    Diet NPO: NPO starting at 04/04 1223    Bed rest starting at 04/04 1221    Turn patient starting at 04/03 1800        Full Code    Level III    Maegan Dsouza PA-C  Neurocritical Care  [facility] - Neuro Critical Care

## 2022-04-04 NOTE — PLAN OF CARE
[facility] - Neuro Critical Care  Initial Discharge Assessment       Primary Care Provider: Dante Olivier MD    Admission Diagnosis: SDH (subdural hematoma) [S06.5X9A]  Fall [W19.XXXA]    Admission Date: 4/3/2022  Expected Discharge Date:     Discharge Barriers Identified: Other (see comments) (Patient's son does not want her to return to [facility])    Payor: HUMANA MANAGED MEDICARE / Plan: HUMANA SNP (SPECIAL NEEDS PLAN) / Product Type: Medicare Advantage /     Extended Emergency Contact Information  Primary Emergency Contact: Gabino Adame  Mobile Phone: [phone]  Relation: Brother  Preferred language: English   needed? No  Secondary Emergency Contact: Alysa Adame  Mobile Phone: [phone]  Relation: Son    Discharge Plan A: Skilled Nursing Facility  Discharge Plan B: Skilled Nursing Facility      [facility]  [address]  [place] [address]  Phone: [phone] Fax: [phone]    [id number] - [place], [place] - [address]  [address]  [place] [address]  Phone: [phone] Fax: [phone]      Discharge assessment information obtained by phone from patient's son, Oscar.   Patient was admitted from [facility] after a witnessed fall from her wheelchair.  Patient's son does not want her to return to [facility].   CM/SW team will for discharge planning.      Initial Assessment (most recent)     Adult Discharge Assessment - 04/04/22 1052        Discharge Assessment    Assessment Type Discharge Planning Assessment     Confirmed/corrected address, phone number and insurance Yes     Confirmed Demographics Correct on Facesheet     Source of Information family     If unable to respond/provide information was family/caregiver contacted? Yes     Contact Name/Number Alysa Adame (son) [phone]     Reason For Admission SDH     Lives With child(rupesh), adult     Facility Arrived From:  [facility]     Do you expect to return to your current living situation? No   Patient's on does not want her to return to [facility]    Do you have help at home or someone to help you manage your care at home? Yes     Who are your caregiver(s) and their phone number(s)? Alysa Adame (son) [phone]     Prior to hospitilization cognitive status: Not Oriented to Time     Current cognitive status: Unable to Assess     Walking or Climbing Stairs Difficulty ambulation difficulty, requires equipment     Dressing/Bathing Difficulty bathing difficulty, requires equipment;bathing difficulty, assistance 1 person     Home Accessibility wheelchair accessible     Home Layout Able to live on 1st floor     Equipment Currently Used at Home wheelchair;glucometer     Readmission within 30 days? Yes     Do you currently have service(s) that help you manage your care at home? No     Do you take prescription medications? Yes     Do you have prescription coverage? Yes     Coverage [employer] MANAGED MEDICARE ([employer])     Do you have any problems affording any of your prescribed medications? No     Is the patient taking medications as prescribed? yes     How do you get to doctors appointments? family or friend will provide     Are you on dialysis? No     Do you take coumadin? No     Discharge Plan A Skilled Nursing Facility     Discharge Plan B Skilled Nursing Facility     DME Needed Upon Discharge  none     Discharge Plan discussed with: Adult children     Discharge Barriers Identified Other (see comments)   Patient's son does not want her to return to [facility]       Relationship/Environment    Name(s) of Who Lives With Patient Alysa Adame (son) [phone]

## 2022-04-04 NOTE — HOSPITAL COURSE
4/4: AFVSS. Exam worse this morning and CTH with increased aSDH. Class A to OR for R cani for SDH evac.   4/5: p evac, will obtain interval head ct today, stable exam.  4/6:s/p evac. More awake. Still intuabted. SD with 45cc  4/7: NAEON. AFVSS. Exam stable. Extubated. Pain controlled. Saturating well on room air. NGT remains in place. Surgical drain removed.   4/8: Stepped down to floor. NAEON. Afebrile, VSS. NGT removed, tolerating soft diet and thin liquids. Aox3. Podiatry managing recent partial foot amputation, appreciate recs.   4/9: Pain controlled. Saturating well on room air.    4/10: Anemia noted. HM consulted. Appreciate assistance.   4/11: Febrile overnight to 101. Denies fever, chest pain, or shortness of breath. Pain controlled. F/u CXR and BLE US. In setting of recent surgery will transfuse 1 unit RBC. Discharge pending fever workup and blood transfusion. UA with yeast, will f/u final cultures. Diet advanced.   4/12: Afebrile. Reports slight headache. Denies fever. UA with GNRs, pending susceptibility. Blood cultures NGTD. BLE US negative for DVT. Appreciate HM assistance with patient care. Podiatry to see pt today. ID consult placed for updated abx recs. Pending SNF placement.   4/13: Afebrile. Continues to report slight headache. Otherwise neuro stable. Abx regimen adjustment pending updated ID recs. OK to initiate Ertapenem if pt decompensates. x1 episode of vomiting yesterday, no other s/sx of bowel obstruction. BM yesterday. Eating well this AM. Pending SNF acceptance.   4/16: NAEON. CTH reviewed and stable, EEG with concern for NCSE yesterday.   4/17: NAEON.   4/19: NAEON. AFVSS. Exam stable. OR today for SDH evacuation.   4/21: POD 1 s/p R crani for SDH re-evacuation. NAEON. AFVSS. Exam improving. SD JAMAR in place to gravity. Postop CTH expected changes.   4/23: NAEON. AFVSS. Exam stable. R SD JAMAR remains in place. PICC with occlusive thrombus.   4/24: FLORENCIA. AFVSS. Exam stable.   4/26: FLORENCIA.  AFVSS. Exam stable. KIESHA embo with IR today.   4/27: PPD 1 s/p KIESHA embo. NAEON. AFVSS. Exam stable.

## 2022-04-04 NOTE — NURSING
Patient arrived to [facility] from [facility] ED     Type of stroke/diagnosis:  Subdural hematoma    TPA start and end time N/A    Thrombectomy start and end time N/A    Current symptoms: confusion, weakness, Left side motor deficits & left side contracted    Skin assessment done: YES  Wounds noted: Sacrum, Right toe amputation    *If wounds noted, was Wound Care consulted? YES    Delacruz Completed? YES    Patient Belongings on Admit: Blue dress with sailboats    NCC notified: name of person notified: KARLA Mares

## 2022-04-04 NOTE — PROGRESS NOTES
Pharmacokinetic Follow-Up Assessment & Plan: IV Vancomycin    - Vanc random is 11.6 mcg/mL, down from 14 mcg/mL the day prior  - Calculated t1/2 = 59 hours based on above two levels  - Reportedly on 1000 mg Q24H at nursing home; given prolonged half-life, I doubt she will be able to safely tolerate this regimen, will dose by level  - Administer vanc 1000 mg x1 now  - Draw 24-hour random level tomorrow 4/5 at 08:00  - Will re-dose based on level and renal function    Pharmacy will continue to follow and monitor vancomycin.    [phone] with any questions regarding this assessment.     Thank you for the consult,   Josette Johnson, PharmD, [facility]  Neurocritical Care Pharmacist  [id number]       Patient brief summary:  Beatriz Adame is a 71 y.o. female initiated on antimicrobial therapy with IV Vancomycin for treatment of suspected Osteomyelitis     Drug Allergies:   Review of patient's allergies indicates:   Allergen Reactions    Tomato (solanum lycopersicum) Hives and Itching       Actual Body Weight:   45.4 kg    Renal Function:   Estimated Creatinine Clearance: 31.7 mL/min (based on SCr of 1.3 mg/dL).,     Dialysis Method (if applicable):  N/A    CBC (last 72 hours):  Recent Labs   Lab Result Units 04/03/22  1400 04/03/22  1621 04/04/22  0257   WBC K/uL 12.65  --  12.08   Hemoglobin g/dL 8.5*  --  8.4*   Hemoglobin A1C %  --  5.0  --    Hematocrit % 26.5*  --  26.2*   Platelets K/uL 148*  --  149*   Gran % % 71.2  --  65.3   Lymph % % 18.5  --  22.7   Mono % % 8.6  --  10.4   Eosinophil % % 1.2  --  0.9   Basophil % % 0.3  --  0.4   Differential Method  Automated  --  Automated       Metabolic Panel (last 72 hours):  Recent Labs   Lab Result Units 04/03/22  1400 04/03/22  1621 04/03/22  1626 04/04/22  0257   Sodium mmol/L 143  --   --  146*   Potassium mmol/L 3.6  --   --  3.9   Chloride mmol/L 114*  --   --  112*   CO2 mmol/L 20*  --   --  24   Glucose mg/dL 107  --   --  105   Glucose, UA   --   --  Negative  --     BUN mg/dL 25*  --   --  23   Creatinine mg/dL 1.2  --   --  1.3   Albumin g/dL  --   --   --  2.5*   Total Bilirubin mg/dL  --   --   --  0.4   Alkaline Phosphatase U/L  --   --   --  66   AST U/L  --   --   --  18   ALT U/L  --   --   --  8*   Magnesium mg/dL  --  2.0  --  2.0   Phosphorus mg/dL  --  3.2  --  3.4       Drug levels (last 3 results):  Recent Labs   Lab Result Units 04/03/22  1400 04/04/22  0555   Vancomycin, Random ug/mL 14.0 11.6       Microbiologic Results:  Microbiology Results (last 7 days)     Procedure Component Value Units Date/Time    Urine culture [[id number]] Collected: 04/03/22 1626    Order Status: No result Specimen: Urine Updated: 04/03/22 1468

## 2022-04-04 NOTE — ANESTHESIA PREPROCEDURE EVALUATION
[facility]  Anesthesia Pre-Operative Evaluation         Patient Name: Beatriz Adame  YOB: 1950  MRN: [id number]    SUBJECTIVE:     Pre-operative evaluation for Procedure(s) (LRB):  CRANIOTOMY, FOR SUBDURAL HEMATOMA EVACUATION (Right)     04/04/2022    Beatriz Adame is a 71 y.o. female w/ a significant PMHx of hypothyroidism, CVA with left-sided residual deficit on ASA/Plavix, DM2, HTN, HLD, CKD, chronic anemia, presenting from SNF after being found down next to wheelchair after unwitnessed fall, consulted to NSGY for right 2 cm SDH without midline shift.     Patient now presents for the above procedure(s).      LDA:   PICC Double Lumen 03/26/22 1905 right basilic (Active)   Verification by X-ray Yes 04/04/22 0305   Site Assessment No drainage;No redness;No swelling 04/04/22 0305   Extremity Assessment Distal to IV No abnormal discoloration 04/04/22 0305   Line Securement Device Secured with sutureless device 04/04/22 0305   Dressing Type Biopatch in place;Central line dressing 04/04/22 0305   Dressing Status Clean;Dry;Intact 04/04/22 0305   Dressing Intervention Sterile dressing change 04/04/22 0305   Date on Dressing 04/04/22 04/04/22 0305   Dressing Due to be Changed 04/11/22 04/04/22 0305   Left Lumen Patency/Care Flushed w/o difficulty;Blood return present 04/04/22 0305   Right Lumen Patency/Care Flushed w/o difficulty;Blood return present 04/04/22 0305   Current Insertion Depth (cm) 32 cm 03/26/22 1905   Current Exposed Catheter (cm) 1 cm 03/26/22 1905   Extremity Circumference (cm) 25 cm 03/26/22 1905   Waveform Not being transduced 04/04/22 0305   Line Necessity Review Longterm central access required 04/04/22 0305   Number of days: 8       Female External Urinary Catheter 04/04/22 0100 (Active)   Skin no redness;no breakdown;perineum cleansed w/ soap and water 04/04/22 0305   Tolerance no signs/symptoms of discomfort 04/04/22 0305   Suction Continuous suction  at 70 mmHg 04/04/22 0305   Date of last wick change 04/04/22 04/04/22 0305   Time of last wick change 0100 04/04/22 0305   Number of days: 0        Prev airway: None documented.    Drips:    sodium chloride 0.9% Stopped (04/04/22 0551)        Patient Active Problem List   Diagnosis    TIA (transient ischemic attack)    Hypertension    Hyperlipidemia    Type 2 diabetes mellitus, with long-term current use of insulin    Acute kidney injury superimposed on CKD stage IV    History of stroke    Iron deficiency anemia    History of kidney stones    Severe protein-calorie malnutrition    Hypothyroidism    Osteomyelitis of right 2nd toe    Urinary retention    Vitamin D deficiency    Debility    Peripheral neuropathy    Gastroparesis    GERD (gastroesophageal reflux disease)    Long term (current) use of antithrombotics/antiplatelets    Hypokalemia    Insomnia    Chronic kidney disease, stage 4 (severe)    Anemia    Status post partial amputation of right foot    Acute metabolic encephalopathy    Osteomyelitis of right foot    Critical lower limb ischemia    Body mass index (BMI) less than 19    SDH (subdural hematoma)    Elevated troponin I level       Review of patient's allergies indicates:   Allergen Reactions    Tomato (solanum lycopersicum) Hives and Itching       Current Outpatient Medications:    Current Facility-Administered Medications:     0.9%  NaCl infusion (for blood administration), , Intravenous, Q24H PRN, Domitila Hernandez MD    0.9%  NaCl infusion (for blood administration), , Intravenous, Q24H PRN, Brock Fairchild MD    0.9%  NaCl infusion, , Intravenous, Continuous, Maegan Dsouza PA-C, Paused at 04/04/22 0551    acetaminophen tablet 650 mg, 650 mg, Oral, Q6H PRN, Maegan Dsouza PA-C    atorvastatin tablet 40 mg, 40 mg, Oral, Daily, Maegan Dsouza PA-C    dextrose 10% bolus 125 mL, 12.5 g, Intravenous, PRN, Maegan Dsouza PA-C    glucagon (human  recombinant) injection 1 mg, 1 mg, Intramuscular, PRN, Maegan Dsouza PA-C    insulin aspart U-100 pen 1-10 Units, 1-10 Units, Subcutaneous, Q6H PRN, Maegan Dsouza PA-C    labetalol 20 mg/4 mL (5 mg/mL) IV syring, 10 mg, Intravenous, Q15 Min PRN, EBONY Garcia-ALE, 10 mg at 04/04/22 0508    levETIRAcetam injection 500 mg, 500 mg, Intravenous, Q12H, EBONY Garcia-ALE, 500 mg at 04/03/22 2219    ondansetron injection 4 mg, 4 mg, Intravenous, Q8H PRN, Maegan Dsouza PA-C    senna-docusate 8.6-50 mg per tablet 1 tablet, 1 tablet, Oral, BID, Maegan Dsouza PA-C    sodium chloride 0.9% flush 10 mL, 10 mL, Intravenous, PRN, Maegan Dsouza PA-C    Pharmacy to dose Vancomycin consult, , , Once **AND** vancomycin - pharmacy to dose, , Intravenous, pharmacy to manage frequency, Maegan Dsouza PA-C    vancomycin in dextrose 5 % 1 gram/250 mL IVPB 1,000 mg, 1,000 mg, Intravenous, Once, Caleb Anand MD    Past Surgical History:   Procedure Laterality Date    APPENDECTOMY      CYSTOSCOPY W/ URETERAL STENT PLACEMENT Right 10/13/2021    Procedure: CYSTOSCOPY, WITH URETERAL STENT INSERTION;  Surgeon: Wanda Arroyo MD;  Location: [facility];  Service: Urology;  Laterality: Right;    ESOPHAGOGASTRODUODENOSCOPY N/A 11/23/2021    Procedure: EGD (ESOPHAGOGASTRODUODENOSCOPY);  Surgeon: Obed Jeong MD;  Location: [facility] ([address] [place] [address]);  Service: Endoscopy;  Laterality: N/A;    FOOT AMPUTATION THROUGH METATARSAL Right 3/24/2022    Procedure: AMPUTATION, FOOT, PARTIAL 4th and 5th ray;  Surgeon: Rodrigo Logan DPM;  Location: [facility] 2ND FLR;  Service: Podiatry;  Laterality: Right;    LAPAROSCOPIC APPENDECTOMY N/A 7/22/2021    Procedure: APPENDECTOMY, LAPAROSCOPIC;  Surgeon: Paulo Calvo Jr., MD;  Location: [facility];  Service: General;  Laterality: N/A;    TOE AMPUTATION Right 1/1/2022    Procedure: AMPUTATION, TOE;  Surgeon: Genaro Mason DPM;  Location: [facility];  Service:  Podiatry;  Laterality: Right;    TUBAL LIGATION      URETEROSCOPIC REMOVAL OF URETERIC CALCULUS Right 12/22/2021    Procedure: REMOVAL, CALCULUS, URETER, URETEROSCOPIC;  Surgeon: Wanda Arroyo MD;  Location: [facility];  Service: Urology;  Laterality: Right;       Social History     Socioeconomic History    Marital status: Single   Tobacco Use    Smoking status: Never Smoker    Smokeless tobacco: Never Used   Substance and Sexual Activity    Alcohol use: No     Comment: socially    Drug use: No    Sexual activity: Not Currently     Social Determinants of Health     Financial Resource Strain: Low Risk     Difficulty of Paying Living Expenses: Not hard at all   Food Insecurity: No Food Insecurity    Worried About Running Out of Food in the Last Year: Never true    Ran Out of Food in the Last Year: Never true   Transportation Needs: No Transportation Needs    Lack of Transportation (Medical): No    Lack of Transportation (Non-Medical): No   Physical Activity: Inactive    Days of Exercise per Week: 0 days    Minutes of Exercise per Session: 0 min   Stress: No Stress Concern Present    Feeling of Stress : Not at all   Social Connections: Moderately Isolated    Frequency of Communication with Friends and Family: More than three times a week    Frequency of Social Gatherings with Friends and Family: Once a week    Attends Oriental orthodox Services: More than 4 times per year    Active Member of Clubs or Organizations: No    Attends Club or Organization Meetings: Never    Marital Status: Never    Housing Stability: Low Risk     Unable to Pay for Housing in the Last Year: No    Number of Places Lived in the Last Year: 1    Unstable Housing in the Last Year: No       OBJECTIVE:     Vital Signs Range (Last 24H):  Temp:  [36.6 °C (97.9 °F)-37.9 °C (100.3 °F)]   Pulse:  [69-90]   Resp:  [9-18]   BP: (105-182)/()   SpO2:  [95 %-100 %]       Significant Labs:  Lab Results   Component Value Date     WBC 12.08 04/04/2022    HGB 8.4 (L) 04/04/2022    HCT 26.2 (L) 04/04/2022     (L) 04/04/2022    CHOL 138 04/03/2022    TRIG 91 04/03/2022    HDL 38 (L) 04/03/2022    ALT 8 (L) 04/04/2022    AST 18 04/04/2022     (H) 04/04/2022    K 3.9 04/04/2022     (H) 04/04/2022    CREATININE 1.3 04/04/2022    BUN 23 04/04/2022    CO2 24 04/04/2022    TSH 2.269 04/03/2022    INR 1.1 04/03/2022    HGBA1C 5.0 04/03/2022       Diagnostic Studies: No relevant studies.    EKG:   Results for orders placed or performed during the hospital encounter of 04/03/22   EKG 12-lead    Collection Time: 04/03/22  7:27 PM    Narrative    Test Reason : S06.5X9A,    Vent. Rate : 081 BPM     Atrial Rate : 081 BPM     P-R Int : 134 ms          QRS Dur : 064 ms      QT Int : 378 ms       P-R-T Axes : 069 053 047 degrees     QTc Int : 439 ms    Normal sinus rhythm  Normal ECG  When compared with ECG of 03-APR-2022 14:31,  Criteria for Septal infarct are no longer Present    Referred By: [email or web address]   SELF           Confirmed By:        2D ECHO:  TTE:  No results found for this or any previous visit.    ALEJO:  No results found for this or any previous visit.    ASSESSMENT/PLAN:                                                                                                           04/04/2022  Beatriz Adame is a 71 y.o., female.      Pre-op Assessment    I have reviewed the Patient Summary Reports.     I have reviewed the Nursing Notes. I have reviewed the NPO Status.   I have reviewed the Medications.     Review of Systems  Anesthesia Hx:  History of prior surgery of interest to airway management or planning: Denies Family Hx of Anesthesia complications.   Denies Personal Hx of Anesthesia complications.   Hematology/Oncology:  Hematology Normal   Oncology Normal     EENT/Dental:EENT/Dental Normal   Cardiovascular:   Hypertension    Pulmonary:  Pulmonary Normal    Renal/:   Chronic Renal Disease, CRI    Hepatic/GI:   GERD     Musculoskeletal:   Arthritis     Neurological:   CVA    Endocrine:   Diabetes Hypothyroidism    Dermatological:  Skin Normal    Psych:  Psychiatric Normal           Physical Exam  General: Well nourished    Airway:  Mallampati: III / II  Mouth Opening: Normal  TM Distance: Normal  Tongue: Normal  Neck ROM: Normal ROM    Chest/Lungs:  Normal Respiratory Rate    Heart:  Rate: Normal    Abdomen:  Normal        Anesthesia Plan  Type of Anesthesia, risks & benefits discussed:    Anesthesia Type: Gen ETT  Intra-op Monitoring Plan: Standard ASA Monitors and Art Line  Post Op Pain Control Plan: multimodal analgesia  Induction:  IV  Airway Plan: Direct, Post-Induction  Informed Consent: Informed consent signed with the Patient representative and all parties understand the risks and agree with anesthesia plan.  All questions answered. Patient consented to blood products? Yes  ASA Score: 5 Emergent  Day of Surgery Review of History & Physical: H&P Update referred to the surgeon/provider.    Ready For Surgery From Anesthesia Perspective.     .

## 2022-04-04 NOTE — PROGRESS NOTES
Pharmacist Renal Dose Adjustment Note    Beatriz Adame is a 71 y.o. female being treated with the medication famotidine    Recent Labs   Lab 04/03/22  1400 04/04/22  0257 04/04/22  1250   CREATININE 1.2 1.3 1.1     Serum creatinine: 1.1 mg/dL 04/04/22 1250  Estimated creatinine clearance: 37.1 mL/min    Famotidine 20 mg BID will be renally adjusted to 20 mg daily for CrCl < 50 mL/min      Josette Johnson, PharmD, [facility]  Neurocritical Care Pharmacist  [id number]

## 2022-04-04 NOTE — PT/OT/SLP PROGRESS
Physical Therapy      Patient Name:  Beatriz Adame   MRN:  [id number]    Patient not seen today secondary to pt having surgery today. PT will await further orders post op. Tesha Glass PT 4/4/22

## 2022-04-04 NOTE — CONSULTS
[facility] - Neuro Critical Care  Adult Nutrition  Consult Note    SUMMARY     Recommendations    1. Suggest Diabetic 2000 kcal diet with texture per SLP     2. Consider Boost Pudding BID to increase PO intake      3. Add Frederic BID x 14 days to aid in wound healing     4. If TF warranted, recommend TF of Isosource advancing as tolerated to goal rate of 45 mL/hr to provide 1620 kcal, 73 gm protein, and 825 mL free fluid    5. RD following     Goals: receive nutrition by RD follow-up  Nutrition Goal Status: new  Communication of RD Recs:  (POC)    Assessment and Plan    Nutrition Problem:  Severe Protein-Calorie Malnutrition  Malnutrition in the context of Chronic Illness/Injury     Related to (etiology):  Decreased ability to feed self     Signs and Symptoms (as evidenced by):  Energy Intake: <75% of estimated energy requirement for 3 months  Body Fat Depletion: moderate and severe depletion of orbitals, triceps and thoracic and lumbar region   Muscle Mass Depletion: moderate and severe depletion of temples, clavicle region, scapular region, interosseous muscle and lower extremities   Weight Loss: 20% x 6 months      Interventions (treatment strategy):  Collaboration of nutrition care with other providers     Nutrition Diagnosis Status:  Continues    Reason for Assessment    Reason For Assessment: consult  Diagnosis:  (SDH)  Relevant Medical History: baseline dementia, GERD, R MCA stroke with residual LUE weakness, HLD, HTN, Stage IV CKD, TIIDM  Interdisciplinary Rounds: did not attend  General Information Comments: Pt readmitted, last assessed by RD on 3/28. OR today for emergent R cani for SDH evac. Noted on last admit, pt did not like ONS. NFPE completed 3/20; pt meets criteria for severe malnutrition in the context of chronic illness.  Nutrition Discharge Planning: pending medical course    Nutrition Risk Screen    Nutrition Risk Screen: no indicators present    Nutrition/Diet History    Factors Affecting  "Nutritional Intake: NPO    Anthropometrics    Temp: 99.2 °F (37.3 °C)  Height: 5' 7" (170.2 cm)  Height (inches): 67 in  Weight Method: Bed Scale  Weight: 49.8 kg (109 lb 12.6 oz)  Weight (lb): 109.79 lb  Ideal Body Weight (IBW), Female: 135 lb  % Ideal Body Weight, Female (lb): 81.33 %  BMI (Calculated): 17.2  BMI Grade: 17 - 18.4 protein-energy malnutrition grade I     Lab/Procedures/Meds    Pertinent Labs Reviewed: reviewed  Pertinent Labs Comments: Na 146, GFR 48, ALT 8  Pertinent Medications Reviewed: reviewed  Pertinent Medications Comments: statin, senna-docusate, IVF     Estimated/Assessed Needs    Weight Used For Calorie Calculations: 49.8 kg (109 lb 12.6 oz)  Energy Calorie Requirements (kcal): 8637-5449 kcal/day  Energy Need Method: Kcal/kg (30-35)  Protein Requirements: 60-70 gm/day (1.2-1.4 gm/kg)  Weight Used For Protein Calculations: 49.8 kg (109 lb 12.6 oz)  Fluid Requirements (mL): 1 mL/kcal or per MD     RDA Method (mL): 1494  CHO Requirement: 186-218 gm/day    Nutrition Prescription Ordered    Current Diet Order: NPO    Evaluation of Received Nutrient/Fluid Intake    I/O: -343.6mL since admit   Comments: LBM 4/3  Tolerance:  (will monitor)  % Intake of Estimated Energy Needs: 0 - 25 %  % Meal Intake: NPO    Nutrition Risk    Level of Risk/Frequency of Follow-up:  (f/u 1 x wk)       Monitor and Evaluation    Food and Nutrient Intake: energy intake  Food and Nutrient Adminstration: diet order, enteral and parenteral nutrition administration  Anthropometric Measurements: weight, weight change, body mass index  Biochemical Data, Medical Tests and Procedures: electrolyte and renal panel, gastrointestinal profile, glucose/endocrine profile, inflammatory profile, lipid profile  Nutrition-Focused Physical Findings: overall appearance       Nutrition Follow-Up    RD Follow-up?: Yes  "

## 2022-04-04 NOTE — ASSESSMENT & PLAN NOTE
71 year old female with , hypothyroidism, CVA with left-sided residual deficit on ASA/Plavix, DM2, HTN, HLD, CKD, chronic anemia, presenting from SNF after being found down next to wheelchair after unwitnessed fall, consulted to NSGY for right 2 cm SDH mostly acute, membranous and heterogenous appearing, some chronic component, without midline shift.     4/4 now with worsened MLS and increased aSDH caliber on CTH; clinical exam worse.     --Class A to OR for R crani for SDH evacuation.   --STAT 1u plt preop and 1u intraop; 2u pRBC on hold; ddAVP given on admission    -- Admitted to neuro ICU  -- q1 hour vitals/neurochecks  -- SBP < 140  -- Na goal eunatremia  -- Keppra 500 BID  -- ASA/plavix held  -- NPO  -- PPx: SCDs, BR, IS, hold SQH in setting of acute bleed    -- Dispo: OR then return to ICU

## 2022-04-04 NOTE — OP NOTE
Date of Procedure: 4/4/2022     Pre-Operative Diagnosis: SDH (subdural hematoma) [S06.5X9A]    Post-Operative Diagnosis: Post-Op Diagnosis Codes:     * SDH (subdural hematoma) [S06.5X9A]    Anesthesia: General    Procedures performed:  1. Right Fronto-parietal craniotomy for evacuation of SDH     Surgeon: Silvio White MD    Assistant: Brock Fairchild MD, res    Indication for Procedure: mixed density SDH with acute component, showed some enlargement on HCT and patient had sudden mental status change.  Family was counseled about urgent craniotomy.  Due to ASA and Plavix, DDAVP and platelets were transfused.     Operative Note:   Formed consent was obtained and placed in the medical record, the patient was taken to the operating room and general anesthesia was established.  The patient was left supine with a shoulder bump under the right shoulder and the head was turned axially to the left side.  Dillon head clamp was applied.  The right frontoparietal scalp was shaved and then prepped in the usual sterile fashion.  Question rosanna shaped incision was proposed on the skin and then skin infiltrated with local anesthetic.  A 10 blade knife was used to make the skin incision and Bovie was used to dissect soft tissues.  We divided the galea over the temporalis fascia throughout the incision and then developed plane over the temporalis with sharp dissection.  The scalp flap was secured with fish hooks.  We then took the temporalis down with Bovie and reflected it anteriorly leaving a cuff on the frontal temporal cranium.  We placed a bur hole with an acorn drill in the temporal bone under the temporalis, over the posterior frontal area and over the parietal area.  We dissected the epidural space with blunt instruments and then used a craniotome to connect the bur holes.  The bone flap was passed off the field sterilely.  We had some tears anteriorly and inferiorly in the dura and these were connected with Metzenbaum  scissors to release pressure from the subdural.  We used several epidural tack-up sutures and then delivered the subdural clotted blood with irrigation and suction.  We inspected the surface of the brain for any bleeding, and there was some frontal bleeding noted from a bridging vein.  Gentle bipolar as well as thrombin FloSeal was able to control this bleeding.  Then left a subdural drain once no further bleeding was identified and tunneled through a separate incision.  We replaced the bone and secured it with a adonay hole cover frontally, and boxed dog bones parietal and temporal.  We then reapproximated the temporalis fascia with 2-0 Vicryl is a.  We left a subgaleal drain and closed the galea layer were 2 0 Vicryl.  Closed the skin with staples and placed bacitracin and Telfa before covering with a Kerlix.  All sponge needle and instrument counts were correct and patient tolerated without complication.  The patient remained intubated for transport to the ICU and a Cardene drip was started prior to transport.    EBL:  300 ml    Specimen Sent: none

## 2022-04-04 NOTE — ASSESSMENT & PLAN NOTE
S/p R partial foot amputation on vancomycin at SNF  -restart vancomycin  -bandage, stitches intact   -Afebrile, WBC 16

## 2022-04-04 NOTE — PT/OT/SLP EVAL
Speech Language Pathology Evaluation  Bedside Swallow  Discharge    Patient Name:  Beatriz Adame   MRN:  [id number]  Admitting Diagnosis: SDH (subdural hematoma)    Recommendations:                 General Recommendations:  Dysphagia therapy  Diet recommendations:  NPO, NPO   Aspiration Precautions: Strict aspiration precautions   General Precautions: Standard, aspiration, NPO    History:     Past Medical History:   Diagnosis Date    Gastroparesis     GERD (gastroesophageal reflux disease)     History of Clostridium difficile colitis 07/24/2021    History of kidney stones     History of stroke     left side paralysis    Hyperlipidemia     Hypertension     Hypothyroidism     Iron deficiency anemia     Osteoarthritis     Peripheral neuropathy     Stage IV CKD     Type II diabetes mellitus        Past Surgical History:   Procedure Laterality Date    APPENDECTOMY      CYSTOSCOPY W/ URETERAL STENT PLACEMENT Right 10/13/2021    Procedure: CYSTOSCOPY, WITH URETERAL STENT INSERTION;  Surgeon: Wanda Arroyo MD;  Location: [facility];  Service: Urology;  Laterality: Right;    ESOPHAGOGASTRODUODENOSCOPY N/A 11/23/2021    Procedure: EGD (ESOPHAGOGASTRODUODENOSCOPY);  Surgeon: Obed Jeong MD;  Location: [facility] ([address] [place] [address]);  Service: Endoscopy;  Laterality: N/A;    FOOT AMPUTATION THROUGH METATARSAL Right 3/24/2022    Procedure: AMPUTATION, FOOT, PARTIAL 4th and 5th ray;  Surgeon: Rodrigo Logan DPM;  [address];  Service: Podiatry;  Laterality: Right;    LAPAROSCOPIC APPENDECTOMY N/A 7/22/2021    Procedure: APPENDECTOMY, LAPAROSCOPIC;  Surgeon: Paulo Calvo Jr., MD;  Location: [facility];  Service: General;  Laterality: N/A;    TOE AMPUTATION Right 1/1/2022    Procedure: AMPUTATION, TOE;  Surgeon: Genaro Mason DPM;  Location: [facility];  Service: Podiatry;  Laterality: Right;    TUBAL LIGATION      URETEROSCOPIC REMOVAL OF URETERIC CALCULUS Right 12/22/2021    Procedure: REMOVAL,  CALCULUS, URETER, URETEROSCOPIC;  Surgeon: Wanda Arroyo MD;  Location: [facility];  Service: Urology;  Laterality: Right;       Social History: Patient unable to provide hx    Subjective   Pt awake/nonverbal    Pain/Comfort:  · Pain Rating 1: 0/10  · Pain Rating Post-Intervention 1: 0/10    Respiratory Status: Room air    Objective:     Oral Musculature Evaluation  · Oral Musculature: unable to assess due to poor participation/comprehension    Bedside Swallow Eval:   Consistencies Assessed:  · Thin liquids ice x1, cup x1      Oral Phase:   · Ice and cup placed to lips with tight labial seal noted. Anterior loss of both ice chip and 1/4 tsp water . Pt made no attempt to orally accept bolus    Pharyngeal Phase:   · no pharyngeal swallow initiated      Treatment: later in morning, pt taken to emergency surgery for new brain bleed. New orders needed post-op.     Assessment:     Beatriz Adame is a 71 y.o. female with dysphagia.  Goals:   Multidisciplinary Problems     SLP Goals     Not on file                Plan:     · Plan of Care reviewed with:  patient   · SLP Follow-Up:  No       Discharge recommendations:    No ST f/u      Time Tracking:     SLP Treatment Date:   04/04/22  Speech Start Time:  0824  Speech Stop Time:  0829     Speech Total Time (min):  5 min    Billable Minutes: Eval Swallow and Oral Function 5    04/04/2022

## 2022-04-04 NOTE — ANESTHESIA PROCEDURE NOTES
Intubation    Date/Time: 4/4/2022 9:54 AM  Performed by: Arturo Lam MD  Authorized by: Abram Feldman Jr., MD     Intubation:     Induction:  Intravenous    Intubated:  Postinduction    Mask Ventilation:  Easy with oral airway    Attempts:  1    Attempted By:  Resident anesthesiologist    Method of Intubation:  Direct    Blade:  Kanu 3    Laryngeal View Grade: Grade I - full view of cords      Difficult Airway Encountered?: No      Complications:  None    Airway Device:  Oral endotracheal tube    Airway Device Size:  7.0    Style/Cuff Inflation:  Cuffed (inflated to minimal occlusive pressure)    Tube secured:  22    Secured at:  The lips    Placement Verified By:  Capnometry    DIFFICULT INTUBATION DESCRIPTOR: neck contracture.    Findings Post-Intubation:  BS equal bilateral

## 2022-04-04 NOTE — TRANSFER OF CARE
"Anesthesia Transfer of Care Note    Patient: Beatriz Adame    Procedure(s) Performed: Procedure(s) (LRB):  CRANIOTOMY, FOR SUBDURAL HEMATOMA EVACUATION (Right)    Patient location: ICU    Anesthesia Type: general    Transport from OR: Transported from OR intubated on 100% O2 by AMBU with adequate controlled ventilation    Post pain: adequate analgesia    Post assessment: no apparent anesthetic complications    Post vital signs: stable    Level of consciousness: sedated    Nausea/Vomiting: no nausea/vomiting    Complications: none    Transfer of care protocol was followed      Last vitals:   Visit Vitals  BP (!) 120/59   Pulse 69   Temp 37.3 °C (99.2 °F) (Axillary)   Resp 12   Ht 5' 7" (1.702 m)   Wt 49.8 kg (109 lb 12.6 oz)   SpO2 100%   BMI 17.20 kg/m²     "

## 2022-04-04 NOTE — PLAN OF CARE
[facility] Care Plan    POC reviewed with Beatriz Adame at 0300. Pt unable to verbalize understanding. Will continue to monitor. See below and flowsheets for full assessment and VS info.     - Labetalol given x2 for SBP > 160  - Pt drowsy and minimally verbal overnight     Neuro:  Howe Coma Scale  Best Eye Response: 2-->(E2) to pain  Best Motor Response: 5-->(M5) localizes pain  Best Verbal Response: 1-->(V1) none  Casimiro Coma Scale Score: 8  Assessment Qualifiers: patient not sedated/intubated        24hr Temp:  [97.9 °F (36.6 °C)-100.3 °F (37.9 °C)]     CV:   Rhythm: normal sinus rhythm  BP goals:   SBP < 160  MAP > 65    Resp:   O2 Device (Oxygen Therapy): room air       Plan: N/A    GI/:     Diet/Nutrition Received: NPO  Last Bowel Movement: 04/03/22  Voiding Characteristics: urethral catheter (bladder)    Intake/Output Summary (Last 24 hours) at 4/4/2022 0634  Last data filed at 4/4/2022 0605  Gross per 24 hour   Intake 407.37 ml   Output 751 ml   Net -343.63 ml     Unmeasured Output  Stool Occurrence: 1  Pad Count: 1    Labs/Accuchecks:  Recent Labs   Lab 04/04/22  0257   WBC 12.08   RBC 2.85*   HGB 8.4*   HCT 26.2*   *      Recent Labs   Lab 04/04/22  0257   *   K 3.9   CO2 24   *   BUN 23   CREATININE 1.3   ALKPHOS 66   ALT 8*   AST 18   BILITOT 0.4      Recent Labs   Lab 04/03/22  1552   INR 1.1   APTT 24.8      Recent Labs   Lab 03/28/22  1123 04/03/22  1400 04/03/22  1621   *  --   --    TROPONINI  --    < > 0.017    < > = values in this interval not displayed.       Electrolytes: N/A - electrolytes WDL  Accuchecks: Q6H    Gtts:   sodium chloride 0.9% Stopped (04/04/22 0551)       LDA/Wounds:  Lines/Drains/Airways       Peripherally Inserted Central Catheter Line  Duration             PICC Double Lumen 03/26/22 1905 right basilic 8 days              Drain  Duration             Female External Urinary Catheter 04/04/22 0100 <1 day                  Wounds: Yes  Wound care  consulted: Yes

## 2022-04-04 NOTE — BRIEF OP NOTE
[facility]  Brief Operative Note    SUMMARY     Surgery Date: 4/4/2022     Surgeon(s) and Role:     * Silvio White MD - Primary     * Brock Fairchild MD - Resident - Assisting        Pre-op Diagnosis:  SDH (subdural hematoma) [S06.5X9A]    Post-op Diagnosis:  Post-Op Diagnosis Codes:     * SDH (subdural hematoma) [S06.5X9A]    Procedure(s) (LRB):  CRANIOTOMY, FOR SUBDURAL HEMATOMA EVACUATION (Right)    Anesthesia: General    Operative Findings: subdural hematoma evacuated without issue; subdural drain placed to gravity and subgaleal hemovac to full suction. Adequate hemostasis at end of case. Patient tolerated the procedure well and was transported to ICU in stable condition.     Estimated Blood Loss: see op note.     Estimated Blood Loss has been documented.         Specimens:   Specimen (24h ago, onward)            None          [id number]

## 2022-04-04 NOTE — PROGRESS NOTES
[facility] Medicine  Progress Note    Patient name: Melania Beavers  MRN: [id number]  Admit Date: 5/11/2020   LOS: 2 days     SUBJECTIVE:     Principal problem: Acute CVA (cerebrovascular accident)    Interval History:  87-year-old female with prior history of CVA who was recently discharged last week following a right thalamic stroke brought to the ED from rehabilitation facility secondary to abnormal imaging.  MRI brain 05/11 revealed new right occipital lobe infarctions.    No acute overnight events. S/p AVELINO with no left atrial thrombus.  Patient is alert and oriented x2.    Scheduled Meds:   amLODIPine  10 mg Oral Daily    aspirin  81 mg Oral Daily    atorvastatin  40 mg Oral Daily    atropine        clopidogreL  75 mg Oral Daily    EPINEPHrine        flumazeniL        metoprolol tartrate  12.5 mg Oral BID    naloxone        pantoprazole  40 mg Oral Daily    polyethylene glycol  17 g Oral Daily    vitamin D  5,000 Units Oral Daily     Continuous Infusions:  PRN Meds:calcium chloride IVPB, calcium chloride IVPB, calcium chloride IVPB, HYDROcodone-acetaminophen, labetalol, LORazepam, magnesium oxide, magnesium sulfate IVPB, magnesium sulfate IVPB, magnesium sulfate IVPB, magnesium sulfate IVPB, melatonin, ondansetron, potassium chloride in water, potassium chloride in water, potassium chloride in water, potassium chloride in water, potassium chloride, potassium chloride, potassium chloride, potassium chloride, sodium chloride 0.9%, sodium chloride 0.9%, sodium phosphate IVPB, sodium phosphate IVPB, sodium phosphate IVPB, sodium phosphate IVPB, sodium phosphate IVPB    Review of patient's allergies indicates:   Allergen Reactions    Doxycycline monohydrate Other (See Comments)     dizzyness    Heparin     Heparin analogues        Review of Systems: As per interval history    OBJECTIVE:     Vital Signs (Most Recent)  Temp: 98.7 °F (37.1 °C) (05/13/20 1140)  Pulse: 85 (05/13/20  [facility]  Neurosurgery  Progress Note    Subjective:     History of Present Illness: 71 year old female with , hypothyroidism, CVA with left-sided residual deficit on ASA/Plavix, DM2, HTN, HLD, CKD, chronic anemia, presenting from SNF after being found down next to wheelchair after unwitnessed fall, consulted to NSGY for right 2 cm SDH without midline shift. She is altered at baseline and unable to provide to history, but family at bedside says she is more confused and less interactive than usual.       Post-Op Info:  Procedure(s) (LRB):  CRANIOTOMY, FOR SUBDURAL HEMATOMA EVACUATION (Right)   Day of Surgery     Interval History: 4/4: AFVSS. Exam worse this morning and CTH with increased aSDH. Class A to OR for R cani for SDH evac.     Medications:  Continuous Infusions:   sodium chloride 0.9% Stopped (04/04/22 0551)     Scheduled Meds:   atorvastatin  40 mg Oral Daily    levetiracetam IV  500 mg Intravenous Q12H    LIDOcaine (PF) 10 mg/ml (1%)        LIDOcaine HCL 10 mg/ml (1%)  1 mL Other Once    senna-docusate 8.6-50 mg  1 tablet Oral BID    vancomycin (VANCOCIN) IVPB  1,000 mg Intravenous Once     PRN Meds:sodium chloride, sodium chloride, sodium chloride, sodium chloride, sodium chloride, acetaminophen, dextrose 10%, glucagon (human recombinant), insulin aspart U-100, labetalol, ondansetron, sodium chloride 0.9%, Pharmacy to dose Vancomycin consult **AND** vancomycin - pharmacy to dose     Review of Systems    Unable to obtain: AMS    Objective:     Weight: 49.8 kg (109 lb 12.6 oz)  Body mass index is 17.2 kg/m².  Vital Signs (Most Recent):  Temp: 99.2 °F (37.3 °C) (04/04/22 0305)  Pulse: 69 (04/04/22 0826)  Resp: 12 (04/04/22 0826)  BP: (!) 120/59 (04/04/22 0605)  SpO2: 100 % (04/04/22 0826)   Vital Signs (24h Range):  Temp:  [97.9 °F (36.6 °C)-100.3 °F (37.9 °C)] 99.2 °F (37.3 °C)  Pulse:  [69-90] 69  Resp:  [9-18] 12  SpO2:  [95 %-100 %] 100 %  BP: (105-182)/() 120/59                             Urethral Catheter 03/18/22 2210 Non-latex 16 Fr. (Active)       Physical Exam    Neurosurgery Physical Exam    Physical Exam:    Constitutional: No distress.     HEENT: atraumatic/normocephalic    Cardiovascular: Regular rhythm.     Pulm: aerating well, saturating well    Abdominal: Soft.     Psych/Behavior: He is alert.     E1V3M5  Obtunded; oriented with significant stimulation   PERRL  EOMI  Face Symmetric  R drift  Left contracted and paretic      Significant Labs:  Recent Labs   Lab 04/03/22  1400 04/03/22  1621 04/04/22  0257     --  105     --  146*   K 3.6  --  3.9   *  --  112*   CO2 20*  --  24   BUN 25*  --  23   CREATININE 1.2  --  1.3   CALCIUM 8.7  --  9.0   MG  --  2.0 2.0       Recent Labs   Lab 04/03/22  1400 04/04/22  0257   WBC 12.65 12.08   HGB 8.5* 8.4*   HCT 26.5* 26.2*   * 149*       Recent Labs   Lab 04/03/22  1522 04/03/22  1552   INR 1.1 1.1   APTT 25.9 24.8       Microbiology Results (last 7 days)       Procedure Component Value Units Date/Time    Urine culture [[id number]] Collected: 04/03/22 1626    Order Status: No result Specimen: Urine Updated: 04/03/22 1711          All pertinent labs from the last 24 hours have been reviewed.    Significant Diagnostics:  I have reviewed and interpreted all pertinent imaging results/findings within the past 24 hours.  CT Head Without Contrast    Result Date: 4/3/2022  Redemonstration of a right subdural hematoma with mass effect and mild midline shift. Probable remote right MCA and left cerebellar hemisphere infarcts. This report was flagged in Epic as abnormal. Electronically signed by: Bridget Rasmussen Date:    04/03/2022 Time:    21:01    CT Head Without Contrast    Result Date: 4/3/2022  This report was flagged in Epic as abnormal. 1. Right acute subdural hematoma resulting in mass effect upon the right convexity and 3-4 mm of leftward midline shift near the vertex.  No hydrocephalus at this  1140)  Resp: 16 (05/13/20 1140)  BP: (!) 151/63 (05/13/20 1140)  SpO2: 100 % (05/13/20 1140)    Vital Signs Range (Last 24H):  Temp:  [98 °F (36.7 °C)-99 °F (37.2 °C)]   Pulse:  [73-85]   Resp:  [16-18]   BP: (105-176)/(52-72)   SpO2:  [91 %-100 %]     I & O (Last 24H):    Intake/Output Summary (Last 24 hours) at 5/13/2020 1417  Last data filed at 5/13/2020 1038  Gross per 24 hour   Intake 200 ml   Output --   Net 200 ml       Physical Exam:  General: Patient in no acute distress. Appears as stated age.   Eyes: No conjunctival injection. No scleral icterus.  ENT: Hearing grossly intact. No discharge from ears. No nasal discharge.   Neck: Supple, trachea midline. No JVD  CVS: RRR. No LE edema BL  Lungs: CTA BL, no wheezing or crackles. Good breath sounds. No accessory muscle use. No acute respiratory distress  Abdomen:  Soft, nontender and nondistended.  No organomegaly  Neuro: AOx2. Speech is clear. CN 2-12 are grossly intact. Moves all extremities. Follows commands  Skin:  No rash or erythema noted  MSK: No deformity. Muscle wasting noted     Laboratory:  CBC:   Recent Labs   Lab 05/13/20  0628   WBC 5.86   RBC 2.88*   HGB 8.7*   HCT 26.9*      MCV 93   MCH 30.2   MCHC 32.3     CMP:   Recent Labs   Lab 05/13/20  0628   GLU 96   CALCIUM 9.1   ALBUMIN 3.6   PROT 6.3      K 4.2   CO2 20*   *   BUN 16   CREATININE 1.5*   ALKPHOS 58   ALT 12   AST 19   BILITOT 0.7     Diagnostic Results:  Reading physician: Fran House MD Ordering physician: Fran House MD Study date: 5/13/20   Reason for Exam   Priority: Routine   Dx: Stroke [I63.9 (ICD-10-CM)]   Comments:    Staff     Performing Sonographer   Felicita Ryder    Vitals     Height Weight BMI (Calculated) BSA (Calculated - sq m) BP             Conclusion     · Normal left ventricular systolic function. The estimated ejection fraction is 65%.  · Normal LV diastolic function.  · No wall motion abnormalities.  · Normal right ventricular systolic  function.  · Mild left atrial enlargement.  · No interatrial septal defect present.  · No ASD or PFO closure device in interatrial septum.  · Normal appearing left atrial appendage. No thrombus is present in the appendage. ROSALBA occluder is absent.  · Mild-to-moderate aortic regurgitation.  · Severe aortic valve stenosis.  · Aortic valve area is 0.66 cm2; peak velocity is 3.98 m/s; mean gradient is 38 mmHg.  · Mild mitral sclerosis.  · Mild mitral regurgitation.  · Mild tricuspid regurgitation.  · Grade 1 plaque present in the ascending aorta and descending aorta.  · Agitated saline contrast study did not show any right-to-left shunt             ASSESSMENT/PLAN:       Active Hospital Problems    Diagnosis  POA    *Acute CVA (cerebrovascular accident) [I63.9]  Yes    Aortic valve stenosis, moderate [I35.0]  Yes    Chronic kidney disease, stage III (moderate) [N18.3]  Yes    Severe malnutrition [E43]  Yes    Anemia, unspecified [D64.9]  Yes    Stroke [I63.9]  Yes    HTN (hypertension) [I10]  Yes      Resolved Hospital Problems   No resolved problems to display.       Plan:   Recurrent CVAs - ?embolic phenomenon - AVELINO negative left atrial thrombus. Moderate aortic stenosis noted   Continue aspirin and clopidogrel as well as and statin   Neurology following - recommend MRA brain  Continue home meds for chronic medical conditions  Can go back to rehab facility pending MRA brain; most likely tomorrow  Dietitian following for severe malnutrition       VTE Risk Mitigation (From admission, onward)         Ordered     Reason for No Pharmacological VTE Prophylaxis  Once     Question:  Reasons:  Answer:  Physician Provided (leave comment)  Comment:  DAPT    05/11/20 2036     IP VTE HIGH RISK PATIENT  Once      05/11/20 2036     Place sequential compression device  Until discontinued      05/11/20 2036                  Department Hospital Medicine  [facility]  Rikki Gibbons MD  Date of service: 05/13/2020  time.  Neuro surgical consultation advised. 2. Sequela of chronic microvascular ischemic change and senescent change noting stable encephalomalacia in the right MCA territory as well as within the left cerebellum. Electronically signed by: Eduard Rios MD Date:    04/03/2022 Time:    14:37    CT Cervical Spine Without Contrast    Result Date: 4/3/2022  1. No acute fracture or subluxation of the cervical spine. 2. 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 Electronically signed by: Daniel Goldstein Date:    04/03/2022 Time:    15:11    X-Ray Chest AP Portable    Result Date: 4/3/2022  As above Electronically signed by: Eduard Rios MD Date:    04/03/2022 Time:    18:17       Assessment/Plan:     * SDH (subdural hematoma)  71 year old female with , hypothyroidism, CVA with left-sided residual deficit on ASA/Plavix, DM2, HTN, HLD, CKD, chronic anemia, presenting from SNF after being found down next to wheelchair after unwitnessed fall, consulted to NSGY for right 2 cm SDH mostly acute, membranous and heterogenous appearing, some chronic component, without midline shift.     4/4 now with worsened MLS and increased aSDH caliber on CTH; clinical exam worse.     --Class A to OR for R crani for SDH evacuation.   --STAT 1u plt preop and 1u intraop; 2u pRBC on hold; ddAVP given on admission    -- Admitted to neuro ICU  -- q1 hour vitals/neurochecks  -- SBP < 140  -- Na goal eunatremia  -- Keppra 500 BID  -- ASA/plavix held  -- NPO  -- PPx: SCDs, BR, IS, hold SQH in setting of acute bleed    -- Dispo: OR then return to ICU          Brock Fairchild MD  Neurosurgery  [facility]

## 2022-04-04 NOTE — SUBJECTIVE & OBJECTIVE
Interval History:  See above.     Review of Systems   Unable to perform ROS: Intubated     Objective:     Vitals:  Pulse: 68  Rhythm: normal sinus rhythm  BP: 133/67  MAP (mmHg): 85  Resp: 18  SpO2: 100 %  Oxygen Concentration (%): 100  O2 Device (Oxygen Therapy): ventilator  Vent Mode: A/C  Set Rate: 18 BPM  Vt Set: 450 mL  PEEP/CPAP: 5 cmH20  Peak Airway Pressure: 24 cmH2O  Mean Airway Pressure: 0 cmH20  Plateau Pressure: 0 cmH20    Temp  Min: 98.7 °F (37.1 °C)  Max: 100.3 °F (37.9 °C)  Pulse  Min: 68  Max: 90  BP  Min: 119/55  Max: 182/74  MAP (mmHg)  Min: 79  Max: 128  Resp  Min: 9  Max: 18  SpO2  Min: 95 %  Max: 100 %  Oxygen Concentration (%)  Min: 100  Max: 100    04/03 0701 - 04/04 0700  In: 407.4 [I.V.:357.4]  Out: 751 [Urine:750]   Unmeasured Output  Stool Occurrence: 1  Pad Count: 1       Physical Exam  Constitutional: Well-nourished, well-developed. No obvious distress.  Eyes: Clear conjunctiva. Anicteric. No discharge. Lids without lesions.  HEENT: MMM. Nose, external ears atraumatic.  Cardio: RRR. Pulses intact. Capillary refill time < 2 seconds.  Respiratory: Clear to auscultation. Regular effort. Intubated.   GI: Bowel sounds present. Soft, non-distended, non-tender.  MSK: R partial foot amputation with stitches and bandage in place. C/d/I.      Neurologic:  Sedation: propofol  [id number]  PERRL  Pupils brisk  WD Rashaad LE  Localizes RUE  No movement LUE      Medications:  Continuoussodium chloride 0.9%, Last Rate: 100 mL/hr at 04/04/22 1300  niCARdipine, Last Rate: 5 mg/hr (04/04/22 1301)    Scheduled[START ON 4/5/2022] atorvastatin, 40 mg, Daily  [START ON 4/5/2022] famotidine, 20 mg, Daily  levetiracetam IV, 500 mg, Q12H  LIDOcaine (PF) 10 mg/ml (1%), ,   LIDOcaine HCL 10 mg/ml (1%), 1 mL, Once  mupirocin, , BID  senna-docusate 8.6-50 mg, 1 tablet, BID    PRNsodium chloride, , Q24H PRN  sodium chloride, , Q24H PRN  sodium chloride, , Q24H PRN  sodium chloride, , Q24H PRN  sodium chloride, , Q24H  PRN  sodium chloride, , Q24H PRN  sodium chloride, , Q24H PRN  acetaminophen, 650 mg, Q4H PRN  dextrose 10%, 12.5 g, PRN  glucagon (human recombinant), 1 mg, PRN  HYDROcodone-acetaminophen, 1 tablet, Q4H PRN  HYDROmorphone, 0.5 mg, Q3H PRN  insulin aspart U-100, 1-10 Units, Q6H PRN  labetalol, 10 mg, Q15 Min PRN  metoclopramide HCl, 5 mg, Q6H PRN  ondansetron, 4 mg, Q12H PRN  sodium chloride 0.9%, 10 mL, PRN  vancomycin - pharmacy to dose, , pharmacy to manage frequency      Today I personally reviewed pertinent medications, lines/drains/airways, imaging, cardiology results, laboratory results, microbiology results, notably:    Diet  Diet NPO  Diet NPO

## 2022-04-04 NOTE — ASSESSMENT & PLAN NOTE
-Admit to NCC, NSGY following  -q1h neuro checks, vital checks  -EKG, ECHO, CXR  -A1c, TSH, lipid panel, coag panel  -Daily CBC, CMP, mag, phos  -SBP < 160, prn labetalol and hydralazine  -SCDs, hold DVT chemoppx in setting of acute bleed  -DAPT at home, DDAVP in ED and plts prior to OR  -CT with R SDH and 3-4 mm MLS  -Keppra 500 bid  -PT/OT/SLP  -Crani 4/4, post op CT stable  -Remains intubated post op, wean sedation

## 2022-04-04 NOTE — PT/OT/SLP PROGRESS
Occupational Therapy      Patient Name:  Beatriz Adame   MRN:  [id number]    Patient not seen today secondary to Off the floor for procedure/surgery. Pt went off floor due to emergency surgery secondary to brain bleed prior to OT arrival. Will follow-up when need orders issued.    Salomon Anderson OT  4/4/2022

## 2022-04-04 NOTE — HOSPITAL COURSE
04/04/2022: OR today for clot evac. Patient returned to ICU intubated on precedex. Post op scan stable. Wean propofol and initiated precedex for likely extubation tomorrow.   04/05/2022 repeat CT head due to drainage.   04/06/2022 NAEO.   04/07/2022 NAEO. Successfully extubated  4/14/22: EEG revealed seizure   4/15/22: vimpat load overnight  04/16/2022: NAEON.  04/17/2022. NAEON. Repeat CTH stable.   04/18/2022  Lateralized periodic discharges on R with some seizure correlate. 200 vimpat x 1 and increase scheduled vimpat to 200. Platelets by NSGY 2/2 OR for clot evac today.   04/19/2022 OR postponed to today with NSGY. CT this morning stable. Continue EEG.   04/20: in OR for repeat evacuation  04/21: follow up CT after evacuation shows SD drain and little residual blood  04/22: losartan adjusted to decrease prn use, enteral feeding and fluid to be started after MMA embolization  04/23: occlusive brachial thrombus on picc line seen yesterday, right arm swelling improved with removal of art line and picc  New 20 gauge left wrist, carefully start trickle feeds while waiting for MMA embolization  04/24/2022 fluctuation beena exam, getting EEG   04/25/2022: EEG negative for seizures, stable for MMA embolization  04/26/2022: completed embolization procedure, extubated uneventfully  04/27/2022: NAEON, stable for TTF

## 2022-04-04 NOTE — ANESTHESIA PROCEDURE NOTES
Arterial    Diagnosis: SDH    Patient location during procedure: ICU  Procedure start time: 4/4/2022 9:19 AM  Timeout: 4/4/2022 9:19 AM  Procedure end time: 4/4/2022 9:20 AM    Staffing  Authorizing Provider: iVctoriano Handy MD  Performing Provider: Victoriano Handy MD      Preanesthetic Checklist  Completed: patient identified, IV checked, site marked, risks and benefits discussed, surgical consent, monitors and equipment checked, pre-op evaluation, timeout performed and anesthesia consent givenArterial  Skin Prep: chlorhexidine gluconate and isopropyl alcohol  Local Infiltration: lidocaine  Orientation: right  Location: radial    Catheter Size: 20 G  Catheter placement by Ultrasound guidance. Heme positive aspiration all ports.   Vessel Caliber: medium, patent, compressibility normal  Needle advanced into vessel with real time Ultrasound guidance.  Guidewire confirmed in vessel.  Sterile sheath used.Insertion Attempts: 1  Assessment  Dressing: secured with tape and tegaderm

## 2022-04-04 NOTE — SUBJECTIVE & OBJECTIVE
Interval History: 4/4: AFVSS. Exam worse this morning and CTH with increased aSDH. Class A to OR for R cani for SDH evac.     Medications:  Continuous Infusions:   sodium chloride 0.9% Stopped (04/04/22 0551)     Scheduled Meds:   atorvastatin  40 mg Oral Daily    levetiracetam IV  500 mg Intravenous Q12H    LIDOcaine (PF) 10 mg/ml (1%)        LIDOcaine HCL 10 mg/ml (1%)  1 mL Other Once    senna-docusate 8.6-50 mg  1 tablet Oral BID    vancomycin (VANCOCIN) IVPB  1,000 mg Intravenous Once     PRN Meds:sodium chloride, sodium chloride, sodium chloride, sodium chloride, sodium chloride, acetaminophen, dextrose 10%, glucagon (human recombinant), insulin aspart U-100, labetalol, ondansetron, sodium chloride 0.9%, Pharmacy to dose Vancomycin consult **AND** vancomycin - pharmacy to dose     Review of Systems    Unable to obtain: AMS    Objective:     Weight: 49.8 kg (109 lb 12.6 oz)  Body mass index is 17.2 kg/m².  Vital Signs (Most Recent):  Temp: 99.2 °F (37.3 °C) (04/04/22 0305)  Pulse: 69 (04/04/22 0826)  Resp: 12 (04/04/22 0826)  BP: (!) 120/59 (04/04/22 0605)  SpO2: 100 % (04/04/22 0826)   Vital Signs (24h Range):  Temp:  [97.9 °F (36.6 °C)-100.3 °F (37.9 °C)] 99.2 °F (37.3 °C)  Pulse:  [69-90] 69  Resp:  [9-18] 12  SpO2:  [95 %-100 %] 100 %  BP: (105-182)/() 120/59                            Urethral Catheter 03/18/22 2210 Non-latex 16 Fr. (Active)       Physical Exam    Neurosurgery Physical Exam    Physical Exam:    Constitutional: No distress.     HEENT: atraumatic/normocephalic    Cardiovascular: Regular rhythm.     Pulm: aerating well, saturating well    Abdominal: Soft.     Psych/Behavior: He is alert.     E1V3M5  Obtunded; oriented with significant stimulation   PERRL  EOMI  Face Symmetric  R drift  Left contracted and paretic      Significant Labs:  Recent Labs   Lab 04/03/22  1400 04/03/22  1621 04/04/22  0257     --  105     --  146*   K 3.6  --  3.9   *  --  112*   CO2 20*  --   24   BUN 25*  --  23   CREATININE 1.2  --  1.3   CALCIUM 8.7  --  9.0   MG  --  2.0 2.0       Recent Labs   Lab 04/03/22  1400 04/04/22  0257   WBC 12.65 12.08   HGB 8.5* 8.4*   HCT 26.5* 26.2*   * 149*       Recent Labs   Lab 04/03/22  1522 04/03/22  1552   INR 1.1 1.1   APTT 25.9 24.8       Microbiology Results (last 7 days)       Procedure Component Value Units Date/Time    Urine culture [[id number]] Collected: 04/03/22 1626    Order Status: No result Specimen: Urine Updated: 04/03/22 1711          All pertinent labs from the last 24 hours have been reviewed.    Significant Diagnostics:  I have reviewed and interpreted all pertinent imaging results/findings within the past 24 hours.  CT Head Without Contrast    Result Date: 4/3/2022  Redemonstration of a right subdural hematoma with mass effect and mild midline shift. Probable remote right MCA and left cerebellar hemisphere infarcts. This report was flagged in Epic as abnormal. Electronically signed by: Bridget Rasmussen Date:    04/03/2022 Time:    21:01    CT Head Without Contrast    Result Date: 4/3/2022  This report was flagged in Epic as abnormal. 1. Right acute subdural hematoma resulting in mass effect upon the right convexity and 3-4 mm of leftward midline shift near the vertex.  No hydrocephalus at this time.  Neuro surgical consultation advised. 2. Sequela of chronic microvascular ischemic change and senescent change noting stable encephalomalacia in the right MCA territory as well as within the left cerebellum. Electronically signed by: Eduard Rios MD Date:    04/03/2022 Time:    14:37    CT Cervical Spine Without Contrast    Result Date: 4/3/2022  1. No acute fracture or subluxation of the cervical spine. 2. 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 Electronically signed by: Daniel  Angelita Date:    04/03/2022 Time:    15:11    X-Ray Chest AP Portable    Result Date: 4/3/2022  As above Electronically signed by: Eduard Rios MD Date:    04/03/2022 Time:    18:17

## 2022-04-05 NOTE — PLAN OF CARE
[facility] Care Plan    POC reviewed with Beatriz Adame at 0300. Pt unable to  verbalize understanding. Will continue to monitor. See below and flowsheets for full assessment and VS info.     - Cardene gtt titrated to maintain SBP <140  - Precedex gtt weaned back down   - Dilaudid x1  - Hypothermic overnight  - Neurosurgery paged for saturated dressing and pad. MD Jose added a stitch to incision site and redressed cranial dressing.     Neuro:  Casimiro Coma Scale  Best Eye Response: 3-->(E3) to speech  Best Motor Response: 6-->(M6) obeys commands  Best Verbal Response: 1-->(V1) none  Casimiro Coma Scale Score: 10  Assessment Qualifiers: patient intubated, patient chemically sedated or paralyzed        24hr Temp:  [94.3 °F (34.6 °C)-99 °F (37.2 °C)]     CV:   Rhythm: normal sinus rhythm  BP goals:   SBP < 140  MAP > 65    Resp:   O2 Device (Oxygen Therapy): ventilator  Vent Mode: A/C  Set Rate: 18 BPM  Oxygen Concentration (%): 50  Vt Set: 450 mL  PEEP/CPAP: 5 cmH20    Plan: wean to extubate    GI/:     Diet/Nutrition Received: NPO  Last Bowel Movement: 04/03/22  Voiding Characteristics: due to void    Intake/Output Summary (Last 24 hours) at 4/5/2022 0645  Last data filed at 4/5/2022 0605  Gross per 24 hour   Intake 3953.9 ml   Output 540 ml   Net 3413.9 ml     Unmeasured Output  Stool Occurrence: 1  Pad Count: 1    Labs/Accuchecks:  Recent Labs   Lab 04/05/22  0059   WBC 15.52*   RBC 2.49*   HGB 7.5*   HCT 21.9*         Recent Labs   Lab 04/05/22  0059      K 3.1*   CO2 20*      BUN 25*   CREATININE 1.1   ALKPHOS 64   ALT 11   AST 22   BILITOT 0.8      Recent Labs   Lab 04/03/22  1552   INR 1.1   APTT 24.8      Recent Labs   Lab 04/03/22  1621   TROPONINI 0.017       Electrolytes: No replacement orders  Accuchecks: Q6H    Gtts:   sodium chloride 0.9% 50 mL/hr at 04/05/22 0605    dexmedetomidine (PRECEDEX) infusion 0.8 mcg/kg/hr (04/05/22 0605)    niCARdipine 2.5 mg/hr (04/05/22 0605)        LDA/Wounds:  Lines/Drains/Airways       Peripherally Inserted Central Catheter Line  Duration             PICC Double Lumen 03/26/22 1905 right basilic 9 days              Drain  Duration             Female External Urinary Catheter 04/04/22 0100 1 day         Closed/Suction Drain 04/04/22 1112 Right Scalp Accordion 10 Fr. <1 day         Closed/Suction Drain 04/04/22 1200 Right Scalp Other (Comment) 1.9 Fr. <1 day              Airway  Duration                  Airway - Non-Surgical 04/04/22 0954 <1 day              Arterial Line  Duration             Arterial Line 04/04/22 0919 Right Radial <1 day              Peripheral Intravenous Line  Duration                  Peripheral IV - Single Lumen 04/04/22 1005 18 G Left Wrist <1 day                  Wounds: Yes  Wound care consulted: Yes

## 2022-04-05 NOTE — PROGRESS NOTES
[facility] - Neuro Critical Care  Neurocritical Care  Progress Note    Admit Date: 4/3/2022  Service Date: 04/05/2022  Length of Stay: 2    Subjective:     Chief Complaint: SDH (subdural hematoma)    History of Present Illness: Ms. Adame is a 72 y/o F with a PMHx of baseline dementia, GERD, R MCA stroke with residual LUE weakness, HLD, HTN, Stage IV CKD, TIIDM who presents to [facility] from [facility] with a AoC SDH after an unwitnessed fall from her wheel chair without loss of consciousness. CT spine negative for fracture. CT head with R SDH and 3-4 mm MLS. She is on DAPT at home. Of note, she has been staying at [facility] after a partial R foot amputation, stitches still intact, for which she is receiving vancomycin. VSS. She will be admitted to [facility] for hourly neuromonitoring and a higher level of care.                 Hospital Course: 04/04/2022: OR today for clot evac. Patient returned to ICU intubated on precedex. Post op scan stable. Wean propofol and initiated precedex for likely extubation tomorrow.   04/05/2022 repeat CT head due to drainage.       Review of Symptoms: intubated on precedex cannot participate    Medications:  Continuous Infusions:   sodium chloride 0.9% 50 mL/hr at 04/05/22 0900    dexmedetomidine (PRECEDEX) infusion 0.6 mcg/kg/hr (04/05/22 0900)    niCARdipine 2.5 mg/hr (04/05/22 0900)     Scheduled Meds:   atorvastatin  40 mg Per NG tube Daily    famotidine  20 mg Per NG tube Daily    levetiracetam IV  500 mg Intravenous Q12H    LIDOcaine HCL 10 mg/ml (1%)  1 mL Other Once    mupirocin   Nasal BID    senna-docusate 8.6-50 mg  1 tablet Per NG tube BID     PRN Meds:.sodium chloride, sodium chloride, sodium chloride, sodium chloride, sodium chloride, sodium chloride, sodium chloride, acetaminophen, dextrose 10%, glucagon (human recombinant), HYDROcodone-acetaminophen, HYDROmorphone, insulin aspart U-100, labetalol, metoclopramide HCl, ondansetron, sodium chloride 0.9%,  Pharmacy to dose Vancomycin consult **AND** vancomycin - pharmacy to dose    OBJECTIVE:   Vital Signs (Most Recent):   Temp: (!) 93.6 °F (34.2 °C) (04/05/22 0905)  Pulse: 68 (04/05/22 0905)  Resp: 18 (04/05/22 0905)  BP: 132/64 (04/05/22 0905)  SpO2: 100 % (04/05/22 0905)    Vital Signs (24h Range):   Temp:  [92.7 °F (33.7 °C)-98.9 °F (37.2 °C)] 93.6 °F (34.2 °C)  Pulse:  [60-78] 68  Resp:  [10-24] 18  SpO2:  [100 %] 100 %  BP: ()/(51-75) 132/64  Arterial Line BP: ()/(43-64) 128/54    ICP/CPP (Last 24h):        I & O (Last 24h):     Intake/Output Summary (Last 24 hours) at 4/5/2022 0956  Last data filed at 4/5/2022 0900  Gross per 24 hour   Intake 4147.95 ml   Output 540 ml   Net 3607.95 ml     Physical Exam:  GA: drowsy, comfortable, no acute distress.   HEENT: No scleral icterus or JVD. Neck supple  Pulmonary: Air entry equal to auscultation A/P/L. Wheezing no, crackles no  Cardiac: RRR, S1 & S2 w/o rubs/murmurs/gallops.   Abdominal: soft, non-tender, bowel sounds present x 4. No appreciable hepatosplenomegaly.  Skin: No jaundice, rashes, or visible lesions.  Neuro:  --Mental Status:  Drowsy, resists eye opening, follows one step commands intermittently.   --Pupils 2.5 mm, PERRL.   --Corneal reflex, gag, cough intact.  Spastic left UE  Withdraws away from noxious stimuli on the right  MSK:  No edema in UE and LE    Vent Data:   Vent Mode: A/C  Oxygen Concentration (%):  [] 40  Resp Rate Total:  [0 br/min-21 br/min] 18 br/min  Vt Set:  [450 mL] 450 mL  PEEP/CPAP:  [5 cmH20] 5 cmH20  Mean Airway Pressure:  [0 vdY56-51 cmH20] 8.45 cmH20    Lines/Drains/Airway:        Airway - Non-Surgical 04/04/22 0954 (Active)   Secured at 23 cm 04/05/22 0751   Measured At Lips 04/05/22 0751   Secured Location Right 04/05/22 0751   Secured by Commercial tube brunson 04/05/22 0751   Tube Securement Device Changed? Yes 04/04/22 1230   Bite Block none 04/05/22 0751   Site Condition Cool;Dry 04/05/22 0751   Status  Intact;Secured;Patent 04/05/22 0751   Site Assessment Clean;Dry 04/05/22 0751   Cuff Pressure 27 cm H2O 04/05/22 0751      PICC Double Lumen 03/26/22 1905 right basilic (Active)   Verification by X-ray Yes 04/04/22 1905   Site Assessment No drainage;No swelling;No redness;No warmth 04/05/22 0305   Extremity Assessment Distal to IV No abnormal discoloration 04/05/22 0305   Line Securement Device Secured with sutureless device 04/04/22 1905   Dressing Type Biopatch in place;Central line dressing 04/05/22 0305   Dressing Status Clean;Dry;Intact 04/05/22 0305   Dressing Intervention Sterile dressing change 04/05/22 0305   Date on Dressing 04/04/22 04/04/22 1905   Dressing Due to be Changed 04/11/22 04/04/22 1905   Left Lumen Patency/Care Infusing 04/05/22 0305   Right Lumen Patency/Care Infusing 04/05/22 0305   Waveform Not being transduced 04/05/22 0305   Line Necessity Review Longterm central access required 04/04/22 0305      Arterial Line 04/04/22 0919 Right Radial (Active)   Site Assessment Clean;Dry;Intact;No redness;No swelling 04/05/22 0305   Line Status Pulsatile blood flow 04/05/22 0305   Art Line Waveform Appropriate;Square wave test performed 04/05/22 0305   Arterial Line Interventions Zeroed and calibrated;Leveled 04/05/22 0305   Color/Movement/Sensation Capillary refill less than 3 sec 04/05/22 0305   Dressing Type Biopatch in place;Central line dressing 04/05/22 0305   Dressing Status Clean;Dry;Intact 04/05/22 0305   Dressing Intervention Integrity maintained 04/05/22 0305   Dressing Change Due 04/11/22 04/05/22 0305   Tubing Change Due 04/08/22 04/04/22 1905           Closed/Suction Drain 04/04/22 1112 Right Scalp Accordion 10 Fr. (Active)   Site Description Unable to view 04/05/22 0305   Dressing Type Gauze 04/05/22 0305   Dressing Status Clean;Dry;Intact;New drainage;Old drainage 04/05/22 0305   Dressing Intervention Integrity maintained 04/05/22 0305   Drainage Serosanguineous 04/05/22 0305   Status  Other (Comment) 04/05/22 0305   Output (mL) 0 mL 04/05/22 0605            Closed/Suction Drain 04/04/22 1200 Right Scalp Other (Comment) 1.9 Fr. (Active)   Site Description Unable to view 04/05/22 0305   Dressing Type Gauze 04/05/22 0305   Dressing Status Clean;Dry;Intact;New drainage;Old drainage 04/05/22 0305   Dressing Intervention Integrity maintained 04/05/22 0305   Drainage Serosanguineous 04/05/22 0305   Status Open to gravity drainage 04/05/22 0305   Output (mL) 40 mL 04/05/22 0605       Female External Urinary Catheter 04/04/22 0100 (Active)   Skin no redness;no breakdown 04/04/22 1505   Tolerance no signs/symptoms of discomfort 04/04/22 1505   Suction Continuous suction at 70 mmHg 04/04/22 0705   Date of last wick change 04/04/22 04/04/22 0305   Time of last wick change 0100 04/04/22 0305     Nutrition/Tube Feeds (if NPO state why): resume tf     Labs:  ABG: No results for input(s): PH, PO2, PCO2, HCO3, POCSATURATED, BE in the last 24 hours.  BMP:  Recent Labs   Lab 04/05/22  0059      K 3.1*      CO2 20*   BUN 25*   CREATININE 1.1   *   MG 1.9   PHOS 2.3*     LFT:   Lab Results   Component Value Date    AST 22 04/05/2022    ALT 11 04/05/2022    ALKPHOS 64 04/05/2022    BILITOT 0.8 04/05/2022    ALBUMIN 2.5 (L) 04/05/2022    PROT 6.5 04/05/2022     CBC:   Lab Results   Component Value Date    WBC 15.52 (H) 04/05/2022    HGB 7.5 (L) 04/05/2022    HCT 21.9 (L) 04/05/2022    MCV 88 04/05/2022     04/05/2022     Microbiology x 7d:   Microbiology Results (last 7 days)     Procedure Component Value Units Date/Time    Urine culture [[id number]] Collected: 04/03/22 1626    Order Status: Completed Specimen: Urine Updated: 04/04/22 2110     Urine Culture, Routine No growth    Narrative:      Specimen Source->Urine        Imaging:    I personally reviewed the above image.  Today I independently reviewed pertinent medications, lines/drains/airways, imaging, cardiology results, laboratory  results, microbiology results, notably:   ASSESSMENT/PLAN:     Active Hospital Problems    Diagnosis    *SDH (subdural hematoma)    Elevated troponin I level    Body mass index (BMI) less than 19    Critical lower limb ischemia    Osteomyelitis of right foot    Status post partial amputation of right foot    Debility    Acute kidney injury superimposed on CKD stage IV    History of stroke    Type 2 diabetes mellitus, with long-term current use of insulin    Hypertension    Hyperlipidemia      Neuro:   SDH s/p evacuation  Repeat CT head for drainage.   keppra for seizure prophylaxis   Cont precedex for vent comfort  - wean as tolerated    Pulmonary:   Cont mech vent  Wean as tolerated    Cardiac:   SBP < 140 mmhg  Nicardipine  Continue home antihypertensives    Renal:    CKD POA  Making urine  BUN/Cr >20  silodosin (home meds)    ID:   Afebrile, leucocytosis   On vancomycin for osteomylitis  Curbside ID    Hem/Onc:   Stable hh and plt count    Endocrine:    BS trending up  On insulin regimen will monitor  HbA1c 5.0    Fluids/Electrolytes/Nutrition/GI:   Resume tf  Replete lytes as needed  Lines:  Art +  CVC PICC  ETT +  Araiza NA  NG +  PEG NA    Proph:  DVT:SCD/no heparin due to SDH  Constipation:  Last output:bowel regimen as needed  PUP:pepcid  VAP:peridex      Uninterrupted Critical Care/Counseling Time (not including procedures):: 33 mins    Yamil Larson MD, FNCS  Neurocritical care attending    Full Code    Yamil Larson MD  Neurocritical Care  [facility] - Neuro Critical Care

## 2022-04-05 NOTE — SUBJECTIVE & OBJECTIVE
Interval History: 4/5: s/p evac, will obtain interval head ct today, stable exam.    Medications:  Continuous Infusions:   sodium chloride 0.9% 50 mL/hr at 04/05/22 0900    dexmedetomidine (PRECEDEX) infusion 0.6 mcg/kg/hr (04/05/22 0900)    niCARdipine 2.5 mg/hr (04/05/22 0900)     Scheduled Meds:   amLODIPine  10 mg Per NG tube Daily    atorvastatin  40 mg Per NG tube Daily    famotidine  20 mg Per NG tube Daily    levetiracetam IV  500 mg Intravenous Q12H    [START ON 4/6/2022] levothyroxine  75 mcg Per NG tube Before breakfast    mupirocin   Nasal BID    senna-docusate 8.6-50 mg  1 tablet Per NG tube BID    silodosin  4 mg Per NG tube Daily    vancomycin (VANCOCIN) IVPB  1,250 mg Intravenous Once     PRN Meds:acetaminophen, dextrose 10%, glucagon (human recombinant), HYDROcodone-acetaminophen, HYDROmorphone, insulin aspart U-100, labetalol, metoclopramide HCl, ondansetron, sodium chloride 0.9%, Pharmacy to dose Vancomycin consult **AND** vancomycin - pharmacy to dose     Review of Systems    Unable to obtain: AMS    Objective:     Weight: 49.4 kg (109 lb)  Body mass index is 17.07 kg/m².  Vital Signs (Most Recent):  Temp: (!) 93.6 °F (34.2 °C) (04/05/22 0905)  Pulse: 68 (04/05/22 0905)  Resp: 18 (04/05/22 0905)  BP: 132/64 (04/05/22 0905)  SpO2: 100 % (04/05/22 0905)   Vital Signs (24h Range):  Temp:  [92.7 °F (33.7 °C)-98.9 °F (37.2 °C)] 93.6 °F (34.2 °C)  Pulse:  [60-78] 68  Resp:  [10-24] 18  SpO2:  [100 %] 100 %  BP: ()/(51-75) 132/64  Arterial Line BP: ()/(43-64) 128/54     Date 04/05/22 0700 - 04/06/22 0659   Shift 6644-8198 8726-8344 7319-6657 24 Hour Total   INTAKE   I.V.(mL/kg) 194.1(3.9)   194.1(3.9)   Shift Total(mL/kg) 194.1(3.9)   194.1(3.9)   OUTPUT   Shift Total(mL/kg)       Weight (kg) 49.4 49.4 49.4 49.4                Vent Mode: A/C  Oxygen Concentration (%):  [] 40  Resp Rate Total:  [0 br/min-21 br/min] 18 br/min  Vt Set:  [450 mL] 450 mL  PEEP/CPAP:  [5 cmH20] 5 cmH20  Mean  Airway Pressure:  [0 wpO38-61 cmH20] 8.45 cmH20         Urethral Catheter 03/18/22 2210 Non-latex 16 Fr. (Active)       Physical Exam    Neurosurgery Physical Exam    Physical Exam:    Constitutional: No distress.     HEENT: atraumatic/normocephalic    Cardiovascular: Regular rhythm.     Pulm: aerating well, saturating well    Abdominal: Soft.     Psych/Behavior: He is alert.     E1V3M5  Obtunded; oriented with significant stimulation   PERRL  EOMI  Face Symmetric  R drift  Left contracted and paretic      Significant Labs:  Recent Labs   Lab 04/03/22  1621 04/04/22  0257 04/04/22  1250 04/05/22  0059   GLU  --  105 150* 172*   NA  --  146* 140 142   K  --  3.9 3.8 3.1*   CL  --  112* 107 109   CO2  --  24 23 20*   BUN  --  23 23 25*   CREATININE  --  1.3 1.1 1.1   CALCIUM  --  9.0 9.0 8.5*   MG 2.0 2.0  --  1.9       Recent Labs   Lab 04/04/22  0257 04/04/22  1250 04/05/22  0059   WBC 12.08 15.38* 15.52*   HGB 8.4* 8.0* 7.5*   HCT 26.2* 23.9* 21.9*   * 193 198       Recent Labs   Lab 04/03/22  1522 04/03/22  1552   INR 1.1 1.1   APTT 25.9 24.8       Microbiology Results (last 7 days)       Procedure Component Value Units Date/Time    Urine culture [[id number]] Collected: 04/03/22 1626    Order Status: Completed Specimen: Urine Updated: 04/04/22 2110     Urine Culture, Routine No growth    Narrative:      Specimen Source->Urine          All pertinent labs from the last 24 hours have been reviewed.    Significant Diagnostics:  I have reviewed and interpreted all pertinent imaging results/findings within the past 24 hours.  CT Head Without Contrast    Result Date: 4/3/2022  Redemonstration of a right subdural hematoma with mass effect and mild midline shift. Probable remote right MCA and left cerebellar hemisphere infarcts. This report was flagged in Epic as abnormal. Electronically signed by: Bridget Rasmussen Date:    04/03/2022 Time:    21:01    CT Head Without Contrast    Result Date: 4/3/2022  This report was  flagged in Epic as abnormal. 1. Right acute subdural hematoma resulting in mass effect upon the right convexity and 3-4 mm of leftward midline shift near the vertex.  No hydrocephalus at this time.  Neuro surgical consultation advised. 2. Sequela of chronic microvascular ischemic change and senescent change noting stable encephalomalacia in the right MCA territory as well as within the left cerebellum. Electronically signed by: Eduard Rios MD Date:    04/03/2022 Time:    14:37    CT Cervical Spine Without Contrast    Result Date: 4/3/2022  1. No acute fracture or subluxation of the cervical spine. 2. 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 Electronically signed by: Daniel Goldstein Date:    04/03/2022 Time:    15:11    X-Ray Chest AP Portable    Result Date: 4/3/2022  As above Electronically signed by: Eduard Rios MD Date:    04/03/2022 Time:    18:17

## 2022-04-05 NOTE — ASSESSMENT & PLAN NOTE
Although pathology of the clean margins failed to reveal osteomyelitis her bone biopsy cultures from the clean margin are positive for MRSA.  Therefore bone is likely infected at the clean margin however long-term bony changes have not yet been present.  · I favor continuing vancomycin as per prior recommendations for 6 weeks with an estimated end date of May 5, 2022.   · Please consult Pharmacy service for management of vancomycin levels.    · Monitor weekly CBC, BMP, sed rate and CRP.  Monitor vancomycin levels as per hospital protocol.  · Resume OPAT orders upon discharge.  · Infectious diseases will continue to follow peripherally.

## 2022-04-05 NOTE — PT/OT/SLP PROGRESS
"Physical Therapy      Patient Name:  Beatriz Adame   MRN:  [id number]    Patient not seen today secondary to  (Pt s/p "Right Fronto-parietal craniotomy for evacuation of SDH." No Eval performed. New orders needed post-op. Please re-consult when medically appropriate.).         "

## 2022-04-05 NOTE — SUBJECTIVE & OBJECTIVE
Past Medical History:   Diagnosis Date    Gastroparesis     GERD (gastroesophageal reflux disease)     History of Clostridium difficile colitis 07/24/2021    History of kidney stones     History of stroke     left side paralysis    Hyperlipidemia     Hypertension     Hypothyroidism     Iron deficiency anemia     Osteoarthritis     Peripheral neuropathy     Stage IV CKD     Type II diabetes mellitus        Past Surgical History:   Procedure Laterality Date    APPENDECTOMY      CRANIOTOMY FOR EVACUATION OF SUBDURAL HEMATOMA Right 4/4/2022    Procedure: CRANIOTOMY, FOR SUBDURAL HEMATOMA EVACUATION;  Surgeon: Silvio White MD;  [facility];  Service: Neurosurgery;  Laterality: Right;    CYSTOSCOPY W/ URETERAL STENT PLACEMENT Right 10/13/2021    Procedure: CYSTOSCOPY, WITH URETERAL STENT INSERTION;  Surgeon: Wanda Arroyo MD;  Location: [facility] OR;  Service: Urology;  Laterality: Right;    ESOPHAGOGASTRODUODENOSCOPY N/A 11/23/2021    Procedure: EGD (ESOPHAGOGASTRODUODENOSCOPY);  Surgeon: Obed Jeong MD;  Location: [facility] (2ND FLR);  Service: Endoscopy;  Laterality: N/A;    FOOT AMPUTATION THROUGH METATARSAL Right 3/24/2022    Procedure: AMPUTATION, FOOT, PARTIAL 4th and 5th ray;  Surgeon: Rodrigo Logan DPM;  Location: [facility] OR 2ND FLR;  Service: Podiatry;  Laterality: Right;    LAPAROSCOPIC APPENDECTOMY N/A 7/22/2021    Procedure: APPENDECTOMY, LAPAROSCOPIC;  Surgeon: Paulo Calvo Jr., MD;  Location: [facility];  Service: General;  Laterality: N/A;    TOE AMPUTATION Right 1/1/2022    Procedure: AMPUTATION, TOE;  Surgeon: Genaro Mason DPM;  Location: [facility];  Service: Podiatry;  Laterality: Right;    TUBAL LIGATION      URETEROSCOPIC REMOVAL OF URETERIC CALCULUS Right 12/22/2021    Procedure: REMOVAL, CALCULUS, URETER, URETEROSCOPIC;  Surgeon: Wanda Arroyo MD;  Location: [facility];  Service: Urology;  Laterality: Right;       Review of patient's allergies indicates:   Allergen  Reactions    Tomato (solanum lycopersicum) Hives and Itching       Medications:  Medications Prior to Admission   Medication Sig    rosuvastatin (CRESTOR) 40 MG Tab Take 40 mg by mouth every evening.    amLODIPine (NORVASC) 5 MG tablet Take 10 mg by mouth every evening. Take 5 mg every morning and 10 mg at night    ascorbic acid, vitamin C, (VITAMIN C) 500 MG tablet Take 1 tablet (500 mg total) by mouth once daily.    aspirin (ECOTRIN) 81 MG EC tablet Take 81 mg by mouth once daily.    b complex vitamins tablet Take 1 tablet by mouth once daily.    carvedilol (COREG) 25 MG tablet Take 1 tablet (25 mg total) by mouth 2 (two) times daily.    clopidogreL (PLAVIX) 75 mg tablet Take 75 mg by mouth.    DULoxetine (CYMBALTA) 30 MG capsule Take 1 capsule (30 mg total) by mouth once daily.    ferrous sulfate (FEOSOL) 325 mg (65 mg iron) Tab tablet Take 325 mg by mouth daily with breakfast. Off at present    folic acid (FOLVITE) 1 MG tablet Take 1 tablet (1 mg total) by mouth once daily.    gabapentin (NEURONTIN) 300 MG capsule Take 1 capsule (300 mg total) by mouth once daily.    hydrochlorothiazide (MICROZIDE ORAL) Take 20 mg by mouth once daily at 6am.    insulin (BASAGLAR KWIKPEN U-100 INSULIN) glargine 100 units/mL (3mL) SubQ pen Inject 5 Units into the skin once daily.    Lactobacillus rhamnosus GG (CULTURELLE) 10 billion cell capsule Take 1 capsule by mouth once daily.    lactulose (CHRONULAC) 10 gram/15 mL solution Take 10 g by mouth 2 (two) times a day.    levothyroxine (SYNTHROID) 75 MCG tablet Take 75 mcg by mouth once daily.    losartan (COZAAR) 25 MG tablet Take 25 mg by mouth once daily at 6am.    metoclopramide HCl (REGLAN) 10 MG tablet Take 1 tablet (10 mg total) by mouth 3 (three) times daily before meals.    omeprazole (PRILOSEC) 20 MG capsule Take 20 mg by mouth 2 (two) times daily.    ondansetron (ZOFRAN) 4 MG tablet Take 1 tablet (4 mg total) by mouth every 8 (eight) hours as needed for Nausea.     "[] oxyCODONE (ROXICODONE) 5 MG immediate release tablet Take 1 tablet (5 mg total) by mouth every 6 (six) hours as needed for Pain.    tamsulosin (FLOMAX) 0.4 mg Cap Take 1 capsule (0.4 mg total) by mouth once daily.    traZODone (DESYREL) 100 MG tablet Take 1 tablet (100 mg total) by mouth once daily. (Patient taking differently: Take 100 mg by mouth nightly as needed for Insomnia.)     Antibiotics (From admission, onward)                Start     Stop Route Frequency Ordered    22 2100  mupirocin 2 % ointment         2059 Nasl 2 times daily 22 1225    22 1718  vancomycin - pharmacy to dose  (vancomycin IVPB)        "And" Linked Group Details    -- IV pharmacy to manage frequency 22 1619          Antifungals (From admission, onward)                None          Antivirals (From admission, onward)      None             Immunization History   Administered Date(s) Administered    COVID-19, vector-nr, rS-Ad26, PF (Certess) 2021    PPD Test 2022       Family History       Problem Relation (Age of Onset)    Alcohol abuse Father    Arthritis Mother    Asthma Brother    Diabetes Mother, Sister, Brother    Heart attack Mother, Father, Brother    Heart disease Mother, Brother    Hypertension Mother    Kidney disease Mother    Stroke Mother    Vision loss Mother          Social History     Socioeconomic History    Marital status: Single   Tobacco Use    Smoking status: Never Smoker    Smokeless tobacco: Never Used   Substance and Sexual Activity    Alcohol use: No     Comment: socially    Drug use: No    Sexual activity: Not Currently     Social Determinants of Health     Financial Resource Strain: Low Risk     Difficulty of Paying Living Expenses: Not hard at all   Food Insecurity: No Food Insecurity    Worried About Running Out of Food in the Last Year: Never true    Ran Out of Food in the Last Year: Never true   Transportation Needs: No Transportation Needs    Lack of " Transportation (Medical): No    Lack of Transportation (Non-Medical): No   Physical Activity: Inactive    Days of Exercise per Week: 0 days    Minutes of Exercise per Session: 0 min   Stress: No Stress Concern Present    Feeling of Stress : Not at all   Social Connections: Moderately Isolated    Frequency of Communication with Friends and Family: More than three times a week    Frequency of Social Gatherings with Friends and Family: Once a week    Attends [employer] Services: More than 4 times per year    Active Member of Clubs or Organizations: No    Attends Club or Organization Meetings: Never    Marital Status: Never    Housing Stability: Low Risk     Unable to Pay for Housing in the Last Year: No    Number of Places Lived in the Last Year: 1    Unstable Housing in the Last Year: No     Review of Systems   Unable to perform ROS: Intubated   Objective:     Vital Signs (Most Recent):  Temp: 99.5 °F (37.5 °C) (04/05/22 1605)  Pulse: 74 (04/05/22 1605)  Resp: 18 (04/05/22 1605)  BP: 128/63 (04/05/22 1605)  SpO2: 100 % (04/05/22 1605) Vital Signs (24h Range):  Temp:  [92.7 °F (33.7 °C)-99.5 °F (37.5 °C)] 99.5 °F (37.5 °C)  Pulse:  [60-79] 74  Resp:  [15-29] 18  SpO2:  [100 %] 100 %  BP: ()/(51-75) 128/63  Arterial Line BP: ()/(47-64) 139/54     Weight: 49.4 kg (109 lb)  Body mass index is 17.07 kg/m².    Estimated Creatinine Clearance: 40.8 mL/min (based on SCr of 1 mg/dL).    Physical Exam  Vitals and nursing note reviewed.   Constitutional:       General: She is sleeping.      Appearance: She is well-developed.      Interventions: She is sedated and intubated.   HENT:      Head: Normocephalic and atraumatic.      Comments: Gauze dressing in place. Visible drain tube with bloody secretions.      Right Ear: External ear normal.      Left Ear: External ear normal.   Eyes:      General: No scleral icterus.        Right eye: No discharge.         Left eye: No discharge.      Conjunctiva/sclera:  Conjunctivae normal.   Cardiovascular:      Rate and Rhythm: Normal rate and regular rhythm.      Heart sounds: Normal heart sounds. No murmur heard.    No friction rub. No gallop.   Pulmonary:      Effort: Pulmonary effort is normal. No respiratory distress. She is intubated.      Breath sounds: Normal breath sounds. No stridor. No wheezing or rales.   Abdominal:      General: Bowel sounds are normal.   Musculoskeletal:         General: No tenderness.   Skin:     General: Skin is warm and dry.      Comments: Right foot covered with gauze dressing.        Significant Labs: Blood Culture:   Recent Labs   Lab 12/30/21  1523 12/30/21  1536 03/18/22  1641   LABBLOO No growth after 5 days. No growth after 5 days. No growth after 5 days.  No growth after 5 days.     BMP:   Recent Labs   Lab 04/05/22  1042   *      K 3.3*   *   CO2 18*   BUN 24*   CREATININE 1.0   CALCIUM 8.4*   MG 1.8     CBC:   Recent Labs   Lab 04/04/22  0257 04/04/22  1250 04/05/22  0059   WBC 12.08 15.38* 15.52*   HGB 8.4* 8.0* 7.5*   HCT 26.2* 23.9* 21.9*   * 193 198     Urine Culture:   Recent Labs   Lab 11/19/21  2230 01/10/22  0543 02/10/22  0529 03/18/22  1659 04/03/22  1626   LABURIN No growth PSEUDOMONAS AERUGINOSA  10,000 - 49,999 cfu/ml  * PSEUDOMONAS AERUGINOSA  50,000 - 99,999 cfu/ml  * ESCHERICHIA COLI ESBL  > 100,000 cfu/ml  * No growth     Urine Studies:   Recent Labs   Lab 01/10/22  0543 02/10/22  0529 03/18/22  1659 04/03/22  1626   COLORU Yellow Yellow   < > Yellow   APPEARANCEUA Hazy* Hazy*   < > Hazy*   PHUR 6.0 6.0   < > 5.0   SPECGRAV 1.025 1.005   < > 1.015   PROTEINUA 2+* 1+*   < > 2+*   GLUCUA Negative Negative   < > Negative   KETONESU Negative Negative   < > Negative   BILIRUBINUA Negative Negative   < > Negative   OCCULTUA 1+* 2+*   < > 2+*   NITRITE Negative Positive*   < > Negative   UROBILINOGEN Negative  --   --   --    LEUKOCYTESUR 2+* 3+*   < > 3+*   RBCUA 7* 14*   < > 7*   WBCUA 35* 84*   <  > >100*   BACTERIA Rare Many*   < > Rare   SQUAMEPITHEL 5 0  --   --    HYALINECASTS 3* 0   < > 3*    < > = values in this interval not displayed.     Wound Culture:   Recent Labs   Lab 03/18/22  1620 03/24/22  1538   LABAERO METHICILLIN RESISTANT STAPHYLOCOCCUS AUREUS  Many  * METHICILLIN RESISTANT STAPHYLOCOCCUS AUREUS  Few  *  METHICILLIN RESISTANT STAPHYLOCOCCUS AUREUS  Few  *       Significant Imaging: I have reviewed all pertinent imaging results/findings within the past 24 hours.

## 2022-04-05 NOTE — HPI
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  Evaluation in [facility] revealed an acute on chronic subdural hematoma.  The patient was admitted to the neuro critical care unit for further management.  Her vancomycin therapy was resumed.  She subsequently underwent evacuation of the subdural hematoma and a drain was left in place.    Infectious Disease was again consulted on readmit for "osteomyelitis, on dapto/vanc OP".  ID rec'd complete IV vanc as planned and signed off.  She remains on Vanc alone. She has had intermittent fevers since last seen and ID reconsulted given that.  CXR obtained on 4/11 showed: There is interval development of hazy perihilar and basilar lung opacities while nonspecific may represent vascular congestion and edema.  Cannot exclude underlying basilar effusions.  Sukhwinder reports dyspnea and SOB when seen.  UA obtained showing 1+ occult blood, 1+ leukocytes, WBC 28, 5 sqaumous epithelial cells.  She denies dysuria and in now on Purewick as delgado removed.  Blood cultures 5/11 NGTD.  US BL LEs - no dvt.    Patient seen today 4/13 and complains of stable HA but has new N/V since this am.  SHe complains of L knee pain and denies R foot pain  She is being taken for repeat head CT at time of being seen.  Tmacx 100.2 yesterday but afebrile since.  Remains on Vanc alone. CT Head done and cf new SDH. The patient denies any recent neck pain, photophobia, fever, chills, or sweats.          "

## 2022-04-05 NOTE — PROGRESS NOTES
Pharmacokinetic Follow-Up Assessment & Plan: IV Vancomycin  · Random level resulted at 18.2 mcg/mL  · Goal 15-20 mcg/mL  · Serum creatinine currently 1 mg/dL, appears to be at baseline  · Will continue to dose by level for 1 more day and then transition to scheduled regimen if renal function remains stable  · Administer 750 mg x1  · Collect next level on 4/6 @ 08:00    Pharmacy will continue to follow and monitor vancomycin.    [phone] with any questions regarding this assessment.     Thank you for the consult,   Luan NaranjoD  Neurocritical Care Pharmacist     Patient brief summary:  Beatriz Adame is a 71 y.o. female initiated on antimicrobial therapy with IV Vancomycin for treatment of suspected Osteomyelitis     Drug Allergies:   Review of patient's allergies indicates:   Allergen Reactions    Tomato (solanum lycopersicum) Hives and Itching     Actual Body Weight:   49.4 kg    Renal Function:   Estimated Creatinine Clearance: 37.1 mL/min (based on SCr of 1.1 mg/dL).,     Dialysis Method (if applicable):  N/A    CBC (last 72 hours):  Recent Labs   Lab Result Units 04/03/22  1400 04/03/22  1621 04/04/22  0257 04/04/22  1250 04/05/22  0059   WBC K/uL 12.65  --  12.08 15.38* 15.52*   Hemoglobin g/dL 8.5*  --  8.4* 8.0* 7.5*   Hemoglobin A1C %  --  5.0  --   --   --    Hematocrit % 26.5*  --  26.2* 23.9* 21.9*   Platelets K/uL 148*  --  149* 193 198   Gran % % 71.2  --  65.3 83.3* 78.7*   Lymph % % 18.5  --  22.7 10.5* 13.5*   Mono % % 8.6  --  10.4 4.7 7.0   Eosinophil % % 1.2  --  0.9 0.6 0.1   Basophil % % 0.3  --  0.4 0.3 0.3   Differential Method  Automated  --  Automated Automated Automated       Metabolic Panel (last 72 hours):  Recent Labs   Lab Result Units 04/03/22  1400 04/03/22  1621 04/03/22  1626 04/04/22  0257 04/04/22  1250 04/05/22  0059   Sodium mmol/L 143  --   --  146* 140 142   Potassium mmol/L 3.6  --   --  3.9 3.8 3.1*   Chloride mmol/L 114*  --   --  112* 107 109   CO2 mmol/L  20*  --   --  24 23 20*   Glucose mg/dL 107  --   --  105 150* 172*   Glucose, UA   --   --  Negative  --   --   --    BUN mg/dL 25*  --   --  23 23 25*   Creatinine mg/dL 1.2  --   --  1.3 1.1 1.1   Albumin g/dL  --   --   --  2.5*  --  2.5*   Total Bilirubin mg/dL  --   --   --  0.4  --  0.8   Alkaline Phosphatase U/L  --   --   --  66  --  64   AST U/L  --   --   --  18  --  22   ALT U/L  --   --   --  8*  --  11   Magnesium mg/dL  --  2.0  --  2.0  --  1.9   Phosphorus mg/dL  --  3.2  --  3.4  --  2.3*       Drug levels (last 3 results):  Recent Labs   Lab Result Units 04/03/22  1400 04/04/22  0555 04/05/22  0935   Vancomycin, Random ug/mL 14.0 11.6 18.2       Microbiologic Results:  Microbiology Results (last 7 days)     Procedure Component Value Units Date/Time    Urine culture [[id number]] Collected: 04/03/22 1626    Order Status: Completed Specimen: Urine Updated: 04/04/22 2110     Urine Culture, Routine No growth    Narrative:      Specimen Source->Urine

## 2022-04-05 NOTE — CONSULTS
[facility]  Infectious Disease  Consult Note    Patient Name: Beatriz Adame  MRN: [id number]  Admission Date: 4/3/2022  Hospital Length of Stay: 2 days  Attending Physician: Yamil Larson MD  Primary Care Provider: Dante Olivier MD     Isolation Status: Contact    Patient information was obtained from past medical records and ER records.      Inpatient consult to Infectious Diseases  Consult performed by: Jana Hwang MD  Consult ordered by: Maia Velázquez PA-C        Assessment/Plan:     Osteomyelitis of right foot  Although pathology of the clean margins failed to reveal osteomyelitis her bone biopsy cultures from the clean margin are positive for MRSA.  Therefore bone is likely infected at the clean margin however long-term bony changes have not yet been present.  · I favor continuing vancomycin as per prior recommendations for 6 weeks with an estimated end date of May 5, 2022.   · Please consult Pharmacy service for management of vancomycin levels.    · Monitor weekly CBC, BMP, sed rate and CRP.  Monitor vancomycin levels as per hospital protocol.  · Resume OPAT orders upon discharge.  · Infectious diseases will continue to follow peripherally.      Thank you for your consult. I will follow-up with patient. Please contact us if you have any additional questions.    Jana Hwang MD  Infectious Disease  [facility]    Subjective:     Principal Problem: SDH (subdural hematoma)    HPI: A 71-year-old woman with past stroke, residual left-sided deficit, hypertension, DM2, urinary retention with chronic indwelling Araiza, CKD4, recurrent Pseudomonas UTI, right 2nd toe osteomyelitis status post amputation in January of 2022, right 4th and 5th toe osteomyelitis due to MRSA status post partial ray amputation on March 24th 2022 and on outpatient vancomycin therapy for 6 weeks who was admitted after an unwitnessed fall from her wheelchair while at  "[facility].  Evaluation in [facility] ED revealed an acute on chronic subdural hematoma.  The patient was admitted to the neuro critical care unit for further management.  Her vancomycin therapy was resumed.  She subsequently underwent evacuation of the subdural hematoma and a drain was left in place.    Infectious Disease is now consulted for "osteomyelitis, on dapto/vanc OP"            Past Medical History:   Diagnosis Date    Gastroparesis     GERD (gastroesophageal reflux disease)     History of Clostridium difficile colitis 07/24/2021    History of kidney stones     History of stroke     left side paralysis    Hyperlipidemia     Hypertension     Hypothyroidism     Iron deficiency anemia     Osteoarthritis     Peripheral neuropathy     Stage IV CKD     Type II diabetes mellitus        Past Surgical History:   Procedure Laterality Date    APPENDECTOMY      CRANIOTOMY FOR EVACUATION OF SUBDURAL HEMATOMA Right 4/4/2022    Procedure: CRANIOTOMY, FOR SUBDURAL HEMATOMA EVACUATION;  Surgeon: Silvio White MD;  Location: [address];  Service: Neurosurgery;  Laterality: Right;    CYSTOSCOPY W/ URETERAL STENT PLACEMENT Right 10/13/2021    Procedure: CYSTOSCOPY, WITH URETERAL STENT INSERTION;  Surgeon: Wanda Arroyo MD;  Location[facility];  Service: Urology;  Laterality: Right;    ESOPHAGOGASTRODUODENOSCOPY N/A 11/23/2021    Procedure: EGD (ESOPHAGOGASTRODUODENOSCOPY);  Surgeon: Obed Jeong MD;  Location: [address]);  Service: Endoscopy;  Laterality: N/A;    FOOT AMPUTATION THROUGH METATARSAL Right 3/24/2022    Procedure: AMPUTATION, FOOT, PARTIAL 4th and 5th ray;  Surgeon: Rodrigo Logan DPM;  Location: [facility] OR [address] [place] [address];  Service: Podiatry;  Laterality: Right;    LAPAROSCOPIC APPENDECTOMY N/A 7/22/2021    Procedure: APPENDECTOMY, LAPAROSCOPIC;  Surgeon: Paulo Calvo Jr., MD;  Location: Psychiatric;  Service: General;  Laterality: N/A;    TOE AMPUTATION Right 1/1/2022    " Procedure: AMPUTATION, TOE;  Surgeon: Genaor Mason DPM;  Location: [facility];  Service: Podiatry;  Laterality: Right;    TUBAL LIGATION      URETEROSCOPIC REMOVAL OF URETERIC CALCULUS Right 12/22/2021    Procedure: REMOVAL, CALCULUS, URETER, URETEROSCOPIC;  Surgeon: Wanda Arroyo MD;  Location: [facility];  Service: Urology;  Laterality: Right;       Review of patient's allergies indicates:   Allergen Reactions    Tomato (solanum lycopersicum) Hives and Itching       Medications:  Medications Prior to Admission   Medication Sig    rosuvastatin (CRESTOR) 40 MG Tab Take 40 mg by mouth every evening.    amLODIPine (NORVASC) 5 MG tablet Take 10 mg by mouth every evening. Take 5 mg every morning and 10 mg at night    ascorbic acid, vitamin C, (VITAMIN C) 500 MG tablet Take 1 tablet (500 mg total) by mouth once daily.    aspirin (ECOTRIN) 81 MG EC tablet Take 81 mg by mouth once daily.    b complex vitamins tablet Take 1 tablet by mouth once daily.    carvedilol (COREG) 25 MG tablet Take 1 tablet (25 mg total) by mouth 2 (two) times daily.    clopidogreL (PLAVIX) 75 mg tablet Take 75 mg by mouth.    DULoxetine (CYMBALTA) 30 MG capsule Take 1 capsule (30 mg total) by mouth once daily.    ferrous sulfate (FEOSOL) 325 mg (65 mg iron) Tab tablet Take 325 mg by mouth daily with breakfast. Off at present    folic acid (FOLVITE) 1 MG tablet Take 1 tablet (1 mg total) by mouth once daily.    gabapentin (NEURONTIN) 300 MG capsule Take 1 capsule (300 mg total) by mouth once daily.    hydrochlorothiazide (MICROZIDE ORAL) Take 20 mg by mouth once daily at 6am.    insulin (BASAGLAR KWIKPEN U-100 INSULIN) glargine 100 units/mL (3mL) SubQ pen Inject 5 Units into the skin once daily.    Lactobacillus rhamnosus GG (CULTURELLE) 10 billion cell capsule Take 1 capsule by mouth once daily.    lactulose (CHRONULAC) 10 gram/15 mL solution Take 10 g by mouth 2 (two) times a day.    levothyroxine (SYNTHROID) 75 MCG tablet  "Take 75 mcg by mouth once daily.    losartan (COZAAR) 25 MG tablet Take 25 mg by mouth once daily at 6am.    metoclopramide HCl (REGLAN) 10 MG tablet Take 1 tablet (10 mg total) by mouth 3 (three) times daily before meals.    omeprazole (PRILOSEC) 20 MG capsule Take 20 mg by mouth 2 (two) times daily.    ondansetron (ZOFRAN) 4 MG tablet Take 1 tablet (4 mg total) by mouth every 8 (eight) hours as needed for Nausea.    [] oxyCODONE (ROXICODONE) 5 MG immediate release tablet Take 1 tablet (5 mg total) by mouth every 6 (six) hours as needed for Pain.    tamsulosin (FLOMAX) 0.4 mg Cap Take 1 capsule (0.4 mg total) by mouth once daily.    traZODone (DESYREL) 100 MG tablet Take 1 tablet (100 mg total) by mouth once daily. (Patient taking differently: Take 100 mg by mouth nightly as needed for Insomnia.)     Antibiotics (From admission, onward)                Start     Stop Route Frequency Ordered    22 2100  mupirocin 2 % ointment         2059 Nasl 2 times daily 22 1225    22 1718  vancomycin - pharmacy to dose  (vancomycin IVPB)        "And" Linked Group Details    -- IV pharmacy to manage frequency 22 1619          Antifungals (From admission, onward)                None          Antivirals (From admission, onward)      None             Immunization History   Administered Date(s) Administered    COVID-19, vector-nr, rS-Ad26, PF (Matteo) 2021    PPD Test 2022       Family History       Problem Relation (Age of Onset)    Alcohol abuse Father    Arthritis Mother    Asthma Brother    Diabetes Mother, Sister, Brother    Heart attack Mother, Father, Brother    Heart disease Mother, Brother    Hypertension Mother    Kidney disease Mother    Stroke Mother    Vision loss Mother          Social History     Socioeconomic History    Marital status: Single   Tobacco Use    Smoking status: Never Smoker    Smokeless tobacco: Never Used   Substance and Sexual Activity    " Alcohol use: No     Comment: socially    Drug use: No    Sexual activity: Not Currently     Social Determinants of Health     Financial Resource Strain: Low Risk     Difficulty of Paying Living Expenses: Not hard at all   Food Insecurity: No Food Insecurity    Worried About Running Out of Food in the Last Year: Never true    Ran Out of Food in the Last Year: Never true   Transportation Needs: No Transportation Needs    Lack of Transportation (Medical): No    Lack of Transportation (Non-Medical): No   Physical Activity: Inactive    Days of Exercise per Week: 0 days    Minutes of Exercise per Session: 0 min   Stress: No Stress Concern Present    Feeling of Stress : Not at all   Social Connections: Moderately Isolated    Frequency of Communication with Friends and Family: More than three times a week    Frequency of Social Gatherings with Friends and Family: Once a week    Attends [employer] Services: More than 4 times per year    Active Member of Clubs or Organizations: No    Attends Club or Organization Meetings: Never    Marital Status: Never    Housing Stability: Low Risk     Unable to Pay for Housing in the Last Year: No    Number of Places Lived in the Last Year: 1    Unstable Housing in the Last Year: No     Review of Systems   Unable to perform ROS: Intubated   Objective:     Vital Signs (Most Recent):  Temp: 99.5 °F (37.5 °C) (04/05/22 1605)  Pulse: 74 (04/05/22 1605)  Resp: 18 (04/05/22 1605)  BP: 128/63 (04/05/22 1605)  SpO2: 100 % (04/05/22 1605) Vital Signs (24h Range):  Temp:  [92.7 °F (33.7 °C)-99.5 °F (37.5 °C)] 99.5 °F (37.5 °C)  Pulse:  [60-79] 74  Resp:  [15-29] 18  SpO2:  [100 %] 100 %  BP: ()/(51-75) 128/63  Arterial Line BP: ()/(47-64) 139/54     Weight: 49.4 kg (109 lb)  Body mass index is 17.07 kg/m².    Estimated Creatinine Clearance: 40.8 mL/min (based on SCr of 1 mg/dL).    Physical Exam  Vitals and nursing note reviewed.   Constitutional:       General:  She is sleeping.      Appearance: She is well-developed.      Interventions: She is sedated and intubated.   HENT:      Head: Normocephalic and atraumatic.      Comments: Gauze dressing in place. Visible drain tube with bloody secretions.      Right Ear: External ear normal.      Left Ear: External ear normal.   Eyes:      General: No scleral icterus.        Right eye: No discharge.         Left eye: No discharge.      Conjunctiva/sclera: Conjunctivae normal.   Cardiovascular:      Rate and Rhythm: Normal rate and regular rhythm.      Heart sounds: Normal heart sounds. No murmur heard.    No friction rub. No gallop.   Pulmonary:      Effort: Pulmonary effort is normal. No respiratory distress. She is intubated.      Breath sounds: Normal breath sounds. No stridor. No wheezing or rales.   Abdominal:      General: Bowel sounds are normal.   Musculoskeletal:         General: No tenderness.   Skin:     General: Skin is warm and dry.      Comments: Right foot covered with gauze dressing.        Significant Labs: Blood Culture:   Recent Labs   Lab 12/30/21  1523 12/30/21  1536 03/18/22  1641   LABBLOO No growth after 5 days. No growth after 5 days. No growth after 5 days.  No growth after 5 days.     BMP:   Recent Labs   Lab 04/05/22  1042   *      K 3.3*   *   CO2 18*   BUN 24*   CREATININE 1.0   CALCIUM 8.4*   MG 1.8     CBC:   Recent Labs   Lab 04/04/22  0257 04/04/22  1250 04/05/22  0059   WBC 12.08 15.38* 15.52*   HGB 8.4* 8.0* 7.5*   HCT 26.2* 23.9* 21.9*   * 193 198     Urine Culture:   Recent Labs   Lab 11/19/21  2230 01/10/22  0543 02/10/22  0529 03/18/22  1659 04/03/22  1626   LABURIN No growth PSEUDOMONAS AERUGINOSA  10,000 - 49,999 cfu/ml  * PSEUDOMONAS AERUGINOSA  50,000 - 99,999 cfu/ml  * ESCHERICHIA COLI ESBL  > 100,000 cfu/ml  * No growth     Urine Studies:   Recent Labs   Lab 01/10/22  0543 02/10/22  0529 03/18/22  1659 04/03/22  1626   COLORU Yellow Yellow   < > Yellow    APPEARANCEUA Hazy* Hazy*   < > Hazy*   PHUR 6.0 6.0   < > 5.0   SPECGRAV 1.025 1.005   < > 1.015   PROTEINUA 2+* 1+*   < > 2+*   GLUCUA Negative Negative   < > Negative   KETONESU Negative Negative   < > Negative   BILIRUBINUA Negative Negative   < > Negative   OCCULTUA 1+* 2+*   < > 2+*   NITRITE Negative Positive*   < > Negative   UROBILINOGEN Negative  --   --   --    LEUKOCYTESUR 2+* 3+*   < > 3+*   RBCUA 7* 14*   < > 7*   WBCUA 35* 84*   < > >100*   BACTERIA Rare Many*   < > Rare   SQUAMEPITHEL 5 0  --   --    HYALINECASTS 3* 0   < > 3*    < > = values in this interval not displayed.     Wound Culture:   Recent Labs   Lab 03/18/22  1620 03/24/22  1538   LABAERO METHICILLIN RESISTANT STAPHYLOCOCCUS AUREUS  Many  * METHICILLIN RESISTANT STAPHYLOCOCCUS AUREUS  Few  *  METHICILLIN RESISTANT STAPHYLOCOCCUS AUREUS  Few  *       Significant Imaging: I have reviewed all pertinent imaging results/findings within the past 24 hours.

## 2022-04-05 NOTE — ASSESSMENT & PLAN NOTE
71 year old female with , hypothyroidism, CVA with left-sided residual deficit on ASA/Plavix, DM2, HTN, HLD, CKD, chronic anemia, presenting from SNF after being found down next to wheelchair after unwitnessed fall, consulted to NSGY for right 2 cm SDH mostly acute, membranous and heterogenous appearing, some chronic component, without midline shift.     Now s/p evac, will obtain head CT today asm interval study, continue icu care.      -- Admitted to neuro ICU  -- q1 hour vitals/neurochecks  -- SBP < 140  -- Na goal eunatremia  -- Keppra 500 BID  --Continue subdural hemovac  --Continue subgaleal hemovac  -- PPx: SCDs, BR, IS, hold SQH in setting of acute bleed

## 2022-04-05 NOTE — NURSING
[facility] paged for increased drainage from gauze dressing on head. Awaiting page back. VSS. Will continue to monitor.     1343- Spoke with Ina BECK with [facility]. No new orders received. Stated they would look at her CTH and enforce dressing if needed. Will continue to monitor.     1500- NSGY at bedside re-dressing head wrap. Drainage was noted by MD. Will continue to monitor.

## 2022-04-05 NOTE — NURSING
1115: pt traveled to CT with monitor, ventilator, o2 tank, ambu, IV pole with RNx2 and RT.    1145: pt returned to [facility] bed 9071. Pt tolerated well. VSS. Will continue to monitor.

## 2022-04-05 NOTE — ANESTHESIA POSTPROCEDURE EVALUATION
Anesthesia Post Evaluation    Patient: Beatriz Adame    Procedure(s) Performed: Procedure(s) (LRB):  CRANIOTOMY, FOR SUBDURAL HEMATOMA EVACUATION (Right)    Final Anesthesia Type: general      Patient location during evaluation: ICU  Patient participation: No - Unable to Participate, Intubation  Level of consciousness: sedated  Post-procedure vital signs: reviewed and stable  Pain management: adequate  Airway patency: patent    PONV status at discharge: No PONV  Anesthetic complications: no      Cardiovascular status: hemodynamically stable  Respiratory status: ventilator and ETT  Hydration status: euvolemic  Follow-up not needed.          Vitals Value Taken Time   /58 04/05/22 1202   Temp 35.9 °C (96.62 °F) 04/05/22 1206   Pulse 74 04/05/22 1206   Resp 18 04/05/22 1206   SpO2 100 % 04/05/22 1206   Vitals shown include unvalidated device data.      No case tracking events are documented in the log.      Pain/Luis Alfredo Score: Pain Rating Prior to Med Admin: 7 (4/4/2022  7:51 PM)

## 2022-04-05 NOTE — PLAN OF CARE
[facility] Care Plan    POC reviewed with Beatriz Adame and family at 1400. Pt unable to verbalize understanding due to mechanical ventilation. Questions and concerns addressed with son and brother at bedside today. No acute events today. Pt progressing toward goals. Will continue to monitor. See below and flowsheets for full assessment and VS info.   -CTH completed  -NGT insertion completed  -esophageal temp probe insertion completed  -bear hugger applied throughout morning to achieve normothermia  -bladder scan/straight cath x2        Neuro:  [place] Coma Scale  Best Eye Response: 3-->(E3) to speech  Best Motor Response: 6-->(M6) obeys commands  Best Verbal Response: 1-->(V1) none  [place] Coma Scale Score: 10  Assessment Qualifiers: patient intubated        24 hr Temp:  [92.7 °F (33.7 °C)-100.04 °F (37.8 °C)]     CV:   Rhythm: normal sinus rhythm  BP goals:   SBP < 140  MAP > 65    Resp:   O2 Device (Oxygen Therapy): ventilator  Vent Mode: A/C  Set Rate: 18 BPM  Oxygen Concentration (%): 40  Vt Set: 450 mL  PEEP/CPAP: 5 cmH20    Plan: wean to extubate    GI/:     Diet/Nutrition Received: NPO  Last Bowel Movement: 04/03/22  Voiding Characteristics: other (see comments) (urinary retention)    Intake/Output Summary (Last 24 hours) at 4/5/2022 1741  Last data filed at 4/5/2022 1700  Gross per 24 hour   Intake 3199.36 ml   Output 1286 ml   Net 1913.36 ml     Unmeasured Output  Urine Occurrence: 1  Stool Occurrence: 1  Pad Count: 1    Labs/Accuchecks:  Recent Labs   Lab 04/05/22  0059   WBC 15.52*   RBC 2.49*   HGB 7.5*   HCT 21.9*         Recent Labs   Lab 04/05/22  1042      K 3.3*   CO2 18*   *   BUN 24*   CREATININE 1.0   ALKPHOS 60   ALT 10   AST 22   BILITOT 0.8      Recent Labs   Lab 04/03/22  1552   INR 1.1   APTT 24.8      Recent Labs   Lab 04/03/22  1621   TROPONINI 0.017       Electrolytes: Electrolytes replaced  Accuchecks: Q4H    Gtts:   sodium chloride 0.9% 50 mL/hr at 04/05/22 1700        LDA/Wounds:  Lines/Drains/Airways       Peripherally Inserted Central Catheter Line  Duration             PICC Double Lumen 03/26/22 1905 right basilic 9 days              Drain  Duration                  Closed/Suction Drain 04/04/22 1112 Right Scalp Accordion 10 Fr. 1 day         Closed/Suction Drain 04/04/22 1200 Right Scalp Other (Comment) 1.9 Fr. 1 day         NG/OG Tube 04/05/22 1041 Left nostril <1 day              Airway  Duration                  Airway - Non-Surgical 04/04/22 0954 1 day              Arterial Line  Duration             Arterial Line 04/04/22 0919 Right Radial 1 day              Peripheral Intravenous Line  Duration                  Peripheral IV - Single Lumen 04/04/22 1005 18 G Left Wrist 1 day                  Wounds: Yes  Wound care consulted: Yes following

## 2022-04-05 NOTE — PROGRESS NOTES
[facility]  Neurosurgery  Progress Note    Subjective:     History of Present Illness: 71 year old female with , hypothyroidism, CVA with left-sided residual deficit on ASA/Plavix, DM2, HTN, HLD, CKD, chronic anemia, presenting from SNF after being found down next to wheelchair after unwitnessed fall, consulted to NSGY for right 2 cm SDH without midline shift. She is altered at baseline and unable to provide to history, but family at bedside says she is more confused and less interactive than usual.       Post-Op Info:  Procedure(s) (LRB):  CRANIOTOMY, FOR SUBDURAL HEMATOMA EVACUATION (Right)   1 Day Post-Op     Interval History: 4/5: s/p evac, will obtain interval head ct today, stable exam.    Medications:  Continuous Infusions:   sodium chloride 0.9% 50 mL/hr at 04/05/22 0900    dexmedetomidine (PRECEDEX) infusion 0.6 mcg/kg/hr (04/05/22 0900)    niCARdipine 2.5 mg/hr (04/05/22 0900)     Scheduled Meds:   amLODIPine  10 mg Per NG tube Daily    atorvastatin  40 mg Per NG tube Daily    famotidine  20 mg Per NG tube Daily    levetiracetam IV  500 mg Intravenous Q12H    [START ON 4/6/2022] levothyroxine  75 mcg Per NG tube Before breakfast    mupirocin   Nasal BID    senna-docusate 8.6-50 mg  1 tablet Per NG tube BID    silodosin  4 mg Per NG tube Daily    vancomycin (VANCOCIN) IVPB  1,250 mg Intravenous Once     PRN Meds:acetaminophen, dextrose 10%, glucagon (human recombinant), HYDROcodone-acetaminophen, HYDROmorphone, insulin aspart U-100, labetalol, metoclopramide HCl, ondansetron, sodium chloride 0.9%, Pharmacy to dose Vancomycin consult **AND** vancomycin - pharmacy to dose     Review of Systems    Unable to obtain: AMS    Objective:     Weight: 49.4 kg (109 lb)  Body mass index is 17.07 kg/m².  Vital Signs (Most Recent):  Temp: (!) 93.6 °F (34.2 °C) (04/05/22 0905)  Pulse: 68 (04/05/22 0905)  Resp: 18 (04/05/22 0905)  BP: 132/64 (04/05/22 0905)  SpO2: 100 % (04/05/22 0905)    Vital Signs (24h Range):  Temp:  [92.7 °F (33.7 °C)-98.9 °F (37.2 °C)] 93.6 °F (34.2 °C)  Pulse:  [60-78] 68  Resp:  [10-24] 18  SpO2:  [100 %] 100 %  BP: ()/(51-75) 132/64  Arterial Line BP: ()/(43-64) 128/54     Date 04/05/22 0700 - 04/06/22 0659   Shift 7245-9562 7850-6141 0973-0050 24 Hour Total   INTAKE   I.V.(mL/kg) 194.1(3.9)   194.1(3.9)   Shift Total(mL/kg) 194.1(3.9)   194.1(3.9)   OUTPUT   Shift Total(mL/kg)       Weight (kg) 49.4 49.4 49.4 49.4                Vent Mode: A/C  Oxygen Concentration (%):  [] 40  Resp Rate Total:  [0 br/min-21 br/min] 18 br/min  Vt Set:  [450 mL] 450 mL  PEEP/CPAP:  [5 cmH20] 5 cmH20  Mean Airway Pressure:  [0 zwM42-04 cmH20] 8.45 cmH20         Urethral Catheter 03/18/22 2210 Non-latex 16 Fr. (Active)       Physical Exam    Neurosurgery Physical Exam    Physical Exam:    Constitutional: No distress.     HEENT: atraumatic/normocephalic    Cardiovascular: Regular rhythm.     Pulm: aerating well, saturating well    Abdominal: Soft.     Psych/Behavior: He is alert.     E1V3M5  Obtunded; oriented with significant stimulation   PERRL  EOMI  Face Symmetric  R drift  Left contracted and paretic      Significant Labs:  Recent Labs   Lab 04/03/22  1621 04/04/22  0257 04/04/22  1250 04/05/22  0059   GLU  --  105 150* 172*   NA  --  146* 140 142   K  --  3.9 3.8 3.1*   CL  --  112* 107 109   CO2  --  24 23 20*   BUN  --  23 23 25*   CREATININE  --  1.3 1.1 1.1   CALCIUM  --  9.0 9.0 8.5*   MG 2.0 2.0  --  1.9       Recent Labs   Lab 04/04/22  0257 04/04/22  1250 04/05/22  0059   WBC 12.08 15.38* 15.52*   HGB 8.4* 8.0* 7.5*   HCT 26.2* 23.9* 21.9*   * 193 198       Recent Labs   Lab 04/03/22  1522 04/03/22  1552   INR 1.1 1.1   APTT 25.9 24.8       Microbiology Results (last 7 days)       Procedure Component Value Units Date/Time    Urine culture [[id number]] Collected: 04/03/22 1626    Order Status: Completed Specimen: Urine Updated: 04/04/22 2110     Urine  Culture, Routine No growth    Narrative:      Specimen Source->Urine          All pertinent labs from the last 24 hours have been reviewed.    Significant Diagnostics:  I have reviewed and interpreted all pertinent imaging results/findings within the past 24 hours.  CT Head Without Contrast    Result Date: 4/3/2022  Redemonstration of a right subdural hematoma with mass effect and mild midline shift. Probable remote right MCA and left cerebellar hemisphere infarcts. This report was flagged in Epic as abnormal. Electronically signed by: Bridget Rasmussen Date:    04/03/2022 Time:    21:01    CT Head Without Contrast    Result Date: 4/3/2022  This report was flagged in Epic as abnormal. 1. Right acute subdural hematoma resulting in mass effect upon the right convexity and 3-4 mm of leftward midline shift near the vertex.  No hydrocephalus at this time.  Neuro surgical consultation advised. 2. Sequela of chronic microvascular ischemic change and senescent change noting stable encephalomalacia in the right MCA territory as well as within the left cerebellum. Electronically signed by: Eduard Rios MD Date:    04/03/2022 Time:    14:37    CT Cervical Spine Without Contrast    Result Date: 4/3/2022  1. No acute fracture or subluxation of the cervical spine. 2. 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 Electronically signed by: Daniel Goldstein Date:    04/03/2022 Time:    15:11    X-Ray Chest AP Portable    Result Date: 4/3/2022  As above Electronically signed by: Eduard Rios MD Date:    04/03/2022 Time:    18:17       Assessment/Plan:     * SDH (subdural hematoma)  71 year old female with , hypothyroidism, CVA with left-sided residual deficit on ASA/Plavix, DM2, HTN, HLD, CKD, chronic anemia, presenting from SNF after being found down next to wheelchair after  unwitnessed fall, consulted to NSGY for right 2 cm SDH mostly acute, membranous and heterogenous appearing, some chronic component, without midline shift.     Now s/p evac, will obtain head CT today asm interval study, continue icu care.      -- Admitted to neuro ICU  -- q1 hour vitals/neurochecks  -- SBP < 140  -- Na goal eunatremia  -- Keppra 500 BID  --Continue subdural hemovac  --Continue subgaleal hemovac  -- PPx: SCDs, BR, IS, hold SQH in setting of acute bleed            Chaka Mendenhall MD  Neurosurgery  [facility]

## 2022-04-06 NOTE — PROGRESS NOTES
Patient extubated to 2L NC by RT per order.  Patient tolerated well.  Oriented to self and year upon extubation. tm

## 2022-04-06 NOTE — PLAN OF CARE
[facility] Care Plan    POC reviewed with Beatriz Adame and family at 1700. Pt and son at bedside verbalized understanding. Questions and concerns addressed. No acute events today. Pt progressing toward goals. Will continue to monitor. See below and flowsheets for full assessment and VS info.     Patient extubated to 2L NC today and tolerated well  Passed [place], but SLP still recommending NPO at this time after eval.  Bath and linens and gown changed.   Straight cath x1 for urinary retention.     Neuro:  [place] Coma Scale  Best Eye Response: 4-->(E4) spontaneous  Best Motor Response: 6-->(M6) obeys commands  Best Verbal Response: 4-->(V4) confused  [place] Coma Scale Score: 14  Assessment Qualifiers: patient intubated        24 hr Temp:  [97.52 °F (36.4 °C)-100.22 °F (37.9 °C)]     CV:   Rhythm: normal sinus rhythm  BP goals:   SBP < 140  MAP > 65    Resp:   O2 Device (Oxygen Therapy): nasal cannula  Vent Mode: Spont  Set Rate: 15 BPM  Oxygen Concentration (%): 30  Vt Set: 450 mL  PEEP/CPAP: 5 cmH20  Pressure Support: 5 cmH20    Plan: extubated to 2L NC today    GI/:     Diet/Nutrition Received: NPO  Last Bowel Movement: 04/06/22  Voiding Characteristics: other (see comments) (urinary retention)    Intake/Output Summary (Last 24 hours) at 4/6/2022 1757  Last data filed at 4/6/2022 1705  Gross per 24 hour   Intake 1277.17 ml   Output 411 ml   Net 866.17 ml     Unmeasured Output  Urine Occurrence: 1  Stool Occurrence: 1 (loose )  Pad Count: 1    Labs/Accuchecks:  Recent Labs   Lab 04/06/22  0755   WBC 20.44*   RBC 2.62*   HGB 8.1*   HCT 23.3*         Recent Labs   Lab 04/06/22  0110      K 3.8   CO2 20*   *   BUN 22   CREATININE 1.3   ALKPHOS 57   ALT 11   AST 34   BILITOT 0.7      Recent Labs   Lab 04/03/22  1552   INR 1.1   APTT 24.8      Recent Labs   Lab 04/03/22  1621   TROPONINI 0.017       Electrolytes: none needed  Accuchecks: q4h    Gtts:   sodium chloride 0.9% 50 mL/hr at 04/06/22 1709        LDA/Wounds:  Lines/Drains/Airways       Peripherally Inserted Central Catheter Line  Duration             PICC Double Lumen 03/26/22 1905 right basilic 10 days              Drain  Duration                  NG/OG Tube 04/05/22 1041 Left nostril 1 day         Fecal Incontinence  04/06/22 0830 <1 day              Arterial Line  Duration             Arterial Line 04/04/22 0919 Right Radial 2 days              Peripheral Intravenous Line  Duration                  Peripheral IV - Single Lumen 04/04/22 1005 18 G Left Wrist 2 days                  Wounds: yes- foot and crani   Wound care consulted: na

## 2022-04-06 NOTE — ASSESSMENT & PLAN NOTE
71 year old female with , hypothyroidism, CVA with left-sided residual deficit on ASA/Plavix, DM2, HTN, HLD, CKD, chronic anemia, presenting from SNF after being found down next to wheelchair after unwitnessed fall, consulted to NSGY for right 2 cm SDH mostly acute, membranous and heterogenous appearing, some chronic component, without midline shift. Now s/p R crani for SDH evacuation (4/4).    POD#2      -- Continue ICU care  -- q1 hour vitals/neurochecks  -- SBP < 140  -- Na goal eunatremia  -- Keppra 500 BID  -- Keep SD and HV in place  -- PPx abx while drains in place  -- Follow up CT head satisfactory x2 with residual blood    -- WTE per NCC  -- Keep head of bed flat to 15 degrees   -- PPx: SCDs, BR, IS, hold SQH in setting of acute bleed

## 2022-04-06 NOTE — PHYSICIAN QUERY
PT Name: Beatriz Adame  MR #: [id number]    DOCUMENTATION CLARIFICATION     CDS/: Josue Hough RN             Contact information:  [email or web address]    This form is a permanent document in the medical record.     Query Date: April 6, 2022    By submitting this query, we are merely seeking further clarification of documentation.. Please utilize your independent clinical judgment when addressing the question(s) below.    The medical record contains the following:   Indicators  Supporting Clinical Findings Location in Medical Record   x % of Estimated Energy Intake over a time frame from p.o., TF, or TPN % Intake of Estimated Energy Needs: 0 - 25 %  % Meal Intake: NPO RD Note 4/4   x Weight Status over a time frame Weight Loss: 20% x 6 months  RD Note 4/4   x Subcutaneous Fat and/or Muscle Loss Energy Intake: <75% of estimated energy requirement for 3 months  Body Fat Depletion: moderate and severe depletion of orbitals, triceps and thoracic and lumbar region   Muscle Mass Depletion: moderate and severe depletion of temples, clavicle region, scapular region, interosseous muscle and lower extremities    RD Note 4/4    Fluid Accumulation or Edema     x Wt/BMI/Usual Body Weight Weight: 49.8 kg (109 lb 12.6 oz)  Weight (lb): 109.79 lb  Ideal Body Weight (IBW), Female: 135 lb  % Ideal Body Weight, Female (lb): 81.33 %  BMI (Calculated): 17.2  BMI Grade: 17 - 18.4 protein-energy malnutrition grade I   RD Note 4/4    Delayed Wound Healing/Failure to Thrive     x Acute or Chronic Illness Diagnosis:  (SDH)  Relevant Medical History: baseline dementia, GERD, R MCA stroke with residual LUE weakness, HLD, HTN, Stage IV CKD, TIIDM RD Note 4/4    Medication      Treatment     x Other Nutrition Problem:  Severe Protein-Calorie Malnutrition  Malnutrition in the context of Chronic Illness/Injury    General Information Comments: Pt readmitted, last assessed by RD on 3/28. OR today for emergent R cani for SDH evac.  Noted on last admit, pt did not like ONS. NFPE completed 3/20; pt meets criteria for severe malnutrition in the context of chronic illness.  Nutrition Discharge Planning: pending medical course    RD Note 4/4     AND / ASPEN Clinical Characteristics (October 2011)  A minimum of two characteristics is recommended for diagnosing either moderate or severe malnutrition   Mild Malnutrition Moderate Malnutrition Severe Malnutrition   Energy Intake from p.o., TF or TPN. < 75% intake of estimated energy needs for less than 7 days < 75% intake of estimated energy needs for greater than 7 days < 50% intake of estimated energy needs for > 5 days   Weight Loss 1-2% in 1 month  5% in 3 months  7.5% in 6 months  10% in 1 year 1-2 % in 1 week  5% in 1 month  7.5% in 3 months  10% in 6 months  20% in 1 year > 2% in 1 week  > 5% in 1 month  > 7.5% in 3 months  > 10% in 6 months  > 20% in 1 year   Physical Findings     None *Mild subcutaneous fat and/or muscle loss  *Mild fluid accumulation  *Stage II decubitus  *Surgical wound or non-healing wound *Mod/severe subcutaneous fat and/or muscle loss  *Mod/severe fluid accumulation  *Stage III or IV decubitus  *Non-healing surgical wound     Provider, please specify diagnosis or diagnoses associated with above clinical findings.    [  ] Severe Protein-Calorie Malnutrition   [  ] Other Nutritional Diagnosis (please specify): _______   [  ] Malnutrition ruled out   [  ] Clinically Undetermined       Please document in your progress notes daily for the duration of treatment until resolved and include in your discharge summary.

## 2022-04-06 NOTE — PROGRESS NOTES
[facility]  Neurosurgery  Progress Note    Subjective:     History of Present Illness: 71 year old female with , hypothyroidism, CVA with left-sided residual deficit on ASA/Plavix, DM2, HTN, HLD, CKD, chronic anemia, presenting from SNF after being found down next to wheelchair after unwitnessed fall, consulted to NSGY for right 2 cm SDH without midline shift. She is altered at baseline and unable to provide to history, but family at bedside says she is more confused and less interactive than usual.       Post-Op Info:  Procedure(s) (LRB):  CRANIOTOMY, FOR SUBDURAL HEMATOMA EVACUATION (Right)   2 Days Post-Op     Interval History: s/p evac. More awake. Still intuabted. SD with 45cc    Medications:  Continuous Infusions:   sodium chloride 0.9% 50 mL/hr at 04/06/22 0401     Scheduled Meds:   amLODIPine  10 mg Per NG tube Daily    atorvastatin  40 mg Per NG tube Daily    famotidine  20 mg Per NG tube Daily    levetiracetam  500 mg Per NG tube BID    levothyroxine  75 mcg Per NG tube Before breakfast    mupirocin   Nasal BID    senna-docusate 8.6-50 mg  1 tablet Per NG tube BID    silodosin  4 mg Per NG tube Daily     PRN Meds:sodium chloride, acetaminophen, dextrose 10%, fentaNYL, glucagon (human recombinant), hydrALAZINE, HYDROcodone-acetaminophen, HYDROmorphone, insulin aspart U-100, labetalol, magnesium sulfate IVPB, magnesium sulfate IVPB, metoclopramide HCl, ondansetron, potassium bicarbonate, potassium bicarbonate, potassium bicarbonate, potassium, sodium phosphates, potassium, sodium phosphates, potassium, sodium phosphates, sodium chloride 0.9%, Pharmacy to dose Vancomycin consult **AND** vancomycin - pharmacy to dose     Review of Systems    Unable to obtain: AMS    Objective:     Weight: 49.4 kg (109 lb)  Body mass index is 17.07 kg/m².  Vital Signs (Most Recent):  Temp: 98.78 °F (37.1 °C) (04/06/22 0719)  Pulse: 93 (04/06/22 0719)  Resp: 20 (04/06/22 0719)  BP: (!) 146/65  (04/06/22 0601)  SpO2: 100 % (04/06/22 0719)   Vital Signs (24h Range):  Temp:  [93.6 °F (34.2 °C)-100.22 °F (37.9 °C)] 98.78 °F (37.1 °C)  Pulse:  [60-95] 93  Resp:  [9-31] 20  SpO2:  [100 %] 100 %  BP: (107-154)/(49-70) 146/65  Arterial Line BP: (106-148)/(38-58) 135/53                  Vent Mode: A/C  Oxygen Concentration (%):  [30-40] 30  Resp Rate Total:  [16 br/min-34 br/min] 18 br/min  Vt Set:  [450 mL] 450 mL  PEEP/CPAP:  [5 cmH20] 5 cmH20  Mean Airway Pressure:  [8.6 cmH20-9.6 cmH20] 9.6 cmH20         Urethral Catheter 03/18/22 2210 Non-latex 16 Fr. (Active)       Physical Exam    Neurosurgery Physical Exam    Physical Exam:  Constitutional: No distress.   HEENT: ataumatic/normocephalic  Cardiovascular: Regular rhythm.   Pulm: intubated   Abdominal: Soft.   Psych/Behavior: He is alert.   E4VtM6  PERRL  Left contracted and paretic  FCx in all except LUE  Incision C/D/I      Significant Labs:  Recent Labs   Lab 04/05/22  0059 04/05/22  1042 04/06/22  0110 04/06/22  0214   * 132* 79  --     142 142  --    K 3.1* 3.3* 3.8  --     112* 114*  --    CO2 20* 18* 20*  --    BUN 25* 24* 22  --    CREATININE 1.1 1.0 1.3  --    CALCIUM 8.5* 8.4* 7.9*  --    MG 1.9 1.8  --  2.2       Recent Labs   Lab 04/04/22  1250 04/05/22  0059 04/06/22  0110   WBC 15.38* 15.52* 14.82*   HGB 8.0* 7.5* 6.1*   HCT 23.9* 21.9* 17.7*    198 165       No results for input(s): LABPT, INR, APTT in the last 48 hours.    Microbiology Results (last 7 days)       Procedure Component Value Units Date/Time    Urine culture [[id number]] Collected: 04/03/22 1626    Order Status: Completed Specimen: Urine Updated: 04/04/22 2110     Urine Culture, Routine No growth    Narrative:      Specimen Source->Urine          All pertinent labs from the last 24 hours have been reviewed.    Significant Diagnostics:  I have reviewed and interpreted all pertinent imaging results/findings within the past 24 hours.  CT Head Without  Contrast    Result Date: 4/3/2022  Redemonstration of a right subdural hematoma with mass effect and mild midline shift. Probable remote right MCA and left cerebellar hemisphere infarcts. This report was flagged in Epic as abnormal. Electronically signed by: Bridget Rasmussen Date:    04/03/2022 Time:    21:01    CT Head Without Contrast    Result Date: 4/3/2022  This report was flagged in Epic as abnormal. 1. Right acute subdural hematoma resulting in mass effect upon the right convexity and 3-4 mm of leftward midline shift near the vertex.  No hydrocephalus at this time.  Neuro surgical consultation advised. 2. Sequela of chronic microvascular ischemic change and senescent change noting stable encephalomalacia in the right MCA territory as well as within the left cerebellum. Electronically signed by: Eduard Rios MD Date:    04/03/2022 Time:    14:37    CT Cervical Spine Without Contrast    Result Date: 4/3/2022  1. No acute fracture or subluxation of the cervical spine. 2. 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 Electronically signed by: Daniel Goldstein Date:    04/03/2022 Time:    15:11    X-Ray Chest AP Portable    Result Date: 4/3/2022  As above Electronically signed by: Eduard Rios MD Date:    04/03/2022 Time:    18:17       Assessment/Plan:     * SDH (subdural hematoma)  71 year old female with , hypothyroidism, CVA with left-sided residual deficit on ASA/Plavix, DM2, HTN, HLD, CKD, chronic anemia, presenting from SNF after being found down next to wheelchair after unwitnessed fall, consulted to NSGY for right 2 cm SDH mostly acute, membranous and heterogenous appearing, some chronic component, without midline shift. Now s/p R crani for SDH evacuation (4/4).    POD#2      -- Continue ICU care  -- q1 hour vitals/neurochecks  -- SBP < 140  -- Na  goal eunatremia  -- Keppra 500 BID  -- Keep SD and HV in place  -- PPx abx while drains in place  -- Follow up CT head satisfactory x2 with residual blood    -- WTE per NCC  -- Keep head of bed flat to 15 degrees   -- PPx: SCDs, BR, IS, hold SQH in setting of acute bleed            Shahana Cornejo MD  Neurosurgery  [facility] - Neuro Critical Care

## 2022-04-06 NOTE — PROGRESS NOTES
[facility] Neuro Critical Care  Infectious Disease  Progress Note    Patient Name: Beatriz Adame  MRN: [id number]  Admission Date: 4/3/2022  Length of Stay: 3 days  Attending Physician: Yamil Larson MD  Primary Care Provider: Dante Olivier MD    Isolation Status: Contact  Assessment/Plan:      Osteomyelitis of right foot  Although pathology of the clean margins failed to reveal osteomyelitis her bone biopsy cultures from the clean margin are positive for MRSA.  Therefore bone is likely infected at the clean margin however long-term bony changes have not yet been present.  · I favor continuing vancomycin as per prior recommendations for 6 weeks with an estimated end date of May 5, 2022.   · Please consult Pharmacy service for management of vancomycin levels.    · Monitor weekly CBC, BMP, sed rate and CRP.  Monitor vancomycin levels as per hospital protocol.  · Resume OPAT orders upon discharge.      Anticipated Disposition: per primary    Thank you for your consult. I will sign off. Please contact us if you have any additional questions.    Jana Hwang MD  Infectious Disease  [facility] Neuro Critical Care    Subjective:     Principal Problem:SDH (subdural hematoma)    HPI: A 71-year-old woman with past stroke, residual left-sided deficit, hypertension, DM2, urinary retention with chronic indwelling Araiza, CKD4, recurrent Pseudomonas UTI, right 2nd toe osteomyelitis status post amputation in January of 2022, right 4th and 5th toe osteomyelitis due to MRSA status post partial ray amputation on March 24th 2022 and on outpatient vancomycin therapy for 6 weeks who was admitted after an unwitnessed fall from her wheelchair while at [facility].  [place] [facility] revealed an acute on chronic subdural hematoma.  The patient was admitted to the neuro critical care unit for further management.  Her vancomycin therapy was resumed.  She subsequently underwent evacuation of the subdural  "hematoma and a drain was left in place.    Infectious Disease is now consulted for "osteomyelitis, on dapto/vanc OP"          Interval History: "ok".  Patient with known osteomyelitis due to MRSA on outpatient vancomycin who was admitted for management of a subdural hematoma.  She underwent drain placement for evacuation.  She has now been extubated and the drain has been removed.    Review of Systems   Constitutional:  Negative for chills, fatigue, fever and unexpected weight change.   HENT:  Negative for ear pain, facial swelling, hearing loss, mouth sores, nosebleeds, rhinorrhea, sinus pressure, sore throat, tinnitus, trouble swallowing and voice change.    Eyes:  Negative for photophobia, pain, redness and visual disturbance.   Respiratory:  Negative for cough, chest tightness, shortness of breath and wheezing.    Cardiovascular:  Negative for chest pain, palpitations and leg swelling.   Gastrointestinal:  Negative for abdominal pain, blood in stool, constipation, diarrhea, nausea and vomiting.   Endocrine: Negative for cold intolerance, heat intolerance, polydipsia, polyphagia and polyuria.   Genitourinary:  Negative for decreased urine volume, dysuria, flank pain, frequency, hematuria, menstrual problem, urgency, vaginal bleeding, vaginal discharge and vaginal pain.   Musculoskeletal:  Negative for arthralgias, back pain, joint swelling, myalgias and neck pain.   Skin:  Negative for rash.   Allergic/Immunologic: Negative for environmental allergies, food allergies and immunocompromised state.   Neurological:  Negative for dizziness, seizures, syncope, weakness, light-headedness, numbness and headaches.   Hematological:  Negative for adenopathy. Does not bruise/bleed easily.   Psychiatric/Behavioral:  Negative for confusion, hallucinations, self-injury, sleep disturbance and suicidal ideas. The patient is not nervous/anxious.    Objective:     Vital Signs (Most Recent):  Temp: 99 °F (37.2 °C) (04/06/22 " 1901)  Pulse: 99 (04/06/22 2001)  Resp: 16 (04/06/22 2001)  BP: 135/64 (04/06/22 2001)  SpO2: 100 % (04/06/22 2001) Vital Signs (24h Range):  Temp:  [97.52 °F (36.4 °C)-100.22 °F (37.9 °C)] 99 °F (37.2 °C)  Pulse:  [81-99] 99  Resp:  [9-31] 16  SpO2:  [100 %] 100 %  BP: (107-151)/(49-67) 135/64  Arterial Line BP: (106-148)/(38-56) 139/47     Weight: 49.4 kg (109 lb)  Body mass index is 17.07 kg/m².    Estimated Creatinine Clearance: 31.4 mL/min (based on SCr of 1.3 mg/dL).    Physical Exam  Vitals and nursing note reviewed.   Constitutional:       Appearance: She is well-developed.      Interventions: She is sedated and intubated.   HENT:      Head: Normocephalic and atraumatic.      Comments: Surgical incision with sutures in place.  No surrounding erythema or purulence.     Right Ear: External ear normal.      Left Ear: External ear normal.   Eyes:      General: No scleral icterus.        Right eye: No discharge.         Left eye: No discharge.      Conjunctiva/sclera: Conjunctivae normal.   Cardiovascular:      Rate and Rhythm: Normal rate and regular rhythm.      Heart sounds: Normal heart sounds. No murmur heard.    No friction rub. No gallop.   Pulmonary:      Effort: Pulmonary effort is normal. No respiratory distress. She is intubated.      Breath sounds: Normal breath sounds. No stridor. No wheezing or rales.   Abdominal:      General: Bowel sounds are normal.   Musculoskeletal:         General: No tenderness.      Comments: Ankylosed LUE.   Skin:     General: Skin is warm and dry.      Comments: Right foot covered with gauze dressing.    Neurological:      Mental Status: She is alert.       Significant Labs: Blood Culture:   Recent Labs   Lab 12/30/21  1523 12/30/21  1536 03/18/22  1641   LABBLOO No growth after 5 days. No growth after 5 days. No growth after 5 days.  No growth after 5 days.     BMP:   Recent Labs   Lab 04/06/22  0110 04/06/22  0214   GLU 79  --      --    K 3.8  --    *  --     CO2 20*  --    BUN 22  --    CREATININE 1.3  --    CALCIUM 7.9*  --    MG  --  2.2     CBC:   Recent Labs   Lab 04/05/22  0059 04/06/22  0110 04/06/22  0755   WBC 15.52* 14.82* 20.44*   HGB 7.5* 6.1* 8.1*   HCT 21.9* 17.7* 23.3*    165 178     Wound Culture:   Recent Labs   Lab 03/18/22  1620 03/24/22  1538   LABAERO METHICILLIN RESISTANT STAPHYLOCOCCUS AUREUS  Many  * METHICILLIN RESISTANT STAPHYLOCOCCUS AUREUS  Few  *  METHICILLIN RESISTANT STAPHYLOCOCCUS AUREUS  Few  *       Significant Imaging: I have reviewed all pertinent imaging results/findings within the past 24 hours.

## 2022-04-06 NOTE — PLAN OF CARE
Problem: SLP  Goal: SLP Goal  Description: Speech Language Pathology Goals  Goals expected to be met by 4/13:  1. Pt will participate in ongoing swallowing assessment to determine if/when safe for oral intake.           Outcome: Ongoing, Progressing  Bedside swallow study completed. Pt not safe for PO intake at this time. Cont NPO with alternative means of nutrition/hydration/medication. SLP to continue to follow.

## 2022-04-06 NOTE — PHYSICIAN QUERY
PT Name: Beatriz Adame  MR #: [id number]     DOCUMENTATION CLARIFICATION     CDS/: Josue Hough  RN             Contact information:  [email or web address]  This form is a permanent document in the medical record.    Query Date: April 6, 2022    By submitting this query, we are merely seeking further clarification of documentation.  Please utilize your independent clinical judgment when addressing the question(s) below.    The Medical Record contains the following:  Indicators Supporting Clinical Findings Location in Medical Record   x Decreased LOC, neurological deficits Neuro  Acute on chronic intracranial subdural hematoma  -Admit to [facility], NSGY following  -q1h neuro checks, vital checks  -EKG, ECHO, CXR  -A1c, TSH, lipid panel, coag panel  -Daily CBC, CMP, mag, phos  -SBP < 160, prn labetalol and hydralazine  -SCDs, hold DVT chemoppx in setting of acute bleed  -DAPT at home  -CT with R SDH and 3-4 mm MLS  -Keppra 500 bid  -PT/OT/SLP  -6 hour stability scan pending   H/P NCC  4/3 (Nicaud/Ciccotto)    [place] Coma Scale (GCS)     x Traumatic Injury unwitnessed fall from her wheel chair without loss of consciousness. H/P NCC  4/3 (Nicaud/Ciccotto)   x Acute/Chronic Conditions History of Present Illness: Ms. Adame is a 72 y/o F with a PMHx of baseline dementia, GERD, R MCA stroke with residual LUE weakness, HLD, HTN, Stage IV CKD, TIIDM who presents to [facility] from [facility] with a AoC SDH after an unwitnessed fall from her wheel chair without loss of consciousness. CT spine negative for fracture. CT head with R SDH and 3-4 mm MLS. She is on DAPT at home. Of note, she has been staying at [facility] after a partial R foot amputation, stitches still intact, for which she is receiving vancomycin. VSS. She will be admitted to [facility] for hourly neuromonitoring and a higher level of care   H/P NCC  4/3 (Nicaud/Ciccotto)   x Radiology FINDINGS:  There is motion artifact.    There is generalized  cerebral volume loss. There is hypoattenuation in a periventricular fashion, likely sequela of chronic microvascular ischemic change. There is stable encephalomalacia in the right MCA territory. Additional encephalomalacia is noted within the left cerebellum. There is a mixed attenuation, primarily hyperattenuating, overlying the right convexity measuring 1.9 cm in maximal thickness. This results in localized sulcal effacement and mass effect upon the right convexity and compression of the right lateral ventricle. No hydrocephalus at this time. There is 3-4 mm of midline shift near the vertex. There is opacification of the inferior most left mastoid air cells, otherwise the visualized paranasal sinuses and mastoid air cells are clear, and there is no evidence of calvarial fracture. The visualized soft tissues are remarkable for hematoma overlying the left frontal calvarium at the vertex. The bilateral globes and orbital content are unremarkable..    Impression:    This report was flagged in Epic as abnormal.    1. Right acute subdural hematoma resulting in mass effect upon the right convexity and 3-4 mm of leftward midline shift near the vertex. No hydrocephalus at this time. Neuro surgical consultation advised.  2. Sequela of chronic microvascular ischemic change and senescent change noting stable encephalomalacia in the right MCA territory as well as within the left cerebellum.    CT Head 4/3    Treatment (i.e. IV Steroids, Mannitol, Surgery)      Other         Provider, please specify diagnosis or diagnoses associated with above clinical findings. (check all that apply)    [   x] Cerebral Compression   [   x] Brain Herniation   [   x] Traumatic Cerebral (Vasogenic) Edema   [   ] Non-Traumatic Cerebral (Vasogenic) Edema   [   ] Other neurological diagnosis (please specify):________   [  ] Clinically Undetermined       Please document in your progress notes daily for the duration of treatment until resolved and  include in your discharge summary.

## 2022-04-06 NOTE — PT/OT/SLP EVAL
Speech Language Pathology Evaluation  Bedside Swallow    Patient Name:  Beatriz Adame   MRN:  [id number]  Admitting Diagnosis: SDH (subdural hematoma)    Recommendations:                 General Recommendations:  Speech language evaluation, Cognitive-linguistic evaluation and ongoing swallowing assessment  Diet recommendations:  NPO, NPO   Aspiration Precautions: Continue alternate means of nutrition, Frequent oral care and Strict aspiration precautions   General Precautions: Standard, aspiration, fall, NPO  Communication strategies:  go to room if call light pushed    History:     Past Medical History:   Diagnosis Date    Gastroparesis     GERD (gastroesophageal reflux disease)     History of Clostridium difficile colitis 07/24/2021    History of kidney stones     History of stroke     left side paralysis    Hyperlipidemia     Hypertension     Hypothyroidism     Iron deficiency anemia     Osteoarthritis     Peripheral neuropathy     Stage IV CKD     Type II diabetes mellitus        Past Surgical History:   Procedure Laterality Date    APPENDECTOMY      CRANIOTOMY FOR EVACUATION OF SUBDURAL HEMATOMA Right 4/4/2022    Procedure: CRANIOTOMY, FOR SUBDURAL HEMATOMA EVACUATION;  Surgeon: Silvio White MD;  Location: [address];  Service: Neurosurgery;  Laterality: Right;    CYSTOSCOPY W/ URETERAL STENT PLACEMENT Right 10/13/2021    Procedure: CYSTOSCOPY, WITH URETERAL STENT INSERTION;  Surgeon: Wanda Arroyo MD;  Location: [facility];  Service: Urology;  Laterality: Right;    ESOPHAGOGASTRODUODENOSCOPY N/A 11/23/2021    Procedure: EGD (ESOPHAGOGASTRODUODENOSCOPY);  Surgeon: Obed Jeong MD;  Location: [address]);  Service: Endoscopy;  Laterality: N/A;    FOOT AMPUTATION THROUGH METATARSAL Right 3/24/2022    Procedure: AMPUTATION, FOOT, PARTIAL 4th and 5th ray;  Surgeon: Rodrigo Logan DPM;  Location: [address];  Service: Podiatry;  Laterality: Right;    LAPAROSCOPIC  APPENDECTOMY N/A 7/22/2021    Procedure: APPENDECTOMY, LAPAROSCOPIC;  Surgeon: Paulo Calvo Jr., MD;  Location: [facility] OR;  Service: General;  Laterality: N/A;    TOE AMPUTATION Right 1/1/2022    Procedure: AMPUTATION, TOE;  Surgeon: Genaro Mason DPM;  Location: [facility] OR;  Service: Podiatry;  Laterality: Right;    TUBAL LIGATION      URETEROSCOPIC REMOVAL OF URETERIC CALCULUS Right 12/22/2021    Procedure: REMOVAL, CALCULUS, URETER, URETEROSCOPIC;  Surgeon: Wanda Arroyo MD;  Location: [facility] OR;  Service: Urology;  Laterality: Right;     Chief Complaint: Acute on chronic intracranial subdural hematoma     History of Present Illness: Ms. Adame is a 72 y/o F with a PMHx of baseline dementia, GERD, R MCA stroke with residual LUE weakness, HLD, HTN, Stage IV CKD, TIIDM who presents to [facility] from [facility] with a AoC SDH after an unwitnessed fall from her wheel chair without loss of consciousness. CT spine negative for fracture. CT head with R SDH and 3-4 mm MLS. She is on DAPT at home. Of note, she has been staying at [facility] after a partial R foot amputation, stitches still intact, for which she is receiving vancomycin. VSS. She will be admitted to [facility] for hourly neuromonitoring and a higher level of care.         Prior Intubation HX:   4/4 - 4/6    Modified Barium Swallow: none on file    Chest X-Rays: 4/5/22: FINDINGS:  There is limited evaluation of the left upper lung zone as the patient's hand is positioned over the upper thorax.  Portions of the lung that are seen appear clear.  The heart size appears normal.     Interval satisfactory intubation and insertion of enteric tube.  A tip of a right vascular access catheter projects over the expected location of the cavoatrial junction.  The bones appear unremarkable for the age of the patient, with mild hypertrophic and/or degenerative changes.       Prior diet: currently NPO - NG tube in place; pt reports receiving regular diet/thin  "liquids PTA    Subjective     "All kind of food." pt's response when asked what kind of foods she eats PTA.    Pain/Comfort:  · Pain Rating 1: 0/10    Respiratory Status: Nasal cannula, flow 3 L/min spo2 100%    Objective:     Oral Musculature Evaluation  · Oral Musculature: left weakness, facial asymmetry present  · Dentition: scattered dentition  · Secretion Management: adequate  · Mucosal Quality: adequate  · Mandibular Strength and Mobility: WFL  · Oral Labial Strength and Mobility: impaired retraction, impaired pursing, impaired seal  · Lingual Strength and Mobility: impaired strength, impaired protrusion, impaired left lateral movement  · Buccal Strength and Mobility: decreased tone  · Volitional Cough: decreased strength  · Volitional Swallow: elicited - delayed  · Voice Prior to PO Intake: hoarse/raspy, low volume and intensity    Bedside Swallow Eval:   Consistencies Assessed:  · Thin liquids ice chip x 1; 1/2 tsp x 2  · Nectar thick liquids 1/2 tsp x 1  · Puree 1/2 tsp x 1     Oral Phase:   · WFL    Pharyngeal Phase:   · throat clearing for most trials; productive cough for 1 out of 2 thin liquid trials    Compensatory Strategies  · Multiple swallows    Treatment: Education provided to pt and brother regarding role of SLP, purpose of swallowing assessment, possible dysphagia with increased risks of aspiration s/p intubation, concerns for ability to adequately protect airway, vocal cord function as it relates to airway protection during PO intake, complications associated with aspiration, recommendations to remain NPO at this time, and plan for ongoing swallowing assessment.  Pt nodded to demonstrate understanding, but full understanding and carryover may be decreased as pt asked if she could have more pudding at end of explanation for NPO recommendations.  Pt's brother expressed understanding.     Assessment:     Beatriz Adame is a 71 y.o. female with an SLP diagnosis of Dysphagia.    Goals: "   Multidisciplinary Problems     SLP Goals        Problem: SLP    Goal Priority Disciplines Outcome   SLP Goal     SLP Ongoing, Progressing   Description: Speech Language Pathology Goals  Goals expected to be met by 4/13:  1. Pt will participate in ongoing swallowing assessment to determine if/when safe for oral intake.                            Plan:     · Patient to be seen:  4 x/week   · Plan of Care expires:  05/06/22  · Plan of Care reviewed with:  patient, sibling   · SLP Follow-Up:  Yes       Discharge recommendations:   (tbd)     Time Tracking:     SLP Treatment Date:   04/06/22  Speech Start Time:  1519  Speech Stop Time:  1544     Speech Total Time (min):  25 min    Billable Minutes: Eval Swallow and Oral Function 15 and Self Care/Home Management Training 10    04/06/2022

## 2022-04-06 NOTE — PT/OT/SLP EVAL
Physical Therapy  Evaluation and Treatment    Patient Name:  Beatriz Adame   MRN:  [id number]    Recommendations:     Discharge Recommendations:  nursing facility, skilled   Discharge Equipment Recommendations: other (see comments) (TBD by next level of care)   Barriers to discharge: decreased functional mobility, fall risk and decreased caregiver support    Assessment:     Beatriz Adame is a 71 y.o. female admitted with a medical diagnosis of SDH (subdural hematoma).  Pt demonstrates the below listed impairments with decreased tolerance to functional mobility, weakness and balance instability being the most limiting.  Pt demonstrates fair tolerance to edge of bed mobility.  She has been w/c bound for ~1year however prior to December of 2021 pt has requires increased assist.  Per family patient was able to transfer IND in 2021 despite her L sided contractions.  After her admission at the SNF she has required assist for transfers but is not sure if she was using a slide board at the facility.  Pt is not safe for home discharge at this time due to patient's status as: a fall risk and requires skilled PT.      Impairments and functional limitations:  weakness, impaired endurance, impaired self care skills, impaired functional mobilty, gait instability, impaired balance, decreased coordination, decreased upper extremity function, decreased lower extremity function, decreased safety awareness, abnormal tone, decreased ROM, impaired coordination, impaired fine motor, impaired skin.  These deficits affect their roles and responsibilities in which they were able to complete prior to admit.  Rehab Prognosis:   Fair; patient would benefit from acute skilled PT services 3 x/week to address these deficits, improve quality of life, focus on recovery of impairments, provide patient/caregiver education, reduce fall risk, and reach maximum level of function.  Pt is moderately motivated to participated in skilled PT.    Recent  Surgery:   Procedure(s) (LRB):  CRANIOTOMY, FOR SUBDURAL HEMATOMA EVACUATION (Right) 2 Days Post-Op    Plan:     During this hospitalization, patient to be seen 3 x/week to address the identified rehab impairments via therapeutic activities, therapeutic exercises, neuromuscular re-education and progress toward the following goals:    · Plan of Care Expires:  05/06/22    Subjective     Chief Complaint: decreased tolerance to functional mobility  Patient/Family Comments/Goals: Progress to SNF  Pain/Comfort:  · Pain Rating 1: 0/10    Patients cultural, spiritual, Mu-ism conflicts given the current situation: no    Living Environment:  Pt is from a SNF ([facility]) which family does not want her to return to.  Per previous charting and according to her brother she lived alone in a 1st floor apt and was IND with mobility.  Last none to be at her baseline in Dec. 2021.  Pt will have assist from healthcare staff at NH for mobility.  Family is not sure if pt will return to her baseline and progress patient to [facility] pending progress with therapy.   Upon discharge, patient will have assistance from staff with 24/7 assist.     Objective:     Communicated with nursing prior to session.  Patient found HOB elevated with arterial line, bed alarm, blood pressure cuff, pulse ox (continuous), telemetry, NG tube, bowel management system, peripheral IV, oxygen, PICC line, restraints  upon PT entry to room.    General Precautions: Standard, fall, contact, aspiration, seizure, NPO   Orthopedic Precautions:N/A   Braces: N/A   Oxygen Device:      Exams:  · Cognitive Exam:  Patient is oriented to Person, Place, Time and Situation  · Command following: Patient follows <75% of commands   · RLE ROM: WFL  · RLE Strength: grossly 4+/5  · LLE ROM: Deficits: knee flexion contracture (chronic)  · LLE Strength: grossly 3/5 (no formal MMT performed due to poor ROM)  · Postural Exam:  Patient presented with the following abnormalities:     · -       Rounded shoulders  · -       Forward head  · -       Kyphosis    Functional Mobility:  · Bed Mobility:  Rolling Left: total assistance  · Rolling Right: total assistance  · Scooting: total assistance  · Supine to Sit: maximal assistance  · Sit to Supine: total assistance  · Scooting up to head of bed in supine: total assistance  · Head of bed position: HOB elevated   · Pt EOB for 4 min with CGA-Mod A   · Posterior lean    · Transfers:  n/a    · Gait: n/a    · Balance:   Position Score Time   Static Sitting FAIR-: Maintains without assist but is inconsistent 2 minute(s)   Dynamic Sitting POOR: MODERATE assist to maintain 2 minute(s)   Static Standing n/a: dependent n/a   Dynamic Standing n/a: dependent n/a       Therapeutic Activities:  Patient educated on role of acute care PT and PT POC, safety while in hospital including calling nurse for mobility, call light usage, benefits of out of bed mobility, breathing technique, fall risk, bed mobility , posture, risks of prolonged bed rest, possible discharge disposition  and benefits of continued PT by explanation.    Patient demonstrates fair understanding of education provided this day.   Whiteboard updated    Therapeutic Exercises:  n/a    AM-PAC 6 CLICK MOBILITY  Total Score:8     Patient left HOB elevated with all lines intact, call button in reach, bed alarm on and RN present.    GOALS:   Multidisciplinary Problems     Physical Therapy Goals        Problem: Physical Therapy    Goal Priority Disciplines Outcome Goal Variances Interventions   Physical Therapy Goal     PT, PT/OT Ongoing, Progressing     Description: Goals to be met by: 22    Patient will increase functional independence with mobility by performin. Supine to sit with minimum assistance  2. Sit to supine with minimum assistance  3. Rolling to Left and Right with minimum assistance  4. Bed to chair transfer with minimum assistance using LRAD as needed                   History:      Past Medical History:   Diagnosis Date    Gastroparesis     GERD (gastroesophageal reflux disease)     History of Clostridium difficile colitis 07/24/2021    History of kidney stones     History of stroke     left side paralysis    Hyperlipidemia     Hypertension     Hypothyroidism     Iron deficiency anemia     Osteoarthritis     Peripheral neuropathy     Stage IV CKD     Type II diabetes mellitus        Past Surgical History:   Procedure Laterality Date    APPENDECTOMY      CRANIOTOMY FOR EVACUATION OF SUBDURAL HEMATOMA Right 4/4/2022    Procedure: CRANIOTOMY, FOR SUBDURAL HEMATOMA EVACUATION;  Surgeon: Silvio White MD;  [facility];  Service: Neurosurgery;  Laterality: Right;    CYSTOSCOPY W/ URETERAL STENT PLACEMENT Right 10/13/2021    Procedure: CYSTOSCOPY, WITH URETERAL STENT INSERTION;  Surgeon: Wanda Arroyo MD;  Location: [facility];  Service: Urology;  Laterality: Right;    ESOPHAGOGASTRODUODENOSCOPY N/A 11/23/2021    Procedure: EGD (ESOPHAGOGASTRODUODENOSCOPY);  Surgeon: Obed Jeong MD;  Location: [facility] (2ND FLR);  Service: Endoscopy;  Laterality: N/A;    FOOT AMPUTATION THROUGH METATARSAL Right 3/24/2022    Procedure: AMPUTATION, FOOT, PARTIAL 4th and 5th ray;  Surgeon: Rodrigo Logan DPM;  Location: [facility] OR 2ND FLR;  Service: Podiatry;  Laterality: Right;    LAPAROSCOPIC APPENDECTOMY N/A 7/22/2021    Procedure: APPENDECTOMY, LAPAROSCOPIC;  Surgeon: Paulo Calvo Jr., MD;  Location: [facility];  Service: General;  Laterality: N/A;    TOE AMPUTATION Right 1/1/2022    Procedure: AMPUTATION, TOE;  Surgeon: Genaro Mason DPM;  Location: [facility];  Service: Podiatry;  Laterality: Right;    TUBAL LIGATION      URETEROSCOPIC REMOVAL OF URETERIC CALCULUS Right 12/22/2021    Procedure: REMOVAL, CALCULUS, URETER, URETEROSCOPIC;  Surgeon: Wanda Arroyo MD;  Location: [facility];  Service: Urology;  Laterality: Right;       Time Tracking:     PT Received  On: 04/06/22  PT Start Time: 1600     PT Stop Time: 1625  PT Total Time (min): 25 min     Billable Minutes: Evaluation 15 and Therapeutic Activity 10    04/06/2022

## 2022-04-06 NOTE — PLAN OF CARE
[facility] - Neuro Critical Care  Discharge Reassessment    Primary Care Provider: Dante Olivier MD    Expected Discharge Date: 4/14/2022     Patient extubated today per MD.  NPO per SLP.  Not medically ready for discharge.     Reassessment (most recent)     Discharge Reassessment - 04/06/22 1836        Discharge Reassessment    Assessment Type Discharge Planning Reassessment     Did the patient's condition or plan change since previous assessment? Yes     Communicated MARY with patient/caregiver Date not available/Unable to determine     Discharge Plan A Skilled Nursing Facility     Discharge Plan B Skilled Nursing Facility     DME Needed Upon Discharge  none     Discharge Barriers Identified Other (see comments)   son wants patient placed at another Nursing Facility    Why the patient remains in the hospital Placement issues                 Tonie Oneal, RN, CCRN-K, [facility]  Neuro-Critical Care   [phone]

## 2022-04-06 NOTE — PROGRESS NOTES
Neurosurgery came by and removed both drains. Staples to site and instructed to place 4x4 gauze if site begins to ooze.   WCTM  Daughter updated on POC and verbalized understanding.

## 2022-04-06 NOTE — SUBJECTIVE & OBJECTIVE
Interval History: s/p evac. More awake. Still intuabted. SD with 45cc    Medications:  Continuous Infusions:   sodium chloride 0.9% 50 mL/hr at 04/06/22 0401     Scheduled Meds:   amLODIPine  10 mg Per NG tube Daily    atorvastatin  40 mg Per NG tube Daily    famotidine  20 mg Per NG tube Daily    levetiracetam  500 mg Per NG tube BID    levothyroxine  75 mcg Per NG tube Before breakfast    mupirocin   Nasal BID    senna-docusate 8.6-50 mg  1 tablet Per NG tube BID    silodosin  4 mg Per NG tube Daily     PRN Meds:sodium chloride, acetaminophen, dextrose 10%, fentaNYL, glucagon (human recombinant), hydrALAZINE, HYDROcodone-acetaminophen, HYDROmorphone, insulin aspart U-100, labetalol, magnesium sulfate IVPB, magnesium sulfate IVPB, metoclopramide HCl, ondansetron, potassium bicarbonate, potassium bicarbonate, potassium bicarbonate, potassium, sodium phosphates, potassium, sodium phosphates, potassium, sodium phosphates, sodium chloride 0.9%, Pharmacy to dose Vancomycin consult **AND** vancomycin - pharmacy to dose     Review of Systems    Unable to obtain: AMS    Objective:     Weight: 49.4 kg (109 lb)  Body mass index is 17.07 kg/m².  Vital Signs (Most Recent):  Temp: 98.78 °F (37.1 °C) (04/06/22 0719)  Pulse: 93 (04/06/22 0719)  Resp: 20 (04/06/22 0719)  BP: (!) 146/65 (04/06/22 0601)  SpO2: 100 % (04/06/22 0719)   Vital Signs (24h Range):  Temp:  [93.6 °F (34.2 °C)-100.22 °F (37.9 °C)] 98.78 °F (37.1 °C)  Pulse:  [60-95] 93  Resp:  [9-31] 20  SpO2:  [100 %] 100 %  BP: (107-154)/(49-70) 146/65  Arterial Line BP: (106-148)/(38-58) 135/53                  Vent Mode: A/C  Oxygen Concentration (%):  [30-40] 30  Resp Rate Total:  [16 br/min-34 br/min] 18 br/min  Vt Set:  [450 mL] 450 mL  PEEP/CPAP:  [5 cmH20] 5 cmH20  Mean Airway Pressure:  [8.6 cmH20-9.6 cmH20] 9.6 cmH20         Urethral Catheter 03/18/22 2210 Non-latex 16 Fr. (Active)       Physical Exam    Neurosurgery Physical Exam    Physical  Exam:  Constitutional: No distress.   HEENT: ataumatic/normocephalic  Cardiovascular: Regular rhythm.   Pulm: intubated   Abdominal: Soft.   Psych/Behavior: He is alert.   [id number]  PERRL  Left contracted and paretic  FCx in all except LUE  Incision C/D/I      Significant Labs:  Recent Labs   Lab 04/05/22  0059 04/05/22  1042 04/06/22  0110 04/06/22  0214   * 132* 79  --     142 142  --    K 3.1* 3.3* 3.8  --     112* 114*  --    CO2 20* 18* 20*  --    BUN 25* 24* 22  --    CREATININE 1.1 1.0 1.3  --    CALCIUM 8.5* 8.4* 7.9*  --    MG 1.9 1.8  --  2.2       Recent Labs   Lab 04/04/22  1250 04/05/22  0059 04/06/22  0110   WBC 15.38* 15.52* 14.82*   HGB 8.0* 7.5* 6.1*   HCT 23.9* 21.9* 17.7*    198 165       No results for input(s): LABPT, INR, APTT in the last 48 hours.    Microbiology Results (last 7 days)       Procedure Component Value Units Date/Time    Urine culture [[id number]] Collected: 04/03/22 1626    Order Status: Completed Specimen: Urine Updated: 04/04/22 2110     Urine Culture, Routine No growth    Narrative:      Specimen Source->Urine          All pertinent labs from the last 24 hours have been reviewed.    Significant Diagnostics:  I have reviewed and interpreted all pertinent imaging results/findings within the past 24 hours.  CT Head Without Contrast    Result Date: 4/3/2022  Redemonstration of a right subdural hematoma with mass effect and mild midline shift. Probable remote right MCA and left cerebellar hemisphere infarcts. This report was flagged in Epic as abnormal. Electronically signed by: Bridget Rasmussen Date:    04/03/2022 Time:    21:01    CT Head Without Contrast    Result Date: 4/3/2022  This report was flagged in Epic as abnormal. 1. Right acute subdural hematoma resulting in mass effect upon the right convexity and 3-4 mm of leftward midline shift near the vertex.  No hydrocephalus at this time.  Neuro surgical consultation advised. 2. Sequela of chronic  microvascular ischemic change and senescent change noting stable encephalomalacia in the right MCA territory as well as within the left cerebellum. Electronically signed by: Eduard Rios MD Date:    04/03/2022 Time:    14:37    CT Cervical Spine Without Contrast    Result Date: 4/3/2022  1. No acute fracture or subluxation of the cervical spine. 2. 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 Electronically signed by: Daniel Goldstein Date:    04/03/2022 Time:    15:11    X-Ray Chest AP Portable    Result Date: 4/3/2022  As above Electronically signed by: Eduard Rios MD Date:    04/03/2022 Time:    18:17

## 2022-04-06 NOTE — PLAN OF CARE
Problem: Physical Therapy  Goal: Physical Therapy Goal  Description: Goals to be met by: 22    Patient will increase functional independence with mobility by performin. Supine to sit with minimum assistance  2. Sit to supine with minimum assistance  3. Rolling to Left and Right with minimum assistance  4. Bed to chair transfer with minimum assistance using LRAD as needed  Outcome: Ongoing, Progressing     Pt tolerated PT session well.      All needs met, all questions answered.  Salomon De Leon PT, DPT

## 2022-04-06 NOTE — PROGRESS NOTES
[facility] - Neuro Critical [facility]  Neurocritical Care  Progress Note    Admit Date: 4/3/2022  Service Date: 04/06/2022  Length of Stay: 3    Subjective:     Chief Complaint: SDH (subdural hematoma)    History of Present Illness: Ms. Adame is a 72 y/o F with a PMHx of baseline dementia, GERD, R MCA stroke with residual LUE weakness, HLD, HTN, Stage IV CKD, TIIDM who presents to [facility] from [facility] with a AoC SDH after an unwitnessed fall from her wheel chair without loss of consciousness. CT spine negative for fracture. CT head with R SDH and 3-4 mm MLS. She is on DAPT at home. Of note, she has been staying at [facility] after a partial R foot amputation, stitches still intact, for which she is receiving vancomycin. VSS. She will be admitted to [facility] for hourly neuromonitoring and a higher level of care.                 Hospital Course: 04/04/2022: OR today for clot evac. Patient returned to ICU intubated on precedex. Post op scan stable. Wean propofol and initiated precedex for likely extubation tomorrow.   04/05/2022 repeat CT head due to drainage.   04/06/2022 NAEO.       Review of Symptoms: intubated on precedex cannot participate    Medications:  Continuous Infusions:   sodium chloride 0.9% 50 mL/hr at 04/06/22 0401     Scheduled Meds:   amLODIPine  10 mg Per NG tube Daily    atorvastatin  40 mg Per NG tube Daily    famotidine  20 mg Per NG tube Daily    levetiracetam  500 mg Per NG tube BID    levothyroxine  75 mcg Per NG tube Before breakfast    mupirocin   Nasal BID    senna-docusate 8.6-50 mg  1 tablet Per NG tube BID    silodosin  4 mg Per NG tube Daily     PRN Meds:.sodium chloride, acetaminophen, dextrose 10%, fentaNYL, glucagon (human recombinant), hydrALAZINE, HYDROcodone-acetaminophen, HYDROmorphone, insulin aspart U-100, labetalol, magnesium sulfate IVPB, magnesium sulfate IVPB, metoclopramide HCl, ondansetron, potassium bicarbonate, potassium bicarbonate, potassium  bicarbonate, potassium, sodium phosphates, potassium, sodium phosphates, potassium, sodium phosphates, sodium chloride 0.9%, Pharmacy to dose Vancomycin consult **AND** vancomycin - pharmacy to dose    OBJECTIVE:   Vital Signs (Most Recent):   Temp: 98.96 °F (37.2 °C) (04/06/22 1005)  Pulse: 84 (04/06/22 1005)  Resp: 16 (04/06/22 1005)  BP: 134/63 (04/06/22 1005)  SpO2: 100 % (04/06/22 1005)    Vital Signs (24h Range):   Temp:  [95.72 °F (35.4 °C)-100.22 °F (37.9 °C)] 98.96 °F (37.2 °C)  Pulse:  [69-95] 84  Resp:  [9-31] 16  SpO2:  [100 %] 100 %  BP: (107-154)/(49-70) 134/63  Arterial Line BP: (106-148)/(38-56) 137/48    ICP/CPP (Last 24h):        I & O (Last 24h):     Intake/Output Summary (Last 24 hours) at 4/6/2022 1019  Last data filed at 4/6/2022 0900  Gross per 24 hour   Intake 1788.57 ml   Output 1157 ml   Net 631.57 ml     Physical Exam:  GA: drowsy, comfortable, no acute distress.   HEENT: No scleral icterus or JVD. Neck supple  Pulmonary: Air entry equal to auscultation A/P/L. Wheezing no, crackles no  Cardiac: RRR, S1 & S2 w/o rubs/murmurs/gallops.   Abdominal: soft, non-tender, bowel sounds present x 4. No appreciable hepatosplenomegaly.  Skin: No jaundice, rashes, or visible lesions.  Neuro:  --Mental Status:  Drowsy, resists eye opening, follows one step commands intermittently.   --Pupils 2.5 mm, PERRL.   --Corneal reflex, gag, cough intact.  Spastic left UE  Withdraws away from noxious stimuli on the right  MSK:  No edema in UE and LE    Vent Data:   Vent Mode: A/C  Oxygen Concentration (%):  [30-40] 30  Resp Rate Total:  [16 br/min-34 br/min] 16 br/min  Vt Set:  [450 mL] 450 mL  PEEP/CPAP:  [5 cmH20] 5 cmH20  Mean Airway Pressure:  [8.6 nnT89-75 cmH20] 12 cmH20    Lines/Drains/Airway:        Airway - Non-Surgical 04/04/22 0954 (Active)   Secured at 23 cm 04/05/22 0751   Measured At Lips 04/05/22 0751   Secured Location Right 04/05/22 0751   Secured by Commercial tube brunson 04/05/22 0751   Tube  Securement Device Changed? Yes 04/04/22 1230   Bite Block none 04/05/22 0751   Site Condition Cool;Dry 04/05/22 0751   Status Intact;Secured;Patent 04/05/22 0751   Site Assessment Clean;Dry 04/05/22 0751   Cuff Pressure 27 cm H2O 04/05/22 0751      PICC Double Lumen 03/26/22 1905 right basilic (Active)   Verification by X-ray Yes 04/04/22 1905   Site Assessment No drainage;No swelling;No redness;No warmth 04/05/22 0305   Extremity Assessment Distal to IV No abnormal discoloration 04/05/22 0305   Line Securement Device Secured with sutureless device 04/04/22 1905   Dressing Type Biopatch in place;Central line dressing 04/05/22 0305   Dressing Status Clean;Dry;Intact 04/05/22 0305   Dressing Intervention Sterile dressing change 04/05/22 0305   Date on Dressing 04/04/22 04/04/22 1905   Dressing Due to be Changed 04/11/22 04/04/22 1905   Left Lumen Patency/Care Infusing 04/05/22 0305   Right Lumen Patency/Care Infusing 04/05/22 0305   Waveform Not being transduced 04/05/22 0305   Line Necessity Review Longterm central access required 04/04/22 0305      Arterial Line 04/04/22 0919 Right Radial (Active)   Site Assessment Clean;Dry;Intact;No redness;No swelling 04/05/22 0305   Line Status Pulsatile blood flow 04/05/22 0305   Art Line Waveform Appropriate;Square wave test performed 04/05/22 0305   Arterial Line Interventions Zeroed and calibrated;Leveled 04/05/22 0305   Color/Movement/Sensation Capillary refill less than 3 sec 04/05/22 0305   Dressing Type Biopatch in place;Central line dressing 04/05/22 0305   Dressing Status Clean;Dry;Intact 04/05/22 0305   Dressing Intervention Integrity maintained 04/05/22 0305   Dressing Change Due 04/11/22 04/05/22 0305   Tubing Change Due 04/08/22 04/04/22 1905           Closed/Suction Drain 04/04/22 1112 Right Scalp Accordion 10 Fr. (Active)   Site Description Unable to view 04/05/22 0305   Dressing Type Gauze 04/05/22 0305   Dressing Status Clean;Dry;Intact;New drainage;Old  drainage 04/05/22 0305   Dressing Intervention Integrity maintained 04/05/22 0305   Drainage Serosanguineous 04/05/22 0305   Status Other (Comment) 04/05/22 0305   Output (mL) 0 mL 04/05/22 0605            Closed/Suction Drain 04/04/22 1200 Right Scalp Other (Comment) 1.9 Fr. (Active)   Site Description Unable to view 04/05/22 0305   Dressing Type Gauze 04/05/22 0305   Dressing Status Clean;Dry;Intact;New drainage;Old drainage 04/05/22 0305   Dressing Intervention Integrity maintained 04/05/22 0305   Drainage Serosanguineous 04/05/22 0305   Status Open to gravity drainage 04/05/22 0305   Output (mL) 40 mL 04/05/22 0605       Female External Urinary Catheter 04/04/22 0100 (Active)   Skin no redness;no breakdown 04/04/22 1505   Tolerance no signs/symptoms of discomfort 04/04/22 1505   Suction Continuous suction at 70 mmHg 04/04/22 0705   Date of last wick change 04/04/22 04/04/22 0305   Time of last wick change 0100 04/04/22 0305     Nutrition/Tube Feeds (if NPO state why): resume tf     Labs:  ABG:   Recent Labs   Lab 04/06/22  0526   PH 7.500*   PO2 198*   PCO2 29.3*   HCO3 22.9*   POCSATURATED 100   BE 0     BMP:  Recent Labs   Lab 04/06/22  0110 04/06/22  0214     --    K 3.8  --    *  --    CO2 20*  --    BUN 22  --    CREATININE 1.3  --    GLU 79  --    MG  --  2.2   PHOS  --  2.8     LFT:   Lab Results   Component Value Date    AST 34 04/06/2022    ALT 11 04/06/2022    ALKPHOS 57 04/06/2022    BILITOT 0.7 04/06/2022    ALBUMIN 2.2 (L) 04/06/2022    PROT 5.8 (L) 04/06/2022     CBC:   Lab Results   Component Value Date    WBC 20.44 (H) 04/06/2022    HGB 8.1 (L) 04/06/2022    HCT 23.3 (L) 04/06/2022    MCV 89 04/06/2022     04/06/2022     Microbiology x 7d:   Microbiology Results (last 7 days)     Procedure Component Value Units Date/Time    Urine culture [[id number]] Collected: 04/03/22 1626    Order Status: Completed Specimen: Urine Updated: 04/04/22 2110     Urine Culture, Routine No growth     Narrative:      Specimen Source->Urine        Imaging:    I personally reviewed the above image.  Today I independently reviewed pertinent medications, lines/drains/airways, imaging, cardiology results, laboratory results, microbiology results, notably:   ASSESSMENT/PLAN:     Active Hospital Problems    Diagnosis    *SDH (subdural hematoma)    Elevated troponin I level    Body mass index (BMI) less than 19    Critical lower limb ischemia    Osteomyelitis of right foot    Status post partial amputation of right foot    Debility    Acute kidney injury superimposed on CKD stage IV    History of stroke    Type 2 diabetes mellitus, with long-term current use of insulin    Hypertension    Hyperlipidemia      Neuro:   SDH s/p evacuation  SD drain pulled today  Elevate HOB  keppra for seizure prophylaxis   D/c precedex for possible extubation    Pulmonary:   Wean vent to possible extubation  SBT for 1 hr, consider extubation if tolerated    Cardiac:   SBP < 140 mmhg  Nicardipine gtt, wean off  Continue home antihypertensives    Renal:    CKD POA  Making urine  BUN/Cr >20  silodosin (home meds)    ID:   Afebrile, leucocytosis trending up  On vancomycin for osteomylitis  Appreciate ID input    Hem/Onc:   Stable hh and plt count    Endocrine:    BS trending up  On insulin regimen will monitor  HbA1c 5.0    Fluids/Electrolytes/Nutrition/GI:   NPO for possible extubation  Replete lytes as needed  Lines:  Art +  CVC PICC  ETT +  Araiza NA  NG +  PEG NA    Proph:  DVT:SCD/no heparin due to SDH  Constipation:  Last output:bowel regimen as needed  PUP:pepcid  VAP:peridex      Uninterrupted Critical Care/Counseling Time (not including procedures):: 30 mins    Yamil Larson MD, FNCS  Neurocritical care attending    Full Code    Yamil Larson MD  Neurocritical Care  [facility] - Neuro Critical Care

## 2022-04-06 NOTE — PROGRESS NOTES
Pharmacokinetic Assessment Follow Up: IV Vancomycin  · 4/6 random level 19.4 mcg/mL ~16 hours post dose  · Goal 15-20 mcg/mL  · Patient with ESRD, baseline serum creatinine ~1-1.3 mg/dL  · Previously pulse dosing, plan to initiate scheduled regimen  · Administer 500 mg q24 hrs  · Collect next trough 4/8 @ 16:30    Drug levels (last 3 results):  Recent Labs   Lab Result Units 04/05/22  0935 04/06/22  0755 04/06/22  1506   Vancomycin, Random ug/mL 18.2 21.8 19.4       Pharmacy will continue to follow and monitor vancomycin.    Please contact pharmacy at extension [phone] for questions regarding this assessment.    Thank you for the consult,   Angélica Velázquez       Patient brief summary:  Beatriz Adame is a 71 y.o. female initiated on antimicrobial therapy with IV Vancomycin for treatment of osteomyelitis    Drug Allergies:   Review of patient's allergies indicates:   Allergen Reactions    Tomato (solanum lycopersicum) Hives and Itching       Actual Body Weight:   49.4 kg    Renal Function:   Estimated Creatinine Clearance: 31.4 mL/min (based on SCr of 1.3 mg/dL).,     Dialysis Method (if applicable):  n/a    CBC (last 72 hours):  Recent Labs   Lab Result Units 04/04/22  0257 04/04/22  1250 04/05/22  0059 04/06/22  0110 04/06/22  0755   WBC K/uL 12.08 15.38* 15.52* 14.82* 20.44*   Hemoglobin g/dL 8.4* 8.0* 7.5* 6.1* 8.1*   Hematocrit % 26.2* 23.9* 21.9* 17.7* 23.3*   Platelets K/uL 149* 193 198 165 178   Gran % % 65.3 83.3* 78.7* 75.2* 80.3*   Lymph % % 22.7 10.5* 13.5* 14.0* 10.6*   Mono % % 10.4 4.7 7.0 9.5 7.9   Eosinophil % % 0.9 0.6 0.1 0.5 0.2   Basophil % % 0.4 0.3 0.3 0.3 0.2   Differential Method  Automated Automated Automated Automated Automated       Metabolic Panel (last 72 hours):  Recent Labs   Lab Result Units 04/04/22  0257 04/04/22  1250 04/05/22  0059 04/05/22  1042 04/06/22  0110 04/06/22  0214   Sodium mmol/L 146* 140 142 142 142  --    Potassium mmol/L 3.9 3.8 3.1* 3.3* 3.8  --    Chloride  mmol/L 112* 107 109 112* 114*  --    CO2 mmol/L 24 23 20* 18* 20*  --    Glucose mg/dL 105 150* 172* 132* 79  --    BUN mg/dL 23 23 25* 24* 22  --    Creatinine mg/dL 1.3 1.1 1.1 1.0 1.3  --    Albumin g/dL 2.5*  --  2.5* 2.2* 2.2*  --    Total Bilirubin mg/dL 0.4  --  0.8 0.8 0.7  --    Alkaline Phosphatase U/L 66  --  64 60 57  --    AST U/L 18  --  22 22 34  --    ALT U/L 8*  --  11 10 11  --    Magnesium mg/dL 2.0  --  1.9 1.8  --  2.2   Phosphorus mg/dL 3.4  --  2.3* 2.3*  --  2.8       Vancomycin Administrations:  vancomycin given in the last 96 hours                   vancomycin 750 mg in dextrose 5 % 250 mL IVPB (ready to mix system) (mg) 750 mg New Bag 04/05/22 1052    vancomycin in dextrose 5 % 1 gram/250 mL IVPB 1,000 mg (mg) 1,000 mg Given 04/04/22 1005                Microbiologic Results:  Microbiology Results (last 7 days)     Procedure Component Value Units Date/Time    Urine culture [[id number]] Collected: 04/03/22 1626    Order Status: Completed Specimen: Urine Updated: 04/04/22 2110     Urine Culture, Routine No growth    Narrative:      Specimen Source->Urine

## 2022-04-06 NOTE — PLAN OF CARE
[facility] Care Plan    POC reviewed with Beatriz Adame at 0300. Pt unable to verbalize understanding. Questions and concerns addressed. No acute events overnight. Pt progressing toward goals. Will continue to monitor. See below and flowsheets for full assessment and VS info.     - PRN labetolol given x2 to maintain SBP <140  - PRN fentanyl IVP given x4 for agitation  - PRN dilaudid given x1 for pain  - Critical lab: Hct 17.7, 1 unit PRBC given  - CT head complete                                  Neuro:  Casimiro Coma Scale  Best Eye Response: 3-->(E3) to speech  Best Motor Response: 6-->(M6) obeys commands  Best Verbal Response: 1-->(V1) none  Napoleon Coma Scale Score: 10  Assessment Qualifiers: patient intubated        24hr Temp:  [92.7 °F (33.7 °C)-100.22 °F (37.9 °C)]     CV:   Rhythm: normal sinus rhythm  BP goals:   SBP < 140  MAP > 65    Resp:   O2 Device (Oxygen Therapy): ventilator  Vent Mode: A/C  Set Rate: 18 BPM  Oxygen Concentration (%): 40  Vt Set: 450 mL  PEEP/CPAP: 5 cmH20    Plan: wean to extubate    GI/:     Diet/Nutrition Received: NPO  Last Bowel Movement: 04/06/22  Voiding Characteristics: other (see comments) (urinary retention)    Intake/Output Summary (Last 24 hours) at 4/6/2022 0338  Last data filed at 4/6/2022 0301  Gross per 24 hour   Intake 1765.66 ml   Output 786 ml   Net 979.66 ml     Unmeasured Output  Urine Occurrence: 1  Stool Occurrence: 0  Pad Count: 1    Labs/Accuchecks:  Recent Labs   Lab 04/06/22  0110   WBC 14.82*   RBC 1.97*   HGB 6.1*   HCT 17.7*         Recent Labs   Lab 04/06/22  0110      K 3.8   CO2 20*   *   BUN 22   CREATININE 1.3   ALKPHOS 57   ALT 11   AST 34   BILITOT 0.7      Recent Labs   Lab 04/03/22  1552   INR 1.1   APTT 24.8      Recent Labs   Lab 04/03/22  1621   TROPONINI 0.017       Electrolytes: N/A - electrolytes WDL  Accuchecks: Q4H    Gtts:   sodium chloride 0.9% 50 mL/hr at 04/06/22 0301       LDA/Wounds:  Lines/Drains/Airways        Peripherally Inserted Central Catheter Line  Duration             PICC Double Lumen 03/26/22 1905 right basilic 10 days              Drain  Duration                  Closed/Suction Drain 04/04/22 1112 Right Scalp Accordion 10 Fr. 1 day         Closed/Suction Drain 04/04/22 1200 Right Scalp Other (Comment) 1.9 Fr. 1 day         NG/OG Tube 04/05/22 1041 Left nostril <1 day              Airway  Duration                  Airway - Non-Surgical 04/04/22 0954 1 day              Arterial Line  Duration             Arterial Line 04/04/22 0919 Right Radial 1 day              Peripheral Intravenous Line  Duration                  Peripheral IV - Single Lumen 04/04/22 1005 18 G Left Wrist 1 day                  Wounds: Yes  Wound care consulted: No

## 2022-04-07 NOTE — PROGRESS NOTES
[facility] - Neuro Critical Care  Neurosurgery  Progress Note    Subjective:     History of Present Illness: 71 year old female with , hypothyroidism, CVA with left-sided residual deficit on ASA/Plavix, DM2, HTN, HLD, CKD, chronic anemia, presenting from SNF after being found down next to wheelchair after unwitnessed fall, consulted to NSGY for right 2 cm SDH without midline shift. She is altered at baseline and unable to provide to history, but family at bedside says she is more confused and less interactive than usual.       Post-Op Info:  Procedure(s) (LRB):  CRANIOTOMY, FOR SUBDURAL HEMATOMA EVACUATION (Right)   3 Days Post-Op     Interval History: 4/7: NAEON. AFVSS. Exam stable. Extubated. Pain controlled. Saturating well on room air. NGT remains in place. Surgical drain removed.     Medications:  Continuous Infusions:   sodium chloride 0.9% 50 mL/hr at 04/07/22 0905     Scheduled Meds:   amLODIPine  10 mg Per NG tube Daily    atorvastatin  40 mg Per NG tube Daily    carvediloL  12.5 mg Per NG tube BID    heparin (porcine)  5,000 Units Subcutaneous Q8H    levetiracetam  500 mg Per NG tube BID    levothyroxine  75 mcg Per NG tube Before breakfast    mupirocin   Nasal BID    senna-docusate 8.6-50 mg  1 tablet Per NG tube BID    silodosin  4 mg Per NG tube Daily    vancomycin (VANCOCIN) IVPB  500 mg Intravenous Q24H     PRN Meds:acetaminophen, dextrose 10%, glucagon (human recombinant), hydrALAZINE, HYDROcodone-acetaminophen, HYDROmorphone, insulin aspart U-100, labetalol, magnesium sulfate IVPB, magnesium sulfate IVPB, metoclopramide HCl, ondansetron, potassium bicarbonate, potassium bicarbonate, potassium bicarbonate, potassium, sodium phosphates, potassium, sodium phosphates, potassium, sodium phosphates, sodium chloride 0.9%, Pharmacy to dose Vancomycin consult **AND** vancomycin - pharmacy to dose         Objective:     Weight: 49.4 kg (109 lb)  Body mass index is 17.07 kg/m².  Vital Signs (Most  Recent):  Temp: 98.4 °F (36.9 °C) (04/07/22 0705)  Pulse: 90 (04/07/22 0905)  Resp: 18 (04/07/22 0905)  BP: (!) 121/58 (04/07/22 0905)  SpO2: 100 % (04/07/22 0905)   Vital Signs (24h Range):  Temp:  [98 °F (36.7 °C)-99.6 °F (37.6 °C)] 98.4 °F (36.9 °C)  Pulse:  [78-99] 90  Resp:  [9-19] 18  SpO2:  [100 %] 100 %  BP: (109-141)/(54-80) 121/58  Arterial Line BP: (123-143)/(39-52) 136/48     Date 04/07/22 0700 - 04/08/22 0659   Shift 6375-8923 4126-4836 2292-3515 24 Hour Total   INTAKE   I.V.(mL/kg) 203.2(4.1)   203.2(4.1)   Shift Total(mL/kg) 203.2(4.1)   203.2(4.1)   OUTPUT   Shift Total(mL/kg)       Weight (kg) 49.4 49.4 49.4 49.4                Vent Mode: Spont  Oxygen Concentration (%):  [30] 30  Resp Rate Total:  [16 br/min-27 br/min] 27 br/min  PEEP/CPAP:  [5 cmH20] 5 cmH20  Pressure Support:  [5 cmH20] 5 cmH20  Mean Airway Pressure:  [7 cmH20-7.8 cmH20] 7.8 cmH20         Urethral Catheter 03/18/22 2210 Non-latex 16 Fr. (Active)       Physical Exam    Neurosurgery Physical Exam    Physical Exam:  Constitutional: No distress.   HEENT: ataumatic/normocephalic  Cardiovascular: Regular rhythm.   Pulm: intubated   Abdominal: Soft.   Psych/Behavior: He is alert.   [id number]  PERRL  Left contracted and paretic  FCx in all except LUE  Incision C/D/I      Significant Labs:  Recent Labs   Lab 04/05/22  1042 04/06/22  0110 04/06/22  0214 04/07/22  0058   * 79  --  80    142  --  146*   K 3.3* 3.8  --  4.1   * 114*  --  114*   CO2 18* 20*  --  19*   BUN 24* 22  --  21   CREATININE 1.0 1.3  --  1.1   CALCIUM 8.4* 7.9*  --  7.9*   MG 1.8  --  2.2 2.2       Recent Labs   Lab 04/06/22  0110 04/06/22  0755 04/07/22  0058   WBC 14.82* 20.44* 19.30*   HGB 6.1* 8.1* 7.5*   HCT 17.7* 23.3* 23.0*    178 178       No results for input(s): LABPT, INR, APTT in the last 48 hours.    Microbiology Results (last 7 days)       Procedure Component Value Units Date/Time    Urine culture [[id number]] Collected: 04/03/22  1626    Order Status: Completed Specimen: Urine Updated: 04/04/22 2110     Urine Culture, Routine No growth    Narrative:      Specimen Source->Urine          All pertinent labs from the last 24 hours have been reviewed.    Significant Diagnostics:  I have reviewed and interpreted all pertinent imaging results/findings within the past 24 hours.  No results found in the last 24 hours.      Assessment/Plan:     * SDH (subdural hematoma)  71 year old female with , hypothyroidism, CVA with left-sided residual deficit on ASA/Plavix, DM2, HTN, HLD, CKD, chronic anemia, presenting from SNF after being found down next to wheelchair after unwitnessed fall, consulted to NSGY for right 2 cm SDH mostly acute, membranous and heterogenous appearing, some chronic component, without midline shift. Now s/p R crani for SDH evacuation (4/4).    POD#3      -- Admitted to ICU with q1h neurochecks.   -- q1 hour vitals/neurochecks  -- SBP < 140  -- Na goal eunatremia  -- Keppra 500 BID  -- drains discontinued  -- Follow up CT head satisfactory x2 with residual blood    -- Extubated and saturating well on room air  -- No HOB restriction  -- PPx: SCDs, BR, IS, hold SQH in setting of acute bleed    Dispo: stable for stepdown to NSY          Brock Fairchild MD  Neurosurgery  [facility] - Neuro Critical Care

## 2022-04-07 NOTE — NURSING TRANSFER
Nursing Transfer Note      4/7/2022     Reason patient is being transferred: stepdown from icu    Transfer To:  from [facility] [id number]    Transfer via bed    Transfer with cardiac monitoring, patient belongings (blue pajama pants), stroke booklet, chart, RNx2    Transported by RN x2    Medicines sent: yes    Any special needs or follow-up needed: no    Chart send with patient: Yes    Notified: daughter, son, brother    Patient reassessed at: 4/7, 1515     Upon arrival to floor: cardiac monitor applied, patient oriented to room, call bell in reach and bed in lowest position, bedside neuro assessment done rodolfo/ Claire NPU RN

## 2022-04-07 NOTE — ASSESSMENT & PLAN NOTE
Although pathology of the clean margins failed to reveal osteomyelitis her bone biopsy cultures from the clean margin are positive for MRSA.  Therefore bone is likely infected at the clean margin however long-term bony changes have not yet been present.  · I favor continuing vancomycin as per prior recommendations for 6 weeks with an estimated end date of May 5, 2022.   · Please consult Pharmacy service for management of vancomycin levels.    · Monitor weekly CBC, BMP, sed rate and CRP.  Monitor vancomycin levels as per hospital protocol.  · Resume OPAT orders upon discharge.

## 2022-04-07 NOTE — PT/OT/SLP PROGRESS
"Speech Language Pathology Treatment    Patient Name:  Beatriz Adame   MRN:  [id number]  Admitting Diagnosis: SDH (subdural hematoma)    Recommendations:                 General Recommendations:  Speech language evaluation, Cognitive-linguistic evaluation and monitor diet tolerance/ongoing swallowing assessment  Diet recommendations:  Mechanical soft, Liquid Diet Level: Thin   Aspiration Precautions: 1 bite/sip at a time, Alternating bites/sips, Assistance with meals, Avoid talking while eating, Eliminate distractions, Feed only when awake/alert, Frequent oral care, HOB to 90 degrees, Meds crushed in puree, Monitor for s/s of aspiration, Remain upright 30 minutes post meal, Small bites/sips and Strict aspiration precautions   General Precautions: Standard, aspiration, fall  Communication strategies:  go to room if call light pushed    Subjective     "It burns a little bit." pt reports burning upon swallowing PO.     Pain/Comfort:  · Pain Rating 1:  (pt did not rate)    Respiratory Status: Nasal cannula, flow 2 L/min    Objective:     Has the patient been evaluated by SLP for swallowing?   Yes  Keep patient NPO? No   Current Respiratory Status:        Pt seen for ongoing swallowing assessment to determine if safe for oral diet.  NG tube still in place, but only being used for medication administration. TFs not started.  Pt accepted the following PO trials: ice chip x 1, thin water via 1/2 tsp x 1, full tsp x 1, cup sip x 1, straw sips x 5; 4 oz pudding; 1/4 cracker x 1.  Prolonged mastication, piecemeal deglutition, and lingual residue present for solids.  No overt coughing and choking observed, though pt did exhibit irritation from presence of NG tube upon swallow. Education provided to pt regarding ongoing swallowing assessment, diet recommendations,aspiration and overt s/s of aspiration, aspiration precautions, and SLP treatment plan and POC.   Pt demonstrated understanding of education provided, but will benefit " from continued reinforcement.       Assessment:     Beatriz Adame is a 71 y.o. female with an SLP diagnosis of Dysphagia.      Goals:   Multidisciplinary Problems     SLP Goals        Problem: SLP    Goal Priority Disciplines Outcome   SLP Goal     SLP Ongoing, Progressing   Description: Speech Language Pathology Goals  Goals expected to be met by 4/13:  1. Pt will participate in ongoing swallowing assessment to determine if/when safe for oral intake.                            Plan:     · Patient to be seen:  4 x/week   · Plan of Care expires:  05/06/22  · Plan of Care reviewed with:  patient   · SLP Follow-Up:  Yes       Discharge recommendations:  nursing facility, basic     Time Tracking:     SLP Treatment Date:   04/07/22  Speech Start Time:  1010  Speech Stop Time:  1030     Speech Total Time (min):  20 min    Billable Minutes: Treatment Swallowing Dysfunction 12 and Self Care/Home Management Training 8    04/07/2022

## 2022-04-07 NOTE — PLAN OF CARE
04/07/22 1353   Post-Acute Status   Post-Acute Authorization Placement   Post-Acute Placement Status Referrals Sent   Discharge Plan   Discharge Plan A Skilled Nursing Facility   Discharge Plan B Home Health     The PRN MARIO met with the pt to discuss dc planning.  Per note, the pt's son does not want her to go back to [facility].  She stated that the SW could call her son.  SW called her son, Rere Adame ([phone]).  He stated that he would like to try to take her home to his house on the  with HH.  However, he needs to discuss this with his wife.  He stated that he would also like to send referrals out to new SNF.  SW verbally gave him a list over the phone.  He wanted the SW to send referrals to [facility], and [facility]. MARIO sent these referrals through [facility] and faxed to [facility].  MARIO will f/u as needed.    Ester Glasgow LCSW   PRN

## 2022-04-07 NOTE — PLAN OF CARE
Problem: Occupational Therapy  Goal: Occupational Therapy Goal  Description: Goals to be met by: 5/7/22    Patient will increase functional independence with ADLs by performing:    Grooming while seated with Stand-by Assistance.  Toileting from bedside commode with Minimal Assistance for hygiene and clothing management.   Supine to sit with Minimal Assistance.  Stand pivot transfers with Minimal Assistance.  Toilet transfer to bedside commode with Minimal Assistance.    Outcome: Ongoing, Progressing    OT evaluation with goals established. Patient completed bed mobility at total assistance x 2. She sat edge of bed ~12 minutes with stand by assistance with bout of minimal assistance toward end. She required minimal assistance with grooming task sitting edge of bed. Patient previously contracted in LUE from previous stroke. Patient with minimal passive motion of left wrist and fingers prior to patient reporting pain with passion motion.

## 2022-04-07 NOTE — PT/OT/SLP EVAL
"Occupational Therapy   Evaluation    Name: Beatriz Adame  MRN: [id number]  Admitting Diagnosis:  SDH (subdural hematoma) 3 Days Post-Op    Recommendations:     Discharge Recommendations: nursing facility, skilled  Discharge Equipment Recommendations:   (TBD)  Barriers to discharge:  Decreased caregiver support, Other (Comment) (increased level of care)    Assessment:     Beatriz Adame is a 71 y.o. female with a medical diagnosis of SDH (subdural hematoma).Patient completed bed mobility at total assistance x 2. She sat edge of bed ~12 minutes with stand by assistance with bout of minimal assistance toward end. She required minimal assistance with grooming task sitting edge of bed. Patient previously contracted in LUE from previous stroke. Patient with minimal passive motion of left wrist and fingers prior to patient reporting pain with passion motion. Patient followed directions throughout session. She would benefit from skilled OT needs while in acute care to increase independence with ADLs and ADL transfers. Performance deficits affecting function: weakness, impaired functional mobilty, impaired endurance, gait instability, impaired balance, impaired self care skills, decreased ROM, impaired muscle length, abnormal tone, decreased upper extremity function, decreased lower extremity function.      Rehab Prognosis: Good; patient would benefit from acute skilled OT services to address these deficits and reach maximum level of function.       Plan:     Patient to be seen 3 x/week to address the above listed problems via self-care/home management, therapeutic activities, therapeutic exercises, neuromuscular re-education  · Plan of Care Expires: 05/07/22  · Plan of Care Reviewed with: patient    Subjective     Chief Complaint: "I am hungry"   Patient/Family Comments/goals: "go back to for more rehab"     Occupational Profile:  Living Environment: Patient was living alone in 1 level home with 4 steps, walk in shower " with grab bars: reported for the past few months she has been at a SNF getting rehab   Previous level of function: she reported previous independence with ADLs, stated she is wheelchair bound and stands on right foot to transfer into wheelchair without assistance, 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  Patient found supine with arterial line, blood pressure cuff, pulse ox (continuous), telemetry, bowel management system, NG tube, peripheral IV, PICC line upon OT entry to room.    General Precautions: Standard, fall, aspiration, NPO, seizure   Orthopedic Precautions:N/A   Braces: N/A     Occupational Performance:    Bed Mobility:    · Patient completed Scooting while sitting edge of bed with total assistance  · Patient completed Supine to Sit with total assistance and 2 persons  · Patient completed Sit to Supine with total assistance and 2 persons    Functional Mobility/Transfers:  · Deferred     Activities of Daily Living:  · Grooming: minimal assistance sitting edge of bed to wash face, required assistance with lifting RUE to wash face     Cognitive/Visual Perceptual:  Cognitive/Psychosocial Skills:     -       Oriented to: Person, Place and Time   -       Follows Commands/attention:Follows multistep  commands    Physical Exam:  Dominant hand:    -       RHD  Upper Extremity Range of Motion:     -       Right Upper Extremity: required assistance with shoulder flexion in order to reach full face for grooming   -       Left Upper Extremity: flexor contractor throughout, very limited passive motion of wrist/fingers prior to reporting pain   Upper  Extremity Strength:    -       Right Upper Extremity: functional within range of motion   -       Left Upper Extremity: no functional range of motion for strength     AMPA 6 Click ADL:  AMPA Total Score: 11    Treatment & Education:  -patient able to visually track throughout session   -patient able to cross midline with use of RUE  -patient with head tilt to right with inability to come to neutral   -patient required stand by assistance for sitting edge of bed ~12 minutes with need for minimal assistance towards ends due to patient leaning forward with patient recognizing and communicating she was leaning forward  -patient educated on role of OT and plan of care       Patient left supine with all lines intact, call button in reach and RN notified    GOALS:   Multidisciplinary Problems     Occupational Therapy Goals        Problem: Occupational Therapy    Goal Priority Disciplines Outcome Interventions   Occupational Therapy Goal     OT, PT/OT Ongoing, Progressing    Description: Goals to be met by: 5/7/22    Patient will increase functional independence with ADLs by performing:    Grooming while seated with Stand-by Assistance.  Toileting from bedside commode with Minimal Assistance for hygiene and clothing management.   Supine to sit with Minimal Assistance.  Stand pivot transfers with Minimal Assistance.  Toilet transfer to bedside commode with Minimal Assistance.                     History:     Past Medical History:   Diagnosis Date    Gastroparesis     GERD (gastroesophageal reflux disease)     History of Clostridium difficile colitis 07/24/2021    History of kidney stones     History of stroke     left side paralysis    Hyperlipidemia     Hypertension     Hypothyroidism     Iron deficiency anemia     Osteoarthritis     Peripheral neuropathy     Stage IV CKD     Type II diabetes mellitus        Past Surgical History:   Procedure Laterality Date    APPENDECTOMY      CRANIOTOMY FOR EVACUATION OF  SUBDURAL HEMATOMA Right 4/4/2022    Procedure: CRANIOTOMY, FOR SUBDURAL HEMATOMA EVACUATION;  Surgeon: Silvio White MD;  [facility];  Service: Neurosurgery;  Laterality: Right;    CYSTOSCOPY W/ URETERAL STENT PLACEMENT Right 10/13/2021    Procedure: CYSTOSCOPY, WITH URETERAL STENT INSERTION;  Surgeon: Wanda Arroyo MD;  Location: [facility];  Service: Urology;  Laterality: Right;    ESOPHAGOGASTRODUODENOSCOPY N/A 11/23/2021    Procedure: EGD (ESOPHAGOGASTRODUODENOSCOPY);  Surgeon: Obed Jeong MD;  Location: [facility] ENDO (2ND FLR);  Service: Endoscopy;  Laterality: N/A;    FOOT AMPUTATION THROUGH METATARSAL Right 3/24/2022    Procedure: AMPUTATION, FOOT, PARTIAL 4th and 5th ray;  Surgeon: Rodrigo Logan DPM;  Location: [facility] OR 2ND FLR;  Service: Podiatry;  Laterality: Right;    LAPAROSCOPIC APPENDECTOMY N/A 7/22/2021    Procedure: APPENDECTOMY, LAPAROSCOPIC;  Surgeon: Paulo Calvo Jr., MD;  Location: [facility];  Service: General;  Laterality: N/A;    TOE AMPUTATION Right 1/1/2022    Procedure: AMPUTATION, TOE;  Surgeon: Genaro Mason DPM;  Location: [facility];  Service: Podiatry;  Laterality: Right;    TUBAL LIGATION      URETEROSCOPIC REMOVAL OF URETERIC CALCULUS Right 12/22/2021    Procedure: REMOVAL, CALCULUS, URETER, URETEROSCOPIC;  Surgeon: Wanda Arroyo MD;  Location: [facility];  Service: Urology;  Laterality: Right;       Time Tracking:     OT Date of Treatment: 04/07/22  OT Start Time: 0922  OT Stop Time: 0941  OT Total Time (min): 19 min    Billable Minutes:Evaluation 11  Self Care/Home Management 8    Jessica Escobar OT  4/7/2022

## 2022-04-07 NOTE — SUBJECTIVE & OBJECTIVE
"Interval History: "ok".  Patient with known osteomyelitis due to MRSA on outpatient vancomycin who was admitted for management of a subdural hematoma.  She underwent drain placement for evacuation.  She has now been extubated and the drain has been removed.    Review of Systems   Constitutional:  Negative for chills, fatigue, fever and unexpected weight change.   HENT:  Negative for ear pain, facial swelling, hearing loss, mouth sores, nosebleeds, rhinorrhea, sinus pressure, sore throat, tinnitus, trouble swallowing and voice change.    Eyes:  Negative for photophobia, pain, redness and visual disturbance.   Respiratory:  Negative for cough, chest tightness, shortness of breath and wheezing.    Cardiovascular:  Negative for chest pain, palpitations and leg swelling.   Gastrointestinal:  Negative for abdominal pain, blood in stool, constipation, diarrhea, nausea and vomiting.   Endocrine: Negative for cold intolerance, heat intolerance, polydipsia, polyphagia and polyuria.   Genitourinary:  Negative for decreased urine volume, dysuria, flank pain, frequency, hematuria, menstrual problem, urgency, vaginal bleeding, vaginal discharge and vaginal pain.   Musculoskeletal:  Negative for arthralgias, back pain, joint swelling, myalgias and neck pain.   Skin:  Negative for rash.   Allergic/Immunologic: Negative for environmental allergies, food allergies and immunocompromised state.   Neurological:  Negative for dizziness, seizures, syncope, weakness, light-headedness, numbness and headaches.   Hematological:  Negative for adenopathy. Does not bruise/bleed easily.   Psychiatric/Behavioral:  Negative for confusion, hallucinations, self-injury, sleep disturbance and suicidal ideas. The patient is not nervous/anxious.    Objective:     Vital Signs (Most Recent):  Temp: 99 °F (37.2 °C) (04/06/22 1901)  Pulse: 99 (04/06/22 2001)  Resp: 16 (04/06/22 2001)  BP: 135/64 (04/06/22 2001)  SpO2: 100 % (04/06/22 2001) Vital Signs (24h " Range):  Temp:  [97.52 °F (36.4 °C)-100.22 °F (37.9 °C)] 99 °F (37.2 °C)  Pulse:  [81-99] 99  Resp:  [9-31] 16  SpO2:  [100 %] 100 %  BP: (107-151)/(49-67) 135/64  Arterial Line BP: (106-148)/(38-56) 139/47     Weight: 49.4 kg (109 lb)  Body mass index is 17.07 kg/m².    Estimated Creatinine Clearance: 31.4 mL/min (based on SCr of 1.3 mg/dL).    Physical Exam  Vitals and nursing note reviewed.   Constitutional:       Appearance: She is well-developed.      Interventions: She is sedated and intubated.   HENT:      Head: Normocephalic and atraumatic.      Comments: Surgical incision with sutures in place.  No surrounding erythema or purulence.     Right Ear: External ear normal.      Left Ear: External ear normal.   Eyes:      General: No scleral icterus.        Right eye: No discharge.         Left eye: No discharge.      Conjunctiva/sclera: Conjunctivae normal.   Cardiovascular:      Rate and Rhythm: Normal rate and regular rhythm.      Heart sounds: Normal heart sounds. No murmur heard.    No friction rub. No gallop.   Pulmonary:      Effort: Pulmonary effort is normal. No respiratory distress. She is intubated.      Breath sounds: Normal breath sounds. No stridor. No wheezing or rales.   Abdominal:      General: Bowel sounds are normal.   Musculoskeletal:         General: No tenderness.      Comments: Ankylosed LUE.   Skin:     General: Skin is warm and dry.      Comments: Right foot covered with gauze dressing.    Neurological:      Mental Status: She is alert.       Significant Labs: Blood Culture:   Recent Labs   Lab 12/30/21  1523 12/30/21  1536 03/18/22  1641   LABBLOO No growth after 5 days. No growth after 5 days. No growth after 5 days.  No growth after 5 days.     BMP:   Recent Labs   Lab 04/06/22  0110 04/06/22  0214   GLU 79  --      --    K 3.8  --    *  --    CO2 20*  --    BUN 22  --    CREATININE 1.3  --    CALCIUM 7.9*  --    MG  --  2.2     CBC:   Recent Labs   Lab 04/05/22  0059  04/06/22  0110 04/06/22  0755   WBC 15.52* 14.82* 20.44*   HGB 7.5* 6.1* 8.1*   HCT 21.9* 17.7* 23.3*    165 178     Wound Culture:   Recent Labs   Lab 03/18/22  1620 03/24/22  1538   LABAERO METHICILLIN RESISTANT STAPHYLOCOCCUS AUREUS  Many  * METHICILLIN RESISTANT STAPHYLOCOCCUS AUREUS  Few  *  METHICILLIN RESISTANT STAPHYLOCOCCUS AUREUS  Few  *       Significant Imaging: I have reviewed all pertinent imaging results/findings within the past 24 hours.

## 2022-04-07 NOTE — PROGRESS NOTES
[facility] - Neuro Critical Care  Neurocritical Care  Progress Note    Admit Date: 4/3/2022  Service Date: 04/07/2022  Length of Stay: 4    Subjective:     Chief Complaint: SDH (subdural hematoma)    History of Present Illness: Ms. Adame is a 72 y/o F with a PMHx of baseline dementia, GERD, R MCA stroke with residual LUE weakness, HLD, HTN, Stage IV CKD, TIIDM who presents to [facility] from [facility] with a AoC SDH after an unwitnessed fall from her wheel chair without loss of consciousness. CT spine negative for fracture. CT head with R SDH and 3-4 mm MLS. She is on DAPT at home. Of note, she has been staying at [facility] after a partial R foot amputation, stitches still intact, for which she is receiving vancomycin. VSS. She will be admitted to [facility] for hourly neuromonitoring and a higher level of care.                 Hospital Course: 04/04/2022: OR today for clot evac. Patient returned to ICU intubated on precedex. Post op scan stable. Wean propofol and initiated precedex for likely extubation tomorrow.   04/05/2022 repeat CT head due to drainage.   04/06/2022 NAEO.   04/07/2022 NAEO. Successfully extubated      Review of Symptoms: intubated on precedex cannot participate    Medications:  Continuous Infusions:   sodium chloride 0.9% 50 mL/hr at 04/07/22 0905     Scheduled Meds:   amLODIPine  10 mg Per NG tube Daily    atorvastatin  40 mg Per NG tube Daily    carvediloL  12.5 mg Per NG tube BID    heparin (porcine)  5,000 Units Subcutaneous Q8H    levetiracetam  500 mg Per NG tube BID    levothyroxine  75 mcg Per NG tube Before breakfast    mupirocin   Nasal BID    senna-docusate 8.6-50 mg  1 tablet Per NG tube BID    silodosin  4 mg Per NG tube Daily    vancomycin (VANCOCIN) IVPB  500 mg Intravenous Q24H     PRN Meds:.acetaminophen, dextrose 10%, glucagon (human recombinant), hydrALAZINE, HYDROcodone-acetaminophen, HYDROmorphone, insulin aspart U-100, labetalol, magnesium sulfate IVPB,  magnesium sulfate IVPB, metoclopramide HCl, ondansetron, potassium bicarbonate, potassium bicarbonate, potassium bicarbonate, potassium, sodium phosphates, potassium, sodium phosphates, potassium, sodium phosphates, sodium chloride 0.9%, Pharmacy to dose Vancomycin consult **AND** vancomycin - pharmacy to dose    OBJECTIVE:   Vital Signs (Most Recent):   Temp: 98.4 °F (36.9 °C) (04/07/22 0705)  Pulse: 90 (04/07/22 0905)  Resp: 18 (04/07/22 0905)  BP: (!) 121/58 (04/07/22 0905)  SpO2: 100 % (04/07/22 0905)    Vital Signs (24h Range):   Temp:  [98 °F (36.7 °C)-99.6 °F (37.6 °C)] 98.4 °F (36.9 °C)  Pulse:  [78-99] 90  Resp:  [9-19] 18  SpO2:  [100 %] 100 %  BP: (109-141)/(54-80) 121/58  Arterial Line BP: (123-143)/(39-52) 136/48    ICP/CPP (Last 24h):        I & O (Last 24h):     Intake/Output Summary (Last 24 hours) at 4/7/2022 1003  Last data filed at 4/7/2022 0905  Gross per 24 hour   Intake 1167.6 ml   Output 715 ml   Net 452.6 ml     Physical Exam:  GA: awake, comfortable, no acute distress.   HEENT: No scleral icterus or JVD. Neck supple  Pulmonary: Air entry equal to auscultation A/P/L. Wheezing no, crackles no  Cardiac: RRR, S1 & S2 w/o rubs/murmurs/gallops.   Abdominal: soft, non-tender, bowel sounds present x 4. No appreciable hepatosplenomegaly.  Skin: No jaundice, rashes, or visible lesions.  Neuro:  --Mental Status:  awake, spontaneous eye opening, follows  Commands. Fluent and appropriate   --Pupils 2.5 mm, PERRL.   --Corneal reflex, gag, cough intact.  Spastic left UE  Spontaneous movement in right UE at least 4/5  MSK:  No edema in UE and LE    Vent Data:   Vent Mode: Spont  Oxygen Concentration (%):  [30] 30  Resp Rate Total:  [16 br/min-27 br/min] 27 br/min  PEEP/CPAP:  [5 cmH20] 5 cmH20  Pressure Support:  [5 cmH20] 5 cmH20  Mean Airway Pressure:  [7 cmH20-7.8 cmH20] 7.8 cmH20    Lines/Drains/Airway:     PICC Double Lumen 03/26/22 1905 right basilic (Active)   Verification by X-ray Yes 04/06/22 1901    Site Assessment No drainage;No redness 04/06/22 1901   Extremity Assessment Distal to IV No abnormal discoloration 04/07/22 0301   Line Securement Device Secured with sutures 04/06/22 1901   Dressing Type Biopatch in place 04/07/22 0301   Dressing Status Clean;Dry;Intact 04/07/22 0301   Dressing Intervention Integrity maintained 04/07/22 0301   Date on Dressing 04/04/22 04/06/22 1901   Dressing Due to be Changed 04/11/22 04/06/22 1901   Left Lumen Patency/Care Infusing 04/07/22 0301   Right Lumen Patency/Care Infusing 04/07/22 0301   Waveform Not being transduced 04/07/22 0301   Line Necessity Review Poor venous access 04/06/22 1901      Arterial Line 04/04/22 0919 Right Radial (Active)   Site Assessment Clean;Dry;Intact 04/07/22 0301   Line Status Pulsatile blood flow 04/07/22 0301   Art Line Waveform Appropriate 04/07/22 0301   Arterial Line Interventions Zeroed and calibrated 04/07/22 0301   Color/Movement/Sensation Capillary refill less than 3 sec 04/07/22 0301   Dressing Type Biopatch in place 04/07/22 0301   Dressing Status Clean;Dry;Intact 04/07/22 0301   Dressing Intervention Integrity maintained 04/07/22 0301   Dressing Change Due 04/11/22 04/07/22 0301   Tubing Change Due 04/08/22 04/06/22 1901           NG/OG Tube 04/05/22 1041 Left nostril (Active)   Placement Check placement verified by x-ray 04/07/22 0301   Tolerance no signs/symptoms of discomfort 04/07/22 0301   Clamp Status/Tolerance no abdominal discomfort 04/07/22 0301   Suction Setting/Drainage Method suction at the bedside 04/07/22 0301   Insertion Site Appearance no redness, warmth, tenderness, skin breakdown, drainage 04/07/22 0301   Drainage None 04/07/22 0301   Flush/Irrigation flushed w/;water 04/07/22 0301   Intake (mL) 50 mL 04/06/22 2001   Residual Amount (ml) 25 ml 04/05/22 1105            Fecal Incontinence  04/06/22 0830 (Active)   Application fecal incontinence  in place 04/07/22 0301   Drainage Method attached to  drainage bag 04/07/22 0301   Securement to gravity 04/07/22 0301   Skin no breakdown 04/07/22 0301   Tolerance no signs/symptoms of discomfort 04/07/22 0301   Stool (mL) 80 mL 04/07/22 0501       Nutrition/Tube Feeds (if NPO state why): resume tf if fails swallow eval    Labs:  ABG:   Recent Labs   Lab 04/07/22  0401   PH 7.374   PO2 133*   PCO2 31.1*   HCO3 18.1*   POCSATURATED 99   BE -7     BMP:  Recent Labs   Lab 04/07/22  0058   *   K 4.1   *   CO2 19*   BUN 21   CREATININE 1.1   GLU 80   MG 2.2   PHOS 4.6*     LFT:   Lab Results   Component Value Date    AST 36 04/07/2022    ALT 13 04/07/2022    ALKPHOS 58 04/07/2022    BILITOT 0.8 04/07/2022    ALBUMIN 2.2 (L) 04/07/2022    PROT 6.0 04/07/2022     CBC:   Lab Results   Component Value Date    WBC 19.30 (H) 04/07/2022    HGB 7.5 (L) 04/07/2022    HCT 23.0 (L) 04/07/2022    MCV 92 04/07/2022     04/07/2022     Microbiology x 7d:   Microbiology Results (last 7 days)     Procedure Component Value Units Date/Time    Urine culture [[id number]] Collected: 04/03/22 1626    Order Status: Completed Specimen: Urine Updated: 04/04/22 2110     Urine Culture, Routine No growth    Narrative:      Specimen Source->Urine        Imaging:    I personally reviewed the above image.  Today I independently reviewed pertinent medications, lines/drains/airways, imaging, cardiology results, laboratory results, microbiology results, notably:   ASSESSMENT/PLAN:     Active Hospital Problems    Diagnosis    *SDH (subdural hematoma)    Elevated troponin I level    Body mass index (BMI) less than 19    Critical lower limb ischemia    Osteomyelitis of right foot    Status post partial amputation of right foot    Debility    Acute kidney injury superimposed on CKD stage IV    History of stroke    Type 2 diabetes mellitus, with long-term current use of insulin    Hypertension    Hyperlipidemia      Neuro:   SDH s/p evacuation  SD drain pulled today  Elevate  HOB  keppra for seizure prophylaxis   Continue to hold DAPT (history of stroke). Will defer to neurosurgery on timing and choice of antplatelet    Pulmonary:   Successful extubation  Saturation >92% on room air    Cardiac:   SBP < 140 mmhg  resume home oral antihypertensives    Renal:    CKD POA  Making urine  BUN/Cr >20  silodosin (home meds)    ID:   Afebrile, leucocytosis trending up  On vancomycin for osteomylitis  Appreciate ID input    Hem/Onc:   Stable hh and plt count    Endocrine:    BS tstable  On insulin regimen   HbA1c 5.0    Fluids/Electrolytes/Nutrition/GI:   Swallow eval vs tf  Replete lytes as needed  Lines:  Art + d/c  CVC PICC  ETT NA  Araiza NA  NG +  PEG NA    Proph:  DVT:SCD/no heparin due to SDH  Constipation:  Last output:bowel regimen as needed  PUP:pepcid  VAP:peridex    Transfer to [facility]    Uninterrupted Critical Care/Counseling Time (not including procedures):: III    Yamil Larson MD, FNCS  Neurocritical care attending    Full Code    Yamil Larson MD  Neurocritical Care  [facility] - Neuro Critical Care

## 2022-04-07 NOTE — PLAN OF CARE
[facility] Care Plan    POC reviewed with Beatriz Adame at 0300. Pt verbalized understanding. Questions and concerns addressed. No acute events overnight. Pt progressing toward goals. Will continue to monitor. See below and flowsheets for full assessment and VS info.     - Pt given PRN zofran x1 for nausea   Neuro:  Casimiro Coma Scale  Best Eye Response: 4-->(E4) spontaneous  Best Motor Response: 6-->(M6) obeys commands  Best Verbal Response: 4-->(V4) confused  Casimiro Coma Scale Score: 14  Assessment Qualifiers: patient not sedated/intubated  Pupil PERRLA: yes     24hr Temp:  [97.52 °F (36.4 °C)-99.68 °F (37.6 °C)]     CV:   Rhythm: normal sinus rhythm  BP goals:   SBP < 140  MAP > 65    Resp:   O2 Device (Oxygen Therapy): nasal cannula  Vent Mode: Spont  Set Rate: 15 BPM  Oxygen Concentration (%): 30  Vt Set: 450 mL  PEEP/CPAP: 5 cmH20  Pressure Support: 5 cmH20    Plan: N/A    GI/:     Diet/Nutrition Received: NPO  Last Bowel Movement: 04/06/22  Voiding Characteristics: other (see comments) (urinary retention)    Intake/Output Summary (Last 24 hours) at 4/7/2022 0347  Last data filed at 4/7/2022 0301  Gross per 24 hour   Intake 1372.61 ml   Output 486 ml   Net 886.61 ml     Unmeasured Output  Urine Occurrence: 1  Stool Occurrence: 1 (loose )  Pad Count: 1    Labs/Accuchecks:  Recent Labs   Lab 04/07/22  0058   WBC 19.30*   RBC 2.50*   HGB 7.5*   HCT 23.0*         Recent Labs   Lab 04/07/22  0058   *   K 4.1   CO2 19*   *   BUN 21   CREATININE 1.1   ALKPHOS 58   ALT 13   AST 36   BILITOT 0.8      Recent Labs   Lab 04/03/22  1552   INR 1.1   APTT 24.8      Recent Labs   Lab 04/03/22  1621   TROPONINI 0.017       Electrolytes: N/A - electrolytes WDL  Accuchecks: Q4H    Gtts:   sodium chloride 0.9% 50 mL/hr at 04/07/22 0301       LDA/Wounds:  Lines/Drains/Airways       Peripherally Inserted Central Catheter Line  Duration             PICC Double Lumen 03/26/22 1905 right basilic 11 days               Drain  Duration                  NG/OG Tube 04/05/22 1041 Left nostril 1 day         Fecal Incontinence  04/06/22 0830 <1 day              Arterial Line  Duration             Arterial Line 04/04/22 0919 Right Radial 2 days              Peripheral Intravenous Line  Duration                  Peripheral IV - Single Lumen 04/04/22 1005 18 G Left Wrist 2 days                  Wounds: No  Wound care consulted: No   Problem: Infection  Goal: Absence of Infection Signs and Symptoms  Outcome: Ongoing, Progressing     Problem: Adult Inpatient Plan of Care  Goal: Plan of Care Review  Outcome: Ongoing, Progressing  Goal: Patient-Specific Goal (Individualized)  Description: Admit Date: 4/3/2022    SDH (subdural hematoma)    Past Medical History:  No date: Gastroparesis  No date: GERD (gastroesophageal reflux disease)  07/24/2021: History of Clostridium difficile colitis  No date: History of kidney stones  No date: History of stroke      Comment:  left side paralysis  No date: Hyperlipidemia  No date: Hypertension  No date: Hypothyroidism  No date: Iron deficiency anemia  No date: Osteoarthritis  No date: Peripheral neuropathy  No date: Stage IV CKD  No date: Type II diabetes mellitus    Past Surgical History:  No date: APPENDECTOMY  10/13/2021: CYSTOSCOPY W/ URETERAL STENT PLACEMENT; Right      Comment:  Procedure: CYSTOSCOPY, WITH URETERAL STENT INSERTION;                 Surgeon: Wanda Arroyo MD;  Location: [facility];                 Service: Urology;  Laterality: Right;  11/23/2021: ESOPHAGOGASTRODUODENOSCOPY; N/A      Comment:  Procedure: EGD (ESOPHAGOGASTRODUODENOSCOPY);  Surgeon:                Obed Jeong MD;  Location: [facility] ENDO (2ND FLR);                 Service: Endoscopy;  Laterality: N/A;  3/24/2022: FOOT AMPUTATION THROUGH METATARSAL; Right      Comment:  Procedure: AMPUTATION, FOOT, PARTIAL 4th and 5th ray;                 Surgeon: Rodrigo Logan DPM;  Location: [facility] OR 2ND FLR;                  Service: Podiatry;  Laterality: Right;  7/22/2021: LAPAROSCOPIC APPENDECTOMY; N/A      Comment:  Procedure: APPENDECTOMY, LAPAROSCOPIC;  Surgeon: Paulo Calvo Jr., MD;  [facility];  Service: General;                 Laterality: N/A;  1/1/2022: TOE AMPUTATION; Right      Comment:  Procedure: AMPUTATION, TOE;  Surgeon: Genaro Mason DPM;  Location: [facility];  Service: Podiatry;  Laterality:               Right;  No date: TUBAL LIGATION  12/22/2021: URETEROSCOPIC REMOVAL OF URETERIC CALCULUS; Right      Comment:  Procedure: REMOVAL, CALCULUS, URETER, URETEROSCOPIC;                 Surgeon: Wanda Arroyo MD;  Location: [facility];                 Service: Urology;  Laterality: Right;    Individualization:   1. Please dim lights at night    Restraints:             Outcome: Ongoing, Progressing  Goal: Absence of Hospital-Acquired Illness or Injury  Outcome: Ongoing, Progressing  Goal: Optimal Comfort and Wellbeing  Outcome: Ongoing, Progressing  Goal: Readiness for Transition of Care  Outcome: Ongoing, Progressing     Problem: Adjustment to Illness (Stroke, Hemorrhagic)  Goal: Optimal Coping  Outcome: Ongoing, Progressing     Problem: Bowel Elimination Impaired (Stroke, Hemorrhagic)  Goal: Effective Bowel Elimination  Outcome: Ongoing, Progressing     Problem: Cerebral Tissue Perfusion (Stroke, Hemorrhagic)  Goal: Optimal Cerebral Tissue Perfusion  Outcome: Ongoing, Progressing     Problem: Cognitive Impairment (Stroke, Hemorrhagic)  Goal: Optimal Cognitive Function  Outcome: Ongoing, Progressing     Problem: Communication Impairment (Stroke, Hemorrhagic)  Goal: Effective Communication Skills  Outcome: Ongoing, Progressing     Problem: Functional Ability Impaired (Stroke, Hemorrhagic)  Goal: Optimal Functional Ability  Outcome: Ongoing, Progressing     Problem: Pain (Stroke, Hemorrhagic)  Goal: Acceptable Pain Control  Outcome: Ongoing, Progressing     Problem: Respiratory  Compromise (Stroke, Hemorrhagic)  Goal: Effective Oxygenation and Ventilation  Outcome: Ongoing, Progressing     Problem: Sensorimotor Impairment (Stroke, Hemorrhagic)  Goal: Improved Sensorimotor Function  Outcome: Ongoing, Progressing     Problem: Swallowing Impairment (Stroke, Hemorrhagic)  Goal: Optimal Eating and Swallowing Without Aspiration  Outcome: Ongoing, Progressing     Problem: Urinary Elimination Impaired (Stroke, Hemorrhagic)  Goal: Effective Urinary Elimination  Outcome: Ongoing, Progressing     Problem: Diabetes Comorbidity  Goal: Blood Glucose Level Within Targeted Range  Outcome: Ongoing, Progressing     Problem: Fluid and Electrolyte Imbalance (Acute Kidney Injury/Impairment)  Goal: Fluid and Electrolyte Balance  Outcome: Ongoing, Progressing     Problem: Oral Intake Inadequate (Acute Kidney Injury/Impairment)  Goal: Optimal Nutrition Intake  Outcome: Ongoing, Progressing     Problem: Renal Function Impairment (Acute Kidney Injury/Impairment)  Goal: Effective Renal Function  Outcome: Ongoing, Progressing     Problem: Impaired Wound Healing  Goal: Optimal Wound Healing  Outcome: Ongoing, Progressing     Problem: Adjustment to Illness (Delirium)  Goal: Optimal Coping  Outcome: Ongoing, Progressing     Problem: Altered Behavior (Delirium)  Goal: Improved Behavioral Control  Outcome: Ongoing, Progressing     Problem: Attention and Thought Clarity Impairment (Delirium)  Goal: Improved Attention and Thought Clarity  Outcome: Ongoing, Progressing     Problem: Sleep Disturbance (Delirium)  Goal: Improved Sleep  Outcome: Ongoing, Progressing     Problem: Skin Injury Risk Increased  Goal: Skin Health and Integrity  Outcome: Ongoing, Progressing     Problem: Fall Injury Risk  Goal: Absence of Fall and Fall-Related Injury  Outcome: Ongoing, Progressing     Problem: Restraint, Nonbehavioral (Nonviolent)  Goal: Absence of Harm or Injury  Outcome: Ongoing, Progressing     Problem: Communication Impairment  (Mechanical Ventilation, Invasive)  Goal: Effective Communication  Outcome: Ongoing, Progressing     Problem: Device-Related Complication Risk (Mechanical Ventilation, Invasive)  Goal: Optimal Device Function  Outcome: Ongoing, Progressing     Problem: Inability to Wean (Mechanical Ventilation, Invasive)  Goal: Mechanical Ventilation Liberation  Outcome: Ongoing, Progressing     Problem: Nutrition Impairment (Mechanical Ventilation, Invasive)  Goal: Optimal Nutrition Delivery  Outcome: Ongoing, Progressing     Problem: Skin and Tissue Injury (Mechanical Ventilation, Invasive)  Goal: Absence of Device-Related Skin and Tissue Injury  Outcome: Ongoing, Progressing     Problem: Ventilator-Induced Lung Injury (Mechanical Ventilation, Invasive)  Goal: Absence of Ventilator-Induced Lung Injury  Outcome: Ongoing, Progressing     Problem: Communication Impairment (Artificial Airway)  Goal: Effective Communication  Outcome: Ongoing, Progressing     Problem: Device-Related Complication Risk (Artificial Airway)  Goal: Optimal Device Function  Outcome: Ongoing, Progressing     Problem: Skin and Tissue Injury (Artificial Airway)  Goal: Absence of Device-Related Skin or Tissue Injury  Outcome: Ongoing, Progressing     Problem: Noninvasive Ventilation Acute  Goal: Effective Unassisted Ventilation and Oxygenation  Outcome: Ongoing, Progressing

## 2022-04-07 NOTE — PHYSICIAN QUERY
PT Name: Beatriz Adame  MR #: [id number]    DOCUMENTATION CLARIFICATION     CDS/: Josue Hough RN             Contact information:  [email or web address]    This form is a permanent document in the medical record.     Query Date: April 7, 2022    By submitting this query, we are merely seeking further clarification of documentation.. Please utilize your independent clinical judgment when addressing the question(s) below.    The medical record contains the following:   Indicators  Supporting Clinical Findings Location in Medical Record   x % of Estimated Energy Intake over a time frame from p.o., TF, or TPN % Intake of Estimated Energy Needs: 0 - 25 %  % Meal Intake: NPO RD Note 4/4   x Weight Status over a time frame Weight Loss: 20% x 6 months  RD Note 4/4   x Subcutaneous Fat and/or Muscle Loss Energy Intake: <75% of estimated energy requirement for 3 months  Body Fat Depletion: moderate and severe depletion of orbitals, triceps and thoracic and lumbar region   Muscle Mass Depletion: moderate and severe depletion of temples, clavicle region, scapular region, interosseous muscle and lower extremities    RD Note 4/4    Fluid Accumulation or Edema     x Wt/BMI/Usual Body Weight Weight: 49.8 kg (109 lb 12.6 oz)  Weight (lb): 109.79 lb  Ideal Body Weight (IBW), Female: 135 lb  % Ideal Body Weight, Female (lb): 81.33 %  BMI (Calculated): 17.2  BMI Grade: 17 - 18.4 protein-energy malnutrition grade I   RD Note 4/4    Delayed Wound Healing/Failure to Thrive     x Acute or Chronic Illness Diagnosis:  (SDH)  Relevant Medical History: baseline dementia, GERD, R MCA stroke with residual LUE weakness, HLD, HTN, Stage IV CKD, TIIDM RD Note 4/4    Medication      Treatment     x Other Nutrition Problem:  Severe Protein-Calorie Malnutrition  Malnutrition in the context of Chronic Illness/Injury    General Information Comments: Pt readmitted, last assessed by RD on 3/28. OR today for emergent R cani for SDH evac.  Noted on last admit, pt did not like ONS. NFPE completed 3/20; pt meets criteria for severe malnutrition in the context of chronic illness.  Nutrition Discharge Planning: pending medical course    RD Note 4/4     AND / ASPEN Clinical Characteristics (October 2011)  A minimum of two characteristics is recommended for diagnosing either moderate or severe malnutrition   Mild Malnutrition Moderate Malnutrition Severe Malnutrition   Energy Intake from p.o., TF or TPN. < 75% intake of estimated energy needs for less than 7 days < 75% intake of estimated energy needs for greater than 7 days < 50% intake of estimated energy needs for > 5 days   Weight Loss 1-2% in 1 month  5% in 3 months  7.5% in 6 months  10% in 1 year 1-2 % in 1 week  5% in 1 month  7.5% in 3 months  10% in 6 months  20% in 1 year > 2% in 1 week  > 5% in 1 month  > 7.5% in 3 months  > 10% in 6 months  > 20% in 1 year   Physical Findings     None *Mild subcutaneous fat and/or muscle loss  *Mild fluid accumulation  *Stage II decubitus  *Surgical wound or non-healing wound *Mod/severe subcutaneous fat and/or muscle loss  *Mod/severe fluid accumulation  *Stage III or IV decubitus  *Non-healing surgical wound     Provider, please specify diagnosis or diagnoses associated with above clinical findings.    [ x ] Severe Protein-Calorie Malnutrition   [  ] Other Nutritional Diagnosis (please specify): _______   [  ] Malnutrition ruled out   [  ] Clinically Undetermined       Please document in your progress notes daily for the duration of treatment until resolved and include in your discharge summary.

## 2022-04-07 NOTE — SUBJECTIVE & OBJECTIVE
Interval History: 4/7: NAEON. AFVSS. Exam stable. Extubated. Pain controlled. Saturating well on room air. NGT remains in place. Surgical drain removed.     Medications:  Continuous Infusions:   sodium chloride 0.9% 50 mL/hr at 04/07/22 0905     Scheduled Meds:   amLODIPine  10 mg Per NG tube Daily    atorvastatin  40 mg Per NG tube Daily    carvediloL  12.5 mg Per NG tube BID    heparin (porcine)  5,000 Units Subcutaneous Q8H    levetiracetam  500 mg Per NG tube BID    levothyroxine  75 mcg Per NG tube Before breakfast    mupirocin   Nasal BID    senna-docusate 8.6-50 mg  1 tablet Per NG tube BID    silodosin  4 mg Per NG tube Daily    vancomycin (VANCOCIN) IVPB  500 mg Intravenous Q24H     PRN Meds:acetaminophen, dextrose 10%, glucagon (human recombinant), hydrALAZINE, HYDROcodone-acetaminophen, HYDROmorphone, insulin aspart U-100, labetalol, magnesium sulfate IVPB, magnesium sulfate IVPB, metoclopramide HCl, ondansetron, potassium bicarbonate, potassium bicarbonate, potassium bicarbonate, potassium, sodium phosphates, potassium, sodium phosphates, potassium, sodium phosphates, sodium chloride 0.9%, Pharmacy to dose Vancomycin consult **AND** vancomycin - pharmacy to dose         Objective:     Weight: 49.4 kg (109 lb)  Body mass index is 17.07 kg/m².  Vital Signs (Most Recent):  Temp: 98.4 °F (36.9 °C) (04/07/22 0705)  Pulse: 90 (04/07/22 0905)  Resp: 18 (04/07/22 0905)  BP: (!) 121/58 (04/07/22 0905)  SpO2: 100 % (04/07/22 0905)   Vital Signs (24h Range):  Temp:  [98 °F (36.7 °C)-99.6 °F (37.6 °C)] 98.4 °F (36.9 °C)  Pulse:  [78-99] 90  Resp:  [9-19] 18  SpO2:  [100 %] 100 %  BP: (109-141)/(54-80) 121/58  Arterial Line BP: (123-143)/(39-52) 136/48     Date 04/07/22 0700 - 04/08/22 0659   Shift 3216-9102 9677-5962 5074-2098 24 Hour Total   INTAKE   I.V.(mL/kg) 203.2(4.1)   203.2(4.1)   Shift Total(mL/kg) 203.2(4.1)   203.2(4.1)   OUTPUT   Shift Total(mL/kg)       Weight (kg) 49.4 49.4 49.4 49.4                 Vent Mode: Spont  Oxygen Concentration (%):  [30] 30  Resp Rate Total:  [16 br/min-27 br/min] 27 br/min  PEEP/CPAP:  [5 cmH20] 5 cmH20  Pressure Support:  [5 cmH20] 5 cmH20  Mean Airway Pressure:  [7 cmH20-7.8 cmH20] 7.8 cmH20         Urethral Catheter 03/18/22 2210 Non-latex 16 Fr. (Active)       Physical Exam    Neurosurgery Physical Exam    Physical Exam:  Constitutional: No distress.   HEENT: ataumatic/normocephalic  Cardiovascular: Regular rhythm.   Pulm: intubated   Abdominal: Soft.   Psych/Behavior: He is alert.   [id number]  PERRL  Left contracted and paretic  FCx in all except LUE  Incision C/D/I      Significant Labs:  Recent Labs   Lab 04/05/22  1042 04/06/22  0110 04/06/22  0214 04/07/22  0058   * 79  --  80    142  --  146*   K 3.3* 3.8  --  4.1   * 114*  --  114*   CO2 18* 20*  --  19*   BUN 24* 22  --  21   CREATININE 1.0 1.3  --  1.1   CALCIUM 8.4* 7.9*  --  7.9*   MG 1.8  --  2.2 2.2       Recent Labs   Lab 04/06/22  0110 04/06/22  0755 04/07/22  0058   WBC 14.82* 20.44* 19.30*   HGB 6.1* 8.1* 7.5*   HCT 17.7* 23.3* 23.0*    178 178       No results for input(s): LABPT, INR, APTT in the last 48 hours.    Microbiology Results (last 7 days)       Procedure Component Value Units Date/Time    Urine culture [[id number]] Collected: 04/03/22 1626    Order Status: Completed Specimen: Urine Updated: 04/04/22 2110     Urine Culture, Routine No growth    Narrative:      Specimen Source->Urine          All pertinent labs from the last 24 hours have been reviewed.    Significant Diagnostics:  I have reviewed and interpreted all pertinent imaging results/findings within the past 24 hours.  No results found in the last 24 hours.

## 2022-04-07 NOTE — ASSESSMENT & PLAN NOTE
71 year old female with , hypothyroidism, CVA with left-sided residual deficit on ASA/Plavix, DM2, HTN, HLD, CKD, chronic anemia, presenting from SNF after being found down next to wheelchair after unwitnessed fall, consulted to NSGY for right 2 cm SDH mostly acute, membranous and heterogenous appearing, some chronic component, without midline shift. Now s/p R crani for SDH evacuation (4/4).    POD#3      -- Admitted to ICU with q1h neurochecks.   -- q1 hour vitals/neurochecks  -- SBP < 140  -- Na goal eunatremia  -- Keppra 500 BID  -- drains discontinued  -- Follow up CT head satisfactory x2 with residual blood    -- Extubated and saturating well on room air  -- No HOB restriction  -- PPx: SCDs, BR, IS, hold SQH in setting of acute bleed    Dispo: stable for stepdown to NSY

## 2022-04-08 NOTE — PT/OT/SLP EVAL
Speech Language Pathology Evaluation  Cognitive Communication/Dysphagia Treatment     Patient Name:  Beatriz Adame   MRN:  [id number]  Admitting Diagnosis: SDH (subdural hematoma)    Recommendations:     Recommendations:                General Recommendations:  Dysphagia therapy  Diet recommendations:  Mechanical soft, Thin   Aspiration Precautions: 1 bite/sip at a time, Alternating bites/sips, Assistance with meals, Avoid talking while eating, Eliminate distractions, HOB to 90 degrees, Meds crushed in puree, Monitor for s/s of aspiration, Remain upright 30 minutes post meal and Standard aspiration precautions   General Precautions: Standard, fall, aspiration, hearing impaired  Communication strategies:  go to room if call light pushed    History:     Past Medical History:   Diagnosis Date    Gastroparesis     GERD (gastroesophageal reflux disease)     History of Clostridium difficile colitis 07/24/2021    History of kidney stones     History of stroke     left side paralysis    Hyperlipidemia     Hypertension     Hypothyroidism     Iron deficiency anemia     Osteoarthritis     Peripheral neuropathy     Stage IV CKD     Type II diabetes mellitus        Past Surgical History:   Procedure Laterality Date    APPENDECTOMY      CRANIOTOMY FOR EVACUATION OF SUBDURAL HEMATOMA Right 4/4/2022    Procedure: CRANIOTOMY, FOR SUBDURAL HEMATOMA EVACUATION;  Surgeon: Silvio White MD;  Location: [address];  Service: Neurosurgery;  Laterality: Right;    CYSTOSCOPY W/ URETERAL STENT PLACEMENT Right 10/13/2021    Procedure: CYSTOSCOPY, WITH URETERAL STENT INSERTION;  Surgeon: Wanda Arroyo MD;  Location: [facility];  Service: Urology;  Laterality: Right;    ESOPHAGOGASTRODUODENOSCOPY N/A 11/23/2021    Procedure: EGD (ESOPHAGOGASTRODUODENOSCOPY);  Surgeon: Obed Jeong MD;  Location: [address]);  Service: Endoscopy;  Laterality: N/A;    FOOT AMPUTATION THROUGH METATARSAL Right 3/24/2022  "   Procedure: AMPUTATION, FOOT, PARTIAL 4th and 5th ray;  Surgeon: Rodrigo Logan DPM;  Location: [facility] OR 2ND FLR;  Service: Podiatry;  Laterality: Right;    LAPAROSCOPIC APPENDECTOMY N/A 7/22/2021    Procedure: APPENDECTOMY, LAPAROSCOPIC;  Surgeon: Paulo Calvo Jr., MD;  Location: [facility];  Service: General;  Laterality: N/A;    TOE AMPUTATION Right 1/1/2022    Procedure: AMPUTATION, TOE;  Surgeon: Genaro Mason DPM;  Location: [facility];  Service: Podiatry;  Laterality: Right;    TUBAL LIGATION      URETEROSCOPIC REMOVAL OF URETERIC CALCULUS Right 12/22/2021    Procedure: REMOVAL, CALCULUS, URETER, URETEROSCOPIC;  Surgeon: Wanda Arryoo MD;  Location: [facility];  Service: Urology;  Laterality: Right;       History of Present Illness: Ms. Adame is a 72 y/o F with a PMHx of baseline dementia, GERD, R MCA stroke with residual LUE weakness, HLD, HTN, Stage IV CKD, TIIDM who presents to [facility] from [facility] with a AoC SDH after an unwitnessed fall from her wheel chair without loss of consciousness. CT spine negative for fracture. CT head with R SDH and 3-4 mm MLS. She is on DAPT at home. Of note, she has been staying at [facility] after a partial R foot amputation, stitches still intact, for which she is receiving vancomycin. VSS. She will be admitted to [facility] for hourly neuromonitoring and a higher level of care.        Subjective     "Water" pt stated this when SLP asked O4 q's      Pain/Comfort:       Respiratory Status: Room air    Objective:   Cognitive Status:    Perseveration Present-verbal Pt presented w/ perseveration when requesting for water & assistance from the nurse. Pt demonstrated this during orientation q's & requesting nurse after delayed memory task.   Orientation Person, Place, Time and Situation  Memory Immediate Recall Pt recalled series of 3 and 4 digits and not 5.     Receptive Language:   Comprehension:   Pt answered appropriately during simple YNQ's, & responded " appropriately during 2/3 complex YNQ's. However, pt displayed slowed responses throughout session.     Pragmatics:    inconsistent eye contact present and monotone present    Expressive Language:  Verbal:    Pt was able to express basic wants and needs. However further assessing is needed.   Nonverbal:   Facial Expression unexpressive, flat       Motor Speech:  Dysarthria Present; mild     Voice:   WFL Vocal quality has improved today, status post extubation.     Visual-Spatial:  tba    Reading:   tba     Written Expression:   tba    Treatment: Pt also observed for dysphagia therapy. Pt tolerated thin liquids via straw x 6 w/o any s/s of aspiration. Pt accepted bites of mixed aaron cracker and vanilla pudding x 2. Pt required increased time for mastication an exhibited a throat clearance w/1/2 trials, and required a liquid wash 1/2 trials. Diets recs are to continue mech soft/thin consistencies at this time. Cognitive evaluation is ongoing due to pt request for nursing assistance.     Assessment:   Beatriz Adame is a 71 y.o. female with an SLP diagnosis of Dysphagia, Dysarthria and Cognitive-Linguistic Impairment.  Goals:   Multidisciplinary Problems     SLP Goals        Problem: SLP    Goal Priority Disciplines Outcome   SLP Goal     SLP Ongoing, Progressing   Description: Speech Language Pathology Goals  UPDATED GOALS AFTER SPEECH EVAL INITIATED -EXPECTED TO BE MET 4/15:  1.  Pt will tolerate least restrictive diet without s/s of aspiration.   2. Pt will answer complex yes/no q's with 70% accuracy.   3. Pt will complete immediate memory tasks with 60% accuracy.   4. Following a 2 minute delay, pt will recall 2/3 novel items given max cues.   5. Pt will participate in ongoing speech/language/cognitive evaluation to determine baseline and need for further intervention.     Goals expected to be met by 4/13:  1. Pt will participate in ongoing swallowing assessment to determine if/when safe for oral intake.                             Plan:   · Patient to be seen:  4 x/week   · Plan of Care expires:  05/06/22  · Plan of Care reviewed with:  patient   · SLP Follow-Up:  Yes       Discharge recommendations:  Discharge Facility/Level of Care Needs: other (see comments) (tbd)       Time Tracking:   SLP Treatment Date:   04/08/22  Speech Start Time:  1238  Speech Stop Time:  1259     Speech Total Time (min):  21 min    Billable Minutes: Eval 13  and Treatment Swallowing Dysfunction 8    LAXMI Khan    04/08/2022

## 2022-04-08 NOTE — CONSULTS
[facility] - Neurosurgery ([facility])  Podiatry  Consult Note    Patient Name: Beatriz Adame  MRN: [id number]  Admission Date: 4/3/2022  Hospital Length of Stay: 5 days  Attending Physician: Silvio White MD  Primary Care Provider: Dante Olivier MD     Inpatient consult to Podiatry  Consult performed by: Yumiko Morales MD  Consult ordered by: Ina Ardon PA-C        Subjective:     History of Present Illness:  Ms. 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     Podiatry consulted for management of right foot amputation site. Patient underwent right foot partial 4th and 5th ray amputation 4/3/2022. Proximal bone margins +MRSA. Patient is currently receiving 6 weeks of IV Vancomycin for residual osteomyelitis with estimated end date of 5/5/2022.       Scheduled Meds:   amLODIPine  10 mg Oral Daily    atorvastatin  40 mg Oral Daily    carvediloL  25 mg Oral BID    heparin (porcine)  5,000 Units Subcutaneous Q8H    levetiracetam  500 mg Oral BID    levothyroxine  75 mcg Oral Before breakfast    losartan  25 mg Oral Daily    senna-docusate 8.6-50 mg  1 tablet Oral BID    silodosin  4 mg Oral Daily     Continuous Infusions:  PRN Meds:acetaminophen, dextrose 10%, glucagon (human recombinant), hydrALAZINE, HYDROcodone-acetaminophen, HYDROmorphone, insulin aspart U-100, labetalol, magnesium sulfate IVPB, magnesium sulfate IVPB, metoclopramide HCl, ondansetron, potassium bicarbonate, potassium bicarbonate, potassium bicarbonate, potassium, sodium phosphates, potassium,  sodium phosphates, potassium, sodium phosphates, sodium chloride 0.9%, Pharmacy to dose Vancomycin consult **AND** vancomycin - pharmacy to dose    Review of patient's allergies indicates:   Allergen Reactions    Tomato (solanum lycopersicum) Hives and Itching        Past Medical History:   Diagnosis Date    Gastroparesis     GERD (gastroesophageal reflux disease)     History of Clostridium difficile colitis 07/24/2021    History of kidney stones     History of stroke     left side paralysis    Hyperlipidemia     Hypertension     Hypothyroidism     Iron deficiency anemia     Osteoarthritis     Peripheral neuropathy     Stage IV CKD     Type II diabetes mellitus      Past Surgical History:   Procedure Laterality Date    APPENDECTOMY      CRANIOTOMY FOR EVACUATION OF SUBDURAL HEMATOMA Right 4/4/2022    Procedure: CRANIOTOMY, FOR SUBDURAL HEMATOMA EVACUATION;  Surgeon: Silvio White MD;  Location: [address];  Service: Neurosurgery;  Laterality: Right;    CYSTOSCOPY W/ URETERAL STENT PLACEMENT Right 10/13/2021    Procedure: CYSTOSCOPY, WITH URETERAL STENT INSERTION;  Surgeon: Wanda Arroyo MD;  Location: [facility];  Service: Urology;  Laterality: Right;    ESOPHAGOGASTRODUODENOSCOPY N/A 11/23/2021    Procedure: EGD (ESOPHAGOGASTRODUODENOSCOPY);  Surgeon: Obed Jeong MD;  Location: [facility] ENDO ([address] [place] [address]);  Service: Endoscopy;  Laterality: N/A;    FOOT AMPUTATION THROUGH METATARSAL Right 3/24/2022    Procedure: AMPUTATION, FOOT, PARTIAL 4th and 5th ray;  Surgeon: Rodrigo Logan DPM;  Location: [address];  Service: Podiatry;  Laterality: Right;    LAPAROSCOPIC APPENDECTOMY N/A 7/22/2021    Procedure: APPENDECTOMY, LAPAROSCOPIC;  Surgeon: Paulo Calvo Jr., MD;  Location: [facility];  Service: General;  Laterality: N/A;    TOE AMPUTATION Right 1/1/2022    Procedure: AMPUTATION, TOE;  Surgeon: Genaro Mason DPM;  Location: [facility];  Service: Podiatry;  Laterality: Right;    TUBAL  LIGATION      URETEROSCOPIC REMOVAL OF URETERIC CALCULUS Right 12/22/2021    Procedure: REMOVAL, CALCULUS, URETER, URETEROSCOPIC;  Surgeon: Wanda Arroyo MD;  Location: [facility];  Service: Urology;  Laterality: Right;       Family History       Problem Relation (Age of Onset)    Alcohol abuse Father    Arthritis Mother    Asthma Brother    Diabetes Mother, Sister, Brother    Heart attack Mother, Father, Brother    Heart disease Mother, Brother    Hypertension Mother    Kidney disease Mother    Stroke Mother    Vision loss Mother          Tobacco Use    Smoking status: Never Smoker    Smokeless tobacco: Never Used   Substance and Sexual Activity    Alcohol use: No     Comment: socially    Drug use: No    Sexual activity: Not Currently     Review of Systems   Unable to perform ROS: Mental status change   Constitutional:  Negative for fever.   Musculoskeletal:  Positive for gait problem.   Skin:  Positive for wound.   Objective:     Vital Signs (Most Recent):  Temp: 97.6 °F (36.4 °C) (04/08/22 1230)  Pulse: 94 (04/08/22 1230)  Resp: 18 (04/08/22 1230)  BP: (!) 128/59 (04/08/22 1230)  SpO2: (!) 94 % (04/08/22 1230)   Vital Signs (24h Range):  Temp:  [97.6 °F (36.4 °C)-99.7 °F (37.6 °C)] 97.6 °F (36.4 °C)  Pulse:  [87-96] 94  Resp:  [16-18] 18  SpO2:  [94 %-100 %] 94 %  BP: (110-128)/(58-68) 128/59     Weight: 49.4 kg (109 lb)  Body mass index is 17.07 kg/m².    Foot Exam    Right Foot/Ankle     Inspection and Palpation  Tenderness: none   Swelling: none   Skin Exam: skin changes; no drainage, skin not intact, no cellulitis, no abnormal color and no erythema     Neurovascular  Dorsalis pedis: absent  Posterior tibial: absent    Comments  R 2nd toe amputated    S/P 4th and 5th ray resection 03/24    Sutures clean, dry, and intact   Skin edges well approximated with no signs of gapping or dehiscence  No signs of soft tissue infection     Left Foot/Ankle      Inspection and Palpation  Tenderness: none    Swelling: none   Skin Exam: skin intact; no drainage, no cellulitis and no erythema     Neurovascular  Dorsalis pedis: absent  Posterior tibial: absent        Laboratory:  CBC:   Recent Labs   Lab 04/08/22  0400   WBC 12.51   RBC 2.52*   HGB 7.6*   HCT 23.3*      MCV 93   MCH 30.2   MCHC 32.6     CRP: No results for input(s): CRP in the last 168 hours.  ESR: No results for input(s): SEDRATE in the last 168 hours.  Wound Cultures:   Recent Labs   Lab 03/18/22  1620 03/24/22  1538   LABAERO METHICILLIN RESISTANT STAPHYLOCOCCUS AUREUS  Many  * METHICILLIN RESISTANT STAPHYLOCOCCUS AUREUS  Few  *  METHICILLIN RESISTANT STAPHYLOCOCCUS AUREUS  Few  *     Microbiology Results (last 7 days)       Procedure Component Value Units Date/Time    Urine culture [[id number]] Collected: 04/03/22 1626    Order Status: Completed Specimen: Urine Updated: 04/04/22 2110     Urine Culture, Routine No growth    Narrative:      Specimen Source->Urine          Specimen (24h ago, onward)                None            Diagnostic Results:  I have reviewed all pertinent imaging results/findings within the past 24 hours.    Clinical Findings:  S/p right foot partial 4th and 5th ray amputation on 3/24/2022. Sutures intact, skin well coapted, no gapping or dehiscence, no drainage, no erythema, no fluctuance. 2nd toe amputated.         Assessment/Plan:     Osteomyelitis of right foot  S/p partial 4th and 5th ray amputation 3/24/2022. Currently on IV Vancomycin x 6 weeks for residual osteomyelitis with estimated end date 5/5/2022. Proximal bone margins obtained intraop +MRSA.    Plan  She is now POD#15 s/p amputation. Podiatry will plan to remove sutures early next week while inpatient.   Continue IV Vancomycin  Local wound care performed by podiatry today.  Keep dressings to right foot c/d/i until podiatry follow up.        Thank you for your consult. I will follow-up with patient. Please contact us if you have any additional  questions.    Yumiko Morales DPM  [facility]  Podiatry PGY3  Pager: [phone]  Spectra:[phone]

## 2022-04-08 NOTE — CONSULTS
[facility] - Neurosurgery [facility])  Physical Medicine & Rehab  Consult Note    Patient Name: Beatriz Adame  MRN: [id number]  Admission Date: 4/3/2022  Hospital Length of Stay: 5 days  Attending Physician: Silvio White MD     Inpatient consult to Physical Medicine & Rehabilitation  Consult performed by: Maryam Bojorquez NP  Consult requested by:  Silvio White MD    Reason for Consult:  assess rehabilitation needs    Consults  Subjective:     Principal Problem: SDH (subdural hematoma)    HPI: Per chart review, Beatriz Adame is a 71-year-old female with PMHx of baseline dementia, GERD, R MCA stroke with residual LUE weakness/contracture, HLD, HTN, Stage IV CKD, TIIDM, and a recent R partial foot amputation.  [place] [facility] [place] [facility] s/p unwitnessed fall from  with head trauma and no LOC. CT spine negative for fracture. CT head with R SDH and 3-4 mm MLS. NSGY consulted and now s/p R crani for SDH evacuation 4/4. Hospital course complicated by Osteomyelitis of right foot, Dysphagia, impaired functional mobility, and anemia.     Functional History: Patient was at [facility] SNF PTA. Has support from son.  Prior to admission, (A) with ADLs and (I) WC trxs mobility w/ R foor to transfer. DME: .    Hospital Course: 4/6: PT-BM TA-max. No sit to stand, trxs, or gait.   4/7: Passed bedside swallow evaluation.  SLP recommending mechanical soft diet and thin liquids.  OT-BM TAx 2ppl-TA. [place] Min.       Past Medical History:   Diagnosis Date    Gastroparesis     GERD (gastroesophageal reflux disease)     History of Clostridium difficile colitis 07/24/2021    History of kidney stones     History of stroke     left side paralysis    Hyperlipidemia     Hypertension     Hypothyroidism     Iron deficiency anemia     Osteoarthritis     Peripheral neuropathy     Stage IV CKD     Type II diabetes mellitus      Past Surgical History:   Procedure Laterality Date     APPENDECTOMY      CRANIOTOMY FOR EVACUATION OF SUBDURAL HEMATOMA Right 4/4/2022    Procedure: CRANIOTOMY, FOR SUBDURAL HEMATOMA EVACUATION;  Surgeon: Silvio White MD;  Location: [facility] OR 2ND FLR;  Service: Neurosurgery;  Laterality: Right;    CYSTOSCOPY W/ URETERAL STENT PLACEMENT Right 10/13/2021    Procedure: CYSTOSCOPY, WITH URETERAL STENT INSERTION;  Surgeon: Wanda Arroyo MD;  Location: [facility];  Service: Urology;  Laterality: Right;    ESOPHAGOGASTRODUODENOSCOPY N/A 11/23/2021    Procedure: EGD (ESOPHAGOGASTRODUODENOSCOPY);  Surgeon: Obed Jeong MD;  Location: [facility] ENDO (2ND FLR);  Service: Endoscopy;  Laterality: N/A;    FOOT AMPUTATION THROUGH METATARSAL Right 3/24/2022    Procedure: AMPUTATION, FOOT, PARTIAL 4th and 5th ray;  Surgeon: Rodrigo Logan DPM;  Location: [facility] OR 2ND FLR;  Service: Podiatry;  Laterality: Right;    LAPAROSCOPIC APPENDECTOMY N/A 7/22/2021    Procedure: APPENDECTOMY, LAPAROSCOPIC;  Surgeon: Paulo Calvo Jr., MD;  Location: [facility];  Service: General;  Laterality: N/A;    TOE AMPUTATION Right 1/1/2022    Procedure: AMPUTATION, TOE;  Surgeon: Genaro Mason DPM;  Location: [facility];  Service: Podiatry;  Laterality: Right;    TUBAL LIGATION      URETEROSCOPIC REMOVAL OF URETERIC CALCULUS Right 12/22/2021    Procedure: REMOVAL, CALCULUS, URETER, URETEROSCOPIC;  Surgeon: Wanda Arroyo MD;  Location: [facility];  Service: Urology;  Laterality: Right;     Review of patient's allergies indicates:   Allergen Reactions    Tomato (solanum lycopersicum) Hives and Itching       Scheduled Medications:    amLODIPine  10 mg Oral Daily    atorvastatin  40 mg Oral Daily    carvediloL  25 mg Oral BID    heparin (porcine)  5,000 Units Subcutaneous Q8H    levetiracetam  500 mg Oral BID    levothyroxine  75 mcg Oral Before breakfast    losartan  25 mg Oral Daily    senna-docusate 8.6-50 mg  1 tablet Oral BID    silodosin  4 mg Oral Daily    vancomycin (VANCOCIN)  IVPB  500 mg Intravenous Q24H       PRN Medications: acetaminophen, dextrose 10%, glucagon (human recombinant), hydrALAZINE, HYDROcodone-acetaminophen, HYDROmorphone, insulin aspart U-100, labetalol, magnesium sulfate IVPB, magnesium sulfate IVPB, metoclopramide HCl, ondansetron, potassium bicarbonate, potassium bicarbonate, potassium bicarbonate, potassium, sodium phosphates, potassium, sodium phosphates, potassium, sodium phosphates, sodium chloride 0.9%, Pharmacy to dose Vancomycin consult **AND** vancomycin - pharmacy to dose    Family History       Problem Relation (Age of Onset)    Alcohol abuse Father    Arthritis Mother    Asthma Brother    Diabetes Mother, Sister, Brother    Heart attack Mother, Father, Brother    Heart disease Mother, Brother    Hypertension Mother    Kidney disease Mother    Stroke Mother    Vision loss Mother          Tobacco Use    Smoking status: Never Smoker    Smokeless tobacco: Never Used   Substance and Sexual Activity    Alcohol use: No     Comment: socially    Drug use: No    Sexual activity: Not Currently     Review of Systems   Constitutional:  Positive for activity change.   Musculoskeletal:  Positive for arthralgias and gait problem.   Skin:  Positive for wound.   Neurological:  Positive for weakness.        LUE contracture   Objective:     Vital Signs (Most Recent):  Temp: 98.1 °F (36.7 °C) (04/08/22 0748)  Pulse: 87 (04/08/22 0800)  Resp: 18 (04/08/22 0748)  BP: 128/68 (04/08/22 0748)  SpO2: 98 % (04/08/22 0748)    Vital Signs (24h Range):  Temp:  [97.7 °F (36.5 °C)-99.7 °F (37.6 °C)] 98.1 °F (36.7 °C)  Pulse:  [82-96] 87  Resp:  [12-18] 18  SpO2:  [94 %-100 %] 98 %  BP: (110-130)/(57-68) 128/68  Arterial Line BP: (110-131)/(40-41) 110/40     Body mass index is 17.07 kg/m².    Physical Exam  Vitals reviewed.   Constitutional:       General: She is not in acute distress.     Appearance: She is well-developed. She is ill-appearing.   HENT:      Head: Normocephalic and  atraumatic.   Eyes:      General:         Right eye: No discharge.         Left eye: No discharge.   Cardiovascular:      Rate and Rhythm: Normal rate.   Pulmonary:      Effort: Pulmonary effort is normal. No respiratory distress.   Abdominal:      Palpations: Abdomen is soft.      Tenderness: There is no abdominal tenderness.   Musculoskeletal:         General: Deformity (R foot amputation, partial) present.      Cervical back: Neck supple.   Feet:      Comments: R foot wound dressed  Skin:     General: Skin is warm and dry.   Neurological:      Mental Status: She is alert and oriented to person, place, and time.      Motor: Weakness and abnormal muscle tone (LUE) present.      Comments: Follows simple commands to R    Psychiatric:         Mood and Affect: Affect is flat.         Behavior: Behavior is slowed.     Diagnostic Results:   Labs: Reviewed  CT: Reviewed    Assessment/Plan:     * SDH (subdural hematoma)  -s/p unwitnessed fall from WC at SNF with head trauma and no LOC  - CT head with R SDH and 3-4 mm MLS  -NSGY consulted and now s/p R crani for SDH evacuation 4/4, drain removed     Osteomyelitis of right foot  -ID consulted, podiatry consult pending   -on IV vanc     Debility  See hospital course for functional status.      Recommendations  -  Encourage mobility, OOB in chair, and early ambulation as appropriate  -  PT/OT evaluate and treat  -  Pain management  -  DVT prophylaxis if appropriate   -  Monitor for and prevent skin breakdown and pressure ulcers  · Early mobility, repositioning/weight shifting every 20-30 minutes when sitting, turn patient every 2 hours, proper mattress/overlay and chair cushioning, pressure relief/heel protector boots  ·     Admitted to [facility] from SNF. Participating w/ therapy and appears like a SNF candidate. Work up in progress. Will follow for medical and therapy progress.      Thank you for your consult.     Maryam Bojorquez NP  Department of Physical Medicine & Rehab  [facility]  - Neurosurgery ([facility])

## 2022-04-08 NOTE — HPI
Ms. 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     Podiatry consulted for management of right foot amputation site. Patient underwent right foot partial 4th and 5th ray amputation 4/3/2022. Proximal bone margins +MRSA. Patient is currently receiving 6 weeks of IV Vancomycin for residual osteomyelitis with estimated end date of 5/5/2022.

## 2022-04-08 NOTE — HOSPITAL COURSE
4/6: PT-BM TA-max. No sit to stand, trxs, or gait.   4/7: Passed bedside swallow evaluation.  SLP recommending mechanical soft diet and thin liquids.  OT-BM TAx 2ppl-TA. [place] Min.   4/8: BM TA-max. Sat EOB 18 MINS Mod-sba. No trxs or gait.

## 2022-04-08 NOTE — ASSESSMENT & PLAN NOTE
S/p partial 4th and 5th ray amputation 3/24/2022. Currently on IV Vancomycin x 6 weeks for residual osteomyelitis with estimated end date 5/5/2022. Proximal bone margins obtained intraop +MRSA.    Plan  She is now POD#15 s/p amputation. Podiatry will plan to remove sutures early next week while inpatient.   Continue IV Vancomycin  Local wound care performed by podiatry today.  Keep dressings to right foot c/d/i until podiatry follow up.

## 2022-04-08 NOTE — ASSESSMENT & PLAN NOTE
-s/p unwitnessed fall from WC at SNF with head trauma and no LOC  - CT head with R SDH and 3-4 mm MLS  -NSGY consulted and now s/p R crani for SDH evacuation 4/4, drain removed

## 2022-04-08 NOTE — SUBJECTIVE & OBJECTIVE
Interval History:  Stepped down to floor. NAEON. Afebrile, VSS. NGT removed, tolerating soft diet and thin liquids. Aox3. Podiatry managing recent partial foot amputation, appreciate recs.    Medications:  Continuous Infusions:  Scheduled Meds:   amLODIPine  10 mg Oral Daily    atorvastatin  40 mg Oral Daily    carvediloL  25 mg Oral BID    heparin (porcine)  5,000 Units Subcutaneous Q8H    levetiracetam  500 mg Oral BID    levothyroxine  75 mcg Oral Before breakfast    losartan  25 mg Oral Daily    senna-docusate 8.6-50 mg  1 tablet Oral BID    silodosin  4 mg Oral Daily     PRN Meds:acetaminophen, dextrose 10%, glucagon (human recombinant), hydrALAZINE, HYDROcodone-acetaminophen, HYDROmorphone, insulin aspart U-100, labetalol, magnesium sulfate IVPB, magnesium sulfate IVPB, metoclopramide HCl, ondansetron, potassium bicarbonate, potassium bicarbonate, potassium bicarbonate, potassium, sodium phosphates, potassium, sodium phosphates, potassium, sodium phosphates, sodium chloride 0.9%, Pharmacy to dose Vancomycin consult **AND** vancomycin - pharmacy to dose       Objective:     Weight: 49.4 kg (109 lb)  Body mass index is 17.07 kg/m².  Vital Signs (Most Recent):  Temp: 97.6 °F (36.4 °C) (04/08/22 1230)  Pulse: 94 (04/08/22 1230)  Resp: 18 (04/08/22 1230)  BP: (!) 128/59 (04/08/22 1230)  SpO2: (!) 94 % (04/08/22 1230)   Vital Signs (24h Range):  Temp:  [97.6 °F (36.4 °C)-99.7 °F (37.6 °C)] 97.6 °F (36.4 °C)  Pulse:  [87-96] 94  Resp:  [16-18] 18  SpO2:  [94 %-100 %] 94 %  BP: (110-128)/(58-68) 128/59                Oxygen Concentration (%):  [98] 98         Fecal Incontinence  04/06/22 0830 (Active)   Application fecal incontinence  in place 04/08/22 0750   Drainage Method attached to drainage bag 04/08/22 0750   Securement to gravity 04/08/22 0750   Skin no breakdown 04/08/22 0750   Tolerance no signs/symptoms of discomfort 04/08/22 0750   Stool (mL) 0 mL 04/07/22 2000       [facility] Physical  Exam  Constitutional: No distress.   HEENT: Normocephalic. Left sided crani incision with staples intact, clean and dry.   Cardiovascular: Regular rhythm.   Pulm: No signs of respiratory distress.   Abdominal: Soft, non-distended.   Mental status: She is awake, alert and oriented to person, date and place.   PERRL  Left side contracted and paretic  FCx in all except LUE    Significant Labs:  Recent Labs   Lab 04/07/22 0058 04/08/22  0400   GLU 80 175*   * 143   K 4.1 3.6   * 113*   CO2 19* 22*   BUN 21 23   CREATININE 1.1 1.5*   CALCIUM 7.9* 7.9*   MG 2.2  --      Recent Labs   Lab 04/07/22 0058 04/08/22  0400   WBC 19.30* 12.51   HGB 7.5* 7.6*   HCT 23.0* 23.3*    151     No results for input(s): LABPT, INR, APTT in the last 48 hours.  Microbiology Results (last 7 days)       Procedure Component Value Units Date/Time    Urine culture [[id number]] Collected: 04/03/22 1626    Order Status: Completed Specimen: Urine Updated: 04/04/22 2110     Urine Culture, Routine No growth    Narrative:      Specimen Source->Urine          All pertinent labs from the last 24 hours have been reviewed.    Significant Diagnostics:  I have reviewed and interpreted all pertinent imaging results/findings within the past 24 hours.

## 2022-04-08 NOTE — SUBJECTIVE & OBJECTIVE
Scheduled Meds:   amLODIPine  10 mg Oral Daily    atorvastatin  40 mg Oral Daily    carvediloL  25 mg Oral BID    heparin (porcine)  5,000 Units Subcutaneous Q8H    levetiracetam  500 mg Oral BID    levothyroxine  75 mcg Oral Before breakfast    losartan  25 mg Oral Daily    senna-docusate 8.6-50 mg  1 tablet Oral BID    silodosin  4 mg Oral Daily     Continuous Infusions:  PRN Meds:acetaminophen, dextrose 10%, glucagon (human recombinant), hydrALAZINE, HYDROcodone-acetaminophen, HYDROmorphone, insulin aspart U-100, labetalol, magnesium sulfate IVPB, magnesium sulfate IVPB, metoclopramide HCl, ondansetron, potassium bicarbonate, potassium bicarbonate, potassium bicarbonate, potassium, sodium phosphates, potassium, sodium phosphates, potassium, sodium phosphates, sodium chloride 0.9%, Pharmacy to dose Vancomycin consult **AND** vancomycin - pharmacy to dose    Review of patient's allergies indicates:   Allergen Reactions    Tomato (solanum lycopersicum) Hives and Itching        Past Medical History:   Diagnosis Date    Gastroparesis     GERD (gastroesophageal reflux disease)     History of Clostridium difficile colitis 07/24/2021    History of kidney stones     History of stroke     left side paralysis    Hyperlipidemia     Hypertension     Hypothyroidism     Iron deficiency anemia     Osteoarthritis     Peripheral neuropathy     Stage IV CKD     Type II diabetes mellitus      Past Surgical History:   Procedure Laterality Date    APPENDECTOMY      CRANIOTOMY FOR EVACUATION OF SUBDURAL HEMATOMA Right 4/4/2022    Procedure: CRANIOTOMY, FOR SUBDURAL HEMATOMA EVACUATION;  Surgeon: Silvio White MD;  Location: [address];  Service: Neurosurgery;  Laterality: Right;    CYSTOSCOPY W/ URETERAL STENT PLACEMENT Right 10/13/2021    Procedure: CYSTOSCOPY, WITH URETERAL STENT INSERTION;  Surgeon: Wanda Arroyo MD;  Location: [facility] OR;  Service: Urology;  Laterality: Right;    ESOPHAGOGASTRODUODENOSCOPY N/A  11/23/2021    Procedure: EGD (ESOPHAGOGASTRODUODENOSCOPY);  Surgeon: Obed Jeong MD;  Location: [facility] ENDO (2ND FLR);  Service: Endoscopy;  Laterality: N/A;    FOOT AMPUTATION THROUGH METATARSAL Right 3/24/2022    Procedure: AMPUTATION, FOOT, PARTIAL 4th and 5th ray;  Surgeon: Rodrigo Logan DPM;  Location: [facility] OR 2ND FLR;  Service: Podiatry;  Laterality: Right;    LAPAROSCOPIC APPENDECTOMY N/A 7/22/2021    Procedure: APPENDECTOMY, LAPAROSCOPIC;  Surgeon: Paulo Calvo Jr., MD;  Location: [facility];  Service: General;  Laterality: N/A;    TOE AMPUTATION Right 1/1/2022    Procedure: AMPUTATION, TOE;  Surgeon: Genaro Mason DPM;  Location: [facility];  Service: Podiatry;  Laterality: Right;    TUBAL LIGATION      URETEROSCOPIC REMOVAL OF URETERIC CALCULUS Right 12/22/2021    Procedure: REMOVAL, CALCULUS, URETER, URETEROSCOPIC;  Surgeon: Wanda Arroyo MD;  Location: [facility];  Service: Urology;  Laterality: Right;       Family History       Problem Relation (Age of Onset)    Alcohol abuse Father    Arthritis Mother    Asthma Brother    Diabetes Mother, Sister, Brother    Heart attack Mother, Father, Brother    Heart disease Mother, Brother    Hypertension Mother    Kidney disease Mother    Stroke Mother    Vision loss Mother          Tobacco Use    Smoking status: Never Smoker    Smokeless tobacco: Never Used   Substance and Sexual Activity    Alcohol use: No     Comment: socially    Drug use: No    Sexual activity: Not Currently     Review of Systems   Unable to perform ROS: Mental status change   Constitutional:  Negative for fever.   Musculoskeletal:  Positive for gait problem.   Skin:  Positive for wound.   Objective:     Vital Signs (Most Recent):  Temp: 97.6 °F (36.4 °C) (04/08/22 1230)  Pulse: 94 (04/08/22 1230)  Resp: 18 (04/08/22 1230)  BP: (!) 128/59 (04/08/22 1230)  SpO2: (!) 94 % (04/08/22 1230)   Vital Signs (24h Range):  Temp:  [97.6 °F (36.4 °C)-99.7 °F (37.6 °C)] 97.6 °F (36.4 °C)  Pulse:   [87-96] 94  Resp:  [16-18] 18  SpO2:  [94 %-100 %] 94 %  BP: (110-128)/(58-68) 128/59     Weight: 49.4 kg (109 lb)  Body mass index is 17.07 kg/m².    Foot Exam    Right Foot/Ankle     Inspection and Palpation  Tenderness: none   Swelling: none   Skin Exam: skin changes; no drainage, skin not intact, no cellulitis, no abnormal color and no erythema     Neurovascular  Dorsalis pedis: absent  Posterior tibial: absent    Comments  R 2nd toe amputated    S/P 4th and 5th ray resection 03/24    Sutures clean, dry, and intact   Skin edges well approximated with no signs of gapping or dehiscence  No signs of soft tissue infection     Left Foot/Ankle      Inspection and Palpation  Tenderness: none   Swelling: none   Skin Exam: skin intact; no drainage, no cellulitis and no erythema     Neurovascular  Dorsalis pedis: absent  Posterior tibial: absent        Laboratory:  CBC:   Recent Labs   Lab 04/08/22  0400   WBC 12.51   RBC 2.52*   HGB 7.6*   HCT 23.3*      MCV 93   MCH 30.2   MCHC 32.6     CRP: No results for input(s): CRP in the last 168 hours.  ESR: No results for input(s): SEDRATE in the last 168 hours.  Wound Cultures:   Recent Labs   Lab 03/18/22  1620 03/24/22  1538   LABAERO METHICILLIN RESISTANT STAPHYLOCOCCUS AUREUS  Many  * METHICILLIN RESISTANT STAPHYLOCOCCUS AUREUS  Few  *  METHICILLIN RESISTANT STAPHYLOCOCCUS AUREUS  Few  *     Microbiology Results (last 7 days)       Procedure Component Value Units Date/Time    Urine culture [[id number]] Collected: 04/03/22 1626    Order Status: Completed Specimen: Urine Updated: 04/04/22 2110     Urine Culture, Routine No growth    Narrative:      Specimen Source->Urine          Specimen (24h ago, onward)                None            Diagnostic Results:  I have reviewed all pertinent imaging results/findings within the past 24 hours.    Clinical Findings:  S/p right foot partial 4th and 5th ray amputation on 3/24/2022. Sutures intact, skin well coapted, no gapping  or dehiscence, no drainage, no erythema, no fluctuance. 2nd toe amputated.

## 2022-04-08 NOTE — PLAN OF CARE
Problem: SLP  Goal: SLP Goal  Description: Speech Language Pathology Goals  UPDATED GOALS AFTER SPEECH EVAL INITIATED -EXPECTED TO BE MET 4/15:  1.  Pt will tolerate least restrictive diet without s/s of aspiration.   2. Pt will answer complex yes/no q's with 70% accuracy.   3. Pt will complete immediate memory tasks with 60% accuracy.   4. Following a 2 minute delay, pt will recall 2/3 novel items given max cues.   5. Pt will participate in ongoing speech/language/cognitive evaluation to determine baseline and need for further intervention.     Goals expected to be met by 4/13:  1. Pt will participate in ongoing swallowing assessment to determine if/when safe for oral intake.           Outcome: Ongoing, Progressing  Pt appears to be tolerating and remains appropriate for mechanical soft diet/thin liquids at this time.  Speech/language/cognitive evaluation initiated, but not complete.  Ongoing evaluation to determine baseline and need for further SLP intervention.

## 2022-04-08 NOTE — CONSULTS
Wound care consult received from nursing for sacrum and foot amputation site incision. Discussed with MD team and they will consult podiatry as the team that performed the recent foot amputation. There was also a consult for the patients sacral area but no documentation is noted of sacral wounds. Wound care to sign off. Please re consult if there is new or worsening alterations in skin integrity. Nursing to continue care.

## 2022-04-08 NOTE — HPI
Per chart review, Beatriz Adame is a 71-year-old female with PMHx of baseline dementia, GERD, R MCA stroke with residual LUE weakness/contracture, HLD, HTN, Stage IV CKD, TIIDM, and a recent R partial foot amputation.  Patient presented to [facility] on 4/3 from [facility] s/p unwitnessed fall from  with head trauma and no LOC. CT spine negative for fracture. CT head with R SDH and 3-4 mm MLS. NSGY consulted and now s/p R crani for SDH evacuation 4/4. Hospital course complicated by Osteomyelitis of right foot, Dysphagia, impaired functional mobility, and anemia.     Functional History: Patient was at [facility] SNF PTA. Has support from son.  Prior to admission, (A) with ADLs and (I) WC trxs mobility w/ R foor to transfer. DME: .

## 2022-04-08 NOTE — SUBJECTIVE & OBJECTIVE
Past Medical History:   Diagnosis Date    Gastroparesis     GERD (gastroesophageal reflux disease)     History of Clostridium difficile colitis 07/24/2021    History of kidney stones     History of stroke     left side paralysis    Hyperlipidemia     Hypertension     Hypothyroidism     Iron deficiency anemia     Osteoarthritis     Peripheral neuropathy     Stage IV CKD     Type II diabetes mellitus      Past Surgical History:   Procedure Laterality Date    APPENDECTOMY      CRANIOTOMY FOR EVACUATION OF SUBDURAL HEMATOMA Right 4/4/2022    Procedure: CRANIOTOMY, FOR SUBDURAL HEMATOMA EVACUATION;  Surgeon: Silvio White MD;  [facility];  Service: Neurosurgery;  Laterality: Right;    CYSTOSCOPY W/ URETERAL STENT PLACEMENT Right 10/13/2021    Procedure: CYSTOSCOPY, WITH URETERAL STENT INSERTION;  Surgeon: Wanda Arroyo MD;  Location: [facility] OR;  Service: Urology;  Laterality: Right;    ESOPHAGOGASTRODUODENOSCOPY N/A 11/23/2021    Procedure: EGD (ESOPHAGOGASTRODUODENOSCOPY);  Surgeon: Obed Jeong MD;  Location: [facility] (2ND FLR);  Service: Endoscopy;  Laterality: N/A;    FOOT AMPUTATION THROUGH METATARSAL Right 3/24/2022    Procedure: AMPUTATION, FOOT, PARTIAL 4th and 5th ray;  Surgeon: Rodrigo Logan DPM;  Location: [facility] OR 2ND FLR;  Service: Podiatry;  Laterality: Right;    LAPAROSCOPIC APPENDECTOMY N/A 7/22/2021    Procedure: APPENDECTOMY, LAPAROSCOPIC;  Surgeon: Paulo Calvo Jr., MD;  Location: [facility];  Service: General;  Laterality: N/A;    TOE AMPUTATION Right 1/1/2022    Procedure: AMPUTATION, TOE;  Surgeon: Genaro Mason DPM;  Location: [facility];  Service: Podiatry;  Laterality: Right;    TUBAL LIGATION      URETEROSCOPIC REMOVAL OF URETERIC CALCULUS Right 12/22/2021    Procedure: REMOVAL, CALCULUS, URETER, URETEROSCOPIC;  Surgeon: Wanda Arroyo MD;  Location: [facility];  Service: Urology;  Laterality: Right;     Review of patient's allergies indicates:   Allergen Reactions     Tomato (solanum lycopersicum) Hives and Itching       Scheduled Medications:    amLODIPine  10 mg Oral Daily    atorvastatin  40 mg Oral Daily    carvediloL  25 mg Oral BID    heparin (porcine)  5,000 Units Subcutaneous Q8H    levetiracetam  500 mg Oral BID    levothyroxine  75 mcg Oral Before breakfast    losartan  25 mg Oral Daily    senna-docusate 8.6-50 mg  1 tablet Oral BID    silodosin  4 mg Oral Daily    vancomycin (VANCOCIN) IVPB  500 mg Intravenous Q24H       PRN Medications: acetaminophen, dextrose 10%, glucagon (human recombinant), hydrALAZINE, HYDROcodone-acetaminophen, HYDROmorphone, insulin aspart U-100, labetalol, magnesium sulfate IVPB, magnesium sulfate IVPB, metoclopramide HCl, ondansetron, potassium bicarbonate, potassium bicarbonate, potassium bicarbonate, potassium, sodium phosphates, potassium, sodium phosphates, potassium, sodium phosphates, sodium chloride 0.9%, Pharmacy to dose Vancomycin consult **AND** vancomycin - pharmacy to dose    Family History       Problem Relation (Age of Onset)    Alcohol abuse Father    Arthritis Mother    Asthma Brother    Diabetes Mother, Sister, Brother    Heart attack Mother, Father, Brother    Heart disease Mother, Brother    Hypertension Mother    Kidney disease Mother    Stroke Mother    Vision loss Mother          Tobacco Use    Smoking status: Never Smoker    Smokeless tobacco: Never Used   Substance and Sexual Activity    Alcohol use: No     Comment: socially    Drug use: No    Sexual activity: Not Currently     Review of Systems   Constitutional:  Positive for activity change.   Musculoskeletal:  Positive for arthralgias and gait problem.   Skin:  Positive for wound.   Neurological:  Positive for weakness.        LUE contracture   Objective:     Vital Signs (Most Recent):  Temp: 98.1 °F (36.7 °C) (04/08/22 0748)  Pulse: 87 (04/08/22 0800)  Resp: 18 (04/08/22 0748)  BP: 128/68 (04/08/22 0748)  SpO2: 98 % (04/08/22 0748)    Vital Signs (24h  Range):  Temp:  [97.7 °F (36.5 °C)-99.7 °F (37.6 °C)] 98.1 °F (36.7 °C)  Pulse:  [82-96] 87  Resp:  [12-18] 18  SpO2:  [94 %-100 %] 98 %  BP: (110-130)/(57-68) 128/68  Arterial Line BP: (110-131)/(40-41) 110/40     Body mass index is 17.07 kg/m².    Physical Exam  Vitals reviewed.   Constitutional:       General: She is not in acute distress.     Appearance: She is well-developed. She is ill-appearing.   HENT:      Head: Normocephalic and atraumatic.   Eyes:      General:         Right eye: No discharge.         Left eye: No discharge.   Cardiovascular:      Rate and Rhythm: Normal rate.   Pulmonary:      Effort: Pulmonary effort is normal. No respiratory distress.   Abdominal:      Palpations: Abdomen is soft.      Tenderness: There is no abdominal tenderness.   Musculoskeletal:         General: Deformity (R foot amputation, partial) present.      Cervical back: Neck supple.   Feet:      Comments: R foot wound dressed  Skin:     General: Skin is warm and dry.   Neurological:      Mental Status: She is alert and oriented to person, place, and time.      Motor: Weakness and abnormal muscle tone (LUE) present.      Comments: Follows simple commands to R    Psychiatric:         Mood and Affect: Affect is flat.         Behavior: Behavior is slowed.     NEUROLOGICAL EXAMINATION:     MENTAL STATUS   Oriented to person, place, and time.     Diagnostic Results:   Labs: Reviewed  CT: Reviewed

## 2022-04-08 NOTE — ASSESSMENT & PLAN NOTE
71 year old female with , hypothyroidism, CVA with left-sided residual deficit on ASA/Plavix, DM2, HTN, HLD, CKD, chronic anemia, presenting from SNF after being found down next to wheelchair after unwitnessed fall, consulted to NSGY for right 2 cm SDH mostly acute, membranous and heterogenous appearing, some chronic component, without midline shift. Now s/p R crani for SDH evacuation (4/4).    POD#4    --Stepped down to floor.   -- q4 hour vitals/neurochecks.  -- SBP < 160.  -- Na goal eunatremia.  -- Passed swallow studies; tolerating mechanical soft diet with thin liquids.  -- Podiatry managing recent partial foot amputation, appreciate recs.   -- Keppra 500 BID.  -- drains discontinued.  -- Follow up CT head satisfactory x2 with residual blood.  -- Extubated and saturating well on room air.  -- No HOB restriction.  -- PPx: SCDs, BR, IS. Okay for [facility] for dvt ppx.   -- Nsgy will arrange appropriate follow up for patient.     Dispo: PT/OT recs for SNF; son wishes for home with  or new SNF

## 2022-04-08 NOTE — PT/OT/SLP PROGRESS
Physical Therapy  Treatment    Patient Name:  Beatriz Adame   MRN:  [id number]    Recommendations:     Discharge Recommendations:  nursing facility, skilled   Discharge Equipment Recommendations: other (see comments) (TBD by next level of care)   Barriers to discharge: decreased functional mobility and fall risk    Assessment:     Beatriz Adame is a 71 y.o. female admitted with a medical diagnosis of SDH (subdural hematoma).  Pt demonstrates the below listed impairments with decreased tolerance to functional mobility, weakness and balance instability being the most limiting.  Pt demonstrates improved tolerance to edge of bed mobility and performs the below listed exercises.  She is able to talk however requires encouragement to use her voice rather than nod her head.  Pt has pain with all positional changes and it appears that her contractions in her L side are more painful this session.  Pt is not safe for home discharge at this time due to patient's status as: a fall risk, cognitively impaired and patient has increased pain and requires skilled PT.      Impairments and functional limitations:  weakness, impaired endurance, impaired self care skills, impaired functional mobilty, gait instability, impaired balance, impaired cognition, decreased coordination, decreased upper extremity function, decreased lower extremity function, decreased safety awareness, pain, abnormal tone, decreased ROM, impaired coordination, impaired fine motor, impaired skin, orthopedic precautions.  These deficits affect their roles and responsibilities in which they were able to complete prior to admit.  Rehab Prognosis:   Fair; patient would benefit from acute skilled PT services 3 x/week to address these deficits, improve quality of life, focus on recovery of impairments, provide patient/caregiver education, reduce fall risk, and reach maximum level of function.  Pt is moderately motivated to participated in skilled PT.    Recent  Surgery:   Procedure(s) (LRB):  CRANIOTOMY, FOR SUBDURAL HEMATOMA EVACUATION (Right) 4 Days Post-Op    Plan:     During this hospitalization, patient to be seen 3 x/week to address the identified rehab impairments via therapeutic activities, therapeutic exercises, neuromuscular re-education and progress toward the following goals:    · Plan of Care Expires:  05/06/22    Subjective     Chief Complaint: decreased tolerance to functional mobility  Patient/Family Comments/Goals: Progress to SNF  Pain/Comfort:  · Pain Rating 1: other (see comments) (does not rate)  · Location - Side 1: Left  · Location - Orientation 1: generalized  · Location 1:  (UE and LE)  · Pain Addressed 1: Reposition, Distraction, Cessation of Activity  · Pain Rating Post-Intervention 1: other (see comments) (not rated)    Objective:     Communicated with RN prior to session.  Patient found HOB elevated with bowel management system, peripheral IV, telemetry, SCD upon PT entry to room.     General Precautions: Standard, aspiration, fall, contact   Orthopedic Precautions:N/A   Braces: N/A  Oxygen Device:      Functional Mobility:  · Bed Mobility:  Rolling Left: maximal assistance  · Scooting: total assistance  · Supine to Sit: total assistance for LE management and trunk management  · Sit to Supine: total assistance for LE management and trunk management  · Scooting up to head of bed in supine: total assistance for LE management and trunk management  · Head of bed position: HOB elevated   · EOB for 18min with SBA-Mod A     · Transfers:  n/a, pt not safe for transfers    · Gait: n/a, pt not safe for ambulation    · Balance:   Position Score Time   Static Sitting FAIR-: Maintains without assist but is inconsistent 8 minute(s)   Dynamic Sitting POOR: MODERATE assist to maintain 10 minute(s)   Static Standing n/a: dependent n/a   Dynamic Standing n/a: dependent n/a       AM-PAC 6 CLICK MOBILITY  Turning over in bed (including adjusting bedclothes, sheets  and blankets)?: 2  Sitting down on and standing up from a chair with arms (e.g., wheelchair, bedside commode, etc.): 1  Moving from lying on back to sitting on the side of the bed?: 2  Moving to and from a bed to a chair (including a wheelchair)?: 1  Need to walk in hospital room?: 1  Climbing 3-5 steps with a railing?: 1  Basic Mobility Total Score: 8     Therapeutic Activities:  Patient educated on role of acute care PT and PT POC, safety while in hospital including calling nurse for mobility, call light usage, benefits of out of bed mobility, breathing technique, fall risk, bed mobility , positioning, posture, risks of prolonged bed rest and benefits of continued PT by explanation.    Patient demonstrates inadequate understanding of education provided this day.   Whiteboard updated    Therapeutic Exercises:  Patient participated in: LAQs to R LE, crunches while seated EOB with R UE support, R forearm pushups to midline while EOB with 1 sets, 10 reps with cues for correct technique as well as AAROM for up to maximal assist .    Patient left HOB elevated with all lines intact, call button in reach, bed alarm on and RN notified.    GOALS:   Multidisciplinary Problems     Physical Therapy Goals        Problem: Physical Therapy    Goal Priority Disciplines Outcome Goal Variances Interventions   Physical Therapy Goal     PT, PT/OT Ongoing, Progressing     Description: Goals to be met by: 22    Patient will increase functional independence with mobility by performin. Supine to sit with minimum assistance  2. Sit to supine with minimum assistance  3. Rolling to Left and Right with minimum assistance  4. Bed to chair transfer with minimum assistance using LRAD as needed                   Time Tracking:     PT Received On: 22  PT Start Time: 1105     PT Stop Time: 1134  PT Total Time (min): 29 min     Billable Minutes: Therapeutic Exercise 15 and Neuromuscular Re-education 14    Treatment Type:  Treatment  PT/PTA: PT     PTA Visit Number: 0     04/08/2022

## 2022-04-08 NOTE — PROGRESS NOTES
[facility] - Neurosurgery ([facility])  Neurosurgery  Progress Note    Subjective:     History of Present Illness: 71 year old female with , hypothyroidism, CVA with left-sided residual deficit on ASA/Plavix, DM2, HTN, HLD, CKD, chronic anemia, presenting from SNF after being found down next to wheelchair after unwitnessed fall, consulted to NSGY for right 2 cm SDH without midline shift. She is altered at baseline and unable to provide to history, but family at bedside says she is more confused and less interactive than usual.       Post-Op Info:  Procedure(s) (LRB):  CRANIOTOMY, FOR SUBDURAL HEMATOMA EVACUATION (Right)   4 Days Post-Op     Interval History:  Stepped down to floor. NAEON. Afebrile, VSS. NGT removed, tolerating soft diet and thin liquids. Aox3. Podiatry managing recent partial foot amputation, appreciate recs.    Medications:  Continuous Infusions:  Scheduled Meds:   amLODIPine  10 mg Oral Daily    atorvastatin  40 mg Oral Daily    carvediloL  25 mg Oral BID    heparin (porcine)  5,000 Units Subcutaneous Q8H    levetiracetam  500 mg Oral BID    levothyroxine  75 mcg Oral Before breakfast    losartan  25 mg Oral Daily    senna-docusate 8.6-50 mg  1 tablet Oral BID    silodosin  4 mg Oral Daily     PRN Meds:acetaminophen, dextrose 10%, glucagon (human recombinant), hydrALAZINE, HYDROcodone-acetaminophen, HYDROmorphone, insulin aspart U-100, labetalol, magnesium sulfate IVPB, magnesium sulfate IVPB, metoclopramide HCl, ondansetron, potassium bicarbonate, potassium bicarbonate, potassium bicarbonate, potassium, sodium phosphates, potassium, sodium phosphates, potassium, sodium phosphates, sodium chloride 0.9%, Pharmacy to dose Vancomycin consult **AND** vancomycin - pharmacy to dose       Objective:     Weight: 49.4 kg (109 lb)  Body mass index is 17.07 kg/m².  Vital Signs (Most Recent):  Temp: 97.6 °F (36.4 °C) (04/08/22 1230)  Pulse: 94 (04/08/22 1230)  Resp: 18 (04/08/22 1230)  BP: (!) 128/59  (04/08/22 1230)  SpO2: (!) 94 % (04/08/22 1230)   Vital Signs (24h Range):  Temp:  [97.6 °F (36.4 °C)-99.7 °F (37.6 °C)] 97.6 °F (36.4 °C)  Pulse:  [87-96] 94  Resp:  [16-18] 18  SpO2:  [94 %-100 %] 94 %  BP: (110-128)/(58-68) 128/59                Oxygen Concentration (%):  [98] 98         Fecal Incontinence  04/06/22 0830 (Active)   Application fecal incontinence  in place 04/08/22 0750   Drainage Method attached to drainage bag 04/08/22 0750   Securement to gravity 04/08/22 0750   Skin no breakdown 04/08/22 0750   Tolerance no signs/symptoms of discomfort 04/08/22 0750   Stool (mL) 0 mL 04/07/22 2000       Neurosurgery Physical Exam  Constitutional: No distress.   HEENT: Normocephalic. Left sided crani incision with staples intact, clean and dry.   Cardiovascular: Regular rhythm.   Pulm: No signs of respiratory distress.   Abdominal: Soft, non-distended.   Mental status: She is awake, alert and oriented to person, date and place.   PERRL  Left side contracted and paretic  FCx in all except LUE    Significant Labs:  Recent Labs   Lab 04/07/22  0058 04/08/22  0400   GLU 80 175*   * 143   K 4.1 3.6   * 113*   CO2 19* 22*   BUN 21 23   CREATININE 1.1 1.5*   CALCIUM 7.9* 7.9*   MG 2.2  --      Recent Labs   Lab 04/07/22  0058 04/08/22  0400   WBC 19.30* 12.51   HGB 7.5* 7.6*   HCT 23.0* 23.3*    151     No results for input(s): LABPT, INR, APTT in the last 48 hours.  Microbiology Results (last 7 days)       Procedure Component Value Units Date/Time    Urine culture [[id number]] Collected: 04/03/22 1626    Order Status: Completed Specimen: Urine Updated: 04/04/22 2110     Urine Culture, Routine No growth    Narrative:      Specimen Source->Urine          All pertinent labs from the last 24 hours have been reviewed.    Significant Diagnostics:  I have reviewed and interpreted all pertinent imaging results/findings within the past 24 hours.    Assessment/Plan:     * SDH (subdural  hematoma)  71 year old female with , hypothyroidism, CVA with left-sided residual deficit on ASA/Plavix, DM2, HTN, HLD, CKD, chronic anemia, presenting from SNF after being found down next to wheelchair after unwitnessed fall, consulted to NSGY for right 2 cm SDH mostly acute, membranous and heterogenous appearing, some chronic component, without midline shift. Now s/p R crani for SDH evacuation (4/4).    POD#4    --Stepped down to floor.   -- q4 hour vitals/neurochecks.  -- SBP < 160.  -- Na goal eunatremia.  -- Passed swallow studies; tolerating mechanical soft diet with thin liquids.  -- Podiatry managing recent partial foot amputation, appreciate recs.   -- Keppra 500 BID.  -- drains discontinued.  -- Follow up CT head satisfactory x2 with residual blood.  -- Extubated and saturating well on room air.  -- No HOB restriction.  -- PPx: SCDs, BR, IS. Okay for [facility] for dvt ppx.   -- Nsgy will arrange appropriate follow up for patient.     Dispo: PT/OT recs for SNF; son wishes for home with  or new SNF          Ivis Haynes PA-C  Neurosurgery  [facility] - Neurosurgery ([facility])

## 2022-04-09 NOTE — PROGRESS NOTES
[facility] - Neurosurgery ([facility])  Neurosurgery  Progress Note    Subjective:     History of Present Illness: 71 year old female with , hypothyroidism, CVA with left-sided residual deficit on ASA/Plavix, DM2, HTN, HLD, CKD, chronic anemia, presenting from SNF after being found down next to wheelchair after unwitnessed fall, consulted to NSGY for right 2 cm SDH without midline shift. She is altered at baseline and unable to provide to history, but family at bedside says she is more confused and less interactive than usual.       Post-Op Info:  Procedure(s) (LRB):  CRANIOTOMY, FOR SUBDURAL HEMATOMA EVACUATION (Right)   5 Days Post-Op     Interval History:  NAEON. AFVSS. Exam stable. Extubated. Pain controlled. Saturating well on room air.      Medications:  Continuous Infusions:      Scheduled Meds:   amLODIPine  10 mg Oral Daily    atorvastatin  40 mg Oral Daily    carvediloL  25 mg Oral BID    heparin (porcine)  5,000 Units Subcutaneous Q8H    levetiracetam  500 mg Oral BID    levothyroxine  75 mcg Oral Before breakfast    losartan  25 mg Oral Daily    senna-docusate 8.6-50 mg  1 tablet Oral BID    silodosin  4 mg Oral Daily     PRN Meds:acetaminophen, dextrose 10%, glucagon (human recombinant), hydrALAZINE, HYDROcodone-acetaminophen, HYDROmorphone, insulin aspart U-100, labetalol, magnesium sulfate IVPB, magnesium sulfate IVPB, metoclopramide HCl, ondansetron, potassium bicarbonate, potassium bicarbonate, potassium bicarbonate, potassium, sodium phosphates, potassium, sodium phosphates, potassium, sodium phosphates, sodium chloride 0.9%, Pharmacy to dose Vancomycin consult **AND** vancomycin - pharmacy to dose         Objective:     Weight: 49.4 kg (109 lb)  Body mass index is 17.07 kg/m².  Vital Signs (Most Recent):  Temp: 97.1 °F (36.2 °C) (04/09/22 0833)  Pulse: 92 (04/09/22 0833)  Resp: 18 (04/09/22 0833)  BP: (!) 147/72 (04/09/22 0833)  SpO2: 99 % (04/09/22 0833)   Vital Signs (24h Range):  Temp:   [97.1 °F (36.2 °C)-101.3 °F (38.5 °C)] 97.1 °F (36.2 °C)  Pulse:  [79-95] 92  Resp:  [17-18] 18  SpO2:  [94 %-99 %] 99 %  BP: (117-147)/(56-72) 147/72                  Oxygen Concentration (%):  [98] 98         Urethral Catheter 03/18/22 2210 Non-latex 16 Fr. (Active)       Physical Exam    Neurosurgery Physical Exam    Physical Exam:  Constitutional: No distress.   HEENT: ataumatic/normocephalic  Cardiovascular: Regular rhythm.   Pulm: intubated   Abdominal: Soft.   Psych/Behavior: He is alert.   [id number]  PERRL  Left contracted and paretic  FCx in all except LUE  Incision C/D/I      Significant Labs:  Recent Labs   Lab 04/08/22  0400 04/09/22  0550   * 122*    143   K 3.6 3.8   * 112*   CO2 22* 22*   BUN 23 19   CREATININE 1.5* 1.4   CALCIUM 7.9* 7.9*       Recent Labs   Lab 04/08/22  0400 04/09/22  0550   WBC 12.51 11.90   HGB 7.6* 6.5*   HCT 23.3* 20.5*    151       No results for input(s): LABPT, INR, APTT in the last 48 hours.    Microbiology Results (last 7 days)       Procedure Component Value Units Date/Time    Urine culture [[id number]] Collected: 04/03/22 1626    Order Status: Completed Specimen: Urine Updated: 04/04/22 2110     Urine Culture, Routine No growth    Narrative:      Specimen Source->Urine          All pertinent labs from the last 24 hours have been reviewed.    Significant Diagnostics:  I have reviewed and interpreted all pertinent imaging results/findings within the past 24 hours.  No results found in the last 24 hours.    Review of Systems    Assessment/Plan:     * SDH (subdural hematoma)  71 year old female with , hypothyroidism, CVA with left-sided residual deficit on ASA/Plavix, DM2, HTN, HLD, CKD, chronic anemia, presenting from SNF after being found down next to wheelchair after unwitnessed fall, consulted to NSGY for right 2 cm SDH mostly acute, membranous and heterogenous appearing, some chronic component, without midline shift. Now s/p R crani for SDH  evacuation (4/4).         --Stepped down to floor.   -- q4 hour vitals/neurochecks.  -- SBP < 160.  -- Na goal eunatremia.  -- Passed swallow studies; tolerating mechanical soft diet with thin liquids.  -- Podiatry managing recent partial foot amputation, appreciate recs.   -- Keppra 500 BID.  -- drains discontinued.  -- Follow up CT head satisfactory x2 with residual blood.  -- Extubated and saturating well on room air.  -- No HOB restriction.  -- PPx: SCDs, BR, IS. Okay for [facility] for dvt ppx.   -- Nsgy will arrange appropriate follow up for patient.     Dispo: PT/OT recs for SNF; son wishes for home with  or new SNF   Working on snf           Yohan Thomas MD  Neurosurgery  [facility] - Neurosurgery ([facility])

## 2022-04-09 NOTE — ASSESSMENT & PLAN NOTE
71 year old female with , hypothyroidism, CVA with left-sided residual deficit on ASA/Plavix, DM2, HTN, HLD, CKD, chronic anemia, presenting from SNF after being found down next to wheelchair after unwitnessed fall, consulted to NSGY for right 2 cm SDH mostly acute, membranous and heterogenous appearing, some chronic component, without midline shift. Now s/p R crani for SDH evacuation (4/4).         --Stepped down to floor.   -- q4 hour vitals/neurochecks.  -- SBP < 160.  -- Na goal eunatremia.  -- Passed swallow studies; tolerating mechanical soft diet with thin liquids.  -- Podiatry managing recent partial foot amputation, appreciate recs.   -- Keppra 500 BID.  -- drains discontinued.  -- Follow up CT head satisfactory x2 with residual blood.  -- Extubated and saturating well on room air.  -- No HOB restriction.  -- PPx: SCDs, BR, IS. Okay for [facility] for dvt ppx.   -- Nsgy will arrange appropriate follow up for patient.     Dispo: PT/OT recs for SNF; son wishes for home with  or new SNF   Working on snf

## 2022-04-09 NOTE — PROGRESS NOTES
Pharmacokinetic Assessment Follow Up: IV Vancomycin    Vancomycin serum concentration assessment(s):     The random level was drawn correctly and can be used to guide therapy at this time. The measurement is within the desired definitive target range of 15 to 20 mcg/mL.    Vancomycin Regimen Plan:    Random vancomycin resulted 19.3  Dose with Vancomycin  500 mg x1  Re-dose when the random level is less than 20 mcg/mL, next level to be drawn at 0430 on 4/10/22    Drug levels (last 3 results):  Recent Labs   Lab Result Units 04/06/22  1506 04/08/22  2143 04/09/22  1034   Vancomycin, Random ug/mL 19.4  --  19.3   Vancomycin-Trough ug/mL  --  23.2*  --        Pharmacy will continue to follow and monitor vancomycin.    Please contact pharmacy at extension [phone]   for questions regarding this assessment.    Thank you for the consult,   Liset Segovia       Patient brief summary:  Beatriz Adame is a 71 y.o. female initiated on antimicrobial therapy with IV Vancomycin for treatment of bone/joint infection, osteomyelitis    The patient's current regimen is dose by level    Drug Allergies:   Review of patient's allergies indicates:   Allergen Reactions    Tomato (solanum lycopersicum) Hives and Itching       Actual Body Weight:   49.4 kg    Renal Function:   Estimated Creatinine Clearance: 29.2 mL/min (based on SCr of 1.4 mg/dL).,     Dialysis Method (if applicable):  N/A    CBC (last 72 hours):  Recent Labs   Lab Result Units 04/07/22  0058 04/08/22  0400 04/09/22  0550   WBC K/uL 19.30* 12.51 11.90   Hemoglobin g/dL 7.5* 7.6* 6.5*   Hematocrit % 23.0* 23.3* 20.5*   Platelets K/uL 178 151 151   Gran % % 77.3* 75.8* 63.0   Lymph % % 13.9* 15.3* 25.7   Mono % % 7.6 7.8 9.7   Eosinophil % % 0.3 0.3 0.7   Basophil % % 0.2 0.2 0.2   Differential Method  Automated Automated Automated       Metabolic Panel (last 72 hours):  Recent Labs   Lab Result Units 04/07/22  0058 04/08/22  0400 04/09/22  0550   Sodium mmol/L 146* 143  143   Potassium mmol/L 4.1 3.6 3.8   Chloride mmol/L 114* 113* 112*   CO2 mmol/L 19* 22* 22*   Glucose mg/dL 80 175* 122*   BUN mg/dL 21 23 19   Creatinine mg/dL 1.1 1.5* 1.4   Albumin g/dL 2.2* 1.9* 1.9*   Total Bilirubin mg/dL 0.8 0.5 0.6   Alkaline Phosphatase U/L 58 56 55   AST U/L 36 29 22   ALT U/L 13 14 16   Magnesium mg/dL 2.2  --   --    Phosphorus mg/dL 4.6*  --   --        Vancomycin Administrations:  vancomycin given in the last 96 hours                   vancomycin 500 mg in dextrose 5 % 100 mL IVPB (ready to mix system) (mg) 500 mg New Bag 04/07/22 2210     500 mg New Bag 04/06/22 1721                Microbiologic Results:  Microbiology Results (last 7 days)     Procedure Component Value Units Date/Time    Urine culture [[id number]] Collected: 04/03/22 1626    Order Status: Completed Specimen: Urine Updated: 04/04/22 2110     Urine Culture, Routine No growth    Narrative:      Specimen Source->Urine

## 2022-04-09 NOTE — PROGRESS NOTES
Pharmacokinetic Assessment Follow Up: IV Vancomycin    Vancomycin serum concentration assessment(s):    The trough level was drawn correctly and can be used to guide therapy at this time. The measurement is above the desired definitive target range of 15 to 20 mcg/mL.    Vancomycin Regimen Plan:    Discontinue the scheduled vancomycin regimen and re-dose when the random level is less than 20 mcg/mL, next level to be drawn at 1000 on 4/9.    Drug levels (last 3 results):  Recent Labs   Lab Result Units 04/06/22  0755 04/06/22  1506 04/08/22  2143   Vancomycin, Random ug/mL 21.8 19.4  --    Vancomycin-Trough ug/mL  --   --  23.2*       Pharmacy will continue to follow and monitor vancomycin.    Please contact pharmacy at extension [phone] for questions regarding this assessment.    Thank you for the consult,   Cady Nielsen       Patient brief summary:  Beatriz Adame is a 71 y.o. female initiated on antimicrobial therapy with IV Vancomycin for treatment of osteomyelitis      Drug Allergies:   Review of patient's allergies indicates:   Allergen Reactions    Tomato (solanum lycopersicum) Hives and Itching       Actual Body Weight:   49.4 kg    Renal Function:   Estimated Creatinine Clearance: 27.2 mL/min (A) (based on SCr of 1.5 mg/dL (H)).     Dialysis Method (if applicable):  N/A    CBC (last 72 hours):  Recent Labs   Lab Result Units 04/06/22  0110 04/06/22  0755 04/07/22  0058 04/08/22  0400   WBC K/uL 14.82* 20.44* 19.30* 12.51   Hemoglobin g/dL 6.1* 8.1* 7.5* 7.6*   Hematocrit % 17.7* 23.3* 23.0* 23.3*   Platelets K/uL 165 178 178 151   Gran % % 75.2* 80.3* 77.3* 75.8*   Lymph % % 14.0* 10.6* 13.9* 15.3*   Mono % % 9.5 7.9 7.6 7.8   Eosinophil % % 0.5 0.2 0.3 0.3   Basophil % % 0.3 0.2 0.2 0.2   Differential Method  Automated Automated Automated Automated       Metabolic Panel (last 72 hours):  Recent Labs   Lab Result Units 04/06/22  0110 04/06/22  0214 04/07/22  0058 04/08/22  0400   Sodium mmol/L 142  --   146* 143   Potassium mmol/L 3.8  --  4.1 3.6   Chloride mmol/L 114*  --  114* 113*   CO2 mmol/L 20*  --  19* 22*   Glucose mg/dL 79  --  80 175*   BUN mg/dL 22  --  21 23   Creatinine mg/dL 1.3  --  1.1 1.5*   Albumin g/dL 2.2*  --  2.2* 1.9*   Total Bilirubin mg/dL 0.7  --  0.8 0.5   Alkaline Phosphatase U/L 57  --  58 56   AST U/L 34  --  36 29   ALT U/L 11  --  13 14   Magnesium mg/dL  --  2.2 2.2  --    Phosphorus mg/dL  --  2.8 4.6*  --        Vancomycin Administrations:  vancomycin given in the last 96 hours                   vancomycin 500 mg in dextrose 5 % 100 mL IVPB (ready to mix system) (mg) 500 mg New Bag 04/07/22 2210     500 mg New Bag 04/06/22 1721    vancomycin 750 mg in dextrose 5 % 250 mL IVPB (ready to mix system) (mg) 750 mg New Bag 04/05/22 1052                Microbiologic Results:  Microbiology Results (last 7 days)     Procedure Component Value Units Date/Time    Urine culture [[id number]] Collected: 04/03/22 1626    Order Status: Completed Specimen: Urine Updated: 04/04/22 2110     Urine Culture, Routine No growth    Narrative:      Specimen Source->Urine

## 2022-04-09 NOTE — SUBJECTIVE & OBJECTIVE
Interval History:  NAEON. AFVSS. Exam stable. Extubated. Pain controlled. Saturating well on room air.      Medications:  Continuous Infusions:      Scheduled Meds:   amLODIPine  10 mg Oral Daily    atorvastatin  40 mg Oral Daily    carvediloL  25 mg Oral BID    heparin (porcine)  5,000 Units Subcutaneous Q8H    levetiracetam  500 mg Oral BID    levothyroxine  75 mcg Oral Before breakfast    losartan  25 mg Oral Daily    senna-docusate 8.6-50 mg  1 tablet Oral BID    silodosin  4 mg Oral Daily     PRN Meds:acetaminophen, dextrose 10%, glucagon (human recombinant), hydrALAZINE, HYDROcodone-acetaminophen, HYDROmorphone, insulin aspart U-100, labetalol, magnesium sulfate IVPB, magnesium sulfate IVPB, metoclopramide HCl, ondansetron, potassium bicarbonate, potassium bicarbonate, potassium bicarbonate, potassium, sodium phosphates, potassium, sodium phosphates, potassium, sodium phosphates, sodium chloride 0.9%, Pharmacy to dose Vancomycin consult **AND** vancomycin - pharmacy to dose         Objective:     Weight: 49.4 kg (109 lb)  Body mass index is 17.07 kg/m².  Vital Signs (Most Recent):  Temp: 97.1 °F (36.2 °C) (04/09/22 0833)  Pulse: 92 (04/09/22 0833)  Resp: 18 (04/09/22 0833)  BP: (!) 147/72 (04/09/22 0833)  SpO2: 99 % (04/09/22 0833)   Vital Signs (24h Range):  Temp:  [97.1 °F (36.2 °C)-101.3 °F (38.5 °C)] 97.1 °F (36.2 °C)  Pulse:  [79-95] 92  Resp:  [17-18] 18  SpO2:  [94 %-99 %] 99 %  BP: (117-147)/(56-72) 147/72                  Oxygen Concentration (%):  [98] 98         Urethral Catheter 03/18/22 2210 Non-latex 16 Fr. (Active)       Physical Exam    Neurosurgery Physical Exam    Physical Exam:  Constitutional: No distress.   HEENT: ataumatic/normocephalic  Cardiovascular: Regular rhythm.   Pulm: intubated   Abdominal: Soft.   Psych/Behavior: He is alert.   [id number]  PERRL  Left contracted and paretic  FCx in all except LUE  Incision C/D/I      Significant Labs:  Recent Labs   Lab 04/08/22  0400 04/09/22  0537    * 122*    143   K 3.6 3.8   * 112*   CO2 22* 22*   BUN 23 19   CREATININE 1.5* 1.4   CALCIUM 7.9* 7.9*       Recent Labs   Lab 04/08/22  0400 04/09/22  0550   WBC 12.51 11.90   HGB 7.6* 6.5*   HCT 23.3* 20.5*    151       No results for input(s): LABPT, INR, APTT in the last 48 hours.    Microbiology Results (last 7 days)       Procedure Component Value Units Date/Time    Urine culture [[id number]] Collected: 04/03/22 1626    Order Status: Completed Specimen: Urine Updated: 04/04/22 2110     Urine Culture, Routine No growth    Narrative:      Specimen Source->Urine          All pertinent labs from the last 24 hours have been reviewed.    Significant Diagnostics:  I have reviewed and interpreted all pertinent imaging results/findings within the past 24 hours.  No results found in the last 24 hours.    Review of Systems

## 2022-04-10 NOTE — SUBJECTIVE & OBJECTIVE
Past Medical History:   Diagnosis Date    Gastroparesis     GERD (gastroesophageal reflux disease)     History of Clostridium difficile colitis 07/24/2021    History of kidney stones     History of stroke     left side paralysis    Hyperlipidemia     Hypertension     Hypothyroidism     Iron deficiency anemia     Osteoarthritis     Peripheral neuropathy     Stage IV CKD     Type II diabetes mellitus        Past Surgical History:   Procedure Laterality Date    APPENDECTOMY      CRANIOTOMY FOR EVACUATION OF SUBDURAL HEMATOMA Right 4/4/2022    Procedure: CRANIOTOMY, FOR SUBDURAL HEMATOMA EVACUATION;  Surgeon: Silvio White MD;  [facility];  Service: Neurosurgery;  Laterality: Right;    CYSTOSCOPY W/ URETERAL STENT PLACEMENT Right 10/13/2021    Procedure: CYSTOSCOPY, WITH URETERAL STENT INSERTION;  Surgeon: Wanda Arroyo MD;  Location: [facility] OR;  Service: Urology;  Laterality: Right;    ESOPHAGOGASTRODUODENOSCOPY N/A 11/23/2021    Procedure: EGD (ESOPHAGOGASTRODUODENOSCOPY);  Surgeon: Obed Jeong MD;  Location: [facility] (2ND FLR);  Service: Endoscopy;  Laterality: N/A;    FOOT AMPUTATION THROUGH METATARSAL Right 3/24/2022    Procedure: AMPUTATION, FOOT, PARTIAL 4th and 5th ray;  Surgeon: Rodrigo Logan DPM;  Location: [facility] OR 2ND FLR;  Service: Podiatry;  Laterality: Right;    LAPAROSCOPIC APPENDECTOMY N/A 7/22/2021    Procedure: APPENDECTOMY, LAPAROSCOPIC;  Surgeon: Paulo Calvo Jr., MD;  Location: [facility];  Service: General;  Laterality: N/A;    TOE AMPUTATION Right 1/1/2022    Procedure: AMPUTATION, TOE;  Surgeon: Genaro Mason DPM;  Location: [facility];  Service: Podiatry;  Laterality: Right;    TUBAL LIGATION      URETEROSCOPIC REMOVAL OF URETERIC CALCULUS Right 12/22/2021    Procedure: REMOVAL, CALCULUS, URETER, URETEROSCOPIC;  Surgeon: Wanda Arroyo MD;  Location: [facility];  Service: Urology;  Laterality: Right;       Review of patient's allergies indicates:   Allergen  Reactions    Tomato (solanum lycopersicum) Hives and Itching       No current facility-administered medications on file prior to encounter.     Current Outpatient Medications on File Prior to Encounter   Medication Sig    rosuvastatin (CRESTOR) 40 MG Tab Take 40 mg by mouth every evening.    amLODIPine (NORVASC) 5 MG tablet Take 10 mg by mouth every evening. Take 5 mg every morning and 10 mg at night    ascorbic acid, vitamin C, (VITAMIN C) 500 MG tablet Take 1 tablet (500 mg total) by mouth once daily.    aspirin (ECOTRIN) 81 MG EC tablet Take 81 mg by mouth once daily.    b complex vitamins tablet Take 1 tablet by mouth once daily.    carvedilol (COREG) 25 MG tablet Take 1 tablet (25 mg total) by mouth 2 (two) times daily.    clopidogreL (PLAVIX) 75 mg tablet Take 75 mg by mouth.    DULoxetine (CYMBALTA) 30 MG capsule Take 1 capsule (30 mg total) by mouth once daily.    ferrous sulfate (FEOSOL) 325 mg (65 mg iron) Tab tablet Take 325 mg by mouth daily with breakfast. Off at present    folic acid (FOLVITE) 1 MG tablet Take 1 tablet (1 mg total) by mouth once daily.    gabapentin (NEURONTIN) 300 MG capsule Take 1 capsule (300 mg total) by mouth once daily.    hydrochlorothiazide (MICROZIDE ORAL) Take 20 mg by mouth once daily at 6am.    insulin (BASAGLAR KWIKPEN U-100 INSULIN) glargine 100 units/mL (3mL) SubQ pen Inject 5 Units into the skin once daily.    Lactobacillus rhamnosus GG (CULTURELLE) 10 billion cell capsule Take 1 capsule by mouth once daily.    lactulose (CHRONULAC) 10 gram/15 mL solution Take 10 g by mouth 2 (two) times a day.    levothyroxine (SYNTHROID) 75 MCG tablet Take 75 mcg by mouth once daily.    losartan (COZAAR) 25 MG tablet Take 25 mg by mouth once daily at 6am.    metoclopramide HCl (REGLAN) 10 MG tablet Take 1 tablet (10 mg total) by mouth 3 (three) times daily before meals.    omeprazole (PRILOSEC) 20 MG capsule Take 20 mg by mouth 2 (two) times daily.    ondansetron (ZOFRAN) 4 MG tablet  Take 1 tablet (4 mg total) by mouth every 8 (eight) hours as needed for Nausea.    tamsulosin (FLOMAX) 0.4 mg Cap Take 1 capsule (0.4 mg total) by mouth once daily.    traZODone (DESYREL) 100 MG tablet Take 1 tablet (100 mg total) by mouth once daily. (Patient taking differently: Take 100 mg by mouth nightly as needed for Insomnia.)     Family History       Problem Relation (Age of Onset)    Alcohol abuse Father    Arthritis Mother    Asthma Brother    Diabetes Mother, Sister, Brother    Heart attack Mother, Father, Brother    Heart disease Mother, Brother    Hypertension Mother    Kidney disease Mother    Stroke Mother    Vision loss Mother          Tobacco Use    Smoking status: Never Smoker    Smokeless tobacco: Never Used   Substance and Sexual Activity    Alcohol use: No     Comment: socially    Drug use: No    Sexual activity: Not Currently     Review of Systems   Constitutional:  Negative for chills and fever.   HENT:  Negative for trouble swallowing.    Eyes:  Negative for visual disturbance.   Respiratory:  Negative for shortness of breath and wheezing.    Cardiovascular:  Negative for chest pain, palpitations and leg swelling.   Gastrointestinal:  Negative for abdominal distention, abdominal pain, blood in stool, constipation, diarrhea, nausea and vomiting.   Genitourinary:  Negative for difficulty urinating, dysuria and hematuria.   Musculoskeletal:  Positive for arthralgias. Negative for back pain.   Skin:  Negative for rash.   Neurological:  Positive for weakness. Negative for dizziness, light-headedness and headaches.   Psychiatric/Behavioral:  Negative for agitation and confusion.    Objective:     Vital Signs (Most Recent):  Temp: 98.7 °F (37.1 °C) (04/10/22 0750)  Pulse: 79 (04/10/22 0750)  Resp: 18 (04/10/22 0750)  BP: (!) 140/61 (04/10/22 0750)  SpO2: 100 % (04/10/22 0750)   Vital Signs (24h Range):  Temp:  [98.1 °F (36.7 °C)-99.3 °F (37.4 °C)] 98.7 °F (37.1 °C)  Pulse:  [68-91] 79  Resp:  [16-18]  18  SpO2:  [95 %-100 %] 100 %  BP: (118-142)/(58-65) 140/61     Weight: 49.4 kg (109 lb)  Body mass index is 17.07 kg/m².    Physical Exam  Vitals and nursing note reviewed.   Constitutional:       General: She is not in acute distress.     Appearance: Normal appearance. She is not ill-appearing or diaphoretic.   HENT:      Head: Normocephalic.      Comments: Surgical staples right side of scalp     Nose: Nose normal.      Mouth/Throat:      Mouth: Mucous membranes are dry.      Pharynx: No posterior oropharyngeal erythema.   Eyes:      General: No scleral icterus.     Conjunctiva/sclera: Conjunctivae normal.   Cardiovascular:      Rate and Rhythm: Normal rate and regular rhythm.      Heart sounds: No murmur heard.    No gallop.   Pulmonary:      Effort: Pulmonary effort is normal. No respiratory distress.      Breath sounds: No wheezing.   Abdominal:      General: Abdomen is flat. There is no distension.      Palpations: Abdomen is soft.      Tenderness: There is no abdominal tenderness.   Musculoskeletal:         General: Deformity (L arm and left leg contracted. L leg painful with movement) present.      Cervical back: Normal range of motion.      Right lower leg: No edema.      Left lower leg: No edema.   Skin:     General: Skin is warm and dry.      Capillary Refill: Capillary refill takes less than 2 seconds.   Neurological:      Mental Status: She is alert and oriented to person, place, and time. Mental status is at baseline.      Motor: Weakness (Moves right extremities, right upper extremity 5/5 strength. Does not move left extremities) present.   Psychiatric:         Mood and Affect: Mood normal.       Significant Labs: All pertinent labs within the past 24 hours have been reviewed.  BMP:   Recent Labs   Lab 04/10/22  0640   *      K 3.6   *   CO2 23   BUN 20   CREATININE 1.4   CALCIUM 7.6*     CBC:   Recent Labs   Lab 04/09/22  0550 04/10/22  0640   WBC 11.90 8.98  8.98   HGB 6.5* 6.1*   6.1*   HCT 20.5* 19.2*  19.2*    155  155       Significant Imaging: I have reviewed all pertinent imaging results/findings within the past 24 hours.

## 2022-04-10 NOTE — CONSULTS
[facility] ([facility] Medicine  Consult Note    Patient Name: Beatriz Adame  MRN: [id number]  Admission Date: 4/3/2022  Hospital Length of Stay: 7 days  Attending Physician: Silvio White MD   Primary Care Provider: Dante Olivier MD           Patient information was obtained from patient, past medical records and primary team.     Consults  Subjective:     Principal Problem: SDH (subdural hematoma)    Chief Complaint:   Chief Complaint   Patient presents with    Fall     Pt arrives from [facility]. Pt had a witnessed fall out of her wheelchair. Confused at baseline. Negative for LOC; pt on blood thinners.         HPI: Miss Adame is a 71 year old female with hx of DMII, HTN, HLD, CKD, and chronic anemia who initially presented from her SNF after being found down next to her wheelchair. Imaging in the ED revealed a 2 cm SDH and was admitted to the neuro ICU. She is now s/p craniotomy with evacuation of the SDH and stepped down to the floor 4/8. She has been improving from a neurosurgical standpoint. Medicine was consulted given her continued anemia. Upon evaluation patient denied light headedness, fever, chills, chest pain, or abdominal pain. She did endorse some left leg pain. Additionally, she states that she has been told in the past that she is anemia, but denied any prior evaluation by hematology.       Past Medical History:   Diagnosis Date    Gastroparesis     GERD (gastroesophageal reflux disease)     History of Clostridium difficile colitis 07/24/2021    History of kidney stones     History of stroke     left side paralysis    Hyperlipidemia     Hypertension     Hypothyroidism     Iron deficiency anemia     Osteoarthritis     Peripheral neuropathy     Stage IV CKD     Type II diabetes mellitus        Past Surgical History:   Procedure Laterality Date    APPENDECTOMY      CRANIOTOMY FOR EVACUATION OF SUBDURAL HEMATOMA Right 4/4/2022    Procedure:  CRANIOTOMY, FOR SUBDURAL HEMATOMA EVACUATION;  Surgeon: Silvio White MD;  Location: [facility] OR 2ND FLR;  Service: Neurosurgery;  Laterality: Right;    CYSTOSCOPY W/ URETERAL STENT PLACEMENT Right 10/13/2021    Procedure: CYSTOSCOPY, WITH URETERAL STENT INSERTION;  Surgeon: Wanda Arroyo MD;  Location: [facility] OR;  Service: Urology;  Laterality: Right;    ESOPHAGOGASTRODUODENOSCOPY N/A 11/23/2021    Procedure: EGD (ESOPHAGOGASTRODUODENOSCOPY);  Surgeon: Obed Jeong MD;  Location: [facility] ENDO (2ND FLR);  Service: Endoscopy;  Laterality: N/A;    FOOT AMPUTATION THROUGH METATARSAL Right 3/24/2022    Procedure: AMPUTATION, FOOT, PARTIAL 4th and 5th ray;  Surgeon: Rodrigo Logan DPM;  Location: [facility] OR 2ND FLR;  Service: Podiatry;  Laterality: Right;    LAPAROSCOPIC APPENDECTOMY N/A 7/22/2021    Procedure: APPENDECTOMY, LAPAROSCOPIC;  Surgeon: Paulo Calvo Jr., MD;  Location: [facility];  Service: General;  Laterality: N/A;    TOE AMPUTATION Right 1/1/2022    Procedure: AMPUTATION, TOE;  Surgeon: Genaro Mason DPM;  Location: [facility];  Service: Podiatry;  Laterality: Right;    TUBAL LIGATION      URETEROSCOPIC REMOVAL OF URETERIC CALCULUS Right 12/22/2021    Procedure: REMOVAL, CALCULUS, URETER, URETEROSCOPIC;  Surgeon: Wanda Arroyo MD;  Location: [facility];  Service: Urology;  Laterality: Right;       Review of patient's allergies indicates:   Allergen Reactions    Tomato (solanum lycopersicum) Hives and Itching       No current facility-administered medications on file prior to encounter.     Current Outpatient Medications on File Prior to Encounter   Medication Sig    rosuvastatin (CRESTOR) 40 MG Tab Take 40 mg by mouth every evening.    amLODIPine (NORVASC) 5 MG tablet Take 10 mg by mouth every evening. Take 5 mg every morning and 10 mg at night    ascorbic acid, vitamin C, (VITAMIN C) 500 MG tablet Take 1 tablet (500 mg total) by mouth once daily.    aspirin (ECOTRIN) 81 MG EC tablet Take  81 mg by mouth once daily.    b complex vitamins tablet Take 1 tablet by mouth once daily.    carvedilol (COREG) 25 MG tablet Take 1 tablet (25 mg total) by mouth 2 (two) times daily.    clopidogreL (PLAVIX) 75 mg tablet Take 75 mg by mouth.    DULoxetine (CYMBALTA) 30 MG capsule Take 1 capsule (30 mg total) by mouth once daily.    ferrous sulfate (FEOSOL) 325 mg (65 mg iron) Tab tablet Take 325 mg by mouth daily with breakfast. Off at present    folic acid (FOLVITE) 1 MG tablet Take 1 tablet (1 mg total) by mouth once daily.    gabapentin (NEURONTIN) 300 MG capsule Take 1 capsule (300 mg total) by mouth once daily.    hydrochlorothiazide (MICROZIDE ORAL) Take 20 mg by mouth once daily at 6am.    insulin (BASAGLAR KWIKPEN U-100 INSULIN) glargine 100 units/mL (3mL) SubQ pen Inject 5 Units into the skin once daily.    Lactobacillus rhamnosus GG (CULTURELLE) 10 billion cell capsule Take 1 capsule by mouth once daily.    lactulose (CHRONULAC) 10 gram/15 mL solution Take 10 g by mouth 2 (two) times a day.    levothyroxine (SYNTHROID) 75 MCG tablet Take 75 mcg by mouth once daily.    losartan (COZAAR) 25 MG tablet Take 25 mg by mouth once daily at 6am.    metoclopramide HCl (REGLAN) 10 MG tablet Take 1 tablet (10 mg total) by mouth 3 (three) times daily before meals.    omeprazole (PRILOSEC) 20 MG capsule Take 20 mg by mouth 2 (two) times daily.    ondansetron (ZOFRAN) 4 MG tablet Take 1 tablet (4 mg total) by mouth every 8 (eight) hours as needed for Nausea.    tamsulosin (FLOMAX) 0.4 mg Cap Take 1 capsule (0.4 mg total) by mouth once daily.    traZODone (DESYREL) 100 MG tablet Take 1 tablet (100 mg total) by mouth once daily. (Patient taking differently: Take 100 mg by mouth nightly as needed for Insomnia.)     Family History       Problem Relation (Age of Onset)    Alcohol abuse Father    Arthritis Mother    Asthma Brother    Diabetes Mother, Sister, Brother    Heart attack Mother, Father, Brother     Heart disease Mother, Brother    Hypertension Mother    Kidney disease Mother    Stroke Mother    Vision loss Mother          Tobacco Use    Smoking status: Never Smoker    Smokeless tobacco: Never Used   Substance and Sexual Activity    Alcohol use: No     Comment: socially    Drug use: No    Sexual activity: Not Currently     Review of Systems   Constitutional:  Negative for chills and fever.   HENT:  Negative for trouble swallowing.    Eyes:  Negative for visual disturbance.   Respiratory:  Negative for shortness of breath and wheezing.    Cardiovascular:  Negative for chest pain, palpitations and leg swelling.   Gastrointestinal:  Negative for abdominal distention, abdominal pain, blood in stool, constipation, diarrhea, nausea and vomiting.   Genitourinary:  Negative for difficulty urinating, dysuria and hematuria.   Musculoskeletal:  Positive for arthralgias. Negative for back pain.   Skin:  Negative for rash.   Neurological:  Positive for weakness. Negative for dizziness, light-headedness and headaches.   Psychiatric/Behavioral:  Negative for agitation and confusion.    Objective:     Vital Signs (Most Recent):  Temp: 98.7 °F (37.1 °C) (04/10/22 0750)  Pulse: 79 (04/10/22 0750)  Resp: 18 (04/10/22 0750)  BP: (!) 140/61 (04/10/22 0750)  SpO2: 100 % (04/10/22 0750)   Vital Signs (24h Range):  Temp:  [98.1 °F (36.7 °C)-99.3 °F (37.4 °C)] 98.7 °F (37.1 °C)  Pulse:  [68-91] 79  Resp:  [16-18] 18  SpO2:  [95 %-100 %] 100 %  BP: (118-142)/(58-65) 140/61     Weight: 49.4 kg (109 lb)  Body mass index is 17.07 kg/m².    Physical Exam  Vitals and nursing note reviewed.   Constitutional:       General: She is not in acute distress.     Appearance: Normal appearance. She is not ill-appearing or diaphoretic.   HENT:      Head: Normocephalic.      Comments: Surgical staples right side of scalp     Nose: Nose normal.      Mouth/Throat:      Mouth: Mucous membranes are dry.      Pharynx: No posterior oropharyngeal  erythema.   Eyes:      General: No scleral icterus.     Conjunctiva/sclera: Conjunctivae normal.   Cardiovascular:      Rate and Rhythm: Normal rate and regular rhythm.      Heart sounds: No murmur heard.    No gallop.   Pulmonary:      Effort: Pulmonary effort is normal. No respiratory distress.      Breath sounds: No wheezing.   Abdominal:      General: Abdomen is flat. There is no distension.      Palpations: Abdomen is soft.      Tenderness: There is no abdominal tenderness.   Musculoskeletal:         General: Deformity (L arm and left leg contracted. L leg painful with movement) present.      Cervical back: Normal range of motion.      Right lower leg: No edema.      Left lower leg: No edema.   Skin:     General: Skin is warm and dry.      Capillary Refill: Capillary refill takes less than 2 seconds.   Neurological:      Mental Status: She is alert and oriented to person, place, and time. Mental status is at baseline.      Motor: Weakness (Moves right extremities, right upper extremity 5/5 strength. Does not move left extremities) present.   Psychiatric:         Mood and Affect: Mood normal.       Significant Labs: All pertinent labs within the past 24 hours have been reviewed.  BMP:   Recent Labs   Lab 04/10/22  0640   *      K 3.6   *   CO2 23   BUN 20   CREATININE 1.4   CALCIUM 7.6*     CBC:   Recent Labs   Lab 04/09/22  0550 04/10/22  0640   WBC 11.90 8.98  8.98   HGB 6.5* 6.1*  6.1*   HCT 20.5* 19.2*  19.2*    155  155       Significant Imaging: I have reviewed all pertinent imaging results/findings within the past 24 hours.    Assessment/Plan:     * SDH (subdural hematoma)  Management per primary      Osteomyelitis of right foot  Podiatry following. Antibiotic management per ID.       Acute on chronic anemia  This is a patient with a history of chronic anemia. Recent drop in hgb is likely 2/2 to mild expected blood loss, acute illness, and phlebotomy. Patient is asymptomatic,  agree with transfusion today. No signs of active blood loss or hemolysis. Labs consistent with anemia of chronic disease and acute blood loss.     - Recommend age appropriate colonoscopy as outpatient if warrented  - agree with transfusion today  - Trend CBC, transfuse for HgB <7      Type 2 diabetes mellitus, with long-term current use of insulin  Last A1C 5.0         Hyperlipidemia  Continue statin.       Hypertension  Continue amlodipine, coreg, and losartan.         VTE Risk Mitigation (From admission, onward)         Ordered     heparin (porcine) injection 5,000 Units  Every 8 hours         04/06/22 1315     IP VTE HIGH RISK PATIENT  Once         04/03/22 1608     Place sequential compression device  Until discontinued         04/03/22 1608     Reason for No Pharmacological VTE Prophylaxis  Once        Question:  Reasons:  Answer:  Active Bleeding    04/03/22 1608                    Thank you for your consult. I will follow-up with patient. Please contact us if you have any additional questions.    Ang Green DO  Department of Hospital Medicine   [facility] - Neurosurgery ([facility])

## 2022-04-10 NOTE — ASSESSMENT & PLAN NOTE
This is a patient with a history of chronic anemia. Recent drop in hgb is likely 2/2 to mild expected blood loss, acute illness, and phlebotomy. Patient is asymptomatic, agree with transfusion today. No signs of active blood loss or hemolysis. Labs consistent with anemia of chronic disease and acute blood loss.     - Recommend age appropriate colonoscopy as outpatient if warrented  - agree with transfusion today  - Trend CBC, transfuse for HgB <7

## 2022-04-10 NOTE — PROGRESS NOTES
Recommended seeing primary eye care provider for refraction or updated prescription as needed. [facility] Neurosurgery ([facility])  Neurosurgery  Progress Note    Subjective:     History of Present Illness: 71 year old female with , hypothyroidism, CVA with left-sided residual deficit on ASA/Plavix, DM2, HTN, HLD, CKD, chronic anemia, presenting from SNF after being found down next to wheelchair after unwitnessed fall, consulted to NSGY for right 2 cm SDH without midline shift. She is altered at baseline and unable to provide to history, but family at bedside says she is more confused and less interactive than usual.       Post-Op Info:  Procedure(s) (LRB):  CRANIOTOMY, FOR SUBDURAL HEMATOMA EVACUATION (Right)   6 Days Post-Op     Interval History:  NAEON. AFVSS. Exam stable. Extubated. Pain controlled. Saturating well on room air.  Hemaglobin down trending.  Will transfuse and consult medicine for subacute on chronic blood loss.     Medications:  Continuous Infusions:      Scheduled Meds:   amLODIPine  10 mg Oral Daily    atorvastatin  40 mg Oral Daily    carvediloL  25 mg Oral BID    heparin (porcine)  5,000 Units Subcutaneous Q8H    levothyroxine  75 mcg Oral Before breakfast    losartan  25 mg Oral Daily    senna-docusate 8.6-50 mg  1 tablet Oral BID    silodosin  4 mg Oral Daily     PRN Meds:sodium chloride, acetaminophen, dextrose 10%, glucagon (human recombinant), hydrALAZINE, HYDROcodone-acetaminophen, HYDROmorphone, insulin aspart U-100, labetalol, magnesium sulfate IVPB, magnesium sulfate IVPB, metoclopramide HCl, ondansetron, potassium bicarbonate, potassium bicarbonate, potassium bicarbonate, potassium, sodium phosphates, potassium, sodium phosphates, potassium, sodium phosphates, sodium chloride 0.9%, Pharmacy to dose Vancomycin consult **AND** vancomycin - pharmacy to dose         Objective:     Weight: 49.4 kg (109 lb)  Body mass index is 17.07 kg/m².  Vital Signs (Most Recent):  Temp: 98.7 °F (37.1 °C) (04/10/22 0750)  Pulse: 79 (04/10/22 0750)  Resp: 18 (04/10/22 0750)  BP: (!) 140/61  (04/10/22 0750)  SpO2: 100 % (04/10/22 0750)   Vital Signs (24h Range):  Temp:  [98.1 °F (36.7 °C)-99.3 °F (37.4 °C)] 98.7 °F (37.1 °C)  Pulse:  [68-91] 79  Resp:  [16-18] 18  SpO2:  [95 %-100 %] 100 %  BP: (118-142)/(58-65) 140/61                            Urethral Catheter 03/18/22 2210 Non-latex 16 Fr. (Active)       Physical Exam    Neurosurgery Physical Exam    Physical Exam:  Constitutional: No distress.   HEENT: ataumatic/normocephalic  Cardiovascular: Regular rhythm.   Pulm: intubated   Abdominal: Soft.   Psych/Behavior: He is alert.   [id number]  PERRL  Left contracted and paretic  FCx in all except LUE  Incision C/D/I      Significant Labs:  Recent Labs   Lab 04/09/22  0550 04/10/22  0640   * 111*    142   K 3.8 3.6   * 113*   CO2 22* 23   BUN 19 20   CREATININE 1.4 1.4   CALCIUM 7.9* 7.6*       Recent Labs   Lab 04/09/22  0550 04/10/22  0640   WBC 11.90 8.98  8.98   HGB 6.5* 6.1*  6.1*   HCT 20.5* 19.2*  19.2*    155  155       No results for input(s): LABPT, INR, APTT in the last 48 hours.    Microbiology Results (last 7 days)       Procedure Component Value Units Date/Time    Urine culture [[id number]] Collected: 04/03/22 1626    Order Status: Completed Specimen: Urine Updated: 04/04/22 2110     Urine Culture, Routine No growth    Narrative:      Specimen Source->Urine          All pertinent labs from the last 24 hours have been reviewed.    Significant Diagnostics:  I have reviewed and interpreted all pertinent imaging results/findings within the past 24 hours.  No results found in the last 24 hours.    Review of Systems    Assessment/Plan:     * SDH (subdural hematoma)  71 year old female with , hypothyroidism, CVA with left-sided residual deficit on ASA/Plavix, DM2, HTN, HLD, CKD, chronic anemia, presenting from SNF after being found down next to wheelchair after unwitnessed fall, consulted to NSGY for right 2 cm SDH mostly acute, membranous and heterogenous appearing, some  chronic component, without midline shift. Now s/p R crani for SDH evacuation (4/4).      --Stepped down to floor.   -- q4 hour vitals/neurochecks.  -- SBP < 160.  -- Na goal eunatremia.  -- Passed swallow studies; tolerating mechanical soft diet with thin liquids.  -- Podiatry managing recent partial foot amputation, appreciate recs.   -- Keppra 500 BID.  -- drains discontinued.  -- Follow up CT head satisfactory x2 with residual blood.  -- Extubated and saturating well on room air.  -- No HOB restriction.  -- PPx: SCDs, BR, IS. Okay for [facility] for dvt ppx.   -- Nsgy will arrange appropriate follow up for patient.       Hemaglobin down trending.  Will transfuse and consult medicine for subacute on chronic blood loss.       Dispo: PT/OT recs for SNF; son wishes for home with  or new SNF   Working on snf           Yohan Thomas MD  Neurosurgery  [facility] Neurosurgery ([facility])

## 2022-04-10 NOTE — ASSESSMENT & PLAN NOTE
71 year old female with , hypothyroidism, CVA with left-sided residual deficit on ASA/Plavix, DM2, HTN, HLD, CKD, chronic anemia, presenting from SNF after being found down next to wheelchair after unwitnessed fall, consulted to NSGY for right 2 cm SDH mostly acute, membranous and heterogenous appearing, some chronic component, without midline shift. Now s/p R crani for SDH evacuation (4/4).      --Stepped down to floor.   -- q4 hour vitals/neurochecks.  -- SBP < 160.  -- Na goal eunatremia.  -- Passed swallow studies; tolerating mechanical soft diet with thin liquids.  -- Podiatry managing recent partial foot amputation, appreciate recs.   -- Keppra 500 BID.  -- drains discontinued.  -- Follow up CT head satisfactory x2 with residual blood.  -- Extubated and saturating well on room air.  -- No HOB restriction.  -- PPx: SCDs, BR, IS. Okay for SQ for dvt ppx.   -- Nsgy will arrange appropriate follow up for patient.       Hemaglobin down trending.  Will transfuse and consult medicine for subacute on chronic blood loss.       Dispo: PT/OT recs for SNF; son wishes for home with  or new SNF   Working on snf

## 2022-04-10 NOTE — HPI
Ms. Adame is a 70 yo female with hx of dementia, R MCA stroke with left side contraction/hemiparesis [place] [facility] [place]H now s/p evac. Stepped down from [facility]. On 4/13 she developed worsening headache and CTH showed re-accumulation of R SDH and 4-5mm MLS. She was noted to be more slow to follow commands. Stepped back up to [facility] for closer monitoring.     Original HPI below:  Ms. Adame is a 72 y/o F with a PMHx of baseline dementia, GERD, R MCA stroke with residual LUE weakness, HLD, HTN, Stage IV CKD, TIIDM who presents to [facility] from [facility] with a AoC SDH after an unwitnessed fall from her wheel chair without loss of consciousness. CT spine negative for fracture. CT head with R SDH and 3-4 mm MLS. She is on DAPT at home. Of note, she has been staying at [facility] after a partial R foot amputation, stitches still intact, for which she is receiving vancomycin.

## 2022-04-10 NOTE — NURSING
Critical Hematocrit of 19.2 reported to Dr. Cornejo with Neurosurgery.  MD requests for pt to receive 2 units of blood.

## 2022-04-10 NOTE — SUBJECTIVE & OBJECTIVE
Interval History:  NAEON. AFVSS. Exam stable. Extubated. Pain controlled. Saturating well on room air.  Hemaglobin down trending.  Will transfuse and consult medicine for subacute on chronic blood loss.     Medications:  Continuous Infusions:      Scheduled Meds:   amLODIPine  10 mg Oral Daily    atorvastatin  40 mg Oral Daily    carvediloL  25 mg Oral BID    heparin (porcine)  5,000 Units Subcutaneous Q8H    levothyroxine  75 mcg Oral Before breakfast    losartan  25 mg Oral Daily    senna-docusate 8.6-50 mg  1 tablet Oral BID    silodosin  4 mg Oral Daily     PRN Meds:sodium chloride, acetaminophen, dextrose 10%, glucagon (human recombinant), hydrALAZINE, HYDROcodone-acetaminophen, HYDROmorphone, insulin aspart U-100, labetalol, magnesium sulfate IVPB, magnesium sulfate IVPB, metoclopramide HCl, ondansetron, potassium bicarbonate, potassium bicarbonate, potassium bicarbonate, potassium, sodium phosphates, potassium, sodium phosphates, potassium, sodium phosphates, sodium chloride 0.9%, Pharmacy to dose Vancomycin consult **AND** vancomycin - pharmacy to dose         Objective:     Weight: 49.4 kg (109 lb)  Body mass index is 17.07 kg/m².  Vital Signs (Most Recent):  Temp: 98.7 °F (37.1 °C) (04/10/22 0750)  Pulse: 79 (04/10/22 0750)  Resp: 18 (04/10/22 0750)  BP: (!) 140/61 (04/10/22 0750)  SpO2: 100 % (04/10/22 0750)   Vital Signs (24h Range):  Temp:  [98.1 °F (36.7 °C)-99.3 °F (37.4 °C)] 98.7 °F (37.1 °C)  Pulse:  [68-91] 79  Resp:  [16-18] 18  SpO2:  [95 %-100 %] 100 %  BP: (118-142)/(58-65) 140/61                            Urethral Catheter 03/18/22 2210 Non-latex 16 Fr. (Active)       Physical Exam    Neurosurgery Physical Exam    Physical Exam:  Constitutional: No distress.   HEENT: ataumatic/normocephalic  Cardiovascular: Regular rhythm.   Pulm: intubated   Abdominal: Soft.   Psych/Behavior: He is alert.   E4V5M6  PERRL  Left contracted and paretic  FCx in all except LUE  Incision C/D/I      Significant  Labs:  Recent Labs   Lab 04/09/22  0550 04/10/22  0640   * 111*    142   K 3.8 3.6   * 113*   CO2 22* 23   BUN 19 20   CREATININE 1.4 1.4   CALCIUM 7.9* 7.6*       Recent Labs   Lab 04/09/22  0550 04/10/22  0640   WBC 11.90 8.98  8.98   HGB 6.5* 6.1*  6.1*   HCT 20.5* 19.2*  19.2*    155  155       No results for input(s): LABPT, INR, APTT in the last 48 hours.    Microbiology Results (last 7 days)       Procedure Component Value Units Date/Time    Urine culture [[id number]] Collected: 04/03/22 1626    Order Status: Completed Specimen: Urine Updated: 04/04/22 2110     Urine Culture, Routine No growth    Narrative:      Specimen Source->Urine          All pertinent labs from the last 24 hours have been reviewed.    Significant Diagnostics:  I have reviewed and interpreted all pertinent imaging results/findings within the past 24 hours.  No results found in the last 24 hours.    Review of Systems

## 2022-04-11 PROBLEM — R50.9 FEVER: Status: ACTIVE | Noted: 2022-01-01

## 2022-04-11 NOTE — SUBJECTIVE & OBJECTIVE
Interval History: Fever overnight with Tmax 101. Repeat infectious workup ordered by primary team and in process. Received 1u PRBCs yesterday with appropriate rise in hgb and no evidence of bleeding.    Review of Systems   Reason unable to perform ROS: limited 2/2 cognition.   Constitutional:  Positive for activity change and fever.   Respiratory:  Negative for cough and shortness of breath.    Gastrointestinal:  Negative for abdominal pain, nausea and vomiting.   Musculoskeletal:  Positive for arthralgias and gait problem.   Skin:  Positive for wound.   Neurological:  Positive for weakness.        LUE contracture   Objective:     Vital Signs (Most Recent):  Temp: 97.7 °F (36.5 °C) (04/11/22 1144)  Pulse: 77 (04/11/22 1159)  Resp: 18 (04/11/22 1144)  BP: 132/70 (04/11/22 1144)  SpO2: 97 % (04/11/22 1144) Vital Signs (24h Range):  Temp:  [97.7 °F (36.5 °C)-101 °F (38.3 °C)] 97.7 °F (36.5 °C)  Pulse:  [77-91] 77  Resp:  [16-18] 18  SpO2:  [94 %-98 %] 97 %  BP: (125-156)/(57-70) 132/70     Weight: 49.4 kg (109 lb)  Body mass index is 17.07 kg/m².    Intake/Output Summary (Last 24 hours) at 4/11/2022 1231  Last data filed at 4/11/2022 0545  Gross per 24 hour   Intake 492 ml   Output 350 ml   Net 142 ml      Physical Exam  Vitals and nursing note reviewed.   Constitutional:       General: She is not in acute distress.     Appearance: Normal appearance. She is ill-appearing (chronically ill appearing). She is not diaphoretic.      Comments: Frail, emaciated elderly female   HENT:      Head: Normocephalic.      Comments: Surgical incision intact with staples in place to R parietal area of scalp, no surrounding erythema or drainage from incision     Mouth/Throat:      Mouth: Mucous membranes are moist.      Pharynx: No posterior oropharyngeal erythema.   Eyes:      General:         Right eye: No discharge.         Left eye: No discharge.      Conjunctiva/sclera: Conjunctivae normal.   Cardiovascular:      Rate and Rhythm:  Normal rate and regular rhythm.      Heart sounds: No murmur heard.    No gallop.   Pulmonary:      Effort: Pulmonary effort is normal. No respiratory distress.      Breath sounds: No wheezing.   Abdominal:      General: Abdomen is flat. There is no distension.      Palpations: Abdomen is soft.      Tenderness: There is no abdominal tenderness.   Musculoskeletal:         General: Deformity (LUE and LLE contractures) present.      Right lower leg: No edema.      Left lower leg: No edema.      Comments: RLE foot wound covered with clean and dry dressing   Skin:     General: Skin is warm and dry.   Neurological:      Mental Status: She is alert and oriented to person, place, and time. Mental status is at baseline.      Motor: Weakness (LUE and LLE weakness 2/2 CVA, normal strength on R) present.       Significant Labs: All pertinent labs within the past 24 hours have been reviewed.  CBC:   Recent Labs   Lab 04/10/22  0640 04/11/22  0438   WBC 8.98  8.98 8.92   HGB 6.1*  6.1* 7.0*   HCT 19.2*  19.2* 21.8*     155 125*     CMP:   Recent Labs   Lab 04/10/22  0640 04/11/22  0521    143   K 3.6 3.6   * 114*   CO2 23 21*   * 107   BUN 20 19   CREATININE 1.4 1.3   CALCIUM 7.6* 7.8*   PROT 5.4* 5.4*   ALBUMIN 1.8* 1.9*   BILITOT 0.3 0.5   ALKPHOS 53* 59   AST 18 15   ALT 14 12   ANIONGAP 6* 8   EGFRNONAA 38.1* 41.7*       Significant Imaging: I have reviewed all pertinent imaging results/findings within the past 24 hours.

## 2022-04-11 NOTE — CONSULTS
Consult acknowledged. See consult note from 4/10/2022.       Kerry Helton MD  Internal Medicine, PGYIII  [facility]

## 2022-04-11 NOTE — SUBJECTIVE & OBJECTIVE
Interval History: Febrile overnight to 101. Denies fever, chest pain, or shortness of breath. Pain controlled. F/u CXR and BLE US. In setting of recent surgery will transfuse 1 unit RBC. Discharge pending fever workup and blood transfusion. UA with yeast, will f/u final cultures. Diet advanced.     Medications:  Continuous Infusions:  Scheduled Meds:   amLODIPine  10 mg Oral Daily    atorvastatin  40 mg Oral Daily    carvediloL  25 mg Oral BID    heparin (porcine)  5,000 Units Subcutaneous Q8H    levothyroxine  75 mcg Oral Before breakfast    losartan  25 mg Oral Daily    senna-docusate 8.6-50 mg  1 tablet Oral BID    silodosin  4 mg Oral Daily     PRN Meds:sodium chloride, sodium chloride, acetaminophen, dextrose 10%, glucagon (human recombinant), HYDROcodone-acetaminophen, HYDROmorphone, metoclopramide HCl, ondansetron, potassium bicarbonate, potassium bicarbonate, potassium bicarbonate, potassium, sodium phosphates, potassium, sodium phosphates, potassium, sodium phosphates, sodium chloride 0.9%, Pharmacy to dose Vancomycin consult **AND** vancomycin - pharmacy to dose     Review of Systems  Objective:     Weight: 49.4 kg (109 lb)  Body mass index is 17.07 kg/m².  Vital Signs (Most Recent):  Temp: 97.7 °F (36.5 °C) (04/11/22 1144)  Pulse: 77 (04/11/22 1159)  Resp: 18 (04/11/22 1144)  BP: 132/70 (04/11/22 1144)  SpO2: 97 % (04/11/22 1144) Vital Signs (24h Range):  Temp:  [97.7 °F (36.5 °C)-101 °F (38.3 °C)] 97.7 °F (36.5 °C)  Pulse:  [77-91] 77  Resp:  [16-18] 18  SpO2:  [94 %-98 %] 97 %  BP: (125-156)/(57-70) 132/70                     Neurosurgery Physical Exam  Constitutional: No distress.   HEENT: Normocephalic. Left sided crani incision with staples intact, clean and dry.   Cardiovascular: Regular rhythm.   Pulm: No signs of respiratory distress.   Abdominal: Soft, non-distended.   Mental status: She is awake, alert and oriented to person, date and place.   PERRL  Left side contracted and paretic  FCx in all  except LUE    Significant Labs:  Recent Labs   Lab 04/10/22  0640 04/11/22  0521   * 107    143   K 3.6 3.6   * 114*   CO2 23 21*   BUN 20 19   CREATININE 1.4 1.3   CALCIUM 7.6* 7.8*     Recent Labs   Lab 04/10/22  0640 04/11/22  0438   WBC 8.98  8.98 8.92   HGB 6.1*  6.1* 7.0*   HCT 19.2*  19.2* 21.8*     155 125*     No results for input(s): LABPT, INR, APTT in the last 48 hours.  Microbiology Results (last 7 days)       Procedure Component Value Units Date/Time    Blood culture [[id number]] Collected: 04/11/22 1021    Order Status: Sent Specimen: Blood from Peripheral, Left Arm Updated: 04/11/22 1045    Narrative:      Collection has been rescheduled by [facility] at 04/11/2022 10:04 Reason:   Patient unavailable, patient care, nurse paige notified.  Collection has been rescheduled by [facility] at 04/11/2022 10:04 Reason:   Patient unavailable, patient care, nurse paige notified.    Blood culture [[id number]] Collected: 04/11/22 1020    Order Status: Sent Specimen: Blood from Peripheral, Left Hand Updated: 04/11/22 1045    Narrative:      Collection has been rescheduled by [facility] at 04/11/2022 10:04 Reason:   Patient unavailable, patient care, nurse paige notified.  Collection has been rescheduled by [facility] at 04/11/2022 10:04 Reason:   Patient unavailable, patient care, nurse paige notified.    Urine culture [[id number]] Collected: 04/11/22 1017    Order Status: No result Specimen: Urine Updated: 04/11/22 1037    Urine culture [[id number]] Collected: 04/03/22 1626    Order Status: Completed Specimen: Urine Updated: 04/04/22 2110     Urine Culture, Routine No growth    Narrative:      Specimen Source->Urine          All pertinent labs from the last 24 hours have been reviewed.    Significant Diagnostics:  I have reviewed all pertinent imaging results/findings within the past 24 hours.

## 2022-04-11 NOTE — SUBJECTIVE & OBJECTIVE
Interval History 4/11/2022:  Patient is seen for follow-up PM&R evaluation and recommendations: Participating w/ therapy.    HPI, Past Medical, Family, and Social History remains the same as documented in the initial encounter.    Scheduled Medications:    amLODIPine  10 mg Oral Daily    atorvastatin  40 mg Oral Daily    carvediloL  25 mg Oral BID    heparin (porcine)  5,000 Units Subcutaneous Q8H    levothyroxine  75 mcg Oral Before breakfast    losartan  25 mg Oral Daily    senna-docusate 8.6-50 mg  1 tablet Oral BID    silodosin  4 mg Oral Daily       Diagnostic Results: Labs: Reviewed    PRN Medications: sodium chloride, acetaminophen, dextrose 10%, glucagon (human recombinant), HYDROcodone-acetaminophen, HYDROmorphone, metoclopramide HCl, ondansetron, potassium bicarbonate, potassium bicarbonate, potassium bicarbonate, potassium, sodium phosphates, potassium, sodium phosphates, potassium, sodium phosphates, sodium chloride 0.9%, Pharmacy to dose Vancomycin consult **AND** vancomycin - pharmacy to dose    Review of Systems   Reason unable to perform ROS: limited 2/2 cognition.   Constitutional:  Positive for activity change.   Musculoskeletal:  Positive for arthralgias and gait problem.   Skin:  Positive for wound.   Neurological:  Positive for weakness.        LUE contracture   Objective:     Vital Signs (Most Recent):  Temp: 99 °F (37.2 °C) (04/11/22 0704)  Pulse: 84 (04/11/22 0704)  Resp: 16 (04/11/22 0704)  BP: (!) 127/57 (04/11/22 0704)  SpO2: 95 % (04/11/22 0704)      Vital Signs (24h Range):  Temp:  [97.8 °F (36.6 °C)-101 °F (38.3 °C)] 99 °F (37.2 °C)  Pulse:  [78-91] 84  Resp:  [16-18] 16  SpO2:  [94 %-98 %] 95 %  BP: (125-156)/(57-67) 127/57     Physical Exam  Vitals reviewed.   Constitutional:       General: She is not in acute distress.     Appearance: She is well-developed. She is ill-appearing.   HENT:      Head: Normocephalic and atraumatic.   Eyes:      General:         Right eye: No discharge.          Left eye: No discharge.   Cardiovascular:      Rate and Rhythm: Normal rate.   Pulmonary:      Effort: Pulmonary effort is normal. No respiratory distress.   Abdominal:      Palpations: Abdomen is soft.      Tenderness: There is no abdominal tenderness.   Musculoskeletal:         General: Deformity (R foot amputation, partial) present.      Cervical back: Neck supple.   Feet:      Comments: R foot wound dressed  Skin:     General: Skin is warm and dry.   Neurological:      Mental Status: She is alert and oriented to person, place, and time.      Motor: Weakness and abnormal muscle tone (LUE) present.      Comments: Follows simple commands to R    Psychiatric:         Mood and Affect: Affect is flat.         Behavior: Behavior is slowed.     NEUROLOGICAL EXAMINATION:     MENTAL STATUS   Oriented to person, place, and time.

## 2022-04-11 NOTE — ASSESSMENT & PLAN NOTE
71 year old female with , hypothyroidism, CVA with left-sided residual deficit on ASA/Plavix, DM2, HTN, HLD, CKD, chronic anemia, presenting from SNF after being found down next to wheelchair after unwitnessed fall, consulted to NSGY for right 2 cm SDH mostly acute, membranous and heterogenous appearing, some chronic component, without midline shift. Now s/p R crani for SDH evacuation (4/4).      --Admitted to NSGY. Stepped down to floor.   -- q4 hour vitals/neurochecks.  -- SBP < 160.  -- Na goal eunatremia.  -- Diet: mechanical soft.  -- Podiatry managing recent partial foot amputation, appreciate recs.  -- Seizure ppx: Keppra 500 BID.  -- Follow up CT head satisfactory x2 with residual blood.  -- Extubated and saturating well on room air.  -- No HOB restriction.  -- PPx: SCDs, BR, IS. Okay for SQH for dvt ppx.   -- Nsgy will arrange appropriate follow up for patient.   -- Osteomyelitis of R foot:    - ID rec 6 week course Vanc with end date 5/5  -- Anemia: Hemaglobin 7.0. Will transfuse 1U pRBC 4/11 in setting of recent surgery.  -- Febrile 101:   - UA 4/11: WBC 28, few yeast. F/u Culture prior to treatment.   - CXR with possible basilar effusion.   - F/u BLE US.   - HM following. Appreciate assistance with care.    - Blood cx NGTD.    Dispo: PT/OT recs for SNF

## 2022-04-11 NOTE — PT/OT/SLP PROGRESS
Physical Therapy  Treatment    Patient Name:  Beatriz Adame   MRN:  [id number]    Recommendations:     Discharge Recommendations:  nursing facility, skilled   Discharge Equipment Recommendations: other (see comments) (TBD by next level of care)   Barriers to discharge: decreased functional mobility and fall risk    Assessment:     Beatriz Adame is a 71 y.o. female admitted with a medical diagnosis of SDH (subdural hematoma).  Pt demonstrates the below listed impairments with decreased tolerance to functional mobility, pain and impaired cognition being the most limiting.  Pt demonstrates poor tolerance to out of bed mobility and screams in pain with ROM to L LE to allow for patient to sit EOB this session.  Pt is not safe for home discharge at this time due to patient's status as: a fall risk, cognitively impaired and patient has increased pain and requires skilled PT.      Impairments and functional limitations:  weakness, impaired endurance, impaired self care skills, impaired functional mobilty, gait instability, impaired balance, impaired cognition, decreased coordination, decreased upper extremity function, decreased lower extremity function, pain, decreased safety awareness, abnormal tone, decreased ROM, impaired coordination, impaired fine motor.  These deficits affect their roles and responsibilities in which they were able to complete prior to admit.  Rehab Prognosis:   Poor; patient would benefit from acute skilled PT services 3 x/week to address these deficits, improve quality of life, focus on recovery of impairments, provide patient/caregiver education, reduce fall risk, and reach maximum level of function.  Pt is moderately motivated to participated in skilled PT.    Recent Surgery:   Procedure(s) (LRB):  CRANIOTOMY, FOR SUBDURAL HEMATOMA EVACUATION (Right) 7 Days Post-Op    Plan:     During this hospitalization, patient to be seen 3 x/week to address the identified rehab impairments via  therapeutic activities, therapeutic exercises, neuromuscular re-education and progress toward the following goals:    · Plan of Care Expires:  05/06/22    Subjective     Chief Complaint: decreased tolerance to functional mobility  Patient/Family Comments/Goals: Progress to SNF  Pain/Comfort:  · Pain Rating 1: other (see comments) (not rated)  · Location - Side 1: Left  · Location - Orientation 1: generalized  · Location 1:  (Leg and arm, any movement against her contractures)  · Pain Addressed 1: Reposition, Cessation of Activity  · Pain Rating Post-Intervention 1: other (see comments) (not rated)    Objective:     Communicated with RN prior to session.  Patient found HOB elevated with peripheral IV, bed alarm, pulse ox (continuous), telemetry, blood pressure cuff upon PT entry to room.     General Precautions: Standard, contact, fall, hearing impaired, aspiration   Orthopedic Precautions:N/A   Braces: N/A  Oxygen Device:      Functional Mobility:  · Bed Mobility:  Rolling Left: total assistance  · Scooting: total assistance  · Supine to Sit: total assistance  · Sit to Supine: total assistance  · Scooting up to head of bed in supine: total assistance  · Head of bed position: HOB elevated   · EOB with CGA to Mod A     · Transfers:  Sit to Stand: maximal assistance with hand-held assist with cues for hand placement and foot placement   · Pt reaches <25% of the way to standing, screams out in pain with stand     · Gait: n/a, pt not safe for ambulation    · Balance:   Position Score Time   Static Sitting FAIR-: Maintains without assist but is inconsistent 4 minute(s)   Dynamic Sitting POOR: MODERATE assist to maintain 3 minute(s)   Static Standing POOR-: MAXIMAL assist to maintain  <2 seconds   Dynamic Standing n/a: dependent n/a       AM-PAC 6 CLICK MOBILITY  Turning over in bed (including adjusting bedclothes, sheets and blankets)?: 2  Sitting down on and standing up from a chair with arms (e.g., wheelchair, bedside  commode, etc.): 2  Moving from lying on back to sitting on the side of the bed?: 2  Moving to and from a bed to a chair (including a wheelchair)?: 1  Need to walk in hospital room?: 1  Climbing 3-5 steps with a railing?: 1  Basic Mobility Total Score: 9     Therapeutic Activities:  Patient educated on role of acute care PT and PT POC, safety while in hospital including calling nurse for mobility, call light usage, benefits of out of bed mobility, breathing technique, fall risk, bed mobility , transfers, positioning, posture, risks of prolonged bed rest and benefits of continued PT by explanation.    Patient demonstrates fair understanding of education provided this day.   Whiteboard updated   Pts RN present attempting to fix IV throughout session    Therapeutic Exercises:  PROM to L LE to reduce knee contracture.  Pt is most tolerable to a very light load, long duration stretch.  Moving pts LE through her available ROM led to her yelling in pain.     Patient left HOB elevated with all lines intact, call button in reach, bed alarm on, RN notified and RN present.    GOALS:   Multidisciplinary Problems     Physical Therapy Goals        Problem: Physical Therapy    Goal Priority Disciplines Outcome Goal Variances Interventions   Physical Therapy Goal     PT, PT/OT Ongoing, Progressing     Description: Goals to be met by: 22    Patient will increase functional independence with mobility by performin. Supine to sit with minimum assistance  2. Sit to supine with minimum assistance  3. Rolling to Left and Right with minimum assistance  4. Bed to chair transfer with minimum assistance using LRAD as needed                   Time Tracking:     PT Received On: 22  PT Start Time: 1526     PT Stop Time: 1549  PT Total Time (min): 23 min     Billable Minutes: Therapeutic Activity 23    Treatment Type: Treatment  PT/PTA: PT     PTA Visit Number: 0     2022

## 2022-04-11 NOTE — ASSESSMENT & PLAN NOTE
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

## 2022-04-11 NOTE — ASSESSMENT & PLAN NOTE
Home basaglar 5u qhs  BG last 24h in low 100s  She has not required any SSI in last few days, prev had MDSSI which was discontinued 4/10 due to risk of hypoglycemia  Would recommend accuchecks ACHS while inpatient to ensure maintaining glycemic control

## 2022-04-11 NOTE — ASSESSMENT & PLAN NOTE
This is a patient with a long history of chronic anemia. Slow down trending since admission likely 2/2 to surgery, acute illness, and phlebotomy. Patient is asymptomatic, agree with transfusion for hgb<7. No signs of bleeding. Hemodynamically stable. Labs consistent with anemia of chronic disease and acute blood loss.     - Recommend age appropriate colonoscopy as outpatient if warrented  - agree with transfusion for hgb<7 or active bleeding  - Trend CBC daily

## 2022-04-11 NOTE — HOSPITAL COURSE
Ms. Adame was admitted to the NICU for management of a SDH. Intubated on admission, on precedex. Went for clot evac on 4/4. Extubated on 4/7. EEG revealed seizure on 4/14, loaded with Vimpat. Additional seizure activity noted on 4/18. Went for repeat evac on 4/20. Occlusive brachial thrombus noted in PICC line on 4/23. Underwent MMA embolization on 4/26. SD to  on 4/28.    As a result of her extensive neurologic injury her mental status remained very poor. Opens eyes spontaneously but non-verbal, minimally responsive to physical stimuli. Araiza placed d/t persistent urinary retention. Received nutrition through NGT. Discussed with pt's son who wished to make pt DNR and pursue hospice care. Palliative care consulted.  CM/SW worked on getting her to inpatient hospice, but she passed before that time.  Family was at bedside.  Time of death 3:50pm.

## 2022-04-11 NOTE — PLAN OF CARE
MARIO called [place] [facility] to complete LOCET interview via phone but was told that pt may not need a LOCET as she admitted to [facility] from [facility] (pt/family may want to find a new NH for pt). MARIO faxed [facility] to obtain the 142 for NH admission[place]  [phone]

## 2022-04-11 NOTE — PROGRESS NOTES
Pharmacokinetic Assessment Follow Up: IV Vancomycin    Vancomycin serum concentration assessment(s):    -Vancomycin random level was drawn with AM labs, ~ 20 hrs from the preceding dose, and can be used to guide therapy.  -A level of 20. 8 mcg/mL is slightly above goal 15-20 mcg/mL for osteomyelitis.  -Recent ARTURO. Scr improving slightly to 1.3 mg/dL today.    Vancomycin Regimen Plan:    -Hold vancomycin dosing today to allow for clearance.   -Vancomycin random level with AM labs 4/12.  -Pulse dose in setting of renal impairment.   -Will schedule vancomycin again on basis of levels/renal function.     Drug levels (last 3 results):  Recent Labs   Lab Result Units 04/08/22  2143 04/09/22  1034 04/10/22  0640 04/11/22  0521   Vancomycin, Random ug/mL  --  19.3 19.9 20.8   Vancomycin-Trough ug/mL 23.2*  --   --   --        Pharmacy will continue to follow and monitor vancomycin.    Please contact pharmacy at extension [phone] for questions regarding this assessment.    Thank you for the consult,   Robin Duenas       Patient brief summary:  Beatriz Adame is a 71 y.o. female initiated on antimicrobial therapy with IV Vancomycin for treatment of bone/joint infection    Drug Allergies:   Review of patient's allergies indicates:   Allergen Reactions    Tomato (solanum lycopersicum) Hives and Itching       Actual Body Weight:   49.4 kg    Renal Function:   Estimated Creatinine Clearance: 31.4 mL/min (based on SCr of 1.3 mg/dL).,     Dialysis Method (if applicable):  N/A    CBC (last 72 hours):  Recent Labs   Lab Result Units 04/09/22  0550 04/10/22  0640 04/11/22  0438   WBC K/uL 11.90 8.98  8.98 8.92   Hemoglobin g/dL 6.5* 6.1*  6.1* 7.0*   Hematocrit % 20.5* 19.2*  19.2* 21.8*   Platelets K/uL 151 155  155 125*   Gran % % 63.0 55.4  55.4 56.7   Lymph % % 25.7 32.6  32.6 30.2   Mono % % 9.7 10.0  10.0 10.7   Eosinophil % % 0.7 1.1  1.1 1.5   Basophil % % 0.2 0.3  0.3 0.1   Differential Method  Automated  Automated  Automated Automated       Metabolic Panel (last 72 hours):  Recent Labs   Lab Result Units 04/09/22  0550 04/10/22  0640 04/11/22  0521   Sodium mmol/L 143 142 143   Potassium mmol/L 3.8 3.6 3.6   Chloride mmol/L 112* 113* 114*   CO2 mmol/L 22* 23 21*   Glucose mg/dL 122* 111* 107   BUN mg/dL 19 20 19   Creatinine mg/dL 1.4 1.4 1.3   Albumin g/dL 1.9* 1.8* 1.9*   Total Bilirubin mg/dL 0.6 0.3 0.5   Alkaline Phosphatase U/L 55 53* 59   AST U/L 22 18 15   ALT U/L 16 14 12       Vancomycin Administrations:  vancomycin given in the last 96 hours                   vancomycin 500 mg in dextrose 5 % 100 mL IVPB (ready to mix system) (mg) 500 mg New Bag 04/10/22 1019    vancomycin 500 mg in dextrose 5 % 100 mL IVPB (ready to mix system) (mg) 500 mg New Bag 04/09/22 1455    vancomycin 500 mg in dextrose 5 % 100 mL IVPB (ready to mix system) (mg) 500 mg New Bag 04/07/22 2210                Microbiologic Results:  Microbiology Results (last 7 days)     Procedure Component Value Units Date/Time    Urine culture [[id number]] Collected: 04/03/22 1626    Order Status: Completed Specimen: Urine Updated: 04/04/22 2110     Urine Culture, Routine No growth    Narrative:      Specimen Source->Urine

## 2022-04-11 NOTE — PROGRESS NOTES
[facility] - Neurosurgery ([facility])  Neurosurgery  Progress Note    Subjective:     History of Present Illness: 71 year old female with , hypothyroidism, CVA with left-sided residual deficit on ASA/Plavix, DM2, HTN, HLD, CKD, chronic anemia, presenting from SNF after being found down next to wheelchair after unwitnessed fall, consulted to NSGY for right 2 cm SDH without midline shift. She is altered at baseline and unable to provide to history, but family at bedside says she is more confused and less interactive than usual.       Post-Op Info:  Procedure(s) (LRB):  CRANIOTOMY, FOR SUBDURAL HEMATOMA EVACUATION (Right)   7 Days Post-Op     Interval History: Febrile overnight to 101. Denies fever, chest pain, or shortness of breath. Pain controlled. F/u CXR and BLE US. In setting of recent surgery will transfuse 1 unit RBC. Discharge pending fever workup and blood transfusion. UA with yeast, will f/u final cultures. Diet advanced.     Medications:  Continuous Infusions:  Scheduled Meds:   amLODIPine  10 mg Oral Daily    atorvastatin  40 mg Oral Daily    carvediloL  25 mg Oral BID    heparin (porcine)  5,000 Units Subcutaneous Q8H    levothyroxine  75 mcg Oral Before breakfast    losartan  25 mg Oral Daily    senna-docusate 8.6-50 mg  1 tablet Oral BID    silodosin  4 mg Oral Daily     PRN Meds:sodium chloride, sodium chloride, acetaminophen, dextrose 10%, glucagon (human recombinant), HYDROcodone-acetaminophen, HYDROmorphone, metoclopramide HCl, ondansetron, potassium bicarbonate, potassium bicarbonate, potassium bicarbonate, potassium, sodium phosphates, potassium, sodium phosphates, potassium, sodium phosphates, sodium chloride 0.9%, Pharmacy to dose Vancomycin consult **AND** vancomycin - pharmacy to dose     Review of Systems  Objective:     Weight: 49.4 kg (109 lb)  Body mass index is 17.07 kg/m².  Vital Signs (Most Recent):  Temp: 97.7 °F (36.5 °C) (04/11/22 1144)  Pulse: 77 (04/11/22 1159)  Resp: 18  (04/11/22 1144)  BP: 132/70 (04/11/22 1144)  SpO2: 97 % (04/11/22 1144) Vital Signs (24h Range):  Temp:  [97.7 °F (36.5 °C)-101 °F (38.3 °C)] 97.7 °F (36.5 °C)  Pulse:  [77-91] 77  Resp:  [16-18] 18  SpO2:  [94 %-98 %] 97 %  BP: (125-156)/(57-70) 132/70                     Neurosurgery Physical Exam  Constitutional: No distress.   HEENT: Normocephalic. Left sided crani incision with staples intact, clean and dry.   Cardiovascular: Regular rhythm.   Pulm: No signs of respiratory distress.   Abdominal: Soft, non-distended.   Mental status: She is awake, alert and oriented to person, date and place.   PERRL  Left side contracted and paretic  FCx in all except LUE    Significant Labs:  Recent Labs   Lab 04/10/22  0640 04/11/22  0521   * 107    143   K 3.6 3.6   * 114*   CO2 23 21*   BUN 20 19   CREATININE 1.4 1.3   CALCIUM 7.6* 7.8*     Recent Labs   Lab 04/10/22  0640 04/11/22  0438   WBC 8.98  8.98 8.92   HGB 6.1*  6.1* 7.0*   HCT 19.2*  19.2* 21.8*     155 125*     No results for input(s): LABPT, INR, APTT in the last 48 hours.  Microbiology Results (last 7 days)       Procedure Component Value Units Date/Time    Blood culture [[id number]] Collected: 04/11/22 1021    Order Status: Sent Specimen: Blood from Peripheral, Left Arm Updated: 04/11/22 1045    Narrative:      Collection has been rescheduled by T at 04/11/2022 10:04 Reason:   Patient unavailable, patient care, nurse paige notified.  Collection has been rescheduled by T at 04/11/2022 10:04 Reason:   Patient unavailable, patient care, nurse paige notified.    Blood culture [[id number]] Collected: 04/11/22 1020    Order Status: Sent Specimen: Blood from Peripheral, Left Hand Updated: 04/11/22 1045    Narrative:      Collection has been rescheduled by VHT at 04/11/2022 10:04 Reason:   Patient unavailable, patient care, nurse grecia notified.  Collection has been rescheduled by VHT at 04/11/2022 10:04 Reason:   Patient  unavailable, patient care, nurse grecia notified.    Urine culture [[id number]] Collected: 04/11/22 1017    Order Status: No result Specimen: Urine Updated: 04/11/22 1037    Urine culture [[id number]] Collected: 04/03/22 1626    Order Status: Completed Specimen: Urine Updated: 04/04/22 2110     Urine Culture, Routine No growth    Narrative:      Specimen Source->Urine          All pertinent labs from the last 24 hours have been reviewed.    Significant Diagnostics:  I have reviewed all pertinent imaging results/findings within the past 24 hours.    Assessment/Plan:     * SDH (subdural hematoma)  71 year old female with , hypothyroidism, CVA with left-sided residual deficit on ASA/Plavix, DM2, HTN, HLD, CKD, chronic anemia, presenting from SNF after being found down next to wheelchair after unwitnessed fall, consulted to NSGY for right 2 cm SDH mostly acute, membranous and heterogenous appearing, some chronic component, without midline shift. Now s/p R crani for SDH evacuation (4/4).      --Admitted to NSGY. Stepped down to floor.   -- q4 hour vitals/neurochecks.  -- SBP < 160.  -- Na goal eunatremia.  -- Diet: mechanical soft.  -- Podiatry managing recent partial foot amputation, appreciate recs.  -- Seizure ppx: Keppra 500 BID.  -- Follow up CT head satisfactory x2 with residual blood.  -- Extubated and saturating well on room air.  -- No HOB restriction.  -- PPx: SCDs, BR, IS. Okay for [facility] for dvt ppx.   -- Nsgy will arrange appropriate follow up for patient.   -- Osteomyelitis of R foot:    - ID rec 6 week course Vanc with end date 5/5  -- Anemia: Hemaglobin 7.0. Will transfuse 1U pRBC 4/11 in setting of recent surgery.  -- Febrile 101:   - UA 4/11: WBC 28, few yeast. F/u Culture prior to treatment.   - CXR with possible basilar effusion.   - F/u BLE US.   - HM following. Appreciate assistance with care.    - Blood cx NGTD.    Dispo: PT/OT recs for SNF          Ina Ardon PA-C  Neurosurgery  [facility] -  Neurosurgery ([facility])

## 2022-04-11 NOTE — PROGRESS NOTES
[facility] - Neurosurgery ([facility])  [facility] Medicine  Progress Note    Patient Name: Beatriz Adame  MRN: [id number]  Patient Class: IP- Inpatient   Admission Date: 4/3/2022  Length of Stay: 8 days  Attending Physician: Silvio White MD  Primary Care Provider: Dante Olivier MD        Subjective:     Principal Problem:SDH (subdural hematoma)        HPI:  Miss Adame is a 71 year old female with hx of DMII, HTN, HLD, CKD, and chronic anemia who initially presented from her SNF after being found down next to her wheelchair. Imaging in the ED revealed a 2 cm SDH and was admitted to the neuro ICU. She is now s/p craniotomy with evacuation of the SDH and stepped down to the floor 4/8. She has been improving from a neurosurgical standpoint. Medicine was consulted given her continued anemia. Upon evaluation patient denied light headedness, fever, chills, chest pain, or abdominal pain. She did endorse some left leg pain. Additionally, she states that she has been told in the past that she is anemia, but denied any prior evaluation by hematology.       Overview/Hospital Course:  Hospital medicine was consulted on 4/10 for anemia which appears to be a chronic problem for her, with acute on chronic anemia in setting of acute illness and recent surgery. Transfused for hgb<7.      Interval History: Fever overnight with Tmax 101. Repeat infectious workup ordered by primary team and in process. Received 1u PRBCs yesterday with appropriate rise in hgb and no evidence of bleeding.    Review of Systems   Reason unable to perform ROS: limited 2/2 cognition.   Constitutional:  Positive for activity change and fever.   Respiratory:  Negative for cough and shortness of breath.    Gastrointestinal:  Negative for abdominal pain, nausea and vomiting.   Musculoskeletal:  Positive for arthralgias and gait problem.   Skin:  Positive for wound.   Neurological:  Positive for weakness.        LUE contracture   Objective:     Vital Signs  (Most Recent):  Temp: 97.7 °F (36.5 °C) (04/11/22 1144)  Pulse: 77 (04/11/22 1159)  Resp: 18 (04/11/22 1144)  BP: 132/70 (04/11/22 1144)  SpO2: 97 % (04/11/22 1144) Vital Signs (24h Range):  Temp:  [97.7 °F (36.5 °C)-101 °F (38.3 °C)] 97.7 °F (36.5 °C)  Pulse:  [77-91] 77  Resp:  [16-18] 18  SpO2:  [94 %-98 %] 97 %  BP: (125-156)/(57-70) 132/70     Weight: 49.4 kg (109 lb)  Body mass index is 17.07 kg/m².    Intake/Output Summary (Last 24 hours) at 4/11/2022 1231  Last data filed at 4/11/2022 0545  Gross per 24 hour   Intake 492 ml   Output 350 ml   Net 142 ml      Physical Exam  Vitals and nursing note reviewed.   Constitutional:       General: She is not in acute distress.     Appearance: Normal appearance. She is ill-appearing (chronically ill appearing). She is not diaphoretic.      Comments: Frail, emaciated elderly female   HENT:      Head: Normocephalic.      Comments: Surgical incision intact with staples in place to R parietal area of scalp, no surrounding erythema or drainage from incision     Mouth/Throat:      Mouth: Mucous membranes are moist.      Pharynx: No posterior oropharyngeal erythema.   Eyes:      General:         Right eye: No discharge.         Left eye: No discharge.      Conjunctiva/sclera: Conjunctivae normal.   Cardiovascular:      Rate and Rhythm: Normal rate and regular rhythm.      Heart sounds: No murmur heard.    No gallop.   Pulmonary:      Effort: Pulmonary effort is normal. No respiratory distress.      Breath sounds: No wheezing.   Abdominal:      General: Abdomen is flat. There is no distension.      Palpations: Abdomen is soft.      Tenderness: There is no abdominal tenderness.   Musculoskeletal:         General: Deformity (LUE and LLE contractures) present.      Right lower leg: No edema.      Left lower leg: No edema.      Comments: RLE foot wound covered with clean and dry dressing   Skin:     General: Skin is warm and dry.   Neurological:      Mental Status: She is alert  and oriented to person, place, and time. Mental status is at baseline.      Motor: Weakness (LUE and LLE weakness 2/2 CVA, normal strength on R) present.       Significant Labs: All pertinent labs within the past 24 hours have been reviewed.  CBC:   Recent Labs   Lab 04/10/22  0640 04/11/22  0438   WBC 8.98  8.98 8.92   HGB 6.1*  6.1* 7.0*   HCT 19.2*  19.2* 21.8*     155 125*     CMP:   Recent Labs   Lab 04/10/22  0640 04/11/22  0521    143   K 3.6 3.6   * 114*   CO2 23 21*   * 107   BUN 20 19   CREATININE 1.4 1.3   CALCIUM 7.6* 7.8*   PROT 5.4* 5.4*   ALBUMIN 1.8* 1.9*   BILITOT 0.3 0.5   ALKPHOS 53* 59   AST 18 15   ALT 14 12   ANIONGAP 6* 8   EGFRNONAA 38.1* 41.7*       Significant Imaging: I have reviewed all pertinent imaging results/findings within the past 24 hours.      Assessment/Plan:      * SDH (subdural hematoma)  S/p craniotomy with evacuation on 4/4, staples in place  Management per primary      Fever  Fever overnight 4/10-11 to 101  She is s/p R foot partial amputation for osteomyelitis and s/p craniotomy for SDH  Bone bx cultures +MRSA, on Vancomycin for planned 6wk course  She also has a hx of recurrent UTIs with cultures from Jan-March +pseudomonas and ESBL E coli  Repeat infectious workup (UA/UCx, BCx, CXR) + LE DVT US ordered and delgado was exchanged today  On appropriate MRSA coverage, if fever represents new infxn would likely be UTI given her hx. If continues to fever wound likely need to add coverage for pseudomonas and esbl e coli - typically would use carbapenem. With recent evacuation of SDH, carbapenems may not be idea due to concern for lowering seizure threshold.  Recommend discussing with ID who is still following pt peripherally as well as with podiatry to assess R foot for s/sx of worsening infxn        Osteomyelitis of right foot  R foot osteomyelitis with bone bx +MRSA, ID following peripherally   ID rec 6wk course of Vanc, with end  Date 5/5/2022,  pharmacy to dose vanc inpatient  Fever overnight 4/10-4/11 to 101 without any signs of new or worsening infection on my exam, although I did not unwrap R foot dressing  Rec discussing with podiatry re-evaluating R foot  See fever      Acute on chronic anemia  This is a patient with a long history of chronic anemia. Slow down trending since admission likely 2/2 to surgery, acute illness, and phlebotomy. Patient is asymptomatic, agree with transfusion for hgb<7. No signs of bleeding. Hemodynamically stable. Labs consistent with anemia of chronic disease and acute blood loss.     - Recommend age appropriate colonoscopy as outpatient if warrented  - agree with transfusion for hgb<7 or active bleeding  - Trend CBC daily      Hypothyroidism  Continue home levothyroxine      Severe protein-calorie malnutrition  Recommend nutrition consult to optimize nutrition       History of stroke  Baseline LUE and LLE weakness s/p stroke      Type 2 diabetes mellitus, with long-term current use of insulin  Home basaglar 5u qhs  BG last 24h in low 100s  She has not required any SSI in last few days, prev had MDSSI which was discontinued 4/10 due to risk of hypoglycemia  Would recommend accuchecks ACHS while inpatient to ensure maintaining glycemic control        Hyperlipidemia  Continue statin.       Hypertension  Continue amlodipine, coreg, and losartan.   She is normotensive currently       VTE Risk Mitigation (From admission, onward)         Ordered     heparin (porcine) injection 5,000 Units  Every 8 hours         04/06/22 1315     IP VTE HIGH RISK PATIENT  Once         04/03/22 1608     Place sequential compression device  Until discontinued         04/03/22 1608     Reason for No Pharmacological VTE Prophylaxis  Once        Question:  Reasons:  Answer:  Active Bleeding    04/03/22 1608                   Thank you for your consult. I will follow-up with patient. Please contact us if you have any additional questions.      Kerry  MD Sunitha  Department of Hospital Medicine   [facility] ([facility])

## 2022-04-11 NOTE — PT/OT/SLP PROGRESS
"Speech Language Pathology Treatment    Patient Name:  Beatriz Adame   MRN:  [id number]  Admitting Diagnosis: SDH (subdural hematoma)    Recommendations:                 General Recommendations:  Dysphagia therapy, Speech/language therapy and Cognitive-linguistic therapy  Diet recommendations:  Dental Soft, Liquid Diet Level: Thin   Aspiration Precautions: 1 bite/sip at a time, Alternating bites/sips, Assistance with meals, Avoid talking while eating, Eliminate distractions, Feed only when awake/alert, HOB to 90 degrees, Meds crushed in puree, Monitor for s/s of aspiration, Remain upright 30 minutes post meal, Small bites/sips and Strict aspiration precautions   General Precautions: Standard, aspiration, fall, hearing impaired  Communication strategies:  provide increased time to answer and go to room if call light pushed    Subjective     "I'm not gonna eat the mashed potatoes." Pt's red beans were served with mashed potatoes instead of rice because pt was on a mechanical soft diet. Pt does not like mashed potatoes.  After observed pt consuming lunch,  SLP feels pt is appropriate to advance diet to dental soft consistencies.     Pain/Comfort:  · Pain Rating 1:  (did not rate)  · Location - Side 1: Left  · Location 1: knee  · Pain Addressed 1: Nurse notified    Respiratory Status: Room air    Objective:     Has the patient been evaluated by SLP for swallowing?   Yes  Keep patient NPO? No   Current Respiratory Status:        Pt assisted with feeding lunch meal this afternoon after being repositioned upright in bed.  Pt ate entire fillet off fish, approx 1/3 serving of red beans, 6oz Sprite via straw, and straw sips water x 2.  Oral and pharyngeal phases of the swallow were WFL for consistencies consumed. No overt of aspiration observed. Pt appears appropriate for diet advancement to dental soft consistencies.  SLP engaged pt in ongoing assessment of cognitive-linguistic abilities. Pt was able to follow 2-step " commands with 100% accuracy and 3-steps were followed with 83% accuracy.  Pt answered simple problem solving q's with 50% accuracy IND/100% given cues.  During a word fluency task, pt listed 6 items in a category in one minute (15-20 is wnl).  Education provided to pt regarding role of SLP, monitoring diet tolerance and ongoing swallowing assessment, mechanical soft vs dental soft diet, recommendations to advance to dental soft diet, ongoing cognitive-linguistic assessment, and SLP treatment plan and POC.  Pt demonstrated understanding of education provided, but will benefit from continued reinforcement.       Assessment:     Beatriz Adame is a 71 y.o. female with an SLP diagnosis of Dysarthria and Cognitive-Linguistic Impairment.      Goals:   Multidisciplinary Problems     SLP Goals        Problem: SLP    Goal Priority Disciplines Outcome   SLP Goal     SLP Ongoing, Progressing   Description: Speech Language Pathology Goals  UPDATED GOALS AFTER SPEECH EVAL INITIATED -EXPECTED TO BE MET 4/15:  1.  Pt will tolerate least restrictive diet without s/s of aspiration.   2. Pt will answer complex yes/no q's with 70% accuracy.   3. Pt will complete immediate memory tasks with 60% accuracy.   4. Following a 2 minute delay, pt will recall 2/3 novel items given max cues.   5. Pt will participate in ongoing speech/language/cognitive evaluation to determine baseline and need for further intervention.     Goals expected to be met by 4/13:  1. Pt will participate in ongoing swallowing assessment to determine if/when safe for oral intake.                            Plan:     · Patient to be seen:  4 x/week   · Plan of Care expires:  05/06/22  · Plan of Care reviewed with:  patient   · SLP Follow-Up:  Yes       Discharge recommendations:  nursing facility, skilled     Time Tracking:     SLP Treatment Date:   04/11/22  Speech Start Time:  1146  Speech Stop Time:  1220     Speech Total Time (min):  34 min    Billable Minutes:  Speech Therapy Individual (cognitive therapy) 10, Treatment Swallowing Dysfunction 14 and Self Care/Home Management Training 10    04/11/2022

## 2022-04-11 NOTE — PT/OT/SLP PROGRESS
Occupational Therapy      Patient Name:  Beatriz Adame   MRN:  [id number]    Patient not seen today secondary to pt off the floor receiving blood transfusion, per nursing.  Will follow-up as scheduled per OT POC.    4/11/2022

## 2022-04-11 NOTE — PLAN OF CARE
[facility] - Neurosurgery ([facility])  Discharge Reassessment    Primary Care Provider: Dante Olivier MD    Expected Discharge Date: 4/14/2022     Patient is not medically ready at this time due to a elevated temp.  Referrals out for Skilled.    No accepting facility at this time.    Update:  [facility], [facility] and [facility] denied.  CM in communictiton with son and advised additional referrals would be sent.  Patient in agreement to referrals other then [facility].    Reassessment (most recent)     Discharge Reassessment - 04/11/22 1316        Discharge Reassessment    Assessment Type Discharge Planning Reassessment     Did the patient's condition or plan change since previous assessment? No     Discharge Plan discussed with: Adult children;Patient     Communicated MARY with patient/caregiver Date not available/Unable to determine     Discharge Plan A Skilled Nursing Facility     Discharge Plan B Home with family;Home Health     DME Needed Upon Discharge  none     Discharge Barriers Identified None     Why the patient remains in the hospital Requires continued medical care        Post-Acute Status    Post-Acute Authorization Placement     Post-Acute Placement Status Pending medical clearance/testing     Discharge Delays None known at this time

## 2022-04-11 NOTE — ASSESSMENT & PLAN NOTE
Fever overnight 4/10-11 to 101  She is s/p R foot partial amputation for osteomyelitis and s/p craniotomy for SDH  Bone bx cultures +MRSA, on Vancomycin for planned 6wk course  She also has a hx of recurrent UTIs with cultures from Jan-March +pseudomonas and ESBL E coli  Repeat infectious workup (UA/UCx, BCx, CXR) + LE DVT US ordered and delgado was exchanged today  On appropriate MRSA coverage, if fever represents new infxn would likely be UTI given her hx. If continues to fever wound likely need to add coverage for pseudomonas and esbl e coli - typically would use carbapenem. With recent evacuation of SDH, carbapenems may not be idea due to concern for lowering seizure threshold.  Recommend discussing with ID who is still following pt peripherally as well as with podiatry to assess R foot for s/sx of worsening infxn

## 2022-04-11 NOTE — PROGRESS NOTES
[facility]ero - Neurosurgery ([facility])  Physical Medicine & Rehab  Progress Note    Patient Name: Beatriz Adame  MRN: [id number]  Admission Date: 4/3/2022  Length of Stay: 8 days  Attending Physician: Silvio White MD    Subjective:     Principal Problem:SDH (subdural hematoma)    Hospital Course:   4/6: PT-BM TA-max. No sit to stand, trxs, or gait.   4/7: Passed bedside swallow evaluation.  SLP recommending mechanical soft diet and thin liquids.  OT-BM TAx 2ppl-TA. Groom Min.   4/8: BM TA-max. Sat EOB 18 MINS Mod-sba. No trxs or gait.     Interval History 4/11/2022:  Patient is seen for follow-up PM&R evaluation and recommendations: Participating w/ therapy.    HPI, Past Medical, Family, and Social History remains the same as documented in the initial encounter.    Scheduled Medications:    amLODIPine  10 mg Oral Daily    atorvastatin  40 mg Oral Daily    carvediloL  25 mg Oral BID    heparin (porcine)  5,000 Units Subcutaneous Q8H    levothyroxine  75 mcg Oral Before breakfast    losartan  25 mg Oral Daily    senna-docusate 8.6-50 mg  1 tablet Oral BID    silodosin  4 mg Oral Daily       Diagnostic Results: Labs: Reviewed    PRN Medications: sodium chloride, acetaminophen, dextrose 10%, glucagon (human recombinant), HYDROcodone-acetaminophen, HYDROmorphone, metoclopramide HCl, ondansetron, potassium bicarbonate, potassium bicarbonate, potassium bicarbonate, potassium, sodium phosphates, potassium, sodium phosphates, potassium, sodium phosphates, sodium chloride 0.9%, Pharmacy to dose Vancomycin consult **AND** vancomycin - pharmacy to dose    Review of Systems   Reason unable to perform ROS: limited 2/2 cognition.   Constitutional:  Positive for activity change.   Musculoskeletal:  Positive for arthralgias and gait problem.   Skin:  Positive for wound.   Neurological:  Positive for weakness.        LUE contracture   Objective:     Vital Signs (Most Recent):  Temp: 99 °F (37.2 °C) (04/11/22 0704)  Pulse: 84  (04/11/22 0704)  Resp: 16 (04/11/22 0704)  BP: (!) 127/57 (04/11/22 0704)  SpO2: 95 % (04/11/22 0704)      Vital Signs (24h Range):  Temp:  [97.8 °F (36.6 °C)-101 °F (38.3 °C)] 99 °F (37.2 °C)  Pulse:  [78-91] 84  Resp:  [16-18] 16  SpO2:  [94 %-98 %] 95 %  BP: (125-156)/(57-67) 127/57     Physical Exam  Vitals reviewed.   Constitutional:       General: She is not in acute distress.     Appearance: She is well-developed. She is ill-appearing.   HENT:      Head: Normocephalic and atraumatic.   Eyes:      General:         Right eye: No discharge.         Left eye: No discharge.   Cardiovascular:      Rate and Rhythm: Normal rate.   Pulmonary:      Effort: Pulmonary effort is normal. No respiratory distress.   Abdominal:      Palpations: Abdomen is soft.      Tenderness: There is no abdominal tenderness.   Musculoskeletal:         General: Deformity (R foot amputation, partial) present.      Cervical back: Neck supple.   Feet:      Comments: R foot wound dressed  Skin:     General: Skin is warm and dry.   Neurological:      Mental Status: She is alert and oriented to person, place, and time.      Motor: Weakness and abnormal muscle tone (LUE) present.      Comments: Follows simple commands to R    Psychiatric:         Mood and Affect: Affect is flat.         Behavior: Behavior is slowed.       Assessment/Plan:      * SDH (subdural hematoma)  -s/p unwitnessed fall from WC at SNF with head trauma and no LOC  - CT head with R SDH and 3-4 mm MLS  -NSGY consulted and now s/p R crani for SDH evacuation 4/4, drain removed     Osteomyelitis of right foot  -ID consulted, podiatry consulted on 4/8  -on IV vanc     Status post partial amputation of right foot  -recent amputation 3/24/22    Debility  See hospital course for functional status.      Recommendations  -  Encourage mobility, OOB in chair, and early ambulation as appropriate  -  PT/OT evaluate and treat  -  Pain management  -  DVT prophylaxis if appropriate   -  Monitor  for and prevent skin breakdown and pressure ulcers  · Early mobility, repositioning/weight shifting every 20-30 minutes when sitting, turn patient every 2 hours, proper mattress/overlay and chair cushioning, pressure relief/heel protector boots    History of stroke  -w/ LUE contracture    PM&R Recommendation:     At this time, the PM&R team has reviewed this patient's ongoing medical case including inpatient diagnosis, medical history, clinical examination, labs, vitals, current social and functional history to provide the post-acute recommendation as follows:     RECOMMENDATIONS: skilled nursing facility due to poor potential for improvement with rehabilitation.      We will sign off due to discharge soon.      Maryam Bojorquez NP  Department of Physical Medicine & Rehab   [facility])

## 2022-04-12 NOTE — PROGRESS NOTES
[facility]ero - Neurosurgery ([facility])  Adult Nutrition  Progress Note    SUMMARY       Recommendations    1. Suggest Diabetic 2000 kcal diet with texture per SLP     2. Continue Boost Pudding BID to increase PO intake      3. Continue Frederic BID x 14 days to aid in wound healing     4. RD following     Goals: receive nutrition by RD follow-up  Nutrition Goal Status: goal met  Communication of RD Recs:  (POC)    Assessment and Plan      Nutrition Problem:  Severe Protein-Calorie Malnutrition  Malnutrition in the context of Chronic Illness/Injury     Related to (etiology):  Decreased ability to feed self     Signs and Symptoms (as evidenced by):  Energy Intake: <75% of estimated energy requirement for 3 months  Body Fat Depletion: moderate and severe depletion of orbitals, triceps and thoracic and lumbar region   Muscle Mass Depletion: moderate and severe depletion of temples, clavicle region, scapular region, interosseous muscle and lower extremities   Weight Loss: 20% x 6 months      Interventions (treatment strategy):  Collaboration of nutrition care with other providers     Nutrition Diagnosis Status:      Continues     Reason for Assessment    Reason For Assessment: RD follow-up  Diagnosis:  (SDH)  Relevant Medical History: baseline dementia, GERD, R MCA stroke with residual LUE weakness, HLD, HTN, Stage IV CKD, TIIDM  Interdisciplinary Rounds: did not attend  General Information Comments: S/p emergent R cani for SDH evac. Noted previously did not like ONS. Per chart review, pt with varied po intake. No N/V reported. Frederic and Boost Pudding ordered today. NFPE completed 3/20; pt meets criteria for severe malnutrition in the context of chronic illness. Pending SNF placement.   Nutrition Discharge Planning: pending medical course    Nutrition Risk Screen    Nutrition Risk Screen: no indicators present    Nutrition/Diet History    Spiritual, Cultural Beliefs, Sikhism Practices, Values that Affect Care: no  Factors  "Affecting Nutritional Intake: NPO    Anthropometrics    Temp: 99.3 °F (37.4 °C)  Height: 5' 7" (170.2 cm)  Height (inches): 67 in  Weight Method: Bed Scale  Weight: 49.4 kg (109 lb)  Weight (lb): 109 lb  Ideal Body Weight (IBW), Female: 135 lb  % Ideal Body Weight, Female (lb): 80.74 %  BMI (Calculated): 17.1  BMI Grade: 17 - 18.4 protein-energy malnutrition grade I     Lab/Procedures/Meds    Pertinent Labs Reviewed: reviewed  Pertinent Labs Comments: GFR 58.8, glucose 119  Pertinent Medications Reviewed: reviewed  Pertinent Medications Comments: amlodipine, statin, heparin, losartan, senna-docusate     Estimated/Assessed Needs    Weight Used For Calorie Calculations: 49.8 kg (109 lb 12.6 oz)  Energy Calorie Requirements (kcal): 3185-5956 kcal/day  Energy Need Method: Kcal/kg (30-35)  Protein Requirements: 60-70 gm/day (1.2-1.4 gm/kg)  Weight Used For Protein Calculations: 49.8 kg (109 lb 12.6 oz)  Fluid Requirements (mL): 1 mL/kcal or per MD     RDA Method (mL): 1494  CHO Requirement: 186-218 gm/day    Nutrition Prescription Ordered    Current Diet Order: Dysphagia Soft    Evaluation of Received Nutrient/Fluid Intake    I/O: +4.794L since admit   Comments: LBM 4/10  Tolerance:  (will monitor)  % Intake of Estimated Energy Needs: 50 - 75 %  % Meal Intake: 50 - 75 %    Nutrition Risk    Level of Risk/Frequency of Follow-up:  (f/u 1 x wk)     Monitor and Evaluation    Food and Nutrient Intake: energy intake  Food and Nutrient Adminstration: diet order  Anthropometric Measurements: weight, weight change, body mass index  Biochemical Data, Medical Tests and Procedures: electrolyte and renal panel, gastrointestinal profile, glucose/endocrine profile, inflammatory profile, lipid profile  Nutrition-Focused Physical Findings: overall appearance     Nutrition Follow-Up    RD Follow-up?: Yes    "

## 2022-04-12 NOTE — ASSESSMENT & PLAN NOTE
Continue amlodipine, coreg, and losartan.   BP normal - mildly elevated, continue to monitor on current regimen

## 2022-04-12 NOTE — PLAN OF CARE
Problem: Infection  Goal: Absence of Infection Signs and Symptoms  Outcome: Ongoing, Progressing     Problem: Adult Inpatient Plan of Care  Goal: Plan of Care Review  Outcome: Ongoing, Progressing  Goal: Readiness for Transition of Care  Outcome: Ongoing, Progressing     Problem: Adjustment to Illness (Stroke, Hemorrhagic)  Goal: Optimal Coping  Outcome: Ongoing, Progressing     Problem: Bowel Elimination Impaired (Stroke, Hemorrhagic)  Goal: Effective Bowel Elimination  Outcome: Ongoing, Progressing     Problem: Pain (Stroke, Hemorrhagic)  Goal: Acceptable Pain Control  Outcome: Ongoing, Progressing     Problem: Swallowing Impairment (Stroke, Hemorrhagic)  Goal: Optimal Eating and Swallowing Without Aspiration  Outcome: Ongoing, Progressing     Problem: Urinary Elimination Impaired (Stroke, Hemorrhagic)  Goal: Effective Urinary Elimination  Outcome: Ongoing, Progressing     Problem: Diabetes Comorbidity  Goal: Blood Glucose Level Within Targeted Range  Outcome: Ongoing, Progressing    PoC reviewed with patient and family. Strict I/O Q4 added to care plan. Safety measures maintained. Patient bed in low position. Bed alarm set. Patient call bell with in reach. Patient belongings with in reach. VSS. Full assessment in chart. TESS.

## 2022-04-12 NOTE — SUBJECTIVE & OBJECTIVE
Interval History: Afebrile. Reports slight headache. Denies fever. UA with GNRs, pending susceptibility. H&H 9.4/28.1. BLE US negative for DVT. Appreciate HM assistance with patient care. Podiatry removed sutures no signs of infxn. ID consult placed for updated abx recs. Pending SNF placement.     Medications:  Continuous Infusions:  Scheduled Meds:   amLODIPine  10 mg Oral Daily    atorvastatin  40 mg Oral Daily    carvediloL  25 mg Oral BID    heparin (porcine)  5,000 Units Subcutaneous Q8H    levothyroxine  75 mcg Oral Before breakfast    losartan  25 mg Oral Daily    senna-docusate 8.6-50 mg  1 tablet Oral BID    silodosin  4 mg Oral Daily    vancomycin (VANCOCIN) IVPB  500 mg Intravenous Once     PRN Meds:sodium chloride, sodium chloride, acetaminophen, dextrose 10%, glucagon (human recombinant), HYDROcodone-acetaminophen, HYDROmorphone, metoclopramide HCl, ondansetron, potassium bicarbonate, potassium bicarbonate, potassium bicarbonate, potassium, sodium phosphates, potassium, sodium phosphates, potassium, sodium phosphates, sodium chloride 0.9%, Pharmacy to dose Vancomycin consult **AND** vancomycin - pharmacy to dose     Review of Systems  Objective:     Weight: 49.4 kg (109 lb)  Body mass index is 17.07 kg/m².  Vital Signs (Most Recent):  Temp: 99.3 °F (37.4 °C) (04/12/22 1150)  Pulse: 81 (04/12/22 1150)  Resp: 17 (04/12/22 1150)  BP: 137/63 (04/12/22 1150)  SpO2: 99 % (04/12/22 1150) Vital Signs (24h Range):  Temp:  [98.9 °F (37.2 °C)-99.9 °F (37.7 °C)] 99.3 °F (37.4 °C)  Pulse:  [78-90] 81  Resp:  [16-18] 17  SpO2:  [93 %-99 %] 99 %  BP: (127-174)/(58-81) 137/63                     Neurosurgery Physical Exam  Constitutional: No distress.   HEENT: Normocephalic. Left sided crani incision with staples intact, clean and dry.   Cardiovascular: Regular rhythm.   Pulm: No signs of respiratory distress.   Abdominal: Soft, non-distended.   Mental status: She is awake, alert and oriented to person, date and place.    PERRL  Left side contracted and paretic  FCx in all except LUE       Significant Labs:  Recent Labs   Lab 04/11/22  0521 04/12/22  1010    119*    141   K 3.6 3.5   * 109   CO2 21* 23   BUN 19 18   CREATININE 1.3 1.1   CALCIUM 7.8* 8.3*     Recent Labs   Lab 04/11/22  0438 04/12/22  1010   WBC 8.92 9.58   HGB 7.0* 9.4*   HCT 21.8* 28.1*   * 162     No results for input(s): LABPT, INR, APTT in the last 48 hours.  Microbiology Results (last 7 days)       Procedure Component Value Units Date/Time    Urine culture [[id number]]  (Abnormal) Collected: 04/11/22 1017    Order Status: Completed Specimen: Urine Updated: 04/12/22 0813     Urine Culture, Routine GRAM NEGATIVE MARYAN  10,000 - 49,999 cfu/ml  Identification and susceptibility pending      Narrative:      Specimen Source->Urine    Blood culture [[id number]] Collected: 04/11/22 1021    Order Status: Sent Specimen: Blood from Peripheral, Left Arm Updated: 04/11/22 1045    Narrative:      Collection has been rescheduled by [facility] at 04/11/2022 10:04 Reason:   Patient unavailable, patient care, nurse paige notified.  Collection has been rescheduled by [facility] at 04/11/2022 10:04 Reason:   Patient unavailable, patient care, nurse paige notified.    Blood culture [[id number]] Collected: 04/11/22 1020    Order Status: Sent Specimen: Blood from Peripheral, Left Hand Updated: 04/11/22 1045    Narrative:      Collection has been rescheduled by [facility] at 04/11/2022 10:04 Reason:   Patient unavailable, patient care, nurse paige notified.  Collection has been rescheduled by [facility] at 04/11/2022 10:04 Reason:   Patient unavailable, patient care, nurse paige notified.          All pertinent labs from the last 24 hours have been reviewed.    Significant Diagnostics:  I have reviewed all pertinent imaging results/findings within the past 24 hours.

## 2022-04-12 NOTE — PLAN OF CARE
Problem: Infection  Goal: Absence of Infection Signs and Symptoms  Outcome: Ongoing, Progressing     Problem: Adult Inpatient Plan of Care  Goal: Plan of Care Review  Outcome: Ongoing, Progressing  Goal: Patient-Specific Goal (Individualized)  Description: Admit Date: 4/3/2022    SDH (subdural hematoma)    Past Medical History:  No date: Gastroparesis  No date: GERD (gastroesophageal reflux disease)  07/24/2021: History of Clostridium difficile colitis  No date: History of kidney stones  No date: History of stroke      Comment:  left side paralysis  No date: Hyperlipidemia  No date: Hypertension  No date: Hypothyroidism  No date: Iron deficiency anemia  No date: Osteoarthritis  No date: Peripheral neuropathy  No date: Stage IV CKD  No date: Type II diabetes mellitus    Past Surgical History:  No date: APPENDECTOMY  10/13/2021: CYSTOSCOPY W/ URETERAL STENT PLACEMENT; Right      Comment:  Procedure: CYSTOSCOPY, WITH URETERAL STENT INSERTION;                 Surgeon: Wanda Arroyo MD;  Location: [facility];                 Service: Urology;  Laterality: Right;  11/23/2021: ESOPHAGOGASTRODUODENOSCOPY; N/A      Comment:  Procedure: EGD (ESOPHAGOGASTRODUODENOSCOPY);  Surgeon:                Obed Jeong MD;  Location: [facility] ([address] [place] [address]);                 Service: Endoscopy;  Laterality: N/A;  3/24/2022: FOOT AMPUTATION THROUGH METATARSAL; Right      Comment:  Procedure: AMPUTATION, FOOT, PARTIAL 4th and 5th ray;                 Surgeon: Rodrigo Logan DPM;  Location: [address];                 Service: Podiatry;  Laterality: Right;  7/22/2021: LAPAROSCOPIC APPENDECTOMY; N/A      Comment:  Procedure: APPENDECTOMY, LAPAROSCOPIC;  Surgeon: Paulo Calvo Jr., MD;  Location: [facility];  Service: General;                 Laterality: N/A;  1/1/2022: TOE AMPUTATION; Right      Comment:  Procedure: AMPUTATION, TOE;  Surgeon: Genaro Mason DPM;  Location: [facility];  Service: Podiatry;   Laterality:               Right;  No date: TUBAL LIGATION  12/22/2021: URETEROSCOPIC REMOVAL OF URETERIC CALCULUS; Right      Comment:  Procedure: REMOVAL, CALCULUS, URETER, URETEROSCOPIC;                 Surgeon: Wanda Arroyo MD;  Location: [facility];                 Service: Urology;  Laterality: Right;    Individualization:   1. Please dim lights at night    Restraints:             Outcome: Ongoing, Progressing  Goal: Optimal Comfort and Wellbeing  Outcome: Ongoing, Progressing  Goal: Readiness for Transition of Care  Outcome: Ongoing, Progressing     Problem: Adjustment to Illness (Stroke, Hemorrhagic)  Goal: Optimal Coping  Outcome: Ongoing, Progressing     Problem: Bowel Elimination Impaired (Stroke, Hemorrhagic)  Goal: Effective Bowel Elimination  Outcome: Ongoing, Progressing     Problem: Cerebral Tissue Perfusion (Stroke, Hemorrhagic)  Goal: Optimal Cerebral Tissue Perfusion  Outcome: Ongoing, Progressing     Problem: Cognitive Impairment (Stroke, Hemorrhagic)  Goal: Optimal Cognitive Function  Outcome: Ongoing, Progressing

## 2022-04-12 NOTE — ASSESSMENT & PLAN NOTE
Home basaglar 5u qhs  BG last 24h in low 100s  She has not required any SSI in last few days, prev had [facility]I which was discontinued 4/10 due to risk of hypoglycemia  Would recommend accuchecks while inpatient to ensure maintaining glycemic control -- BID for now, can increase of decrease frequency if needed

## 2022-04-12 NOTE — PROGRESS NOTES
[facility] - Neurosurgery ([facility])  [facility] Medicine  Progress Note    Patient Name: Beatriz Adame  MRN: [id number]  Patient Class: IP- Inpatient   Admission Date: 4/3/2022  Length of Stay: 9 days  Attending Physician: Silvio White MD  Primary Care Provider: Dante Olivier MD        Subjective:     Principal Problem:SDH (subdural hematoma)        HPI:  Miss Adame is a 71 year old female with hx of DMII, HTN, HLD, CKD, and chronic anemia who initially presented from her SNF after being found down next to her wheelchair. Imaging in the ED revealed a 2 cm SDH and was admitted to the neuro ICU. She is now s/p craniotomy with evacuation of the SDH and stepped down to the floor 4/8. She has been improving from a neurosurgical standpoint. Medicine was consulted given her continued anemia. Upon evaluation patient denied light headedness, fever, chills, chest pain, or abdominal pain. She did endorse some left leg pain. Additionally, she states that she has been told in the past that she is anemia, but denied any prior evaluation by hematology.       Overview/Hospital Course:  Hospital medicine was consulted on 4/10 for anemia which appears to be a chronic problem for her, with acute on chronic anemia in setting of acute illness and recent surgery. Transfused for hgb<7.      Interval History: Tmax last 24f 99.9. GNRs in Ucx. Bcx still pending collection. Mild urinary retention but pt able to void with RN encouragement. DVT US neg.     Review of Systems   Reason unable to perform ROS: limited 2/2 cognition.   Constitutional:  Positive for activity change. Negative for fever.   Respiratory:  Negative for cough and shortness of breath.    Gastrointestinal:  Negative for abdominal pain, nausea and vomiting.   Musculoskeletal:  Positive for gait problem.   Skin:  Positive for wound.   Neurological:  Positive for weakness.        LUE contracture   Objective:     Vital Signs (Most Recent):  Temp: 99.3 °F (37.4 °C)  (04/12/22 1150)  Pulse: 81 (04/12/22 1150)  Resp: 17 (04/12/22 1150)  BP: 137/63 (04/12/22 1150)  SpO2: 99 % (04/12/22 1150) Vital Signs (24h Range):  Temp:  [98.9 °F (37.2 °C)-99.9 °F (37.7 °C)] 99.3 °F (37.4 °C)  Pulse:  [77-90] 81  Resp:  [16-18] 17  SpO2:  [93 %-99 %] 99 %  BP: (127-174)/(58-81) 137/63     Weight: 49.4 kg (109 lb)  Body mass index is 17.07 kg/m².    Intake/Output Summary (Last 24 hours) at 4/12/2022 1154  Last data filed at 4/11/2022 2000  Gross per 24 hour   Intake 698.75 ml   Output --   Net 698.75 ml      Physical Exam  Vitals and nursing note reviewed.   Constitutional:       General: She is not in acute distress.     Appearance: Normal appearance. She is ill-appearing (chronically ill appearing). She is not diaphoretic.      Comments: Frail, emaciated elderly female   HENT:      Head: Normocephalic.      Comments: Surgical incision intact with staples in place to R parietal area of scalp, no surrounding erythema or drainage from incision     Mouth/Throat:      Mouth: Mucous membranes are moist.   Eyes:      General:         Right eye: No discharge.         Left eye: No discharge.      Conjunctiva/sclera: Conjunctivae normal.   Cardiovascular:      Rate and Rhythm: Normal rate and regular rhythm.      Heart sounds: No murmur heard.    No gallop.   Pulmonary:      Effort: Pulmonary effort is normal. No respiratory distress.      Breath sounds: No wheezing.   Abdominal:      General: Abdomen is flat. There is no distension.      Palpations: Abdomen is soft.      Tenderness: There is no abdominal tenderness.   Musculoskeletal:         General: Deformity (LUE and LLE contractures) present.      Right lower leg: No edema.      Left lower leg: No edema.      Comments: RLE foot wound covered with clean and dry dressing   Skin:     General: Skin is warm and dry.   Neurological:      Mental Status: She is alert and oriented to person, place, and time. Mental status is at baseline.      Motor:  Weakness (LUE and LLE weakness 2/2 CVA, normal strength on R) present.       Significant Labs: All pertinent labs within the past 24 hours have been reviewed.    Significant Imaging: I have reviewed all pertinent imaging results/findings within the past 24 hours.      Assessment/Plan:      * SDH (subdural hematoma)  S/p craniotomy with evacuation on 4/4, staples in place  Management per primary      Fever  Fever overnight 4/10-11 to 101, No additional fever o/n following night  She is s/p R foot partial amputation for osteomyelitis and s/p craniotomy for SDH  Bone bx cultures +MRSA, on Vancomycin for planned 6wk course  She also has a hx of recurrent UTIs with cultures from Jan-March +pseudomonas and ESBL E coli  Repeat infectious workup (UA/UCx, BCx, CXR) - UCx +GNRs + LE DVT US neg  With hx recurrent UTIs and GNRs in new ucx concern for acute UTI vs colonization; she has not fevered again and she remains hemodynamically stable  If adding empiric UTI coverage would need carbapenem for hx ESBL, but some concern for lowering seizure threshold in pt with recent craniotomy  Discussed with primary team who is reaching out to ID to weigh in. I feel it is reasonable to hold off on adding gram neg coverage now, given stability, while awaiting ID re-evaluation    Osteomyelitis of right foot  R foot osteomyelitis with bone bx +MRSA, ID following peripherally   ID rec 6wk course of Vanc, with end  Date 5/5/2022, pharmacy to dose vanc inpatient  Fever overnight 4/10-4/11 to 101 without any signs of new or worsening infection on my exam, although I did not unwrap R foot dressing. Podiatry evaluated wound bedside 4/12 and removed sutures        Acute on chronic anemia  This is a patient with a long history of chronic anemia. Slow down trending since admission likely 2/2 to surgery, acute illness, and phlebotomy. Patient is asymptomatic, agree with transfusion for hgb<7. No signs of bleeding. Hemodynamically stable. Labs  consistent with anemia of chronic disease and acute blood loss.     Total blood products this admission:  - 5u PRBCs (2u 4/11, 1u each on 4/10, 4/6, and 4/4)    *2u given on 4/11 is charted incorrectly as 1u, however confirmed 2u ordered and given*  - 3u PLTs (all on 4/4)    - Recommend age appropriate colonoscopy as outpatient if warrented  - agree with transfusion for hgb<7 or active bleeding  - Trend CBC daily      Urinary retention  Hx retention with prior nephrolithiasis as well  On Flomax outpatient, currently on Silodosin inpatient  Pt with some retention, but still able to void with prompting, has purewick in place  Will schedule q6h bladder scans for now, if requiring straight cath repeatedly, would place delgado      Hypothyroidism  Continue home levothyroxine      Severe protein-calorie malnutrition  Nutrition following, most recent recs below from 4/4 (I ordered boost pudding BID and Frederic BID x14d per their recs)       1. Suggest Diabetic 2000 kcal diet with texture per SLP     2. Consider Boost Pudding BID to increase PO intake      3. Add Frederic BID x 14 days to aid in wound healing     4. If TF warranted, recommend TF of Isosource advancing as tolerated to goal rate of 45 mL/hr to provide 1620 kcal, 73 gm protein, and 825 mL free fluid     5. RD following          History of stroke  Baseline LUE and LLE weakness s/p stroke  Was on ASA/Plavix, but stopped on admit 2/2 SDH    Acute kidney injury superimposed on CKD stage IV  CKD with previous baseline high 1s-mid 2s, but Cr mid 1s recently  Pt with some retention, but still able to void with prompting, has purewick in place  Strict I/Os q4h - discussed with RN  Encourage po hydration  Trend renal function  Renally dose medications and avoid nephrotoxins  Maintain hgb>7 and MAP>65    Type 2 diabetes mellitus, with long-term current use of insulin  Home basaglar 5u qhs  BG last 24h in low 100s  She has not required any SSI in last few days, prev had MDSSI  which was discontinued 4/10 due to risk of hypoglycemia  Would recommend accuchecks while inpatient to ensure maintaining glycemic control -- BID for now, can increase of decrease frequency if needed         Hyperlipidemia  Continue statin.       Hypertension  Continue amlodipine, coreg, and losartan.   BP normal - mildly elevated, continue to monitor on current regimen      VTE Risk Mitigation (From admission, onward)         Ordered     heparin (porcine) injection 5,000 Units  Every 8 hours         04/06/22 1315     IP VTE HIGH RISK PATIENT  Once         04/03/22 1608     Place sequential compression device  Until discontinued         04/03/22 1608     Reason for No Pharmacological VTE Prophylaxis  Once        Question:  Reasons:  Answer:  Active Bleeding    04/03/22 1608                    Kerry Helton MD  Department of Hospital Medicine   [facility] - Neurosurgery ([facility])

## 2022-04-12 NOTE — ASSESSMENT & PLAN NOTE
Hx retention with prior nephrolithiasis as well  On Flomax outpatient, currently on Silodosin inpatient  Pt with some retention, but still able to void with prompting, has purewick in place  Will schedule q6h bladder scans for now, if requiring straight cath repeatedly, would place delgado

## 2022-04-12 NOTE — ASSESSMENT & PLAN NOTE
CKD with previous baseline high 1s-mid 2s, but Cr mid 1s recently  Pt with some retention, but still able to void with prompting, has purewick in place  Strict I/Os q4h - discussed with RN  Encourage po hydration  Trend renal function  Renally dose medications and avoid nephrotoxins  Maintain hgb>7 and MAP>65

## 2022-04-12 NOTE — SUBJECTIVE & OBJECTIVE
Subjective:     Interval History:   Patient with tmax of 101 on 4/10. Otherwise has remained afebrile since then. Vital signs stable. Patient denies any foot pain.       Scheduled Meds:   amLODIPine  10 mg Oral Daily    atorvastatin  40 mg Oral Daily    carvediloL  25 mg Oral BID    heparin (porcine)  5,000 Units Subcutaneous Q8H    levothyroxine  75 mcg Oral Before breakfast    losartan  25 mg Oral Daily    senna-docusate 8.6-50 mg  1 tablet Oral BID    silodosin  4 mg Oral Daily     Continuous Infusions:  PRN Meds:sodium chloride, sodium chloride, acetaminophen, dextrose 10%, glucagon (human recombinant), HYDROcodone-acetaminophen, HYDROmorphone, metoclopramide HCl, ondansetron, potassium bicarbonate, potassium bicarbonate, potassium bicarbonate, potassium, sodium phosphates, potassium, sodium phosphates, potassium, sodium phosphates, sodium chloride 0.9%, Pharmacy to dose Vancomycin consult **AND** vancomycin - pharmacy to dose    Review of Systems   Unable to perform ROS: Mental status change   Constitutional:  Negative for fever.   Musculoskeletal:  Positive for gait problem.   Skin:  Positive for wound.   Objective:     Vital Signs (Most Recent):  Temp: 99.7 °F (37.6 °C) (04/12/22 0722)  Pulse: 78 (04/12/22 1133)  Resp: 16 (04/12/22 0846)  BP: (!) 154/67 (04/12/22 0722)  SpO2: 95 % (04/12/22 0846)   Vital Signs (24h Range):  Temp:  [97.7 °F (36.5 °C)-99.9 °F (37.7 °C)] 99.7 °F (37.6 °C)  Pulse:  [77-90] 78  Resp:  [16-18] 16  SpO2:  [93 %-98 %] 95 %  BP: (127-174)/(58-81) 154/67     Weight: 49.4 kg (109 lb)  Body mass index is 17.07 kg/m².    Foot Exam    Right Foot/Ankle     Inspection and Palpation  Tenderness: none   Swelling: none   Skin Exam: skin changes; no drainage, skin not intact, no cellulitis, no abnormal color and no erythema     Neurovascular  Dorsalis pedis: absent  Posterior tibial: absent    Comments  R 2nd toe amputated    S/P 4th and 5th ray resection 03/24    Sutures clean, dry, and  intact   Skin edges well approximated with no signs of gapping or dehiscence  No signs of soft tissue infection     Left Foot/Ankle      Inspection and Palpation  Tenderness: none   Swelling: none   Skin Exam: skin intact; no drainage, no cellulitis and no erythema     Neurovascular  Dorsalis pedis: absent  Posterior tibial: absent        Laboratory:  CBC:   Recent Labs   Lab 04/12/22  1010   WBC 9.58   RBC 3.06*   HGB 9.4*   HCT 28.1*      MCV 92   MCH 30.7   MCHC 33.5     CRP: No results for input(s): CRP in the last 168 hours.  ESR: No results for input(s): SEDRATE in the last 168 hours.  Wound Cultures:   Recent Labs   Lab 03/18/22  1620 03/24/22  1538   LABAERO METHICILLIN RESISTANT STAPHYLOCOCCUS AUREUS  Many  * METHICILLIN RESISTANT STAPHYLOCOCCUS AUREUS  Few  *  METHICILLIN RESISTANT STAPHYLOCOCCUS AUREUS  Few  *     Microbiology Results (last 7 days)       Procedure Component Value Units Date/Time    Urine culture [[id number]]  (Abnormal) Collected: 04/11/22 1017    Order Status: Completed Specimen: Urine Updated: 04/12/22 0813     Urine Culture, Routine GRAM NEGATIVE MARYAN  10,000 - 49,999 cfu/ml  Identification and susceptibility pending      Narrative:      Specimen Source->Urine    Blood culture [[id number]] Collected: 04/11/22 1021    Order Status: Sent Specimen: Blood from Peripheral, Left Arm Updated: 04/11/22 1045    Narrative:      Collection has been rescheduled by [facility] at 04/11/2022 10:04 Reason:   Patient unavailable, patient care, nurse paige notified.  Collection has been rescheduled by T at 04/11/2022 10:04 Reason:   Patient unavailable, patient care, nurse paige notified.    Blood culture [[id number]] Collected: 04/11/22 1020    Order Status: Sent Specimen: Blood from Peripheral, Left Hand Updated: 04/11/22 1045    Narrative:      Collection has been rescheduled by [facility] at 04/11/2022 10:04 Reason:   Patient unavailable, patient care, nurse paige notified.  Collection has been  rescheduled by [facility] at 04/11/2022 10:04 Reason:   Patient unavailable, patient care, nurse grecia notified.          Specimen (24h ago, onward)                None            Diagnostic Results:  I have reviewed all pertinent imaging results/findings within the past 24 hours.    Clinical Findings:    4/12/2022:  S/p right foot partial 4th and 5th ray amputation on 3/24/2022. Sutures removed at bedside 4/12 with superficial dehiscence/gapping along the incision site, scant serosanguinous drainage, no erythema, no fluctuance. 2nd toe amputated.           4/8/2022

## 2022-04-12 NOTE — ASSESSMENT & PLAN NOTE
Fever overnight 4/10-11 to 101, No additional fever o/n following night  She is s/p R foot partial amputation for osteomyelitis and s/p craniotomy for SDH  Bone bx cultures +MRSA, on Vancomycin for planned 6wk course  She also has a hx of recurrent UTIs with cultures from Jan-March +pseudomonas and ESBL E coli  Repeat infectious workup (UA/UCx, BCx, CXR) - UCx +GNRs + LE DVT US neg  With hx recurrent UTIs and GNRs in new ucx concern for acute UTI vs colonization; she has not fevered again and she remains hemodynamically stable  If adding empiric UTI coverage would need carbapenem for hx ESBL, but some concern for lowering seizure threshold in pt with recent craniotomy  Discussed with primary team who is reaching out to ID to weigh in. I feel it is reasonable to hold off on adding gram neg coverage now, given stability, while awaiting ID re-evaluation

## 2022-04-12 NOTE — ASSESSMENT & PLAN NOTE
71 year old female with , hypothyroidism, CVA with left-sided residual deficit on ASA/Plavix, DM2, HTN, HLD, CKD, chronic anemia, presenting from SNF after being found down next to wheelchair after unwitnessed fall, consulted to NSGY for right 2 cm SDH mostly acute, membranous and heterogenous appearing, some chronic component, without midline shift. Now s/p R crani for SDH evacuation (4/4).      --Admitted to NSGY.  -- q4 hour vitals/neurochecks.  -- SBP < 160.  -- Na goal eunatremia.  -- Diet: mechanical soft.  -- Seizure ppx: Keppra 500 BID.  -- Follow up CT head satisfactory x2 with residual blood.  -- Extubated and saturating well on room air.  -- No HOB restriction.  -- PPx: SCDs, BR, IS. Okay for SQH for dvt ppx.   -- Nsgy will arrange appropriate follow up for patient.   -- Osteomyelitis of R foot:    - ID rec 6 week course Vanc with end date 5/5. ID re-consulted.    -Podiatry managing recent partial foot amputation, appreciate recs. Sutures removed 4/12.    -Home health/facility to do dressing changes every MWF and prn as follows:  Cleanse right foot incision site with saline or wound cleanser, paint incision site with betadine, cover with 4x4 gauze, kerlix, ACE bandage.   -- Anemia: Resolved.   -- Fever 4/11: Resolved   - UA 4/11: Growing GNRs, f/u susceptibility   - CXR with possible basilar effusion.   - BLE US 4/11: w/o DVT.   - HM following. Appreciate assistance with care.    - Blood cx 4/11: NGTD.  --Hx CVA 2018:   -OK to restart ASA POD 14 (4/18/2022)   -OK to restart Plavix POD 28 (5/2/2022)    Dispo: SNF pending accepting facility and medical stability    Discussed with Dr. White

## 2022-04-12 NOTE — ASSESSMENT & PLAN NOTE
R foot osteomyelitis with bone bx +MRSA, ID following peripherally   ID rec 6wk course of Vanc, with end  Date 5/5/2022, pharmacy to dose vanc inpatient  Fever overnight 4/10-4/11 to 101 without any signs of new or worsening infection on my exam, although I did not unwrap R foot dressing. Podiatry evaluated wound bedside 4/12 and removed sutures

## 2022-04-12 NOTE — PT/OT/SLP PROGRESS
"Speech Language Pathology Treatment    Patient Name:  Beatriz Adame   MRN:  [id number]  Admitting Diagnosis: SDH (subdural hematoma)    Recommendations:                 General Recommendations:  Dysphagia therapy and Cognitive-linguistic therapy  Diet recommendations:  Dental Soft, Liquid Diet Level: Thin   Aspiration Precautions: 1 bite/sip at a time, Alternating bites/sips, set-up Assistance with meals, Avoid talking while eating, Eliminate distractions, Feed only when awake/alert, Frequent oral care, HOB to 90 degrees, Meds crushed in puree, Monitor for s/s of aspiration, Remain upright 30 minutes post meal, Small bites/sips and Strict aspiration precautions   General Precautions: Standard, aspiration, fall, hearing impaired  Communication strategies:  provide increased time to answer and go to room if call light pushed    Subjective     "I'm fine, how are you?" pt replied to SLP's greeting.     Pain/Comfort:  · Pain Rating 1: 0/10    Respiratory Status: Room air    Objective:     Has the patient been evaluated by SLP for swallowing?   Yes  Keep patient NPO? No   Current Respiratory Status:        Pt completed a delayed memory task recalling 3/3 unrelated words after a 2 minute delay.  Complex yes/no q's were answered with 60% accuracy.  During a word fluency task, pt listed 9 items in a category within one minute given min cues (15-20 is wnl). [place] convergent categorization tasks were completed with 80% accuracy.  Pt recalled words from fo3 by attribute with 60% accuracy IND/100% given cues.  Education provided to pt regarding role of SLP, recent diet advancement, ongoing cognitive-linguistic assessment, and SLP treatment plan.   Pt demonstrated understanding of education provided, but will benefit from continued reinforcement.       Assessment:     Beatriz Adame is a 71 y.o. female with an SLP diagnosis of Dysphagia, Dysarthria and Cognitive-Linguistic Impairment.  .    Goals:   Multidisciplinary " Problems     SLP Goals        Problem: SLP    Goal Priority Disciplines Outcome   SLP Goal     SLP Ongoing, Progressing   Description: Speech Language Pathology Goals  UPDATED GOALS AFTER SPEECH EVAL INITIATED -EXPECTED TO BE MET 4/15:  1.  Pt will tolerate least restrictive diet without s/s of aspiration.   2. Pt will answer complex yes/no q's with 70% accuracy.   3. Pt will complete immediate memory tasks with 60% accuracy.   4. Following a 2 minute delay, pt will recall 2/3 novel items given max cues.   5. Pt will participate in ongoing speech/language/cognitive evaluation to determine baseline and need for further intervention.     Goals expected to be met by 4/13:  1. Pt will participate in ongoing swallowing assessment to determine if/when safe for oral intake.                            Plan:     · Patient to be seen:  3 x/week   · Plan of Care expires:  05/06/22  · Plan of Care reviewed with:  patient   · SLP Follow-Up:  Yes       Discharge recommendations:  nursing facility, skilled     Time Tracking:     SLP Treatment Date:   04/12/22  Speech Start Time:  1428  Speech Stop Time:  1446     Speech Total Time (min):  18 min    Billable Minutes: Speech Therapy Individual (cognitive therapy) 10 and Self Care/Home Management Training 8    04/12/2022

## 2022-04-12 NOTE — PROGRESS NOTES
Tree Romero - Neurosurgery ([facility])  Neurosurgery  Progress Note    Subjective:     History of Present Illness: 71 year old female with , hypothyroidism, CVA with left-sided residual deficit on ASA/Plavix, DM2, HTN, HLD, CKD, chronic anemia, presenting from SNF after being found down next to wheelchair after unwitnessed fall, consulted to NSGY for right 2 cm SDH without midline shift. She is altered at baseline and unable to provide to history, but family at bedside says she is more confused and less interactive than usual.       Post-Op Info:  Procedure(s) (LRB):  CRANIOTOMY, FOR SUBDURAL HEMATOMA EVACUATION (Right)   8 Days Post-Op     Interval History: Afebrile. Reports slight headache. Denies fever. UA with GNRs, pending susceptibility. H&H 9.4/28.1. BLE US negative for DVT. Appreciate  assistance with patient care. Podiatry removed sutures no signs of infxn. ID consult placed for updated abx recs. Pending SNF placement.     Medications:  Continuous Infusions:  Scheduled Meds:   amLODIPine  10 mg Oral Daily    atorvastatin  40 mg Oral Daily    carvediloL  25 mg Oral BID    heparin (porcine)  5,000 Units Subcutaneous Q8H    levothyroxine  75 mcg Oral Before breakfast    losartan  25 mg Oral Daily    senna-docusate 8.6-50 mg  1 tablet Oral BID    silodosin  4 mg Oral Daily    vancomycin (VANCOCIN) IVPB  500 mg Intravenous Once     PRN Meds:sodium chloride, sodium chloride, acetaminophen, dextrose 10%, glucagon (human recombinant), HYDROcodone-acetaminophen, HYDROmorphone, metoclopramide HCl, ondansetron, potassium bicarbonate, potassium bicarbonate, potassium bicarbonate, potassium, sodium phosphates, potassium, sodium phosphates, potassium, sodium phosphates, sodium chloride 0.9%, Pharmacy to dose Vancomycin consult **AND** vancomycin - pharmacy to dose     Review of Systems  Objective:     Weight: 49.4 kg (109 lb)  Body mass index is 17.07 kg/m².  Vital Signs (Most Recent):  Temp: 99.3 °F (37.4 °C)  (04/12/22 1150)  Pulse: 81 (04/12/22 1150)  Resp: 17 (04/12/22 1150)  BP: 137/63 (04/12/22 1150)  SpO2: 99 % (04/12/22 1150) Vital Signs (24h Range):  Temp:  [98.9 °F (37.2 °C)-99.9 °F (37.7 °C)] 99.3 °F (37.4 °C)  Pulse:  [78-90] 81  Resp:  [16-18] 17  SpO2:  [93 %-99 %] 99 %  BP: (127-174)/(58-81) 137/63                     Neurosurgery Physical Exam  Constitutional: No distress.   HEENT: Normocephalic. Left sided crani incision with staples intact, clean and dry.   Cardiovascular: Regular rhythm.   Pulm: No signs of respiratory distress.   Abdominal: Soft, non-distended.   Mental status: She is awake, alert and oriented to person, date and place.   PERRL  Left side contracted and paretic  FCx in all except LUE       Significant Labs:  Recent Labs   Lab 04/11/22  0521 04/12/22  1010    119*    141   K 3.6 3.5   * 109   CO2 21* 23   BUN 19 18   CREATININE 1.3 1.1   CALCIUM 7.8* 8.3*     Recent Labs   Lab 04/11/22  0438 04/12/22  1010   WBC 8.92 9.58   HGB 7.0* 9.4*   HCT 21.8* 28.1*   * 162     No results for input(s): LABPT, INR, APTT in the last 48 hours.  Microbiology Results (last 7 days)       Procedure Component Value Units Date/Time    Urine culture [[id number]]  (Abnormal) Collected: 04/11/22 1017    Order Status: Completed Specimen: Urine Updated: 04/12/22 0813     Urine Culture, Routine GRAM NEGATIVE MARYAN  10,000 - 49,999 cfu/ml  Identification and susceptibility pending      Narrative:      Specimen Source->Urine    Blood culture [[id number]] Collected: 04/11/22 1021    Order Status: Sent Specimen: Blood from Peripheral, Left Arm Updated: 04/11/22 1045    Narrative:      Collection has been rescheduled by T at 04/11/2022 10:04 Reason:   Patient unavailable, patient care, nurse paige notified.  Collection has been rescheduled by T at 04/11/2022 10:04 Reason:   Patient unavailable, patient care, nurse paige notified.    Blood culture [[id number]] Collected: 04/11/22 1020     Order Status: Sent Specimen: Blood from Peripheral, Left Hand Updated: 04/11/22 1045    Narrative:      Collection has been rescheduled by [facility] at 04/11/2022 10:04 Reason:   Patient unavailable, patient care, nurse grecia notified.  Collection has been rescheduled by T at 04/11/2022 10:04 Reason:   Patient unavailable, patient care, nurse paige notified.          All pertinent labs from the last 24 hours have been reviewed.    Significant Diagnostics:  I have reviewed all pertinent imaging results/findings within the past 24 hours.    Assessment/Plan:     * SDH (subdural hematoma)  71 year old female with , hypothyroidism, CVA with left-sided residual deficit on ASA/Plavix, DM2, HTN, HLD, CKD, chronic anemia, presenting from SNF after being found down next to wheelchair after unwitnessed fall, consulted to NSGY for right 2 cm SDH mostly acute, membranous and heterogenous appearing, some chronic component, without midline shift. Now s/p R crani for SDH evacuation (4/4).      --Admitted to NSGY.  -- q4 hour vitals/neurochecks.  -- SBP < 160.  -- Na goal eunatremia.  -- Diet: mechanical soft.  -- Seizure ppx: Keppra 500 BID.  -- Follow up CT head satisfactory x2 with residual blood.  -- Extubated and saturating well on room air.  -- No HOB restriction.  -- PPx: SCDs, BR, IS. Okay for SQH for dvt ppx.   -- Nsgy will arrange appropriate follow up for patient.   -- Osteomyelitis of R foot:    - ID rec 6 week course Vanc with end date 5/5. ID re-consulted.    -Podiatry managing recent partial foot amputation, appreciate recs. Sutures removed 4/12.    -Home health/facility to do dressing changes every MWF and prn as follows:  Cleanse right foot incision site with saline or wound cleanser, paint incision site with betadine, cover with 4x4 gauze, kerlix, ACE bandage.   -- Anemia: Resolved.   -- Fever 4/11: Resolved   - UA 4/11: Growing GNRs, f/u susceptibility   - CXR with possible basilar effusion.   - BLE US 4/11: w/o  DVT.   - HM following. Appreciate assistance with care.    - Blood cx 4/11: NGTD.  --Hx CVA 2018:   -OK to restart ASA POD 14 (4/18/2022)   -OK to restart Plavix POD 28 (5/2/2022)    Dispo: SNF pending accepting facility and medical stability    Discussed with Dr. Christopher Ardon PATimothyC  Neurosurgery  Tree Romero - Neurosurgery ([facility])

## 2022-04-12 NOTE — PLAN OF CARE
Problem: Infection  Goal: Absence of Infection Signs and Symptoms  Outcome: Ongoing, Progressing     Problem: Adult Inpatient Plan of Care  Goal: Plan of Care Review  Outcome: Ongoing, Progressing  Goal: Patient-Specific Goal (Individualized)  Description: Admit Date: 4/3/2022    SDH (subdural hematoma)    Past Medical History:  No date: Gastroparesis  No date: GERD (gastroesophageal reflux disease)  07/24/2021: History of Clostridium difficile colitis  No date: History of kidney stones  No date: History of stroke      Comment:  left side paralysis  No date: Hyperlipidemia  No date: Hypertension  No date: Hypothyroidism  No date: Iron deficiency anemia  No date: Osteoarthritis  No date: Peripheral neuropathy  No date: Stage IV CKD  No date: Type II diabetes mellitus    Past Surgical History:  No date: APPENDECTOMY  10/13/2021: CYSTOSCOPY W/ URETERAL STENT PLACEMENT; Right      Comment:  Procedure: CYSTOSCOPY, WITH URETERAL STENT INSERTION;                 Surgeon: Wanda Arroyo MD;  Location: [facility];                 Service: Urology;  Laterality: Right;  11/23/2021: ESOPHAGOGASTRODUODENOSCOPY; N/A      Comment:  Procedure: EGD (ESOPHAGOGASTRODUODENOSCOPY);  Surgeon:                Obed Jeong MD;  Location: [facility] ([address] [place] [address]);                 Service: Endoscopy;  Laterality: N/A;  3/24/2022: FOOT AMPUTATION THROUGH METATARSAL; Right      Comment:  Procedure: AMPUTATION, FOOT, PARTIAL 4th and 5th ray;                 Surgeon: Rodrigo Logan DPM;  Location: [address];                 Service: Podiatry;  Laterality: Right;  7/22/2021: LAPAROSCOPIC APPENDECTOMY; N/A      Comment:  Procedure: APPENDECTOMY, LAPAROSCOPIC;  Surgeon: Paulo Calvo Jr., MD;  Location: [facility];  Service: General;                 Laterality: N/A;  1/1/2022: TOE AMPUTATION; Right      Comment:  Procedure: AMPUTATION, TOE;  Surgeon: Genaro Mason DPM;  Location: [facility];  Service: Podiatry;   Laterality:               Right;  No date: TUBAL LIGATION  12/22/2021: URETEROSCOPIC REMOVAL OF URETERIC CALCULUS; Right      Comment:  Procedure: REMOVAL, CALCULUS, URETER, URETEROSCOPIC;                 Surgeon: Wanda Arroyo MD;  Location: [facility];                 Service: Urology;  Laterality: Right;    Individualization:   1. Please dim lights at night    Restraints:             Outcome: Ongoing, Progressing  Goal: Absence of Hospital-Acquired Illness or Injury  Outcome: Ongoing, Progressing  Goal: Optimal Comfort and Wellbeing  Outcome: Ongoing, Progressing  Goal: Readiness for Transition of Care  Outcome: Ongoing, Progressing     Problem: Adjustment to Illness (Stroke, Hemorrhagic)  Goal: Optimal Coping  Outcome: Ongoing, Progressing     Problem: Bowel Elimination Impaired (Stroke, Hemorrhagic)  Goal: Effective Bowel Elimination  Outcome: Ongoing, Progressing     Problem: Cerebral Tissue Perfusion (Stroke, Hemorrhagic)  Goal: Optimal Cerebral Tissue Perfusion  Outcome: Ongoing, Progressing     Problem: Cognitive Impairment (Stroke, Hemorrhagic)  Goal: Optimal Cognitive Function  Outcome: Ongoing, Progressing     Problem: Communication Impairment (Stroke, Hemorrhagic)  Goal: Effective Communication Skills  Outcome: Ongoing, Progressing     Problem: Functional Ability Impaired (Stroke, Hemorrhagic)  Goal: Optimal Functional Ability  Outcome: Ongoing, Progressing     Problem: Pain (Stroke, Hemorrhagic)  Goal: Acceptable Pain Control  Outcome: Ongoing, Progressing     Problem: Respiratory Compromise (Stroke, Hemorrhagic)  Goal: Effective Oxygenation and Ventilation  Outcome: Ongoing, Progressing     Problem: Sensorimotor Impairment (Stroke, Hemorrhagic)  Goal: Improved Sensorimotor Function  Outcome: Ongoing, Progressing     Problem: Swallowing Impairment (Stroke, Hemorrhagic)  Goal: Optimal Eating and Swallowing Without Aspiration  Outcome: Ongoing, Progressing     Problem: Urinary Elimination Impaired  (Stroke, Hemorrhagic)  Goal: Effective Urinary Elimination  Outcome: Ongoing, Progressing     Problem: Diabetes Comorbidity  Goal: Blood Glucose Level Within Targeted Range  Outcome: Ongoing, Progressing     Problem: Fluid and Electrolyte Imbalance (Acute Kidney Injury/Impairment)  Goal: Fluid and Electrolyte Balance  Outcome: Ongoing, Progressing     Problem: Oral Intake Inadequate (Acute Kidney Injury/Impairment)  Goal: Optimal Nutrition Intake  Outcome: Ongoing, Progressing     Problem: Renal Function Impairment (Acute Kidney Injury/Impairment)  Goal: Effective Renal Function  Outcome: Ongoing, Progressing     Problem: Impaired Wound Healing  Goal: Optimal Wound Healing  Outcome: Ongoing, Progressing     Problem: Adjustment to Illness (Delirium)  Goal: Optimal Coping  Outcome: Ongoing, Progressing     Problem: Altered Behavior (Delirium)  Goal: Improved Behavioral Control  Outcome: Ongoing, Progressing     Problem: Attention and Thought Clarity Impairment (Delirium)  Goal: Improved Attention and Thought Clarity  Outcome: Ongoing, Progressing     Problem: Sleep Disturbance (Delirium)  Goal: Improved Sleep  Outcome: Ongoing, Progressing     Problem: Skin Injury Risk Increased  Goal: Skin Health and Integrity  Outcome: Ongoing, Progressing     Problem: Fall Injury Risk  Goal: Absence of Fall and Fall-Related Injury  Outcome: Ongoing, Progressing     Problem: Restraint, Nonbehavioral (Nonviolent)  Goal: Absence of Harm or Injury  Outcome: Ongoing, Progressing     Problem: Communication Impairment (Mechanical Ventilation, Invasive)  Goal: Effective Communication  Outcome: Ongoing, Progressing     Problem: Device-Related Complication Risk (Mechanical Ventilation, Invasive)  Goal: Optimal Device Function  Outcome: Ongoing, Progressing     Problem: Inability to Wean (Mechanical Ventilation, Invasive)  Goal: Mechanical Ventilation Liberation  Outcome: Ongoing, Progressing     Problem: Nutrition Impairment (Mechanical  Ventilation, Invasive)  Goal: Optimal Nutrition Delivery  Outcome: Ongoing, Progressing     Problem: Skin and Tissue Injury (Mechanical Ventilation, Invasive)  Goal: Absence of Device-Related Skin and Tissue Injury  Outcome: Ongoing, Progressing     Problem: Ventilator-Induced Lung Injury (Mechanical Ventilation, Invasive)  Goal: Absence of Ventilator-Induced Lung Injury  Outcome: Ongoing, Progressing     Problem: Communication Impairment (Artificial Airway)  Goal: Effective Communication  Outcome: Ongoing, Progressing     Problem: Device-Related Complication Risk (Artificial Airway)  Goal: Optimal Device Function  Outcome: Ongoing, Progressing     Problem: Skin and Tissue Injury (Artificial Airway)  Goal: Absence of Device-Related Skin or Tissue Injury  Outcome: Ongoing, Progressing     Problem: Noninvasive Ventilation Acute  Goal: Effective Unassisted Ventilation and Oxygenation  Outcome: Ongoing, Progressing       Patient remained stable throughout the shift. Patient positioned for optimal circulatory flow and appears to be in no respiratory distress. Patient repositioned q2h and prn. Incision to head appears to be free of signs of infection. Pt'sl eft side is contracted. Patient has no complaints at this time.

## 2022-04-12 NOTE — SUBJECTIVE & OBJECTIVE
Interval History: Tmax last 24f 99.9. GNRs in Ucx. Bcx still pending collection. Mild urinary retention but pt able to void with RN encouragement. DVT US neg.     Review of Systems   Reason unable to perform ROS: limited 2/2 cognition.   Constitutional:  Positive for activity change. Negative for fever.   Respiratory:  Negative for cough and shortness of breath.    Gastrointestinal:  Negative for abdominal pain, nausea and vomiting.   Musculoskeletal:  Positive for gait problem.   Skin:  Positive for wound.   Neurological:  Positive for weakness.        LUE contracture   Objective:     Vital Signs (Most Recent):  Temp: 99.3 °F (37.4 °C) (04/12/22 1150)  Pulse: 81 (04/12/22 1150)  Resp: 17 (04/12/22 1150)  BP: 137/63 (04/12/22 1150)  SpO2: 99 % (04/12/22 1150) Vital Signs (24h Range):  Temp:  [98.9 °F (37.2 °C)-99.9 °F (37.7 °C)] 99.3 °F (37.4 °C)  Pulse:  [77-90] 81  Resp:  [16-18] 17  SpO2:  [93 %-99 %] 99 %  BP: (127-174)/(58-81) 137/63     Weight: 49.4 kg (109 lb)  Body mass index is 17.07 kg/m².    Intake/Output Summary (Last 24 hours) at 4/12/2022 1154  Last data filed at 4/11/2022 2000  Gross per 24 hour   Intake 698.75 ml   Output --   Net 698.75 ml      Physical Exam  Vitals and nursing note reviewed.   Constitutional:       General: She is not in acute distress.     Appearance: Normal appearance. She is ill-appearing (chronically ill appearing). She is not diaphoretic.      Comments: Frail, emaciated elderly female   HENT:      Head: Normocephalic.      Comments: Surgical incision intact with staples in place to R parietal area of scalp, no surrounding erythema or drainage from incision     Mouth/Throat:      Mouth: Mucous membranes are moist.   Eyes:      General:         Right eye: No discharge.         Left eye: No discharge.      Conjunctiva/sclera: Conjunctivae normal.   Cardiovascular:      Rate and Rhythm: Normal rate and regular rhythm.      Heart sounds: No murmur heard.    No gallop.   Pulmonary:       Effort: Pulmonary effort is normal. No respiratory distress.      Breath sounds: No wheezing.   Abdominal:      General: Abdomen is flat. There is no distension.      Palpations: Abdomen is soft.      Tenderness: There is no abdominal tenderness.   Musculoskeletal:         General: Deformity (LUE and LLE contractures) present.      Right lower leg: No edema.      Left lower leg: No edema.      Comments: RLE foot wound covered with clean and dry dressing   Skin:     General: Skin is warm and dry.   Neurological:      Mental Status: She is alert and oriented to person, place, and time. Mental status is at baseline.      Motor: Weakness (LUE and LLE weakness 2/2 CVA, normal strength on R) present.       Significant Labs: All pertinent labs within the past 24 hours have been reviewed.    Significant Imaging: I have reviewed all pertinent imaging results/findings within the past 24 hours.

## 2022-04-12 NOTE — PT/OT/SLP PROGRESS
Occupational Therapy Missed Treatment      Patient Name:  Beatriz Adame   MRN:  [id number]    Patient not seen on this date. However, per chart review pt continues to benefit from acute OT services. OT to follow up as POC allows.   Please continue progressive mobility and assistance with functional ADLs as appropriate.     Discharge Disposition Recommendation: SNF    Salomon Anderson OT  4/12/2022

## 2022-04-12 NOTE — PROGRESS NOTES
Pharmacokinetic Assessment Follow Up: IV Vancomycin    Vancomycin serum concentration assessment(s):     -Vancomycin random level was drawn with AM labs, and can be used to guide therapy.  -A level of 15.6 mcg/mL is within goal 15-20 mcg/mL for osteomyelitis.  -Renal function improving.    Vancomycin Regimen Plan:     -Vancomycin x 1 500 mg today.  -Consider scheduling Q24H again if renal function stable 4/13.  -Levels to follow.   -6 weeks vancomycin per ID s/o 4/6.     Drug levels (last 3 results):  Recent Labs   Lab Result Units 04/10/22  0640 04/11/22  0521 04/12/22  1010   Vancomycin, Random ug/mL 19.9 20.8 15.6       Pharmacy will continue to follow and monitor vancomycin.    Please contact pharmacy at extension [phone] for questions regarding this assessment.    Thank you for the consult,   Robin Duenas       Patient brief summary:  Beatriz Adame is a 71 y.o. female initiated on antimicrobial therapy with IV Vancomycin for treatment of bone/joint infection    Drug Allergies:   Review of patient's allergies indicates:   Allergen Reactions    Tomato (solanum lycopersicum) Hives and Itching       Actual Body Weight:   49.4 kg    Renal Function:   Estimated Creatinine Clearance: 37.1 mL/min (based on SCr of 1.1 mg/dL).,     Dialysis Method (if applicable):  N/A    CBC (last 72 hours):  Recent Labs   Lab Result Units 04/10/22  0640 04/11/22  0438 04/12/22  1010   WBC K/uL 8.98  8.98 8.92 9.58   Hemoglobin g/dL 6.1*  6.1* 7.0* 9.4*   Hematocrit % 19.2*  19.2* 21.8* 28.1*   Platelets K/uL 155  155 125* 162   Gran % % 55.4  55.4 56.7 60.0   Lymph % % 32.6  32.6 30.2 27.5   Mono % % 10.0  10.0 10.7 10.0   Eosinophil % % 1.1  1.1 1.5 1.7   Basophil % % 0.3  0.3 0.1 0.3   Differential Method  Automated  Automated Automated Automated       Metabolic Panel (last 72 hours):  Recent Labs   Lab Result Units 04/10/22  0640 04/11/22  0521 04/11/22  1017 04/12/22  1010   Sodium mmol/L 142 143  --  141   Potassium  mmol/L 3.6 3.6  --  3.5   Chloride mmol/L 113* 114*  --  109   CO2 mmol/L 23 21*  --  23   Glucose mg/dL 111* 107  --  119*   Glucose, UA   --   --  Negative  --    BUN mg/dL 20 19  --  18   Creatinine mg/dL 1.4 1.3  --  1.1   Albumin g/dL 1.8* 1.9*  --  1.9*   Total Bilirubin mg/dL 0.3 0.5  --  0.7   Alkaline Phosphatase U/L 53* 59  --  55   AST U/L 18 15  --  14   ALT U/L 14 12  --  12       Vancomycin Administrations:  vancomycin given in the last 96 hours                   vancomycin 500 mg in dextrose 5 % 100 mL IVPB (ready to mix system) (mg) 500 mg New Bag 04/10/22 1019    vancomycin 500 mg in dextrose 5 % 100 mL IVPB (ready to mix system) (mg) 500 mg New Bag 04/09/22 1455                Microbiologic Results:  Microbiology Results (last 7 days)     Procedure Component Value Units Date/Time    Urine culture [[id number]]  (Abnormal) Collected: 04/11/22 1017    Order Status: Completed Specimen: Urine Updated: 04/12/22 0813     Urine Culture, Routine GRAM NEGATIVE MARYAN  10,000 - 49,999 cfu/ml  Identification and susceptibility pending      Narrative:      Specimen Source->Urine    Blood culture [[id number]] Collected: 04/11/22 1021    Order Status: Sent Specimen: Blood from Peripheral, Left Arm Updated: 04/11/22 1045    Narrative:      Collection has been rescheduled by [facility] at 04/11/2022 10:04 Reason:   Patient unavailable, patient care, nurse paige notified.  Collection has been rescheduled by [facility] at 04/11/2022 10:04 Reason:   Patient unavailable, patient care, nurse paige notified.    Blood culture [[id number]] Collected: 04/11/22 1020    Order Status: Sent Specimen: Blood from Peripheral, Left Hand Updated: 04/11/22 1045    Narrative:      Collection has been rescheduled by [facility] at 04/11/2022 10:04 Reason:   Patient unavailable, patient care, nurse paige notified.  Collection has been rescheduled by [facility] at 04/11/2022 10:04 Reason:   Patient unavailable, patient care, nurse paige notified.

## 2022-04-12 NOTE — ASSESSMENT & PLAN NOTE
Nutrition following, most recent recs below from 4/4 (I ordered boost pudding BID and Frederic BID x14d per their recs)       1. Suggest Diabetic 2000 kcal diet with texture per SLP     2. Consider Boost Pudding BID to increase PO intake      3. Add Frederic BID x 14 days to aid in wound healing     4. If TF warranted, recommend TF of Isosource advancing as tolerated to goal rate of 45 mL/hr to provide 1620 kcal, 73 gm protein, and 825 mL free fluid     5. RD following

## 2022-04-12 NOTE — PLAN OF CARE
Recommendations    1. Suggest Diabetic 2000 kcal diet with texture per SLP     2. Continue Boost Pudding BID to increase PO intake      3. Continue Frederic BID x 14 days to aid in wound healing     4. RD following     Goals: receive nutrition by RD follow-up  Nutrition Goal Status: goal met

## 2022-04-12 NOTE — PROGRESS NOTES
[facility] - Neurosurgery ([facility])  Podiatry  Progress Note    Patient Name: Beatriz Adame  MRN: [id number]  Admission Date: 4/3/2022  Hospital Length of Stay: 9 days  Attending Physician: Silvio White MD  Primary Care Provider: Dante Olivier MD     Subjective:     Interval History:   Patient with tmax of 101 on 4/10. Otherwise has remained afebrile since then. Vital signs stable. Patient denies any foot pain.       Scheduled Meds:   amLODIPine  10 mg Oral Daily    atorvastatin  40 mg Oral Daily    carvediloL  25 mg Oral BID    heparin (porcine)  5,000 Units Subcutaneous Q8H    levothyroxine  75 mcg Oral Before breakfast    losartan  25 mg Oral Daily    senna-docusate 8.6-50 mg  1 tablet Oral BID    silodosin  4 mg Oral Daily     Continuous Infusions:  PRN Meds:sodium chloride, sodium chloride, acetaminophen, dextrose 10%, glucagon (human recombinant), HYDROcodone-acetaminophen, HYDROmorphone, metoclopramide HCl, ondansetron, potassium bicarbonate, potassium bicarbonate, potassium bicarbonate, potassium, sodium phosphates, potassium, sodium phosphates, potassium, sodium phosphates, sodium chloride 0.9%, Pharmacy to dose Vancomycin consult **AND** vancomycin - pharmacy to dose    Review of Systems   Unable to perform ROS: Mental status change   Constitutional:  Negative for fever.   Musculoskeletal:  Positive for gait problem.   Skin:  Positive for wound.   Objective:     Vital Signs (Most Recent):  Temp: 99.7 °F (37.6 °C) (04/12/22 0722)  Pulse: 78 (04/12/22 1133)  Resp: 16 (04/12/22 0846)  BP: (!) 154/67 (04/12/22 0722)  SpO2: 95 % (04/12/22 0846)   Vital Signs (24h Range):  Temp:  [97.7 °F (36.5 °C)-99.9 °F (37.7 °C)] 99.7 °F (37.6 °C)  Pulse:  [77-90] 78  Resp:  [16-18] 16  SpO2:  [93 %-98 %] 95 %  BP: (127-174)/(58-81) 154/67     Weight: 49.4 kg (109 lb)  Body mass index is 17.07 kg/m².    Foot Exam    Right Foot/Ankle     Inspection and Palpation  Tenderness: none   Swelling: none   Skin Exam:  skin changes; no drainage, skin not intact, no cellulitis, no abnormal color and no erythema     Neurovascular  Dorsalis pedis: absent  Posterior tibial: absent    Comments  R 2nd toe amputated    S/P 4th and 5th ray resection 03/24    Sutures clean, dry, and intact   Skin edges well approximated with no signs of gapping or dehiscence  No signs of soft tissue infection     Left Foot/Ankle      Inspection and Palpation  Tenderness: none   Swelling: none   Skin Exam: skin intact; no drainage, no cellulitis and no erythema     Neurovascular  Dorsalis pedis: absent  Posterior tibial: absent        Laboratory:  CBC:   Recent Labs   Lab 04/12/22  1010   WBC 9.58   RBC 3.06*   HGB 9.4*   HCT 28.1*      MCV 92   MCH 30.7   MCHC 33.5     CRP: No results for input(s): CRP in the last 168 hours.  ESR: No results for input(s): SEDRATE in the last 168 hours.  Wound Cultures:   Recent Labs   Lab 03/18/22  1620 03/24/22  1538   LABAERO METHICILLIN RESISTANT STAPHYLOCOCCUS AUREUS  Many  * METHICILLIN RESISTANT STAPHYLOCOCCUS AUREUS  Few  *  METHICILLIN RESISTANT STAPHYLOCOCCUS AUREUS  Few  *     Microbiology Results (last 7 days)       Procedure Component Value Units Date/Time    Urine culture [[id number]]  (Abnormal) Collected: 04/11/22 1017    Order Status: Completed Specimen: Urine Updated: 04/12/22 0813     Urine Culture, Routine GRAM NEGATIVE MARYAN  10,000 - 49,999 cfu/ml  Identification and susceptibility pending      Narrative:      Specimen Source->Urine    Blood culture [[id number]] Collected: 04/11/22 1021    Order Status: Sent Specimen: Blood from Peripheral, Left Arm Updated: 04/11/22 1045    Narrative:      Collection has been rescheduled by [facility] at 04/11/2022 10:04 Reason:   Patient unavailable, patient care, nurse paige notified.  Collection has been rescheduled by [facility] at 04/11/2022 10:04 Reason:   Patient unavailable, patient care, nurse paige notified.    Blood culture [[id number]] Collected: 04/11/22 1020     Order Status: Sent Specimen: Blood from Peripheral, Left Hand Updated: 04/11/22 1045    Narrative:      Collection has been rescheduled by [facility] at 04/11/2022 10:04 Reason:   Patient unavailable, patient care, nurse grecia notified.  Collection has been rescheduled by [facility] at 04/11/2022 10:04 Reason:   Patient unavailable, patient care, nurse grecia notified.          Specimen (24h ago, onward)                None            Diagnostic Results:  I have reviewed all pertinent imaging results/findings within the past 24 hours.    Clinical Findings:    4/12/2022:  S/p right foot partial 4th and 5th ray amputation on 3/24/2022. Sutures removed at bedside 4/12 with superficial dehiscence/gapping along the incision site, scant serosanguinous drainage, no erythema, no fluctuance. 2nd toe amputated.           4/8/2022          Assessment/Plan:     Osteomyelitis of right foot  S/p partial 4th and 5th ray amputation 3/24/2022. Currently on IV Vancomycin x 6 weeks for residual osteomyelitis with estimated end date 5/5/2022. Proximal bone margins obtained intraop +MRSA.    Plan  She is now POD#19 s/p amputation. Sutures removed at bedside noting superfical gapping/dehiscence along the incision site with no acute signs of infection.   Continue IV Vancomycin  No further plans for surgical intervention from podiatry  Continue local wound care with betadine. Nursing orders placed  Ok to weightbear as tolerated  Podiatry will follow peripherally    DC Instructions:  Patient is to follow up with podiatry within 10-14 days of discharge with Dr. Logan.  Podiatry will arrange an appointment.  Home health/facility to do dressing changes every MWF and prn as follows:  Cleanse right foot incision site with saline or wound cleanser, paint incision site with betadine, cover with 4x4 gauze, kerlix, ACE bandage.           Yumiko Morales DPM  [facility]  Podiatry PGY3  Pager: [phone]  Spectra:[phone]

## 2022-04-12 NOTE — ASSESSMENT & PLAN NOTE
This is a patient with a long history of chronic anemia. Slow down trending since admission likely 2/2 to surgery, acute illness, and phlebotomy. Patient is asymptomatic, agree with transfusion for hgb<7. No signs of bleeding. Hemodynamically stable. Labs consistent with anemia of chronic disease and acute blood loss.     Total blood products this admission:  - 5u PRBCs (2u 4/11, 1u each on 4/10, 4/6, and 4/4)    *2u given on 4/11 is charted incorrectly as 1u, however confirmed 2u ordered and given*  - 3u PLTs (all on 4/4)    - Recommend age appropriate colonoscopy as outpatient if warrented  - agree with transfusion for hgb<7 or active bleeding  - Trend CBC daily

## 2022-04-12 NOTE — ASSESSMENT & PLAN NOTE
S/p partial 4th and 5th ray amputation 3/24/2022. Currently on IV Vancomycin x 6 weeks for residual osteomyelitis with estimated end date 5/5/2022. Proximal bone margins obtained intraop +MRSA.    Plan  She is now POD#19 s/p amputation. Sutures removed at bedside noting superfical gapping/dehiscence along the incision site with no acute signs of infection.   Continue IV Vancomycin  No further plans for surgical intervention from podiatry  Continue local wound care with betadine. Nursing orders placed  Ok to weightbear as tolerated  Podiatry will follow peripherally    DC Instructions:  Patient is to follow up with podiatry within 10-14 days of discharge with Dr. Logan.  Podiatry will arrange an appointment.  Home health/facility to do dressing changes every MWF and prn as follows:  Cleanse right foot incision site with saline or wound cleanser, paint incision site with betadine, cover with 4x4 gauze, kerlix, ACE bandage.

## 2022-04-13 NOTE — SUBJECTIVE & OBJECTIVE
Past Medical History:   Diagnosis Date    Gastroparesis     GERD (gastroesophageal reflux disease)     History of Clostridium difficile colitis 07/24/2021    History of kidney stones     History of stroke     left side paralysis    Hyperlipidemia     Hypertension     Hypothyroidism     Iron deficiency anemia     Osteoarthritis     Peripheral neuropathy     Stage IV CKD     Type II diabetes mellitus      Past Surgical History:   Procedure Laterality Date    APPENDECTOMY      CRANIOTOMY FOR EVACUATION OF SUBDURAL HEMATOMA Right 4/4/2022    Procedure: CRANIOTOMY, FOR SUBDURAL HEMATOMA EVACUATION;  Surgeon: Silvio White MD;  [facility];  Service: Neurosurgery;  Laterality: Right;    CYSTOSCOPY W/ URETERAL STENT PLACEMENT Right 10/13/2021    Procedure: CYSTOSCOPY, WITH URETERAL STENT INSERTION;  Surgeon: Wanda Arroyo MD;  Location: [facility];  Service: Urology;  Laterality: Right;    ESOPHAGOGASTRODUODENOSCOPY N/A 11/23/2021    Procedure: EGD (ESOPHAGOGASTRODUODENOSCOPY);  Surgeon: Obed Jeong MD;  Location: [facility] (2ND FLR);  Service: Endoscopy;  Laterality: N/A;    FOOT AMPUTATION THROUGH METATARSAL Right 3/24/2022    Procedure: AMPUTATION, FOOT, PARTIAL 4th and 5th ray;  Surgeon: Rodrigo Logan DPM;  [facility];  Service: Podiatry;  Laterality: Right;    LAPAROSCOPIC APPENDECTOMY N/A 7/22/2021    Procedure: APPENDECTOMY, LAPAROSCOPIC;  Surgeon: Paulo Calvo Jr., MD;  Location: [facility];  Service: General;  Laterality: N/A;    TOE AMPUTATION Right 1/1/2022    Procedure: AMPUTATION, TOE;  Surgeon: Genaro Mason DPM;  Location: [facility];  Service: Podiatry;  Laterality: Right;    TUBAL LIGATION      URETEROSCOPIC REMOVAL OF URETERIC CALCULUS Right 12/22/2021    Procedure: REMOVAL, CALCULUS, URETER, URETEROSCOPIC;  Surgeon: Wanda Arroyo MD;  Location: [facility];  Service: Urology;  Laterality: Right;      No current facility-administered medications on file prior  to encounter.     Current Outpatient Medications on File Prior to Encounter   Medication Sig Dispense Refill    rosuvastatin (CRESTOR) 40 MG Tab Take 40 mg by mouth every evening.      amLODIPine (NORVASC) 5 MG tablet Take 10 mg by mouth every evening. Take 5 mg every morning and 10 mg at night      ascorbic acid, vitamin C, (VITAMIN C) 500 MG tablet Take 1 tablet (500 mg total) by mouth once daily.      aspirin (ECOTRIN) 81 MG EC tablet Take 81 mg by mouth once daily.      b complex vitamins tablet Take 1 tablet by mouth once daily.      carvedilol (COREG) 25 MG tablet Take 1 tablet (25 mg total) by mouth 2 (two) times daily. 60 tablet 0    clopidogreL (PLAVIX) 75 mg tablet Take 75 mg by mouth.      DULoxetine (CYMBALTA) 30 MG capsule Take 1 capsule (30 mg total) by mouth once daily.      ferrous sulfate (FEOSOL) 325 mg (65 mg iron) Tab tablet Take 325 mg by mouth daily with breakfast. Off at present      folic acid (FOLVITE) 1 MG tablet Take 1 tablet (1 mg total) by mouth once daily.  0    gabapentin (NEURONTIN) 300 MG capsule Take 1 capsule (300 mg total) by mouth once daily. 90 capsule 0    hydrochlorothiazide (MICROZIDE ORAL) Take 20 mg by mouth once daily at 6am.      insulin (BASAGLAR KWIKPEN U-100 INSULIN) glargine 100 units/mL (3mL) SubQ pen Inject 5 Units into the skin once daily.  0    Lactobacillus rhamnosus GG (CULTURELLE) 10 billion cell capsule Take 1 capsule by mouth once daily.      lactulose (CHRONULAC) 10 gram/15 mL solution Take 10 g by mouth 2 (two) times a day.      levothyroxine (SYNTHROID) 75 MCG tablet Take 75 mcg by mouth once daily.      losartan (COZAAR) 25 MG tablet Take 25 mg by mouth once daily at 6am.      metoclopramide HCl (REGLAN) 10 MG tablet Take 1 tablet (10 mg total) by mouth 3 (three) times daily before meals. 90 tablet 3    omeprazole (PRILOSEC) 20 MG capsule Take 20 mg by mouth 2 (two) times daily.      ondansetron (ZOFRAN) 4 MG tablet Take 1 tablet (4 mg total) by mouth  every 8 (eight) hours as needed for Nausea. 30 tablet 3    tamsulosin (FLOMAX) 0.4 mg Cap Take 1 capsule (0.4 mg total) by mouth once daily. 30 capsule 1    traZODone (DESYREL) 100 MG tablet Take 1 tablet (100 mg total) by mouth once daily. (Patient taking differently: Take 100 mg by mouth nightly as needed for Insomnia.) 30 tablet 0      Allergies: Tomato (solanum lycopersicum)    Family History   Problem Relation Age of Onset    Arthritis Mother     Diabetes Mother     Heart disease Mother     Hypertension Mother     Kidney disease Mother     Stroke Mother     Vision loss Mother     Heart attack Mother     Alcohol abuse Father     Heart attack Father     Diabetes Sister     Asthma Brother     Diabetes Brother     Heart disease Brother     Heart attack Brother      Social History     Tobacco Use    Smoking status: Never Smoker    Smokeless tobacco: Never Used   Substance Use Topics    Alcohol use: No     Comment: socially    Drug use: No     Review of Systems   Unable to perform ROS: Patient nonverbal   Objective:     Vitals:    Temp: 98.1 °F (36.7 °C)  Pulse: 79  Rhythm: normal sinus rhythm  BP: (!) 151/67  MAP (mmHg): 97  Resp: 16  SpO2: (!) 94 %    Temp  Min: 97.8 °F (36.6 °C)  Max: 100.2 °F (37.9 °C)  Pulse  Min: 77  Max: 85  BP  Min: 144/64  Max: 187/75  MAP (mmHg)  Min: 92  Max: 108  Resp  Min: 16  Max: 18  SpO2  Min: 90 %  Max: 97 %    04/12 0701 - 04/13 0700  In: 302 [P.O.:302]  Out: 800 [Urine:800]   Unmeasured Output  Urine Occurrence: 1  Stool Occurrence: 1  Pad Count: 1       Physical Exam  Constitutional:       General: She is not in acute distress.  HENT:      Head: Normocephalic.      Right Ear: External ear normal.      Left Ear: External ear normal.      Mouth/Throat:      Mouth: Mucous membranes are moist.      Pharynx: Oropharynx is clear.   Eyes:      Comments: Does not track   Cardiovascular:      Rate and Rhythm: Normal rate.   Pulmonary:      Effort: Pulmonary effort is normal. No respiratory  distress.   Abdominal:      General: Abdomen is flat.      Palpations: Abdomen is soft.   Musculoskeletal:      Cervical back: Neck supple.      Comments: Right partial foot amputation, dressing c/d/i   Neurological:      Mental Status: She is alert.      Comments: Does not respond to orientation questions  Follows commands on right side  Hemiparesis on left side     Unable to test orientation, language, memory, judgment, insight, fund of knowledge, coordination, gait due to level of consciousness.    Today I personally reviewed pertinent medications, lines/drains/airways, imaging, cardiology results, laboratory results, notably:

## 2022-04-13 NOTE — NURSING
Patient found to have worsening neuro checks with greater delay after CT scan findings. Right sided neglect noted. Planning to move to Neuro ICU. Patient will respond AOx4 when responsive.

## 2022-04-13 NOTE — ASSESSMENT & PLAN NOTE
71 year old female with , hypothyroidism, CVA with left-sided residual deficit on ASA/Plavix, DM2, HTN, HLD, CKD, chronic anemia, presenting from SNF after being found down next to wheelchair after unwitnessed fall, consulted to NSGY for right 2 cm SDH mostly acute, membranous and heterogenous appearing, some chronic component, without midline shift. Now s/p R crani for SDH evacuation (4/4).      --Admitted to NSGY.  -- q4 hour vitals/neurochecks.  -- SBP < 160.  -- Na goal eunatremia.  -- Diet: mechanical soft.  -- Seizure ppx: Keppra 500 BID.  -- Follow up CT head satisfactory x2 with residual blood.  -- Extubated and saturating well on room air.  -- No HOB restriction.  -- PPx: SCDs, BR, IS. Okay for SQH for dvt ppx.   -- Nsgy will arrange appropriate follow up for patient.   -- Osteomyelitis of R foot:    - ID rec 6 week course Vanc with end date 5/5. F/u full ID recs. OK to start Etrapenem if pt decompensates.   -Podiatry managing recent partial foot amputation, appreciate recs. Sutures removed 4/12.    -Home health/facility to do dressing changes every MWF and prn as follows:  Cleanse right foot incision site with saline or wound cleanser, paint incision site with betadine, cover with 4x4 gauze, kerlix, ACE bandage.   -- Anemia: Resolved.   -- Fever 4/11: Afebrile overnight.    - UA 4/11: Growing GNRs, f/u susceptibility   - CXR with possible basilar effusion.   - BLE US 4/11: w/o DVT.   - HM following. Appreciate assistance with care.    - Blood cx 4/11: NGTD.  --Hx CVA 2018:   -OK to restart ASA POD 14 (4/18/2022)   -OK to restart Plavix POD 28 (5/2/2022)    Dispo: SNF pending accepting facility.    Discussed with Dr. White

## 2022-04-13 NOTE — PT/OT/SLP PROGRESS
"Occupational Therapy   Treatment    Name: Beatriz Adame  MRN: [id number]  Admitting Diagnosis:  SDH (subdural hematoma)  9 Days Post-Op    Recommendations:     Discharge Recommendations: nursing facility, skilled  Discharge Equipment Recommendations:  other (see comments) (defer to next care setting)  Barriers to discharge:  Decreased caregiver support, Other (Comment) (increased level of assist required)    Assessment:     Beatriz Adame is a 71 y.o. female with a medical diagnosis of SDH (subdural hematoma).  She presents with weakness, impaired endurance, impaired self care skills, impaired functional mobilty, impaired cognition, decreased coordination, decreased upper extremity function, decreased lower extremity function, decreased safety awareness, pain, impaired coordination, decreased ROM, impaired fine motor, impaired muscle length, impaired joint extensibility.     Rehab Prognosis:  Fair; patient would benefit from acute skilled OT services to address these deficits and reach maximum level of function.       Pt largely limited by pain. Session spent on LUE stretching, hygiene and repositioning to preserve joint/skin integrity and improve ROM. LLE ROM attempted but pt refused 2/2 pain. LLE contracted in hip/knee flexion.     Plan:     Patient to be seen 3 x/week to address the above listed problems via therapeutic activities, therapeutic exercises, self-care/home management  · Plan of Care Expires: 05/07/22  · Plan of Care Reviewed with: patient    Subjective     Pain/Comfort:  · Pain Rating 1:  (unrated pain to L knee and LUE with stretching, pt stating "ow" throughout)  · Pain Addressed 1: Reposition, Distraction, Cessation of Activity, Nurse notified    Objective:     Communicated with: RN prior to session.  Patient found supine with peripheral IV, bed alarm, telemetry upon OT entry to room.    General Precautions: Standard, aspiration, fall   Orthopedic Precautions:N/A   Braces: N/A  Respiratory " Status: Room air     Occupational Performance:     Bed Mobility:    · Dependent x2 to scoot up and reposition in bed.      Functional Mobility/Transfers:  · Bed lvl LUE stretching and repositioning to promote functional positioning for joint integrity to improve participation with ADLs.    Activities of Daily Living:  · Grooming: Pt required max A for L hand/elbow/underarm hygiene to promote skin integrity and prevent skin breakdown. Pt positioned with LUE on pillow and towel roll in L elbow to provide stretch to bicep.      [facility] 6 Click ADL: 8    Treatment & Education:  OT role, plan of care, progression of goals, importance of continued OOB activity, LUE stretching and therex as well as positioning, pain management     Patient left HOB elevated with all lines intact, call button in reach, RN notified and RN presentEducation:      GOALS:   Multidisciplinary Problems     Occupational Therapy Goals        Problem: Occupational Therapy    Goal Priority Disciplines Outcome Interventions   Occupational Therapy Goal     OT, PT/OT Ongoing, Progressing    Description: Goals to be met by: 5/7/22    Patient will increase functional independence with ADLs by performing:    Grooming while seated with Stand-by Assistance.  Toileting from bedside commode with Minimal Assistance for hygiene and clothing management.   Supine to sit with Minimal Assistance.  Stand pivot transfers with Minimal Assistance.  Toilet transfer to bedside commode with Minimal Assistance.                     Time Tracking:     OT Date of Treatment: 04/13/22  OT Start Time: 0924  OT Stop Time: 0948  OT Total Time (min): 24 min    Billable Minutes:Self Care/Home Management 12 minutes  Therapeutic Exercise 12 minutes    OT/KRISTYN: OT          4/13/2022

## 2022-04-13 NOTE — ASSESSMENT & PLAN NOTE
71 year old female with , hypothyroidism, CVA with left-sided residual deficit on ASA/Plavix, DM2, HTN, HLD, CKD, chronic anemia, presenting from SNF after being found down next to wheelchair after unwitnessed fall, consulted to NSGY for right 2 cm SDH mostly acute, membranous and heterogenous appearing, some chronic component, without midline shift. Now s/p R crani for SDH evacuation (4/4).    --Stepped up to ICU  -- q1 hour vitals/neurochecks; exam wax and wane   -- SBP < 160  -- Na goal eunatremia.  -- Diet: keep npo for now  -- Seizure ppx: Keppra 1500 BID. Please place on EEG  -- Follow up CT head satisfactory x2 with residual blood   -- CT head from yesterday shows residual subdural hematoma with reaccumulation with some mass effect and sulcal effacement which is stable on repeat scan this morning  -- Saturating well on room   -- No HOB restriction.  -- PPx: SCDs, BR, IS. Okay for SQH for dvt ppx.   -- Osteomyelitis the or really alternative is of R foot:    - ID rec 6 week course Vanc with end date 5/5. F/u full ID recs. OK to start Etrapenem if pt decompensates.   -Podiatry managing recent partial foot amputation, appreciate recs. Sutures removed 4/12.    -Home health/facility to do dressing changes every MWF and prn as follows:  Cleanse right foot incision site with saline or wound cleanser, paint incision site with betadine, cover with 4x4 gauze, kerlix, ACE bandage.   -- Anemia: Resolved.   -- Fever 4/11: Afebrile overnight.    - UA 4/11: Growing GNRs, f/u susceptibility   - CXR with possible basilar effusion.   - BLE US 4/11: w/o DVT.   - HM following. Appreciate assistance with care.    - Blood cx 4/11: NGTD.  --Cranial wound care: Staples to be removed 4/18 if incision appears well healed.   --Repeat CTH 6 weeks post op.     Dispo: EEG

## 2022-04-13 NOTE — PT/OT/SLP PROGRESS
Physical Therapy      Patient Name:  Beatriz Adame   MRN:  [id number]    Patient not seen today secondary to Other (Comment) (Pt to transfer back to ICU). Will follow-up when new orders are provided.

## 2022-04-13 NOTE — PT/OT/SLP DISCHARGE
Physical Therapy Discharge Summary    Name: Beatriz Adame  MRN: [id number]   Principal Problem: SDH (subdural hematoma)     Patient Discharged from acute Physical Therapy on 22.  Please refer to prior PT noted date on 22 for functional status.     Assessment:     Patient transferred to lower level of care secondary to Pateint appearing more lethargic this afternoon. Slow to follow commands. Worsening right gaze palsy. In setting of clinical findings and recent CTH showing mass effect w 4-5 mm left MLS, NCC consulted to step patient back up to ICU for closer neuromonitoring    Objective:     GOALS:   Multidisciplinary Problems     Physical Therapy Goals        Problem: Physical Therapy    Goal Priority Disciplines Outcome Goal Variances Interventions   Physical Therapy Goal     PT, PT/OT Ongoing, Progressing     Description: Goals to be met by: 22    Patient will increase functional independence with mobility by performin. Supine to sit with minimum assistance  2. Sit to supine with minimum assistance  3. Rolling to Left and Right with minimum assistance  4. Bed to chair transfer with minimum assistance using LRAD as needed                   Reasons for Discontinuation of Therapy Services  Transfer to alternate level of care.      Plan:     Patient Discharged to: In house   2022

## 2022-04-13 NOTE — SUBJECTIVE & OBJECTIVE
Interval History: Tmax o/n 100.2. Ucx with 10k-49,999 CFU GNRs. Bcx NGTD. Spoke to ID yesterday - they were in agreement with holding off on adding gram neg coverage. They will see the pt today for a full consult. Pt with new HA this AM after seen on rounds. Repeat CTH ordered by primary team.    Review of Systems   Reason unable to perform ROS: limited 2/2 cognition.   Constitutional:  Positive for activity change. Negative for fever.   Respiratory:  Negative for cough and shortness of breath.    Gastrointestinal:  Negative for abdominal pain, nausea and vomiting.   Musculoskeletal:  Positive for gait problem.   Skin:  Positive for wound.   Neurological:  Positive for weakness and headaches.        LUE contracture   Objective:     Vital Signs (Most Recent):  Temp: 98.1 °F (36.7 °C) (04/13/22 0733)  Pulse: 79 (04/13/22 1116)  Resp: 16 (04/13/22 0951)  BP: (!) 151/67 (04/13/22 0942)  SpO2: (!) 94 % (04/13/22 0733)   Vital Signs (24h Range):  Temp:  [97.8 °F (36.6 °C)-100.2 °F (37.9 °C)] 98.1 °F (36.7 °C)  Pulse:  [77-85] 79  Resp:  [16-18] 16  SpO2:  [90 %-97 %] 94 %  BP: (144-187)/(64-75) 151/67     Weight: 49.4 kg (109 lb)  Body mass index is 17.07 kg/m².    Intake/Output Summary (Last 24 hours) at 4/13/2022 1254  Last data filed at 4/13/2022 0800  Gross per 24 hour   Intake 422 ml   Output 600 ml   Net -178 ml      Physical Exam  Vitals and nursing note reviewed.   Constitutional:       General: She is not in acute distress.     Appearance: Normal appearance. She is ill-appearing (chronically ill appearing). She is not diaphoretic.      Comments: Frail, emaciated elderly female   HENT:      Head: Normocephalic.      Comments: Surgical incision intact with staples in place to R parietal area of scalp, no surrounding erythema or drainage from incision     Mouth/Throat:      Mouth: Mucous membranes are moist.   Eyes:      General:         Right eye: No discharge.         Left eye: No discharge.       Conjunctiva/sclera: Conjunctivae normal.   Cardiovascular:      Rate and Rhythm: Normal rate and regular rhythm.      Heart sounds: No murmur heard.    No gallop.   Pulmonary:      Effort: Pulmonary effort is normal. No respiratory distress.      Breath sounds: No wheezing.   Abdominal:      General: Abdomen is flat. There is no distension.      Palpations: Abdomen is soft.      Tenderness: There is no abdominal tenderness.   Musculoskeletal:         General: Deformity (LUE and LLE contractures) present.      Right lower leg: No edema.      Left lower leg: No edema.      Comments: RLE foot wound covered with clean and dry dressing   Skin:     General: Skin is warm and dry.   Neurological:      Mental Status: She is alert and oriented to person, place, and time. Mental status is at baseline.      Motor: Weakness (LUE and LLE weakness 2/2 CVA, normal strength on R) present.       Significant Labs: All pertinent labs within the past 24 hours have been reviewed.  CBC:   Recent Labs   Lab 04/12/22  1010 04/13/22  0655   WBC 9.58 11.22   HGB 9.4* 10.1*   HCT 28.1* 30.5*    154     CMP:   Recent Labs   Lab 04/12/22  1010 04/13/22  0505    139   K 3.5 3.6    107   CO2 23 23   * 177*   BUN 18 19   CREATININE 1.1 1.2   CALCIUM 8.3* 8.7   PROT 6.3 7.1   ALBUMIN 1.9* 2.1*   BILITOT 0.7 0.7   ALKPHOS 55 63   AST 14 13   ALT 12 11   ANIONGAP 9 9   EGFRNONAA 51.0* 45.9*       Significant Imaging: I have reviewed all pertinent imaging results/findings within the past 24 hours.

## 2022-04-13 NOTE — SUBJECTIVE & OBJECTIVE
Interval History: Afebrile. Continues to report slight headache. Otherwise neuro stable. Abx regimen adjustment pending updated ID recs. OK to initiate Ertapenem if pt decompensates. x1 episode of vomiting yesterday, no other s/sx of bowel obstruction. BM yesterday. Eating well this AM. Pending SNF acceptance. Per lab, blood cultures collected morning of 4/11 - NGTD.    Medications:  Continuous Infusions:  Scheduled Meds:   amLODIPine  10 mg Oral Daily    atorvastatin  40 mg Oral Daily    carvediloL  25 mg Oral BID    heparin (porcine)  5,000 Units Subcutaneous Q8H    levothyroxine  75 mcg Oral Before breakfast    losartan  25 mg Oral Daily    senna-docusate 8.6-50 mg  1 tablet Oral BID    silodosin  4 mg Oral Daily    vancomycin (VANCOCIN) IVPB  500 mg Intravenous Q24H     PRN Meds:sodium chloride, sodium chloride, acetaminophen, dextrose 10%, glucagon (human recombinant), HYDROcodone-acetaminophen, HYDROmorphone, metoclopramide HCl, ondansetron, potassium bicarbonate, potassium bicarbonate, potassium bicarbonate, potassium, sodium phosphates, potassium, sodium phosphates, potassium, sodium phosphates, sodium chloride 0.9%, Pharmacy to dose Vancomycin consult **AND** vancomycin - pharmacy to dose     Review of Systems  Objective:     Weight: 49.4 kg (109 lb)  Body mass index is 17.07 kg/m².  Vital Signs (Most Recent):  Temp: 98.1 °F (36.7 °C) (04/13/22 0733)  Pulse: 82 (04/13/22 0942)  Resp: 16 (04/13/22 0951)  BP: (!) 151/67 (04/13/22 0942)  SpO2: (!) 94 % (04/13/22 0733)   Vital Signs (24h Range):  Temp:  [97.8 °F (36.6 °C)-100.2 °F (37.9 °C)] 98.1 °F (36.7 °C)  Pulse:  [77-85] 82  Resp:  [16-18] 16  SpO2:  [90 %-99 %] 94 %  BP: (137-187)/(63-75) 151/67     Date 04/13/22 0700 - 04/14/22 0659   Shift 6415-5642 2323-2878 4511-2072 24 Hour Total   INTAKE   P.O. 120   120   Shift Total(mL/kg) 120(2.4)   120(2.4)   OUTPUT   Shift Total(mL/kg)       Weight (kg) 49.4 49.4 49.4 49.4                      Neurosurgery  Physical Exam  Constitutional: No distress.   HEENT: Normocephalic. Left sided crani incision with staples intact, clean and dry.   Cardiovascular: Regular rhythm.   Pulm: No signs of respiratory distress.   Abdominal: Soft, non-distended.   Mental status: She is awake, alert and oriented to person, date and place.   PERRL  Left side contracted and paretic  FCx in all except LUE    Significant Labs:  Recent Labs   Lab 04/12/22  1010 04/13/22  0505   * 177*    139   K 3.5 3.6    107   CO2 23 23   BUN 18 19   CREATININE 1.1 1.2   CALCIUM 8.3* 8.7     Recent Labs   Lab 04/12/22  1010 04/13/22  0655   WBC 9.58 11.22   HGB 9.4* 10.1*   HCT 28.1* 30.5*    154     No results for input(s): LABPT, INR, APTT in the last 48 hours.  Microbiology Results (last 7 days)       Procedure Component Value Units Date/Time    Blood culture [[id number]] Collected: 04/11/22 1021    Order Status: Completed Specimen: Blood from Peripheral, Left Arm Updated: 04/13/22 0800     Blood Culture, Routine No growth to date    Narrative:      Collection has been rescheduled by [facility] at 04/11/2022 10:04 Reason:   Patient unavailable, patient care, nurse paige notified.  Collection has been rescheduled by T at 04/11/2022 10:04 Reason:   Patient unavailable, patient care, nurse paige notified.    Blood culture [[id number]] Collected: 04/11/22 1020    Order Status: Completed Specimen: Blood from Peripheral, Left Hand Updated: 04/13/22 0759     Blood Culture, Routine No growth to date    Narrative:      Collection has been rescheduled by T at 04/11/2022 10:04 Reason:   Patient unavailable, patient care, nurse paige notified.  Collection has been rescheduled by T at 04/11/2022 10:04 Reason:   Patient unavailable, patient care, nurse paige notified.    Blood culture [[id number]]     Order Status: No result Specimen: Blood     Blood culture [[id number]]     Order Status: No result Specimen: Blood     Urine culture [[id number]]   (Abnormal) Collected: 04/11/22 1017    Order Status: Completed Specimen: Urine Updated: 04/12/22 0813     Urine Culture, Routine GRAM NEGATIVE MARYAN  10,000 - 49,999 cfu/ml  Identification and susceptibility pending      Narrative:      Specimen Source->Urine          All pertinent labs from the last 24 hours have been reviewed.    Significant Diagnostics:  I have reviewed all pertinent imaging results/findings within the past 24 hours.

## 2022-04-13 NOTE — PROGRESS NOTES
[facility] - Neurosurgery ([facility])  Neurosurgery  Progress Note    Subjective:     History of Present Illness: 71 year old female with , hypothyroidism, CVA with left-sided residual deficit on ASA/Plavix, DM2, HTN, HLD, CKD, chronic anemia, presenting from SNF after being found down next to wheelchair after unwitnessed fall, consulted to NSGY for right 2 cm SDH without midline shift. She is altered at baseline and unable to provide to history, but family at bedside says she is more confused and less interactive than usual.       Post-Op Info:  Procedure(s) (LRB):  CRANIOTOMY, FOR SUBDURAL HEMATOMA EVACUATION (Right)   9 Days Post-Op     Interval History: Afebrile. Continues to report slight headache. Otherwise neuro stable. Abx regimen adjustment pending updated ID recs. OK to initiate Ertapenem if pt decompensates. x1 episode of vomiting yesterday, no other s/sx of bowel obstruction. BM yesterday. Eating well this AM. Pending SNF acceptance. Per lab, blood cultures collected morning of 4/11 - NGTD.    Medications:  Continuous Infusions:  Scheduled Meds:   amLODIPine  10 mg Oral Daily    atorvastatin  40 mg Oral Daily    carvediloL  25 mg Oral BID    heparin (porcine)  5,000 Units Subcutaneous Q8H    levothyroxine  75 mcg Oral Before breakfast    losartan  25 mg Oral Daily    senna-docusate 8.6-50 mg  1 tablet Oral BID    silodosin  4 mg Oral Daily    vancomycin (VANCOCIN) IVPB  500 mg Intravenous Q24H     PRN Meds:sodium chloride, sodium chloride, acetaminophen, dextrose 10%, glucagon (human recombinant), HYDROcodone-acetaminophen, HYDROmorphone, metoclopramide HCl, ondansetron, potassium bicarbonate, potassium bicarbonate, potassium bicarbonate, potassium, sodium phosphates, potassium, sodium phosphates, potassium, sodium phosphates, sodium chloride 0.9%, Pharmacy to dose Vancomycin consult **AND** vancomycin - pharmacy to dose     Review of Systems  Objective:     Weight: 49.4 kg (109 lb)  Body mass  index is 17.07 kg/m².  Vital Signs (Most Recent):  Temp: 98.1 °F (36.7 °C) (04/13/22 0733)  Pulse: 82 (04/13/22 0942)  Resp: 16 (04/13/22 0951)  BP: (!) 151/67 (04/13/22 0942)  SpO2: (!) 94 % (04/13/22 0733)   Vital Signs (24h Range):  Temp:  [97.8 °F (36.6 °C)-100.2 °F (37.9 °C)] 98.1 °F (36.7 °C)  Pulse:  [77-85] 82  Resp:  [16-18] 16  SpO2:  [90 %-99 %] 94 %  BP: (137-187)/(63-75) 151/67     Date 04/13/22 0700 - 04/14/22 0659   Shift 0448-7278 0923-5402 2504-5283 24 Hour Total   INTAKE   P.O. 120   120   Shift Total(mL/kg) 120(2.4)   120(2.4)   OUTPUT   Shift Total(mL/kg)       Weight (kg) 49.4 49.4 49.4 49.4                      Neurosurgery Physical Exam  Constitutional: No distress.   HEENT: Normocephalic. Left sided crani incision with staples intact, clean and dry.   Cardiovascular: Regular rhythm.   Pulm: No signs of respiratory distress.   Abdominal: Soft, non-distended.   Mental status: She is awake, alert and oriented to person, date and place.   PERRL  Left side contracted and paretic  FCx in all except LUE    Significant Labs:  Recent Labs   Lab 04/12/22  1010 04/13/22  0505   * 177*    139   K 3.5 3.6    107   CO2 23 23   BUN 18 19   CREATININE 1.1 1.2   CALCIUM 8.3* 8.7     Recent Labs   Lab 04/12/22  1010 04/13/22  0655   WBC 9.58 11.22   HGB 9.4* 10.1*   HCT 28.1* 30.5*    154     No results for input(s): LABPT, INR, APTT in the last 48 hours.  Microbiology Results (last 7 days)       Procedure Component Value Units Date/Time    Blood culture [[id number]] Collected: 04/11/22 1021    Order Status: Completed Specimen: Blood from Peripheral, Left Arm Updated: 04/13/22 0800     Blood Culture, Routine No growth to date    Narrative:      Collection has been rescheduled by T at 04/11/2022 10:04 Reason:   Patient unavailable, patient care, nurse paige notified.  Collection has been rescheduled by T at 04/11/2022 10:04 Reason:   Patient unavailable, patient care, nurse paige  notified.    Blood culture [[id number]] Collected: 04/11/22 1020    Order Status: Completed Specimen: Blood from Peripheral, Left Hand Updated: 04/13/22 0759     Blood Culture, Routine No growth to date    Narrative:      Collection has been rescheduled by T at 04/11/2022 10:04 Reason:   Patient unavailable, patient care, nurse grecia notified.  Collection has been rescheduled by T at 04/11/2022 10:04 Reason:   Patient unavailable, patient care, nurse paige notified.    Blood culture [[id number]]     Order Status: No result Specimen: Blood     Blood culture [[id number]]     Order Status: No result Specimen: Blood     Urine culture [[id number]]  (Abnormal) Collected: 04/11/22 1017    Order Status: Completed Specimen: Urine Updated: 04/12/22 0813     Urine Culture, Routine GRAM NEGATIVE MARYAN  10,000 - 49,999 cfu/ml  Identification and susceptibility pending      Narrative:      Specimen Source->Urine          All pertinent labs from the last 24 hours have been reviewed.    Significant Diagnostics:  I have reviewed all pertinent imaging results/findings within the past 24 hours.    Assessment/Plan:     * SDH (subdural hematoma)  71 year old female with , hypothyroidism, CVA with left-sided residual deficit on ASA/Plavix, DM2, HTN, HLD, CKD, chronic anemia, presenting from SNF after being found down next to wheelchair after unwitnessed fall, consulted to NSGY for right 2 cm SDH mostly acute, membranous and heterogenous appearing, some chronic component, without midline shift. Now s/p R crani for SDH evacuation (4/4).      --Admitted to NSGY.  -- q4 hour vitals/neurochecks.  -- SBP < 160.  -- Na goal eunatremia.  -- Diet: mechanical soft.  -- Seizure ppx: Keppra 500 BID.  -- Follow up CT head satisfactory x2 with residual blood.  -- Extubated and saturating well on room air.  -- No HOB restriction.  -- PPx: SCDs, BR, IS. Okay for SQH for dvt ppx.   -- Nsgy will arrange appropriate follow up for patient.   --  Osteomyelitis of R foot:    - ID rec 6 week course Vanc with end date 5/5. F/u full ID recs. OK to start Etrapenem if pt decompensates.   -Podiatry managing recent partial foot amputation, appreciate recs. Sutures removed 4/12.    -Home health/facility to do dressing changes every MWF and prn as follows:  Cleanse right foot incision site with saline or wound cleanser, paint incision site with betadine, cover with 4x4 gauze, kerlix, ACE bandage.   -- Anemia: Resolved.   -- Fever 4/11: Afebrile overnight.    - UA 4/11: Growing GNRs, f/u susceptibility   - CXR with possible basilar effusion.   - BLE US 4/11: w/o DVT.   - HM following. Appreciate assistance with care.    - Blood cx 4/11: NGTD.  --Hx CVA 2018:   -OK to restart ASA POD 14 (4/18/2022)   -OK to restart Plavix POD 28 (5/2/2022)    Dispo: SNF pending accepting facility.    Discussed with EBONY Ovalles-C  Neurosurgery  [facility] - Neurosurgery ([facility])

## 2022-04-13 NOTE — ASSESSMENT & PLAN NOTE
Fever overnight 4/10-11 to 101, No additional fever o/n following night  She is s/p R foot partial amputation for osteomyelitis and s/p craniotomy for SDH  Bone bx cultures +MRSA, on Vancomycin for planned 6wk course  She also has a hx of recurrent UTIs with cultures from Jan-March +pseudomonas and ESBL E coli  Repeat infectious workup (UA/UCx, BCx, CXR) - UCx +GNRs low colony count + LE DVT US neg  With hx recurrent UTIs and GNRs in new ucx concern for acute UTI vs colonization; she has not fevered again and she remains hemodynamically stable  If adding empiric UTI coverage would need carbapenem for hx ESBL, but some concern for lowering seizure threshold in pt with recent craniotomy  Discussed with primary team who is reaching out to ID to weigh in. I feel it is reasonable to hold off on adding gram neg coverage now, given stability, while awaiting ID re-evaluation

## 2022-04-13 NOTE — CARE UPDATE
RAPID RESPONSE NURSE NOTE        Admit Date: 4/3/2022  LOS: 10  Code Status: Full Code   Date of Consult: 2022  : 1950  Age: 71 y.o.  Weight:   Wt Readings from Last 1 Encounters:   22 49.4 kg (109 lb)     Sex: female  Race: Black or    Bed: [address] [place] [address] A:   MRN: [id number]  Time Rapid Response Team page Received: 1450  Time Rapid Response Team at Bedside: 3  Time Rapid Response Team left Bedside: 1510  Was the patient discharged from an ICU this admission? Yes   Was the patient discharged from a PACU within last 24 hours? No   Did the patient receive conscious sedation/general anesthesia in last 24 hours? No  Was the patient in the ED within the past 24 hours? No  Was the patient on NIPPV within the past 24 hours? No   Did this progress into an ARC or CPA: no  Attending Physician: Silvio White MD  Primary Service: Neurosurgery       SITUATION    Notified by bedside RN via phone call.  Reason for alert: Neurological change  Called to evaluate the patient for Neuro    BACKGROUND     Why is the patient in the hospital?: SDH (subdural hematoma)    Patient has a past medical history of Gastroparesis, GERD (gastroesophageal reflux disease), History of Clostridium difficile colitis, History of kidney stones, History of stroke, Hyperlipidemia, Hypertension, Hypothyroidism, Iron deficiency anemia, Osteoarthritis, Peripheral neuropathy, Stage IV CKD, and Type II diabetes mellitus.    Last Vitals:  Temp: 98.1 °F (36.7 °C) ( 07)  Pulse: 86 ( 1523)  Resp: 16 ( 0951)  BP: 166/71 ( 1529)  SpO2: 94 % ( 0733)    24 Hours Vitals Range:  Temp:  [97.8 °F (36.6 °C)-100.2 °F (37.9 °C)]   Pulse:  [77-86]   Resp:  [16-18]   BP: (144-166)/(64-71)   SpO2:  [90 %-96 %]     Labs:  Recent Labs     22  0438 22  1010 22  0655   WBC 8.92 9.58 11.22   HGB 7.0* 9.4* 10.1*   HCT 21.8* 28.1* 30.5*   * 162 154       Recent Labs     22  0521 22  1010  04/13/22  0505    141 139   K 3.6 3.5 3.6   * 109 107   CO2 21* 23 23   CREATININE 1.3 1.1 1.2    119* 177*        No results for input(s): PH, PCO2, PO2, HCO3, POCSATURATED, BE in the last 72 hours.     ASSESSMENT    Physical Exam  Cardiovascular:      Rate and Rhythm: Normal rate.   Pulmonary:      Effort: Pulmonary effort is normal.   Musculoskeletal:      Right lower leg: No edema.      Left lower leg: No edema.   Skin:     General: Skin is warm and dry.      Capillary Refill: Capillary refill takes less than 2 seconds.   Neurological:      Mental Status: She is oriented to person, place, and time. She is lethargic.      GCS: GCS eye subscore is 3. GCS verbal subscore is 4. GCS motor subscore is 4.       Called to bedside for pt AMS. Upon assessment, pt with eyes open but responsive only to repeated stimuli. When responding, oriented to self, place and year. Pt with Left gaze deviation/preference.     INTERVENTIONS    The patient was seen for a Neurological problem. Staff concerns included mental status change, decreased responsiveness and suspected stroke activity. The following interventions were performed: POCT glucose.    Neurosurgery (primary) to bedside prior to our arrival to see patient. [facility] consulted by them for upgrade. Spoke with [facility] who have accepted pt. Keep pt SBP < 160, PRNs ordered. Awaiting bed.     RECOMMENDATIONS    We recommend: Continue POC per [facility]. Maintain SBP < 160 per [facility].    PROVIDER ESCALATION    Orders received and case discussed with Luh Ardon PA-C.    Disposition: Tx in ICU bed 1st [facility] bed available.    FOLLOW UP    bedside Abram CLEMENTE updated on plan of care. Instructed to call the Rapid Response Nurse, Sammy Salinas RN at [phone] for additional questions or concerns.

## 2022-04-13 NOTE — ASSESSMENT & PLAN NOTE
S/p R partial foot amputation on vancomycin at SNF  -continue vancomycin  -bandage, stitches intact   -Afebrile, WBC normal

## 2022-04-13 NOTE — ASSESSMENT & PLAN NOTE
S/p evac on 4/4/13; stable and stepped down from NCC.    On 4/13 patient had 8/10 HA and was slower to follow commands.  Repeat CTH showed re-accumulation of R SDH along with 4-5mm MLS.  Stepped back up to [facility].    -- NSGY following  -- SBP <160  -- q1hr neuro check  -- hold anticoagulation

## 2022-04-13 NOTE — PLAN OF CARE
Problem: SLP  Goal: SLP Goal  Description: Speech Language Pathology Goals  UPDATED GOALS AFTER SPEECH EVAL INITIATED -EXPECTED TO BE MET 4/15:  1.  Pt will tolerate least restrictive diet without s/s of aspiration.   2. Pt will answer complex yes/no q's with 70% accuracy.   3. Pt will complete immediate memory tasks with 60% accuracy.   4. Following a 2 minute delay, pt will recall 2/3 novel items given max cues.   5. Pt will participate in ongoing speech/language/cognitive evaluation to determine baseline and need for further intervention.   Outcome: Ongoing, Progressing  Continue ST per POC.  4/13/2022

## 2022-04-13 NOTE — NURSING
Pt transferred from NPU via bed accompanied per staff nurses without noted distress.Dr Anand  notified of her arrival.No personal belongings present

## 2022-04-13 NOTE — CARE UPDATE
NSGY Care Update    Patient with increased headache after AM evaluation. Rating it 8/10 with associated emesis. Will obtain surveillance CTH to assess stability of evac.      Ina Ardon PA-C  Neurosurgery  [facility] - [facility]

## 2022-04-13 NOTE — ASSESSMENT & PLAN NOTE
S/p craniotomy with evacuation on 4/4, staples in place  Management per primary  New HA 4/13 - repeat head CT per NSGY pending

## 2022-04-13 NOTE — PROGRESS NOTES
Pharmacokinetic Assessment Follow Up: IV Vancomycin    Vancomycin serum concentration assessment(s):     -Vancomycin random level was drawn with AM labs (~15 hrs from preceding dose), and can be used to guide therapy.  -A level of 18.6 mcg/mL is within goal 15-20 mcg/mL for osteomyelitis.  -Recent ARTURO, CTM.      Vancomycin Regimen Plan:     -Vancomycin 500 mg IV Q24H today.   -Trough on 4/15 @ 11:00, or sooner if renal function declines.   -6 weeks vancomycin per ID s/o 4/6.        Drug levels (last 3 results):  Recent Labs   Lab Result Units 04/11/22  0521 04/12/22  1010 04/13/22  0505   Vancomycin, Random ug/mL 20.8 15.6 18.6       Pharmacy will continue to follow and monitor vancomycin.    Please contact pharmacy at extension [phone] for questions regarding this assessment.    Thank you for the consult,   Robin Duenas       Patient brief summary:  Beatriz Adame is a 71 y.o. female initiated on antimicrobial therapy with IV Vancomycin for treatment of bone/joint infection    Drug Allergies:   Review of patient's allergies indicates:   Allergen Reactions    Tomato (solanum lycopersicum) Hives and Itching       Actual Body Weight:   49.4 kg    Renal Function:   Estimated Creatinine Clearance: 34 mL/min (based on SCr of 1.2 mg/dL).,     Dialysis Method (if applicable):  N/A    CBC (last 72 hours):  Recent Labs   Lab Result Units 04/11/22  0438 04/12/22  1010   WBC K/uL 8.92 9.58   Hemoglobin g/dL 7.0* 9.4*   Hematocrit % 21.8* 28.1*   Platelets K/uL 125* 162   Gran % % 56.7 60.0   Lymph % % 30.2 27.5   Mono % % 10.7 10.0   Eosinophil % % 1.5 1.7   Basophil % % 0.1 0.3   Differential Method  Automated Automated       Metabolic Panel (last 72 hours):  Recent Labs   Lab Result Units 04/11/22  0521 04/11/22  1017 04/12/22  1010 04/13/22  0505   Sodium mmol/L 143  --  141 139   Potassium mmol/L 3.6  --  3.5 3.6   Chloride mmol/L 114*  --  109 107   CO2 mmol/L 21*  --  23 23   Glucose mg/dL 107  --  119* 177*    Glucose, UA   --  Negative  --   --    BUN mg/dL 19  --  18 19   Creatinine mg/dL 1.3  --  1.1 1.2   Albumin g/dL 1.9*  --  1.9* 2.1*   Total Bilirubin mg/dL 0.5  --  0.7 0.7   Alkaline Phosphatase U/L 59  --  55 63   AST U/L 15  --  14 13   ALT U/L 12  --  12 11       Vancomycin Administrations:  vancomycin given in the last 96 hours                   vancomycin 500 mg in dextrose 5 % 100 mL IVPB (ready to mix system) (mg) 500 mg New Bag 04/12/22 1352    vancomycin 500 mg in dextrose 5 % 100 mL IVPB (ready to mix system) (mg) 500 mg New Bag 04/10/22 1019    vancomycin 500 mg in dextrose 5 % 100 mL IVPB (ready to mix system) (mg) 500 mg New Bag 04/09/22 1455                Microbiologic Results:  Microbiology Results (last 7 days)     Procedure Component Value Units Date/Time    Blood culture [[id number]]     Order Status: No result Specimen: Blood     Blood culture [[id number]]     Order Status: No result Specimen: Blood     Urine culture [[id number]]  (Abnormal) Collected: 04/11/22 1017    Order Status: Completed Specimen: Urine Updated: 04/12/22 0813     Urine Culture, Routine GRAM NEGATIVE MARYAN  10,000 - 49,999 cfu/ml  Identification and susceptibility pending      Narrative:      Specimen Source->Urine    Blood culture [[id number]] Collected: 04/11/22 1021    Order Status: Sent Specimen: Blood from Peripheral, Left Arm Updated: 04/11/22 1045    Narrative:      Collection has been rescheduled by [facility] at 04/11/2022 10:04 Reason:   Patient unavailable, patient care, nurse paige notified.  Collection has been rescheduled by [facility] at 04/11/2022 10:04 Reason:   Patient unavailable, patient care, nurse paige notified.    Blood culture [[id number]] Collected: 04/11/22 1020    Order Status: Sent Specimen: Blood from Peripheral, Left Hand Updated: 04/11/22 1045    Narrative:      Collection has been rescheduled by [facility] at 04/11/2022 10:04 Reason:   Patient unavailable, patient care, nurse paige notified.  Collection has been  rescheduled by T at 04/11/2022 10:04 Reason:   Patient unavailable, patient care, nurse grecia notified.

## 2022-04-13 NOTE — NURSING
Paged on call neuro surgery, to notify them that the patient vomited. Patient VS are stable and stated that they do not feel nauseous. No orders received at this time.

## 2022-04-13 NOTE — PT/OT/SLP PROGRESS
"  Speech Language Pathology Treatment    Patient Name:  Beatriz Adame   MRN:  [id number]  Admitting Diagnosis: SDH (subdural hematoma)    Recommendations:                 General Recommendations:  Dysphagia therapy and Cognitive-linguistic therapy  Diet recommendations:  Dental Soft, Liquid Diet Level: Thin   Aspiration Precautions: 1 bite/sip at a time, Alternating bites/sips, set-up Assistance with meals, Avoid talking while eating, Eliminate distractions, Feed only when awake/alert, Frequent oral care, HOB to 90 degrees, Meds crushed in puree, Monitor for s/s of aspiration, Remain upright 30 minutes post meal, Small bites/sips and Strict aspiration precautions   General Precautions: Standard, fall, aspiration, hearing impaired  Communication strategies:  provide increased time to answer and go to room if call light pushed    Subjective     Pt awake and alert upon ST entry. Pt agreeable to participate with ST.  "I have a headache." - pt continued to repeat , ultimately ending session this date    Pain/Comfort:  Pain Rating 1: 8/10  Location - Side 1:  ("in the middle")  Location - Orientation 1: generalized  Location 1: head  Pain Addressed 1: Distraction, Nurse notified, Cessation of Activity  Pain Rating Post-Intervention 1: 8/10    Respiratory Status: Room air    Objective:     Has the patient been evaluated by SLP for swallowing?   Yes  Keep patient NPO? No     Pt seen bedside for ongoing SLP services. She was agreeable to participate in po trials, though declined solid consistencies this date. Pt consumed x3 straw sips water without overt s/s aspiration or airway compromise. SLP initiated simple word deduction task to which pt achieved 25% acc given mod/max cues. She participated in simple comparisons with 50% acc given mod/max cues though session ultimately ended prematurely 2/2 pt persistent c/o headache. RN notified of findings/pt complaint. Continue ST per POC.    Assessment:     Beatriz Adame is a " 71 y.o. female with an SLP diagnosis of Dysphagia, Dysarthria and Cognitive-Linguistic Impairment.  .    Goals:   Multidisciplinary Problems     SLP Goals        Problem: SLP    Goal Priority Disciplines Outcome   SLP Goal     SLP Ongoing, Progressing   Description: Speech Language Pathology Goals  UPDATED GOALS AFTER SPEECH EVAL INITIATED -EXPECTED TO BE MET 4/15:  1.  Pt will tolerate least restrictive diet without s/s of aspiration.   2. Pt will answer complex yes/no q's with 70% accuracy.   3. Pt will complete immediate memory tasks with 60% accuracy.   4. Following a 2 minute delay, pt will recall 2/3 novel items given max cues.   5. Pt will participate in ongoing speech/language/cognitive evaluation to determine baseline and need for further intervention.     Goals expected to be met by 4/13:  1. Pt will participate in ongoing swallowing assessment to determine if/when safe for oral intake.                            Plan:     · Patient to be seen:  3 x/week   · Plan of Care expires:  05/06/22  · Plan of Care reviewed with:  patient   · SLP Follow-Up:  Yes       Discharge recommendations:  nursing facility, skilled     Time Tracking:     SLP Treatment Date:   04/13/22  Speech Start Time:  0900  Speech Stop Time:  0913     Speech Total Time (min):  13 min    Billable Minutes: Speech Therapy Individual 8 and Treatment Swallowing Dysfunction 5  04/13/2022

## 2022-04-13 NOTE — PROGRESS NOTES
[facility] - Neurosurgery ([facility])  Hospital Medicine  Progress Note    Patient Name: Beatriz Adame  MRN: [id number]  Patient Class: IP- Inpatient   Admission Date: 4/3/2022  Length of Stay: 10 days  Attending Physician: Silvio White MD  Primary Care Provider: Dante Olivier MD        Subjective:     Principal Problem:SDH (subdural hematoma)        HPI:  Miss Adame is a 71 year old female with hx of DMII, HTN, HLD, CKD, and chronic anemia who initially presented from her SNF after being found down next to her wheelchair. Imaging in the ED revealed a 2 cm SDH and was admitted to the neuro ICU. She is now s/p craniotomy with evacuation of the SDH and stepped down to the floor 4/8. She has been improving from a neurosurgical standpoint. Medicine was consulted given her continued anemia. Upon evaluation patient denied light headedness, fever, chills, chest pain, or abdominal pain. She did endorse some left leg pain. Additionally, she states that she has been told in the past that she is anemia, but denied any prior evaluation by hematology.       Overview/Hospital Course:  Hospital medicine was consulted on 4/10 for anemia which appears to be a chronic problem for her, with acute on chronic anemia in setting of acute illness and recent surgery. Transfused for hgb<7. Fever while on vanc for osteo +MRSA on bone bx. Repeat infectious workup with low colony count GNRs in urine, ID re-consulted as pt with hx ESBL e coli and pseudomonas UTIs previously. Holding off on adding gram neg coverage with low suspicion fot acute infection.      Interval History: Tmax o/n 100.2. Ucx with 10k-49,999 CFU GNRs. Bcx NGTD. Spoke to ID yesterday - they were in agreement with holding off on adding gram neg coverage. They will see the pt today for a full consult. Pt with new HA this AM after seen on rounds. Repeat CTH ordered by primary team.    Review of Systems   Reason unable to perform ROS: limited 2/2 cognition.    Constitutional:  Positive for activity change. Negative for fever.   Respiratory:  Negative for cough and shortness of breath.    Gastrointestinal:  Negative for abdominal pain, nausea and vomiting.   Musculoskeletal:  Positive for gait problem.   Skin:  Positive for wound.   Neurological:  Positive for weakness and headaches.        LUE contracture   Objective:     Vital Signs (Most Recent):  Temp: 98.1 °F (36.7 °C) (04/13/22 0733)  Pulse: 79 (04/13/22 1116)  Resp: 16 (04/13/22 0951)  BP: (!) 151/67 (04/13/22 0942)  SpO2: (!) 94 % (04/13/22 0733)   Vital Signs (24h Range):  Temp:  [97.8 °F (36.6 °C)-100.2 °F (37.9 °C)] 98.1 °F (36.7 °C)  Pulse:  [77-85] 79  Resp:  [16-18] 16  SpO2:  [90 %-97 %] 94 %  BP: (144-187)/(64-75) 151/67     Weight: 49.4 kg (109 lb)  Body mass index is 17.07 kg/m².    Intake/Output Summary (Last 24 hours) at 4/13/2022 1254  Last data filed at 4/13/2022 0800  Gross per 24 hour   Intake 422 ml   Output 600 ml   Net -178 ml      Physical Exam  Vitals and nursing note reviewed.   Constitutional:       General: She is not in acute distress.     Appearance: Normal appearance. She is ill-appearing (chronically ill appearing). She is not diaphoretic.      Comments: Frail, emaciated elderly female   HENT:      Head: Normocephalic.      Comments: Surgical incision intact with staples in place to R parietal area of scalp, no surrounding erythema or drainage from incision     Mouth/Throat:      Mouth: Mucous membranes are moist.   Eyes:      General:         Right eye: No discharge.         Left eye: No discharge.      Conjunctiva/sclera: Conjunctivae normal.   Cardiovascular:      Rate and Rhythm: Normal rate and regular rhythm.      Heart sounds: No murmur heard.    No gallop.   Pulmonary:      Effort: Pulmonary effort is normal. No respiratory distress.      Breath sounds: No wheezing.   Abdominal:      General: Abdomen is flat. There is no distension.      Palpations: Abdomen is soft.       Tenderness: There is no abdominal tenderness.   Musculoskeletal:         General: Deformity (LUE and LLE contractures) present.      Right lower leg: No edema.      Left lower leg: No edema.      Comments: RLE foot wound covered with clean and dry dressing   Skin:     General: Skin is warm and dry.   Neurological:      Mental Status: She is alert and oriented to person, place, and time. Mental status is at baseline.      Motor: Weakness (LUE and LLE weakness 2/2 CVA, normal strength on R) present.       Significant Labs: All pertinent labs within the past 24 hours have been reviewed.  CBC:   Recent Labs   Lab 04/12/22  1010 04/13/22  0655   WBC 9.58 11.22   HGB 9.4* 10.1*   HCT 28.1* 30.5*    154     CMP:   Recent Labs   Lab 04/12/22  1010 04/13/22  0505    139   K 3.5 3.6    107   CO2 23 23   * 177*   BUN 18 19   CREATININE 1.1 1.2   CALCIUM 8.3* 8.7   PROT 6.3 7.1   ALBUMIN 1.9* 2.1*   BILITOT 0.7 0.7   ALKPHOS 55 63   AST 14 13   ALT 12 11   ANIONGAP 9 9   EGFRNONAA 51.0* 45.9*       Significant Imaging: I have reviewed all pertinent imaging results/findings within the past 24 hours.      Assessment/Plan:      * SDH (subdural hematoma)  S/p craniotomy with evacuation on 4/4, staples in place  Management per primary  New HA 4/13 - repeat head CT per NSGY pending      Fever  Fever overnight 4/10-11 to 101, No additional fever o/n following night  She is s/p R foot partial amputation for osteomyelitis and s/p craniotomy for SDH  Bone bx cultures +MRSA, on Vancomycin for planned 6wk course  She also has a hx of recurrent UTIs with cultures from Jan-March +pseudomonas and ESBL E coli  Repeat infectious workup (UA/UCx, BCx, CXR) - UCx +GNRs low colony count + LE DVT US neg  With hx recurrent UTIs and GNRs in new ucx concern for acute UTI vs colonization; she has not fevered again and she remains hemodynamically stable  If adding empiric UTI coverage would need carbapenem for hx ESBL, but some  concern for lowering seizure threshold in pt with recent craniotomy  Discussed with primary team who is reaching out to ID to weigh in. I feel it is reasonable to hold off on adding gram neg coverage now, given stability, while awaiting ID re-evaluation    Osteomyelitis of right foot  R foot osteomyelitis with bone bx +MRSA, ID following peripherally   ID rec 6wk course of Vanc, with end  Date 5/5/2022, pharmacy to dose vanc inpatient  Fever overnight 4/10-4/11 to 101 without any signs of new or worsening infection on my exam, although I did not unwrap R foot dressing. Podiatry evaluated wound bedside 4/12 and removed sutures        Acute on chronic anemia  This is a patient with a long history of chronic anemia. Slow down trending since admission likely 2/2 to surgery, acute illness, and phlebotomy. Patient is asymptomatic, agree with transfusion for hgb<7. No signs of bleeding. Hemodynamically stable. Labs consistent with anemia of chronic disease and acute blood loss.     Total blood products this admission:  - 5u PRBCs (2u 4/11, 1u each on 4/10, 4/6, and 4/4)    *2u given on 4/11 is charted incorrectly as 1u, however confirmed 2u ordered and given*  - 3u PLTs (all on 4/4)    - Recommend age appropriate colonoscopy as outpatient if warrented  - agree with transfusion for hgb<7 or active bleeding  - Trend CBC daily      Urinary retention  Hx retention with prior nephrolithiasis as well  On Flomax outpatient, currently on Silodosin inpatient  Pt with some retention, but still able to void with prompting, has purewick in place  Will schedule q6h bladder scans for now, if requiring straight cath repeatedly, would place deglado      Hypothyroidism  Continue home levothyroxine      Severe protein-calorie malnutrition  Nutrition following, most recent recs below from 4/12 (I ordered boost pudding BID and Frederic BID x14d per their recs)       1. Suggest Diabetic 2000 kcal diet with texture per SLP     2. Consider Boost  Pudding BID to increase PO intake      3. Add Frederic BID x 14 days to aid in wound healing     4. If TF warranted, recommend TF of Isosource advancing as tolerated to goal rate of 45 mL/hr to provide 1620 kcal, 73 gm protein, and 825 mL free fluid     5. RD following          History of stroke  Baseline LUE and LLE weakness s/p stroke  Was on ASA/Plavix, but stopped on admit 2/2 SDH    Acute kidney injury superimposed on CKD stage IV  CKD with previous baseline high 1s-mid 2s, but Cr mid 1s recently  Pt with some retention, but still able to void with prompting, has purewick in place  Strict I/Os q4h - discussed with RN  Encourage po hydration  Trend renal function  Renally dose medications and avoid nephrotoxins  Maintain hgb>7 and MAP>65    Type 2 diabetes mellitus, with long-term current use of insulin  Home basaglar 5u qhs  BG last 24h in low 100s  She has not required any SSI in last few days, prev had MDSSI which was discontinued 4/10 due to risk of hypoglycemia  Would recommend accuchecks while inpatient to ensure maintaining glycemic control -- BID for now, can increase of decrease frequency if needed         Hyperlipidemia  Continue statin.       Hypertension  Continue amlodipine, coreg, and losartan.   BP normal - mildly elevated, continue to monitor on current regimen      VTE Risk Mitigation (From admission, onward)         Ordered     heparin (porcine) injection 5,000 Units  Every 8 hours         04/06/22 1315     IP VTE HIGH RISK PATIENT  Once         04/03/22 1608     Place sequential compression device  Until discontinued         04/03/22 1608     Reason for No Pharmacological VTE Prophylaxis  Once        Question:  Reasons:  Answer:  Active Bleeding    04/03/22 1608                Discharge Planning   MARY: 4/14/2022     Code Status: Full Code   Is the patient medically ready for discharge?:     Reason for patient still in hospital (select all that apply): Patient trending condition and Pending  disposition  Discharge Plan A: Skilled Nursing Facility   Discharge Delays: None known at this time              Kerry Helton MD  Department of Hospital Medicine   [facility] - Neurosurgery ([facility])

## 2022-04-13 NOTE — H&P
[facility] - Neurosurgery ([facility])  Neurocritical Care  History & Physical    Admit Date: 4/3/2022  Service Date: 04/13/2022  Length of Stay: 10    Subjective:     Chief Complaint: SDH (subdural hematoma)    History of Present Illness: Ms. Adame is a 70 yo female with hx of dementia, R MCA stroke with left side contraction/hemiparesis presented to [facility] with SDH now s/p evac. Stepped down from [facility]. On 4/13 she developed worsening headache and CTH showed re-accumulation of R SDH and 4-5mm MLS. She was noted to be more slow to follow commands. Stepped back up to [facility] for closer monitoring.    Original HPI below:  Ms. Adame is a 72 y/o F with a PMHx of baseline dementia, GERD, R MCA stroke with residual LUE weakness, HLD, HTN, Stage IV CKD, TIIDM who presents to [facility] from [facility] with a AoC SDH after an unwitnessed fall from her wheel chair without loss of consciousness. CT spine negative for fracture. CT head with R SDH and 3-4 mm MLS. She is on DAPT at home. Of note, she has been staying at [facility] after a partial R foot amputation, stitches still intact, for which she is receiving vancomycin.         Past Medical History:   Diagnosis Date    Gastroparesis     GERD (gastroesophageal reflux disease)     History of Clostridium difficile colitis 07/24/2021    History of kidney stones     History of stroke     left side paralysis    Hyperlipidemia     Hypertension     Hypothyroidism     Iron deficiency anemia     Osteoarthritis     Peripheral neuropathy     Stage IV CKD     Type II diabetes mellitus      Past Surgical History:   Procedure Laterality Date    APPENDECTOMY      CRANIOTOMY FOR EVACUATION OF SUBDURAL HEMATOMA Right 4/4/2022    Procedure: CRANIOTOMY, FOR SUBDURAL HEMATOMA EVACUATION;  Surgeon: Silvio White MD;  Location: [facility] OR [address] [place] [address];  Service: Neurosurgery;  Laterality: Right;    CYSTOSCOPY W/ URETERAL STENT PLACEMENT Right 10/13/2021    Procedure: CYSTOSCOPY,  WITH URETERAL STENT INSERTION;  Surgeon: Wanda Arroyo MD;  Location: [facility];  Service: Urology;  Laterality: Right;    ESOPHAGOGASTRODUODENOSCOPY N/A 11/23/2021    Procedure: EGD (ESOPHAGOGASTRODUODENOSCOPY);  Surgeon: Obed Jeong MD;  Location: [facility] ENDO (2ND FLR);  Service: Endoscopy;  Laterality: N/A;    FOOT AMPUTATION THROUGH METATARSAL Right 3/24/2022    Procedure: AMPUTATION, FOOT, PARTIAL 4th and 5th ray;  Surgeon: Rodrigo Logan DPM;  Location: [facility] OR 2ND FLR;  Service: Podiatry;  Laterality: Right;    LAPAROSCOPIC APPENDECTOMY N/A 7/22/2021    Procedure: APPENDECTOMY, LAPAROSCOPIC;  Surgeon: Paulo Calvo Jr., MD;  Location: [facility];  Service: General;  Laterality: N/A;    TOE AMPUTATION Right 1/1/2022    Procedure: AMPUTATION, TOE;  Surgeon: Genaro Mason DPM;  Location: [facility];  Service: Podiatry;  Laterality: Right;    TUBAL LIGATION      URETEROSCOPIC REMOVAL OF URETERIC CALCULUS Right 12/22/2021    Procedure: REMOVAL, CALCULUS, URETER, URETEROSCOPIC;  Surgeon: Wanda Arroyo MD;  Location: [facility];  Service: Urology;  Laterality: Right;      No current facility-administered medications on file prior to encounter.     Current Outpatient Medications on File Prior to Encounter   Medication Sig Dispense Refill    rosuvastatin (CRESTOR) 40 MG Tab Take 40 mg by mouth every evening.      amLODIPine (NORVASC) 5 MG tablet Take 10 mg by mouth every evening. Take 5 mg every morning and 10 mg at night      ascorbic acid, vitamin C, (VITAMIN C) 500 MG tablet Take 1 tablet (500 mg total) by mouth once daily.      aspirin (ECOTRIN) 81 MG EC tablet Take 81 mg by mouth once daily.      b complex vitamins tablet Take 1 tablet by mouth once daily.      carvedilol (COREG) 25 MG tablet Take 1 tablet (25 mg total) by mouth 2 (two) times daily. 60 tablet 0    clopidogreL (PLAVIX) 75 mg tablet Take 75 mg by mouth.      DULoxetine (CYMBALTA) 30 MG capsule Take 1 capsule (30 mg total)  by mouth once daily.      ferrous sulfate (FEOSOL) 325 mg (65 mg iron) Tab tablet Take 325 mg by mouth daily with breakfast. Off at present      folic acid (FOLVITE) 1 MG tablet Take 1 tablet (1 mg total) by mouth once daily.  0    gabapentin (NEURONTIN) 300 MG capsule Take 1 capsule (300 mg total) by mouth once daily. 90 capsule 0    hydrochlorothiazide (MICROZIDE ORAL) Take 20 mg by mouth once daily at 6am.      insulin (BASAGLAR KWIKPEN U-100 INSULIN) glargine 100 units/mL (3mL) SubQ pen Inject 5 Units into the skin once daily.  0    Lactobacillus rhamnosus GG (CULTURELLE) 10 billion cell capsule Take 1 capsule by mouth once daily.      lactulose (CHRONULAC) 10 gram/15 mL solution Take 10 g by mouth 2 (two) times a day.      levothyroxine (SYNTHROID) 75 MCG tablet Take 75 mcg by mouth once daily.      losartan (COZAAR) 25 MG tablet Take 25 mg by mouth once daily at 6am.      metoclopramide HCl (REGLAN) 10 MG tablet Take 1 tablet (10 mg total) by mouth 3 (three) times daily before meals. 90 tablet 3    omeprazole (PRILOSEC) 20 MG capsule Take 20 mg by mouth 2 (two) times daily.      ondansetron (ZOFRAN) 4 MG tablet Take 1 tablet (4 mg total) by mouth every 8 (eight) hours as needed for Nausea. 30 tablet 3    tamsulosin (FLOMAX) 0.4 mg Cap Take 1 capsule (0.4 mg total) by mouth once daily. 30 capsule 1    traZODone (DESYREL) 100 MG tablet Take 1 tablet (100 mg total) by mouth once daily. (Patient taking differently: Take 100 mg by mouth nightly as needed for Insomnia.) 30 tablet 0      Allergies: Tomato (solanum lycopersicum)    Family History   Problem Relation Age of Onset    Arthritis Mother     Diabetes Mother     Heart disease Mother     Hypertension Mother     Kidney disease Mother     Stroke Mother     Vision loss Mother     Heart attack Mother     Alcohol abuse Father     Heart attack Father     Diabetes Sister     Asthma Brother     Diabetes Brother     Heart disease Brother      Heart attack Brother      Social History     Tobacco Use    Smoking status: Never Smoker    Smokeless tobacco: Never Used   Substance Use Topics    Alcohol use: No     Comment: socially    Drug use: No     Review of Systems   Unable to perform ROS: Patient nonverbal   Objective:     Vitals:    Temp: 98.1 °F (36.7 °C)  Pulse: 79  Rhythm: normal sinus rhythm  BP: (!) 151/67  MAP (mmHg): 97  Resp: 16  SpO2: (!) 94 %    Temp  Min: 97.8 °F (36.6 °C)  Max: 100.2 °F (37.9 °C)  Pulse  Min: 77  Max: 85  BP  Min: 144/64  Max: 187/75  MAP (mmHg)  Min: 92  Max: 108  Resp  Min: 16  Max: 18  SpO2  Min: 90 %  Max: 97 %    04/12 0701 - 04/13 0700  In: 302 [P.O.:302]  Out: 800 [Urine:800]   Unmeasured Output  Urine Occurrence: 1  Stool Occurrence: 1  Pad Count: 1       Physical Exam  Constitutional:       General: She is not in acute distress.  HENT:      Head: Normocephalic.      Right Ear: External ear normal.      Left Ear: External ear normal.      Mouth/Throat:      Mouth: Mucous membranes are moist.      Pharynx: Oropharynx is clear.   Eyes:      Comments: Does not track   Cardiovascular:      Rate and Rhythm: Normal rate.   Pulmonary:      Effort: Pulmonary effort is normal. No respiratory distress.   Abdominal:      General: Abdomen is flat.      Palpations: Abdomen is soft.   Musculoskeletal:      Cervical back: Neck supple.      Comments: Right partial foot amputation, dressing c/d/i   Neurological:      Mental Status: She is alert.      Comments: Does not respond to orientation questions  Follows commands on right side  Hemiparesis on left side     Unable to test orientation, language, memory, judgment, insight, fund of knowledge, coordination, gait due to level of consciousness.    Today I personally reviewed pertinent medications, lines/drains/airways, imaging, cardiology results, laboratory results, notably:          Assessment/Plan:     Neuro  * SDH (subdural hematoma)  S/p evac on 4/4/13; stable and stepped  down from [facility].    On 4/13 patient had 8/10 HA and was slower to follow commands.  Repeat CTH showed re-accumulation of R SDH along with 4-5mm MLS.  Stepped back up to [facility].    -- NSGY following  -- SBP <160  -- q1hr neuro check  -- hold anticoagulation      History of stroke  R MCA stroke (2013) with residual LUE flaccid paralysis  DAPT at home  Statin      Cardiac/Vascular  Elevated troponin I level  0.031 on admission  EKG, CXR, ECHO   DAPT at home, hold in setting of acute bleed   Resolved    Critical lower limb ischemia  See osteomyelitis    Hyperlipidemia  Statin    Hypertension  SBP < 160  PRN labetalol    ID  Osteomyelitis of right foot  S/p R partial foot amputation on vancomycin at [facility]  -continue vancomycin  -bandage, stitches intact   -Afebrile, WBC normal    Endocrine  Body mass index (BMI) less than 19  Nutrition consult    Type 2 diabetes mellitus, with long-term current use of insulin  A1c 5    Orthopedic  Status post partial amputation of right foot  See osteomyelitis    Other  Debility  Baseline flaccid paralysis of LUE 2/2 previous R MCA stroke   PT/OT        The patient is being Prophylaxed for:  Venous Thromboembolism with: Mechanical  Stress Ulcer with: Not Applicable   Ventilator Pneumonia with: not applicable    Activity Orders          Turn patient starting at 04/13 1600    Elevate HOB starting at 04/13 1514    Diet NPO: NPO starting at 04/13 1514    Elevate HOB Flat starting at 04/04 1228        Full Code    Brenna Stuart, DO  Neurocritical Care  [facility] - Neurosurgery ([facility])

## 2022-04-13 NOTE — SUBJECTIVE & OBJECTIVE
Past Medical History:   Diagnosis Date    Gastroparesis     GERD (gastroesophageal reflux disease)     History of Clostridium difficile colitis 07/24/2021    History of kidney stones     History of stroke     left side paralysis    Hyperlipidemia     Hypertension     Hypothyroidism     Iron deficiency anemia     Osteoarthritis     Peripheral neuropathy     Stage IV CKD     Type II diabetes mellitus        Past Surgical History:   Procedure Laterality Date    APPENDECTOMY      CRANIOTOMY FOR EVACUATION OF SUBDURAL HEMATOMA Right 4/4/2022    Procedure: CRANIOTOMY, FOR SUBDURAL HEMATOMA EVACUATION;  Surgeon: Silvio White MD;  [facility];  Service: Neurosurgery;  Laterality: Right;    CYSTOSCOPY W/ URETERAL STENT PLACEMENT Right 10/13/2021    Procedure: CYSTOSCOPY, WITH URETERAL STENT INSERTION;  Surgeon: Wanda Arroyo MD;  Location: [facility] OR;  Service: Urology;  Laterality: Right;    ESOPHAGOGASTRODUODENOSCOPY N/A 11/23/2021    Procedure: EGD (ESOPHAGOGASTRODUODENOSCOPY);  Surgeon: Obed Jeong MD;  Location: [facility] (2ND FLR);  Service: Endoscopy;  Laterality: N/A;    FOOT AMPUTATION THROUGH METATARSAL Right 3/24/2022    Procedure: AMPUTATION, FOOT, PARTIAL 4th and 5th ray;  Surgeon: Rodrigo Logan DPM;  Location: [facility] OR 2ND FLR;  Service: Podiatry;  Laterality: Right;    LAPAROSCOPIC APPENDECTOMY N/A 7/22/2021    Procedure: APPENDECTOMY, LAPAROSCOPIC;  Surgeon: Paulo Calvo Jr., MD;  Location: [facility];  Service: General;  Laterality: N/A;    TOE AMPUTATION Right 1/1/2022    Procedure: AMPUTATION, TOE;  Surgeon: Genaro Mason DPM;  Location: [facility];  Service: Podiatry;  Laterality: Right;    TUBAL LIGATION      URETEROSCOPIC REMOVAL OF URETERIC CALCULUS Right 12/22/2021    Procedure: REMOVAL, CALCULUS, URETER, URETEROSCOPIC;  Surgeon: Wanda Arroyo MD;  Location: [facility];  Service: Urology;  Laterality: Right;       Review of patient's allergies indicates:   Allergen  Reactions    Tomato (solanum lycopersicum) Hives and Itching       Medications:  Medications Prior to Admission   Medication Sig    rosuvastatin (CRESTOR) 40 MG Tab Take 40 mg by mouth every evening.    amLODIPine (NORVASC) 5 MG tablet Take 10 mg by mouth every evening. Take 5 mg every morning and 10 mg at night    ascorbic acid, vitamin C, (VITAMIN C) 500 MG tablet Take 1 tablet (500 mg total) by mouth once daily.    aspirin (ECOTRIN) 81 MG EC tablet Take 81 mg by mouth once daily.    b complex vitamins tablet Take 1 tablet by mouth once daily.    carvedilol (COREG) 25 MG tablet Take 1 tablet (25 mg total) by mouth 2 (two) times daily.    clopidogreL (PLAVIX) 75 mg tablet Take 75 mg by mouth.    DULoxetine (CYMBALTA) 30 MG capsule Take 1 capsule (30 mg total) by mouth once daily.    ferrous sulfate (FEOSOL) 325 mg (65 mg iron) Tab tablet Take 325 mg by mouth daily with breakfast. Off at present    folic acid (FOLVITE) 1 MG tablet Take 1 tablet (1 mg total) by mouth once daily.    gabapentin (NEURONTIN) 300 MG capsule Take 1 capsule (300 mg total) by mouth once daily.    hydrochlorothiazide (MICROZIDE ORAL) Take 20 mg by mouth once daily at 6am.    insulin (BASAGLAR KWIKPEN U-100 INSULIN) glargine 100 units/mL (3mL) SubQ pen Inject 5 Units into the skin once daily.    Lactobacillus rhamnosus GG (CULTURELLE) 10 billion cell capsule Take 1 capsule by mouth once daily.    lactulose (CHRONULAC) 10 gram/15 mL solution Take 10 g by mouth 2 (two) times a day.    levothyroxine (SYNTHROID) 75 MCG tablet Take 75 mcg by mouth once daily.    losartan (COZAAR) 25 MG tablet Take 25 mg by mouth once daily at 6am.    metoclopramide HCl (REGLAN) 10 MG tablet Take 1 tablet (10 mg total) by mouth 3 (three) times daily before meals.    omeprazole (PRILOSEC) 20 MG capsule Take 20 mg by mouth 2 (two) times daily.    ondansetron (ZOFRAN) 4 MG tablet Take 1 tablet (4 mg total) by mouth every 8 (eight) hours as needed for Nausea.     "[] oxyCODONE (ROXICODONE) 5 MG immediate release tablet Take 1 tablet (5 mg total) by mouth every 6 (six) hours as needed for Pain.    tamsulosin (FLOMAX) 0.4 mg Cap Take 1 capsule (0.4 mg total) by mouth once daily.    traZODone (DESYREL) 100 MG tablet Take 1 tablet (100 mg total) by mouth once daily. (Patient taking differently: Take 100 mg by mouth nightly as needed for Insomnia.)     Antibiotics (From admission, onward)                Start     Stop Route Frequency Ordered    22 1615  cefepime in dextrose 5 % IVPB 2 g         -- IV Every 12 hours (non-standard times) 22 1508    22 1200  vancomycin 500 mg in dextrose 5 % 100 mL IVPB (ready to mix system)         -- IV Every 24 hours (non-standard times) 22 0711    22 1718  vancomycin - pharmacy to dose  (vancomycin IVPB)        "And" Linked Group Details    -- IV pharmacy to manage frequency 22 1619          Antifungals (From admission, onward)                None          Antivirals (From admission, onward)      None             Immunization History   Administered Date(s) Administered    COVID-19, vector-nr, rS-Ad26, PF ([facility]) 2021    PPD Test 2022       Family History       Problem Relation (Age of Onset)    Alcohol abuse Father    Arthritis Mother    Asthma Brother    Diabetes Mother, Sister, Brother    Heart attack Mother, Father, Brother    Heart disease Mother, Brother    Hypertension Mother    Kidney disease Mother    Stroke Mother    Vision loss Mother          Social History     Socioeconomic History    Marital status: Single   Tobacco Use    Smoking status: Never Smoker    Smokeless tobacco: Never Used   Substance and Sexual Activity    Alcohol use: No     Comment: socially    Drug use: No    Sexual activity: Not Currently     Social Determinants of Health     Financial Resource Strain: Low Risk     Difficulty of Paying Living Expenses: Not hard at all   Food Insecurity: No Food Insecurity    " Worried About Running Out of Food in the Last Year: Never true    Ran Out of Food in the Last Year: Never true   Transportation Needs: No Transportation Needs    Lack of Transportation (Medical): No    Lack of Transportation (Non-Medical): No   Physical Activity: Inactive    Days of Exercise per Week: 0 days    Minutes of Exercise per Session: 0 min   Stress: No Stress Concern Present    Feeling of Stress : Not at all   Social Connections: Moderately Isolated    Frequency of Communication with Friends and Family: More than three times a week    Frequency of Social Gatherings with Friends and Family: Once a week    Attends [employer] Services: More than 4 times per year    Active Member of Clubs or Organizations: No    Attends Club or Organization Meetings: Never    Marital Status: Never    Housing Stability: Low Risk     Unable to Pay for Housing in the Last Year: No    Number of Places Lived in the Last Year: 1    Unstable Housing in the Last Year: No     Review of Systems   Constitutional:  Negative for appetite change, chills, diaphoresis, fatigue, fever and unexpected weight change.   HENT:  Negative for congestion, ear pain, hearing loss, sore throat and tinnitus.    Eyes:  Negative for pain, redness and visual disturbance.   Respiratory:  Positive for shortness of breath. Negative for cough, chest tightness and wheezing.    Cardiovascular:  Negative for chest pain.   Gastrointestinal:  Negative for abdominal pain, constipation, diarrhea, nausea and vomiting.   Endocrine: Negative for cold intolerance and heat intolerance.   Genitourinary:  Negative for decreased urine volume, difficulty urinating, dysuria, flank pain, frequency, hematuria and urgency.   Musculoskeletal:  Negative for arthralgias, back pain, myalgias and neck pain.   Skin:  Positive for wound. Negative for rash.   Allergic/Immunologic: Negative for environmental allergies, food allergies and immunocompromised state.   Neurological:   Positive for headaches. Negative for dizziness, facial asymmetry, weakness, light-headedness and numbness.   Hematological:  Negative for adenopathy. Does not bruise/bleed easily.   Psychiatric/Behavioral:  Negative for agitation, behavioral problems and confusion.    Objective:     Vital Signs (Most Recent):  Temp: 98.6 °F (37 °C) (04/13/22 1745)  Pulse: 91 (04/13/22 1745)  Resp: 17 (04/13/22 1745)  BP: (!) 171/73 (04/13/22 1745)  SpO2: 98 % (04/13/22 1745)   Vital Signs (24h Range):  Temp:  [97.8 °F (36.6 °C)-100.2 °F (37.9 °C)] 98.6 °F (37 °C)  Pulse:  [77-91] 91  Resp:  [16-18] 17  SpO2:  [90 %-98 %] 98 %  BP: (144-171)/(64-73) 171/73     Weight: 49.4 kg (109 lb)  Body mass index is 17.07 kg/m².    Estimated Creatinine Clearance: 34 mL/min (based on SCr of 1.2 mg/dL).    Physical Exam  Constitutional:       General: She is not in acute distress.     Appearance: She is well-developed. She is ill-appearing. She is not diaphoretic.       HENT:      Head: Normocephalic.   Cardiovascular:      Rate and Rhythm: Normal rate and regular rhythm.      Heart sounds: Normal heart sounds. No murmur heard.    No friction rub. No gallop.   Pulmonary:      Effort: Pulmonary effort is normal. No respiratory distress.      Breath sounds: Normal breath sounds. No wheezing or rales.   Abdominal:      General: Bowel sounds are normal. There is no distension.      Palpations: Abdomen is soft. There is no mass.      Tenderness: There is no abdominal tenderness. There is no guarding or rebound.   Musculoskeletal:         General: Swelling (L knee) and tenderness (L knee anterior) present.   Skin:     General: Skin is warm and dry.   Neurological:      Mental Status: She is alert and oriented to person, place, and time.      Comments: Oriented x3 when first seen then later on rounds  more lethargic and not answering questions   Psychiatric:         Behavior: Behavior normal.                               Significant Labs: Blood Culture:    Recent Labs   Lab 12/30/21  1523 12/30/21  1536 03/18/22  1641 04/11/22  1020 04/11/22  1021   LABBLOO No growth after 5 days. No growth after 5 days. No growth after 5 days.  No growth after 5 days. No growth to date  No Growth to date No growth to date  No Growth to date     CBC:   Recent Labs   Lab 04/12/22  1010 04/13/22  0655   WBC 9.58 11.22   HGB 9.4* 10.1*   HCT 28.1* 30.5*    154     CMP:   Recent Labs   Lab 04/12/22  1010 04/13/22  0505    139   K 3.5 3.6    107   CO2 23 23   * 177*   BUN 18 19   CREATININE 1.1 1.2   CALCIUM 8.3* 8.7   PROT 6.3 7.1   ALBUMIN 1.9* 2.1*   BILITOT 0.7 0.7   ALKPHOS 55 63   AST 14 13   ALT 12 11   ANIONGAP 9 9   EGFRNONAA 51.0* 45.9*     Wound Culture:   Recent Labs   Lab 03/18/22  1620 03/24/22  1538   LABAERO METHICILLIN RESISTANT STAPHYLOCOCCUS AUREUS  Many  * METHICILLIN RESISTANT STAPHYLOCOCCUS AUREUS  Few  *  METHICILLIN RESISTANT STAPHYLOCOCCUS AUREUS  Few  *     All pertinent labs within the past 24 hours have been reviewed.    Significant Imaging: I have reviewed all pertinent imaging results/findings within the past 24 hours.  X-Ray Abdomen AP 1 View [[id number]] Resulted: 04/13/22 1358   Order Status: Completed Updated: 04/13/22 1401   Narrative:     EXAMINATION:   XR ABDOMEN AP 1 VIEW     CLINICAL HISTORY:   emesis;     TECHNIQUE:   AP View(s) of the abdomen was performed.     COMPARISON:   Plain film from 10/15/2021     FINDINGS:   No dilated loops of small bowel.  Moderate amount of stool within the colon.  No evidence to suggest obstruction.  Small amount of high density material overlying the right lower quadrant which is nonspecific.  Deformity of the left 12th rib which may be remote.  Remaining osseous structures are intact.    Impression:       As above.       Electronically signed by: Brock Aguero MD   Date: 04/13/2022   Time: 13:58   CT Head Without Contrast [[id number]] (Abnormal) Resulted: 04/13/22 1344   Order Status:  Completed Updated: 04/13/22 4460   Narrative:     EXAMINATION:   CT HEAD WITHOUT CONTRAST     CLINICAL HISTORY:   8/10 headache, emesis, f/u [facility];     TECHNIQUE:   Low dose axial CT images obtained throughout the head without intravenous contrast. Sagittal and coronal reconstructions were performed.     COMPARISON:   CT head 04/06/2022     FINDINGS:   Postoperative changes of prior right frontal craniotomy and interval removal of subdural drainage catheter.     There is interval increased extra-axial mixed density fluid collection overlying the right cerebral hemisphere.  The increased low-density component is possibly in part superficial to the dural plasty suggestive for developing hygroma, however there is increased thin hyperdense component along the right cerebral hemisphere particularly posteriorly concerning for superimposed increased recent subdural hemorrhage.  This measures approximately 0.7 cm in thickness.  There is increased mass effect upon the right cerebral hemisphere with compression of the right lateral ventricle and leftward subfalcine herniation.  Leftward midline shift measuring 4-5 mm increased from prior.     There is hypoattenuation of the right deep white matter and right basal ganglia similar to prior which may represent combination of encephalomalacia of remote MCA territory infarct and chronic microvascular ischemic changes similar to prior.  Unchanged small left cerebellar encephalomalacia of remote infarct.  No new abnormal parenchymal attenuation to suggest new infarction.     Partial opacification left mastoid air cells.  Paranasal sinuses are essentially clear.     Findings were directly discussed with ordering provider Ina Ardon PA-C via secure chat by resident Paulo Tee at approximately 13:20 with acknowledgement message reply received.     This report was flagged in Epic as abnormal.    Impression:       Interval increase extra-axial mixed density fluid collection overlying  the right cerebral hemisphere with increased hyperdense component posteriorly concerning for recurrent subdural hemorrhage.  Please note there is component of increased hypodense collection overlying the right frontal convexity which may represent developing hygroma.  There is increased mass effect with 4-5 mm of leftward midline shift     Clinical correlation and neuro surgical evaluation advised.     See above for additional details...     Electronically signed by resident: Paulo Tee   Date: 04/13/2022   Time: 13:06     Electronically signed by: Theo Fenton DO   Date: 04/13/2022   Time: 13:44   US Lower Extremity Veins Bilateral [[id number]] Resulted: 04/11/22 1432   Order Status: Completed Updated: 04/11/22 1435   Narrative:     EXAMINATION:   US LOWER EXTREMITY VEINS BILATERAL     CLINICAL HISTORY:   fever workup;     TECHNIQUE:   Duplex and color flow Doppler and dynamic compression was performed of the bilateral lower extremity veins was performed.     COMPARISON:   None     FINDINGS:   Right thigh veins: The common femoral, superficial femoral, popliteal, upper greater saphenous, and deep femoral veins are patent and free of thrombus. The veins are normally compressible and have normal phasic flow and augmentation response.     Right calf veins: The visualized calf veins are patent.     Left thigh veins: The common femoral, superficial femoral, popliteal, upper greater saphenous, and deep femoral veins are patent and free of thrombus. The veins are normally compressible and have normal phasic flow and augmentation response.     Left calf veins: The visualized calf veins are patent.    Impression:       No evidence of deep venous thrombosis in either lower extremity.     Electronically signed by resident: Angelita Preciado   Date: 04/11/2022   Time: 14:26     Electronically signed by: Brock Aguero MD   Date: 04/11/2022   Time: 14:32   X-Ray Chest AP Portable [[id number]] Resulted: 04/11/22 0854   Order  Status: Completed Updated: 04/11/22 0857   Narrative:     EXAMINATION:   XR CHEST AP PORTABLE     CLINICAL HISTORY:   fever workup;     TECHNIQUE:   Single frontal view of the chest was performed.     COMPARISON:   04/05/2022     FINDINGS:   Interval extubation and removal of feeding tube and pH probe devices.  Right-sided PICC line relatively stable.  There is interval development of hazy perihilar and basilar lung opacities while nonspecific may represent vascular congestion and edema.  Cannot exclude underlying basilar effusions.  There is no large pneumothorax.  Continued atherosclerotic aorta.  Clinical correlation and follow-up advised.    Impression:       Please see above       Electronically signed by: Theo Fenton DO   Date: 04/11/2022   Time: 08:54       Imaging History    2022  Date Procedure Name Status Accession Number Location   04/13/22 01:15 PM X-Ray Abdomen AP 1 View Final [id number] [facility]   04/13/22 12:57 PM CT Head Without Contrast Final [id number] [facility]   04/11/22 02:14 PM US Lower Extremity Veins Bilateral Final [id number] [facility]   04/11/22 08:41 AM X-Ray Chest AP Portable Final [id number] [facility]   04/06/22 04:34 AM CT Head Without Contrast Final [id number] [facility]   04/05/22 11:34 AM CT Head Without Contrast Final [id number] [facility]   04/05/22 11:29 AM X-Ray Chest AP Portable Final [id number] [facility]   04/04/22 03:44 PM CT Head Without Contrast Final [id number] [facility]   04/03/22 08:47 PM CT Head Without Contrast Final [id number] [facility]   04/03/22 06:09 PM X-Ray Chest AP Portable Final [id number] [facility]   04/03/22 02:29 PM CT Head Without Contrast Final [id number] [facility]   04/03/22 02:29 PM CT Cervical Spine Without Contrast [facility] [id number] [facility]   03/26/22 07:47 PM X-Ray Chest 1 View Final [id number] [facility]   03/24/22 05:34 PM X-Ray Foot Complete Right Final [id number] [facility]   03/19/22 10:55 AM VAS Ankle Brachial Indices Resting Final [id number]    03/18/22 09:43 PM MRI Foot Toes Without Contrast Right Final [id number] [facility]    03/18/22 05:30 PM X-Ray Foot Complete Right Final [id number] [facility]   03/18/22 05:29 PM X-Ray Chest AP Portable Final [id number] [facility]   04/04/22 01:43 PM Echo Final [id number] [facility]

## 2022-04-13 NOTE — PLAN OF CARE
Problem: Infection  Goal: Absence of Infection Signs and Symptoms  Outcome: Ongoing, Progressing     Problem: Adult Inpatient Plan of Care  Goal: Plan of Care Review  Outcome: Ongoing, Progressing  Goal: Patient-Specific Goal (Individualized)  Description: Admit Date: 4/3/2022    SDH (subdural hematoma)    Past Medical History:  No date: Gastroparesis  No date: GERD (gastroesophageal reflux disease)  07/24/2021: History of Clostridium difficile colitis  No date: History of kidney stones  No date: History of stroke      Comment:  left side paralysis  No date: Hyperlipidemia  No date: Hypertension  No date: Hypothyroidism  No date: Iron deficiency anemia  No date: Osteoarthritis  No date: Peripheral neuropathy  No date: Stage IV CKD  No date: Type II diabetes mellitus    Past Surgical History:  No date: APPENDECTOMY  10/13/2021: CYSTOSCOPY W/ URETERAL STENT PLACEMENT; Right      Comment:  Procedure: CYSTOSCOPY, WITH URETERAL STENT INSERTION;                 Surgeon: Wanda Arroyo MD;  Location: [facility];                 Service: Urology;  Laterality: Right;  11/23/2021: ESOPHAGOGASTRODUODENOSCOPY; N/A      Comment:  Procedure: EGD (ESOPHAGOGASTRODUODENOSCOPY);  Surgeon:                Obed Jeong MD;  Location: [facility] ([address] [place] [address]);                 Service: Endoscopy;  Laterality: N/A;  3/24/2022: FOOT AMPUTATION THROUGH METATARSAL; Right      Comment:  Procedure: AMPUTATION, FOOT, PARTIAL 4th and 5th ray;                 Surgeon: Rodrigo Logan DPM;  Location: [address];                 Service: Podiatry;  Laterality: Right;  7/22/2021: LAPAROSCOPIC APPENDECTOMY; N/A      Comment:  Procedure: APPENDECTOMY, LAPAROSCOPIC;  Surgeon: Paulo Calvo Jr., MD;  Location: [facility];  Service: General;                 Laterality: N/A;  1/1/2022: TOE AMPUTATION; Right      Comment:  Procedure: AMPUTATION, TOE;  Surgeon: Genaro Mason DPM;  Location: [facility];  Service: Podiatry;   Laterality:               Right;  No date: TUBAL LIGATION  12/22/2021: URETEROSCOPIC REMOVAL OF URETERIC CALCULUS; Right      Comment:  Procedure: REMOVAL, CALCULUS, URETER, URETEROSCOPIC;                 Surgeon: Wanda Arroyo MD;  Location: [facility];                 Service: Urology;  Laterality: Right;    Individualization:   1. Please dim lights at night    Restraints:             Outcome: Ongoing, Progressing  Goal: Absence of Hospital-Acquired Illness or Injury  Outcome: Ongoing, Progressing  Goal: Optimal Comfort and Wellbeing  Outcome: Ongoing, Progressing  Goal: Readiness for Transition of Care  Outcome: Ongoing, Progressing     Problem: Adjustment to Illness (Stroke, Hemorrhagic)  Goal: Optimal Coping  Outcome: Ongoing, Progressing     Problem: Bowel Elimination Impaired (Stroke, Hemorrhagic)  Goal: Effective Bowel Elimination  Outcome: Ongoing, Progressing     Problem: Cerebral Tissue Perfusion (Stroke, Hemorrhagic)  Goal: Optimal Cerebral Tissue Perfusion  Outcome: Ongoing, Progressing     Problem: Cognitive Impairment (Stroke, Hemorrhagic)  Goal: Optimal Cognitive Function  Outcome: Ongoing, Progressing     Problem: Communication Impairment (Stroke, Hemorrhagic)  Goal: Effective Communication Skills  Outcome: Ongoing, Progressing     Problem: Functional Ability Impaired (Stroke, Hemorrhagic)  Goal: Optimal Functional Ability  Outcome: Ongoing, Progressing     Problem: Pain (Stroke, Hemorrhagic)  Goal: Acceptable Pain Control  Outcome: Ongoing, Progressing     Problem: Respiratory Compromise (Stroke, Hemorrhagic)  Goal: Effective Oxygenation and Ventilation  Outcome: Ongoing, Progressing     Problem: Sensorimotor Impairment (Stroke, Hemorrhagic)  Goal: Improved Sensorimotor Function  Outcome: Ongoing, Progressing     Problem: Swallowing Impairment (Stroke, Hemorrhagic)  Goal: Optimal Eating and Swallowing Without Aspiration  Outcome: Ongoing, Progressing     Problem: Urinary Elimination Impaired  (Stroke, Hemorrhagic)  Goal: Effective Urinary Elimination  Outcome: Ongoing, Progressing     Problem: Diabetes Comorbidity  Goal: Blood Glucose Level Within Targeted Range  Outcome: Ongoing, Progressing     Problem: Fluid and Electrolyte Imbalance (Acute Kidney Injury/Impairment)  Goal: Fluid and Electrolyte Balance  Outcome: Ongoing, Progressing     Problem: Oral Intake Inadequate (Acute Kidney Injury/Impairment)  Goal: Optimal Nutrition Intake  Outcome: Ongoing, Progressing     Problem: Renal Function Impairment (Acute Kidney Injury/Impairment)  Goal: Effective Renal Function  Outcome: Ongoing, Progressing     Problem: Impaired Wound Healing  Goal: Optimal Wound Healing  Outcome: Ongoing, Progressing     Problem: Adjustment to Illness (Delirium)  Goal: Optimal Coping  Outcome: Ongoing, Progressing     Problem: Altered Behavior (Delirium)  Goal: Improved Behavioral Control  Outcome: Ongoing, Progressing     Problem: Attention and Thought Clarity Impairment (Delirium)  Goal: Improved Attention and Thought Clarity  Outcome: Ongoing, Progressing     Problem: Sleep Disturbance (Delirium)  Goal: Improved Sleep  Outcome: Ongoing, Progressing     Problem: Skin Injury Risk Increased  Goal: Skin Health and Integrity  Outcome: Ongoing, Progressing     Problem: Fall Injury Risk  Goal: Absence of Fall and Fall-Related Injury  Outcome: Ongoing, Progressing     Problem: Restraint, Nonbehavioral (Nonviolent)  Goal: Absence of Harm or Injury  Outcome: Ongoing, Progressing     Problem: Communication Impairment (Mechanical Ventilation, Invasive)  Goal: Effective Communication  Outcome: Ongoing, Progressing     Problem: Device-Related Complication Risk (Mechanical Ventilation, Invasive)  Goal: Optimal Device Function  Outcome: Ongoing, Progressing     Problem: Inability to Wean (Mechanical Ventilation, Invasive)  Goal: Mechanical Ventilation Liberation  Outcome: Ongoing, Progressing     Problem: Nutrition Impairment (Mechanical  Ventilation, Invasive)  Goal: Optimal Nutrition Delivery  Outcome: Ongoing, Progressing     Problem: Skin and Tissue Injury (Mechanical Ventilation, Invasive)  Goal: Absence of Device-Related Skin and Tissue Injury  Outcome: Ongoing, Progressing     Problem: Ventilator-Induced Lung Injury (Mechanical Ventilation, Invasive)  Goal: Absence of Ventilator-Induced Lung Injury  Outcome: Ongoing, Progressing     Problem: Communication Impairment (Artificial Airway)  Goal: Effective Communication  Outcome: Ongoing, Progressing     Problem: Device-Related Complication Risk (Artificial Airway)  Goal: Optimal Device Function  Outcome: Ongoing, Progressing     Problem: Skin and Tissue Injury (Artificial Airway)  Goal: Absence of Device-Related Skin or Tissue Injury  Outcome: Ongoing, Progressing     Problem: Noninvasive Ventilation Acute  Goal: Effective Unassisted Ventilation and Oxygenation  Outcome: Ongoing, Progressing

## 2022-04-13 NOTE — ASSESSMENT & PLAN NOTE
Nutrition following, most recent recs below from 4/12 (I ordered boost pudding BID and Frederic BID x14d per their recs)       1. Suggest Diabetic 2000 kcal diet with texture per SLP     2. Consider Boost Pudding BID to increase PO intake      3. Add Frederic BID x 14 days to aid in wound healing     4. If TF warranted, recommend TF of Isosource advancing as tolerated to goal rate of 45 mL/hr to provide 1620 kcal, 73 gm protein, and 825 mL free fluid     5. RD following

## 2022-04-13 NOTE — CARE UPDATE
NSGY Care Update    --Pateint appearing more lethargic this afternoon. Slow to follow commands. Worsening right gaze palsy. In setting of clinical findings and recent CTH showing mass effect w 4-5 mm left MLS, NCC consulted to step patient back up to ICU for closer neuromonitoring.   --SonOscar updated via phone.     Discussed with Dr. White and Dr. Kel Ardon, PATimothyC  Neurosurgery  [facility]

## 2022-04-13 NOTE — ASSESSMENT & PLAN NOTE
Although pathology of the clean margins failed to reveal osteomyelitis her bone biopsy cultures from the clean margin are positive for MRSA.  Therefore bone is likely infected at the clean margin however long-term bony changes have not yet been present.  · Rec continuing vancomycin as per prior recommendations for 6 weeks with an estimated end date of May 5, 2022.   · Please consult Pharmacy service for management of vancomycin levels.    · Monitor weekly CBC, BMP, sed rate and CRP.  Monitor vancomycin levels as per hospital protocol.  · Resume OPAT orders upon discharge.

## 2022-04-14 PROBLEM — G40.909 RECURRENT SEIZURES: Status: ACTIVE | Noted: 2022-01-01

## 2022-04-14 NOTE — ASSESSMENT & PLAN NOTE
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

## 2022-04-14 NOTE — PT/OT/SLP PROGRESS
Speech Language Pathology  Discharge - transfer to ICU      Beatriz Adame  MRN: [id number]    Patient not seen today secondary to (pt transfered to ICU on previous service date. SLP team will await new orders once pt is medically appropriate for re-evaluation.)   4/14/2022

## 2022-04-14 NOTE — ASSESSMENT & PLAN NOTE
Hx of R MCA CVA w/ left hemiparesis/contraction   - On DAPT as outpatient per chart  - Management per NCC

## 2022-04-14 NOTE — SUBJECTIVE & OBJECTIVE
Interval History: No fevers documented overnight. Transferred to [facility] for closer monitoring. States that she sometimes coughs when eating.       Review of Systems   Constitutional:  Negative for chills and fever.   Respiratory:  Negative for shortness of breath.    Gastrointestinal:  Negative for abdominal pain, diarrhea, nausea and vomiting.   Genitourinary:  Negative for dysuria.   All other systems reviewed and are negative.  Objective:     Vital Signs (Most Recent):  Temp: 98.9 °F (37.2 °C) (04/14/22 0700)  Pulse: 97 (04/14/22 0650)  Resp: (!) 26 (04/14/22 0650)  BP: (!) 170/122 (04/14/22 0650)  SpO2: 96 % (04/14/22 0650)   Vital Signs (24h Range):  Temp:  [97.5 °F (36.4 °C)-99.9 °F (37.7 °C)] 98.9 °F (37.2 °C)  Pulse:  [] 97  Resp:  [16-32] 26  SpO2:  [95 %-98 %] 96 %  BP: (105-181)/() 170/122     Weight: 49.4 kg (109 lb)  Body mass index is 17.07 kg/m².    Estimated Creatinine Clearance: 31.4 mL/min (based on SCr of 1.3 mg/dL).    Physical Exam  Constitutional:       General: She is not in acute distress.     Appearance: She is not ill-appearing.      Comments: thin   HENT:      Head:      Comments: EEG cap     Right Ear: External ear normal.      Left Ear: External ear normal.      Mouth/Throat:      Mouth: Mucous membranes are moist.   Eyes:      General: No scleral icterus.        Right eye: No discharge.         Left eye: No discharge.   Cardiovascular:      Rate and Rhythm: Normal rate and regular rhythm.   Pulmonary:      Effort: Pulmonary effort is normal. No respiratory distress.      Breath sounds: No stridor. No wheezing or rhonchi.   Abdominal:      General: Bowel sounds are normal. There is no distension.      Palpations: Abdomen is soft.      Tenderness: There is no abdominal tenderness. There is no guarding.   Genitourinary:     Comments: purewick  Skin:     General: Skin is warm and dry.      Findings: No erythema or rash.      Comments: R foot wrapped   Neurological:      Mental  Status: She is alert and oriented to person, place, and time. Mental status is at baseline.      Motor: No weakness.   Psychiatric:         Mood and Affect: Mood normal.       Significant Labs:   Microbiology Results (last 7 days)       Procedure Component Value Units Date/Time    Urine culture [[id number]]  (Abnormal)  (Susceptibility) Collected: 04/11/22 1017    Order Status: Completed Specimen: Urine Updated: 04/13/22 1411     Urine Culture, Routine ESCHERICHIA COLI ESBL  10,000 - 49,999 cfu/ml      Narrative:      Specimen Source->Urine    Blood culture [[id number]] Collected: 04/11/22 1020    Order Status: Completed Specimen: Blood from Peripheral, Left Hand Updated: 04/13/22 1212     Blood Culture, Routine No growth to date      No Growth to date    Narrative:      Collection has been rescheduled by [facility] at 04/11/2022 10:04 Reason:   Patient unavailable, patient care, nurse paige notified.  Collection has been rescheduled by [facility] at 04/11/2022 10:04 Reason:   Patient unavailable, patient care, nurse paige notified.    Blood culture [[id number]] Collected: 04/11/22 1021    Order Status: Completed Specimen: Blood from Peripheral, Left Arm Updated: 04/13/22 1212     Blood Culture, Routine No growth to date      No Growth to date    Narrative:      Collection has been rescheduled by T at 04/11/2022 10:04 Reason:   Patient unavailable, patient care, nurse paige notified.  Collection has been rescheduled by [facility] at 04/11/2022 10:04 Reason:   Patient unavailable, patient care, nurse paige notified.    Blood culture [[id number]]     Order Status: No result Specimen: Blood     Blood culture [[id number]]     Order Status: No result Specimen: Blood             Significant Imaging: I have reviewed all pertinent imaging results/findings within the past 24 hours.

## 2022-04-14 NOTE — SUBJECTIVE & OBJECTIVE
Interval History: patient exhibit right side facial twitching and right gaze. EEG revealed seizure activity. Patient loaded with Vimpat 200 mg    Review of Systems   Constitutional: Negative.    HENT: Negative.     Eyes: Negative.    Respiratory: Negative.     Cardiovascular: Negative.    Gastrointestinal: Negative.    Endocrine: Negative.    Genitourinary: Negative.    Musculoskeletal: Negative.    Skin: Negative.    Neurological: Negative.      Objective:     Vitals:  Temp: 98.4 °F (36.9 °C)  Pulse: 99  Rhythm: normal sinus rhythm  BP: (!) 152/72  MAP (mmHg): 103  Resp: (!) 23  SpO2: 97 %  O2 Device (Oxygen Therapy): room air    Temp  Min: 97.5 °F (36.4 °C)  Max: 99.9 °F (37.7 °C)  Pulse  Min: 82  Max: 105  BP  Min: 105/60  Max: 181/76  MAP (mmHg)  Min: 72  Max: 142  Resp  Min: 17  Max: 32  SpO2  Min: 96 %  Max: 98 %    04/13 0701 - 04/14 0700  In: 902.6 [P.O.:120; I.V.:127.9]  Out: 650 [Urine:650]   Unmeasured Output  Urine Occurrence: 1  Stool Occurrence: 1  Pad Count: 1       Physical Exam  Vitals and nursing note reviewed.   Constitutional:       Appearance: She is ill-appearing.   HENT:      Head: Normocephalic.      Nose: Nose normal.      Mouth/Throat:      Mouth: Mucous membranes are moist.      Pharynx: Oropharynx is clear.   Cardiovascular:      Rate and Rhythm: Normal rate and regular rhythm.      Pulses: Normal pulses.      Heart sounds: Normal heart sounds.   Pulmonary:      Effort: Pulmonary effort is normal.      Breath sounds: Normal breath sounds.   Abdominal:      General: Bowel sounds are normal.      Palpations: Abdomen is soft.   Musculoskeletal:         General: Normal range of motion.      Cervical back: Normal range of motion.   Skin:     General: Skin is warm and dry.      Capillary Refill: Capillary refill takes more than 3 seconds.   Neurological:      Comments: GCS 15  PERRL  AAO X3  CN II-XII grossly intact  LEGGETT on right side to command  Hemiparesis on left side  Sensation intact in  all extremities          Medications:  ContinuousniCARdipine, Last Rate: 5 mg/hr (04/14/22 0950)    ScheduledamLODIPine, 10 mg, Daily  atorvastatin, 40 mg, Daily  carvediloL, 25 mg, BID  levetiracetam IV, 1,500 mg, Q12H  levothyroxine, 75 mcg, Before breakfast  losartan, 25 mg, Daily  senna-docusate 8.6-50 mg, 1 tablet, BID  silodosin, 4 mg, Daily  vancomycin (VANCOCIN) IVPB, 500 mg, Q24H    PRNsodium chloride, , Q24H PRN  sodium chloride, , Q24H PRN  acetaminophen, 650 mg, Q4H PRN  dextrose 10%, 12.5 g, PRN  glucagon (human recombinant), 1 mg, PRN  hydrALAZINE, 10 mg, Q8H PRN  HYDROcodone-acetaminophen, 1 tablet, Q4H PRN  HYDROmorphone, 0.5 mg, Q3H PRN  labetalol, 10 mg, Q15 Min PRN  metoclopramide HCl, 5 mg, Q6H PRN  ondansetron, 4 mg, Q6H PRN  potassium bicarbonate, 35 mEq, PRN  potassium bicarbonate, 50 mEq, PRN  potassium bicarbonate, 60 mEq, PRN  potassium, sodium phosphates, 2 packet, PRN  potassium, sodium phosphates, 2 packet, PRN  potassium, sodium phosphates, 2 packet, PRN  sodium chloride 0.9%, 10 mL, PRN  sodium chloride 0.9%, 10 mL, PRN  vancomycin - pharmacy to dose, , pharmacy to manage frequency      Today I personally reviewed pertinent medications, lines/drains/airways, imaging, cardiology results, laboratory results, microbiology results, notably:    Diet  Diet NPO  Diet NPO

## 2022-04-14 NOTE — NURSING
Attempted to call sonOscar @ [phone] to update him on patient's status and plan of care for the day. Call went to voicemail box that is not set up. Notified oncoming day shift RN of attempt to call and information needed to be passed on to the patient's son.

## 2022-04-14 NOTE — PROGRESS NOTES
[facility] - Neuro Critical Care  Infectious Disease  Progress Note    Patient Name: Beatriz Adame  MRN: [id number]  Admission Date: 4/3/2022  Length of Stay: 11 days  Attending Physician: Maverick Ely MD  Primary Care Provider: Dante Olivier MD    Isolation Status: Contact  Assessment/Plan:      Fever  Ddx for fever includes infectious (bacteremia, pneumonia (aspiration), and less likely UTI given asymptomatic bacteruria and poor UA sample with presence of squams) vs non-infectious cause such as new bleed as seen on recent CT. Febrile episodes appear to coincide with anemia that required blood transfusion. Now in [facility] for closer monitoring. Repeat blcx in process. CXR with hazy perihilar/bibasilar lung opacities - possible aspiration given recent extubation/removal of feeding tube. Suspect leukocytosis to be due to above processes.     Recommendations:  -continue vancomycin pharm to dose as prior MRSA OM plan (rodrigue 5/5)   -continue empiric cefepime pending cx data  -sputum cultures  -follow up repeat blcx             SDH s/p evacuation  -transferred to [facility] for closer monitoring      Thank you for your consult. I will follow-up with patient. Please contact us if you have any additional questions. Above d/w primary team.     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  Evaluation in [facility] revealed an acute on chronic subdural hematoma.   "The patient was admitted to the neuro critical care unit for further management.  Her vancomycin therapy was resumed.  She subsequently underwent evacuation of the subdural hematoma and a drain was left in place.    Infectious Disease was again consulted on readmit for "osteomyelitis, on dapto/vanc OP".  ID rec'd complete IV vanc as planned and signed off.  She remains on Vanc alone. She has had intermittent fevers since last seen and ID reconsulted given that.  CXR obtained on 4/11 showed: There is interval development of hazy perihilar and basilar lung opacities while nonspecific may represent vascular congestion and edema.  Cannot exclude underlying basilar effusions.  Sukhwinder reports dyspnea and SOB when seen.  UA obtained showing 1+ occult blood, 1+ leukocytes, WBC 28, 5 sqaumous epithelial cells.  She denies dysuria and in now on Purewick as delgado removed.  Blood cultures 5/11 NGTD.  US BL LEs - no dvt.    Patient seen today 4/13 and complains of stable HA but has new N/V since this am.  SHe complains of L knee pain and denies R foot pain  She is being taken for repeat head CT at time of being seen.  Tmacx 100.2 yesterday but afebrile since.  Remains on Vanc alone. CT Head done and cf new SDH. The patient denies any recent neck pain, photophobia, fever, chills, or sweats.            Interval History: No fevers documented overnight. Transferred to [facility] for closer monitoring. States that she sometimes coughs when eating.       Review of Systems   Constitutional:  Negative for chills and fever.   Respiratory:  Negative for shortness of breath.    Gastrointestinal:  Negative for abdominal pain, diarrhea, nausea and vomiting.   Genitourinary:  Negative for dysuria.   All other systems reviewed and are negative.  Objective:     Vital Signs (Most Recent):  Temp: 98.9 °F (37.2 °C) (04/14/22 0700)  Pulse: 97 (04/14/22 0650)  Resp: (!) 26 (04/14/22 0650)  BP: (!) 170/122 (04/14/22 0650)  SpO2: 96 % (04/14/22 0650)   Vital " Signs (24h Range):  Temp:  [97.5 °F (36.4 °C)-99.9 °F (37.7 °C)] 98.9 °F (37.2 °C)  Pulse:  [] 97  Resp:  [16-32] 26  SpO2:  [95 %-98 %] 96 %  BP: (105-181)/() 170/122     Weight: 49.4 kg (109 lb)  Body mass index is 17.07 kg/m².    Estimated Creatinine Clearance: 31.4 mL/min (based on SCr of 1.3 mg/dL).    Physical Exam  Constitutional:       General: She is not in acute distress.     Appearance: She is not ill-appearing.      Comments: thin   HENT:      Head:      Comments: EEG cap     Right Ear: External ear normal.      Left Ear: External ear normal.      Mouth/Throat:      Mouth: Mucous membranes are moist.   Eyes:      General: No scleral icterus.        Right eye: No discharge.         Left eye: No discharge.   Cardiovascular:      Rate and Rhythm: Normal rate and regular rhythm.   Pulmonary:      Effort: Pulmonary effort is normal. No respiratory distress.      Breath sounds: No stridor. No wheezing or rhonchi.   Abdominal:      General: Bowel sounds are normal. There is no distension.      Palpations: Abdomen is soft.      Tenderness: There is no abdominal tenderness. There is no guarding.   Genitourinary:     Comments: purewick  Skin:     General: Skin is warm and dry.      Findings: No erythema or rash.      Comments: R foot wrapped   Neurological:      Mental Status: She is alert and oriented to person, place, and time. Mental status is at baseline.      Motor: No weakness.   Psychiatric:         Mood and Affect: Mood normal.       Significant Labs:   Microbiology Results (last 7 days)       Procedure Component Value Units Date/Time    Urine culture [[id number]]  (Abnormal)  (Susceptibility) Collected: 04/11/22 1017    Order Status: Completed Specimen: Urine Updated: 04/13/22 1411     Urine Culture, Routine ESCHERICHIA COLI ESBL  10,000 - 49,999 cfu/ml      Narrative:      Specimen Source->Urine    Blood culture [[id number]] Collected: 04/11/22 1020    Order Status: Completed Specimen: Blood  from Peripheral, Left Hand Updated: 04/13/22 1212     Blood Culture, Routine No growth to date      No Growth to date    Narrative:      Collection has been rescheduled by T at 04/11/2022 10:04 Reason:   Patient unavailable, patient care, nurse paige notified.  Collection has been rescheduled by T at 04/11/2022 10:04 Reason:   Patient unavailable, patient care, nurse paige notified.    Blood culture [[id number]] Collected: 04/11/22 1021    Order Status: Completed Specimen: Blood from Peripheral, Left Arm Updated: 04/13/22 1212     Blood Culture, Routine No growth to date      No Growth to date    Narrative:      Collection has been rescheduled by T at 04/11/2022 10:04 Reason:   Patient unavailable, patient care, nurse paige notified.  Collection has been rescheduled by T at 04/11/2022 10:04 Reason:   Patient unavailable, patient care, nurse paige notified.    Blood culture [[id number]]     Order Status: No result Specimen: Blood     Blood culture [[id number]]     Order Status: No result Specimen: Blood             Significant Imaging: I have reviewed all pertinent imaging results/findings within the past 24 hours.

## 2022-04-14 NOTE — ASSESSMENT & PLAN NOTE
S/p evac on 4/4/13; stable and stepped down from NCC.    On 4/13 patient had 8/10 HA and was slower to follow commands.  Repeat CTH showed re-accumulation of R SDH along with 4-5mm MLS.  Stepped back up to [facility].    -- NSGY following  -- SBP <160  -- q1hr neuro check  -- hold anticoagulation  - EEG revealed seizure  - Vimpat load 200 mg

## 2022-04-14 NOTE — SUBJECTIVE & OBJECTIVE
Interval History:  About ICU for blocks away and altered mental status.  She has more bright on exam this morning.  Follow-up CT is stable.  Pending EEG      Medications:  Continuous Infusions:   niCARdipine Stopped (04/14/22 0506)     Scheduled Meds:   amLODIPine  10 mg Oral Daily    atorvastatin  40 mg Oral Daily    carvediloL  25 mg Oral BID    ceFEPime (MAXIPIME) IVPB EXTENDED INFUSION  2 g Intravenous Q12H    levetiracetam IV  1,500 mg Intravenous Q12H    levothyroxine  75 mcg Oral Before breakfast    losartan  25 mg Oral Daily    senna-docusate 8.6-50 mg  1 tablet Oral BID    silodosin  4 mg Oral Daily    vancomycin (VANCOCIN) IVPB  500 mg Intravenous Q24H     PRN Meds:sodium chloride, sodium chloride, acetaminophen, dextrose 10%, glucagon (human recombinant), hydrALAZINE, HYDROcodone-acetaminophen, HYDROmorphone, labetalol, metoclopramide HCl, ondansetron, potassium bicarbonate, potassium bicarbonate, potassium bicarbonate, potassium, sodium phosphates, potassium, sodium phosphates, potassium, sodium phosphates, sodium chloride 0.9%, sodium chloride 0.9%, Pharmacy to dose Vancomycin consult **AND** vancomycin - pharmacy to dose     Review of Systems  Objective:     Weight: 49.4 kg (109 lb)  Body mass index is 17.07 kg/m².  Vital Signs (Most Recent):  Temp: 98.9 °F (37.2 °C) (04/14/22 0700)  Pulse: 97 (04/14/22 0650)  Resp: (!) 26 (04/14/22 0650)  BP: (!) 170/122 (04/14/22 0650)  SpO2: 96 % (04/14/22 0650)   Vital Signs (24h Range):  Temp:  [97.5 °F (36.4 °C)-99.9 °F (37.7 °C)] 98.9 °F (37.2 °C)  Pulse:  [] 97  Resp:  [16-32] 26  SpO2:  [95 %-98 %] 96 %  BP: (105-181)/() 170/122                      Neurosurgery Physical Exam  Constitutional: No distress.   HEENT: Normocephalic. Left sided crani incision with staples intact, clean and dry.   Cardiovascular: Regular rhythm.   Pulm: No signs of respiratory distress.   Abdominal: Soft, non-distended.   Mental status: She is awake, alert and oriented  to person, date and place.  Opening eyes spontaneously  PERRL  Left side contracted and paretic  FCx in all except LUE    Significant Labs:  Recent Labs   Lab 04/12/22  1010 04/13/22  0505 04/14/22  0418   * 177* 116*    139 141   K 3.5 3.6 3.9    107 107   CO2 23 23 22*   BUN 18 19 24*   CREATININE 1.1 1.2 1.3   CALCIUM 8.3* 8.7 8.6*       Recent Labs   Lab 04/12/22  1010 04/13/22  0655 04/14/22  0418   WBC 9.58 11.22 15.82*   HGB 9.4* 10.1* 10.7*   HCT 28.1* 30.5* 32.7*    154 209       No results for input(s): LABPT, INR, APTT in the last 48 hours.  Microbiology Results (last 7 days)       Procedure Component Value Units Date/Time    Urine culture [[id number]]  (Abnormal)  (Susceptibility) Collected: 04/11/22 1017    Order Status: Completed Specimen: Urine Updated: 04/13/22 1411     Urine Culture, Routine ESCHERICHIA COLI ESBL  10,000 - 49,999 cfu/ml      Narrative:      Specimen Source->Urine    Blood culture [[id number]] Collected: 04/11/22 1020    Order Status: Completed Specimen: Blood from Peripheral, Left Hand Updated: 04/13/22 1212     Blood Culture, Routine No growth to date      No Growth to date    Narrative:      Collection has been rescheduled by [facility] at 04/11/2022 10:04 Reason:   Patient unavailable, patient care, nurse paige notified.  Collection has been rescheduled by T at 04/11/2022 10:04 Reason:   Patient unavailable, patient care, nurse paige notified.    Blood culture [[id number]] Collected: 04/11/22 1021    Order Status: Completed Specimen: Blood from Peripheral, Left Arm Updated: 04/13/22 1212     Blood Culture, Routine No growth to date      No Growth to date    Narrative:      Collection has been rescheduled by T at 04/11/2022 10:04 Reason:   Patient unavailable, patient care, nurse paige notified.  Collection has been rescheduled by [facility] at 04/11/2022 10:04 Reason:   Patient unavailable, patient care, nurse paige notified.    Blood culture [[id number]]      Order Status: No result Specimen: Blood     Blood culture [[id number]]     Order Status: No result Specimen: Blood           All pertinent labs from the last 24 hours have been reviewed.    Significant Diagnostics:  I have reviewed all pertinent imaging results/findings within the past 24 hours.  Physical Exam

## 2022-04-14 NOTE — SUBJECTIVE & OBJECTIVE
Past Medical History:   Diagnosis Date    Gastroparesis     GERD (gastroesophageal reflux disease)     History of Clostridium difficile colitis 07/24/2021    History of kidney stones     History of stroke     left side paralysis    Hyperlipidemia     Hypertension     Hypothyroidism     Iron deficiency anemia     Osteoarthritis     Peripheral neuropathy     Stage IV CKD     Type II diabetes mellitus        Past Surgical History:   Procedure Laterality Date    APPENDECTOMY      CRANIOTOMY FOR EVACUATION OF SUBDURAL HEMATOMA Right 4/4/2022    Procedure: CRANIOTOMY, FOR SUBDURAL HEMATOMA EVACUATION;  Surgeon: Silvio White MD;  [facility];  Service: Neurosurgery;  Laterality: Right;    CYSTOSCOPY W/ URETERAL STENT PLACEMENT Right 10/13/2021    Procedure: CYSTOSCOPY, WITH URETERAL STENT INSERTION;  Surgeon: Wanda Arroyo MD;  Location: [facility] OR;  Service: Urology;  Laterality: Right;    ESOPHAGOGASTRODUODENOSCOPY N/A 11/23/2021    Procedure: EGD (ESOPHAGOGASTRODUODENOSCOPY);  Surgeon: Obed Jeong MD;  Location: [facility] (2ND FLR);  Service: Endoscopy;  Laterality: N/A;    FOOT AMPUTATION THROUGH METATARSAL Right 3/24/2022    Procedure: AMPUTATION, FOOT, PARTIAL 4th and 5th ray;  Surgeon: Rodrigo Logan DPM;  Location: [facility] OR 2ND FLR;  Service: Podiatry;  Laterality: Right;    LAPAROSCOPIC APPENDECTOMY N/A 7/22/2021    Procedure: APPENDECTOMY, LAPAROSCOPIC;  Surgeon: Paulo Calvo Jr., MD;  Location: [facility];  Service: General;  Laterality: N/A;    TOE AMPUTATION Right 1/1/2022    Procedure: AMPUTATION, TOE;  Surgeon: Genaro Mason DPM;  Location: [facility];  Service: Podiatry;  Laterality: Right;    TUBAL LIGATION      URETEROSCOPIC REMOVAL OF URETERIC CALCULUS Right 12/22/2021    Procedure: REMOVAL, CALCULUS, URETER, URETEROSCOPIC;  Surgeon: Wanda Arroyo MD;  Location: [facility];  Service: Urology;  Laterality: Right;       Review of patient's allergies indicates:   Allergen  Reactions    Tomato (solanum lycopersicum) Hives and Itching       No current facility-administered medications on file prior to encounter.     Current Outpatient Medications on File Prior to Encounter   Medication Sig    rosuvastatin (CRESTOR) 40 MG Tab Take 40 mg by mouth every evening.    amLODIPine (NORVASC) 5 MG tablet Take 10 mg by mouth every evening. Take 5 mg every morning and 10 mg at night    ascorbic acid, vitamin C, (VITAMIN C) 500 MG tablet Take 1 tablet (500 mg total) by mouth once daily.    aspirin (ECOTRIN) 81 MG EC tablet Take 81 mg by mouth once daily.    b complex vitamins tablet Take 1 tablet by mouth once daily.    carvedilol (COREG) 25 MG tablet Take 1 tablet (25 mg total) by mouth 2 (two) times daily.    clopidogreL (PLAVIX) 75 mg tablet Take 75 mg by mouth.    DULoxetine (CYMBALTA) 30 MG capsule Take 1 capsule (30 mg total) by mouth once daily.    ferrous sulfate (FEOSOL) 325 mg (65 mg iron) Tab tablet Take 325 mg by mouth daily with breakfast. Off at present    folic acid (FOLVITE) 1 MG tablet Take 1 tablet (1 mg total) by mouth once daily.    gabapentin (NEURONTIN) 300 MG capsule Take 1 capsule (300 mg total) by mouth once daily.    hydrochlorothiazide (MICROZIDE ORAL) Take 20 mg by mouth once daily at 6am.    insulin (BASAGLAR KWIKPEN U-100 INSULIN) glargine 100 units/mL (3mL) SubQ pen Inject 5 Units into the skin once daily.    Lactobacillus rhamnosus GG (CULTURELLE) 10 billion cell capsule Take 1 capsule by mouth once daily.    lactulose (CHRONULAC) 10 gram/15 mL solution Take 10 g by mouth 2 (two) times a day.    levothyroxine (SYNTHROID) 75 MCG tablet Take 75 mcg by mouth once daily.    losartan (COZAAR) 25 MG tablet Take 25 mg by mouth once daily at 6am.    metoclopramide HCl (REGLAN) 10 MG tablet Take 1 tablet (10 mg total) by mouth 3 (three) times daily before meals.    omeprazole (PRILOSEC) 20 MG capsule Take 20 mg by mouth 2 (two) times daily.    ondansetron (ZOFRAN) 4 MG tablet  Take 1 tablet (4 mg total) by mouth every 8 (eight) hours as needed for Nausea.    tamsulosin (FLOMAX) 0.4 mg Cap Take 1 capsule (0.4 mg total) by mouth once daily.    traZODone (DESYREL) 100 MG tablet Take 1 tablet (100 mg total) by mouth once daily. (Patient taking differently: Take 100 mg by mouth nightly as needed for Insomnia.)     Continuous Infusions:   niCARdipine 5 mg/hr (04/14/22 0950)       Family History       Problem Relation (Age of Onset)    Alcohol abuse Father    Arthritis Mother    Asthma Brother    Diabetes Mother, Sister, Brother    Heart attack Mother, Father, Brother    Heart disease Mother, Brother    Hypertension Mother    Kidney disease Mother    Stroke Mother    Vision loss Mother          Tobacco Use    Smoking status: Never Smoker    Smokeless tobacco: Never Used   Substance and Sexual Activity    Alcohol use: No     Comment: socially    Drug use: No    Sexual activity: Not Currently     Review of Systems   Unable to perform ROS: Acuity of condition   Objective:     Vital Signs (Most Recent):  Temp: 98.9 °F (37.2 °C) (04/14/22 0700)  Pulse: 100 (04/14/22 1300)  Resp: (!) 25 (04/14/22 1300)  BP: (!) 163/75 (04/14/22 1300)  SpO2: 98 % (04/14/22 1300)   Vital Signs (24h Range):  Temp:  [97.5 °F (36.4 °C)-99.9 °F (37.7 °C)] 98.9 °F (37.2 °C)  Pulse:  [] 100  Resp:  [17-32] 25  SpO2:  [95 %-98 %] 98 %  BP: (105-181)/() 163/75     Weight: 49.4 kg (109 lb)  Body mass index is 17.07 kg/m².    Physical Exam  Constitutional:       General: She is not in acute distress.     Appearance: She is not diaphoretic.   HENT:      Head: Normocephalic.      Comments: EEG in place  Eyes:      General: No scleral icterus.        Right eye: No discharge.         Left eye: No discharge.      Conjunctiva/sclera: Conjunctivae normal.      Pupils: Pupils are equal, round, and reactive to light.   Cardiovascular:      Rate and Rhythm: Normal rate.   Pulmonary:      Effort: Pulmonary effort is normal. No  respiratory distress.   Abdominal:      General: There is no distension.      Palpations: Abdomen is soft.      Tenderness: There is no abdominal tenderness.   Musculoskeletal:         General: Deformity (partial R foot amputation) present. No signs of injury.   Skin:     General: Skin is warm and dry.   Neurological:      Comments: L contraction   Psychiatric:      Comments: Unable to assess       NEUROLOGICAL EXAMINATION:     CRANIAL NERVES     CN III, IV, VI   Pupils are equal, round, and reactive to light.       Awake, oriented x3  VARINDER OU   Corneal intact OU   + spontaneous eye opening   Tongue midline   L contraction   Follows commands to RUE    Exam findings to suggest seizures:  Myoclonus - no   eye twitching - no   Nystagmus - no   gaze deviation - no   waxy rigidity - no      Significant Labs: All pertinent lab results from the past 24 hours have been reviewed.    Significant Studies: I have reviewed all pertinent imaging results/findings within the past 24 hours.

## 2022-04-14 NOTE — ASSESSMENT & PLAN NOTE
71 femal with R foot osteo with MRSA s/p debridement on Vanc completing 6 weeks, recent SDH s/p evacuation, Hx ESBL E coli in urine, L knee pain s/p fall, now with recent fever so ID reconsulted.  Blood cultures NGTD  R foot without sign of infection - MRSA prior  CXR possible developing PNA vs aspiration.   L knee pain and swelling suspected traumatic cause.  Repeat CTH today with possible increasing hemorrhage - NGSY following  Fever cause could be lung or intracranial bleeding.  Patient denies any urinary sx - UC with ESBL E coli 10-49k CFU - suspect asymptomatic bacteruria  On vanc for foot    Plan:  1. COntinue Vanc  2 Add cefepime for intracranial and lung coverage  3. Check procalcitonin  4. Going to ICU per NSGY -? Repeat craniotomy?  5. Seen with ID staff  6. Will follow - If going to ICU will transfer care to ID critical care team in am.

## 2022-04-14 NOTE — ASSESSMENT & PLAN NOTE
S/p partial right foot amputation  - On Vancomycin per ID recs  - Management per Podiatry, ID, NCC

## 2022-04-14 NOTE — PLAN OF CARE
04/14/22 1628   Post-Acute Status   Post-Acute Authorization Placement   Post-Acute Placement Status Referrals Sent  (LTAC)       MARIO spoke with Genevieve with [facility] regarding this Pt as possible LTAC candidate given the BPAP needs, IV antibiotic needs, and wound care needs. Per Genevieve, Pt does meet criteria.     MARIO met with Pt brother at bedside to discuss LTAC. He is agreeable but reported Pt son would make the decision.    MARIO contacted Pt son to discuss LTAC and provide options. Pt son agreeable and would like [facility] as their preference.     Sent referral via [facility].     Rose Marie Dillard LCSW  Neurocritical Care   [facility]  [id number]

## 2022-04-14 NOTE — ASSESSMENT & PLAN NOTE
71F PMHx of HTN, DM, CKD, R MCA CVA  on DAPT with L contraction and hemiparesis, s/p partial R foot amputation, and dementia who p/w unwitnessed fall on 4/3/2022 from [facility]. CTH revealed acute R SDH with mass effect and 3-4 mm L MLS, now s/p right fronto-parietal craniotomy for SDH evacuation with Dr. White on 4/4/2022. Stepped down to NSGY 4/8/2022. Hospital course c/b fever, CXR with possibly developing pneumonia vs aspiration. Cefepime added for coverage on 4/13 per ID recs. Patient c/o increasing severity of headache 4/13; CTH concerning for recurrent R SDH with increased effect and L MLS. Patient transferred back to [facility]. CAP EEG placed, limited with evidence of right periodic discharges. Formal EEG placed and revealed recurrent subclinical electrographic seizures with R onset. Epilepsy following for assistance in management.     Recommendations:  - Continuous vEEG monitoring  - Recommend IV Levetiracetam 50 mg/kg load followed by 2g BID  - Cefepime discontinued today  - Avoid agents that lower seizure threshold  - Management of infectious/metabolic abnormalities per NCC  - Seizure precautions      Discussed plan of care with NCC team. No family at bedside. Please call with questions.

## 2022-04-14 NOTE — PROGRESS NOTES
[facility] - Neuro Critical Care  Neurocritical Care  Progress Note    Admit Date: 4/3/2022  Service Date: 04/14/2022  Length of Stay: 11    Subjective:     Chief Complaint: SDH (subdural hematoma)    History of Present Illness: Ms. Adame is a 72 yo female with hx of dementia, R MCA stroke with left side contraction/hemiparesis presented to [facility] with SDH now s/p evac. Stepped down from [facility]. On 4/13 she developed worsening headache and CTH showed re-accumulation of R SDH and 4-5mm MLS. She was noted to be more slow to follow commands. Stepped back up to [facility] for closer monitoring.    Original HPI below:  Ms. Adame is a 72 y/o F with a PMHx of baseline dementia, GERD, R MCA stroke with residual LUE weakness, HLD, HTN, Stage IV CKD, TIIDM who presents to [facility] from [facility] with a AoC SDH after an unwitnessed fall from her wheel chair without loss of consciousness. CT spine negative for fracture. CT head with R SDH and 3-4 mm MLS. She is on DAPT at home. Of note, she has been staying at [facility] after a partial R foot amputation, stitches still intact, for which she is receiving vancomycin.         Hospital Course: 04/04/2022: OR today for clot evac. Patient returned to ICU intubated on precedex. Post op scan stable. Wean propofol and initiated precedex for likely extubation tomorrow.   04/05/2022 repeat CT head due to drainage.   04/06/2022 NAEO.   04/07/2022 NAEO. Successfully extubated  4/14/22: EEG revealed seizure       Interval History: patient exhibit right side facial twitching and right gaze. EEG revealed seizure activity. Patient loaded with Vimpat 200 mg    Review of Systems   Constitutional: Negative.    HENT: Negative.     Eyes: Negative.    Respiratory: Negative.     Cardiovascular: Negative.    Gastrointestinal: Negative.    Endocrine: Negative.    Genitourinary: Negative.    Musculoskeletal: Negative.    Skin: Negative.    Neurological: Negative.      Objective:     Vitals:  Temp:  98.4 °F (36.9 °C)  Pulse: 99  Rhythm: normal sinus rhythm  BP: (!) 152/72  MAP (mmHg): 103  Resp: (!) 23  SpO2: 97 %  O2 Device (Oxygen Therapy): room air    Temp  Min: 97.5 °F (36.4 °C)  Max: 99.9 °F (37.7 °C)  Pulse  Min: 82  Max: 105  BP  Min: 105/60  Max: 181/76  MAP (mmHg)  Min: 72  Max: 142  Resp  Min: 17  Max: 32  SpO2  Min: 96 %  Max: 98 %    04/13 0701 - 04/14 0700  In: 902.6 [P.O.:120; I.V.:127.9]  Out: 650 [Urine:650]   Unmeasured Output  Urine Occurrence: 1  Stool Occurrence: 1  Pad Count: 1       Physical Exam  Vitals and nursing note reviewed.   Constitutional:       Appearance: She is ill-appearing.   HENT:      Head: Normocephalic.      Nose: Nose normal.      Mouth/Throat:      Mouth: Mucous membranes are moist.      Pharynx: Oropharynx is clear.   Cardiovascular:      Rate and Rhythm: Normal rate and regular rhythm.      Pulses: Normal pulses.      Heart sounds: Normal heart sounds.   Pulmonary:      Effort: Pulmonary effort is normal.      Breath sounds: Normal breath sounds.   Abdominal:      General: Bowel sounds are normal.      Palpations: Abdomen is soft.   Musculoskeletal:         General: Normal range of motion.      Cervical back: Normal range of motion.   Skin:     General: Skin is warm and dry.      Capillary Refill: Capillary refill takes more than 3 seconds.   Neurological:      Comments: GCS 15  PERRL  AAO X3  CN II-XII grossly intact  LEGGETT on right side to command  Hemiparesis on left side  Sensation intact in all extremities          Medications:  ContinuousniCARdipine, Last Rate: 5 mg/hr (04/14/22 0950)    ScheduledamLODIPine, 10 mg, Daily  atorvastatin, 40 mg, Daily  carvediloL, 25 mg, BID  levetiracetam IV, 1,500 mg, Q12H  levothyroxine, 75 mcg, Before breakfast  losartan, 25 mg, Daily  senna-docusate 8.6-50 mg, 1 tablet, BID  silodosin, 4 mg, Daily  vancomycin (VANCOCIN) IVPB, 500 mg, Q24H    PRNsodium chloride, , Q24H PRN  sodium chloride, , Q24H PRN  acetaminophen, 650 mg, Q4H  PRN  dextrose 10%, 12.5 g, PRN  glucagon (human recombinant), 1 mg, PRN  hydrALAZINE, 10 mg, Q8H PRN  HYDROcodone-acetaminophen, 1 tablet, Q4H PRN  HYDROmorphone, 0.5 mg, Q3H PRN  labetalol, 10 mg, Q15 Min PRN  metoclopramide HCl, 5 mg, Q6H PRN  ondansetron, 4 mg, Q6H PRN  potassium bicarbonate, 35 mEq, PRN  potassium bicarbonate, 50 mEq, PRN  potassium bicarbonate, 60 mEq, PRN  potassium, sodium phosphates, 2 packet, PRN  potassium, sodium phosphates, 2 packet, PRN  potassium, sodium phosphates, 2 packet, PRN  sodium chloride 0.9%, 10 mL, PRN  sodium chloride 0.9%, 10 mL, PRN  vancomycin - pharmacy to dose, , pharmacy to manage frequency      Today I personally reviewed pertinent medications, lines/drains/airways, imaging, cardiology results, laboratory results, microbiology results, notably:    Diet  Diet NPO  Diet NPO          Assessment/Plan:     Neuro  * SDH (subdural hematoma)  S/p evac on 4/4/13; stable and stepped down from [facility].    On 4/13 patient had 8/10 HA and was slower to follow commands.  Repeat CTH showed re-accumulation of R SDH along with 4-5mm MLS.  Stepped back up to [facility].    -- NSGY following  -- SBP <160  -- q1hr neuro check  -- hold anticoagulation  - EEG revealed seizure  - Vimpat load 200 mg        History of stroke  R MCA stroke (2013) with residual LUE flaccid paralysis  DAPT at home held due to new ICH  Statin      Cardiac/Vascular  Elevated troponin I level  0.031 on admission  EKG, CXR, ECHO   DAPT at home, hold in setting of acute bleed   Resolved    Critical lower limb ischemia  See osteomyelitis    Hyperlipidemia  Statin    Hypertension  SBP < 160  PRN labetalol    Endocrine  Body mass index (BMI) less than 19  Nutrition consult    Type 2 diabetes mellitus, with long-term current use of insulin  A1c 5    Orthopedic  Status post partial amputation of right foot  See osteomyelitis    Other  Debility  Baseline flaccid paralysis of LUE 2/2 previous R MCA stroke   PT/OT          The patient  is being Prophylaxed for:  Venous Thromboembolism with: Mechanical or Chemical  Stress Ulcer with: Not Applicable   Ventilator Pneumonia with: not applicable    Activity Orders          Turn patient starting at 04/13 1600    Elevate HOB starting at 04/13 1514    Diet NPO: NPO starting at 04/13 1514    Elevate HOB Flat starting at 04/04 1228        Full Code  Critical care time 45 mins  Donita Garcia, NP  Neurocritical Care  [facility] - Neuro Critical Care

## 2022-04-14 NOTE — HOSPITAL COURSE
4/13>4/14: CAP EEG limited d/t artifact with evidence of right periodic discharges. Formal EEG placed and revealed recurrent subclinical electrographic seizures with right hemispheric onset.   4/14>4/15: right focal NCSE with nearly continuous fluctuation and frequency evolution into subclinical seizures; pattern is slightly more stable after IV Lacosamide but there are still persistent lateralized periodic discharges on a rhythmic background with evidence of fluctuation consistent with marked cortical irritation and high potential to develop into electrographic seizures from this region; moderate to severe encephalopathy    4/17>4/18: lateralized periodic discharges over right hemisphere with evidence of fluctuation sometimes meeting criteria for subclinical seizures

## 2022-04-14 NOTE — CONSULTS
[facility] - Neuro Critical Care  Infectious Disease  Consult Note    Patient Name: Beatriz Adame  MRN: [id number]  Admission Date: 4/3/2022  Hospital Length of Stay: 10 days  Attending Physician: Caleb Anand MD  Primary Care Provider: Dante Olivier MD     Isolation Status: Contact    Patient information was obtained from patient and ER records.      Inpatient consult to Infectious Diseases  Consult performed by: Arash More Jr., PA  Consult ordered by: Ina Ardon PA-C        Assessment/Plan:     Fever  71 femal with R foot osteo with MRSA s/p debridement on Vanc completing 6 weeks, recent SDH s/p evacuation, Hx ESBL E coli in urine, L knee pain s/p fall, now with recent fever so ID reconsulted.  Blood cultures NGTD  R foot without sign of infection - MRSA prior  CXR possible developing PNA vs aspiration.   L knee pain and swelling suspected traumatic cause.  Repeat CTH today with possible increasing hemorrhage - NGSY following  Fever cause could be lung or intracranial bleeding.  Patient denies any urinary sx - UC with ESBL E coli 10-49k CFU - suspect asymptomatic bacteruria  On vanc for foot    Plan:  1. COntinue Vanc  2 Add cefepime for intracranial and lung coverage  3. Check procalcitonin  4. Going to ICU per NSGY -? Repeat craniotomy?  5. Consider ortho sx eval of L knee given pain with PROM  6. Seen with ID staff  7. Will follow - If going to ICU will transfer care to ID critical care team in am.    Osteomyelitis of right foot  Although pathology of the clean margins failed to reveal osteomyelitis her bone biopsy cultures from the clean margin are positive for MRSA.  Therefore bone is likely infected at the clean margin however long-term bony changes have not yet been present.  · Rec continuing vancomycin as per prior recommendations for 6 weeks with an estimated end date of May 5, 2022.   · Please consult Pharmacy service for management of vancomycin levels.    · Monitor weekly CBC,  "BMP, sed rate and CRP.  Monitor vancomycin levels as per hospital protocol.  · Resume OPAT orders upon discharge.        Thank you for your consult. I will follow-up with patient. Please contact us if you have any additional questions.    EBONY Zhou  Infectious Disease  [facility] - Neuro Critical [facility]    Subjective:     Principal Problem: SDH (subdural hematoma)    HPI: A 71-year-old woman with past stroke, residual left-sided deficit, hypertension, DM2, urinary retention with chronic indwelling Delgado, CKD4, recurrent Pseudomonas UTI, right 2nd toe osteomyelitis status post amputation in January of 2022, right 4th and 5th toe osteomyelitis due to MRSA status post partial ray amputation on March 24th 2022 and on outpatient vancomycin therapy for 6 weeks who was admitted after an unwitnessed fall from her wheelchair while at [facility].  Evaluation in [facility] revealed an acute on chronic subdural hematoma.  The patient was admitted to the neuro critical care unit for further management.  Her vancomycin therapy was resumed.  She subsequently underwent evacuation of the subdural hematoma and a drain was left in place.    Infectious Disease was again consulted on readmit for "osteomyelitis, on dapto/vanc OP".  ID rec'd complete IV vanc as planned and signed off.  She remains on Vanc alone. She has had intermittent fevers since last seen and ID reconsulted given that.  CXR obtained on 4/11 showed: There is interval development of hazy perihilar and basilar lung opacities while nonspecific may represent vascular congestion and edema.  Cannot exclude underlying basilar effusions.  Sukhwinder reports dyspnea and SOB when seen.  UA obtained showing 1+ occult blood, 1+ leukocytes, WBC 28, 5 sqaumous epithelial cells.  She denies dysuria and in now on Purewick as delgado removed.  Blood cultures 5/11 NGTD.  US BL LEs - no dvt.    Patient seen today 4/13 and complains of stable HA but has new N/V since this am.  SHe " complains of L knee pain and denies R foot pain  She is being taken for repeat head CT at time of being seen.  Tmacx 100.2 yesterday but afebrile since.  Remains on Vanc alone. CT Head done and cf new SDH. The patient denies any recent neck pain, photophobia, fever, chills, or sweats.              Past Medical History:   Diagnosis Date    Gastroparesis     GERD (gastroesophageal reflux disease)     History of Clostridium difficile colitis 07/24/2021    History of kidney stones     History of stroke     left side paralysis    Hyperlipidemia     Hypertension     Hypothyroidism     Iron deficiency anemia     Osteoarthritis     Peripheral neuropathy     Stage IV CKD     Type II diabetes mellitus        Past Surgical History:   Procedure Laterality Date    APPENDECTOMY      CRANIOTOMY FOR EVACUATION OF SUBDURAL HEMATOMA Right 4/4/2022    Procedure: CRANIOTOMY, FOR SUBDURAL HEMATOMA EVACUATION;  Surgeon: Silvio White MD;  Location: [address];  Service: Neurosurgery;  Laterality: Right;    CYSTOSCOPY W/ URETERAL STENT PLACEMENT Right 10/13/2021    Procedure: CYSTOSCOPY, WITH URETERAL STENT INSERTION;  Surgeon: Wanda Arroyo MD;  Location: [facility];  Service: Urology;  Laterality: Right;    ESOPHAGOGASTRODUODENOSCOPY N/A 11/23/2021    Procedure: EGD (ESOPHAGOGASTRODUODENOSCOPY);  Surgeon: Obed Jeong MD;  Location: [address]);  Service: Endoscopy;  Laterality: N/A;    FOOT AMPUTATION THROUGH METATARSAL Right 3/24/2022    Procedure: AMPUTATION, FOOT, PARTIAL 4th and 5th ray;  Surgeon: Rodrigo Logan DPM;  Location: [address];  Service: Podiatry;  Laterality: Right;    LAPAROSCOPIC APPENDECTOMY N/A 7/22/2021    Procedure: APPENDECTOMY, LAPAROSCOPIC;  Surgeon: Paulo Calvo Jr., MD;  Location: [facility];  Service: General;  Laterality: N/A;    TOE AMPUTATION Right 1/1/2022    Procedure: AMPUTATION, TOE;  Surgeon: Genaro Mason DPM;  Location: [facility];  Service:  Podiatry;  Laterality: Right;    TUBAL LIGATION      URETEROSCOPIC REMOVAL OF URETERIC CALCULUS Right 12/22/2021    Procedure: REMOVAL, CALCULUS, URETER, URETEROSCOPIC;  Surgeon: Wanda Arroyo MD;  Location: [facility];  Service: Urology;  Laterality: Right;       Review of patient's allergies indicates:   Allergen Reactions    Tomato (solanum lycopersicum) Hives and Itching       Medications:  Medications Prior to Admission   Medication Sig    rosuvastatin (CRESTOR) 40 MG Tab Take 40 mg by mouth every evening.    amLODIPine (NORVASC) 5 MG tablet Take 10 mg by mouth every evening. Take 5 mg every morning and 10 mg at night    ascorbic acid, vitamin C, (VITAMIN C) 500 MG tablet Take 1 tablet (500 mg total) by mouth once daily.    aspirin (ECOTRIN) 81 MG EC tablet Take 81 mg by mouth once daily.    b complex vitamins tablet Take 1 tablet by mouth once daily.    carvedilol (COREG) 25 MG tablet Take 1 tablet (25 mg total) by mouth 2 (two) times daily.    clopidogreL (PLAVIX) 75 mg tablet Take 75 mg by mouth.    DULoxetine (CYMBALTA) 30 MG capsule Take 1 capsule (30 mg total) by mouth once daily.    ferrous sulfate (FEOSOL) 325 mg (65 mg iron) Tab tablet Take 325 mg by mouth daily with breakfast. Off at present    folic acid (FOLVITE) 1 MG tablet Take 1 tablet (1 mg total) by mouth once daily.    gabapentin (NEURONTIN) 300 MG capsule Take 1 capsule (300 mg total) by mouth once daily.    hydrochlorothiazide (MICROZIDE ORAL) Take 20 mg by mouth once daily at 6am.    insulin (BASAGLAR KWIKPEN U-100 INSULIN) glargine 100 units/mL (3mL) SubQ pen Inject 5 Units into the skin once daily.    Lactobacillus rhamnosus GG (CULTURELLE) 10 billion cell capsule Take 1 capsule by mouth once daily.    lactulose (CHRONULAC) 10 gram/15 mL solution Take 10 g by mouth 2 (two) times a day.    levothyroxine (SYNTHROID) 75 MCG tablet Take 75 mcg by mouth once daily.    losartan (COZAAR) 25 MG tablet Take 25 mg by mouth  "once daily at 6am.    metoclopramide HCl (REGLAN) 10 MG tablet Take 1 tablet (10 mg total) by mouth 3 (three) times daily before meals.    omeprazole (PRILOSEC) 20 MG capsule Take 20 mg by mouth 2 (two) times daily.    ondansetron (ZOFRAN) 4 MG tablet Take 1 tablet (4 mg total) by mouth every 8 (eight) hours as needed for Nausea.    [] oxyCODONE (ROXICODONE) 5 MG immediate release tablet Take 1 tablet (5 mg total) by mouth every 6 (six) hours as needed for Pain.    tamsulosin (FLOMAX) 0.4 mg Cap Take 1 capsule (0.4 mg total) by mouth once daily.    traZODone (DESYREL) 100 MG tablet Take 1 tablet (100 mg total) by mouth once daily. (Patient taking differently: Take 100 mg by mouth nightly as needed for Insomnia.)     Antibiotics (From admission, onward)                Start     Stop Route Frequency Ordered    22 1615  cefepime in dextrose 5 % IVPB 2 g         -- IV Every 12 hours (non-standard times) 22 1508    22 1200  vancomycin 500 mg in dextrose 5 % 100 mL IVPB (ready to mix system)         -- IV Every 24 hours (non-standard times) 22 0711    22 1718  vancomycin - pharmacy to dose  (vancomycin IVPB)        "And" Linked Group Details    -- IV pharmacy to manage frequency 22 1619          Antifungals (From admission, onward)                None          Antivirals (From admission, onward)      None             Immunization History   Administered Date(s) Administered    COVID-19, vector-nr, rS-Ad26, PF (Wibiya) 2021    PPD Test 2022       Family History       Problem Relation (Age of Onset)    Alcohol abuse Father    Arthritis Mother    Asthma Brother    Diabetes Mother, Sister, Brother    Heart attack Mother, Father, Brother    Heart disease Mother, Brother    Hypertension Mother    Kidney disease Mother    Stroke Mother    Vision loss Mother          Social History     Socioeconomic History    Marital status: Single   Tobacco Use    Smoking status: " Never Smoker    Smokeless tobacco: Never Used   Substance and Sexual Activity    Alcohol use: No     Comment: socially    Drug use: No    Sexual activity: Not Currently     Social Determinants of Health     Financial Resource Strain: Low Risk     Difficulty of Paying Living Expenses: Not hard at all   Food Insecurity: No Food Insecurity    Worried About Running Out of Food in the Last Year: Never true    Ran Out of Food in the Last Year: Never true   Transportation Needs: No Transportation Needs    Lack of Transportation (Medical): No    Lack of Transportation (Non-Medical): No   Physical Activity: Inactive    Days of Exercise per Week: 0 days    Minutes of Exercise per Session: 0 min   Stress: No Stress Concern Present    Feeling of Stress : Not at all   Social Connections: Moderately Isolated    Frequency of Communication with Friends and Family: More than three times a week    Frequency of Social Gatherings with Friends and Family: Once a week    Attends Tenriism Services: More than 4 times per year    Active Member of Clubs or Organizations: No    Attends Club or Organization Meetings: Never    Marital Status: Never    Housing Stability: Low Risk     Unable to Pay for Housing in the Last Year: No    Number of Places Lived in the Last Year: 1    Unstable Housing in the Last Year: No     Review of Systems   Constitutional:  Negative for appetite change, chills, diaphoresis, fatigue, fever and unexpected weight change.   HENT:  Negative for congestion, ear pain, hearing loss, sore throat and tinnitus.    Eyes:  Negative for pain, redness and visual disturbance.   Respiratory:  Positive for shortness of breath. Negative for cough, chest tightness and wheezing.    Cardiovascular:  Negative for chest pain.   Gastrointestinal:  Negative for abdominal pain, constipation, diarrhea, nausea and vomiting.   Endocrine: Negative for cold intolerance and heat intolerance.   Genitourinary:  Negative  for decreased urine volume, difficulty urinating, dysuria, flank pain, frequency, hematuria and urgency.   Musculoskeletal:  Negative for arthralgias, back pain, myalgias and neck pain.   Skin:  Positive for wound. Negative for rash.   Allergic/Immunologic: Negative for environmental allergies, food allergies and immunocompromised state.   Neurological:  Positive for headaches. Negative for dizziness, facial asymmetry, weakness, light-headedness and numbness.   Hematological:  Negative for adenopathy. Does not bruise/bleed easily.   Psychiatric/Behavioral:  Negative for agitation, behavioral problems and confusion.    Objective:     Vital Signs (Most Recent):  Temp: 98.6 °F (37 °C) (04/13/22 1745)  Pulse: 91 (04/13/22 1745)  Resp: 17 (04/13/22 1745)  BP: (!) 171/73 (04/13/22 1745)  SpO2: 98 % (04/13/22 1745)   Vital Signs (24h Range):  Temp:  [97.8 °F (36.6 °C)-100.2 °F (37.9 °C)] 98.6 °F (37 °C)  Pulse:  [77-91] 91  Resp:  [16-18] 17  SpO2:  [90 %-98 %] 98 %  BP: (144-171)/(64-73) 171/73     Weight: 49.4 kg (109 lb)  Body mass index is 17.07 kg/m².    Estimated Creatinine Clearance: 34 mL/min (based on SCr of 1.2 mg/dL).    Physical Exam  Constitutional:       General: She is not in acute distress.     Appearance: She is well-developed. She is ill-appearing. She is not diaphoretic.       HENT:      Head: Normocephalic.   Cardiovascular:      Rate and Rhythm: Normal rate and regular rhythm.      Heart sounds: Normal heart sounds. No murmur heard.    No friction rub. No gallop.   Pulmonary:      Effort: Pulmonary effort is normal. No respiratory distress.      Breath sounds: Normal breath sounds. No wheezing or rales.   Abdominal:      General: Bowel sounds are normal. There is no distension.      Palpations: Abdomen is soft. There is no mass.      Tenderness: There is no abdominal tenderness. There is no guarding or rebound.   Musculoskeletal:         General: Swelling (L knee) and tenderness (L knee anterior)  present.   Skin:     General: Skin is warm and dry.   Neurological:      Mental Status: She is alert and oriented to person, place, and time.      Comments: Oriented x3 when first seen then later on rounds  more lethargic and not answering questions   Psychiatric:         Behavior: Behavior normal.                               Significant Labs: Blood Culture:   Recent Labs   Lab 12/30/21  1523 12/30/21  1536 03/18/22  1641 04/11/22  1020 04/11/22  1021   LABBLOO No growth after 5 days. No growth after 5 days. No growth after 5 days.  No growth after 5 days. No growth to date  No Growth to date No growth to date  No Growth to date     CBC:   Recent Labs   Lab 04/12/22  1010 04/13/22  0655   WBC 9.58 11.22   HGB 9.4* 10.1*   HCT 28.1* 30.5*    154     CMP:   Recent Labs   Lab 04/12/22  1010 04/13/22  0505    139   K 3.5 3.6    107   CO2 23 23   * 177*   BUN 18 19   CREATININE 1.1 1.2   CALCIUM 8.3* 8.7   PROT 6.3 7.1   ALBUMIN 1.9* 2.1*   BILITOT 0.7 0.7   ALKPHOS 55 63   AST 14 13   ALT 12 11   ANIONGAP 9 9   EGFRNONAA 51.0* 45.9*     Wound Culture:   Recent Labs   Lab 03/18/22  1620 03/24/22  1538   LABAERO METHICILLIN RESISTANT STAPHYLOCOCCUS AUREUS  Many  * METHICILLIN RESISTANT STAPHYLOCOCCUS AUREUS  Few  *  METHICILLIN RESISTANT STAPHYLOCOCCUS AUREUS  Few  *     All pertinent labs within the past 24 hours have been reviewed.    Significant Imaging: I have reviewed all pertinent imaging results/findings within the past 24 hours.  X-Ray Abdomen AP 1 View [[id number]] Resulted: 04/13/22 1358   Order Status: Completed Updated: 04/13/22 1401   Narrative:     EXAMINATION:   XR ABDOMEN AP 1 VIEW     CLINICAL HISTORY:   emesis;     TECHNIQUE:   AP View(s) of the abdomen was performed.     COMPARISON:   Plain film from 10/15/2021     FINDINGS:   No dilated loops of small bowel.  Moderate amount of stool within the colon.  No evidence to suggest obstruction.  Small amount of high density  material overlying the right lower quadrant which is nonspecific.  Deformity of the left 12th rib which may be remote.  Remaining osseous structures are intact.    Impression:       As above.       Electronically signed by: Brock Aguero MD   Date: 04/13/2022   Time: 13:58   CT Head Without Contrast [[id number]] (Abnormal) Resulted: 04/13/22 1344   Order Status: Completed Updated: 04/13/22 1347   Narrative:     EXAMINATION:   CT HEAD WITHOUT CONTRAST     CLINICAL HISTORY:   8/10 headache, emesis, f/u SDH;     TECHNIQUE:   Low dose axial CT images obtained throughout the head without intravenous contrast. Sagittal and coronal reconstructions were performed.     COMPARISON:   CT head 04/06/2022     FINDINGS:   Postoperative changes of prior right frontal craniotomy and interval removal of subdural drainage catheter.     There is interval increased extra-axial mixed density fluid collection overlying the right cerebral hemisphere.  The increased low-density component is possibly in part superficial to the dural plasty suggestive for developing hygroma, however there is increased thin hyperdense component along the right cerebral hemisphere particularly posteriorly concerning for superimposed increased recent subdural hemorrhage.  This measures approximately 0.7 cm in thickness.  There is increased mass effect upon the right cerebral hemisphere with compression of the right lateral ventricle and leftward subfalcine herniation.  Leftward midline shift measuring 4-5 mm increased from prior.     There is hypoattenuation of the right deep white matter and right basal ganglia similar to prior which may represent combination of encephalomalacia of remote MCA territory infarct and chronic microvascular ischemic changes similar to prior.  Unchanged small left cerebellar encephalomalacia of remote infarct.  No new abnormal parenchymal attenuation to suggest new infarction.     Partial opacification left mastoid air cells.   Paranasal sinuses are essentially clear.     Findings were directly discussed with ordering provider Ina Ardon PA-C via secure chat by resident Paulo Tee at approximately 13:20 with acknowledgement message reply received.     This report was flagged in Epic as abnormal.    Impression:       Interval increase extra-axial mixed density fluid collection overlying the right cerebral hemisphere with increased hyperdense component posteriorly concerning for recurrent subdural hemorrhage.  Please note there is component of increased hypodense collection overlying the right frontal convexity which may represent developing hygroma.  There is increased mass effect with 4-5 mm of leftward midline shift     Clinical correlation and neuro surgical evaluation advised.     See above for additional details...     Electronically signed by resident: Paulo Tee   Date: 04/13/2022   Time: 13:06     Electronically signed by: Theo Fenton DO   Date: 04/13/2022   Time: 13:44   US Lower Extremity Veins Bilateral [[id number]] Resulted: 04/11/22 1432   Order Status: Completed Updated: 04/11/22 1435   Narrative:     EXAMINATION:   US LOWER EXTREMITY VEINS BILATERAL     CLINICAL HISTORY:   fever workup;     TECHNIQUE:   Duplex and color flow Doppler and dynamic compression was performed of the bilateral lower extremity veins was performed.     COMPARISON:   None     FINDINGS:   Right thigh veins: The common femoral, superficial femoral, popliteal, upper greater saphenous, and deep femoral veins are patent and free of thrombus. The veins are normally compressible and have normal phasic flow and augmentation response.     Right calf veins: The visualized calf veins are patent.     Left thigh veins: The common femoral, superficial femoral, popliteal, upper greater saphenous, and deep femoral veins are patent and free of thrombus. The veins are normally compressible and have normal phasic flow and augmentation response.     Left calf  veins: The visualized calf veins are patent.    Impression:       No evidence of deep venous thrombosis in either lower extremity.     Electronically signed by resident: Angelita Preciado   Date: 04/11/2022   Time: 14:26     Electronically signed by: Brock Aguero MD   Date: 04/11/2022   Time: 14:32   X-Ray Chest AP Portable [[id number]] Resulted: 04/11/22 0854   Order Status: Completed Updated: 04/11/22 0857   Narrative:     EXAMINATION:   XR CHEST AP PORTABLE     CLINICAL HISTORY:   fever workup;     TECHNIQUE:   Single frontal view of the chest was performed.     COMPARISON:   04/05/2022     FINDINGS:   Interval extubation and removal of feeding tube and pH probe devices.  Right-sided PICC line relatively stable.  There is interval development of hazy perihilar and basilar lung opacities while nonspecific may represent vascular congestion and edema.  Cannot exclude underlying basilar effusions.  There is no large pneumothorax.  Continued atherosclerotic aorta.  Clinical correlation and follow-up advised.    Impression:       Please see above       Electronically signed by: Theo Fenton DO   Date: 04/11/2022   Time: 08:54       Imaging History    2022  Date Procedure Name Status Accession Number Location   04/13/22 01:15 PM X-Ray Abdomen AP 1 View Final [id number] [facility]   04/13/22 12:57 PM CT Head Without Contrast Final [id number] [facility]   04/11/22 02:14 PM US Lower Extremity Veins Bilateral Final [id number] [facility]   04/11/22 08:41 AM X-Ray Chest AP Portable Final [id number] [facility]   04/06/22 04:34 AM CT Head Without Contrast Final [id number] [facility]   04/05/22 11:34 AM CT Head Without Contrast Final [id number] [facility]   04/05/22 11:29 AM X-Ray Chest AP Portable Final [id number] [facility]   04/04/22 03:44 PM CT Head Without Contrast Final [id number] [facility]   04/03/22 08:47 PM CT Head Without Contrast Final [id number] [facility]   04/03/22 06:09 PM X-Ray Chest AP Portable Final [id number] [facility]   04/03/22 02:29 PM CT Head Without Contrast Final  [id number] [facility]   04/03/22 02:29 PM CT Cervical Spine Without Contrast Final [id number] [facility]   03/26/22 07:47 PM X-Ray Chest 1 View Final [id number] [facility]   03/24/22 05:34 PM X-Ray Foot Complete Right Final [id number] [facility]   03/19/22 10:55 AM VAS Ankle Brachial Indices Resting Final [id number]    03/18/22 09:43 PM MRI Foot Toes Without Contrast Right Final [id number] [facility]   03/18/22 05:30 PM X-Ray Foot Complete Right Final [id number] [facility]   03/18/22 05:29 PM X-Ray Chest AP Portable Final [id number] [facility]   04/04/22 01:43 PM Echo Final [id number] [facility]

## 2022-04-14 NOTE — PT/OT/SLP DISCHARGE
Occupational Therapy Discharge Summary    Beatriz Adame  MRN: [id number]   Principal Problem: SDH (subdural hematoma)      Patient Discharged from acute Occupational Therapy on 4/14/2022.  Please refer to prior OT note dated 4/13/2022 for functional status.  Please re-consult once medically stable.    Assessment:      Patient transferred to lower level of care secondary to change in medical status and alertness    Objective:     GOALS:   Multidisciplinary Problems     Occupational Therapy Goals        Problem: Occupational Therapy    Goal Priority Disciplines Outcome Interventions   Occupational Therapy Goal     OT, PT/OT Ongoing, Progressing    Description: Goals to be met by: 5/7/22    Patient will increase functional independence with ADLs by performing:    Grooming while seated with Stand-by Assistance.  Toileting from bedside commode with Minimal Assistance for hygiene and clothing management.   Supine to sit with Minimal Assistance.  Stand pivot transfers with Minimal Assistance.  Toilet transfer to bedside commode with Minimal Assistance.                     Reasons for Discontinuation of Therapy Services  Transfer to alternate level of care.      Plan:     [facility]    4/14/2022

## 2022-04-14 NOTE — NURSING
Charge RN to bedside to place cap EEG on patient per Neurosurgery MD at bedside. On call Epilepsy called. Message left for them to call back for notification of placement of EEG.

## 2022-04-14 NOTE — CONSULTS
[facility] - Neuro Critical Care  Neurology-Epilepsy  Consult Note    Patient Name: Beatriz Adame  MRN: [id number]  Admission Date: 4/3/2022  Hospital Length of Stay: 11 days  Code Status: Full Code   Attending Provider: Maverick Ely MD   Consulting Provider: Marilee Daigle PA-C  Primary Care Physician: Dante Olivier MD  Principal Problem:Recurrent seizures    Consults   Subjective:     HPI:   71 year old female with a PMHx of HTN, DM, CKD, R MCA CVA on DAPT with left sided contraction and hemiparesis, s/p partial right foot amputation on IV Vancomycin, and dementia who initially presented to the ER 4/3/2022 from [facility] after an unwitnessed fall. HPI per chart review. CTH revealed acute right subdural hematoma with mass effect and 3-4 mm leftward midline shift. Patient was admitted to [facility] for higher level of care and further management. Patient s/p right fronto-parietal craniotomy for subdural evacuation with Dr. White on 4/4/2022. Patient was stepped down to Neurosurgery on 4/8/2022. Hospital course complicated by fever; CXR with possibly developing pneumonia vs aspiration. Cefepime added for coverage on 4/13 per ID recs. Patient complained of increasing severity of headache 4/13; CTH concerning for recurrent subdural hemorrhage over right hemisphere with increased effect and midline shift. Patient transferred to [facility] for higher level of care and further management. CAP EEG limited, evidence of right periodic discharges. Formal EEG placed and revealed recurrent subclinical electrographic seizures with right hemispheric onset. Epilepsy following for assistance in management.       Hospital Course:   4/13>4/14: CAP EEG limited d/t artifact with evidence of right periodic discharges. Formal EEG placed and revealed recurrent subclinical electrographic seizures with right hemispheric onset.        Past Medical History:   Diagnosis Date    Gastroparesis     GERD (gastroesophageal reflux disease)      History of Clostridium difficile colitis 07/24/2021    History of kidney stones     History of stroke     left side paralysis    Hyperlipidemia     Hypertension     Hypothyroidism     Iron deficiency anemia     Osteoarthritis     Peripheral neuropathy     Stage IV CKD     Type II diabetes mellitus        Past Surgical History:   Procedure Laterality Date    APPENDECTOMY      CRANIOTOMY FOR EVACUATION OF SUBDURAL HEMATOMA Right 4/4/2022    Procedure: CRANIOTOMY, FOR SUBDURAL HEMATOMA EVACUATION;  Surgeon: Silvio White MD;  [facility];  Service: Neurosurgery;  Laterality: Right;    CYSTOSCOPY W/ URETERAL STENT PLACEMENT Right 10/13/2021    Procedure: CYSTOSCOPY, WITH URETERAL STENT INSERTION;  Surgeon: Wanda Arroyo MD;  Location: [facility];  Service: Urology;  Laterality: Right;    ESOPHAGOGASTRODUODENOSCOPY N/A 11/23/2021    Procedure: EGD (ESOPHAGOGASTRODUODENOSCOPY);  Surgeon: Obed Jeong MD;  Location: [facility] (2ND FLR);  Service: Endoscopy;  Laterality: N/A;    FOOT AMPUTATION THROUGH METATARSAL Right 3/24/2022    Procedure: AMPUTATION, FOOT, PARTIAL 4th and 5th ray;  Surgeon: Rodrigo Logan DPM;  [facility];  Service: Podiatry;  Laterality: Right;    LAPAROSCOPIC APPENDECTOMY N/A 7/22/2021    Procedure: APPENDECTOMY, LAPAROSCOPIC;  Surgeon: Paulo Calvo Jr., MD;  Location: [facility];  Service: General;  Laterality: N/A;    TOE AMPUTATION Right 1/1/2022    Procedure: AMPUTATION, TOE;  Surgeon: Genaro Mason DPM;  Location: [facility];  Service: Podiatry;  Laterality: Right;    TUBAL LIGATION      URETEROSCOPIC REMOVAL OF URETERIC CALCULUS Right 12/22/2021    Procedure: REMOVAL, CALCULUS, URETER, URETEROSCOPIC;  Surgeon: Wanda Arroyo MD;  Location: [facility];  Service: Urology;  Laterality: Right;       Review of patient's allergies indicates:   Allergen Reactions    Tomato (solanum lycopersicum) Hives and Itching       No current  facility-administered medications on file prior to encounter.     Current Outpatient Medications on File Prior to Encounter   Medication Sig    rosuvastatin (CRESTOR) 40 MG Tab Take 40 mg by mouth every evening.    amLODIPine (NORVASC) 5 MG tablet Take 10 mg by mouth every evening. Take 5 mg every morning and 10 mg at night    ascorbic acid, vitamin C, (VITAMIN C) 500 MG tablet Take 1 tablet (500 mg total) by mouth once daily.    aspirin (ECOTRIN) 81 MG EC tablet Take 81 mg by mouth once daily.    b complex vitamins tablet Take 1 tablet by mouth once daily.    carvedilol (COREG) 25 MG tablet Take 1 tablet (25 mg total) by mouth 2 (two) times daily.    clopidogreL (PLAVIX) 75 mg tablet Take 75 mg by mouth.    DULoxetine (CYMBALTA) 30 MG capsule Take 1 capsule (30 mg total) by mouth once daily.    ferrous sulfate (FEOSOL) 325 mg (65 mg iron) Tab tablet Take 325 mg by mouth daily with breakfast. Off at present    folic acid (FOLVITE) 1 MG tablet Take 1 tablet (1 mg total) by mouth once daily.    gabapentin (NEURONTIN) 300 MG capsule Take 1 capsule (300 mg total) by mouth once daily.    hydrochlorothiazide (MICROZIDE ORAL) Take 20 mg by mouth once daily at 6am.    insulin (BASAGLAR KWIKPEN U-100 INSULIN) glargine 100 units/mL (3mL) SubQ pen Inject 5 Units into the skin once daily.    Lactobacillus rhamnosus GG (CULTURELLE) 10 billion cell capsule Take 1 capsule by mouth once daily.    lactulose (CHRONULAC) 10 gram/15 mL solution Take 10 g by mouth 2 (two) times a day.    levothyroxine (SYNTHROID) 75 MCG tablet Take 75 mcg by mouth once daily.    losartan (COZAAR) 25 MG tablet Take 25 mg by mouth once daily at 6am.    metoclopramide HCl (REGLAN) 10 MG tablet Take 1 tablet (10 mg total) by mouth 3 (three) times daily before meals.    omeprazole (PRILOSEC) 20 MG capsule Take 20 mg by mouth 2 (two) times daily.    ondansetron (ZOFRAN) 4 MG tablet Take 1 tablet (4 mg total) by mouth every 8 (eight) hours  as needed for Nausea.    tamsulosin (FLOMAX) 0.4 mg Cap Take 1 capsule (0.4 mg total) by mouth once daily.    traZODone (DESYREL) 100 MG tablet Take 1 tablet (100 mg total) by mouth once daily. (Patient taking differently: Take 100 mg by mouth nightly as needed for Insomnia.)     Continuous Infusions:   niCARdipine 5 mg/hr (04/14/22 0950)       Family History       Problem Relation (Age of Onset)    Alcohol abuse Father    Arthritis Mother    Asthma Brother    Diabetes Mother, Sister, Brother    Heart attack Mother, Father, Brother    Heart disease Mother, Brother    Hypertension Mother    Kidney disease Mother    Stroke Mother    Vision loss Mother          Tobacco Use    Smoking status: Never Smoker    Smokeless tobacco: Never Used   Substance and Sexual Activity    Alcohol use: No     Comment: socially    Drug use: No    Sexual activity: Not Currently     Review of Systems   Unable to perform ROS: Acuity of condition   Objective:     Vital Signs (Most Recent):  Temp: 98.9 °F (37.2 °C) (04/14/22 0700)  Pulse: 100 (04/14/22 1300)  Resp: (!) 25 (04/14/22 1300)  BP: (!) 163/75 (04/14/22 1300)  SpO2: 98 % (04/14/22 1300)   Vital Signs (24h Range):  Temp:  [97.5 °F (36.4 °C)-99.9 °F (37.7 °C)] 98.9 °F (37.2 °C)  Pulse:  [] 100  Resp:  [17-32] 25  SpO2:  [95 %-98 %] 98 %  BP: (105-181)/() 163/75     Weight: 49.4 kg (109 lb)  Body mass index is 17.07 kg/m².    Physical Exam  Constitutional:       General: She is not in acute distress.     Appearance: She is not diaphoretic.   HENT:      Head: Normocephalic.      Comments: EEG in place  Eyes:      General: No scleral icterus.        Right eye: No discharge.         Left eye: No discharge.      Conjunctiva/sclera: Conjunctivae normal.      Pupils: Pupils are equal, round, and reactive to light.   Cardiovascular:      Rate and Rhythm: Normal rate.   Pulmonary:      Effort: Pulmonary effort is normal. No respiratory distress.   Abdominal:      General:  There is no distension.      Palpations: Abdomen is soft.      Tenderness: There is no abdominal tenderness.   Musculoskeletal:         General: Deformity (partial R foot amputation) present. No signs of injury.   Skin:     General: Skin is warm and dry.   Psychiatric:      Comments: Unable to assess       NEUROLOGICAL EXAMINATION:     CRANIAL NERVES     CN III, IV, VI   Pupils are equal, round, and reactive to light.       Awake, oriented x3  VARINDER OU   Corneal intact OU   + spontaneous eye opening   Tongue midline   L contraction   Follows commands to RUE    Exam findings to suggest seizures:  Myoclonus - no   eye twitching - no   Nystagmus - no   gaze deviation - no   waxy rigidity - no      Significant Labs: All pertinent lab results from the past 24 hours have been reviewed.    Significant Studies: I have reviewed all pertinent imaging results/findings within the past 24 hours.    Assessment and Plan:     * Recurrent seizures  71F PMHx of HTN, DM, CKD, R MCA CVA  on DAPT with L contraction and hemiparesis, s/p partial R foot amputation, and dementia who p/w unwitnessed fall on 4/3/2022 from [facility]. CTH revealed acute R SDH with mass effect and 3-4 mm L MLS, now s/p right fronto-parietal craniotomy for SDH evacuation with Dr. White on 4/4/2022. Stepped down to NSGY 4/8/2022. Hospital course c/b fever, CXR with possibly developing pneumonia vs aspiration. Cefepime added for coverage on 4/13 per ID recs. Patient c/o increasing severity of headache 4/13; CTH concerning for recurrent R SDH with increased effect and L MLS. Patient transferred back to [facility]. CAP EEG placed, limited with evidence of right periodic discharges. Formal EEG placed and revealed recurrent subclinical electrographic seizures with R onset. Epilepsy following for assistance in management.     Recommendations:  - Continuous vEEG monitoring  - Recommend IV Levetiracetam 50 mg/kg load followed by 2g BID  - Cefepime discontinued today;  recommend to avoid d/t neurotoxicity   - Avoid agents that lower seizure threshold  - Management of infectious/metabolic abnormalities per NCC  - Seizure precautions      Discussed plan of care with NCC team. No family at bedside. Please call with questions.    SDH (subdural hematoma)  - Presented to ER 4/3/2022 from [facility] after an unwitnessed fall-> CTH revealed acute R SDH with mass effect and 3-4 mm L MLS  - s/p right fronto-parietal craniotomy for subdural evacuation with Dr. White on 4/4/2022  - Patient c/o increasing severity of headache 4/13-> CTH concerning for recurrent R SDH with increased effect and 4-5 L MLS  - Management per NSGY, NCC    Osteomyelitis of right foot  S/p partial right foot amputation  - On Vancomycin per ID recs  - Management per Podiatry, ID, NCC    History of stroke  Hx of R MCA CVA w/ left hemiparesis/contraction   - On DAPT as outpatient per chart  - Management per NCC      VTE Risk Mitigation (From admission, onward)         Ordered     IP VTE HIGH RISK PATIENT  Once         04/03/22 1608     Place sequential compression device  Until discontinued         04/03/22 1608     Reason for No Pharmacological VTE Prophylaxis  Once        Question:  Reasons:  Answer:  Active Bleeding    04/03/22 1608                Thank you for your consult. Please contact us if you have any additional questions.    Marilee Daigle PA-C  Neurology-Epilepsy  [facility] - Neuro Critical Care  Staff: Dr. Da Silva

## 2022-04-14 NOTE — PLAN OF CARE
04/14/22 1537   Discharge Reassessment   Assessment Type Discharge Planning Reassessment   Did the patient's condition or plan change since previous assessment? No   Discharge Plan discussed with: Adult children;Patient   Communicated MARY with patient/caregiver Date not available/Unable to determine   Discharge Plan A Long-term acute care facility (LTAC)   Discharge Plan B Skilled Nursing Facility   DME Needed Upon Discharge  none   Discharge Barriers Identified None   Why the patient remains in the hospital Requires continued medical care   Post-Acute Status   Post-Acute Authorization Placement   Post-Acute Placement Status Pending medical clearance/testing   Discharge Delays None known at this time       D/C plan changed to LTAC.  Referrals sent by MARIO SEYMOUR to facilities.  MARIO SEYMOUR spoke with family member re: change in change in d/c plan    Kiley Almodovar CD, MSW, LMSW, RSW   Case Management  [facility]

## 2022-04-14 NOTE — PLAN OF CARE
[facility] Care Plan    POC reviewed with Beatriz Adame and family at 2100. Pt and family verbalized understanding. Questions and concerns addressed. No acute events overnight. Pt progressing toward goals.  See below and flowsheets for full assessment and VS info.           Is this a stroke patient?     [place] Coma Scale  Best Eye Response: 3-->(E3) to speech  Best Motor Response: 6-->(M6) obeys commands  Best Verbal Response: 5-->(V5) oriented  [place] Coma Scale Score: 14  Assessment Qualifiers: patient not sedated/intubated  Pupil PERRLA: yes     24hr Temp:  [97.5 °F (36.4 °C)-99.2 °F (37.3 °C)]     CV:   Rhythm: normal sinus rhythm  BP goals:   SBP < 160  MAP > 65    Resp:   O2 Device (Oxygen Therapy): room air  Vent Mode: Spont  Set Rate: 15 BPM  Oxygen Concentration (%): 98  Vt Set: 450 mL  PEEP/CPAP: 5 cmH20  Pressure Support: 5 cmH20    Plan: keep O2 sats >92% while on Room Air    GI/:     Diet/Nutrition Received: mechanical/dental soft  Last Bowel Movement: 04/12/22  Voiding Characteristics: external catheter    Intake/Output Summary (Last 24 hours) at 4/14/2022 0304  Last data filed at 4/14/2022 0105  Gross per 24 hour   Intake 613.35 ml   Output 550 ml   Net 63.35 ml     Unmeasured Output  Urine Occurrence: 1  Stool Occurrence: 1  Pad Count: 1    Labs/Accuchecks:  Recent Labs   Lab 04/13/22  0655   WBC 11.22   RBC 3.38*   HGB 10.1*   HCT 30.5*         Recent Labs   Lab 04/13/22  0505      K 3.6   CO2 23      BUN 19   CREATININE 1.2   ALKPHOS 63   ALT 11   AST 13   BILITOT 0.7    No results for input(s): PROTIME, INR, APTT, HEPANTIXA in the last 168 hours. No results for input(s): CPK, CPKMB, TROPONINI, MB in the last 168 hours.    Electrolytes:   Accuchecks: two times daily  Gtts:   niCARdipine 2.5 mg/hr (04/14/22 0105)       LDA/Wounds:  Lines/Drains/Airways       Peripherally Inserted Central Catheter Line  Duration             PICC Double Lumen 03/26/22 1905 right basilic 18 days               Drain  Duration             Female External Urinary Catheter 04/13/22 1820 <1 day                  Wounds: yes  Wound care consulted: yes

## 2022-04-14 NOTE — ASSESSMENT & PLAN NOTE
Ddx for fever includes infectious (bacteremia, pneumonia, and less likely UTI given asymptomatic bacteruria and poor UA sample with presence of squams) vs non-infectious cause such as new bleed as seen on recent CT. Febrile episodes appear to coincide with anemia that required blood transfusion. Now in NCC for closer monitoring. Repeat blcx in process. CXR with hazy perihilar/bibasilar lung opacities - possible aspiration given recent extubation/removal of feeding tube. Suspect leukocytosis to be due to above processes.     Recommendations:  -continue vancomycin pharm to dose as prior MRSA OM plan (rodrigue 5/5)   -continue empiric cefepime pending cx data  -sputum cultures  -follow up repeat blcx

## 2022-04-14 NOTE — HPI
71 year old female with a PMHx of HTN, DM, CKD, R MCA CVA on DAPT with left sided contraction and hemiparesis, s/p partial right foot amputation on IV Vancomycin, and dementia who initially presented to the ER 4/3/2022 from [facility] after an unwitnessed fall. HPI per chart review. CTH revealed acute right subdural hematoma with mass effect and 3-4 mm leftward midline shift. Patient was admitted to [facility] for higher level of care and further management. Patient s/p right fronto-parietal craniotomy for subdural evacuation with Dr. White on 4/4/2022. Patient was stepped down to Neurosurgery on 4/8/2022. Hospital course complicated by fever; CXR with possibly developing pneumonia vs aspiration. Cefepime added for coverage on 4/13 per ID recs. Patient complained of increasing severity of headache 4/13; CTH concerning for recurrent subdural hemorrhage over right hemisphere with increased effect and midline shift. Patient transferred to [facility] for higher level of care and further management. CAP EEG limited, evidence of right periodic discharges. Formal EEG placed and revealed recurrent subclinical electrographic seizures with right hemispheric onset. Epilepsy following for assistance in management.    Kilograms Preamble Statement (Weight Entered In Details Tab): Reported Weight in kilograms:

## 2022-04-14 NOTE — PLAN OF CARE
[facility] Care Plan    POC reviewed with Beatriz ARNDT Wendi and family at 1400. Pt verbalized understanding. Questions and concerns addressed. No acute events today. Pt progressing toward goals. Will continue to monitor. See below and flowsheets for full assessment and VS info.             Is this a stroke patient? yes- Stroke booklet reviewed with patient and family, risk factors identified for patient and stroke booklet remains at bedside for ongoing education.     Neuro:  [place] Coma Scale  Best Eye Response: 3-->(E3) to speech  Best Motor Response: 6-->(M6) obeys commands  Best Verbal Response: 5-->(V5) oriented  [place] Coma Scale Score: 14  Assessment Qualifiers: eye obstruction present  Pupil PERRLA: yes     24 hr Temp:  [97.5 °F (36.4 °C)-99.9 °F (37.7 °C)]     CV:   Rhythm: normal sinus rhythm  BP goals:   SBP < 160  MAP > 65    Resp:   O2 Device (Oxygen Therapy): room air  Vent Mode: Spont  Set Rate: 15 BPM  Oxygen Concentration (%): 98  Vt Set: 450 mL  PEEP/CPAP: 5 cmH20  Pressure Support: 5 cmH20    Plan: N/A    GI/:     Diet/Nutrition Received: NPO  Last Bowel Movement: 04/12/22  Voiding Characteristics: external catheter    Intake/Output Summary (Last 24 hours) at 4/14/2022 1702  Last data filed at 4/14/2022 0647  Gross per 24 hour   Intake 782.61 ml   Output 650 ml   Net 132.61 ml     Unmeasured Output  Urine Occurrence: 1  Stool Occurrence: 1  Pad Count: 1    Labs/Accuchecks:  Recent Labs   Lab 04/14/22  0418   WBC 15.82*   RBC 3.55*   HGB 10.7*   HCT 32.7*         Recent Labs   Lab 04/14/22  0418      K 3.9   CO2 22*      BUN 24*   CREATININE 1.3   ALKPHOS 67   ALT 11   AST 12   BILITOT 0.5    No results for input(s): PROTIME, INR, APTT, HEPANTIXA in the last 168 hours. No results for input(s): CPK, CPKMB, TROPONINI, MB in the last 168 hours.    Electrolytes: N/A - electrolytes WDL  Accuchecks: Q6H    Gtts:   niCARdipine 5 mg/hr (04/14/22 0950)       LDA/Wounds:  Lines/Drains/Airways        Peripherally Inserted Central Catheter Line  Duration             PICC Double Lumen 03/26/22 1905 right basilic 18 days              Drain  Duration                  NG/OG Tube 04/14/22 1200 [place] sump 14 Fr. Left nostril <1 day    Female External Urinary Catheter 04/13/22 1820 <1 day                  Wounds: No  Wound care consulted: No

## 2022-04-14 NOTE — PROGRESS NOTES
[facility]  Neurosurgery  Progress Note    Subjective:     History of Present Illness: 71 year old female with , hypothyroidism, CVA with left-sided residual deficit on ASA/Plavix, DM2, HTN, HLD, CKD, chronic anemia, presenting from SNF after being found down next to wheelchair after unwitnessed fall, consulted to NSGY for right 2 cm SDH without midline shift. She is altered at baseline and unable to provide to history, but family at bedside says she is more confused and less interactive than usual.       Post-Op Info:  Procedure(s) (LRB):  CRANIOTOMY, FOR SUBDURAL HEMATOMA EVACUATION (Right)   10 Days Post-Op     Interval History:  About ICU for blocks away and altered mental status.  She has more bright on exam this morning.  Follow-up CT is stable.  Pending EEG      Medications:  Continuous Infusions:   niCARdipine Stopped (04/14/22 0506)     Scheduled Meds:   amLODIPine  10 mg Oral Daily    atorvastatin  40 mg Oral Daily    carvediloL  25 mg Oral BID    ceFEPime (MAXIPIME) IVPB EXTENDED INFUSION  2 g Intravenous Q12H    levetiracetam IV  1,500 mg Intravenous Q12H    levothyroxine  75 mcg Oral Before breakfast    losartan  25 mg Oral Daily    senna-docusate 8.6-50 mg  1 tablet Oral BID    silodosin  4 mg Oral Daily    vancomycin (VANCOCIN) IVPB  500 mg Intravenous Q24H     PRN Meds:sodium chloride, sodium chloride, acetaminophen, dextrose 10%, glucagon (human recombinant), hydrALAZINE, HYDROcodone-acetaminophen, HYDROmorphone, labetalol, metoclopramide HCl, ondansetron, potassium bicarbonate, potassium bicarbonate, potassium bicarbonate, potassium, sodium phosphates, potassium, sodium phosphates, potassium, sodium phosphates, sodium chloride 0.9%, sodium chloride 0.9%, Pharmacy to dose Vancomycin consult **AND** vancomycin - pharmacy to dose     Review of Systems  Objective:     Weight: 49.4 kg (109 lb)  Body mass index is 17.07 kg/m².  Vital Signs (Most Recent):  Temp: 98.9 °F  (37.2 °C) (04/14/22 0700)  Pulse: 97 (04/14/22 0650)  Resp: (!) 26 (04/14/22 0650)  BP: (!) 170/122 (04/14/22 0650)  SpO2: 96 % (04/14/22 0650)   Vital Signs (24h Range):  Temp:  [97.5 °F (36.4 °C)-99.9 °F (37.7 °C)] 98.9 °F (37.2 °C)  Pulse:  [] 97  Resp:  [16-32] 26  SpO2:  [95 %-98 %] 96 %  BP: (105-181)/() 170/122                      Neurosurgery Physical Exam  Constitutional: No distress.   HEENT: Normocephalic. Left sided crani incision with staples intact, clean and dry.   Cardiovascular: Regular rhythm.   Pulm: No signs of respiratory distress.   Abdominal: Soft, non-distended.   Mental status: She is awake, alert and oriented to person, date and place.  Opening eyes spontaneously  PERRL  Left side contracted and paretic  FCx in all except LUE    Significant Labs:  Recent Labs   Lab 04/12/22  1010 04/13/22  0505 04/14/22  0418   * 177* 116*    139 141   K 3.5 3.6 3.9    107 107   CO2 23 23 22*   BUN 18 19 24*   CREATININE 1.1 1.2 1.3   CALCIUM 8.3* 8.7 8.6*       Recent Labs   Lab 04/12/22  1010 04/13/22  0655 04/14/22  0418   WBC 9.58 11.22 15.82*   HGB 9.4* 10.1* 10.7*   HCT 28.1* 30.5* 32.7*    154 209       No results for input(s): LABPT, INR, APTT in the last 48 hours.  Microbiology Results (last 7 days)       Procedure Component Value Units Date/Time    Urine culture [[id number]]  (Abnormal)  (Susceptibility) Collected: 04/11/22 1017    Order Status: Completed Specimen: Urine Updated: 04/13/22 1411     Urine Culture, Routine ESCHERICHIA COLI ESBL  10,000 - 49,999 cfu/ml      Narrative:      Specimen Source->Urine    Blood culture [[id number]] Collected: 04/11/22 1020    Order Status: Completed Specimen: Blood from Peripheral, Left Hand Updated: 04/13/22 1212     Blood Culture, Routine No growth to date      No Growth to date    Narrative:      Collection has been rescheduled by T at 04/11/2022 10:04 Reason:   Patient unavailable, patient care, nurse grecia  notified.  Collection has been rescheduled by T at 04/11/2022 10:04 Reason:   Patient unavailable, patient care, nurse paige notified.    Blood culture [[id number]] Collected: 04/11/22 1021    Order Status: Completed Specimen: Blood from Peripheral, Left Arm Updated: 04/13/22 1212     Blood Culture, Routine No growth to date      No Growth to date    Narrative:      Collection has been rescheduled by T at 04/11/2022 10:04 Reason:   Patient unavailable, patient care, nurse paige notified.  Collection has been rescheduled by T at 04/11/2022 10:04 Reason:   Patient unavailable, patient care, nurse paige notified.    Blood culture [[id number]]     Order Status: No result Specimen: Blood     Blood culture [[id number]]     Order Status: No result Specimen: Blood           All pertinent labs from the last 24 hours have been reviewed.    Significant Diagnostics:  I have reviewed all pertinent imaging results/findings within the past 24 hours.  Physical Exam    Assessment/Plan:     * SDH (subdural hematoma)  71 year old female with , hypothyroidism, CVA with left-sided residual deficit on ASA/Plavix, DM2, HTN, HLD, CKD, chronic anemia, presenting from SNF after being found down next to wheelchair after unwitnessed fall, consulted to NSGY for right 2 cm SDH mostly acute, membranous and heterogenous appearing, some chronic component, without midline shift. Now s/p R crani for SDH evacuation (4/4).    --Stepped up to ICU  -- q1 hour vitals/neurochecks; exam wax and wane   -- SBP < 160  -- Na goal eunatremia.  -- Diet: keep npo for now  -- Seizure ppx: Keppra 1500 BID. Please place on EEG  -- Follow up CT head satisfactory x2 with residual blood   -- CT head from yesterday shows residual subdural hematoma with reaccumulation with some mass effect and sulcal effacement which is stable on repeat scan this morning  -- Saturating well on room   -- No HOB restriction.  -- PPx: SCDs, BR, IS. Okay for SQH for dvt ppx.   --  Osteomyelitis the or really alternative is of R foot:    - ID rec 6 week course Vanc with end date 5/5. F/u full ID recs. OK to start Etrapenem if pt decompensates.   -Podiatry managing recent partial foot amputation, appreciate recs. Sutures removed 4/12.    -Home health/facility to do dressing changes every MWF and prn as follows:  Cleanse right foot incision site with saline or wound cleanser, paint incision site with betadine, cover with 4x4 gauze, kerlix, ACE bandage.   -- Anemia: Resolved.   -- Fever 4/11: Afebrile overnight.    - UA 4/11: Growing GNRs, f/u susceptibility   - CXR with possible basilar effusion.   - BLE US 4/11: w/o DVT.   - HM following. Appreciate assistance with care.    - Blood cx 4/11: NGTD.  --Cranial wound care: Staples to be removed 4/18 if incision appears well healed.   --Repeat CTH 6 weeks post op.     Dispo: EEG            Shahana Cornejo MD  Neurosurgery  [facility]

## 2022-04-14 NOTE — ASSESSMENT & PLAN NOTE
- Presented to ER 4/3/2022 from [facility] after an unwitnessed fall-> CTH revealed acute R SDH with mass effect and 3-4 mm L MLS  - s/p right fronto-parietal craniotomy for subdural evacuation with Dr. White on 4/4/2022  - Patient c/o increasing severity of headache 4/13-> CTH concerning for recurrent R SDH with increased effect and 4-5 L MLS  - Management per NSGY, NCC

## 2022-04-15 NOTE — ASSESSMENT & PLAN NOTE
MRSA osteomyelitis of the right foot.  S/p amputation of 4th and 5th toes.  Bone cultures positive for MRSA.  Bone margins showed chronic inflammation on pathology.    Plan    1. Vancomycin IV for 6 weeks.    2. Goal trough is 15-20.    3. Estimated stop date would be may 5.    4. If discharged prior to stop date, check cbc, cmp, crp, vancomycin trough once a week and fax to ID clinic.    5. Patient has follow up scheduled in ID clinic.

## 2022-04-15 NOTE — PROCEDURES
EEG prelim  11:13 a.m.-21:43 p.m.    Background:  Continuous and markedly asymmetric.  Over the left, there is predominantly delta with some admixed theta.  Over the right and central regions, there is more prominent delta slowing with nearly continuous irregular lateralized periodic discharges that often coalesce on a rhythmic background with superimposed fast activity.  After the administration of IV lacosamide, there is a modest improvement with slightly more stable continuous periodic discharges with somewhat less fluctuation over the right hemisphere.    Impression:  Right hemispheric focal nonconvulsive status epilepticus with nearly continuous fluctuation and frequent evolution into subclinical seizures.  The pattern is slightly more stable after the administration of IV lacosamide but there are still persistent lateralized periodic discharges on a rhythmic background with evidence of fluctuation consistent with marked cortical irritation and a high potential to develop  electrographic seizures from this region..  Generalized slowing consistent with a moderate to moderate severe encephalopathy.  Higher frequencies with more sharply contoured activity in the area of the patient's known skull defect consistent with a breach rhythm.    Please hit the EEG button with any clinical activity concerning for seizures and describe what you see.    Full report after the completion of the study.    Lorelei Peace MD PhD  Neurology-Epilepsy  [facility].

## 2022-04-15 NOTE — ASSESSMENT & PLAN NOTE
S/p evac on 4/4/13; stable and stepped down from NCC.    On 4/13 patient had 8/10 HA and was slower to follow commands.  Repeat CTH showed re-accumulation of R SDH along with 4-5mm MLS.  Stepped back up to [facility].    -- NSGY following  -- SBP <160  -- q1hr neuro check  -- hold anticoagulation  - EEG revealed seizure  - Vimpat load 400 mg

## 2022-04-15 NOTE — SUBJECTIVE & OBJECTIVE
Interval History:  EEG shows nonconvulsive seizure that localized the right-sided.  No clinical seizures.  She is on Keppra and Vimpat.  Exam wax and wane   Medications:  Continuous Infusions:   niCARdipine 0 mg/hr (04/15/22 0545)     Scheduled Meds:   amLODIPine  10 mg Per NG tube Daily    atorvastatin  40 mg Per NG tube Daily    carvediloL  25 mg Per NG tube BID    lacosamide (VIMPAT) IVPB  100 mg Intravenous Q12H    lacosamide (VIMPAT) IVPB  200 mg Intravenous Once    levetiracetam IV  1,500 mg Intravenous Q12H    levothyroxine  75 mcg Per NG tube Before breakfast    losartan  25 mg Per NG tube Daily    senna-docusate 8.6-50 mg  1 tablet Per NG tube BID    silodosin  4 mg Per NG tube Daily    vancomycin (VANCOCIN) IVPB  500 mg Intravenous Q24H     PRN Meds:sodium chloride, sodium chloride, acetaminophen, dextrose 10%, glucagon (human recombinant), hydrALAZINE, HYDROcodone-acetaminophen, HYDROmorphone, labetalol, metoclopramide HCl, ondansetron, potassium bicarbonate, potassium bicarbonate, potassium bicarbonate, potassium, sodium phosphates, potassium, sodium phosphates, potassium, sodium phosphates, sodium chloride 0.9%, sodium chloride 0.9%, Pharmacy to dose Vancomycin consult **AND** vancomycin - pharmacy to dose     Review of Systems  Objective:     Weight: 49.4 kg (109 lb)  Body mass index is 17.07 kg/m².  Vital Signs (Most Recent):  Temp: 98.6 °F (37 °C) (04/15/22 0800)  Pulse: 77 (04/15/22 0910)  Resp: 20 (04/15/22 0854)  BP: (!) 156/79 (04/15/22 0910)  SpO2: 99 % (04/15/22 0854)   Vital Signs (24h Range):  Temp:  [98 °F (36.7 °C)-100.7 °F (38.2 °C)] 98.6 °F (37 °C)  Pulse:  [] 77  Resp:  [17-28] 20  SpO2:  [94 %-99 %] 99 %  BP: (108-185)/(52-96) 156/79                      Neurosurgery Physical Exam  Constitutional: No distress.   HEENT: Normocephalic. Left sided crani incision with staples intact, clean and dry.   Cardiovascular: Regular rhythm.   Pulm: No signs of respiratory distress.    Abdominal: Soft, non-distended.   Mental status: She is awake. Opening eyes spontaneously.  Ox3 but very dysarthric speech  PERRL  Left side contracted and paretic  FCx on the right-side    Significant Labs:  Recent Labs   Lab 04/14/22  0418 04/15/22  0137   * 97    143   K 3.9 3.7    111*   CO2 22* 22*   BUN 24* 29*   CREATININE 1.3 1.2   CALCIUM 8.6* 8.3*       Recent Labs   Lab 04/14/22  0418 04/15/22  0137   WBC 15.82* 13.16*   HGB 10.7* 9.2*   HCT 32.7* 28.3*    181       No results for input(s): LABPT, INR, APTT in the last 48 hours.  Microbiology Results (last 7 days)       Procedure Component Value Units Date/Time    Blood culture [[id number]] Collected: 04/14/22 1208    Order Status: Completed Specimen: Blood from Peripheral, Hand, Right Updated: 04/14/22 1915     Blood Culture, Routine No Growth to date    Blood culture [[id number]] Collected: 04/14/22 1226    Order Status: Completed Specimen: Blood from Peripheral, Hand, Left Updated: 04/14/22 1915     Blood Culture, Routine No Growth to date    Blood culture [[id number]] Collected: 04/11/22 1021    Order Status: Completed Specimen: Blood from Peripheral, Left Arm Updated: 04/14/22 1212     Blood Culture, Routine No growth to date      No Growth to date      No Growth to date    Narrative:      Collection has been rescheduled by [facility] at 04/11/2022 10:04 Reason:   Patient unavailable, patient care, nurse paige notified.  Collection has been rescheduled by T at 04/11/2022 10:04 Reason:   Patient unavailable, patient care, nurse paige notified.    Blood culture [[id number]] Collected: 04/11/22 1020    Order Status: Completed Specimen: Blood from Peripheral, Left Hand Updated: 04/14/22 1212     Blood Culture, Routine No growth to date      No Growth to date      No Growth to date    Narrative:      Collection has been rescheduled by [facility] at 04/11/2022 10:04 Reason:   Patient unavailable, patient care, nurse paige  notified.  Collection has been rescheduled by [facility] at 04/11/2022 10:04 Reason:   Patient unavailable, patient care, nurse grecia notified.    Culture, Respiratory with Gram Stain [[id number]]     Order Status: No result Specimen: Respiratory     Urine culture [[id number]]  (Abnormal)  (Susceptibility) Collected: 04/11/22 1017    Order Status: Completed Specimen: Urine Updated: 04/13/22 1411     Urine Culture, Routine ESCHERICHIA COLI ESBL  10,000 - 49,999 cfu/ml      Narrative:      Specimen Source->Urine    Blood culture [[id number]]     Order Status: No result Specimen: Blood     Blood culture [[id number]]     Order Status: No result Specimen: Blood           All pertinent labs from the last 24 hours have been reviewed.    Significant Diagnostics:  I have reviewed all pertinent imaging results/findings within the past 24 hours.  Physical Exam

## 2022-04-15 NOTE — PROGRESS NOTES
[facility] Neuro Critical Care  Infectious Disease  Progress Note    Patient Name: Beatriz Adame  MRN: [id number]  Admission Date: 4/3/2022  Length of Stay: 12 days  Attending Physician: Maverick Ely MD  Primary Care Provider: Dante Olivier MD    Isolation Status: Contact  Assessment/Plan:      Fever  Likely related to CNS hemorrhage.  Patient is s/p surgery.  Continue monitoring.      Osteomyelitis of right foot  MRSA osteomyelitis of the right foot.  S/p amputation of 4th and 5th toes.  Bone cultures positive for MRSA.  Bone margins showed chronic inflammation on pathology.    Plan    1. Vancomycin IV for 6 weeks.    2. Goal trough is 15-20.    3. Estimated stop date would be may 5.    4. If discharged prior to stop date, check cbc, cmp, crp, vancomycin trough once a week and fax to ID clinic.    5. Patient has follow up scheduled in ID clinic.        Anticipated Disposition: TBD    Thank you for your consult. I will sign off. Please contact us if you have any additional questions.    Hugo Barros MD  Infectious Disease  [facility] Neuro Critical Care    Subjective:     Principal Problem:SDH (subdural hematoma)    HPI: A 71-year-old woman with past stroke, residual left-sided deficit, hypertension, DM2, urinary retention with chronic indwelling Araiza, CKD4, recurrent Pseudomonas UTI, right 2nd toe osteomyelitis status post amputation in January of 2022, right 4th and 5th toe osteomyelitis due to MRSA status post partial ray amputation on March 24th 2022 and on outpatient vancomycin therapy for 6 weeks who was admitted after an unwitnessed fall from her wheelchair while at [facility].  Evaluation in [facility] ED revealed an acute on chronic subdural hematoma.  The patient was admitted to the neuro critical care unit for further management.  Her vancomycin therapy was resumed.  She subsequently underwent evacuation of the subdural hematoma and a drain was left in place.    Infectious Disease was  "again consulted on readmit for "osteomyelitis, on dapto/vanc OP".  ID rec'd complete IV vanc as planned and signed off.  She remains on Vanc alone. She has had intermittent fevers since last seen and ID reconsulted given that.  CXR obtained on 4/11 showed: There is interval development of hazy perihilar and basilar lung opacities while nonspecific may represent vascular congestion and edema.  Cannot exclude underlying basilar effusions.  Sukhwinder reports dyspnea and SOB when seen.  UA obtained showing 1+ occult blood, 1+ leukocytes, WBC 28, 5 sqaumous epithelial cells.  She denies dysuria and in now on Purewick as delgado removed.  Blood cultures 5/11 NGTD.  US BL LEs - no dvt.    Patient seen today 4/13 and complains of stable HA but has new N/V since this am.  SHe complains of L knee pain and denies R foot pain  She is being taken for repeat head CT at time of being seen.  Tmacx 100.2 yesterday but afebrile since.  Remains on Vanc alone. CT Head done and cf new SDH. The patient denies any recent neck pain, photophobia, fever, chills, or sweats.            Interval History:     Remains critically ill.    Review of Systems   Unable to perform ROS: Acuity of condition   Objective:     Vital Signs (Most Recent):  Temp: 97.7 °F (36.5 °C) (04/15/22 1600)  Pulse: 67 (04/15/22 1812)  Resp: 17 (04/15/22 1812)  BP: (!) 161/73 (04/15/22 1812)  SpO2: 97 % (04/15/22 1812)   Vital Signs (24h Range):  Temp:  [97.7 °F (36.5 °C)-100.7 °F (38.2 °C)] 97.7 °F (36.5 °C)  Pulse:  [63-97] 67  Resp:  [15-24] 17  SpO2:  [94 %-100 %] 97 %  BP: (108-185)/(52-85) 161/73     Weight: 49.4 kg (109 lb)  Body mass index is 17.07 kg/m².    Estimated Creatinine Clearance: 34 mL/min (based on SCr of 1.2 mg/dL).    Physical Exam  Constitutional:       General: She is not in acute distress.     Appearance: She is not ill-appearing.      Comments: thin   HENT:      Head:      Comments: EEG cap     Right Ear: External ear normal.      Left Ear: External ear " normal.      Mouth/Throat:      Mouth: Mucous membranes are moist.   Eyes:      General: No scleral icterus.        Right eye: No discharge.         Left eye: No discharge.   Cardiovascular:      Rate and Rhythm: Normal rate and regular rhythm.   Pulmonary:      Effort: Pulmonary effort is normal. No respiratory distress.      Breath sounds: No stridor. No wheezing or rhonchi.   Abdominal:      General: Bowel sounds are normal. There is no distension.      Palpations: Abdomen is soft.      Tenderness: There is no abdominal tenderness. There is no guarding.   Genitourinary:     Comments: darianck  Skin:     General: Skin is warm and dry.      Findings: No erythema or rash.      Comments: R foot wrapped   Neurological:      Mental Status: She is alert and oriented to person, place, and time. Mental status is at baseline.      Motor: No weakness.   Psychiatric:         Mood and Affect: Mood normal.       Significant Labs:   Microbiology Results (last 7 days)       Procedure Component Value Units Date/Time    Blood culture [[id number]] Collected: 04/14/22 1208    Order Status: Completed Specimen: Blood from Peripheral, Hand, Right Updated: 04/15/22 1412     Blood Culture, Routine No Growth to date      No Growth to date    Blood culture [[id number]] Collected: 04/14/22 1226    Order Status: Completed Specimen: Blood from Peripheral, Hand, Left Updated: 04/15/22 1412     Blood Culture, Routine No Growth to date      No Growth to date    Blood culture [[id number]] Collected: 04/11/22 1021    Order Status: Completed Specimen: Blood from Peripheral, Left Arm Updated: 04/15/22 1212     Blood Culture, Routine No growth to date      No Growth to date      No Growth to date      No Growth to date    Narrative:      Collection has been rescheduled by [facility] at 04/11/2022 10:04 Reason:   Patient unavailable, patient care, nurse paige notified.  Collection has been rescheduled by [facility] at 04/11/2022 10:04 Reason:   Patient unavailable,  patient care, nurse paige notified.    Blood culture [[id number]] Collected: 04/11/22 1020    Order Status: Completed Specimen: Blood from Peripheral, Left Hand Updated: 04/15/22 1212     Blood Culture, Routine No growth to date      No Growth to date      No Growth to date      No Growth to date    Narrative:      Collection has been rescheduled by [facility] at 04/11/2022 10:04 Reason:   Patient unavailable, patient care, nurse paige notified.  Collection has been rescheduled by T at 04/11/2022 10:04 Reason:   Patient unavailable, patient care, nurse paige notified.    Culture, Respiratory with Gram Stain [[id number]]     Order Status: No result Specimen: Respiratory     Urine culture [[id number]]  (Abnormal)  (Susceptibility) Collected: 04/11/22 1017    Order Status: Completed Specimen: Urine Updated: 04/13/22 1411     Urine Culture, Routine ESCHERICHIA COLI ESBL  10,000 - 49,999 cfu/ml      Narrative:      Specimen Source->Urine    Blood culture [[id number]]     Order Status: No result Specimen: Blood     Blood culture [[id number]]     Order Status: No result Specimen: Blood             Significant Imaging: I have reviewed all pertinent imaging results/findings within the past 24 hours.

## 2022-04-15 NOTE — PROGRESS NOTES
Pharmacokinetic Assessment Follow Up: IV Vancomycin  · 4/15 trough resulted at 17.4 mcg/mL  · Goal 15-20 mcg/mL   · Continue 500 mg q24h  · Collect next trough 4/19 @ 12:30    Drug levels (last 3 results):  Recent Labs   Lab Result Units 04/13/22  0505 04/14/22  0418 04/15/22  1516   Vancomycin, Random ug/mL 18.6 17.0  --    Vancomycin-Trough ug/mL  --   --  17.4       Pharmacy will continue to follow and monitor vancomycin.    Please contact pharmacy at extension [phone] for questions regarding this assessment.    Thank you for the consult,   Angélica Velázquez       Patient brief summary:  Beatriz Adame is a 71 y.o. female initiated on antimicrobial therapy with IV Vancomycin for treatment of bone/joint infection    Drug Allergies:   Review of patient's allergies indicates:   Allergen Reactions    Tomato (solanum lycopersicum) Hives and Itching       Actual Body Weight:   49.4 kg    Renal Function:   Estimated Creatinine Clearance: 34 mL/min (based on SCr of 1.2 mg/dL).,     Dialysis Method (if applicable):  N/A    CBC (last 72 hours):  Recent Labs   Lab Result Units 04/13/22  0655 04/14/22  0418 04/15/22  0137   WBC K/uL 11.22 15.82* 13.16*   Hemoglobin g/dL 10.1* 10.7* 9.2*   Hematocrit % 30.5* 32.7* 28.3*   Platelets K/uL 154 209 181   Gran % % 75.6* 80.1* 68.9   Lymph % % 18.0 13.3* 20.2   Mono % % 5.5 5.4 10.0   Eosinophil % % 0.1 0.0 0.0   Basophil % % 0.2 0.1 0.2   Differential Method  Automated Automated Automated       Metabolic Panel (last 72 hours):  Recent Labs   Lab Result Units 04/13/22  0505 04/14/22  0418 04/15/22  0137   Sodium mmol/L 139 141 143   Potassium mmol/L 3.6 3.9 3.7   Chloride mmol/L 107 107 111*   CO2 mmol/L 23 22* 22*   Glucose mg/dL 177* 116* 97   BUN mg/dL 19 24* 29*   Creatinine mg/dL 1.2 1.3 1.2   Albumin g/dL 2.1* 2.3* 2.1*   Total Bilirubin mg/dL 0.7 0.5 0.6   Alkaline Phosphatase U/L 63 67 56   AST U/L 13 12 16   ALT U/L 11 11 13       Vancomycin Administrations:  vancomycin  given in the last 96 hours                   vancomycin 500 mg in dextrose 5 % 100 mL IVPB (ready to mix system) (mg) 500 mg New Bag 04/15/22 1547     500 mg New Bag 04/14/22 1313    vancomycin 500 mg in dextrose 5 % 100 mL IVPB (ready to mix system) (mg) 500 mg New Bag 04/13/22 1325    vancomycin 500 mg in dextrose 5 % 100 mL IVPB (ready to mix system) (mg) 500 mg New Bag 04/12/22 1352                Microbiologic Results:  Microbiology Results (last 7 days)     Procedure Component Value Units Date/Time    Blood culture [[id number]] Collected: 04/14/22 1208    Order Status: Completed Specimen: Blood from Peripheral, Hand, Right Updated: 04/15/22 1412     Blood Culture, Routine No Growth to date      No Growth to date    Blood culture [[id number]] Collected: 04/14/22 1226    Order Status: Completed Specimen: Blood from Peripheral, Hand, Left Updated: 04/15/22 1412     Blood Culture, Routine No Growth to date      No Growth to date    Blood culture [[id number]] Collected: 04/11/22 1021    Order Status: Completed Specimen: Blood from Peripheral, Left Arm Updated: 04/15/22 1212     Blood Culture, Routine No growth to date      No Growth to date      No Growth to date      No Growth to date    Narrative:      Collection has been rescheduled by [facility] at 04/11/2022 10:04 Reason:   Patient unavailable, patient care, nurse paige notified.  Collection has been rescheduled by [facility] at 04/11/2022 10:04 Reason:   Patient unavailable, patient care, nurse paige notified.    Blood culture [[id number]] Collected: 04/11/22 1020    Order Status: Completed Specimen: Blood from Peripheral, Left Hand Updated: 04/15/22 1212     Blood Culture, Routine No growth to date      No Growth to date      No Growth to date      No Growth to date    Narrative:      Collection has been rescheduled by [facility] at 04/11/2022 10:04 Reason:   Patient unavailable, patient care, nurse paige notified.  Collection has been rescheduled by [facility] at 04/11/2022 10:04  Reason:   Patient unavailable, patient care, nurse grecia notified.    Culture, Respiratory with Gram Stain [[id number]]     Order Status: No result Specimen: Respiratory     Urine culture [[id number]]  (Abnormal)  (Susceptibility) Collected: 04/11/22 1017    Order Status: Completed Specimen: Urine Updated: 04/13/22 1411     Urine Culture, Routine ESCHERICHIA COLI ESBL  10,000 - 49,999 cfu/ml      Narrative:      Specimen Source->Urine    Blood culture [[id number]]     Order Status: No result Specimen: Blood     Blood culture [[id number]]     Order Status: No result Specimen: Blood

## 2022-04-15 NOTE — PROGRESS NOTES
[facility]  Neurosurgery  Progress Note    Subjective:     History of Present Illness: 71 year old female with , hypothyroidism, CVA with left-sided residual deficit on ASA/Plavix, DM2, HTN, HLD, CKD, chronic anemia, presenting from SNF after being found down next to wheelchair after unwitnessed fall, consulted to NSGY for right 2 cm SDH without midline shift. She is altered at baseline and unable to provide to history, but family at bedside says she is more confused and less interactive than usual.       Post-Op Info:  Procedure(s) (LRB):  CRANIOTOMY, FOR SUBDURAL HEMATOMA EVACUATION (Right)   11 Days Post-Op     Interval History:  EEG shows nonconvulsive seizure that localized the right-sided.  No clinical seizures.  She is on Keppra and Vimpat.  Exam wax and wane   Medications:  Continuous Infusions:   niCARdipine 0 mg/hr (04/15/22 0545)     Scheduled Meds:   amLODIPine  10 mg Per NG tube Daily    atorvastatin  40 mg Per NG tube Daily    carvediloL  25 mg Per NG tube BID    lacosamide (VIMPAT) IVPB  100 mg Intravenous Q12H    lacosamide (VIMPAT) IVPB  200 mg Intravenous Once    levetiracetam IV  1,500 mg Intravenous Q12H    levothyroxine  75 mcg Per NG tube Before breakfast    losartan  25 mg Per NG tube Daily    senna-docusate 8.6-50 mg  1 tablet Per NG tube BID    silodosin  4 mg Per NG tube Daily    vancomycin (VANCOCIN) IVPB  500 mg Intravenous Q24H     PRN Meds:sodium chloride, sodium chloride, acetaminophen, dextrose 10%, glucagon (human recombinant), hydrALAZINE, HYDROcodone-acetaminophen, HYDROmorphone, labetalol, metoclopramide HCl, ondansetron, potassium bicarbonate, potassium bicarbonate, potassium bicarbonate, potassium, sodium phosphates, potassium, sodium phosphates, potassium, sodium phosphates, sodium chloride 0.9%, sodium chloride 0.9%, Pharmacy to dose Vancomycin consult **AND** vancomycin - pharmacy to dose     Review of Systems  Objective:     Weight: 49.4 kg  (109 lb)  Body mass index is 17.07 kg/m².  Vital Signs (Most Recent):  Temp: 98.6 °F (37 °C) (04/15/22 0800)  Pulse: 77 (04/15/22 0910)  Resp: 20 (04/15/22 0854)  BP: (!) 156/79 (04/15/22 0910)  SpO2: 99 % (04/15/22 0854)   Vital Signs (24h Range):  Temp:  [98 °F (36.7 °C)-100.7 °F (38.2 °C)] 98.6 °F (37 °C)  Pulse:  [] 77  Resp:  [17-28] 20  SpO2:  [94 %-99 %] 99 %  BP: (108-185)/(52-96) 156/79                      Neurosurgery Physical Exam  Constitutional: No distress.   HEENT: Normocephalic. Left sided crani incision with staples intact, clean and dry.   Cardiovascular: Regular rhythm.   Pulm: No signs of respiratory distress.   Abdominal: Soft, non-distended.   Mental status: She is awake. Opening eyes spontaneously.  Ox3 but very dysarthric speech  PERRL  Left side contracted and paretic  FCx on the right-side    Significant Labs:  Recent Labs   Lab 04/14/22  0418 04/15/22  0137   * 97    143   K 3.9 3.7    111*   CO2 22* 22*   BUN 24* 29*   CREATININE 1.3 1.2   CALCIUM 8.6* 8.3*       Recent Labs   Lab 04/14/22  0418 04/15/22  0137   WBC 15.82* 13.16*   HGB 10.7* 9.2*   HCT 32.7* 28.3*    181       No results for input(s): LABPT, INR, APTT in the last 48 hours.  Microbiology Results (last 7 days)       Procedure Component Value Units Date/Time    Blood culture [[id number]] Collected: 04/14/22 1208    Order Status: Completed Specimen: Blood from Peripheral, Hand, Right Updated: 04/14/22 1915     Blood Culture, Routine No Growth to date    Blood culture [[id number]] Collected: 04/14/22 1226    Order Status: Completed Specimen: Blood from Peripheral, Hand, Left Updated: 04/14/22 1915     Blood Culture, Routine No Growth to date    Blood culture [[id number]] Collected: 04/11/22 1021    Order Status: Completed Specimen: Blood from Peripheral, Left Arm Updated: 04/14/22 1212     Blood Culture, Routine No growth to date      No Growth to date      No Growth to date    Narrative:       Collection has been rescheduled by T at 04/11/2022 10:04 Reason:   Patient unavailable, patient care, nurse paige notified.  Collection has been rescheduled by T at 04/11/2022 10:04 Reason:   Patient unavailable, patient care, nurse paige notified.    Blood culture [[id number]] Collected: 04/11/22 1020    Order Status: Completed Specimen: Blood from Peripheral, Left Hand Updated: 04/14/22 1212     Blood Culture, Routine No growth to date      No Growth to date      No Growth to date    Narrative:      Collection has been rescheduled by T at 04/11/2022 10:04 Reason:   Patient unavailable, patient care, nurse paige notified.  Collection has been rescheduled by T at 04/11/2022 10:04 Reason:   Patient unavailable, patient care, nurse paige notified.    Culture, Respiratory with Gram Stain [[id number]]     Order Status: No result Specimen: Respiratory     Urine culture [[id number]]  (Abnormal)  (Susceptibility) Collected: 04/11/22 1017    Order Status: Completed Specimen: Urine Updated: 04/13/22 1411     Urine Culture, Routine ESCHERICHIA COLI ESBL  10,000 - 49,999 cfu/ml      Narrative:      Specimen Source->Urine    Blood culture [[id number]]     Order Status: No result Specimen: Blood     Blood culture [[id number]]     Order Status: No result Specimen: Blood           All pertinent labs from the last 24 hours have been reviewed.    Significant Diagnostics:  I have reviewed all pertinent imaging results/findings within the past 24 hours.  Physical Exam    Assessment/Plan:     * SDH (subdural hematoma)  71 year old female with , hypothyroidism, CVA with left-sided residual deficit on ASA/Plavix, DM2, HTN, HLD, CKD, chronic anemia, presenting from SNF after being found down next to wheelchair after unwitnessed fall, consulted to NSGY for right 2 cm SDH mostly acute, membranous and heterogenous appearing, some chronic component, without midline shift. Now s/p R crani for SDH evacuation (4/4).    --Stepped up to  ICU  -- q1 hour vitals/neurochecks; exam wax and wane   -- SBP < 160  -- Na goal eunatremia.  -- Diet: keep npo for now  -- Seizure ppx: Keppra 1500 BID and vimpat  b.i.d. EEG with right-sided none compulsive seizures  -- Follow up CT head satisfactory x2 with residual blood   -- CT head from 4/13 shows residual subdural hematoma with reaccumulation with some mass effect and sulcal effacement which is stable on repeat scan on 4/14  -- Follow-up CT tomorrow  -- Saturating well on room   -- No HOB restriction.  -- PPx: SCDs, BR, IS. Okay for SQH for dvt ppx.   -- Osteomyelitis the or really alternative is of R foot:    - ID rec 6 week course Vanc with end date 5/5. F/u full ID recs. OK to start Etrapenem if pt decompensates.   -Podiatry managing recent partial foot amputation, appreciate recs. Sutures removed 4/12.    -Home health/facility to do dressing changes every MWF and prn as follows:  Cleanse right foot incision site with saline or wound cleanser, paint incision site with betadine, cover with 4x4 gauze, kerlix, ACE bandage.   -- Anemia: Resolved.    -- Fever 4/11: Afebrile overnight.    - UA 4/11: Growing GNRs, f/u susceptibility   - CXR with possible basilar effusion.   - BLE US 4/11: w/o DVT.   - HM following. Appreciate assistance with care.    - Blood cx 4/11: NGTD.  --Cranial wound care: Staples to be removed 4/18 if incision appears well healed.   --Repeat CTH 6 weeks post op.     Dispo: Continue EEG monitoring       Shahana Cornejo MD  Neurosurgery  [facility]

## 2022-04-15 NOTE — ASSESSMENT & PLAN NOTE
71 year old female with , hypothyroidism, CVA with left-sided residual deficit on ASA/Plavix, DM2, HTN, HLD, CKD, chronic anemia, presenting from SNF after being found down next to wheelchair after unwitnessed fall, consulted to NSGY for right 2 cm SDH mostly acute, membranous and heterogenous appearing, some chronic component, without midline shift. Now s/p R crani for SDH evacuation (4/4).    --Stepped up to ICU  -- q1 hour vitals/neurochecks; exam wax and wane   -- SBP < 160  -- Na goal eunatremia.  -- Diet: keep npo for now  -- Seizure ppx: Keppra 1500 BID and vimpat  b.i.d. EEG with right-sided none compulsive seizures  -- Follow up CT head satisfactory x2 with residual blood   -- CT head from 4/13 shows residual subdural hematoma with reaccumulation with some mass effect and sulcal effacement which is stable on repeat scan on 4/14  -- Follow-up CT tomorrow  -- Saturating well on room   -- No HOB restriction.  -- PPx: SCDs, BR, IS. Okay for SQH for dvt ppx.   -- Osteomyelitis the or really alternative is of R foot:    - ID rec 6 week course Vanc with end date 5/5. F/u full ID recs. OK to start Etrapenem if pt decompensates.   -Podiatry managing recent partial foot amputation, appreciate recs. Sutures removed 4/12.    -Home health/facility to do dressing changes every MWF and prn as follows:  Cleanse right foot incision site with saline or wound cleanser, paint incision site with betadine, cover with 4x4 gauze, kerlix, ACE bandage.   -- Anemia: Resolved.   -- Fever 4/11: Afebrile overnight.    - UA 4/11: Growing GNRs, f/u susceptibility   - CXR with possible basilar effusion.   - BLE US 4/11: w/o DVT.   - HM following. Appreciate assistance with care.    - Blood cx 4/11: NGTD.  --Cranial wound care: Staples to be removed 4/18 if incision appears well healed.   --Repeat CTH 6 weeks post op.     Dispo: Continue EEG monitoring

## 2022-04-15 NOTE — PLAN OF CARE
[facility] Care Plan    POC reviewed with Beatriz Adame at 0300. Pt unable to verbalize understanding. Family not at present at this time. No questions or concerns at this time. No acute events overnight. Pt progressing toward goals. See below and flowsheets for full assessment and VS info.           Is this a stroke patient? no    Neuro:  [place] Coma Scale  Best Eye Response: 4-->(E4) spontaneous  Best Motor Response: 5-->(M5) localizes pain  Best Verbal Response: 2-->(V2) incomprehensible speech  [place] Coma Scale Score: 11  Assessment Qualifiers: patient not sedated/intubated  Pupil PERRLA: yes     24hr Temp:  [98 °F (36.7 °C)-100.7 °F (38.2 °C)]     CV:   Rhythm: normal sinus rhythm  BP goals:   SBP < 160  MAP > 65    Resp:   O2 Device (Oxygen Therapy): room air  Vent Mode: Spont  Set Rate: 15 BPM  Oxygen Concentration (%): 98  Vt Set: 450 mL  PEEP/CPAP: 5 cmH20  Pressure Support: 5 cmH20    Plan: maintain O2 sats greater than 92% on room air    GI/:     Diet/Nutrition Received: NPO  Last Bowel Movement: 04/12/22  Voiding Characteristics: external catheter    Intake/Output Summary (Last 24 hours) at 4/15/2022 0415  Last data filed at 4/15/2022 0210  Gross per 24 hour   Intake 686.55 ml   Output 700 ml   Net -13.45 ml     Unmeasured Output  Urine Occurrence: 1  Stool Occurrence: 1  Pad Count: 1    Labs/Accuchecks:  Recent Labs   Lab 04/15/22  0137   WBC 13.16*   RBC 3.06*   HGB 9.2*   HCT 28.3*         Recent Labs   Lab 04/15/22  0137      K 3.7   CO2 22*   *   BUN 29*   CREATININE 1.2   ALKPHOS 56   ALT 13   AST 16   BILITOT 0.6    No results for input(s): PROTIME, INR, APTT, HEPANTIXA in the last 168 hours. No results for input(s): CPK, CPKMB, TROPONINI, MB in the last 168 hours.    Electrolytes:   Accuchecks: every shift  Gtts:   niCARdipine Stopped (04/14/22 2134)       LDA/Wounds:  Lines/Drains/Airways       Peripherally Inserted Central Catheter Line  Duration             PICC Double  Lumen 03/26/22 1905 right basilic 19 days              Drain  Duration             Female External Urinary Catheter 04/13/22 1820 1 day         NG/OG Tube 04/14/22 1200 [place] sump 14 Fr. Left nostril <1 day                  Wounds:yes  Wound care consulted: yes

## 2022-04-15 NOTE — PROGRESS NOTES
[facility]  Neurocritical Care  Progress Note    Admit Date: 4/3/2022  Service Date: 04/15/2022  Length of Stay: 12    Subjective:     Chief Complaint: SDH (subdural hematoma)    History of Present Illness: Ms. Adame is a 70 yo female with hx of dementia, R MCA stroke with left side contraction/hemiparesis presented to [facility] [place], [place] with SDH now s/p evac. Stepped down from [facility]. On  she developed worsening headache and CTH showed re-accumulation of R SDH and 4-5mm MLS. She was noted to be more slow to follow commands. Stepped back up to [facility] for closer monitoring.    Original HPI below:  Ms. Adame is a 72 y/o F with a PMHx of baseline dementia, GERD, R MCA stroke with residual LUE weakness, HLD, HTN, Stage IV CKD, TIIDM who presents to [facility] from [facility] with a AoC SDH after an unwitnessed fall from her wheel chair without loss of consciousness. CT spine negative for fracture. CT head with R SDH and 3-4 mm MLS. She is on DAPT at home. Of note, she has been staying at [facility] after a partial R foot amputation, stitches still intact, for which she is receiving vancomycin.         Hospital Course: 2022: OR today for clot evac. Patient returned to ICU intubated on precedex. Post op scan stable. Wean propofol and initiated precedex for likely extubation tomorrow.   2022 repeat CT head due to drainage.   2022 NAEO.   2022 NAEO. Successfully extubated  22: EEG revealed seizure   4/15/22: vimpat load overnight      Interval History: patient exhibit right side facial twitching and right gaze. EEG revealed seizure activity. Patient loaded with Vimpat 200 mg, another 200 mg Vimapt load given overnight and 100 mg scheduled. Per EEG: Right hemispheric focal nonconvulsive status epilepticus with nearly continuous fluctuation and frequent evolution into subclinical seizure.    Review of Systems   Reason unable to perform ROS:  LOC.     Objective:      Vitals:  Temp: 98.6 °F (37 °C)  Pulse: (P) 68  Rhythm: normal sinus rhythm  BP: (!) (P) 165/73  MAP (mmHg): (P) 110  Resp: (P) 15  SpO2: (P) 98 %  O2 Device (Oxygen Therapy): room air    Temp  Min: 98.3 °F (36.8 °C)  Max: 100.7 °F (38.2 °C)  Pulse  Min: 63  Max: 103  BP  Min: 108/52  Max: 185/70  MAP (mmHg)  Min: 76  Max: 117  Resp  Min: 15  Max: 24  SpO2  Min: 94 %  Max: 100 %    04/14 0701 - 04/15 0700  In: 671.6 [I.V.:355.2]  Out: 650 [Urine:650]   Unmeasured Output  Urine Occurrence: 1  Stool Occurrence: 1  Pad Count: 1       Physical Exam  Vitals and nursing note reviewed.   Constitutional:       Appearance: She is ill-appearing.   HENT:      Head: Normocephalic.      Nose: Nose normal.      Mouth/Throat:      Mouth: Mucous membranes are moist.      Pharynx: Oropharynx is clear.   Cardiovascular:      Rate and Rhythm: Normal rate and regular rhythm.      Pulses: Normal pulses.      Heart sounds: Normal heart sounds.   Pulmonary:      Effort: Pulmonary effort is normal.      Breath sounds: Normal breath sounds.   Abdominal:      General: Bowel sounds are normal.      Palpations: Abdomen is soft.   Musculoskeletal:         General: Normal range of motion.      Cervical back: Normal range of motion.   Skin:     General: Skin is warm and dry.      Capillary Refill: Capillary refill takes more than 3 seconds.   Neurological:      Comments: [id number]  Not following commands  PERRL  CN II-XII grossly intact  Withdrawal to pain on RUE and RLE  Hemiparesis on left side  Sensation intact in all extremities          Medications:  ContinuousniCARdipine, Last Rate: 0 mg/hr (04/15/22 0545)  ScheduledamLODIPine, 10 mg, Daily  atorvastatin, 40 mg, Daily  carvediloL, 25 mg, BID  lacosamide (VIMPAT) IVPB, 100 mg, Q12H  lacosamide (VIMPAT) IVPB, 200 mg, Once  levetiracetam IV, 1,500 mg, Q12H  levothyroxine, 75 mcg, Before breakfast  losartan, 25 mg, Daily  senna-docusate 8.6-50 mg, 1 tablet, BID  silodosin, 4 mg,  Daily  vancomycin (VANCOCIN) IVPB, 500 mg, Q24H  PRNsodium chloride, , Q24H PRN  sodium chloride, , Q24H PRN  acetaminophen, 650 mg, Q4H PRN  dextrose 10%, 12.5 g, PRN  glucagon (human recombinant), 1 mg, PRN  hydrALAZINE, 10 mg, Q8H PRN  HYDROcodone-acetaminophen, 1 tablet, Q4H PRN  labetalol, 10 mg, Q15 Min PRN  metoclopramide HCl, 5 mg, Q6H PRN  ondansetron, 4 mg, Q6H PRN  potassium bicarbonate, 35 mEq, PRN  potassium bicarbonate, 50 mEq, PRN  potassium bicarbonate, 60 mEq, PRN  potassium, sodium phosphates, 2 packet, PRN  potassium, sodium phosphates, 2 packet, PRN  potassium, sodium phosphates, 2 packet, PRN  sodium chloride 0.9%, 10 mL, PRN  sodium chloride 0.9%, 10 mL, PRN  vancomycin - pharmacy to dose, , pharmacy to manage frequency    Today I personally reviewed pertinent medications, lines/drains/airways, imaging, cardiology results, laboratory results, microbiology results, notably:    Diet  Diet NPO  Diet NPO          Assessment/Plan:     Neuro  * SDH (subdural hematoma)  S/p evac on 4/4/13; stable and stepped down from [facility].    On 4/13 patient had 8/10 HA and was slower to follow commands.  Repeat CTH showed re-accumulation of R SDH along with 4-5mm MLS.  Stepped back up to [facility].    -- NSGY following  -- SBP <160  -- q1hr neuro check  -- hold anticoagulation  - EEG revealed seizure  - Vimpat load 400 mg        History of stroke  R MCA stroke (2013) with residual LUE flaccid paralysis  DAPT at home held due to new ICH  Statin      Cardiac/Vascular  Elevated troponin I level  0.031 on admission  EKG, CXR, ECHO   DAPT at home, hold in setting of acute bleed   Resolved    Critical lower limb ischemia  See osteomyelitis    Hyperlipidemia  Statin    Hypertension  SBP < 160  PRN labetalol    ID  Osteomyelitis of right foot  S/p R partial foot amputation on vancomycin at 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

## 2022-04-15 NOTE — SUBJECTIVE & OBJECTIVE
Interval History:     Remains critically ill.    Review of Systems   Unable to perform ROS: Acuity of condition   Objective:     Vital Signs (Most Recent):  Temp: 97.7 °F (36.5 °C) (04/15/22 1600)  Pulse: 67 (04/15/22 1812)  Resp: 17 (04/15/22 1812)  BP: (!) 161/73 (04/15/22 1812)  SpO2: 97 % (04/15/22 1812)   Vital Signs (24h Range):  Temp:  [97.7 °F (36.5 °C)-100.7 °F (38.2 °C)] 97.7 °F (36.5 °C)  Pulse:  [63-97] 67  Resp:  [15-24] 17  SpO2:  [94 %-100 %] 97 %  BP: (108-185)/(52-85) 161/73     Weight: 49.4 kg (109 lb)  Body mass index is 17.07 kg/m².    Estimated Creatinine Clearance: 34 mL/min (based on SCr of 1.2 mg/dL).    Physical Exam  Constitutional:       General: She is not in acute distress.     Appearance: She is not ill-appearing.      Comments: thin   HENT:      Head:      Comments: EEG cap     Right Ear: External ear normal.      Left Ear: External ear normal.      Mouth/Throat:      Mouth: Mucous membranes are moist.   Eyes:      General: No scleral icterus.        Right eye: No discharge.         Left eye: No discharge.   Cardiovascular:      Rate and Rhythm: Normal rate and regular rhythm.   Pulmonary:      Effort: Pulmonary effort is normal. No respiratory distress.      Breath sounds: No stridor. No wheezing or rhonchi.   Abdominal:      General: Bowel sounds are normal. There is no distension.      Palpations: Abdomen is soft.      Tenderness: There is no abdominal tenderness. There is no guarding.   Genitourinary:     Comments: purewick  Skin:     General: Skin is warm and dry.      Findings: No erythema or rash.      Comments: R foot wrapped   Neurological:      Mental Status: She is alert and oriented to person, place, and time. Mental status is at baseline.      Motor: No weakness.   Psychiatric:         Mood and Affect: Mood normal.       Significant Labs:   Microbiology Results (last 7 days)       Procedure Component Value Units Date/Time    Blood culture [[id number]] Collected: 04/14/22  1208    Order Status: Completed Specimen: Blood from Peripheral, Hand, Right Updated: 04/15/22 1412     Blood Culture, Routine No Growth to date      No Growth to date    Blood culture [[id number]] Collected: 04/14/22 1226    Order Status: Completed Specimen: Blood from Peripheral, Hand, Left Updated: 04/15/22 1412     Blood Culture, Routine No Growth to date      No Growth to date    Blood culture [[id number]] Collected: 04/11/22 1021    Order Status: Completed Specimen: Blood from Peripheral, Left Arm Updated: 04/15/22 1212     Blood Culture, Routine No growth to date      No Growth to date      No Growth to date      No Growth to date    Narrative:      Collection has been rescheduled by [facility] at 04/11/2022 10:04 Reason:   Patient unavailable, patient care, nurse paige notified.  Collection has been rescheduled by [facility] at 04/11/2022 10:04 Reason:   Patient unavailable, patient care, nurse paige notified.    Blood culture [[id number]] Collected: 04/11/22 1020    Order Status: Completed Specimen: Blood from Peripheral, Left Hand Updated: 04/15/22 1212     Blood Culture, Routine No growth to date      No Growth to date      No Growth to date      No Growth to date    Narrative:      Collection has been rescheduled by T at 04/11/2022 10:04 Reason:   Patient unavailable, patient care, nurse paige notified.  Collection has been rescheduled by [facility] at 04/11/2022 10:04 Reason:   Patient unavailable, patient care, nurse paige notified.    Culture, Respiratory with Gram Stain [[id number]]     Order Status: No result Specimen: Respiratory     Urine culture [[id number]]  (Abnormal)  (Susceptibility) Collected: 04/11/22 1017    Order Status: Completed Specimen: Urine Updated: 04/13/22 1411     Urine Culture, Routine ESCHERICHIA COLI ESBL  10,000 - 49,999 cfu/ml      Narrative:      Specimen Source->Urine    Blood culture [[id number]]     Order Status: No result Specimen: Blood     Blood culture [[id number]]     Order Status: No  result Specimen: Blood             Significant Imaging: I have reviewed all pertinent imaging results/findings within the past 24 hours.

## 2022-04-16 PROBLEM — R79.89 ELEVATED TROPONIN I LEVEL: Status: RESOLVED | Noted: 2022-01-01 | Resolved: 2022-01-01

## 2022-04-16 NOTE — CONSULTS
Spiritual care department has attempted to review Medical Power of  documents with pt.  Pt is not able to respond so documentation could not be completed.  Further conversation with son indicated the type of poa he is looking for would need to be completed by an

## 2022-04-16 NOTE — PLAN OF CARE
[facility] Care Plan    POC reviewed with Beatriz Adame at 0300. Pt unable to verbalize understanding.  No acute events overnight. Pt progressing toward goals. See below and flowsheets for full assessment and VS info.           Is this a stroke patient? no    Neuro:  [place] Coma Scale  Best Eye Response: 4-->(E4) spontaneous  Best Motor Response: 6-->(M6) obeys commands  Best Verbal Response: 5-->(V5) oriented  [place] Coma Scale Score: 15  Assessment Qualifiers: patient not sedated/intubated  Pupil PERRLA: yes     24hr Temp:  [97.7 °F (36.5 °C)-98.8 °F (37.1 °C)]     CV:   Rhythm: normal sinus rhythm  BP goals:   SBP < 160  MAP > 65  Resp:   O2 Device (Oxygen Therapy): room air  Vent Mode: Spont  Set Rate: 15 BPM  Oxygen Concentration (%): 98  Vt Set: 450 mL  PEEP/CPAP: 5 cmH20  Pressure Support: 5 cmH20    Plan: keep O2 sats greater than 92% while on room air    GI/:     Diet/Nutrition Received: NPO  Last Bowel Movement: 04/16/22  Voiding Characteristics: external catheter    Intake/Output Summary (Last 24 hours) at 4/16/2022 0424  Last data filed at 4/16/2022 0105  Gross per 24 hour   Intake 510.37 ml   Output 675 ml   Net -164.63 ml     Unmeasured Output  Urine Occurrence: 1  Stool Occurrence: 1  Pad Count: 1    Labs/Accuchecks:  Recent Labs   Lab 04/15/22  0137   WBC 13.16*   RBC 3.06*   HGB 9.2*   HCT 28.3*         Recent Labs   Lab 04/15/22  0137      K 3.7   CO2 22*   *   BUN 29*   CREATININE 1.2   ALKPHOS 56   ALT 13   AST 16   BILITOT 0.6    No results for input(s): PROTIME, INR, APTT, HEPANTIXA in the last 168 hours. No results for input(s): CPK, CPKMB, TROPONINI, MB in the last 168 hours.    Electrolytes:   Accuchecks: every shift    Gtts:   niCARdipine 0 mg/hr (04/15/22 0545)       LDA/Wounds:  Lines/Drains/Airways       Peripherally Inserted Central Catheter Line  Duration             PICC Double Lumen 03/26/22 1905 right basilic 20 days              Drain  Duration              Female External Urinary Catheter 04/13/22 1820 2 days         NG/OG Tube 04/14/22 1200 [place] [address] 14 Fr. Left nostril 1 day                  Wounds: yes  Wound care consulted: yes

## 2022-04-17 NOTE — SUBJECTIVE & OBJECTIVE
Interval History: 4/17: NAEON. On EEG. Exam stable.     Medications:  Continuous Infusions:   niCARdipine 0 mg/hr (04/15/22 0545)     Scheduled Meds:   amLODIPine  10 mg Per NG tube Daily    atorvastatin  40 mg Per NG tube Daily    carvediloL  25 mg Per NG tube BID    lacosamide (VIMPAT) IVPB  100 mg Intravenous Q12H    levetiracetam IV  1,500 mg Intravenous Q12H    levothyroxine  75 mcg Per NG tube Before breakfast    losartan  25 mg Per NG tube Daily    senna-docusate 8.6-50 mg  1 tablet Per NG tube BID    silodosin  4 mg Per NG tube Daily    vancomycin (VANCOCIN) IVPB  500 mg Intravenous Q24H     PRN Meds:acetaminophen, dextrose 10%, glucagon (human recombinant), hydrALAZINE, HYDROcodone-acetaminophen, labetalol, metoclopramide HCl, ondansetron, potassium bicarbonate, potassium bicarbonate, potassium bicarbonate, potassium, sodium phosphates, potassium, sodium phosphates, potassium, sodium phosphates, sodium chloride 0.9%, sodium chloride 0.9%, Pharmacy to dose Vancomycin consult **AND** vancomycin - pharmacy to dose     Review of Systems  Objective:     Weight: 49.4 kg (109 lb)  Body mass index is 17.07 kg/m².  Vital Signs (Most Recent):  Temp: 98 °F (36.7 °C) (04/17/22 0305)  Pulse: (!) 58 (04/17/22 1101)  Resp: (!) 8 (04/17/22 1101)  BP: (!) 159/70 (04/17/22 1101)  SpO2: 100 % (04/17/22 1101)   Vital Signs (24h Range):  Temp:  [97.7 °F (36.5 °C)-98 °F (36.7 °C)] 98 °F (36.7 °C)  Pulse:  [48-78] 58  Resp:  [8-23] 8  SpO2:  [99 %-100 %] 100 %  BP: (112-184)/(55-90) 159/70                          NG/OG Tube 04/14/22 1200 [place] sump 14 Fr. Left nostril (Active)   Placement Check placement verified by aspirate characteristics;placement verified by x-ray 04/17/22 0305   Tolerance no signs/symptoms of discomfort 04/17/22 0305   Securement secured to nostril center w/ adhesive device 04/17/22 0305   Clamp Status/Tolerance clamped 04/17/22 0305   Suction Setting/Drainage Method suction at the bedside 04/17/22 0305    Insertion Site Appearance no redness, warmth, tenderness, skin breakdown, drainage 04/17/22 0305   Drainage None 04/15/22 0332   Flush/Irrigation flushed w/;water;no resistance met 04/17/22 0305   Water Bolus (mL) 100 mL 04/17/22 0505   Residual Amount (ml) 40 ml 04/16/22 0700       Female External Urinary Catheter 04/13/22 1820 (Active)   Skin no redness;no breakdown 04/17/22 0305   Tolerance no signs/symptoms of discomfort 04/17/22 0305   Suction Continuous suction at 60 mmHg 04/16/22 2004   Date of last wick change 04/16/22 04/16/22 2004   Time of last wick change 0956 04/16/22 0700   Output (mL) 200 mL 04/17/22 0505       Physical Exam    Neurosurgery Physical Exam  [id number]  Follows commands on R  No movement of left to stim (baseline)  Not verbal on exam  EEG cap in place.       Significant Labs:  Recent Labs   Lab 04/16/22  0600 04/17/22  0246   GLU 84 66*    143   K 3.5 3.5    111*   CO2 20* 20*   BUN 32* 35*   CREATININE 1.2 1.2   CALCIUM 8.5* 8.3*     Recent Labs   Lab 04/16/22  0600 04/17/22  0246   WBC 15.89* 16.10*   HGB 10.3* 9.8*   HCT 31.3* 29.7*    139*     No results for input(s): LABPT, INR, APTT in the last 48 hours.  Microbiology Results (last 7 days)       Procedure Component Value Units Date/Time    Blood culture [[id number]] Collected: 04/14/22 1226    Order Status: Completed Specimen: Blood from Peripheral, Hand, Left Updated: 04/16/22 1412     Blood Culture, Routine No Growth to date      No Growth to date      No Growth to date    Blood culture [[id number]] Collected: 04/14/22 1208    Order Status: Completed Specimen: Blood from Peripheral, Hand, Right Updated: 04/16/22 1412     Blood Culture, Routine No Growth to date      No Growth to date      No Growth to date    Blood culture [[id number]] Collected: 04/11/22 1021    Order Status: Completed Specimen: Blood from Peripheral, Left Arm Updated: 04/16/22 1212     Blood Culture, Routine No growth after 5 days.    Narrative:       Collection has been rescheduled by T at 04/11/2022 10:04 Reason:   Patient unavailable, patient care, nurse paige notified.  Collection has been rescheduled by T at 04/11/2022 10:04 Reason:   Patient unavailable, patient care, nurse paige notified.    Blood culture [[id number]] Collected: 04/11/22 1020    Order Status: Completed Specimen: Blood from Peripheral, Left Hand Updated: 04/16/22 1212     Blood Culture, Routine No growth after 5 days.    Narrative:      Collection has been rescheduled by T at 04/11/2022 10:04 Reason:   Patient unavailable, patient care, nurse paige notified.  Collection has been rescheduled by T at 04/11/2022 10:04 Reason:   Patient unavailable, patient care, nurse paige notified.    Culture, Respiratory with Gram Stain [[id number]]     Order Status: No result Specimen: Respiratory     Urine culture [[id number]]  (Abnormal)  (Susceptibility) Collected: 04/11/22 1017    Order Status: Completed Specimen: Urine Updated: 04/13/22 1411     Urine Culture, Routine ESCHERICHIA COLI ESBL  10,000 - 49,999 cfu/ml      Narrative:      Specimen Source->Urine    Blood culture [[id number]]     Order Status: No result Specimen: Blood     Blood culture [[id number]]     Order Status: No result Specimen: Blood           All pertinent labs from the last 24 hours have been reviewed.    Significant Diagnostics:  I have reviewed all pertinent imaging results/findings within the past 24 hours.

## 2022-04-17 NOTE — ASSESSMENT & PLAN NOTE
S/p evac on 4/4/13; stable and stepped down from [facility].    On 4/13 patient had 8/10 HA and was slower to follow commands.  Repeat CTH showed re-accumulation of R SDH along with 4-5mm MLS.  Stepped back up to [facility].    -- NSGY following  -- SBP <160  -- q1hr neuro check  -- hold anticoagulation  -- EEG revealed seizure  -- Vimpat load 400 mg

## 2022-04-17 NOTE — ASSESSMENT & PLAN NOTE
71 year old female with , hypothyroidism, CVA with left-sided residual deficit on ASA/Plavix, DM2, HTN, HLD, CKD, chronic anemia, presenting from SNF after being found down next to wheelchair after unwitnessed fall, consulted to NSGY for right 2 cm SDH mostly acute, membranous and heterogenous appearing, some chronic component, without midline shift. Now s/p R crani for SDH evacuation (4/4).    --Stepped up to ICU  -- q1 hour vitals/neurochecks; exam wax and wane   -- SBP < 160  -- Na goal eunatremia.  -- Seizure control per neuro ICU: Keppra 1500 BID and vimpat  b.i.d.    EEG with right-sided none compulsive seizures on 4/15.   -- Follow up CT head satisfactory x2 with residual blood   -- CT head from 4/13 shows residual subdural hematoma with reaccumulation with some mass effect and sulcal effacement which is stable on repeat scan on 4/14  -- Follow-up CT tomorrow  -- Saturating well on room   -- No HOB restriction.  -- PPx: SCDs, BR, IS. Okay for SQH for dvt ppx.   -- Osteomyelitis the or really alternative is of R foot:    - ID rec 6 week course Vanc with end date 5/5. F/u full ID recs. OK to start Etrapenem if pt decompensates.   -Podiatry managing recent partial foot amputation, appreciate recs. Sutures removed 4/12.    -Home health/facility to do dressing changes every MWF and prn as follows:  Cleanse right foot incision site with saline or wound cleanser, paint incision site with betadine, cover with 4x4 gauze, kerlix, ACE bandage.   -- Anemia: Resolved.   -- Fever 4/11: Afebrile overnight.    - UA 4/11: Growing GNRs, f/u susceptibility   - CXR with possible basilar effusion.   - BLE US 4/11: w/o DVT.   - HM following. Appreciate assistance with care.    - Blood cx 4/11: NGTD.  --Cranial wound care: Staples to be removed 4/18 if incision appears well healed.   --Repeat CTH 6 weeks post op.     Dispo: Continue EEG monitoring

## 2022-04-17 NOTE — SUBJECTIVE & OBJECTIVE
Interval History: 4/16: NAEON. [facility] reviewed and stable, EEG with concern for NCSE yesterday.     Medications:  Continuous Infusions:   niCARdipine 0 mg/hr (04/15/22 0545)     Scheduled Meds:   amLODIPine  10 mg Per NG tube Daily    atorvastatin  40 mg Per NG tube Daily    carvediloL  25 mg Per NG tube BID    lacosamide (VIMPAT) IVPB  100 mg Intravenous Q12H    levetiracetam IV  1,500 mg Intravenous Q12H    levothyroxine  75 mcg Per NG tube Before breakfast    losartan  25 mg Per NG tube Daily    senna-docusate 8.6-50 mg  1 tablet Per NG tube BID    silodosin  4 mg Per NG tube Daily    vancomycin (VANCOCIN) IVPB  500 mg Intravenous Q24H     PRN Meds:acetaminophen, dextrose 10%, glucagon (human recombinant), hydrALAZINE, HYDROcodone-acetaminophen, labetalol, metoclopramide HCl, ondansetron, potassium bicarbonate, potassium bicarbonate, potassium bicarbonate, potassium, sodium phosphates, potassium, sodium phosphates, potassium, sodium phosphates, sodium chloride 0.9%, sodium chloride 0.9%, Pharmacy to dose Vancomycin consult **AND** vancomycin - pharmacy to dose     Review of Systems  Objective:     Weight: 49.4 kg (109 lb)  Body mass index is 17.07 kg/m².  Vital Signs (Most Recent):  Temp: 97.9 °F (36.6 °C) (04/16/22 1905)  Pulse: 70 (04/16/22 2134)  Resp: 13 (04/16/22 2105)  BP: (!) 165/77 (04/16/22 2134)  SpO2: 99 % (04/16/22 2105)   Vital Signs (24h Range):  Temp:  [97.7 °F (36.5 °C)-97.9 °F (36.6 °C)] 97.9 °F (36.6 °C)  Pulse:  [48-79] 70  Resp:  [11-28] 13  SpO2:  [98 %-100 %] 99 %  BP: (105-179)/(55-90) 165/77     Date 04/16/22 0700 - 04/17/22 0659   Shift 3186-5376 5417-8279 1185-7101 24 Hour Total   INTAKE   NG/GT 50 50  100   IV Piggyback  199.1  199.1   Shift Total(mL/kg) 50(1) 249.1(5)  299.1(6)   OUTPUT   Urine(mL/kg/hr)  150  150   Shift Total(mL/kg)  150(3)  150(3)   Weight (kg) 49.4 49.4 49.4 49.4                        NG/OG Tube 04/14/22 1200 [place] sump 14 Fr. Left nostril (Active)   Placement Check  placement verified by aspirate characteristics;placement verified by x-ray 04/16/22 2004   Tolerance no signs/symptoms of discomfort 04/16/22 2004   Securement secured to nostril center w/ adhesive device 04/16/22 2004   Clamp Status/Tolerance clamped 04/16/22 2004   Suction Setting/Drainage Method suction at the bedside 04/16/22 2004   Insertion Site Appearance no redness, warmth, tenderness, skin breakdown, drainage 04/16/22 2004   Drainage None 04/15/22 0332   Flush/Irrigation flushed w/;water;no resistance met 04/16/22 2004   Water Bolus (mL) 50 mL 04/16/22 2105   Residual Amount (ml) 40 ml 04/16/22 0700       Female External Urinary Catheter 04/13/22 1820 (Active)   Skin no redness;no breakdown 04/16/22 2004   Tolerance no signs/symptoms of discomfort 04/16/22 2004   Suction Continuous suction at 60 mmHg 04/16/22 2004   Date of last wick change 04/16/22 04/16/22 2004   Time of last wick change 0956 04/16/22 0700   Output (mL) 150 mL 04/16/22 2005       Physical Exam    Neurosurgery Physical Exam  [id number]  Follows commands on R  Baseline contracted on left  Cni  Awake, with NGT    Significant Labs:  Recent Labs   Lab 04/15/22  0137 04/16/22  0600   GLU 97 84    143   K 3.7 3.5   * 109   CO2 22* 20*   BUN 29* 32*   CREATININE 1.2 1.2   CALCIUM 8.3* 8.5*     Recent Labs   Lab 04/15/22  0137 04/16/22  0600   WBC 13.16* 15.89*   HGB 9.2* 10.3*   HCT 28.3* 31.3*    175     No results for input(s): LABPT, INR, APTT in the last 48 hours.  Microbiology Results (last 7 days)       Procedure Component Value Units Date/Time    Blood culture [[id number]] Collected: 04/14/22 1226    Order Status: Completed Specimen: Blood from Peripheral, Hand, Left Updated: 04/16/22 1412     Blood Culture, Routine No Growth to date      No Growth to date      No Growth to date    Blood culture [[id number]] Collected: 04/14/22 1208    Order Status: Completed Specimen: Blood from Peripheral, Hand, Right Updated: 04/16/22 1419      Blood Culture, Routine No Growth to date      No Growth to date      No Growth to date    Blood culture [[id number]] Collected: 04/11/22 1021    Order Status: Completed Specimen: Blood from Peripheral, Left Arm Updated: 04/16/22 1212     Blood Culture, Routine No growth after 5 days.    Narrative:      Collection has been rescheduled by T at 04/11/2022 10:04 Reason:   Patient unavailable, patient care, nurse paige notified.  Collection has been rescheduled by T at 04/11/2022 10:04 Reason:   Patient unavailable, patient care, nurse grecia notified.    Blood culture [[id number]] Collected: 04/11/22 1020    Order Status: Completed Specimen: Blood from Peripheral, Left Hand Updated: 04/16/22 1212     Blood Culture, Routine No growth after 5 days.    Narrative:      Collection has been rescheduled by T at 04/11/2022 10:04 Reason:   Patient unavailable, patient care, nurse paige notified.  Collection has been rescheduled by T at 04/11/2022 10:04 Reason:   Patient unavailable, patient care, nurse paige notified.    Culture, Respiratory with Gram Stain [[id number]]     Order Status: No result Specimen: Respiratory     Urine culture [[id number]]  (Abnormal)  (Susceptibility) Collected: 04/11/22 1017    Order Status: Completed Specimen: Urine Updated: 04/13/22 1411     Urine Culture, Routine ESCHERICHIA COLI ESBL  10,000 - 49,999 cfu/ml      Narrative:      Specimen Source->Urine    Blood culture [[id number]]     Order Status: No result Specimen: Blood     Blood culture [[id number]]     Order Status: No result Specimen: Blood           All pertinent labs from the last 24 hours have been reviewed.    Significant Diagnostics:  I have reviewed all pertinent imaging results/findings within the past 24 hours.

## 2022-04-17 NOTE — ASSESSMENT & PLAN NOTE
- SBP < 160  - Cardene gtt stopped  - Start norvasc, coreg, & losartan  - Hold home microzide  - PRN labetalol, hydralazine

## 2022-04-17 NOTE — SUBJECTIVE & OBJECTIVE
Interval History: NAEON.    Review of Systems   Reason unable to perform ROS:  LOC.     Objective:     Vitals:  Temp: 97.8 °F (36.6 °C)  Pulse: 69  Rhythm: normal sinus rhythm  BP: (!) 158/70  MAP (mmHg): 100  Resp: 20  SpO2: 100 %  O2 Device (Oxygen Therapy): room air    Temp  Min: 97.7 °F (36.5 °C)  Max: 97.9 °F (36.6 °C)  Pulse  Min: 48  Max: 79  BP  Min: 105/55  Max: 179/84  MAP (mmHg)  Min: 78  Max: 121  Resp  Min: 11  Max: 28  SpO2  Min: 98 %  Max: 100 %    04/15 0701 -  0700  In: 690.5   Out: 875 [Urine:875]   Unmeasured Output  Urine Occurrence: 1  Stool Occurrence: 1  Pad Count: 1       Physical Exam  Vitals and nursing note reviewed.   Constitutional:       Appearance: She is ill-appearing.   HENT:      Head: Normocephalic.      Right Ear: External ear normal.      Left Ear: External ear normal.      Nose: Nose normal.      Mouth/Throat:      Mouth: Mucous membranes are moist.      Pharynx: Oropharynx is clear.   Cardiovascular:      Rate and Rhythm: Normal rate and regular rhythm.   Pulmonary:      Effort: Pulmonary effort is normal. No respiratory distress.   Abdominal:      General: Abdomen is flat. There is no distension.   Musculoskeletal:         General: Normal range of motion.      Cervical back: Normal range of motion.   Skin:     General: Skin is warm and dry.   Neurological:      Comments: E4V1M5  Awake. No verbal response. Follows commands.  PERRL. CN II-XII grossly intact.   Withdrawal to pain on RUE and RLE. Hemiparesis on left side with baseline contraction. SILT in all extremities.       Medications:  ContinuousniCARdipine, Last Rate: 0 mg/hr (04/15/22 0545)  ScheduledamLODIPine, 10 mg, Daily  atorvastatin, 40 mg, Daily  carvediloL, 25 mg, BID  lacosamide (VIMPAT) IVPB, 100 mg, Q12H  levetiracetam IV, 1,500 mg, Q12H  levothyroxine, 75 mcg, Before breakfast  losartan, 25 mg, Daily  senna-docusate 8.6-50 mg, 1 tablet, BID  silodosin, 4 mg, Daily  vancomycin (VANCOCIN) IVPB, 500  mg, Q24H  PRNacetaminophen, 650 mg, Q4H PRN  dextrose 10%, 12.5 g, PRN  glucagon (human recombinant), 1 mg, PRN  hydrALAZINE, 10 mg, Q8H PRN  HYDROcodone-acetaminophen, 1 tablet, Q4H PRN  labetalol, 10 mg, Q15 Min PRN  metoclopramide HCl, 5 mg, Q6H PRN  ondansetron, 4 mg, Q6H PRN  potassium bicarbonate, 35 mEq, PRN  potassium bicarbonate, 50 mEq, PRN  potassium bicarbonate, 60 mEq, PRN  potassium, sodium phosphates, 2 packet, PRN  potassium, sodium phosphates, 2 packet, PRN  potassium, sodium phosphates, 2 packet, PRN  sodium chloride 0.9%, 10 mL, PRN  sodium chloride 0.9%, 10 mL, PRN  vancomycin - pharmacy to dose, , pharmacy to manage frequency    Today I personally reviewed pertinent medications, lines/drains/airways, imaging, laboratory results, microbiology results,      Diet  Diet NPO  Diet NPO

## 2022-04-17 NOTE — PROGRESS NOTES
[facility]  Neurosurgery  Progress Note    Subjective:     History of Present Illness: 71 year old female with , hypothyroidism, CVA with left-sided residual deficit on ASA/Plavix, DM2, HTN, HLD, CKD, chronic anemia, presenting from SNF after being found down next to wheelchair after unwitnessed fall, consulted to NSGY for right 2 cm SDH without midline shift. She is altered at baseline and unable to provide to history, but family at bedside says she is more confused and less interactive than usual.       Post-Op Info:  Procedure(s) (LRB):  CRANIOTOMY, FOR SUBDURAL HEMATOMA EVACUATION (Right)   12 Days Post-Op     Interval History: 4/16: DENYSON. CTH reviewed and stable, EEG with concern for NCSE yesterday.     Medications:  Continuous Infusions:   niCARdipine 0 mg/hr (04/15/22 0545)     Scheduled Meds:   amLODIPine  10 mg Per NG tube Daily    atorvastatin  40 mg Per NG tube Daily    carvediloL  25 mg Per NG tube BID    lacosamide (VIMPAT) IVPB  100 mg Intravenous Q12H    levetiracetam IV  1,500 mg Intravenous Q12H    levothyroxine  75 mcg Per NG tube Before breakfast    losartan  25 mg Per NG tube Daily    senna-docusate 8.6-50 mg  1 tablet Per NG tube BID    silodosin  4 mg Per NG tube Daily    vancomycin (VANCOCIN) IVPB  500 mg Intravenous Q24H     PRN Meds:acetaminophen, dextrose 10%, glucagon (human recombinant), hydrALAZINE, HYDROcodone-acetaminophen, labetalol, metoclopramide HCl, ondansetron, potassium bicarbonate, potassium bicarbonate, potassium bicarbonate, potassium, sodium phosphates, potassium, sodium phosphates, potassium, sodium phosphates, sodium chloride 0.9%, sodium chloride 0.9%, Pharmacy to dose Vancomycin consult **AND** vancomycin - pharmacy to dose     Review of Systems  Objective:     Weight: 49.4 kg (109 lb)  Body mass index is 17.07 kg/m².  Vital Signs (Most Recent):  Temp: 97.9 °F (36.6 °C) (04/16/22 1905)  Pulse: 70 (04/16/22 2134)  Resp: 13 (04/16/22  2105)  BP: (!) 165/77 (04/16/22 2134)  SpO2: 99 % (04/16/22 2105)   Vital Signs (24h Range):  Temp:  [97.7 °F (36.5 °C)-97.9 °F (36.6 °C)] 97.9 °F (36.6 °C)  Pulse:  [48-79] 70  Resp:  [11-28] 13  SpO2:  [98 %-100 %] 99 %  BP: (105-179)/(55-90) 165/77     Date 04/16/22 0700 - 04/17/22 0659   Shift 4176-1392 9284-0758 7499-5853 24 Hour Total   INTAKE   NG/GT 50 50  100   IV Piggyback  199.1  199.1   Shift Total(mL/kg) 50(1) 249.1(5)  299.1(6)   OUTPUT   Urine(mL/kg/hr)  150  150   Shift Total(mL/kg)  150(3)  150(3)   Weight (kg) 49.4 49.4 49.4 49.4                        NG/OG Tube 04/14/22 1200 [place] sump 14 Fr. Left nostril (Active)   Placement Check placement verified by aspirate characteristics;placement verified by x-ray 04/16/22 2004   Tolerance no signs/symptoms of discomfort 04/16/22 2004   Securement secured to nostril center w/ adhesive device 04/16/22 2004   Clamp Status/Tolerance clamped 04/16/22 2004   Suction Setting/Drainage Method suction at the bedside 04/16/22 2004   Insertion Site Appearance no redness, warmth, tenderness, skin breakdown, drainage 04/16/22 2004   Drainage None 04/15/22 0332   Flush/Irrigation flushed w/;water;no resistance met 04/16/22 2004   Water Bolus (mL) 50 mL 04/16/22 2105   Residual Amount (ml) 40 ml 04/16/22 0700       Female External Urinary Catheter 04/13/22 1820 (Active)   Skin no redness;no breakdown 04/16/22 2004   Tolerance no signs/symptoms of discomfort 04/16/22 2004   Suction Continuous suction at 60 mmHg 04/16/22 2004   Date of last wick change 04/16/22 04/16/22 2004   Time of last wick change 0956 04/16/22 0700   Output (mL) 150 mL 04/16/22 2005       Physical Exam    Neurosurgery Physical Exam  [id number]  Follows commands on R  Baseline contracted on left  Cni  Awake, with NGT    Significant Labs:  Recent Labs   Lab 04/15/22  0137 04/16/22  0600   GLU 97 84    143   K 3.7 3.5   * 109   CO2 22* 20*   BUN 29* 32*   CREATININE 1.2 1.2   CALCIUM 8.3* 8.5*      Recent Labs   Lab 04/15/22  0137 04/16/22  0600   WBC 13.16* 15.89*   HGB 9.2* 10.3*   HCT 28.3* 31.3*    175     No results for input(s): LABPT, INR, APTT in the last 48 hours.  Microbiology Results (last 7 days)       Procedure Component Value Units Date/Time    Blood culture [[id number]] Collected: 04/14/22 1226    Order Status: Completed Specimen: Blood from Peripheral, Hand, Left Updated: 04/16/22 1412     Blood Culture, Routine No Growth to date      No Growth to date      No Growth to date    Blood culture [[id number]] Collected: 04/14/22 1208    Order Status: Completed Specimen: Blood from Peripheral, Hand, Right Updated: 04/16/22 1412     Blood Culture, Routine No Growth to date      No Growth to date      No Growth to date    Blood culture [[id number]] Collected: 04/11/22 1021    Order Status: Completed Specimen: Blood from Peripheral, Left Arm Updated: 04/16/22 1212     Blood Culture, Routine No growth after 5 days.    Narrative:      Collection has been rescheduled by [facility] at 04/11/2022 10:04 Reason:   Patient unavailable, patient care, nurse paige notified.  Collection has been rescheduled by [facility] at 04/11/2022 10:04 Reason:   Patient unavailable, patient care, nurse paige notified.    Blood culture [[id number]] Collected: 04/11/22 1020    Order Status: Completed Specimen: Blood from Peripheral, Left Hand Updated: 04/16/22 1212     Blood Culture, Routine No growth after 5 days.    Narrative:      Collection has been rescheduled by [facility] at 04/11/2022 10:04 Reason:   Patient unavailable, patient care, nurse paige notified.  Collection has been rescheduled by [facility] at 04/11/2022 10:04 Reason:   Patient unavailable, patient care, nurse paige notified.    Culture, Respiratory with Gram Stain [[id number]]     Order Status: No result Specimen: Respiratory     Urine culture [[id number]]  (Abnormal)  (Susceptibility) Collected: 04/11/22 1017    Order Status: Completed Specimen: Urine Updated: 04/13/22 1411      Urine Culture, Routine ESCHERICHIA COLI ESBL  10,000 - 49,999 cfu/ml      Narrative:      Specimen Source->Urine    Blood culture [[id number]]     Order Status: No result Specimen: Blood     Blood culture [[id number]]     Order Status: No result Specimen: Blood           All pertinent labs from the last 24 hours have been reviewed.    Significant Diagnostics:  I have reviewed all pertinent imaging results/findings within the past 24 hours.    Assessment/Plan:     * SDH (subdural hematoma)  71 year old female with , hypothyroidism, CVA with left-sided residual deficit on ASA/Plavix, DM2, HTN, HLD, CKD, chronic anemia, presenting from SNF after being found down next to wheelchair after unwitnessed fall, consulted to NSGY for right 2 cm SDH mostly acute, membranous and heterogenous appearing, some chronic component, without midline shift. Now s/p R crani for SDH evacuation (4/4).    --Stepped up to ICU  -- q1 hour vitals/neurochecks; exam wax and wane   -- SBP < 160  -- Na goal eunatremia.  -- Seizure control per neuro ICU: Keppra 1500 BID and vimpat  b.i.d.    EEG with right-sided none compulsive seizures on 4/15.   -- Follow up CT head satisfactory x2 with residual blood   -- CT head from 4/13 shows residual subdural hematoma with reaccumulation with some mass effect and sulcal effacement which is stable on repeat scan on 4/14  -- Follow-up CT tomorrow  -- Saturating well on room   -- No HOB restriction.  -- PPx: SCDs, BR, IS. Okay for SQH for dvt ppx.   -- Osteomyelitis the or really alternative is of R foot:    - ID rec 6 week course Vanc with end date 5/5. F/u full ID recs. OK to start Etrapenem if pt decompensates.   -Podiatry managing recent partial foot amputation, appreciate recs. Sutures removed 4/12.    -Home health/facility to do dressing changes every MWF and prn as follows:  Cleanse right foot incision site with saline or wound cleanser, paint incision site with betadine, cover with 4x4 gauze, kerlix,  ACE bandage.   -- Anemia: Resolved.   -- Fever 4/11: Afebrile overnight.    - UA 4/11: Growing GNRs, f/u susceptibility   - CXR with possible basilar effusion.   - BLE US 4/11: w/o DVT.   - HM following. Appreciate assistance with care.    - Blood cx 4/11: NGTD.  --Cranial wound care: Staples to be removed 4/18 if incision appears well healed.   --Repeat CTH 6 weeks post op.     Dispo: Continue EEG monitoring             Eris Huntley MD  Neurosurgery  [facility]

## 2022-04-17 NOTE — PROGRESS NOTES
[facility]  Neurosurgery  Progress Note    Subjective:     History of Present Illness: 71 year old female with , hypothyroidism, CVA with left-sided residual deficit on ASA/Plavix, DM2, HTN, HLD, CKD, chronic anemia, presenting from SNF after being found down next to wheelchair after unwitnessed fall, consulted to NSGY for right 2 cm SDH without midline shift. She is altered at baseline and unable to provide to history, but family at bedside says she is more confused and less interactive than usual.       Post-Op Info:  Procedure(s) (LRB):  CRANIOTOMY, FOR SUBDURAL HEMATOMA EVACUATION (Right)   13 Days Post-Op     Interval History: 4/17: NAEON. On EEG. Exam stable.     Medications:  Continuous Infusions:   niCARdipine 0 mg/hr (04/15/22 0545)     Scheduled Meds:   amLODIPine  10 mg Per NG tube Daily    atorvastatin  40 mg Per NG tube Daily    carvediloL  25 mg Per NG tube BID    lacosamide (VIMPAT) IVPB  100 mg Intravenous Q12H    levetiracetam IV  1,500 mg Intravenous Q12H    levothyroxine  75 mcg Per NG tube Before breakfast    losartan  25 mg Per NG tube Daily    senna-docusate 8.6-50 mg  1 tablet Per NG tube BID    silodosin  4 mg Per NG tube Daily    vancomycin (VANCOCIN) IVPB  500 mg Intravenous Q24H     PRN Meds:acetaminophen, dextrose 10%, glucagon (human recombinant), hydrALAZINE, HYDROcodone-acetaminophen, labetalol, metoclopramide HCl, ondansetron, potassium bicarbonate, potassium bicarbonate, potassium bicarbonate, potassium, sodium phosphates, potassium, sodium phosphates, potassium, sodium phosphates, sodium chloride 0.9%, sodium chloride 0.9%, Pharmacy to dose Vancomycin consult **AND** vancomycin - pharmacy to dose     Review of Systems  Objective:     Weight: 49.4 kg (109 lb)  Body mass index is 17.07 kg/m².  Vital Signs (Most Recent):  Temp: 98 °F (36.7 °C) (04/17/22 0305)  Pulse: (!) 58 (04/17/22 1101)  Resp: (!) 8 (04/17/22 1101)  BP: (!) 159/70 (04/17/22  1101)  SpO2: 100 % (04/17/22 1101)   Vital Signs (24h Range):  Temp:  [97.7 °F (36.5 °C)-98 °F (36.7 °C)] 98 °F (36.7 °C)  Pulse:  [48-78] 58  Resp:  [8-23] 8  SpO2:  [99 %-100 %] 100 %  BP: (112-184)/(55-90) 159/70                          NG/OG Tube 04/14/22 1200 [place] sump 14 Fr. Left nostril (Active)   Placement Check placement verified by aspirate characteristics;placement verified by x-ray 04/17/22 0305   Tolerance no signs/symptoms of discomfort 04/17/22 0305   Securement secured to nostril center w/ adhesive device 04/17/22 0305   Clamp Status/Tolerance clamped 04/17/22 0305   Suction Setting/Drainage Method suction at the bedside 04/17/22 0305   Insertion Site Appearance no redness, warmth, tenderness, skin breakdown, drainage 04/17/22 0305   Drainage None 04/15/22 0332   Flush/Irrigation flushed w/;water;no resistance met 04/17/22 0305   Water Bolus (mL) 100 mL 04/17/22 0505   Residual Amount (ml) 40 ml 04/16/22 0700       Female External Urinary Catheter 04/13/22 1820 (Active)   Skin no redness;no breakdown 04/17/22 0305   Tolerance no signs/symptoms of discomfort 04/17/22 0305   Suction Continuous suction at 60 mmHg 04/16/22 2004   Date of last wick change 04/16/22 04/16/22 2004   Time of last wick change 0956 04/16/22 0700   Output (mL) 200 mL 04/17/22 0505       Physical Exam    Neurosurgery Physical Exam  [id number]  Follows commands on R  No movement of left to stim (baseline)  Not verbal on exam  EEG cap in place.       Significant Labs:  Recent Labs   Lab 04/16/22  0600 04/17/22  0246   GLU 84 66*    143   K 3.5 3.5    111*   CO2 20* 20*   BUN 32* 35*   CREATININE 1.2 1.2   CALCIUM 8.5* 8.3*     Recent Labs   Lab 04/16/22  0600 04/17/22  0246   WBC 15.89* 16.10*   HGB 10.3* 9.8*   HCT 31.3* 29.7*    139*     No results for input(s): LABPT, INR, APTT in the last 48 hours.  Microbiology Results (last 7 days)       Procedure Component Value Units Date/Time    Blood culture [[id number]]  Collected: 04/14/22 1226    Order Status: Completed Specimen: Blood from Peripheral, Hand, Left Updated: 04/16/22 1412     Blood Culture, Routine No Growth to date      No Growth to date      No Growth to date    Blood culture [[id number]] Collected: 04/14/22 1208    Order Status: Completed Specimen: Blood from Peripheral, Hand, Right Updated: 04/16/22 1412     Blood Culture, Routine No Growth to date      No Growth to date      No Growth to date    Blood culture [[id number]] Collected: 04/11/22 1021    Order Status: Completed Specimen: Blood from Peripheral, Left Arm Updated: 04/16/22 1212     Blood Culture, Routine No growth after 5 days.    Narrative:      Collection has been rescheduled by [facility] at 04/11/2022 10:04 Reason:   Patient unavailable, patient care, nurse piage notified.  Collection has been rescheduled by [facility] at 04/11/2022 10:04 Reason:   Patient unavailable, patient care, nurse paige notified.    Blood culture [[id number]] Collected: 04/11/22 1020    Order Status: Completed Specimen: Blood from Peripheral, Left Hand Updated: 04/16/22 1212     Blood Culture, Routine No growth after 5 days.    Narrative:      Collection has been rescheduled by [facility] at 04/11/2022 10:04 Reason:   Patient unavailable, patient care, nurse paige notified.  Collection has been rescheduled by [facility] at 04/11/2022 10:04 Reason:   Patient unavailable, patient care, nurse paige notified.    Culture, Respiratory with Gram Stain [[id number]]     Order Status: No result Specimen: Respiratory     Urine culture [[id number]]  (Abnormal)  (Susceptibility) Collected: 04/11/22 1017    Order Status: Completed Specimen: Urine Updated: 04/13/22 1411     Urine Culture, Routine ESCHERICHIA COLI ESBL  10,000 - 49,999 cfu/ml      Narrative:      Specimen Source->Urine    Blood culture [[id number]]     Order Status: No result Specimen: Blood     Blood culture [[id number]]     Order Status: No result Specimen: Blood           All pertinent labs from the  last 24 hours have been reviewed.    Significant Diagnostics:  I have reviewed all pertinent imaging results/findings within the past 24 hours.    Assessment/Plan:     * SDH (subdural hematoma)  71 year old female with , hypothyroidism, CVA with left-sided residual deficit on ASA/Plavix, DM2, HTN, HLD, CKD, chronic anemia, presenting from SNF after being found down next to wheelchair after unwitnessed fall, consulted to NSGY for right 2 cm SDH mostly acute, membranous and heterogenous appearing, some chronic component, without midline shift. Now s/p R crani for SDH evacuation (4/4).    --Stepped up to ICU  -- q1 hour vitals/neurochecks; exam wax and wane   -- SBP < 160  -- Na goal eunatremia.  -- Seizure control per neuro ICU: Keppra 1500 BID and vimpat  b.i.d.    EEG with right-sided none compulsive seizures on 4/15.   -- Follow up CT head satisfactory x2 with residual blood   -- CT head from 4/13 shows residual subdural hematoma with reaccumulation with some mass effect and sulcal effacement which is stable on repeat scan on 4/14  -- Saturating well on room   -- No HOB restriction.  -- PPx: SCDs, BR, IS. Okay for SQH for dvt ppx.   -- Osteomyelitis the or really alternative is of R foot:    - ID rec 6 week course Vanc with end date 5/5. F/u full ID recs. OK to start Etrapenem if pt decompensates.   -Podiatry managing recent partial foot amputation, appreciate recs. Sutures removed 4/12.    -Home health/facility to do dressing changes every MWF and prn as follows:  Cleanse right foot incision site with saline or wound cleanser, paint incision site with betadine, cover with 4x4 gauze, kerlix, ACE bandage.   -- Anemia: Resolved.   -- Fever 4/11: Afebrile overnight.    - UA 4/11: Growing GNRs, f/u susceptibility   - CXR with possible basilar effusion.   - BLE US 4/11: w/o DVT.   - HM following. Appreciate assistance with care.    - Blood cx 4/11: NGTD.  --Cranial wound care: Staples to be removed 4/18 if incision  appears well healed.   --Repeat CTH 6 weeks post op.     Dispo: Continue EEG monitoring             Eris Huntley MD  Neurosurgery  [facility]

## 2022-04-17 NOTE — PROGRESS NOTES
[facility]  Neurocritical Care  Progress Note    Admit Date: 4/3/2022  Service Date: 2022  Length of Stay: 13    Subjective:     Chief Complaint: SDH (subdural hematoma)    History of Present Illness: Ms. Adame is a 70 yo female with hx of dementia, R MCA stroke with left side contraction/hemiparesis presented to [facility] with SDH now s/p evac. Stepped down from [facility]. On  she developed worsening headache and CTH showed re-accumulation of R SDH and 4-5mm MLS. She was noted to be more slow to follow commands. Stepped back up to [facility] for closer monitoring.    Original HPI below:  Ms. Adame is a 70 y/o F with a PMHx of baseline dementia, GERD, R MCA stroke with residual LUE weakness, HLD, HTN, Stage IV CKD, TIIDM who presents to [facility] from [facility] with a AoC SDH after an unwitnessed fall from her wheel chair without loss of consciousness. CT spine negative for fracture. CT head with R SDH and 3-4 mm MLS. She is on DAPT at home. Of note, she has been staying at [facility] after a partial R foot amputation, stitches still intact, for which she is receiving vancomycin.         Hospital Course: 2022: OR today for clot evac. Patient returned to ICU intubated on precedex. Post op scan stable. Wean propofol and initiated precedex for likely extubation tomorrow.   2022 repeat CT head due to drainage.   2022 NAEO.   2022 NAEO. Successfully extubated  22: EEG revealed seizure   4/15/22: vimpat load overnight  2022: NAEON.      Interval History: NAEON.    Review of Systems   Reason unable to perform ROS:  LOC.     Objective:     Vitals:  Temp: 97.8 °F (36.6 °C)  Pulse: 69  Rhythm: normal sinus rhythm  BP: (!) 158/70  MAP (mmHg): 100  Resp: 20  SpO2: 100 %  O2 Device (Oxygen Therapy): room air    Temp  Min: 97.7 °F (36.5 °C)  Max: 97.9 °F (36.6 °C)  Pulse  Min: 48  Max: 79  BP  Min: 105/55  Max: 179/84  MAP (mmHg)  Min: 78  Max: 121  Resp   Min: 11  Max: 28  SpO2  Min: 98 %  Max: 100 %    04/15 0701 - 04/16 0700  In: 690.5   Out: 875 [Urine:875]   Unmeasured Output  Urine Occurrence: 1  Stool Occurrence: 1  Pad Count: 1       Physical Exam  Vitals and nursing note reviewed.   Constitutional:       Appearance: She is ill-appearing.   HENT:      Head: Normocephalic.      Right Ear: External ear normal.      Left Ear: External ear normal.      Nose: Nose normal.      Mouth/Throat:      Mouth: Mucous membranes are moist.      Pharynx: Oropharynx is clear.   Cardiovascular:      Rate and Rhythm: Normal rate and regular rhythm.   Pulmonary:      Effort: Pulmonary effort is normal. No respiratory distress.   Abdominal:      General: Abdomen is flat. There is no distension.   Musculoskeletal:         General: Normal range of motion.      Cervical back: Normal range of motion.   Skin:     General: Skin is warm and dry.   Neurological:      Comments: E4V1M5  Awake. No verbal response. Follows commands.  PERRL. CN II-XII grossly intact.   Withdrawal to pain on RUE and RLE. Hemiparesis on left side with baseline contraction. SILT in all extremities.       Medications:  ContinuousniCARdipine, Last Rate: 0 mg/hr (04/15/22 0545)  ScheduledamLODIPine, 10 mg, Daily  atorvastatin, 40 mg, Daily  carvediloL, 25 mg, BID  lacosamide (VIMPAT) IVPB, 100 mg, Q12H  levetiracetam IV, 1,500 mg, Q12H  levothyroxine, 75 mcg, Before breakfast  losartan, 25 mg, Daily  senna-docusate 8.6-50 mg, 1 tablet, BID  silodosin, 4 mg, Daily  vancomycin (VANCOCIN) IVPB, 500 mg, Q24H  PRNacetaminophen, 650 mg, Q4H PRN  dextrose 10%, 12.5 g, PRN  glucagon (human recombinant), 1 mg, PRN  hydrALAZINE, 10 mg, Q8H PRN  HYDROcodone-acetaminophen, 1 tablet, Q4H PRN  labetalol, 10 mg, Q15 Min PRN  metoclopramide HCl, 5 mg, Q6H PRN  ondansetron, 4 mg, Q6H PRN  potassium bicarbonate, 35 mEq, PRN  potassium bicarbonate, 50 mEq, PRN  potassium bicarbonate, 60 mEq, PRN  potassium, sodium phosphates, 2  packet, PRN  potassium, sodium phosphates, 2 packet, PRN  potassium, sodium phosphates, 2 packet, PRN  sodium chloride 0.9%, 10 mL, PRN  sodium chloride 0.9%, 10 mL, PRN  vancomycin - pharmacy to dose, , pharmacy to manage frequency    Today I personally reviewed pertinent medications, lines/drains/airways, imaging, laboratory results, microbiology results,      Diet  Diet NPO  Diet NPO          Assessment/Plan:     Neuro  * SDH (subdural hematoma)  S/p evac on 4/4/13; stable and stepped down from [facility].    On 4/13 patient had 8/10 HA and was slower to follow commands.  Repeat CTH showed re-accumulation of R SDH along with 4-5mm MLS.  Stepped back up to [facility].    -- NSGY following  -- SBP <160  -- q1hr neuro check  -- hold anticoagulation  -- EEG revealed seizure  -- Vimpat load 400 mg        History of stroke  - R MCA stroke (2013) with residual LUE flaccid paralysis  - DAPT at home held due to new ICH  - Statin      Cardiac/Vascular  Critical lower limb ischemia  See osteomyelitis    Hyperlipidemia  - Statin    Hypertension  - SBP < 160  - Cardene gtt stopped  - Start norvasc, coreg, & losartan  - Hold home microzide  - PRN labetalol, hydralazine    ID  Osteomyelitis of right foot  - S/p R partial foot amputation on vancomycin at [facility]  - continue vancomycin  - bandage, stitches intact   - Afebrile, WBC normal    Endocrine  Body mass index (BMI) less than 19  Nutrition consult    Hypothyroidism  - TSH WNL  - Synthroid    Type 2 diabetes mellitus, with long-term current use of insulin  - A1c 5  - Accuchecks    Orthopedic  Status post partial amputation of right foot  See osteomyelitis    Other  Debility  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

## 2022-04-17 NOTE — ASSESSMENT & PLAN NOTE
- S/p R partial foot amputation on vancomycin at [facility]  - continue vancomycin  - bandage, stitches intact   - Afebrile, WBC normal

## 2022-04-17 NOTE — ASSESSMENT & PLAN NOTE
- R MCA stroke (2013) with residual LUE flaccid paralysis  - DAPT at home held due to new ICH  - Statin

## 2022-04-17 NOTE — ASSESSMENT & PLAN NOTE
71 year old female with , hypothyroidism, CVA with left-sided residual deficit on ASA/Plavix, DM2, HTN, HLD, CKD, chronic anemia, presenting from SNF after being found down next to wheelchair after unwitnessed fall, consulted to NSGY for right 2 cm SDH mostly acute, membranous and heterogenous appearing, some chronic component, without midline shift. Now s/p R crani for SDH evacuation (4/4).    --Stepped up to ICU  -- q1 hour vitals/neurochecks; exam wax and wane   -- SBP < 160  -- Na goal eunatremia.  -- Seizure control per neuro ICU: Keppra 1500 BID and vimpat  b.i.d.    EEG with right-sided none compulsive seizures on 4/15.   -- Follow up CT head satisfactory x2 with residual blood   -- CT head from 4/13 shows residual subdural hematoma with reaccumulation with some mass effect and sulcal effacement which is stable on repeat scan on 4/14  -- Saturating well on room   -- No HOB restriction.  -- PPx: SCDs, BR, IS. Okay for SQH for dvt ppx.   -- Osteomyelitis the or really alternative is of R foot:    - ID rec 6 week course Vanc with end date 5/5. F/u full ID recs. OK to start Etrapenem if pt decompensates.   -Podiatry managing recent partial foot amputation, appreciate recs. Sutures removed 4/12.    -Home health/facility to do dressing changes every MWF and prn as follows:  Cleanse right foot incision site with saline or wound cleanser, paint incision site with betadine, cover with 4x4 gauze, kerlix, ACE bandage.   -- Anemia: Resolved.   -- Fever 4/11: Afebrile overnight.    - UA 4/11: Growing GNRs, f/u susceptibility   - CXR with possible basilar effusion.   - BLE US 4/11: w/o DVT.   - HM following. Appreciate assistance with care.    - Blood cx 4/11: NGTD.  --Cranial wound care: Staples to be removed 4/18 if incision appears well healed.   --Repeat CTH 6 weeks post op.     Dispo: Continue EEG monitoring

## 2022-04-17 NOTE — PLAN OF CARE
[facility] Care Plan    POC reviewed with Beatriz Adame at 0300. Pt verbalized understanding. Questions and concerns addressed. No acute events overnight. Pt progressing toward goals. See below and flowsheets for full assessment and VS info.           Is this a stroke patient? no    Neuro:  [place] Coma Scale  Best Eye Response: 4-->(E4) spontaneous  Best Motor Response: 6-->(M6) obeys commands  Best Verbal Response: 2-->(V2) incomprehensible speech  [place] Coma Scale Score: 12  Assessment Qualifiers: patient not sedated/intubated  Pupil PERRLA: yes     24hr Temp:  [97.7 °F (36.5 °C)-98 °F (36.7 °C)]     CV:   Rhythm: normal sinus rhythm  BP goals:   SBP < 160  MAP > 65    Resp:   O2 Device (Oxygen Therapy): room air  Vent Mode: Spont  Set Rate: 15 BPM  Oxygen Concentration (%): 98  Vt Set: 450 mL  PEEP/CPAP: 5 cmH20  Pressure Support: 5 cmH20    Plan: keep O2 sars freater than 92%  while on room air    GI/:     Diet/Nutrition Received: NPO  Last Bowel Movement: 04/16/22  Voiding Characteristics: external catheter    Intake/Output Summary (Last 24 hours) at 4/17/2022 0355  Last data filed at 4/17/2022 0002  Gross per 24 hour   Intake 449.07 ml   Output 450 ml   Net -0.93 ml     Unmeasured Output  Urine Occurrence: 1  Stool Occurrence: 1  Pad Count: 1    Labs/Accuchecks:  Recent Labs   Lab 04/17/22  0246   WBC 16.10*   RBC 3.25*   HGB 9.8*   HCT 29.7*   *      Recent Labs   Lab 04/17/22  0246      K 3.5   CO2 20*   *   BUN 35*   CREATININE 1.2   ALKPHOS 59   ALT 13   AST 15   BILITOT 0.6    No results for input(s): PROTIME, INR, APTT, HEPANTIXA in the last 168 hours. No results for input(s): CPK, CPKMB, TROPONINI, MB in the last 168 hours.    Electrolytes:   Accuchecks: every shift    Gtts:   niCARdipine 0 mg/hr (04/15/22 0545)       LDA/Wounds:  Lines/Drains/Airways       Peripherally Inserted Central Catheter Line  Duration             PICC Double Lumen 03/26/22 1905 right basilic 21 days               Drain  Duration             Female External Urinary Catheter 04/13/22 1820 3 days         NG/OG Tube 04/14/22 1200 Amando peraza 14 Fr. Left nostril 2 days                  Wounds: yes  Wound care consulted: yes

## 2022-04-18 NOTE — ASSESSMENT & PLAN NOTE
- S/p R partial foot amputation on vancomycin at [facility]  - continue vancomycin  - bandage, stitches intact   - Afebrile, WBC normal

## 2022-04-18 NOTE — ASSESSMENT & PLAN NOTE
S/p evac on 4/4/13; stable and stepped down from [facility].    On 4/13 patient had 8/10 HA and was slower to follow commands.  Repeat CTH showed re-accumulation of R SDH along with 4-5mm MLS.  Stepped back up to [facility].    -- NSGY following  -- SBP <160  -- q1hr neuro check  -- hold anticoagulation  -- EEG revealed seizure  -- Continue vimpat and keppra    4/18: OR today for evac. Vimpat 200 x 1 and increase scheduled vimpat to 200 2/2 R sided lateralized periodic discharges with some seizure correlate on EEG.

## 2022-04-18 NOTE — PROGRESS NOTES
[facility] - Neuro Critical Care  Neurocritical Care  Progress Note    Admit Date: 4/3/2022  Service Date: 04/17/2022  Length of Stay: 14    Subjective:     Chief Complaint: SDH (subdural hematoma)    History of Present Illness: Ms. Adame is a 72 yo female with hx of dementia, R MCA stroke with left side contraction/hemiparesis presented to [facility] with SDH now s/p evac. Stepped down from [facility]. On 4/13 she developed worsening headache and CTH showed re-accumulation of R SDH and 4-5mm MLS. She was noted to be more slow to follow commands. Stepped back up to [facility] for closer monitoring.    Original HPI below:  Ms. Adame is a 72 y/o F with a PMHx of baseline dementia, GERD, R MCA stroke with residual LUE weakness, HLD, HTN, Stage IV CKD, TIIDM who presents to [facility] from [facility] with a AoC SDH after an unwitnessed fall from her wheel chair without loss of consciousness. CT spine negative for fracture. CT head with R SDH and 3-4 mm MLS. She is on DAPT at home. Of note, she has been staying at [facility] after a partial R foot amputation, stitches still intact, for which she is receiving vancomycin.         Hospital Course: 04/04/2022: OR today for clot evac. Patient returned to ICU intubated on precedex. Post op scan stable. Wean propofol and initiated precedex for likely extubation tomorrow.   04/05/2022 repeat CT head due to drainage.   04/06/2022 NAEO.   04/07/2022 NAEO. Successfully extubated  4/14/22: EEG revealed seizure   4/15/22: vimpat load overnight  04/16/2022: NAEON.  04/17/2022. NAEON. Repeat CTH stable.       Interval History: NAEON. Repeat CTH stable.    Review of Systems   Reason unable to perform ROS: decreased LOC.     Objective:     Vitals:  Temp: 98.5 °F (36.9 °C)  Pulse: 66  Rhythm: normal sinus rhythm  BP: (!) 122/59  MAP (mmHg): 85  Resp: 16  SpO2: 100 %  O2 Device (Oxygen Therapy): room air    Temp  Min: 97 °F (36.1 °C)  Max: 98.5 °F (36.9 °C)  Pulse  Min: 52  Max: 85  BP  Min:  114/56  Max: 186/79  MAP (mmHg)  Min: 80  Max: 118  Resp  Min: 8  Max: 23  SpO2  Min: 97 %  Max: 100 %    04/16 0701 - 04/17 0700  In: 349.1   Out: 450 [Urine:450]   Unmeasured Output  Urine Occurrence: 1  Stool Occurrence: 1  Pad Count: 1       Physical Exam  Vitals and nursing note reviewed.   Constitutional:       Appearance: She is ill-appearing.   HENT:      Head: Normocephalic.      Right Ear: External ear normal.      Left Ear: External ear normal.      Nose: Nose normal.      Mouth/Throat:      Mouth: Mucous membranes are moist.      Pharynx: Oropharynx is clear.   Cardiovascular:      Rate and Rhythm: Normal rate and regular rhythm.   Pulmonary:      Effort: Pulmonary effort is normal. No respiratory distress.   Abdominal:      General: Abdomen is flat. There is no distension.   Musculoskeletal:         General: Normal range of motion.      Cervical back: Normal range of motion.   Skin:     General: Skin is warm and dry.   Neurological:      Comments: [id number]  Awake. No verbal response. Follows commands.  Moves RUE & RLE spontaneously & against gravity. Hemiparesis on left side with baseline contraction. SILT in all extremities.       Medications:  ContinuousniCARdipine, Last Rate: 0 mg/hr (04/15/22 0545)  ScheduledamLODIPine, 10 mg, Daily  atorvastatin, 40 mg, Daily  carvediloL, 25 mg, BID  lacosamide (VIMPAT) IVPB, 100 mg, Q12H  levetiracetam IV, 1,500 mg, Q12H  levothyroxine, 75 mcg, Before breakfast  losartan, 25 mg, Daily  senna-docusate 8.6-50 mg, 1 tablet, BID  silodosin, 4 mg, Daily  vancomycin (VANCOCIN) IVPB, 500 mg, Q24H  PRNacetaminophen, 650 mg, Q4H PRN  dextrose 10%, 12.5 g, PRN  glucagon (human recombinant), 1 mg, PRN  hydrALAZINE, 10 mg, Q8H PRN  HYDROcodone-acetaminophen, 1 tablet, Q4H PRN  labetalol, 10 mg, Q15 Min PRN  metoclopramide HCl, 5 mg, Q6H PRN  ondansetron, 4 mg, Q6H PRN  potassium bicarbonate, 35 mEq, PRN  potassium bicarbonate, 50 mEq, PRN  potassium bicarbonate, 60 mEq,  PRN  potassium, sodium phosphates, 2 packet, PRN  potassium, sodium phosphates, 2 packet, PRN  potassium, sodium phosphates, 2 packet, PRN  sodium chloride 0.9%, 10 mL, PRN  sodium chloride 0.9%, 10 mL, PRN  vancomycin - pharmacy to dose, , pharmacy to manage frequency    Today I personally reviewed pertinent medications, lines/drains/airways, imaging, laboratory results, microbiology results,      Diet  Diet NPO  Diet NPO          Assessment/Plan:     Neuro  * SDH (subdural hematoma)  S/p evac on 4/4/13; stable and stepped down from [facility].    On 4/13 patient had 8/10 HA and was slower to follow commands.  Repeat CTH showed re-accumulation of R SDH along with 4-5mm MLS.  Stepped back up to [facility].    -- NSGY following  -- SBP <160  -- q1hr neuro check  -- hold anticoagulation  -- EEG revealed seizure  -- Vimpat load 400 mg        History of stroke  - R MCA stroke (2013) with residual LUE flaccid paralysis  - DAPT at home held due to new ICH  - Statin      Cardiac/Vascular  Critical lower limb ischemia  See osteomyelitis    Hypertension  - SBP < 160  - Cardene gtt stopped  - Start norvasc, coreg, & losartan  - Hold home microzide  - PRN labetalol, hydralazine    ID  Osteomyelitis of right foot  - S/p R partial foot amputation on vancomycin at SNF  - continue vancomycin  - bandage, stitches intact   - Afebrile, WBC normal    Endocrine  Body mass index (BMI) less than 19  Nutrition consult    Hypothyroidism  - TSH WNL  - Synthroid    Type 2 diabetes mellitus, with long-term current use of insulin  - A1c 5  - Accuchecks    Orthopedic  Status post partial amputation of right foot  See osteomyelitis    Other  Debility  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

## 2022-04-18 NOTE — SUBJECTIVE & OBJECTIVE
Interval History: 4/18: NAEON. On EEG. One cliinical seizure like episode overnight. Exam stable.     Medications:  Continuous Infusions:   niCARdipine 0 mg/hr (04/15/22 0545)     Scheduled Meds:   amLODIPine  10 mg Per NG tube Daily    atorvastatin  40 mg Per NG tube Daily    carvediloL  25 mg Per NG tube BID    lacosamide (VIMPAT) IVPB  100 mg Intravenous Q12H    levetiracetam IV  1,500 mg Intravenous Q12H    levothyroxine  75 mcg Per NG tube Before breakfast    losartan  25 mg Per NG tube Daily    senna-docusate 8.6-50 mg  1 tablet Per NG tube BID    silodosin  4 mg Per NG tube Daily    vancomycin (VANCOCIN) IVPB  500 mg Intravenous Q24H     PRN Meds:acetaminophen, dextrose 10%, glucagon (human recombinant), hydrALAZINE, HYDROcodone-acetaminophen, labetalol, metoclopramide HCl, ondansetron, potassium bicarbonate, potassium bicarbonate, potassium bicarbonate, potassium, sodium phosphates, potassium, sodium phosphates, potassium, sodium phosphates, sodium chloride 0.9%, sodium chloride 0.9%, Pharmacy to dose Vancomycin consult **AND** vancomycin - pharmacy to dose     Review of Systems  Objective:     Weight: 49.4 kg (109 lb)  Body mass index is 17.07 kg/m².  Vital Signs (Most Recent):  Temp: 98.3 °F (36.8 °C) (04/18/22 0402)  Pulse: 66 (04/18/22 0701)  Resp: 15 (04/18/22 0701)  BP: (!) 146/67 (04/18/22 0701)  SpO2: 100 % (04/18/22 0701)   Vital Signs (24h Range):  Temp:  [97 °F (36.1 °C)-98.5 °F (36.9 °C)] 98.3 °F (36.8 °C)  Pulse:  [55-85] 66  Resp:  [8-23] 15  SpO2:  [97 %-100 %] 100 %  BP: (112-186)/(53-82) 146/67     Date 04/18/22 0700 - 04/19/22 0659   Shift 2610-9080 3710-9492 4597-9989 24 Hour Total   INTAKE   IV Piggyback 503.4   503.4   Shift Total(mL/kg) 503.4(10.2)   503.4(10.2)   OUTPUT   Shift Total(mL/kg)       Weight (kg) 49.4 49.4 49.4 49.4                        NG/OG Tube 04/14/22 1200 Jewell sump 14 Fr. Left nostril (Active)   Placement Check placement verified by aspirate  characteristics;placement verified by x-ray 04/17/22 0305   Tolerance no signs/symptoms of discomfort 04/17/22 0305   Securement secured to nostril center w/ adhesive device 04/17/22 0305   Clamp Status/Tolerance clamped 04/17/22 0305   Suction Setting/Drainage Method suction at the bedside 04/17/22 0305   Insertion Site Appearance no redness, warmth, tenderness, skin breakdown, drainage 04/17/22 0305   Drainage None 04/15/22 0332   Flush/Irrigation flushed w/;water;no resistance met 04/17/22 0305   Water Bolus (mL) 100 mL 04/17/22 0505   Residual Amount (ml) 40 ml 04/16/22 0700       Female External Urinary Catheter 04/13/22 1820 (Active)   Skin no redness;no breakdown 04/17/22 0305   Tolerance no signs/symptoms of discomfort 04/17/22 0305   Suction Continuous suction at 60 mmHg 04/16/22 2004   Date of last wick change 04/16/22 04/16/22 2004   Time of last wick change 0956 04/16/22 0700   Output (mL) 200 mL 04/17/22 0505       Physical Exam    Neurosurgery Physical Exam  [id number]  Follows commands on R  No movement of left to stim (baseline) and contracted  Not verbal on exam  EEG cap in place.       Significant Labs:  Recent Labs   Lab 04/17/22 0246 04/18/22  0402   GLU 66* 108    143   K 3.5 3.4*   * 111*   CO2 20* 22*   BUN 35* 34*   CREATININE 1.2 1.2   CALCIUM 8.3* 8.5*       Recent Labs   Lab 04/17/22 0246 04/18/22  0402   WBC 16.10* 15.42*   HGB 9.8* 10.2*   HCT 29.7* 30.7*   * 113*       No results for input(s): LABPT, INR, APTT in the last 48 hours.  Microbiology Results (last 7 days)       Procedure Component Value Units Date/Time    Blood culture [[id number]] Collected: 04/14/22 1208    Order Status: Completed Specimen: Blood from Peripheral, Hand, Right Updated: 04/17/22 1412     Blood Culture, Routine No Growth to date      No Growth to date      No Growth to date      No Growth to date    Blood culture [[id number]] Collected: 04/14/22 1226    Order Status: Completed Specimen: Blood  from Peripheral, Hand, Left Updated: 04/17/22 1412     Blood Culture, Routine No Growth to date      No Growth to date      No Growth to date      No Growth to date    Blood culture [[id number]] Collected: 04/11/22 1021    Order Status: Completed Specimen: Blood from Peripheral, Left Arm Updated: 04/16/22 1212     Blood Culture, Routine No growth after 5 days.    Narrative:      Collection has been rescheduled by [facility] at 04/11/2022 10:04 Reason:   Patient unavailable, patient care, nurse paige notified.  Collection has been rescheduled by T at 04/11/2022 10:04 Reason:   Patient unavailable, patient care, nurse paige notified.    Blood culture [[id number]] Collected: 04/11/22 1020    Order Status: Completed Specimen: Blood from Peripheral, Left Hand Updated: 04/16/22 1212     Blood Culture, Routine No growth after 5 days.    Narrative:      Collection has been rescheduled by [facility] at 04/11/2022 10:04 Reason:   Patient unavailable, patient care, nurse paige notified.  Collection has been rescheduled by [facility] at 04/11/2022 10:04 Reason:   Patient unavailable, patient care, nurse paige notified.    Culture, Respiratory with Gram Stain [[id number]]     Order Status: No result Specimen: Respiratory     Urine culture [[id number]]  (Abnormal)  (Susceptibility) Collected: 04/11/22 1017    Order Status: Completed Specimen: Urine Updated: 04/13/22 1411     Urine Culture, Routine ESCHERICHIA COLI ESBL  10,000 - 49,999 cfu/ml      Narrative:      Specimen Source->Urine    Blood culture [[id number]]     Order Status: No result Specimen: Blood     Blood culture [[id number]]     Order Status: No result Specimen: Blood           All pertinent labs from the last 24 hours have been reviewed.    Significant Diagnostics:  I have reviewed all pertinent imaging results/findings within the past 24 hours.

## 2022-04-18 NOTE — ASSESSMENT & PLAN NOTE
- S/p R partial foot amputation on vancomycin at [facility]  - continue vancomycin  - bandage, stitches intact   - Afebrile, WBC normal

## 2022-04-18 NOTE — PROGRESS NOTES
"[facility] - Neuro [facility]  Neurology-Epilepsy  Progress Note    Patient Name: Beatriz Adame  MRN: [id number]  Admission Date: 4/3/2022  Hospital Length of Stay: 15 days  Code Status: Full Code   Attending Provider: Maverick Ely MD  Primary Care Physician: Dante Olivier MD   Principal Problem:Recurrent seizures    Subjective:     Hospital Course:   4/13>4/14: CAP EEG limited d/t artifact with evidence of right periodic discharges. Formal EEG placed and revealed recurrent subclinical electrographic seizures with right hemispheric onset.   4/14>4/15: right focal NCSE with nearly continuous fluctuation and frequency evolution into subclinical seizures; pattern is slightly more stable after IV Lacosamide but there are still persistent lateralized periodic discharges on a rhythmic background with evidence of fluctuation consistent with marked cortical irritation and high potential to develop into electrographic seizures from this region; moderate to severe encephalopathy    4/17>4/18: lateralized periodic discharges over right hemisphere with evidence of fluctuation sometimes meeting criteria for subclinical seizures      Interval History: NAEON. RN documented episode of seizure like activity described as "patient blew bubbles from mouth for <5 seconds during bath"- no EEG correlate with this event. NSGY planning for redo craniotomy this afternoon. Discussed EEG findings and AED recommendations with son at bedside, answered questions.    Current Facility-Administered Medications   Medication Dose Route Frequency Provider Last Rate Last Admin    0.9%  NaCl infusion (for blood administration)   Intravenous Q24H PRN Heber Felipe MD        0.9%  NaCl infusion   Intravenous Continuous Heber Felipe MD 75 mL/hr at 04/18/22 1233 Rate Verify at 04/18/22 1233    acetaminophen tablet 650 mg  650 mg Oral Q4H PRN Yamil Larson MD   650 mg at 04/14/22 1954    amLODIPine tablet 10 mg  10 mg Per NG " tube Daily Donita L Love, NP   10 mg at 04/18/22 0828    atorvastatin tablet 40 mg  40 mg Per NG tube Daily Donita L Love, NP   40 mg at 04/18/22 0828    carvediloL tablet 25 mg  25 mg Per NG tube BID Donita L Radha, NP   25 mg at 04/18/22 0828    dextrose 10% bolus 125 mL  12.5 g Intravenous PRN Brock Fairchild MD   Stopped at 04/17/22 1637    glucagon (human recombinant) injection 1 mg  1 mg Intramuscular PRN Brock Fairchild MD        hydrALAZINE injection 10 mg  10 mg Intravenous Q8H PRN Ina Ardon PA-C   10 mg at 04/17/22 1842    HYDROcodone-acetaminophen 5-325 mg per tablet 1 tablet  1 tablet Oral Q4H PRN Yamil Larson MD   1 tablet at 04/16/22 0834    labetalol 20 mg/4 mL (5 mg/mL) IV syring  10 mg Intravenous Q15 Min PRN Brenna Stuart DO   10 mg at 04/15/22 1707    lacosamide (VIMPAT) 200 mg in sodium chloride 0.9% 100 mL IVPB  200 mg Intravenous Once Maegan Dsouza PA-C 200 mL/hr at 04/18/22 1316 200 mg at 04/18/22 1316    lacosamide (VIMPAT) 200 mg in sodium chloride 0.9% 100 mL IVPB  200 mg Intravenous Q12H Maegan Dsouza PA-C        levETIRAcetam injection 1,500 mg  1,500 mg Intravenous Q12H Shahana Cornejo MD   1,500 mg at 04/18/22 0828    levothyroxine tablet 75 mcg  75 mcg Per NG tube Before breakfast Donita L Radha, NP   75 mcg at 04/18/22 0622    losartan tablet 25 mg  25 mg Per NG tube Daily Donita L Love, NP   25 mg at 04/18/22 0828    metoclopramide HCl injection 5 mg  5 mg Intravenous Q6H PRN Brock Fairchild MD        niCARdipine 40 mg/200 mL (0.2 mg/mL) infusion  0-15 mg/hr Intravenous Continuous Brenna Stuart DO 0 mL/hr at 04/15/22 0545 Canceled Entry at 04/15/22 0625    ondansetron injection 4 mg  4 mg Intravenous Q6H PRN Brock Fairchild MD   4 mg at 04/13/22 1313    potassium bicarbonate disintegrating tablet 35 mEq  35 mEq Oral PRN Yamil Larson MD   35 mEq at 04/18/22 0827    potassium bicarbonate disintegrating tablet 50 mEq   50 mEq Oral PRN Yamil Larson MD        potassium bicarbonate disintegrating tablet 60 mEq  60 mEq Oral PRN Yamil Larson MD        potassium, sodium phosphates 280-160-250 mg packet 2 packet  2 packet Oral PRN Yamil Larson MD        potassium, sodium phosphates 280-160-250 mg packet 2 packet  2 packet Oral PRN Yamil Larson MD        potassium, sodium phosphates 280-160-250 mg packet 2 packet  2 packet Oral PRN Yamil Larson MD        senna-docusate 8.6-50 mg per tablet 1 tablet  1 tablet Per NG tube BID Donitabecka Garcia, NP   1 tablet at 04/18/22 0828    silodosin capsule 4 mg  4 mg Per NG tube Daily Donita L Radha, NP   4 mg at 04/18/22 0828    sodium chloride 0.9% flush 10 mL  10 mL Intravenous PRN Brock Fairchild MD        sodium chloride 0.9% flush 10 mL  10 mL Intravenous PRN Brenna Stuart DO        vancomycin - pharmacy to dose   Intravenous pharmacy to manage frequency Brock Fairchild MD        vancomycin 500 mg in dextrose 5 % 100 mL IVPB (ready to mix system)  500 mg Intravenous Q24H Caleb Anand MD   Stopped at 04/17/22 1442     Continuous Infusions:   sodium chloride 0.9% 75 mL/hr at 04/18/22 1233    niCARdipine 0 mg/hr (04/15/22 0545)       Review of Systems   Unable to perform ROS: Acuity of condition   Objective:     Vital Signs (Most Recent):  Temp: 98.3 °F (36.8 °C) (04/18/22 1315)  Pulse: 77 (04/18/22 1315)  Resp: 19 (04/18/22 1315)  BP: 130/62 (04/18/22 1315)  SpO2: 100 % (04/18/22 1315) Vital Signs (24h Range):  Temp:  [98.1 °F (36.7 °C)-98.5 °F (36.9 °C)] 98.3 °F (36.8 °C)  Pulse:  [55-81] 77  Resp:  [12-22] 19  SpO2:  [97 %-100 %] 100 %  BP: (112-186)/(53-79) 130/62     Weight: 49.4 kg (109 lb)  Body mass index is 17.07 kg/m².    Physical Exam  Constitutional:       General: She is not in acute distress.     Appearance: She is not diaphoretic.   HENT:      Head: Normocephalic.      Comments: EEG in place  Eyes:      General: No  scleral icterus.        Right eye: No discharge.         Left eye: No discharge.      Conjunctiva/sclera: Conjunctivae normal.      Pupils: Pupils are equal, round, and reactive to light.   Cardiovascular:      Rate and Rhythm: Normal rate.   Pulmonary:      Effort: Pulmonary effort is normal. No respiratory distress.   Abdominal:      General: There is no distension.      Palpations: Abdomen is soft.      Tenderness: There is no abdominal tenderness.   Musculoskeletal:         General: Deformity (partial R foot amputation) present. No signs of injury.   Skin:     General: Skin is warm and dry.   Neurological:      Comments: L contraction   Psychiatric:      Comments: Unable to assess       NEUROLOGICAL EXAMINATION:     CRANIAL NERVES     CN III, IV, VI   Pupils are equal, round, and reactive to light.       VARINDER OU   Corneal intact OU   No spontaneous eye opening   Tongue midline   L contraction, increased tone   Withdraws from RLE    Exam findings to suggest seizures:  Myoclonus - no   eye twitching - no   Nystagmus - no   gaze deviation - no   waxy rigidity - no      Significant Labs: All pertinent lab results from the past 24 hours have been reviewed.    Significant Studies: I have reviewed all pertinent imaging results/findings within the past 24 hours.    Assessment and Plan:     * Recurrent seizures  71F PMHx of HTN, DM, CKD, R MCA CVA  on DAPT with L contraction and hemiparesis, s/p partial R foot amputation, and dementia who p/w unwitnessed fall on 4/3/2022 from [facility]. CTH revealed acute R SDH with mass effect and 3-4 mm L MLS, now s/p right fronto-parietal craniotomy for SDH evacuation with Dr. White on 4/4/2022. Stepped down to NSGY 4/8/2022. Hospital course c/b fever, CXR with possibly developing pneumonia vs aspiration. Cefepime added for coverage on 4/13 per ID recs. Patient c/o increasing severity of headache 4/13; CTH concerning for recurrent R SDH with increased effect and L MLS.  Patient transferred back to [facility]. CAP EEG placed, limited with evidence of right periodic discharges. Formal EEG placed 4/14 and revealed recurrent subclinical electrographic seizures with R onset which progressed into R focal NCSE. Epilepsy following for assistance in management.     Recommendations:  - Continuous vEEG monitoring  - Recommend IV Lacosamide 200 mg x1 followed by 200 mg BID  - Recommend to continue Levetiracetam 1500 mg BID  - Cefepime d/c 4/14  - Avoid agents that lower seizure threshold  - Management of infectious/metabolic abnormalities per NCC  - Seizure precautions      Discussed plan of care with [facility] team and son at [facility]. Will follow, please call with questions.    SDH (subdural hematoma)  - Presented to ER 4/3/2022 from [facility] after an unwitnessed fall-> CTH revealed acute R SDH with mass effect and 3-4 mm L MLS  - s/p right fronto-parietal craniotomy for subdural evacuation with Dr. White on 4/4/2022  - Patient c/o increasing severity of headache 4/13-> CTH concerning for recurrent R SDH with increased effect and 4-5 L MLS  - NSGY planning for redo craniotomy this afternoon for SDH evac  - Management per NSGY, NCC    Osteomyelitis of right foot  S/p partial right foot amputation  - On Vancomycin per ID recs  - Management per Podiatry, ID, NCC    History of stroke  Hx of R MCA CVA w/ left hemiparesis/contraction   - On DAPT as outpatient per chart  - Management per NCC      VTE Risk Mitigation (From admission, onward)         Ordered     IP VTE HIGH RISK PATIENT  Once         04/03/22 1608     Place sequential compression device  Until discontinued         04/03/22 1608     Reason for No Pharmacological VTE Prophylaxis  Once        Question:  Reasons:  Answer:  Active Bleeding    04/03/22 1608                Marilee Daigle PA-C  Neurology-Epilepsy  [facility] - Neuro Critical Care  Staff: Dr. Peace

## 2022-04-18 NOTE — SUBJECTIVE & OBJECTIVE
Interval History: NAEON. Repeat CTH stable.    Review of Systems   Reason unable to perform ROS: decreased LOC.     Objective:     Vitals:  Temp: 98.5 °F (36.9 °C)  Pulse: 66  Rhythm: normal sinus rhythm  BP: (!) 122/59  MAP (mmHg): 85  Resp: 16  SpO2: 100 %  O2 Device (Oxygen Therapy): room air    Temp  Min: 97 °F (36.1 °C)  Max: 98.5 °F (36.9 °C)  Pulse  Min: 52  Max: 85  BP  Min: 114/56  Max: 186/79  MAP (mmHg)  Min: 80  Max: 118  Resp  Min: 8  Max: 23  SpO2  Min: 97 %  Max: 100 %    04/16 0701 - 04/17 0700  In: 349.1   Out: 450 [Urine:450]   Unmeasured Output  Urine Occurrence: 1  Stool Occurrence: 1  Pad Count: 1       Physical Exam  Vitals and nursing note reviewed.   Constitutional:       Appearance: She is ill-appearing.   HENT:      Head: Normocephalic.      Right Ear: External ear normal.      Left Ear: External ear normal.      Nose: Nose normal.      Mouth/Throat:      Mouth: Mucous membranes are moist.      Pharynx: Oropharynx is clear.   Cardiovascular:      Rate and Rhythm: Normal rate and regular rhythm.   Pulmonary:      Effort: Pulmonary effort is normal. No respiratory distress.   Abdominal:      General: Abdomen is flat. There is no distension.   Musculoskeletal:         General: Normal range of motion.      Cervical back: Normal range of motion.   Skin:     General: Skin is warm and dry.   Neurological:      Comments: E4V1M6  Awake. No verbal response. Follows commands.  Moves RUE & RLE spontaneously & against gravity. Hemiparesis on left side with baseline contraction. SILT in all extremities.       Medications:  ContinuousniCARdipine, Last Rate: 0 mg/hr (04/15/22 0545)  ScheduledamLODIPine, 10 mg, Daily  atorvastatin, 40 mg, Daily  carvediloL, 25 mg, BID  lacosamide (VIMPAT) IVPB, 100 mg, Q12H  levetiracetam IV, 1,500 mg, Q12H  levothyroxine, 75 mcg, Before breakfast  losartan, 25 mg, Daily  senna-docusate 8.6-50 mg, 1 tablet, BID  silodosin, 4 mg, Daily  vancomycin (VANCOCIN) IVPB, 500 mg,  Q24H  PRNacetaminophen, 650 mg, Q4H PRN  dextrose 10%, 12.5 g, PRN  glucagon (human recombinant), 1 mg, PRN  hydrALAZINE, 10 mg, Q8H PRN  HYDROcodone-acetaminophen, 1 tablet, Q4H PRN  labetalol, 10 mg, Q15 Min PRN  metoclopramide HCl, 5 mg, Q6H PRN  ondansetron, 4 mg, Q6H PRN  potassium bicarbonate, 35 mEq, PRN  potassium bicarbonate, 50 mEq, PRN  potassium bicarbonate, 60 mEq, PRN  potassium, sodium phosphates, 2 packet, PRN  potassium, sodium phosphates, 2 packet, PRN  potassium, sodium phosphates, 2 packet, PRN  sodium chloride 0.9%, 10 mL, PRN  sodium chloride 0.9%, 10 mL, PRN  vancomycin - pharmacy to dose, , pharmacy to manage frequency    Today I personally reviewed pertinent medications, lines/drains/airways, imaging, laboratory results, microbiology results,      Diet  Diet NPO  Diet NPO

## 2022-04-18 NOTE — PLAN OF CARE
[facility] Care Plan    POC reviewed with Beatriz Adame and family at 0200. Pt unable to verbalized understanding of teaching . Questions and concerns addressed with son at bedside. No acute events overnight. Pt progressing toward goals. Will continue to monitor. See below and flowsheets for full assessment and VS info.   -CT head w/co contrast  -Seizure like activity x1  -Pt HR falls below 50 while asleep, VSS  -bladder scan x1, straight cath x1 got out 500ml urine          Is this a stroke patient? yes- Stroke booklet reviewed with family, risk factors identified for patient and stroke booklet remains at bedside for ongoing education.     Neuro:  [place] Coma Scale  Best Eye Response: 4-->(E4) spontaneous  Best Motor Response: 6-->(M6) obeys commands  Best Verbal Response: 1-->(V1) none  [place] Coma Scale Score: 11  Assessment Qualifiers: patient not sedated/intubated, no eye obstruction present  Pupil PERRLA: yes     24hr Temp:  [97 °F (36.1 °C)-98.5 °F (36.9 °C)]     CV:   Rhythm: sinus bradycardia  BP goals:   SBP < 160  MAP > 65    Resp:   O2 Device (Oxygen Therapy): room air  Vent Mode: Spont  Set Rate: 15 BPM  Oxygen Concentration (%): 98  Vt Set: 450 mL  PEEP/CPAP: 5 cmH20  Pressure Support: 5 cmH20    Plan: N/A    GI/:     Diet/Nutrition Received: tube feeding  Last Bowel Movement: 04/16/22  Voiding Characteristics: external catheter    Intake/Output Summary (Last 24 hours) at 4/18/2022 0216  Last data filed at 4/18/2022 0202  Gross per 24 hour   Intake 583 ml   Output 850 ml   Net -267 ml     Unmeasured Output  Urine Occurrence: 1  Stool Occurrence: 1  Pad Count: 1    Labs/Accuchecks:  Recent Labs   Lab 04/17/22  0246   WBC 16.10*   RBC 3.25*   HGB 9.8*   HCT 29.7*   *      Recent Labs   Lab 04/17/22  0246      K 3.5   CO2 20*   *   BUN 35*   CREATININE 1.2   ALKPHOS 59   ALT 13   AST 15   BILITOT 0.6    No results for input(s): PROTIME, INR, APTT, HEPANTIXA in the last 168 hours. No  results for input(s): CPK, CPKMB, TROPONINI, MB in the last 168 hours.    Electrolytes: No replacement orders  Accuchecks: ACHS    Gtts:   niCARdipine 0 mg/hr (04/15/22 0545)       LDA/Wounds:  Lines/Drains/Airways       Peripherally Inserted Central Catheter Line  Duration             PICC Double Lumen 03/26/22 1905 right basilic 22 days              Drain  Duration             Female External Urinary Catheter 04/13/22 1820 4 days         NG/OG Tube 04/14/22 1200 [place] sump 14 Fr. Left nostril 3 days                  Wounds: Yes  Wound care consulted: Yes

## 2022-04-18 NOTE — SUBJECTIVE & OBJECTIVE
Interval History:  NAEON. See above.     Review of Systems   Reason unable to perform ROS: decreased LOC.     Objective:     Vitals:  Temp: 98.3 °F (36.8 °C)  Pulse: 77  Rhythm: normal sinus rhythm  BP: 130/62  MAP (mmHg): 89  Resp: 19  SpO2: 100 %  O2 Device (Oxygen Therapy): room air    Temp  Min: 98.1 °F (36.7 °C)  Max: 98.5 °F (36.9 °C)  Pulse  Min: 55  Max: 81  BP  Min: 112/53  Max: 186/79  MAP (mmHg)  Min: 77  Max: 108  Resp  Min: 12  Max: 22  SpO2  Min: 97 %  Max: 100 %    04/17 0701 - 04/18 0700  In: 537   Out: 650 [Urine:650]   Unmeasured Output  Urine Occurrence: 1  Stool Occurrence: 1  Pad Count: 1       Physical Exam  Vitals and nursing note reviewed.   Constitutional:       Appearance: She is ill-appearing.   HENT:      Head: Normocephalic.      Right Ear: External ear normal.      Left Ear: External ear normal.      Nose: Nose normal.      Mouth/Throat:      Mouth: Mucous membranes are moist.      Pharynx: Oropharynx is clear.   Cardiovascular:      Rate and Rhythm: Normal rate and regular rhythm.   Pulmonary:      Effort: Pulmonary effort is normal. No respiratory distress.   Abdominal:      General: Abdomen is flat. There is no distension.   Musculoskeletal:         General: Normal range of motion.      Cervical back: Normal range of motion.   Skin:     General: Skin is warm and dry.   Neurological:      Comments: E4V1M6  Awake. No verbal response. Follows commands.  Moves RUE & RLE spontaneously & against gravity. Hemiparesis on left side with baseline contraction of RUE. SILT in all extremities.         Medications:  Continuoussodium chloride 0.9%, Last Rate: 75 mL/hr at 04/18/22 1233  niCARdipine, Last Rate: 0 mg/hr (04/15/22 0545)    ScheduledamLODIPine, 10 mg, Daily  atorvastatin, 40 mg, Daily  carvediloL, 25 mg, BID  lacosamide (VIMPAT) IVPB, 200 mg, Q12H  levetiracetam IV, 1,500 mg, Q12H  levothyroxine, 75 mcg, Before breakfast  losartan, 25 mg, Daily  senna-docusate 8.6-50 mg, 1 tablet,  BID  silodosin, 4 mg, Daily  vancomycin (VANCOCIN) IVPB, 500 mg, Q24H    PRNsodium chloride, , Q24H PRN  acetaminophen, 650 mg, Q4H PRN  dextrose 10%, 12.5 g, PRN  glucagon (human recombinant), 1 mg, PRN  hydrALAZINE, 10 mg, Q8H PRN  HYDROcodone-acetaminophen, 1 tablet, Q4H PRN  labetalol, 10 mg, Q15 Min PRN  metoclopramide HCl, 5 mg, Q6H PRN  ondansetron, 4 mg, Q6H PRN  potassium bicarbonate, 35 mEq, PRN  potassium bicarbonate, 50 mEq, PRN  potassium bicarbonate, 60 mEq, PRN  potassium, sodium phosphates, 2 packet, PRN  potassium, sodium phosphates, 2 packet, PRN  potassium, sodium phosphates, 2 packet, PRN  sodium chloride 0.9%, 10 mL, PRN  sodium chloride 0.9%, 10 mL, PRN  vancomycin - pharmacy to dose, , pharmacy to manage frequency      Today I personally reviewed pertinent medications, lines/drains/airways, imaging, cardiology results, laboratory results, microbiology results, notably:    Diet  Diet NPO  Diet NPO

## 2022-04-18 NOTE — PROGRESS NOTES
[facility]  Neurocritical Care  Progress Note    Admit Date: 4/3/2022  Service Date: 04/18/2022  Length of Stay: 15    Subjective:     Chief Complaint: SDH (subdural hematoma)    History of Present Illness: Ms. Adame is a 70 yo female with hx of dementia, R MCA stroke with left side contraction/hemiparesis presented to [facility] with SDH now s/p evac. Stepped down from [facility]. On 4/13 she developed worsening headache and CTH showed re-accumulation of R SDH and 4-5mm MLS. She was noted to be more slow to follow commands. Stepped back up to [facility] for closer monitoring.    Original HPI below:  Ms. Adame is a 72 y/o F with a PMHx of baseline dementia, GERD, R MCA stroke with residual LUE weakness, HLD, HTN, Stage IV CKD, TIIDM who presents to [facility] from [facility] with a AoC SDH after an unwitnessed fall from her wheel chair without loss of consciousness. CT spine negative for fracture. CT head with R SDH and 3-4 mm MLS. She is on DAPT at home. Of note, she has been staying at [facility] after a partial R foot amputation, stitches still intact, for which she is receiving vancomycin.         Hospital Course: 04/04/2022: OR today for clot evac. Patient returned to ICU intubated on precedex. Post op scan stable. Wean propofol and initiated precedex for likely extubation tomorrow.   04/05/2022 repeat CT head due to drainage.   04/06/2022 NAEO.   04/07/2022 NAEO. Successfully extubated  4/14/22: EEG revealed seizure   4/15/22: vimpat load overnight  04/16/2022: NAEON.  04/17/2022. NAEON. Repeat CTH stable.   04/18/2022  Lateralized periodic discharges on R with some seizure correlate. 200 vimpat x 1 and increase scheduled vimpat to 200. Platelets by NSGY 2/2 OR for clot evac today.       Interval History:  NAEON. See above.     Review of Systems   Reason unable to perform ROS: decreased LOC.     Objective:     Vitals:  Temp: 98.3 °F (36.8 °C)  Pulse: 77  Rhythm: normal sinus rhythm  BP:  130/62  MAP (mmHg): 89  Resp: 19  SpO2: 100 %  O2 Device (Oxygen Therapy): room air    Temp  Min: 98.1 °F (36.7 °C)  Max: 98.5 °F (36.9 °C)  Pulse  Min: 55  Max: 81  BP  Min: 112/53  Max: 186/79  MAP (mmHg)  Min: 77  Max: 108  Resp  Min: 12  Max: 22  SpO2  Min: 97 %  Max: 100 %    04/17 0701 - 04/18 0700  In: 537   Out: 650 [Urine:650]   Unmeasured Output  Urine Occurrence: 1  Stool Occurrence: 1  Pad Count: 1       Physical Exam  Vitals and nursing note reviewed.   Constitutional:       Appearance: She is ill-appearing.   HENT:      Head: Normocephalic.      Right Ear: External ear normal.      Left Ear: External ear normal.      Nose: Nose normal.      Mouth/Throat:      Mouth: Mucous membranes are moist.      Pharynx: Oropharynx is clear.   Cardiovascular:      Rate and Rhythm: Normal rate and regular rhythm.   Pulmonary:      Effort: Pulmonary effort is normal. No respiratory distress.   Abdominal:      General: Abdomen is flat. There is no distension.   Musculoskeletal:         General: Normal range of motion.      Cervical back: Normal range of motion.   Skin:     General: Skin is warm and dry.   Neurological:      Comments: [id number]  Awake. No verbal response. Follows commands.  Moves RUE & RLE spontaneously & against gravity. Hemiparesis on left side with baseline contraction of RUE. SILT in all extremities.         Medications:  Continuoussodium chloride 0.9%, Last Rate: 75 mL/hr at 04/18/22 1233  niCARdipine, Last Rate: 0 mg/hr (04/15/22 0545)    ScheduledamLODIPine, 10 mg, Daily  atorvastatin, 40 mg, Daily  carvediloL, 25 mg, BID  lacosamide (VIMPAT) IVPB, 200 mg, Q12H  levetiracetam IV, 1,500 mg, Q12H  levothyroxine, 75 mcg, Before breakfast  losartan, 25 mg, Daily  senna-docusate 8.6-50 mg, 1 tablet, BID  silodosin, 4 mg, Daily  vancomycin (VANCOCIN) IVPB, 500 mg, Q24H    PRNsodium chloride, , Q24H PRN  acetaminophen, 650 mg, Q4H PRN  dextrose 10%, 12.5 g, PRN  glucagon (human recombinant), 1 mg,  PRN  hydrALAZINE, 10 mg, Q8H PRN  HYDROcodone-acetaminophen, 1 tablet, Q4H PRN  labetalol, 10 mg, Q15 Min PRN  metoclopramide HCl, 5 mg, Q6H PRN  ondansetron, 4 mg, Q6H PRN  potassium bicarbonate, 35 mEq, PRN  potassium bicarbonate, 50 mEq, PRN  potassium bicarbonate, 60 mEq, PRN  potassium, sodium phosphates, 2 packet, PRN  potassium, sodium phosphates, 2 packet, PRN  potassium, sodium phosphates, 2 packet, PRN  sodium chloride 0.9%, 10 mL, PRN  sodium chloride 0.9%, 10 mL, PRN  vancomycin - pharmacy to dose, , pharmacy to manage frequency      Today I personally reviewed pertinent medications, lines/drains/airways, imaging, cardiology results, laboratory results, microbiology results, notably:    Diet  Diet NPO  Diet NPO          Assessment/Plan:     Neuro  * SDH (subdural hematoma)  S/p evac on 4/4/13; stable and stepped down from [facility].    On 4/13 patient had 8/10 HA and was slower to follow commands.  Repeat CTH showed re-accumulation of R SDH along with 4-5mm MLS.  Stepped back up to [facility].    -- NSGY following  -- SBP <160  -- q1hr neuro check  -- hold anticoagulation  -- EEG revealed seizure  -- Continue vimpat and keppra    4/18: OR today for evac. Vimpat 200 x 1 and increase scheduled vimpat to 200 2/2 R sided lateralized periodic discharges with some seizure correlate on EEG.        History of stroke  - R MCA stroke (2013) with residual LUE flaccid paralysis  - DAPT at home held due to new ICH  - Statin      Cardiac/Vascular  Critical lower limb ischemia  See osteomyelitis    Hyperlipidemia  - Statin    Hypertension  - SBP < 160  - Cardene gtt stopped  - Start norvasc, coreg, & losartan  - Hold home microzide  - PRN labetalol, hydralazine    Renal/  Urinary retention  Continue silodosin    ID  Osteomyelitis of right foot  - S/p R partial foot amputation on vancomycin at [facility]  - continue vancomycin  - bandage, stitches intact   - Afebrile, WBC normal    Endocrine  Hypothyroidism  - TSH WNL  -  Synthroid    Severe protein-calorie malnutrition  Nutrition consulted.           Type 2 diabetes mellitus, with long-term current use of insulin  - A1c 5  - Accuchecks    Orthopedic  Status post partial amputation of right foot  See osteomyelitis    Other  Debility  Baseline flaccid paralysis of LUE 2/2 previous R MCA stroke   PT/OT          The patient is being Prophylaxed for:  Venous Thromboembolism with: Mechanical  Stress Ulcer with: None  Ventilator Pneumonia with: none    Activity Orders          Diet NPO: NPO starting at 04/18 0915    Turn patient starting at 04/13 1600    Elevate HOB starting at 04/13 1514    Elevate HOB Flat starting at 04/04 1228        Full Code     Level III    ROSA GarciaC  Neurocritical Care  [facility] - Neuro Critical [facility]

## 2022-04-18 NOTE — ASSESSMENT & PLAN NOTE
S/p evac on 4/4/13; stable and stepped down from NCC.    On 4/13 patient had 8/10 HA and was slower to follow commands.  Repeat CTH showed re-accumulation of R SDH along with 4-5mm MLS.  Stepped back up to [facility].    -- NSGY following  -- SBP <160  -- q1hr neuro check  -- hold anticoagulation  -- EEG revealed seizure  -- Vimpat load 400 mg

## 2022-04-18 NOTE — ASSESSMENT & PLAN NOTE
71F PMHx of HTN, DM, CKD, R MCA CVA  on DAPT with L contraction and hemiparesis, s/p partial R foot amputation, and dementia who [facility]. CTH revealed acute R SDH with mass effect and 3-4 mm L MLS, now s/p right fronto-parietal craniotomy for SDH evacuation with Dr. White on 4/4/2022. Stepped down to NSGY 4/8/2022. Hospital course c/b fever, CXR with possibly developing pneumonia vs aspiration. Cefepime added for coverage on 4/13 per ID recs. Patient c/o increasing severity of headache 4/13; CTH concerning for recurrent R SDH with increased effect and L MLS. Patient transferred back to [facility]. CAP EEG placed, limited with evidence of right periodic discharges. Formal EEG placed 4/14 and revealed recurrent subclinical electrographic seizures with R onset which progressed into R focal NCSE. Epilepsy following for assistance in management.     Recommendations:  - Continuous vEEG monitoring  - Recommend IV Lacosamide 200 mg x1 followed by 200 mg BID  - Recommend to continue Levetiracetam 1500 mg BID  - Cefepime d/c 4/14  - Avoid agents that lower seizure threshold  - Management of infectious/metabolic abnormalities per [facility]  - Seizure precautions      Discussed plan of care with NCC team and son at beside. Will follow, please call with questions.

## 2022-04-18 NOTE — ASSESSMENT & PLAN NOTE
71 year old female with , hypothyroidism, CVA with left-sided residual deficit on ASA/Plavix, DM2, HTN, HLD, CKD, chronic anemia, presenting from SNF after being found down next to wheelchair after unwitnessed fall, consulted to NSGY for right 2 cm SDH mostly acute, membranous and heterogenous appearing, some chronic component, without midline shift. Now s/p R crani for SDH evacuation (4/4).      NAEON. On EEG. One cliinical seizure like episode overnight. Exam stable.       --Stepped up to ICU  -- q1 hour vitals/neurochecks; exam wax and wane   -- SBP < 160  -- Na goal eunatremia.  -- Seizure control per neuro ICU: Keppra 1500 BID and vimpat  b.i.d.    EEG with right-sided none compulsive seizures on 4/15.   -- Follow up CT head satisfactory x2 with residual blood   -- CT head from 4/13 shows residual subdural hematoma with reaccumulation with some mass effect and sulcal effacement which is stable on repeat scan on 4/14  -- Saturating well on room   -- No HOB restriction.  -- PPx: SCDs, BR, IS. Okay for SQH for dvt ppx.   -- Osteomyelitis the or really alternative is of R foot:    - ID rec 6 week course Vanc with end date 5/5. F/u full ID recs. OK to start Etrapenem if pt decompensates.   -Podiatry managing recent partial foot amputation, appreciate recs. Sutures removed 4/12.    -Home health/facility to do dressing changes every MWF and prn as follows:  Cleanse right foot incision site with saline or wound cleanser, paint incision site with betadine, cover with 4x4 gauze, kerlix, ACE bandage.   -- Anemia: Resolved.   -- Fever 4/11: Afebrile overnight.    - UA 4/11: Growing GNRs, f/u susceptibility   - CXR with possible basilar effusion.   - BLE US 4/11: w/o DVT.   - HM following. Appreciate assistance with care.    - Blood cx 4/11: NGTD.  --Cranial wound care: Staples to be removed 4/18 if incision appears well healed.   --Repeat CTH 6 weeks post op.     Dispo: Continue EEG monitoring

## 2022-04-18 NOTE — SUBJECTIVE & OBJECTIVE
"Interval History: MONICA RN documented episode of seizure like activity described as "patient blew bubbles from mouth for <5 seconds during bath"- no EEG correlate with this event. NSGY planning for redo craniotomy this afternoon. Discussed EEG findings and AED recommendations with son at bedside, answered questions.    Current Facility-Administered Medications   Medication Dose Route Frequency Provider Last Rate Last Admin    0.9%  NaCl infusion (for blood administration)   Intravenous Q24H PRN Heber Felipe MD        0.9%  NaCl infusion   Intravenous Continuous Heber Felipe MD 75 mL/hr at 04/18/22 1233 Rate Verify at 04/18/22 1233    acetaminophen tablet 650 mg  650 mg Oral Q4H PRN Yamil Larson MD   650 mg at 04/14/22 1954    amLODIPine tablet 10 mg  10 mg Per NG tube Daily Donita L Love, NP   10 mg at 04/18/22 0828    atorvastatin tablet 40 mg  40 mg Per NG tube Daily Donita L Love, NP   40 mg at 04/18/22 0828    carvediloL tablet 25 mg  25 mg Per NG tube BID Donita L Love, NP   25 mg at 04/18/22 0828    dextrose 10% bolus 125 mL  12.5 g Intravenous PRN Brock Fairchild MD   Stopped at 04/17/22 1637    glucagon (human recombinant) injection 1 mg  1 mg Intramuscular PRN Brock Fairchild MD        hydrALAZINE injection 10 mg  10 mg Intravenous Q8H PRN Ina Ardon PA-C   10 mg at 04/17/22 1842    HYDROcodone-acetaminophen 5-325 mg per tablet 1 tablet  1 tablet Oral Q4H PRN Yamil Larson MD   1 tablet at 04/16/22 0834    labetalol 20 mg/4 mL (5 mg/mL) IV syring  10 mg Intravenous Q15 Min PRN Brenna Stuart DO   10 mg at 04/15/22 1707    lacosamide (VIMPAT) 200 mg in sodium chloride 0.9% 100 mL IVPB  200 mg Intravenous Once Maegan Dsouza PA-C 200 mL/hr at 04/18/22 1316 200 mg at 04/18/22 1316    lacosamide (VIMPAT) 200 mg in sodium chloride 0.9% 100 mL IVPB  200 mg Intravenous Q12H Maegan Dsouza PA-C        levETIRAcetam injection 1,500 mg  1,500 mg Intravenous Q12H Shahana" Roddy Cornejo MD   1,500 mg at 04/18/22 0828    levothyroxine tablet 75 mcg  75 mcg Per NG tube Before breakfast Donita L Love, NP   75 mcg at 04/18/22 0622    losartan tablet 25 mg  25 mg Per NG tube Daily Donita L Love, NP   25 mg at 04/18/22 0828    metoclopramide HCl injection 5 mg  5 mg Intravenous Q6H PRN Brock Fairchild MD        niCARdipine 40 mg/200 mL (0.2 mg/mL) infusion  0-15 mg/hr Intravenous Continuous Brenna Stuart DO 0 mL/hr at 04/15/22 0545 Canceled Entry at 04/15/22 0625    ondansetron injection 4 mg  4 mg Intravenous Q6H PRN Brock Fairchild MD   4 mg at 04/13/22 1313    potassium bicarbonate disintegrating tablet 35 mEq  35 mEq Oral PRN Yamil Larson MD   35 mEq at 04/18/22 0827    potassium bicarbonate disintegrating tablet 50 mEq  50 mEq Oral PRN Yamil Larson MD        potassium bicarbonate disintegrating tablet 60 mEq  60 mEq Oral PRN Yamil Larson MD        potassium, sodium phosphates 280-160-250 mg packet 2 packet  2 packet Oral PRN Yamil Larson MD        potassium, sodium phosphates 280-160-250 mg packet 2 packet  2 packet Oral PRN Yamil Larson MD        potassium, sodium phosphates 280-160-250 mg packet 2 packet  2 packet Oral PRN Yamil Larson MD        senna-docusate 8.6-50 mg per tablet 1 tablet  1 tablet Per NG tube BID Donita L Love, NP   1 tablet at 04/18/22 0828    silodosin capsule 4 mg  4 mg Per NG tube Daily Donita L Love, NP   4 mg at 04/18/22 0828    sodium chloride 0.9% flush 10 mL  10 mL Intravenous PRN Brock Fairchild MD        sodium chloride 0.9% flush 10 mL  10 mL Intravenous PRN Brenna Stuart DO        vancomycin - pharmacy to dose   Intravenous pharmacy to manage frequency Brock Fairchild MD        vancomycin 500 mg in dextrose 5 % 100 mL IVPB (ready to mix system)  500 mg Intravenous Q24H Caleb Anand MD   Stopped at 04/17/22 1442     Continuous Infusions:   sodium chloride 0.9% 75 mL/hr at  04/18/22 1233    niCARdipine 0 mg/hr (04/15/22 0545)       Review of Systems   Unable to perform ROS: Acuity of condition   Objective:     Vital Signs (Most Recent):  Temp: 98.3 °F (36.8 °C) (04/18/22 1315)  Pulse: 77 (04/18/22 1315)  Resp: 19 (04/18/22 1315)  BP: 130/62 (04/18/22 1315)  SpO2: 100 % (04/18/22 1315) Vital Signs (24h Range):  Temp:  [98.1 °F (36.7 °C)-98.5 °F (36.9 °C)] 98.3 °F (36.8 °C)  Pulse:  [55-81] 77  Resp:  [12-22] 19  SpO2:  [97 %-100 %] 100 %  BP: (112-186)/(53-79) 130/62     Weight: 49.4 kg (109 lb)  Body mass index is 17.07 kg/m².    Physical Exam  Constitutional:       General: She is not in acute distress.     Appearance: She is not diaphoretic.   HENT:      Head: Normocephalic.      Comments: EEG in place  Eyes:      General: No scleral icterus.        Right eye: No discharge.         Left eye: No discharge.      Conjunctiva/sclera: Conjunctivae normal.      Pupils: Pupils are equal, round, and reactive to light.   Cardiovascular:      Rate and Rhythm: Normal rate.   Pulmonary:      Effort: Pulmonary effort is normal. No respiratory distress.   Abdominal:      General: There is no distension.      Palpations: Abdomen is soft.      Tenderness: There is no abdominal tenderness.   Musculoskeletal:         General: Deformity (partial R foot amputation) present. No signs of injury.   Skin:     General: Skin is warm and dry.   Neurological:      Comments: L contraction   Psychiatric:      Comments: Unable to assess       NEUROLOGICAL EXAMINATION:     CRANIAL NERVES     CN III, IV, VI   Pupils are equal, round, and reactive to light.       VARINDER OU   Corneal intact OU   No spontaneous eye opening   Tongue midline   L contraction, increased tone   Withdraws from RLE    Exam findings to suggest seizures:  Myoclonus - no   eye twitching - no   Nystagmus - no   gaze deviation - no   waxy rigidity - no      Significant Labs: All pertinent lab results from the past 24 hours have been  reviewed.    Significant Studies: I have reviewed all pertinent imaging results/findings within the past 24 hours.

## 2022-04-18 NOTE — PROCEDURES
[facility] EEG/VIDEO MONITORING REPORT  Beatriz dAame  [id number]  1950    DATE OF SERVICE:  04/17/2022-04/18/2022  DATE OF ADMISSION: 4/3/2022  1:00 PM    ADMITTING/REQUESTING PROVIDER: Caleb Anand MD    REASON FOR CONSULT:  71-year-old woman with previous right middle cerebral artery stroke with left spastic hemiparesis admitted with right subdural hematoma requiring hemicraniectomy for evacuation with persistent decreased responsiveness.  Evaluate for evidence of epileptiform activity.    METHODOLOGY   Electroencephalographic (EEG) recording is with electrodes placed according to the International 10-20 placement system.  Thirty two (32) channels of digital signal (sampling rate of 512/sec) including T1 and T2 was simultaneously recorded from the scalp and may include  EKG, EMG, and/or eye monitors.  Recording band pass was 0.1 to 512 hz.  Digital video recording of the patient is simultaneously recorded with the EEG.  The patient is instructed report clinical symptoms which may occur during the recording session.  EEG and video recording is stored and archived in digital format.  Activation procedures which include photic stimulation, hyperventilation and instructing patients to perform simple task are done in selected patients.   The EEG is displayed on a monitor screen and can be reviewed using different montages.  Computer assisted analysis is employed to detect spike and electrographic seizure activity.   The entire record is submitted for computer analysis.  The entire recording is visually reviewed and the times identified by computer analysis as being spikes or seizures are reviewed again.  Compresses spectral analysis (CSA) is also performed on the activity recorded from each individual channel.  This is displayed as a power display of frequencies from 0 to 30 Hz over time.   The CSA is reviewed looking for asymmetries in power between homologous areas of the scalp and then compared with the  original EEG recording.     [employer] software is also utilized in the review of this study.  This software suite analyzes the EEG recording in multiple domains.  Coherence and rhythmicity is computed to identify EEG sections which may contain organized seizures.  Each channel undergoes analysis to detect presence of spike and sharp waves which have special and morphological characteristic of epileptic activity.  The routine EEG recording is converted from spacial into frequency domain.  This is then displayed comparing homologous areas to identify areas of significant asymmetry.  Algorithm to identify non-cortically generated artifact is used to separate eye movement, EMG and other artifact from the EEG.      RECORDING TIMES  Start on 04/17/2022 at 07:01 a.m.  Stop on 04/18/2022 at 15:56 p.m.-> End of the Recording Session  A total of 32 hours and 21 minutes of EEG recording is obtained.    EEG FINDINGS  Background activity:   The background is continuous and markedly asymmetric.  Over the left, there is predominantly polymorphic delta with some overriding faster frequencies.  Over the right, there is more pronounced delta slowing with continuous lateralized periodic discharges vs periodic sharply contoured theta/delta complexes at approximately 1 hz.  There is frequent fluctuation of this activity into brief ictal appearing repetitive discharges (BIRDs) characterized by more rapid discharges on a rhythmic background with superimposed fast activity which at times extends past 10 seconds meeting criteria for subclinical electrographic seizures.    There is a pushbutton activation at 10:34 a.m. for reasons that are not stated.  On video, the patient is lying quietly in the bed with her eyes closed with no abnormal movements or vocalizations.  There are no significant changes on the electrographic record at this time.  There is another pushbutton activation at 00:41 a.m. when the patient has some irregular mouth  "movements and "blows bubbles" while being moved in the plane of the bed for routine bathing with no other abnormal movements or vocalizations.  There are no significant changes on the electrographic record at this time.  There are three pushbutton activations at around 10:26 a.m. for reasons that are not stated.  On video, the patient has slight movement of her lips and head in response to standard nursing care with no significant changes noted on the electrographic record.    Sleep:  There is evidence of sleep architecture better organized over the left.    Activation procedures:   Hyperventilation is not performed  Photic stimulation is not performed    Cardiac Monitor:   Heart rate appears generally regular on a single lead EKG.    Impression:   This is an abnormal continuous EEG monitoring study because of continuous periodic discharges/sharp complexes which fluctuate on a rhythmic background with superimposed fast activity frequently meeting criteria for intermittent subclinical seizures.  This pattern is consistent with marked cortical irritation and high epileptogenicity over the right hemisphere.  There are pushbutton activations when the patient has some abnormal movements during bathing or routine nursing care with no significant changes in the electrographic record.     Lorelei Peace MD PhD  Neurology-Epilepsy  [facility].            "

## 2022-04-18 NOTE — CONSULTS
Nutrition consult received regarding TF recommendations.  RD following, please see note from 4/12 for full assessment.    Recommendations/Interventions:  1) TF recommendations: Isosource 1.5 @ 45 mL/hr to provide 1620 kcals, 73 g of protein, 825 mL fluid.  2) If diabetic formula warranted, rec'd Glucerna 1.5 @ 40 mL/hr to provide 1440 kcals, 79 g of protein, 729 mL fluid.  3) RD to monitor & follow-up.    Thanks!  MS Estefania, RD, LDN

## 2022-04-18 NOTE — NURSING
Tomi ACOSTA notified of pt seizure like activity, pt blew bubbles from mouth for <5seconds during bath. EEG recorded. VSS. No new orders, will continue to monitor.

## 2022-04-18 NOTE — PROGRESS NOTES
[facility]  Neurosurgery  Progress Note    Subjective:     History of Present Illness: 71 year old female with , hypothyroidism, CVA with left-sided residual deficit on ASA/Plavix, DM2, HTN, HLD, CKD, chronic anemia, presenting from SNF after being found down next to wheelchair after unwitnessed fall, consulted to NSGY for right 2 cm SDH without midline shift. She is altered at baseline and unable to provide to history, but family at bedside says she is more confused and less interactive than usual.       Post-Op Info:  Procedure(s) (LRB):  CRANIOTOMY, FOR SUBDURAL HEMATOMA EVACUATION (Right)   14 Days Post-Op     Interval History: 4/18: NAEON. On EEG. One cliinical seizure like episode overnight. Exam stable.     Medications:  Continuous Infusions:   niCARdipine 0 mg/hr (04/15/22 0545)     Scheduled Meds:   amLODIPine  10 mg Per NG tube Daily    atorvastatin  40 mg Per NG tube Daily    carvediloL  25 mg Per NG tube BID    lacosamide (VIMPAT) IVPB  100 mg Intravenous Q12H    levetiracetam IV  1,500 mg Intravenous Q12H    levothyroxine  75 mcg Per NG tube Before breakfast    losartan  25 mg Per NG tube Daily    senna-docusate 8.6-50 mg  1 tablet Per NG tube BID    silodosin  4 mg Per NG tube Daily    vancomycin (VANCOCIN) IVPB  500 mg Intravenous Q24H     PRN Meds:acetaminophen, dextrose 10%, glucagon (human recombinant), hydrALAZINE, HYDROcodone-acetaminophen, labetalol, metoclopramide HCl, ondansetron, potassium bicarbonate, potassium bicarbonate, potassium bicarbonate, potassium, sodium phosphates, potassium, sodium phosphates, potassium, sodium phosphates, sodium chloride 0.9%, sodium chloride 0.9%, Pharmacy to dose Vancomycin consult **AND** vancomycin - pharmacy to dose     Review of Systems  Objective:     Weight: 49.4 kg (109 lb)  Body mass index is 17.07 kg/m².  Vital Signs (Most Recent):  Temp: 98.3 °F (36.8 °C) (04/18/22 0402)  Pulse: 66 (04/18/22 0701)  Resp: 15 (04/18/22  0701)  BP: (!) 146/67 (04/18/22 0701)  SpO2: 100 % (04/18/22 0701)   Vital Signs (24h Range):  Temp:  [97 °F (36.1 °C)-98.5 °F (36.9 °C)] 98.3 °F (36.8 °C)  Pulse:  [55-85] 66  Resp:  [8-23] 15  SpO2:  [97 %-100 %] 100 %  BP: (112-186)/(53-82) 146/67     Date 04/18/22 0700 - 04/19/22 0659   Shift 1136-5078 6668-3484 7880-0565 24 Hour Total   INTAKE   IV Piggyback 503.4   503.4   Shift Total(mL/kg) 503.4(10.2)   503.4(10.2)   OUTPUT   Shift Total(mL/kg)       Weight (kg) 49.4 49.4 49.4 49.4                        NG/OG Tube 04/14/22 1200 Hodgeman sump 14 Fr. Left nostril (Active)   Placement Check placement verified by aspirate characteristics;placement verified by x-ray 04/17/22 0305   Tolerance no signs/symptoms of discomfort 04/17/22 0305   Securement secured to nostril center w/ adhesive device 04/17/22 0305   Clamp Status/Tolerance clamped 04/17/22 0305   Suction Setting/Drainage Method suction at the bedside 04/17/22 0305   Insertion Site Appearance no redness, warmth, tenderness, skin breakdown, drainage 04/17/22 0305   Drainage None 04/15/22 0332   Flush/Irrigation flushed w/;water;no resistance met 04/17/22 0305   Water Bolus (mL) 100 mL 04/17/22 0505   Residual Amount (ml) 40 ml 04/16/22 0700       Female External Urinary Catheter 04/13/22 1820 (Active)   Skin no redness;no breakdown 04/17/22 0305   Tolerance no signs/symptoms of discomfort 04/17/22 0305   Suction Continuous suction at 60 mmHg 04/16/22 2004   Date of last wick change 04/16/22 04/16/22 2004   Time of last wick change 0956 04/16/22 0700   Output (mL) 200 mL 04/17/22 0505       Physical Exam    Neurosurgery Physical Exam  [id number]  Follows commands on R  No movement of left to stim (baseline) and contracted  Not verbal on exam  EEG cap in place.       Significant Labs:  Recent Labs   Lab 04/17/22  0246 04/18/22  0402   GLU 66* 108    143   K 3.5 3.4*   * 111*   CO2 20* 22*   BUN 35* 34*   CREATININE 1.2 1.2   CALCIUM 8.3* 8.5*        Recent Labs   Lab 04/17/22  0246 04/18/22  0402   WBC 16.10* 15.42*   HGB 9.8* 10.2*   HCT 29.7* 30.7*   * 113*       No results for input(s): LABPT, INR, APTT in the last 48 hours.  Microbiology Results (last 7 days)       Procedure Component Value Units Date/Time    Blood culture [[id number]] Collected: 04/14/22 1208    Order Status: Completed Specimen: Blood from Peripheral, Hand, Right Updated: 04/17/22 1412     Blood Culture, Routine No Growth to date      No Growth to date      No Growth to date      No Growth to date    Blood culture [[id number]] Collected: 04/14/22 1226    Order Status: Completed Specimen: Blood from Peripheral, Hand, Left Updated: 04/17/22 1412     Blood Culture, Routine No Growth to date      No Growth to date      No Growth to date      No Growth to date    Blood culture [[id number]] Collected: 04/11/22 1021    Order Status: Completed Specimen: Blood from Peripheral, Left Arm Updated: 04/16/22 1212     Blood Culture, Routine No growth after 5 days.    Narrative:      Collection has been rescheduled by [facility] at 04/11/2022 10:04 Reason:   Patient unavailable, patient care, nurse paige notified.  Collection has been rescheduled by T at 04/11/2022 10:04 Reason:   Patient unavailable, patient care, nurse paige notified.    Blood culture [[id number]] Collected: 04/11/22 1020    Order Status: Completed Specimen: Blood from Peripheral, Left Hand Updated: 04/16/22 1212     Blood Culture, Routine No growth after 5 days.    Narrative:      Collection has been rescheduled by [facility] at 04/11/2022 10:04 Reason:   Patient unavailable, patient care, nurse paige notified.  Collection has been rescheduled by [facility] at 04/11/2022 10:04 Reason:   Patient unavailable, patient care, nurse paige notified.    Culture, Respiratory with Gram Stain [[id number]]     Order Status: No result Specimen: Respiratory     Urine culture [[id number]]  (Abnormal)  (Susceptibility) Collected: 04/11/22 1017    Order  Status: Completed Specimen: Urine Updated: 04/13/22 1411     Urine Culture, Routine ESCHERICHIA COLI ESBL  10,000 - 49,999 cfu/ml      Narrative:      Specimen Source->Urine    Blood culture [[id number]]     Order Status: No result Specimen: Blood     Blood culture [[id number]]     Order Status: No result Specimen: Blood           All pertinent labs from the last 24 hours have been reviewed.    Significant Diagnostics:  I have reviewed all pertinent imaging results/findings within the past 24 hours.    Assessment/Plan:     * SDH (subdural hematoma)  71 year old female with , hypothyroidism, CVA with left-sided residual deficit on ASA/Plavix, DM2, HTN, HLD, CKD, chronic anemia, presenting from SNF after being found down next to wheelchair after unwitnessed fall, consulted to NSGY for right 2 cm SDH mostly acute, membranous and heterogenous appearing, some chronic component, without midline shift. Now s/p R crani for SDH evacuation (4/4).      NAEON. On EEG. One cliinical seizure like episode overnight. Exam stable.       --Stepped up to ICU  -- q1 hour vitals/neurochecks; exam wax and wane   -- SBP < 160  -- Na goal eunatremia.  -- Seizure control per neuro ICU: Keppra 1500 BID and vimpat  b.i.d.    EEG with right-sided none compulsive seizures on 4/15.   -- Follow up CT head satisfactory x2 with residual blood   -- CT head from 4/13 shows residual subdural hematoma with reaccumulation with some mass effect and sulcal effacement which is stable on repeat scan on 4/14  -- Saturating well on room   -- No HOB restriction.  -- PPx: SCDs, BR, IS. Okay for SQH for dvt ppx.   -- Osteomyelitis the or really alternative is of R foot:    - ID rec 6 week course Vanc with end date 5/5. F/u full ID recs. OK to start Etrapenem if pt decompensates.   -Podiatry managing recent partial foot amputation, appreciate recs. Sutures removed 4/12.    -Home health/facility to do dressing changes every MWF and prn as follows:  Cleanse right  foot incision site with saline or wound cleanser, paint incision site with betadine, cover with 4x4 gauze, kerlix, ACE bandage.   -- Anemia: Resolved.   -- Fever 4/11: Afebrile overnight.    - UA 4/11: Growing GNRs, f/u susceptibility   - CXR with possible basilar effusion.   - BLE US 4/11: w/o DVT.   - HM following. Appreciate assistance with care.    - Blood cx 4/11: NGTD.  --Cranial wound care: Staples to be removed 4/18 if incision appears well healed.   --Repeat CTH 6 weeks post op.     Dispo: Continue EEG monitoring             Chaka Mendenhall MD  Neurosurgery  [facility]

## 2022-04-18 NOTE — ASSESSMENT & PLAN NOTE
- Presented to ER 4/3/2022 from [facility] after an unwitnessed fall-> CTH revealed acute R SDH with mass effect and 3-4 mm L MLS  - s/p right fronto-parietal craniotomy for subdural evacuation with Dr. White on 4/4/2022  - Patient c/o increasing severity of headache 4/13-> CTH concerning for recurrent R SDH with increased effect and 4-5 L MLS  - NSGY planning for redo craniotomy this afternoon for SDH evac  - Management per NSGY, NCC

## 2022-04-19 NOTE — PROGRESS NOTES
[facility]  Neurocritical Care  Progress Note    Admit Date: 4/3/2022  Service Date: 04/19/2022  Length of Stay: 16    Subjective:     Chief Complaint: SDH (subdural hematoma)    History of Present Illness: Ms. Adame is a 70 yo female with hx of dementia, R MCA stroke with left side contraction/hemiparesis [place] [facility] [place] now s/p evac. Stepped down from [facility]. On 4/13 she developed worsening headache and CTH showed re-accumulation of R SDH and 4-5mm MLS. She was noted to be more slow to follow commands. Stepped back up to [facility] for closer monitoring.    Original HPI below:  Ms. Adame is a 70 y/o F with a PMHx of baseline dementia, GERD, R MCA stroke with residual LUE weakness, HLD, HTN, Stage IV CKD, TIIDM who presents to [facility] from [facility] with a AoC SDH after an unwitnessed fall from her wheel chair without loss of consciousness. CT spine negative for fracture. CT head with R SDH and 3-4 mm MLS. She is on DAPT at home. Of note, she has been staying at [facility] after a partial R foot amputation, stitches still intact, for which she is receiving vancomycin.         Hospital Course: 04/04/2022: OR today for clot evac. Patient returned to ICU intubated on precedex. Post op scan stable. Wean propofol and initiated precedex for likely extubation tomorrow.   04/05/2022 repeat CT head due to drainage.   04/06/2022 NAEO.   04/07/2022 NAEO. Successfully extubated  4/14/22: EEG revealed seizure   4/15/22: vimpat load overnight  04/16/2022: NAEON.  04/17/2022. NAEON. Repeat CTH stable.   04/18/2022  Lateralized periodic discharges on R with some seizure correlate. 200 vimpat x 1 and increase scheduled vimpat to 200. Platelets by NSGY 2/2 OR for clot evac today.   04/19/2022 OR postponed to today with NSGY. CT this morning stable. Continue EEG.       Interval History:  NAEON. See above.     Review of Systems   Reason unable to perform ROS: decreased LOC.     Objective:      Vitals:  Temp: 98.4 °F (36.9 °C)  Pulse: 81  Rhythm: (P) normal sinus rhythm  BP: (!) 154/72  MAP (mmHg): 110  Resp: 18  SpO2: 100 %  O2 Device (Oxygen Therapy): room air    Temp  Min: 98.2 °F (36.8 °C)  Max: 98.5 °F (36.9 °C)  Pulse  Min: 67  Max: 85  BP  Min: 124/58  Max: 166/78  MAP (mmHg)  Min: 83  Max: 117  Resp  Min: 7  Max: 22  SpO2  Min: 98 %  Max: 100 %    04/18 0701 - 04/19 0700  In: 2721.2 [I.V.:1334.9]  Out: 100 [Urine:100]   Unmeasured Output  Urine Occurrence: 1  Stool Occurrence: 1  Pad Count: 1       Physical Exam  Vitals and nursing note reviewed.   Constitutional:       Appearance: She is ill-appearing.   HENT:      Head: Normocephalic.      Right Ear: External ear normal.      Left Ear: External ear normal.      Nose: Nose normal.      Mouth/Throat:      Mouth: Mucous membranes are moist.      Pharynx: Oropharynx is clear.   Cardiovascular:      Rate and Rhythm: Normal rate and regular rhythm.   Pulmonary:      Effort: Pulmonary effort is normal. No respiratory distress.   Abdominal:      General: Abdomen is flat. There is no distension.   Musculoskeletal:         General: Normal range of motion.      Cervical back: Normal range of motion.   Skin:     General: Skin is warm and dry.   Neurological:      Comments: [id number]  Awake. No verbal response. Follows commands.  Moves RUE & RLE spontaneously & against gravity. Hemiparesis on left side with baseline contraction of RUE. SILT in all extremities.         Medications:  Continuoussodium chloride 0.9%, Last Rate: Stopped (04/19/22 0317)  niCARdipine, Last Rate: 0 mg/hr (04/15/22 0545)    ScheduledamLODIPine, 10 mg, Daily  atorvastatin, 40 mg, Daily  carvediloL, 25 mg, BID  lacosamide (VIMPAT) IVPB, 200 mg, Q12H  levetiracetam IV, 1,500 mg, Q12H  levothyroxine, 75 mcg, Before breakfast  losartan, 25 mg, Daily  senna-docusate 8.6-50 mg, 1 tablet, BID  silodosin, 4 mg, Daily  vancomycin (VANCOCIN) IVPB, 500 mg, Q24H    PRNsodium chloride, , Q24H  PRN  acetaminophen, 650 mg, Q4H PRN  dextrose 10%, 12.5 g, PRN  glucagon (human recombinant), 1 mg, PRN  hydrALAZINE, 10 mg, Q8H PRN  HYDROcodone-acetaminophen, 1 tablet, Q4H PRN  labetalol, 10 mg, Q15 Min PRN  metoclopramide HCl, 5 mg, Q6H PRN  ondansetron, 4 mg, Q6H PRN  potassium bicarbonate, 35 mEq, PRN  potassium bicarbonate, 50 mEq, PRN  potassium bicarbonate, 60 mEq, PRN  potassium, sodium phosphates, 2 packet, PRN  potassium, sodium phosphates, 2 packet, PRN  potassium, sodium phosphates, 2 packet, PRN  sodium chloride 0.9%, 10 mL, PRN  sodium chloride 0.9%, 10 mL, PRN  vancomycin - pharmacy to dose, , pharmacy to manage frequency      Today I personally reviewed pertinent medications, lines/drains/airways, imaging, cardiology results, laboratory results, microbiology results, notably:    Diet  Diet NPO  Diet NPO          Assessment/Plan:     Neuro  * SDH (subdural hematoma)  S/p evac on 4/4/13; stable and stepped down from [facility].    On 4/13 patient had 8/10 HA and was slower to follow commands.  Repeat CTH showed re-accumulation of R SDH along with 4-5mm MLS.  Stepped back up to [facility].    -- NSGY following  -- SBP <160  -- q1hr neuro check  -- hold anticoagulation  -- EEG revealed seizure  -- Continue vimpat and keppra    4/19 OR today        History of stroke  - R MCA stroke (2013) with residual LUE flaccid paralysis  - DAPT at home held due to new ICH  - Statin      Cardiac/Vascular  Critical lower limb ischemia  See osteomyelitis    Hyperlipidemia  - Statin    Hypertension  - SBP < 160  - Cardene gtt stopped  - Start norvasc, coreg, & losartan  - Hold home microzide  - PRN labetalol, hydralazine    Renal/  Urinary retention  Continue silodosin    ID  Osteomyelitis of right foot  - S/p R partial foot amputation on vancomycin at [facility]  - continue vancomycin  - bandage, stitches intact   - Afebrile, WBC normal    Endocrine  Body mass index (BMI) less than 19  Nutrition consult    Hypothyroidism  - TSH WNL  -  Synthroid    Severe protein-calorie malnutrition  Nutrition consulted.           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

## 2022-04-19 NOTE — ANESTHESIA PREPROCEDURE EVALUATION
[facility]  Anesthesia Pre-Operative Evaluation         Patient Name: Beatriz Adame  YOB: 1950  MRN: [id number]    SUBJECTIVE:     Pre-operative evaluation for Procedure(s) (LRB):  CRANIOTOMY, FOR SUBDURAL HEMATOMA EVACUATION (Right)     04/18/2022    Ms. 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 Stepped down from [facility]. On 4/13 she developed worsening headache and CTH showed re-accumulation of R SDH and 4-5mm MLS. She was noted to be more slow to follow commands. Stepped back up to [facility] for closer monitoring.    Patient now presents for the above procedure(s).      LDA: None documented.  PICC Double Lumen 03/26/22 1905 right basilic (Active)   $ PICC Line Charges (Upon insertion) Catheter - Double Lumen (Supply) 04/18/22 0701   Verification by X-ray Yes 04/18/22 0701   Site Assessment No swelling;No drainage;No redness;No warmth 04/18/22 0701   Extremity Assessment Distal to IV No abnormal discoloration;No redness;No swelling;No warmth 04/18/22 1902   Line Securement Device Secured with sutureless device 04/18/22 0701   Dressing Type Biopatch in place;Central line dressing;Transparent (Tegaderm) 04/18/22 1902   Dressing Status Clean;Dry;Intact 04/18/22 1902   Dressing Intervention Integrity maintained 04/18/22 1902   Date on Dressing 04/13/22 04/18/22 0701   Dressing Due to be Changed 04/20/22 04/18/22 0701   Left Lumen Patency/Care Flushed w/o difficulty 04/18/22 1902   Right Lumen Patency/Care Flushed w/o difficulty 04/18/22 1902   Current Insertion Depth (cm) 32 cm 03/26/22 1905   Current Exposed Catheter (cm) 1 cm 03/26/22 1905   Extremity Circumference (cm) 25 cm 03/26/22 1905   Waveform Not being transduced 04/14/22 1500   Line Necessity Review Longterm central access required 04/16/22 2004   Number of days: 23            NG/OG Tube 04/14/22 1200 [place] sump 14 Fr. Left nostril  (Active)   Placement Check placement verified by aspirate characteristics;placement verified by x-ray 04/18/22 1902   Tolerance no signs/symptoms of discomfort 04/18/22 1902   Securement secured to nostril center w/ adhesive device 04/18/22 1902   Clamp Status/Tolerance unclamped 04/18/22 1902   Suction Setting/Drainage Method suction at the bedside 04/18/22 0701   Insertion Site Appearance no redness, warmth, tenderness, skin breakdown, drainage 04/18/22 1902   Drainage None 04/18/22 1902   Flush/Irrigation flushed w/;water 04/18/22 1902   Feeding Type continuous;by pump 04/18/22 1902   Feeding Action feeding continued 04/18/22 1902   Current Rate (mL/hr) 27 mL/hr 04/18/22 0302   Intake (mL) 60 mL 04/18/22 0801   Water Bolus (mL) 200 mL 04/18/22 0925   Rate Formula Tube Feeding (mL/hr) 27 mL/hr 04/18/22 0302   Formula Name glucerna 04/18/22 1902   Intake (mL) - Formula Tube Feeding 0 04/18/22 0925   Residual Amount (ml) 100 ml 04/17/22 1902   Number of days: 4       Female External Urinary Catheter 04/13/22 1820 (Active)   Skin no redness;no breakdown 04/18/22 1902   Tolerance no signs/symptoms of discomfort 04/18/22 1902   Suction Continuous suction at 60 mmHg 04/18/22 0801   Date of last wick change 04/18/22 04/18/22 0801   Time of last wick change 0002 04/17/22 2302   Output (mL) 150 mL 04/17/22 1637   Number of days: 5        Prev airway: None documented.    Drips: None documented   sodium chloride 0.9% 75 mL/hr at 04/18/22 1830    niCARdipine 0 mg/hr (04/15/22 0545)        Patient Active Problem List   Diagnosis    TIA (transient ischemic attack)    Hypertension    Hyperlipidemia    Type 2 diabetes mellitus, with long-term current use of insulin    Acute kidney injury superimposed on CKD stage IV    History of stroke    Iron deficiency anemia    History of kidney stones    Severe protein-calorie malnutrition    Hypothyroidism    Osteomyelitis of right 2nd toe    Urinary retention    Vitamin D  deficiency    Debility    Peripheral neuropathy    Gastroparesis    GERD (gastroesophageal reflux disease)    Long term (current) use of antithrombotics/antiplatelets    Hypokalemia    Insomnia    Chronic kidney disease, stage 4 (severe)    Acute on chronic anemia    Status post partial amputation of right foot    Acute metabolic encephalopathy    Osteomyelitis of right foot    Critical lower limb ischemia    Body mass index (BMI) less than 19    SDH (subdural hematoma)    Fever    Recurrent seizures    Fall    Encephalopathy       Review of patient's allergies indicates:   Allergen Reactions    Tomato (solanum lycopersicum) Hives and Itching       Current Outpatient Medications:    Current Facility-Administered Medications:     0.9%  NaCl infusion (for blood administration), , Intravenous, Q24H PRN, Heber Felipe MD    0.9%  NaCl infusion, , Intravenous, Continuous, Heber Felipe MD, Last Rate: 75 mL/hr at 04/18/22 1830, Rate Verify at 04/18/22 1830    acetaminophen tablet 650 mg, 650 mg, Oral, Q4H PRN, Yamil Larson MD, 650 mg at 04/14/22 1954    amLODIPine tablet 10 mg, 10 mg, Per NG tube, Daily, Donita Garcia NP, 10 mg at 04/18/22 0828    atorvastatin tablet 40 mg, 40 mg, Per NG tube, Daily, Donita Garcia NP, 40 mg at 04/18/22 0828    carvediloL tablet 25 mg, 25 mg, Per NG tube, BID, Donita Garcia NP, 25 mg at 04/18/22 0828    dextrose 10% bolus 125 mL, 12.5 g, Intravenous, PRN, Brock Fairchild MD, Stopped at 04/17/22 1637    glucagon (human recombinant) injection 1 mg, 1 mg, Intramuscular, PRN, Brock Fairchild MD    hydrALAZINE injection 10 mg, 10 mg, Intravenous, Q8H PRN, Ina Ardon PA-C, 10 mg at 04/17/22 1842    HYDROcodone-acetaminophen 5-325 mg per tablet 1 tablet, 1 tablet, Oral, Q4H PRN, Yamil Larson MD, 1 tablet at 04/16/22 0834    labetalol 20 mg/4 mL (5 mg/mL) IV syring, 10 mg, Intravenous, Q15 Min PRN, Brenna Stuart DO, 10 mg at  04/15/22 1707    lacosamide (VIMPAT) 200 mg in sodium chloride 0.9% 100 mL IVPB, 200 mg, Intravenous, Q12H, Maegan Dsouza PA-C, Stopped at 04/18/22 1746    levETIRAcetam injection 1,500 mg, 1,500 mg, Intravenous, Q12H, Shahana Cornejo MD, 1,500 mg at 04/18/22 0828    levothyroxine tablet 75 mcg, 75 mcg, Per NG tube, Before breakfast, Donita Garcia NP, 75 mcg at 04/18/22 0622    losartan tablet 25 mg, 25 mg, Per NG tube, Daily, Donitabecka Garcia NP, 25 mg at 04/18/22 0828    metoclopramide HCl injection 5 mg, 5 mg, Intravenous, Q6H PRN, Brock Fairchild MD    niCARdipine 40 mg/200 mL (0.2 mg/mL) infusion, 0-15 mg/hr, Intravenous, Continuous, Brenna Stuart DO, Last Rate: 0 mL/hr at 04/15/22 0545, Canceled Entry at 04/15/22 0625    ondansetron injection 4 mg, 4 mg, Intravenous, Q6H PRN, Brock Fairchild MD, 4 mg at 04/13/22 1313    potassium bicarbonate disintegrating tablet 35 mEq, 35 mEq, Oral, PRN, Yamil Larson MD, 35 mEq at 04/18/22 0827    potassium bicarbonate disintegrating tablet 50 mEq, 50 mEq, Oral, PRN, Yamil Larson MD    potassium bicarbonate disintegrating tablet 60 mEq, 60 mEq, Oral, PRN, Yamil Larson MD    potassium, sodium phosphates 280-160-250 mg packet 2 packet, 2 packet, Oral, PRN, Yamil Larson MD    potassium, sodium phosphates 280-160-250 mg packet 2 packet, 2 packet, Oral, PRN, Yamil Larson MD    potassium, sodium phosphates 280-160-250 mg packet 2 packet, 2 packet, Oral, PRN, Yamil Larson MD    senna-docusate 8.6-50 mg per tablet 1 tablet, 1 tablet, Per NG tube, BID, Donita Garcia NP, 1 tablet at 04/18/22 0828    silodosin capsule 4 mg, 4 mg, Per NG tube, Daily, Donita Garcia NP, 4 mg at 04/18/22 0828    sodium chloride 0.9% flush 10 mL, 10 mL, Intravenous, PRN, Brock Fairchild MD    sodium chloride 0.9% flush 10 mL, 10 mL, Intravenous, PRN, Brenna Stuart,     Pharmacy to dose Vancomycin consult, ,  , Once **AND** vancomycin - pharmacy to dose, , Intravenous, pharmacy to manage frequency, Brock Fairchild MD    vancomycin 500 mg in dextrose 5 % 100 mL IVPB (ready to mix system), 500 mg, Intravenous, Q24H, Caleb Anand MD, Stopped at 04/18/22 1817    Past Surgical History:   Procedure Laterality Date    APPENDECTOMY      CRANIOTOMY FOR EVACUATION OF SUBDURAL HEMATOMA Right 4/4/2022    Procedure: CRANIOTOMY, FOR SUBDURAL HEMATOMA EVACUATION;  Surgeon: Silvio White MD;  [address]  Service: Neurosurgery;  Laterality: Right;    CYSTOSCOPY W/ URETERAL STENT PLACEMENT Right 10/13/2021    Procedure: CYSTOSCOPY, WITH URETERAL STENT INSERTION;  Surgeon: Wanda Arroyo MD;  Location: [facility];  Service: Urology;  Laterality: Right;    ESOPHAGOGASTRODUODENOSCOPY N/A 11/23/2021    Procedure: EGD (ESOPHAGOGASTRODUODENOSCOPY);  Surgeon: Obed Jeong MD;  Location: [facility] (2ND FLR);  Service: Endoscopy;  Laterality: N/A;    FOOT AMPUTATION THROUGH METATARSAL Right 3/24/2022    Procedure: AMPUTATION, FOOT, PARTIAL 4th and 5th ray;  Surgeon: Rodrigo Logan DPM;  [address];  Service: Podiatry;  Laterality: Right;    LAPAROSCOPIC APPENDECTOMY N/A 7/22/2021    Procedure: APPENDECTOMY, LAPAROSCOPIC;  Surgeon: Paulo Calvo Jr., MD;  Location: [facility];  Service: General;  Laterality: N/A;    TOE AMPUTATION Right 1/1/2022    Procedure: AMPUTATION, TOE;  Surgeon: Genaro Mason DPM;  Location: [facility];  Service: Podiatry;  Laterality: Right;    TUBAL LIGATION      URETEROSCOPIC REMOVAL OF URETERIC CALCULUS Right 12/22/2021    Procedure: REMOVAL, CALCULUS, URETER, URETEROSCOPIC;  Surgeon: Wanda Arroyo MD;  Location: [facility];  Service: Urology;  Laterality: Right;       Social History     Socioeconomic History    Marital status: Single   Tobacco Use    Smoking status: Never Smoker    Smokeless tobacco: Never Used   Substance and Sexual Activity    Alcohol  use: No     Comment: socially    Drug use: No    Sexual activity: Not Currently     Social Determinants of Health     Financial Resource Strain: Low Risk     Difficulty of Paying Living Expenses: Not hard at all   Food Insecurity: No Food Insecurity    Worried About Running Out of Food in the Last Year: Never true    Ran Out of Food in the Last Year: Never true   Transportation Needs: No Transportation Needs    Lack of Transportation (Medical): No    Lack of Transportation (Non-Medical): No   Physical Activity: Inactive    Days of Exercise per Week: 0 days    Minutes of Exercise per Session: 0 min   Stress: No Stress Concern Present    Feeling of Stress : Not at all   Social Connections: Moderately Isolated    Frequency of Communication with Friends and Family: More than three times a week    Frequency of Social Gatherings with Friends and Family: Once a week    Attends Hindu Services: More than 4 times per year    Active Member of Clubs or Organizations: No    Attends Club or Organization Meetings: Never    Marital Status: Never    Housing Stability: Low Risk     Unable to Pay for Housing in the Last Year: No    Number of Places Lived in the Last Year: 1    Unstable Housing in the Last Year: No       OBJECTIVE:     Vital Signs Range (Last 24H):  Temp:  [36.8 °C (98.2 °F)-36.9 °C (98.5 °F)]   Pulse:  [55-81]   Resp:  [12-22]   BP: (112-161)/(53-76)   SpO2:  [97 %-100 %]       Significant Labs:  Lab Results   Component Value Date    WBC 13.05 (H) 04/18/2022    HGB 9.1 (L) 04/18/2022    HCT 27.9 (L) 04/18/2022     04/18/2022    CHOL 138 04/03/2022    TRIG 91 04/03/2022    HDL 38 (L) 04/03/2022    ALT 13 04/18/2022    AST 15 04/18/2022     04/18/2022    K 4.3 04/18/2022     (H) 04/18/2022    CREATININE 1.2 04/18/2022    BUN 34 (H) 04/18/2022    CO2 22 (L) 04/18/2022    TSH 2.269 04/03/2022    INR 1.1 04/18/2022    HGBA1C 5.0 04/03/2022       Diagnostic Studies: No  relevant studies.    EKG:   Results for orders placed or performed during the hospital encounter of 04/03/22   EKG 12-lead    Collection Time: 04/03/22  7:27 PM    Narrative    Test Reason : S06.5X9A,    Vent. Rate : 081 BPM     Atrial Rate : 081 BPM     P-R Int : 134 ms          QRS Dur : 064 ms      QT Int : 378 ms       P-R-T Axes : 069 053 047 degrees     QTc Int : 439 ms    Normal sinus rhythm  Normal ECG  When compared with ECG of 03-APR-2022 14:31,  Criteria for Septal infarct less evident  Confirmed by Raimundo CANDELARIO MD ([id number]) on 4/4/2022 12:16:58 PM    Referred By: [email or web address]   SELF           Confirmed By:Raimundo CANDELARIO MD       2D ECHO:  TTE:  Results for orders placed or performed during the hospital encounter of 04/03/22   Echo   Result Value Ref Range    Ascending aorta 2.47 cm    STJ 2.52 cm    AV mean gradient 4 mmHg    Ao peak lópez 1.33 m/s    Ao VTI 29.44 cm    IVS 1.14 (A) 0.6 - 1.1 cm    LA size 3.70 cm    Left Atrium Major Axis 4.80 cm    Left Atrium Minor Axis 4.80 cm    LVIDd 3.49 (A) 3.5 - 6.0 cm    LVIDs 2.67 2.1 - 4.0 cm    LVOT diameter 2.00 cm    LVOT peak VTI 19.57 cm    Posterior Wall 1.32 (A) 0.6 - 1.1 cm    RA Major Axis 4.02 cm    RA Width 3.32 cm    RVDD 2.84 cm    Sinus 2.49 cm    TAPSE 3.34 cm    TR Max López 2.78 m/s    TDI LATERAL 0.06 m/s    TDI SEPTAL 0.07 m/s    LA WIDTH 3.70 cm    LV Diastolic Volume 50.63 mL    LV Systolic Volume 26.26 mL    LVOT peak lópez 0.86 m/s    FS 23 %    LA volume 55.86 cm3    LV mass 140.49 g    Left Ventricle Relative Wall Thickness 0.76 cm    AV valve area 2.09 cm2    AV Velocity Ratio 0.65     AV index (prosthetic) 0.66     Mean e' 0.07 m/s    LVOT area 3.1 cm2    LVOT stroke volume 61.45 cm3    AV peak gradient 7 mmHg    LV Systolic Volume Index 16.8 mL/m2    LV Diastolic Volume Index 32.46 mL/m2    LA Volume Index 35.8 mL/m2    LV Mass Index 90 g/m2    Triscuspid Valve Regurgitation Peak Gradient 31 mmHg    BSA 1.53 m2    EF 65 %    Narrative    · The left  ventricle is normal in size with concentric remodeling and   normal systolic function. The estimated ejection fraction is 65%.  · Indeterminate left ventricular diastolic function.  · Mild tricuspid regurgitation.  · Normal right ventricular size with normal right ventricular systolic   function.  · Mild left atrial enlargement.  · Aortic valve sclerosis.  · Mitral valve sclerosis with mitral annular calcifications and no   stenosis.  · Indeterminate central venous pressure. Estimated PA systolic pressure is   at least 31 mmHg.  · Small circumferential pericardial effusion.          ALEJO:  No results found for this or any previous visit.    ASSESSMENT/PLAN:           Pre-op Assessment    I have reviewed the Patient Summary Reports.    I have reviewed the NPO Status.   I have reviewed the Medications.     Review of Systems  Anesthesia Hx:  History of prior surgery of interest to airway management or planning:   Cardiovascular:   Hypertension    Renal/:   Chronic Renal Disease    Hepatic/GI:   GERD    Musculoskeletal:   Arthritis     Neurological:   TIA, CVA Seizures    Endocrine:   Diabetes Hypothyroidism        Physical Exam  General: Well nourished and Confusion    Airway:  Mallampati: III / II  Mouth Opening: Normal  TM Distance: Normal  Tongue: Normal  Neck ROM: Normal ROM    Chest/Lungs:  Clear to auscultation, Normal Respiratory Rate    Heart:  Rate: Normal  Rhythm: Regular Rhythm        Anesthesia Plan  Type of Anesthesia, risks & benefits discussed:    Anesthesia Type: Gen ETT  Post Op Pain Control Plan: multimodal analgesia  Airway Plan: Direct and Video  Informed Consent: Informed consent signed with the Patient and all parties understand the risks and agree with anesthesia plan.  All questions answered.   ASA Score: 4    Ready For Surgery From Anesthesia Perspective.     .

## 2022-04-19 NOTE — NURSING
Tomi ACOSTA notified of pt increasing /73, current medication is via IV. Still no IV access, No new orders, will continue to monitor.

## 2022-04-19 NOTE — ASSESSMENT & PLAN NOTE
S/p partial 4th and 5th ray amputation 3/24/2022. Currently on IV Vancomycin x 6 weeks for residual osteomyelitis with estimated end date 5/5/2022. Proximal bone margins obtained intraop +MRSA.    Plan  She is now POD#26 s/p amputation. Sutures removed at bedside noting superfical gapping/dehiscence along the incision site with no acute signs of infection.   Continue IV Vancomycin estimated end date 5/5/2022  No further plans for surgical intervention from podiatry  Continue local wound care with betadine. Nursing orders placed  Ok to weightbear as tolerated  Podiatry will follow peripherally    DC Instructions:  Patient is to follow up with podiatry within 10-14 days of discharge with Dr. Logan.  Podiatry will arrange an appointment.  Home health/facility to do dressing changes every MWF and prn as follows:  Cleanse right foot incision site with saline or wound cleanser, paint incision site with betadine, cover with 4x4 gauze, kerlix, ACE bandage.

## 2022-04-19 NOTE — PROCEDURES
[facility] EEG/VIDEO MONITORING REPORT  Beatriz Adame  [id number]  1950    DATE OF SERVICE:  04/18/2022-04/19/2022  DATE OF ADMISSION: 4/3/2022  1:00 PM    ADMITTING/REQUESTING PROVIDER: Caleb Anand MD    REASON FOR CONSULT:  71-year-old woman with previous right middle cerebral artery stroke with left spastic hemiparesis admitted with right subdural hematoma requiring hemicraniectomy for evacuation with persistent decreased responsiveness. Evaluate for evidence of epileptiform activity.    METHODOLOGY   Electroencephalographic (EEG) recording is with electrodes placed according to the International 10-20 placement system.  Thirty two (32) channels of digital signal (sampling rate of 512/sec) including T1 and T2 was simultaneously recorded from the scalp and may include  EKG, EMG, and/or eye monitors.  Recording band pass was 0.1 to 512 hz.  Digital video recording of the patient is simultaneously recorded with the EEG.  The patient is instructed report clinical symptoms which may occur during the recording session.  EEG and video recording is stored and archived in digital format.  Activation procedures which include photic stimulation, hyperventilation and instructing patients to perform simple task are done in selected patients.   The EEG is displayed on a monitor screen and can be reviewed using different montages.  Computer assisted analysis is employed to detect spike and electrographic seizure activity.   The entire record is submitted for computer analysis.  The entire recording is visually reviewed and the times identified by computer analysis as being spikes or seizures are reviewed again.  Compresses spectral analysis (CSA) is also performed on the activity recorded from each individual channel.  This is displayed as a power display of frequencies from 0 to 30 Hz over time.   The CSA is reviewed looking for asymmetries in power between homologous areas of the scalp and then compared with the original  EEG recording.     Pixelated software is also utilized in the review of this study.  This software suite analyzes the EEG recording in multiple domains.  Coherence and rhythmicity is computed to identify EEG sections which may contain organized seizures.  Each channel undergoes analysis to detect presence of spike and sharp waves which have special and morphological characteristic of epileptic activity.  The routine EEG recording is converted from spacial into frequency domain.  This is then displayed comparing homologous areas to identify areas of significant asymmetry.  Algorithm to identify non-cortically generated artifact is used to separate eye movement, EMG and other artifact from the EEG.      RECORDING TIMES  Start on 04/18/2022 at 21:12 p.m.  Stop on 04/19/2022 at 08:40 a.m.-> End of the Recording Session  A total of 11 hours and 26 minutes of EEG recording is obtained.    EEG FINDINGS  Background activity:   Within the limitation of a significant amount of movement/lead artifact on an electrode cap EEG, the background is continuous and asymmetric predominantly mixed frequency theta/delta activity.  Over the right, there are continuous lateralized periodic discharges at approximately 1 hz which frequently coalesce on a rhythmic background with more prominent focal slowing in the same area.    There are no pushbutton activations.    Sleep:  There is poorly formed sleep architecture.    Activation procedures:   Hyperventilation is not performed  Photic stimulation is not performed    Cardiac Monitor:   Electrode caps do not have EKG capabilities    Impression:   Within the limitation of a significant amount of lead/movement artifact, this is an abnormal electrode cap EEG monitoring study because of continuous fluctuating lateralized periodic discharges which often coalesce on a rhythmic background over the right hemisphere consistent with significant cortical irritation with a high epileptogenic potential.   There is generalized background slowing consistent with a moderate-severe encephalopathy.  There are no pushbutton activations.  Depending on the clinical scenario, consider additional monitoring with standard scalp electrodes.    Lorelei Peace MD PhD  Neurology-Epilepsy  [facility].

## 2022-04-19 NOTE — ASSESSMENT & PLAN NOTE
S/p evac on 4/4/13; stable and stepped down from [facility].    On 4/13 patient had 8/10 HA and was slower to follow commands.  Repeat CTH showed re-accumulation of R SDH along with 4-5mm MLS.  Stepped back up to [facility].    -- NSGY following  -- SBP <160  -- q1hr neuro check  -- hold anticoagulation  -- EEG revealed seizure  -- Continue vimpat and keppra    4/19 OR today

## 2022-04-19 NOTE — PLAN OF CARE
[facility] Care Plan    POC reviewed with Beatriz Adame and family at 0150. Pt unable to verbalized understanding. Questions and concerns addressed with family at bedside. No acute events today. Pt progressing toward goals. Will continue to monitor. See below and flowsheets for full assessment and VS info.   -EEG cap placed  -Tube feeding held  -Plan for OR crani SDH evac               Is this a stroke patient? yes- Stroke booklet reviewed with family, risk factors identified for patient and stroke booklet remains at bedside for ongoing education.     Neuro:  Ada Coma Scale  Best Eye Response: 3-->(E3) to speech  Best Motor Response: 6-->(M6) obeys commands  Best Verbal Response: 1-->(V1) none  Ada Coma Scale Score: 10  Assessment Qualifiers: patient not sedated/intubated, no eye obstruction present  Pupil PERRLA: yes     24 hr Temp:  [98.2 °F (36.8 °C)-98.5 °F (36.9 °C)]     CV:   Rhythm: normal sinus rhythm  BP goals:   SBP < 160  MAP > 65    Resp:   O2 Device (Oxygen Therapy): room air  Vent Mode: Spont  Set Rate: 15 BPM  Oxygen Concentration (%): 98  Vt Set: 450 mL  PEEP/CPAP: 5 cmH20  Pressure Support: 5 cmH20    Plan: N/A    GI/:     Diet/Nutrition Received: tube feeding  Last Bowel Movement: 04/19/22  Voiding Characteristics: external catheter    Intake/Output Summary (Last 24 hours) at 4/19/2022 0153  Last data filed at 4/19/2022 0102  Gross per 24 hour   Intake 2632.23 ml   Output 100 ml   Net 2532.23 ml     Unmeasured Output  Urine Occurrence: 1  Stool Occurrence: 1  Pad Count: 1    Labs/Accuchecks:  Recent Labs   Lab 04/18/22  1347   WBC 13.05*   RBC 3.00*   HGB 9.1*   HCT 27.9*         Recent Labs   Lab 04/18/22  0402 04/18/22  1037     --    K 3.4* 4.3   CO2 22*  --    *  --    BUN 34*  --    CREATININE 1.2  --    ALKPHOS 71  --    ALT 13  --    AST 15  --    BILITOT 0.5  --       Recent Labs   Lab 04/18/22  1037   INR 1.1   APTT 26.2    No results for input(s): CPK, CPKMB,  TROPONINI, MB in the last 168 hours.    Electrolytes: N/A - electrolytes WDL  Accuchecks: Q12    Gtts:   sodium chloride 0.9% 75 mL/hr at 04/19/22 0124    niCARdipine 0 mg/hr (04/15/22 0545)       LDA/Wounds:  Lines/Drains/Airways       Peripherally Inserted Central Catheter Line  Duration             PICC Double Lumen 03/26/22 1905 right basilic 23 days              Drain  Duration             Female External Urinary Catheter 04/13/22 1820 5 days         NG/OG Tube 04/14/22 1200 [place] sump 14 Fr. Left nostril 4 days                  Wounds: Yes  Wound care consulted: Yes

## 2022-04-19 NOTE — PLAN OF CARE
Problem: Adult Inpatient Plan of Care  Goal: Patient-Specific Goal (Individualized)  Description: Admit Date: 4/3/2022    SDH (subdural hematoma)    Past Medical History:  No date: Gastroparesis  No date: GERD (gastroesophageal reflux disease)  07/24/2021: History of Clostridium difficile colitis  No date: History of kidney stones  No date: History of stroke      Comment:  left side paralysis  No date: Hyperlipidemia  No date: Hypertension  No date: Hypothyroidism  No date: Iron deficiency anemia  No date: Osteoarthritis  No date: Peripheral neuropathy  No date: Stage IV CKD  No date: Type II diabetes mellitus    Past Surgical History:  No date: APPENDECTOMY  10/13/2021: CYSTOSCOPY W/ URETERAL STENT PLACEMENT; Right      Comment:  Procedure: CYSTOSCOPY, WITH URETERAL STENT INSERTION;                 Surgeon: Wanda Arroyo MD;  Location: [facility];                 Service: Urology;  Laterality: Right;  11/23/2021: ESOPHAGOGASTRODUODENOSCOPY; N/A      Comment:  Procedure: EGD (ESOPHAGOGASTRODUODENOSCOPY);  Surgeon:                Obed Jeong MD;  Location: [facility] ([facility]);                 Service: Endoscopy;  Laterality: N/A;  3/24/2022: FOOT AMPUTATION THROUGH METATARSAL; Right      Comment:  Procedure: AMPUTATION, FOOT, PARTIAL 4th and 5th ray;                 Surgeon: Rodrigo Logan DPM;  [address];                 Service: Podiatry;  Laterality: Right;  7/22/2021: LAPAROSCOPIC APPENDECTOMY; N/A      Comment:  Procedure: APPENDECTOMY, LAPAROSCOPIC;  Surgeon: Paulo Calvo Jr., MD;  Location: [facility];  Service: General;                 Laterality: N/A;  1/1/2022: TOE AMPUTATION; Right      Comment:  Procedure: AMPUTATION, TOE;  Surgeon: Genaro Mason DPM;  Location: [facility];  Service: Podiatry;  Laterality:               Right;  No date: TUBAL LIGATION  12/22/2021: URETEROSCOPIC REMOVAL OF URETERIC CALCULUS; Right      Comment:  Procedure: REMOVAL,  CALCULUS, URETER, URETEROSCOPIC;                 Surgeon: Wanda Arroyo MD;  Location: [facility];                 Service: Urology;  Laterality: Right;    Individualization:   1. Please dim lights at night  2. Pt likes to be covered with blankets  3. Pt likes to be moved slowly.    Restraints:  Right wrist restrains exp 4/18 14pm            Outcome: Ongoing, Progressing     Problem: Communication Impairment (Stroke, Hemorrhagic)  Goal: Effective Communication Skills  Outcome: Ongoing, Progressing     Problem: Urinary Elimination Impaired (Stroke, Hemorrhagic)  Goal: Effective Urinary Elimination  Outcome: Ongoing, Progressing     Problem: Fluid and Electrolyte Imbalance (Acute Kidney Injury/Impairment)  Goal: Fluid and Electrolyte Balance  Outcome: Ongoing, ProgressingNSCC Care Plan    POC reviewed with Beatriz Adame and family at 0900. Pt verbalized understanding. Questions and concerns addressed. No acute events today. Pt progressing toward goals. Will continue to monitor. See below and flowsheets for full assessment and VS info.         Pt brought down for CT head per Neuro Sx prior to OR. Straight cath with 500 ml retrieved. Family updated per Neuro Sx on pending Sx. Given report to night shift.       Is this a stroke patient? yes-     Neuro:  [place] Coma Scale  Best Eye Response: 3-->(E3) to speech  Best Motor Response: 6-->(M6) obeys commands  Best Verbal Response: 1-->(V1) none  [place] Coma Scale Score: 10  Assessment Qualifiers: patient not sedated/intubated  Pupil PERRLA: yes     24 hr Temp:  [98.2 °F (36.8 °C)-98.5 °F (36.9 °C)]     CV:   Rhythm: (P) normal sinus rhythm  BP goals:   SBP < 160  MAP > 65    Resp:   O2 Device (Oxygen Therapy): room air  Vent Mode: Spont  Set Rate: 15 BPM  Oxygen Concentration (%): 98  Vt Set: 450 mL  PEEP/CPAP: 5 cmH20  Pressure Support: 5 cmH20    Plan: N/A    GI/:     Diet/Nutrition Received: NPO  Last Bowel Movement: 04/18/22  Voiding Characteristics:  external catheter    Intake/Output Summary (Last 24 hours) at 4/19/2022 1418  Last data filed at 4/19/2022 0402  Gross per 24 hour   Intake 1491.04 ml   Output 100 ml   Net 1391.04 ml     Unmeasured Output  Urine Occurrence: 1  Stool Occurrence: 1  Pad Count: 1    Labs/Accuchecks:  Recent Labs   Lab 04/19/22  0333   WBC 13.52*   RBC 3.29*   HGB 10.1*   HCT 29.9*   *      Recent Labs   Lab 04/19/22  0339      K 4.0   CO2 20*   *   BUN 31*   CREATININE 1.1   ALKPHOS 70   ALT 13   AST 15   BILITOT 0.7      Recent Labs   Lab 04/18/22  1037   INR 1.1   APTT 26.2    No results for input(s): CPK, CPKMB, TROPONINI, MB in the last 168 hours.    Electrolytes: Electrolytes replaced  Accuchecks: Q shift    Gtts:   sodium chloride 0.9% Stopped (04/19/22 0317)       LDA/Wounds:  Lines/Drains/Airways       Peripherally Inserted Central Catheter Line  Duration             PICC Double Lumen 03/26/22 1905 right basilic 23 days              Drain  Duration                  NG/OG Tube 04/14/22 1200 [place] sump 14 Fr. Left nostril 5 days    Female External Urinary Catheter 04/13/22 1820 5 days                  Wounds: yes  Wound care consulted: Yes

## 2022-04-19 NOTE — PLAN OF CARE
Problem: Adult Inpatient Plan of Care  Goal: Absence of Hospital-Acquired Illness or Injury  Outcome: Ongoing, Progressing     Problem: Communication Impairment (Stroke, Hemorrhagic)  Goal: Effective Communication Skills  Outcome: Ongoing, Progressing   [facility] Care Plan    POC reviewed with Beatriz Adame and family at 1000. Pt verbalized understanding. Questions and concerns addressed. No acute events today. Pt progressing toward goals. Will continue to monitor. See below and flowsheets for full assessment and VS info.     PT prepared for OR. Tube feeds stopped at 920 am. Given 1 unit of platelets for OR and repeat CBC done ( 179). Family at  bedside updated on plan of care. Epilepsy talked with family as well.   Bladder scanned around 11am 210.  Pt had one void second bladder scanned at 1700 amount of 175 ml. EEG taken off for SX, but pushed back till tomorrow 4/19. Family updated.   Pt had one BM.         Is this a stroke patient? no    Neuro:  [place] Coma Scale  Best Eye Response: 4-->(E4) spontaneous  Best Motor Response: 6-->(M6) obeys commands  Best Verbal Response: 1-->(V1) none  [place] Coma Scale Score: 11  Assessment Qualifiers: patient not sedated/intubated, no eye obstruction present  Pupil PERRLA: yes     24 hr Temp:  [98.2 °F (36.8 °C)-98.5 °F (36.9 °C)]     CV:   Rhythm: normal sinus rhythm  BP goals:   SBP < 160  MAP > 65    Resp:   O2 Device (Oxygen Therapy): room air  Vent Mode: Spont  Set Rate: 15 BPM  Oxygen Concentration (%): 98  Vt Set: 450 mL  PEEP/CPAP: 5 cmH20  Pressure Support: 5 cmH20    Plan: N/A    GI/:     Diet/Nutrition Received: tube feeding  Last Bowel Movement: 04/16/22  Voiding Characteristics: external catheter    Intake/Output Summary (Last 24 hours) at 4/18/2022 1959  Last data filed at 4/18/2022 1830  Gross per 24 hour   Intake 2245.23 ml   Output 500 ml   Net 1745.23 ml     Unmeasured Output  Urine Occurrence: 1  Stool Occurrence: 1  Pad Count:  1    Labs/Accuchecks:  Recent Labs   Lab 04/18/22  1347   WBC 13.05*   RBC 3.00*   HGB 9.1*   HCT 27.9*         Recent Labs   Lab 04/18/22  0402 04/18/22  1037     --    K 3.4* 4.3   CO2 22*  --    *  --    BUN 34*  --    CREATININE 1.2  --    ALKPHOS 71  --    ALT 13  --    AST 15  --    BILITOT 0.5  --       Recent Labs   Lab 04/18/22  1037   INR 1.1   APTT 26.2    No results for input(s): CPK, CPKMB, TROPONINI, MB in the last 168 hours.    Electrolytes: Electrolytes replaced  Accuchecks: q shift      Gtts:   sodium chloride 0.9% 75 mL/hr at 04/18/22 1830    niCARdipine 0 mg/hr (04/15/22 0545)       LDA/Wounds:  Lines/Drains/Airways       Peripherally Inserted Central Catheter Line  Duration             PICC Double Lumen 03/26/22 1905 right basilic 23 days              Drain  Duration             Female External Urinary Catheter 04/13/22 1820 5 days         NG/OG Tube 04/14/22 1200 [place] sump 14 Fr. Left nostril 4 days                  Wounds: Yes  Wound care consulted: Yes

## 2022-04-19 NOTE — PLAN OF CARE
MARIO provided an update to Genevieve with [facility].  MARIO will f/u as needed.    Ester Glasgow LCSW   PRN      negative

## 2022-04-19 NOTE — SUBJECTIVE & OBJECTIVE
Interval History:  NAEON. See above.     Review of Systems   Reason unable to perform ROS: decreased LOC.     Objective:     Vitals:  Temp: 98.4 °F (36.9 °C)  Pulse: 81  Rhythm: (P) normal sinus rhythm  BP: (!) 154/72  MAP (mmHg): 110  Resp: 18  SpO2: 100 %  O2 Device (Oxygen Therapy): room air    Temp  Min: 98.2 °F (36.8 °C)  Max: 98.5 °F (36.9 °C)  Pulse  Min: 67  Max: 85  BP  Min: 124/58  Max: 166/78  MAP (mmHg)  Min: 83  Max: 117  Resp  Min: 7  Max: 22  SpO2  Min: 98 %  Max: 100 %    04/18 0701 - 04/19 0700  In: 2721.2 [I.V.:1334.9]  Out: 100 [Urine:100]   Unmeasured Output  Urine Occurrence: 1  Stool Occurrence: 1  Pad Count: 1       Physical Exam  Vitals and nursing note reviewed.   Constitutional:       Appearance: She is ill-appearing.   HENT:      Head: Normocephalic.      Right Ear: External ear normal.      Left Ear: External ear normal.      Nose: Nose normal.      Mouth/Throat:      Mouth: Mucous membranes are moist.      Pharynx: Oropharynx is clear.   Cardiovascular:      Rate and Rhythm: Normal rate and regular rhythm.   Pulmonary:      Effort: Pulmonary effort is normal. No respiratory distress.   Abdominal:      General: Abdomen is flat. There is no distension.   Musculoskeletal:         General: Normal range of motion.      Cervical back: Normal range of motion.   Skin:     General: Skin is warm and dry.   Neurological:      Comments: E4V1M6  Awake. No verbal response. Follows commands.  Moves RUE & RLE spontaneously & against gravity. Hemiparesis on left side with baseline contraction of RUE. SILT in all extremities.         Medications:  Continuoussodium chloride 0.9%, Last Rate: Stopped (04/19/22 0317)  niCARdipine, Last Rate: 0 mg/hr (04/15/22 0545)    ScheduledamLODIPine, 10 mg, Daily  atorvastatin, 40 mg, Daily  carvediloL, 25 mg, BID  lacosamide (VIMPAT) IVPB, 200 mg, Q12H  levetiracetam IV, 1,500 mg, Q12H  levothyroxine, 75 mcg, Before breakfast  losartan, 25 mg, Daily  senna-docusate  8.6-50 mg, 1 tablet, BID  silodosin, 4 mg, Daily  vancomycin (VANCOCIN) IVPB, 500 mg, Q24H    PRNsodium chloride, , Q24H PRN  acetaminophen, 650 mg, Q4H PRN  dextrose 10%, 12.5 g, PRN  glucagon (human recombinant), 1 mg, PRN  hydrALAZINE, 10 mg, Q8H PRN  HYDROcodone-acetaminophen, 1 tablet, Q4H PRN  labetalol, 10 mg, Q15 Min PRN  metoclopramide HCl, 5 mg, Q6H PRN  ondansetron, 4 mg, Q6H PRN  potassium bicarbonate, 35 mEq, PRN  potassium bicarbonate, 50 mEq, PRN  potassium bicarbonate, 60 mEq, PRN  potassium, sodium phosphates, 2 packet, PRN  potassium, sodium phosphates, 2 packet, PRN  potassium, sodium phosphates, 2 packet, PRN  sodium chloride 0.9%, 10 mL, PRN  sodium chloride 0.9%, 10 mL, PRN  vancomycin - pharmacy to dose, , pharmacy to manage frequency      Today I personally reviewed pertinent medications, lines/drains/airways, imaging, cardiology results, laboratory results, microbiology results, notably:    Diet  Diet NPO  Diet NPO

## 2022-04-19 NOTE — PROGRESS NOTES
[facility] - Neuro Critical Care  Podiatry  Progress Note    Patient Name: Beatriz Adame  MRN: [id number]  Admission Date: 4/3/2022  Hospital Length of Stay: 16 days  Attending Physician: Maverick Ely MD  Primary Care Provider: Dante Olivier MD     Subjective:     Interval History:   Afebrile, vital signs stable. Persistent leukocytosis. Patient scheduled for SDH evacuation today with neurosurgery. Son at bedside. She is 3.5 weeks s/p partial 4th and 5th ray amputation DOS 3/24/2022. 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 with estimated end date of 5/5/2022    Scheduled Meds:   amLODIPine  10 mg Per NG tube Daily    atorvastatin  40 mg Per NG tube Daily    carvediloL  25 mg Per NG tube BID    lacosamide (VIMPAT) IVPB  200 mg Intravenous Q12H    levetiracetam IV  1,500 mg Intravenous Q12H    levothyroxine  75 mcg Per NG tube Before breakfast    losartan  25 mg Per NG tube Daily    senna-docusate 8.6-50 mg  1 tablet Per NG tube BID    silodosin  4 mg Per NG tube Daily    vancomycin (VANCOCIN) IVPB  500 mg Intravenous Q24H     Continuous Infusions:   sodium chloride 0.9% Stopped (04/19/22 0317)     PRN Meds:sodium chloride, acetaminophen, dextrose 10%, glucagon (human recombinant), hydrALAZINE, HYDROcodone-acetaminophen, labetalol, metoclopramide HCl, ondansetron, potassium bicarbonate, potassium bicarbonate, potassium bicarbonate, potassium, sodium phosphates, potassium, sodium phosphates, potassium, sodium phosphates, sodium chloride 0.9%, sodium chloride 0.9%, Pharmacy to dose Vancomycin consult **AND** vancomycin - pharmacy to dose    Review of Systems   Unable to perform ROS: Mental status change   Objective:     Vital Signs (Most Recent):  Temp: 98.4 °F (36.9 °C) (04/19/22 0502)  Pulse: 68 (04/19/22 1301)  Resp: 15 (04/19/22 1301)  BP: 133/75 (04/19/22 1301)  SpO2: 100 % (04/19/22 1301) Vital Signs (24h Range):  Temp:  [98.2 °F (36.8 °C)-98.4 °F (36.9 °C)] 98.4 °F (36.9  °C)  Pulse:  [65-85] 68  Resp:  [7-22] 15  SpO2:  [98 %-100 %] 100 %  BP: (124-166)/(58-83) 133/75     Weight: 49.4 kg (109 lb)  Body mass index is 17.07 kg/m².    Foot Exam    Right Foot/Ankle     Inspection and Palpation  Tenderness: none   Swelling: none   Skin Exam: skin changes; no drainage, skin not intact, no cellulitis, no abnormal color and no erythema     Neurovascular  Dorsalis pedis: absent  Posterior tibial: absent    Comments  R 2nd toe amputated    S/P 4th and 5th ray resection 03/24    Sutures clean, dry, and intact   Skin edges well approximated with no signs of gapping or dehiscence  No signs of soft tissue infection     Left Foot/Ankle      Inspection and Palpation  Tenderness: none   Swelling: none   Skin Exam: skin intact; no drainage, no cellulitis and no erythema     Neurovascular  Dorsalis pedis: absent  Posterior tibial: absent        Laboratory:  CBC:   Recent Labs   Lab 04/19/22  0333   WBC 13.52*   RBC 3.29*   HGB 10.1*   HCT 29.9*   *   MCV 91   MCH 30.7   MCHC 33.8     CRP: No results for input(s): CRP in the last 168 hours.  ESR: No results for input(s): SEDRATE in the last 168 hours.  Wound Cultures:   Recent Labs   Lab 03/18/22  1620 03/24/22  1538   LABAERO METHICILLIN RESISTANT STAPHYLOCOCCUS AUREUS  Many  * METHICILLIN RESISTANT STAPHYLOCOCCUS AUREUS  Few  *  METHICILLIN RESISTANT STAPHYLOCOCCUS AUREUS  Few  *     Microbiology Results (last 7 days)       Procedure Component Value Units Date/Time    Blood culture [[id number]] Collected: 04/14/22 1208    Order Status: Completed Specimen: Blood from Peripheral, Hand, Right Updated: 04/19/22 1412     Blood Culture, Routine No growth after 5 days.    Blood culture [[id number]] Collected: 04/14/22 1226    Order Status: Completed Specimen: Blood from Peripheral, Hand, Left Updated: 04/19/22 1412     Blood Culture, Routine No growth after 5 days.    Blood culture [[id number]] Collected: 04/11/22 1021    Order Status: Completed  Specimen: Blood from Peripheral, Left Arm Updated: 04/16/22 1212     Blood Culture, Routine No growth after 5 days.    Narrative:      Collection has been rescheduled by T at 04/11/2022 10:04 Reason:   Patient unavailable, patient care, nurse grecia notified.  Collection has been rescheduled by T at 04/11/2022 10:04 Reason:   Patient unavailable, patient care, nurse paige notified.    Blood culture [[id number]] Collected: 04/11/22 1020    Order Status: Completed Specimen: Blood from Peripheral, Left Hand Updated: 04/16/22 1212     Blood Culture, Routine No growth after 5 days.    Narrative:      Collection has been rescheduled by T at 04/11/2022 10:04 Reason:   Patient unavailable, patient care, nurse paige notified.  Collection has been rescheduled by T at 04/11/2022 10:04 Reason:   Patient unavailable, patient care, nurse paige notified.    Culture, Respiratory with Gram Stain [[id number]]     Order Status: No result Specimen: Respiratory     Urine culture [[id number]]  (Abnormal)  (Susceptibility) Collected: 04/11/22 1017    Order Status: Completed Specimen: Urine Updated: 04/13/22 1411     Urine Culture, Routine ESCHERICHIA COLI ESBL  10,000 - 49,999 cfu/ml      Narrative:      Specimen Source->Urine    Blood culture [[id number]]     Order Status: No result Specimen: Blood     Blood culture [[id number]]     Order Status: No result Specimen: Blood           Specimen (24h ago, onward)                None            Diagnostic Results:  I have reviewed all pertinent imaging results/findings within the past 24 hours.    Clinical Findings:  4/12/2022:  S/p right foot partial 4th and 5th ray amputation on 3/24/2022. There is superficial dehiscence/gapping along the incision site, scant serosanguinous drainage, no erythema, no fluctuance. 2nd toe amputated.         Assessment/Plan:     Osteomyelitis of right foot  S/p partial 4th and 5th ray amputation 3/24/2022. Currently on IV Vancomycin x 6 weeks for residual  osteomyelitis with estimated end date 5/5/2022. Proximal bone margins obtained intraop +MRSA.    Plan  She is now POD#26 s/p amputation. Sutures removed at bedside noting superfical gapping/dehiscence along the incision site with no acute signs of infection.   Continue IV Vancomycin estimated end date 5/5/2022  No further plans for surgical intervention from podiatry  Continue local wound care with betadine. Nursing orders placed  Ok to weightbear as tolerated  Podiatry will follow peripherally    DC Instructions:  Patient is to follow up with podiatry within 10-14 days of discharge with Dr. Logan.  Podiatry will arrange an appointment.  Home health/facility to do dressing changes every MWF and prn as follows:  Cleanse right foot incision site with saline or wound cleanser, paint incision site with betadine, cover with 4x4 gauze, kerlix, ACE bandage.           Yumiko Morales DPM  [facility]  Podiatry PGY3  Pager: [phone]  Spectra:[phone]

## 2022-04-19 NOTE — PROGRESS NOTES
[facility] - Neuro Critical Care  Neurosurgery  Progress Note    Subjective:     History of Present Illness: 71 year old female with , hypothyroidism, CVA with left-sided residual deficit on ASA/Plavix, DM2, HTN, HLD, CKD, chronic anemia, presenting from SNF after being found down next to wheelchair after unwitnessed fall, consulted to NSGY for right 2 cm SDH without midline shift. She is altered at baseline and unable to provide to history, but family at bedside says she is more confused and less interactive than usual.       Post-Op Info:  Procedure(s) (LRB):  CRANIOTOMY, FOR SUBDURAL HEMATOMA EVACUATION (Right)   15 Days Post-Op     Interval History: 4/19: FLORENCIA. AFVSS. Exam stable. OR today for SDH evacuation.     Medications:  Continuous Infusions:   sodium chloride 0.9% Stopped (04/19/22 0317)    niCARdipine 0 mg/hr (04/15/22 0545)     Scheduled Meds:   amLODIPine  10 mg Per NG tube Daily    atorvastatin  40 mg Per NG tube Daily    carvediloL  25 mg Per NG tube BID    lacosamide (VIMPAT) IVPB  200 mg Intravenous Q12H    levetiracetam IV  1,500 mg Intravenous Q12H    levothyroxine  75 mcg Per NG tube Before breakfast    losartan  25 mg Per NG tube Daily    senna-docusate 8.6-50 mg  1 tablet Per NG tube BID    silodosin  4 mg Per NG tube Daily    vancomycin (VANCOCIN) IVPB  500 mg Intravenous Q24H     PRN Meds:sodium chloride, acetaminophen, dextrose 10%, glucagon (human recombinant), hydrALAZINE, HYDROcodone-acetaminophen, labetalol, metoclopramide HCl, ondansetron, potassium bicarbonate, potassium bicarbonate, potassium bicarbonate, potassium, sodium phosphates, potassium, sodium phosphates, potassium, sodium phosphates, sodium chloride 0.9%, sodium chloride 0.9%, Pharmacy to dose Vancomycin consult **AND** vancomycin - pharmacy to dose     Review of Systems  Objective:     Weight: 49.4 kg (109 lb)  Body mass index is 17.07 kg/m².  Vital Signs (Most Recent):  Temp: 98.4 °F (36.9 °C) (04/19/22  0502)  Pulse: 81 (04/19/22 0602)  Resp: 18 (04/19/22 0602)  BP: (!) 154/72 (04/19/22 1004)  SpO2: 100 % (04/19/22 0602)   Vital Signs (24h Range):  Temp:  [98.2 °F (36.8 °C)-98.5 °F (36.9 °C)] 98.4 °F (36.9 °C)  Pulse:  [67-85] 81  Resp:  [7-22] 18  SpO2:  [98 %-100 %] 100 %  BP: (124-166)/(58-83) 154/72                            NG/OG Tube 04/14/22 1200 [place] sump 14 Fr. Left nostril (Active)   Placement Check placement verified by aspirate characteristics;placement verified by x-ray 04/17/22 0305   Tolerance no signs/symptoms of discomfort 04/17/22 0305   Securement secured to nostril center w/ adhesive device 04/17/22 0305   Clamp Status/Tolerance clamped 04/17/22 0305   Suction Setting/Drainage Method suction at the bedside 04/17/22 0305   Insertion Site Appearance no redness, warmth, tenderness, skin breakdown, drainage 04/17/22 0305   Drainage None 04/15/22 0332   Flush/Irrigation flushed w/;water;no resistance met 04/17/22 0305   Water Bolus (mL) 100 mL 04/17/22 0505   Residual Amount (ml) 40 ml 04/16/22 0700       Female External Urinary Catheter 04/13/22 1820 (Active)   Skin no redness;no breakdown 04/17/22 0305   Tolerance no signs/symptoms of discomfort 04/17/22 0305   Suction Continuous suction at 60 mmHg 04/16/22 2004   Date of last wick change 04/16/22 04/16/22 2004   Time of last wick change 0956 04/16/22 0700   Output (mL) 200 mL 04/17/22 0505       Physical Exam    Neurosurgery Physical Exam  [id number]  Loc x 4  Not verbal on exam  EEG cap in place.       Significant Labs:  Recent Labs   Lab 04/18/22  0402 04/18/22  1037 04/19/22  0339     --  123*     --  144   K 3.4* 4.3 4.0   *  --  112*   CO2 22*  --  20*   BUN 34*  --  31*   CREATININE 1.2  --  1.1   CALCIUM 8.5*  --  8.4*       Recent Labs   Lab 04/18/22  0402 04/18/22  1347 04/19/22  0333   WBC 15.42* 13.05* 13.52*   HGB 10.2* 9.1* 10.1*   HCT 30.7* 27.9* 29.9*   * 175 128*       Recent Labs   Lab 04/18/22  1037   INR  1.1   APTT 26.2       Microbiology Results (last 7 days)       Procedure Component Value Units Date/Time    Blood culture [[id number]] Collected: 04/14/22 1226    Order Status: Completed Specimen: Blood from Peripheral, Hand, Left Updated: 04/18/22 1412     Blood Culture, Routine No Growth to date      No Growth to date      No Growth to date      No Growth to date      No Growth to date    Blood culture [[id number]] Collected: 04/14/22 1208    Order Status: Completed Specimen: Blood from Peripheral, Hand, Right Updated: 04/18/22 1412     Blood Culture, Routine No Growth to date      No Growth to date      No Growth to date      No Growth to date      No Growth to date    Blood culture [[id number]] Collected: 04/11/22 1021    Order Status: Completed Specimen: Blood from Peripheral, Left Arm Updated: 04/16/22 1212     Blood Culture, Routine No growth after 5 days.    Narrative:      Collection has been rescheduled by [facility] at 04/11/2022 10:04 Reason:   Patient unavailable, patient care, nurse paige notified.  Collection has been rescheduled by T at 04/11/2022 10:04 Reason:   Patient unavailable, patient care, nurse paige notified.    Blood culture [[id number]] Collected: 04/11/22 1020    Order Status: Completed Specimen: Blood from Peripheral, Left Hand Updated: 04/16/22 1212     Blood Culture, Routine No growth after 5 days.    Narrative:      Collection has been rescheduled by T at 04/11/2022 10:04 Reason:   Patient unavailable, patient care, nurse paige notified.  Collection has been rescheduled by [facility] at 04/11/2022 10:04 Reason:   Patient unavailable, patient care, nurse paige notified.    Culture, Respiratory with Gram Stain [[id number]]     Order Status: No result Specimen: Respiratory     Urine culture [[id number]]  (Abnormal)  (Susceptibility) Collected: 04/11/22 1017    Order Status: Completed Specimen: Urine Updated: 04/13/22 1411     Urine Culture, Routine ESCHERICHIA COLI ESBL  10,000 - 49,999 cfu/ml       Narrative:      Specimen Source->Urine    Blood culture [[id number]]     Order Status: No result Specimen: Blood     Blood culture [[id number]]     Order Status: No result Specimen: Blood           All pertinent labs from the last 24 hours have been reviewed.    Significant Diagnostics:  I have reviewed all pertinent imaging results/findings within the past 24 hours.  X-Ray Abdomen AP 1 View    Result Date: 4/18/2022  NG tube is in good position with catheter tip overlying the stomach in left upper quadrant. Increased airspace opacity in the right lung base compared to prior exam, could represent developing pneumonia versus atelectasis. Electronically signed by resident: Concepcion Urban Date:    04/18/2022 Time:    19:40 Electronically signed by: Chintan Hinojosa MD Date:    04/18/2022 Time:    19:49    X-Ray Abdomen AP 1 View    Result Date: 4/18/2022  Pulmonary edema pneumonia aspiration or sepsis. Electronically signed by: Nolan Hernández MD Date:    04/18/2022 Time:    14:37    CT Head Without Contrast    Result Date: 4/19/2022  Relative to prior CT of 04/17/2022, 23:37 hours, continued maturing postoperative appearance following right craniotomy for subdural hematoma evacuation. Similar size and associated mass effect of residual mixed attenuation right convexity extra-axial collection, as described.  Some redistribution of posterior parafalcine and tentorial leaflet subdural blood, felt overall similar in volume relative to prior. Electronically signed by: Brock Beach Date:    04/19/2022 Time:    08:55       Assessment/Plan:     * SDH (subdural hematoma)  71 year old female with , hypothyroidism, CVA with left-sided residual deficit on ASA/Plavix, DM2, HTN, HLD, CKD, chronic anemia, presenting from SNF after being found down next to wheelchair after unwitnessed fall, consulted to NSGY for right 2 cm SDH mostly acute, membranous and heterogenous appearing, some chronic component, without midline shift. Now s/p R  crani for SDH evacuation (4/4).      NAEON. On EEG. One cliinical seizure like episode overnight. Exam stable.       --Admitted to [facility].   -- q1 hour vitals/neurochecks; exam wax and wane   -- SBP < 160  -- Na goal eunatremia.  -- Seizure control per neuro ICU: Keppra 1500 BID and vimpat  b.i.d.    EEG with right-sided none compulsive seizures on 4/15.   -- CTH stable 4/19.   -- Saturating well on room   -- HOB > 30  -- PPx: SCDs, BR, IS. Okay for SQH for dvt ppx.   -- Osteomyelitis the or really alternative is of R foot:    - ID rec 6 week course Vanc with end date 5/5. F/u full ID recs. OK to start Etrapenem if pt decompensates.   -Podiatry managing recent partial foot amputation, appreciate recs. Sutures removed 4/12.    -Home health/facility to do dressing changes every MWF and prn as follows:  Cleanse right foot incision site with saline or wound cleanser, paint incision site with betadine, cover with 4x4 gauze, kerlix, ACE bandage.   -- Anemia: Resolved.   -- Fever 4/11: Afebrile overnight.    - UA 4/11: Growing GNRs, f/u susceptibility   - CXR with possible basilar effusion.   - BLE US 4/11: w/o DVT.   - HM following. Appreciate assistance with care.    - Blood cx 4/11: NGTD.    Dispo: OR today for SDH evacuation.             Brock Fairchild MD  Neurosurgery  [facility] - Neuro Critical Care

## 2022-04-19 NOTE — SUBJECTIVE & OBJECTIVE
Subjective:     Interval History:   Afebrile, vital signs stable. Persistent leukocytosis. Patient scheduled for SDH evacuation today with neurosurgery. Son at bedside. She is 3.5 weeks s/p partial 4th and 5th ray amputation DOS 3/24/2022. 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 with estimated end date of 5/5/2022    Scheduled Meds:   amLODIPine  10 mg Per NG tube Daily    atorvastatin  40 mg Per NG tube Daily    carvediloL  25 mg Per NG tube BID    lacosamide (VIMPAT) IVPB  200 mg Intravenous Q12H    levetiracetam IV  1,500 mg Intravenous Q12H    levothyroxine  75 mcg Per NG tube Before breakfast    losartan  25 mg Per NG tube Daily    senna-docusate 8.6-50 mg  1 tablet Per NG tube BID    silodosin  4 mg Per NG tube Daily    vancomycin (VANCOCIN) IVPB  500 mg Intravenous Q24H     Continuous Infusions:   sodium chloride 0.9% Stopped (04/19/22 0317)     PRN Meds:sodium chloride, acetaminophen, dextrose 10%, glucagon (human recombinant), hydrALAZINE, HYDROcodone-acetaminophen, labetalol, metoclopramide HCl, ondansetron, potassium bicarbonate, potassium bicarbonate, potassium bicarbonate, potassium, sodium phosphates, potassium, sodium phosphates, potassium, sodium phosphates, sodium chloride 0.9%, sodium chloride 0.9%, Pharmacy to dose Vancomycin consult **AND** vancomycin - pharmacy to dose    Review of Systems   Unable to perform ROS: Mental status change   Objective:     Vital Signs (Most Recent):  Temp: 98.4 °F (36.9 °C) (04/19/22 0502)  Pulse: 68 (04/19/22 1301)  Resp: 15 (04/19/22 1301)  BP: 133/75 (04/19/22 1301)  SpO2: 100 % (04/19/22 1301) Vital Signs (24h Range):  Temp:  [98.2 °F (36.8 °C)-98.4 °F (36.9 °C)] 98.4 °F (36.9 °C)  Pulse:  [65-85] 68  Resp:  [7-22] 15  SpO2:  [98 %-100 %] 100 %  BP: (124-166)/(58-83) 133/75     Weight: 49.4 kg (109 lb)  Body mass index is 17.07 kg/m².    Foot Exam    Right Foot/Ankle     Inspection and Palpation  Tenderness: none   Swelling: none   Skin Exam:  skin changes; no drainage, skin not intact, no cellulitis, no abnormal color and no erythema     Neurovascular  Dorsalis pedis: absent  Posterior tibial: absent    Comments  R 2nd toe amputated    S/P 4th and 5th ray resection 03/24    Sutures clean, dry, and intact   Skin edges well approximated with no signs of gapping or dehiscence  No signs of soft tissue infection     Left Foot/Ankle      Inspection and Palpation  Tenderness: none   Swelling: none   Skin Exam: skin intact; no drainage, no cellulitis and no erythema     Neurovascular  Dorsalis pedis: absent  Posterior tibial: absent        Laboratory:  CBC:   Recent Labs   Lab 04/19/22  0333   WBC 13.52*   RBC 3.29*   HGB 10.1*   HCT 29.9*   *   MCV 91   MCH 30.7   MCHC 33.8     CRP: No results for input(s): CRP in the last 168 hours.  ESR: No results for input(s): SEDRATE in the last 168 hours.  Wound Cultures:   Recent Labs   Lab 03/18/22  1620 03/24/22  1538   LABAERO METHICILLIN RESISTANT STAPHYLOCOCCUS AUREUS  Many  * METHICILLIN RESISTANT STAPHYLOCOCCUS AUREUS  Few  *  METHICILLIN RESISTANT STAPHYLOCOCCUS AUREUS  Few  *     Microbiology Results (last 7 days)       Procedure Component Value Units Date/Time    Blood culture [[id number]] Collected: 04/14/22 1208    Order Status: Completed Specimen: Blood from Peripheral, Hand, Right Updated: 04/19/22 1412     Blood Culture, Routine No growth after 5 days.    Blood culture [[id number]] Collected: 04/14/22 1226    Order Status: Completed Specimen: Blood from Peripheral, Hand, Left Updated: 04/19/22 1412     Blood Culture, Routine No growth after 5 days.    Blood culture [[id number]] Collected: 04/11/22 1021    Order Status: Completed Specimen: Blood from Peripheral, Left Arm Updated: 04/16/22 1212     Blood Culture, Routine No growth after 5 days.    Narrative:      Collection has been rescheduled by T at 04/11/2022 10:04 Reason:   Patient unavailable, patient care, nurse grecia notified.  Collection has  been rescheduled by T at 04/11/2022 10:04 Reason:   Patient unavailable, patient care, nurse paige notified.    Blood culture [[id number]] Collected: 04/11/22 1020    Order Status: Completed Specimen: Blood from Peripheral, Left Hand Updated: 04/16/22 1212     Blood Culture, Routine No growth after 5 days.    Narrative:      Collection has been rescheduled by T at 04/11/2022 10:04 Reason:   Patient unavailable, patient care, nurse paige notified.  Collection has been rescheduled by T at 04/11/2022 10:04 Reason:   Patient unavailable, patient care, nurse paige notified.    Culture, Respiratory with Gram Stain [[id number]]     Order Status: No result Specimen: Respiratory     Urine culture [[id number]]  (Abnormal)  (Susceptibility) Collected: 04/11/22 1017    Order Status: Completed Specimen: Urine Updated: 04/13/22 1411     Urine Culture, Routine ESCHERICHIA COLI ESBL  10,000 - 49,999 cfu/ml      Narrative:      Specimen Source->Urine    Blood culture [[id number]]     Order Status: No result Specimen: Blood     Blood culture [[id number]]     Order Status: No result Specimen: Blood           Specimen (24h ago, onward)                None            Diagnostic Results:  I have reviewed all pertinent imaging results/findings within the past 24 hours.    Clinical Findings:  4/12/2022:  S/p right foot partial 4th and 5th ray amputation on 3/24/2022. There is superficial dehiscence/gapping along the incision site, scant serosanguinous drainage, no erythema, no fluctuance. 2nd toe amputated.

## 2022-04-19 NOTE — NURSING
Pt PICC no longer flushes or draw back blood. Unsuccessful  IV sticks performed by 2 RN , Tomi ACOSTA notified. No new orders, will continue to monitor.

## 2022-04-19 NOTE — ASSESSMENT & PLAN NOTE
71 year old female with , hypothyroidism, CVA with left-sided residual deficit on ASA/Plavix, DM2, HTN, HLD, CKD, chronic anemia, presenting from SNF after being found down next to wheelchair after unwitnessed fall, consulted to NSGY for right 2 cm SDH mostly acute, membranous and heterogenous appearing, some chronic component, without midline shift. Now s/p R crani for SDH evacuation (4/4).      NAEON. On EEG. One cliinical seizure like episode overnight. Exam stable.       --Admitted to [facility].   -- q1 hour vitals/neurochecks; exam wax and wane   -- SBP < 160  -- Na goal eunatremia.  -- Seizure control per neuro ICU: Keppra 1500 BID and vimpat  b.i.d.    EEG with right-sided none compulsive seizures on 4/15.   -- CTH stable 4/19.   -- Saturating well on room   -- HOB > 30  -- PPx: SCDs, BR, IS. Okay for H for dvt ppx.   -- Osteomyelitis the or really alternative is of R foot:    - ID rec 6 week course Vanc with end date 5/5. F/u full ID recs. OK to start Etrapenem if pt decompensates.   -Podiatry managing recent partial foot amputation, appreciate recs. Sutures removed 4/12.    -Home health/facility to do dressing changes every MWF and prn as follows:  Cleanse right foot incision site with saline or wound cleanser, paint incision site with betadine, cover with 4x4 gauze, kerlix, ACE bandage.   -- Anemia: Resolved.   -- Fever 4/11: Afebrile overnight.    - UA 4/11: Growing GNRs, f/u susceptibility   - CXR with possible basilar effusion.   - BLE US 4/11: w/o DVT.   - HM following. Appreciate assistance with care.    - Blood cx 4/11: NGTD.    Dispo: OR today for SDH evacuation.

## 2022-04-19 NOTE — PLAN OF CARE
[facility] - Neuro Critical Care  Discharge Reassessment    Primary Care Provider: Dante Olivier MD    Expected Discharge Date: 4/18/2022     Patient to OR today for SDH evac per Neruosurgery.    Not medically ready for discharge.       Per MD:    1. Vancomycin IV for 6 weeks.     2. Goal trough is 15-20.     3. Estimated stop date would be may 5.     4. If discharged prior to stop date, check cbc, cmp, crp, vancomycin trough once a week and fax to ID clinic.     5. Patient has follow up scheduled in ID clinic.      Reassessment (most recent)     Discharge Reassessment - 04/19/22 1504        Discharge Reassessment    Assessment Type Discharge Planning Reassessment     Did the patient's condition or plan change since previous assessment? No     Communicated MARY with patient/caregiver Date not available/Unable to determine     Discharge Plan A Long-term acute care facility (LTAC)     Discharge Plan B Skilled Nursing Facility     DME Needed Upon Discharge  none     Discharge Barriers Identified None     Why the patient remains in the hospital Requires continued medical care               Tonie Oneal RN, CCRN-K, [facility]  Neuro-Critical Care   [phone]

## 2022-04-19 NOTE — PROGRESS NOTES
Pharmacokinetic Assessment Follow Up: IV Vancomycin  · 4/19 trough resulted as 19.6 mcg/mL  · Goal 15-20 mcg/mL   · Yesterday's dose administration delayed; trough should be interpreted with caution, not a true trough  · Continue 500 mg q24h, will retime for 1700   · Collect next trough 4/21 at 16:00    Drug levels (last 3 results):  Recent Labs   Lab Result Units 04/19/22  1341   Vancomycin-Trough ug/mL 19.6       Pharmacy will continue to follow and monitor vancomycin.    Please contact pharmacy at extension [phone] for questions regarding this assessment.    Thank you for the consult,   Josette Johnson, PharmD, [facility]  Neurocritical Care Pharmacist  [phone]       Patient brief summary:  Beatriz Adame is a 71 y.o. female initiated on antimicrobial therapy with IV Vancomycin for treatment of bone/joint infection    Drug Allergies:   Review of patient's allergies indicates:   Allergen Reactions    Tomato (solanum lycopersicum) Hives and Itching       Actual Body Weight:   49.4 kg    Renal Function:   Estimated Creatinine Clearance: 37.1 mL/min (based on SCr of 1.1 mg/dL).,     Dialysis Method (if applicable):  N/A    CBC (last 72 hours):  Recent Labs   Lab Result Units 04/17/22  0246 04/18/22  0402 04/18/22  1347 04/19/22  0333   WBC K/uL 16.10* 15.42* 13.05* 13.52*   Hemoglobin g/dL 9.8* 10.2* 9.1* 10.1*   Hematocrit % 29.7* 30.7* 27.9* 29.9*   Platelets K/uL 139* 113* 175 128*   Gran % % 74.3* 71.4 71.8 72.9   Lymph % % 16.7* 19.6 19.1 17.8*   Mono % % 7.8 7.8 8.0 8.1   Eosinophil % % 0.5 0.5 0.4 0.5   Basophil % % 0.2 0.2 0.2 0.2   Differential Method  Automated Automated Automated Automated       Metabolic Panel (last 72 hours):  Recent Labs   Lab Result Units 04/17/22  0246 04/18/22  0402 04/18/22  1037 04/19/22  0339   Sodium mmol/L 143 143  --  144   Potassium mmol/L 3.5 3.4* 4.3 4.0   Chloride mmol/L 111* 111*  --  112*   CO2 mmol/L 20* 22*  --  20*   Glucose mg/dL 66* 108  --  123*   BUN mg/dL 35* 34*  --   31*   Creatinine mg/dL 1.2 1.2  --  1.1   Albumin g/dL 2.1* 2.2*  --  2.4*   Total Bilirubin mg/dL 0.6 0.5  --  0.7   Alkaline Phosphatase U/L 59 71  --  70   AST U/L 15 15  --  15   ALT U/L 13 13  --  13       Vancomycin Administrations:  vancomycin given in the last 96 hours                   vancomycin 500 mg in dextrose 5 % 100 mL IVPB (ready to mix system) (mg) 500 mg New Bag 04/15/22 1547     500 mg New Bag 04/14/22 1313    vancomycin 500 mg in dextrose 5 % 100 mL IVPB (ready to mix system) (mg) 500 mg New Bag 04/13/22 1325    vancomycin 500 mg in dextrose 5 % 100 mL IVPB (ready to mix system) (mg) 500 mg New Bag 04/12/22 1352                Microbiologic Results:  Microbiology Results (last 7 days)     Procedure Component Value Units Date/Time    Blood culture [[id number]] Collected: 04/14/22 1208    Order Status: Completed Specimen: Blood from Peripheral, Hand, Right Updated: 04/19/22 1412     Blood Culture, Routine No growth after 5 days.    Blood culture [[id number]] Collected: 04/14/22 1226    Order Status: Completed Specimen: Blood from Peripheral, Hand, Left Updated: 04/19/22 1412     Blood Culture, Routine No growth after 5 days.    Blood culture [[id number]] Collected: 04/11/22 1021    Order Status: Completed Specimen: Blood from Peripheral, Left Arm Updated: 04/16/22 1212     Blood Culture, Routine No growth after 5 days.    Narrative:      Collection has been rescheduled by [facility] at 04/11/2022 10:04 Reason:   Patient unavailable, patient care, nurse paige notified.  Collection has been rescheduled by [facility] at 04/11/2022 10:04 Reason:   Patient unavailable, patient care, nurse paige notified.    Blood culture [[id number]] Collected: 04/11/22 1020    Order Status: Completed Specimen: Blood from Peripheral, Left Hand Updated: 04/16/22 1212     Blood Culture, Routine No growth after 5 days.    Narrative:      Collection has been rescheduled by [facility] at 04/11/2022 10:04 Reason:   Patient unavailable, patient  care, nurse paige notified.  Collection has been rescheduled by T at 04/11/2022 10:04 Reason:   Patient unavailable, patient care, nurse paige notified.    Culture, Respiratory with Gram Stain [[id number]]     Order Status: No result Specimen: Respiratory     Urine culture [[id number]]  (Abnormal)  (Susceptibility) Collected: 04/11/22 1017    Order Status: Completed Specimen: Urine Updated: 04/13/22 1411     Urine Culture, Routine ESCHERICHIA COLI ESBL  10,000 - 49,999 cfu/ml      Narrative:      Specimen Source->Urine    Blood culture [[id number]]     Order Status: No result Specimen: Blood     Blood culture [[id number]]     Order Status: No result Specimen: Blood

## 2022-04-19 NOTE — ASSESSMENT & PLAN NOTE
- S/p R partial foot amputation on vancomycin at   - continue vancomycin  - bandage, stitches intact   - Afebrile, WBC normal

## 2022-04-19 NOTE — SUBJECTIVE & OBJECTIVE
Interval History: 4/19: NAEON. AFVSS. Exam stable. OR today for SDH evacuation.     Medications:  Continuous Infusions:   sodium chloride 0.9% Stopped (04/19/22 0317)    niCARdipine 0 mg/hr (04/15/22 0545)     Scheduled Meds:   amLODIPine  10 mg Per NG tube Daily    atorvastatin  40 mg Per NG tube Daily    carvediloL  25 mg Per NG tube BID    lacosamide (VIMPAT) IVPB  200 mg Intravenous Q12H    levetiracetam IV  1,500 mg Intravenous Q12H    levothyroxine  75 mcg Per NG tube Before breakfast    losartan  25 mg Per NG tube Daily    senna-docusate 8.6-50 mg  1 tablet Per NG tube BID    silodosin  4 mg Per NG tube Daily    vancomycin (VANCOCIN) IVPB  500 mg Intravenous Q24H     PRN Meds:sodium chloride, acetaminophen, dextrose 10%, glucagon (human recombinant), hydrALAZINE, HYDROcodone-acetaminophen, labetalol, metoclopramide HCl, ondansetron, potassium bicarbonate, potassium bicarbonate, potassium bicarbonate, potassium, sodium phosphates, potassium, sodium phosphates, potassium, sodium phosphates, sodium chloride 0.9%, sodium chloride 0.9%, Pharmacy to dose Vancomycin consult **AND** vancomycin - pharmacy to dose     Review of Systems  Objective:     Weight: 49.4 kg (109 lb)  Body mass index is 17.07 kg/m².  Vital Signs (Most Recent):  Temp: 98.4 °F (36.9 °C) (04/19/22 0502)  Pulse: 81 (04/19/22 0602)  Resp: 18 (04/19/22 0602)  BP: (!) 154/72 (04/19/22 1004)  SpO2: 100 % (04/19/22 0602)   Vital Signs (24h Range):  Temp:  [98.2 °F (36.8 °C)-98.5 °F (36.9 °C)] 98.4 °F (36.9 °C)  Pulse:  [67-85] 81  Resp:  [7-22] 18  SpO2:  [98 %-100 %] 100 %  BP: (124-166)/(58-83) 154/72                            NG/OG Tube 04/14/22 1200 [place] sump 14 Fr. Left nostril (Active)   Placement Check placement verified by aspirate characteristics;placement verified by x-ray 04/17/22 0305   Tolerance no signs/symptoms of discomfort 04/17/22 0305   Securement secured to nostril center w/ adhesive device 04/17/22 0305   Clamp Status/Tolerance  clamped 04/17/22 0305   Suction Setting/Drainage Method suction at the bedside 04/17/22 0305   Insertion Site Appearance no redness, warmth, tenderness, skin breakdown, drainage 04/17/22 0305   Drainage None 04/15/22 0332   Flush/Irrigation flushed w/;water;no resistance met 04/17/22 0305   Water Bolus (mL) 100 mL 04/17/22 0505   Residual Amount (ml) 40 ml 04/16/22 0700       Female External Urinary Catheter 04/13/22 1820 (Active)   Skin no redness;no breakdown 04/17/22 0305   Tolerance no signs/symptoms of discomfort 04/17/22 0305   Suction Continuous suction at 60 mmHg 04/16/22 2004   Date of last wick change 04/16/22 04/16/22 2004   Time of last wick change 0956 04/16/22 0700   Output (mL) 200 mL 04/17/22 0505       Physical Exam    Neurosurgery Physical Exam  [id number]  Loc x 4  Not verbal on exam  EEG cap in place.       Significant Labs:  Recent Labs   Lab 04/18/22  0402 04/18/22  1037 04/19/22  0339     --  123*     --  144   K 3.4* 4.3 4.0   *  --  112*   CO2 22*  --  20*   BUN 34*  --  31*   CREATININE 1.2  --  1.1   CALCIUM 8.5*  --  8.4*       Recent Labs   Lab 04/18/22  0402 04/18/22  1347 04/19/22  0333   WBC 15.42* 13.05* 13.52*   HGB 10.2* 9.1* 10.1*   HCT 30.7* 27.9* 29.9*   * 175 128*       Recent Labs   Lab 04/18/22  1037   INR 1.1   APTT 26.2       Microbiology Results (last 7 days)       Procedure Component Value Units Date/Time    Blood culture [[id number]] Collected: 04/14/22 1226    Order Status: Completed Specimen: Blood from Peripheral, Hand, Left Updated: 04/18/22 1412     Blood Culture, Routine No Growth to date      No Growth to date      No Growth to date      No Growth to date      No Growth to date    Blood culture [[id number]] Collected: 04/14/22 1208    Order Status: Completed Specimen: Blood from Peripheral, Hand, Right Updated: 04/18/22 1412     Blood Culture, Routine No Growth to date      No Growth to date      No Growth to date      No Growth to date      No  Growth to date    Blood culture [[id number]] Collected: 04/11/22 1021    Order Status: Completed Specimen: Blood from Peripheral, Left Arm Updated: 04/16/22 1212     Blood Culture, Routine No growth after 5 days.    Narrative:      Collection has been rescheduled by T at 04/11/2022 10:04 Reason:   Patient unavailable, patient care, nurse grecia notified.  Collection has been rescheduled by T at 04/11/2022 10:04 Reason:   Patient unavailable, patient care, nurse grecia notified.    Blood culture [[id number]] Collected: 04/11/22 1020    Order Status: Completed Specimen: Blood from Peripheral, Left Hand Updated: 04/16/22 1212     Blood Culture, Routine No growth after 5 days.    Narrative:      Collection has been rescheduled by T at 04/11/2022 10:04 Reason:   Patient unavailable, patient care, nurse paige notified.  Collection has been rescheduled by T at 04/11/2022 10:04 Reason:   Patient unavailable, patient care, nurse paige notified.    Culture, Respiratory with Gram Stain [[id number]]     Order Status: No result Specimen: Respiratory     Urine culture [[id number]]  (Abnormal)  (Susceptibility) Collected: 04/11/22 1017    Order Status: Completed Specimen: Urine Updated: 04/13/22 1411     Urine Culture, Routine ESCHERICHIA COLI ESBL  10,000 - 49,999 cfu/ml      Narrative:      Specimen Source->Urine    Blood culture [[id number]]     Order Status: No result Specimen: Blood     Blood culture [[id number]]     Order Status: No result Specimen: Blood           All pertinent labs from the last 24 hours have been reviewed.    Significant Diagnostics:  I have reviewed all pertinent imaging results/findings within the past 24 hours.  X-Ray Abdomen AP 1 View    Result Date: 4/18/2022  NG tube is in good position with catheter tip overlying the stomach in left upper quadrant. Increased airspace opacity in the right lung base compared to prior exam, could represent developing pneumonia versus atelectasis. Electronically  signed by resident: Concepcion Urban Date:    04/18/2022 Time:    19:40 Electronically signed by: Chintan Hinojosa MD Date:    04/18/2022 Time:    19:49    X-Ray Abdomen AP 1 View    Result Date: 4/18/2022  Pulmonary edema pneumonia aspiration or sepsis. Electronically signed by: Nolan Hernández MD Date:    04/18/2022 Time:    14:37    CT Head Without Contrast    Result Date: 4/19/2022  Relative to prior CT of 04/17/2022, 23:37 hours, continued maturing postoperative appearance following right craniotomy for subdural hematoma evacuation. Similar size and associated mass effect of residual mixed attenuation right convexity extra-axial collection, as described.  Some redistribution of posterior parafalcine and tentorial leaflet subdural blood, felt overall similar in volume relative to prior. Electronically signed by: Brock Beach Date:    04/19/2022 Time:    08:55

## 2022-04-20 NOTE — ASSESSMENT & PLAN NOTE
S/p evac on 4/4/13; stable and stepped down from [facility].    On 4/13 patient had 8/10 HA and was slower to follow commands.  Repeat CTH showed re-accumulation of R SDH along with 4-5mm MLS.  Stepped back up to [facility].    -- NSGY following  -- SBP <160  -- q1hr neuro check  -- hold anticoagulation  -- EEG revealed seizure  -- Continue vimpat and keppra    4/20 OR today

## 2022-04-20 NOTE — SUBJECTIVE & OBJECTIVE
Interval History: NAEON. Exam stable. OR today for SDH evacuation.     Medications:  Continuous Infusions:   sodium chloride 0.9% 75 mL/hr at 04/20/22 0601     Scheduled Meds:   amLODIPine  10 mg Per NG tube Daily    atorvastatin  40 mg Per NG tube Daily    carvediloL  25 mg Per NG tube BID    lacosamide (VIMPAT) IVPB  200 mg Intravenous Q12H    levetiracetam IV  1,500 mg Intravenous Q12H    levothyroxine  75 mcg Per NG tube Before breakfast    losartan  25 mg Per NG tube Daily    senna-docusate 8.6-50 mg  1 tablet Per NG tube BID    silodosin  4 mg Per NG tube Daily    vancomycin (VANCOCIN) IVPB  500 mg Intravenous Q24H     PRN Meds:sodium chloride, acetaminophen, dextrose 10%, glucagon (human recombinant), hydrALAZINE, HYDROcodone-acetaminophen, labetalol, metoclopramide HCl, ondansetron, potassium bicarbonate, potassium bicarbonate, potassium bicarbonate, potassium, sodium phosphates, potassium, sodium phosphates, potassium, sodium phosphates, sodium chloride 0.9%, sodium chloride 0.9%, Pharmacy to dose Vancomycin consult **AND** vancomycin - pharmacy to dose     Review of Systems  Objective:     Weight: 49.4 kg (109 lb)  Body mass index is 17.07 kg/m².  Vital Signs (Most Recent):  Temp: 97.6 °F (36.4 °C) (04/20/22 0701)  Pulse: 81 (04/20/22 0701)  Resp: 17 (04/20/22 0701)  BP: (!) 154/72 (04/20/22 0701)  SpO2: 100 % (04/20/22 0701)   Vital Signs (24h Range):  Temp:  [97.4 °F (36.3 °C)-99 °F (37.2 °C)] 97.6 °F (36.4 °C)  Pulse:  [57-82] 81  Resp:  [8-19] 17  SpO2:  [95 %-100 %] 100 %  BP: (112-160)/(54-83) 154/72                            NG/OG Tube 04/14/22 1200 [place] sump 14 Fr. Left nostril (Active)   Placement Check placement verified by aspirate characteristics;placement verified by x-ray 04/17/22 0305   Tolerance no signs/symptoms of discomfort 04/17/22 0305   Securement secured to nostril center w/ adhesive device 04/17/22 0305   Clamp Status/Tolerance clamped 04/17/22 0305   Suction Setting/Drainage  Method suction at the bedside 04/17/22 0305   Insertion Site Appearance no redness, warmth, tenderness, skin breakdown, drainage 04/17/22 0305   Drainage None 04/15/22 0332   Flush/Irrigation flushed w/;water;no resistance met 04/17/22 0305   Water Bolus (mL) 100 mL 04/17/22 0505   Residual Amount (ml) 40 ml 04/16/22 0700       Female External Urinary Catheter 04/13/22 1820 (Active)   Skin no redness;no breakdown 04/17/22 0305   Tolerance no signs/symptoms of discomfort 04/17/22 0305   Suction Continuous suction at 60 mmHg 04/16/22 2004   Date of last wick change 04/16/22 04/16/22 2004   Time of last wick change 0956 04/16/22 0700   Output (mL) 200 mL 04/17/22 0505       Physical Exam    Neurosurgery Physical Exam  [id number]  Make some sounds   Follow commands on RUE  EEG cap in place      Significant Labs:  Recent Labs   Lab 04/18/22  1037 04/19/22  0339 04/20/22  0220   GLU  --  123* 79   NA  --  144 146*   K 4.3 4.0 3.6   CL  --  112* 117*   CO2  --  20* 19*   BUN  --  31* 27*   CREATININE  --  1.1 1.0   CALCIUM  --  8.4* 7.9*       Recent Labs   Lab 04/18/22  1347 04/19/22  0333 04/20/22  0220   WBC 13.05* 13.52* 10.41   HGB 9.1* 10.1* 8.2*   HCT 27.9* 29.9* 24.7*    128* 162       Recent Labs   Lab 04/18/22  1037   INR 1.1   APTT 26.2       Microbiology Results (last 7 days)       Procedure Component Value Units Date/Time    Blood culture [[id number]] Collected: 04/14/22 1208    Order Status: Completed Specimen: Blood from Peripheral, Hand, Right Updated: 04/19/22 1412     Blood Culture, Routine No growth after 5 days.    Blood culture [[id number]] Collected: 04/14/22 1226    Order Status: Completed Specimen: Blood from Peripheral, Hand, Left Updated: 04/19/22 1412     Blood Culture, Routine No growth after 5 days.    Blood culture [[id number]] Collected: 04/11/22 1021    Order Status: Completed Specimen: Blood from Peripheral, Left Arm Updated: 04/16/22 1212     Blood Culture, Routine No growth after 5 days.     Narrative:      Collection has been rescheduled by T at 04/11/2022 10:04 Reason:   Patient unavailable, patient care, nurse grecia notified.  Collection has been rescheduled by T at 04/11/2022 10:04 Reason:   Patient unavailable, patient care, nurse grecia notified.    Blood culture [[id number]] Collected: 04/11/22 1020    Order Status: Completed Specimen: Blood from Peripheral, Left Hand Updated: 04/16/22 1212     Blood Culture, Routine No growth after 5 days.    Narrative:      Collection has been rescheduled by T at 04/11/2022 10:04 Reason:   Patient unavailable, patient care, nurse grecia notified.  Collection has been rescheduled by T at 04/11/2022 10:04 Reason:   Patient unavailable, patient care, nurse grecia notified.    Culture, Respiratory with Gram Stain [[id number]]     Order Status: No result Specimen: Respiratory     Urine culture [[id number]]  (Abnormal)  (Susceptibility) Collected: 04/11/22 1017    Order Status: Completed Specimen: Urine Updated: 04/13/22 1411     Urine Culture, Routine ESCHERICHIA COLI ESBL  10,000 - 49,999 cfu/ml      Narrative:      Specimen Source->Urine          All pertinent labs from the last 24 hours have been reviewed.    Significant Diagnostics:  I have reviewed all pertinent imaging results/findings within the past 24 hours.  X-Ray Abdomen AP 1 View    Result Date: 4/18/2022  NG tube is in good position with catheter tip overlying the stomach in left upper quadrant. Increased airspace opacity in the right lung base compared to prior exam, could represent developing pneumonia versus atelectasis. Electronically signed by resident: Concepcion Urban Date:    04/18/2022 Time:    19:40 Electronically signed by: Chintan Hinojosa MD Date:    04/18/2022 Time:    19:49    X-Ray Abdomen AP 1 View    Result Date: 4/18/2022  Pulmonary edema pneumonia aspiration or sepsis. Electronically signed by: Nolan Hernández MD Date:    04/18/2022 Time:    14:37    CT Head Without  Contrast    Result Date: 4/19/2022  Relative to prior CT of 04/17/2022, 23:37 hours, continued maturing postoperative appearance following right craniotomy for subdural hematoma evacuation. Similar size and associated mass effect of residual mixed attenuation right convexity extra-axial collection, as described.  Some redistribution of posterior parafalcine and tentorial leaflet subdural blood, felt overall similar in volume relative to prior. Electronically signed by: Brock Beach Date:    04/19/2022 Time:    08:55

## 2022-04-20 NOTE — BRIEF OP NOTE
[facility] - [facility] Critical [facility]  Brief Operative Note    SUMMARY     Surgery Date: 4/20/2022     Surgeon(s) and Role:     * Jose Walter MD - Primary     * Brock Fairchild MD - Resident - Assisting        Pre-op Diagnosis:  SDH (subdural hematoma) [S06.5X9A]    Post-op Diagnosis:  Post-Op Diagnosis Codes:     * SDH (subdural hematoma) [S06.5X9A]    Procedure(s) (LRB):  CRANIOTOMY, FOR SUBDURAL HEMATOMA EVACUATION (Right)    Anesthesia: General    Operative Findings: Diffuse subdural hematoma appreciated upon elevation of cranial flap. Hematoma was thoroughly removed and dural membranes were coagulated. Copious irrigation revealed no active bleeding. A 10 Turkish janis drain was placed in the subdural space and connect to a JAMAR reservoir to gravity. At case end adequate hemostasis was achieved. Patient tolerated procedure well and was transported to Neuro ICU in stable condition.     Estimated Blood Loss: see op note    Estimated Blood Loss has been documented.         Specimens:   Specimen (24h ago, onward)            None          [id number]

## 2022-04-20 NOTE — ASSESSMENT & PLAN NOTE
- S/p R partial foot amputation on vancomycin at [facility]  - continue vancomycin  - bandage, stitches intact   - Afebrile, WBC normal

## 2022-04-20 NOTE — ASSESSMENT & PLAN NOTE
71 year old female with , hypothyroidism, CVA with left-sided residual deficit on ASA/Plavix, DM2, HTN, HLD, CKD, chronic anemia, presenting from SNF after being found down next to wheelchair after unwitnessed fall, consulted to NSGY for right 2 cm SDH mostly acute, membranous and heterogenous appearing, some chronic component, without midline shift. Now s/p R crani for SDH evacuation (4/4).    No clinical seizure. On EEG. Exam wax and wane       --Admitted to NCC   -- q1 hour vitals/neurochecks; exam wax and wane   --To the OR today for redo crai for SDH evac  -- SBP < 160  -- Na goal eunatremia.  -- Seizure control per neuro ICU: Keppra 1500 BID and vimpat  b.i.d.    EEG with right-sided none compulsive seizures   -- CTH stable 4/19.  -- Saturating well on room   -- HOB > 30  -- PPx: SCDs, BR, IS. Okay for SQH for dvt ppx.   -- Osteomyelitis the or really alternative is of R foot:    - ID rec 6 week course Vanc with end date 5/5. F/u full ID recs. OK to start Etrapenem if pt decompensates.   -Podiatry managing recent partial foot amputation, appreciate recs. Sutures removed 4/12.    -Home health/facility to do dressing changes every MWF and prn as follows:  Cleanse right foot incision site with saline or wound cleanser, paint incision site with betadine, cover with 4x4 gauze, kerlix, ACE bandage.   -- Anemia: Resolved.   -- Fever 4/11: Afebrile overnight.    - UA 4/11: Growing GNRs, f/u susceptibility   - CXR with possible basilar effusion.   - BLE US 4/11: w/o DVT.   - HM following. Appreciate assistance with care.    - Blood cx 4/11: NGTD.    Dispo: OR today for SDH evacuation.

## 2022-04-20 NOTE — PROGRESS NOTES
[facility] - Neuro Critical Care  Neurocritical Care  Progress Note    Admit Date: 4/3/2022  Service Date: 04/20/2022  Length of Stay: 17    Subjective:     Chief Complaint: SDH (subdural hematoma)    History of Present Illness: Ms. Adame is a 70 yo female with hx of dementia, R MCA stroke with left side contraction/hemiparesis presented to [facility] with SDH now s/p evac. Stepped down from [facility]. On 4/13 she developed worsening headache and CTH showed re-accumulation of R SDH and 4-5mm MLS. She was noted to be more slow to follow commands. Stepped back up to [facility] for closer monitoring.    Original HPI below:  Ms. Adame is a 70 y/o F with a PMHx of baseline dementia, GERD, R MCA stroke with residual LUE weakness, HLD, HTN, Stage IV CKD, TIIDM who presents to [facility] from [facility] with a AoC SDH after an unwitnessed fall from her wheel chair without loss of consciousness. CT spine negative for fracture. CT head with R SDH and 3-4 mm MLS. She is on DAPT at home. Of note, she has been staying at [facility] after a partial R foot amputation, stitches still intact, for which she is receiving vancomycin.         Hospital Course: 04/04/2022: OR today for clot evac. Patient returned to ICU intubated on precedex. Post op scan stable. Wean propofol and initiated precedex for likely extubation tomorrow.   04/05/2022 repeat CT head due to drainage.   04/06/2022 NAEO.   04/07/2022 NAEO. Successfully extubated  4/14/22: EEG revealed seizure   4/15/22: vimpat load overnight  04/16/2022: NAEON.  04/17/2022. NAEON. Repeat CTH stable.   04/18/2022  Lateralized periodic discharges on R with some seizure correlate. 200 vimpat x 1 and increase scheduled vimpat to 200. Platelets by NSGY 2/2 OR for clot evac today.   04/19/2022 OR postponed to today with NSGY. CT this morning stable. Continue EEG.   04/20: in OR for repeat evacuation      Interval History:      Review of Systems   Unable to perform ROS: Patient nonverbal      Objective:     Vitals:  Temp: 97.9 °F (36.6 °C)  Pulse: 75  Rhythm: normal sinus rhythm  BP: (!) 149/70  MAP (mmHg): 101  Resp: 12  SpO2: 100 %  O2 Device (Oxygen Therapy): room air    Temp  Min: 97.4 °F (36.3 °C)  Max: 98 °F (36.7 °C)  Pulse  Min: 57  Max: 82  BP  Min: 112/54  Max: 158/71  MAP (mmHg)  Min: 78  Max: 104  Resp  Min: 8  Max: 20  SpO2  Min: 95 %  Max: 100 %    04/19 0701 - 04/20 0700  In: 1671 [I.V.:1333.8]  Out: 850 [Urine:850]   Unmeasured Output  Urine Occurrence: 1  Stool Occurrence: 1  Pad Count: 1       Physical Exam  HENT:      Head:      Comments: Pterional craniotomy on right  Eyes:      Extraocular Movements: Extraocular movements intact.      Pupils: Pupils are equal, round, and reactive to light.   Cardiovascular:      Rate and Rhythm: Normal rate.      Heart sounds: Normal heart sounds.   Pulmonary:      Breath sounds: Normal breath sounds.   Abdominal:      Palpations: Abdomen is soft.   Musculoskeletal:      Cervical back: Neck supple.   Skin:     General: Skin is warm.   Neurological:      Mental Status: She is alert.      Comments: Left spastic hemiplegia  Right side bradykinetic but withdraws         Medications:  Continuouslactated ringers, Last Rate: 75 mL/hr at 04/20/22 1557    ScheduledamLODIPine, 10 mg, Daily  atorvastatin, 40 mg, Daily  carvediloL, 25 mg, BID  lacosamide (VIMPAT) IVPB, 200 mg, Q12H  levetiracetam IV, 1,500 mg, Q12H  levothyroxine, 75 mcg, Before breakfast  losartan, 25 mg, Daily  senna-docusate 8.6-50 mg, 1 tablet, BID  silodosin, 4 mg, Daily  vancomycin (VANCOCIN) IVPB, 500 mg, Q24H    PRNsodium chloride, , Q24H PRN  acetaminophen, 650 mg, Q4H PRN  bacitracin, , PRN  dextrose 10%, 12.5 g, PRN  gelatin adsorbable, , PRN  glucagon (human recombinant), 1 mg, PRN  hydrALAZINE, 10 mg, Q8H PRN  HYDROcodone-acetaminophen, 1 tablet, Q4H PRN  labetalol, 10 mg, Q15 Min PRN  LIDOcaine-EPINEPHrine 1%-1:100,000, , PRN  metoclopramide HCl, 5 mg, Q6H PRN  ondansetron, 4  mg, Q6H PRN  potassium bicarbonate, 35 mEq, PRN  potassium bicarbonate, 50 mEq, PRN  potassium bicarbonate, 60 mEq, PRN  potassium, sodium phosphates, 2 packet, PRN  potassium, sodium phosphates, 2 packet, PRN  potassium, sodium phosphates, 2 packet, PRN  sodium chloride 0.9%, 10 mL, PRN  sodium chloride 0.9%, 10 mL, PRN  thrombin (bovine), , PRN  vancomycin - pharmacy to dose, , pharmacy to manage frequency      Today I personally reviewed pertinent medications, lines/drains/airways, imaging, laboratory results, notably:    Diet  Diet NPO  Diet NPO          Assessment/Plan:     Neuro  * SDH (subdural hematoma)  S/p evac on 4/4/13; stable and stepped down from [facility].    On 4/13 patient had 8/10 HA and was slower to follow commands.  Repeat CTH showed re-accumulation of R SDH along with 4-5mm MLS.  Stepped back up to [facility].    -- NSGY following  -- SBP <160  -- q1hr neuro check  -- hold anticoagulation  -- EEG revealed seizure  -- Continue vimpat and keppra    4/20 OR today        Encephalopathy  Multiple underlying structural etiologies in brain, likely baseline dementia    Cardiac/Vascular  Hypertension  - SBP < 160  - Cardene gtt stopped  - Start norvasc, coreg, & losartan  - Hold home microzide  - PRN labetalol, hydralazine    ID  Osteomyelitis of right foot  - S/p R partial foot amputation on vancomycin at SNF  - continue vancomycin  - bandage, stitches intact   - Afebrile, WBC normal    Endocrine  Type 2 diabetes mellitus, with long-term current use of insulin  - A1c 5  - Accuchecks      Orthopedic  Status post partial amputation of right foot  See osteomyelitis          The patient is being Prophylaxed for:  Venous Thromboembolism with: Mechanical  Stress Ulcer with: None  Ventilator Pneumonia with: not applicable    Activity Orders          Diet NPO: NPO starting at 04/19 0000    Turn patient starting at 04/13 1600    Elevate HOB starting at 04/13 1514    Elevate HOB Flat starting at 04/04 1228        Full  Code    Jorge Pino MD  Neurocritical Care  [facility] - Neuro Critical [facility]

## 2022-04-20 NOTE — OP NOTE
Date of surgery 04/20/2022    Preop diagnosis  1. Status post right frontoparietal temporal craniotomy for subdural evacuation about 16 days ago   2. Recurrence of acute subdural hematoma  3. Epidural hematoma  4. Brain compression with midline shift  5. Altered mental status    Postop diagnosis  Same    Surgery  1. Revision of right frontal parietal temporal craniotomy for evacuation of subdural and epidural hematoma  2. Placement of subdural drain      Surgeon  Jose Walter MD    Assistant surgeon  Nirmal Fairchild MD    Indication  This is a 71 years old female who presented with a mixed density subdural hematoma with some acute component following a fall.  She underwent an emergent craniotomy for evacuation of subdural hematoma.  Patient was on aspirin and Plavix at presentation.  Following the craniotomy evacuation of subdural hematoma 16 days ago the patient mental status improved.  Over the last 5 days her mental status has progressively become worse.  Repeat head CT showed reaccumulation of blood in the subdural and epidural space causing brain compression and midline shift.  Risk and benefits of surgery were discussed with son at bedside who consented for surgery.    Procedure    Patient was intubated under general anesthesia position on the Slider bed.  A bolster was placed underneath the right shoulder and the head was turned to the left side.  All pressure points were carefully padded.  The head was resting on the horseshoe head brunson in reverse Trendelenburg.  We started by removing all the staples then shaving the hair around the incision.  The scalp and hair were washed with solution of chlorhexidine.  The right frontoparietal temporal incision was then prepped and draped in a typical sterile fashion.  The incision was reopened bluntly and all sutures were cut and removed.  The scalp was reflected anteriorly and the temporalis muscle was divided.  The bone flap was removed by removing the plates and  screws.  By removing the bone flap we noticed acute blood over the dura.  The block clots were removed with irrigation and using the Penfield.  The dura was then reopened and reflected anteriorly.  In the subdural space there was another blood clot with brown CSF.  Again using irrigation we evacuated the blood clot.  We went underneath the edges of the frontal bone and sphenoid bone to complete the evacuation of the subdural hematoma.  There was some active bleeding coming from the dura toward the midline.  We drilled the craniotomy edge and suspended the dura with 4-0 Nurolon to stop the epidural bleeding.  We then completed the hemostasis over the dura and the bleeding subdural membranes.  We irrigated abundantly.  Once the hemostasis was completed we covered the brain with Surgicel.  We left the Moi drain over the brain surface and tunneled the drain through the scalp over the midline.  The drain was fixated with 2 nylon.  The dura was then closed loosely with 4-0 Nurolon over the drain.  The bone flap was reapplied and connected to the skull with 4 mm screws using the same mini plates.  That a prolonged muscle was closed with interrupted 2-0 Vicryl.  The galea was closed with inverted interrupted 2-0 Vicryl.  The skin was closed with staples.  No complication.  Blood loss was estimated at 50 cc.

## 2022-04-20 NOTE — PLAN OF CARE
[facility] Care Plan    POC reviewed with Beatriz Adame at 0300. Pt unable to verbalize understanding. No acute events overnight. Pt progressing toward goals. Will continue to monitor. See below and flowsheets for full assessment and VS info.           Is this a stroke patient? yes- Stroke booklet reviewed with patient and family, risk factors identified for patient and stroke booklet remains at bedside for ongoing education.     Neuro:  Wright Coma Scale  Best Eye Response: 3-->(E3) to speech  Best Motor Response: 6-->(M6) obeys commands  Best Verbal Response: 1-->(V1) none  Casimiro Coma Scale Score: 10  Assessment Qualifiers: patient not sedated/intubated, no eye obstruction present  Pupil PERRLA: yes     24hr Temp:  [97.4 °F (36.3 °C)-99 °F (37.2 °C)]     CV:   Rhythm: normal sinus rhythm  BP goals:   SBP < 160  MAP > 65    Resp:   O2 Device (Oxygen Therapy): room air  Vent Mode: Spont  Set Rate: 15 BPM  Oxygen Concentration (%): 98  Vt Set: 450 mL  PEEP/CPAP: 5 cmH20  Pressure Support: 5 cmH20    Plan: N/A    GI/:     Diet/Nutrition Received: NPO  Last Bowel Movement: 04/20/22  Voiding Characteristics: incontinence, other (see comments) (urinary retention)    Intake/Output Summary (Last 24 hours) at 4/20/2022 0653  Last data filed at 4/20/2022 0601  Gross per 24 hour   Intake 1671.02 ml   Output 850 ml   Net 821.02 ml     Unmeasured Output  Urine Occurrence: 1  Stool Occurrence: 1  Pad Count: 1    Labs/Accuchecks:  Recent Labs   Lab 04/20/22  0220   WBC 10.41   RBC 2.65*   HGB 8.2*   HCT 24.7*         Recent Labs   Lab 04/20/22  0220   *   K 3.6   CO2 19*   *   BUN 27*   CREATININE 1.0   ALKPHOS 53*   ALT 10   AST 12   BILITOT 0.5      Recent Labs   Lab 04/18/22  1037   INR 1.1   APTT 26.2    No results for input(s): CPK, CPKMB, TROPONINI, MB in the last 168 hours.    Electrolytes: Electrolytes replaced  Accuchecks: Q12      Gtts:   sodium chloride 0.9% 75 mL/hr at 04/20/22 0601        LDA/Wounds:  Lines/Drains/Airways       Peripherally Inserted Central Catheter Line  Duration             PICC Double Lumen 03/26/22 1905 right basilic 24 days              Drain  Duration                  NG/OG Tube 04/14/22 1200 Bailey sum 14 Fr. Left nostril 5 days                  Wounds: Yes  Wound care consulted: Yes

## 2022-04-20 NOTE — TRANSFER OF CARE
"Anesthesia Transfer of Care Note    Patient: Beatriz Adame    Procedure(s) Performed: Procedure(s) (LRB):  CRANIOTOMY, FOR SUBDURAL HEMATOMA EVACUATION (Right)    Patient location: ICU    Anesthesia Type: general    Transport from OR: Transported from OR on 6-10 L/min O2 by face mask with adequate spontaneous ventilation. Continuous ECG monitoring in transport. Continuous SpO2 monitoring in transport. Continuos invasive BP monitoring in transport    Post pain: adequate analgesia    Post assessment: no apparent anesthetic complications    Post vital signs: stable    Post-procedure mental status: sleeping.    Nausea/Vomiting: no nausea/vomiting    Complications: none    Transfer of care protocol was followed      Last vitals:   Visit Vitals  BP (!) 149/70 (BP Location: Left arm, Patient Position: Lying)   Pulse 75   Temp 36.6 °C (97.9 °F) (Axillary)   Resp 12   Ht 5' 7" (1.702 m)   Wt 49.4 kg (109 lb)   SpO2 100%   BMI 17.07 kg/m²     "

## 2022-04-20 NOTE — ANESTHESIA PROCEDURE NOTES
Intubation    Date/Time: 4/20/2022 4:20 PM  Performed by: Elsa Hernandez MD  Authorized by: Emile Browning MD     Intubation:     Induction:  Intravenous    Intubated:  Postinduction    Mask Ventilation:  Easy mask    Attempts:  1    Attempted By:  CRNA    Method of Intubation:  Direct    Blade:  Calero 2    Laryngeal View Grade: Grade I - full view of cords      Difficult Airway Encountered?: No      Complications:  None    Airway Device:  Oral endotracheal tube    Airway Device Size:  7.0    Style/Cuff Inflation:  Cuffed    Inflation Amount (mL):  5    Tube secured:  22    Secured at:  The lips    Placement Verified By:  Capnometry    Complicating Factors:  None    Findings Post-Intubation:  BS equal bilateral and atraumatic/condition of teeth unchanged

## 2022-04-20 NOTE — PLAN OF CARE
Problem: Adult Inpatient Plan of Care  Goal: Absence of Hospital-Acquired Illness or Injury  Outcome: Ongoing, Progressing     Problem: Cognitive Impairment (Stroke, Hemorrhagic)  Goal: Optimal Cognitive Function  Outcome: Ongoing, Progressing     Problem: Communication Impairment (Stroke, Hemorrhagic)  Goal: Effective Communication Skills  Outcome: Ongoing, Progressing     Problem: Urinary Elimination Impaired (Stroke, Hemorrhagic)  Goal: Effective Urinary Elimination  Outcome: Ongoing, Progressing     Problem: Skin and Tissue Injury (Mechanical Ventilation, Invasive)  Goal: Absence of Device-Related Skin and Tissue Injury  Outcome: Ongoing, Progressing     Problem: Seizure, Active Management  Goal: Absence of Seizure/Seizure-Related Injury  Outcome: Ongoing, Progressing   [facility] Care Plan    POC reviewed with Beatriz Adame and family at 1130. Pt verbalized understanding. Questions and concerns addressed. No acute events today. Pt progressing toward goals. Will continue to monitor. See below and flowsheets for full assessment and VS info.     Placed delgado for OR for Strict I and O's . Sent off 1552 pm.   Changed Fluids to LR at 75 ml/hr  Updated family on time of sx.       Is this a stroke patient? Yes    Neuro:  Casimiro Coma Scale  Best Eye Response: 3-->(E3) to speech  Best Motor Response: 6-->(M6) obeys commands  Best Verbal Response: 1-->(V1) none  Casimiro Coma Scale Score: 10  Assessment Qualifiers: patient not sedated/intubated  Pupil PERRLA: yes     24 hr Temp:  [97.4 °F (36.3 °C)-98 °F (36.7 °C)]     CV:   Rhythm: normal sinus rhythm  BP goals:   SBP < 160  MAP > 65    Resp:   O2 Device (Oxygen Therapy): room air  Vent Mode: Spont  Set Rate: 15 BPM  Oxygen Concentration (%): 98  Vt Set: 450 mL  PEEP/CPAP: 5 cmH20  Pressure Support: 5 cmH20    Plan: N/A    GI/:     Diet/Nutrition Received: NPO  Last Bowel Movement: 04/20/22  Voiding Characteristics: incontinence    Intake/Output Summary (Last 24 hours)  at 4/20/2022 1601  Last data filed at 4/20/2022 1557  Gross per 24 hour   Intake 2713.85 ml   Output 1090 ml   Net 1623.85 ml     Unmeasured Output  Urine Occurrence: 1  Stool Occurrence: 1  Pad Count: 1    Labs/Accuchecks:  Recent Labs   Lab 04/20/22  0220   WBC 10.41   RBC 2.65*   HGB 8.2*   HCT 24.7*         Recent Labs   Lab 04/20/22  0220   *   K 3.6   CO2 19*   *   BUN 27*   CREATININE 1.0   ALKPHOS 53*   ALT 10   AST 12   BILITOT 0.5      Recent Labs   Lab 04/18/22  1037   INR 1.1   APTT 26.2    No results for input(s): CPK, CPKMB, TROPONINI, MB in the last 168 hours.    Electrolytes: No replacement orders  Accuchecks: Q shift    Gtts:   lactated ringers 75 mL/hr at 04/20/22 1557       LDA/Wounds:  Lines/Drains/Airways       Peripherally Inserted Central Catheter Line  Duration             PICC Double Lumen 03/26/22 1905 right basilic 24 days              Drain  Duration                  NG/OG Tube 04/14/22 1200 [place] sump 14 Fr. Left nostril 6 days         Urethral Catheter 04/20/22 1230 Latex 16 Fr. <1 day                  Wounds: Yes  Wound care consulted: Yes

## 2022-04-20 NOTE — SUBJECTIVE & OBJECTIVE
Interval History:      Review of Systems   Unable to perform ROS: Patient nonverbal     Objective:     Vitals:  Temp: 97.9 °F (36.6 °C)  Pulse: 75  Rhythm: normal sinus rhythm  BP: (!) 149/70  MAP (mmHg): 101  Resp: 12  SpO2: 100 %  O2 Device (Oxygen Therapy): room air    Temp  Min: 97.4 °F (36.3 °C)  Max: 98 °F (36.7 °C)  Pulse  Min: 57  Max: 82  BP  Min: 112/54  Max: 158/71  MAP (mmHg)  Min: 78  Max: 104  Resp  Min: 8  Max: 20  SpO2  Min: 95 %  Max: 100 %    04/19 0701 - 04/20 0700  In: 1671 [I.V.:1333.8]  Out: 850 [Urine:850]   Unmeasured Output  Urine Occurrence: 1  Stool Occurrence: 1  Pad Count: 1       Physical Exam  HENT:      Head:      Comments: Pterional craniotomy on right  Eyes:      Extraocular Movements: Extraocular movements intact.      Pupils: Pupils are equal, round, and reactive to light.   Cardiovascular:      Rate and Rhythm: Normal rate.      Heart sounds: Normal heart sounds.   Pulmonary:      Breath sounds: Normal breath sounds.   Abdominal:      Palpations: Abdomen is soft.   Musculoskeletal:      Cervical back: Neck supple.   Skin:     General: Skin is warm.   Neurological:      Mental Status: She is alert.      Comments: Left spastic hemiplegia  Right side bradykinetic but withdraws         Medications:  Continuouslactated ringers, Last Rate: 75 mL/hr at 04/20/22 1557    ScheduledamLODIPine, 10 mg, Daily  atorvastatin, 40 mg, Daily  carvediloL, 25 mg, BID  lacosamide (VIMPAT) IVPB, 200 mg, Q12H  levetiracetam IV, 1,500 mg, Q12H  levothyroxine, 75 mcg, Before breakfast  losartan, 25 mg, Daily  senna-docusate 8.6-50 mg, 1 tablet, BID  silodosin, 4 mg, Daily  vancomycin (VANCOCIN) IVPB, 500 mg, Q24H    PRNsodium chloride, , Q24H PRN  acetaminophen, 650 mg, Q4H PRN  bacitracin, , PRN  dextrose 10%, 12.5 g, PRN  gelatin adsorbable, , PRN  glucagon (human recombinant), 1 mg, PRN  hydrALAZINE, 10 mg, Q8H PRN  HYDROcodone-acetaminophen, 1 tablet, Q4H PRN  labetalol, 10 mg, Q15 Min  PRN  LIDOcaine-EPINEPHrine 1%-1:100,000, , PRN  metoclopramide HCl, 5 mg, Q6H PRN  ondansetron, 4 mg, Q6H PRN  potassium bicarbonate, 35 mEq, PRN  potassium bicarbonate, 50 mEq, PRN  potassium bicarbonate, 60 mEq, PRN  potassium, sodium phosphates, 2 packet, PRN  potassium, sodium phosphates, 2 packet, PRN  potassium, sodium phosphates, 2 packet, PRN  sodium chloride 0.9%, 10 mL, PRN  sodium chloride 0.9%, 10 mL, PRN  thrombin (bovine), , PRN  vancomycin - pharmacy to dose, , pharmacy to manage frequency      Today I personally reviewed pertinent medications, lines/drains/airways, imaging, laboratory results, notably:    Diet  Diet NPO  Diet NPO

## 2022-04-20 NOTE — ANESTHESIA PROCEDURE NOTES
Arterial    Diagnosis: subdural hematoma  Doctor requesting consult: bridget    Patient location during procedure: done in OR  Procedure start time: 4/20/2022 4:27 PM  Timeout: 4/20/2022 4:25 PM  Procedure end time: 4/20/2022 4:28 PM    Staffing  Authorizing Provider: Emile Browning MD  Performing Provider: Elsa Hernandez MD    Anesthesiologist was present at the time of the procedure.    Preanesthetic Checklist  Completed: patient identified, IV checked, site marked, risks and benefits discussed, surgical consent, monitors and equipment checked, pre-op evaluation, timeout performed and anesthesia consent givenArterial  Skin Prep: chlorhexidine gluconate  Local Infiltration: lidocaine  Orientation: right  Location: radial    Catheter Size: 20 G  Catheter placement by Anatomical landmarks. Heme positive aspiration all ports. Insertion Attempts: 1  Assessment  Dressing: secured with tape and tegaderm  Patient: Tolerated well

## 2022-04-20 NOTE — PROGRESS NOTES
[facility] - Neuro Critical Care  Neurosurgery  Progress Note    Subjective:     History of Present Illness: 71 year old female with , hypothyroidism, CVA with left-sided residual deficit on ASA/Plavix, DM2, HTN, HLD, CKD, chronic anemia, presenting from SNF after being found down next to wheelchair after unwitnessed fall, consulted to NSGY for right 2 cm SDH without midline shift. She is altered at baseline and unable to provide to history, but family at bedside says she is more confused and less interactive than usual.       Post-Op Info:  Procedure(s) (LRB):  CRANIOTOMY, FOR SUBDURAL HEMATOMA EVACUATION (Right)   16 Days Post-Op     Interval History: NAEON. Exam stable. OR today for SDH evacuation.     Medications:  Continuous Infusions:   sodium chloride 0.9% 75 mL/hr at 04/20/22 0601     Scheduled Meds:   amLODIPine  10 mg Per NG tube Daily    atorvastatin  40 mg Per NG tube Daily    carvediloL  25 mg Per NG tube BID    lacosamide (VIMPAT) IVPB  200 mg Intravenous Q12H    levetiracetam IV  1,500 mg Intravenous Q12H    levothyroxine  75 mcg Per NG tube Before breakfast    losartan  25 mg Per NG tube Daily    senna-docusate 8.6-50 mg  1 tablet Per NG tube BID    silodosin  4 mg Per NG tube Daily    vancomycin (VANCOCIN) IVPB  500 mg Intravenous Q24H     PRN Meds:sodium chloride, acetaminophen, dextrose 10%, glucagon (human recombinant), hydrALAZINE, HYDROcodone-acetaminophen, labetalol, metoclopramide HCl, ondansetron, potassium bicarbonate, potassium bicarbonate, potassium bicarbonate, potassium, sodium phosphates, potassium, sodium phosphates, potassium, sodium phosphates, sodium chloride 0.9%, sodium chloride 0.9%, Pharmacy to dose Vancomycin consult **AND** vancomycin - pharmacy to dose     Review of Systems  Objective:     Weight: 49.4 kg (109 lb)  Body mass index is 17.07 kg/m².  Vital Signs (Most Recent):  Temp: 97.6 °F (36.4 °C) (04/20/22 0701)  Pulse: 81 (04/20/22 0701)  Resp: 17 (04/20/22  0701)  BP: (!) 154/72 (04/20/22 0701)  SpO2: 100 % (04/20/22 0701)   Vital Signs (24h Range):  Temp:  [97.4 °F (36.3 °C)-99 °F (37.2 °C)] 97.6 °F (36.4 °C)  Pulse:  [57-82] 81  Resp:  [8-19] 17  SpO2:  [95 %-100 %] 100 %  BP: (112-160)/(54-83) 154/72                            NG/OG Tube 04/14/22 1200 T[place] sump 14 Fr. Left nostril (Active)   Placement Check placement verified by aspirate characteristics;placement verified by x-ray 04/17/22 0305   Tolerance no signs/symptoms of discomfort 04/17/22 0305   Securement secured to nostril center w/ adhesive device 04/17/22 0305   Clamp Status/Tolerance clamped 04/17/22 0305   Suction Setting/Drainage Method suction at the bedside 04/17/22 0305   Insertion Site Appearance no redness, warmth, tenderness, skin breakdown, drainage 04/17/22 0305   Drainage None 04/15/22 0332   Flush/Irrigation flushed w/;water;no resistance met 04/17/22 0305   Water Bolus (mL) 100 mL 04/17/22 0505   Residual Amount (ml) 40 ml 04/16/22 0700       Female External Urinary Catheter 04/13/22 1820 (Active)   Skin no redness;no breakdown 04/17/22 0305   Tolerance no signs/symptoms of discomfort 04/17/22 0305   Suction Continuous suction at 60 mmHg 04/16/22 2004   Date of last wick change 04/16/22 04/16/22 2004   Time of last wick change 0956 04/16/22 0700   Output (mL) 200 mL 04/17/22 0505       Physical Exam    Neurosurgery Physical Exam  [id number]  Make some sounds   Follow commands on RUE  EEG cap in place      Significant Labs:  Recent Labs   Lab 04/18/22  1037 04/19/22  0339 04/20/22  0220   GLU  --  123* 79   NA  --  144 146*   K 4.3 4.0 3.6   CL  --  112* 117*   CO2  --  20* 19*   BUN  --  31* 27*   CREATININE  --  1.1 1.0   CALCIUM  --  8.4* 7.9*       Recent Labs   Lab 04/18/22  1347 04/19/22  0333 04/20/22  0220   WBC 13.05* 13.52* 10.41   HGB 9.1* 10.1* 8.2*   HCT 27.9* 29.9* 24.7*    128* 162       Recent Labs   Lab 04/18/22  1037   INR 1.1   APTT 26.2       Microbiology Results  (last 7 days)       Procedure Component Value Units Date/Time    Blood culture [[id number]] Collected: 04/14/22 1208    Order Status: Completed Specimen: Blood from Peripheral, Hand, Right Updated: 04/19/22 1412     Blood Culture, Routine No growth after 5 days.    Blood culture [[id number]] Collected: 04/14/22 1226    Order Status: Completed Specimen: Blood from Peripheral, Hand, Left Updated: 04/19/22 1412     Blood Culture, Routine No growth after 5 days.    Blood culture [[id number]] Collected: 04/11/22 1021    Order Status: Completed Specimen: Blood from Peripheral, Left Arm Updated: 04/16/22 1212     Blood Culture, Routine No growth after 5 days.    Narrative:      Collection has been rescheduled by [facility] at 04/11/2022 10:04 Reason:   Patient unavailable, patient care, nurse paige notified.  Collection has been rescheduled by T at 04/11/2022 10:04 Reason:   Patient unavailable, patient care, nurse paige notified.    Blood culture [[id number]] Collected: 04/11/22 1020    Order Status: Completed Specimen: Blood from Peripheral, Left Hand Updated: 04/16/22 1212     Blood Culture, Routine No growth after 5 days.    Narrative:      Collection has been rescheduled by T at 04/11/2022 10:04 Reason:   Patient unavailable, patient care, nurse paige notified.  Collection has been rescheduled by T at 04/11/2022 10:04 Reason:   Patient unavailable, patient care, nurse paige notified.    Culture, Respiratory with Gram Stain [[id number]]     Order Status: No result Specimen: Respiratory     Urine culture [[id number]]  (Abnormal)  (Susceptibility) Collected: 04/11/22 1017    Order Status: Completed Specimen: Urine Updated: 04/13/22 1411     Urine Culture, Routine ESCHERICHIA COLI ESBL  10,000 - 49,999 cfu/ml      Narrative:      Specimen Source->Urine          All pertinent labs from the last 24 hours have been reviewed.    Significant Diagnostics:  I have reviewed all pertinent imaging results/findings within the past 24  hours.  X-Ray Abdomen AP 1 View    Result Date: 4/18/2022  NG tube is in good position with catheter tip overlying the stomach in left upper quadrant. Increased airspace opacity in the right lung base compared to prior exam, could represent developing pneumonia versus atelectasis. Electronically signed by resident: Concepcion Urban Date:    04/18/2022 Time:    19:40 Electronically signed by: Chintan Hinojosa MD Date:    04/18/2022 Time:    19:49    X-Ray Abdomen AP 1 View    Result Date: 4/18/2022  Pulmonary edema pneumonia aspiration or sepsis. Electronically signed by: Nolan Hernández MD Date:    04/18/2022 Time:    14:37    CT Head Without Contrast    Result Date: 4/19/2022  Relative to prior CT of 04/17/2022, 23:37 hours, continued maturing postoperative appearance following right craniotomy for subdural hematoma evacuation. Similar size and associated mass effect of residual mixed attenuation right convexity extra-axial collection, as described.  Some redistribution of posterior parafalcine and tentorial leaflet subdural blood, felt overall similar in volume relative to prior. Electronically signed by: Brock [place] Date:    04/19/2022 Time:    08:55       Assessment/Plan:     * SDH (subdural hematoma)  71 year old female with , hypothyroidism, CVA with left-sided residual deficit on ASA/Plavix, DM2, HTN, HLD, CKD, chronic anemia, presenting from SNF after being found down next to wheelchair after unwitnessed fall, consulted to NSGY for right 2 cm SDH mostly acute, membranous and heterogenous appearing, some chronic component, without midline shift. Now s/p R crani for SDH evacuation (4/4).    No clinical seizure. On EEG. Exam wax and wane       --Admitted to [facility]   -- q1 hour vitals/neurochecks; exam wax and wane   --To the OR today for redo crai for SDH evac  -- SBP < 160  -- Na goal eunatremia.  -- Seizure control per neuro ICU: Keppra 1500 BID and vimpat  b.i.d.    EEG with right-sided none compulsive seizures    -- CTH stable 4/19.  -- Saturating well on room   -- HOB > 30  -- PPx: SCDs, BR, IS. Okay for SQH for dvt ppx.   -- Osteomyelitis the or really alternative is of R foot:    - ID rec 6 week course Vanc with end date 5/5. F/u full ID recs. OK to start Etrapenem if pt decompensates.   -Podiatry managing recent partial foot amputation, appreciate recs. Sutures removed 4/12.    -Home health/facility to do dressing changes every MWF and prn as follows:  Cleanse right foot incision site with saline or wound cleanser, paint incision site with betadine, cover with 4x4 gauze, kerlix, ACE bandage.   -- Anemia: Resolved.   -- Fever 4/11: Afebrile overnight.    - UA 4/11: Growing GNRs, f/u susceptibility   - CXR with possible basilar effusion.   - BLE US 4/11: w/o DVT.   - HM following. Appreciate assistance with care.    - Blood cx 4/11: NGTD.    Dispo: OR today for SDH evacuation.             Shahana Cornejo MD  Neurosurgery  [facility] - [facility]

## 2022-04-21 NOTE — NURSING
Patient arrived to unit at 0630 pm with Dr. Fairchild and team at bedside. Pt arrived facemask to 2 L. Right radial art line placed with left hand PIV 20 g. Pt lethargic but able to move all extremities to noxious stimuli. Pupils 3 sluggish but equal.     Dressing to head dry and intact with bright red blood noted. JAMAR 10 Cameroonian janis placed with orders to gravity only, keep HOB flat no more than 15, No trendelenburg. Drain and document q shift. Updated Night RN.

## 2022-04-21 NOTE — PROGRESS NOTES
[facility]  Neurocritical Care  Progress Note    Admit Date: 4/3/2022  Service Date: 04/21/2022  Length of Stay: 18    Subjective:     Chief Complaint: SDH (subdural hematoma)    History of Present Illness: Ms. Adame is a 70 yo female with hx of dementia, R MCA stroke with left side contraction/hemiparesis presented to [facility] with SDH now s/p evac. Stepped down from [facility]. On 4/13 she developed worsening headache and CTH showed re-accumulation of R SDH and 4-5mm MLS. She was noted to be more slow to follow commands. Stepped back up to [facility] for closer monitoring.    Original HPI below:  Ms. Adame is a 70 y/o F with a PMHx of baseline dementia, GERD, R MCA stroke with residual LUE weakness, HLD, HTN, Stage IV CKD, TIIDM who presents to [facility] from [facility] with a AoC SDH after an unwitnessed fall from her wheel chair without loss of consciousness. CT spine negative for fracture. CT head with R SDH and 3-4 mm MLS. She is on DAPT at home. Of note, she has been staying at [facility] after a partial R foot amputation, stitches still intact, for which she is receiving vancomycin.         Hospital Course: 04/04/2022: OR today for clot evac. Patient returned to ICU intubated on precedex. Post op scan stable. Wean propofol and initiated precedex for likely extubation tomorrow.   04/05/2022 repeat CT head due to drainage.   04/06/2022 NAEO.   04/07/2022 NAEO. Successfully extubated  4/14/22: EEG revealed seizure   4/15/22: vimpat load overnight  04/16/2022: NAEON.  04/17/2022. NAEON. Repeat CTH stable.   04/18/2022  Lateralized periodic discharges on R with some seizure correlate. 200 vimpat x 1 and increase scheduled vimpat to 200. Platelets by NSGY 2/2 OR for clot evac today.   04/19/2022 OR postponed to today with NSGY. CT this morning stable. Continue EEG.   04/20: in OR for repeat evacuation  04/21: follow up CT after evacuation shows SD drain and little residual blood      Interval  History:      Review of Systems   Unable to perform ROS: Patient nonverbal     Objective:     Vitals:  Temp: 97.6 °F (36.4 °C)  Pulse: 78  Rhythm: normal sinus rhythm  BP: 132/66  MAP (mmHg): 90  Resp: 14  SpO2: 98 %  O2 Device (Oxygen Therapy): Simple Face Mask    Temp  Min: 96.6 °F (35.9 °C)  Max: 97.9 °F (36.6 °C)  Pulse  Min: 65  Max: 80  BP  Min: 110/57  Max: 158/81  MAP (mmHg)  Min: 78  Max: 113  Resp  Min: 2  Max: 18  SpO2  Min: 93 %  Max: 100 %    04/20 0701 - 04/21 0700  In: 3128.6 [I.V.:1834.7]  Out: 420 [Urine:400; Drains:20]   Unmeasured Output  Urine Occurrence: 1  Stool Occurrence: 1  Pad Count: 1       Physical Exam  HENT:      Head:      Comments: Pterional craniotomy on right  Subdural drain in place  Eyes:      Extraocular Movements: Extraocular movements intact.      Pupils: Pupils are equal, round, and reactive to light.   Cardiovascular:      Rate and Rhythm: Normal rate.      Heart sounds: Normal heart sounds.   Pulmonary:      Breath sounds: Normal breath sounds.   Abdominal:      Palpations: Abdomen is soft.   Musculoskeletal:      Cervical back: Neck supple.   Skin:     General: Skin is warm.   Neurological:      Mental Status: She is alert.      Comments: Left spastic hemiplegia  Right side bradykinetic but withdraws         Medications:  Continuousdextrose 5 % and 0.9 % NaCl, Last Rate: 75 mL/hr at 04/21/22 1051    ScheduledamLODIPine, 10 mg, Daily  atorvastatin, 40 mg, Daily  carvediloL, 25 mg, BID  lacosamide (VIMPAT) IVPB, 200 mg, Q12H  levetiracetam IV, 1,500 mg, Q12H  levothyroxine, 75 mcg, Before breakfast  losartan, 25 mg, Daily  senna-docusate 8.6-50 mg, 1 tablet, BID  silodosin, 4 mg, Daily  vancomycin (VANCOCIN) IVPB, 500 mg, Q24H    PRNacetaminophen, 650 mg, Q4H PRN  dextrose 10%, 12.5 g, PRN  glucagon (human recombinant), 1 mg, PRN  hydrALAZINE, 10 mg, Q8H PRN  HYDROcodone-acetaminophen, 1 tablet, Q4H PRN  labetalol, 10 mg, Q15 Min PRN  metoclopramide HCl, 5 mg, Q6H  PRN  ondansetron, 4 mg, Q6H PRN  potassium bicarbonate, 35 mEq, PRN  potassium bicarbonate, 50 mEq, PRN  potassium bicarbonate, 60 mEq, PRN  potassium, sodium phosphates, 2 packet, PRN  potassium, sodium phosphates, 2 packet, PRN  potassium, sodium phosphates, 2 packet, PRN  sodium chloride 0.9%, 10 mL, PRN  sodium chloride 0.9%, 10 mL, PRN  vancomycin - pharmacy to dose, , pharmacy to manage frequency      Today I personally reviewed pertinent medications, lines/drains/airways, imaging, laboratory results, notably:    Diet  Diet NPO  Diet NPO          Assessment/Plan:     Neuro  * SDH (subdural hematoma)  S/p evac on 4/4/13; stable and stepped down from [facility].    On 4/13 patient had 8/10 HA and was slower to follow commands.  Repeat CTH showed re-accumulation of R SDH along with 4-5mm MLS.  Stepped back up to [facility].    -- NSGY following  -- SBP <160  -- q1hr neuro check  -- hold anticoagulation  -- EEG revealed seizure  -- Continue vimpat and keppra    4/20 OR today  4/21 day 1 post op, keep horizontal and flat, SD drain in place        Encephalopathy  Multiple underlying structural etiologies in brain, likely baseline dementia    Recurrent seizures  Cont lacosamide and leviteracetam at current dosages    Cardiac/Vascular  Hypertension  - SBP < 160  - Cardene gtt stopped  - Start norvasc, coreg, & losartan  - Hold home microzide  - PRN labetalol, hydralazine    ID  Osteomyelitis of right foot  - S/p R partial foot amputation on vancomycin at SNF  - continue vancomycin  - bandage, stitches intact   - Afebrile, WBC normal    Endocrine  Type 2 diabetes mellitus, with long-term current use of insulin  - A1c 5  - Accuchecks      Orthopedic  Status post partial amputation of right foot  See osteomyelitis          The patient is being Prophylaxed for:  Venous Thromboembolism with: Mechanical  Stress Ulcer with: None  Ventilator Pneumonia with: not applicable    Activity Orders          Elevate HOB Flat starting at 04/20 0604     Diet NPO: NPO starting at 04/19 0000    Turn patient starting at 04/13 1600        Full Code    Jorge Pino MD  Neurocritical Care  [facility] - Neuro Critical [facility]

## 2022-04-21 NOTE — ASSESSMENT & PLAN NOTE
- S/p R partial foot amputation on vancomycin at [facility]  - continue vancomycin  - bandage, stitches intact   - Afebrile, WBC normal

## 2022-04-21 NOTE — PLAN OF CARE
Problem: Adult Inpatient Plan of Care  Goal: Patient-Specific Goal (Individualized)  Description: Admit Date: 4/3/2022    SDH (subdural hematoma)    Past Medical History:  No date: Gastroparesis  No date: GERD (gastroesophageal reflux disease)  07/24/2021: History of Clostridium difficile colitis  No date: History of kidney stones  No date: History of stroke      Comment:  left side paralysis  No date: Hyperlipidemia  No date: Hypertension  No date: Hypothyroidism  No date: Iron deficiency anemia  No date: Osteoarthritis  No date: Peripheral neuropathy  No date: Stage IV CKD  No date: Type II diabetes mellitus    Past Surgical History:  No date: APPENDECTOMY  10/13/2021: CYSTOSCOPY W/ URETERAL STENT PLACEMENT; Right      Comment:  Procedure: CYSTOSCOPY, WITH URETERAL STENT INSERTION;                 Surgeon: Wanda Arroyo MD;  Location: [facility];                 Service: Urology;  Laterality: Right;  11/23/2021: ESOPHAGOGASTRODUODENOSCOPY; N/A      Comment:  Procedure: EGD (ESOPHAGOGASTRODUODENOSCOPY);  Surgeon:                Obed Jeong MD;  Location: [facility] ([facility]);                 Service: Endoscopy;  Laterality: N/A;  3/24/2022: FOOT AMPUTATION THROUGH METATARSAL; Right      Comment:  Procedure: AMPUTATION, FOOT, PARTIAL 4th and 5th ray;                 Surgeon: Rodrigo Logan DPM;  [address]                 Service: Podiatry;  Laterality: Right;  7/22/2021: LAPAROSCOPIC APPENDECTOMY; N/A      Comment:  Procedure: APPENDECTOMY, LAPAROSCOPIC;  Surgeon: Paulo Calvo Jr., MD;  Location: [facility];  Service: General;                 Laterality: N/A;  1/1/2022: TOE AMPUTATION; Right      Comment:  Procedure: AMPUTATION, TOE;  Surgeon: Genaro Mason DPM;  Location: [facility];  Service: Podiatry;  Laterality:               Right;  No date: TUBAL LIGATION  12/22/2021: URETEROSCOPIC REMOVAL OF URETERIC CALCULUS; Right      Comment:  Procedure: REMOVAL,  CALCULUS, URETER, URETEROSCOPIC;                 Surgeon: Wanda Arroyo MD;  Location: [facility];                 Service: Urology;  Laterality: Right;    Individualization:   1. Please dim lights at night  2. Pt likes to be covered with blankets  3. Pt likes to be moved slowly.    Restraints:  Right wrist restrains exp 4/18 14pm            Outcome: Ongoing, Progressing     Problem: Adjustment to Illness (Stroke, Hemorrhagic)  Goal: Optimal Coping  Outcome: Ongoing, Progressing     Problem: Cognitive Impairment (Stroke, Hemorrhagic)  Goal: Optimal Cognitive Function  Outcome: Ongoing, Progressing     Problem: Impaired Wound Healing  Goal: Optimal Wound Healing  Outcome: Ongoing, Progressing   [facility] Care Plan    POC reviewed with Beatriz Adame and family at 1400. Pt verbalized understanding. Questions and concerns addressed. No acute events today. Pt progressing toward goals. Will continue to monitor. See below and flowsheets for full assessment and VS info.     Glucose at 37;  given D10 125 ml bolus glucose reached 137. Pt placed on q4hr glucose checks. Removed Araiza catheter. Pt had one incontinence of bowel and bladder.  Pt remained flat all shift. Fluids changed to D5/NS at 75ml/hr. Tuber feeds on hold until Neuro Sx team makes a decision to removed Subdural drain or keep in for another 24hrs. Family arrived at bedside and updated on Plan of care for the next few days. Will update Night Shift Rn.       Is this a stroke patient? Yes    Neuro:  [place] Coma Scale  Best Eye Response: 3-->(E3) to speech  Best Motor Response: 6-->(M6) obeys commands  Best Verbal Response: 1-->(V1) none  [place] Coma Scale Score: 10  Assessment Qualifiers: patient not sedated/intubated  Pupil PERRLA: yes     24 hr Temp:  [96.6 °F (35.9 °C)-97.9 °F (36.6 °C)]     CV:   Rhythm: normal sinus rhythm  BP goals:   SBP < 160  MAP > 65    Resp:   O2 Device (Oxygen Therapy): Simple Face Mask  Vent Mode: Spont  Set Rate: 15 BPM  Oxygen  Concentration (%): 98  Vt Set: 450 mL  PEEP/CPAP: 5 cmH20  Pressure Support: 5 cmH20    Plan: N/A    GI/:     Diet/Nutrition Received: NPO  Last Bowel Movement: 04/20/22  Voiding Characteristics: incontinence, other (see comments)    Intake/Output Summary (Last 24 hours) at 4/21/2022 1352  Last data filed at 4/21/2022 1300  Gross per 24 hour   Intake 3333.7 ml   Output 410 ml   Net 2923.7 ml     Unmeasured Output  Urine Occurrence: 1  Stool Occurrence: 1  Pad Count: 1    Labs/Accuchecks:  Recent Labs   Lab 04/21/22  0205   WBC 12.62   RBC 2.82*   HGB 8.6*   HCT 27.2*         Recent Labs   Lab 04/21/22  0205   *   K 4.4   CO2 18*   *   BUN 26*   CREATININE 1.0   ALKPHOS 52*   ALT 9*   AST 14   BILITOT 0.3      Recent Labs   Lab 04/18/22  1037   INR 1.1   APTT 26.2    No results for input(s): CPK, CPKMB, TROPONINI, MB in the last 168 hours.    Electrolytes: No replacement orders  Accuchecks: Q4H    Gtts:   dextrose 5 % and 0.9 % NaCl 75 mL/hr at 04/21/22 1051       LDA/Wounds:  Lines/Drains/Airways       Peripherally Inserted Central Catheter Line  Duration             PICC Double Lumen 03/26/22 1905 right basilic 25 days              Drain  Duration                  NG/OG Tube 04/14/22 1200 Spring sump 14 Fr. Left nostril 7 days         Urethral Catheter 04/20/22 1230 Latex 16 Fr. 1 day         Closed/Suction Drain 04/20/22 1732 Right Scalp Bulb 10 Fr. <1 day              Arterial Line  Duration             Arterial Line 04/20/22 1627 Right Radial <1 day              Peripheral Intravenous Line  Duration                  Peripheral IV - Single Lumen 04/20/22 1622 18 G Left Hand <1 day                  Wounds: Yes  Wound care consulted: Yes

## 2022-04-21 NOTE — PROGRESS NOTES
[facility] - Neuro Critical Care  Neurosurgery  Progress Note    Subjective:     History of Present Illness: 71 year old female with , hypothyroidism, CVA with left-sided residual deficit on ASA/Plavix, DM2, HTN, HLD, CKD, chronic anemia, presenting from SNF after being found down next to wheelchair after unwitnessed fall, consulted to NSGY for right 2 cm SDH without midline shift. She is altered at baseline and unable to provide to history, but family at bedside says she is more confused and less interactive than usual.       Post-Op Info:  Procedure(s) (LRB):  CRANIOTOMY, FOR SUBDURAL HEMATOMA EVACUATION (Right)   1 Day Post-Op     Interval History: 4/21: POD 1 s/p R crani for SDH re-evacuation. NAEON. AFVSS. Exam improving. SD JAMAR in place to gravity. Postop CTH expected changes.      Medications:  Continuous Infusions:   dextrose 5 % and 0.9 % NaCl 75 mL/hr at 04/21/22 1037    lactated ringers 75 mL/hr at 04/21/22 0733     Scheduled Meds:   amLODIPine  10 mg Per NG tube Daily    atorvastatin  40 mg Per NG tube Daily    carvediloL  25 mg Per NG tube BID    lacosamide (VIMPAT) IVPB  200 mg Intravenous Q12H    levetiracetam IV  1,500 mg Intravenous Q12H    levothyroxine  75 mcg Per NG tube Before breakfast    losartan  25 mg Per NG tube Daily    senna-docusate 8.6-50 mg  1 tablet Per NG tube BID    silodosin  4 mg Per NG tube Daily    vancomycin (VANCOCIN) IVPB  500 mg Intravenous Q24H     PRN Meds:acetaminophen, dextrose 10%, glucagon (human recombinant), hydrALAZINE, HYDROcodone-acetaminophen, labetalol, metoclopramide HCl, ondansetron, potassium bicarbonate, potassium bicarbonate, potassium bicarbonate, potassium, sodium phosphates, potassium, sodium phosphates, potassium, sodium phosphates, sodium chloride 0.9%, sodium chloride 0.9%, Pharmacy to dose Vancomycin consult **AND** vancomycin - pharmacy to dose     Review of Systems  Objective:     Weight: 49.4 kg (109 lb)  Body mass index is 17.07  kg/m².  Vital Signs (Most Recent):  Temp: (!) 32 °F (0 °C) (04/21/22 0801)  Pulse: 80 (04/21/22 0901)  Resp: 11 (04/21/22 0901)  BP: 127/67 (04/21/22 0901)  SpO2: (!) 93 % (04/21/22 0901)   Vital Signs (24h Range):  Temp:  [32 °F (0 °C)-97.9 °F (36.6 °C)] 32 °F (0 °C)  Pulse:  [65-80] 80  Resp:  [2-20] 11  SpO2:  [93 %-100 %] 93 %  BP: (110-158)/(57-81) 127/67  Arterial Line BP: (116-152)/(47-62) 133/56     Date 04/21/22 0700 - 04/22/22 0659   Shift 7786-5078 2050-5244 4325-2056 24 Hour Total   INTAKE   I.V.(mL/kg) 115(2.3)   115(2.3)   IV Piggyback 44.9   44.9   Shift Total(mL/kg) 159.9(3.2)   159.9(3.2)   OUTPUT   Urine(mL/kg/hr) 35   35   Shift Total(mL/kg) 35(0.7)   35(0.7)   Weight (kg) 49.4 49.4 49.4 49.4                          NG/OG Tube 04/14/22 1200 Heard sump 14 Fr. Left nostril (Active)   Placement Check placement verified by aspirate characteristics;placement verified by x-ray 04/17/22 0305   Tolerance no signs/symptoms of discomfort 04/17/22 0305   Securement secured to nostril center w/ adhesive device 04/17/22 0305   Clamp Status/Tolerance clamped 04/17/22 0305   Suction Setting/Drainage Method suction at the bedside 04/17/22 0305   Insertion Site Appearance no redness, warmth, tenderness, skin breakdown, drainage 04/17/22 0305   Drainage None 04/15/22 0332   Flush/Irrigation flushed w/;water;no resistance met 04/17/22 0305   Water Bolus (mL) 100 mL 04/17/22 0505   Residual Amount (ml) 40 ml 04/16/22 0700       Female External Urinary Catheter 04/13/22 1820 (Active)   Skin no redness;no breakdown 04/17/22 0305   Tolerance no signs/symptoms of discomfort 04/17/22 0305   Suction Continuous suction at 60 mmHg 04/16/22 2004   Date of last wick change 04/16/22 04/16/22 2004   Time of last wick change 0956 04/16/22 0700   Output (mL) 200 mL 04/17/22 0505       Physical Exam    Neurosurgery Physical Exam  [id number]  AAO x 3  Follow commands on RUE  RLE - wd  L - contracted and plegic      Cranial dressing  c/d/I    SD JAMAR to gravity with SS output      Significant Labs:  Recent Labs   Lab 04/20/22  0220 04/21/22  0205   GLU 79 113*   * 146*   K 3.6 4.4   * 116*   CO2 19* 18*   BUN 27* 26*   CREATININE 1.0 1.0   CALCIUM 7.9* 7.6*       Recent Labs   Lab 04/20/22  0220 04/21/22  0205   WBC 10.41 12.62   HGB 8.2* 8.6*   HCT 24.7* 27.2*    181       No results for input(s): LABPT, INR, APTT in the last 48 hours.    Microbiology Results (last 7 days)       Procedure Component Value Units Date/Time    Blood culture [[id number]] Collected: 04/14/22 1208    Order Status: Completed Specimen: Blood from Peripheral, Hand, Right Updated: 04/19/22 1412     Blood Culture, Routine No growth after 5 days.    Blood culture [[id number]] Collected: 04/14/22 1226    Order Status: Completed Specimen: Blood from Peripheral, Hand, Left Updated: 04/19/22 1412     Blood Culture, Routine No growth after 5 days.    Blood culture [[id number]] Collected: 04/11/22 1021    Order Status: Completed Specimen: Blood from Peripheral, Left Arm Updated: 04/16/22 1212     Blood Culture, Routine No growth after 5 days.    Narrative:      Collection has been rescheduled by [facility] at 04/11/2022 10:04 Reason:   Patient unavailable, patient care, nurse paige notified.  Collection has been rescheduled by [facility] at 04/11/2022 10:04 Reason:   Patient unavailable, patient care, nurse paige notified.    Blood culture [[id number]] Collected: 04/11/22 1020    Order Status: Completed Specimen: Blood from Peripheral, Left Hand Updated: 04/16/22 1212     Blood Culture, Routine No growth after 5 days.    Narrative:      Collection has been rescheduled by [facility] at 04/11/2022 10:04 Reason:   Patient unavailable, patient care, nurse paige notified.  Collection has been rescheduled by [facility] at 04/11/2022 10:04 Reason:   Patient unavailable, patient care, nurse paige notified.    Culture, Respiratory with Gram Stain [[id number]]     Order Status: No result Specimen:  Respiratory           All pertinent labs from the last 24 hours have been reviewed.    Significant Diagnostics:  I have reviewed all pertinent imaging results/findings within the past 24 hours.  CT Head Without Contrast    Result Date: 4/20/2022  Postop changes of right craniotomy for subdural hematoma evacuation.  Small residual right extra-axial collection and postop pneumocephalus.  Significant interval decrease in mass effect on the adjacent brain parenchyma and right lateral ventricle.  Interval resolution of leftward midline shift. Stable remote infarcts in the right frontal lobe, right basal ganglia, and left cerebellum.  No evidence for new intracranial hemorrhage or recent infarct. Electronically signed by resident: Concepcion Urban Date:    04/20/2022 Time:    20:59 Electronically signed by: Chintan Hinojosa MD Date:    04/20/2022 Time:    21:23         Assessment/Plan:     * SDH (subdural hematoma)  71 year old female with , hypothyroidism, CVA with left-sided residual deficit on ASA/Plavix, DM2, HTN, HLD, CKD, chronic anemia, presenting from SNF after being found down next to wheelchair after unwitnessed fall, consulted to NSGY for right 2 cm SDH mostly acute, membranous and heterogenous appearing, some chronic component, without midline shift. Now s/p R crani for SDH evacuation (4/4).    Now s/p repeat R crani for SDH evacuation on 4/20 without complication. Postop CTH with expected changes.       --Admitted to [facility]   -- q1 hour vitals/neurochecks; exam wax and wane   -- SBP < 160  -- Na goal eunatremia.  -- Seizure control per neuro ICU: Keppra 1500 BID and vimpat  b.i.d.    EEG with right-sided none compulsive seizures   -- CTH stable 4/19. Postop 4/20 as above.   -- Saturating well on room   -- HOB flat  -- Monitor SD JAMAR output.  -- PPx: SCDs, BR, IS. Okay for SQH for dvt ppx.   -- Osteomyelitis the or really alternative is of R foot:    - ID rec 6 week course Vanc with end date 5/5. F/u full ID recs. OK  to start Etrapenem if pt decompensates.   -Podiatry managing recent partial foot amputation, appreciate recs. Sutures removed 4/12.    -Home health/facility to do dressing changes every MWF and prn as follows:  Cleanse right foot incision site with saline or wound cleanser, paint incision site with betadine, cover with 4x4 gauze, kerlix, ACE bandage.   -- Anemia: Resolved.   -- Fever 4/11: Afebrile overnight.    - UA 4/11: Growing GNRs, f/u susceptibility   - CXR with possible basilar effusion.   - BLE US 4/11: w/o DVT.   - HM following. Appreciate assistance with care.    - Blood cx 4/11: NGTD.    Dispo: c/w ICU care.             Brock Fairchild MD  Neurosurgery  [facility] - [facility]

## 2022-04-21 NOTE — SUBJECTIVE & OBJECTIVE
Interval History: 4/21: POD 1 s/p R crani for SDH re-evacuation. NAEON. AFVSS. Exam improving. SD JAMAR in place to gravity. Postop CTH expected changes.      Medications:  Continuous Infusions:   dextrose 5 % and 0.9 % NaCl 75 mL/hr at 04/21/22 1037    lactated ringers 75 mL/hr at 04/21/22 0733     Scheduled Meds:   amLODIPine  10 mg Per NG tube Daily    atorvastatin  40 mg Per NG tube Daily    carvediloL  25 mg Per NG tube BID    lacosamide (VIMPAT) IVPB  200 mg Intravenous Q12H    levetiracetam IV  1,500 mg Intravenous Q12H    levothyroxine  75 mcg Per NG tube Before breakfast    losartan  25 mg Per NG tube Daily    senna-docusate 8.6-50 mg  1 tablet Per NG tube BID    silodosin  4 mg Per NG tube Daily    vancomycin (VANCOCIN) IVPB  500 mg Intravenous Q24H     PRN Meds:acetaminophen, dextrose 10%, glucagon (human recombinant), hydrALAZINE, HYDROcodone-acetaminophen, labetalol, metoclopramide HCl, ondansetron, potassium bicarbonate, potassium bicarbonate, potassium bicarbonate, potassium, sodium phosphates, potassium, sodium phosphates, potassium, sodium phosphates, sodium chloride 0.9%, sodium chloride 0.9%, Pharmacy to dose Vancomycin consult **AND** vancomycin - pharmacy to dose     Review of Systems  Objective:     Weight: 49.4 kg (109 lb)  Body mass index is 17.07 kg/m².  Vital Signs (Most Recent):  Temp: (!) 32 °F (0 °C) (04/21/22 0801)  Pulse: 80 (04/21/22 0901)  Resp: 11 (04/21/22 0901)  BP: 127/67 (04/21/22 0901)  SpO2: (!) 93 % (04/21/22 0901)   Vital Signs (24h Range):  Temp:  [32 °F (0 °C)-97.9 °F (36.6 °C)] 32 °F (0 °C)  Pulse:  [65-80] 80  Resp:  [2-20] 11  SpO2:  [93 %-100 %] 93 %  BP: (110-158)/(57-81) 127/67  Arterial Line BP: (116-152)/(47-62) 133/56     Date 04/21/22 0700 - 04/22/22 0659   Shift 9271-4800 7487-3456 2399-4685 24 Hour Total   INTAKE   I.V.(mL/kg) 115(2.3)   115(2.3)   IV Piggyback 44.9   44.9   Shift Total(mL/kg) 159.9(3.2)   159.9(3.2)   OUTPUT   Urine(mL/kg/hr) 35   35   Shift  Total(mL/kg) 35(0.7)   35(0.7)   Weight (kg) 49.4 49.4 49.4 49.4                          NG/OG Tube 04/14/22 1200 [place] sump 14 Fr. Left nostril (Active)   Placement Check placement verified by aspirate characteristics;placement verified by x-ray 04/17/22 0305   Tolerance no signs/symptoms of discomfort 04/17/22 0305   Securement secured to nostril center w/ adhesive device 04/17/22 0305   Clamp Status/Tolerance clamped 04/17/22 0305   Suction Setting/Drainage Method suction at the bedside 04/17/22 0305   Insertion Site Appearance no redness, warmth, tenderness, skin breakdown, drainage 04/17/22 0305   Drainage None 04/15/22 0332   Flush/Irrigation flushed w/;water;no resistance met 04/17/22 0305   Water Bolus (mL) 100 mL 04/17/22 0505   Residual Amount (ml) 40 ml 04/16/22 0700       Female External Urinary Catheter 04/13/22 1820 (Active)   Skin no redness;no breakdown 04/17/22 0305   Tolerance no signs/symptoms of discomfort 04/17/22 0305   Suction Continuous suction at 60 mmHg 04/16/22 2004   Date of last wick change 04/16/22 04/16/22 2004   Time of last wick change 0956 04/16/22 0700   Output (mL) 200 mL 04/17/22 0505       Physical Exam    Neurosurgery Physical Exam  [id number]  AAO x 3  Follow commands on RUE  RLE - wd  L - contracted and plegic      Cranial dressing c/d/I    SD JAMAR to gravity with SS output      Significant Labs:  Recent Labs   Lab 04/20/22 0220 04/21/22  0205   GLU 79 113*   * 146*   K 3.6 4.4   * 116*   CO2 19* 18*   BUN 27* 26*   CREATININE 1.0 1.0   CALCIUM 7.9* 7.6*       Recent Labs   Lab 04/20/22 0220 04/21/22  0205   WBC 10.41 12.62   HGB 8.2* 8.6*   HCT 24.7* 27.2*    181       No results for input(s): LABPT, INR, APTT in the last 48 hours.    Microbiology Results (last 7 days)       Procedure Component Value Units Date/Time    Blood culture [[id number]] Collected: 04/14/22 1208    Order Status: Completed Specimen: Blood from Peripheral, Hand, Right Updated: 04/19/22  1412     Blood Culture, Routine No growth after 5 days.    Blood culture [[id number]] Collected: 04/14/22 1226    Order Status: Completed Specimen: Blood from Peripheral, Hand, Left Updated: 04/19/22 1412     Blood Culture, Routine No growth after 5 days.    Blood culture [[id number]] Collected: 04/11/22 1021    Order Status: Completed Specimen: Blood from Peripheral, Left Arm Updated: 04/16/22 1212     Blood Culture, Routine No growth after 5 days.    Narrative:      Collection has been rescheduled by [facility] at 04/11/2022 10:04 Reason:   Patient unavailable, patient care, nurse grecia notified.  Collection has been rescheduled by [facility] at 04/11/2022 10:04 Reason:   Patient unavailable, patient care, nurse grecia notified.    Blood culture [[id number]] Collected: 04/11/22 1020    Order Status: Completed Specimen: Blood from Peripheral, Left Hand Updated: 04/16/22 1212     Blood Culture, Routine No growth after 5 days.    Narrative:      Collection has been rescheduled by [facility] at 04/11/2022 10:04 Reason:   Patient unavailable, patient care, nurse grecia notified.  Collection has been rescheduled by [facility] at 04/11/2022 10:04 Reason:   Patient unavailable, patient care, nurse grecia notified.    Culture, Respiratory with Gram Stain [[id number]]     Order Status: No result Specimen: Respiratory           All pertinent labs from the last 24 hours have been reviewed.    Significant Diagnostics:  I have reviewed all pertinent imaging results/findings within the past 24 hours.  CT Head Without Contrast    Result Date: 4/20/2022  Postop changes of right craniotomy for subdural hematoma evacuation.  Small residual right extra-axial collection and postop pneumocephalus.  Significant interval decrease in mass effect on the adjacent brain parenchyma and right lateral ventricle.  Interval resolution of leftward midline shift. Stable remote infarcts in the right frontal lobe, right basal ganglia, and left cerebellum.  No evidence for new  intracranial hemorrhage or recent infarct. Electronically signed by resident: Concepcion Urban Date:    04/20/2022 Time:    20:59 Electronically signed by: Chintan Hinojosa MD Date:    04/20/2022 Time:    21:23

## 2022-04-21 NOTE — PROGRESS NOTES
Pharmacokinetic Assessment Follow Up: IV Vancomycin    Vancomycin serum concentration assessment(s):    The trough level was drawn ~19 hours post dose (early). The measurement is above the desired definitive target range of 15 to 20 mcg/mL but true trough likely within 15 - 20 mcg/ml. Scr improving    Vancomycin Regimen Plan:    Continue regimen of Vancomycin 500 mg IV every 24 hours with next serum trough concentration measured at 2100 prior to 4th dose on 4/24      Drug levels (last 3 results):  Recent Labs   Lab Result Units 04/19/22  1341 04/21/22  1642   Vancomycin-Trough ug/mL 19.6 21.2       Pharmacy will continue to follow and monitor vancomycin.    Please contact pharmacy at extension [phone] for questions regarding this assessment.    Thank you for the consult,   Angelita Leonel       Patient brief summary:  Beatriz Adame is a 71 y.o. female initiated on antimicrobial therapy with IV Vancomycin for treatment of bone/joint infection      Drug Allergies:   Review of patient's allergies indicates:   Allergen Reactions    Tomato (solanum lycopersicum) Hives and Itching       Actual Body Weight:   49.4 kg    Renal Function:   Estimated Creatinine Clearance: 40.8 mL/min (based on SCr of 1 mg/dL).,         CBC (last 72 hours):  Recent Labs   Lab Result Units 04/19/22  0333 04/20/22 0220 04/21/22  0205   WBC K/uL 13.52* 10.41 12.62   Hemoglobin g/dL 10.1* 8.2* 8.6*   Hematocrit % 29.9* 24.7* 27.2*   Platelets K/uL 128* 162 181   Gran % % 72.9 69.3 80.3*   Lymph % % 17.8* 21.1 15.5*   Mono % % 8.1 7.6 3.6*   Eosinophil % % 0.5 1.2 0.0   Basophil % % 0.2 0.4 0.2   Differential Method  Automated Automated Automated       Metabolic Panel (last 72 hours):  Recent Labs   Lab Result Units 04/19/22  0339 04/20/22  0220 04/21/22  0205   Sodium mmol/L 144 146* 146*   Potassium mmol/L 4.0 3.6 4.4   Chloride mmol/L 112* 117* 116*   CO2 mmol/L 20* 19* 18*   Glucose mg/dL 123* 79 113*   BUN mg/dL 31* 27* 26*   Creatinine mg/dL  1.1 1.0 1.0   Albumin g/dL 2.4* 1.9* 1.8*   Total Bilirubin mg/dL 0.7 0.5 0.3   Alkaline Phosphatase U/L 70 53* 52*   AST U/L 15 12 14   ALT U/L 13 10 9*       Vancomycin Administrations:  vancomycin given in the last 96 hours                   vancomycin 500 mg in dextrose 5 % 100 mL IVPB (ready to mix system) (mg) 500 mg New Bag 04/20/22 2206      Restarted 04/19/22 1510     500 mg New Bag  1505     500 mg New Bag 04/18/22 1718                Microbiologic Results:  Microbiology Results (last 7 days)     Procedure Component Value Units Date/Time    Blood culture [[id number]] Collected: 04/14/22 1208    Order Status: Completed Specimen: Blood from Peripheral, Hand, Right Updated: 04/19/22 1412     Blood Culture, Routine No growth after 5 days.    Blood culture [[id number]] Collected: 04/14/22 1226    Order Status: Completed Specimen: Blood from Peripheral, Hand, Left Updated: 04/19/22 1412     Blood Culture, Routine No growth after 5 days.    Blood culture [[id number]] Collected: 04/11/22 1021    Order Status: Completed Specimen: Blood from Peripheral, Left Arm Updated: 04/16/22 1212     Blood Culture, Routine No growth after 5 days.    Narrative:      Collection has been rescheduled by [facility] at 04/11/2022 10:04 Reason:   Patient unavailable, patient care, nurse paige notified.  Collection has been rescheduled by [facility] at 04/11/2022 10:04 Reason:   Patient unavailable, patient care, nurse paige notified.    Blood culture [[id number]] Collected: 04/11/22 1020    Order Status: Completed Specimen: Blood from Peripheral, Left Hand Updated: 04/16/22 1212     Blood Culture, Routine No growth after 5 days.    Narrative:      Collection has been rescheduled by [facility] at 04/11/2022 10:04 Reason:   Patient unavailable, patient care, nurse paige notified.  Collection has been rescheduled by T at 04/11/2022 10:04 Reason:   Patient unavailable, patient care, nurse paige notified.

## 2022-04-21 NOTE — PLAN OF CARE
[facility] Care Plan    POC reviewed with Beatriz Adame at 0300. Pt unable to verbalize understanding. Questions and concerns addressed. No acute events overnight. Pt progressing toward goals. Will continue to monitor. See below and flowsheets for full assessment and VS info.           Is this a stroke patient? yes- Stroke booklet reviewed with patient and family, risk factors identified for patient and stroke booklet remains at bedside for ongoing education.     Neuro:  [place] Coma Scale  Best Eye Response: 3-->(E3) to speech  Best Motor Response: 6-->(M6) obeys commands  Best Verbal Response: 1-->(V1) none  [place] Coma Scale Score: 10  Assessment Qualifiers: patient not sedated/intubated, no eye obstruction present  Pupil PERRLA: yes     24hr Temp:  [96.6 °F (35.9 °C)-97.9 °F (36.6 °C)]     CV:   Rhythm: normal sinus rhythm  BP goals:   SBP < 160  MAP > 65    Resp:   O2 Device (Oxygen Therapy): Simple Face Mask  Vent Mode: Spont  Set Rate: 15 BPM  Oxygen Concentration (%): 98  Vt Set: 450 mL  PEEP/CPAP: 5 cmH20  Pressure Support: 5 cmH20    Plan: N/A    GI/:     Diet/Nutrition Received: NPO  Last Bowel Movement: 04/20/22  Voiding Characteristics: incontinence, other (see comments) (urinary retention)    Intake/Output Summary (Last 24 hours) at 4/21/2022 0639  Last data filed at 4/21/2022 0601  Gross per 24 hour   Intake 3128.57 ml   Output 420 ml   Net 2708.57 ml     Unmeasured Output  Urine Occurrence: 1  Stool Occurrence: 1  Pad Count: 1    Labs/Accuchecks:  Recent Labs   Lab 04/21/22  0205   WBC 12.62   RBC 2.82*   HGB 8.6*   HCT 27.2*         Recent Labs   Lab 04/21/22  0205   *   K 4.4   CO2 18*   *   BUN 26*   CREATININE 1.0   ALKPHOS 52*   ALT 9*   AST 14   BILITOT 0.3      Recent Labs   Lab 04/18/22  1037   INR 1.1   APTT 26.2    No results for input(s): CPK, CPKMB, TROPONINI, MB in the last 168 hours.    Electrolytes: N/A - electrolytes WDL  Accuchecks: Q-shift    Gtts:   lactated  ringers 75 mL/hr at 04/21/22 0601       LDA/Wounds:  Lines/Drains/Airways       Peripherally Inserted Central Catheter Line  Duration             PICC Double Lumen 03/26/22 1905 right basilic 25 days              Drain  Duration                  NG/OG Tube 04/14/22 1200 [place] sump 14 Fr. Left nostril 6 days         Closed/Suction Drain 04/20/22 1732 Right Scalp Bulb 10 Fr. <1 day         Urethral Catheter 04/20/22 1230 Latex 16 Fr. <1 day              Arterial Line  Duration             Arterial Line 04/20/22 1627 Right Radial <1 day              Peripheral Intravenous Line  Duration                  Peripheral IV - Single Lumen 04/20/22 1622 18 G Left Hand <1 day                  Wounds: Yes  Wound care consulted: Yes

## 2022-04-21 NOTE — PLAN OF CARE
Recommendations    1. Resume TF when able: Isosource 1.5 @ goal rate 45 mL/hr    - This provides 1620 kcal, 73g protein, and 825 mL free water    - Impact provides too much protein for this pt     2. NPO remains     3. RD to monitor and follow    Goals: receive nutrition by RD follow-up  Nutrition Goal Status: goal not met    Communication of RD Recs:  (POC)      Milvia Payne MS, RD, LDN  Ext: [phone]

## 2022-04-21 NOTE — ASSESSMENT & PLAN NOTE
S/p evac on 4/4/13; stable and stepped down from [facility].    On 4/13 patient had 8/10 HA and was slower to follow commands.  Repeat CTH showed re-accumulation of R SDH along with 4-5mm MLS.  Stepped back up to [facility].    -- NSGY following  -- SBP <160  -- q1hr neuro check  -- hold anticoagulation  -- EEG revealed seizure  -- Continue vimpat and keppra    4/20 OR today  4/21 day 1 post op, keep horizontal and flat, SD drain in place

## 2022-04-21 NOTE — ASSESSMENT & PLAN NOTE
Nutrition Problem:  Severe Protein-Calorie Malnutrition  Malnutrition in the context of Chronic Illness/Injury     Related to (etiology):  Decreased ability to feed self     Signs and Symptoms (as evidenced by):  Energy Intake: <75% of estimated energy requirement for 3 months  Body Fat Depletion: moderate and severe depletion of orbitals, triceps and thoracic and lumbar region   Muscle Mass Depletion: moderate and severe depletion of temples, clavicle region, scapular region, interosseous muscle and lower extremities   Weight Loss: 20% x 6 months      Interventions (treatment strategy):  Collaboration of nutrition care with other providers     Nutrition Diagnosis Status:      Continues

## 2022-04-21 NOTE — PROGRESS NOTES
[facility] - Neuro Critical Care  Adult Nutrition  Progress Note    SUMMARY     Recommendations    1. Resume TF when able: Isosource 1.5 @ goal rate 45 mL/hr    - This provides 1620 kcal, 73g protein, and 825 mL free water    - Impact provides too much protein for this pt     2. NPO remains     3. RD to monitor and follow    Goals: receive nutrition by RD follow-up  Nutrition Goal Status: goal not met    Communication of RD Recs:  (POC)    Assessment and Plan    Severe protein-calorie malnutrition  Nutrition Problem:  Severe Protein-Calorie Malnutrition  Malnutrition in the context of Chronic Illness/Injury     Related to (etiology):  Decreased ability to feed self     Signs and Symptoms (as evidenced by):  Energy Intake: <75% of estimated energy requirement for 3 months  Body Fat Depletion: moderate and severe depletion of orbitals, triceps and thoracic and lumbar region   Muscle Mass Depletion: moderate and severe depletion of temples, clavicle region, scapular region, interosseous muscle and lower extremities   Weight Loss: 20% x 6 months      Interventions (treatment strategy):  Collaboration of nutrition care with other providers     Nutrition Diagnosis Status:      Continues     Malnutrition Assessment    Malnutrition Type: chronic illness  Energy Intake: severe energy intake          Weight Loss (Malnutrition): greater than 10% in 6 months  Energy Intake (Malnutrition): less than or equal to 75% for greater than or equal to 1 month   Orbital Region (Subcutaneous Fat Loss): moderate depletion  Upper Arm Region (Subcutaneous Fat Loss): severe depletion   [place] Region (Muscle Loss): moderate depletion  Clavicle Bone Region (Muscle Loss): severe depletion  Clavicle and Acromion Bone Region (Muscle Loss): severe depletion  Patellar Region (Muscle Loss): severe depletion  Anterior Thigh Region (Muscle Loss): severe depletion  Posterior Calf Region (Muscle Loss): severe depletion       Subcutaneous Fat Loss (Final  "Summary): severe protein-calorie malnutrition  Muscle Loss Evaluation (Final Summary): severe protein-calorie malnutrition         Reason for Assessment    Reason For Assessment: RD follow-up  Diagnosis:  (SDH)  Relevant Medical History: baseline dementia, GERD, R MCA stroke with residual LUE weakness, HLD, HTN, Stage IV CKD, TIIDM  Interdisciplinary Rounds: did not attend    General Information Comments: Pt presents with SDH. S/p evacuation 4/20. TF held x several days 2/2 plan for OR and requirement for pt to lay flat. RN reported plan to start Impact Peptide tomorrow; unsure why. Was tolerating TF prior. GCS 10; pt nonverbal. NFPE updated 4/21; pt meets continues with severe malnutrition in the context of chronic illness. Pending SNF placement.    Nutrition Discharge Planning: pending medical course    Nutrition Risk Screen    Nutrition Risk Screen: no indicators present    Nutrition/Diet History    Spiritual, Cultural Beliefs, Denominational Practices, Values that Affect Care: no  Food Allergies:  (tomato)  Factors Affecting Nutritional Intake: NPO (TF held)    Anthropometrics    Temp: 97.6 °F (36.4 °C)  Height: 5' 7" (170.2 cm)  Height (inches): 67 in  Weight Method: Bed Scale  Weight: 49.4 kg (109 lb)  Weight (lb): 109 lb  Ideal Body Weight (IBW), Female: 135 lb  % Ideal Body Weight, Female (lb): 80.74 %  BMI (Calculated): 17.1  BMI Grade: 17 - 18.4 protein-energy malnutrition grade I       Lab/Procedures/Meds    Pertinent Labs Reviewed: reviewed  Pertinent Labs Comments: Na 146, BUN 26, Alk phos 52, Total protein 5.2  Pertinent Medications Reviewed: reviewed  Pertinent Medications Comments: senna, vancomycin, LR    Estimated/Assessed Needs    Weight Used For Calorie Calculations: 49.8 kg (109 lb 12.6 oz)  Energy Calorie Requirements (kcal): 6390-0837 kcal/day  Energy Need Method: Kcal/kg (30-35)  Protein Requirements: 60-70 gm/day (1.2-1.4 gm/kg)  Weight Used For Protein Calculations: 49.8 kg (109 lb 12.6 " oz)  Fluid Requirements (mL): 1 mL/kcal or per MD     RDA Method (mL): 1494  CHO Requirement: 186-218 gm/day    Nutrition Prescription Ordered    Current Diet Order: NPO  Nutrition Order Comments: No TF running    Evaluation of Received Nutrient/Fluid Intake    % Kcal Needs: 0  % Protein Needs: 0  I/O: +10.4L since admit  Energy Calories Required: not meeting needs  Protein Required: not meeting needs  Comments: LBM 4/20  Tolerance:  (NPO)  % Intake of Estimated Energy Needs: 0 - 25 %  % Meal Intake: NPO    Nutrition Risk    Level of Risk/Frequency of Follow-up: low     Monitor and Evaluation    Food and Nutrient Intake: energy intake, enteral nutrition intake  Food and Nutrient Adminstration: enteral and parenteral nutrition administration  Knowledge/Beliefs/Attitudes: food and nutrition knowledge/skill  Physical Activity and Function: nutrition-related ADLs and IADLs  Anthropometric Measurements: weight, weight change  Biochemical Data, Medical Tests and Procedures: electrolyte and renal panel, gastrointestinal profile, glucose/endocrine profile, inflammatory profile, lipid profile  Nutrition-Focused Physical Findings: overall appearance     Nutrition Follow-Up    RD Follow-up?: Yes

## 2022-04-21 NOTE — ANESTHESIA POSTPROCEDURE EVALUATION
Anesthesia Post Evaluation    Patient: Beatriz Adame    Procedure(s) Performed: Procedure(s) (LRB):  CRANIOTOMY, FOR SUBDURAL HEMATOMA EVACUATION (Right)    Final Anesthesia Type: general      Patient location during evaluation: ICU  Patient participation: Yes- Able to Participate  Level of consciousness: awake and alert  Post-procedure vital signs: reviewed and stable  Pain management: adequate  Airway patency: patent    PONV status at discharge: No PONV  Anesthetic complications: no      Cardiovascular status: hemodynamically stable  Respiratory status: unassisted, spontaneous ventilation and nasal cannula  Hydration status: euvolemic  Follow-up not needed.          Vitals Value Taken Time   /72 04/21/22 1126   Temp 0 °C (32 °F) 04/21/22 0801   Pulse 75 04/21/22 1126   Resp 15 04/21/22 1126   SpO2 100 % 04/21/22 1126   Vitals shown include unvalidated device data.      No case tracking events are documented in the log.      Pain/Luis Alfredo Score: No data recorded

## 2022-04-21 NOTE — ASSESSMENT & PLAN NOTE
71 year old female with , hypothyroidism, CVA with left-sided residual deficit on ASA/Plavix, DM2, HTN, HLD, CKD, chronic anemia, presenting from SNF after being found down next to wheelchair after unwitnessed fall, consulted to NSGY for right 2 cm SDH mostly acute, membranous and heterogenous appearing, some chronic component, without midline shift. Now s/p R crani for SDH evacuation (4/4).    Now s/p repeat R crani for SDH evacuation on 4/20 without complication. Postop CTH with expected changes.       --Admitted to [facility]   -- q1 hour vitals/neurochecks; exam wax and wane   -- SBP < 160  -- Na goal eunatremia.  -- Seizure control per neuro ICU: Keppra 1500 BID and vimpat  b.i.d.    EEG with right-sided none compulsive seizures   -- CTH stable 4/19. Postop 4/20 as above.   -- Saturating well on room   -- HOB flat  -- Monitor SD JAMAR output.  -- PPx: SCDs, BR, IS. Okay for SQH for dvt ppx.   -- Osteomyelitis the or really alternative is of R foot:    - ID rec 6 week course Vanc with end date 5/5. F/u full ID recs. OK to start Etrapenem if pt decompensates.   -Podiatry managing recent partial foot amputation, appreciate recs. Sutures removed 4/12.    -Home health/facility to do dressing changes every MWF and prn as follows:  Cleanse right foot incision site with saline or wound cleanser, paint incision site with betadine, cover with 4x4 gauze, kerlix, ACE bandage.   -- Anemia: Resolved.   -- Fever 4/11: Afebrile overnight.    - UA 4/11: Growing GNRs, f/u susceptibility   - CXR with possible basilar effusion.   - BLE US 4/11: w/o DVT.   - HM following. Appreciate assistance with care.    - Blood cx 4/11: NGTD.    Dispo: c/w ICU care.

## 2022-04-21 NOTE — SUBJECTIVE & OBJECTIVE
Interval History:      Review of Systems   Unable to perform ROS: Patient nonverbal     Objective:     Vitals:  Temp: 97.6 °F (36.4 °C)  Pulse: 78  Rhythm: normal sinus rhythm  BP: 132/66  MAP (mmHg): 90  Resp: 14  SpO2: 98 %  O2 Device (Oxygen Therapy): Simple Face Mask    Temp  Min: 96.6 °F (35.9 °C)  Max: 97.9 °F (36.6 °C)  Pulse  Min: 65  Max: 80  BP  Min: 110/57  Max: 158/81  MAP (mmHg)  Min: 78  Max: 113  Resp  Min: 2  Max: 18  SpO2  Min: 93 %  Max: 100 %    04/20 0701 - 04/21 0700  In: 3128.6 [I.V.:1834.7]  Out: 420 [Urine:400; Drains:20]   Unmeasured Output  Urine Occurrence: 1  Stool Occurrence: 1  Pad Count: 1       Physical Exam  HENT:      Head:      Comments: Pterional craniotomy on right  Subdural drain in place  Eyes:      Extraocular Movements: Extraocular movements intact.      Pupils: Pupils are equal, round, and reactive to light.   Cardiovascular:      Rate and Rhythm: Normal rate.      Heart sounds: Normal heart sounds.   Pulmonary:      Breath sounds: Normal breath sounds.   Abdominal:      Palpations: Abdomen is soft.   Musculoskeletal:      Cervical back: Neck supple.   Skin:     General: Skin is warm.   Neurological:      Mental Status: She is alert.      Comments: Left spastic hemiplegia  Right side bradykinetic but withdraws         Medications:  Continuousdextrose 5 % and 0.9 % NaCl, Last Rate: 75 mL/hr at 04/21/22 1051    ScheduledamLODIPine, 10 mg, Daily  atorvastatin, 40 mg, Daily  carvediloL, 25 mg, BID  lacosamide (VIMPAT) IVPB, 200 mg, Q12H  levetiracetam IV, 1,500 mg, Q12H  levothyroxine, 75 mcg, Before breakfast  losartan, 25 mg, Daily  senna-docusate 8.6-50 mg, 1 tablet, BID  silodosin, 4 mg, Daily  vancomycin (VANCOCIN) IVPB, 500 mg, Q24H    PRNacetaminophen, 650 mg, Q4H PRN  dextrose 10%, 12.5 g, PRN  glucagon (human recombinant), 1 mg, PRN  hydrALAZINE, 10 mg, Q8H PRN  HYDROcodone-acetaminophen, 1 tablet, Q4H PRN  labetalol, 10 mg, Q15 Min PRN  metoclopramide HCl, 5 mg, Q6H  PRN  ondansetron, 4 mg, Q6H PRN  potassium bicarbonate, 35 mEq, PRN  potassium bicarbonate, 50 mEq, PRN  potassium bicarbonate, 60 mEq, PRN  potassium, sodium phosphates, 2 packet, PRN  potassium, sodium phosphates, 2 packet, PRN  potassium, sodium phosphates, 2 packet, PRN  sodium chloride 0.9%, 10 mL, PRN  sodium chloride 0.9%, 10 mL, PRN  vancomycin - pharmacy to dose, , pharmacy to manage frequency      Today I personally reviewed pertinent medications, lines/drains/airways, imaging, laboratory results, notably:    Diet  Diet NPO  Diet NPO

## 2022-04-22 NOTE — ASSESSMENT & PLAN NOTE
- SBP < 160  - Cardene gtt stopped  - Start norvasc, coreg, & losartan  - Hold home microzide  - PRN labetalol, hydralazine  04/22: increase losartan

## 2022-04-22 NOTE — ASSESSMENT & PLAN NOTE
71 year old female with , hypothyroidism, CVA with left-sided residual deficit on ASA/Plavix, DM2, HTN, HLD, CKD, chronic anemia, presenting from SNF after being found down next to wheelchair after unwitnessed fall, consulted to NSGY for right 2 cm SDH mostly acute, membranous and heterogenous appearing, some chronic component, without midline shift. Now s/p R crani for SDH evacuation (4/4).    No acute changes. Incision dry, intact and non-inflammed. Will discontinue subdural drain today.      --Admitted to [facility]   -- q1 hour vitals/neurochecks; exam wax and wane   -- SBP < 160  -- Na goal eunatremia.  -- Seizure control per neuro ICU: Keppra 1500 BID and vimpat  b.i.d.    EEG with right-sided none compulsive seizures   -- CTH stable 4/19. Postop 4/20 as above.   -- Saturating well on room   -- HOB flat  -- PPx: SCDs, BR, IS. Okay for SQH for dvt ppx.   -- Osteomyelitis the or really alternative is of R foot:    - ID rec 6 week course Vanc with end date 5/5. F/u full ID recs. OK to start Etrapenem if pt decompensates.   -Podiatry managing recent partial foot amputation, appreciate recs. Sutures removed 4/12.    -Home health/facility to do dressing changes every MWF and prn as follows:  Cleanse right foot incision site with saline or wound cleanser, paint incision site with betadine, cover with 4x4 gauze, kerlix, ACE bandage.   -- Anemia: Resolved.   -- Fever 4/11: Afebrile overnight.    - UA 4/11: Growing GNRs, f/u susceptibility   - CXR with possible basilar effusion.   - BLE US 4/11: w/o DVT.   - HM following. Appreciate assistance with care.    - Blood cx 4/11: NGTD.    Dispo: c/w ICU care.

## 2022-04-22 NOTE — PLAN OF CARE
[facility] Care Plan    POC reviewed with Beatriz ARNDT Wendi and family at 0300. Pt unable to verbalized understanding. Questions and concerns addressed. No acute events overnight. Pt progressing toward goals. Will continue to monitor. See below and flowsheets for full assessment and VS info.           Is this a stroke patient? no    Neuro:  Casimiro Coma Scale  Best Eye Response: 3-->(E3) to speech  Best Motor Response: 6-->(M6) obeys commands  Best Verbal Response: 1-->(V1) none  Beecher Coma Scale Score: 10  Assessment Qualifiers: patient not sedated/intubated  Pupil PERRLA: yes     24hr Temp:  [97.6 °F (36.4 °C)-98.6 °F (37 °C)]     CV:   Rhythm: normal sinus rhythm  BP goals:   SBP < 160  MAP > 65    Resp:   O2 Device (Oxygen Therapy): Simple Face Mask  Vent Mode: Spont  Set Rate: 15 BPM  Oxygen Concentration (%): 98  Vt Set: 450 mL  PEEP/CPAP: 5 cmH20  Pressure Support: 5 cmH20    Plan: N/A    GI/:     Diet/Nutrition Received: NPO  Last Bowel Movement: 04/20/22  Voiding Characteristics: incontinence, other (see comments)    Intake/Output Summary (Last 24 hours) at 4/22/2022 0512  Last data filed at 4/22/2022 0508  Gross per 24 hour   Intake 1768.24 ml   Output 240 ml   Net 1528.24 ml     Unmeasured Output  Urine Occurrence: 1  Stool Occurrence: 1  Pad Count: 1    Labs/Accuchecks:  Recent Labs   Lab 04/22/22  0118   WBC 11.44   RBC 2.91*   HGB 9.0*   HCT 27.8*         Recent Labs   Lab 04/22/22  0118      K 4.1   CO2 18*   *   BUN 25*   CREATININE 1.2   ALKPHOS 52*   ALT 9*   AST 15   BILITOT 0.3      Recent Labs   Lab 04/18/22  1037   INR 1.1   APTT 26.2    No results for input(s): CPK, CPKMB, TROPONINI, MB in the last 168 hours.    Electrolytes: N/A - electrolytes WDL  Accuchecks: Q4H    Gtts:   dextrose 5 % and 0.9 % NaCl Stopped (04/22/22 2962)       LDA/Wounds:  Lines/Drains/Airways       Peripherally Inserted Central Catheter Line  Duration             PICC Double Lumen 03/26/22 1905 right  basilic 26 days              Drain  Duration                  NG/OG Tube 04/14/22 1200 [place] sump 14 Fr. Left nostril 7 days         Closed/Suction Drain 04/20/22 1732 Right Scalp Bulb 10 Fr. 1 day              Arterial Line  Duration             Arterial Line 04/20/22 1627 Right Radial 1 day              Peripheral Intravenous Line  Duration                  Peripheral IV - Single Lumen 04/20/22 1622 18 G Left Hand 1 day                  Wounds: Yes  Wound care consulted: Yes

## 2022-04-22 NOTE — PROGRESS NOTES
[facility] - Neuro Critical Care  Neurocritical Care  Progress Note    Admit Date: 4/3/2022  Service Date: 04/22/2022  Length of Stay: 19    Subjective:     Chief Complaint: SDH (subdural hematoma)    History of Present Illness: Ms. Adame is a 72 yo female with hx of dementia, R MCA stroke with left side contraction/hemiparesis presented to [facility] with SDH now s/p evac. Stepped down from [facility]. On 4/13 she developed worsening headache and CTH showed re-accumulation of R SDH and 4-5mm MLS. She was noted to be more slow to follow commands. Stepped back up to [facility] for closer monitoring.    Original HPI below:  Ms. Adame is a 72 y/o F with a PMHx of baseline dementia, GERD, R MCA stroke with residual LUE weakness, HLD, HTN, Stage IV CKD, TIIDM who presents to [facility] from [facility] with a AoC SDH after an unwitnessed fall from her wheel chair without loss of consciousness. CT spine negative for fracture. CT head with R SDH and 3-4 mm MLS. She is on DAPT at home. Of note, she has been staying at [facility] after a partial R foot amputation, stitches still intact, for which she is receiving vancomycin.         Hospital Course: 04/04/2022: OR today for clot evac. Patient returned to ICU intubated on precedex. Post op scan stable. Wean propofol and initiated precedex for likely extubation tomorrow.   04/05/2022 repeat CT head due to drainage.   04/06/2022 NAEO.   04/07/2022 NAEO. Successfully extubated  4/14/22: EEG revealed seizure   4/15/22: vimpat load overnight  04/16/2022: NAEON.  04/17/2022. NAEON. Repeat CTH stable.   04/18/2022  Lateralized periodic discharges on R with some seizure correlate. 200 vimpat x 1 and increase scheduled vimpat to 200. Platelets by NSGY 2/2 OR for clot evac today.   04/19/2022 OR postponed to today with NSGY. CT this morning stable. Continue EEG.   04/20: in OR for repeat evacuation  04/21: follow up CT after evacuation shows SD drain and little residual blood  04/22: losartan  adjusted to decrease prn use, enteral feeding and fluid to be started after MMA embolization      Interval History:      Review of Systems   Unable to perform ROS: Patient nonverbal     Objective:     Vitals:  Temp: 98.1 °F (36.7 °C)  Pulse: 80  Rhythm: normal sinus rhythm  BP: (!) 154/69  MAP (mmHg): 99  Resp: 10  SpO2: 98 %  O2 Device (Oxygen Therapy): nasal cannula    Temp  Min: 97.9 °F (36.6 °C)  Max: 98.6 °F (37 °C)  Pulse  Min: 71  Max: 85  BP  Min: 134/58  Max: 178/79  MAP (mmHg)  Min: 88  Max: 116  Resp  Min: 4  Max: 25  SpO2  Min: 94 %  Max: 100 %    04/21 0701 - 04/22 0700  In: 1715.1 [I.V.:1383.4]  Out: 780 [Urine:740; Drains:40]   Unmeasured Output  Urine Occurrence: 1  Stool Occurrence: 1  Pad Count: 1       Physical Exam  HENT:      Head:      Comments: Pterional craniotomy on right  Subdural drain in place  Eyes:      Extraocular Movements: Extraocular movements intact.      Pupils: Pupils are equal, round, and reactive to light.   Cardiovascular:      Rate and Rhythm: Normal rate.      Heart sounds: Normal heart sounds.   Pulmonary:      Breath sounds: Normal breath sounds.   Abdominal:      Palpations: Abdomen is soft.   Musculoskeletal:      Cervical back: Neck supple.   Skin:     General: Skin is warm.   Neurological:      Mental Status: She is alert.      Comments: Left spastic hemiplegia  Right side bradykinetic but withdraws         Medications:  Continuous ScheduledamLODIPine, 10 mg, Daily  atorvastatin, 40 mg, Daily  carvediloL, 25 mg, BID  lacosamide, 200 mg, Q12H  levetiracetam, 1,500 mg, BID  levothyroxine, 75 mcg, Before breakfast  [START ON 4/23/2022] losartan, 50 mg, Daily  senna-docusate 8.6-50 mg, 1 tablet, BID  silodosin, 4 mg, Daily  vancomycin (VANCOCIN) IVPB, 500 mg, Q24H    PRNacetaminophen, 650 mg, Q4H PRN  dextrose 10%, 12.5 g, PRN  glucagon (human recombinant), 1 mg, PRN  hydrALAZINE, 10 mg, Q8H PRN  HYDROcodone-acetaminophen, 1 tablet, Q4H PRN  labetalol, 10 mg, Q6H  PRN  metoclopramide HCl, 5 mg, Q6H PRN  ondansetron, 4 mg, Q6H PRN  potassium bicarbonate, 35 mEq, PRN  potassium bicarbonate, 50 mEq, PRN  potassium bicarbonate, 60 mEq, PRN  potassium, sodium phosphates, 2 packet, PRN  potassium, sodium phosphates, 2 packet, PRN  potassium, sodium phosphates, 2 packet, PRN  sodium chloride 0.9%, 10 mL, PRN  sodium chloride 0.9%, 10 mL, PRN  vancomycin - pharmacy to dose, , pharmacy to manage frequency      Today I personally reviewed pertinent medications, lines/drains/airways, laboratory results, notably:    Diet  Diet NPO  Diet NPO          Assessment/Plan:     Neuro  * SDH (subdural hematoma)  S/p evac on 4/4/13; stable and stepped down from [facility].    On 4/13 patient had 8/10 HA and was slower to follow commands.  Repeat CTH showed re-accumulation of R SDH along with 4-5mm MLS.  Stepped back up to [facility].    -- NSGY following  -- SBP <160  -- q1hr neuro check  -- hold anticoagulation  -- EEG revealed seizure  -- Continue vimpat and keppra    4/20 OR today  4/21 day 1 post op, keep horizontal and flat, SD drain in place  4/22: per neurosurgery, possible mma embolization today        Recurrent seizures  Cont lacosamide and leviteracetam at current dosages    Cardiac/Vascular  Hypertension  - SBP < 160  - Cardene gtt stopped  - Start norvasc, coreg, & losartan  - Hold home microzide  - PRN labetalol, hydralazine  04/22: increase losartan    ID  Osteomyelitis of right foot  - S/p R partial foot amputation on vancomycin at [facility]  - continue vancomycin  - bandage, stitches intact   - Afebrile, WBC normal    Endocrine  Type 2 diabetes mellitus, with long-term current use of insulin  - A1c 5  - Accuchecks  SSI while npo            The patient is being Prophylaxed for:  Venous Thromboembolism with: Mechanical  Stress Ulcer with: None  Ventilator Pneumonia with: not applicable    Activity Orders          Elevate HOB Flat starting at 04/20 1845    Diet NPO: NPO starting at 04/19 0000    Turn  patient starting at 04/13 1600        Full Code    Jorge Pino MD  Neurocritical Care  [facility] - Neuro Critical Care

## 2022-04-22 NOTE — SUBJECTIVE & OBJECTIVE
Interval History:      Review of Systems   Unable to perform ROS: Patient nonverbal     Objective:     Vitals:  Temp: 98.1 °F (36.7 °C)  Pulse: 80  Rhythm: normal sinus rhythm  BP: (!) 154/69  MAP (mmHg): 99  Resp: 10  SpO2: 98 %  O2 Device (Oxygen Therapy): nasal cannula    Temp  Min: 97.9 °F (36.6 °C)  Max: 98.6 °F (37 °C)  Pulse  Min: 71  Max: 85  BP  Min: 134/58  Max: 178/79  MAP (mmHg)  Min: 88  Max: 116  Resp  Min: 4  Max: 25  SpO2  Min: 94 %  Max: 100 %    04/21 0701 - 04/22 0700  In: 1715.1 [I.V.:1383.4]  Out: 780 [Urine:740; Drains:40]   Unmeasured Output  Urine Occurrence: 1  Stool Occurrence: 1  Pad Count: 1       Physical Exam  HENT:      Head:      Comments: Pterional craniotomy on right  Subdural drain in place  Eyes:      Extraocular Movements: Extraocular movements intact.      Pupils: Pupils are equal, round, and reactive to light.   Cardiovascular:      Rate and Rhythm: Normal rate.      Heart sounds: Normal heart sounds.   Pulmonary:      Breath sounds: Normal breath sounds.   Abdominal:      Palpations: Abdomen is soft.   Musculoskeletal:      Cervical back: Neck supple.   Skin:     General: Skin is warm.   Neurological:      Mental Status: She is alert.      Comments: Left spastic hemiplegia  Right side bradykinetic but withdraws         Medications:  Continuous ScheduledamLODIPine, 10 mg, Daily  atorvastatin, 40 mg, Daily  carvediloL, 25 mg, BID  lacosamide, 200 mg, Q12H  levetiracetam, 1,500 mg, BID  levothyroxine, 75 mcg, Before breakfast  [START ON 4/23/2022] losartan, 50 mg, Daily  senna-docusate 8.6-50 mg, 1 tablet, BID  silodosin, 4 mg, Daily  vancomycin (VANCOCIN) IVPB, 500 mg, Q24H    PRNacetaminophen, 650 mg, Q4H PRN  dextrose 10%, 12.5 g, PRN  glucagon (human recombinant), 1 mg, PRN  hydrALAZINE, 10 mg, Q8H PRN  HYDROcodone-acetaminophen, 1 tablet, Q4H PRN  labetalol, 10 mg, Q6H PRN  metoclopramide HCl, 5 mg, Q6H PRN  ondansetron, 4 mg, Q6H PRN  potassium bicarbonate, 35 mEq,  PRN  potassium bicarbonate, 50 mEq, PRN  potassium bicarbonate, 60 mEq, PRN  potassium, sodium phosphates, 2 packet, PRN  potassium, sodium phosphates, 2 packet, PRN  potassium, sodium phosphates, 2 packet, PRN  sodium chloride 0.9%, 10 mL, PRN  sodium chloride 0.9%, 10 mL, PRN  vancomycin - pharmacy to dose, , pharmacy to manage frequency      Today I personally reviewed pertinent medications, lines/drains/airways, laboratory results, notably:    Diet  Diet NPO  Diet NPO

## 2022-04-22 NOTE — ASSESSMENT & PLAN NOTE
- S/p R partial foot amputation on vancomycin at [facility]  - continue vancomycin  - bandage, stitches intact   - Afebrile, WBC normal

## 2022-04-22 NOTE — ASSESSMENT & PLAN NOTE
S/p evac on 4/4/13; stable and stepped down from [facility].    On 4/13 patient had 8/10 HA and was slower to follow commands.  Repeat CTH showed re-accumulation of R SDH along with 4-5mm MLS.  Stepped back up to [facility].    -- NSGY following  -- SBP <160  -- q1hr neuro check  -- hold anticoagulation  -- EEG revealed seizure  -- Continue vimpat and keppra    4/20 OR today  4/21 day 1 post op, keep horizontal and flat, SD drain in place  4/22: per neurosurgery, possible mma embolization today

## 2022-04-22 NOTE — SUBJECTIVE & OBJECTIVE
Interval History: 4/22: No acute changes. Incision dry, intact and non-inflammed. Will discontinue subdural drain today.    Medications:  Continuous Infusions:   dextrose 5 % and 0.9 % NaCl 40 mL/hr at 04/22/22 0604     Scheduled Meds:   amLODIPine  10 mg Per NG tube Daily    atorvastatin  40 mg Per NG tube Daily    carvediloL  25 mg Per NG tube BID    lacosamide (VIMPAT) IVPB  200 mg Intravenous Q12H    levetiracetam IV  1,500 mg Intravenous Q12H    levothyroxine  75 mcg Per NG tube Before breakfast    losartan  25 mg Per NG tube Daily    senna-docusate 8.6-50 mg  1 tablet Per NG tube BID    silodosin  4 mg Per NG tube Daily    vancomycin (VANCOCIN) IVPB  500 mg Intravenous Q24H     PRN Meds:acetaminophen, dextrose 10%, glucagon (human recombinant), hydrALAZINE, HYDROcodone-acetaminophen, labetalol, metoclopramide HCl, ondansetron, potassium bicarbonate, potassium bicarbonate, potassium bicarbonate, potassium, sodium phosphates, potassium, sodium phosphates, potassium, sodium phosphates, sodium chloride 0.9%, sodium chloride 0.9%, Pharmacy to dose Vancomycin consult **AND** vancomycin - pharmacy to dose     Review of Systems  Objective:     Weight: 49.4 kg (109 lb)  Body mass index is 17.07 kg/m².  Vital Signs (Most Recent):  Temp: 98.6 °F (37 °C) (04/22/22 0305)  Pulse: 75 (04/22/22 0600)  Resp: 15 (04/22/22 0600)  BP: (!) 144/81 (04/22/22 0600)  SpO2: 98 % (04/22/22 0600)   Vital Signs (24h Range):  Temp:  [97.6 °F (36.4 °C)-98.6 °F (37 °C)] 98.6 °F (37 °C)  Pulse:  [71-85] 75  Resp:  [2-25] 15  SpO2:  [93 %-100 %] 98 %  BP: (124-178)/(58-81) 144/81  Arterial Line BP: ()/(51-97) 118/97                            NG/OG Tube 04/14/22 1200 [place] sump 14 Fr. Left nostril (Active)   Placement Check placement verified by aspirate characteristics;placement verified by x-ray 04/17/22 0305   Tolerance no signs/symptoms of discomfort 04/17/22 0305   Securement secured to nostril center w/ adhesive device 04/17/22  0305   Clamp Status/Tolerance clamped 04/17/22 0305   Suction Setting/Drainage Method suction at the bedside 04/17/22 0305   Insertion Site Appearance no redness, warmth, tenderness, skin breakdown, drainage 04/17/22 0305   Drainage None 04/15/22 0332   Flush/Irrigation flushed w/;water;no resistance met 04/17/22 0305   Water Bolus (mL) 100 mL 04/17/22 0505   Residual Amount (ml) 40 ml 04/16/22 0700       Female External Urinary Catheter 04/13/22 1820 (Active)   Skin no redness;no breakdown 04/17/22 0305   Tolerance no signs/symptoms of discomfort 04/17/22 0305   Suction Continuous suction at 60 mmHg 04/16/22 2004   Date of last wick change 04/16/22 04/16/22 2004   Time of last wick change 0956 04/16/22 0700   Output (mL) 200 mL 04/17/22 0505       Physical Exam    Neurosurgery Physical Exam  [id number]  AAO x 3  Follow commands on RUE  RLE - wd  L - contracted and plegic      Cranial dressing c/d/I    SD JAMAR to gravity with SS output      Significant Labs:  Recent Labs   Lab 04/21/22  0205 04/22/22  0118   * 130*   * 145   K 4.4 4.1   * 117*   CO2 18* 18*   BUN 26* 25*   CREATININE 1.0 1.2   CALCIUM 7.6* 8.0*       Recent Labs   Lab 04/21/22  0205 04/22/22  0118   WBC 12.62 11.44   HGB 8.6* 9.0*   HCT 27.2* 27.8*    185       No results for input(s): LABPT, INR, APTT in the last 48 hours.    Microbiology Results (last 7 days)       Procedure Component Value Units Date/Time    Blood culture [[id number]] Collected: 04/14/22 1208    Order Status: Completed Specimen: Blood from Peripheral, Hand, Right Updated: 04/19/22 1412     Blood Culture, Routine No growth after 5 days.    Blood culture [[id number]] Collected: 04/14/22 1226    Order Status: Completed Specimen: Blood from Peripheral, Hand, Left Updated: 04/19/22 1412     Blood Culture, Routine No growth after 5 days.    Blood culture [[id number]] Collected: 04/11/22 1021    Order Status: Completed Specimen: Blood from Peripheral, Left Arm Updated:  04/16/22 1212     Blood Culture, Routine No growth after 5 days.    Narrative:      Collection has been rescheduled by T at 04/11/2022 10:04 Reason:   Patient unavailable, patient care, nurse grecia notified.  Collection has been rescheduled by T at 04/11/2022 10:04 Reason:   Patient unavailable, patient care, nurse apige notified.    Blood culture [[id number]] Collected: 04/11/22 1020    Order Status: Completed Specimen: Blood from Peripheral, Left Hand Updated: 04/16/22 1212     Blood Culture, Routine No growth after 5 days.    Narrative:      Collection has been rescheduled by T at 04/11/2022 10:04 Reason:   Patient unavailable, patient care, nurse paige notified.  Collection has been rescheduled by T at 04/11/2022 10:04 Reason:   Patient unavailable, patient care, nurse paige notified.          All pertinent labs from the last 24 hours have been reviewed.    Significant Diagnostics:  I have reviewed all pertinent imaging results/findings within the past 24 hours.  CT Head Without Contrast    Result Date: 4/20/2022  Postop changes of right craniotomy for subdural hematoma evacuation.  Small residual right extra-axial collection and postop pneumocephalus.  Significant interval decrease in mass effect on the adjacent brain parenchyma and right lateral ventricle.  Interval resolution of leftward midline shift. Stable remote infarcts in the right frontal lobe, right basal ganglia, and left cerebellum.  No evidence for new intracranial hemorrhage or recent infarct. Electronically signed by resident: Concepcion Urban Date:    04/20/2022 Time:    20:59 Electronically signed by: Chintan Hinojosa MD Date:    04/20/2022 Time:    21:23

## 2022-04-22 NOTE — PROGRESS NOTES
[facility]  Neurosurgery  Progress Note    Subjective:     History of Present Illness: 71 year old female with , hypothyroidism, CVA with left-sided residual deficit on ASA/Plavix, DM2, HTN, HLD, CKD, chronic anemia, presenting from SNF after being found down next to wheelchair after unwitnessed fall, consulted to NSGY for right 2 cm SDH without midline shift. She is altered at baseline and unable to provide to history, but family at bedside says she is more confused and less interactive than usual.       Post-Op Info:  Procedure(s) (LRB):  CRANIOTOMY, FOR SUBDURAL HEMATOMA EVACUATION (Right)   2 Days Post-Op     Interval History: 4/22: No acute changes. Incision dry, intact and non-inflammed. Will discontinue subdural drain today.    Medications:  Continuous Infusions:   dextrose 5 % and 0.9 % NaCl 40 mL/hr at 04/22/22 0604     Scheduled Meds:   amLODIPine  10 mg Per NG tube Daily    atorvastatin  40 mg Per NG tube Daily    carvediloL  25 mg Per NG tube BID    lacosamide (VIMPAT) IVPB  200 mg Intravenous Q12H    levetiracetam IV  1,500 mg Intravenous Q12H    levothyroxine  75 mcg Per NG tube Before breakfast    losartan  25 mg Per NG tube Daily    senna-docusate 8.6-50 mg  1 tablet Per NG tube BID    silodosin  4 mg Per NG tube Daily    vancomycin (VANCOCIN) IVPB  500 mg Intravenous Q24H     PRN Meds:acetaminophen, dextrose 10%, glucagon (human recombinant), hydrALAZINE, HYDROcodone-acetaminophen, labetalol, metoclopramide HCl, ondansetron, potassium bicarbonate, potassium bicarbonate, potassium bicarbonate, potassium, sodium phosphates, potassium, sodium phosphates, potassium, sodium phosphates, sodium chloride 0.9%, sodium chloride 0.9%, Pharmacy to dose Vancomycin consult **AND** vancomycin - pharmacy to dose     Review of Systems  Objective:     Weight: 49.4 kg (109 lb)  Body mass index is 17.07 kg/m².  Vital Signs (Most Recent):  Temp: 98.6 °F (37 °C) (04/22/22 0305)  Pulse: 75  (04/22/22 0600)  Resp: 15 (04/22/22 0600)  BP: (!) 144/81 (04/22/22 0600)  SpO2: 98 % (04/22/22 0600)   Vital Signs (24h Range):  Temp:  [97.6 °F (36.4 °C)-98.6 °F (37 °C)] 98.6 °F (37 °C)  Pulse:  [71-85] 75  Resp:  [2-25] 15  SpO2:  [93 %-100 %] 98 %  BP: (124-178)/(58-81) 144/81  Arterial Line BP: ()/(51-97) 118/97                            NG/OG Tube 04/14/22 1200 [place] sump 14 Fr. Left nostril (Active)   Placement Check placement verified by aspirate characteristics;placement verified by x-ray 04/17/22 0305   Tolerance no signs/symptoms of discomfort 04/17/22 0305   Securement secured to nostril center w/ adhesive device 04/17/22 0305   Clamp Status/Tolerance clamped 04/17/22 0305   Suction Setting/Drainage Method suction at the bedside 04/17/22 0305   Insertion Site Appearance no redness, warmth, tenderness, skin breakdown, drainage 04/17/22 0305   Drainage None 04/15/22 0332   Flush/Irrigation flushed w/;water;no resistance met 04/17/22 0305   Water Bolus (mL) 100 mL 04/17/22 0505   Residual Amount (ml) 40 ml 04/16/22 0700       Female External Urinary Catheter 04/13/22 1820 (Active)   Skin no redness;no breakdown 04/17/22 0305   Tolerance no signs/symptoms of discomfort 04/17/22 0305   Suction Continuous suction at 60 mmHg 04/16/22 2004   Date of last wick change 04/16/22 04/16/22 2004   Time of last wick change 0956 04/16/22 0700   Output (mL) 200 mL 04/17/22 0505       Physical Exam    Neurosurgery Physical Exam  [id number]  AAO x 3  Follow commands on RUE  RLE - wd  L - contracted and plegic      Cranial dressing c/d/I    SD JAMAR to gravity with SS output      Significant Labs:  Recent Labs   Lab 04/21/22  0205 04/22/22  0118   * 130*   * 145   K 4.4 4.1   * 117*   CO2 18* 18*   BUN 26* 25*   CREATININE 1.0 1.2   CALCIUM 7.6* 8.0*       Recent Labs   Lab 04/21/22  0205 04/22/22 0118   WBC 12.62 11.44   HGB 8.6* 9.0*   HCT 27.2* 27.8*    185       No results for input(s): LABPT,  INR, APTT in the last 48 hours.    Microbiology Results (last 7 days)       Procedure Component Value Units Date/Time    Blood culture [[id number]] Collected: 04/14/22 1208    Order Status: Completed Specimen: Blood from Peripheral, Hand, Right Updated: 04/19/22 1412     Blood Culture, Routine No growth after 5 days.    Blood culture [[id number]] Collected: 04/14/22 1226    Order Status: Completed Specimen: Blood from Peripheral, Hand, Left Updated: 04/19/22 1412     Blood Culture, Routine No growth after 5 days.    Blood culture [[id number]] Collected: 04/11/22 1021    Order Status: Completed Specimen: Blood from Peripheral, Left Arm Updated: 04/16/22 1212     Blood Culture, Routine No growth after 5 days.    Narrative:      Collection has been rescheduled by [facility] at 04/11/2022 10:04 Reason:   Patient unavailable, patient care, nurse paige notified.  Collection has been rescheduled by T at 04/11/2022 10:04 Reason:   Patient unavailable, patient care, nurse paige notified.    Blood culture [[id number]] Collected: 04/11/22 1020    Order Status: Completed Specimen: Blood from Peripheral, Left Hand Updated: 04/16/22 1212     Blood Culture, Routine No growth after 5 days.    Narrative:      Collection has been rescheduled by T at 04/11/2022 10:04 Reason:   Patient unavailable, patient care, nurse paige notified.  Collection has been rescheduled by T at 04/11/2022 10:04 Reason:   Patient unavailable, patient care, nurse paige notified.          All pertinent labs from the last 24 hours have been reviewed.    Significant Diagnostics:  I have reviewed all pertinent imaging results/findings within the past 24 hours.  CT Head Without Contrast    Result Date: 4/20/2022  Postop changes of right craniotomy for subdural hematoma evacuation.  Small residual right extra-axial collection and postop pneumocephalus.  Significant interval decrease in mass effect on the adjacent brain parenchyma and right lateral ventricle.   Interval resolution of leftward midline shift. Stable remote infarcts in the right frontal lobe, right basal ganglia, and left cerebellum.  No evidence for new intracranial hemorrhage or recent infarct. Electronically signed by resident: Concepcion Urban Date:    04/20/2022 Time:    20:59 Electronically signed by: Chintan Hinojosa MD Date:    04/20/2022 Time:    21:23         Assessment/Plan:     * SDH (subdural hematoma)  71 year old female with , hypothyroidism, CVA with left-sided residual deficit on ASA/Plavix, DM2, HTN, HLD, CKD, chronic anemia, presenting from SNF after being found down next to wheelchair after unwitnessed fall, consulted to NSGY for right 2 cm SDH mostly acute, membranous and heterogenous appearing, some chronic component, without midline shift. Now s/p R crani for SDH evacuation (4/4).    No acute changes. Incision dry, intact and non-inflammed. Will discontinue subdural drain today.      --Admitted to NCC   -- q1 hour vitals/neurochecks; exam wax and wane   -- SBP < 160  -- Na goal eunatremia.  -- Seizure control per neuro ICU: Keppra 1500 BID and vimpat  b.i.d.    EEG with right-sided none compulsive seizures   -- CTH stable 4/19. Postop 4/20 as above.   -- Saturating well on room   -- HOB flat  -- PPx: SCDs, BR, IS. Okay for SQH for dvt ppx.   -- Osteomyelitis the or really alternative is of R foot:    - ID rec 6 week course Vanc with end date 5/5. F/u full ID recs. OK to start Etrapenem if pt decompensates.   -Podiatry managing recent partial foot amputation, appreciate recs. Sutures removed 4/12.    -Home health/facility to do dressing changes every MWF and prn as follows:  Cleanse right foot incision site with saline or wound cleanser, paint incision site with betadine, cover with 4x4 gauze, kerlix, ACE bandage.   -- Anemia: Resolved.   -- Fever 4/11: Afebrile overnight.    - UA 4/11: Growing GNRs, f/u susceptibility   - CXR with possible basilar effusion.   - BLE US 4/11: w/o DVT.   - HM  following. Appreciate assistance with care.    - Blood cx 4/11: NGTD.    Dispo: c/w ICU care.             Chaka Mendenhall MD  Neurosurgery  [facility]

## 2022-04-22 NOTE — PLAN OF CARE
[facility] Care Plan    POC reviewed with Beatriz Adame and family at 1500. Pt verbalized understanding. Questions and concerns addressed. No acute events today. Progressing toward goals. Will continue to monitor. See below and flowsheets for full assessment and VS info.   - Drain in place, no output during 7am-7pm  - LR started, still remains NPO  - Increased Losartan to decrease PRN use  - d/c'ed [place]  - US to RUE d/t swelling  - UO: 400    Is this a stroke patient? No    Neuro:  Casimiro Coma Scale  Best Eye Response: 4-->(E4) spontaneous  Best Motor Response: 5-->(M5) localizes pain  Best Verbal Response: 1-->(V1) none  [place] Coma Scale Score: 10  Assessment Qualifiers: patient not sedated/intubated  Pupil PERRLA: yes     24 hr Temp:  [98 °F (36.7 °C)-98.6 °F (37 °C)]     CV:   Rhythm: normal sinus rhythm  BP goals:   SBP < 160  MAP > 65    Resp:   O2 Device (Oxygen Therapy): nasal cannula  Vent Mode: Spont  Set Rate: 15 BPM  Oxygen Concentration (%): 98  Vt Set: 450 mL  PEEP/CPAP: 5 cmH20  Pressure Support: 5 cmH20    Plan: N/A    GI/:     Diet/Nutrition Received: NPO  Last Bowel Movement: 04/20/22  Voiding Characteristics: incontinence    Intake/Output Summary (Last 24 hours) at 4/22/2022 1533  Last data filed at 4/22/2022 1505  Gross per 24 hour   Intake 1494.88 ml   Output 1040 ml   Net 454.88 ml     Unmeasured Output  Urine Occurrence: 1  Stool Occurrence: 1  Pad Count: 1    Labs/Accuchecks:  Recent Labs   Lab 04/22/22  0118   WBC 11.44   RBC 2.91*   HGB 9.0*   HCT 27.8*         Recent Labs   Lab 04/22/22  0118      K 4.1   CO2 18*   *   BUN 25*   CREATININE 1.2   ALKPHOS 52*   ALT 9*   AST 15   BILITOT 0.3      Recent Labs   Lab 04/18/22  1037   INR 1.1   APTT 26.2    No results for input(s): CPK, CPKMB, TROPONINI, MB in the last 168 hours.    Electrolytes: N/A - electrolytes WDL  Accuchecks: Q6H    Gtts:   lactated ringers 50 mL/hr at 04/22/22 6418        LDA/Wounds:  Lines/Drains/Airways       Peripherally Inserted Central Catheter Line  Duration             PICC Double Lumen 03/26/22 1905 right basilic 26 days              Drain  Duration                  NG/OG Tube 04/14/22 1200 [place] sump 14 Fr. Left nostril 8 days         Closed/Suction Drain 04/20/22 1732 Right Scalp Bulb 10 Fr. 1 day              Peripheral Intravenous Line  Duration                  Peripheral IV - Single Lumen 04/20/22 1622 18 G Left Hand 1 day                  Wounds: Yes  Wound care consulted: Yes

## 2022-04-23 NOTE — PLAN OF CARE
[facility] Care Plan    POC reviewed with Beatriz ARNDT Wendi and family at 0300. Pt verbalized understanding. Questions and concerns addressed. No acute events overnight. Pt progressing toward goals. Will continue to monitor. See below and flowsheets for full assessment and VS info.           Is this a stroke patient? no    Neuro:  Casimiro Coma Scale  Best Eye Response: 3-->(E3) to speech  Best Motor Response: 6-->(M6) obeys commands  Best Verbal Response: 1-->(V1) none  Casimiro Coma Scale Score: 10  Assessment Qualifiers: patient not sedated/intubated  Pupil PERRLA: yes     24hr Temp:  [98 °F (36.7 °C)-98.6 °F (37 °C)]     CV:   Rhythm: normal sinus rhythm  BP goals:   SBP < 160  MAP > 65    Resp:   O2 Device (Oxygen Therapy): nasal cannula  Vent Mode: Spont  Set Rate: 15 BPM  Oxygen Concentration (%): 98  Vt Set: 450 mL  PEEP/CPAP: 5 cmH20  Pressure Support: 5 cmH20    Plan: N/A    GI/:     Diet/Nutrition Received: NPO  Last Bowel Movement: 04/20/22  Voiding Characteristics: incontinence    Intake/Output Summary (Last 24 hours) at 4/23/2022 0526  Last data filed at 4/23/2022 0512  Gross per 24 hour   Intake 1036.09 ml   Output 880 ml   Net 156.09 ml     Unmeasured Output  Urine Occurrence: 1  Stool Occurrence: 1  Pad Count: 1    Labs/Accuchecks:  Recent Labs   Lab 04/23/22  0140   WBC 11.48   RBC 3.38*   HGB 10.4*   HCT 32.2*         Recent Labs   Lab 04/23/22  0140   *   K 4.1   CO2 18*   *   BUN 21   CREATININE 1.0   ALKPHOS 64   ALT 10   AST 16   BILITOT 0.4      Recent Labs   Lab 04/18/22  1037   INR 1.1   APTT 26.2    No results for input(s): CPK, CPKMB, TROPONINI, MB in the last 168 hours.    Electrolytes: N/A - electrolytes WDL  Accuchecks: Q4H    Gtts:   lactated ringers 50 mL/hr at 04/23/22 0512       LDA/Wounds:  Lines/Drains/Airways       Drain  Duration                  NG/OG Tube 04/14/22 1200 [place] sump 14 Fr. Left nostril 8 days         Closed/Suction Drain 04/20/22 6933 Right Scalp  Bulb 10 Fr. 2 days              Peripheral Intravenous Line  Duration                  Peripheral IV - Single Lumen 04/20/22 1622 18 G Left Hand 2 days         Peripheral IV - Single Lumen 04/22/22 1702 22 G Posterior;Right Hand <1 day                  Wounds: Yes  Wound care consulted: Yes

## 2022-04-23 NOTE — SUBJECTIVE & OBJECTIVE
Interval History: 4/23: NAEON. AFVSS. Exam stable. R SD JAMAR remains in place. PICC with occlusive thrombus.     Medications:  Continuous Infusions:   lactated ringers 50 mL/hr at 04/23/22 0609     Scheduled Meds:   amLODIPine  10 mg Per NG tube Daily    atorvastatin  40 mg Per NG tube Daily    carvediloL  25 mg Per NG tube BID    lacosamide  250 mg Per NG tube Q12H    levetiracetam  1,500 mg Per NG tube BID    levothyroxine  75 mcg Per NG tube Before breakfast    losartan  50 mg Per NG tube Daily    senna-docusate 8.6-50 mg  1 tablet Per NG tube BID    silodosin  4 mg Per NG tube Daily    vancomycin (VANCOCIN) IVPB  500 mg Intravenous Q24H     PRN Meds:acetaminophen, dextrose 10%, glucagon (human recombinant), hydrALAZINE, HYDROcodone-acetaminophen, labetalol, metoclopramide HCl, ondansetron, potassium bicarbonate, potassium bicarbonate, potassium bicarbonate, potassium, sodium phosphates, potassium, sodium phosphates, potassium, sodium phosphates, sodium chloride 0.9%, sodium chloride 0.9%, Pharmacy to dose Vancomycin consult **AND** vancomycin - pharmacy to dose     Review of Systems  Objective:     Weight: 49.4 kg (109 lb)  Body mass index is 17.07 kg/m².  Vital Signs (Most Recent):  Temp: 98.4 °F (36.9 °C) (04/23/22 0304)  Pulse: 65 (04/23/22 1100)  Resp: 16 (04/23/22 1100)  BP: (!) 103/51 (04/23/22 1100)  SpO2: 100 % (04/23/22 1100)   Vital Signs (24h Range):  Temp:  [98 °F (36.7 °C)-98.6 °F (37 °C)] 98.4 °F (36.9 °C)  Pulse:  [63-83] 65  Resp:  [10-22] 16  SpO2:  [98 %-100 %] 100 %  BP: ()/(51-81) 103/51                            NG/OG Tube 04/14/22 1200 [place] sump 14 Fr. Left nostril (Active)   Placement Check placement verified by aspirate characteristics;placement verified by x-ray 04/17/22 0305   Tolerance no signs/symptoms of discomfort 04/17/22 0305   Securement secured to nostril center w/ adhesive device 04/17/22 0305   Clamp Status/Tolerance clamped 04/17/22 0305   Suction Setting/Drainage  Method suction at the bedside 04/17/22 0305   Insertion Site Appearance no redness, warmth, tenderness, skin breakdown, drainage 04/17/22 0305   Drainage None 04/15/22 0332   Flush/Irrigation flushed w/;water;no resistance met 04/17/22 0305   Water Bolus (mL) 100 mL 04/17/22 0505   Residual Amount (ml) 40 ml 04/16/22 0700       Female External Urinary Catheter 04/13/22 1820 (Active)   Skin no redness;no breakdown 04/17/22 0305   Tolerance no signs/symptoms of discomfort 04/17/22 0305   Suction Continuous suction at 60 mmHg 04/16/22 2004   Date of last wick change 04/16/22 04/16/22 2004   Time of last wick change 0956 04/16/22 0700   Output (mL) 200 mL 04/17/22 0505       Physical Exam    Neurosurgery Physical Exam  [id number]  AAO x 3  Follow commands on RUE  RLE - wd  L - contracted and plegic      Cranial dressing c/d/I    SD JAMAR to gravity with SS output      Significant Labs:  Recent Labs   Lab 04/22/22  0118 04/23/22  0140   * 135*    146*   K 4.1 4.1   * 118*   CO2 18* 18*   BUN 25* 21   CREATININE 1.2 1.0   CALCIUM 8.0* 8.3*       Recent Labs   Lab 04/22/22  0118 04/23/22  0140   WBC 11.44 11.48   HGB 9.0* 10.4*   HCT 27.8* 32.2*    175       No results for input(s): LABPT, INR, APTT in the last 48 hours.    Microbiology Results (last 7 days)       Procedure Component Value Units Date/Time    Blood culture [[id number]] Collected: 04/14/22 1208    Order Status: Completed Specimen: Blood from Peripheral, Hand, Right Updated: 04/19/22 1412     Blood Culture, Routine No growth after 5 days.    Blood culture [[id number]] Collected: 04/14/22 1226    Order Status: Completed Specimen: Blood from Peripheral, Hand, Left Updated: 04/19/22 1412     Blood Culture, Routine No growth after 5 days.    Blood culture [[id number]] Collected: 04/11/22 1021    Order Status: Completed Specimen: Blood from Peripheral, Left Arm Updated: 04/16/22 1212     Blood Culture, Routine No growth after 5 days.    Narrative:       Collection has been rescheduled by T at 04/11/2022 10:04 Reason:   Patient unavailable, patient care, nurse grecia notified.  Collection has been rescheduled by T at 04/11/2022 10:04 Reason:   Patient unavailable, patient care, nurse grecia notified.    Blood culture [[id number]] Collected: 04/11/22 1020    Order Status: Completed Specimen: Blood from Peripheral, Left Hand Updated: 04/16/22 1212     Blood Culture, Routine No growth after 5 days.    Narrative:      Collection has been rescheduled by T at 04/11/2022 10:04 Reason:   Patient unavailable, patient care, nurse grecia notified.  Collection has been rescheduled by T at 04/11/2022 10:04 Reason:   Patient unavailable, patient care, nurse grecia notified.          All pertinent labs from the last 24 hours have been reviewed.    Significant Diagnostics:  I have reviewed all pertinent imaging results/findings within the past 24 hours.  US Upper Extremity Veins Right    Result Date: 4/22/2022  Deep venous thrombosis involving the right internal jugular, axillary and brachial veins.  There is a catheter within the right subclavian, brachial and axillary veins. This report was flagged in Epic as abnormal. Findings discussed via telephone with A Meet PA by STEFFI ISABEL on 04/22/2022 at 15:25. Electronically signed by resident: Ever Choi Date:    04/22/2022 Time:    15:07 Electronically signed by: Donato Myers MD Date:    04/22/2022 Time:    16:23

## 2022-04-23 NOTE — PROGRESS NOTES
[facility] - Neuro Critical Care  Neurocritical Care  Progress Note    Admit Date: 4/3/2022  Service Date: 04/23/2022  Length of Stay: 20    Subjective:     Chief Complaint: SDH (subdural hematoma)    History of Present Illness: Ms. Adame is a 70 yo female with hx of dementia, R MCA stroke with left side contraction/hemiparesis presented to [facility] with SDH now s/p evac. Stepped down from [facility]. On 4/13 she developed worsening headache and CTH showed re-accumulation of R SDH and 4-5mm MLS. She was noted to be more slow to follow commands. Stepped back up to [facility] for closer monitoring.    Original HPI below:  Ms. Adame is a 72 y/o F with a PMHx of baseline dementia, GERD, R MCA stroke with residual LUE weakness, HLD, HTN, Stage IV CKD, TIIDM who presents to [facility] from [facility] with a AoC SDH after an unwitnessed fall from her wheel chair without loss of consciousness. CT spine negative for fracture. CT head with R SDH and 3-4 mm MLS. She is on DAPT at home. Of note, she has been staying at [facility] after a partial R foot amputation, stitches still intact, for which she is receiving vancomycin.         Hospital Course: 04/04/2022: OR today for clot evac. Patient returned to ICU intubated on precedex. Post op scan stable. Wean propofol and initiated precedex for likely extubation tomorrow.   04/05/2022 repeat CT head due to drainage.   04/06/2022 NAEO.   04/07/2022 NAEO. Successfully extubated  4/14/22: EEG revealed seizure   4/15/22: vimpat load overnight  04/16/2022: NAEON.  04/17/2022. NAEON. Repeat CTH stable.   04/18/2022  Lateralized periodic discharges on R with some seizure correlate. 200 vimpat x 1 and increase scheduled vimpat to 200. Platelets by NSGY 2/2 OR for clot evac today.   04/19/2022 OR postponed to today with NSGY. CT this morning stable. Continue EEG.   04/20: in OR for repeat evacuation  04/21: follow up CT after evacuation shows SD drain and little residual blood  04/22: losartan  adjusted to decrease prn use, enteral feeding and fluid to be started after MMA embolization  04/23: occlusive brachial thrombus on picc line seen yesterday, right arm swelling improved with removal of art line and picc  New 20 gauge left wrist, carefully start trickle feeds while waiting for MMA embolization      Interval History:      Review of Systems   Unable to perform ROS: Patient nonverbal     Objective:     Vitals:  Temp: 98.4 °F (36.9 °C)  Pulse: 65  Rhythm: normal sinus rhythm  BP: (!) 103/51  MAP (mmHg): 68  Resp: 16  SpO2: 100 %  O2 Device (Oxygen Therapy): nasal cannula    Temp  Min: 98 °F (36.7 °C)  Max: 98.6 °F (37 °C)  Pulse  Min: 63  Max: 83  BP  Min: 91/55  Max: 192/79  MAP (mmHg)  Min: 62  Max: 116  Resp  Min: 10  Max: 22  SpO2  Min: 98 %  Max: 100 %    04/22 0701 - 04/23 0700  In: 991.9 [I.V.:808.6]  Out: 880 [Urine:850; Drains:30]   Unmeasured Output  Urine Occurrence: 1  Stool Occurrence: 1  Pad Count: 1       Physical Exam  HENT:      Head:      Comments: Pterional craniotomy on right  Subdural drain in place  Eyes:      Extraocular Movements: Extraocular movements intact.      Pupils: Pupils are equal, round, and reactive to light.   Cardiovascular:      Rate and Rhythm: Normal rate.      Heart sounds: Normal heart sounds.   Pulmonary:      Breath sounds: Normal breath sounds.   Abdominal:      Palpations: Abdomen is soft.   Musculoskeletal:      Cervical back: Neck supple.   Skin:     General: Skin is warm.   Neurological:      Mental Status: She is alert.      Comments: Left spastic hemiplegia  Right side bradykinetic but withdraws       Medications:  Continuouslactated ringers, Last Rate: 50 mL/hr at 04/23/22 1137    ScheduledamLODIPine, 10 mg, Daily  atorvastatin, 40 mg, Daily  carvediloL, 25 mg, BID  lacosamide, 250 mg, Q12H  levetiracetam, 1,500 mg, BID  levothyroxine, 75 mcg, Before breakfast  losartan, 50 mg, Daily  senna-docusate 8.6-50 mg, 1 tablet, BID  silodosin, 4 mg,  Daily  vancomycin (VANCOCIN) IVPB, 500 mg, Q24H    PRNacetaminophen, 650 mg, Q4H PRN  dextrose 10%, 12.5 g, PRN  glucagon (human recombinant), 1 mg, PRN  hydrALAZINE, 10 mg, Q8H PRN  HYDROcodone-acetaminophen, 1 tablet, Q4H PRN  labetalol, 10 mg, Q6H PRN  metoclopramide HCl, 5 mg, Q6H PRN  ondansetron, 4 mg, Q6H PRN  potassium bicarbonate, 35 mEq, PRN  potassium bicarbonate, 50 mEq, PRN  potassium bicarbonate, 60 mEq, PRN  potassium, sodium phosphates, 2 packet, PRN  potassium, sodium phosphates, 2 packet, PRN  potassium, sodium phosphates, 2 packet, PRN  sodium chloride 0.9%, 10 mL, PRN  sodium chloride 0.9%, 10 mL, PRN  vancomycin - pharmacy to dose, , pharmacy to manage frequency      Today I personally reviewed pertinent medications, lines/drains/airways, imaging, laboratory results, notably:    Diet  Diet NPO  Diet NPO          Assessment/Plan:     Neuro  * SDH (subdural hematoma)  S/p evac on 4/4/13; stable and stepped down from [facility].    On 4/13 patient had 8/10 HA and was slower to follow commands.  Repeat CTH showed re-accumulation of R SDH along with 4-5mm MLS.  Stepped back up to [facility].    -- NSGY following  -- SBP <160  -- q1hr neuro check  -- hold anticoagulation  -- EEG revealed seizure  -- Continue vimpat and keppra    4/20 OR today  4/21 day 1 post op, keep horizontal and flat, SD drain in place  4/22: per neurosurgery, possible mma embolization today  4/23: MMA embolization pending        Recurrent seizures  Cont lacosamide and leviteracetam at current dosages  Brief episode of eye deviation and decreased responsiveness noted by nurse, lacosamide increased to 250mg bid    Cardiac/Vascular  Hypertension  - SBP < 160  - Cardene gtt stopped  - Start norvasc, coreg, & losartan  - Hold home microzide  - PRN labetalol, hydralazine  04/22: increase losartan    ID  Osteomyelitis of right foot  - S/p R partial foot amputation on vancomycin at [facility]  - continue vancomycin  - bandage, stitches intact   - Afebrile,  WBC normal    Endocrine  Type 2 diabetes mellitus, with long-term current use of insulin  - A1c 5  - Accuchecks  SSI while npo            The patient is being Prophylaxed for:  Venous Thromboembolism with: Mechanical  Stress Ulcer with: None  Ventilator Pneumonia with: not applicable    Activity Orders          Diet NPO: NPO starting at 04/22 1944    Elevate HOB Flat starting at 04/20 1845    Turn patient starting at 04/13 1600        Full Code    Jorge Pino MD  Neurocritical Care  [facility] - Neuro Critical Care

## 2022-04-23 NOTE — PROGRESS NOTES
[facility] - Neuro Critical Care  Neurosurgery  Progress Note    Subjective:     History of Present Illness: 71 year old female with , hypothyroidism, CVA with left-sided residual deficit on ASA/Plavix, DM2, HTN, HLD, CKD, chronic anemia, presenting from SNF after being found down next to wheelchair after unwitnessed fall, consulted to NSGY for right 2 cm SDH without midline shift. She is altered at baseline and unable to provide to history, but family at bedside says she is more confused and less interactive than usual.       Post-Op Info:  Procedure(s) (LRB):  CRANIOTOMY, FOR SUBDURAL HEMATOMA EVACUATION (Right)   3 Days Post-Op     Interval History: 4/23: NAEON. AFVSS. Exam stable. R SD JAMAR remains in place. PICC with occlusive thrombus.     Medications:  Continuous Infusions:   lactated ringers 50 mL/hr at 04/23/22 0609     Scheduled Meds:   amLODIPine  10 mg Per NG tube Daily    atorvastatin  40 mg Per NG tube Daily    carvediloL  25 mg Per NG tube BID    lacosamide  250 mg Per NG tube Q12H    levetiracetam  1,500 mg Per NG tube BID    levothyroxine  75 mcg Per NG tube Before breakfast    losartan  50 mg Per NG tube Daily    senna-docusate 8.6-50 mg  1 tablet Per NG tube BID    silodosin  4 mg Per NG tube Daily    vancomycin (VANCOCIN) IVPB  500 mg Intravenous Q24H     PRN Meds:acetaminophen, dextrose 10%, glucagon (human recombinant), hydrALAZINE, HYDROcodone-acetaminophen, labetalol, metoclopramide HCl, ondansetron, potassium bicarbonate, potassium bicarbonate, potassium bicarbonate, potassium, sodium phosphates, potassium, sodium phosphates, potassium, sodium phosphates, sodium chloride 0.9%, sodium chloride 0.9%, Pharmacy to dose Vancomycin consult **AND** vancomycin - pharmacy to dose     Review of Systems  Objective:     Weight: 49.4 kg (109 lb)  Body mass index is 17.07 kg/m².  Vital Signs (Most Recent):  Temp: 98.4 °F (36.9 °C) (04/23/22 0304)  Pulse: 65 (04/23/22 1100)  Resp: 16 (04/23/22  1100)  BP: (!) 103/51 (04/23/22 1100)  SpO2: 100 % (04/23/22 1100)   Vital Signs (24h Range):  Temp:  [98 °F (36.7 °C)-98.6 °F (37 °C)] 98.4 °F (36.9 °C)  Pulse:  [63-83] 65  Resp:  [10-22] 16  SpO2:  [98 %-100 %] 100 %  BP: ()/(51-81) 103/51                            NG/OG Tube 04/14/22 1200 [place] sump 14 Fr. Left nostril (Active)   Placement Check placement verified by aspirate characteristics;placement verified by x-ray 04/17/22 0305   Tolerance no signs/symptoms of discomfort 04/17/22 0305   Securement secured to nostril center w/ adhesive device 04/17/22 0305   Clamp Status/Tolerance clamped 04/17/22 0305   Suction Setting/Drainage Method suction at the bedside 04/17/22 0305   Insertion Site Appearance no redness, warmth, tenderness, skin breakdown, drainage 04/17/22 0305   Drainage None 04/15/22 0332   Flush/Irrigation flushed w/;water;no resistance met 04/17/22 0305   Water Bolus (mL) 100 mL 04/17/22 0505   Residual Amount (ml) 40 ml 04/16/22 0700       Female External Urinary Catheter 04/13/22 1820 (Active)   Skin no redness;no breakdown 04/17/22 0305   Tolerance no signs/symptoms of discomfort 04/17/22 0305   Suction Continuous suction at 60 mmHg 04/16/22 2004   Date of last wick change 04/16/22 04/16/22 2004   Time of last wick change 0956 04/16/22 0700   Output (mL) 200 mL 04/17/22 0505       Physical Exam    Neurosurgery Physical Exam  [id number]  AAO x 3  Follow commands on RUE  RLE - wd  L - contracted and plegic      Cranial dressing c/d/I    SD JAMAR to gravity with SS output      Significant Labs:  Recent Labs   Lab 04/22/22  0118 04/23/22  0140   * 135*    146*   K 4.1 4.1   * 118*   CO2 18* 18*   BUN 25* 21   CREATININE 1.2 1.0   CALCIUM 8.0* 8.3*       Recent Labs   Lab 04/22/22  0118 04/23/22  0140   WBC 11.44 11.48   HGB 9.0* 10.4*   HCT 27.8* 32.2*    175       No results for input(s): LABPT, INR, APTT in the last 48 hours.    Microbiology Results (last 7 days)        Procedure Component Value Units Date/Time    Blood culture [[id number]] Collected: 04/14/22 1208    Order Status: Completed Specimen: Blood from Peripheral, Hand, Right Updated: 04/19/22 1412     Blood Culture, Routine No growth after 5 days.    Blood culture [[id number]] Collected: 04/14/22 1226    Order Status: Completed Specimen: Blood from Peripheral, Hand, Left Updated: 04/19/22 1412     Blood Culture, Routine No growth after 5 days.    Blood culture [[id number]] Collected: 04/11/22 1021    Order Status: Completed Specimen: Blood from Peripheral, Left Arm Updated: 04/16/22 1212     Blood Culture, Routine No growth after 5 days.    Narrative:      Collection has been rescheduled by T at 04/11/2022 10:04 Reason:   Patient unavailable, patient care, nurse paige notified.  Collection has been rescheduled by T at 04/11/2022 10:04 Reason:   Patient unavailable, patient care, nurse paige notified.    Blood culture [[id number]] Collected: 04/11/22 1020    Order Status: Completed Specimen: Blood from Peripheral, Left Hand Updated: 04/16/22 1212     Blood Culture, Routine No growth after 5 days.    Narrative:      Collection has been rescheduled by T at 04/11/2022 10:04 Reason:   Patient unavailable, patient care, nurse paige notified.  Collection has been rescheduled by T at 04/11/2022 10:04 Reason:   Patient unavailable, patient care, nurse paige notified.          All pertinent labs from the last 24 hours have been reviewed.    Significant Diagnostics:  I have reviewed all pertinent imaging results/findings within the past 24 hours.  US Upper Extremity Veins Right    Result Date: 4/22/2022  Deep venous thrombosis involving the right internal jugular, axillary and brachial veins.  There is a catheter within the right subclavian, brachial and axillary veins. This report was flagged in Epic as abnormal. Findings discussed via telephone with NHI BECK by STEFFI ISABEL on 04/22/2022 at 15:25. Electronically  signed by resident: Ever Choi Date:    04/22/2022 Time:    15:07 Electronically signed by: Donato Myers MD Date:    04/22/2022 Time:    16:23         Assessment/Plan:     * SDH (subdural hematoma)  71 year old female with , hypothyroidism, CVA with left-sided residual deficit on ASA/Plavix, DM2, HTN, HLD, CKD, chronic anemia, presenting from SNF after being found down next to wheelchair after unwitnessed fall, consulted to NSGY for right 2 cm SDH mostly acute, membranous and heterogenous appearing, some chronic component, without midline shift. Now s/p R crani for SDH evacuation (4/4).    No acute changes. Incision dry, intact and non-inflammed. Maintain subdural drain for now.       --Admitted to [facility]   -- q1 hour vitals/neurochecks; exam wax and wane   -- SBP < 160  -- Na goal eunatremia.  -- Seizure control per neuro ICU: Keppra 1500 BID and vimpat  b.i.d.    EEG with right-sided none compulsive seizures   -- CTH stable 4/19. Postop 4/20 with expected changes.    -- Please obtain MMA embo as soon as possible.   -- Saturating well on room   -- HOB 15  -- PPx: SCDs, BR, IS. Okay for S[facility]H for dvt ppx.   -- Osteomyelitis the or really alternative is of R foot:    - ID rec 6 week course Vanc with end date 5/5. F/u full ID recs. OK to start Etrapenem if pt decompensates.   -Podiatry managing recent partial foot amputation, appreciate recs. Sutures removed 4/12.    -Home health/facility to do dressing changes every MWF and prn as follows:  Cleanse right foot incision site with saline or wound cleanser, paint incision site with betadine, cover with 4x4 gauze, kerlix, ACE bandage.   -- Anemia: Resolved.   -- Fever 4/11: Afebrile overnight.    - UA 4/11: Growing GNRs, f/u susceptibility   - CXR with possible basilar effusion.   - BLE US 4/11: w/o DVT.   - HM following. Appreciate assistance with care.    - Blood cx 4/11: NGTD.    Dispo: c/w ICU care.             Brock Fairchild MD  Neurosurgery  [facility] - Neuro  Critical Care

## 2022-04-23 NOTE — ASSESSMENT & PLAN NOTE
71 year old female with , hypothyroidism, CVA with left-sided residual deficit on ASA/Plavix, DM2, HTN, HLD, CKD, chronic anemia, presenting from SNF after being found down next to wheelchair after unwitnessed fall, consulted to NSGY for right 2 cm SDH mostly acute, membranous and heterogenous appearing, some chronic component, without midline shift. Now s/p R crani for SDH evacuation (4/4).    No acute changes. Incision dry, intact and non-inflammed. Maintain subdural drain for now.       --Admitted to [facility]   -- q1 hour vitals/neurochecks; exam wax and wane   -- SBP < 160  -- Na goal eunatremia.  -- Seizure control per neuro ICU: Keppra 1500 BID and vimpat  b.i.d.    EEG with right-sided none compulsive seizures   -- CTH stable 4/19. Postop 4/20 with expected changes.    -- Please obtain MMA embo as soon as possible.   -- Saturating well on room   -- HOB 15  -- PPx: SCDs, BR, IS. Okay for [facility] for dvt ppx.   -- Osteomyelitis the or really alternative is of R foot:    - ID rec 6 week course Vanc with end date 5/5. F/u full ID recs. OK to start Etrapenem if pt decompensates.   -Podiatry managing recent partial foot amputation, appreciate recs. Sutures removed 4/12.    -Home health/facility to do dressing changes every MWF and prn as follows:  Cleanse right foot incision site with saline or wound cleanser, paint incision site with betadine, cover with 4x4 gauze, kerlix, ACE bandage.   -- Anemia: Resolved.   -- Fever 4/11: Afebrile overnight.    - UA 4/11: Growing GNRs, f/u susceptibility   - CXR with possible basilar effusion.   - BLE US 4/11: w/o DVT.   - HM following. Appreciate assistance with care.    - Blood cx 4/11: NGTD.    Dispo: c/w ICU care.

## 2022-04-23 NOTE — NURSING
Pt had episodes of low bps and o2 sats this shift. cc bolus hung.O2 @ 3 liters placed.Temp 94.0 ,bear hugger on.

## 2022-04-23 NOTE — ASSESSMENT & PLAN NOTE
Cont lacosamide and leviteracetam at current dosages  Brief episode of eye deviation and decreased responsiveness noted by nurse, lacosamide increased to 250mg bid

## 2022-04-23 NOTE — ASSESSMENT & PLAN NOTE
- S/p R partial foot amputation on vancomycin at [facility]  - continue vancomycin  - bandage, stitches intact   - Afebrile, WBC normal

## 2022-04-23 NOTE — ASSESSMENT & PLAN NOTE
S/p evac on 4/4/13; stable and stepped down from [facility].    On 4/13 patient had 8/10 HA and was slower to follow commands.  Repeat CTH showed re-accumulation of R SDH along with 4-5mm MLS.  Stepped back up to [facility].    -- NSGY following  -- SBP <160  -- q1hr neuro check  -- hold anticoagulation  -- EEG revealed seizure  -- Continue vimpat and keppra    4/20 OR today  4/21 day 1 post op, keep horizontal and flat, SD drain in place  4/22: per neurosurgery, possible mma embolization today  4/23: MMA embolization pending

## 2022-04-23 NOTE — SUBJECTIVE & OBJECTIVE
Interval History:      Review of Systems   Unable to perform ROS: Patient nonverbal     Objective:     Vitals:  Temp: 98.4 °F (36.9 °C)  Pulse: 65  Rhythm: normal sinus rhythm  BP: (!) 103/51  MAP (mmHg): 68  Resp: 16  SpO2: 100 %  O2 Device (Oxygen Therapy): nasal cannula    Temp  Min: 98 °F (36.7 °C)  Max: 98.6 °F (37 °C)  Pulse  Min: 63  Max: 83  BP  Min: 91/55  Max: 192/79  MAP (mmHg)  Min: 62  Max: 116  Resp  Min: 10  Max: 22  SpO2  Min: 98 %  Max: 100 %    04/22 0701 - 04/23 0700  In: 991.9 [I.V.:808.6]  Out: 880 [Urine:850; Drains:30]   Unmeasured Output  Urine Occurrence: 1  Stool Occurrence: 1  Pad Count: 1       Physical Exam  HENT:      Head:      Comments: Pterional craniotomy on right  Subdural drain in place  Eyes:      Extraocular Movements: Extraocular movements intact.      Pupils: Pupils are equal, round, and reactive to light.   Cardiovascular:      Rate and Rhythm: Normal rate.      Heart sounds: Normal heart sounds.   Pulmonary:      Breath sounds: Normal breath sounds.   Abdominal:      Palpations: Abdomen is soft.   Musculoskeletal:      Cervical back: Neck supple.   Skin:     General: Skin is warm.   Neurological:      Mental Status: She is alert.      Comments: Left spastic hemiplegia  Right side bradykinetic but withdraws       Medications:  Continuouslactated ringers, Last Rate: 50 mL/hr at 04/23/22 1137    ScheduledamLODIPine, 10 mg, Daily  atorvastatin, 40 mg, Daily  carvediloL, 25 mg, BID  lacosamide, 250 mg, Q12H  levetiracetam, 1,500 mg, BID  levothyroxine, 75 mcg, Before breakfast  losartan, 50 mg, Daily  senna-docusate 8.6-50 mg, 1 tablet, BID  silodosin, 4 mg, Daily  vancomycin (VANCOCIN) IVPB, 500 mg, Q24H    PRNacetaminophen, 650 mg, Q4H PRN  dextrose 10%, 12.5 g, PRN  glucagon (human recombinant), 1 mg, PRN  hydrALAZINE, 10 mg, Q8H PRN  HYDROcodone-acetaminophen, 1 tablet, Q4H PRN  labetalol, 10 mg, Q6H PRN  metoclopramide HCl, 5 mg, Q6H PRN  ondansetron, 4 mg, Q6H  PRN  potassium bicarbonate, 35 mEq, PRN  potassium bicarbonate, 50 mEq, PRN  potassium bicarbonate, 60 mEq, PRN  potassium, sodium phosphates, 2 packet, PRN  potassium, sodium phosphates, 2 packet, PRN  potassium, sodium phosphates, 2 packet, PRN  sodium chloride 0.9%, 10 mL, PRN  sodium chloride 0.9%, 10 mL, PRN  vancomycin - pharmacy to dose, , pharmacy to manage frequency      Today I personally reviewed pertinent medications, lines/drains/airways, imaging, laboratory results, notably:    Diet  Diet NPO  Diet NPO

## 2022-04-23 NOTE — PLAN OF CARE
[facility] Care Plan    POC reviewed with Beatriz Adame and family at 1400. Son verbalized understanding. Questions and concerns addressed. No acute events today. Pt progressing toward goals. Will continue to monitor. See below and flowsheets for full assessment and VS info.             Is this a stroke patient? yes- Stroke booklet reviewed with patient and family, risk factors identified for patient and stroke booklet remains at bedside for ongoing education.     Neuro:  [place] Coma Scale  Best Eye Response: 3-->(E3) to speech  Best Motor Response: 6-->(M6) obeys commands  Best Verbal Response: 4-->(V4) confused  Casimiro Coma Scale Score: 13  Assessment Qualifiers: patient not sedated/intubated  Pupil PERRLA: yes     24 hr Temp:  [98.4 °F (36.9 °C)-98.6 °F (37 °C)]     CV:   Rhythm: normal sinus rhythm  BP goals:   SBP < 160  MAP > 65    Resp:   O2 Device (Oxygen Therapy): nasal cannula  Vent Mode: Spont  Set Rate: 15 BPM  Oxygen Concentration (%): 98  Vt Set: 450 mL  PEEP/CPAP: 5 cmH20  Pressure Support: 5 cmH20    Plan: N/A    GI/:     Diet/Nutrition Received: NPO  Last Bowel Movement: 04/23/22  Voiding Characteristics: incontinence    Intake/Output Summary (Last 24 hours) at 4/23/2022 1621  Last data filed at 4/23/2022 1500  Gross per 24 hour   Intake 786.92 ml   Output 480 ml   Net 306.92 ml     Unmeasured Output  Urine Occurrence: 1  Stool Occurrence: 1  Pad Count: 1    Labs/Accuchecks:  Recent Labs   Lab 04/23/22  0140   WBC 11.48   RBC 3.38*   HGB 10.4*   HCT 32.2*         Recent Labs   Lab 04/23/22  0140   *   K 4.1   CO2 18*   *   BUN 21   CREATININE 1.0   ALKPHOS 64   ALT 10   AST 16   BILITOT 0.4      Recent Labs   Lab 04/18/22  1037   INR 1.1   APTT 26.2    No results for input(s): CPK, CPKMB, TROPONINI, MB in the last 168 hours.    Electrolytes: N/A - electrolytes WDL  Accuchecks: Q6H    Gtts:   lactated ringers 50 mL/hr at 04/23/22 1137       LDA/Wounds:  Lines/Drains/Airways        Drain  Duration                  NG/OG Tube 04/14/22 1200 [place] sump 14 Fr. Left nostril 9 days         Closed/Suction Drain 04/20/22 1732 Right Scalp Bulb 10 Fr. 2 days              Peripheral Intravenous Line  Duration                  Peripheral IV - Single Lumen 04/20/22 1622 18 G Left Hand 2 days         Peripheral IV - Single Lumen 04/22/22 1702 22 G Posterior;Right Hand <1 day                  Wounds: No  Wound care consulted: No

## 2022-04-24 NOTE — SUBJECTIVE & OBJECTIVE
Interval History: 4/24: NAEON. AFVSS. Exam stable.     Medications:  Continuous Infusions:   lactated ringers 50 mL/hr at 04/24/22 0923     Scheduled Meds:   amLODIPine  5 mg Oral Daily    atorvastatin  40 mg Per NG tube Daily    heparin (porcine)  5,000 Units Subcutaneous Q8H    lacosamide  250 mg Per NG tube Q12H    levetiracetam  1,500 mg Per NG tube BID    levothyroxine  75 mcg Per NG tube Before breakfast    senna-docusate 8.6-50 mg  1 tablet Per NG tube BID    silodosin  4 mg Per NG tube Daily    vancomycin (VANCOCIN) IVPB  500 mg Intravenous Q24H     PRN Meds:acetaminophen, albuterol sulfate, dextrose 10%, glucagon (human recombinant), hydrALAZINE, HYDROcodone-acetaminophen, labetalol, metoclopramide HCl, ondansetron, potassium bicarbonate, potassium bicarbonate, potassium bicarbonate, potassium, sodium phosphates, potassium, sodium phosphates, potassium, sodium phosphates, sodium chloride 0.9%, sodium chloride 0.9%, Pharmacy to dose Vancomycin consult **AND** vancomycin - pharmacy to dose     Review of Systems  Objective:     Weight: 49.4 kg (109 lb)  Body mass index is 17.07 kg/m².  Vital Signs (Most Recent):  Temp: 98.06 °F (36.7 °C) (04/24/22 0818)  Pulse: 82 (04/24/22 0818)  Resp: 20 (04/24/22 0818)  BP: (!) 157/74 (04/24/22 0800)  SpO2: 100 % (04/24/22 0818)   Vital Signs (24h Range):  Temp:  [93.38 °F (34.1 °C)-98.42 °F (36.9 °C)] 98.06 °F (36.7 °C)  Pulse:  [61-82] 82  Resp:  [9-28] 20  SpO2:  [92 %-100 %] 100 %  BP: ()/(47-79) 157/74                  Oxygen Concentration (%):  [98] 98         NG/OG Tube 04/14/22 1200 [place] sump 14 Fr. Left nostril (Active)   Placement Check placement verified by aspirate characteristics;placement verified by x-ray 04/17/22 0305   Tolerance no signs/symptoms of discomfort 04/17/22 0305   Securement secured to nostril center w/ adhesive device 04/17/22 0305   Clamp Status/Tolerance clamped 04/17/22 0305   Suction Setting/Drainage Method suction at the bedside  04/17/22 0305   Insertion Site Appearance no redness, warmth, tenderness, skin breakdown, drainage 04/17/22 0305   Drainage None 04/15/22 0332   Flush/Irrigation flushed w/;water;no resistance met 04/17/22 0305   Water Bolus (mL) 100 mL 04/17/22 0505   Residual Amount (ml) 40 ml 04/16/22 0700       Female External Urinary Catheter 04/13/22 1820 (Active)   Skin no redness;no breakdown 04/17/22 0305   Tolerance no signs/symptoms of discomfort 04/17/22 0305   Suction Continuous suction at 60 mmHg 04/16/22 2004   Date of last wick change 04/16/22 04/16/22 2004   Time of last wick change 0956 04/16/22 0700   Output (mL) 200 mL 04/17/22 0505       Physical Exam    Neurosurgery Physical Exam  [id number]  AAO x 3; significant mental status waxing and waning  Follow commands on RUE  RLE - wd  L - contracted and plegic      Cranial dressing c/d/I    SD JAMAR to gravity with scant SS output      Significant Labs:  Recent Labs   Lab 04/23/22  0140 04/24/22  0331   * 89   * 144   K 4.1 3.5   * 116*   CO2 18* 19*   BUN 21 21   CREATININE 1.0 1.1   CALCIUM 8.3* 8.0*       Recent Labs   Lab 04/23/22  0140 04/24/22  0331   WBC 11.48 10.45   HGB 10.4* 9.1*   HCT 32.2* 28.2*    154       No results for input(s): LABPT, INR, APTT in the last 48 hours.    Microbiology Results (last 7 days)       Procedure Component Value Units Date/Time    Blood culture [[id number]] Collected: 04/14/22 1208    Order Status: Completed Specimen: Blood from Peripheral, Hand, Right Updated: 04/19/22 1412     Blood Culture, Routine No growth after 5 days.    Blood culture [[id number]] Collected: 04/14/22 1226    Order Status: Completed Specimen: Blood from Peripheral, Hand, Left Updated: 04/19/22 1412     Blood Culture, Routine No growth after 5 days.          All pertinent labs from the last 24 hours have been reviewed.    Significant Diagnostics:  I have reviewed all pertinent imaging results/findings within the past 24 hours.  X-Ray Chest  AP Portable    Result Date: 4/24/2022  As above. Electronically signed by: Brock Beach Date:    04/24/2022 Time:    09:32

## 2022-04-24 NOTE — PLAN OF CARE
[facility] Care Plan    POC reviewed with Beatriz ARNDT Adame and family at 0300. Pt unable to verbalize understanding. Questions and concerns addressed. No acute events overnight. Pt progressing toward goals. Will continue to monitor. See below and flowsheets for full assessment and VS info.             Is this a stroke patient? no    Neuro:  Casimiro Coma Scale  Best Eye Response: 4-->(E4) spontaneous  Best Motor Response: 6-->(M6) obeys commands  Best Verbal Response: 4-->(V4) confused  Casimiro Coma Scale Score: 14  Assessment Qualifiers: patient not sedated/intubated  Pupil PERRLA: yes     24hr Temp:  [93.38 °F (34.1 °C)-98.42 °F (36.9 °C)]     CV:   Rhythm: normal sinus rhythm  BP goals:   SBP < 160  MAP > 65    Resp:   O2 Device (Oxygen Therapy): nasal cannula  Vent Mode: Spont  Set Rate: 15 BPM  Oxygen Concentration (%): 98  Vt Set: 450 mL  PEEP/CPAP: 5 cmH20  Pressure Support: 5 cmH20    Plan: N/A    GI/:     Diet/Nutrition Received: tube feeding  Last Bowel Movement: 04/23/22  Voiding Characteristics: hesitancy    Intake/Output Summary (Last 24 hours) at 4/24/2022 0514  Last data filed at 4/24/2022 0000  Gross per 24 hour   Intake 87.53 ml   Output 475 ml   Net -387.47 ml     Unmeasured Output  Urine Occurrence: 1  Stool Occurrence: 1  Pad Count: 1    Labs/Accuchecks:  Recent Labs   Lab 04/24/22  0331   WBC 10.45   RBC 3.00*   HGB 9.1*   HCT 28.2*         Recent Labs   Lab 04/24/22  0331      K 3.5   CO2 19*   *   BUN 21   CREATININE 1.1   ALKPHOS 63   ALT 9*   AST 16   BILITOT 0.3      Recent Labs   Lab 04/18/22  1037   INR 1.1   APTT 26.2    No results for input(s): CPK, CPKMB, TROPONINI, MB in the last 168 hours.    Electrolytes: Electrolytes replaced  Accuchecks: Q6H    Gtts:   lactated ringers 50 mL/hr at 04/23/22 1137       LDA/Wounds:  Lines/Drains/Airways       Drain  Duration                  NG/OG Tube 04/14/22 1200 [place] sump 14 Fr. Left nostril 9 days         Closed/Suction Drain  04/20/22 1732 Right Scalp Bulb 10 Fr. 3 days              Peripheral Intravenous Line  Duration                  Peripheral IV - Single Lumen 04/20/22 1622 18 G Left Hand 3 days         Peripheral IV - Single Lumen 04/22/22 1702 22 G Posterior;Right Hand 1 day                  Wounds: Yes  Wound care consulted: Yes

## 2022-04-24 NOTE — PLAN OF CARE
[facility] Care Plan    POC reviewed with Beatriz Adame and family at 1400. Pt not able to verbalize understanding. Questions and concerns addressed. No acute events today. Pt progressing toward goals. Will continue to monitor. See below and flowsheets for full assessment and VS info.             Is this a stroke patient? yes- Stroke booklet reviewed with patient, risk factors identified for patient and stroke booklet remains at bedside for ongoing education.     Neuro:  Max Coma Scale  Best Eye Response: 3-->(E3) to speech  Best Motor Response: 6-->(M6) obeys commands  Best Verbal Response: 4-->(V4) confused  Casimiro Coma Scale Score: 13  Assessment Qualifiers: patient not sedated/intubated  Pupil PERRLA: yes     24 hr Temp:  [95.54 °F (35.3 °C)-98.42 °F (36.9 °C)]     CV:   Rhythm: normal sinus rhythm  BP goals:   SBP < 160  MAP > 65    Resp:   O2 Device (Oxygen Therapy): nasal cannula  Vent Mode: Spont  Set Rate: 15 BPM  Oxygen Concentration (%): 98  Vt Set: 450 mL  PEEP/CPAP: 5 cmH20  Pressure Support: 5 cmH20    Plan: N/A    GI/:     Diet/Nutrition Received: NPO, tube feeding  Last Bowel Movement: 04/24/22  Voiding Characteristics: incontinence    Intake/Output Summary (Last 24 hours) at 4/24/2022 1630  Last data filed at 4/24/2022 1500  Gross per 24 hour   Intake 1047.92 ml   Output 75 ml   Net 972.92 ml     Unmeasured Output  Urine Occurrence: 1  Stool Occurrence: 2  Pad Count: 1    Labs/Accuchecks:  Recent Labs   Lab 04/24/22  0331   WBC 10.45   RBC 3.00*   HGB 9.1*   HCT 28.2*         Recent Labs   Lab 04/24/22  0331      K 3.5   CO2 19*   *   BUN 21   CREATININE 1.1   ALKPHOS 63   ALT 9*   AST 16   BILITOT 0.3      Recent Labs   Lab 04/18/22  1037   INR 1.1   APTT 26.2    No results for input(s): CPK, CPKMB, TROPONINI, MB in the last 168 hours.    Electrolytes: N/A - electrolytes WDL  Accuchecks: Q6H    Gtts:      LDA/Wounds:  Lines/Drains/Airways       Drain  Duration                   NG/OG Tube 04/14/22 1200 [place] sump 14 Fr. Left nostril 10 days              Peripheral Intravenous Line  Duration                  Peripheral IV - Single Lumen 04/22/22 1702 22 G Posterior;Right Hand 1 day         Peripheral IV - Single Lumen 04/24/22 1400 20 G Anterior;Left Upper Arm <1 day                  Wounds: No  Wound care consulted: No

## 2022-04-24 NOTE — SUBJECTIVE & OBJECTIVE
Interval History:  >4 elements OR status of 3 inpatient conditions    Review of Systems   Unable to perform ROS: Patient nonverbal   2 systems OR Unable to obtain a complete ROS due to level of consciousness.  Objective:     Vitals:  Temp: 98.06 °F (36.7 °C)  Pulse: 82  Rhythm: normal sinus rhythm  BP: (!) 157/74  MAP (mmHg): 106  Resp: 20  SpO2: 100 %  Oxygen Concentration (%): 98  O2 Device (Oxygen Therapy): nasal cannula    Temp  Min: 93.38 °F (34.1 °C)  Max: 98.42 °F (36.9 °C)  Pulse  Min: 61  Max: 82  BP  Min: 85/47  Max: 178/79  MAP (mmHg)  Min: 60  Max: 114  Resp  Min: 9  Max: 28  SpO2  Min: 92 %  Max: 100 %  Oxygen Concentration (%)  Min: 98  Max: 98    04/23 0701 - 04/24 0700  In: 40   Out: 475 [Urine:475]   Unmeasured Output  Urine Occurrence: 1  Stool Occurrence: 1  Pad Count: 1       Physical Exam  Constitutional: Mild res distress  Eyes: Conjunctiva clear, anicteric. Lids no lesions.  Head/Ears/Nose/Mouth/Throat/Neck: s/p right crani and SD drain placement. dry mucous membranes. External ears, nose atraumatic.   Cardiovascular: Regular rhythm. ESM  Respiratory: wheezing   Gastrointestinal: No hernia. Soft, nondistended, + bowel sounds.      Neurologic Examination:    -Mental Status: Alert. Mute, not following commands   -Cranial nerves: Pupils equal, round, and reactive bilateral. Extraocular movements intact.   -Motor: Left spastic hemiplegia. Withdraw right side to noxious stimuli     Medications:  Continuouslactated ringers, Last Rate: 50 mL/hr at 04/24/22 0923    ScheduledamLODIPine, 5 mg, Daily  atorvastatin, 40 mg, Daily  heparin (porcine), 5,000 Units, Q8H  lacosamide, 250 mg, Q12H  levetiracetam, 1,500 mg, BID  levothyroxine, 75 mcg, Before breakfast  senna-docusate 8.6-50 mg, 1 tablet, BID  silodosin, 4 mg, Daily  vancomycin (VANCOCIN) IVPB, 500 mg, Q24H    PRNacetaminophen, 650 mg, Q4H PRN  albuterol sulfate, 2.5 mg, Q4H PRN  dextrose 10%, 12.5 g, PRN  glucagon (human recombinant), 1 mg,  PRN  hydrALAZINE, 10 mg, Q8H PRN  HYDROcodone-acetaminophen, 1 tablet, Q4H PRN  labetalol, 10 mg, Q6H PRN  metoclopramide HCl, 5 mg, Q6H PRN  ondansetron, 4 mg, Q6H PRN  potassium bicarbonate, 35 mEq, PRN  potassium bicarbonate, 50 mEq, PRN  potassium bicarbonate, 60 mEq, PRN  potassium, sodium phosphates, 2 packet, PRN  potassium, sodium phosphates, 2 packet, PRN  potassium, sodium phosphates, 2 packet, PRN  sodium chloride 0.9%, 10 mL, PRN  sodium chloride 0.9%, 10 mL, PRN  vancomycin - pharmacy to dose, , pharmacy to manage frequency      Today I personally reviewed pertinent medications, lines/drains/airways, imaging, laboratory results, microbiology results, notably:    Diet  Diet NPO  Diet NPO

## 2022-04-24 NOTE — PROGRESS NOTES
[facility]  Neurosurgery  Progress Note    Subjective:     History of Present Illness: 71 year old female with , hypothyroidism, CVA with left-sided residual deficit on ASA/Plavix, DM2, HTN, HLD, CKD, chronic anemia, presenting from SNF after being found down next to wheelchair after unwitnessed fall, consulted to NSGY for right 2 cm SDH without midline shift. She is altered at baseline and unable to provide to history, but family at bedside says she is more confused and less interactive than usual.       Post-Op Info:  Procedure(s) (LRB):  CRANIOTOMY, FOR SUBDURAL HEMATOMA EVACUATION (Right)   4 Days Post-Op     Interval History: 4/24: NAEON. AFVSS. Exam stable.     Medications:  Continuous Infusions:   lactated ringers 50 mL/hr at 04/24/22 0923     Scheduled Meds:   amLODIPine  5 mg Oral Daily    atorvastatin  40 mg Per NG tube Daily    heparin (porcine)  5,000 Units Subcutaneous Q8H    lacosamide  250 mg Per NG tube Q12H    levetiracetam  1,500 mg Per NG tube BID    levothyroxine  75 mcg Per NG tube Before breakfast    senna-docusate 8.6-50 mg  1 tablet Per NG tube BID    silodosin  4 mg Per NG tube Daily    vancomycin (VANCOCIN) IVPB  500 mg Intravenous Q24H     PRN Meds:acetaminophen, albuterol sulfate, dextrose 10%, glucagon (human recombinant), hydrALAZINE, HYDROcodone-acetaminophen, labetalol, metoclopramide HCl, ondansetron, potassium bicarbonate, potassium bicarbonate, potassium bicarbonate, potassium, sodium phosphates, potassium, sodium phosphates, potassium, sodium phosphates, sodium chloride 0.9%, sodium chloride 0.9%, Pharmacy to dose Vancomycin consult **AND** vancomycin - pharmacy to dose     Review of Systems  Objective:     Weight: 49.4 kg (109 lb)  Body mass index is 17.07 kg/m².  Vital Signs (Most Recent):  Temp: 98.06 °F (36.7 °C) (04/24/22 0818)  Pulse: 82 (04/24/22 0818)  Resp: 20 (04/24/22 0818)  BP: (!) 157/74 (04/24/22 0800)  SpO2: 100 % (04/24/22 0818)    Vital Signs (24h Range):  Temp:  [93.38 °F (34.1 °C)-98.42 °F (36.9 °C)] 98.06 °F (36.7 °C)  Pulse:  [61-82] 82  Resp:  [9-28] 20  SpO2:  [92 %-100 %] 100 %  BP: ()/(47-79) 157/74                  Oxygen Concentration (%):  [98] 98         NG/OG Tube 04/14/22 1200 [place] sump 14 Fr. Left nostril (Active)   Placement Check placement verified by aspirate characteristics;placement verified by x-ray 04/17/22 0305   Tolerance no signs/symptoms of discomfort 04/17/22 0305   Securement secured to nostril center w/ adhesive device 04/17/22 0305   Clamp Status/Tolerance clamped 04/17/22 0305   Suction Setting/Drainage Method suction at the bedside 04/17/22 0305   Insertion Site Appearance no redness, warmth, tenderness, skin breakdown, drainage 04/17/22 0305   Drainage None 04/15/22 0332   Flush/Irrigation flushed w/;water;no resistance met 04/17/22 0305   Water Bolus (mL) 100 mL 04/17/22 0505   Residual Amount (ml) 40 ml 04/16/22 0700       Female External Urinary Catheter 04/13/22 1820 (Active)   Skin no redness;no breakdown 04/17/22 0305   Tolerance no signs/symptoms of discomfort 04/17/22 0305   Suction Continuous suction at 60 mmHg 04/16/22 2004   Date of last wick change 04/16/22 04/16/22 2004   Time of last wick change 0956 04/16/22 0700   Output (mL) 200 mL 04/17/22 0505       Physical Exam    Neurosurgery Physical Exam  E3V4M6  AAO x 3; significant mental status waxing and waning  Follow commands on RUE  RLE - wd  L - contracted and plegic      Cranial dressing c/d/I    SD JAMAR to gravity with scant SS output      Significant Labs:  Recent Labs   Lab 04/23/22  0140 04/24/22  0331   * 89   * 144   K 4.1 3.5   * 116*   CO2 18* 19*   BUN 21 21   CREATININE 1.0 1.1   CALCIUM 8.3* 8.0*       Recent Labs   Lab 04/23/22  0140 04/24/22  0331   WBC 11.48 10.45   HGB 10.4* 9.1*   HCT 32.2* 28.2*    154       No results for input(s): LABPT, INR, APTT in the last 48 hours.    Microbiology Results  (last 7 days)       Procedure Component Value Units Date/Time    Blood culture [[id number]] Collected: 04/14/22 1208    Order Status: Completed Specimen: Blood from Peripheral, Hand, Right Updated: 04/19/22 1412     Blood Culture, Routine No growth after 5 days.    Blood culture [[id number]] Collected: 04/14/22 1226    Order Status: Completed Specimen: Blood from Peripheral, Hand, Left Updated: 04/19/22 1412     Blood Culture, Routine No growth after 5 days.          All pertinent labs from the last 24 hours have been reviewed.    Significant Diagnostics:  I have reviewed all pertinent imaging results/findings within the past 24 hours.  X-Ray Chest AP Portable    Result Date: 4/24/2022  As above. Electronically signed by: Brock Beach Date:    04/24/2022 Time:    09:32           Assessment/Plan:     * SDH (subdural hematoma)  71 year old female with , hypothyroidism, CVA with left-sided residual deficit on ASA/Plavix, DM2, HTN, HLD, CKD, chronic anemia, presenting from SNF after being found down next to wheelchair after unwitnessed fall, consulted to NSGY for right 2 cm SDH mostly acute, membranous and heterogenous appearing, some chronic component, without midline shift. Now s/p R crani for SDH evacuation (4/4) and redo evacuation on 4/20.     No acute changes. Incision dry, intact and non-inflammed. Remove subdural drain today.       --Admitted to [facility]   -- q1 hour vitals/neurochecks; exam wax and wane   -- SBP < 160  -- Na goal eunatremia.  -- Seizure control per neuro ICU: Keppra 1500 BID and vimpat  b.i.d.    EEG with right-sided none compulsive seizures   -- CTH stable 4/19. Postop 4/20 with expected changes.    -- Please obtain MMA embo as soon as possible.   -- Saturating well on room   -- HOB 15  -- PPx: SCDs, BR, IS. Okay for SQH for dvt ppx.   -- Osteomyelitis the or really alternative is of R foot:    - ID rec 6 week course Vanc with end date 5/5. F/u full ID recs. OK to start Etrapenem if pt  decompensates.   -Podiatry managing recent partial foot amputation, appreciate recs. Sutures removed 4/12.    -Home health/facility to do dressing changes every MWF and prn as follows:  Cleanse right foot incision site with saline or wound cleanser, paint incision site with betadine, cover with 4x4 gauze, kerlix, ACE bandage.   -- Anemia: Resolved.   -- Fever 4/11: Afebrile overnight.    - UA 4/11: Growing GNRs, f/u susceptibility   - CXR with possible basilar effusion.   - BLE US 4/11: w/o DVT.   - HM following. Appreciate assistance with care.    - Blood cx 4/11: NGTD.    Dispo: c/w ICU care.             Brock Fairchild MD  Neurosurgery  [facility]

## 2022-04-24 NOTE — PROGRESS NOTES
[facility] - Neuro Critical Care  Neurocritical Care  Progress Note    Admit Date: 4/3/2022  Service Date: 04/24/2022  Length of Stay: 21    Subjective:     Chief Complaint: SDH (subdural hematoma)    History of Present Illness: Ms. Adame is a 72 yo female with hx of dementia, R MCA stroke with left side contraction/hemiparesis [place] [facility] [place]p [place]. Stepped down from [facility]. On 4/13 she developed worsening headache and CTH showed re-accumulation of R SDH and 4-5mm MLS. She was noted to be more slow to follow commands. Stepped back up to [facility] for closer monitoring.    Original HPI below:  Ms. Adame is a 72 y/o F with a PMHx of baseline dementia, GERD, R MCA stroke with residual LUE weakness, HLD, HTN, Stage IV CKD, TIIDM who presents to [facility] from [facility] with a AoC SDH after an unwitnessed fall from her wheel chair without loss of consciousness. CT spine negative for fracture. CT head with R SDH and 3-4 mm MLS. She is on DAPT at home. Of note, she has been staying at [facility] after a partial R foot amputation, stitches still intact, for which she is receiving vancomycin.         Hospital Course: 04/04/2022: OR today for clot evac. Patient returned to ICU intubated on precedex. Post op scan stable. Wean propofol and initiated precedex for likely extubation tomorrow.   04/05/2022 repeat CT head due to drainage.   04/06/2022 NAEO.   04/07/2022 NAEO. Successfully extubated  4/14/22: EEG revealed seizure   4/15/22: vimpat load overnight  04/16/2022: NAEON.  04/17/2022. NAEON. Repeat CTH stable.   04/18/2022  Lateralized periodic discharges on R with some seizure correlate. 200 vimpat x 1 and increase scheduled vimpat to 200. Platelets by NSGY 2/2 OR for clot evac today.   04/19/2022 OR postponed to today with NSGY. CT this morning stable. Continue EEG.   04/20: in OR for repeat evacuation  04/21: follow up CT after evacuation shows SD drain and little residual blood  04/22: losartan  adjusted to decrease prn use, enteral feeding and fluid to be started after MMA embolization  04/23: occlusive brachial thrombus on picc line seen yesterday, right arm swelling improved with removal of art line and picc  New 20 gauge left wrist, carefully start trickle feeds while waiting for MMA embolization  04/24/2022 fluctuation beena exam, getting EEG       Interval History:  >4 elements OR status of 3 inpatient conditions    Review of Systems   Unable to perform ROS: Patient nonverbal   2 systems OR Unable to obtain a complete ROS due to level of consciousness.  Objective:     Vitals:  Temp: 98.06 °F (36.7 °C)  Pulse: 82  Rhythm: normal sinus rhythm  BP: (!) 157/74  MAP (mmHg): 106  Resp: 20  SpO2: 100 %  Oxygen Concentration (%): 98  O2 Device (Oxygen Therapy): nasal cannula    Temp  Min: 93.38 °F (34.1 °C)  Max: 98.42 °F (36.9 °C)  Pulse  Min: 61  Max: 82  BP  Min: 85/47  Max: 178/79  MAP (mmHg)  Min: 60  Max: 114  Resp  Min: 9  Max: 28  SpO2  Min: 92 %  Max: 100 %  Oxygen Concentration (%)  Min: 98  Max: 98    04/23 0701 - 04/24 0700  In: 40   Out: 475 [Urine:475]   Unmeasured Output  Urine Occurrence: 1  Stool Occurrence: 1  Pad Count: 1       Physical Exam  Constitutional: Mild res distress  Eyes: Conjunctiva clear, anicteric. Lids no lesions.  Head/Ears/Nose/Mouth/Throat/Neck: s/p right crani and SD drain placement. dry mucous membranes. External ears, nose atraumatic.   Cardiovascular: Regular rhythm. ESM  Respiratory: wheezing   Gastrointestinal: No hernia. Soft, nondistended, + bowel sounds.      Neurologic Examination:    -Mental Status: Alert. Mute, not following commands   -Cranial nerves: Pupils equal, round, and reactive bilateral. Extraocular movements intact.   -Motor: Left spastic hemiplegia. Withdraw right side to noxious stimuli     Medications:  Continuouslactated ringers, Last Rate: 50 mL/hr at 04/24/22 0923    ScheduledamLODIPine, 5 mg, Daily  atorvastatin, 40 mg, Daily  heparin (porcine),  5,000 Units, Q8H  lacosamide, 250 mg, Q12H  levetiracetam, 1,500 mg, BID  levothyroxine, 75 mcg, Before breakfast  senna-docusate 8.6-50 mg, 1 tablet, BID  silodosin, 4 mg, Daily  vancomycin (VANCOCIN) IVPB, 500 mg, Q24H    PRNacetaminophen, 650 mg, Q4H PRN  albuterol sulfate, 2.5 mg, Q4H PRN  dextrose 10%, 12.5 g, PRN  glucagon (human recombinant), 1 mg, PRN  hydrALAZINE, 10 mg, Q8H PRN  HYDROcodone-acetaminophen, 1 tablet, Q4H PRN  labetalol, 10 mg, Q6H PRN  metoclopramide HCl, 5 mg, Q6H PRN  ondansetron, 4 mg, Q6H PRN  potassium bicarbonate, 35 mEq, PRN  potassium bicarbonate, 50 mEq, PRN  potassium bicarbonate, 60 mEq, PRN  potassium, sodium phosphates, 2 packet, PRN  potassium, sodium phosphates, 2 packet, PRN  potassium, sodium phosphates, 2 packet, PRN  sodium chloride 0.9%, 10 mL, PRN  sodium chloride 0.9%, 10 mL, PRN  vancomycin - pharmacy to dose, , pharmacy to manage frequency      Today I personally reviewed pertinent medications, lines/drains/airways, imaging, laboratory results, microbiology results, notably:    Diet  Diet NPO  Diet NPO          Assessment/Plan:     Neuro  * SDH (subdural hematoma)  S/p evac  Poor neurological exam  Discuss MMA embolization           Recurrent seizures  Con current AEDs  Connect to EEG giving the fluctuation of neurological exam     History of stroke  - R MCA stroke (2013) with residual LUE flaccid paralysis  - DAPT at home held due to new ICH  - Statin      Cardiac/Vascular  Hyperlipidemia  - Statin    Hypertension  - SBP < 160  -Restart PO meds gradually     ID  Osteomyelitis of right foot    - continue vancomycin      Endocrine  Hypothyroidism    - Synthroid    Severe protein-calorie malnutrition  Nutrition consulted.   NPO since she has to be flat for SDD          Type 2 diabetes mellitus, with long-term current use of insulin  ssi      Other  Debility  Baseline flaccid paralysis of LUE 2/2 previous R MCA stroke   PT/OT          The patient is being Prophylaxed  for:  Venous Thromboembolism with: Mechanical or Chemical  Stress Ulcer with: H2B  Ventilator Pneumonia with: not applicable    Activity Orders          Diet NPO: NPO starting at 04/22 1944    Elevate HOB Flat starting at 04/20 1845    Turn patient starting at 04/13 1600        Full Code    Uninterrupted Critical Care/Counseling Time (not including procedures): 35 minutes     Jason Figueroa MD  Neurocritical Care  [facility] - Neuro Critical Care

## 2022-04-24 NOTE — ASSESSMENT & PLAN NOTE
71 year old female with , hypothyroidism, CVA with left-sided residual deficit on ASA/Plavix, DM2, HTN, HLD, CKD, chronic anemia, presenting from SNF after being found down next to wheelchair after unwitnessed fall, consulted to NSGY for right 2 cm SDH mostly acute, membranous and heterogenous appearing, some chronic component, without midline shift. Now s/p R crani for SDH evacuation (4/4) and redo evacuation on 4/20.     No acute changes. Incision dry, intact and non-inflammed. Remove subdural drain today.       --Admitted to NCC   -- q1 hour vitals/neurochecks; exam wax and wane   -- SBP < 160  -- Na goal eunatremia.  -- Seizure control per neuro ICU: Keppra 1500 BID and vimpat  b.i.d.    EEG with right-sided none compulsive seizures   -- CTH stable 4/19. Postop 4/20 with expected changes.    -- Please obtain MMA embo as soon as possible.   -- Saturating well on room   -- HOB 15  -- PPx: SCDs, BR, IS. Okay for SQ for dvt ppx.   -- Osteomyelitis the or really alternative is of R foot:    - ID rec 6 week course Vanc with end date 5/5. F/u full ID recs. OK to start Etrapenem if pt decompensates.   -Podiatry managing recent partial foot amputation, appreciate recs. Sutures removed 4/12.    -Home health/facility to do dressing changes every MWF and prn as follows:  Cleanse right foot incision site with saline or wound cleanser, paint incision site with betadine, cover with 4x4 gauze, kerlix, ACE bandage.   -- Anemia: Resolved.   -- Fever 4/11: Afebrile overnight.    - UA 4/11: Growing GNRs, f/u susceptibility   - CXR with possible basilar effusion.   - BLE US 4/11: w/o DVT.   - HM following. Appreciate assistance with care.    - Blood cx 4/11: NGTD.    Dispo: c/w ICU care.

## 2022-04-25 NOTE — SUBJECTIVE & OBJECTIVE
Interval History: Exam wax and wane. No other issues. No clinical sz    Medications:  Continuous Infusions:      Scheduled Meds:   albuterol sulfate  2.5 mg Nebulization Q6H    amLODIPine  5 mg Oral Daily    atorvastatin  40 mg Per NG tube Daily    heparin (porcine)  5,000 Units Subcutaneous Q8H    lacosamide  250 mg Per NG tube Q12H    levetiracetam  1,500 mg Per NG tube BID    levothyroxine  75 mcg Per NG tube Before breakfast    senna-docusate 8.6-50 mg  1 tablet Per NG tube BID    silodosin  4 mg Per NG tube Daily    [START ON 4/26/2022] vancomycin (VANCOCIN) IVPB  750 mg Intravenous Q48H     PRN Meds:acetaminophen, dextrose 10%, glucagon (human recombinant), hydrALAZINE, HYDROcodone-acetaminophen, labetalol, metoclopramide HCl, ondansetron, potassium bicarbonate, potassium bicarbonate, potassium bicarbonate, potassium, sodium phosphates, potassium, sodium phosphates, potassium, sodium phosphates, sodium chloride 0.9%, sodium chloride 0.9%, Pharmacy to dose Vancomycin consult **AND** vancomycin - pharmacy to dose     Review of Systems  Objective:     Weight: 49.4 kg (109 lb)  Body mass index is 17.07 kg/m².  Vital Signs (Most Recent):  Temp: 97.52 °F (36.4 °C) (04/25/22 0601)  Pulse: 72 (04/25/22 0601)  Resp: 13 (04/25/22 0601)  BP: (!) 110/56 (04/25/22 0601)  SpO2: 100 % (04/25/22 0601)   Vital Signs (24h Range):  Temp:  [97.16 °F (36.2 °C)-98.96 °F (37.2 °C)] 97.52 °F (36.4 °C)  Pulse:  [63-88] 72  Resp:  [8-27] 13  SpO2:  [21 %-100 %] 100 %  BP: ()/(51-79) 110/56                  Oxygen Concentration (%):  [98] 98         NG/OG Tube 04/14/22 1200 [place] sump 14 Fr. Left nostril (Active)   Placement Check placement verified by aspirate characteristics;placement verified by x-ray 04/17/22 0305   Tolerance no signs/symptoms of discomfort 04/17/22 0305   Securement secured to nostril center w/ adhesive device 04/17/22 0305   Clamp Status/Tolerance clamped 04/17/22 0305   Suction Setting/Drainage Method  suction at the bedside 04/17/22 0305   Insertion Site Appearance no redness, warmth, tenderness, skin breakdown, drainage 04/17/22 0305   Drainage None 04/15/22 0332   Flush/Irrigation flushed w/;water;no resistance met 04/17/22 0305   Water Bolus (mL) 100 mL 04/17/22 0505   Residual Amount (ml) 40 ml 04/16/22 0700       Female External Urinary Catheter 04/13/22 1820 (Active)   Skin no redness;no breakdown 04/17/22 0305   Tolerance no signs/symptoms of discomfort 04/17/22 0305   Suction Continuous suction at 60 mmHg 04/16/22 2004   Date of last wick change 04/16/22 04/16/22 2004   Time of last wick change 0956 04/16/22 0700   Output (mL) 200 mL 04/17/22 0505       Physical Exam    Neurosurgery Physical Exam  [id number]  significant mental status waxing and waning  Follow commands intermittently   Loca on RUE  RLE -RLE  L - contracted and plegic  Cranial dressing c/d/I      Significant Labs:  Recent Labs   Lab 04/24/22  0331 04/25/22  0218   GLU 89 101    145   K 3.5 4.0   * 118*   CO2 19* 21*   BUN 21 22   CREATININE 1.1 1.2   CALCIUM 8.0* 8.1*       Recent Labs   Lab 04/24/22  0331 04/25/22  0218   WBC 10.45 12.06   HGB 9.1* 8.2*   HCT 28.2* 26.2*    140*       No results for input(s): LABPT, INR, APTT in the last 48 hours.    Microbiology Results (last 7 days)       Procedure Component Value Units Date/Time    Blood culture [[id number]] Collected: 04/14/22 1208    Order Status: Completed Specimen: Blood from Peripheral, Hand, Right Updated: 04/19/22 1412     Blood Culture, Routine No growth after 5 days.    Blood culture [[id number]] Collected: 04/14/22 1226    Order Status: Completed Specimen: Blood from Peripheral, Hand, Left Updated: 04/19/22 1412     Blood Culture, Routine No growth after 5 days.          All pertinent labs from the last 24 hours have been reviewed.    Significant Diagnostics:  I have reviewed all pertinent imaging results/findings within the past 24 hours.  X-Ray Chest AP  Portable    Result Date: 4/24/2022  As above. Electronically signed by: Brock Beach Date:    04/24/2022 Time:    09:32

## 2022-04-25 NOTE — PLAN OF CARE
[facility] - Neuro Critical Care  Discharge Reassessment    Primary Care Provider: Dante Olivier MD    Expected Discharge Date: 5/4/2022     Per MD, patient to have MMA Embolization.  Not medically ready for discharge.     Reassessment (most recent)     Discharge Reassessment - 04/25/22 1641        Discharge Reassessment    Assessment Type Discharge Planning Reassessment     Did the patient's condition or plan change since previous assessment? No     Communicated MARY with patient/caregiver Date not available/Unable to determine     Discharge Plan A Long-term acute care facility (LTAC)     Discharge Plan B Skilled Nursing Facility     DME Needed Upon Discharge  none     Discharge Barriers Identified None     Why the patient remains in the hospital Requires continued medical care                 Tonie Oneal RN, CCRN-K, [facility]  Neuro-Critical Care   [phone]

## 2022-04-25 NOTE — PROGRESS NOTES
[facility] - Neuro Critical Care  Neurosurgery  Progress Note    Subjective:     History of Present Illness: 71 year old female with , hypothyroidism, CVA with left-sided residual deficit on ASA/Plavix, DM2, HTN, HLD, CKD, chronic anemia, presenting from SNF after being found down next to wheelchair after unwitnessed fall, consulted to NSGY for right 2 cm SDH without midline shift. She is altered at baseline and unable to provide to history, but family at bedside says she is more confused and less interactive than usual.       Post-Op Info:  Procedure(s) (LRB):  CRANIOTOMY, FOR SUBDURAL HEMATOMA EVACUATION (Right)   5 Days Post-Op     Interval History: Exam wax and wane. No other issues. No clinical sz    Medications:  Continuous Infusions:      Scheduled Meds:   albuterol sulfate  2.5 mg Nebulization Q6H    amLODIPine  5 mg Oral Daily    atorvastatin  40 mg Per NG tube Daily    heparin (porcine)  5,000 Units Subcutaneous Q8H    lacosamide  250 mg Per NG tube Q12H    levetiracetam  1,500 mg Per NG tube BID    levothyroxine  75 mcg Per NG tube Before breakfast    senna-docusate 8.6-50 mg  1 tablet Per NG tube BID    silodosin  4 mg Per NG tube Daily    [START ON 4/26/2022] vancomycin (VANCOCIN) IVPB  750 mg Intravenous Q48H     PRN Meds:acetaminophen, dextrose 10%, glucagon (human recombinant), hydrALAZINE, HYDROcodone-acetaminophen, labetalol, metoclopramide HCl, ondansetron, potassium bicarbonate, potassium bicarbonate, potassium bicarbonate, potassium, sodium phosphates, potassium, sodium phosphates, potassium, sodium phosphates, sodium chloride 0.9%, sodium chloride 0.9%, Pharmacy to dose Vancomycin consult **AND** vancomycin - pharmacy to dose     Review of Systems  Objective:     Weight: 49.4 kg (109 lb)  Body mass index is 17.07 kg/m².  Vital Signs (Most Recent):  Temp: 97.52 °F (36.4 °C) (04/25/22 0601)  Pulse: 72 (04/25/22 0601)  Resp: 13 (04/25/22 0601)  BP: (!) 110/56 (04/25/22 0601)  SpO2: 100 %  (04/25/22 0601)   Vital Signs (24h Range):  Temp:  [97.16 °F (36.2 °C)-98.96 °F (37.2 °C)] 97.52 °F (36.4 °C)  Pulse:  [63-88] 72  Resp:  [8-27] 13  SpO2:  [21 %-100 %] 100 %  BP: ()/(51-79) 110/56                  Oxygen Concentration (%):  [98] 98         NG/OG Tube 04/14/22 1200 [place] sump 14 Fr. Left nostril (Active)   Placement Check placement verified by aspirate characteristics;placement verified by x-ray 04/17/22 0305   Tolerance no signs/symptoms of discomfort 04/17/22 0305   Securement secured to nostril center w/ adhesive device 04/17/22 0305   Clamp Status/Tolerance clamped 04/17/22 0305   Suction Setting/Drainage Method suction at the bedside 04/17/22 0305   Insertion Site Appearance no redness, warmth, tenderness, skin breakdown, drainage 04/17/22 0305   Drainage None 04/15/22 0332   Flush/Irrigation flushed w/;water;no resistance met 04/17/22 0305   Water Bolus (mL) 100 mL 04/17/22 0505   Residual Amount (ml) 40 ml 04/16/22 0700       Female External Urinary Catheter 04/13/22 1820 (Active)   Skin no redness;no breakdown 04/17/22 0305   Tolerance no signs/symptoms of discomfort 04/17/22 0305   Suction Continuous suction at 60 mmHg 04/16/22 2004   Date of last wick change 04/16/22 04/16/22 2004   Time of last wick change 0956 04/16/22 0700   Output (mL) 200 mL 04/17/22 0505       Physical Exam    Neurosurgery Physical Exam  [id number]  significant mental status waxing and waning  Follow commands intermittently   Loca on RUE  RLE -RLE  L - contracted and plegic  Cranial dressing c/d/I      Significant Labs:  Recent Labs   Lab 04/24/22  0331 04/25/22  0218   GLU 89 101    145   K 3.5 4.0   * 118*   CO2 19* 21*   BUN 21 22   CREATININE 1.1 1.2   CALCIUM 8.0* 8.1*       Recent Labs   Lab 04/24/22  0331 04/25/22  0218   WBC 10.45 12.06   HGB 9.1* 8.2*   HCT 28.2* 26.2*    140*       No results for input(s): LABPT, INR, APTT in the last 48 hours.    Microbiology Results (last 7 days)        Procedure Component Value Units Date/Time    Blood culture [[id number]] Collected: 04/14/22 1208    Order Status: Completed Specimen: Blood from Peripheral, Hand, Right Updated: 04/19/22 1412     Blood Culture, Routine No growth after 5 days.    Blood culture [[id number]] Collected: 04/14/22 1226    Order Status: Completed Specimen: Blood from Peripheral, Hand, Left Updated: 04/19/22 1412     Blood Culture, Routine No growth after 5 days.          All pertinent labs from the last 24 hours have been reviewed.    Significant Diagnostics:  I have reviewed all pertinent imaging results/findings within the past 24 hours.  X-Ray Chest AP Portable    Result Date: 4/24/2022  As above. Electronically signed by: Brock Beach Date:    04/24/2022 Time:    09:32           Assessment/Plan:     * SDH (subdural hematoma)  71 year old female with , hypothyroidism, CVA with left-sided residual deficit on ASA/Plavix, DM2, HTN, HLD, CKD, chronic anemia, presenting from SNF after being found down next to wheelchair after unwitnessed fall, consulted to NSGY for right 2 cm SDH mostly acute, membranous and heterogenous appearing, some chronic component, without midline shift. Now s/p R crani for SDH evacuation (4/4) and redo evacuation on 4/20.       --Admitted to NCC   -- q1 hour vitals/neurochecks; exam wax and wane   -- SBP < 160  -- Na goal eunatremia. Na 145  -- Seizure control per neuro ICU: Keppra 1500 BID and vimpat  250 b.i.d. EEG per NCC for wax and wane mental status   -- CTH stable 4/19. Postop 4/20 with expected changes   -- Please obtain MMA embo as soon as possible when deemed stable   -- Saturating well on room   -- HOB 15  -- PPx: SCDs, BR, IS. Okay for SQH for dvt ppx.   -- Osteomyelitis R foot: on abx    -Podiatry managing recent partial foot amputation, appreciate recs. Sutures removed 4/12.    -Home health/facility to do dressing changes every MWF and prn as follows:  Cleanse right foot incision site with saline  or wound cleanser, paint incision site with betadine, cover with 4x4 gauze, kerlix, ACE bandage.   -- Fever 4/11: Afebrile overnight.    - UA 4/11: Growing GNRs, f/u susceptibility   - CXR with possible basilar effusion.   - BLE US 4/11: w/o DVT.   - Blood cx 4/11: NGTD.  Dispo: c/w ICU care. Follow up EEG. Possible MMA embo             Shahana Cornejo MD  Neurosurgery  [facility] - Neuro Critical Care

## 2022-04-25 NOTE — PROGRESS NOTES
Pharmacokinetic Assessment Follow Up: IV Vancomycin    Vancomycin serum concentration assessment(s):    The trough level was drawn correctly and can be used to guide therapy at this time. The measurement is above the desired definitive target range of 15 to 20 mcg/mL.    Vancomycin Regimen Plan:    Change regimen to Vancomycin 750 mg IV every 48 hours with next serum trough concentration measured at 0400 prior to 3rd dose on 4/30        Drug levels (last 3 results):  Recent Labs   Lab Result Units 04/24/22 2057   Vancomycin-Trough ug/mL 24.2*       Pharmacy will continue to follow and monitor vancomycin.    Please contact pharmacy at extension [phone] for questions regarding this assessment.    Thank you for the consult,   Angelita Castaneda       Patient brief summary:  Beatriz Adame is a 71 y.o. female initiated on antimicrobial therapy with IV Vancomycin for treatment of bone/joint infection    The patient's current regimen is 500mg IV every 24 hours    Drug Allergies:   Review of patient's allergies indicates:   Allergen Reactions    Tomato (solanum lycopersicum) Hives and Itching       Actual Body Weight:   49.4 kg    Renal Function:   Estimated Creatinine Clearance: 37.1 mL/min (based on SCr of 1.1 mg/dL).,     Dialysis Method (if applicable):  N/A    CBC (last 72 hours):  Recent Labs   Lab Result Units 04/22/22 0118 04/23/22 0140 04/24/22  0331   WBC K/uL 11.44 11.48 10.45   Hemoglobin g/dL 9.0* 10.4* 9.1*   Hematocrit % 27.8* 32.2* 28.2*   Platelets K/uL 185 175 154   Gran % % 64.6 69.6 67.1   Lymph % % 24.9 20.5 22.0   Mono % % 8.1 7.3 8.3   Eosinophil % % 1.7 1.7 1.8   Basophil % % 0.3 0.4 0.4   Differential Method  Automated Automated Automated       Metabolic Panel (last 72 hours):  Recent Labs   Lab Result Units 04/22/22 0118 04/23/22  0140 04/24/22  0331   Sodium mmol/L 145 146* 144   Potassium mmol/L 4.1 4.1 3.5   Chloride mmol/L 117* 118* 116*   CO2 mmol/L 18* 18* 19*   Glucose mg/dL 130* 135* 89    BUN mg/dL 25* 21 21   Creatinine mg/dL 1.2 1.0 1.1   Albumin g/dL 2.0* 2.1* 1.9*   Total Bilirubin mg/dL 0.3 0.4 0.3   Alkaline Phosphatase U/L 52* 64 63   AST U/L 15 16 16   ALT U/L 9* 10 9*       Vancomycin Administrations:  vancomycin given in the last 96 hours                   vancomycin 500 mg in dextrose 5 % 100 mL IVPB (ready to mix system) (mg) 500 mg New Bag 04/24/22 2131     500 mg New Bag 04/23/22 2145     500 mg New Bag 04/22/22 2120     500 mg New Bag 04/21/22 2107    vancomycin 500 mg in dextrose 5 % 100 mL IVPB (ready to mix system) (mg) 500 mg New Bag 04/20/22 2206                Microbiologic Results:  Microbiology Results (last 7 days)     Procedure Component Value Units Date/Time    Blood culture [[id number]] Collected: 04/14/22 1208    Order Status: Completed Specimen: Blood from Peripheral, Hand, Right Updated: 04/19/22 1412     Blood Culture, Routine No growth after 5 days.    Blood culture [[id number]] Collected: 04/14/22 1226    Order Status: Completed Specimen: Blood from Peripheral, Hand, Left Updated: 04/19/22 1412     Blood Culture, Routine No growth after 5 days.

## 2022-04-25 NOTE — H&P
Inpatient Radiology Pre-procedure Note    History of Present Illness:  Beatriz Adame is a 71 y.o. female with hypothyroidism, CVA with left-sided residual deficit on ASA/Plavix, DM2, HTN, HLD, CKD, chronic anemia - with admission concerns of acute SDH S/p R crani for SDH evacuation (4/4) and redo evacuation on 4/20. Hence, plans for cerebral angiogram +/-  bilateral middle meningeal artery embolization for further management.     Admission H&P reviewed.      Past Medical History:   Diagnosis Date    Gastroparesis     GERD (gastroesophageal reflux disease)     History of Clostridium difficile colitis 07/24/2021    History of kidney stones     History of stroke     left side paralysis    Hyperlipidemia     Hypertension     Hypothyroidism     Iron deficiency anemia     Osteoarthritis     Peripheral neuropathy     Stage IV CKD     Type II diabetes mellitus      Past Surgical History:   Procedure Laterality Date    APPENDECTOMY      CRANIOTOMY FOR EVACUATION OF SUBDURAL HEMATOMA Right 4/4/2022    Procedure: CRANIOTOMY, FOR SUBDURAL HEMATOMA EVACUATION;  Surgeon: Silvio White MD;  Location: [address];  Service: Neurosurgery;  Laterality: Right;    CRANIOTOMY FOR EVACUATION OF SUBDURAL HEMATOMA Right 4/20/2022    Procedure: CRANIOTOMY, FOR SUBDURAL HEMATOMA EVACUATION;  Surgeon: Jose Walter MD;  Location: [address];  Service: Neurosurgery;  Laterality: Right;    CYSTOSCOPY W/ URETERAL STENT PLACEMENT Right 10/13/2021    Procedure: CYSTOSCOPY, WITH URETERAL STENT INSERTION;  Surgeon: Wanda Arroyo MD;  Location: [facility];  Service: Urology;  Laterality: Right;    ESOPHAGOGASTRODUODENOSCOPY N/A 11/23/2021    Procedure: EGD (ESOPHAGOGASTRODUODENOSCOPY);  Surgeon: Obed Jeong MD;  Location: [facility] ENDO ([address] [place] [address]);  Service: Endoscopy;  Laterality: N/A;    FOOT AMPUTATION THROUGH METATARSAL Right 3/24/2022    Procedure: AMPUTATION, FOOT, PARTIAL 4th and 5th ray;  Surgeon:  Rodrigo Logan DPM;  Location: [facility] OR 2ND FLR;  Service: Podiatry;  Laterality: Right;    LAPAROSCOPIC APPENDECTOMY N/A 7/22/2021    Procedure: APPENDECTOMY, LAPAROSCOPIC;  Surgeon: Paulo Calvo Jr., MD;  Location: [facility];  Service: General;  Laterality: N/A;    TOE AMPUTATION Right 1/1/2022    Procedure: AMPUTATION, TOE;  Surgeon: Genaro Mason DPM;  Location: [facility];  Service: Podiatry;  Laterality: Right;    TUBAL LIGATION      URETEROSCOPIC REMOVAL OF URETERIC CALCULUS Right 12/22/2021    Procedure: REMOVAL, CALCULUS, URETER, URETEROSCOPIC;  Surgeon: Wanda Arroyo MD;  Location: [facility];  Service: Urology;  Laterality: Right;       Review of Systems:   As documented in primary team H&P    Home Meds:   Prior to Admission medications    Medication Sig Start Date End Date Taking? Authorizing Provider   rosuvastatin (CRESTOR) 40 MG Tab Take 40 mg by mouth every evening. 6/23/21  Yes Historical Provider   amLODIPine (NORVASC) 5 MG tablet Take 10 mg by mouth every evening. Take 5 mg every morning and 10 mg at night    Historical Provider   ascorbic acid, vitamin C, (VITAMIN C) 500 MG tablet Take 1 tablet (500 mg total) by mouth once daily. 3/30/22   Laxmi Hernandez PA-C   aspirin (ECOTRIN) 81 MG EC tablet Take 81 mg by mouth once daily.    Historical Provider   b complex vitamins tablet Take 1 tablet by mouth once daily.    Historical Provider   carvedilol (COREG) 25 MG tablet Take 1 tablet (25 mg total) by mouth 2 (two) times daily. 8/15/17 7/22/21  John Islas MD   clopidogreL (PLAVIX) 75 mg tablet Take 75 mg by mouth. 1/9/20   Historical Provider   DULoxetine (CYMBALTA) 30 MG capsule Take 1 capsule (30 mg total) by mouth once daily. 10/17/21   Kathya Ortiz MD   ferrous sulfate (FEOSOL) 325 mg (65 mg iron) Tab tablet Take 325 mg by mouth daily with breakfast. Off at present    Historical Provider   folic acid (FOLVITE) 1 MG tablet Take 1 tablet (1 mg total) by mouth once daily. 3/29/22  3/29/23  Laxmi Hernandez PA-C   gabapentin (NEURONTIN) 300 MG capsule Take 1 capsule (300 mg total) by mouth once daily. 10/17/21 10/17/22  Kathya Ortiz MD   hydrochlorothiazide (MICROZIDE ORAL) Take 20 mg by mouth once daily at 6am.    Historical Provider   insulin (BASAGLAR KWIKPEN U-100 INSULIN) glargine 100 units/mL (3mL) SubQ pen Inject 5 Units into the skin once daily. 10/17/21   Kathya Ortiz MD   Lactobacillus rhamnosus GG (CULTURELLE) 10 billion cell capsule Take 1 capsule by mouth once daily. 3/29/22   Laxmi Hernandez PA-C   lactulose (CHRONULAC) 10 gram/15 mL solution Take 10 g by mouth 2 (two) times a day.    Historical Provider   levothyroxine (SYNTHROID) 75 MCG tablet Take 75 mcg by mouth once daily. 5/16/21   Historical Provider   losartan (COZAAR) 25 MG tablet Take 25 mg by mouth once daily at 6am. 2/6/22   Historical Provider   metoclopramide HCl (REGLAN) 10 MG tablet Take 1 tablet (10 mg total) by mouth 3 (three) times daily before meals. 11/23/21   Kellie Bianchi PA-C   omeprazole (PRILOSEC) 20 MG capsule Take 20 mg by mouth 2 (two) times daily. 5/28/21   Historical Provider   ondansetron (ZOFRAN) 4 MG tablet Take 1 tablet (4 mg total) by mouth every 8 (eight) hours as needed for Nausea. 11/23/21   Kellie Bianchi PA-C   tamsulosin (FLOMAX) 0.4 mg Cap Take 1 capsule (0.4 mg total) by mouth once daily. 10/18/21   Kathya Ortiz MD   traZODone (DESYREL) 100 MG tablet Take 1 tablet (100 mg total) by mouth once daily.  Patient taking differently: Take 100 mg by mouth nightly as needed for Insomnia. 1/14/22 2/13/22  Annel Hernandez MD     Scheduled Meds:    albuterol sulfate  2.5 mg Nebulization Q6H    [START ON 4/26/2022] amLODIPine  5 mg Per NG tube Daily    atorvastatin  40 mg Per NG tube Daily    heparin (porcine)  5,000 Units Subcutaneous Q8H    lacosamide  250 mg Per NG tube Q12H    levetiracetam  1,500 mg Per NG tube BID    levothyroxine  75 mcg Per NG tube Before  breakfast    senna-docusate 8.6-50 mg  1 tablet Per NG tube BID    silodosin  4 mg Per NG tube Daily    [START ON 4/26/2022] vancomycin (VANCOCIN) IVPB  750 mg Intravenous Q48H     Continuous Infusions:   PRN Meds:acetaminophen, dextrose 10%, glucagon (human recombinant), hydrALAZINE, HYDROcodone-acetaminophen, labetalol, metoclopramide HCl, ondansetron, potassium bicarbonate, potassium bicarbonate, potassium bicarbonate, potassium, sodium phosphates, potassium, sodium phosphates, potassium, sodium phosphates, sodium chloride 0.9%, sodium chloride 0.9%, Pharmacy to dose Vancomycin consult **AND** vancomycin - pharmacy to dose  Anticoagulants/Antiplatelets: no anticoagulation    Allergies:   Review of patient's allergies indicates:   Allergen Reactions    Tomato (solanum lycopersicum) Hives and Itching     Sedation Hx: have not been any systemic reactions    Labs:  No results for input(s): INR in the last 168 hours.    Invalid input(s):  PT,  PTT    Recent Labs   Lab 04/25/22 0218   WBC 12.06   HGB 8.2*   HCT 26.2*   MCV 97   *      Recent Labs   Lab 04/25/22  0218         K 4.0   *   CO2 21*   BUN 22   CREATININE 1.2   CALCIUM 8.1*   ALT 10   AST 18   ALBUMIN 2.0*   BILITOT 0.3         Vitals:  Temp: 97.3 °F (36.3 °C) (04/25/22 1200)  Pulse: 83 (04/25/22 1333)  Resp: (!) 23 (04/25/22 1333)  BP: (!) 161/68 (04/25/22 1200)  SpO2: 96 % (04/25/22 1333)     Physical Exam:  ASA: 3  Mallampati: 3    General: no acute distress, awake   Mental Status: encephalopathic - not oriented to person, place and time  HEENT: normocephalic, atraumatic  Chest: unlabored breathing  Heart: regular heart rate  Abdomen: nondistended  Extremity: left spastic hemiparesis / right active withrawals       Plan:  Sedation Plan: As per general anesthesia  Patient will undergo: cerebral angiogram +/-  bilateral middle meningeal artery embolization for further management of recurrent SDH        Diya Adkins MD      Neuro Endovascular Surgery Fellow   [facility]

## 2022-04-25 NOTE — SUBJECTIVE & OBJECTIVE
Interval History:      Review of Systems   Unable to perform ROS: Patient nonverbal     Objective:     Vitals:  Temp: 97.3 °F (36.3 °C)  Pulse: 81  Rhythm: normal sinus rhythm  BP: (!) 161/68  MAP (mmHg): 80  Resp: 20  SpO2: 100 %  O2 Device (Oxygen Therapy): nasal cannula    Temp  Min: 97.16 °F (36.2 °C)  Max: 98.96 °F (37.2 °C)  Pulse  Min: 63  Max: 84  BP  Min: 98/55  Max: 161/68  MAP (mmHg)  Min: 73  Max: 93  Resp  Min: 8  Max: 25  SpO2  Min: 21 %  Max: 100 %    04/24 0701 - 04/25 0700  In: 483.7 [I.V.:263.7]  Out: 300 [Urine:300]   Unmeasured Output  Urine Occurrence: 1  Stool Occurrence: 2  Pad Count: 1       Physical Exam  HENT:      Head:      Comments: Pterional craniotomy on right    Eyes:      Extraocular Movements: Extraocular movements intact.      Pupils: Pupils are equal, round, and reactive to light.   Cardiovascular:      Rate and Rhythm: Normal rate.      Heart sounds: Normal heart sounds.   Pulmonary:      Breath sounds: Normal breath sounds.   Abdominal:      Palpations: Abdomen is soft.   Musculoskeletal:      Cervical back: Neck supple.   Skin:     General: Skin is warm.   Neurological:      Mental Status: She is alert.      Comments: Left spastic hemiplegia  Right side bradykinetic but withdraws with increased flexion tone in arm and leg       Medications:  Continuous Scheduledalbuterol sulfate, 2.5 mg, Q6H  [START ON 4/26/2022] amLODIPine, 5 mg, Daily  atorvastatin, 40 mg, Daily  heparin (porcine), 5,000 Units, Q8H  lacosamide, 250 mg, Q12H  levetiracetam, 1,500 mg, BID  levothyroxine, 75 mcg, Before breakfast  senna-docusate 8.6-50 mg, 1 tablet, BID  silodosin, 4 mg, Daily  [START ON 4/26/2022] vancomycin (VANCOCIN) IVPB, 750 mg, Q48H    PRNacetaminophen, 650 mg, Q4H PRN  dextrose 10%, 12.5 g, PRN  glucagon (human recombinant), 1 mg, PRN  hydrALAZINE, 10 mg, Q8H PRN  HYDROcodone-acetaminophen, 1 tablet, Q4H PRN  labetalol, 10 mg, Q6H PRN  metoclopramide HCl, 5 mg, Q6H PRN  ondansetron, 4 mg,  Q6H PRN  potassium bicarbonate, 35 mEq, PRN  potassium bicarbonate, 50 mEq, PRN  potassium bicarbonate, 60 mEq, PRN  potassium, sodium phosphates, 2 packet, PRN  potassium, sodium phosphates, 2 packet, PRN  potassium, sodium phosphates, 2 packet, PRN  sodium chloride 0.9%, 10 mL, PRN  sodium chloride 0.9%, 10 mL, PRN  vancomycin - pharmacy to dose, , pharmacy to manage frequency      Today I personally reviewed pertinent medications, lines/drains/airways, imaging, laboratory results, notably:    Diet  Diet NPO  Diet NPO

## 2022-04-25 NOTE — PLAN OF CARE
[facility] Care Plan    POC reviewed with Beatriz Adame and family at 0300. Pt unable to verbalize understanding. Questions and concerns addressed. No acute events overnight. Pt progressing toward goals. Will continue to monitor. See below and flowsheets for full assessment and VS info.           Is this a stroke patient? no    Neuro:  Casimiro Coma Scale  Best Eye Response: 3-->(E3) to speech  Best Motor Response: 6-->(M6) obeys commands  Best Verbal Response: 2-->(V2) incomprehensible speech  [place] Coma Scale Score: 11  Assessment Qualifiers: patient not sedated/intubated  Pupil PERRLA: yes     24hr Temp:  [97.16 °F (36.2 °C)-98.96 °F (37.2 °C)]     CV:   Rhythm: normal sinus rhythm  BP goals:   SBP < 160  MAP > 65    Resp:   O2 Device (Oxygen Therapy): nasal cannula  Vent Mode: Spont  Set Rate: 15 BPM  Oxygen Concentration (%): 98  Vt Set: 450 mL  PEEP/CPAP: 5 cmH20  Pressure Support: 5 cmH20    Plan: N/A    GI/:     Diet/Nutrition Received: NPO, tube feeding  Last Bowel Movement: 04/23/22  Voiding Characteristics: hesitancy    Intake/Output Summary (Last 24 hours) at 4/25/2022 0552  Last data filed at 4/25/2022 0501  Gross per 24 hour   Intake 1019.04 ml   Output 300 ml   Net 719.04 ml     Unmeasured Output  Urine Occurrence: 1  Stool Occurrence: 2  Pad Count: 1    Labs/Accuchecks:  Recent Labs   Lab 04/25/22  0218   WBC 12.06   RBC 2.71*   HGB 8.2*   HCT 26.2*   *      Recent Labs   Lab 04/25/22  0218      K 4.0   CO2 21*   *   BUN 22   CREATININE 1.2   ALKPHOS 67   ALT 10   AST 18   BILITOT 0.3      Recent Labs   Lab 04/18/22  1037   INR 1.1   APTT 26.2    No results for input(s): CPK, CPKMB, TROPONINI, MB in the last 168 hours.    Electrolytes: Electrolytes replaced  Accuchecks: Q4H    Gtts:      LDA/Wounds:  Lines/Drains/Airways       Drain  Duration                  NG/OG Tube 04/14/22 1200 [place] sump 14 Fr. Left nostril 10 days              Peripheral Intravenous Line  Duration                   Peripheral IV - Single Lumen 04/22/22 1702 22 G Posterior;Right Hand 2 days         Peripheral IV - Single Lumen 04/24/22 1400 20 G Anterior;Left Upper Arm <1 day                  Wounds: No  Wound care consulted: No

## 2022-04-25 NOTE — ASSESSMENT & PLAN NOTE
Multiple underlying structural etiologies in brain, likely baseline dementia  Also added in component is effects of prior seizures

## 2022-04-25 NOTE — ASSESSMENT & PLAN NOTE
71 year old female with , hypothyroidism, CVA with left-sided residual deficit on ASA/Plavix, DM2, HTN, HLD, CKD, chronic anemia, presenting from SNF after being found down next to wheelchair after unwitnessed fall, consulted to NSGY for right 2 cm SDH mostly acute, membranous and heterogenous appearing, some chronic component, without midline shift. Now s/p R crani for SDH evacuation (4/4) and redo evacuation on 4/20.       --Admitted to [facility]   -- q1 hour vitals/neurochecks; exam wax and wane   -- SBP < 160  -- Na goal eunatremia. Na 145  -- Seizure control per neuro ICU: Keppra 1500 BID and vimpat  250 b.i.d. EEG per [facility] for wax and wane mental status   -- CTH stable 4/19. Postop 4/20 with expected changes   -- Please obtain MMA embo as soon as possible when deemed stable   -- Saturating well on room   -- HOB 15  -- PPx: SCDs, BR, IS. Okay for SQH for dvt ppx.   -- Osteomyelitis R foot: on abx    -Podiatry managing recent partial foot amputation, appreciate recs. Sutures removed 4/12.    -Home health/facility to do dressing changes every MWF and prn as follows:  Cleanse right foot incision site with saline or wound cleanser, paint incision site with betadine, cover with 4x4 gauze, kerlix, ACE bandage.   -- Fever 4/11: Afebrile overnight.    - UA 4/11: Growing GNRs, f/u susceptibility   - CXR with possible basilar effusion.   - BLE US 4/11: w/o DVT.   - Blood cx 4/11: NGTD.  Dispo: c/w ICU care. Follow up EEG. Possible MMA embo

## 2022-04-25 NOTE — PROCEDURES
[facility] EEG/VIDEO MONITORING REPORT  Beatriz Adame  [id number]  1950    DATE OF SERVICE:  04/24/2022-04/25/2022  DATE OF ADMISSION: 4/3/2022  1:00 PM    ADMITTING/REQUESTING PROVIDER: Caleb Anand MD    REASON FOR CONSULT:  71-year-old woman with previous right middle cerebral artery stroke with left spastic hemiparesis admitted with right subdural hematoma requiring hemicraniectomy for evacuation with persistent decreased responsiveness. Evaluate for evidence of epileptiform activity.    METHODOLOGY   Electroencephalographic (EEG) recording is with electrodes placed according to the International 10-20 placement system.  Thirty two (32) channels of digital signal (sampling rate of 512/sec) including T1 and T2 was simultaneously recorded from the scalp and may include  EKG, EMG, and/or eye monitors.  Recording band pass was 0.1 to 512 hz.  Digital video recording of the patient is simultaneously recorded with the EEG.  The patient is instructed report clinical symptoms which may occur during the recording session.  EEG and video recording is stored and archived in digital format.  Activation procedures which include photic stimulation, hyperventilation and instructing patients to perform simple task are done in selected patients.   The EEG is displayed on a monitor screen and can be reviewed using different montages.  Computer assisted analysis is employed to detect spike and electrographic seizure activity.   The entire record is submitted for computer analysis.  The entire recording is visually reviewed and the times identified by computer analysis as being spikes or seizures are reviewed again.  Compresses spectral analysis (CSA) is also performed on the activity recorded from each individual channel.  This is displayed as a power display of frequencies from 0 to 30 Hz over time.   The CSA is reviewed looking for asymmetries in power between homologous areas of the scalp and then compared with the original  EEG recording.     Scribe[employer] software is also utilized in the review of this study.  This software suite analyzes the EEG recording in multiple domains.  Coherence and rhythmicity is computed to identify EEG sections which may contain organized seizures.  Each channel undergoes analysis to detect presence of spike and sharp waves which have special and morphological characteristic of epileptic activity.  The routine EEG recording is converted from spacial into frequency domain.  This is then displayed comparing homologous areas to identify areas of significant asymmetry.  Algorithm to identify non-cortically generated artifact is used to separate eye movement, EMG and other artifact from the EEG.      RECORDING TIMES  Start on 04/24/2022 at 12:17 p.m.  Stop on 04/25/2022 at 11:54 a.m.-> End of the Recording Session  A total of 22 hours and 41 minutes of EEG recording is obtained.    EEG FINDINGS  Background activity:   The background is continuous and asymmetric.  Over the left, there is predominantly polymorphic delta with some overriding faster frequencies.  Over the right, there is more pronounced delta slowing which at times appears sharply contoured and periodic in the area of the patient's known skull defect.  There is modest variability throughout the recording session.     There are no pushbutton activations.     Sleep:  There is evidence of sleep architecture better organized over the left.     Activation procedures:   Hyperventilation is not performed  Photic stimulation is not performed     Cardiac Monitor:   Heart rate appears generally regular on a single lead EKG.     Impression:   This is an abnormal continuous EEG monitoring study because of generalized background slowing consistent with a moderate-severe encephalopathy with more prominent focal slowing seen over the right hemisphere with evidence of cortical irritation with contributions from a breach rhythm in the same area of the patient's known  skull defect.  There are no pushbutton activations and no electrographic seizures.     Lorelei Peace MD PhD  Neurology-Epilepsy  [facility].

## 2022-04-25 NOTE — PROGRESS NOTES
[facility] - Neuro Critical Care  Neurocritical Care  Progress Note    Admit Date: 4/3/2022  Service Date: 04/25/2022  Length of Stay: 22    Subjective:     Chief Complaint: SDH (subdural hematoma)    History of Present Illness: Ms. Adame is a 72 yo female with hx of dementia, R MCA stroke with left side contraction/hemiparesis presented [place] [facility] [place]H now s/p evac. Stepped down from [facility]. On 4/13 she developed worsening headache and CTH showed re-accumulation of R SDH and 4-5mm MLS. She was noted to be more slow to follow commands. Stepped back up to [facility] for closer monitoring.    Original HPI below:  Ms. Adame is a 72 y/o F with a PMHx of baseline dementia, GERD, R MCA stroke with residual LUE weakness, HLD, HTN, Stage IV CKD, TIIDM who presents to [facility] from [facility] with a AoC SDH after an unwitnessed fall from her wheel chair without loss of consciousness. CT spine negative for fracture. CT head with R SDH and 3-4 mm MLS. She is on DAPT at home. Of note, she has been staying at [facility] after a partial R foot amputation, stitches still intact, for which she is receiving vancomycin.         Hospital Course: 04/04/2022: OR today for clot evac. Patient returned to ICU intubated on precedex. Post op scan stable. Wean propofol and initiated precedex for likely extubation tomorrow.   04/05/2022 repeat CT head due to drainage.   04/06/2022 NAEO.   04/07/2022 NAEO. Successfully extubated  4/14/22: EEG revealed seizure   4/15/22: vimpat load overnight  04/16/2022: NAEON.  04/17/2022. NAEON. Repeat CTH stable.   04/18/2022  Lateralized periodic discharges on R with some seizure correlate. 200 vimpat x 1 and increase scheduled vimpat to 200. Platelets by NSGY 2/2 OR for clot evac today.   04/19/2022 OR postponed to today with NSGY. CT this morning stable. Continue EEG.   04/20: in OR for repeat evacuation  04/21: follow up CT after evacuation shows SD drain and little residual blood  04/22: losartan  adjusted to decrease prn use, enteral feeding and fluid to be started after MMA embolization  04/23: occlusive brachial thrombus on picc line seen yesterday, right arm swelling improved with removal of art line and picc  New 20 gauge left wrist, carefully start trickle feeds while waiting for MMA embolization  04/24/2022 fluctuation beena exam, getting EEG   04/25/2022: EEG negative for seizures, stable for MMA embolization      Interval History:      Review of Systems   Unable to perform ROS: Patient nonverbal     Objective:     Vitals:  Temp: 97.3 °F (36.3 °C)  Pulse: 81  Rhythm: normal sinus rhythm  BP: (!) 161/68  MAP (mmHg): 80  Resp: 20  SpO2: 100 %  O2 Device (Oxygen Therapy): nasal cannula    Temp  Min: 97.16 °F (36.2 °C)  Max: 98.96 °F (37.2 °C)  Pulse  Min: 63  Max: 84  BP  Min: 98/55  Max: 161/68  MAP (mmHg)  Min: 73  Max: 93  Resp  Min: 8  Max: 25  SpO2  Min: 21 %  Max: 100 %    04/24 0701 - 04/25 0700  In: 483.7 [I.V.:263.7]  Out: 300 [Urine:300]   Unmeasured Output  Urine Occurrence: 1  Stool Occurrence: 2  Pad Count: 1       Physical Exam  HENT:      Head:      Comments: Pterional craniotomy on right    Eyes:      Extraocular Movements: Extraocular movements intact.      Pupils: Pupils are equal, round, and reactive to light.   Cardiovascular:      Rate and Rhythm: Normal rate.      Heart sounds: Normal heart sounds.   Pulmonary:      Breath sounds: Normal breath sounds.   Abdominal:      Palpations: Abdomen is soft.   Musculoskeletal:      Cervical back: Neck supple.   Skin:     General: Skin is warm.   Neurological:      Mental Status: She is alert.      Comments: Left spastic hemiplegia  Right side bradykinetic but withdraws with increased flexion tone in arm and leg       Medications:  Continuous Scheduledalbuterol sulfate, 2.5 mg, Q6H  [START ON 4/26/2022] amLODIPine, 5 mg, Daily  atorvastatin, 40 mg, Daily  heparin (porcine), 5,000 Units, Q8H  lacosamide, 250 mg, Q12H  levetiracetam, 1,500 mg,  BID  levothyroxine, 75 mcg, Before breakfast  senna-docusate 8.6-50 mg, 1 tablet, BID  silodosin, 4 mg, Daily  [START ON 4/26/2022] vancomycin (VANCOCIN) IVPB, 750 mg, Q48H    PRNacetaminophen, 650 mg, Q4H PRN  dextrose 10%, 12.5 g, PRN  glucagon (human recombinant), 1 mg, PRN  hydrALAZINE, 10 mg, Q8H PRN  HYDROcodone-acetaminophen, 1 tablet, Q4H PRN  labetalol, 10 mg, Q6H PRN  metoclopramide HCl, 5 mg, Q6H PRN  ondansetron, 4 mg, Q6H PRN  potassium bicarbonate, 35 mEq, PRN  potassium bicarbonate, 50 mEq, PRN  potassium bicarbonate, 60 mEq, PRN  potassium, sodium phosphates, 2 packet, PRN  potassium, sodium phosphates, 2 packet, PRN  potassium, sodium phosphates, 2 packet, PRN  sodium chloride 0.9%, 10 mL, PRN  sodium chloride 0.9%, 10 mL, PRN  vancomycin - pharmacy to dose, , pharmacy to manage frequency      Today I personally reviewed pertinent medications, lines/drains/airways, imaging, laboratory results, notably:    Diet  Diet NPO  Diet NPO          Assessment/Plan:     Neuro  * SDH (subdural hematoma)  S/p evac  Poor neurological exam  Discuss MMA embolization   Subdural drain removed          Encephalopathy  Multiple underlying structural etiologies in brain, likely baseline dementia  Also added in component is effects of prior seizures    Recurrent seizures  Con current AEDs  Connect to EEG giving the fluctuation of neurological exam   04/25: EEG negative for seizures    Cardiac/Vascular  Hypertension  - SBP < 160  -Restart PO meds gradually   Presently only requiring amlodipine 5mg daily    Endocrine  Hypothyroidism    - Synthroid    Type 2 diabetes mellitus, with long-term current use of insulin  ssi  Has been on and off TFs, cont checks with escalation of impact peptide            The patient is being Prophylaxed for:  Venous Thromboembolism with: Chemical  Stress Ulcer with: None  Ventilator Pneumonia with: not applicable    Activity Orders          Diet NPO: NPO starting at 04/22 1944    Elevate HOB  Flat starting at 04/20 1845    Turn patient starting at 04/13 1600        Full Code    Jorge Pino MD  Neurocritical Care  [facility] - Neuro Critical Care

## 2022-04-25 NOTE — ASSESSMENT & PLAN NOTE
Con current AEDs  Connect to EEG giving the fluctuation of neurological exam   04/25: EEG negative for seizures

## 2022-04-26 NOTE — SUBJECTIVE & OBJECTIVE
Interval History: 4/26: NAEON. AFVSS. Exam stable. MMA embo with IR today.     Medications:  Continuous Infusions:      Scheduled Meds:   acetylcysteine 100 mg/ml (10%)  4 mL Nebulization Q6H    albuterol-ipratropium  3 mL Nebulization Q6H    amLODIPine  5 mg Per NG tube Daily    atorvastatin  40 mg Per NG tube Daily    heparin (porcine)  5,000 Units Subcutaneous Q8H    lacosamide  250 mg Per NG tube Q12H    levetiracetam  1,500 mg Per NG tube BID    levothyroxine  75 mcg Per NG tube Before breakfast    senna-docusate 8.6-50 mg  1 tablet Per NG tube BID    silodosin  4 mg Per NG tube Daily    vancomycin (VANCOCIN) IVPB  750 mg Intravenous Q48H     PRN Meds:acetaminophen, dextrose 10%, glucagon (human recombinant), hydrALAZINE, HYDROcodone-acetaminophen, iodixanoL, labetalol, metoclopramide HCl, ondansetron, potassium bicarbonate, potassium bicarbonate, potassium bicarbonate, potassium, sodium phosphates, potassium, sodium phosphates, potassium, sodium phosphates, sodium chloride 0.9%, sodium chloride 0.9%, sodium chloride 0.9%, Pharmacy to dose Vancomycin consult **AND** vancomycin - pharmacy to dose     Review of Systems  Objective:     Weight: 49.4 kg (109 lb)  Body mass index is 17.07 kg/m².  Vital Signs (Most Recent):  Temp: 97.9 °F (36.6 °C) (04/26/22 0800)  Pulse: 64 (04/26/22 1435)  Resp: 20 (04/26/22 1435)  BP: 112/60 (04/26/22 1435)  SpO2: 97 % (04/26/22 1435)   Vital Signs (24h Range):  Temp:  [97.5 °F (36.4 °C)-98.4 °F (36.9 °C)] 97.9 °F (36.6 °C)  Pulse:  [63-85] 64  Resp:  [10-29] 20  SpO2:  [78 %-100 %] 97 %  BP: (109-152)/(56-66) 112/60     Date 04/26/22 0700 - 04/27/22 0659   Shift 6608-7799 5401-2727 0269-3548 24 Hour Total   INTAKE   IV Piggyback 400   400   Shift Total(mL/kg) 400(8.1)   400(8.1)   OUTPUT   Urine(mL/kg/hr) 100(0.3)   100   Shift Total(mL/kg) 100(2)   100(2)   Weight (kg) 49.4 49.4 49.4 49.4                Vent Mode: VC  Resp Rate Total:  [18 br/min] 18 br/min  Vt Set:  [400 mL] 400  mL  PEEP/CPAP:  [5 cmH20] 5 cmH20         NG/OG Tube 04/14/22 1200 Mason sump 14 Fr. Left nostril (Active)   Placement Check placement verified by aspirate characteristics;placement verified by x-ray 04/17/22 0305   Tolerance no signs/symptoms of discomfort 04/17/22 0305   Securement secured to nostril center w/ adhesive device 04/17/22 0305   Clamp Status/Tolerance clamped 04/17/22 0305   Suction Setting/Drainage Method suction at the bedside 04/17/22 0305   Insertion Site Appearance no redness, warmth, tenderness, skin breakdown, drainage 04/17/22 0305   Drainage None 04/15/22 0332   Flush/Irrigation flushed w/;water;no resistance met 04/17/22 0305   Water Bolus (mL) 100 mL 04/17/22 0505   Residual Amount (ml) 40 ml 04/16/22 0700       Female External Urinary Catheter 04/13/22 1820 (Active)   Skin no redness;no breakdown 04/17/22 0305   Tolerance no signs/symptoms of discomfort 04/17/22 0305   Suction Continuous suction at 60 mmHg 04/16/22 2004   Date of last wick change 04/16/22 04/16/22 2004   Time of last wick change 0956 04/16/22 0700   Output (mL) 200 mL 04/17/22 0505       Physical Exam    Neurosurgery Physical Exam  [id number]  AAO x 3; significant mental status waxing and waning  Follow commands on RUE  RLE - wd  L - contracted and plegic      Cranial dressing c/d/I    SD JAMAR to gravity with scant SS output      Significant Labs:  Recent Labs   Lab 04/25/22 0218 04/26/22 0226    128*    142   K 4.0 4.1   * 115*   CO2 21* 19*   BUN 22 23   CREATININE 1.2 1.1   CALCIUM 8.1* 8.3*       Recent Labs   Lab 04/25/22 0218 04/26/22 0226   WBC 12.06 12.83*   HGB 8.2* 8.7*   HCT 26.2* 26.9*   * 131*       No results for input(s): LABPT, INR, APTT in the last 48 hours.    Microbiology Results (last 7 days)       ** No results found for the last 168 hours. **          All pertinent labs from the last 24 hours have been reviewed.    Significant Diagnostics:  I have reviewed all pertinent imaging  results/findings within the past 24 hours.  X-Ray Chest AP Portable    Result Date: 4/26/2022  Continued demonstration of widespread bilateral airspace consolidation and bilateral pleural fluid.  Allowing for differences in patient positioning, no significant interval change in the appearance of the chest since 04/24/2022 is appreciated. Electronically signed by: Paulo Ortiz MD Date:    04/26/2022 Time:    08:02

## 2022-04-26 NOTE — ANESTHESIA PROCEDURE NOTES
Intubation    Date/Time: 4/26/2022 11:21 AM  Performed by: Marquis Dsouza CRNA  Authorized by: Samuel Nova Jr., MD     Intubation:     Induction:  Rapid sequence induction    Intubated:  Postinduction    Mask Ventilation:  N/a    Attempted By:  CRNA    Method of Intubation:  Video laryngoscopy    Blade:  Shen 3    Laryngeal View Grade: Grade I - full view of cords      Difficult Airway Encountered?: No      Complications:  None    Airway Device:  Oral endotracheal tube    Airway Device Size:  7.0    Style/Cuff Inflation:  Cuffed    Inflation Amount (mL):  5    Tube secured:  22    Secured at:  The lips    Placement Verified By:  Capnometry    Complicating Factors:  None    Findings Post-Intubation:  BS equal bilateral and atraumatic/condition of teeth unchanged

## 2022-04-26 NOTE — PROCEDURES
Pre Op Diagnosis: Right acute on chronic SDH     Post Op Diagnosis: Bilateral MMA embolization      Procedure: Cerebral angiogram     Procedure performed by: Wes Rebolledo MD     Written Informed Consent Obtained: Yes     Specimen Removed: NO     Estimated Blood Loss: Minimal     Procedure report:      A 5F sheath was placed into the right femoral artery and a 5F Impress Merit catheter was advanced into the aortic arch. Four vessel angiography of the brain was performed after injection into each of these vessels.     Preliminary interpretation:      Bilateral middle meningeal artery was identified, subselected and  Contour -355 particles embolization was performed using Rapid Transit Microcatheter, guided with aid of Jose Luis 14 Guidewire.      Ruled out any anastomoses of distal MMA branches with ophthalmic artery via the recurrent meningeal artery, and with posterior auricular artery supplying facial nerve prior to embolization.      Please see Imaging report for full details.     A right femoral artery angiogram was performed, the sheath removed and hemostasis achieved using 5F Exoseal. No hematoma was present at the time of hemostasis.        Diya Adkins MD     Neuro Endovascular Surgery Fellow   [facility]

## 2022-04-26 NOTE — ASSESSMENT & PLAN NOTE
S/p evac  Poor neurological exam  Discuss MMA embolization   Subdural drain removed  04/26: stable post MMA embolization

## 2022-04-26 NOTE — TRANSFER OF CARE
"Anesthesia Transfer of Care Note    Patient: Beatriz Adame    Procedure(s) Performed: * No procedures listed *    Patient location: ICU    Anesthesia Type: general    Transport from OR: Continuous ECG monitoring in transport. Continuous SpO2 monitoring in transport. Transported from OR intubated on 100% O2 by AMBU with assisted ventilation    Post pain: adequate analgesia    Post assessment: no apparent anesthetic complications and tolerated procedure well    Post vital signs: stable    Level of consciousness: alert and awake    Nausea/Vomiting: no nausea/vomiting    Complications: none    Transfer of care protocol was followedComments: Dr. Nova spoke with IR staff and determined to let ICU ween for extubation.       Last vitals:   Visit Vitals  BP (!) 113/56 (BP Location: Left arm, Patient Position: Lying)   Pulse 66   Temp 36.6 °C (97.9 °F) (Axillary)   Resp 13   Ht 5' 7" (1.702 m)   Wt 49.4 kg (109 lb)   SpO2 100%   BMI 17.07 kg/m²     "

## 2022-04-26 NOTE — PLAN OF CARE
Procedure complete. Pt tolerated well. 5Fr Exoseal deployed to Right groin at 1200. Site clean, dry, intact, no bleeding, no hematoma. HOB to remain flat for 2 hours, until 1400. CRNA remains at bedside. Pt to be transported to room in bed in care of RN and CRNA. Report to be given at bedside.

## 2022-04-26 NOTE — PLAN OF CARE
SW received email from [facility] with [facility] ([phone]) regarding this Pt. Provided update via phone. Per Ara, they can do IV antibiotics but need the med so they can price it out first before accepting. [facility]    Rose Marie Dillard LCSW  Neurocritical Care   [facility]  [id number]

## 2022-04-26 NOTE — PROGRESS NOTES
[facility] - Neuro Critical Care  Neurocritical Care  Progress Note    Admit Date: 4/3/2022  Service Date: 04/26/2022  Length of Stay: 23    Subjective:     Chief Complaint: SDH (subdural hematoma)    History of Present Illness: Ms. Adame is a 72 yo female with hx of dementia, R MCA stroke with left side contraction/hemiparesis presented to [facility] with SDH now s/p evac. Stepped down from [facility]. On 4/13 she developed worsening headache and CTH showed re-accumulation of R SDH and 4-5mm MLS. She was noted to be more slow to follow commands. Stepped back up to [facility] for closer monitoring.    Original HPI below:  Ms. Adame is a 70 y/o F with a PMHx of baseline dementia, GERD, R MCA stroke with residual LUE weakness, HLD, HTN, Stage IV CKD, TIIDM who presents to [facility] from [facility] with a AoC SDH after an unwitnessed fall from her wheel chair without loss of consciousness. CT spine negative for fracture. CT head with R SDH and 3-4 mm MLS. She is on DAPT at home. Of note, she has been staying at [facility] after a partial R foot amputation, stitches still intact, for which she is receiving vancomycin.         Hospital Course: 04/04/2022: OR today for clot evac. Patient returned to ICU intubated on precedex. Post op scan stable. Wean propofol and initiated precedex for likely extubation tomorrow.   04/05/2022 repeat CT head due to drainage.   04/06/2022 NAEO.   04/07/2022 NAEO. Successfully extubated  4/14/22: EEG revealed seizure   4/15/22: vimpat load overnight  04/16/2022: NAEON.  04/17/2022. NAEON. Repeat CTH stable.   04/18/2022  Lateralized periodic discharges on R with some seizure correlate. 200 vimpat x 1 and increase scheduled vimpat to 200. Platelets by NSGY 2/2 OR for clot evac today.   04/19/2022 OR postponed to today with NSGY. CT this morning stable. Continue EEG.   04/20: in OR for repeat evacuation  04/21: follow up CT after evacuation shows SD drain and little residual blood  04/22: losartan  adjusted to decrease prn use, enteral feeding and fluid to be started after MMA embolization  04/23: occlusive brachial thrombus on picc line seen yesterday, right arm swelling improved with removal of art line and picc  New 20 gauge left wrist, carefully start trickle feeds while waiting for MMA embolization  04/24/2022 fluctuation beena exam, getting EEG   04/25/2022: EEG negative for seizures, stable for MMA embolization  04/26/2022: completed embolization procedure, extubated uneventfully      Interval History:      Review of Systems   Unable to perform ROS: Patient nonverbal     Objective:     Vitals:  Temp: 97.9 °F (36.6 °C)  Pulse: 63  Rhythm: normal sinus rhythm  BP: (!) 109/56  MAP (mmHg): 78  Resp: 19  SpO2: 97 %  O2 Device (Oxygen Therapy): Simple Face Mask  Vent Mode: VC  Set Rate: 14 BPM  Vt Set: 400 mL  PEEP/CPAP: 5 cmH20    Temp  Min: 97.5 °F (36.4 °C)  Max: 98.4 °F (36.9 °C)  Pulse  Min: 63  Max: 85  BP  Min: 109/56  Max: 163/70  MAP (mmHg)  Min: 78  Max: 98  Resp  Min: 10  Max: 29  SpO2  Min: 78 %  Max: 100 %    04/25 0701 - 04/26 0700  In: 357.5   Out: 655 [Urine:655]   Unmeasured Output  Urine Occurrence: 1  Stool Occurrence: 1  Pad Count: 1       Physical Exam  HENT:      Head:      Comments: Pterional craniotomy on right    Eyes:      Extraocular Movements: Extraocular movements intact.      Pupils: Pupils are equal, round, and reactive to light.   Cardiovascular:      Rate and Rhythm: Normal rate.      Heart sounds: Normal heart sounds.   Pulmonary:      Breath sounds: Normal breath sounds.   Abdominal:      Palpations: Abdomen is soft.   Musculoskeletal:      Cervical back: Neck supple.   Skin:     General: Skin is warm.   Neurological:      Mental Status: She is alert.      Comments: Left spastic hemiplegia  Right side bradykinetic but withdraws with increased flexion tone in arm and leg         Medications:  Continuous Scheduledacetylcysteine 100 mg/ml (10%), 4 mL, Q6H  albuterol-ipratropium,  3 mL, Q6H  amLODIPine, 5 mg, Daily  atorvastatin, 40 mg, Daily  heparin (porcine), 5,000 Units, Q8H  lacosamide, 250 mg, Q12H  levetiracetam, 1,500 mg, BID  levothyroxine, 75 mcg, Before breakfast  senna-docusate 8.6-50 mg, 1 tablet, BID  silodosin, 4 mg, Daily  vancomycin (VANCOCIN) IVPB, 750 mg, Q48H    PRNacetaminophen, 650 mg, Q4H PRN  dextrose 10%, 12.5 g, PRN  glucagon (human recombinant), 1 mg, PRN  hydrALAZINE, 10 mg, Q8H PRN  HYDROcodone-acetaminophen, 1 tablet, Q4H PRN  iodixanoL, 200 mL, ONCE PRN  labetalol, 10 mg, Q6H PRN  metoclopramide HCl, 5 mg, Q6H PRN  ondansetron, 4 mg, Q6H PRN  potassium bicarbonate, 35 mEq, PRN  potassium bicarbonate, 50 mEq, PRN  potassium bicarbonate, 60 mEq, PRN  potassium, sodium phosphates, 2 packet, PRN  potassium, sodium phosphates, 2 packet, PRN  potassium, sodium phosphates, 2 packet, PRN  sodium chloride 0.9%, 10 mL, PRN  sodium chloride 0.9%, 10 mL, PRN  sodium chloride 0.9%, 10 mL, PRN  vancomycin - pharmacy to dose, , pharmacy to manage frequency      Today I personally reviewed pertinent medications, lines/drains/airways, imaging, laboratory results, notably:    Diet  Diet NPO  Diet NPO          Assessment/Plan:     Neuro  * SDH (subdural hematoma)  S/p evac  Poor neurological exam  Discuss MMA embolization   Subdural drain removed  04/26: stable post MMA embolization          Encephalopathy  Multiple underlying structural etiologies in brain, likely baseline dementia  Also added in component is effects of prior seizures    Cardiac/Vascular  Critical lower limb ischemia  See osteomyelitis  Timing of resumption of antiplatelets per neurosurgery, cont off DVT prophylaxis    Hypertension  - SBP < 160  -Restart PO meds gradually   Presently only requiring amlodipine 5mg daily    ID  Osteomyelitis of right foot    - continue vancomycin      Endocrine  Type 2 diabetes mellitus, with long-term current use of insulin  ssi  Has been on and off TFs, cont checks with  escalation of impact peptide      Orthopedic  Status post partial amputation of right foot  See osteomyelitis          The patient is being Prophylaxed for:  Venous Thromboembolism with: Mechanical  Stress Ulcer with: None  Ventilator Pneumonia with: not applicable    Activity Orders          Progressive Mobility Protocol (mobilize patient to their highest level of functioning at least twice daily) starting at 04/26 2000    Diet NPO: NPO starting at 04/22 1944    Elevate HOB Flat starting at 04/20 1845    Turn patient starting at 04/13 1600        Full Code    Jorge Pino MD  Neurocritical Care  [facility] - Neuro Critical Care

## 2022-04-26 NOTE — PLAN OF CARE
Pt in  for MMA Embolization. Pt brought to IR in bed in care of ICU RN. CRNA is in suite, as procedure will be done with anesthesia.

## 2022-04-26 NOTE — SUBJECTIVE & OBJECTIVE
Interval History:      Review of Systems   Unable to perform ROS: Patient nonverbal     Objective:     Vitals:  Temp: 97.9 °F (36.6 °C)  Pulse: 63  Rhythm: normal sinus rhythm  BP: (!) 109/56  MAP (mmHg): 78  Resp: 19  SpO2: 97 %  O2 Device (Oxygen Therapy): Simple Face Mask  Vent Mode: VC  Set Rate: 14 BPM  Vt Set: 400 mL  PEEP/CPAP: 5 cmH20    Temp  Min: 97.5 °F (36.4 °C)  Max: 98.4 °F (36.9 °C)  Pulse  Min: 63  Max: 85  BP  Min: 109/56  Max: 163/70  MAP (mmHg)  Min: 78  Max: 98  Resp  Min: 10  Max: 29  SpO2  Min: 78 %  Max: 100 %    04/25 0701 - 04/26 0700  In: 357.5   Out: 655 [Urine:655]   Unmeasured Output  Urine Occurrence: 1  Stool Occurrence: 1  Pad Count: 1       Physical Exam  HENT:      Head:      Comments: Pterional craniotomy on right    Eyes:      Extraocular Movements: Extraocular movements intact.      Pupils: Pupils are equal, round, and reactive to light.   Cardiovascular:      Rate and Rhythm: Normal rate.      Heart sounds: Normal heart sounds.   Pulmonary:      Breath sounds: Normal breath sounds.   Abdominal:      Palpations: Abdomen is soft.   Musculoskeletal:      Cervical back: Neck supple.   Skin:     General: Skin is warm.   Neurological:      Mental Status: She is alert.      Comments: Left spastic hemiplegia  Right side bradykinetic but withdraws with increased flexion tone in arm and leg         Medications:  Continuous Scheduledacetylcysteine 100 mg/ml (10%), 4 mL, Q6H  albuterol-ipratropium, 3 mL, Q6H  amLODIPine, 5 mg, Daily  atorvastatin, 40 mg, Daily  heparin (porcine), 5,000 Units, Q8H  lacosamide, 250 mg, Q12H  levetiracetam, 1,500 mg, BID  levothyroxine, 75 mcg, Before breakfast  senna-docusate 8.6-50 mg, 1 tablet, BID  silodosin, 4 mg, Daily  vancomycin (VANCOCIN) IVPB, 750 mg, Q48H    PRNacetaminophen, 650 mg, Q4H PRN  dextrose 10%, 12.5 g, PRN  glucagon (human recombinant), 1 mg, PRN  hydrALAZINE, 10 mg, Q8H PRN  HYDROcodone-acetaminophen, 1 tablet, Q4H PRN  iodixanoL, 200  mL, ONCE PRN  labetalol, 10 mg, Q6H PRN  metoclopramide HCl, 5 mg, Q6H PRN  ondansetron, 4 mg, Q6H PRN  potassium bicarbonate, 35 mEq, PRN  potassium bicarbonate, 50 mEq, PRN  potassium bicarbonate, 60 mEq, PRN  potassium, sodium phosphates, 2 packet, PRN  potassium, sodium phosphates, 2 packet, PRN  potassium, sodium phosphates, 2 packet, PRN  sodium chloride 0.9%, 10 mL, PRN  sodium chloride 0.9%, 10 mL, PRN  sodium chloride 0.9%, 10 mL, PRN  vancomycin - pharmacy to dose, , pharmacy to manage frequency      Today I personally reviewed pertinent medications, lines/drains/airways, imaging, laboratory results, notably:    Diet  Diet NPO  Diet NPO

## 2022-04-26 NOTE — ANESTHESIA PREPROCEDURE EVALUATION
04/26/2022  Beatriz Adame is a 71 y.o., female.    History of Present Illness:  Beatriz Adame is a 71 y.o. female with hypothyroidism, CVA with left-sided residual deficit on ASA/Plavix, DM2, HTN, HLD, CKD, chronic anemia - with admission concerns of acute SDH S/p R crani for SDH evacuation (4/4) and redo evacuation on 4/20. Hence, plans for cerebral angiogram +/-  bilateral middle meningeal artery embolization for further management.     @[id number]@@    Encounter Diagnoses   Name Primary?    Fall     SDH (subdural hematoma)     Acute on chronic anemia     Debility     Recurrent seizures     Encephalopathy     Urinary retention Yes       Review of patient's allergies indicates:   Allergen Reactions    Tomato (solanum lycopersicum) Hives and Itching       Medications Prior to Admission   Medication Sig Dispense Refill Last Dose    rosuvastatin (CRESTOR) 40 MG Tab Take 40 mg by mouth every evening.   4/2/2022 at Unknown time    amLODIPine (NORVASC) 5 MG tablet Take 10 mg by mouth every evening. Take 5 mg every morning and 10 mg at night       ascorbic acid, vitamin C, (VITAMIN C) 500 MG tablet Take 1 tablet (500 mg total) by mouth once daily.       aspirin (ECOTRIN) 81 MG EC tablet Take 81 mg by mouth once daily.       b complex vitamins tablet Take 1 tablet by mouth once daily.       carvedilol (COREG) 25 MG tablet Take 1 tablet (25 mg total) by mouth 2 (two) times daily. 60 tablet 0     clopidogreL (PLAVIX) 75 mg tablet Take 75 mg by mouth.       DULoxetine (CYMBALTA) 30 MG capsule Take 1 capsule (30 mg total) by mouth once daily.       ferrous sulfate (FEOSOL) 325 mg (65 mg iron) Tab tablet Take 325 mg by mouth daily with breakfast. Off at present       folic acid (FOLVITE) 1 MG tablet Take 1 tablet (1 mg total) by mouth once daily.  0     gabapentin (NEURONTIN) 300 MG capsule  Take 1 capsule (300 mg total) by mouth once daily. 90 capsule 0     hydrochlorothiazide (MICROZIDE ORAL) Take 20 mg by mouth once daily at 6am.       insulin (BASAGLAR KWIKPEN U-100 INSULIN) glargine 100 units/mL (3mL) SubQ pen Inject 5 Units into the skin once daily.  0     Lactobacillus rhamnosus GG (CULTURELLE) 10 billion cell capsule Take 1 capsule by mouth once daily.       lactulose (CHRONULAC) 10 gram/15 mL solution Take 10 g by mouth 2 (two) times a day.       levothyroxine (SYNTHROID) 75 MCG tablet Take 75 mcg by mouth once daily.       losartan (COZAAR) 25 MG tablet Take 25 mg by mouth once daily at 6am.       metoclopramide HCl (REGLAN) 10 MG tablet Take 1 tablet (10 mg total) by mouth 3 (three) times daily before meals. 90 tablet 3     omeprazole (PRILOSEC) 20 MG capsule Take 20 mg by mouth 2 (two) times daily.       ondansetron (ZOFRAN) 4 MG tablet Take 1 tablet (4 mg total) by mouth every 8 (eight) hours as needed for Nausea. 30 tablet 3     [] oxyCODONE (ROXICODONE) 5 MG immediate release tablet Take 1 tablet (5 mg total) by mouth every 6 (six) hours as needed for Pain. 20 tablet 0     tamsulosin (FLOMAX) 0.4 mg Cap Take 1 capsule (0.4 mg total) by mouth once daily. 30 capsule 1     traZODone (DESYREL) 100 MG tablet Take 1 tablet (100 mg total) by mouth once daily. (Patient taking differently: Take 100 mg by mouth nightly as needed for Insomnia.) 30 tablet 0             No current facility-administered medications on file prior to encounter.     Current Outpatient Medications on File Prior to Encounter   Medication Sig Dispense Refill    rosuvastatin (CRESTOR) 40 MG Tab Take 40 mg by mouth every evening.      amLODIPine (NORVASC) 5 MG tablet Take 10 mg by mouth every evening. Take 5 mg every morning and 10 mg at night      ascorbic acid, vitamin C, (VITAMIN C) 500 MG tablet Take 1 tablet (500 mg total) by mouth once daily.      aspirin (ECOTRIN) 81 MG EC tablet Take 81 mg by  mouth once daily.      b complex vitamins tablet Take 1 tablet by mouth once daily.      carvedilol (COREG) 25 MG tablet Take 1 tablet (25 mg total) by mouth 2 (two) times daily. 60 tablet 0    clopidogreL (PLAVIX) 75 mg tablet Take 75 mg by mouth.      DULoxetine (CYMBALTA) 30 MG capsule Take 1 capsule (30 mg total) by mouth once daily.      ferrous sulfate (FEOSOL) 325 mg (65 mg iron) Tab tablet Take 325 mg by mouth daily with breakfast. Off at present      folic acid (FOLVITE) 1 MG tablet Take 1 tablet (1 mg total) by mouth once daily.  0    gabapentin (NEURONTIN) 300 MG capsule Take 1 capsule (300 mg total) by mouth once daily. 90 capsule 0    hydrochlorothiazide (MICROZIDE ORAL) Take 20 mg by mouth once daily at 6am.      insulin (BASAGLAR KWIKPEN U-100 INSULIN) glargine 100 units/mL (3mL) SubQ pen Inject 5 Units into the skin once daily.  0    Lactobacillus rhamnosus GG (CULTURELLE) 10 billion cell capsule Take 1 capsule by mouth once daily.      lactulose (CHRONULAC) 10 gram/15 mL solution Take 10 g by mouth 2 (two) times a day.      levothyroxine (SYNTHROID) 75 MCG tablet Take 75 mcg by mouth once daily.      losartan (COZAAR) 25 MG tablet Take 25 mg by mouth once daily at 6am.      metoclopramide HCl (REGLAN) 10 MG tablet Take 1 tablet (10 mg total) by mouth 3 (three) times daily before meals. 90 tablet 3    omeprazole (PRILOSEC) 20 MG capsule Take 20 mg by mouth 2 (two) times daily.      ondansetron (ZOFRAN) 4 MG tablet Take 1 tablet (4 mg total) by mouth every 8 (eight) hours as needed for Nausea. 30 tablet 3    tamsulosin (FLOMAX) 0.4 mg Cap Take 1 capsule (0.4 mg total) by mouth once daily. 30 capsule 1    traZODone (DESYREL) 100 MG tablet Take 1 tablet (100 mg total) by mouth once daily. (Patient taking differently: Take 100 mg by mouth nightly as needed for Insomnia.) 30 tablet 0       Past Medical History:  No date: Gastroparesis  No date: GERD (gastroesophageal reflux  disease)  07/24/2021: History of Clostridium difficile colitis  No date: History of kidney stones  No date: History of stroke      Comment:  left side paralysis  No date: Hyperlipidemia  No date: Hypertension  No date: Hypothyroidism  No date: Iron deficiency anemia  No date: Osteoarthritis  No date: Peripheral neuropathy  No date: Stage IV CKD  No date: Type II diabetes mellitus    Past Surgical History:   Procedure Laterality Date    APPENDECTOMY      CRANIOTOMY FOR EVACUATION OF SUBDURAL HEMATOMA Right 4/4/2022    Procedure: CRANIOTOMY, FOR SUBDURAL HEMATOMA EVACUATION;  Surgeon: Silvio White MD;  Location: [address];  Service: Neurosurgery;  Laterality: Right;    CRANIOTOMY FOR EVACUATION OF SUBDURAL HEMATOMA Right 4/20/2022    Procedure: CRANIOTOMY, FOR SUBDURAL HEMATOMA EVACUATION;  Surgeon: Jose Walter MD;  Location: [address];  Service: Neurosurgery;  Laterality: Right;    CYSTOSCOPY W/ URETERAL STENT PLACEMENT Right 10/13/2021    Procedure: CYSTOSCOPY, WITH URETERAL STENT INSERTION;  Surgeon: Wanda Arroyo MD;  Location: [facility];  Service: Urology;  Laterality: Right;    ESOPHAGOGASTRODUODENOSCOPY N/A 11/23/2021    Procedure: EGD (ESOPHAGOGASTRODUODENOSCOPY);  Surgeon: Obed Jeong MD;  Location: [facility] ([address] [place] [address]);  Service: Endoscopy;  Laterality: N/A;    FOOT AMPUTATION THROUGH METATARSAL Right 3/24/2022    Procedure: AMPUTATION, FOOT, PARTIAL 4th and 5th ray;  Surgeon: Rodrigo Logan DPM;  Location: [address];  Service: Podiatry;  Laterality: Right;    LAPAROSCOPIC APPENDECTOMY N/A 7/22/2021    Procedure: APPENDECTOMY, LAPAROSCOPIC;  Surgeon: Paulo Calvo Jr., MD;  Location: [facility];  Service: General;  Laterality: N/A;    TOE AMPUTATION Right 1/1/2022    Procedure: AMPUTATION, TOE;  Surgeon: Genaro Mason DPM;  Location: [facility];  Service: Podiatry;  Laterality: Right;    TUBAL LIGATION      URETEROSCOPIC REMOVAL OF URETERIC CALCULUS Right  12/22/2021    Procedure: REMOVAL, CALCULUS, URETER, URETEROSCOPIC;  Surgeon: Wanda Arroyo MD;  Location: [facility];  Service: Urology;  Laterality: Right;       Social History     Tobacco Use   Smoking Status Never Smoker   Smokeless Tobacco Never Used       Social History     Substance and Sexual Activity   Alcohol Use No    Comment: socially       Physical Activity: Inactive    Days of Exercise per Week: 0 days    Minutes of Exercise per Session: 0 min         Recent Labs     04/26/22 0226   HCT 26.9*     Recent Labs     04/26/22 0226   *     Recent Labs     04/26/22 0226   K 4.1     Recent Labs     04/26/22 0226   CREATININE 1.1     Recent Labs     04/26/22 0226   *     No results for input(s): PT in the last 72 hours.                    Pre-op Assessment          Review of Systems  Anesthesia Hx:  No problems with previous Anesthesia    Hematology/Oncology:     Oncology Normal     Cardiovascular:   Hypertension, well controlled Denies MI.    Denies Angina.    Pulmonary:  Pulmonary Normal  Denies COPD.  Denies Asthma.  Denies Shortness of breath.    Renal/:   Chronic Renal Disease, CRI    Hepatic/GI:   Denies Liver Disease.    Endocrine:   Denies Diabetes.           Anesthesia Plan  Type of Anesthesia, risks & benefits discussed:    Anesthesia Type: Gen ETT  Intra-op Monitoring Plan: Standard ASA Monitors  Post Op Pain Control Plan: multimodal analgesia and IV/PO Opioids PRN  Induction:  IV  Informed Consent: Informed consent signed with the Patient and all parties understand the risks and agree with anesthesia plan.  All questions answered.   ASA Score: 2  Day of Surgery Review of History & Physical: H&P Update referred to the surgeon/provider.    Ready For Surgery From Anesthesia Perspective.     .

## 2022-04-26 NOTE — PLAN OF CARE
[facility] Care Plan    POC reviewed with Beatriz Adame and family at 0300. Pt unable to verbalize understanding. Questions and concerns addressed. No acute events overnight. Pt progressing toward goals. Will continue to monitor. See below and flowsheets for full assessment and VS info.       - TF held at 0000    Is this a stroke patient? no    Neuro:  Hines Coma Scale  Best Eye Response: 3-->(E3) to speech  Best Motor Response: 6-->(M6) obeys commands  Best Verbal Response: 2-->(V2) incomprehensible speech  Hines Coma Scale Score: 11  Assessment Qualifiers: patient not sedated/intubated  Pupil PERRLA: yes     24hr Temp:  [97.16 °F (36.2 °C)-98.3 °F (36.8 °C)]     CV:   Rhythm: normal sinus rhythm  BP goals:   SBP < 160  MAP > 65    Resp:   O2 Device (Oxygen Therapy): nasal cannula  Vent Mode: Spont  Set Rate: 15 BPM  Oxygen Concentration (%): 98  Vt Set: 450 mL  PEEP/CPAP: 5 cmH20  Pressure Support: 5 cmH20    Plan: N/A    GI/:     Diet/Nutrition Received: tube feeding  Last Bowel Movement: 04/25/22  Voiding Characteristics: incontinence    Intake/Output Summary (Last 24 hours) at 4/26/2022 0311  Last data filed at 4/26/2022 0101  Gross per 24 hour   Intake 160 ml   Output 455 ml   Net -295 ml     Unmeasured Output  Urine Occurrence: 1  Stool Occurrence: 1  Pad Count: 1    Labs/Accuchecks:  Recent Labs   Lab 04/25/22  0218   WBC 12.06   RBC 2.71*   HGB 8.2*   HCT 26.2*   *      Recent Labs   Lab 04/25/22  0218      K 4.0   CO2 21*   *   BUN 22   CREATININE 1.2   ALKPHOS 67   ALT 10   AST 18   BILITOT 0.3    No results for input(s): PROTIME, INR, APTT, HEPANTIXA in the last 168 hours. No results for input(s): CPK, CPKMB, TROPONINI, MB in the last 168 hours.    Electrolytes: N/A - electrolytes WDL  Accuchecks: Q4H    Gtts:      LDA/Wounds:  Lines/Drains/Airways       Drain  Duration                  NG/OG Tube 04/14/22 1200 [place] sump 14 Fr. Left nostril 11 days         Urethral Catheter  04/25/22 1800 <1 day              Peripheral Intravenous Line  Duration                  Peripheral IV - Single Lumen 04/22/22 1702 22 G Posterior;Right Hand 3 days         Peripheral IV - Single Lumen 04/24/22 1400 20 G Anterior;Left Upper Arm 1 day                  Wounds: Yes  Wound care consulted: No   Problem: Infection  Goal: Absence of Infection Signs and Symptoms  Outcome: Ongoing, Progressing     Problem: Adult Inpatient Plan of Care  Goal: Plan of Care Review  Outcome: Ongoing, Progressing  Goal: Patient-Specific Goal (Individualized)  Description: Admit Date: 4/3/2022    Admit Dx: SDH (subdural hematoma)    Past Medical History:  No date: Gastroparesis  No date: GERD (gastroesophageal reflux disease)  07/24/2021: History of Clostridium difficile colitis  No date: History of kidney stones  No date: History of stroke      Comment:  left side paralysis  No date: Hyperlipidemia  No date: Hypertension  No date: Hypothyroidism  No date: Iron deficiency anemia  No date: Osteoarthritis  No date: Peripheral neuropathy  No date: Stage IV CKD  No date: Type II diabetes mellitus    Past Surgical History:  No date: APPENDECTOMY  10/13/2021: CYSTOSCOPY W/ URETERAL STENT PLACEMENT; Right      Comment:  Procedure: CYSTOSCOPY, WITH URETERAL STENT INSERTION;                 Surgeon: Wanda Arroyo MD;  Location: [facility];                 Service: Urology;  Laterality: Right;  11/23/2021: ESOPHAGOGASTRODUODENOSCOPY; N/A      Comment:  Procedure: EGD (ESOPHAGOGASTRODUODENOSCOPY);  Surgeon:                Obed Jeong MD;  Location: [address]);                 Service: Endoscopy;  Laterality: N/A;  3/24/2022: FOOT AMPUTATION THROUGH METATARSAL; Right      Comment:  Procedure: AMPUTATION, FOOT, PARTIAL 4th and 5th ray;                 Surgeon: Rodrigo Logan DPM;  Location: [address];                 Service: Podiatry;  Laterality: Right;  7/22/2021: LAPAROSCOPIC APPENDECTOMY; N/A      Comment:   Procedure: APPENDECTOMY, LAPAROSCOPIC;  Surgeon: Paulo Calvo Jr., MD;  Location: [facility];  Service: General;                 Laterality: N/A;  1/1/2022: TOE AMPUTATION; Right      Comment:  Procedure: AMPUTATION, TOE;  Surgeon: Genaro Mason DPM;  Location: [facility];  Service: Podiatry;  Laterality:               Right;  No date: TUBAL LIGATION  12/22/2021: URETEROSCOPIC REMOVAL OF URETERIC CALCULUS; Right      Comment:  Procedure: REMOVAL, CALCULUS, URETER, URETEROSCOPIC;                 Surgeon: Wanda Arroyo MD;  Location: [facility];                 Service: Urology;  Laterality: Right;    Individualization:   1. Please dim lights at night  2. Pt likes to be covered with blankets  3. Pt likes to be moved slowly.    Restraints:   1. Soft right wrist restraint initiated 4/4/22 @ 2138 for pulling of lines. WCTM for DC criteria.            Outcome: Ongoing, Progressing  Flowsheets (Taken 4/26/2022 0310)  Individualized Care Needs: likes television on at night  Patient-Specific Goals (Include Timeframe): pt will tolerate embolization today  Goal: Absence of Hospital-Acquired Illness or Injury  Outcome: Ongoing, Progressing  Goal: Optimal Comfort and Wellbeing  Outcome: Ongoing, Progressing  Goal: Readiness for Transition of Care  Outcome: Ongoing, Progressing     Problem: Adjustment to Illness (Stroke, Hemorrhagic)  Goal: Optimal Coping  Outcome: Ongoing, Progressing     Problem: Bowel Elimination Impaired (Stroke, Hemorrhagic)  Goal: Effective Bowel Elimination  Outcome: Ongoing, Progressing     Problem: Cerebral Tissue Perfusion (Stroke, Hemorrhagic)  Goal: Optimal Cerebral Tissue Perfusion  Outcome: Ongoing, Progressing     Problem: Cognitive Impairment (Stroke, Hemorrhagic)  Goal: Optimal Cognitive Function  Outcome: Ongoing, Progressing     Problem: Communication Impairment (Stroke, Hemorrhagic)  Goal: Effective Communication Skills  Outcome: Ongoing, Progressing     Problem:  Functional Ability Impaired (Stroke, Hemorrhagic)  Goal: Optimal Functional Ability  Outcome: Ongoing, Progressing     Problem: Pain (Stroke, Hemorrhagic)  Goal: Acceptable Pain Control  Outcome: Ongoing, Progressing     Problem: Respiratory Compromise (Stroke, Hemorrhagic)  Goal: Effective Oxygenation and Ventilation  Outcome: Ongoing, Progressing     Problem: Sensorimotor Impairment (Stroke, Hemorrhagic)  Goal: Improved Sensorimotor Function  Outcome: Ongoing, Progressing     Problem: Swallowing Impairment (Stroke, Hemorrhagic)  Goal: Optimal Eating and Swallowing Without Aspiration  Outcome: Ongoing, Progressing     Problem: Urinary Elimination Impaired (Stroke, Hemorrhagic)  Goal: Effective Urinary Elimination  Outcome: Ongoing, Progressing     Problem: Diabetes Comorbidity  Goal: Blood Glucose Level Within Targeted Range  Outcome: Ongoing, Progressing     Problem: Fluid and Electrolyte Imbalance (Acute Kidney Injury/Impairment)  Goal: Fluid and Electrolyte Balance  Outcome: Ongoing, Progressing     Problem: Oral Intake Inadequate (Acute Kidney Injury/Impairment)  Goal: Optimal Nutrition Intake  Outcome: Ongoing, Progressing     Problem: Renal Function Impairment (Acute Kidney Injury/Impairment)  Goal: Effective Renal Function  Outcome: Ongoing, Progressing     Problem: Impaired Wound Healing  Goal: Optimal Wound Healing  Outcome: Ongoing, Progressing     Problem: Adjustment to Illness (Delirium)  Goal: Optimal Coping  Outcome: Ongoing, Progressing     Problem: Altered Behavior (Delirium)  Goal: Improved Behavioral Control  Outcome: Ongoing, Progressing     Problem: Attention and Thought Clarity Impairment (Delirium)  Goal: Improved Attention and Thought Clarity  Outcome: Ongoing, Progressing     Problem: Sleep Disturbance (Delirium)  Goal: Improved Sleep  Outcome: Ongoing, Progressing     Problem: Skin Injury Risk Increased  Goal: Skin Health and Integrity  Outcome: Ongoing, Progressing     Problem: Fall Injury  Risk  Goal: Absence of Fall and Fall-Related Injury  Outcome: Ongoing, Progressing     Problem: Restraint, Nonbehavioral (Nonviolent)  Goal: Absence of Harm or Injury  Outcome: Ongoing, Progressing     Problem: Communication Impairment (Mechanical Ventilation, Invasive)  Goal: Effective Communication  Outcome: Ongoing, Progressing     Problem: Device-Related Complication Risk (Mechanical Ventilation, Invasive)  Goal: Optimal Device Function  Outcome: Ongoing, Progressing     Problem: Inability to Wean (Mechanical Ventilation, Invasive)  Goal: Mechanical Ventilation Liberation  Outcome: Ongoing, Progressing     Problem: Nutrition Impairment (Mechanical Ventilation, Invasive)  Goal: Optimal Nutrition Delivery  Outcome: Ongoing, Progressing     Problem: Skin and Tissue Injury (Mechanical Ventilation, Invasive)  Goal: Absence of Device-Related Skin and Tissue Injury  Outcome: Ongoing, Progressing     Problem: Ventilator-Induced Lung Injury (Mechanical Ventilation, Invasive)  Goal: Absence of Ventilator-Induced Lung Injury  Outcome: Ongoing, Progressing     Problem: Communication Impairment (Artificial Airway)  Goal: Effective Communication  Outcome: Ongoing, Progressing     Problem: Device-Related Complication Risk (Artificial Airway)  Goal: Optimal Device Function  Outcome: Ongoing, Progressing     Problem: Skin and Tissue Injury (Artificial Airway)  Goal: Absence of Device-Related Skin or Tissue Injury  Outcome: Ongoing, Progressing     Problem: Noninvasive Ventilation Acute  Goal: Effective Unassisted Ventilation and Oxygenation  Outcome: Ongoing, Progressing

## 2022-04-26 NOTE — PROGRESS NOTES
[facility]  Neurosurgery  Progress Note    Subjective:     History of Present Illness: 71 year old female with , hypothyroidism, CVA with left-sided residual deficit on ASA/Plavix, DM2, HTN, HLD, CKD, chronic anemia, presenting from SNF after being found down next to wheelchair after unwitnessed fall, consulted to NSGY for right 2 cm SDH without midline shift. She is altered at baseline and unable to provide to history, but family at bedside says she is more confused and less interactive than usual.       Post-Op Info:  Procedure(s) (LRB):  CRANIOTOMY, FOR SUBDURAL HEMATOMA EVACUATION (Right)   6 Days Post-Op     Interval History: 4/26: QUIANAEON. AFVSS. Exam stable. MMA embo with IR today.     Medications:  Continuous Infusions:      Scheduled Meds:   acetylcysteine 100 mg/ml (10%)  4 mL Nebulization Q6H    albuterol-ipratropium  3 mL Nebulization Q6H    amLODIPine  5 mg Per NG tube Daily    atorvastatin  40 mg Per NG tube Daily    heparin (porcine)  5,000 Units Subcutaneous Q8H    lacosamide  250 mg Per NG tube Q12H    levetiracetam  1,500 mg Per NG tube BID    levothyroxine  75 mcg Per NG tube Before breakfast    senna-docusate 8.6-50 mg  1 tablet Per NG tube BID    silodosin  4 mg Per NG tube Daily    vancomycin (VANCOCIN) IVPB  750 mg Intravenous Q48H     PRN Meds:acetaminophen, dextrose 10%, glucagon (human recombinant), hydrALAZINE, HYDROcodone-acetaminophen, iodixanoL, labetalol, metoclopramide HCl, ondansetron, potassium bicarbonate, potassium bicarbonate, potassium bicarbonate, potassium, sodium phosphates, potassium, sodium phosphates, potassium, sodium phosphates, sodium chloride 0.9%, sodium chloride 0.9%, sodium chloride 0.9%, Pharmacy to dose Vancomycin consult **AND** vancomycin - pharmacy to dose     Review of Systems  Objective:     Weight: 49.4 kg (109 lb)  Body mass index is 17.07 kg/m².  Vital Signs (Most Recent):  Temp: 97.9 °F (36.6 °C) (04/26/22 0800)  Pulse: 64  (04/26/22 1435)  Resp: 20 (04/26/22 1435)  BP: 112/60 (04/26/22 1435)  SpO2: 97 % (04/26/22 1435)   Vital Signs (24h Range):  Temp:  [97.5 °F (36.4 °C)-98.4 °F (36.9 °C)] 97.9 °F (36.6 °C)  Pulse:  [63-85] 64  Resp:  [10-29] 20  SpO2:  [78 %-100 %] 97 %  BP: (109-152)/(56-66) 112/60     Date 04/26/22 0700 - 04/27/22 0659   Shift 8228-9572 0954-7997 9425-5111 24 Hour Total   INTAKE   IV Piggyback 400   400   Shift Total(mL/kg) 400(8.1)   400(8.1)   OUTPUT   Urine(mL/kg/hr) 100(0.3)   100   Shift Total(mL/kg) 100(2)   100(2)   Weight (kg) 49.4 49.4 49.4 49.4                Vent Mode: VC  Resp Rate Total:  [18 br/min] 18 br/min  Vt Set:  [400 mL] 400 mL  PEEP/CPAP:  [5 cmH20] 5 cmH20         NG/OG Tube 04/14/22 1200 Yell sump 14 Fr. Left nostril (Active)   Placement Check placement verified by aspirate characteristics;placement verified by x-ray 04/17/22 0305   Tolerance no signs/symptoms of discomfort 04/17/22 0305   Securement secured to nostril center w/ adhesive device 04/17/22 0305   Clamp Status/Tolerance clamped 04/17/22 0305   Suction Setting/Drainage Method suction at the bedside 04/17/22 0305   Insertion Site Appearance no redness, warmth, tenderness, skin breakdown, drainage 04/17/22 0305   Drainage None 04/15/22 0332   Flush/Irrigation flushed w/;water;no resistance met 04/17/22 0305   Water Bolus (mL) 100 mL 04/17/22 0505   Residual Amount (ml) 40 ml 04/16/22 0700       Female External Urinary Catheter 04/13/22 1820 (Active)   Skin no redness;no breakdown 04/17/22 0305   Tolerance no signs/symptoms of discomfort 04/17/22 0305   Suction Continuous suction at 60 mmHg 04/16/22 2004   Date of last wick change 04/16/22 04/16/22 2004   Time of last wick change 0956 04/16/22 0700   Output (mL) 200 mL 04/17/22 0505       Physical Exam    Neurosurgery Physical Exam  [id number]  AAO x 3; significant mental status waxing and waning  Follow commands on RUE  RLE - wd  L - contracted and plegic      Cranial dressing  c/d/I    SD JAMAR to gravity with scant SS output      Significant Labs:  Recent Labs   Lab 04/25/22 0218 04/26/22 0226    128*    142   K 4.0 4.1   * 115*   CO2 21* 19*   BUN 22 23   CREATININE 1.2 1.1   CALCIUM 8.1* 8.3*       Recent Labs   Lab 04/25/22 0218 04/26/22 0226   WBC 12.06 12.83*   HGB 8.2* 8.7*   HCT 26.2* 26.9*   * 131*       No results for input(s): LABPT, INR, APTT in the last 48 hours.    Microbiology Results (last 7 days)       ** No results found for the last 168 hours. **          All pertinent labs from the last 24 hours have been reviewed.    Significant Diagnostics:  I have reviewed all pertinent imaging results/findings within the past 24 hours.  X-Ray Chest AP Portable    Result Date: 4/26/2022  Continued demonstration of widespread bilateral airspace consolidation and bilateral pleural fluid.  Allowing for differences in patient positioning, no significant interval change in the appearance of the chest since 04/24/2022 is appreciated. Electronically signed by: Paulo Ortiz MD Date:    04/26/2022 Time:    08:02       Assessment/Plan:     * SDH (subdural hematoma)  71 year old female with , hypothyroidism, CVA with left-sided residual deficit on ASA/Plavix, DM2, HTN, HLD, CKD, chronic anemia, presenting from SNF after being found down next to wheelchair after unwitnessed fall, consulted to NSGY for right 2 cm SDH mostly acute, membranous and heterogenous appearing, some chronic component, without midline shift. Now s/p R crani for SDH evacuation (4/4) and redo evacuation on 4/20.       --Admitted to NCC   -- q1 hour vitals/neurochecks; exam wax and wane   -- SBP < 160  -- Na goal eunatremia. Na 145  -- Seizure control per neuro ICU: Keppra 1500 BID and vimpat  250 b.i.d. EEG per NCC for wax and wane mental status   -- CTH stable 4/19. Postop 4/20 with expected changes   -- s/p MMA embo with IR on 4/26  -- Saturating well on room   -- HOB 15  -- PPx: SCDs, BR, IS.  Okay for [facility] for dvt ppx.   -- Osteomyelitis R foot: on abx    -Podiatry managing recent partial foot amputation, appreciate recs. Sutures removed 4/12.    -Home health/facility to do dressing changes every MWF and prn as follows:  Cleanse right foot incision site with saline or wound cleanser, paint incision site with betadine, cover with 4x4 gauze, kerlix, ACE bandage.   -- Fever 4/11: Afebrile overnight.    - UA 4/11: Growing GNRs, f/u susceptibility   - CXR with possible basilar effusion.   - BLE US 4/11: w/o DVT.   - Blood cx 4/11: NGTD.    Dispo: ICU; anticipate stepdown tomorrow            Brock Fairchild MD  Neurosurgery  [facility]

## 2022-04-26 NOTE — ASSESSMENT & PLAN NOTE
See osteomyelitis  Timing of resumption of antiplatelets per neurosurgery, cont off DVT prophylaxis

## 2022-04-26 NOTE — ASSESSMENT & PLAN NOTE
71 year old female with , hypothyroidism, CVA with left-sided residual deficit on ASA/Plavix, DM2, HTN, HLD, CKD, chronic anemia, presenting from SNF after being found down next to wheelchair after unwitnessed fall, consulted to NSGY for right 2 cm SDH mostly acute, membranous and heterogenous appearing, some chronic component, without midline shift. Now s/p R crani for SDH evacuation (4/4) and redo evacuation on 4/20.       --Admitted to NCC   -- q1 hour vitals/neurochecks; exam wax and wane   -- SBP < 160  -- Na goal eunatremia. Na 145  -- Seizure control per neuro ICU: Keppra 1500 BID and vimpat  250 b.i.d. EEG per NCC for wax and wane mental status   -- CTH stable 4/19. Postop 4/20 with expected changes   -- s/p MMA embo with IR on 4/26  -- Saturating well on room   -- HOB 15  -- PPx: SCDs, BR, IS. Okay for [facility] for dvt ppx.   -- Osteomyelitis R foot: on abx    -Podiatry managing recent partial foot amputation, appreciate recs. Sutures removed 4/12.    -Home health/facility to do dressing changes every MWF and prn as follows:  Cleanse right foot incision site with saline or wound cleanser, paint incision site with betadine, cover with 4x4 gauze, kerlix, ACE bandage.   -- Fever 4/11: Afebrile overnight.    - UA 4/11: Growing GNRs, f/u susceptibility   - CXR with possible basilar effusion.   - BLE US 4/11: w/o DVT.   - Blood cx 4/11: NGTD.    Dispo: ICU; anticipate stepdown tomorrow

## 2022-04-27 NOTE — PROGRESS NOTES
[facility]  Neurosurgery  Progress Note    Subjective:     History of Present Illness: 71 year old female with , hypothyroidism, CVA with left-sided residual deficit on ASA/Plavix, DM2, HTN, HLD, CKD, chronic anemia, presenting from SNF after being found down next to wheelchair after unwitnessed fall, consulted to NSGY for right 2 cm SDH without midline shift. She is altered at baseline and unable to provide to history, but family at bedside says she is more confused and less interactive than usual.       Post-Op Info:  Procedure(s) (LRB):  CRANIOTOMY, FOR SUBDURAL HEMATOMA EVACUATION (Right)   7 Days Post-Op     Interval History: 4/27: PPD 1 s/p MMA embo. NAEON. AFVSS. Exam stable.     Medications:  Continuous Infusions:      Scheduled Meds:   acetylcysteine 100 mg/ml (10%)  4 mL Nebulization Q6H    albuterol-ipratropium  3 mL Nebulization Q6H    amLODIPine  5 mg Per NG tube Daily    atorvastatin  40 mg Per NG tube Daily    heparin (porcine)  5,000 Units Subcutaneous Q8H    lacosamide  250 mg Per NG tube Q12H    levetiracetam  1,500 mg Per NG tube BID    levothyroxine  75 mcg Per NG tube Before breakfast    senna-docusate 8.6-50 mg  1 tablet Per NG tube BID    silodosin  4 mg Per NG tube Daily    vancomycin (VANCOCIN) IVPB  750 mg Intravenous Q48H     PRN Meds:acetaminophen, dextrose 10%, glucagon (human recombinant), hydrALAZINE, HYDROcodone-acetaminophen, iodixanoL, labetalol, metoclopramide HCl, ondansetron, potassium bicarbonate, potassium bicarbonate, potassium bicarbonate, potassium, sodium phosphates, potassium, sodium phosphates, potassium, sodium phosphates, sodium chloride 0.9%, sodium chloride 0.9%, sodium chloride 0.9%, Pharmacy to dose Vancomycin consult **AND** vancomycin - pharmacy to dose     Review of Systems  Objective:     Weight: 49.4 kg (109 lb)  Body mass index is 17.07 kg/m².  Vital Signs (Most Recent):  Temp: 97.8 °F (36.6 °C) (04/27/22 0335)  Pulse: 71  (04/27/22 0744)  Resp: 16 (04/27/22 0744)  BP: (!) 148/70 (04/27/22 0855)  SpO2: 100 % (04/27/22 0744)   Vital Signs (24h Range):  Temp:  [94 °F (34.4 °C)-98 °F (36.7 °C)] 97.8 °F (36.6 °C)  Pulse:  [58-81] 71  Resp:  [10-29] 16  SpO2:  [78 %-100 %] 100 %  BP: (109-148)/(55-71) 148/70     Date 04/27/22 0700 - 04/28/22 0659   Shift 9579-2662 0168-0819 0307-8862 24 Hour Total   INTAKE   NG/GT 60   60   Shift Total(mL/kg) 60(1.2)   60(1.2)   OUTPUT   Urine(mL/kg/hr) 50   50   Shift Total(mL/kg) 50(1)   50(1)   Weight (kg) 49.4 49.4 49.4 49.4                Vent Mode: VC  Resp Rate Total:  [18 br/min] 18 br/min  Vt Set:  [400 mL] 400 mL  PEEP/CPAP:  [5 cmH20] 5 cmH20         NG/OG Tube 04/14/22 1200 [place]an sump 14 Fr. Left nostril (Active)   Placement Check placement verified by aspirate characteristics;placement verified by x-ray 04/17/22 0305   Tolerance no signs/symptoms of discomfort 04/17/22 0305   Securement secured to nostril center w/ adhesive device 04/17/22 0305   Clamp Status/Tolerance clamped 04/17/22 0305   Suction Setting/Drainage Method suction at the bedside 04/17/22 0305   Insertion Site Appearance no redness, warmth, tenderness, skin breakdown, drainage 04/17/22 0305   Drainage None 04/15/22 0332   Flush/Irrigation flushed w/;water;no resistance met 04/17/22 0305   Water Bolus (mL) 100 mL 04/17/22 0505   Residual Amount (ml) 40 ml 04/16/22 0700       Female External Urinary Catheter 04/13/22 1820 (Active)   Skin no redness;no breakdown 04/17/22 0305   Tolerance no signs/symptoms of discomfort 04/17/22 0305   Suction Continuous suction at 60 mmHg 04/16/22 2004   Date of last wick change 04/16/22 04/16/22 2004   Time of last wick change 0956 04/16/22 0700   Output (mL) 200 mL 04/17/22 0505       Physical Exam    Neurosurgery Physical Exam  [id number]  AAO x 3; significant mental status waxing and waning  Follow commands on RUE  RLE - wd  L - contracted and plegic      Cranial incision c/d/i      Significant  Labs:  Recent Labs   Lab 04/26/22 0226 04/27/22  0337   * 121*    144   K 4.1 4.4   * 117*   CO2 19* 20*   BUN 23 28*   CREATININE 1.1 1.2   CALCIUM 8.3* 8.0*       Recent Labs   Lab 04/26/22 0226 04/27/22  0337   WBC 12.83* 12.00   HGB 8.7* 7.7*   HCT 26.9* 24.5*   * 119*       No results for input(s): LABPT, INR, APTT in the last 48 hours.    Microbiology Results (last 7 days)       ** No results found for the last 168 hours. **          All pertinent labs from the last 24 hours have been reviewed.    Significant Diagnostics:  I have reviewed all pertinent imaging results/findings within the past 24 hours.  No results found in the last 24 hours.        Assessment/Plan:     * SDH (subdural hematoma)  71 year old female with , hypothyroidism, CVA with left-sided residual deficit on ASA/Plavix, DM2, HTN, HLD, CKD, chronic anemia, presenting from SNF after being found down next to wheelchair after unwitnessed fall, consulted to NSGY for right 2 cm SDH mostly acute, membranous and heterogenous appearing, some chronic component, without midline shift. Now s/p R crani for SDH evacuation (4/4) and redo evacuation on 4/20. S/p MMA embolization on 4/26.       --Admitted to NCC   -- q1 hour vitals/neurochecks; exam wax and wane   -- SBP < 160  -- Na goal eunatremia. Na 145  -- Seizure ppx per NCC.   -- CTH stable 4/19. Postop 4/20 with expected changes. Post drain pull stable.   -- s/p MMA embo with IR on 4/26  -- Saturating well on room air  -- HOB 15  -- PPx: SCDs, BR, IS. Okay for [facility] for dvt ppx.   -- Osteomyelitis R foot: on abx    -Podiatry managing recent partial foot amputation, appreciate recs. Sutures removed 4/12.    -Home health/facility to do dressing changes every MWF and prn as follows:  Cleanse right foot incision site with saline or wound cleanser, paint incision site with betadine, cover with 4x4 gauze, kerlix, ACE bandage.   --No further NSY intervention. Patient is medically  complex and should transfer to medicine team if she steps down before being accepted back to SNF.   --We will remove cranial staples on 5/4 if the patient is still inpatient.   --NSY will sign off. Please page with questions. Thank you for this interesting consult.     Dispo: per primary.         Brock Fairchild MD  Neurosurgery  [facility]

## 2022-04-27 NOTE — ANESTHESIA POSTPROCEDURE EVALUATION
Anesthesia Post Evaluation    Patient: Beatriz Adame    Procedure(s) Performed: * No procedures listed *    Final Anesthesia Type: general      Patient location during evaluation: ICU  Patient participation: No - Unable to Participate, Coma/Other Inability to Communicate (poor mental status)  Level of consciousness: awake and alert and oriented  Post-procedure vital signs: reviewed and stable  Pain management: adequate  Airway patency: patent    PONV status at discharge: No PONV  Anesthetic complications: no      Cardiovascular status: stable  Respiratory status: unassisted, spontaneous ventilation and nasal cannula  Hydration status: euvolemic  Follow-up not needed.          Vitals Value Taken Time   /77 04/27/22 0902   Temp 36.6 °C (97.8 °F) 04/27/22 0335   Pulse 70 04/27/22 0923   Resp 13 04/27/22 0923   SpO2 100 % 04/27/22 0923   Vitals shown include unvalidated device data.      No case tracking events are documented in the log.      Pain/Luis Alfredo Score: No data recorded

## 2022-04-27 NOTE — PROGRESS NOTES
Dr. Kenney and team at bedside aware of unequal and sluggish pupils intermittently. Left is almost always sluggish per pupilometer and larger , pt opens eyes spontaneously, left side contracted, occasional rare spont movements rt side, does not follow commands , plan to stepdown later today, stable post embolization from yesterday, labs reviewed, orders noted.

## 2022-04-27 NOTE — PLAN OF CARE
Pt in bed. On room air, satting 100%. NGT feeding infusing at goal rate of 30cc/hr. Pt has not voided since removal of delgado at 2:15. External catheter placed. Pt DTV. No signs of distress. Bed in lowest position, call light in reach. WCTM.    Problem: Infection  Goal: Absence of Infection Signs and Symptoms  Outcome: Ongoing, Progressing     Problem: Adult Inpatient Plan of Care  Goal: Plan of Care Review  Outcome: Ongoing, Progressing  Goal: Patient-Specific Goal (Individualized)  Description: Admit Date: 4/3/2022    Admit Dx: SDH (subdural hematoma)    Past Medical History:  No date: Gastroparesis  No date: GERD (gastroesophageal reflux disease)  07/24/2021: History of Clostridium difficile colitis  No date: History of kidney stones  No date: History of stroke      Comment:  left side paralysis  No date: Hyperlipidemia  No date: Hypertension  No date: Hypothyroidism  No date: Iron deficiency anemia  No date: Osteoarthritis  No date: Peripheral neuropathy  No date: Stage IV CKD  No date: Type II diabetes mellitus    Past Surgical History:  No date: APPENDECTOMY  10/13/2021: CYSTOSCOPY W/ URETERAL STENT PLACEMENT; Right      Comment:  Procedure: CYSTOSCOPY, WITH URETERAL STENT INSERTION;                 Surgeon: Wanda Arroyo MD;  Location: [facility];                 Service: Urology;  Laterality: Right;  11/23/2021: ESOPHAGOGASTRODUODENOSCOPY; N/A      Comment:  Procedure: EGD (ESOPHAGOGASTRODUODENOSCOPY);  Surgeon:                Obed Jeong MD;  Location: [address]);                 Service: Endoscopy;  Laterality: N/A;  3/24/2022: FOOT AMPUTATION THROUGH METATARSAL; Right      Comment:  Procedure: AMPUTATION, FOOT, PARTIAL 4th and 5th ray;                 Surgeon: Rodrigo Logan DPM;  Location: [address];                 Service: Podiatry;  Laterality: Right;  7/22/2021: LAPAROSCOPIC APPENDECTOMY; N/A      Comment:  Procedure: APPENDECTOMY, LAPAROSCOPIC;  Surgeon: Paulo CROWELL                Umesh Lui MD;  Location: [facility];  Service: General;                 Laterality: N/A;  1/1/2022: TOE AMPUTATION; Right      Comment:  Procedure: AMPUTATION, TOE;  Surgeon: Genaro Mason DPM;  Location: [facility];  Service: Podiatry;  Laterality:               Right;  No date: TUBAL LIGATION  12/22/2021: URETEROSCOPIC REMOVAL OF URETERIC CALCULUS; Right      Comment:  Procedure: REMOVAL, CALCULUS, URETER, URETEROSCOPIC;                 Surgeon: Wanda Arroyo MD;  Location: [facility];                 Service: Urology;  Laterality: Right;    Individualization:   1. Please dim lights at night  2. Pt likes to be covered with blankets  3. Pt likes to be moved slowly.    Restraints:   1. Soft right wrist restraint initiated 4/4/22 @ 2138 for pulling of lines. [facility] for DC criteria.            Outcome: Ongoing, Progressing  Goal: Absence of Hospital-Acquired Illness or Injury  Outcome: Ongoing, Progressing  Goal: Optimal Comfort and Wellbeing  Outcome: Ongoing, Progressing  Goal: Readiness for Transition of Care  Outcome: Ongoing, Progressing     Problem: Adjustment to Illness (Stroke, Hemorrhagic)  Goal: Optimal Coping  Outcome: Ongoing, Progressing     Problem: Bowel Elimination Impaired (Stroke, Hemorrhagic)  Goal: Effective Bowel Elimination  Outcome: Ongoing, Progressing     Problem: Cerebral Tissue Perfusion (Stroke, Hemorrhagic)  Goal: Optimal Cerebral Tissue Perfusion  Outcome: Ongoing, Progressing     Problem: Cognitive Impairment (Stroke, Hemorrhagic)  Goal: Optimal Cognitive Function  Outcome: Ongoing, Progressing     Problem: Communication Impairment (Stroke, Hemorrhagic)  Goal: Effective Communication Skills  Outcome: Ongoing, Progressing     Problem: Functional Ability Impaired (Stroke, Hemorrhagic)  Goal: Optimal Functional Ability  Outcome: Ongoing, Progressing     Problem: Pain (Stroke, Hemorrhagic)  Goal: Acceptable Pain Control  Outcome: Ongoing, Progressing     Problem: Respiratory  Compromise (Stroke, Hemorrhagic)  Goal: Effective Oxygenation and Ventilation  Outcome: Ongoing, Progressing     Problem: Sensorimotor Impairment (Stroke, Hemorrhagic)  Goal: Improved Sensorimotor Function  Outcome: Ongoing, Progressing     Problem: Swallowing Impairment (Stroke, Hemorrhagic)  Goal: Optimal Eating and Swallowing Without Aspiration  Outcome: Ongoing, Progressing     Problem: Urinary Elimination Impaired (Stroke, Hemorrhagic)  Goal: Effective Urinary Elimination  Outcome: Ongoing, Progressing     Problem: Diabetes Comorbidity  Goal: Blood Glucose Level Within Targeted Range  Outcome: Ongoing, Progressing     Problem: Fluid and Electrolyte Imbalance (Acute Kidney Injury/Impairment)  Goal: Fluid and Electrolyte Balance  Outcome: Ongoing, Progressing     Problem: Oral Intake Inadequate (Acute Kidney Injury/Impairment)  Goal: Optimal Nutrition Intake  Outcome: Ongoing, Progressing     Problem: Renal Function Impairment (Acute Kidney Injury/Impairment)  Goal: Effective Renal Function  Outcome: Ongoing, Progressing     Problem: Impaired Wound Healing  Goal: Optimal Wound Healing  Outcome: Ongoing, Progressing     Problem: Adjustment to Illness (Delirium)  Goal: Optimal Coping  Outcome: Ongoing, Progressing     Problem: Altered Behavior (Delirium)  Goal: Improved Behavioral Control  Outcome: Ongoing, Progressing     Problem: Attention and Thought Clarity Impairment (Delirium)  Goal: Improved Attention and Thought Clarity  Outcome: Ongoing, Progressing     Problem: Sleep Disturbance (Delirium)  Goal: Improved Sleep  Outcome: Ongoing, Progressing     Problem: Skin Injury Risk Increased  Goal: Skin Health and Integrity  Outcome: Ongoing, Progressing     Problem: Fall Injury Risk  Goal: Absence of Fall and Fall-Related Injury  Outcome: Ongoing, Progressing     Problem: Restraint, Nonbehavioral (Nonviolent)  Goal: Absence of Harm or Injury  Outcome: Ongoing, Progressing     Problem: Communication Impairment  (Mechanical Ventilation, Invasive)  Goal: Effective Communication  Outcome: Ongoing, Progressing     Problem: Device-Related Complication Risk (Mechanical Ventilation, Invasive)  Goal: Optimal Device Function  Outcome: Ongoing, Progressing     Problem: Inability to Wean (Mechanical Ventilation, Invasive)  Goal: Mechanical Ventilation Liberation  Outcome: Ongoing, Progressing     Problem: Nutrition Impairment (Mechanical Ventilation, Invasive)  Goal: Optimal Nutrition Delivery  Outcome: Ongoing, Progressing     Problem: Skin and Tissue Injury (Mechanical Ventilation, Invasive)  Goal: Absence of Device-Related Skin and Tissue Injury  Outcome: Ongoing, Progressing     Problem: Ventilator-Induced Lung Injury (Mechanical Ventilation, Invasive)  Goal: Absence of Ventilator-Induced Lung Injury  Outcome: Ongoing, Progressing     Problem: Communication Impairment (Artificial Airway)  Goal: Effective Communication  Outcome: Ongoing, Progressing     Problem: Device-Related Complication Risk (Artificial Airway)  Goal: Optimal Device Function  Outcome: Ongoing, Progressing     Problem: Skin and Tissue Injury (Artificial Airway)  Goal: Absence of Device-Related Skin or Tissue Injury  Outcome: Ongoing, Progressing     Problem: Noninvasive Ventilation Acute  Goal: Effective Unassisted Ventilation and Oxygenation  Outcome: Ongoing, Progressing

## 2022-04-27 NOTE — PROGRESS NOTES
[facility] - Neuro Critical Care  Neurocritical Care  Progress Note    Admit Date: 4/3/2022  Service Date: 04/27/2022  Length of Stay: 24    Subjective:     Chief Complaint: SDH (subdural hematoma)    History of Present Illness: Ms. Adame is a 72 yo female with hx of dementia, R MCA stroke with left side contraction/hemiparesis [place] [facility] [place] now s/p evac. Stepped down from [facility]. On 4/13 she developed worsening headache and CTH showed re-accumulation of R SDH and 4-5mm MLS. She was noted to be more slow to follow commands. Stepped back up to [facility] for closer monitoring.    Original HPI below:  Ms. Adame is a 72 y/o F with a PMHx of baseline dementia, GERD, R MCA stroke with residual LUE weakness, HLD, HTN, Stage IV CKD, TIIDM who presents to [facility] with a AoC SDH after an unwitnessed fall from her wheel chair without loss of consciousness. CT spine negative for fracture. CT head with R SDH and 3-4 mm MLS. She is on DAPT at home. Of note, she has been staying at [facility] after a partial R foot amputation, stitches still intact, for which she is receiving vancomycin.         Hospital Course: 04/04/2022: OR today for clot evac. Patient returned to ICU intubated on precedex. Post op scan stable. Wean propofol and initiated precedex for likely extubation tomorrow.   04/05/2022 repeat CT head due to drainage.   04/06/2022 NAEO.   04/07/2022 NAEO. Successfully extubated  4/14/22: EEG revealed seizure   4/15/22: vimpat load overnight  04/16/2022: NAEON.  04/17/2022. NAEON. Repeat CTH stable.   04/18/2022  Lateralized periodic discharges on R with some seizure correlate. 200 vimpat x 1 and increase scheduled vimpat to 200. Platelets by NSGY 2/2 OR for clot evac today.   04/19/2022 OR postponed to today with NSGY. CT this morning stable. Continue EEG.   04/20: in OR for repeat evacuation  04/21: follow up CT after evacuation shows SD drain and little residual blood  04/22: losartan  adjusted to decrease prn use, enteral feeding and fluid to be started after MMA embolization  04/23: occlusive brachial thrombus on picc line seen yesterday, right arm swelling improved with removal of art line and picc  New 20 gauge left wrist, carefully start trickle feeds while waiting for MMA embolization  04/24/2022 fluctuation beena exam, getting EEG   04/25/2022: EEG negative for seizures, stable for MMA embolization  04/26/2022: completed embolization procedure, extubated uneventfully  04/27/2022: NAEON, stable for TTF      Interval History:      Review of Systems   Unable to perform ROS: Patient nonverbal     Objective:     Vitals:  Temp: 98.5 °F (36.9 °C)  Pulse: 69  Rhythm: normal sinus rhythm  BP: 134/63  MAP (mmHg): 90  Resp: 14  SpO2: 100 %  O2 Device (Oxygen Therapy): nasal cannula    Temp  Min: 94.4 °F (34.7 °C)  Max: 98.5 °F (36.9 °C)  Pulse  Min: 58  Max: 79  BP  Min: 109/56  Max: 161/70  MAP (mmHg)  Min: 75  Max: 103  Resp  Min: 10  Max: 23  SpO2  Min: 96 %  Max: 100 %    04/26 0701 - 04/27 0700  In: 700   Out: 415 [Urine:415]   Unmeasured Output  Urine Occurrence: 1  Stool Occurrence: 0  Pad Count: 1       Physical Exam  HENT:      Head:      Comments: Pterional craniotomy on right    Eyes:      Extraocular Movements: Extraocular movements intact.      Pupils: Pupils are equal, round, and reactive to light.   Cardiovascular:      Rate and Rhythm: Normal rate.      Heart sounds: Normal heart sounds.   Pulmonary:      Breath sounds: Normal breath sounds.   Abdominal:      Palpations: Abdomen is soft.   Musculoskeletal:      Cervical back: Neck supple.   Skin:     General: Skin is warm.   Neurological:      Mental Status: She is alert.      Comments: Left spastic hemiplegia  Right side bradykinetic but withdraws with increased flexion tone in arm and leg         Medications:  Continuous Scheduledacetylcysteine 100 mg/ml (10%), 4 mL, Q6H  albuterol-ipratropium, 3 mL, Q6H  amLODIPine, 5 mg,  Daily  atorvastatin, 40 mg, Daily  heparin (porcine), 5,000 Units, Q8H  lacosamide, 250 mg, Q12H  levetiracetam, 1,500 mg, BID  levothyroxine, 75 mcg, Before breakfast  senna-docusate 8.6-50 mg, 1 tablet, BID  silodosin, 4 mg, Daily  vancomycin (VANCOCIN) IVPB, 750 mg, Q48H    PRNacetaminophen, 650 mg, Q4H PRN  dextrose 10%, 12.5 g, PRN  glucagon (human recombinant), 1 mg, PRN  hydrALAZINE, 10 mg, Q8H PRN  HYDROcodone-acetaminophen, 1 tablet, Q4H PRN  insulin aspart U-100, 1-10 Units, Q4H PRN  iodixanoL, 200 mL, ONCE PRN  labetalol, 10 mg, Q6H PRN  metoclopramide HCl, 5 mg, Q6H PRN  ondansetron, 4 mg, Q6H PRN  potassium bicarbonate, 35 mEq, PRN  potassium bicarbonate, 50 mEq, PRN  potassium bicarbonate, 60 mEq, PRN  potassium, sodium phosphates, 2 packet, PRN  potassium, sodium phosphates, 2 packet, PRN  potassium, sodium phosphates, 2 packet, PRN  sodium chloride 0.9%, 10 mL, PRN  sodium chloride 0.9%, 10 mL, PRN  sodium chloride 0.9%, 10 mL, PRN  vancomycin - pharmacy to dose, , pharmacy to manage frequency      Today I personally reviewed pertinent medications, lines/drains/airways, imaging, laboratory results, notably:    Diet  Diet NPO  Diet NPO          Assessment/Plan:     Neuro  * SDH (subdural hematoma)  S/p evac  Poor neurological exam  Discuss MMA embolization   Subdural drain removed  04/26: stable post MMA embolization          Recurrent seizures  Con current AEDs  Connect to EEG giving the fluctuation of neurological exam   04/25: EEG negative for seizures    Cardiac/Vascular  Hypertension  - SBP < 160  -Restart PO meds gradually   Presently only requiring amlodipine 5mg daily    ID  Osteomyelitis of right foot    - continue vancomycin      Endocrine  Severe protein-calorie malnutrition  Nutrition consulted.   NPO since she has to be flat for SDD  Albumin 1.8, use higher protein and calorie TF        Type 2 diabetes mellitus, with long-term current use of insulin  ssi  Has been on and off TFs, cont  checks with escalation of impact peptide      Orthopedic  Status post partial amputation of right foot  See osteomyelitis          The patient is being Prophylaxed for:  Venous Thromboembolism with: Chemical  Stress Ulcer with: None  Ventilator Pneumonia with: not applicable    Activity Orders          Progressive Mobility Protocol (mobilize patient to their highest level of functioning at least twice daily) starting at 04/26 2000    Diet NPO: NPO starting at 04/22 1944    Elevate HOB Flat starting at 04/20 1845    Turn patient starting at 04/13 1600        Full Code    Jorge Pino MD  Neurocritical Care  [facility] - Neuro Critical Care

## 2022-04-27 NOTE — ASSESSMENT & PLAN NOTE
Nutrition consulted.   NPO since she has to be flat for SDD  Albumin 1.8, use higher protein and calorie TF

## 2022-04-27 NOTE — SUBJECTIVE & OBJECTIVE
Interval History: 4/27: PPD 1 s/p MMA embo. NAEON. AFVSS. Exam stable.     Medications:  Continuous Infusions:      Scheduled Meds:   acetylcysteine 100 mg/ml (10%)  4 mL Nebulization Q6H    albuterol-ipratropium  3 mL Nebulization Q6H    amLODIPine  5 mg Per NG tube Daily    atorvastatin  40 mg Per NG tube Daily    heparin (porcine)  5,000 Units Subcutaneous Q8H    lacosamide  250 mg Per NG tube Q12H    levetiracetam  1,500 mg Per NG tube BID    levothyroxine  75 mcg Per NG tube Before breakfast    senna-docusate 8.6-50 mg  1 tablet Per NG tube BID    silodosin  4 mg Per NG tube Daily    vancomycin (VANCOCIN) IVPB  750 mg Intravenous Q48H     PRN Meds:acetaminophen, dextrose 10%, glucagon (human recombinant), hydrALAZINE, HYDROcodone-acetaminophen, iodixanoL, labetalol, metoclopramide HCl, ondansetron, potassium bicarbonate, potassium bicarbonate, potassium bicarbonate, potassium, sodium phosphates, potassium, sodium phosphates, potassium, sodium phosphates, sodium chloride 0.9%, sodium chloride 0.9%, sodium chloride 0.9%, Pharmacy to dose Vancomycin consult **AND** vancomycin - pharmacy to dose     Review of Systems  Objective:     Weight: 49.4 kg (109 lb)  Body mass index is 17.07 kg/m².  Vital Signs (Most Recent):  Temp: 97.8 °F (36.6 °C) (04/27/22 0335)  Pulse: 71 (04/27/22 0744)  Resp: 16 (04/27/22 0744)  BP: (!) 148/70 (04/27/22 0855)  SpO2: 100 % (04/27/22 0744)   Vital Signs (24h Range):  Temp:  [94 °F (34.4 °C)-98 °F (36.7 °C)] 97.8 °F (36.6 °C)  Pulse:  [58-81] 71  Resp:  [10-29] 16  SpO2:  [78 %-100 %] 100 %  BP: (109-148)/(55-71) 148/70     Date 04/27/22 0700 - 04/28/22 0659   Shift 2032-7420 3699-7222 2022-6533 24 Hour Total   INTAKE   NG/GT 60   60   Shift Total(mL/kg) 60(1.2)   60(1.2)   OUTPUT   Urine(mL/kg/hr) 50   50   Shift Total(mL/kg) 50(1)   50(1)   Weight (kg) 49.4 49.4 49.4 49.4                Vent Mode: VC  Resp Rate Total:  [18 br/min] 18 br/min  Vt Set:  [400 mL] 400 mL  PEEP/CPAP:  [5  cmH20] 5 cmH20         NG/OG Tube 04/14/22 1200 [place] sump 14 Fr. Left nostril (Active)   Placement Check placement verified by aspirate characteristics;placement verified by x-ray 04/17/22 0305   Tolerance no signs/symptoms of discomfort 04/17/22 0305   Securement secured to nostril center w/ adhesive device 04/17/22 0305   Clamp Status/Tolerance clamped 04/17/22 0305   Suction Setting/Drainage Method suction at the bedside 04/17/22 0305   Insertion Site Appearance no redness, warmth, tenderness, skin breakdown, drainage 04/17/22 0305   Drainage None 04/15/22 0332   Flush/Irrigation flushed w/;water;no resistance met 04/17/22 0305   Water Bolus (mL) 100 mL 04/17/22 0505   Residual Amount (ml) 40 ml 04/16/22 0700       Female External Urinary Catheter 04/13/22 1820 (Active)   Skin no redness;no breakdown 04/17/22 0305   Tolerance no signs/symptoms of discomfort 04/17/22 0305   Suction Continuous suction at 60 mmHg 04/16/22 2004   Date of last wick change 04/16/22 04/16/22 2004   Time of last wick change 0956 04/16/22 0700   Output (mL) 200 mL 04/17/22 0505       Physical Exam    Neurosurgery Physical Exam  [id number]  AAO x 3; significant mental status waxing and waning  Follow commands on RUE  RLE - wd  L - contracted and plegic      Cranial incision c/d/i      Significant Labs:  Recent Labs   Lab 04/26/22 0226 04/27/22  0337   * 121*    144   K 4.1 4.4   * 117*   CO2 19* 20*   BUN 23 28*   CREATININE 1.1 1.2   CALCIUM 8.3* 8.0*       Recent Labs   Lab 04/26/22 0226 04/27/22  0337   WBC 12.83* 12.00   HGB 8.7* 7.7*   HCT 26.9* 24.5*   * 119*       No results for input(s): LABPT, INR, APTT in the last 48 hours.    Microbiology Results (last 7 days)       ** No results found for the last 168 hours. **          All pertinent labs from the last 24 hours have been reviewed.    Significant Diagnostics:  I have reviewed all pertinent imaging results/findings within the past 24 hours.  No results  found in the last 24 hours.

## 2022-04-27 NOTE — ASSESSMENT & PLAN NOTE
71 year old female with , hypothyroidism, CVA with left-sided residual deficit on ASA/Plavix, DM2, HTN, HLD, CKD, chronic anemia, presenting from SNF after being found down next to wheelchair after unwitnessed fall, consulted to NSGY for right 2 cm SDH mostly acute, membranous and heterogenous appearing, some chronic component, without midline shift. Now s/p R crani for SDH evacuation (4/4) and redo evacuation on 4/20. S/p MMA embolization on 4/26.       --Admitted to [facility]   -- q1 hour vitals/neurochecks; exam wax and wane   -- SBP < 160  -- Na goal eunatremia. Na 145  -- Seizure ppx per NCC.   -- CTH stable 4/19. Postop 4/20 with expected changes. Post drain pull stable.   -- s/p MMA embo with IR on 4/26  -- Saturating well on room air  -- HOB 15  -- PPx: SCDs, BR, IS. Okay for SQH for dvt ppx.   -- Osteomyelitis R foot: on abx    -Podiatry managing recent partial foot amputation, appreciate recs. Sutures removed 4/12.    -Home health/facility to do dressing changes every MWF and prn as follows:  Cleanse right foot incision site with saline or wound cleanser, paint incision site with betadine, cover with 4x4 gauze, kerlix, ACE bandage.   --No further NSY intervention. Patient is medically complex and should transfer to medicine team if she steps down before being accepted back to SNF.   --We will remove cranial staples on 5/4 if the patient is still inpatient.   --NSY will sign off. Please page with questions. Thank you for this interesting consult.     Dispo: per primary.

## 2022-04-27 NOTE — PLAN OF CARE
[facility] Care Plan    POC reviewed with Beatriz Adame and family at 2130. Family verbalized understanding. Questions and concerns addressed. No acute events overnight. Pt progressing toward goals.  See below and flowsheets for full assessment and VS info.           Is this a stroke patient? no    Neuro:  Casimiro Coma Scale  Best Eye Response: 4-->(E4) spontaneous  Best Motor Response: 5-->(M5) localizes pain  Best Verbal Response: 1-->(V1) none  Casimiro Coma Scale Score: 10  Assessment Qualifiers: patient not sedated/intubated  Pupil PERRLA: no     24hr Temp:  [94 °F (34.4 °C)-98.4 °F (36.9 °C)]     CV:   Rhythm: normal sinus rhythm  BP goals:   SBP < 160  MAP > 65    Resp:   O2 Device (Oxygen Therapy): nasal cannula  Vent Mode: VC  Set Rate: 14 BPM  Oxygen Concentration (%): 98  Vt Set: 400 mL  PEEP/CPAP: 5 cmH20  Pressure Support: 5 cmH20    Plan: n/a    GI/:     Diet/Nutrition Received: NPO  Last Bowel Movement: 04/25/22  Voiding Characteristics: urethral catheter (bladder)    Intake/Output Summary (Last 24 hours) at 4/27/2022 0120  Last data filed at 4/26/2022 2305  Gross per 24 hour   Intake 767.51 ml   Output 435 ml   Net 332.51 ml     Unmeasured Output  Urine Occurrence: 1  Stool Occurrence: 1  Pad Count: 1    Labs/Accuchecks:  Recent Labs   Lab 04/26/22 0226   WBC 12.83*   RBC 2.85*   HGB 8.7*   HCT 26.9*   *      Recent Labs   Lab 04/26/22 0226      K 4.1   CO2 19*   *   BUN 23   CREATININE 1.1   ALKPHOS 75   ALT 11   AST 17   BILITOT 0.3    No results for input(s): PROTIME, INR, APTT, HEPANTIXA in the last 168 hours. No results for input(s): CPK, CPKMB, TROPONINI, MB in the last 168 hours.    Electrolytes: n/a  Accuchecks: every 4 hours    Gtts:      LDA/Wounds:  Lines/Drains/Airways       Drain  Duration                  NG/OG Tube 04/14/22 1200 [place] sump 14 Fr. Left nostril 12 days         Urethral Catheter 04/25/22 1800 1 day              Airway  Duration                  Airway  - Non-Surgical 04/26/22 1121 <1 day              Peripheral Intravenous Line  Duration                  Peripheral IV - Single Lumen 04/22/22 1702 22 G Posterior;Right Hand 4 days         Peripheral IV - Single Lumen 04/24/22 1400 20 G Anterior;Left Upper Arm 2 days                  Wounds: yes  Wound care consulted: no

## 2022-04-27 NOTE — SUBJECTIVE & OBJECTIVE
Interval History:      Review of Systems   Unable to perform ROS: Patient nonverbal     Objective:     Vitals:  Temp: 98.5 °F (36.9 °C)  Pulse: 69  Rhythm: normal sinus rhythm  BP: 134/63  MAP (mmHg): 90  Resp: 14  SpO2: 100 %  O2 Device (Oxygen Therapy): nasal cannula    Temp  Min: 94.4 °F (34.7 °C)  Max: 98.5 °F (36.9 °C)  Pulse  Min: 58  Max: 79  BP  Min: 109/56  Max: 161/70  MAP (mmHg)  Min: 75  Max: 103  Resp  Min: 10  Max: 23  SpO2  Min: 96 %  Max: 100 %    04/26 0701 - 04/27 0700  In: 700   Out: 415 [Urine:415]   Unmeasured Output  Urine Occurrence: 1  Stool Occurrence: 0  Pad Count: 1       Physical Exam  HENT:      Head:      Comments: Pterional craniotomy on right    Eyes:      Extraocular Movements: Extraocular movements intact.      Pupils: Pupils are equal, round, and reactive to light.   Cardiovascular:      Rate and Rhythm: Normal rate.      Heart sounds: Normal heart sounds.   Pulmonary:      Breath sounds: Normal breath sounds.   Abdominal:      Palpations: Abdomen is soft.   Musculoskeletal:      Cervical back: Neck supple.   Skin:     General: Skin is warm.   Neurological:      Mental Status: She is alert.      Comments: Left spastic hemiplegia  Right side bradykinetic but withdraws with increased flexion tone in arm and leg         Medications:  Continuous Scheduledacetylcysteine 100 mg/ml (10%), 4 mL, Q6H  albuterol-ipratropium, 3 mL, Q6H  amLODIPine, 5 mg, Daily  atorvastatin, 40 mg, Daily  heparin (porcine), 5,000 Units, Q8H  lacosamide, 250 mg, Q12H  levetiracetam, 1,500 mg, BID  levothyroxine, 75 mcg, Before breakfast  senna-docusate 8.6-50 mg, 1 tablet, BID  silodosin, 4 mg, Daily  vancomycin (VANCOCIN) IVPB, 750 mg, Q48H    PRNacetaminophen, 650 mg, Q4H PRN  dextrose 10%, 12.5 g, PRN  glucagon (human recombinant), 1 mg, PRN  hydrALAZINE, 10 mg, Q8H PRN  HYDROcodone-acetaminophen, 1 tablet, Q4H PRN  insulin aspart U-100, 1-10 Units, Q4H PRN  iodixanoL, 200 mL, ONCE PRN  labetalol, 10 mg,  Q6H PRN  metoclopramide HCl, 5 mg, Q6H PRN  ondansetron, 4 mg, Q6H PRN  potassium bicarbonate, 35 mEq, PRN  potassium bicarbonate, 50 mEq, PRN  potassium bicarbonate, 60 mEq, PRN  potassium, sodium phosphates, 2 packet, PRN  potassium, sodium phosphates, 2 packet, PRN  potassium, sodium phosphates, 2 packet, PRN  sodium chloride 0.9%, 10 mL, PRN  sodium chloride 0.9%, 10 mL, PRN  sodium chloride 0.9%, 10 mL, PRN  vancomycin - pharmacy to dose, , pharmacy to manage frequency      Today I personally reviewed pertinent medications, lines/drains/airways, imaging, laboratory results, notably:    Diet  Diet NPO  Diet NPO

## 2022-04-27 NOTE — NURSING TRANSFER
Nursing Transfer Note      4/27/2022     Reason patient is being transferred: Stepdown to medicine      Transfer from 90 to [id number]    Transfer via : BED    Transfer with: MEDS, TF, off 02, mask     Transported by : RN x 2     Medicines sent: Insulin pen    Any special needs or follow-up needed:RN aware delgado dc'paula at 2:15 pm DTV    Chart send with patient: YES    Notified: Pts son vis phone prior to transfer    Patient reassessed at: 2:45pm CHYNA Jasso full report called prior to transfer.    Upon arrival to floor: bedside Handoff completed

## 2022-04-27 NOTE — PLAN OF CARE
Handoff     Primary Team: [facility] stepdown to  Room Number: 9099/9099 A     Patient Name: Beatriz Adame MRN: [id number]     Date of Birth: [id number] Allergies: Tomato (solanum lycopersicum)     Age: 71 y.o. Admit Date: 4/3/2022     Sex: female  BMI: Body mass index is 17.07 kg/m².     Code Status: Full Code        Illness Level (current clinical status): Watcher - No    Reason for Admission: SDH (subdural hematoma)    Brief HPI (pertinent PMH and diagnosis or differential diagnosis): 71 year old female with , hypothyroidism, CVA with left-sided residual deficit on ASA/Plavix, DM2, HTN, HLD, CKD, chronic anemia, presenting from SNF after being found down next to wheelchair after unwitnessed fall, found to have SDH without midline shift. She is altered at baseline. On 4/13 she developed worsening headache and CTH showed re-accumulation of R SDH and 4-5mm MLS. She was noted to be more slow to follow commands. Stepped back up to [facility] for closer monitoring. Per NSY, No further NSY intervention. Patient is medically complex and should transfer to medicine team if she steps down before being accepted back to SNF.     Procedure Date: s/p MMA embo with IR on 4/26.    Hospital Course :   04/04/2022: OR today for clot evac. Patient returned to ICU intubated on precedex. Post op scan stable. Wean propofol and initiated precedex for likely extubation tomorrow.   04/05/2022 repeat CT head due to drainage.   04/08/2022 Stepped down    04/13/2022: Stepped up to [facility]  4/14/22: EEG revealed seizure,vimpat load overnight.   04/23: occlusive brachial thrombus on picc line seen yesterday, right arm swelling improved with removal of art line and picc  04/24/2022 fluctuation beena exam, getting EEG   04/25/2022: EEG negative for seizures, stable for MMA embolization  04/26/2022: completed embolization procedure, extubated uneventfully    Tasks:   Currently stable for the floor.   1. Continue to monitor neuro exam while on the floor. If concerns  of worsening neuro status, please notify NCC.  2. Continue vanc for osteo of right foot.   3. Will likely need glucose monitoring now that enteral foods begun with T2DM    Estimated Discharge Date: 4/29/2022    Discharge Disposition: Skilled Nursing Facility

## 2022-04-28 PROBLEM — R50.9 FEVER: Status: RESOLVED | Noted: 2022-01-01 | Resolved: 2022-01-01

## 2022-04-28 NOTE — ASSESSMENT & PLAN NOTE
- Araiza removed 4/27, has not yet produced urine spontaneously, had in/out cath with 600 cc on 4/28  - Continue silodosin  - Bladder scans q6h for now. If continuing to require in/out caths will replace Araiza

## 2022-04-28 NOTE — PROGRESS NOTES
[facility] - Intensive Care ([address] Medicine  Progress Note    Patient Name: Beatriz Adame  MRN: [id number]  Patient Class: IP- Inpatient   Admission Date: 4/3/2022  Length of Stay: 25 days  Attending Physician: Tirso Springer MD  Primary Care Provider: aDnte Olivier MD        Subjective:     Principal Problem:SDH (subdural hematoma)        HPI:   Ms. Adame is a 70 yo female with hx of dementia, R MCA stroke with left side contraction/hemiparesis [place] [facility] [place]/p [place]. Stepped down from [facility]. On 4/13 she developed worsening headache and CTH showed re-accumulation of R SDH and 4-5mm MLS. She was noted to be more slow to follow commands. Stepped back up to [facility] for closer monitoring.     Original HPI below:  Ms. Adame is a 72 y/o F with a PMHx of baseline dementia, GERD, R MCA stroke with residual LUE weakness, HLD, HTN, Stage IV CKD, TIIDM who presents to [facility] from [facility] with a AoC SDH after an unwitnessed fall from her wheel chair without loss of consciousness. CT spine negative for fracture. CT head with R SDH and 3-4 mm MLS. She is on DAPT at home. Of note, she has been staying at [facility] after a partial R foot amputation, stitches still intact, for which she is receiving vancomycin.       Overview/Hospital Course:  04/04/2022: OR today for clot evac. Patient returned to ICU intubated on precedex. Post op scan stable. Wean propofol and initiated precedex for likely extubation tomorrow.   04/05/2022 repeat CT head due to drainage.   04/06/2022 NAEO.   04/07/2022 NAEO. Successfully extubated  4/14/22: EEG revealed seizure   4/15/22: vimpat load overnight  04/16/2022: NAEON.  04/17/2022. NAEON. Repeat CTH stable.   04/18/2022  Lateralized periodic discharges on R with some seizure correlate. 200 vimpat x 1 and increase scheduled vimpat to 200. Platelets by NSGY 2/2 OR for clot evac today.   04/19/2022 OR postponed to today with NSGY. CT this morning  stable. Continue EEG.   04/20: in OR for repeat evacuation  04/21: follow up CT after evacuation shows SD drain and little residual blood  04/22: losartan adjusted to decrease prn use, enteral feeding and fluid to be started after MMA embolization  04/23: occlusive brachial thrombus on picc line seen yesterday, right arm swelling improved with removal of art line and picc  New 20 gauge left wrist, carefully start trickle feeds while waiting for MMA embolization  04/24/2022 fluctuation beena exam, getting EEG   04/25/2022: EEG negative for seizures, stable for MMA embolization  04/26/2022: completed embolization procedure, extubated uneventfully  04/27/2022: NAEON, stable for TTF      Interval History:   NAEON. Pt is non-verbal, opens eyes spontaneously but does not respond to any verbal stimuli. Brother at bedside updated on status and prognosis.    Review of Systems   Reason unable to perform ROS: pt non-verbal.   Objective:     Vital Signs (Most Recent):  Temp: 98.1 °F (36.7 °C) (04/28/22 1141)  Pulse: 81 (04/28/22 1524)  Resp: (!) 21 (04/28/22 1344)  BP: (!) 145/79 (04/28/22 1141)  SpO2: 99 % (04/28/22 1524)   Vital Signs (24h Range):  Temp:  [97.8 °F (36.6 °C)-99.1 °F (37.3 °C)] 98.1 °F (36.7 °C)  Pulse:  [76-86] 81  Resp:  [11-21] 21  SpO2:  [98 %-100 %] 99 %  BP: (144-257)/() 145/79     Weight: 49.4 kg (109 lb)  Body mass index is 17.07 kg/m².    Intake/Output Summary (Last 24 hours) at 4/28/2022 1832  Last data filed at 4/28/2022 0642  Gross per 24 hour   Intake 800 ml   Output 547 ml   Net 253 ml      Physical Exam  Constitutional:       General: She is not in acute distress.     Appearance: She is not ill-appearing.   HENT:      Head:      Comments: Pterional craniotomy on right    Eyes:      Extraocular Movements: Extraocular movements intact.      Pupils: Pupils are equal, round, and reactive to light.   Cardiovascular:      Rate and Rhythm: Normal rate and regular rhythm.      Heart sounds: Normal  heart sounds.   Pulmonary:      Breath sounds: Normal breath sounds.   Abdominal:      Palpations: Abdomen is soft.   Musculoskeletal:      Cervical back: Neck supple.   Skin:     General: Skin is warm.   Neurological:      Mental Status: She is alert.      Comments: Opens eyes spontaneously but does not respond to any verbal or physical stimuli. Non-verbal.  Left spastic hemiplegia  Right side bradykinetic but withdraws with increased flexion tone in arm and leg       Significant Labs: All pertinent labs within the past 24 hours have been reviewed.    Significant Imaging: I have reviewed all pertinent imaging results/findings within the past 24 hours.      Assessment/Plan:      * SDH (subdural hematoma)  S/p craniotomy with evacuation on 4/4 and repeat evac on 4/20 and MMA embolization on 4/26  Neuro recovery has been poor, prognosis poor, need to discuss with pt's son, strongly consider hospice  Has staples to be removed on 5/4 (by NSGY if still admitted)  Continue TF for now  Will have SLP re-evaluate before discussing possible PEG    Encephalopathy        Recurrent seizures  Now stable on Lacosamide and Keppra    Critical lower limb ischemia        Osteomyelitis of right foot  R foot osteomyelitis with bone bx +MRSA, ID following peripherally   ID rec 6wk course of Vanc, with end  Date 5/5/2022, pharmacy to dose vanc inpatient    Acute on chronic anemia  This is a patient with a long history of chronic anemia. Slow down trending since admission likely 2/2 to surgery, acute illness, and phlebotomy. Patient is asymptomatic, agree with transfusion for hgb<7. No signs of bleeding. Hemodynamically stable. Labs consistent with anemia of chronic disease and acute blood loss.     Total blood products this admission:  - 5u PRBCs (2u 4/11, 1u each on 4/10, 4/6, and 4/4)    *2u given on 4/11 is charted incorrectly as 1u, however confirmed 2u ordered and given*  - 3u PLTs (all on 4/4)    - Recommend age appropriate  colonoscopy as outpatient if warrented  - agree with transfusion for hgb<7 or active bleeding  - Trend CBC daily      Debility        Urinary retention  - Araiza removed 4/27, has not yet produced urine spontaneously, had in/out cath with 600 cc on 4/28  - Continue silodosin  - Bladder scans q6h for now. If continuing to require in/out caths will replace Araiza      Hypothyroidism  Continue home levothyroxine      Severe protein-calorie malnutrition  Nutrition following, most recent recs below from 4/12 (I ordered boost pudding BID and Frederic BID x14d per their recs)       1. Suggest Diabetic 2000 kcal diet with texture per SLP     2. Consider Boost Pudding BID to increase PO intake      3. Add Frederic BID x 14 days to aid in wound healing     4. If TF warranted, recommend TF of Isosource advancing as tolerated to goal rate of 45 mL/hr to provide 1620 kcal, 73 gm protein, and 825 mL free fluid     5. RD following          History of stroke  Baseline LUE and LLE weakness s/p stroke  Was on ASA/Plavix, but stopped on admit 2/2 SDH    Acute kidney injury superimposed on CKD stage IV  CKD with previous baseline high 1s-mid 2s, but Cr mid 1s recently  Pt with some retention, but still able to void with prompting, has purewick in place  Strict I/Os q4h - discussed with RN  Encourage po hydration  Trend renal function  Renally dose medications and avoid nephrotoxins  Maintain hgb>7 and MAP>65    Type 2 diabetes mellitus, with long-term current use of insulin  - Accuchecks ACHS with SSI while inpatient, not needing any long-acting insulin      Hyperlipidemia  Continue statin.       Hypertension  Continue home amlodipine. Can resume coreg/losartan if needed.      VTE Risk Mitigation (From admission, onward)         Ordered     heparin (porcine) injection 5,000 Units  Every 8 hours         04/24/22 0853     IP VTE HIGH RISK PATIENT  Once         04/03/22 1608     Place sequential compression device  Until discontinued          04/03/22 1608     Reason for No Pharmacological VTE Prophylaxis  Once        Question:  Reasons:  Answer:  Active Bleeding    04/03/22 1608                Discharge Planning   MARY: 5/4/2022     Code Status: Full Code   Is the patient medically ready for discharge?:     Reason for patient still in hospital (select all that apply): Patient trending condition  Discharge Plan A: Long-term acute care facility (LTAC)   Discharge Delays: None known at this time              Tirso Springer MD  Department of Hospital Medicine   [facility] - Intensive Care ([place]-[facility])

## 2022-04-28 NOTE — SUBJECTIVE & OBJECTIVE
Interval History:   NAEON. Pt is non-verbal, opens eyes spontaneously but does not respond to any verbal stimuli. Brother at bedside updated on status and prognosis.    Review of Systems   Reason unable to perform ROS: pt non-verbal.   Objective:     Vital Signs (Most Recent):  Temp: 98.1 °F (36.7 °C) (04/28/22 1141)  Pulse: 81 (04/28/22 1524)  Resp: (!) 21 (04/28/22 1344)  BP: (!) 145/79 (04/28/22 1141)  SpO2: 99 % (04/28/22 1524)   Vital Signs (24h Range):  Temp:  [97.8 °F (36.6 °C)-99.1 °F (37.3 °C)] 98.1 °F (36.7 °C)  Pulse:  [76-86] 81  Resp:  [11-21] 21  SpO2:  [98 %-100 %] 99 %  BP: (144-257)/() 145/79     Weight: 49.4 kg (109 lb)  Body mass index is 17.07 kg/m².    Intake/Output Summary (Last 24 hours) at 4/28/2022 1832  Last data filed at 4/28/2022 0642  Gross per 24 hour   Intake 800 ml   Output 547 ml   Net 253 ml      Physical Exam  Constitutional:       General: She is not in acute distress.     Appearance: She is not ill-appearing.   HENT:      Head:      Comments: Pterional craniotomy on right    Eyes:      Extraocular Movements: Extraocular movements intact.      Pupils: Pupils are equal, round, and reactive to light.   Cardiovascular:      Rate and Rhythm: Normal rate and regular rhythm.      Heart sounds: Normal heart sounds.   Pulmonary:      Breath sounds: Normal breath sounds.   Abdominal:      Palpations: Abdomen is soft.   Musculoskeletal:      Cervical back: Neck supple.   Skin:     General: Skin is warm.   Neurological:      Mental Status: She is alert.      Comments: Opens eyes spontaneously but does not respond to any verbal or physical stimuli. Non-verbal.  Left spastic hemiplegia  Right side bradykinetic but withdraws with increased flexion tone in arm and leg       Significant Labs: All pertinent labs within the past 24 hours have been reviewed.    Significant Imaging: I have reviewed all pertinent imaging results/findings within the past 24 hours.

## 2022-04-28 NOTE — ASSESSMENT & PLAN NOTE
S/p craniotomy with evacuation on 4/4 and repeat evac on 4/20 and MMA embolization on 4/26  Neuro recovery has been poor, prognosis poor, need to discuss with pt's son, strongly consider hospice  Has staples to be removed on 5/4 (by NSGY if still admitted)  Continue TF for now  Will have SLP re-evaluate before discussing possible PEG

## 2022-04-28 NOTE — PLAN OF CARE
Problem: Infection  Goal: Absence of Infection Signs and Symptoms  Outcome: Ongoing, Progressing     Problem: Adult Inpatient Plan of Care  Goal: Plan of Care Review  Outcome: Ongoing, Progressing  Goal: Patient-Specific Goal (Individualized)  Description: Admit Date: 4/3/2022    Admit Dx: SDH (subdural hematoma)    Past Medical History:  No date: Gastroparesis  No date: GERD (gastroesophageal reflux disease)  07/24/2021: History of Clostridium difficile colitis  No date: History of kidney stones  No date: History of stroke      Comment:  left side paralysis  No date: Hyperlipidemia  No date: Hypertension  No date: Hypothyroidism  No date: Iron deficiency anemia  No date: Osteoarthritis  No date: Peripheral neuropathy  No date: Stage IV CKD  No date: Type II diabetes mellitus    Past Surgical History:  No date: APPENDECTOMY  10/13/2021: CYSTOSCOPY W/ URETERAL STENT PLACEMENT; Right      Comment:  Procedure: CYSTOSCOPY, WITH URETERAL STENT INSERTION;                 Surgeon: Wanda Arroyo MD;  Location: [facility];                 Service: Urology;  Laterality: Right;  11/23/2021: ESOPHAGOGASTRODUODENOSCOPY; N/A      Comment:  Procedure: EGD (ESOPHAGOGASTRODUODENOSCOPY);  Surgeon:                Obed Jeong MD;  Location: [address]);                 Service: Endoscopy;  Laterality: N/A;  3/24/2022: FOOT AMPUTATION THROUGH METATARSAL; Right      Comment:  Procedure: AMPUTATION, FOOT, PARTIAL 4th and 5th ray;                 Surgeon: Rodrigo Logan DPM;  Location: [address];                 Service: Podiatry;  Laterality: Right;  7/22/2021: LAPAROSCOPIC APPENDECTOMY; N/A      Comment:  Procedure: APPENDECTOMY, LAPAROSCOPIC;  Surgeon: Paulo Calvo Jr., MD;  Location: [facility];  Service: General;                 Laterality: N/A;  1/1/2022: TOE AMPUTATION; Right      Comment:  Procedure: AMPUTATION, TOE;  Surgeon: Genaro Mason DPM;  Location: [facility];  Service:  Podiatry;  Laterality:               Right;  No date: TUBAL LIGATION  12/22/2021: URETEROSCOPIC REMOVAL OF URETERIC CALCULUS; Right      Comment:  Procedure: REMOVAL, CALCULUS, URETER, URETEROSCOPIC;                 Surgeon: Wanda Arroyo MD;  Location: [facility];                 Service: Urology;  Laterality: Right;    Individualization:   1. Please dim lights at night  2. Pt likes to be covered with blankets  3. Pt likes to be moved slowly.    Restraints:   1. Soft right wrist restraint initiated 4/4/22 @ 2138 for pulling of lines. [facility] for DC criteria.            Outcome: Ongoing, Progressing  Goal: Absence of Hospital-Acquired Illness or Injury  Outcome: Ongoing, Progressing  Goal: Optimal Comfort and Wellbeing  Outcome: Ongoing, Progressing  Goal: Readiness for Transition of Care  Outcome: Ongoing, Progressing     Problem: Adjustment to Illness (Stroke, Hemorrhagic)  Goal: Optimal Coping  Outcome: Ongoing, Progressing     Problem: Bowel Elimination Impaired (Stroke, Hemorrhagic)  Goal: Effective Bowel Elimination  Outcome: Ongoing, Progressing     Problem: Cerebral Tissue Perfusion (Stroke, Hemorrhagic)  Goal: Optimal Cerebral Tissue Perfusion  Outcome: Ongoing, Progressing     Problem: Cognitive Impairment (Stroke, Hemorrhagic)  Goal: Optimal Cognitive Function  Outcome: Ongoing, Progressing     Problem: Communication Impairment (Stroke, Hemorrhagic)  Goal: Effective Communication Skills  Outcome: Ongoing, Progressing     Problem: Functional Ability Impaired (Stroke, Hemorrhagic)  Goal: Optimal Functional Ability  Outcome: Ongoing, Progressing     Problem: Pain (Stroke, Hemorrhagic)  Goal: Acceptable Pain Control  Outcome: Ongoing, Progressing     Problem: Respiratory Compromise (Stroke, Hemorrhagic)  Goal: Effective Oxygenation and Ventilation  Outcome: Ongoing, Progressing     Problem: Sensorimotor Impairment (Stroke, Hemorrhagic)  Goal: Improved Sensorimotor Function  Outcome: Ongoing, Progressing      Problem: Swallowing Impairment (Stroke, Hemorrhagic)  Goal: Optimal Eating and Swallowing Without Aspiration  Outcome: Ongoing, Progressing     Problem: Urinary Elimination Impaired (Stroke, Hemorrhagic)  Goal: Effective Urinary Elimination  Outcome: Ongoing, Progressing     Problem: Diabetes Comorbidity  Goal: Blood Glucose Level Within Targeted Range  Outcome: Ongoing, Progressing     Problem: Fluid and Electrolyte Imbalance (Acute Kidney Injury/Impairment)  Goal: Fluid and Electrolyte Balance  Outcome: Ongoing, Progressing     Problem: Oral Intake Inadequate (Acute Kidney Injury/Impairment)  Goal: Optimal Nutrition Intake  Outcome: Ongoing, Progressing     Problem: Renal Function Impairment (Acute Kidney Injury/Impairment)  Goal: Effective Renal Function  Outcome: Ongoing, Progressing     Problem: Impaired Wound Healing  Goal: Optimal Wound Healing  Outcome: Ongoing, Progressing     Problem: Adjustment to Illness (Delirium)  Goal: Optimal Coping  Outcome: Ongoing, Progressing     Problem: Altered Behavior (Delirium)  Goal: Improved Behavioral Control  Outcome: Ongoing, Progressing     Problem: Attention and Thought Clarity Impairment (Delirium)  Goal: Improved Attention and Thought Clarity  Outcome: Ongoing, Progressing     Problem: Sleep Disturbance (Delirium)  Goal: Improved Sleep  Outcome: Ongoing, Progressing     Problem: Skin Injury Risk Increased  Goal: Skin Health and Integrity  Outcome: Ongoing, Progressing     Problem: Fall Injury Risk  Goal: Absence of Fall and Fall-Related Injury  Outcome: Ongoing, Progressing     Problem: Restraint, Nonbehavioral (Nonviolent)  Goal: Absence of Harm or Injury  Outcome: Ongoing, Progressing     Problem: Communication Impairment (Mechanical Ventilation, Invasive)  Goal: Effective Communication  Outcome: Ongoing, Progressing     Problem: Device-Related Complication Risk (Mechanical Ventilation, Invasive)  Goal: Optimal Device Function  Outcome: Ongoing, Progressing      Problem: Inability to Wean (Mechanical Ventilation, Invasive)  Goal: Mechanical Ventilation Liberation  Outcome: Ongoing, Progressing     Problem: Nutrition Impairment (Mechanical Ventilation, Invasive)  Goal: Optimal Nutrition Delivery  Outcome: Ongoing, Progressing     Problem: Skin and Tissue Injury (Mechanical Ventilation, Invasive)  Goal: Absence of Device-Related Skin and Tissue Injury  Outcome: Ongoing, Progressing     Problem: Ventilator-Induced Lung Injury (Mechanical Ventilation, Invasive)  Goal: Absence of Ventilator-Induced Lung Injury  Outcome: Ongoing, Progressing     Problem: Communication Impairment (Artificial Airway)  Goal: Effective Communication  Outcome: Ongoing, Progressing     Problem: Device-Related Complication Risk (Artificial Airway)  Goal: Optimal Device Function  Outcome: Ongoing, Progressing     Problem: Skin and Tissue Injury (Artificial Airway)  Goal: Absence of Device-Related Skin or Tissue Injury  Outcome: Ongoing, Progressing     Problem: Noninvasive Ventilation Acute  Goal: Effective Unassisted Ventilation and Oxygenation  Outcome: Ongoing, Progressing     Problem: Seizure, Active Management  Goal: Absence of Seizure/Seizure-Related Injury  Outcome: Ongoing, Progressing

## 2022-04-28 NOTE — PLAN OF CARE
SW contacted patient lucy Moore to discuss the dc plan and he states they are wanting patient to admit to SNF post dc. He requested a referral be sent to [facility]. SW sent the referral and will continue to follow up.        Beatriz Aguero LMSW  [facility]   [id number]

## 2022-04-28 NOTE — SUBJECTIVE & OBJECTIVE
Subjective:     Interval History:   Afebrile, vital signs stable. WBC improving. S/p MMA embolization with IR 4/26. She is now 5 weeks s/p partial 4th and 5th ray amputation DOS 3/24/2022. 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 with estimated end date of 5/5/2022    Scheduled Meds:   acetylcysteine 100 mg/ml (10%)  4 mL Nebulization Q6H    albuterol-ipratropium  3 mL Nebulization Q6H    amLODIPine  5 mg Per NG tube Daily    atorvastatin  40 mg Per NG tube Daily    heparin (porcine)  5,000 Units Subcutaneous Q8H    lacosamide  250 mg Per NG tube Q12H    levetiracetam  1,500 mg Per NG tube BID    levothyroxine  75 mcg Per NG tube Before breakfast    senna-docusate 8.6-50 mg  1 tablet Per NG tube BID    silodosin  4 mg Per NG tube Daily    vancomycin (VANCOCIN) IVPB  750 mg Intravenous Q48H     Continuous Infusions:      PRN Meds:acetaminophen, dextrose 10%, glucagon (human recombinant), hydrALAZINE, HYDROcodone-acetaminophen, insulin aspart U-100, iodixanoL, labetalol, metoclopramide HCl, ondansetron, potassium bicarbonate, potassium bicarbonate, potassium bicarbonate, potassium, sodium phosphates, potassium, sodium phosphates, potassium, sodium phosphates, sodium chloride 0.9%, sodium chloride 0.9%, sodium chloride 0.9%, Pharmacy to dose Vancomycin consult **AND** vancomycin - pharmacy to dose    Review of Systems   Unable to perform ROS: Mental status change   Objective:     Vital Signs (Most Recent):  Temp: 98.2 °F (36.8 °C) (04/27/22 1900)  Pulse: 83 (04/27/22 1908)  Resp: 17 (04/27/22 1603)  BP: (!) 140/65 (04/27/22 1603)  SpO2: 100 % (04/27/22 1603) Vital Signs (24h Range):  Temp:  [97.8 °F (36.6 °C)-98.8 °F (37.1 °C)] 98.2 °F (36.8 °C)  Pulse:  [64-83] 83  Resp:  [10-18] 17  SpO2:  [100 %] 100 %  BP: (117-161)/(55-77) 140/65     Weight: 49.4 kg (109 lb)  Body mass index is 17.07 kg/m².    Foot Exam    Right Foot/Ankle     Inspection and Palpation  Tenderness: none   Swelling: none   Skin  Exam: skin changes; no drainage, skin not intact, no cellulitis, no abnormal color and no erythema     Neurovascular  Dorsalis pedis: absent  Posterior tibial: absent    Comments  R 2nd toe amputated    S/P 4th and 5th ray resection 03/24    Sutures clean, dry, and intact   Skin edges well approximated with no signs of gapping or dehiscence  No signs of soft tissue infection     Left Foot/Ankle      Inspection and Palpation  Tenderness: none   Swelling: none   Skin Exam: skin intact; no drainage, no cellulitis and no erythema     Neurovascular  Dorsalis pedis: absent  Posterior tibial: absent        Laboratory:  CBC:   Recent Labs   Lab 04/27/22  0337   WBC 12.00   RBC 2.52*   HGB 7.7*   HCT 24.5*   *   MCV 97   MCH 30.6   MCHC 31.4*       CRP: No results for input(s): CRP in the last 168 hours.  ESR: No results for input(s): SEDRATE in the last 168 hours.  Wound Cultures:   Recent Labs   Lab 03/18/22  1620 03/24/22  1538   LABAERO METHICILLIN RESISTANT STAPHYLOCOCCUS AUREUS  Many  * METHICILLIN RESISTANT STAPHYLOCOCCUS AUREUS  Few  *  METHICILLIN RESISTANT STAPHYLOCOCCUS AUREUS  Few  *       Microbiology Results (last 7 days)       ** No results found for the last 168 hours. **          Specimen (24h ago, onward)                None            Diagnostic Results:  I have reviewed all pertinent imaging results/findings within the past 24 hours.    Clinical Findings:  S/p right foot partial 4th and 5th ray amputation on 3/24/2022. There is superficial dehiscence/gapping along the incision site with dry eschar forming over the wound, no drainage, no erythema, no fluctuance. 2nd toe amputated.     4/27/2022 4/12/2022

## 2022-04-28 NOTE — NURSING
2340 bladder scanned pt, 0ml per bladder scanner.   0101 Notified Dr. Redding of bladder scan and no UOP since delgado removal.         END OF SHIFT NOTE  Pt rested well throughout shift, no distress at this time, no complaints, VSS, remains nonverbal, bladder scanned patient at 0600 and 547 ml, in and out patient and received 600ml of urine. Neuro checks remain unchanged, bed in lowest position, side rails up x2. Bed alarm set, personal items within reach. [facility]      Graciela Hernandez, GAVINN RN

## 2022-04-28 NOTE — PROGRESS NOTES
[facility] - Intensive Care ([place] [address])  Podiatry  Progress Note    Patient Name: Beatriz Adame  MRN: [id number]  Admission Date: 4/3/2022  Hospital Length of Stay: 24 days  Attending Physician: Maury Panchal MD  Primary Care Provider: Dante Olivier MD     Subjective:     Interval History:   Afebrile, vital signs stable. WBC improving. S/p MMA embolization with IR 4/26. She is now 5 weeks s/p partial 4th and 5th ray amputation DOS 3/24/2022. 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 with estimated end date of 5/5/2022    Scheduled Meds:   acetylcysteine 100 mg/ml (10%)  4 mL Nebulization Q6H    albuterol-ipratropium  3 mL Nebulization Q6H    amLODIPine  5 mg Per NG tube Daily    atorvastatin  40 mg Per NG tube Daily    heparin (porcine)  5,000 Units Subcutaneous Q8H    lacosamide  250 mg Per NG tube Q12H    levetiracetam  1,500 mg Per NG tube BID    levothyroxine  75 mcg Per NG tube Before breakfast    senna-docusate 8.6-50 mg  1 tablet Per NG tube BID    silodosin  4 mg Per NG tube Daily    vancomycin (VANCOCIN) IVPB  750 mg Intravenous Q48H     Continuous Infusions:      PRN Meds:acetaminophen, dextrose 10%, glucagon (human recombinant), hydrALAZINE, HYDROcodone-acetaminophen, insulin aspart U-100, iodixanoL, labetalol, metoclopramide HCl, ondansetron, potassium bicarbonate, potassium bicarbonate, potassium bicarbonate, potassium, sodium phosphates, potassium, sodium phosphates, potassium, sodium phosphates, sodium chloride 0.9%, sodium chloride 0.9%, sodium chloride 0.9%, Pharmacy to dose Vancomycin consult **AND** vancomycin - pharmacy to dose    Review of Systems   Unable to perform ROS: Mental status change   Objective:     Vital Signs (Most Recent):  Temp: 98.2 °F (36.8 °C) (04/27/22 1900)  Pulse: 83 (04/27/22 1908)  Resp: 17 (04/27/22 1603)  BP: (!) 140/65 (04/27/22 1603)  SpO2: 100 % (04/27/22 1603) Vital Signs (24h Range):  Temp:  [97.8 °F (36.6 °C)-98.8 °F (37.1 °C)]  98.2 °F (36.8 °C)  Pulse:  [64-83] 83  Resp:  [10-18] 17  SpO2:  [100 %] 100 %  BP: (117-161)/(55-77) 140/65     Weight: 49.4 kg (109 lb)  Body mass index is 17.07 kg/m².    Foot Exam    Right Foot/Ankle     Inspection and Palpation  Tenderness: none   Swelling: none   Skin Exam: skin changes; no drainage, skin not intact, no cellulitis, no abnormal color and no erythema     Neurovascular  Dorsalis pedis: absent  Posterior tibial: absent    Comments  R 2nd toe amputated    S/P 4th and 5th ray resection 03/24    Sutures clean, dry, and intact   Skin edges well approximated with no signs of gapping or dehiscence  No signs of soft tissue infection     Left Foot/Ankle      Inspection and Palpation  Tenderness: none   Swelling: none   Skin Exam: skin intact; no drainage, no cellulitis and no erythema     Neurovascular  Dorsalis pedis: absent  Posterior tibial: absent        Laboratory:  CBC:   Recent Labs   Lab 04/27/22  0337   WBC 12.00   RBC 2.52*   HGB 7.7*   HCT 24.5*   *   MCV 97   MCH 30.6   MCHC 31.4*       CRP: No results for input(s): CRP in the last 168 hours.  ESR: No results for input(s): SEDRATE in the last 168 hours.  Wound Cultures:   Recent Labs   Lab 03/18/22  1620 03/24/22  1538   LABAERO METHICILLIN RESISTANT STAPHYLOCOCCUS AUREUS  Many  * METHICILLIN RESISTANT STAPHYLOCOCCUS AUREUS  Few  *  METHICILLIN RESISTANT STAPHYLOCOCCUS AUREUS  Few  *       Microbiology Results (last 7 days)       ** No results found for the last 168 hours. **          Specimen (24h ago, onward)                None            Diagnostic Results:  I have reviewed all pertinent imaging results/findings within the past 24 hours.    Clinical Findings:  S/p right foot partial 4th and 5th ray amputation on 3/24/2022. There is superficial dehiscence/gapping along the incision site with dry eschar forming over the wound, no drainage, no erythema, no fluctuance. 2nd toe amputated.      4/27/2022 4/12/2022        Assessment/Plan:     Osteomyelitis of right foot  S/p partial 4th and 5th ray amputation 3/24/2022. Currently on IV Vancomycin x 6 weeks for residual osteomyelitis with estimated end date 5/5/2022. Proximal bone margins obtained intraop +MRSA.    Plan  She is now 5 weeks s/p amputation with superfical gapping/dehiscence along the incision site with no acute signs of infection.   Continue IV Vancomycin estimated end date 5/5/2022  No further plans for surgical intervention from podiatry  Continue local wound care with betadine. Nursing orders placed   Offload bilateral heels in heel protector boots and pillows while in bed to prevent decubitus heel ulcers  Ok to weightbear as tolerated when able  Podiatry will follow peripherally    DC Instructions:  Patient is to follow up with podiatry within 14-21 days of discharge with Dr. Logan.  Podiatry will arrange an appointment.  Home health/facility to do dressing changes every MWF and prn as follows:  Cleanse right foot incision site with saline or wound cleanser, paint incision site with betadine, cover with 4x4 gauze, kerlix, ACE bandage.           Yumiko Morales DPM  [facility]  Podiatry PGY3  Pager: [phone]  Spectra:[phone]

## 2022-04-28 NOTE — ASSESSMENT & PLAN NOTE
S/p partial 4th and 5th ray amputation 3/24/2022. Currently on IV Vancomycin x 6 weeks for residual osteomyelitis with estimated end date 5/5/2022. Proximal bone margins obtained intraop +MRSA.    Plan  She is now 5 weeks s/p amputation with superfical gapping/dehiscence along the incision site with no acute signs of infection.   Continue IV Vancomycin estimated end date 5/5/2022  No further plans for surgical intervention from podiatry  Continue local wound care with betadine. Nursing orders placed   Offload bilateral heels in heel protector boots and pillows while in bed to prevent decubitus heel ulcers  Ok to weightbear as tolerated when able  Podiatry will follow peripherally    DC Instructions:  Patient is to follow up with podiatry within 14-21 days of discharge with Dr. Logan.  Podiatry will arrange an appointment.  Home health/facility to do dressing changes every MWF and prn as follows:  Cleanse right foot incision site with saline or wound cleanser, paint incision site with betadine, cover with 4x4 gauze, kerlix, ACE bandage.

## 2022-04-28 NOTE — CONSULTS
NINA placed 20g, 8 cm Midline in left cephalic vein using u/s guidance.  Max dwell date 5/26/2022.  Lot # [id number].    MIDLINE inserted for IV abx: pt to complete a 6 wk course of Vancomycin ED 5/5/2022

## 2022-04-29 PROBLEM — Z51.5 PALLIATIVE CARE ENCOUNTER: Status: ACTIVE | Noted: 2022-01-01

## 2022-04-29 PROBLEM — Z71.89 GOALS OF CARE, COUNSELING/DISCUSSION: Status: ACTIVE | Noted: 2021-01-01

## 2022-04-29 NOTE — PLAN OF CARE
No acute events during this shift. POC reviewed with niece and son. Pt satting 100%, NGT in place, feeding continued. No distress noted. WCTM.      Problem: Infection  Goal: Absence of Infection Signs and Symptoms  Outcome: Ongoing, Not Progressing     Problem: Adult Inpatient Plan of Care  Goal: Plan of Care Review  Outcome: Ongoing, Not Progressing  Goal: Patient-Specific Goal (Individualized)  Description: Admit Date: 4/3/2022    Admit Dx: SDH (subdural hematoma)    Past Medical History:  No date: Gastroparesis  No date: GERD (gastroesophageal reflux disease)  07/24/2021: History of Clostridium difficile colitis  No date: History of kidney stones  No date: History of stroke      Comment:  left side paralysis  No date: Hyperlipidemia  No date: Hypertension  No date: Hypothyroidism  No date: Iron deficiency anemia  No date: Osteoarthritis  No date: Peripheral neuropathy  No date: Stage IV CKD  No date: Type II diabetes mellitus    Past Surgical History:  No date: APPENDECTOMY  10/13/2021: CYSTOSCOPY W/ URETERAL STENT PLACEMENT; Right      Comment:  Procedure: CYSTOSCOPY, WITH URETERAL STENT INSERTION;                 Surgeon: Wanda Arroyo MD;  Location: [facility];                 Service: Urology;  Laterality: Right;  11/23/2021: ESOPHAGOGASTRODUODENOSCOPY; N/A      Comment:  Procedure: EGD (ESOPHAGOGASTRODUODENOSCOPY);  Surgeon:                Obed Jeong MD;  Location: [address]);                 Service: Endoscopy;  Laterality: N/A;  3/24/2022: FOOT AMPUTATION THROUGH METATARSAL; Right      Comment:  Procedure: AMPUTATION, FOOT, PARTIAL 4th and 5th ray;                 Surgeon: Rodrigo Logan DPM;  Location: [address];                 Service: Podiatry;  Laterality: Right;  7/22/2021: LAPAROSCOPIC APPENDECTOMY; N/A      Comment:  Procedure: APPENDECTOMY, LAPAROSCOPIC;  Surgeon: Paulo Calvo Jr., MD;  Location: [facility];  Service: General;                 Laterality:  N/A;  1/1/2022: TOE AMPUTATION; Right      Comment:  Procedure: AMPUTATION, TOE;  Surgeon: Genaro Mason DPM;  Location: [facility];  Service: Podiatry;  Laterality:               Right;  No date: TUBAL LIGATION  12/22/2021: URETEROSCOPIC REMOVAL OF URETERIC CALCULUS; Right      Comment:  Procedure: REMOVAL, CALCULUS, URETER, URETEROSCOPIC;                 Surgeon: Wanda Arroyo MD;  Location: [facility];                 Service: Urology;  Laterality: Right;    Individualization:   1. Please dim lights at night  2. Pt likes to be covered with blankets  3. Pt likes to be moved slowly.    Restraints:   1. Soft right wrist restraint initiated 4/4/22 @ 2138 for pulling of lines. [facility] for DC criteria.            Outcome: Ongoing, Not Progressing  Goal: Absence of Hospital-Acquired Illness or Injury  Outcome: Ongoing, Not Progressing  Goal: Optimal Comfort and Wellbeing  Outcome: Ongoing, Not Progressing  Goal: Readiness for Transition of Care  Outcome: Ongoing, Not Progressing     Problem: Adjustment to Illness (Stroke, Hemorrhagic)  Goal: Optimal Coping  Outcome: Ongoing, Not Progressing     Problem: Bowel Elimination Impaired (Stroke, Hemorrhagic)  Goal: Effective Bowel Elimination  Outcome: Ongoing, Not Progressing     Problem: Cerebral Tissue Perfusion (Stroke, Hemorrhagic)  Goal: Optimal Cerebral Tissue Perfusion  Outcome: Ongoing, Not Progressing     Problem: Cognitive Impairment (Stroke, Hemorrhagic)  Goal: Optimal Cognitive Function  Outcome: Ongoing, Not Progressing     Problem: Communication Impairment (Stroke, Hemorrhagic)  Goal: Effective Communication Skills  Outcome: Ongoing, Not Progressing     Problem: Functional Ability Impaired (Stroke, Hemorrhagic)  Goal: Optimal Functional Ability  Outcome: Ongoing, Not Progressing     Problem: Pain (Stroke, Hemorrhagic)  Goal: Acceptable Pain Control  Outcome: Ongoing, Not Progressing     Problem: Respiratory Compromise (Stroke, Hemorrhagic)  Goal:  Effective Oxygenation and Ventilation  Outcome: Ongoing, Not Progressing     Problem: Sensorimotor Impairment (Stroke, Hemorrhagic)  Goal: Improved Sensorimotor Function  Outcome: Ongoing, Not Progressing     Problem: Swallowing Impairment (Stroke, Hemorrhagic)  Goal: Optimal Eating and Swallowing Without Aspiration  Outcome: Ongoing, Not Progressing     Problem: Urinary Elimination Impaired (Stroke, Hemorrhagic)  Goal: Effective Urinary Elimination  Outcome: Ongoing, Not Progressing     Problem: Diabetes Comorbidity  Goal: Blood Glucose Level Within Targeted Range  Outcome: Ongoing, Not Progressing     Problem: Fluid and Electrolyte Imbalance (Acute Kidney Injury/Impairment)  Goal: Fluid and Electrolyte Balance  Outcome: Ongoing, Not Progressing     Problem: Oral Intake Inadequate (Acute Kidney Injury/Impairment)  Goal: Optimal Nutrition Intake  Outcome: Ongoing, Not Progressing     Problem: Renal Function Impairment (Acute Kidney Injury/Impairment)  Goal: Effective Renal Function  Outcome: Ongoing, Not Progressing     Problem: Impaired Wound Healing  Goal: Optimal Wound Healing  Outcome: Ongoing, Not Progressing     Problem: Adjustment to Illness (Delirium)  Goal: Optimal Coping  Outcome: Ongoing, Not Progressing     Problem: Altered Behavior (Delirium)  Goal: Improved Behavioral Control  Outcome: Ongoing, Not Progressing     Problem: Attention and Thought Clarity Impairment (Delirium)  Goal: Improved Attention and Thought Clarity  Outcome: Ongoing, Not Progressing     Problem: Sleep Disturbance (Delirium)  Goal: Improved Sleep  Outcome: Ongoing, Not Progressing     Problem: Skin Injury Risk Increased  Goal: Skin Health and Integrity  Outcome: Ongoing, Not Progressing     Problem: Fall Injury Risk  Goal: Absence of Fall and Fall-Related Injury  Outcome: Ongoing, Not Progressing     Problem: Restraint, Nonbehavioral (Nonviolent)  Goal: Absence of Harm or Injury  Outcome: Ongoing, Not Progressing     Problem:  Communication Impairment (Mechanical Ventilation, Invasive)  Goal: Effective Communication  Outcome: Ongoing, Not Progressing     Problem: Device-Related Complication Risk (Mechanical Ventilation, Invasive)  Goal: Optimal Device Function  Outcome: Ongoing, Not Progressing     Problem: Inability to Wean (Mechanical Ventilation, Invasive)  Goal: Mechanical Ventilation Liberation  Outcome: Ongoing, Not Progressing     Problem: Nutrition Impairment (Mechanical Ventilation, Invasive)  Goal: Optimal Nutrition Delivery  Outcome: Ongoing, Not Progressing     Problem: Skin and Tissue Injury (Mechanical Ventilation, Invasive)  Goal: Absence of Device-Related Skin and Tissue Injury  Outcome: Ongoing, Not Progressing     Problem: Ventilator-Induced Lung Injury (Mechanical Ventilation, Invasive)  Goal: Absence of Ventilator-Induced Lung Injury  Outcome: Ongoing, Not Progressing     Problem: Communication Impairment (Artificial Airway)  Goal: Effective Communication  Outcome: Ongoing, Not Progressing     Problem: Device-Related Complication Risk (Artificial Airway)  Goal: Optimal Device Function  Outcome: Ongoing, Not Progressing     Problem: Skin and Tissue Injury (Artificial Airway)  Goal: Absence of Device-Related Skin or Tissue Injury  Outcome: Ongoing, Not Progressing     Problem: Noninvasive Ventilation Acute  Goal: Effective Unassisted Ventilation and Oxygenation  Outcome: Ongoing, Not Progressing

## 2022-04-29 NOTE — HPI
72 y/o F with a PMHx of baseline dementia, GERD, R MCA stroke with residual LUE weakness, HLD, HTN, Stage IV CKD, TIIDM who presents to [facility] from [facility] with a AoC SDH after an unwitnessed fall from her wheel chair. Patient is s/p evacuation and has had a prolonged hospital course without meaningful recovery. Patient is receiving artificial nutrition through a NG. Palliative consulted on day 26 for GOC

## 2022-04-29 NOTE — PROGRESS NOTES
[facility] - Intensive Care ([address] Medicine  Progress Note    Patient Name: Beatriz Adame  MRN: [id number]  Patient Class: IP- Inpatient   Admission Date: 4/3/2022  Length of Stay: 26 days  Attending Physician: Tirso Springer MD  Primary Care Provider: Dante Olivier MD        Subjective:     Principal Problem:SDH (subdural hematoma)        HPI:   Ms. Adame is a 72 yo female with hx of dementia, R MCA stroke with left side contraction/hemiparesis presented to [facility] with SDH now s/p evac. Stepped down from [facility]. On 4/13 she developed worsening headache and CTH showed re-accumulation of R SDH and 4-5mm MLS. She was noted to be more slow to follow commands. Stepped back up to [facility] for closer monitoring.     Original HPI below:  Ms. Adame is a 70 y/o F with a PMHx of baseline dementia, GERD, R MCA stroke with residual LUE weakness, HLD, HTN, Stage IV CKD, TIIDM who presents to [facility] from [facility] with a AoC SDH after an unwitnessed fall from her wheel chair without loss of consciousness. CT spine negative for fracture. CT head with R SDH and 3-4 mm MLS. She is on DAPT at home. Of note, she has been staying at [facility] after a partial R foot amputation, stitches still intact, for which she is receiving vancomycin.       Overview/Hospital Course:  04/04/2022: OR today for clot evac. Patient returned to ICU intubated on precedex. Post op scan stable. Wean propofol and initiated precedex for likely extubation tomorrow.   04/05/2022 repeat CT head due to drainage.   04/06/2022 NAEO.   04/07/2022 NAEO. Successfully extubated  4/14/22: EEG revealed seizure   4/15/22: vimpat load overnight  04/16/2022: NAEON.  04/17/2022. NAEON. Repeat CTH stable.   04/18/2022  Lateralized periodic discharges on R with some seizure correlate. 200 vimpat x 1 and increase scheduled vimpat to 200. Platelets by NSGY 2/2 OR for clot evac today.   04/19/2022 OR postponed to today with NSGY. CT this morning  "stable. Continue EEG.   04/20: in OR for repeat evacuation  04/21: follow up CT after evacuation shows SD drain and little residual blood  04/22: losartan adjusted to decrease prn use, enteral feeding and fluid to be started after MMA embolization  04/23: occlusive brachial thrombus on picc line seen yesterday, right arm swelling improved with removal of art line and picc  New 20 gauge left wrist, carefully start trickle feeds while waiting for MMA embolization  04/24/2022 fluctuation beena exam, getting EEG   04/25/2022: EEG negative for seizures, stable for MMA embolization  04/26/2022: completed embolization procedure, extubated uneventfully  04/27/2022: NAEON, stable for TTF      Interval History:   NAEON. Pt seen resting comfortably. Opens eyes spontaneously. Withdraws to physical stimuli, does not respond to verbal stimuli.    I spoke with Ms. Adame's son Alysa over the phone today and we reviewed her medical care up to this point. He states he had not been informed of her prognosis but assumed she would not make a full recovery. We discussed that any further neurologic recovery from this point appears unlikely. He voiced understanding. He requests that she be made DNR at this time - "she wouldn't want to be hooked up to a machine." We also discussed that an NG tube is a temporary mode of nutrition and for a more permanent delivery of nutrition she would need a PEG tube. We discussed an alternative of focus on comfort measures and pursuing home hospice. He is open to this approach and wishes to discuss with his wife and would like to meet with palliative care.    Review of Systems   Reason unable to perform ROS: pt non-verbal.   Objective:     Vital Signs (Most Recent):  Temp: 98 °F (36.7 °C) (04/29/22 1125)  Pulse: 78 (04/29/22 1106)  Resp: 16 (04/29/22 1016)  BP: (!) 169/78 (04/29/22 0731)  SpO2: 100 % (04/29/22 1016)   Vital Signs (24h Range):  Temp:  [97 °F (36.1 °C)-98 °F (36.7 °C)] 98 °F (36.7 " °C)  Pulse:  [75-85] 78  Resp:  [14-21] 16  SpO2:  [98 %-100 %] 100 %  BP: (145-178)/(76-82) 169/78     Weight: 49.4 kg (109 lb)  Body mass index is 17.07 kg/m².    Intake/Output Summary (Last 24 hours) at 4/29/2022 1319  Last data filed at 4/29/2022 0630  Gross per 24 hour   Intake --   Output 1250 ml   Net -1250 ml      Physical Exam  Constitutional:       General: She is not in acute distress.     Appearance: She is not ill-appearing.   HENT:      Head:      Comments: Pterional craniotomy on right    Eyes:      Extraocular Movements: Extraocular movements intact.      Pupils: Pupils are equal, round, and reactive to light.   Cardiovascular:      Rate and Rhythm: Normal rate and regular rhythm.      Heart sounds: Normal heart sounds.   Pulmonary:      Breath sounds: Normal breath sounds.   Abdominal:      Palpations: Abdomen is soft.   Musculoskeletal:      Cervical back: Neck supple.   Skin:     General: Skin is warm.   Neurological:      Mental Status: She is alert.      Comments: Opens eyes spontaneously but does not respond to any verbal or physical stimuli. Non-verbal.  Left spastic hemiplegia  Right side bradykinetic but withdraws with increased flexion tone in arm and leg       Significant Labs: All pertinent labs within the past 24 hours have been reviewed.    Significant Imaging: I have reviewed all pertinent imaging results/findings within the past 24 hours.      Assessment/Plan:      * SDH (subdural hematoma)  S/p craniotomy with evacuation on 4/4 and repeat evac on 4/20 and MMA embolization on 4/26  Neuro recovery has been poor, prognosis poor, need to discuss with pt's son, strongly consider hospice  Has staples to be removed on 5/4 (by NSGY if still admitted)  Continue TF for now, discussing PEG with son    Encephalopathy        Recurrent seizures  Now stable on Lacosamide and Keppra    Critical lower limb ischemia        Osteomyelitis of right foot  R foot osteomyelitis with bone bx +MRSA, ID  following peripherally   ID rec 6wk course of Vanc, with end  Date 5/5/2022, pharmacy to dose vanc inpatient    Acute on chronic anemia  This is a patient with a long history of chronic anemia. Slow down trending since admission likely 2/2 to surgery, acute illness, and phlebotomy. Patient is asymptomatic, agree with transfusion for hgb<7. No signs of bleeding. Hemodynamically stable. Labs consistent with anemia of chronic disease and acute blood loss.     Total blood products this admission:  - 5u PRBCs (2u 4/11, 1u each on 4/10, 4/6, and 4/4)    *2u given on 4/11 is charted incorrectly as 1u, however confirmed 2u ordered and given*  - 3u PLTs (all on 4/4)    - Recommend age appropriate colonoscopy as outpatient if warrented  - agree with transfusion for hgb<7 or active bleeding  - Trend CBC daily      Goals of care, counseling/discussion  Discussed prognosis with pt's son on 4/29 and low likelihood of meaningful recovery. He would like her to be DNR and is open to discussing hospice care. Unsure whether he wants a PEG tube placed.  - DNR  - Palliative care consult placed  - SW notified of family interest in hospice care    Debility        Urinary retention  - Araiza removed 4/27, failed voiding trial, replaced on 4/28  - Continue silodosin    Hypothyroidism  Continue home levothyroxine      Severe protein-calorie malnutrition  Nutrition following, most recent recs below from 4/12 (I ordered boost pudding BID and Frederic BID x14d per their recs)       1. Suggest Diabetic 2000 kcal diet with texture per SLP     2. Consider Boost Pudding BID to increase PO intake      3. Add Frederic BID x 14 days to aid in wound healing     4. If TF warranted, recommend TF of Isosource advancing as tolerated to goal rate of 45 mL/hr to provide 1620 kcal, 73 gm protein, and 825 mL free fluid     5. RD following          History of stroke  Baseline LUE and LLE weakness s/p stroke  Was on ASA/Plavix, but stopped on admit 2/2 SDH    Acute  kidney injury superimposed on CKD stage IV  CKD with previous baseline high 1s-mid 2s, but Cr mid 1s recently  Pt with some retention, but still able to void with prompting, has purewick in place  Strict I/Os q4h - discussed with RN  Encourage po hydration  Trend renal function  Renally dose medications and avoid nephrotoxins  Maintain hgb>7 and MAP>65    Type 2 diabetes mellitus, with long-term current use of insulin  - Accuchecks ACHS with SSI while inpatient, not needing any long-acting insulin      Hyperlipidemia  Continue statin.       Hypertension  Continue home amlodipine, losartan. Coreg held on admission.      VTE Risk Mitigation (From admission, onward)         Ordered     heparin (porcine) injection 5,000 Units  Every 8 hours         04/24/22 0853     IP VTE HIGH RISK PATIENT  Once         04/03/22 1608     Place sequential compression device  Until discontinued         04/03/22 1608     Reason for No Pharmacological VTE Prophylaxis  Once        Question:  Reasons:  Answer:  Active Bleeding    04/03/22 1608                Discharge Planning   AMRY: 5/4/2022     Code Status: DNR   Is the patient medically ready for discharge?: No    Reason for patient still in hospital (select all that apply): Patient trending condition  Discharge Plan A: Long-term acute care facility (LTAC)   Discharge Delays: None known at this time              Tirso Springer MD  Department of Hospital Medicine   [facility] - Intensive Care ([facility])

## 2022-04-29 NOTE — SUBJECTIVE & OBJECTIVE
Past Medical History:   Diagnosis Date    Gastroparesis     GERD (gastroesophageal reflux disease)     History of Clostridium difficile colitis 07/24/2021    History of kidney stones     History of stroke     left side paralysis    Hyperlipidemia     Hypertension     Hypothyroidism     Iron deficiency anemia     Osteoarthritis     Peripheral neuropathy     Stage IV CKD     Type II diabetes mellitus        Past Surgical History:   Procedure Laterality Date    APPENDECTOMY      CRANIOTOMY FOR EVACUATION OF SUBDURAL HEMATOMA Right 4/4/2022    Procedure: CRANIOTOMY, FOR SUBDURAL HEMATOMA EVACUATION;  Surgeon: Silvio White MD;  Location: [facility] OR [address] [place] [address];  Service: Neurosurgery;  Laterality: Right;    CRANIOTOMY FOR EVACUATION OF SUBDURAL HEMATOMA Right 4/20/2022    Procedure: CRANIOTOMY, FOR SUBDURAL HEMATOMA EVACUATION;  Surgeon: Jose Walter MD;  Location: [facility] OR [address] [place] [address];  Service: Neurosurgery;  Laterality: Right;    CYSTOSCOPY W/ URETERAL STENT PLACEMENT Right 10/13/2021    Procedure: CYSTOSCOPY, WITH URETERAL STENT INSERTION;  Surgeon: Wanda Arroyo MD;  Location: [facility];  Service: Urology;  Laterality: Right;    ESOPHAGOGASTRODUODENOSCOPY N/A 11/23/2021    Procedure: EGD (ESOPHAGOGASTRODUODENOSCOPY);  Surgeon: Obed Jeong MD;  Location: [facility] ([address] [place] [address]);  Service: Endoscopy;  Laterality: N/A;    FOOT AMPUTATION THROUGH METATARSAL Right 3/24/2022    Procedure: AMPUTATION, FOOT, PARTIAL 4th and 5th ray;  Surgeon: Rodrigo Logan DPM;  Location: [facility] OR [address] [place] [address];  Service: Podiatry;  Laterality: Right;    LAPAROSCOPIC APPENDECTOMY N/A 7/22/2021    Procedure: APPENDECTOMY, LAPAROSCOPIC;  Surgeon: Paulo Calvo Jr., MD;  Location: [facility];  Service: General;  Laterality: N/A;    TOE AMPUTATION Right 1/1/2022    Procedure: AMPUTATION, TOE;  Surgeon: Genaro Mason DPM;  Location: [facility];  Service: Podiatry;  Laterality: Right;    TUBAL LIGATION      URETEROSCOPIC REMOVAL OF URETERIC  CALCULUS Right 12/22/2021    Procedure: REMOVAL, CALCULUS, URETER, URETEROSCOPIC;  Surgeon: Wanda Arroyo MD;  Location: [facility];  Service: Urology;  Laterality: Right;       Review of patient's allergies indicates:   Allergen Reactions    Tomato (solanum lycopersicum) Hives and Itching       Medications:  Continuous Infusions:  Scheduled Meds:   acetylcysteine 100 mg/ml (10%)  4 mL Nebulization Q6H    albuterol-ipratropium  3 mL Nebulization Q6H    amLODIPine  10 mg Per NG tube Daily    atorvastatin  40 mg Per NG tube Daily    heparin (porcine)  5,000 Units Subcutaneous Q8H    lacosamide  250 mg Per NG tube Q12H    levetiracetam  1,500 mg Per NG tube BID    levothyroxine  75 mcg Per NG tube Before breakfast    losartan  25 mg Per NG tube Daily    senna-docusate 8.6-50 mg  1 tablet Per NG tube BID    silodosin  4 mg Per NG tube Daily    vancomycin (VANCOCIN) IVPB  750 mg Intravenous Q48H     PRN Meds:acetaminophen, dextrose 10%, glucagon (human recombinant), hydrALAZINE, HYDROcodone-acetaminophen, insulin aspart U-100, iodixanoL, labetalol, metoclopramide HCl, ondansetron, potassium bicarbonate, potassium bicarbonate, potassium bicarbonate, potassium, sodium phosphates, potassium, sodium phosphates, potassium, sodium phosphates, sodium chloride 0.9%, sodium chloride 0.9%, sodium chloride 0.9%, Pharmacy to dose Vancomycin consult **AND** vancomycin - pharmacy to dose    Family History       Problem Relation (Age of Onset)    Alcohol abuse Father    Arthritis Mother    Asthma Brother    Diabetes Mother, Sister, Brother    Heart attack Mother, Father, Brother    Heart disease Mother, Brother    Hypertension Mother    Kidney disease Mother    Stroke Mother    Vision loss Mother          Tobacco Use    Smoking status: Never Smoker    Smokeless tobacco: Never Used   Substance and Sexual Activity    Alcohol use: No     Comment: socially    Drug use: No    Sexual activity: Not Currently       Review of Systems    Unable to perform ROS: Patient unresponsive   Objective:     Vital Signs (Most Recent):  Temp: 98 °F (36.7 °C) (04/29/22 1125)  Pulse: 82 (04/29/22 1555)  Resp: 20 (04/29/22 1555)  BP: (!) 156/70 (04/29/22 1555)  SpO2: 100 % (04/29/22 1555)   Vital Signs (24h Range):  Temp:  [97 °F (36.1 °C)-98 °F (36.7 °C)] 98 °F (36.7 °C)  Pulse:  [75-85] 82  Resp:  [14-21] 20  SpO2:  [100 %] 100 %  BP: (145-178)/(70-82) 156/70     Weight: 49.4 kg (109 lb)  Body mass index is 17.07 kg/m².    Physical Exam  Vitals and nursing note reviewed.   Constitutional:       General: She is not in acute distress.     Appearance: She is ill-appearing.      Comments: Unresponsive   HENT:      Head:      Comments: Staples from evac     Right Ear: External ear normal.      Left Ear: External ear normal.      Nose:      Comments: NG in place     Mouth/Throat:      Mouth: Mucous membranes are dry.   Eyes:      Comments: Eyes remained closed    Cardiovascular:      Rate and Rhythm: Normal rate.   Pulmonary:      Effort: Pulmonary effort is normal. No respiratory distress.   Abdominal:      General: There is no distension.   Musculoskeletal:      Cervical back: Normal range of motion.      Comments: Contracted    Neurological:      Comments: unresponsive   Psychiatric:         Cognition and Memory: Cognition is impaired. Memory is impaired.       Review of Symptoms      Symptom Assessment (ESAS 0-10 Scale)  Unable to complete assessment due to Patient unresponsive     CAM / Delirium:  Negative  Constipation:  Negative  Diarrhea:  Negative    Bowel Management Plan (BMP):  Yes      Pain Assessment in Advanced Demential Scale (PAINAD)   Breathing - Independent of vocalization:  0  Negative vocalization:  0  Facial expression:  0  Body language:  0  Consolability:  0  Total:  0    Modified Ally Scale:  0    Performance Status:  10    Living Arrangements:  Lives in nursing home    Psychosocial/Cultural: Has one son     Spiritual:  F - Mary Ellen and Belief:   Not addressed       Advance Care Planning   Advance Directives:   Living Will: No    LaPOST: No    Do Not Resuscitate Status: Yes    Medical Power of : No      Decision Making:  Family answered questions       Significant Labs: All pertinent labs within the past 24 hours have been reviewed.  CBC:   Recent Labs   Lab 04/29/22 0529   WBC 8.97   HGB 8.6*   HCT 27.4*   MCV 98   *     BMP:  Recent Labs   Lab 04/29/22 0529   *   *   K 4.2   *   CO2 21*   BUN 34*   CREATININE 1.2   CALCIUM 8.1*     LFT:  Lab Results   Component Value Date    AST 21 04/29/2022    ALKPHOS 82 04/29/2022    BILITOT 0.3 04/29/2022     Albumin:   Albumin   Date Value Ref Range Status   04/29/2022 1.9 (L) 3.5 - 5.2 g/dL Final     Protein:   Total Protein   Date Value Ref Range Status   04/29/2022 5.8 (L) 6.0 - 8.4 g/dL Final     Lactic acid:   Lab Results   Component Value Date    LACTATE 0.7 03/18/2022    LACTATE 0.9 03/18/2022       Significant Imaging: I have reviewed all pertinent imaging results/findings within the past 24 hours.    X-ray abdomen 4/28/22    Nasogastric tube tip and side hole projects over the expected location of the gastric lumen.  There is vascular calcification.  There is moderate to large amount of stool in the colon.  There may be left pleural effusion.

## 2022-04-29 NOTE — PLAN OF CARE
Pt in bed resting comfortably with son at bedside. Plan of care reviewed with son. Understanding verbalized. Araiza placed for urinary retention, pt tolerated. Tube feeding on hold, awaiting confirmation of NGT placement. Pt stable. Bed in lowest position, call light in reach.     Problem: Infection  Goal: Absence of Infection Signs and Symptoms  Outcome: Ongoing, Progressing     Problem: Adult Inpatient Plan of Care  Goal: Plan of Care Review  Outcome: Ongoing, Progressing  Goal: Patient-Specific Goal (Individualized)  Description: Admit Date: 4/3/2022    Admit Dx: SDH (subdural hematoma)    Past Medical History:  No date: Gastroparesis  No date: GERD (gastroesophageal reflux disease)  07/24/2021: History of Clostridium difficile colitis  No date: History of kidney stones  No date: History of stroke      Comment:  left side paralysis  No date: Hyperlipidemia  No date: Hypertension  No date: Hypothyroidism  No date: Iron deficiency anemia  No date: Osteoarthritis  No date: Peripheral neuropathy  No date: Stage IV CKD  No date: Type II diabetes mellitus    Past Surgical History:  No date: APPENDECTOMY  10/13/2021: CYSTOSCOPY W/ URETERAL STENT PLACEMENT; Right      Comment:  Procedure: CYSTOSCOPY, WITH URETERAL STENT INSERTION;                 Surgeon: Wanda Arroyo MD;  Location: [facility];                 Service: Urology;  Laterality: Right;  11/23/2021: ESOPHAGOGASTRODUODENOSCOPY; N/A      Comment:  Procedure: EGD (ESOPHAGOGASTRODUODENOSCOPY);  Surgeon:                Obed Jeong MD;  Location: [address]);                 Service: Endoscopy;  Laterality: N/A;  3/24/2022: FOOT AMPUTATION THROUGH METATARSAL; Right      Comment:  Procedure: AMPUTATION, FOOT, PARTIAL 4th and 5th ray;                 Surgeon: Rodrigo Logan DPM;  Location: [address];                 Service: Podiatry;  Laterality: Right;  7/22/2021: LAPAROSCOPIC APPENDECTOMY; N/A      Comment:  Procedure: APPENDECTOMY,  LAPAROSCOPIC;  Surgeon: Paulo Calvo Jr., MD;  Location: [facility];  Service: General;                 Laterality: N/A;  1/1/2022: TOE AMPUTATION; Right      Comment:  Procedure: AMPUTATION, TOE;  Surgeon: Genaro Mason DPM;  Location: [facility];  Service: Podiatry;  Laterality:               Right;  No date: TUBAL LIGATION  12/22/2021: URETEROSCOPIC REMOVAL OF URETERIC CALCULUS; Right      Comment:  Procedure: REMOVAL, CALCULUS, URETER, URETEROSCOPIC;                 Surgeon: Wanda Arroyo MD;  Location: [facility];                 Service: Urology;  Laterality: Right;    Individualization:   1. Please dim lights at night  2. Pt likes to be covered with blankets  3. Pt likes to be moved slowly.    Restraints:   1. Soft right wrist restraint initiated 4/4/22 @ 2138 for pulling of lines. [facility] for DC criteria.            Outcome: Ongoing, Progressing  Goal: Absence of Hospital-Acquired Illness or Injury  Outcome: Ongoing, Progressing  Goal: Optimal Comfort and Wellbeing  Outcome: Ongoing, Progressing  Goal: Readiness for Transition of Care  Outcome: Ongoing, Progressing     Problem: Adjustment to Illness (Stroke, Hemorrhagic)  Goal: Optimal Coping  Outcome: Ongoing, Progressing     Problem: Bowel Elimination Impaired (Stroke, Hemorrhagic)  Goal: Effective Bowel Elimination  Outcome: Ongoing, Progressing     Problem: Cerebral Tissue Perfusion (Stroke, Hemorrhagic)  Goal: Optimal Cerebral Tissue Perfusion  Outcome: Ongoing, Progressing     Problem: Cognitive Impairment (Stroke, Hemorrhagic)  Goal: Optimal Cognitive Function  Outcome: Ongoing, Progressing     Problem: Communication Impairment (Stroke, Hemorrhagic)  Goal: Effective Communication Skills  Outcome: Ongoing, Progressing     Problem: Functional Ability Impaired (Stroke, Hemorrhagic)  Goal: Optimal Functional Ability  Outcome: Ongoing, Progressing     Problem: Pain (Stroke, Hemorrhagic)  Goal: Acceptable Pain Control  Outcome:  Ongoing, Progressing     Problem: Respiratory Compromise (Stroke, Hemorrhagic)  Goal: Effective Oxygenation and Ventilation  Outcome: Ongoing, Progressing     Problem: Sensorimotor Impairment (Stroke, Hemorrhagic)  Goal: Improved Sensorimotor Function  Outcome: Ongoing, Progressing     Problem: Swallowing Impairment (Stroke, Hemorrhagic)  Goal: Optimal Eating and Swallowing Without Aspiration  Outcome: Ongoing, Progressing     Problem: Urinary Elimination Impaired (Stroke, Hemorrhagic)  Goal: Effective Urinary Elimination  Outcome: Ongoing, Progressing     Problem: Diabetes Comorbidity  Goal: Blood Glucose Level Within Targeted Range  Outcome: Ongoing, Progressing     Problem: Fluid and Electrolyte Imbalance (Acute Kidney Injury/Impairment)  Goal: Fluid and Electrolyte Balance  Outcome: Ongoing, Progressing     Problem: Oral Intake Inadequate (Acute Kidney Injury/Impairment)  Goal: Optimal Nutrition Intake  Outcome: Ongoing, Progressing     Problem: Renal Function Impairment (Acute Kidney Injury/Impairment)  Goal: Effective Renal Function  Outcome: Ongoing, Progressing     Problem: Impaired Wound Healing  Goal: Optimal Wound Healing  Outcome: Ongoing, Progressing     Problem: Adjustment to Illness (Delirium)  Goal: Optimal Coping  Outcome: Ongoing, Progressing     Problem: Altered Behavior (Delirium)  Goal: Improved Behavioral Control  Outcome: Ongoing, Progressing     Problem: Attention and Thought Clarity Impairment (Delirium)  Goal: Improved Attention and Thought Clarity  Outcome: Ongoing, Progressing     Problem: Sleep Disturbance (Delirium)  Goal: Improved Sleep  Outcome: Ongoing, Progressing     Problem: Skin Injury Risk Increased  Goal: Skin Health and Integrity  Outcome: Ongoing, Progressing     Problem: Fall Injury Risk  Goal: Absence of Fall and Fall-Related Injury  Outcome: Ongoing, Progressing     Problem: Restraint, Nonbehavioral (Nonviolent)  Goal: Absence of Harm or Injury  Outcome: Ongoing,  Progressing     Problem: Communication Impairment (Mechanical Ventilation, Invasive)  Goal: Effective Communication  Outcome: Ongoing, Progressing     Problem: Device-Related Complication Risk (Mechanical Ventilation, Invasive)  Goal: Optimal Device Function  Outcome: Ongoing, Progressing     Problem: Inability to Wean (Mechanical Ventilation, Invasive)  Goal: Mechanical Ventilation Liberation  Outcome: Ongoing, Progressing     Problem: Nutrition Impairment (Mechanical Ventilation, Invasive)  Goal: Optimal Nutrition Delivery  Outcome: Ongoing, Progressing     Problem: Skin and Tissue Injury (Mechanical Ventilation, Invasive)  Goal: Absence of Device-Related Skin and Tissue Injury  Outcome: Ongoing, Progressing     Problem: Ventilator-Induced Lung Injury (Mechanical Ventilation, Invasive)  Goal: Absence of Ventilator-Induced Lung Injury  Outcome: Ongoing, Progressing     Problem: Communication Impairment (Artificial Airway)  Goal: Effective Communication  Outcome: Ongoing, Progressing     Problem: Device-Related Complication Risk (Artificial Airway)  Goal: Optimal Device Function  Outcome: Ongoing, Progressing     Problem: Skin and Tissue Injury (Artificial Airway)  Goal: Absence of Device-Related Skin or Tissue Injury  Outcome: Ongoing, Progressing     Problem: Noninvasive Ventilation Acute  Goal: Effective Unassisted Ventilation and Oxygenation  Outcome: Ongoing, Progressing

## 2022-04-29 NOTE — ASSESSMENT & PLAN NOTE
Discussed prognosis with pt's son on 4/29 and low likelihood of meaningful recovery. He would like her to be DNR and is open to discussing hospice care. Unsure whether he wants a PEG tube placed.  - DNR  - Palliative care consult placed  - SW notified of family interest in hospice care

## 2022-04-29 NOTE — ASSESSMENT & PLAN NOTE
71 year old female with prolonged hospitalization for SDH. Getting tube feeds through NG. Palliative consulted for [facility]    Code status: DNR     Surrogate decision maker: Alysa Adame [phone]    GOC/ACP  -Spoke with patient's son. Introduced palliative medicine. Discussed patient's diagnosis and lack of progress throughout this prolonged hospitalization. Informed son that patient previously spoke with our team back in October and had said that she did not want to be resuscitated in the event of a cardiac and respiratory arrest. Son said he was not surprised as they had had conversations about her not wanting to be on life support. Discussed that artificial nutrition is a form of life support and I would not recommend PEG placement / continuing tube feeds. Son expressed understanding. Made plans to meet on Monday at 2pm to discuss further with other family present     -Recommended against PEG/continuing artificial nutrition. Recommended transitioning to comfort focused care with hospice   -Plan to meet 5/2 at 2pm to discuss further     Discussed with Dr. Springer

## 2022-04-29 NOTE — CONSULTS
[facility] - [facility] ([place] [id number])  Palliative Medicine  Consult Note    Patient Name: Beatriz Adame  MRN: [id number]  Admission Date: 4/3/2022  Hospital Length of Stay: 26 days  Code Status: DNR   Attending Provider: Tirso Springer MD  Consulting Provider: Maryam Schuster MD  Primary Care Physician: Dante Olivier MD  Principal Problem:SDH (subdural hematoma)    Patient information was obtained from relative(s) and primary team.      Inpatient consult to Palliative Care  Consult performed by: Maryam Schuster MD  Consult ordered by: Tirso Springer MD        Assessment/Plan:     Palliative care encounter  71 year old female with prolonged hospitalization for SDH. Getting tube feeds through NG. Palliative consulted for GOC    Code status: DNR     Surrogate decision maker: Alysa Adame [phone]    GOC/ACP  -Spoke with patient's son. Introduced palliative medicine. Discussed patient's diagnosis and lack of progress throughout this prolonged hospitalization. Informed son that patient previously spoke with our team back in October and had said that she did not want to be resuscitated in the event of a cardiac and respiratory arrest. Son said he was not surprised as they had had conversations about her not wanting to be on life support. Discussed that artificial nutrition is a form of life support and I would not recommend PEG placement / continuing tube feeds. Son expressed understanding. Made plans to meet on Monday at 2pm to discuss further with other family present     -Recommended against PEG/continuing artificial nutrition. Recommended transitioning to comfort focused care with hospice   -Plan to meet 5/2 at 2pm to discuss further     Discussed with Dr. Springer         Thank you for your consult. I will follow-up with patient. Please contact us if you have any additional questions.    Subjective:     HPI:   70 y/o F with a PMHx of baseline dementia, GERD, R MCA stroke with residual LUE weakness, HLD, HTN,  Stage IV CKD, TIIDM who presents to [facility] from [facility] with a AoC SDH after an unwitnessed fall from her wheel chair. Patient is s/p evacuation and has had a prolonged hospital course without meaningful recovery. Patient is receiving artificial nutrition through a NG. Palliative consulted on day 26 for GOC      Hospital Course:  No notes on file        Past Medical History:   Diagnosis Date    Gastroparesis     GERD (gastroesophageal reflux disease)     History of Clostridium difficile colitis 07/24/2021    History of kidney stones     History of stroke     left side paralysis    Hyperlipidemia     Hypertension     Hypothyroidism     Iron deficiency anemia     Osteoarthritis     Peripheral neuropathy     Stage IV CKD     Type II diabetes mellitus        Past Surgical History:   Procedure Laterality Date    APPENDECTOMY      CRANIOTOMY FOR EVACUATION OF SUBDURAL HEMATOMA Right 4/4/2022    Procedure: CRANIOTOMY, FOR SUBDURAL HEMATOMA EVACUATION;  Surgeon: Silvio White MD;  Location: [facility] OR [address] [place] [address];  Service: Neurosurgery;  Laterality: Right;    CRANIOTOMY FOR EVACUATION OF SUBDURAL HEMATOMA Right 4/20/2022    Procedure: CRANIOTOMY, FOR SUBDURAL HEMATOMA EVACUATION;  Surgeon: Jose Walter MD;  Location: [address];  Service: Neurosurgery;  Laterality: Right;    CYSTOSCOPY W/ URETERAL STENT PLACEMENT Right 10/13/2021    Procedure: CYSTOSCOPY, WITH URETERAL STENT INSERTION;  Surgeon: Wanda Arroyo MD;  Location: [facility];  Service: Urology;  Laterality: Right;    ESOPHAGOGASTRODUODENOSCOPY N/A 11/23/2021    Procedure: EGD (ESOPHAGOGASTRODUODENOSCOPY);  Surgeon: Obed Jeong MD;  Location: [facility] (2ND FLR);  Service: Endoscopy;  Laterality: N/A;    FOOT AMPUTATION THROUGH METATARSAL Right 3/24/2022    Procedure: AMPUTATION, FOOT, PARTIAL 4th and 5th ray;  Surgeon: Rodrigo Logan DPM;  Location: [facility] OR 2ND FLR;  Service: Podiatry;  Laterality: Right;     LAPAROSCOPIC APPENDECTOMY N/A 7/22/2021    Procedure: APPENDECTOMY, LAPAROSCOPIC;  Surgeon: Paulo Calvo Jr., MD;  Location: [facility] [place] OR;  Service: General;  Laterality: N/A;    TOE AMPUTATION Right 1/1/2022    Procedure: AMPUTATION, TOE;  Surgeon: Genaro [facility] DPM;  Location: [facility] [place] OR;  Service: Podiatry;  Laterality: Right;    TUBAL LIGATION      URETEROSCOPIC REMOVAL OF URETERIC CALCULUS Right 12/22/2021    Procedure: REMOVAL, CALCULUS, URETER, URETEROSCOPIC;  Surgeon: Wanda Arroyo MD;  Location: [facility] [place] OR;  Service: Urology;  Laterality: Right;       Review of patient's allergies indicates:   Allergen Reactions    Tomato (solanum lycopersicum) Hives and Itching       Medications:  Continuous Infusions:  Scheduled Meds:   acetylcysteine 100 mg/ml (10%)  4 mL Nebulization Q6H    albuterol-ipratropium  3 mL Nebulization Q6H    amLODIPine  10 mg Per NG tube Daily    atorvastatin  40 mg Per NG tube Daily    heparin (porcine)  5,000 Units Subcutaneous Q8H    lacosamide  250 mg Per NG tube Q12H    levetiracetam  1,500 mg Per NG tube BID    levothyroxine  75 mcg Per NG tube Before breakfast    losartan  25 mg Per NG tube Daily    senna-docusate 8.6-50 mg  1 tablet Per NG tube BID    silodosin  4 mg Per NG tube Daily    vancomycin (VANCOCIN) IVPB  750 mg Intravenous Q48H     PRN Meds:acetaminophen, dextrose 10%, glucagon (human recombinant), hydrALAZINE, HYDROcodone-acetaminophen, insulin aspart U-100, iodixanoL, labetalol, metoclopramide HCl, ondansetron, potassium bicarbonate, potassium bicarbonate, potassium bicarbonate, potassium, sodium phosphates, potassium, sodium phosphates, potassium, sodium phosphates, sodium chloride 0.9%, sodium chloride 0.9%, sodium chloride 0.9%, Pharmacy to dose Vancomycin consult **AND** vancomycin - pharmacy to dose    Family History       Problem Relation (Age of Onset)    Alcohol abuse Father    Arthritis Mother    Asthma Brother    Diabetes Mother, Sister,  Brother    Heart attack Mother, Father, Brother    Heart disease Mother, Brother    Hypertension Mother    Kidney disease Mother    Stroke Mother    Vision loss Mother          Tobacco Use    Smoking status: Never Smoker    Smokeless tobacco: Never Used   Substance and Sexual Activity    Alcohol use: No     Comment: socially    Drug use: No    Sexual activity: Not Currently       Review of Systems   Unable to perform ROS: Patient unresponsive   Objective:     Vital Signs (Most Recent):  Temp: 98 °F (36.7 °C) (04/29/22 1125)  Pulse: 82 (04/29/22 1555)  Resp: 20 (04/29/22 1555)  BP: (!) 156/70 (04/29/22 1555)  SpO2: 100 % (04/29/22 1555)   Vital Signs (24h Range):  Temp:  [97 °F (36.1 °C)-98 °F (36.7 °C)] 98 °F (36.7 °C)  Pulse:  [75-85] 82  Resp:  [14-21] 20  SpO2:  [100 %] 100 %  BP: (145-178)/(70-82) 156/70     Weight: 49.4 kg (109 lb)  Body mass index is 17.07 kg/m².    Physical Exam  Vitals and nursing note reviewed.   Constitutional:       General: She is not in acute distress.     Appearance: She is ill-appearing.      Comments: Unresponsive   HENT:      Head:      Comments: Staples from evac     Right Ear: External ear normal.      Left Ear: External ear normal.      Nose:      Comments: NG in place     Mouth/Throat:      Mouth: Mucous membranes are dry.   Eyes:      Comments: Eyes remained closed    Cardiovascular:      Rate and Rhythm: Normal rate.   Pulmonary:      Effort: Pulmonary effort is normal. No respiratory distress.   Abdominal:      General: There is no distension.   Musculoskeletal:      Cervical back: Normal range of motion.      Comments: Contracted    Neurological:      Comments: unresponsive   Psychiatric:         Cognition and Memory: Cognition is impaired. Memory is impaired.       Review of Symptoms      Symptom Assessment (ESAS 0-10 Scale)  Unable to complete assessment due to Patient unresponsive     CAM / Delirium:  Negative  Constipation:  Negative  Diarrhea:  Negative    Bowel  Management Plan (BMP):  Yes      Pain Assessment in Advanced Demential Scale (PAINAD)   Breathing - Independent of vocalization:  0  Negative vocalization:  0  Facial expression:  0  Body language:  0  Consolability:  0  Total:  0    Modified Ally Scale:  0    Performance Status:  10    Living Arrangements:  Lives in nursing home    Psychosocial/Cultural: Has one son     Spiritual:  F - Mary Ellen and Belief:  Not addressed       Advance Care Planning   Advance Directives:   Living Will: No    LaPOST: No    Do Not Resuscitate Status: Yes    Medical Power of : No      Decision Making:  Family answered questions       Significant Labs: All pertinent labs within the past 24 hours have been reviewed.  CBC:   Recent Labs   Lab 04/29/22 0529   WBC 8.97   HGB 8.6*   HCT 27.4*   MCV 98   *     BMP:  Recent Labs   Lab 04/29/22 0529   *   *   K 4.2   *   CO2 21*   BUN 34*   CREATININE 1.2   CALCIUM 8.1*     LFT:  Lab Results   Component Value Date    AST 21 04/29/2022    ALKPHOS 82 04/29/2022    BILITOT 0.3 04/29/2022     Albumin:   Albumin   Date Value Ref Range Status   04/29/2022 1.9 (L) 3.5 - 5.2 g/dL Final     Protein:   Total Protein   Date Value Ref Range Status   04/29/2022 5.8 (L) 6.0 - 8.4 g/dL Final     Lactic acid:   Lab Results   Component Value Date    LACTATE 0.7 03/18/2022    LACTATE 0.9 03/18/2022       Significant Imaging: I have reviewed all pertinent imaging results/findings within the past 24 hours.    X-ray abdomen 4/28/22    Nasogastric tube tip and side hole projects over the expected location of the gastric lumen.  There is vascular calcification.  There is moderate to large amount of stool in the colon.  There may be left pleural effusion.        > 50% of 75 min visit spent in chart review, face to face discussion of goals of care,  symptom assessment, coordination of care and emotional support.    Maryam Schuster MD  Palliative Medicine  [facility] - Intensive Care  ([place])

## 2022-04-29 NOTE — SUBJECTIVE & OBJECTIVE
"Interval History:   NAEON. Pt seen resting comfortably. Opens eyes spontaneously. Withdraws to physical stimuli, does not respond to verbal stimuli.    I spoke with Ms. Adame's son Alysa over the phone today and we reviewed her medical care up to this point. He states he had not been informed of her prognosis but assumed she would not make a full recovery. We discussed that any further neurologic recovery from this point appears unlikely. He voiced understanding. He requests that she be made DNR at this time - "she wouldn't want to be hooked up to a machine." We also discussed that an NG tube is a temporary mode of nutrition and for a more permanent delivery of nutrition she would need a PEG tube. We discussed an alternative of focus on comfort measures and pursuing home hospice. He is open to this approach and wishes to discuss with his wife and would like to meet with palliative care.    Review of Systems   Reason unable to perform ROS: pt non-verbal.   Objective:     Vital Signs (Most Recent):  Temp: 98 °F (36.7 °C) (04/29/22 1125)  Pulse: 78 (04/29/22 1106)  Resp: 16 (04/29/22 1016)  BP: (!) 169/78 (04/29/22 0731)  SpO2: 100 % (04/29/22 1016)   Vital Signs (24h Range):  Temp:  [97 °F (36.1 °C)-98 °F (36.7 °C)] 98 °F (36.7 °C)  Pulse:  [75-85] 78  Resp:  [14-21] 16  SpO2:  [98 %-100 %] 100 %  BP: (145-178)/(76-82) 169/78     Weight: 49.4 kg (109 lb)  Body mass index is 17.07 kg/m².    Intake/Output Summary (Last 24 hours) at 4/29/2022 1319  Last data filed at 4/29/2022 0630  Gross per 24 hour   Intake --   Output 1250 ml   Net -1250 ml      Physical Exam  Constitutional:       General: She is not in acute distress.     Appearance: She is not ill-appearing.   HENT:      Head:      Comments: Pterional craniotomy on right    Eyes:      Extraocular Movements: Extraocular movements intact.      Pupils: Pupils are equal, round, and reactive to light.   Cardiovascular:      Rate and Rhythm: Normal rate and regular " rhythm.      Heart sounds: Normal heart sounds.   Pulmonary:      Breath sounds: Normal breath sounds.   Abdominal:      Palpations: Abdomen is soft.   Musculoskeletal:      Cervical back: Neck supple.   Skin:     General: Skin is warm.   Neurological:      Mental Status: She is alert.      Comments: Opens eyes spontaneously but does not respond to any verbal or physical stimuli. Non-verbal.  Left spastic hemiplegia  Right side bradykinetic but withdraws with increased flexion tone in arm and leg       Significant Labs: All pertinent labs within the past 24 hours have been reviewed.    Significant Imaging: I have reviewed all pertinent imaging results/findings within the past 24 hours.

## 2022-04-29 NOTE — ASSESSMENT & PLAN NOTE
S/p craniotomy with evacuation on 4/4 and repeat evac on 4/20 and MMA embolization on 4/26  Neuro recovery has been poor, prognosis poor, need to discuss with pt's son, strongly consider hospice  Has staples to be removed on 5/4 (by NSGY if still admitted)  Continue TF for now, discussing PEG with son

## 2022-04-30 NOTE — PLAN OF CARE
Problem: Occupational Therapy  Goal: Occupational Therapy Goal  Description: Goals to be met by: 5/17/22    Patient will increase functional independence with ADLs by performing:    Grooming while seated with Moderate assistance.  Toileting from bedside commode with Max Assistance for hygiene and clothing management.   Supine to sit with Max Assistance.  Stand pivot transfers with Max Assistance.  Toilet transfer to bedside commode with Max Assistance.    Outcome: Ongoing, Progressing     OT re-eval completed.

## 2022-04-30 NOTE — SUBJECTIVE & OBJECTIVE
Interval History:   NAEON. Pt remains unresponsive to verbal/tactile stimuli.    Review of Systems   Reason unable to perform ROS: pt non-verbal.   Objective:     Vital Signs (Most Recent):  Temp: 99.1 °F (37.3 °C) (04/30/22 1138)  Pulse: 88 (04/30/22 1009)  Resp: (!) 2 (04/30/22 1009)  BP: (!) 130/59 (04/30/22 1000)  SpO2: 100 % (04/30/22 1009)   Vital Signs (24h Range):  Temp:  [97.9 °F (36.6 °C)-99.1 °F (37.3 °C)] 99.1 °F (37.3 °C)  Pulse:  [78-88] 88  Resp:  [2-20] 2  SpO2:  [97 %-100 %] 100 %  BP: (130-161)/(59-77) 130/59     Weight: 49.4 kg (109 lb)  Body mass index is 17.07 kg/m².    Intake/Output Summary (Last 24 hours) at 4/30/2022 1259  Last data filed at 4/29/2022 1900  Gross per 24 hour   Intake --   Output 1000 ml   Net -1000 ml      Physical Exam  Constitutional:       General: She is not in acute distress.     Appearance: She is not ill-appearing.   HENT:      Head:      Comments: Pterional craniotomy on right    Eyes:      Extraocular Movements: Extraocular movements intact.      Pupils: Pupils are equal, round, and reactive to light.   Cardiovascular:      Rate and Rhythm: Normal rate and regular rhythm.      Heart sounds: Normal heart sounds.   Pulmonary:      Breath sounds: Normal breath sounds.   Abdominal:      Palpations: Abdomen is soft.   Musculoskeletal:      Cervical back: Neck supple.   Skin:     General: Skin is warm.   Neurological:      Mental Status: She is alert.      Comments: Opens eyes spontaneously but does not respond to any verbal or physical stimuli. Non-verbal.  Left spastic hemiplegia  Right side bradykinetic but withdraws with increased flexion tone in arm and leg       Significant Labs: All pertinent labs within the past 24 hours have been reviewed.    Significant Imaging: I have reviewed all pertinent imaging results/findings within the past 24 hours.

## 2022-04-30 NOTE — PLAN OF CARE
Problem: Physical Therapy  Goal: Physical Therapy Goal  Description: Goals to be met by: 2022    Patient will increase functional independence with mobility by performin. Supine to sit with minimum assistance - Total x2 -  - BHM  2. Sit to supine with minimum assistance - Total x2 -  - BHM  3. Rolling to Left and Right with minimum assistance - Total x2 -  - BHM  4. Bed to chair transfer with minimum assistance using LRAD as needed - unable -  - BHM     PT Re-Eval: 2022

## 2022-04-30 NOTE — ASSESSMENT & PLAN NOTE
S/p craniotomy with evacuation on 4/4 and repeat evac on 4/20 and MMA embolization on 4/26  Neuro recovery has been poor, prognosis poor, son open to hospice, assistance from palliative appreciated  Has staples to be removed on 5/4 (by NSGY if still admitted)  Continue TF for now

## 2022-04-30 NOTE — PROGRESS NOTES
[facility] - Intensive Care ([address] Medicine  Progress Note    Patient Name: Beatriz Adame  MRN: [id number]  Patient Class: IP- Inpatient   Admission Date: 4/3/2022  Length of Stay: 27 days  Attending Physician: Tirso Springer MD  Primary Care Provider: Dante Olivier MD        Subjective:     Principal Problem:SDH (subdural hematoma)        HPI:   Ms. Adame is a 72 yo female with hx of dementia, R MCA stroke with left side contraction/hemiparesis [place] [facility] [place]H now s/p evac. Stepped down from [facility]. On 4/13 she developed worsening headache and CTH showed re-accumulation of R SDH and 4-5mm MLS. She was noted to be more slow to follow commands. Stepped back up to [facility] for closer monitoring.     Original HPI below:  Ms. Adame is a 70 y/o F with a PMHx of baseline dementia, GERD, R MCA stroke with residual LUE weakness, HLD, HTN, Stage IV CKD, TIIDM who presents to [facility] from [facility] with a AoC SDH after an unwitnessed fall from her wheel chair without loss of consciousness. CT spine negative for fracture. CT head with R SDH and 3-4 mm MLS. She is on DAPT at home. Of note, she has been staying at [facility] after a partial R foot amputation, stitches still intact, for which she is receiving vancomycin.       Overview/Hospital Course:  04/04/2022: OR today for clot evac. Patient returned to ICU intubated on precedex. Post op scan stable. Wean propofol and initiated precedex for likely extubation tomorrow.   04/05/2022 repeat CT head due to drainage.   04/06/2022 NAEO.   04/07/2022 NAEO. Successfully extubated  4/14/22: EEG revealed seizure   4/15/22: vimpat load overnight  04/16/2022: NAEON.  04/17/2022. NAEON. Repeat CTH stable.   04/18/2022  Lateralized periodic discharges on R with some seizure correlate. 200 vimpat x 1 and increase scheduled vimpat to 200. Platelets by NSGY 2/2 OR for clot evac today.   04/19/2022 OR postponed to today with NSGY. CT this morning  stable. Continue EEG.   04/20: in OR for repeat evacuation  04/21: follow up CT after evacuation shows SD drain and little residual blood  04/22: losartan adjusted to decrease prn use, enteral feeding and fluid to be started after MMA embolization  04/23: occlusive brachial thrombus on picc line seen yesterday, right arm swelling improved with removal of art line and picc  New 20 gauge left wrist, carefully start trickle feeds while waiting for MMA embolization  04/24/2022 fluctuation beena exam, getting EEG   04/25/2022: EEG negative for seizures, stable for MMA embolization  04/26/2022: completed embolization procedure, extubated uneventfully  04/27/2022: NAEON, stable for TTF      Interval History:   NAEON. Pt remains unresponsive to verbal/tactile stimuli.    Review of Systems   Reason unable to perform ROS: pt non-verbal.   Objective:     Vital Signs (Most Recent):  Temp: 99.1 °F (37.3 °C) (04/30/22 1138)  Pulse: 88 (04/30/22 1009)  Resp: (!) 2 (04/30/22 1009)  BP: (!) 130/59 (04/30/22 1000)  SpO2: 100 % (04/30/22 1009)   Vital Signs (24h Range):  Temp:  [97.9 °F (36.6 °C)-99.1 °F (37.3 °C)] 99.1 °F (37.3 °C)  Pulse:  [78-88] 88  Resp:  [2-20] 2  SpO2:  [97 %-100 %] 100 %  BP: (130-161)/(59-77) 130/59     Weight: 49.4 kg (109 lb)  Body mass index is 17.07 kg/m².    Intake/Output Summary (Last 24 hours) at 4/30/2022 1259  Last data filed at 4/29/2022 1900  Gross per 24 hour   Intake --   Output 1000 ml   Net -1000 ml      Physical Exam  Constitutional:       General: She is not in acute distress.     Appearance: She is not ill-appearing.   HENT:      Head:      Comments: Pterional craniotomy on right    Eyes:      Extraocular Movements: Extraocular movements intact.      Pupils: Pupils are equal, round, and reactive to light.   Cardiovascular:      Rate and Rhythm: Normal rate and regular rhythm.      Heart sounds: Normal heart sounds.   Pulmonary:      Breath sounds: Normal breath sounds.   Abdominal:       Palpations: Abdomen is soft.   Musculoskeletal:      Cervical back: Neck supple.   Skin:     General: Skin is warm.   Neurological:      Mental Status: She is alert.      Comments: Opens eyes spontaneously but does not respond to any verbal or physical stimuli. Non-verbal.  Left spastic hemiplegia  Right side bradykinetic but withdraws with increased flexion tone in arm and leg       Significant Labs: All pertinent labs within the past 24 hours have been reviewed.    Significant Imaging: I have reviewed all pertinent imaging results/findings within the past 24 hours.      Assessment/Plan:      * SDH (subdural hematoma)  S/p craniotomy with evacuation on 4/4 and repeat evac on 4/20 and MMA embolization on 4/26  Neuro recovery has been poor, prognosis poor, son open to hospice, assistance from palliative appreciated  Has staples to be removed on 5/4 (by NSGY if still admitted)  Continue TF for now    Encephalopathy        Recurrent seizures  Now stable on Lacosamide and Keppra    Critical lower limb ischemia        Osteomyelitis of right foot  R foot osteomyelitis with bone bx +MRSA, ID following peripherally   ID rec 6wk course of Vanc, with end  Date 5/5/2022, pharmacy to dose vanc inpatient    Acute on chronic anemia  This is a patient with a long history of chronic anemia. Slow down trending since admission likely 2/2 to surgery, acute illness, and phlebotomy. Patient is asymptomatic, agree with transfusion for hgb<7. No signs of bleeding. Hemodynamically stable. Labs consistent with anemia of chronic disease and acute blood loss.     Total blood products this admission:  - 5u PRBCs (2u 4/11, 1u each on 4/10, 4/6, and 4/4)    *2u given on 4/11 is charted incorrectly as 1u, however confirmed 2u ordered and given*  - 3u PLTs (all on 4/4)    - Recommend age appropriate colonoscopy as outpatient if warrented  - agree with transfusion for hgb<7 or active bleeding  - Trend CBC daily      Goals of care,  counseling/discussion  Discussed prognosis with pt's son on 4/29 and low likelihood of meaningful recovery. He would like her to be DNR and is open to discussing hospice care. Unsure whether he wants a PEG tube placed.  - DNR  - Palliative care consult placed  - SW notified of family interest in hospice care    Debility        Urinary retention  - Araiza removed 4/27, failed voiding trial, replaced on 4/28  - Continue silodosin    Hypothyroidism  Continue home levothyroxine      Severe protein-calorie malnutrition  Nutrition following, most recent recs below from 4/12 (I ordered boost pudding BID and Frederic BID x14d per their recs)       1. Suggest Diabetic 2000 kcal diet with texture per SLP     2. Consider Boost Pudding BID to increase PO intake      3. Add Frederic BID x 14 days to aid in wound healing     4. If TF warranted, recommend TF of Isosource advancing as tolerated to goal rate of 45 mL/hr to provide 1620 kcal, 73 gm protein, and 825 mL free fluid     5. RD following          History of stroke  Baseline LUE and LLE weakness s/p stroke  Was on ASA/Plavix, but stopped on admit 2/2 SDH    Acute kidney injury superimposed on CKD stage IV  CKD with previous baseline high 1s-mid 2s, but Cr mid 1s recently  Pt with some retention, but still able to void with prompting, has purewick in place  Strict I/Os q4h - discussed with RN  Encourage po hydration  Trend renal function  Renally dose medications and avoid nephrotoxins  Maintain hgb>7 and MAP>65    Type 2 diabetes mellitus, with long-term current use of insulin  - Accuchecks ACHS with SSI while inpatient, not needing any long-acting insulin      Hyperlipidemia  Continue statin.       Hypertension  Continue home amlodipine, losartan. Coreg held on admission.      VTE Risk Mitigation (From admission, onward)         Ordered     heparin (porcine) injection 5,000 Units  Every 8 hours         04/24/22 08     IP VTE HIGH RISK PATIENT  Once         04/03/22 7487      Place sequential compression device  Until discontinued         04/03/22 1608     Reason for No Pharmacological VTE Prophylaxis  Once        Question:  Reasons:  Answer:  Active Bleeding    04/03/22 1608                Discharge Planning   MARY: 5/4/2022     Code Status: DNR   Is the patient medically ready for discharge?: No    Reason for patient still in hospital (select all that apply): Patient trending condition  Discharge Plan A: Long-term acute care facility (LTAC)   Discharge Delays: None known at this time              Tirso Springer MD  Department of Hospital Medicine   [facility] - Intensive Care ([place]-[facility])

## 2022-04-30 NOTE — PT/OT/SLP RE-EVAL
Physical Therapy Co-Reevaluation and Co-Treatment  Co-evaluation with OT for maximal pt participation, safety, and activity tolerance    Patient Name:  Beatriz Adame   MRN:  [id number]  Admit Date: 4/3/2022  Admitting Diagnosis:  SDH (subdural hematoma)   Length of Stay: 27 days  Recent Surgery: Procedure(s) (LRB):  CRANIOTOMY, FOR SUBDURAL HEMATOMA EVACUATION (Right) 10 Days Post-Op    Hospital Course:  4/3: Admit date  4/6: PT initial evaluation    Please refer to PT initial evaluation for PLOF and social history.  Recommendations:     Discharge Recommendations:  nursing facility, basic   Discharge Equipment Recommendations: none   Barriers to discharge: Evolving Clinical Presentation    Assessment:     Beatriz Adame is a 71 y.o. female admitted with a medical diagnosis of SDH (subdural hematoma).     Pt presents today very minimally responsive to commands, only reactive response to stimuli. Pt's LLE first stretched to tolerance with contraction to decrease pain when sitting EOB. Pt sat EOB with Total A x2, mod->max A for static sitting EOB. Contracture noted in LLE. Extensive passive ROM in sitting EOB, no active movement or command following noted this treatment. Pt will be kept on caseload for now to continue with passive range and EOB tolerance in hopes that condition will improve. Treatment tolerated fair at this time.     Problem List: weakness, decreased safety awareness, impaired cognition, impaired self care skills, impaired functional mobilty, impaired muscle length, impaired joint extensibility, decreased lower extremity function, impaired endurance, decreased ROM  Rehab Prognosis: Poor     GOALS:   Multidisciplinary Problems     Physical Therapy Goals        Problem: Physical Therapy    Goal Priority Disciplines Outcome Goal Variances Interventions   Physical Therapy Goal     PT, PT/OT Ongoing, Progressing     Description: Goals to be met by: 5/14/2022    Patient will increase functional  "independence with mobility by performin. Supine to sit with minimum assistance - Total x2 -  - BHM  2. Sit to supine with minimum assistance - Total x2 -  - BHM  3. Rolling to Left and Right with minimum assistance - Total x2 -  - BHM  4. Bed to chair transfer with minimum assistance using LRAD as needed - unable -  - BHM                 Plan:     During this hospitalization, patient to be seen 3 x/week to address the above listed problems via therapeutic activities, therapeutic exercises, neuromuscular re-education  · Plan of Care Expires:  22   Plan of Care Reviewed with: patient    Subjective     RN notified prior to session. No family present upon PT entrance into room.    Chief Complaint: none verbalized  Patient/Family Comments/goals: none verbalized  Pain/Comfort:  · Pain Rating 1:  (pt nonverbal, grimmaces occasionally with forced movement of L leg)  · Location - Side 1: Left  · Location 1: leg  · Pain Addressed 1: Distraction, Reposition    Objective:     Patient found right sidelying with: peripheral IV, NG tube, pressure relief boots, delgado catheter     General Precautions: Standard, Cardiac fall, aspiration, seizure, NPO, contact   Orthopedic Precautions:N/A   Braces: N/A   Respiratory Status: Room Air      Vitals: BP (!) 130/59   Pulse 88   Temp 98.8 °F (37.1 °C) (Oral)   Resp (!) 2   Ht 5' 7" (1.702 m)   Wt 49.4 kg (109 lb)   SpO2 100%   BMI 17.07 kg/m²      Exam:  · Cognition:   · Flat affect and mostly non-responsive  · Command following: Not following commands  · Fluency: non-verbal  · Hearing: unable to test  · Vision:  Reacts to threatening stimuli only  · Skin Integrity: Visible skin intact  · Physical Exam:   · Edema: None noted  · ROM - L knee ext lacks 70deg passively, R knee extension lacks 20 deg passively, hip extension on L lacks ~45deg, flexion movements grossly intact  · Strength - no active movement noted  · Sensation - unable to test  · Coordination " - unable to test    Outcome Measures:  AM-PAC 6 CLICK MOBILITY  Turning over in bed (including adjusting bedclothes, sheets and blankets)?: 2  Sitting down on and standing up from a chair with arms (e.g., wheelchair, bedside commode, etc.): 1  Moving from lying on back to sitting on the side of the bed?: 2  Moving to and from a bed to a chair (including a wheelchair)?: 1  Need to walk in hospital room?: 1  Climbing 3-5 steps with a railing?: 1  Basic Mobility Total Score: 8     Functional Mobility:  Additional staff present: JIMMY Griffin    Bed Mobility:   · Rolling/Turning to Left: total assistance  · Rolling/Turning to Right: total assistance  · Scooting to HOB via supine bridge: total assistance of 2 persons  · Supine to Sit: total assistance of 2 persons; from R side of bed  · Scooting anteriorly to EOB to have both feet planted on floor: total assistance  · Sit to Supine: total assistance of 2 persons; to L side of bed    Sitting Balance at Edge of Bed:   Static Sitting Balance: Poor : unable to maintain balance and requires moderate to maximal assistance from individual or chair   Assistance Level Required: mod->max A   Time: 12 min   Postural deviations noted: slouched posture, rounded shoulders, forward head and poor midline awareness   Encouraged: head up, midline balance    Transfers: deferred due to poor command following and postural control    Therapeutic Exercises:    Supine: P ROM Knee and hip flexion and extension x 10 repetitions in all planes through available ROM. Exercises performed to develop and maintain pt's  ROM and flexibility.    Seated PROM: Pt performed Knee and hip flexion and extension x 10 repetitions with facilitation for correct performance and sequencing. Exercises performed to develop and maintain pt's  ROM and flexibility.     Education:   Time provided for education, counseling and discussion of health disposition in regards to patient's current status    Patient left right  sidelying with all lines intact, call button in reach and RN notified.    Time Tracking:     PT Received On: 04/30/22  PT Start Time: 0914     PT Stop Time: 0940  PT Total Time (min): 26 min     Billable Minutes: Re-eval 1 procedure and Therapeutic Exercise 15 min    Pee Johnson, PT, DPT  4/30/2022

## 2022-04-30 NOTE — PLAN OF CARE
Problem: Infection  Goal: Absence of Infection Signs and Symptoms  Outcome: Ongoing, Progressing     Problem: Adult Inpatient Plan of Care  Goal: Plan of Care Review  Outcome: Ongoing, Not Progressing  Goal: Patient-Specific Goal (Individualized)  Description: Admit Date: 4/3/2022    Admit Dx: SDH (subdural hematoma)    Past Medical History:  No date: Gastroparesis  No date: GERD (gastroesophageal reflux disease)  07/24/2021: History of Clostridium difficile colitis  No date: History of kidney stones  No date: History of stroke      Comment:  left side paralysis  No date: Hyperlipidemia  No date: Hypertension  No date: Hypothyroidism  No date: Iron deficiency anemia  No date: Osteoarthritis  No date: Peripheral neuropathy  No date: Stage IV CKD  No date: Type II diabetes mellitus    Past Surgical History:  No date: APPENDECTOMY  10/13/2021: CYSTOSCOPY W/ URETERAL STENT PLACEMENT; Right      Comment:  Procedure: CYSTOSCOPY, WITH URETERAL STENT INSERTION;                 Surgeon: Wanda Arroyo MD;  Location: [facility];                 Service: Urology;  Laterality: Right;  11/23/2021: ESOPHAGOGASTRODUODENOSCOPY; N/A      Comment:  Procedure: EGD (ESOPHAGOGASTRODUODENOSCOPY);  Surgeon:                Obed Jeong MD;  Location: [address]);                 Service: Endoscopy;  Laterality: N/A;  3/24/2022: FOOT AMPUTATION THROUGH METATARSAL; Right      Comment:  Procedure: AMPUTATION, FOOT, PARTIAL 4th and 5th ray;                 Surgeon: Rodrigo Logan DPM;  Location: [address];                 Service: Podiatry;  Laterality: Right;  7/22/2021: LAPAROSCOPIC APPENDECTOMY; N/A      Comment:  Procedure: APPENDECTOMY, LAPAROSCOPIC;  Surgeon: Paulo Calvo Jr., MD;  Location: [facility];  Service: General;                 Laterality: N/A;  1/1/2022: TOE AMPUTATION; Right      Comment:  Procedure: AMPUTATION, TOE;  Surgeon: Genaro Mason DPM;  Location: [facility];   Service: Podiatry;  Laterality:               Right;  No date: TUBAL LIGATION  12/22/2021: URETEROSCOPIC REMOVAL OF URETERIC CALCULUS; Right      Comment:  Procedure: REMOVAL, CALCULUS, URETER, URETEROSCOPIC;                 Surgeon: Wanda Arroyo MD;  Location: [facility];                 Service: Urology;  Laterality: Right;    Individualization:   1. Please dim lights at night  2. Pt likes to be covered with blankets  3. Pt likes to be moved slowly.    Restraints:   1. Soft right wrist restraint initiated 4/4/22 @ 2138 for pulling of lines. [facility] for DC criteria.            Outcome: Ongoing, Not Progressing  Goal: Absence of Hospital-Acquired Illness or Injury  Outcome: Ongoing, Not Progressing  Goal: Optimal Comfort and Wellbeing  Outcome: Ongoing, Not Progressing  Goal: Readiness for Transition of Care  Outcome: Ongoing, Not Progressing     Problem: Adjustment to Illness (Stroke, Hemorrhagic)  Goal: Optimal Coping  Outcome: Ongoing, Not Progressing     Problem: Bowel Elimination Impaired (Stroke, Hemorrhagic)  Goal: Effective Bowel Elimination  Outcome: Ongoing, Not Progressing     Problem: Cerebral Tissue Perfusion (Stroke, Hemorrhagic)  Goal: Optimal Cerebral Tissue Perfusion  Outcome: Ongoing, Not Progressing     Problem: Cognitive Impairment (Stroke, Hemorrhagic)  Goal: Optimal Cognitive Function  Outcome: Ongoing, Not Progressing     Problem: Communication Impairment (Stroke, Hemorrhagic)  Goal: Effective Communication Skills  Outcome: Ongoing, Not Progressing     Problem: Functional Ability Impaired (Stroke, Hemorrhagic)  Goal: Optimal Functional Ability  Outcome: Ongoing, Not Progressing     Problem: Pain (Stroke, Hemorrhagic)  Goal: Acceptable Pain Control  Outcome: Ongoing, Not Progressing     Problem: Respiratory Compromise (Stroke, Hemorrhagic)  Goal: Effective Oxygenation and Ventilation  Outcome: Ongoing, Not Progressing     Problem: Sensorimotor Impairment (Stroke, Hemorrhagic)  Goal: Improved  Sensorimotor Function  Outcome: Ongoing, Not Progressing     Problem: Swallowing Impairment (Stroke, Hemorrhagic)  Goal: Optimal Eating and Swallowing Without Aspiration  Outcome: Ongoing, Not Progressing     Problem: Urinary Elimination Impaired (Stroke, Hemorrhagic)  Goal: Effective Urinary Elimination  Outcome: Ongoing, Not Progressing     Problem: Diabetes Comorbidity  Goal: Blood Glucose Level Within Targeted Range  Outcome: Ongoing, Not Progressing     Problem: Fluid and Electrolyte Imbalance (Acute Kidney Injury/Impairment)  Goal: Fluid and Electrolyte Balance  Outcome: Ongoing, Not Progressing     Problem: Oral Intake Inadequate (Acute Kidney Injury/Impairment)  Goal: Optimal Nutrition Intake  Outcome: Ongoing, Not Progressing     Problem: Renal Function Impairment (Acute Kidney Injury/Impairment)  Goal: Effective Renal Function  Outcome: Ongoing, Not Progressing     Problem: Impaired Wound Healing  Goal: Optimal Wound Healing  Outcome: Ongoing, Not Progressing     Problem: Adjustment to Illness (Delirium)  Goal: Optimal Coping  Outcome: Ongoing, Not Progressing     Problem: Altered Behavior (Delirium)  Goal: Improved Behavioral Control  Outcome: Ongoing, Not Progressing     Problem: Attention and Thought Clarity Impairment (Delirium)  Goal: Improved Attention and Thought Clarity  Outcome: Ongoing, Not Progressing     Problem: Sleep Disturbance (Delirium)  Goal: Improved Sleep  Outcome: Ongoing, Not Progressing     Problem: Skin Injury Risk Increased  Goal: Skin Health and Integrity  Outcome: Ongoing, Not Progressing     Problem: Fall Injury Risk  Goal: Absence of Fall and Fall-Related Injury  Outcome: Ongoing, Not Progressing     Problem: Restraint, Nonbehavioral (Nonviolent)  Goal: Absence of Harm or Injury  Outcome: Ongoing, Not Progressing     Problem: Communication Impairment (Mechanical Ventilation, Invasive)  Goal: Effective Communication  Outcome: Ongoing, Not Progressing     Problem: Device-Related  Complication Risk (Mechanical Ventilation, Invasive)  Goal: Optimal Device Function  Outcome: Ongoing, Not Progressing     Problem: Inability to Wean (Mechanical Ventilation, Invasive)  Goal: Mechanical Ventilation Liberation  Outcome: Ongoing, Not Progressing     Problem: Nutrition Impairment (Mechanical Ventilation, Invasive)  Goal: Optimal Nutrition Delivery  Outcome: Ongoing, Not Progressing     Problem: Skin and Tissue Injury (Mechanical Ventilation, Invasive)  Goal: Absence of Device-Related Skin and Tissue Injury  Outcome: Ongoing, Not Progressing     Problem: Ventilator-Induced Lung Injury (Mechanical Ventilation, Invasive)  Goal: Absence of Ventilator-Induced Lung Injury  Outcome: Ongoing, Not Progressing     Problem: Communication Impairment (Artificial Airway)  Goal: Effective Communication  Outcome: Ongoing, Not Progressing     Problem: Device-Related Complication Risk (Artificial Airway)  Goal: Optimal Device Function  Outcome: Ongoing, Not Progressing     Problem: Skin and Tissue Injury (Artificial Airway)  Goal: Absence of Device-Related Skin or Tissue Injury  Outcome: Ongoing, Not Progressing     Problem: Noninvasive Ventilation Acute  Goal: Effective Unassisted Ventilation and Oxygenation  Outcome: Ongoing, Not Progressing     Problem: Seizure, Active Management  Goal: Absence of Seizure/Seizure-Related Injury  Outcome: Ongoing, Not Progressing

## 2022-04-30 NOTE — PLAN OF CARE
Patient received alert and oriented, no indication of any pain and or discomfort at this time. NG in place and feeding infusing as ordered tolerating it well. White board updated on the plan of care . All safety interventions are in place, call bell in use. Will continue to observe

## 2022-04-30 NOTE — PROGRESS NOTES
Pharmacokinetic Assessment Follow Up: IV Vancomycin    Vancomycin serum concentration assessment(s):    The trough level was drawn incorrectly and cannot be used to guide therapy at this time. Level was drawn 33 minutes after new bag was started.     Vancomycin Regimen Plan:    Re-dose when the random level is less than 20 mcg/mL, next level to be drawn with AM labs on 5/1/22.     The current regimen is vancomycin 500 mg IV every 48 hours. Redose on 5/2/22 is contingent on random levels.     Drug levels (last 3 results):  Recent Labs   Lab Result Units 04/30/22  0533   Vancomycin-Trough ug/mL 41.3*       Pharmacy will continue to follow and monitor vancomycin.    Please contact pharmacy at extension [phone] for questions regarding this assessment.    Thank you for the consult,   Omer Berg       Patient brief summary:  Beatriz Adame is a 71 y.o. female initiated on antimicrobial therapy with IV Vancomycin for treatment of bone/joint infection    Drug Allergies:   Review of patient's allergies indicates:   Allergen Reactions    Tomato (solanum lycopersicum) Hives and Itching       Actual Body Weight:   49.4 kg    Renal Function:   Estimated Creatinine Clearance: 37.1 mL/min (based on SCr of 1.1 mg/dL).    Dialysis Method (if applicable):  N/A    CBC (last 72 hours):  Recent Labs   Lab Result Units 04/28/22  1248 04/29/22  0529 04/30/22  0532   WBC K/uL 11.49 8.97 10.65   Hemoglobin g/dL 7.7* 8.6* 7.9*   Hematocrit % 24.5* 27.4* 24.6*   Platelets K/uL 107* 115* 108*   Gran % % 67.1 63.5 71.5   Lymph % % 23.8 26.2 17.8*   Mono % % 7.3 7.7 8.4   Eosinophil % % 1.0 1.9 1.5   Basophil % % 0.3 0.4 0.4   Differential Method  Automated Automated Automated       Metabolic Panel (last 72 hours):  Recent Labs   Lab Result Units 04/28/22  1442 04/29/22  0529 04/30/22  0533   Sodium mmol/L 145 148* 146*   Potassium mmol/L 4.1 4.2 4.0   Chloride mmol/L 115* 117* 117*   CO2 mmol/L 23 21* 21*   Glucose mg/dL 123* 123* 174*    BUN mg/dL 31* 34* 39*   Creatinine mg/dL 1.2 1.2 1.1   Albumin g/dL 1.9* 1.9* 1.7*   Total Bilirubin mg/dL 0.3 0.3 0.2   Alkaline Phosphatase U/L 81 82 80   AST U/L 23 21 16   ALT U/L 12 12 6*       Vancomycin Administrations:  vancomycin given in the last 96 hours                   vancomycin 750 mg in dextrose 5 % 250 mL IVPB (ready to mix system) (mg) 750 mg New Bag 04/30/22 0500     750 mg New Bag 04/28/22 0539                Microbiologic Results:  Microbiology Results (last 7 days)     ** No results found for the last 168 hours. **

## 2022-04-30 NOTE — PT/OT/SLP RE-EVAL
Occupational Therapy  [id number]    Name: Beatriz Adame  MRN: [id number]  Admitting Diagnosis:  SDH (subdural hematoma)  Recent Surgery: Procedure(s) (LRB):  CRANIOTOMY, FOR SUBDURAL HEMATOMA EVACUATION (Right) 10 Days Post-Op    Recommendations:     Discharge Recommendations: nursing facility, basic  Discharge Equipment Recommendations:  none  Barriers to discharge:   (increased (A) required)    Assessment:     Beatriz Adame is a 71 y.o. female with a medical diagnosis of SDH (subdural hematoma).  She presents with decreased independence with ADL's.  Performance deficits affecting function are weakness, impaired endurance, impaired self care skills, impaired functional mobilty, impaired balance, decreased upper extremity function, decreased lower extremity function, decreased safety awareness, impaired cognition, decreased coordination, visual deficits.  Co-evaluation/treatment performed due to patient's multiple deficits requiring two skilled therapists to safely assess patient's ability to complete ADLs and functional mobility in addition to accommodating for patient's activity tolerance while in acute care setting.      Rehab Prognosis:  Limited currently; patient would benefit from acute skilled OT services to address these deficits and reach maximum level of function.       Plan:     Patient to be seen 3 x/week to address the above listed problems via self-care/home management, therapeutic activities, therapeutic exercises, neuromuscular re-education, cognitive retraining  · Plan of Care Expires: 05/30/22  · Plan of Care Reviewed with: patient    Subjective     Chief Complaint: pt with no attempts to verbalize  Patient/Family stated goals: none stated  Communicated with: RN prior to session.  Pain/Comfort:  · Pain Rating 1:  (no indication of pain)  · Pain Rating Post-Intervention 1:  (no indication of pain)    Objective:     Communicated with: RN prior to session.  Patient found supine with: CARLOS  tube, telemetry, peripheral IV, pressure relief boots, delgado catheter (no family present) upon OT entry to room.    General Precautions: Standard, fall, aspiration, NPO, seizure, contact (DNR)   Orthopedic Precautions:N/A   Braces: N/A    Occupational Performance:    Bed Mobility:    · Patient completed Rolling/Turning to Right with total assistance  · Patient completed Scooting/Bridging with total assistance scooting forward on EOB; dependent drawsheet transfer up [facility] while supine  · Patient completed Supine to Sit with total assistance and 2 persons  · Patient completed Sit to Supine with total assistance and 2 persons    Activities of Daily Living:  · Grooming: Total (A)     Cognitive/Visual Perceptual:  pt with no response to questions, no verbalizations, no visual tracking or eye contact, eyes blink to fast close movement towards eyes, followed no commands, with noted reflexive gripping    Physical Exam:  RUE PROM with increased tone throughout shoulder & elbow, distally tone WFL, no active ROM except reflexive gripping    AMPAC 6 Click:  AMPAC Total Score: 6    Treatment & Education:  Education:    Pt required Mod-Total (A) for postural control while seated EOB.  Provided verbal & physical cues to facilitate postural control. Total (A) with cervical extension while seated EOB.  Provided education regarding role of OT, POC, & discharge recommendations.        Patient left supine with all lines intact, call button in reach, bed alarm on and RN notified    GOALS:   Multidisciplinary Problems     Occupational Therapy Goals        Problem: Occupational Therapy    Goal Priority Disciplines Outcome Interventions   Occupational Therapy Goal     OT, PT/OT Ongoing, Progressing    Description: Goals to be met by: 5/17/22    Patient will increase functional independence with ADLs by performing:    Grooming while seated with Moderate assistance.  Toileting from bedside commode with Max Assistance for hygiene and clothing  management.   Supine to sit with Max Assistance.  Stand pivot transfers with Max Assistance.  Toilet transfer to bedside commode with Max Assistance.                     History:     Past Medical History:   Diagnosis Date    Gastroparesis     GERD (gastroesophageal reflux disease)     History of Clostridium difficile colitis 07/24/2021    History of kidney stones     History of stroke     left side paralysis    Hyperlipidemia     Hypertension     Hypothyroidism     Iron deficiency anemia     Osteoarthritis     Peripheral neuropathy     Stage IV CKD     Type II diabetes mellitus        Past Surgical History:   Procedure Laterality Date    APPENDECTOMY      CRANIOTOMY FOR EVACUATION OF SUBDURAL HEMATOMA Right 4/4/2022    Procedure: CRANIOTOMY, FOR SUBDURAL HEMATOMA EVACUATION;  Surgeon: Silvio White MD;  Location: [address];  Service: Neurosurgery;  Laterality: Right;    CRANIOTOMY FOR EVACUATION OF SUBDURAL HEMATOMA Right 4/20/2022    Procedure: CRANIOTOMY, FOR SUBDURAL HEMATOMA EVACUATION;  Surgeon: Jose Walter MD;  Location: [address];  Service: Neurosurgery;  Laterality: Right;    CYSTOSCOPY W/ URETERAL STENT PLACEMENT Right 10/13/2021    Procedure: CYSTOSCOPY, WITH URETERAL STENT INSERTION;  Surgeon: Wanda Arroyo MD;  Location: [facility];  Service: Urology;  Laterality: Right;    ESOPHAGOGASTRODUODENOSCOPY N/A 11/23/2021    Procedure: EGD (ESOPHAGOGASTRODUODENOSCOPY);  Surgeon: Obed Jeong MD;  Location: [address]);  Service: Endoscopy;  Laterality: N/A;    FOOT AMPUTATION THROUGH METATARSAL Right 3/24/2022    Procedure: AMPUTATION, FOOT, PARTIAL 4th and 5th ray;  Surgeon: Rodrigo Logan DPM;  Location: [address];  Service: Podiatry;  Laterality: Right;    LAPAROSCOPIC APPENDECTOMY N/A 7/22/2021    Procedure: APPENDECTOMY, LAPAROSCOPIC;  Surgeon: Paulo Calvo Jr., MD;  Location: [facility];  Service: General;  Laterality: N/A;    TOE AMPUTATION  Right 1/1/2022    Procedure: AMPUTATION, TOE;  Surgeon: Genaro Mason DPM;  Location: [facility];  Service: Podiatry;  Laterality: Right;    TUBAL LIGATION      URETEROSCOPIC REMOVAL OF URETERIC CALCULUS Right 12/22/2021    Procedure: REMOVAL, CALCULUS, URETER, URETEROSCOPIC;  Surgeon: Wanda Arroyo MD;  Location: [facility];  Service: Urology;  Laterality: Right;       Time Tracking:     OT Date of Treatment: 04/30/22  OT Start Time: 0914  OT Stop Time: 0939  OT Total Time (min): 25 min    Billable Minutes:Re-eval 15  Therapeutic Activity 10    4/30/2022

## 2022-05-01 NOTE — PROGRESS NOTES
[facility] - Intensive Care ([address] Medicine  Progress Note    Patient Name: Beatriz Adame  MRN: [id number]  Patient Class: IP- Inpatient   Admission Date: 4/3/2022  Length of Stay: 28 days  Attending Physician: Tirso Springer MD  Primary Care Provider: Dante Olivier MD        Subjective:     Principal Problem:SDH (subdural hematoma)        HPI:   Ms. Adame is a 70 yo female with hx of dementia, R MCA stroke with left side contraction/hemiparesis presented to [facility] with SDH now s/p evac. Stepped down from [facility]. On 4/13 she developed worsening headache and CTH showed re-accumulation of R SDH and 4-5mm MLS. She was noted to be more slow to follow commands. Stepped back up to [facility] for closer monitoring.     Original HPI below:  Ms. Adame is a 70 y/o F with a PMHx of baseline dementia, GERD, R MCA stroke with residual LUE weakness, HLD, HTN, Stage IV CKD, TIIDM who presents to [facility] from [facility] with a AoC SDH after an unwitnessed fall from her wheel chair without loss of consciousness. CT spine negative for fracture. CT head with R SDH and 3-4 mm MLS. She is on DAPT at home. Of note, she has been staying at [facility] after a partial R foot amputation, stitches still intact, for which she is receiving vancomycin.       Overview/Hospital Course:  Ms. Adame was admitted to the NICU for management of a SDH. Intubated on admission, on precedex. Went for clot evac on 4/4. Extubated on 4/7. EEG revealed seizure on 4/14, loaded with Vimpat. Additional seizure activity noted on 4/18. Went for repeat evac on 4/20. Occlusive brachial thrombus noted in PICC line on 4/23. Underwent MMA embolization on 4/26. SD to  on 4/28.    As a result of her extensive neurologic injury her mental status remained very poor. Opens eyes spontaneously but non-verbal, minimally responsive to physical stimuli. Araiza placed d/t persistent urinary retention. Received nutrition through NGT. Discussed with pt's  son who wished to make pt DNR and pursue hospice care. Palliative care consulted.      Interval History:   NAEON. Pt remains unresponsive to verbal stimuli and minimally responsive to physical stimuli.    Review of Systems   Reason unable to perform ROS: pt non-verbal.   Objective:     Vital Signs (Most Recent):  Temp: 99 °F (37.2 °C) (05/01/22 1151)  Pulse: 97 (05/01/22 1602)  Resp: 20 (05/01/22 0855)  BP: 137/66 (05/01/22 0958)  SpO2: (!) 92 % (05/01/22 1213)   Vital Signs (24h Range):  Temp:  [98.6 °F (37 °C)-99.2 °F (37.3 °C)] 99 °F (37.2 °C)  Pulse:  [] 97  Resp:  [16-23] 20  SpO2:  [91 %-100 %] 92 %  BP: (124-149)/(64-93) 137/66     Weight: 49.4 kg (109 lb)  Body mass index is 17.07 kg/m².    Intake/Output Summary (Last 24 hours) at 5/1/2022 1608  Last data filed at 4/30/2022 1800  Gross per 24 hour   Intake --   Output 600 ml   Net -600 ml      Physical Exam  Constitutional:       General: She is not in acute distress.     Appearance: She is not ill-appearing.   HENT:      Head:      Comments: Pterional craniotomy on right    Eyes:      Extraocular Movements: Extraocular movements intact.      Pupils: Pupils are equal, round, and reactive to light.   Cardiovascular:      Rate and Rhythm: Normal rate and regular rhythm.      Heart sounds: Normal heart sounds.   Pulmonary:      Breath sounds: Normal breath sounds.   Abdominal:      Palpations: Abdomen is soft.   Musculoskeletal:      Cervical back: Neck supple.   Skin:     General: Skin is warm.   Neurological:      Mental Status: She is alert.      Comments: Opens eyes spontaneously but does not respond to any verbal or physical stimuli. Non-verbal.  Left spastic hemiplegia  Right side bradykinetic but withdraws with increased flexion tone in arm and leg       Significant Labs: All pertinent labs within the past 24 hours have been reviewed.    Significant Imaging: I have reviewed all pertinent imaging results/findings within the past 24  hours.      Assessment/Plan:      * SDH (subdural hematoma)  S/p craniotomy with evacuation on 4/4 and repeat evac on 4/20 and MMA embolization on 4/26  Neuro recovery has been poor, prognosis poor, son open to hospice, assistance from palliative appreciated  Has staples to be removed on 5/4 (by NSGY if still admitted)  Continue TF for now    Encephalopathy        Recurrent seizures  Now stable on Lacosamide and Keppra    Critical lower limb ischemia        Osteomyelitis of right foot  R foot osteomyelitis with bone bx +MRSA, ID following peripherally   ID rec 6wk course of Vanc, with end  Date 5/5/2022, pharmacy to dose vanc inpatient    Acute on chronic anemia  This is a patient with a long history of chronic anemia. Slow down trending since admission likely 2/2 to surgery, acute illness, and phlebotomy. Patient is asymptomatic, agree with transfusion for hgb<7. No signs of bleeding. Hemodynamically stable. Labs consistent with anemia of chronic disease and acute blood loss.     Total blood products this admission:  - 5u PRBCs (2u 4/11, 1u each on 4/10, 4/6, and 4/4)    *2u given on 4/11 is charted incorrectly as 1u, however confirmed 2u ordered and given*  - 3u PLTs (all on 4/4)    - Recommend age appropriate colonoscopy as outpatient if warrented  - agree with transfusion for hgb<7 or active bleeding  - Trend CBC daily      Goals of care, counseling/discussion  Discussed prognosis with pt's son on 4/29 and low likelihood of meaningful recovery. He would like her to be DNR and is open to discussing hospice care. Unsure whether he wants a PEG tube placed.  - DNR  - Palliative care consult placed  - SW notified of family interest in hospice care    Debility        Urinary retention  - Araiza removed 4/27, failed voiding trial, replaced on 4/28  - Continue silodosin    Hypothyroidism  Continue home levothyroxine      Severe protein-calorie malnutrition  Nutrition following, most recent recs below from 4/12 (I ordered  boost pudding BID and Frederic BID x14d per their recs)       1. Suggest Diabetic 2000 kcal diet with texture per SLP     2. Consider Boost Pudding BID to increase PO intake      3. Add Frederic BID x 14 days to aid in wound healing     4. If TF warranted, recommend TF of Isosource advancing as tolerated to goal rate of 45 mL/hr to provide 1620 kcal, 73 gm protein, and 825 mL free fluid     5. RD following          History of stroke  Baseline LUE and LLE weakness s/p stroke  Was on ASA/Plavix, but stopped on admit 2/2 SDH    Acute kidney injury superimposed on CKD stage IV  CKD with previous baseline high 1s-mid 2s, but Cr mid 1s recently  Pt with some retention, but still able to void with prompting, has purewick in place  Strict I/Os q4h - discussed with RN  Encourage po hydration  Trend renal function  Renally dose medications and avoid nephrotoxins  Maintain hgb>7 and MAP>65    Type 2 diabetes mellitus, with long-term current use of insulin  - Accuchecks ACHS with SSI while inpatient, not needing any long-acting insulin      Hyperlipidemia  Continue statin.       Hypertension  Continue home amlodipine, losartan. Coreg held on admission.      VTE Risk Mitigation (From admission, onward)         Ordered     heparin (porcine) injection 5,000 Units  Every 8 hours         04/24/22 0853     IP VTE HIGH RISK PATIENT  Once         04/03/22 1608     Place sequential compression device  Until discontinued         04/03/22 1608     Reason for No Pharmacological VTE Prophylaxis  Once        Question:  Reasons:  Answer:  Active Bleeding    04/03/22 1608                Discharge Planning   MARY: 5/2/2022     Code Status: DNR   Is the patient medically ready for discharge?: Yes    Reason for patient still in hospital (select all that apply): Pending disposition  Discharge Plan A: Long-term acute care facility (LTAC)   Discharge Delays: None known at this time              Tirso Springer MD  Department of Hospital Medicine   [facility] -  Intensive Care ([facility]

## 2022-05-01 NOTE — PLAN OF CARE
Problem: Infection  Goal: Absence of Infection Signs and Symptoms  Outcome: Ongoing, Progressing     Problem: Adult Inpatient Plan of Care  Goal: Plan of Care Review  Outcome: Ongoing, Progressing  Goal: Patient-Specific Goal (Individualized)  Description: Admit Date: 4/3/2022    Admit Dx: SDH (subdural hematoma)    Past Medical History:  No date: Gastroparesis  No date: GERD (gastroesophageal reflux disease)  07/24/2021: History of Clostridium difficile colitis  No date: History of kidney stones  No date: History of stroke      Comment:  left side paralysis  No date: Hyperlipidemia  No date: Hypertension  No date: Hypothyroidism  No date: Iron deficiency anemia  No date: Osteoarthritis  No date: Peripheral neuropathy  No date: Stage IV CKD  No date: Type II diabetes mellitus    Past Surgical History:  No date: APPENDECTOMY  10/13/2021: CYSTOSCOPY W/ URETERAL STENT PLACEMENT; Right      Comment:  Procedure: CYSTOSCOPY, WITH URETERAL STENT INSERTION;                 Surgeon: Wanda Arroyo MD;  Location: [facility];                 Service: Urology;  Laterality: Right;  11/23/2021: ESOPHAGOGASTRODUODENOSCOPY; N/A      Comment:  Procedure: EGD (ESOPHAGOGASTRODUODENOSCOPY);  Surgeon:                Obed Jeong MD;  Location: [address]);                 Service: Endoscopy;  Laterality: N/A;  3/24/2022: FOOT AMPUTATION THROUGH METATARSAL; Right      Comment:  Procedure: AMPUTATION, FOOT, PARTIAL 4th and 5th ray;                 Surgeon: Rodrigo Logan DPM;  Location: [address];                 Service: Podiatry;  Laterality: Right;  7/22/2021: LAPAROSCOPIC APPENDECTOMY; N/A      Comment:  Procedure: APPENDECTOMY, LAPAROSCOPIC;  Surgeon: Paulo Calvo Jr., MD;  Location: [facility];  Service: General;                 Laterality: N/A;  1/1/2022: TOE AMPUTATION; Right      Comment:  Procedure: AMPUTATION, TOE;  Surgeon: Genaro Mason DPM;  Location: [facility];  Service:  Podiatry;  Laterality:               Right;  No date: TUBAL LIGATION  12/22/2021: URETEROSCOPIC REMOVAL OF URETERIC CALCULUS; Right      Comment:  Procedure: REMOVAL, CALCULUS, URETER, URETEROSCOPIC;                 Surgeon: Wanda Arroyo MD;  Location: [facility];                 Service: Urology;  Laterality: Right;    Individualization:   1. Please dim lights at night  2. Pt likes to be covered with blankets  3. Pt likes to be moved slowly.    Restraints:   1. Soft right wrist restraint initiated 4/4/22 @ 2138 for pulling of lines. [facility] for DC criteria.            Outcome: Ongoing, Progressing  Goal: Absence of Hospital-Acquired Illness or Injury  Outcome: Ongoing, Progressing  Goal: Optimal Comfort and Wellbeing  Outcome: Ongoing, Progressing  Goal: Readiness for Transition of Care  Outcome: Ongoing, Progressing     Problem: Adjustment to Illness (Stroke, Hemorrhagic)  Goal: Optimal Coping  Outcome: Ongoing, Progressing     Problem: Bowel Elimination Impaired (Stroke, Hemorrhagic)  Goal: Effective Bowel Elimination  Outcome: Ongoing, Progressing     Problem: Cerebral Tissue Perfusion (Stroke, Hemorrhagic)  Goal: Optimal Cerebral Tissue Perfusion  Outcome: Ongoing, Progressing     Problem: Cognitive Impairment (Stroke, Hemorrhagic)  Goal: Optimal Cognitive Function  Outcome: Ongoing, Progressing     Problem: Communication Impairment (Stroke, Hemorrhagic)  Goal: Effective Communication Skills  Outcome: Ongoing, Progressing     Problem: Functional Ability Impaired (Stroke, Hemorrhagic)  Goal: Optimal Functional Ability  Outcome: Ongoing, Progressing     Problem: Pain (Stroke, Hemorrhagic)  Goal: Acceptable Pain Control  Outcome: Ongoing, Progressing     Problem: Respiratory Compromise (Stroke, Hemorrhagic)  Goal: Effective Oxygenation and Ventilation  Outcome: Ongoing, Progressing     Problem: Sensorimotor Impairment (Stroke, Hemorrhagic)  Goal: Improved Sensorimotor Function  Outcome: Ongoing, Progressing      Problem: Swallowing Impairment (Stroke, Hemorrhagic)  Goal: Optimal Eating and Swallowing Without Aspiration  Outcome: Ongoing, Progressing     Problem: Urinary Elimination Impaired (Stroke, Hemorrhagic)  Goal: Effective Urinary Elimination  Outcome: Ongoing, Progressing     Problem: Diabetes Comorbidity  Goal: Blood Glucose Level Within Targeted Range  Outcome: Ongoing, Progressing     Problem: Fluid and Electrolyte Imbalance (Acute Kidney Injury/Impairment)  Goal: Fluid and Electrolyte Balance  Outcome: Ongoing, Progressing     Problem: Oral Intake Inadequate (Acute Kidney Injury/Impairment)  Goal: Optimal Nutrition Intake  Outcome: Ongoing, Progressing     Problem: Renal Function Impairment (Acute Kidney Injury/Impairment)  Goal: Effective Renal Function  Outcome: Ongoing, Progressing     Problem: Impaired Wound Healing  Goal: Optimal Wound Healing  Outcome: Ongoing, Progressing     Problem: Adjustment to Illness (Delirium)  Goal: Optimal Coping  Outcome: Ongoing, Progressing     Problem: Altered Behavior (Delirium)  Goal: Improved Behavioral Control  Outcome: Ongoing, Progressing     Problem: Attention and Thought Clarity Impairment (Delirium)  Goal: Improved Attention and Thought Clarity  Outcome: Ongoing, Progressing     Problem: Sleep Disturbance (Delirium)  Goal: Improved Sleep  Outcome: Ongoing, Progressing     Problem: Skin Injury Risk Increased  Goal: Skin Health and Integrity  Outcome: Ongoing, Progressing     Problem: Fall Injury Risk  Goal: Absence of Fall and Fall-Related Injury  Outcome: Ongoing, Progressing     Problem: Restraint, Nonbehavioral (Nonviolent)  Goal: Absence of Harm or Injury  Outcome: Ongoing, Progressing     Problem: Communication Impairment (Mechanical Ventilation, Invasive)  Goal: Effective Communication  Outcome: Ongoing, Progressing     Problem: Device-Related Complication Risk (Mechanical Ventilation, Invasive)  Goal: Optimal Device Function  Outcome: Ongoing, Progressing      Problem: Inability to Wean (Mechanical Ventilation, Invasive)  Goal: Mechanical Ventilation Liberation  Outcome: Ongoing, Progressing     Problem: Nutrition Impairment (Mechanical Ventilation, Invasive)  Goal: Optimal Nutrition Delivery  Outcome: Ongoing, Progressing     Problem: Skin and Tissue Injury (Mechanical Ventilation, Invasive)  Goal: Absence of Device-Related Skin and Tissue Injury  Outcome: Ongoing, Progressing     Problem: Ventilator-Induced Lung Injury (Mechanical Ventilation, Invasive)  Goal: Absence of Ventilator-Induced Lung Injury  Outcome: Ongoing, Progressing     Problem: Communication Impairment (Artificial Airway)  Goal: Effective Communication  Outcome: Ongoing, Progressing     Problem: Device-Related Complication Risk (Artificial Airway)  Goal: Optimal Device Function  Outcome: Ongoing, Progressing     Problem: Skin and Tissue Injury (Artificial Airway)  Goal: Absence of Device-Related Skin or Tissue Injury  Outcome: Ongoing, Progressing     Problem: Noninvasive Ventilation Acute  Goal: Effective Unassisted Ventilation and Oxygenation  Outcome: Ongoing, Progressing     Problem: Seizure, Active Management  Goal: Absence of Seizure/Seizure-Related Injury  Outcome: Ongoing, Progressing

## 2022-05-01 NOTE — CARE UPDATE
"RAPID RESPONSE NURSE CHART REVIEW        Chart Reviewed: 05/01/2022, 1:42 AM    MRN: [id number]  Bed: 32270/32480 A    Dx: SDH (subdural hematoma)    Beatriz Adame has a past medical history of Gastroparesis, GERD (gastroesophageal reflux disease), History of Clostridium difficile colitis, History of kidney stones, History of stroke, Hyperlipidemia, Hypertension, Hypothyroidism, Iron deficiency anemia, Osteoarthritis, Peripheral neuropathy, Stage IV CKD, and Type II diabetes mellitus.    Last VS: /64   Pulse 94   Temp 99 °F (37.2 °C) (Oral)   Resp 16   Ht 5' 7" (1.702 m)   Wt 49.4 kg (109 lb)   SpO2 100%   BMI 17.07 kg/m²     24H Vital Sign Range:  Temp:  [98.7 °F (37.1 °C)-99.2 °F (37.3 °C)]   Pulse:  [85-98]   Resp:  [2-21]   BP: (130-149)/(59-93)   SpO2:  [95 %-100 %]     Level of Consciousness (AVPU): responds to pain    Recent Labs     04/28/22  1248 04/29/22  0529 04/30/22  0532   WBC 11.49 8.97 10.65   HGB 7.7* 8.6* 7.9*   HCT 24.5* 27.4* 24.6*   * 115* 108*       Recent Labs     04/28/22  1442 04/29/22  0529 04/30/22  0533    148* 146*   K 4.1 4.2 4.0   * 117* 117*   CO2 23 21* 21*   CREATININE 1.2 1.2 1.1   * 123* 174*        No results for input(s): PH, PCO2, PO2, HCO3, POCSATURATED, BE in the last 72 hours.     OXYGEN:  Flow (L/min): 2  Oxygen Concentration (%): 98  O2 Device (Oxygen Therapy): room air    MEWS score: 3    Charge RN, Joslyn contacted. No concerns verbalized at this time. Instructed to call [phone] for further concerns or assistance.    Mariangel Bonds RN      "

## 2022-05-01 NOTE — PLAN OF CARE
Patient received alert, no indication of any pain and or discomfort at this time.Turned and repositioned for comfort.NG to the nare patent and intact, infusing as ordered tolerating it well. White board updated on the plan of care. All safety interventions are in place, call bell in use. Will continue to observe

## 2022-05-01 NOTE — PROGRESS NOTES
Pharmacokinetic Assessment Follow Up: IV Vancomycin    Vancomycin serum concentration assessment(s):    The random level was drawn correctly and can be used to guide therapy at this time. The measurement is above the desired definitive target range of 15 to 20 mcg/mL.    Vancomycin Regimen Plan:    Re-dose when the trough level is less than 20 mcg/mL, tough to be drawn at 0400 on 5/2/22.     The current regimen is vancomycin 500 mg IV every 48 hours. Next dose is scheduled for 0500 on 5/2/22. .     Drug levels (last 3 results):  Recent Labs   Lab Result Units 04/30/22  0533 05/01/22  0555   Vancomycin, Random ug/mL  --  25.9   Vancomycin-Trough ug/mL 41.3*  --        Pharmacy will continue to follow and monitor vancomycin.    Please contact pharmacy at extension [phone] for questions regarding this assessment.    Thank you for the consult,   Omer Berg       Patient brief summary:  Beatriz Adame is a 71 y.o. female initiated on antimicrobial therapy with IV Vancomycin for treatment of bone/joint infection.    Drug Allergies:   Review of patient's allergies indicates:   Allergen Reactions    Tomato (solanum lycopersicum) Hives and Itching       Actual Body Weight:   49.4 kg     Renal Function:   Estimated Creatinine Clearance: 37.1 mL/min (based on SCr of 1.1 mg/dL).,     Dialysis Method (if applicable):  N/A    CBC (last 72 hours):  Recent Labs   Lab Result Units 04/28/22  1248 04/29/22  0529 04/30/22  0532 05/01/22  0555   WBC K/uL 11.49 8.97 10.65 11.00   Hemoglobin g/dL 7.7* 8.6* 7.9* 7.8*   Hematocrit % 24.5* 27.4* 24.6* 24.2*   Platelets K/uL 107* 115* 108* 106*   Gran % % 67.1 63.5 71.5 68.5   Lymph % % 23.8 26.2 17.8* 21.4   Mono % % 7.3 7.7 8.4 8.6   Eosinophil % % 1.0 1.9 1.5 0.7   Basophil % % 0.3 0.4 0.4 0.4   Differential Method  Automated Automated Automated Automated       Metabolic Panel (last 72 hours):  Recent Labs   Lab Result Units 04/28/22  1442 04/29/22  0529 04/30/22  0533 05/01/22  0555    Sodium mmol/L 145 148* 146* 145   Potassium mmol/L 4.1 4.2 4.0 4.0   Chloride mmol/L 115* 117* 117* 116*   CO2 mmol/L 23 21* 21* 22*   Glucose mg/dL 123* 123* 174* 148*   BUN mg/dL 31* 34* 39* 47*   Creatinine mg/dL 1.2 1.2 1.1 1.1   Albumin g/dL 1.9* 1.9* 1.7* 1.7*   Total Bilirubin mg/dL 0.3 0.3 0.2 0.3   Alkaline Phosphatase U/L 81 82 80 77   AST U/L 23 21 16 18   ALT U/L 12 12 6* 11       Vancomycin Administrations:  vancomycin given in the last 96 hours                   vancomycin 750 mg in dextrose 5 % 250 mL IVPB (ready to mix system) (mg) 750 mg New Bag 04/30/22 0500     750 mg New Bag 04/28/22 0539                Microbiologic Results:  Microbiology Results (last 7 days)     ** No results found for the last 168 hours. **

## 2022-05-01 NOTE — CARE UPDATE
"RAPID RESPONSE NURSE CHART REVIEW        Chart Reviewed: 05/01/2022, 9:25 AM    MRN: [id number]  Bed: 32094/94084 A    Dx: SDH (subdural hematoma)    Beatriz Adame has a past medical history of Gastroparesis, GERD (gastroesophageal reflux disease), History of Clostridium difficile colitis, History of kidney stones, History of stroke, Hyperlipidemia, Hypertension, Hypothyroidism, Iron deficiency anemia, Osteoarthritis, Peripheral neuropathy, Stage IV CKD, and Type II diabetes mellitus.    Last VS: /72   Pulse 99   Temp 98.9 °F (37.2 °C) (Oral)   Resp 20   Ht 5' 7" (1.702 m)   Wt 49.4 kg (109 lb)   SpO2 (!) 91%   BMI 17.07 kg/m²     24H Vital Sign Range:  Temp:  [98.7 °F (37.1 °C)-99.2 °F (37.3 °C)]   Pulse:  [87-99]   Resp:  [2-21]   BP: (124-149)/(59-93)   SpO2:  [91 %-100 %]     Level of Consciousness (AVPU): alert    Recent Labs     04/29/22  0529 04/30/22  0532 05/01/22  0555   WBC 8.97 10.65 11.00   HGB 8.6* 7.9* 7.8*   HCT 27.4* 24.6* 24.2*   * 108* 106*       Recent Labs     04/29/22  0529 04/30/22  0533 05/01/22  0555   * 146* 145   K 4.2 4.0 4.0   * 117* 116*   CO2 21* 21* 22*   CREATININE 1.2 1.1 1.1   * 174* 148*        No results for input(s): PH, PCO2, PO2, HCO3, POCSATURATED, BE in the last 72 hours.     OXYGEN:  Flow (L/min): 2  Oxygen Concentration (%): 98  O2 Device (Oxygen Therapy): room air    MEWS score: 4    Bedside RN, Gaby contacted. No concerns verbalized at this time. Instructed to call [phone] for further concerns or assistance.    Stephanie Krishnamurthy RN        "

## 2022-05-01 NOTE — SUBJECTIVE & OBJECTIVE
Interval History:   NAEON. Pt remains unresponsive to verbal stimuli and minimally responsive to physical stimuli.    Review of Systems   Reason unable to perform ROS: pt non-verbal.   Objective:     Vital Signs (Most Recent):  Temp: 99 °F (37.2 °C) (05/01/22 1151)  Pulse: 97 (05/01/22 1602)  Resp: 20 (05/01/22 0855)  BP: 137/66 (05/01/22 0958)  SpO2: (!) 92 % (05/01/22 1213)   Vital Signs (24h Range):  Temp:  [98.6 °F (37 °C)-99.2 °F (37.3 °C)] 99 °F (37.2 °C)  Pulse:  [] 97  Resp:  [16-23] 20  SpO2:  [91 %-100 %] 92 %  BP: (124-149)/(64-93) 137/66     Weight: 49.4 kg (109 lb)  Body mass index is 17.07 kg/m².    Intake/Output Summary (Last 24 hours) at 5/1/2022 1608  Last data filed at 4/30/2022 1800  Gross per 24 hour   Intake --   Output 600 ml   Net -600 ml      Physical Exam  Constitutional:       General: She is not in acute distress.     Appearance: She is not ill-appearing.   HENT:      Head:      Comments: Pterional craniotomy on right    Eyes:      Extraocular Movements: Extraocular movements intact.      Pupils: Pupils are equal, round, and reactive to light.   Cardiovascular:      Rate and Rhythm: Normal rate and regular rhythm.      Heart sounds: Normal heart sounds.   Pulmonary:      Breath sounds: Normal breath sounds.   Abdominal:      Palpations: Abdomen is soft.   Musculoskeletal:      Cervical back: Neck supple.   Skin:     General: Skin is warm.   Neurological:      Mental Status: She is alert.      Comments: Opens eyes spontaneously but does not respond to any verbal or physical stimuli. Non-verbal.  Left spastic hemiplegia  Right side bradykinetic but withdraws with increased flexion tone in arm and leg       Significant Labs: All pertinent labs within the past 24 hours have been reviewed.    Significant Imaging: I have reviewed all pertinent imaging results/findings within the past 24 hours.

## 2022-05-02 NOTE — NURSING
Assumed care of patient from CHYNA Liao. Patient is non responsive verbally but does open eyes when touched or moved, son at bedside, vitals stable, IKER midline flushed and saline locked, NG tube to left nare, prevalon boots on bilateral feet, delgado, tube feedings currently stopped due to patient pulling out earlier in the day, radiology has already come by to xray for placement, waiting results before starting tube feed, placed patient on 1LNC due to sats dropping below 90 on room air, call bell and tray table within reach of the patient, will continue to monitor.

## 2022-05-02 NOTE — PLAN OF CARE
Osteomyelitis of right foot  S/p partial 4th and 5th ray amputation 3/24/2022. Currently on IV Vancomycin x 6 weeks for residual osteomyelitis with estimated end date 5/5/2022. Proximal bone margins obtained intraop +MRSA.     Plan  -OK for discharge from podiatry perspective.  -Antibiotic plan per ID.   -Dressing changes by nursing.  -Continue aggressive offloading in heel protector boots while in bed or chair.  -WBAT BLE.   -Podiatry will sign off. Reconsult if needed.   DC Instructions:  Patient is to follow up with podiatry within 14-21 days of discharge with Dr. Logan.  Podiatry will arrange an appointment.  Home health/facility to do dressing changes every MWF and prn as follows:  Cleanse right foot incision site with saline or wound cleanser, paint incision site with betadine, cover with 4x4 gauze, kerlix, ACE bandage.       Norman Higgins DPM PGY-2  Podiatric Medicine & Surgery  [facility]  Secure Chat Preferred  Mobile: [phone]  Pager: [phone]

## 2022-05-02 NOTE — SUBJECTIVE & OBJECTIVE
Interval Hx: Notified by RT that patient had acutely decompensated with labored respirations. Patient was placed on Bipap. No family at bedside. Pt getting tube feeds through NG    Past Medical History:   Diagnosis Date    Gastroparesis     GERD (gastroesophageal reflux disease)     History of Clostridium difficile colitis 07/24/2021    History of kidney stones     History of stroke     left side paralysis    Hyperlipidemia     Hypertension     Hypothyroidism     Iron deficiency anemia     Osteoarthritis     Peripheral neuropathy     Stage IV CKD     Type II diabetes mellitus        Past Surgical History:   Procedure Laterality Date    APPENDECTOMY      CRANIOTOMY FOR EVACUATION OF SUBDURAL HEMATOMA Right 4/4/2022    Procedure: CRANIOTOMY, FOR SUBDURAL HEMATOMA EVACUATION;  Surgeon: Silvio White MD;  Location: [address];  Service: Neurosurgery;  Laterality: Right;    CRANIOTOMY FOR EVACUATION OF SUBDURAL HEMATOMA Right 4/20/2022    Procedure: CRANIOTOMY, FOR SUBDURAL HEMATOMA EVACUATION;  Surgeon: Jose Walter MD;  Location: [address];  Service: Neurosurgery;  Laterality: Right;    CYSTOSCOPY W/ URETERAL STENT PLACEMENT Right 10/13/2021    Procedure: CYSTOSCOPY, WITH URETERAL STENT INSERTION;  Surgeon: Wanda Arroyo MD;  Location: [facility];  Service: Urology;  Laterality: Right;    ESOPHAGOGASTRODUODENOSCOPY N/A 11/23/2021    Procedure: EGD (ESOPHAGOGASTRODUODENOSCOPY);  Surgeon: Obed Jeong MD;  Location: [address]);  Service: Endoscopy;  Laterality: N/A;    FOOT AMPUTATION THROUGH METATARSAL Right 3/24/2022    Procedure: AMPUTATION, FOOT, PARTIAL 4th and 5th ray;  Surgeon: Rodrigo Logan DPM;  Location: [address];  Service: Podiatry;  Laterality: Right;    LAPAROSCOPIC APPENDECTOMY N/A 7/22/2021    Procedure: APPENDECTOMY, LAPAROSCOPIC;  Surgeon: Paulo Calvo Jr., MD;  Location: [facility];  Service: General;  Laterality: N/A;    TOE AMPUTATION Right 1/1/2022     Procedure: AMPUTATION, TOE;  Surgeon: Genaro Mason DPM;  Location: [facility];  Service: Podiatry;  Laterality: Right;    TUBAL LIGATION      URETEROSCOPIC REMOVAL OF URETERIC CALCULUS Right 12/22/2021    Procedure: REMOVAL, CALCULUS, URETER, URETEROSCOPIC;  Surgeon: Wanda Arroyo MD;  Location: [facility];  Service: Urology;  Laterality: Right;       Review of patient's allergies indicates:   Allergen Reactions    Tomato (solanum lycopersicum) Hives and Itching       Medications:  Continuous Infusions:   morphine 2 mg/hr (05/04/22 1058)     Scheduled Meds:   lacosamide  250 mg Per NG tube Q12H    levetiracetam  1,500 mg Per NG tube BID     PRN Meds:acetaminophen, lorazepam, metoclopramide HCl, morphine, ondansetron, sodium chloride 0.9%, sodium chloride 0.9%, sodium chloride 0.9%    Family History       Problem Relation (Age of Onset)    Alcohol abuse Father    Arthritis Mother    Asthma Brother    Diabetes Mother, Sister, Brother    Heart attack Mother, Father, Brother    Heart disease Mother, Brother    Hypertension Mother    Kidney disease Mother    Stroke Mother    Vision loss Mother          Tobacco Use    Smoking status: Never Smoker    Smokeless tobacco: Never Used   Substance and Sexual Activity    Alcohol use: No     Comment: socially    Drug use: No    Sexual activity: Not Currently       Review of Systems   Unable to perform ROS: Patient unresponsive   Objective:     Vital Signs (Most Recent):  Temp: 98.8 °F (37.1 °C) (05/04/22 1500)  Pulse: 92 (05/04/22 1343)  Resp: (!) 24 (05/04/22 1343)  BP: 124/70 (05/04/22 1500)  SpO2: (!) 93 % (05/04/22 1343)   Vital Signs (24h Range):  Temp:  [97.4 °F (36.3 °C)-99.5 °F (37.5 °C)] 98.8 °F (37.1 °C)  Pulse:  [] 92  Resp:  [17-35] 24  SpO2:  [87 %-98 %] 93 %  BP: (108-136)/(56-75) 124/70     Weight: 49.4 kg (108 lb 14.5 oz)  Body mass index is 17.06 kg/m².    Physical Exam  Vitals and nursing note reviewed.   Constitutional:       General: She is in acute  distress.      Appearance: She is ill-appearing.      Comments: Unresponsive   HENT:      Head:      Comments: Staples from evac     Right Ear: External ear normal.      Left Ear: External ear normal.      Nose:      Comments: NG in place     Mouth/Throat:      Mouth: Mucous membranes are dry.   Eyes:      Comments: Eyes remained closed    Cardiovascular:      Rate and Rhythm: Normal rate.   Pulmonary:      Effort: Respiratory distress present.      Comments: On bipap  Abdominal:      General: There is no distension.   Musculoskeletal:      Cervical back: Normal range of motion.      Comments: Contracted    Neurological:      Comments: unresponsive   Psychiatric:         Cognition and Memory: Cognition is impaired. Memory is impaired.       Review of Symptoms      Symptom Assessment (ESAS 0-10 Scale)  Unable to complete assessment due to Patient unresponsive     CAM / Delirium:  Negative  Constipation:  Negative  Diarrhea:  Negative    Bowel Management Plan (BMP):  Yes      Pain Assessment in Advanced Demential Scale (PAINAD)   Breathing - Independent of vocalization:  1  Negative vocalization:  0  Facial expression:  0  Body language:  0  Consolability:  0  Total:  1    Modified Ally Scale:  0    Performance Status:  10    Living Arrangements:  Lives in nursing home    Psychosocial/Cultural: Has one son     Spiritual:  F - Mary Ellen and Belief:  Not addressed       Advance Care Planning   Advance Directives:   Living Will: No    LaPOST: No    Do Not Resuscitate Status: Yes    Medical Power of : No      Decision Making:  Family answered questions       Significant Labs: All pertinent labs within the past 24 hours have been reviewed.  CBC:   Recent Labs   Lab 05/03/22  0451   WBC 9.39   HGB 7.3*   HCT 23.0*   MCV 98   *       BMP:  Recent Labs   Lab 05/04/22  0945   *      K 4.7   *   CO2 20*   BUN 60*   CREATININE 1.6*   CALCIUM 8.4*   MG 2.1       LFT:  Lab Results   Component Value  Date    AST 16 05/03/2022    ALKPHOS 64 05/03/2022    BILITOT 0.3 05/03/2022     Albumin:   Albumin   Date Value Ref Range Status   05/03/2022 1.6 (L) 3.5 - 5.2 g/dL Final     Protein:   Total Protein   Date Value Ref Range Status   05/03/2022 5.9 (L) 6.0 - 8.4 g/dL Final     Lactic acid:   Lab Results   Component Value Date    LACTATE 0.7 03/18/2022    LACTATE 0.9 03/18/2022       Significant Imaging: I have reviewed all pertinent imaging results/findings within the past 24 hours.    X-ray abdomen 4/28/22    Nasogastric tube tip and side hole projects over the expected location of the gastric lumen.  There is vascular calcification.  There is moderate to large amount of stool in the colon.  There may be left pleural effusion.

## 2022-05-02 NOTE — NURSING
Notified Dr. Springer of patient's increased need for oxygen. Patient now on 4L nasal cannula. No new orders.

## 2022-05-02 NOTE — PROGRESS NOTES
Pharmacokinetic Assessment Follow Up: IV Vancomycin    Vancomycin serum concentration assessment(s):    The trough level was drawn correctly and can be used to guide therapy at this time. The measurement is above the desired definitive target range of 15 to 20 mcg/mL.    Vancomycin Regimen Plan:    The trough drawn this morning was supratherapeutic and the vancomycin dose has already been administered  Will plan on d/c the vancomycin 750 mg IV q48h regimen  Plan to obtain a vancomycin random with AM labs on Wednesday, 5/3  Potentially no further doses will be warranted provided end of therapy will be 5/5 but pharmacy will continue to monitor    Drug levels (last 3 results):  Recent Labs   Lab Result Units 04/30/22  0533 05/01/22  0555 05/02/22  0420   Vancomycin, Random ug/mL  --  25.9  --    Vancomycin-Trough ug/mL 41.3*  --  22.0       Pharmacy will continue to follow and monitor vancomycin.    Please contact pharmacy at extension [phone] for questions regarding this assessment.    Thank you for the consult,   Cesario Adorno, PharmD, [facility]      Patient brief summary:  Beatriz Adame is a 71 y.o. female initiated on antimicrobial therapy with IV Vancomycin for treatment of bone/joint infection    Drug Allergies:   Review of patient's allergies indicates:   Allergen Reactions    Tomato (solanum lycopersicum) Hives and Itching       Actual Body Weight:   49.4 kg    Renal Function:   Estimated Creatinine Clearance: 37.1 mL/min (based on SCr of 1.1 mg/dL).,     Dialysis Method (if applicable):  N/A    CBC (last 72 hours):  Recent Labs   Lab Result Units 04/30/22  0532 05/01/22  0555   WBC K/uL 10.65 11.00   Hemoglobin g/dL 7.9* 7.8*   Hematocrit % 24.6* 24.2*   Platelets K/uL 108* 106*   Gran % % 71.5 68.5   Lymph % % 17.8* 21.4   Mono % % 8.4 8.6   Eosinophil % % 1.5 0.7   Basophil % % 0.4 0.4   Differential Method  Automated Automated       Metabolic Panel (last 72 hours):  Recent Labs   Lab Result Units  04/30/22  0533 05/01/22  0555   Sodium mmol/L 146* 145   Potassium mmol/L 4.0 4.0   Chloride mmol/L 117* 116*   CO2 mmol/L 21* 22*   Glucose mg/dL 174* 148*   BUN mg/dL 39* 47*   Creatinine mg/dL 1.1 1.1   Albumin g/dL 1.7* 1.7*   Total Bilirubin mg/dL 0.2 0.3   Alkaline Phosphatase U/L 80 77   AST U/L 16 18   ALT U/L 6* 11       Vancomycin Administrations:  vancomycin given in the last 96 hours                   vancomycin 750 mg in dextrose 5 % 250 mL IVPB (ready to mix system) (mg) 750 mg New Bag 05/02/22 0522     750 mg New Bag 04/30/22 0500                Microbiologic Results:  Microbiology Results (last 7 days)     ** No results found for the last 168 hours. **

## 2022-05-02 NOTE — ASSESSMENT & PLAN NOTE
This is a patient with a long history of chronic anemia. Slow down trending since admission likely 2/2 to surgery, acute illness, and phlebotomy. Patient is asymptomatic, agree with transfusion for hgb<7. No signs of bleeding. Hemodynamically stable. Labs consistent with anemia of chronic disease and acute blood loss.   - agree with transfusion for hgb<7 or active bleeding

## 2022-05-02 NOTE — SUBJECTIVE & OBJECTIVE
Interval History:   NAEON. Remains unresponsive. Oxygen requiring increasing to 4L.    Review of Systems   Reason unable to perform ROS: pt non-verbal.   Objective:     Vital Signs (Most Recent):  Temp: 98.1 °F (36.7 °C) (05/02/22 1107)  Pulse: 92 (05/02/22 1400)  Resp: (!) 26 (05/02/22 1400)  BP: 124/67 (05/02/22 1107)  SpO2: (!) 90 % (05/02/22 1400)   Vital Signs (24h Range):  Temp:  [97.6 °F (36.4 °C)-98.7 °F (37.1 °C)] 98.1 °F (36.7 °C)  Pulse:  [84-97] 92  Resp:  [18-27] 26  SpO2:  [90 %-99 %] 90 %  BP: (113-147)/(66-91) 124/67     Weight: 49.4 kg (108 lb 14.5 oz)  Body mass index is 17.06 kg/m².    Intake/Output Summary (Last 24 hours) at 5/2/2022 1503  Last data filed at 5/2/2022 0400  Gross per 24 hour   Intake 540 ml   Output 1000 ml   Net -460 ml      Physical Exam  Constitutional:       General: She is not in acute distress.     Appearance: She is not ill-appearing.   HENT:      Head:      Comments: Pterional craniotomy on right    Eyes:      Extraocular Movements: Extraocular movements intact.      Pupils: Pupils are equal, round, and reactive to light.   Cardiovascular:      Rate and Rhythm: Normal rate and regular rhythm.      Heart sounds: Normal heart sounds.   Pulmonary:      Breath sounds: Normal breath sounds.   Abdominal:      Palpations: Abdomen is soft.   Musculoskeletal:      Cervical back: Neck supple.   Skin:     General: Skin is warm.   Neurological:      Mental Status: She is alert.      Comments: Opens eyes spontaneously but does not respond to any verbal or physical stimuli. Non-verbal.  Left spastic hemiplegia  Right side bradykinetic but withdraws with increased flexion tone in arm and leg       Significant Labs: All pertinent labs within the past 24 hours have been reviewed.    Significant Imaging: I have reviewed all pertinent imaging results/findings within the past 24 hours.

## 2022-05-02 NOTE — ASSESSMENT & PLAN NOTE
Discussed prognosis with pt's son on 4/29 and low likelihood of meaningful recovery. He would like her to be DNR and is open to discussing hospice care. Unsure whether he wants a PEG tube placed.  - DNR  - Palliative care consult placed  - Will need NH placement. Son undecided about hospice care

## 2022-05-02 NOTE — PLAN OF CARE
Recommendations     1. Modify current TF formula to Isosource 1.5. As tolerated, increase to goal rate of 45 mL/hr - 1620 kcals, 73 g of protein, 825 mL fluid.  2. RD to monitor & follow-up.     Goals: receive nutrition by RD follow-up  Nutrition Goal Status: goal met  Communication of RD Recs:  (POC)

## 2022-05-02 NOTE — ASSESSMENT & PLAN NOTE
71 year old female with prolonged hospitalization for SDH. Getting tube feeds through NG. Palliative consulted for [facility]    Code status: DNR     Surrogate decision maker: Alysa Adame [phone]    GOC/ACP  5/2 Met with patient's son and family at bedside. Discussed that we do not expect patient to recover meaningfully. Son talked about how his mother said she never wanted to be dependent on life support. He expressed understanding that artificial nutrition is life support. I recommended that we free patient from life support in accordance with her wishes and allow her to have a natural and peaceful death with the support of hospice. Recommended against PEG. Discussed comfort feeds if patient expresses any interest. Son says that his mother will definitely need placement. He would like to continue to discuss PEG vs transition to comfort focused care with his family     -Son remains undecided about PEG vs transitioning to comfort focused care with hospice. Either way patient will need nursing home placement     4/29  -Spoke with patient's son. Introduced palliative medicine. Discussed patient's diagnosis and lack of progress throughout this prolonged hospitalization. Informed son that patient previously spoke with our team back in October and had said that she did not want to be resuscitated in the event of a cardiac and respiratory arrest. Son said he was not surprised as they had had conversations about her not wanting to be on life support. Discussed that artificial nutrition is a form of life support and I would not recommend PEG placement / continuing tube feeds. Son expressed understanding. Made plans to meet on Monday at 2pm to discuss further with other family present       Discussed with Dr. Springer

## 2022-05-02 NOTE — SUBJECTIVE & OBJECTIVE
Interval Hx: No change in patient's mental status. Continues to receive tube feeds through NG. Patient does not interact. Son, daughter in law and granddaughter at bedside     Past Medical History:   Diagnosis Date    Gastroparesis     GERD (gastroesophageal reflux disease)     History of Clostridium difficile colitis 07/24/2021    History of kidney stones     History of stroke     left side paralysis    Hyperlipidemia     Hypertension     Hypothyroidism     Iron deficiency anemia     Osteoarthritis     Peripheral neuropathy     Stage IV CKD     Type II diabetes mellitus        Past Surgical History:   Procedure Laterality Date    APPENDECTOMY      CRANIOTOMY FOR EVACUATION OF SUBDURAL HEMATOMA Right 4/4/2022    Procedure: CRANIOTOMY, FOR SUBDURAL HEMATOMA EVACUATION;  Surgeon: Silvio White MD;  Location: [address];  Service: Neurosurgery;  Laterality: Right;    CRANIOTOMY FOR EVACUATION OF SUBDURAL HEMATOMA Right 4/20/2022    Procedure: CRANIOTOMY, FOR SUBDURAL HEMATOMA EVACUATION;  Surgeon: Jose Walter MD;  Location: [address];  Service: Neurosurgery;  Laterality: Right;    CYSTOSCOPY W/ URETERAL STENT PLACEMENT Right 10/13/2021    Procedure: CYSTOSCOPY, WITH URETERAL STENT INSERTION;  Surgeon: Wanda Arroyo MD;  Location: [facility];  Service: Urology;  Laterality: Right;    ESOPHAGOGASTRODUODENOSCOPY N/A 11/23/2021    Procedure: EGD (ESOPHAGOGASTRODUODENOSCOPY);  Surgeon: Obed Jeong MD;  Location: [address]);  Service: Endoscopy;  Laterality: N/A;    FOOT AMPUTATION THROUGH METATARSAL Right 3/24/2022    Procedure: AMPUTATION, FOOT, PARTIAL 4th and 5th ray;  Surgeon: Rodrigo Logan DPM;  Location: [address];  Service: Podiatry;  Laterality: Right;    LAPAROSCOPIC APPENDECTOMY N/A 7/22/2021    Procedure: APPENDECTOMY, LAPAROSCOPIC;  Surgeon: Paulo Calvo Jr., MD;  Location: [facility];  Service: General;  Laterality: N/A;    TOE AMPUTATION Right 1/1/2022    Procedure:  AMPUTATION, TOE;  Surgeon: Genaro Mason DPM;  Location: [facility];  Service: Podiatry;  Laterality: Right;    TUBAL LIGATION      URETEROSCOPIC REMOVAL OF URETERIC CALCULUS Right 12/22/2021    Procedure: REMOVAL, CALCULUS, URETER, URETEROSCOPIC;  Surgeon: Wanda Arroyo MD;  Location: [facility];  Service: Urology;  Laterality: Right;       Review of patient's allergies indicates:   Allergen Reactions    Tomato (solanum lycopersicum) Hives and Itching       Medications:  Continuous Infusions:  Scheduled Meds:   acetylcysteine 100 mg/ml (10%)  4 mL Nebulization Q6H    albuterol-ipratropium  3 mL Nebulization Q6H    amLODIPine  10 mg Per NG tube Daily    atorvastatin  40 mg Per NG tube Daily    heparin (porcine)  5,000 Units Subcutaneous Q8H    lacosamide  250 mg Per NG tube Q12H    levetiracetam  1,500 mg Per NG tube BID    levothyroxine  75 mcg Per NG tube Before breakfast    losartan  25 mg Per NG tube Daily    senna-docusate 8.6-50 mg  1 tablet Per NG tube BID    silodosin  4 mg Per NG tube Daily     PRN Meds:acetaminophen, dextrose 10%, glucagon (human recombinant), hydrALAZINE, HYDROcodone-acetaminophen, insulin aspart U-100, iodixanoL, labetalol, metoclopramide HCl, ondansetron, potassium bicarbonate, potassium bicarbonate, potassium bicarbonate, potassium, sodium phosphates, potassium, sodium phosphates, potassium, sodium phosphates, sodium chloride 0.9%, sodium chloride 0.9%, sodium chloride 0.9%, Pharmacy to dose Vancomycin consult **AND** vancomycin - pharmacy to dose    Family History       Problem Relation (Age of Onset)    Alcohol abuse Father    Arthritis Mother    Asthma Brother    Diabetes Mother, Sister, Brother    Heart attack Mother, Father, Brother    Heart disease Mother, Brother    Hypertension Mother    Kidney disease Mother    Stroke Mother    Vision loss Mother          Tobacco Use    Smoking status: Never Smoker    Smokeless tobacco: Never Used   Substance and Sexual Activity    Alcohol  use: No     Comment: socially    Drug use: No    Sexual activity: Not Currently       Review of Systems   Unable to perform ROS: Patient unresponsive   Objective:     Vital Signs (Most Recent):  Temp: 98.1 °F (36.7 °C) (05/02/22 1107)  Pulse: 97 (05/02/22 1532)  Resp: (!) 26 (05/02/22 1400)  BP: 124/67 (05/02/22 1107)  SpO2: (!) 90 % (05/02/22 1400)   Vital Signs (24h Range):  Temp:  [97.6 °F (36.4 °C)-98.7 °F (37.1 °C)] 98.1 °F (36.7 °C)  Pulse:  [84-99] 97  Resp:  [18-27] 26  SpO2:  [90 %-99 %] 90 %  BP: (113-147)/(66-91) 124/67     Weight: 49.4 kg (108 lb 14.5 oz)  Body mass index is 17.06 kg/m².    Physical Exam  Vitals and nursing note reviewed.   Constitutional:       General: She is not in acute distress.     Appearance: She is ill-appearing.      Comments: Unresponsive   HENT:      Head:      Comments: Staples from evac     Right Ear: External ear normal.      Left Ear: External ear normal.      Nose:      Comments: NG in place     Mouth/Throat:      Mouth: Mucous membranes are dry.   Eyes:      Comments: Eyes remained closed    Cardiovascular:      Rate and Rhythm: Normal rate.   Pulmonary:      Effort: Pulmonary effort is normal. No respiratory distress.   Abdominal:      General: There is no distension.   Musculoskeletal:      Cervical back: Normal range of motion.      Comments: Contracted    Neurological:      Comments: unresponsive   Psychiatric:         Cognition and Memory: Cognition is impaired. Memory is impaired.       Review of Symptoms      Symptom Assessment (ESAS 0-10 Scale)  Pain:  0  Dyspnea:  0  Anxiety:  0  Nausea:  0  Depression:  0  Anorexia:  0  Fatigue:  0  Insomnia:  0  Restlessness:  0  Agitation:  0 due to Patient unresponsive     CAM / Delirium:  Negative  Constipation:  Negative  Diarrhea:  Negative    Bowel Management Plan (BMP):  Yes      Pain Assessment in Advanced Demential Scale (PAINAD)   Breathing - Independent of vocalization:  0  Negative vocalization:  0  Facial  expression:  0  Body language:  0  Consolability:  0  Total:  0    Modified Ally Scale:  0    Performance Status:  10    Living Arrangements:  Lives in nursing home    Psychosocial/Cultural: Has one son     Spiritual:  F - Mary Ellen and Belief:  Not addressed       Advance Care Planning   Advance Directives:   Living Will: No    LaPOST: No    Do Not Resuscitate Status: Yes    Medical Power of : No      Decision Making:  Family answered questions       Significant Labs: All pertinent labs within the past 24 hours have been reviewed.  CBC:   Recent Labs   Lab 05/01/22  0555   WBC 11.00   HGB 7.8*   HCT 24.2*   MCV 97   *       BMP:  No results for input(s): GLU, NA, K, CL, CO2, BUN, CREATININE, CALCIUM, MG in the last 24 hours.    LFT:  Lab Results   Component Value Date    AST 18 05/01/2022    ALKPHOS 77 05/01/2022    BILITOT 0.3 05/01/2022     Albumin:   Albumin   Date Value Ref Range Status   05/01/2022 1.7 (L) 3.5 - 5.2 g/dL Final     Protein:   Total Protein   Date Value Ref Range Status   05/01/2022 6.1 6.0 - 8.4 g/dL Final     Lactic acid:   Lab Results   Component Value Date    LACTATE 0.7 03/18/2022    LACTATE 0.9 03/18/2022       Significant Imaging: I have reviewed all pertinent imaging results/findings within the past 24 hours.    X-ray abdomen 4/28/22    Nasogastric tube tip and side hole projects over the expected location of the gastric lumen.  There is vascular calcification.  There is moderate to large amount of stool in the colon.  There may be left pleural effusion.

## 2022-05-02 NOTE — PLAN OF CARE
Problem: Infection  Goal: Absence of Infection Signs and Symptoms  Outcome: Ongoing, Progressing     Problem: Adult Inpatient Plan of Care  Goal: Plan of Care Review  Outcome: Ongoing, Progressing  Goal: Patient-Specific Goal (Individualized)  Description: Admit Date: 4/3/2022    Admit Dx: SDH (subdural hematoma)    Past Medical History:  No date: Gastroparesis  No date: GERD (gastroesophageal reflux disease)  07/24/2021: History of Clostridium difficile colitis  No date: History of kidney stones  No date: History of stroke      Comment:  left side paralysis  No date: Hyperlipidemia  No date: Hypertension  No date: Hypothyroidism  No date: Iron deficiency anemia  No date: Osteoarthritis  No date: Peripheral neuropathy  No date: Stage IV CKD  No date: Type II diabetes mellitus    Past Surgical History:  No date: APPENDECTOMY  10/13/2021: CYSTOSCOPY W/ URETERAL STENT PLACEMENT; Right      Comment:  Procedure: CYSTOSCOPY, WITH URETERAL STENT INSERTION;                 Surgeon: Wanda Arroyo MD;  Location: [facility];                 Service: Urology;  Laterality: Right;  11/23/2021: ESOPHAGOGASTRODUODENOSCOPY; N/A      Comment:  Procedure: EGD (ESOPHAGOGASTRODUODENOSCOPY);  Surgeon:                Obed Jeong MD;  Location: [address]);                 Service: Endoscopy;  Laterality: N/A;  3/24/2022: FOOT AMPUTATION THROUGH METATARSAL; Right      Comment:  Procedure: AMPUTATION, FOOT, PARTIAL 4th and 5th ray;                 Surgeon: Rodrigo Logan DPM;  Location: [address];                 Service: Podiatry;  Laterality: Right;  7/22/2021: LAPAROSCOPIC APPENDECTOMY; N/A      Comment:  Procedure: APPENDECTOMY, LAPAROSCOPIC;  Surgeon: Paulo Calvo Jr., MD;  Location: [facility];  Service: General;                 Laterality: N/A;  1/1/2022: TOE AMPUTATION; Right      Comment:  Procedure: AMPUTATION, TOE;  Surgeon: Genaro Mason DPM;  Location: [facility];  Service:  Podiatry;  Laterality:               Right;  No date: TUBAL LIGATION  12/22/2021: URETEROSCOPIC REMOVAL OF URETERIC CALCULUS; Right      Comment:  Procedure: REMOVAL, CALCULUS, URETER, URETEROSCOPIC;                 Surgeon: Wanda Arroyo MD;  Location: [facility];                 Service: Urology;  Laterality: Right;    Individualization:   1. Please dim lights at night  2. Pt likes to be covered with blankets  3. Pt likes to be moved slowly.    Restraints:   1. Soft right wrist restraint initiated 4/4/22 @ 2138 for pulling of lines. [facility] for DC criteria.            Outcome: Ongoing, Progressing  Goal: Absence of Hospital-Acquired Illness or Injury  Outcome: Ongoing, Progressing  Goal: Optimal Comfort and Wellbeing  Outcome: Ongoing, Progressing  Goal: Readiness for Transition of Care  Outcome: Ongoing, Progressing     Problem: Adjustment to Illness (Stroke, Hemorrhagic)  Goal: Optimal Coping  Outcome: Ongoing, Progressing     Problem: Bowel Elimination Impaired (Stroke, Hemorrhagic)  Goal: Effective Bowel Elimination  Outcome: Ongoing, Progressing     Problem: Cerebral Tissue Perfusion (Stroke, Hemorrhagic)  Goal: Optimal Cerebral Tissue Perfusion  Outcome: Ongoing, Progressing     Problem: Cognitive Impairment (Stroke, Hemorrhagic)  Goal: Optimal Cognitive Function  Outcome: Ongoing, Progressing     Problem: Communication Impairment (Stroke, Hemorrhagic)  Goal: Effective Communication Skills  Outcome: Ongoing, Progressing     Problem: Functional Ability Impaired (Stroke, Hemorrhagic)  Goal: Optimal Functional Ability  Outcome: Ongoing, Progressing     Problem: Pain (Stroke, Hemorrhagic)  Goal: Acceptable Pain Control  Outcome: Ongoing, Progressing     Problem: Respiratory Compromise (Stroke, Hemorrhagic)  Goal: Effective Oxygenation and Ventilation  Outcome: Ongoing, Progressing     Problem: Sensorimotor Impairment (Stroke, Hemorrhagic)  Goal: Improved Sensorimotor Function  Outcome: Ongoing, Progressing      Problem: Swallowing Impairment (Stroke, Hemorrhagic)  Goal: Optimal Eating and Swallowing Without Aspiration  Outcome: Ongoing, Progressing     Problem: Urinary Elimination Impaired (Stroke, Hemorrhagic)  Goal: Effective Urinary Elimination  Outcome: Ongoing, Progressing     Problem: Diabetes Comorbidity  Goal: Blood Glucose Level Within Targeted Range  Outcome: Ongoing, Progressing     Problem: Fluid and Electrolyte Imbalance (Acute Kidney Injury/Impairment)  Goal: Fluid and Electrolyte Balance  Outcome: Ongoing, Progressing     Problem: Oral Intake Inadequate (Acute Kidney Injury/Impairment)  Goal: Optimal Nutrition Intake  Outcome: Ongoing, Progressing     Problem: Renal Function Impairment (Acute Kidney Injury/Impairment)  Goal: Effective Renal Function  Outcome: Ongoing, Progressing     Problem: Impaired Wound Healing  Goal: Optimal Wound Healing  Outcome: Ongoing, Progressing     Problem: Adjustment to Illness (Delirium)  Goal: Optimal Coping  Outcome: Ongoing, Progressing     Problem: Altered Behavior (Delirium)  Goal: Improved Behavioral Control  Outcome: Ongoing, Progressing     Problem: Attention and Thought Clarity Impairment (Delirium)  Goal: Improved Attention and Thought Clarity  Outcome: Ongoing, Progressing     Problem: Sleep Disturbance (Delirium)  Goal: Improved Sleep  Outcome: Ongoing, Progressing     Problem: Skin Injury Risk Increased  Goal: Skin Health and Integrity  Outcome: Ongoing, Progressing     Problem: Fall Injury Risk  Goal: Absence of Fall and Fall-Related Injury  Outcome: Ongoing, Progressing     Problem: Restraint, Nonbehavioral (Nonviolent)  Goal: Absence of Harm or Injury  Outcome: Ongoing, Progressing     Problem: Communication Impairment (Mechanical Ventilation, Invasive)  Goal: Effective Communication  Outcome: Ongoing, Progressing     Problem: Device-Related Complication Risk (Mechanical Ventilation, Invasive)  Goal: Optimal Device Function  Outcome: Ongoing, Progressing      Problem: Inability to Wean (Mechanical Ventilation, Invasive)  Goal: Mechanical Ventilation Liberation  Outcome: Ongoing, Progressing     Problem: Nutrition Impairment (Mechanical Ventilation, Invasive)  Goal: Optimal Nutrition Delivery  Outcome: Ongoing, Progressing     Problem: Skin and Tissue Injury (Mechanical Ventilation, Invasive)  Goal: Absence of Device-Related Skin and Tissue Injury  Outcome: Ongoing, Progressing     Problem: Ventilator-Induced Lung Injury (Mechanical Ventilation, Invasive)  Goal: Absence of Ventilator-Induced Lung Injury  Outcome: Ongoing, Progressing     Problem: Communication Impairment (Artificial Airway)  Goal: Effective Communication  Outcome: Ongoing, Progressing     Problem: Device-Related Complication Risk (Artificial Airway)  Goal: Optimal Device Function  Outcome: Ongoing, Progressing     Problem: Skin and Tissue Injury (Artificial Airway)  Goal: Absence of Device-Related Skin or Tissue Injury  Outcome: Ongoing, Progressing

## 2022-05-02 NOTE — PROGRESS NOTES
[facility] - Intensive Care ([address] Medicine  Progress Note    Patient Name: Beatriz Adame  MRN: [id number]  Patient Class: IP- Inpatient   Admission Date: 4/3/2022  Length of Stay: 29 days  Attending Physician: Tirso Springer MD  Primary Care Provider: Dante Olivier MD        Subjective:     Principal Problem:SDH (subdural hematoma)        HPI:   Ms. Adame is a 72 yo female with hx of dementia, R MCA stroke with left side contraction/hemiparesis presented to [facility] with SDH now s/p evac. Stepped down from [facility]. On 4/13 she developed worsening headache and CTH showed re-accumulation of R SDH and 4-5mm MLS. She was noted to be more slow to follow commands. Stepped back up to [facility] for closer monitoring.     Original HPI below:  Ms. Adame is a 72 y/o F with a PMHx of baseline dementia, GERD, R MCA stroke with residual LUE weakness, HLD, HTN, Stage IV CKD, TIIDM who presents to [facility] from [facility] with a AoC SDH after an unwitnessed fall from her wheel chair without loss of consciousness. CT spine negative for fracture. CT head with R SDH and 3-4 mm MLS. She is on DAPT at home. Of note, she has been staying at [facility] after a partial R foot amputation, stitches still intact, for which she is receiving vancomycin.       Overview/Hospital Course:  Ms. Adame was admitted to the NICU for management of a SDH. Intubated on admission, on precedex. Went for clot evac on 4/4. Extubated on 4/7. EEG revealed seizure on 4/14, loaded with Vimpat. Additional seizure activity noted on 4/18. Went for repeat evac on 4/20. Occlusive brachial thrombus noted in PICC line on 4/23. Underwent MMA embolization on 4/26. SD to  on 4/28.    As a result of her extensive neurologic injury her mental status remained very poor. Opens eyes spontaneously but non-verbal, minimally responsive to physical stimuli. Araiza placed d/t persistent urinary retention. Received nutrition through NGT. Discussed with pt's  son who wished to make pt DNR and pursue hospice care. Palliative care consulted.      Interval History:   NAEON. Remains unresponsive. Oxygen requiring increasing to 4L.    Review of Systems   Reason unable to perform ROS: pt non-verbal.   Objective:     Vital Signs (Most Recent):  Temp: 98.1 °F (36.7 °C) (05/02/22 1107)  Pulse: 92 (05/02/22 1400)  Resp: (!) 26 (05/02/22 1400)  BP: 124/67 (05/02/22 1107)  SpO2: (!) 90 % (05/02/22 1400)   Vital Signs (24h Range):  Temp:  [97.6 °F (36.4 °C)-98.7 °F (37.1 °C)] 98.1 °F (36.7 °C)  Pulse:  [84-97] 92  Resp:  [18-27] 26  SpO2:  [90 %-99 %] 90 %  BP: (113-147)/(66-91) 124/67     Weight: 49.4 kg (108 lb 14.5 oz)  Body mass index is 17.06 kg/m².    Intake/Output Summary (Last 24 hours) at 5/2/2022 1503  Last data filed at 5/2/2022 0400  Gross per 24 hour   Intake 540 ml   Output 1000 ml   Net -460 ml      Physical Exam  Constitutional:       General: She is not in acute distress.     Appearance: She is not ill-appearing.   HENT:      Head:      Comments: Pterional craniotomy on right    Eyes:      Extraocular Movements: Extraocular movements intact.      Pupils: Pupils are equal, round, and reactive to light.   Cardiovascular:      Rate and Rhythm: Normal rate and regular rhythm.      Heart sounds: Normal heart sounds.   Pulmonary:      Breath sounds: Normal breath sounds.   Abdominal:      Palpations: Abdomen is soft.   Musculoskeletal:      Cervical back: Neck supple.   Skin:     General: Skin is warm.   Neurological:      Mental Status: She is alert.      Comments: Opens eyes spontaneously but does not respond to any verbal or physical stimuli. Non-verbal.  Left spastic hemiplegia  Right side bradykinetic but withdraws with increased flexion tone in arm and leg       Significant Labs: All pertinent labs within the past 24 hours have been reviewed.    Significant Imaging: I have reviewed all pertinent imaging results/findings within the past 24 hours.      Assessment/Plan:       * SDH (subdural hematoma)  S/p craniotomy with evacuation on 4/4 and repeat evac on 4/20 and MMA embolization on 4/26  Neuro recovery has been poor, prognosis poor, son open to hospice, assistance from palliative appreciated  Has staples to be removed on 5/4 (by NSGY if still admitted)  Continue TF for now. Son is deciding about PEG tube    Encephalopathy        Recurrent seizures  Now stable on Lacosamide and Keppra    Critical lower limb ischemia        Osteomyelitis of right foot  R foot osteomyelitis with bone bx +MRSA, ID following peripherally   ID rec 6wk course of Vanc, with end  Date 5/5/2022, pharmacy to dose vanc inpatient    Acute on chronic anemia  This is a patient with a long history of chronic anemia. Slow down trending since admission likely 2/2 to surgery, acute illness, and phlebotomy. Patient is asymptomatic, agree with transfusion for hgb<7. No signs of bleeding. Hemodynamically stable. Labs consistent with anemia of chronic disease and acute blood loss.   - agree with transfusion for hgb<7 or active bleeding      Goals of care, counseling/discussion  Discussed prognosis with pt's son on 4/29 and low likelihood of meaningful recovery. He would like her to be DNR and is open to discussing hospice care. Unsure whether he wants a PEG tube placed.  - DNR  - Palliative care consult placed  - Will need NH placement. Son undecided about hospice care    Debility        Urinary retention  - Araiza removed 4/27, failed voiding trial, replaced on 4/28  - Continue silodosin    Hypothyroidism  Continue home levothyroxine      Severe protein-calorie malnutrition  Nutrition following, most recent recs below from 4/12 (I ordered boost pudding BID and Frederic BID x14d per their recs)       1. Suggest Diabetic 2000 kcal diet with texture per SLP     2. Consider Boost Pudding BID to increase PO intake      3. Add Frederic BID x 14 days to aid in wound healing     4. If TF warranted, recommend TF of Isosource  advancing as tolerated to goal rate of 45 mL/hr to provide 1620 kcal, 73 gm protein, and 825 mL free fluid     5. RD following          History of stroke  Baseline LUE and LLE weakness s/p stroke  Was on ASA/Plavix, but stopped on admit 2/2 SDH    Acute kidney injury superimposed on CKD stage IV  CKD with previous baseline high 1s-mid 2s, but Cr mid 1s recently  Pt with some retention, but still able to void with prompting, has purewick in place  Strict I/Os q4h - discussed with RN  Encourage po hydration  Trend renal function  Renally dose medications and avoid nephrotoxins  Maintain hgb>7 and MAP>65    Type 2 diabetes mellitus, with long-term current use of insulin  - Accuchecks ACHS with SSI while inpatient, not needing any long-acting insulin      Hyperlipidemia  Continue statin.       Hypertension  Continue home amlodipine, losartan. Coreg held on admission.      VTE Risk Mitigation (From admission, onward)         Ordered     heparin (porcine) injection 5,000 Units  Every 8 hours         04/24/22 0853     IP VTE HIGH RISK PATIENT  Once         04/03/22 1608     Place sequential compression device  Until discontinued         04/03/22 1608     Reason for No Pharmacological VTE Prophylaxis  Once        Question:  Reasons:  Answer:  Active Bleeding    04/03/22 1608                Discharge Planning   MARY: 5/2/2022     Code Status: DNR   Is the patient medically ready for discharge?: Yes    Reason for patient still in hospital (select all that apply): Pending disposition  Discharge Plan A: Long-term acute care facility (LTAC)   Discharge Delays: None known at this time              Tirso Springer MD  Department of Hospital Medicine   [facility] - Intensive Care ([place]-[facility])

## 2022-05-02 NOTE — PROGRESS NOTES
[facility] - Intensive Care ([facility]-)  Adult Nutrition  Consult Note    SUMMARY     Recommendations    1. Modify current TF formula to Isosource 1.5. As tolerated, increase to goal rate of 45 mL/hr - 1620 kcals, 73 g of protein, 825 mL fluid.  2. RD to monitor & follow-up.    Goals: receive nutrition by RD follow-up  Nutrition Goal Status: goal met  Communication of RD Recs:  (POC)    Assessment and Plan    Severe protein-calorie malnutrition    Nutrition Problem:  Severe Protein-Calorie Malnutrition  Malnutrition in the context of Chronic Illness/Injury     Related to (etiology):  Decreased ability to feed self     Signs and Symptoms (as evidenced by):  Energy Intake: <75% of estimated energy requirement for 3 months  Body Fat Depletion: moderate and severe depletion of orbitals, triceps and thoracic and lumbar region   Muscle Mass Depletion: moderate and severe depletion of temples, clavicle region, scapular region, interosseous muscle and lower extremities   Weight Loss: 20% x 6 months      Interventions (treatment strategy):  Collaboration of nutrition care with other providers     Nutrition Diagnosis Status:      Continues     Malnutrition Assessment    Malnutrition Type: chronic illness  Energy Intake: severe energy intake    Weight Loss (Malnutrition): greater than 10% in 6 months  Energy Intake (Malnutrition): less than or equal to 75% for greater than or equal to 1 month   Orbital Region (Subcutaneous Fat Loss): moderate depletion  Upper Arm Region (Subcutaneous Fat Loss): severe depletion   Alvin Region (Muscle Loss): moderate depletion  Clavicle Bone Region (Muscle Loss): severe depletion  Clavicle and Acromion Bone Region (Muscle Loss): severe depletion  Patellar Region (Muscle Loss): severe depletion  Anterior Thigh Region (Muscle Loss): severe depletion  Posterior Calf Region (Muscle Loss): severe depletion   Subcutaneous Fat Loss (Final Summary): severe protein-calorie malnutrition  Muscle Loss  "Evaluation (Final Summary): severe protein-calorie malnutrition      Reason for Assessment    Reason For Assessment: RD follow-up  Diagnosis:  (SDH)  Relevant Medical History: baseline dementia, GERD, R MCA stroke with residual LUE weakness, HLD, HTN, Stage IV CKD, TIIDM  Interdisciplinary Rounds: did not attend    General Information Comments: Remains NPO, tolerating TFs via NGT. NFPE updated 4/21; pt meets continues with severe malnutrition in the context of chronic illness. Please see PES statement for details.  Nutrition Discharge Planning: pending medical course    Nutrition/Diet History    Spiritual, Cultural Beliefs, Anabaptism Practices, Values that Affect Care: no  Food Allergies:  (tomato)  Factors Affecting Nutritional Intake: NPO    Anthropometrics    Temp: 98.1 °F (36.7 °C)  Height: 5' 7" (170.2 cm)  Height (inches): 67 in  Weight Method: Bed Scale  Weight: 49.4 kg (108 lb 14.5 oz)  Weight (lb): 108.91 lb  Ideal Body Weight (IBW), Female: 135 lb  % Ideal Body Weight, Female (lb): 80.67 %  BMI (Calculated): 17.1  BMI Grade: 17 - 18.4 protein-energy malnutrition grade I    Lab/Procedures/Meds    Pertinent Labs Reviewed: reviewed  Pertinent Labs Comments: BUN 47, GFR 58.8  Pertinent Medications Reviewed: reviewed  Pertinent Medications Comments: -    Estimated/Assessed Needs    Weight Used For Calorie Calculations: 49.4 kg (108 lb 14.5 oz)     Energy Calorie Requirements (kcal): 0096-6630 kcal/d  Energy Need Method: Kcal/kg (30-35)     Protein Requirements: 59-74 g/d (1.2-1.5 g/kg)  Weight Used For Protein Calculations: 49.4 kg (108 lb 14.5 oz)     Fluid Requirements (mL): 1 mL/kcal or per MD  RDA Method (mL): 1482     CHO Requirement: 185-216 g/day    Nutrition Prescription Ordered    Current Diet Order: NPO  Current Nutrition Support Formula Ordered: Impact Peptide 1.5  Current Nutrition Support Rate Ordered: 30 mL/hr    Evaluation of Received Nutrient/Fluid Intake    Enteral Calories (kcal): " 1080  Enteral Protein (gm): 68  Enteral (Free Water) Fluid (mL): 554    % Kcal Needs: 73%  % Protein Needs: 100%    I/O: +5L since 4/18    Energy Calories Required: not meeting needs  Protein Required: meeting needs  Fluid Required: other (see comments) (Per MD)    Comments: LBM: 4/30    Tolerance: tolerating    Nutrition Risk    Level of Risk/Frequency of Follow-up: (1x/week)     Monitor and Evaluation    Food and Nutrient Intake: energy intake, enteral nutrition intake  Food and Nutrient Adminstration: enteral and parenteral nutrition administration  Knowledge/Beliefs/Attitudes: food and nutrition knowledge/skill  Physical Activity and Function: nutrition-related ADLs and IADLs  Anthropometric Measurements: weight, weight change  Biochemical Data, Medical Tests and Procedures: electrolyte and renal panel, gastrointestinal profile, glucose/endocrine profile, inflammatory profile, lipid profile  Nutrition-Focused Physical Findings: overall appearance     Nutrition Follow-Up    RD Follow-up?: Yes

## 2022-05-02 NOTE — PROGRESS NOTES
[facility] - Intensive Care ([place] [address])  Palliative Medicine  Progress Note    Patient Name: Beatriz Adame  MRN: [id number]  Admission Date: 4/3/2022  Hospital Length of Stay: 29 days  Code Status: DNR   Attending Provider: Tirso Springer MD  Consulting Provider: Maryam Schuster MD  Primary Care Physician: Dante Olivier MD  Principal Problem:SDH (subdural hematoma)    Patient information was obtained from relative(s) and primary team.      Assessment/Plan:     Palliative care encounter  71 year old female with prolonged hospitalization for SDH. Getting tube feeds through NG. Palliative consulted for GO    Code status: DNR     Surrogate decision maker: Alysa Adame [phone]    GOC/ACP  5/2 Met with patient's son and family at bedside. Discussed that we do not expect patient to recover meaningfully. Son talked about how his mother said she never wanted to be dependent on life support. He expressed understanding that artificial nutrition is life support. I recommended that we free patient from life support in accordance with her wishes and allow her to have a natural and peaceful death with the support of hospice. Recommended against PEG. Discussed comfort feeds if patient expresses any interest. Son says that his mother will definitely need placement. He would like to continue to discuss PEG vs transition to comfort focused care with his family     -Son remains undecided about PEG vs transitioning to comfort focused care with hospice. Either way patient will need nursing home placement     4/29  -Spoke with patient's son. Introduced palliative medicine. Discussed patient's diagnosis and lack of progress throughout this prolonged hospitalization. Informed son that patient previously spoke with our team back in October and had said that she did not want to be resuscitated in the event of a cardiac and respiratory arrest. Son said he was not surprised as they had had conversations about her not wanting to be  on life support. Discussed that artificial nutrition is a form of life support and I would not recommend PEG placement / continuing tube feeds. Son expressed understanding. Made plans to meet on Monday at 2pm to discuss further with other family present       Discussed with Dr. Springer         I will follow-up with patient. Please contact us if you have any additional questions.    Subjective:     Chief Complaint:   Chief Complaint   Patient presents with    Fall     Pt arrives from [facility]. Pt had a witnessed fall out of her wheelchair. Confused at baseline. Negative for LOC; pt on blood thinners.        HPI:   70 y/o F with a PMHx of baseline dementia, GERD, R MCA stroke with residual LUE weakness, HLD, HTN, Stage IV CKD, TIIDM who presents to [facility] from [facility] with a AoC SDH after an unwitnessed fall from her wheel chair. Patient is s/p evacuation and has had a prolonged hospital course without meaningful recovery. Patient is receiving artificial nutrition through a NG. Palliative [facility] on day [facility]      Hospital Course:  No notes on file    Interval Hx: No change in patient's mental status. Continues to receive tube feeds through NG. Patient does not interact. Son, daughter in law and granddaughter at bedside     Past Medical History:   Diagnosis Date    Gastroparesis     GERD (gastroesophageal reflux disease)     History of Clostridium difficile colitis 07/24/2021    History of kidney stones     History of stroke     left side paralysis    Hyperlipidemia     Hypertension     Hypothyroidism     Iron deficiency anemia     Osteoarthritis     Peripheral neuropathy     Stage IV CKD     Type II diabetes mellitus        Past Surgical History:   Procedure Laterality Date    APPENDECTOMY      CRANIOTOMY FOR EVACUATION OF SUBDURAL HEMATOMA Right 4/4/2022    Procedure: CRANIOTOMY, FOR SUBDURAL HEMATOMA EVACUATION;  Surgeon: Silvio White MD;  Location: [facility] OR [address] [place] [address];   Service: Neurosurgery;  Laterality: Right;    CRANIOTOMY FOR EVACUATION OF SUBDURAL HEMATOMA Right 4/20/2022    Procedure: CRANIOTOMY, FOR SUBDURAL HEMATOMA EVACUATION;  Surgeon: Jose Walter MD;  Location: [facility] OR 2ND FLR;  Service: Neurosurgery;  Laterality: Right;    CYSTOSCOPY W/ URETERAL STENT PLACEMENT Right 10/13/2021    Procedure: CYSTOSCOPY, WITH URETERAL STENT INSERTION;  Surgeon: Wanda Arroyo MD;  Location: [facility];  Service: Urology;  Laterality: Right;    ESOPHAGOGASTRODUODENOSCOPY N/A 11/23/2021    Procedure: EGD (ESOPHAGOGASTRODUODENOSCOPY);  Surgeon: Obed Jeong MD;  Location: [facility] ENDO (2ND FLR);  Service: Endoscopy;  Laterality: N/A;    FOOT AMPUTATION THROUGH METATARSAL Right 3/24/2022    Procedure: AMPUTATION, FOOT, PARTIAL 4th and 5th ray;  Surgeon: Rodrigo Logan DPM;  Location: [facility] OR 2ND FLR;  Service: Podiatry;  Laterality: Right;    LAPAROSCOPIC APPENDECTOMY N/A 7/22/2021    Procedure: APPENDECTOMY, LAPAROSCOPIC;  Surgeon: Paulo Calvo Jr., MD;  Location: [facility];  Service: General;  Laterality: N/A;    TOE AMPUTATION Right 1/1/2022    Procedure: AMPUTATION, TOE;  Surgeon: Genaro Mason DPM;  Location: [facility];  Service: Podiatry;  Laterality: Right;    TUBAL LIGATION      URETEROSCOPIC REMOVAL OF URETERIC CALCULUS Right 12/22/2021    Procedure: REMOVAL, CALCULUS, URETER, URETEROSCOPIC;  Surgeon: Wanda Arroyo MD;  Location: [facility];  Service: Urology;  Laterality: Right;       Review of patient's allergies indicates:   Allergen Reactions    Tomato (solanum lycopersicum) Hives and Itching       Medications:  Continuous Infusions:  Scheduled Meds:   acetylcysteine 100 mg/ml (10%)  4 mL Nebulization Q6H    albuterol-ipratropium  3 mL Nebulization Q6H    amLODIPine  10 mg Per NG tube Daily    atorvastatin  40 mg Per NG tube Daily    heparin (porcine)  5,000 Units Subcutaneous Q8H    lacosamide  250 mg Per NG tube Q12H    levetiracetam  1,500 mg  Per NG tube BID    levothyroxine  75 mcg Per NG tube Before breakfast    losartan  25 mg Per NG tube Daily    senna-docusate 8.6-50 mg  1 tablet Per NG tube BID    silodosin  4 mg Per NG tube Daily     PRN Meds:acetaminophen, dextrose 10%, glucagon (human recombinant), hydrALAZINE, HYDROcodone-acetaminophen, insulin aspart U-100, iodixanoL, labetalol, metoclopramide HCl, ondansetron, potassium bicarbonate, potassium bicarbonate, potassium bicarbonate, potassium, sodium phosphates, potassium, sodium phosphates, potassium, sodium phosphates, sodium chloride 0.9%, sodium chloride 0.9%, sodium chloride 0.9%, Pharmacy to dose Vancomycin consult **AND** vancomycin - pharmacy to dose    Family History       Problem Relation (Age of Onset)    Alcohol abuse Father    Arthritis Mother    Asthma Brother    Diabetes Mother, Sister, Brother    Heart attack Mother, Father, Brother    Heart disease Mother, Brother    Hypertension Mother    Kidney disease Mother    Stroke Mother    Vision loss Mother          Tobacco Use    Smoking status: Never Smoker    Smokeless tobacco: Never Used   Substance and Sexual Activity    Alcohol use: No     Comment: socially    Drug use: No    Sexual activity: Not Currently       Review of Systems   Unable to perform ROS: Patient unresponsive   Objective:     Vital Signs (Most Recent):  Temp: 98.1 °F (36.7 °C) (05/02/22 1107)  Pulse: 97 (05/02/22 1532)  Resp: (!) 26 (05/02/22 1400)  BP: 124/67 (05/02/22 1107)  SpO2: (!) 90 % (05/02/22 1400)   Vital Signs (24h Range):  Temp:  [97.6 °F (36.4 °C)-98.7 °F (37.1 °C)] 98.1 °F (36.7 °C)  Pulse:  [84-99] 97  Resp:  [18-27] 26  SpO2:  [90 %-99 %] 90 %  BP: (113-147)/(66-91) 124/67     Weight: 49.4 kg (108 lb 14.5 oz)  Body mass index is 17.06 kg/m².    Physical Exam  Vitals and nursing note reviewed.   Constitutional:       General: She is not in acute distress.     Appearance: She is ill-appearing.      Comments: Unresponsive   HENT:      Head:       Comments: Staples from evac     Right Ear: External ear normal.      Left Ear: External ear normal.      Nose:      Comments: NG in place     Mouth/Throat:      Mouth: Mucous membranes are dry.   Eyes:      Comments: Eyes remained closed    Cardiovascular:      Rate and Rhythm: Normal rate.   Pulmonary:      Effort: Pulmonary effort is normal. No respiratory distress.   Abdominal:      General: There is no distension.   Musculoskeletal:      Cervical back: Normal range of motion.      Comments: Contracted    Neurological:      Comments: unresponsive   Psychiatric:         Cognition and Memory: Cognition is impaired. Memory is impaired.       Review of Symptoms      Symptom Assessment (ESAS 0-10 Scale)  Pain:  0  Dyspnea:  0  Anxiety:  0  Nausea:  0  Depression:  0  Anorexia:  0  Fatigue:  0  Insomnia:  0  Restlessness:  0  Agitation:  0 due to Patient unresponsive     CAM / Delirium:  Negative  Constipation:  Negative  Diarrhea:  Negative    Bowel Management Plan (BMP):  Yes      Pain Assessment in Advanced Demential Scale (PAINAD)   Breathing - Independent of vocalization:  0  Negative vocalization:  0  Facial expression:  0  Body language:  0  Consolability:  0  Total:  0    Modified Ally Scale:  0    Performance Status:  10    Living Arrangements:  Lives in nursing home    Psychosocial/Cultural: Has one son     Spiritual:  F - Mary Ellen and Belief:  Not addressed       Advance Care Planning   Advance Directives:   Living Will: No    LaPOST: No    Do Not Resuscitate Status: Yes    Medical Power of : No      Decision Making:  Family answered questions       Significant Labs: All pertinent labs within the past 24 hours have been reviewed.  CBC:   Recent Labs   Lab 05/01/22  0555   WBC 11.00   HGB 7.8*   HCT 24.2*   MCV 97   *       BMP:  No results for input(s): GLU, NA, K, CL, CO2, BUN, CREATININE, CALCIUM, MG in the last 24 hours.    LFT:  Lab Results   Component Value Date    AST 18 05/01/2022     ALKPHOS 77 05/01/2022    BILITOT 0.3 05/01/2022     Albumin:   Albumin   Date Value Ref Range Status   05/01/2022 1.7 (L) 3.5 - 5.2 g/dL Final     Protein:   Total Protein   Date Value Ref Range Status   05/01/2022 6.1 6.0 - 8.4 g/dL Final     Lactic acid:   Lab Results   Component Value Date    LACTATE 0.7 03/18/2022    LACTATE 0.9 03/18/2022       Significant Imaging: I have reviewed all pertinent imaging results/findings within the past 24 hours.    X-ray abdomen 4/28/22    Nasogastric tube tip and side hole projects over the expected location of the gastric lumen.  There is vascular calcification.  There is moderate to large amount of stool in the colon.  There may be left pleural effusion.        Maryam Schuster MD  Palliative Medicine  [facility] - Intensive Care ([place] [address])      > 50% of 35  min visit spent in chart review, face to face discussion of goals of care,  symptom assessment, coordination of care and emotional support

## 2022-05-02 NOTE — NURSING
Patient is non responsive verbally but does open eyes when touched or moved, vitals stable, IKER midline currently has vancomycin running at 250 ml/hr, NG tube to left nare, prevalon boots on bilateral feet, dressing on right foot dated 4/29/22, should be changed on 5/2/22, delgado care complete, tube feedings running at 30 ml/hr, water flushes at 100cc every 4 hours completed, patient on 1LNC, BS was 170 at 5:00am and gave 1 unit of insulin, call bell and tray table within reach of the patient, will continue to monitor.

## 2022-05-02 NOTE — ASSESSMENT & PLAN NOTE
S/p craniotomy with evacuation on 4/4 and repeat evac on 4/20 and MMA embolization on 4/26  Neuro recovery has been poor, prognosis poor, son open to hospice, assistance from palliative appreciated  Has staples to be removed on 5/4 (by NSGY if still admitted)  Continue TF for now. Son is deciding about PEG tube

## 2022-05-02 NOTE — PLAN OF CARE
NURSING HOME ORDERS    05/02/2022  [facility]  [facility] - INTENSIVE C[facility] ([address] [place])  [address]  [facility] [address]  Dept: [phone]  Loc: [phone]     Admit to Nursing Home:      Diagnoses:  Active Hospital Problems    Diagnosis  POA    *SDH (subdural hematoma) [S06.5X9A]  Yes    Palliative care encounter [Z51.5]  Not Applicable    Encephalopathy [G93.40]  Yes    Recurrent seizures [G40.909]  No    Body mass index (BMI) less than 19 [Z68.1]  Not Applicable    Critical lower limb ischemia [I70.229]  Yes    Osteomyelitis of right foot [M86.9]  Yes    Status post partial amputation of right foot [Z89.431]  Not Applicable    Acute on chronic anemia [D64.9]  Yes    Debility [R53.81]  Yes    Urinary retention [R33.9]  Yes    Hypothyroidism [E03.9]  Yes    Severe protein-calorie malnutrition [E43]  Yes    Goals of care, counseling/discussion [Z71.89]  Not Applicable    History of stroke [Z86.73]  Not Applicable     Chronic    Type 2 diabetes mellitus, with long-term current use of insulin [E11.9, Z79.4]  Not Applicable    Hypertension [I10]  Yes    Hyperlipidemia [E78.5]  Yes      Resolved Hospital Problems    Diagnosis Date Resolved POA    Fall [W19.XXXA] 04/29/2022 Yes    Fever [R50.9] 04/28/2022 Unknown    Elevated troponin I level [R77.8] 04/16/2022 Yes       Code Status: DNR    Patient is homebound due to:  SDH (subdural hematoma)    Allergies:  Review of patient's allergies indicates:   Allergen Reactions    Tomato (solanum lycopersicum) Hives and Itching       Vitals:  Routine    Diet: NPO  Tube feeds: Isosource 1.5 Bentley, goal rate 45/hr via NGT,  cc q4h    Activities:   Activity as tolerated    Labs:  n/a    Nursing Precautions:  Seizure    Consults:   Nutrition to evaluate and recommend diet     Miscellaneous Care: Araiza Care: Empty Araiza bag every shift.  Change Araiza every month  Routine Skin for Bedridden Patients:  Apply moisture  barrier cream to all                   Diabetes Care: n/a      Medications: Discontinue all previous medication orders, if any. See new list below.     Medication List      ASK your doctor about these medications    amLODIPine 5 MG tablet  Commonly known as: NORVASC  Take 10 mg by mouth every evening. Take 5 mg every morning and 10 mg at night     ascorbic acid (vitamin C) 500 MG tablet  Commonly known as: VITAMIN C  Take 1 tablet (500 mg total) by mouth once daily.     aspirin 81 MG EC tablet  Commonly known as: ECOTRIN  Take 81 mg by mouth once daily.     b complex vitamins tablet  Take 1 tablet by mouth once daily.     BASAGLAR KWIKPEN U-100 INSULIN glargine 100 units/mL (3mL) SubQ pen  Generic drug: insulin  Inject 5 Units into the skin once daily.     carvediloL 25 MG tablet  Commonly known as: COREG  Take 1 tablet (25 mg total) by mouth 2 (two) times daily.     clopidogreL 75 mg tablet  Commonly known as: PLAVIX  Take 75 mg by mouth.     DULoxetine 30 MG capsule  Commonly known as: CYMBALTA  Take 1 capsule (30 mg total) by mouth once daily.     ferrous sulfate 325 mg (65 mg iron) Tab tablet  Commonly known as: FEOSOL  Take 325 mg by mouth daily with breakfast. Off at present     folic acid 1 MG tablet  Commonly known as: FOLVITE  Take 1 tablet (1 mg total) by mouth once daily.     gabapentin 300 MG capsule  Commonly known as: NEURONTIN  Take 1 capsule (300 mg total) by mouth once daily.     Lactobacillus rhamnosus GG 10 billion cell capsule  Commonly known as: CULTURELLE  Take 1 capsule by mouth once daily.     lactulose 10 gram/15 mL solution  Commonly known as: CHRONULAC  Take 10 g by mouth 2 (two) times a day.     levothyroxine 75 MCG tablet  Commonly known as: SYNTHROID  Take 75 mcg by mouth once daily.     losartan 25 MG tablet  Commonly known as: COZAAR  Take 25 mg by mouth once daily at 6am.     metoclopramide HCl 10 MG tablet  Commonly known as: REGLAN  Take 1 tablet (10 mg total) by mouth 3 (three)  times daily before meals.     MICROZIDE ORAL  Take 20 mg by mouth once daily at 6am.     omeprazole 20 MG capsule  Commonly known as: PRILOSEC  Take 20 mg by mouth 2 (two) times daily.     ondansetron 4 MG tablet  Commonly known as: ZOFRAN  Take 1 tablet (4 mg total) by mouth every 8 (eight) hours as needed for Nausea.     oxyCODONE 5 MG immediate release tablet  Commonly known as: ROXICODONE  Take 1 tablet (5 mg total) by mouth every 6 (six) hours as needed for Pain.  Ask about: Should I take this medication?     rosuvastatin 40 MG Tab  Commonly known as: CRESTOR  Take 40 mg by mouth every evening.     tamsulosin 0.4 mg Cap  Commonly known as: FLOMAX  Take 1 capsule (0.4 mg total) by mouth once daily.     traZODone 100 MG tablet  Commonly known as: DESYREL  Take 1 tablet (100 mg total) by mouth once daily.              Immunizations Administered as of 5/2/2022     Name Date Dose VIS Date Route Exp Date    COVID-19, vector-nr, rS-Ad26 (J&J) 3/6/2021 0.5 mL -- -- --    Lot: [id number]     External: Auto Reconciled From Outside Source               _________________________________  Tirso Springer MD  05/02/2022

## 2022-05-03 NOTE — SUBJECTIVE & OBJECTIVE
Interval History:   NAEON. Pt diuresing with IV lasix, weaned down to 1L NC. Remains unresponsive to verbal stimuli, minimally responsive to tactile stimuli.    Review of Systems   Reason unable to perform ROS: pt non-verbal.   Objective:     Vital Signs (Most Recent):  Temp: 98 °F (36.7 °C) (05/03/22 0430)  Pulse: 90 (05/03/22 1130)  Resp: (!) 26 (05/03/22 1115)  BP: (!) 96/51 (05/03/22 1115)  SpO2: (!) 92 % (05/03/22 1130)   Vital Signs (24h Range):  Temp:  [97.9 °F (36.6 °C)-98 °F (36.7 °C)] 98 °F (36.7 °C)  Pulse:  [77-99] 90  Resp:  [18-26] 26  SpO2:  [90 %-99 %] 92 %  BP: ()/(51-76) 96/51     Weight: 49.4 kg (108 lb 14.5 oz)  Body mass index is 17.06 kg/m².    Intake/Output Summary (Last 24 hours) at 5/3/2022 1253  Last data filed at 5/3/2022 0500  Gross per 24 hour   Intake 1105 ml   Output 1400 ml   Net -295 ml      Physical Exam  Constitutional:       General: She is not in acute distress.     Appearance: She is not ill-appearing.   HENT:      Head:      Comments: Pterional craniotomy on right    Eyes:      Extraocular Movements: Extraocular movements intact.      Pupils: Pupils are equal, round, and reactive to light.   Cardiovascular:      Rate and Rhythm: Normal rate and regular rhythm.      Heart sounds: Normal heart sounds.   Pulmonary:      Breath sounds: Normal breath sounds.   Abdominal:      Palpations: Abdomen is soft.   Musculoskeletal:      Cervical back: Neck supple.   Skin:     General: Skin is warm.   Neurological:      Mental Status: She is alert.      Comments: Opens eyes spontaneously but does not respond to any verbal or physical stimuli. Non-verbal.  Left spastic hemiplegia  Right side bradykinetic but withdraws with increased flexion tone in arm and leg       Significant Labs: All pertinent labs within the past 24 hours have been reviewed.    Significant Imaging: I have reviewed all pertinent imaging results/findings within the past 24 hours.

## 2022-05-03 NOTE — PROGRESS NOTES
[facility] - Intensive Care ([address] Medicine  Progress Note    Patient Name: Beatriz Adame  MRN: [id number]  Patient Class: IP- Inpatient   Admission Date: 4/3/2022  Length of Stay: 30 days  Attending Physician: Tirso Springer MD  Primary Care Provider: Dante Olivier MD        Subjective:     Principal Problem:SDH (subdural hematoma)        HPI:   Ms. Adame is a 70 yo female with hx of dementia, R MCA stroke with left side contraction/hemiparesis presented to [facility] with SDH now s/p evac. Stepped down from [facility]. On 4/13 she developed worsening headache and CTH showed re-accumulation of R SDH and 4-5mm MLS. She was noted to be more slow to follow commands. Stepped back up to [facility] for closer monitoring.     Original HPI below:  Ms. Adame is a 72 y/o F with a PMHx of baseline dementia, GERD, R MCA stroke with residual LUE weakness, HLD, HTN, Stage IV CKD, TIIDM who [facility] AoC SDH after an unwitnessed fall from her wheel chair without loss of consciousness. CT spine negative for fracture. CT head with R SDH and 3-4 mm MLS. She is on DAPT at home. Of note, she has been staying at [facility] after a partial R foot amputation, stitches still intact, for which she is receiving vancomycin.       Overview/Hospital Course:  Ms. Adame was admitted to the NICU for management of a SDH. Intubated on admission, on precedex. Went for clot evac on 4/4. Extubated on 4/7. EEG revealed seizure on 4/14, loaded with Vimpat. Additional seizure activity noted on 4/18. Went for repeat evac on 4/20. Occlusive brachial thrombus noted in PICC line on 4/23. Underwent MMA embolization on 4/26. SD to  on 4/28.    As a result of her extensive neurologic injury her mental status remained very poor. Opens eyes spontaneously but non-verbal, minimally responsive to physical stimuli. Araiza placed d/t persistent urinary retention. Received nutrition through NGT. Discussed with pt's  son who wished to make pt DNR and pursue hospice care. Palliative care consulted.      Interval History:   NAEON. Pt diuresing with IV lasix, weaned down to 1L NC. Remains unresponsive to verbal stimuli, minimally responsive to tactile stimuli.    Review of Systems   Reason unable to perform ROS: pt non-verbal.   Objective:     Vital Signs (Most Recent):  Temp: 98 °F (36.7 °C) (05/03/22 0430)  Pulse: 90 (05/03/22 1130)  Resp: (!) 26 (05/03/22 1115)  BP: (!) 96/51 (05/03/22 1115)  SpO2: (!) 92 % (05/03/22 1130)   Vital Signs (24h Range):  Temp:  [97.9 °F (36.6 °C)-98 °F (36.7 °C)] 98 °F (36.7 °C)  Pulse:  [77-99] 90  Resp:  [18-26] 26  SpO2:  [90 %-99 %] 92 %  BP: ()/(51-76) 96/51     Weight: 49.4 kg (108 lb 14.5 oz)  Body mass index is 17.06 kg/m².    Intake/Output Summary (Last 24 hours) at 5/3/2022 1253  Last data filed at 5/3/2022 0500  Gross per 24 hour   Intake 1105 ml   Output 1400 ml   Net -295 ml      Physical Exam  Constitutional:       General: She is not in acute distress.     Appearance: She is not ill-appearing.   HENT:      Head:      Comments: Pterional craniotomy on right    Eyes:      Extraocular Movements: Extraocular movements intact.      Pupils: Pupils are equal, round, and reactive to light.   Cardiovascular:      Rate and Rhythm: Normal rate and regular rhythm.      Heart sounds: Normal heart sounds.   Pulmonary:      Breath sounds: Normal breath sounds.   Abdominal:      Palpations: Abdomen is soft.   Musculoskeletal:      Cervical back: Neck supple.   Skin:     General: Skin is warm.   Neurological:      Mental Status: She is alert.      Comments: Opens eyes spontaneously but does not respond to any verbal or physical stimuli. Non-verbal.  Left spastic hemiplegia  Right side bradykinetic but withdraws with increased flexion tone in arm and leg       Significant Labs: All pertinent labs within the past 24 hours have been reviewed.    Significant Imaging: I have reviewed all pertinent  imaging results/findings within the past 24 hours.        Assessment/Plan:      * SDH (subdural hematoma)  S/p craniotomy with evacuation on 4/4 and repeat evac on 4/20 and MMA embolization on 4/26  Neuro recovery has been poor, prognosis poor, son open to hospice, assistance from palliative appreciated  Has staples to be removed on 5/4 (by NSGY if still admitted)  Continue TF for now. Son is deciding about PEG tube    Encephalopathy        Recurrent seizures  Now stable on Lacosamide and Keppra    Critical lower limb ischemia        Osteomyelitis of right foot  R foot osteomyelitis with bone bx +MRSA, ID following peripherally   ID rec 6wk course of Vanc, with end  Date 5/5/2022, pharmacy to dose vanc inpatient    Acute on chronic anemia  This is a patient with a long history of chronic anemia. Slow down trending since admission likely 2/2 to surgery, acute illness, and phlebotomy. Patient is asymptomatic, agree with transfusion for hgb<7. No signs of bleeding. Hemodynamically stable. Labs consistent with anemia of chronic disease and acute blood loss.   - agree with transfusion for hgb<7 or active bleeding      Goals of care, counseling/discussion  Discussed prognosis with pt's son on 4/29 and low likelihood of meaningful recovery. He would like her to be DNR and is open to discussing hospice care. Unsure whether he wants a PEG tube placed.  - DNR  - Palliative care consult placed  - Will need NH placement. Son undecided about hospice care    Debility        Urinary retention  - Araiza removed 4/27, failed voiding trial, replaced on 4/28  - Continue silodosin    Hypothyroidism  Continue home levothyroxine      Severe protein-calorie malnutrition  Nutrition following, most recent recs below from 4/12 (I ordered boost pudding BID and Frederic BID x14d per their recs)       1. Suggest Diabetic 2000 kcal diet with texture per SLP     2. Consider Boost Pudding BID to increase PO intake      3. Add Frederic BID x 14 days to  aid in wound healing     4. If TF warranted, recommend TF of Isosource advancing as tolerated to goal rate of 45 mL/hr to provide 1620 kcal, 73 gm protein, and 825 mL free fluid     5. RD following          History of stroke  Baseline LUE and LLE weakness s/p stroke  Was on ASA/Plavix, but stopped on admit 2/2 SDH    Acute kidney injury superimposed on CKD stage IV  CKD with previous baseline high 1s-mid 2s, but Cr mid 1s recently  Pt with some retention, but still able to void with prompting, has purewick in place  Strict I/Os q4h - discussed with RN  Encourage po hydration  Trend renal function  Renally dose medications and avoid nephrotoxins  Maintain hgb>7 and MAP>65    Type 2 diabetes mellitus, with long-term current use of insulin  - Accuchecks ACHS with SSI while inpatient, not needing any long-acting insulin      Hyperlipidemia  Continue statin.       Hypertension  Continue home amlodipine, losartan. Coreg held on admission.      VTE Risk Mitigation (From admission, onward)         Ordered     heparin (porcine) injection 5,000 Units  Every 8 hours         04/24/22 0853     IP VTE HIGH RISK PATIENT  Once         04/03/22 1608     Place sequential compression device  Until discontinued         04/03/22 1608     Reason for No Pharmacological VTE Prophylaxis  Once        Question:  Reasons:  Answer:  Active Bleeding    04/03/22 1608                Discharge Planning   MARY: 5/3/2022     Code Status: DNR   Is the patient medically ready for discharge?: Yes    Reason for patient still in hospital (select all that apply): Pending disposition  Discharge Plan A: Long-term acute care facility (LTAC)   Discharge Delays: None known at this time              Tirso Springer MD  Department of Hospital Medicine   [facility] - Intensive Care ([place])

## 2022-05-03 NOTE — PLAN OF CARE
Problem: Infection  Goal: Absence of Infection Signs and Symptoms  Outcome: Ongoing, Progressing     Problem: Adult Inpatient Plan of Care  Goal: Plan of Care Review  Outcome: Ongoing, Progressing  Goal: Patient-Specific Goal (Individualized)  Description: Admit Date: 4/3/2022    Admit Dx: SDH (subdural hematoma)    Past Medical History:  No date: Gastroparesis  No date: GERD (gastroesophageal reflux disease)  07/24/2021: History of Clostridium difficile colitis  No date: History of kidney stones  No date: History of stroke      Comment:  left side paralysis  No date: Hyperlipidemia  No date: Hypertension  No date: Hypothyroidism  No date: Iron deficiency anemia  No date: Osteoarthritis  No date: Peripheral neuropathy  No date: Stage IV CKD  No date: Type II diabetes mellitus    Past Surgical History:  No date: APPENDECTOMY  10/13/2021: CYSTOSCOPY W/ URETERAL STENT PLACEMENT; Right      Comment:  Procedure: CYSTOSCOPY, WITH URETERAL STENT INSERTION;                 Surgeon: Wanda Arroyo MD;  Location: [facility];                 Service: Urology;  Laterality: Right;  11/23/2021: ESOPHAGOGASTRODUODENOSCOPY; N/A      Comment:  Procedure: EGD (ESOPHAGOGASTRODUODENOSCOPY);  Surgeon:                Obed Jeong MD;  Location: [address]);                 Service: Endoscopy;  Laterality: N/A;  3/24/2022: FOOT AMPUTATION THROUGH METATARSAL; Right      Comment:  Procedure: AMPUTATION, FOOT, PARTIAL 4th and 5th ray;                 Surgeon: Rodrigo Logan DPM;  Location: [address];                 Service: Podiatry;  Laterality: Right;  7/22/2021: LAPAROSCOPIC APPENDECTOMY; N/A      Comment:  Procedure: APPENDECTOMY, LAPAROSCOPIC;  Surgeon: Paulo Calvo Jr., MD;  Location: [facility];  Service: General;                 Laterality: N/A;  1/1/2022: TOE AMPUTATION; Right      Comment:  Procedure: AMPUTATION, TOE;  Surgeon: Genaro Mason DPM;  Location: [facility];  Service:  Podiatry;  Laterality:               Right;  No date: TUBAL LIGATION  12/22/2021: URETEROSCOPIC REMOVAL OF URETERIC CALCULUS; Right      Comment:  Procedure: REMOVAL, CALCULUS, URETER, URETEROSCOPIC;                 Surgeon: Wanda Arroyo MD;  Location: [facility];                 Service: Urology;  Laterality: Right;    Individualization:   1. Please dim lights at night  2. Pt likes to be covered with blankets  3. Pt likes to be moved slowly.    Restraints:   1. Soft right wrist restraint initiated 4/4/22 @ 2138 for pulling of lines. [facility] for DC criteria.            Outcome: Ongoing, Progressing  Goal: Absence of Hospital-Acquired Illness or Injury  Outcome: Ongoing, Progressing  Goal: Optimal Comfort and Wellbeing  Outcome: Ongoing, Progressing  Goal: Readiness for Transition of Care  Outcome: Ongoing, Progressing     Problem: Adjustment to Illness (Stroke, Hemorrhagic)  Goal: Optimal Coping  Outcome: Ongoing, Progressing     Problem: Bowel Elimination Impaired (Stroke, Hemorrhagic)  Goal: Effective Bowel Elimination  Outcome: Ongoing, Progressing     Problem: Cerebral Tissue Perfusion (Stroke, Hemorrhagic)  Goal: Optimal Cerebral Tissue Perfusion  Outcome: Ongoing, Progressing     Problem: Cognitive Impairment (Stroke, Hemorrhagic)  Goal: Optimal Cognitive Function  Outcome: Ongoing, Progressing     Problem: Communication Impairment (Stroke, Hemorrhagic)  Goal: Effective Communication Skills  Outcome: Ongoing, Progressing     Problem: Functional Ability Impaired (Stroke, Hemorrhagic)  Goal: Optimal Functional Ability  Outcome: Ongoing, Progressing     Problem: Pain (Stroke, Hemorrhagic)  Goal: Acceptable Pain Control  Outcome: Ongoing, Progressing     Problem: Respiratory Compromise (Stroke, Hemorrhagic)  Goal: Effective Oxygenation and Ventilation  Outcome: Ongoing, Progressing     Problem: Sensorimotor Impairment (Stroke, Hemorrhagic)  Goal: Improved Sensorimotor Function  Outcome: Ongoing, Progressing      Problem: Swallowing Impairment (Stroke, Hemorrhagic)  Goal: Optimal Eating and Swallowing Without Aspiration  Outcome: Ongoing, Progressing     Problem: Urinary Elimination Impaired (Stroke, Hemorrhagic)  Goal: Effective Urinary Elimination  Outcome: Ongoing, Progressing     Problem: Diabetes Comorbidity  Goal: Blood Glucose Level Within Targeted Range  Outcome: Ongoing, Progressing     Problem: Fluid and Electrolyte Imbalance (Acute Kidney Injury/Impairment)  Goal: Fluid and Electrolyte Balance  Outcome: Ongoing, Progressing     Problem: Oral Intake Inadequate (Acute Kidney Injury/Impairment)  Goal: Optimal Nutrition Intake  Outcome: Ongoing, Progressing     Problem: Impaired Wound Healing  Goal: Optimal Wound Healing  Outcome: Ongoing, Progressing     Problem: Adjustment to Illness (Delirium)  Goal: Optimal Coping  Outcome: Ongoing, Progressing     Problem: Altered Behavior (Delirium)  Goal: Improved Behavioral Control  Outcome: Ongoing, Progressing     Problem: Attention and Thought Clarity Impairment (Delirium)  Goal: Improved Attention and Thought Clarity  Outcome: Ongoing, Progressing     Problem: Sleep Disturbance (Delirium)  Goal: Improved Sleep  Outcome: Ongoing, Progressing     Problem: Skin Injury Risk Increased  Goal: Skin Health and Integrity  Outcome: Ongoing, Progressing     Problem: Fall Injury Risk  Goal: Absence of Fall and Fall-Related Injury  Outcome: Ongoing, Progressing     Problem: Restraint, Nonbehavioral (Nonviolent)  Goal: Absence of Harm or Injury  Outcome: Ongoing, Progressing     Problem: Communication Impairment (Mechanical Ventilation, Invasive)  Goal: Effective Communication  Outcome: Ongoing, Progressing     Problem: Device-Related Complication Risk (Mechanical Ventilation, Invasive)  Goal: Optimal Device Function  Outcome: Ongoing, Progressing     Problem: Inability to Wean (Mechanical Ventilation, Invasive)  Goal: Mechanical Ventilation Liberation  Outcome: Ongoing, Progressing      Problem: Ventilator-Induced Lung Injury (Mechanical Ventilation, Invasive)  Goal: Absence of Ventilator-Induced Lung Injury  Outcome: Ongoing, Progressing     Problem: Communication Impairment (Artificial Airway)  Goal: Effective Communication  Outcome: Ongoing, Progressing     Problem: Device-Related Complication Risk (Artificial Airway)  Goal: Optimal Device Function  Outcome: Ongoing, Progressing     Problem: Skin and Tissue Injury (Artificial Airway)  Goal: Absence of Device-Related Skin or Tissue Injury  Outcome: Ongoing, Progressing     Problem: Noninvasive Ventilation Acute  Goal: Effective Unassisted Ventilation and Oxygenation  Outcome: Ongoing, Progressing

## 2022-05-03 NOTE — PROCEDURES
DATE: 4/16/22    EEG NUMBER: FH -4    REFERRING PHYSICIAN:  Dr. Huntley     This EEG was performed to assess for subclinical seizures      ELECTROENCEPHALOGRAM REPORT     METHODOLOGY:  Electroencephalographic (EEG) recording is with electrodes placed according to the International 10-20 placement system.  Thirty two (32) channels of digital signal are simultaneously recorded from the scalp and may include EKG, EMG, and/or eye monitors.   Recording band pass was 0.1 to 512 hz.  Digital video recording of the patient is simultaneously recorded with the EEG.  The nursing staff report clinical symptoms and may press an event button when the patient has symptoms of clinical interest to the treating physicians.  EEG and video recording is stored and archived in digital format.  The entire recording is visually reviewed, and the times identified by computer analysis as being spikes or seizures are reviewed again.  Activation procedures which include photic stimulation, hyperventilation and instructing patients to perform simple task are done in selected patients.   Compresses spectral analysis (CSA) is also performed on the activity recorded from each individual channel.  This is displayed as a power display of frequencies from 0 to 30 Hz over time.   The CSA analysis is done and displayed continuously.  This is reviewed for asymmetries in power between homologous areas of the scalp and for presence of changes in power which can be seen when seizures occur.  Sections of suspected abnormalities on the CSA is then compared with the original EEG recording.                O[employer] software was also utilized in the review of this study.  This software suite analyzes the EEG recording in multiple domains.  Coherence and rhythmicity is computed to identify EEG sections which may contain organized seizures.  Each channel undergoes analysis to detect presence of spike and sharp waves which have special and morphological  characteristic of epileptic activity.  The routine EEG recording is converted from spacial into frequency domain.  This is then displayed comparing homologous areas to identify areas of significant asymmetry.  Algorithm to identify non-cortically generated artifact is used to separate eye movement, EMG and other artifact from the EEG.     Recording times  Start on April 16, 2022 at hours 7 minute 1 seconds 26  End on April 17, 2022 at hours 7 minute 0 seconds 8  The total time of EEG recording for the study was 23 hours and 58 minutes    EEG FINDINGS:  The recording was obtained with a number of standard bipolar and referential montages during obtunded state.  Diffuse disorganized low amplitude mixed low-amplitude theta and occasional alpha range frequencies were noted.  The right hemisphere is punctuated by lateralized epileptiform discharges with broad field involving the left hemisphere.  These occurred at a frequency of 1-2 hertz.  Intermittently prolonged runs of semi rhythmical alpha range activity were noted in the right hemisphere intermixed with the periodic epileptiform discharges suggestive of subclinical seizures.    The EKG channel revealed a sinus rhythm.     IMPRESSION:  This is an abnormal EEG during obtunded state.  Diffuse disorganized low-amplitude slowing of the background was noted.  Pseudo periodic right lateralized epileptiform discharges were noted. Intermittently prolonged runs of semi rhythmical alpha range activity were noted in the right hemisphere intermixed with the periodic epileptiform discharges suggestive of subclinical seizures.     CLINICAL CORRELATION:  The patient is 71-year-old female with history of right-sided subdural hematoma with midline shift who is currently maintained on Keppra and Vimpat.  This is an abnormal EEG during obtunded state.  Diffuse disorganized slowing of the background was noted suggestive of moderate degree of encephalopathy nonspecific to the cause.   The presence of right lateralized periodic epileptiform discharge confer an increased risk for focal seizures from this hemisphere and cortical irritability.  During this study infrequent subclinical brief electrographic seizures were noted emanating from the right hemisphere.

## 2022-05-03 NOTE — PROCEDURES
DATE: 4/15/22    EEG NUMBER: FH -3    REFERRING PHYSICIAN:  Dr. Huntley     This EEG was performed to assess for subclinical seizures      ELECTROENCEPHALOGRAM REPORT     METHODOLOGY:  Electroencephalographic (EEG) recording is with electrodes placed according to the International 10-20 placement system.  Thirty two (32) channels of digital signal are simultaneously recorded from the scalp and may include EKG, EMG, and/or eye monitors.   Recording band pass was 0.1 to 512 hz.  Digital video recording of the patient is simultaneously recorded with the EEG.  The nursing staff report clinical symptoms and may press an event button when the patient has symptoms of clinical interest to the treating physicians.  EEG and video recording is stored and archived in digital format.  The entire recording is visually reviewed, and the times identified by computer analysis as being spikes or seizures are reviewed again.  Activation procedures which include photic stimulation, hyperventilation and instructing patients to perform simple task are done in selected patients.   Compresses spectral analysis (CSA) is also performed on the activity recorded from each individual channel.  This is displayed as a power display of frequencies from 0 to 30 Hz over time.   The CSA analysis is done and displayed continuously.  This is reviewed for asymmetries in power between homologous areas of the scalp and for presence of changes in power which can be seen when seizures occur.  Sections of suspected abnormalities on the CSA is then compared with the original EEG recording.                [employer] software was also utilized in the review of this study.  This software suite analyzes the EEG recording in multiple domains.  Coherence and rhythmicity is computed to identify EEG sections which may contain organized seizures.  Each channel undergoes analysis to detect presence of spike and sharp waves which have special and morphological  characteristic of epileptic activity.  The routine EEG recording is converted from spacial into frequency domain.  This is then displayed comparing homologous areas to identify areas of significant asymmetry.  Algorithm to identify non-cortically generated artifact is used to separate eye movement, EMG and other artifact from the EEG.     Recording times  Start on April 15, 2022 at hours 7 minute 1 seconds 3  End on April 16, 2022 at hours 7 minute 0 seconds 3  The total time of EEG recording for the study was 23 hours and 15 minutes    EEG FINDINGS:  The recording was obtained with a number of standard bipolar and referential montages during obtunded state.  Diffuse disorganized low amplitude mixed low-amplitude theta and occasional alpha range frequencies were noted.  The right hemisphere is punctuated by lateralized epileptiform discharges with broad field involving the left hemisphere.  These occurred at a frequency of 1-2 hertz.  Intermittently prolonged runs of semi rhythmical theta range activity were noted in the right hemisphere intermixed with the periodic epileptiform discharges suggestive of subclinical seizures.    The EKG channel revealed a sinus rhythm.     IMPRESSION:  This is an abnormal EEG during obtunded state.  Diffuse disorganized low-amplitude slowing of the background was noted.  Pseudo periodic right lateralized epileptiform discharges were noted. Intermittently prolonged runs of semi rhythmical theta range activity were noted in the right hemisphere intermixed with the periodic epileptiform discharges suggestive of subclinical seizures.     CLINICAL CORRELATION:  The patient is 71-year-old female with history of right-sided subdural hematoma with midline shift who is currently maintained on Keppra and Vimpat.  This is an abnormal EEG during obtunded state.  Diffuse disorganized slowing of the background was noted suggestive of moderate degree of encephalopathy nonspecific to the cause.  The  presence of right lateralized periodic epileptiform discharge confer an increased risk for focal seizures from this hemisphere and cortical irritability.  During this study frequent subclinical brief electrographic seizures were noted emanating from the right hemisphere.

## 2022-05-03 NOTE — NURSING
Patient is non responsive verbally but does open eyes when touched or moved, vitals stable, IKER midline flushed and saline locked, NG tube to left nare, prevalon boots on bilateral feet, wound care to left foot completed at 530am, delgado, delgado care, mouth care and diaper change completed, tube feedings started after midnight after advancing tube further and having verified placement of tube by xray, and reached goal of 45 ml/hr Isosource 1.5,  placed patient on 1.5 LNC, call bell and tray table within reach of the patient, will continue to monitor.

## 2022-05-03 NOTE — PLAN OF CARE
05/03/22 5077   Post-Acute Status   Post-Acute Authorization Placement     SW has faxed a referral to [facility]. MARIO will continue to follow up.      Beatriz Aguero LMSW  [facility]   [id number]

## 2022-05-03 NOTE — PT/OT/SLP DISCHARGE
Occupational Therapy Discharge Summary    Beatriz Adame  MRN: [id number]   Principal Problem: SDH (subdural hematoma)      Patient Discharged from acute Occupational Therapy on 5/3/2022.  Please refer to prior OT note dated 4/30/2022 for functional status.    Assessment:      Patient not appropriate for therapy services at this time 2* medical condition. Plan is for nursing home placement; son still deciding on possbily transitioning to hospice. Attending (Dr. Springer) aware of OT orders being discontinued.      Objective:     GOALS:   Multidisciplinary Problems     Occupational Therapy Goals        Problem: Occupational Therapy    Goal Priority Disciplines Outcome Interventions   Occupational Therapy Goal     OT, PT/OT Ongoing, Progressing    Description: Goals to be met by: 5/17/22    Patient will increase functional independence with ADLs by performing:    Grooming while seated with Moderate assistance.  Toileting from bedside commode with Max Assistance for hygiene and clothing management.   Supine to sit with Max Assistance.  Stand pivot transfers with Max Assistance.  Toilet transfer to bedside commode with Max Assistance.                     Reasons for Discontinuation of Therapy Services  Patient is unable to continue work toward goals because of medical complications.      Plan:     Patient Discharged to: Remains in hospital    5/3/2022

## 2022-05-03 NOTE — PROCEDURES
DATE: 4/14/22    EEG NUMBER:  -1,  -2    REFERRING PHYSICIAN:  Dr. Huntley     This EEG was performed to assess for subclinical seizures      ELECTROENCEPHALOGRAM REPORT     METHODOLOGY:  Electroencephalographic (EEG) recording is with electrodes placed according to the International 10-20 placement system.  Thirty two (32) channels of digital signal are simultaneously recorded from the scalp and may include EKG, EMG, and/or eye monitors.   Recording band pass was 0.1 to 512 hz.  Digital video recording of the patient is simultaneously recorded with the EEG.  The nursing staff report clinical symptoms and may press an event button when the patient has symptoms of clinical interest to the treating physicians.  EEG and video recording is stored and archived in digital format.  The entire recording is visually reviewed, and the times identified by computer analysis as being spikes or seizures are reviewed again.  Activation procedures which include photic stimulation, hyperventilation and instructing patients to perform simple task are done in selected patients.   Compresses spectral analysis (CSA) is also performed on the activity recorded from each individual channel.  This is displayed as a power display of frequencies from 0 to 30 Hz over time.   The CSA analysis is done and displayed continuously.  This is reviewed for asymmetries in power between homologous areas of the scalp and for presence of changes in power which can be seen when seizures occur.  Sections of suspected abnormalities on the CSA is then compared with the original EEG recording.                [employer] software was also utilized in the review of this study.  This software suite analyzes the EEG recording in multiple domains.  Coherence and rhythmicity is computed to identify EEG sections which may contain organized seizures.  Each channel undergoes analysis to detect presence of spike and sharp waves which have special and  morphological characteristic of epileptic activity.  The routine EEG recording is converted from spacial into frequency domain.  This is then displayed comparing homologous areas to identify areas of significant asymmetry.  Algorithm to identify non-cortically generated artifact is used to separate eye movement, EMG and other artifact from the EEG.     Recording times  Start on April 14, 2022 at hours 7 minute 1 seconds 23  End on April 14, 2022 at hour 8 minute 22 seconds 30  Start April 14, 2022 at hour 11 minute 13 seconds 46  End on April 15, 2022 at hours 7 minute 0 seconds 4  The total time of EEG recording for the study was 22 hours and 4 minutes    EEG FINDINGS:  The recording was obtained with a number of standard bipolar and referential montages during obtunded state.  Diffuse disorganized low amplitude mixed low-amplitude theta and occasional alpha range frequencies were noted.  The right hemisphere is punctuated by lateralized epileptiform discharges with broad field involving the left hemisphere.  These occurred at a frequency of 1-2 hertz.  Throughout the study prolonged runs of evolving sharply contoured alpha and theta activity was noted in the right hemisphere in addition to intermittent involving subclinical seizures characterized by rhythmical theta range activity.    The EKG channel revealed a sinus rhythm.     IMPRESSION:  This is an abnormal EEG during obtunded state.  Diffuse disorganized low-amplitude slowing of the background was noted.  Pseudo periodic right lateralized epileptiform discharges were noted.  Prolonged runs as well as short subclinical electrographic seizures from the right hemisphere were noted     CLINICAL CORRELATION:  The patient is 71-year-old female with history of right-sided subdural hematoma with midline shift who is currently maintained on Keppra and Vimpat.  This is an abnormal EEG during obtunded state.  Diffuse disorganized slowing of the background was noted  suggestive of moderate degree of encephalopathy nonspecific to the cause.  The presence of right lateralized periodic epileptiform discharge confer an increased risk for focal seizures from this hemisphere and cortical irritability.  Right hemisphere focal status epilepticus was noted

## 2022-05-03 NOTE — NURSING
Assumed care of patient from CHYNA Griffin. Patient is non responsive verbally but does open eyes when touched or moved, son at bedside, vitals stable, IKER midline flushed and saline locked, NG tube to left nare, prevalon boots on bilateral feet, delgado, tube feedings currently stopped due to possible placement issues, according to results need to advance tubing and re examine, waiting results before starting tube feed, placed patient on 1.5 LNC due to sats dropping below 90 on room air, call bell and tray table within reach of the patient, will continue to monitor.

## 2022-05-03 NOTE — PT/OT/SLP DISCHARGE
Physical Therapy  Discharge Summary    Name: Beatriz Adame  MRN: [id number]   Principal Problem: SDH (subdural hematoma)     Patient Discharged from acute Physical Therapy on 5/3/22.    Please refer to prior PT noted date on 22 for functional status.     Assessment:     Had discussion with attending MD Springer via [employer]. Patient with poor prognosis, pursuing NH placement. Medical team discussing hospice with son. MD Springer gave ok to sign off from PT at this time as there is minimal potential for mobility improvements at this time.    Objective:     GOALS:   Multidisciplinary Problems     Physical Therapy Goals        Problem: Physical Therapy    Goal Priority Disciplines Outcome Goal Variances Interventions   Physical Therapy Goal     PT, PT/OT Ongoing, Progressing     Description: Goals to be met by: 2022    Patient will increase functional independence with mobility by performin. Supine to sit with minimum assistance - Total x2 - /30 - [facility]  2. Sit to supine with minimum assistance - Total x2 - 30 - [facility]  3. Rolling to Left and Right with minimum assistance - Total x2 - /30 - [facility]  4. Bed to chair transfer with minimum assistance using LRAD as needed - unable - /30 - [facility]                 Reasons for Discontinuation of Therapy Services  Therapist (in agreement with MD Springer) determines that the patient will no longer benefit from therapy services.      Plan:     Sign off from PT at this time, medical team aware they can re-consult PT/OT if there is a significant change in POC or medical status.    Adrian Martinez, PT  5/3/2022

## 2022-05-04 NOTE — PROGRESS NOTES
[facility] - Intensive Care ([address] Medicine  Progress Note    Patient Name: Beatriz Adame  MRN: [id number]  Patient Class: IP- Inpatient   Admission Date: 4/3/2022  Length of Stay: 31 days  Attending Physician: Tirso Springer MD  Primary Care Provider: Dante Olivier MD        Subjective:     Principal Problem:SDH (subdural hematoma)        HPI:   Ms. Adame is a 72 yo female with hx of dementia, R MCA stroke with left side contraction/hemiparesis presented to [facility] with SDH now s/p evac. Stepped down from [facility]. On 4/13 she developed worsening headache and CTH showed re-accumulation of R SDH and 4-5mm MLS. She was noted to be more slow to follow commands. Stepped back up to [facility] for closer monitoring.     Original HPI below:  Ms. Adame is a 70 y/o F with a PMHx of baseline dementia, GERD, R MCA stroke with residual LUE weakness, HLD, HTN, Stage IV CKD, TIIDM who presents to [facility] from [facility] with a AoC SDH after an unwitnessed fall from her wheel chair without loss of consciousness. CT spine negative for fracture. CT head with R SDH and 3-4 mm MLS. She is on DAPT at home. Of note, she has been staying at [facility] after a partial R foot amputation, stitches still intact, for which she is receiving vancomycin.       Overview/Hospital Course:  Ms. Adame was admitted to the NICU for management of a SDH. Intubated on admission, on precedex. Went for clot evac on 4/4. Extubated on 4/7. EEG revealed seizure on 4/14, loaded with Vimpat. Additional seizure activity noted on 4/18. Went for repeat evac on 4/20. Occlusive brachial thrombus noted in PICC line on 4/23. Underwent MMA embolization on 4/26. SD to  on 4/28.    As a result of her extensive neurologic injury her mental status remained very poor. Opens eyes spontaneously but non-verbal, minimally responsive to physical stimuli. Araiza placed d/t persistent urinary retention. Received nutrition through NGT. Discussed with pt's  son who wished to make pt DNR and pursue hospice care. Palliative care consulted.      Interval History:   NAEON. Notified this morning by RT of significant decompensation, requiring bipap to maintain sats, RR 40. Additional IV lasix and repeat CXR ordered. Discussed with son and palliative care, agreed to move forward with comfort measures only, will wean from bipap once son arrives today, may pursue inpatient hospice transfer tomorrow.    Review of Systems   Reason unable to perform ROS: pt non-verbal.   Objective:     Vital Signs (Most Recent):  Temp: 99.5 °F (37.5 °C) (05/04/22 0800)  Pulse: 92 (05/04/22 1144)  Resp: (!) 28 (05/04/22 1058)  BP: (!) 109/57 (05/04/22 0800)  SpO2: 96 % (05/04/22 1144)   Vital Signs (24h Range):  Temp:  [97.4 °F (36.3 °C)-99.5 °F (37.5 °C)] 99.5 °F (37.5 °C)  Pulse:  [] 92  Resp:  [20-35] 28  SpO2:  [87 %-96 %] 96 %  BP: (108-136)/(56-75) 109/57     Weight: 49.4 kg (108 lb 14.5 oz)  Body mass index is 17.06 kg/m².    Intake/Output Summary (Last 24 hours) at 5/4/2022 1311  Last data filed at 5/4/2022 1300  Gross per 24 hour   Intake 935 ml   Output 1000 ml   Net -65 ml      Physical Exam  Constitutional:       General: She is not in acute distress.     Appearance: She is not ill-appearing.   HENT:      Head:      Comments: Pterional craniotomy on right    Eyes:      Extraocular Movements: Extraocular movements intact.      Pupils: Pupils are equal, round, and reactive to light.   Cardiovascular:      Rate and Rhythm: Normal rate and regular rhythm.      Heart sounds: Normal heart sounds.   Pulmonary:      Breath sounds: Normal breath sounds.   Abdominal:      Palpations: Abdomen is soft.   Musculoskeletal:      Cervical back: Neck supple.      Comments: Widespread dependent edema   Skin:     General: Skin is warm.   Neurological:      Mental Status: She is alert.      Comments: Opens eyes spontaneously but does not respond to any verbal or physical stimuli. Non-verbal.  Left  spastic hemiplegia  Right side bradykinetic but withdraws with increased flexion tone in arm and leg       Significant Labs: All pertinent labs within the past 24 hours have been reviewed.    Significant Imaging: I have reviewed all pertinent imaging results/findings within the past 24 hours.      Assessment/Plan:      * SDH (subdural hematoma)  S/p craniotomy with evacuation on 4/4 and repeat evac on 4/20 and MMA embolization on 4/26  Neuro recovery has been poor, prognosis poor, son open to hospice, assistance from palliative appreciated  Has staples to be removed on 5/4 (by NSGY if still admitted)  Discontinuing TF  Transitioning to comfort measures 5/4    Encephalopathy        Recurrent seizures  Now stable on Lacosamide and Keppra    Critical lower limb ischemia        Osteomyelitis of right foot  R foot osteomyelitis with bone bx +MRSA, ID following peripherally   ID rec 6wk course of Vanc, with end  Date 5/5/2022, pharmacy to dose vanc inpatient    Acute on chronic anemia  This is a patient with a long history of chronic anemia. Slow down trending since admission likely 2/2 to surgery, acute illness, and phlebotomy. Patient is asymptomatic, agree with transfusion for hgb<7. No signs of bleeding. Hemodynamically stable. Labs consistent with anemia of chronic disease and acute blood loss.   - agree with transfusion for hgb<7 or active bleeding      Goals of care, counseling/discussion  Discussed prognosis with pt's son on 4/29 and low likelihood of meaningful recovery. He would like her to be DNR and is open to discussing hospice care. Unsure whether he wants a PEG tube placed.  - DNR  - Palliative care following, recs apprecaited  - Transitioning to comfort measures 5/4. Consider transfer to inpatient hospice tomorrow.    Debility        Urinary retention  - Araiza removed 4/27, failed voiding trial, replaced on 4/28  - Continue silodosin    Hypothyroidism  Continue home levothyroxine      Severe  protein-calorie malnutrition  Nutrition following, most recent recs below from 4/12 (I ordered boost pudding BID and Frederic BID x14d per their recs)       1. Suggest Diabetic 2000 kcal diet with texture per SLP     2. Consider Boost Pudding BID to increase PO intake      3. Add Frederic BID x 14 days to aid in wound healing     4. If TF warranted, recommend TF of Isosource advancing as tolerated to goal rate of 45 mL/hr to provide 1620 kcal, 73 gm protein, and 825 mL free fluid     5. RD following          History of stroke  Baseline LUE and LLE weakness s/p stroke  Was on ASA/Plavix, but stopped on admit 2/2 SDH    Acute kidney injury superimposed on CKD stage IV  CKD with previous baseline high 1s-mid 2s, but Cr mid 1s recently  Pt with some retention, but still able to void with prompting, has purewick in place  Strict I/Os q4h - discussed with RN  Encourage po hydration  Trend renal function  Renally dose medications and avoid nephrotoxins  Maintain hgb>7 and MAP>65    Type 2 diabetes mellitus, with long-term current use of insulin  - Accuchecks ACHS with SSI while inpatient, not needing any long-acting insulin      Hyperlipidemia  Continue statin.       Hypertension  Continue home amlodipine, losartan. Coreg held on admission.      VTE Risk Mitigation (From admission, onward)         Ordered     IP VTE HIGH RISK PATIENT  Once         04/03/22 1608     Place sequential compression device  Until discontinued         04/03/22 1608     Reason for No Pharmacological VTE Prophylaxis  Once        Question:  Reasons:  Answer:  Active Bleeding    04/03/22 1608                Discharge Planning   MARY: 5/4/2022     Code Status: DNR   Is the patient medically ready for discharge?: Yes    Reason for patient still in hospital (select all that apply): Patient trending condition  Discharge Plan A: Long-term acute care facility (LTAC)   Discharge Delays: None known at this time              Tirso Springer MD  Department of Hospital  Medicine   [facility] - Intensive Care ([place]-[facility])

## 2022-05-04 NOTE — CARE UPDATE
NSGY Care Update    Staple removal deferred at this time per primary team.      Please message/page neurosurgery team when timing more appropriate in setting of transfer to hospice care.    Ina Ardon PA-C  Neurosurgery  [facility]

## 2022-05-04 NOTE — PROGRESS NOTES
Pharmacokinetic Assessment Follow Up: IV Vancomycin    Vancomycin serum concentration assessment(s):    The random level was drawn correctly and can be used to guide therapy at this time. The measurement is above the desired definitive target range of 15 to 20 mcg/mL.    Vancomycin Regimen Plan:    Patients random vancomycin level resulted as supratherapeutic  Due to patients renal function, the level will remain supratherapeutic/therapeutic for the next few days  No further doses required as end of therapy is Thursday, 5/5  Pharmacy will sign off, please re-consult pharmacy if vancomycin therapy needs to be continued.    Drug levels (last 3 results):  Recent Labs   Lab Result Units 05/02/22  0420 05/04/22  0519   Vancomycin, Random ug/mL  --  22.0   Vancomycin-Trough ug/mL 22.0  --      Please contact pharmacy at extension [phone] for questions regarding this assessment.    Thank you for the consult,   Cesario Adorno, PharmD, [facility]      Patient brief summary:  Beatriz Adame is a 71 y.o. female initiated on antimicrobial therapy with IV Vancomycin for treatment of bone/joint infection    Drug Allergies:   Review of patient's allergies indicates:   Allergen Reactions    Tomato (solanum lycopersicum) Hives and Itching       Actual Body Weight:   49.4 kg    Renal Function:   Estimated Creatinine Clearance: 31.4 mL/min (based on SCr of 1.3 mg/dL).,     Dialysis Method (if applicable):  N/A    CBC (last 72 hours):  Recent Labs   Lab Result Units 05/03/22  0451   WBC K/uL 9.39   Hemoglobin g/dL 7.3*   Hematocrit % 23.0*   Platelets K/uL 107*   Gran % % 68.8   Lymph % % 22.9   Mono % % 6.9   Eosinophil % % 0.9   Basophil % % 0.3   Differential Method  Automated       Metabolic Panel (last 72 hours):  Recent Labs   Lab Result Units 05/03/22  0451   Sodium mmol/L 145   Potassium mmol/L 3.8   Chloride mmol/L 115*   CO2 mmol/L 23   Glucose mg/dL 148*   BUN mg/dL 52*   Creatinine mg/dL 1.3   Albumin g/dL 1.6*   Total  Bilirubin mg/dL 0.3   Alkaline Phosphatase U/L 64   AST U/L 16   ALT U/L 11       Vancomycin Administrations:  vancomycin given in the last 96 hours                   vancomycin 750 mg in dextrose 5 % 250 mL IVPB (ready to mix system) (mg) 750 mg New Bag 05/02/22 0522                Microbiologic Results:  Microbiology Results (last 7 days)     ** No results found for the last 168 hours. **

## 2022-05-04 NOTE — PROGRESS NOTES
Pharmacokinetic Assessment Follow Up: IV Vancomycin    Therapy with vancomycin complete.  Pharmacy will sign off, please re-consult as needed.    Cesario Adorno, PharmD, BCPS

## 2022-05-04 NOTE — ASSESSMENT & PLAN NOTE
S/p craniotomy with evacuation on 4/4 and repeat evac on 4/20 and MMA embolization on 4/26  Neuro recovery has been poor, prognosis poor, son open to hospice, assistance from palliative appreciated  Has staples to be removed on 5/4 (by NSGY if still admitted)  Discontinuing TF  Transitioning to comfort measures 5/4

## 2022-05-04 NOTE — CARE UPDATE
Patient with acute decompensation this am. Now on Bipap. Spoke with patient's son. Comfort is top priority. Plan to initiate comfort meds and remove Bipap once son arrives. Full note to follow

## 2022-05-04 NOTE — PLAN OF CARE
05/04/22 1024   Post-Acute Status   Post-Acute Authorization Placement   Post-Acute Placement Status Pending payor review/awaiting authorization (if required)     Patient is in need of nursing home placement. MARIO has sent referrals to [facility] and other [facility]. Patient son in communication with Palliative possibly considering hospice for patient. MARIO will continue to follow up. MARIO emailed Ever coordinator with [facility] a referral as well.         Beatriz Aguero LMSW  [facility]   [phone]

## 2022-05-04 NOTE — SUBJECTIVE & OBJECTIVE
Interval History:   FLORENCIA. Notified this morning by RT of significant decompensation, requiring bipap to maintain sats, RR 40. Additional IV lasix and repeat CXR ordered. Discussed with son and palliative care, agreed to move forward with comfort measures only, will wean from bipap once son arrives today, may pursue inpatient hospice transfer tomorrow.    Review of Systems   Reason unable to perform ROS: pt non-verbal.   Objective:     Vital Signs (Most Recent):  Temp: 99.5 °F (37.5 °C) (05/04/22 0800)  Pulse: 92 (05/04/22 1144)  Resp: (!) 28 (05/04/22 1058)  BP: (!) 109/57 (05/04/22 0800)  SpO2: 96 % (05/04/22 1144)   Vital Signs (24h Range):  Temp:  [97.4 °F (36.3 °C)-99.5 °F (37.5 °C)] 99.5 °F (37.5 °C)  Pulse:  [] 92  Resp:  [20-35] 28  SpO2:  [87 %-96 %] 96 %  BP: (108-136)/(56-75) 109/57     Weight: 49.4 kg (108 lb 14.5 oz)  Body mass index is 17.06 kg/m².    Intake/Output Summary (Last 24 hours) at 5/4/2022 1311  Last data filed at 5/4/2022 1300  Gross per 24 hour   Intake 935 ml   Output 1000 ml   Net -65 ml      Physical Exam  Constitutional:       General: She is not in acute distress.     Appearance: She is not ill-appearing.   HENT:      Head:      Comments: Pterional craniotomy on right    Eyes:      Extraocular Movements: Extraocular movements intact.      Pupils: Pupils are equal, round, and reactive to light.   Cardiovascular:      Rate and Rhythm: Normal rate and regular rhythm.      Heart sounds: Normal heart sounds.   Pulmonary:      Breath sounds: Normal breath sounds.   Abdominal:      Palpations: Abdomen is soft.   Musculoskeletal:      Cervical back: Neck supple.      Comments: Widespread dependent edema   Skin:     General: Skin is warm.   Neurological:      Mental Status: She is alert.      Comments: Opens eyes spontaneously but does not respond to any verbal or physical stimuli. Non-verbal.  Left spastic hemiplegia  Right side bradykinetic but withdraws with increased flexion tone in  arm and leg       Significant Labs: All pertinent labs within the past 24 hours have been reviewed.    Significant Imaging: I have reviewed all pertinent imaging results/findings within the past 24 hours.

## 2022-05-04 NOTE — PLAN OF CARE
Problem: Infection  Goal: Absence of Infection Signs and Symptoms  Outcome: Ongoing, Progressing     Problem: Adult Inpatient Plan of Care  Goal: Plan of Care Review  Outcome: Ongoing, Progressing  Goal: Patient-Specific Goal (Individualized)  Description: Admit Date: 4/3/2022    Admit Dx: SDH (subdural hematoma)    Past Medical History:  No date: Gastroparesis  No date: GERD (gastroesophageal reflux disease)  07/24/2021: History of Clostridium difficile colitis  No date: History of kidney stones  No date: History of stroke      Comment:  left side paralysis  No date: Hyperlipidemia  No date: Hypertension  No date: Hypothyroidism  No date: Iron deficiency anemia  No date: Osteoarthritis  No date: Peripheral neuropathy  No date: Stage IV CKD  No date: Type II diabetes mellitus    Past Surgical History:  No date: APPENDECTOMY  10/13/2021: CYSTOSCOPY W/ URETERAL STENT PLACEMENT; Right      Comment:  Procedure: CYSTOSCOPY, WITH URETERAL STENT INSERTION;                 Surgeon: Wanda Arroyo MD;  Location: [facility];                 Service: Urology;  Laterality: Right;  11/23/2021: ESOPHAGOGASTRODUODENOSCOPY; N/A      Comment:  Procedure: EGD (ESOPHAGOGASTRODUODENOSCOPY);  Surgeon:                Obed Jeong MD;  Location: [address]);                 Service: Endoscopy;  Laterality: N/A;  3/24/2022: FOOT AMPUTATION THROUGH METATARSAL; Right      Comment:  Procedure: AMPUTATION, FOOT, PARTIAL 4th and 5th ray;                 Surgeon: Rodrigo Logan DPM;  Location: [address];                 Service: Podiatry;  Laterality: Right;  7/22/2021: LAPAROSCOPIC APPENDECTOMY; N/A      Comment:  Procedure: APPENDECTOMY, LAPAROSCOPIC;  Surgeon: Paulo Calvo Jr., MD;  Location: [facility];  Service: General;                 Laterality: N/A;  1/1/2022: TOE AMPUTATION; Right      Comment:  Procedure: AMPUTATION, TOE;  Surgeon: Genaro Mason DPM;  Location: [facility];  Service:  Podiatry;  Laterality:               Right;  No date: TUBAL LIGATION  12/22/2021: URETEROSCOPIC REMOVAL OF URETERIC CALCULUS; Right      Comment:  Procedure: REMOVAL, CALCULUS, URETER, URETEROSCOPIC;                 Surgeon: Wanda Arroyo MD;  Location: [facility];                 Service: Urology;  Laterality: Right;    Individualization:   1. Please dim lights at night  2. Pt likes to be covered with blankets  3. Pt likes to be moved slowly.    Restraints:   1. Soft right wrist restraint initiated 4/4/22 @ 2138 for pulling of lines. [facility] for DC criteria.            Outcome: Ongoing, Progressing  Goal: Absence of Hospital-Acquired Illness or Injury  Outcome: Ongoing, Progressing  Goal: Optimal Comfort and Wellbeing  Outcome: Ongoing, Progressing  Goal: Readiness for Transition of Care  Outcome: Ongoing, Progressing     Problem: Adjustment to Illness (Stroke, Hemorrhagic)  Goal: Optimal Coping  Outcome: Ongoing, Progressing     Problem: Bowel Elimination Impaired (Stroke, Hemorrhagic)  Goal: Effective Bowel Elimination  Outcome: Ongoing, Progressing     Problem: Cerebral Tissue Perfusion (Stroke, Hemorrhagic)  Goal: Optimal Cerebral Tissue Perfusion  Outcome: Ongoing, Progressing     Problem: Cognitive Impairment (Stroke, Hemorrhagic)  Goal: Optimal Cognitive Function  Outcome: Ongoing, Progressing     Problem: Communication Impairment (Stroke, Hemorrhagic)  Goal: Effective Communication Skills  Outcome: Ongoing, Progressing     Problem: Functional Ability Impaired (Stroke, Hemorrhagic)  Goal: Optimal Functional Ability  Outcome: Ongoing, Progressing     Problem: Pain (Stroke, Hemorrhagic)  Goal: Acceptable Pain Control  Outcome: Ongoing, Progressing     Problem: Respiratory Compromise (Stroke, Hemorrhagic)  Goal: Effective Oxygenation and Ventilation  Outcome: Ongoing, Progressing     Problem: Sensorimotor Impairment (Stroke, Hemorrhagic)  Goal: Improved Sensorimotor Function  Outcome: Ongoing, Progressing      Problem: Swallowing Impairment (Stroke, Hemorrhagic)  Goal: Optimal Eating and Swallowing Without Aspiration  Outcome: Ongoing, Progressing     Problem: Urinary Elimination Impaired (Stroke, Hemorrhagic)  Goal: Effective Urinary Elimination  Outcome: Ongoing, Progressing     Problem: Diabetes Comorbidity  Goal: Blood Glucose Level Within Targeted Range  Outcome: Ongoing, Progressing     Problem: Fluid and Electrolyte Imbalance (Acute Kidney Injury/Impairment)  Goal: Fluid and Electrolyte Balance  Outcome: Ongoing, Progressing     Problem: Oral Intake Inadequate (Acute Kidney Injury/Impairment)  Goal: Optimal Nutrition Intake  Outcome: Ongoing, Progressing     Problem: Renal Function Impairment (Acute Kidney Injury/Impairment)  Goal: Effective Renal Function  Outcome: Ongoing, Progressing     Problem: Impaired Wound Healing  Goal: Optimal Wound Healing  Outcome: Ongoing, Progressing     Problem: Adjustment to Illness (Delirium)  Goal: Optimal Coping  Outcome: Ongoing, Progressing     Problem: Altered Behavior (Delirium)  Goal: Improved Behavioral Control  Outcome: Ongoing, Progressing     Problem: Attention and Thought Clarity Impairment (Delirium)  Goal: Improved Attention and Thought Clarity  Outcome: Ongoing, Progressing     Problem: Sleep Disturbance (Delirium)  Goal: Improved Sleep  Outcome: Ongoing, Progressing     Problem: Skin Injury Risk Increased  Goal: Skin Health and Integrity  Outcome: Ongoing, Progressing     Problem: Fall Injury Risk  Goal: Absence of Fall and Fall-Related Injury  Outcome: Ongoing, Progressing     Problem: Restraint, Nonbehavioral (Nonviolent)  Goal: Absence of Harm or Injury  Outcome: Ongoing, Progressing     Problem: Communication Impairment (Mechanical Ventilation, Invasive)  Goal: Effective Communication  Outcome: Ongoing, Progressing     Problem: Device-Related Complication Risk (Mechanical Ventilation, Invasive)  Goal: Optimal Device Function  Outcome: Ongoing, Progressing      Problem: Inability to Wean (Mechanical Ventilation, Invasive)  Goal: Mechanical Ventilation Liberation  Outcome: Ongoing, Progressing     Problem: Nutrition Impairment (Mechanical Ventilation, Invasive)  Goal: Optimal Nutrition Delivery  Outcome: Ongoing, Progressing     Problem: Skin and Tissue Injury (Mechanical Ventilation, Invasive)  Goal: Absence of Device-Related Skin and Tissue Injury  Outcome: Ongoing, Progressing     Problem: Ventilator-Induced Lung Injury (Mechanical Ventilation, Invasive)  Goal: Absence of Ventilator-Induced Lung Injury  Outcome: Ongoing, Progressing     Problem: Communication Impairment (Artificial Airway)  Goal: Effective Communication  Outcome: Ongoing, Progressing     Problem: Device-Related Complication Risk (Artificial Airway)  Goal: Optimal Device Function  Outcome: Ongoing, Progressing     Problem: Skin and Tissue Injury (Artificial Airway)  Goal: Absence of Device-Related Skin or Tissue Injury  Outcome: Ongoing, Progressing     Problem: Noninvasive Ventilation Acute  Goal: Effective Unassisted Ventilation and Oxygenation  Outcome: Ongoing, Progressing

## 2022-05-04 NOTE — NURSING
Assumed care of patient from CHYNA Capps. Patient is non responsive verbally but does open eyes when touched or moved, vitals stable, son at bedside, IKER midline flushed and saline locked, NG tube to left nare, prevalon boots on bilateral feet, delgado, tube feedings are Isosource 1.5 currently running at goal of 45 ml/hr, placed patient on 1.5 LNC, call bell and tray table within reach of the patient, will continue to monitor.

## 2022-05-04 NOTE — PLAN OF CARE
Notified by RT today that pt's breathing has worsened significantly, RR 40, O2 89% on 5L, sounds wet.    I spoke with pt's son Rushaun and discussed options. We agreed to not put pt on bipap or transfer to ICU. Will try IV lasix and monitor response. Son is still considering hospice, wants to touch base again later this morning. Notified RT and palliative.    Tirso Springer MD

## 2022-05-04 NOTE — NURSING
Patient is non responsive verbally but does open eyes when touched or moved, vitals stable, IKER midline flushed and saline locked, NG tube to left nare, prevalon boots on bilateral feet, delgado, tube feedings are Isosource 1.5 currently running at goal of 45 ml/hr, placed patient on 1.5 LNC, no acute changes tonight, COVID test submitted and is NEG, call bell and tray table within reach of the patient, will continue to monitor.

## 2022-05-04 NOTE — ASSESSMENT & PLAN NOTE
Discussed prognosis with pt's son on 4/29 and low likelihood of meaningful recovery. He would like her to be DNR and is open to discussing hospice care. Unsure whether he wants a PEG tube placed.  - DNR  - Palliative care following, recs apprecaited  - Transitioning to comfort measures 5/4. Consider transfer to inpatient hospice tomorrow.

## 2022-05-05 NOTE — PLAN OF CARE
[facility]  [address]  [place] [address]  Phone: [phone]    Hospice Orders      05/05/2022      Admit To Hospice: Inpatient Service      Diagnoses:  Active Hospital Problems    Diagnosis  POA    *SDH (subdural hematoma) [S06.5X9A]  Yes    Palliative care encounter [Z51.5]  Not Applicable    Encephalopathy [G93.40]  Yes    Recurrent seizures [G40.909]  No    Body mass index (BMI) less than 19 [Z68.1]  Not Applicable    Critical lower limb ischemia [I70.229]  Yes    Osteomyelitis of right foot [M86.9]  Yes    Status post partial amputation of right foot [Z89.431]  Not Applicable    Acute on chronic anemia [D64.9]  Yes    Debility [R53.81]  Yes    Urinary retention [R33.9]  Yes    Hypothyroidism [E03.9]  Yes    Severe protein-calorie malnutrition [E43]  Yes    Goals of care, counseling/discussion [Z71.89]  Not Applicable    History of stroke [Z86.73]  Not Applicable     Chronic    Type 2 diabetes mellitus, with long-term current use of insulin [E11.9, Z79.4]  Not Applicable    Hypertension [I10]  Yes    Hyperlipidemia [E78.5]  Yes      Resolved Hospital Problems    Diagnosis Date Resolved POA    Fall [W19.XXXA] 04/29/2022 Yes    Fever [R50.9] 04/28/2022 Unknown    Elevated troponin I level [R77.8] 04/16/2022 Yes         Hospice Qualifying Diagnoses:        Patient has a life expectancy < 6 months due to:  1) Primary Hospice Diagnosis:  SDH  2) Comorbid Conditions Contributing to Decline: Craniotomy, history of stroke      Vital Signs: Routine per hospice protocol.      Code Status:   Code Status: DNR      Allergies:  Review of patient's allergies indicates:   Allergen Reactions    Tomato (solanum lycopersicum) Hives and Itching         Nursing  Per hospice protocol      Medications:    Current Discharge Medication List      STOP taking these medications       rosuvastatin (CRESTOR) 40 MG Tab Comments:   Reason for Stopping:         amLODIPine (NORVASC) 5 MG  tablet Comments:   Reason for Stopping:         ascorbic acid, vitamin C, (VITAMIN C) 500 MG tablet Comments:   Reason for Stopping:         aspirin (ECOTRIN) 81 MG EC tablet Comments:   Reason for Stopping:         b complex vitamins tablet Comments:   Reason for Stopping:         carvedilol (COREG) 25 MG tablet Comments:   Reason for Stopping:         clopidogreL (PLAVIX) 75 mg tablet Comments:   Reason for Stopping:         DULoxetine (CYMBALTA) 30 MG capsule Comments:   Reason for Stopping:         ferrous sulfate (FEOSOL) 325 mg (65 mg iron) Tab tablet Comments:   Reason for Stopping:         folic acid (FOLVITE) 1 MG tablet Comments:   Reason for Stopping:         gabapentin (NEURONTIN) 300 MG capsule Comments:   Reason for Stopping:         hydrochlorothiazide (MICROZIDE ORAL) Comments:   Reason for Stopping:         insulin (BASAGLAR KWIKPEN U-100 INSULIN) glargine 100 units/mL (3mL) SubQ pen Comments:   Reason for Stopping:         Lactobacillus rhamnosus GG (CULTURELLE) 10 billion cell capsule Comments:   Reason for Stopping:         lactulose (CHRONULAC) 10 gram/15 mL solution Comments:   Reason for Stopping:         levothyroxine (SYNTHROID) 75 MCG tablet Comments:   Reason for Stopping:         losartan (COZAAR) 25 MG tablet Comments:   Reason for Stopping:         metoclopramide HCl (REGLAN) 10 MG tablet Comments:   Reason for Stopping:         omeprazole (PRILOSEC) 20 MG capsule Comments:   Reason for Stopping:         ondansetron (ZOFRAN) 4 MG tablet Comments:   Reason for Stopping:         oxyCODONE (ROXICODONE) 5 MG immediate release tablet Comments:   Reason for Stopping:         tamsulosin (FLOMAX) 0.4 mg Cap Comments:   Reason for Stopping:         traZODone (DESYREL) 100 MG tablet Comments:   Reason for Stopping:                   Future Orders:  Hospice Medical Director may dictate new orders for comfortable care measures & sign death certificate.      Stephanie Rivera MD  [facility]  Medicine

## 2022-05-05 NOTE — SIGNIFICANT EVENT
Death Note    Called to bedside by patient's nurse. Nursing supervisor notified. Family at bedside.  has been called and is also at bedside.    Patient is not responding to verbal or tactile stimuli. Patient does not have a papillary or corneal reflex. His pupils are fixed and dilated. No heart or breath sounds on auscultation. No respirations. No palpable pulses.     Time of death: 3:50pm    Cause of Death: Subdural Hematoma    LEERS paperwork to be sent to primary attending physician, MD Stephanie Scherer MD  [facility]

## 2022-05-05 NOTE — SUBJECTIVE & OBJECTIVE
Interval History: No acute events overnight.  Son at bedside.  Resting peacefully.  Discussed working on inpatient hospice.    Review of Systems   Unable to perform ROS: Patient nonverbal   Objective:     Vital Signs (Most Recent):  Temp: 98.1 °F (36.7 °C) (05/05/22 0555)  Pulse: 84 (05/05/22 0815)  Resp: 11 (05/05/22 0815)  BP: (!) 99/55 (05/05/22 0815)  SpO2: 96 % (05/05/22 0815)   Vital Signs (24h Range):  Temp:  [98.1 °F (36.7 °C)-98.8 °F (37.1 °C)] 98.1 °F (36.7 °C)  Pulse:  [81-93] 84  Resp:  [10-24] 11  SpO2:  [86 %-99 %] 96 %  BP: ()/(55-70) 99/55     Weight: 49.4 kg (108 lb 14.5 oz)  Body mass index is 17.06 kg/m².    Intake/Output Summary (Last 24 hours) at 5/5/2022 1254  Last data filed at 5/4/2022 1700  Gross per 24 hour   Intake --   Output 750 ml   Net -750 ml      Physical Exam  Constitutional:       Appearance: She is well-developed.      Comments: Appears comfortable   HENT:      Head: Normocephalic and atraumatic.   Cardiovascular:      Rate and Rhythm: Normal rate and regular rhythm.      Heart sounds: No murmur heard.  Pulmonary:      Effort: Pulmonary effort is normal. No respiratory distress.      Breath sounds: Normal breath sounds. No wheezing or rales.   Abdominal:      General: There is no distension.      Palpations: Abdomen is soft.      Tenderness: There is no abdominal tenderness.   Musculoskeletal:         General: No deformity.   Skin:     General: Skin is warm.       Significant Labs: All pertinent labs within the past 24 hours have been reviewed.    Significant Imaging: I have reviewed all pertinent imaging results/findings within the past 24 hours.

## 2022-05-05 NOTE — NURSING
After reviewing patient chart, I see the information in the notes from palliative care with information to place patient on comfort measures, however, I did not see the actual orders for comfort measures.

## 2022-05-05 NOTE — ASSESSMENT & PLAN NOTE
· This is a patient with a long history of chronic anemia.   · Slow down trending since admission likely 2/2 to surgery, acute illness, and phlebotomy.   · Labs stopped with comfort care

## 2022-05-05 NOTE — ASSESSMENT & PLAN NOTE
71 year old female with prolonged hospitalization for SDH. Getting tube feeds through NG. Palliative consulted for [facility]    Code status: DNR     Surrogate decision maker: Alysa Adame [phone]    GOC/ACP  5/4 Patient acute decompensation this am with labored respirations. Placed on Bipap. Went to bedside. Stopped tube feeds. Called patient's son and explained that the patient's body is shutting down and she is dying. She may have aspirated which will continue to happen. She may just not be able to tolerate all the volume from the tube feeds. Recommended transitioning to comfort focused care. Discussed getting the patient comfortable with medications and then removing the bipap which is life support which the patient said she did not want. Son in agreement with removing bipap once he got to the hospital.   Started a morphine gtt with IV pushes. Met with son once he arrived at the bedside. Patient's breathing appeared much more comfortable. Again discussed removing the bipap and NG tube and allowing a natural death. Discussed seeing how patient does overnight with potential move to inpatient hospice 5/5. Son in agreement     -Patient transitioned to comfort focused care. On morphine gtt at 2mg/hr. IV pushes available for signs of pain or respiratory distress. Bipap removed. Recommend room air. Remove NG. Potential move to inpatient hospice 5/5. Appreciate CM/SW sending referrals         5/2 Met with patient's son and family at bedside. Discussed that we do not expect patient to recover meaningfully. Son talked about how his mother said she never wanted to be dependent on life support. He expressed understanding that artificial nutrition is life support. I recommended that we free patient from life support in accordance with her wishes and allow her to have a natural and peaceful death with the support of hospice. Recommended against PEG. Discussed comfort feeds if patient expresses any interest. Son says that his  mother will definitely need placement. He would like to continue to discuss PEG vs transition to comfort focused care with his family       4/29  -Spoke with patient's son. Introduced palliative medicine. Discussed patient's diagnosis and lack of progress throughout this prolonged hospitalization. Informed son that patient previously spoke with our team back in October and had said that she did not want to be resuscitated in the event of a cardiac and respiratory arrest. Son said he was not surprised as they had had conversations about her not wanting to be on life support. Discussed that artificial nutrition is a form of life support and I would not recommend PEG placement / continuing tube feeds. Son expressed understanding. Made plans to meet on Monday at 2pm to discuss further with other family present       Discussed with Dr. Springer

## 2022-05-05 NOTE — ASSESSMENT & PLAN NOTE
· S/p craniotomy with evacuation on 4/4 and repeat evac on 4/20 and MMA embolization on 4/26  · Neuro recovery has been poor, prognosis poor, son open to hospice, assistance from palliative appreciated  · Has staples to be removed on 5/4 (by NSGY if still admitted)  · Discontinuing TF  · Transitioning to comfort measures 5/4  · CM/MARIO working on inpatient hospice

## 2022-05-05 NOTE — ASSESSMENT & PLAN NOTE
Patient calls requesting refills of hydrochlorothiazide and lisinopril. Patient was scheduled for an appointment on Monday and it had to be rescheduled. Current appointment is for Feb. 14th. Patient took her last doses of medication today, will need refills to get by until appointment.    Elizabeth Werner RN  [facility]             · No acute issues

## 2022-05-05 NOTE — NURSING
A limited head-to-toe assessment was performed for this patient based on a comfort based approach to her care. Pt appears to be sleeping with eyes closed, regular unlabored respirations, resp rate 9-10 per minute with morphine infusion. HR WNL, no facial grimacing or restless behaviors noted. Plan of care reviewed with son who is present at bedside, he expresses understanding. Questions answered and encouraged.

## 2022-05-05 NOTE — NURSING
Assumed care of patient from CHYNA Capps. Patient is non responsive verbally and generally does open eyes when touched or moved but not as much today as reported from the AM RN, O2 eugenio are dropping into the mid to high 80's and patient has been placed on comfort measures, son and his family are at bedside, IKER midline flushed and saline locked, NG tube to left nare has been removed, prevalon boots on bilateral feet, delgado, tube feedings were stopped this afternoon, patient is currently on 6 LNC, call bell and tray table within reach of the patient, will continue to monitor.

## 2022-05-05 NOTE — DISCHARGE SUMMARY
[facility] - Intensive Care ([address] Medicine  Discharge Summary      Patient Name: Beatriz Adame  MRN: [id number]  Patient Class: IP- Inpatient  Admission Date: 4/3/2022  Hospital Length of Stay: 32 days  Discharge Date and Time:  05/05/2022 4:20 PM  Attending Physician: Stephanie Rivera MD   Discharging Provider: Stephanie Rivera MD  Primary Care Provider: Dante Olivier MD  Hospital Medicine Team: [facility] HOSP MED B Stephanie Rivera MD    HPI:    Ms. Adame is a 70 yo female with hx of dementia, R MCA stroke with left side contraction/hemiparesis presented to [facility] with SDH now s/p evac. Stepped down from [facility]. On 4/13 she developed worsening headache and CTH showed re-accumulation of R SDH and 4-5mm MLS. She was noted to be more slow to follow commands. Stepped back up to [facility] for closer monitoring.     Original HPI below:  Ms. Adame is a 72 y/o F with a PMHx of baseline dementia, GERD, R MCA stroke with residual LUE weakness, HLD, HTN, Stage IV CKD, TIIDM who presents to [facility] from [facility] with a AoC SDH after an unwitnessed fall from her wheel chair without loss of consciousness. CT spine negative for fracture. CT head with R SDH and 3-4 mm MLS. She is on DAPT at home. Of note, she has been staying at [facility] after a partial R foot amputation, stitches still intact, for which she is receiving vancomycin.       Procedure(s) (LRB):  CRANIOTOMY, FOR SUBDURAL HEMATOMA EVACUATION (Right)      Hospital Course:   Ms. Adame was admitted to the NICU for management of a SDH. Intubated on admission, on precedex. Went for clot evac on 4/4. Extubated on 4/7. EEG revealed seizure on 4/14, loaded with Vimpat. Additional seizure activity noted on 4/18. Went for repeat evac on 4/20. Occlusive brachial thrombus noted in PICC line on 4/23. Underwent MMA embolization on 4/26. SD to  on 4/28.    As a result of her extensive neurologic injury her mental status remained very poor. Opens  eyes spontaneously but non-verbal, minimally responsive to physical stimuli. Araiza placed d/t persistent urinary retention. Received nutrition through NGT. Discussed with pt's son who wished to make pt DNR and pursue hospice care. Palliative care consulted.  CM/SW worked on getting her to inpatient hospice, but she passed before that time.  Family was at bedside.  Time of death 3:50pm.       Goals of Care Treatment Preferences:  Code Status: DNR      Consults:   Consults (From admission, onward)        Status Ordering Provider     Inpatient consult to Palliative Care  Once        Provider:  (Not yet assigned)    Completed FEDERICO CHRISTIANSON     Inpatient consult to Midline team  Once        Provider:  (Not yet assigned)    Completed IVETT BUSTOS     Inpatient consult to Registered Dietitian/Nutritionist  Once        Provider:  (Not yet assigned)    Completed SIERRA GUERRIER     Inpatient consult to Spiritual Care  Once        Provider:  (Not yet assigned)    Completed BETSY MONTALVO     Inpatient consult to Neuro Critical Care  Once        Provider:  (Not yet assigned)    Acknowledged SAFIA HOBSON     Inpatient consult to Infectious Diseases  Once        Provider:  (Not yet assigned)    Completed SAFIA HOBSON     Inpatient consult to Hospital Medicine-General  Once        Provider:  (Not yet assigned)    Completed ALANIS MELENDREZ     Inpatient consult to Podiatry  Once        Provider:  (Not yet assigned)    Completed SAFIA HOBSON     Inpatient consult to Infectious Diseases  Once        Provider:  (Not yet assigned)    Completed SARAH ALLEN     Inpatient consult to Physical Medicine Rehab  Once        Provider:  (Not yet assigned)    Completed WILLA CHAMPION     Inpatient consult to Registered Dietitian/Nutritionist  Once        Provider:  (Not yet assigned)    Completed WILLA CHAMPION     IP consult to case management/social work  Once        Provider:  (Not yet assigned)    Acknowledged AUSTIN  TASHA SLOAN     Inpatient consult to Neurosurgery  Once        Provider:  (Not yet assigned)    CHUNG Argueta          No new Assessment & Plan notes have been filed under this hospital service since the last note was generated.  Service: Hospital Medicine    Final Active Diagnoses:    Diagnosis Date Noted POA    PRINCIPAL PROBLEM:  SDH (subdural hematoma) [S06.5X9A] 2022 Yes    Palliative care encounter [Z51.5] 2022 Not Applicable    Encephalopathy [G93.40]  Yes    Recurrent seizures [G40.909] 2022 No    Body mass index (BMI) less than 19 [Z68.1] 2022 Not Applicable    Critical lower limb ischemia [I70.229] 2022 Yes    Osteomyelitis of right foot [M86.9] 2022 Yes    Status post partial amputation of right foot [Z89.431] 02/10/2022 Not Applicable    Acute on chronic anemia [D64.9] 02/10/2022 Yes    Debility [R53.81] 2022 Yes    Urinary retention [R33.9] 2022 Yes    Hypothyroidism [E03.9] 2021 Yes    Severe protein-calorie malnutrition [E43] 2021 Yes    Goals of care, counseling/discussion [Z71.89] 10/17/2021 Not Applicable    History of stroke [Z86.73] 2021 Not Applicable     Chronic    Type 2 diabetes mellitus, with long-term current use of insulin [E11.9, Z79.4] 10/27/2016 Not Applicable    Hypertension [I10]  Yes    Hyperlipidemia [E78.5]  Yes      Problems Resolved During this Admission:    Diagnosis Date Noted Date Resolved POA    Fall [W19.XXXA]  2022 Yes    Fever [R50.9] 2022 Unknown    Elevated troponin I level [R77.8] 2022 Yes       Discharged Condition:     Disposition:     Time spent on the discharge of patient: 35 minutes         Stephanie Rivera MD  Department of Hospital Medicine  [facility] Intensive Care ([place])

## 2022-05-05 NOTE — PROGRESS NOTES
[facility] - Intensive Care ([address] Medicine  Progress Note    Patient Name: Beatriz Adame  MRN: [id number]  Patient Class: IP- Inpatient   Admission Date: 4/3/2022  Length of Stay: 32 days  Attending Physician: Stephanie Rivera MD  Primary Care Provider: Dante Olivier MD        Subjective:     Principal Problem:SDH (subdural hematoma)        HPI:   Ms. Adame is a 70 yo female with hx of dementia, R MCA stroke with left side contraction/hemiparesis presented to [facility] with SDH now s/p evac. Stepped down from [facility]. On 4/13 she developed worsening headache and CTH showed re-accumulation of R SDH and 4-5mm MLS. She was noted to be more slow to follow commands. Stepped back up to [facility] for closer monitoring.     Original HPI below:  Ms. Adame is a 70 y/o F with a PMHx of baseline dementia, GERD, R MCA stroke with residual LUE weakness, HLD, HTN, Stage IV CKD, TIIDM who presents to [facility] from [facility] with a AoC SDH after an unwitnessed fall from her wheel chair without loss of consciousness. CT spine negative for fracture. CT head with R SDH and 3-4 mm MLS. She is on DAPT at home. Of note, she has been staying at [facility] after a partial R foot amputation, stitches still intact, for which she is receiving vancomycin.       Overview/Hospital Course:  Ms. Adame was admitted to the NICU for management of a SDH. Intubated on admission, on precedex. Went for clot evac on 4/4. Extubated on 4/7. EEG revealed seizure on 4/14, loaded with Vimpat. Additional seizure activity noted on 4/18. Went for repeat evac on 4/20. Occlusive brachial thrombus noted in PICC line on 4/23. Underwent MMA embolization on 4/26. SD to  on 4/28.    As a result of her extensive neurologic injury her mental status remained very poor. Opens eyes spontaneously but non-verbal, minimally responsive to physical stimuli. Araiza placed d/t persistent urinary retention. Received nutrition through NGT. Discussed with  pt's son who wished to make pt DNR and pursue hospice care. Palliative care consulted.  CM/SW working on inpatient hospice.      Interval History: No acute events overnight.  Son at bedside.  Resting peacefully.  Discussed working on inpatient hospice.    Review of Systems   Unable to perform ROS: Patient nonverbal   Objective:     Vital Signs (Most Recent):  Temp: 98.1 °F (36.7 °C) (05/05/22 0555)  Pulse: 84 (05/05/22 0815)  Resp: 11 (05/05/22 0815)  BP: (!) 99/55 (05/05/22 0815)  SpO2: 96 % (05/05/22 0815)   Vital Signs (24h Range):  Temp:  [98.1 °F (36.7 °C)-98.8 °F (37.1 °C)] 98.1 °F (36.7 °C)  Pulse:  [81-93] 84  Resp:  [10-24] 11  SpO2:  [86 %-99 %] 96 %  BP: ()/(55-70) 99/55     Weight: 49.4 kg (108 lb 14.5 oz)  Body mass index is 17.06 kg/m².    Intake/Output Summary (Last 24 hours) at 5/5/2022 1254  Last data filed at 5/4/2022 1700  Gross per 24 hour   Intake --   Output 750 ml   Net -750 ml      Physical Exam  Constitutional:       Appearance: She is well-developed.      Comments: Appears comfortable   HENT:      Head: Normocephalic and atraumatic.   Cardiovascular:      Rate and Rhythm: Normal rate and regular rhythm.      Heart sounds: No murmur heard.  Pulmonary:      Effort: Pulmonary effort is normal. No respiratory distress.      Breath sounds: Normal breath sounds. No wheezing or rales.   Abdominal:      General: There is no distension.      Palpations: Abdomen is soft.      Tenderness: There is no abdominal tenderness.   Musculoskeletal:         General: No deformity.   Skin:     General: Skin is warm.       Significant Labs: All pertinent labs within the past 24 hours have been reviewed.    Significant Imaging: I have reviewed all pertinent imaging results/findings within the past 24 hours.      Assessment/Plan:      * SDH (subdural hematoma)  · S/p craniotomy with evacuation on 4/4 and repeat evac on 4/20 and MMA embolization on 4/26  · Neuro recovery has been poor, prognosis poor, son open  to hospice, assistance from palliative appreciated  · Has staples to be removed on 5/4 (by NSGY if still admitted)  · Discontinuing TF  · Transitioning to comfort measures 5/4  · CM/SW working on inpatient hospice    Palliative care encounter  · DNR  · Comfort care  · Working on inpatient hospice      Encephalopathy  · 2/2 SDH      Recurrent seizures  · Chronic issue  · Meds stopped for comfort care      Body mass index (BMI) less than 19  · TF stopped for comfort care      Critical lower limb ischemia  · Chronic issue  · Meds stopped for comfort care      Osteomyelitis of right foot  R foot osteomyelitis with bone bx +MRSA, ID following peripherally   ID rec 6wk course of Vanc, with end  Date 5/5/2022    Status post partial amputation of right foot  · No acute issues      Acute on chronic anemia  · This is a patient with a long history of chronic anemia.   · Slow down trending since admission likely 2/2 to surgery, acute illness, and phlebotomy.   · Labs stopped with comfort care      Goals of care, counseling/discussion  · Discussed prognosis with pt's son on 4/29 and low likelihood of meaningful recovery. He would like her to be DNR and is open to discussing hospice care. Unsure whether he wants a PEG tube placed.  · DNR  · Palliative care following, recs apprecaited  · Transitioning to comfort measures 5/4.   · CM/SW working on inpatient hospice    Debility  · Hospice      Urinary retention  · Chronic issue  · Meds stopped for comfort care      Hypothyroidism  · Chronic issue  · Meds stopped for comfort care      Severe protein-calorie malnutrition  · Chronic issue  · Meds stopped for comfort care        History of stroke  · Baseline LUE and LLE weakness s/p stroke  · Was on ASA/Plavix, but stopped on admit 2/2 SDH    Acute kidney injury superimposed on CKD stage IV  CKD with previous baseline high 1s-mid 2s, but Cr mid 1s recently  Pt with some retention, but still able to void with prompting, has karl in  place  Strict I/Os q4h - discussed with RN  Encourage po hydration  Trend renal function  Renally dose medications and avoid nephrotoxins  Maintain hgb>7 and MAP>65    Type 2 diabetes mellitus, with long-term current use of insulin  · Chronic issue  · Meds stopped for comfort care      Hyperlipidemia  · Chronic issue  · Meds stopped for comfort care    Hypertension  · Chronic issue  · Meds stopped for comfort care        VTE Risk Mitigation (From admission, onward)         Ordered     IP VTE HIGH RISK PATIENT  Once         04/03/22 1608     Place sequential compression device  Until discontinued         04/03/22 1608     Reason for No Pharmacological VTE Prophylaxis  Once        Question:  Reasons:  Answer:  Active Bleeding    04/03/22 1608                Discharge Planning   MARY: 5/5/2022     Code Status: DNR   Is the patient medically ready for discharge?: Yes    Reason for patient still in hospital (select all that apply): Pending disposition  Discharge Plan A: Long-term acute care facility (LTAC)   Discharge Delays: None known at this time              Stephanie Rivera MD  Department of Hospital Medicine   [facility] - Intensive Care ([place])

## 2022-05-05 NOTE — ASSESSMENT & PLAN NOTE
· Discussed prognosis with pt's son on 4/29 and low likelihood of meaningful recovery. He would like her to be DNR and is open to discussing hospice care. Unsure whether he wants a PEG tube placed.  · DNR  · Palliative care following, recs apprecaited  · Transitioning to comfort measures 5/4.   · CM/SW working on inpatient hospice

## 2022-05-05 NOTE — PROGRESS NOTES
[facility] - Intensive Care ([place] [address])  Palliative Medicine  Progress Note    Patient Name: Beatriz Adame  MRN: [id number]  Admission Date: 4/3/2022  Hospital Length of Stay: 31 days  Code Status: DNR   Attending Provider: Tirso Springer MD  Consulting Provider: Maryam Schuster MD  Primary Care Physician: Dante Olivier MD  Principal Problem:SDH (subdural hematoma)    Patient information was obtained from relative(s) and primary team.      Assessment/Plan:     Palliative care encounter  71 year old female with prolonged hospitalization for SDH. Getting tube feeds through NG. Palliative consulted for [facility]    Code status: DNR     Surrogate decision maker: Alysa Adame [phone]    GOC/ACP  5/4 Patient acute decompensation this am with labored respirations. Placed on Bipap. Went to bedside. Stopped tube feeds. Called patient's son and explained that the patient's body is shutting down and she is dying. She may have aspirated which will continue to happen. She may just not be able to tolerate all the volume from the tube feeds. Recommended transitioning to comfort focused care. Discussed getting the patient comfortable with medications and then removing the bipap which is life support which the patient said she did not want. Son in agreement with removing bipap once he got to the hospital.   Started a morphine gtt with IV pushes. Met with son once he arrived at the bedside. Patient's breathing appeared much more comfortable. Again discussed removing the bipap and NG tube and allowing a natural death. Discussed seeing how patient does overnight with potential move to inpatient hospice 5/5. Son in agreement     -Patient transitioned to comfort focused care. On morphine gtt at 2mg/hr. IV pushes available for signs of pain or respiratory distress. Bipap removed. Recommend room air. Remove NG. Potential move to inpatient hospice 5/5. Appreciate CM/SW sending referrals         5/2 Met with patient's son and family  at bedside. Discussed that we do not expect patient to recover meaningfully. Son talked about how his mother said she never wanted to be dependent on life support. He expressed understanding that artificial nutrition is life support. I recommended that we free patient from life support in accordance with her wishes and allow her to have a natural and peaceful death with the support of hospice. Recommended against PEG. Discussed comfort feeds if patient expresses any interest. Son says that his mother will definitely need placement. He would like to continue to discuss PEG vs transition to comfort focused care with his family       4/29  -Spoke with patient's son. Introduced palliative medicine. Discussed patient's diagnosis and lack of progress throughout this prolonged hospitalization. Informed son that patient previously spoke with our team back in October and had said that she did not want to be resuscitated in the event of a cardiac and respiratory arrest. Son said he was not surprised as they had had conversations about her not wanting to be on life support. Discussed that artificial nutrition is a form of life support and I would not recommend PEG placement / continuing tube feeds. Son expressed understanding. Made plans to meet on Monday at 2pm to discuss further with other family present       Discussed with Dr. Springer         I will sign off. Please contact us if you have any additional questions.    Subjective:     Chief Complaint:   Chief Complaint   Patient presents with    Fall     Pt arrives from [facility]. Pt had a witnessed fall out of her wheelchair. Confused at baseline. Negative for LOC; pt on blood thinners.        HPI:   70 y/o F with a PMHx of baseline dementia, GERD, R MCA stroke with residual LUE weakness, HLD, HTN, Stage IV CKD, TIIDM who presents to [facility] from [facility] with a AoC SDH after an unwitnessed fall from her wheel chair. Patient is s/p evacuation and has had a  prolonged hospital course without meaningful recovery. Patient is receiving artificial nutrition through a NG. Palliative consulted on day 26 for GOC      Hospital Course:  No notes on file    Interval Hx: Notified by RT that patient had acutely decompensated with labored respirations. Patient was placed on Bipap. No family at bedside. Pt getting tube feeds through NG    Past Medical History:   Diagnosis Date    Gastroparesis     GERD (gastroesophageal reflux disease)     History of Clostridium difficile colitis 07/24/2021    History of kidney stones     History of stroke     left side paralysis    Hyperlipidemia     Hypertension     Hypothyroidism     Iron deficiency anemia     Osteoarthritis     Peripheral neuropathy     Stage IV CKD     Type II diabetes mellitus        Past Surgical History:   Procedure Laterality Date    APPENDECTOMY      CRANIOTOMY FOR EVACUATION OF SUBDURAL HEMATOMA Right 4/4/2022    Procedure: CRANIOTOMY, FOR SUBDURAL HEMATOMA EVACUATION;  Surgeon: Silvio White MD;  Location: [address];  Service: Neurosurgery;  Laterality: Right;    CRANIOTOMY FOR EVACUATION OF SUBDURAL HEMATOMA Right 4/20/2022    Procedure: CRANIOTOMY, FOR SUBDURAL HEMATOMA EVACUATION;  Surgeon: Jose Walter MD;  Location: [address];  Service: Neurosurgery;  Laterality: Right;    CYSTOSCOPY W/ URETERAL STENT PLACEMENT Right 10/13/2021    Procedure: CYSTOSCOPY, WITH URETERAL STENT INSERTION;  Surgeon: Wanda Arroyo MD;  Location: [facility];  Service: Urology;  Laterality: Right;    ESOPHAGOGASTRODUODENOSCOPY N/A 11/23/2021    Procedure: EGD (ESOPHAGOGASTRODUODENOSCOPY);  Surgeon: Obed Jeong MD;  Location: [facility] ([address] [place] [address]);  Service: Endoscopy;  Laterality: N/A;    FOOT AMPUTATION THROUGH METATARSAL Right 3/24/2022    Procedure: AMPUTATION, FOOT, PARTIAL 4th and 5th ray;  Surgeon: Rodrigo Logan DPM;  [facility];  Service: Podiatry;  Laterality: Right;     LAPAROSCOPIC APPENDECTOMY N/A 7/22/2021    Procedure: APPENDECTOMY, LAPAROSCOPIC;  Surgeon: Paulo Calvo Jr., MD;  Location: [facility] OR;  Service: General;  Laterality: N/A;    TOE AMPUTATION Right 1/1/2022    Procedure: AMPUTATION, TOE;  Surgeon: Genaro Mason DPM;  Location: [facility] OR;  Service: Podiatry;  Laterality: Right;    TUBAL LIGATION      URETEROSCOPIC REMOVAL OF URETERIC CALCULUS Right 12/22/2021    Procedure: REMOVAL, CALCULUS, URETER, URETEROSCOPIC;  Surgeon: Wanda Arroyo MD;  Location: [facility] OR;  Service: Urology;  Laterality: Right;       Review of patient's allergies indicates:   Allergen Reactions    Tomato (solanum lycopersicum) Hives and Itching       Medications:  Continuous Infusions:   morphine 2 mg/hr (05/04/22 1058)     Scheduled Meds:   lacosamide  250 mg Per NG tube Q12H    levetiracetam  1,500 mg Per NG tube BID     PRN Meds:acetaminophen, lorazepam, metoclopramide HCl, morphine, ondansetron, sodium chloride 0.9%, sodium chloride 0.9%, sodium chloride 0.9%    Family History       Problem Relation (Age of Onset)    Alcohol abuse Father    Arthritis Mother    Asthma Brother    Diabetes Mother, Sister, Brother    Heart attack Mother, Father, Brother    Heart disease Mother, Brother    Hypertension Mother    Kidney disease Mother    Stroke Mother    Vision loss Mother          Tobacco Use    Smoking status: Never Smoker    Smokeless tobacco: Never Used   Substance and Sexual Activity    Alcohol use: No     Comment: socially    Drug use: No    Sexual activity: Not Currently       Review of Systems   Unable to perform ROS: Patient unresponsive   Objective:     Vital Signs (Most Recent):  Temp: 98.8 °F (37.1 °C) (05/04/22 1500)  Pulse: 92 (05/04/22 1343)  Resp: (!) 24 (05/04/22 1343)  BP: 124/70 (05/04/22 1500)  SpO2: (!) 93 % (05/04/22 1343)   Vital Signs (24h Range):  Temp:  [97.4 °F (36.3 °C)-99.5 °F (37.5 °C)] 98.8 °F (37.1 °C)  Pulse:  [] 92  Resp:  [17-35] 24  SpO2:   [87 %-98 %] 93 %  BP: (108-136)/(56-75) 124/70     Weight: 49.4 kg (108 lb 14.5 oz)  Body mass index is 17.06 kg/m².    Physical Exam  Vitals and nursing note reviewed.   Constitutional:       General: She is in acute distress.      Appearance: She is ill-appearing.      Comments: Unresponsive   HENT:      Head:      Comments: Staples from evac     Right Ear: External ear normal.      Left Ear: External ear normal.      Nose:      Comments: NG in place     Mouth/Throat:      Mouth: Mucous membranes are dry.   Eyes:      Comments: Eyes remained closed    Cardiovascular:      Rate and Rhythm: Normal rate.   Pulmonary:      Effort: Respiratory distress present.      Comments: On bipap  Abdominal:      General: There is no distension.   Musculoskeletal:      Cervical back: Normal range of motion.      Comments: Contracted    Neurological:      Comments: unresponsive   Psychiatric:         Cognition and Memory: Cognition is impaired. Memory is impaired.       Review of Symptoms      Symptom Assessment (ESAS 0-10 Scale)  Unable to complete assessment due to Patient unresponsive     CAM / Delirium:  Negative  Constipation:  Negative  Diarrhea:  Negative    Bowel Management Plan (BMP):  Yes      Pain Assessment in Advanced Demential Scale (PAINAD)   Breathing - Independent of vocalization:  1  Negative vocalization:  0  Facial expression:  0  Body language:  0  Consolability:  0  Total:  1    Modified Ally Scale:  0    Performance Status:  10    Living Arrangements:  Lives in nursing home    Psychosocial/Cultural: Has one son     Spiritual:  F - Mary Ellen and Belief:  Not addressed       Advance Care Planning   Advance Directives:   Living Will: No    LaPOST: No    Do Not Resuscitate Status: Yes    Medical Power of : No      Decision Making:  Family answered questions       Significant Labs: All pertinent labs within the past 24 hours have been reviewed.  CBC:   Recent Labs   Lab 05/03/22  0451   WBC 9.39   HGB 7.3*    HCT 23.0*   MCV 98   *       BMP:  Recent Labs   Lab 05/04/22  0945   *      K 4.7   *   CO2 20*   BUN 60*   CREATININE 1.6*   CALCIUM 8.4*   MG 2.1       LFT:  Lab Results   Component Value Date    AST 16 05/03/2022    ALKPHOS 64 05/03/2022    BILITOT 0.3 05/03/2022     Albumin:   Albumin   Date Value Ref Range Status   05/03/2022 1.6 (L) 3.5 - 5.2 g/dL Final     Protein:   Total Protein   Date Value Ref Range Status   05/03/2022 5.9 (L) 6.0 - 8.4 g/dL Final     Lactic acid:   Lab Results   Component Value Date    LACTATE 0.7 03/18/2022    LACTATE 0.9 03/18/2022       Significant Imaging: I have reviewed all pertinent imaging results/findings within the past 24 hours.    X-ray abdomen 4/28/22    Nasogastric tube tip and side hole projects over the expected location of the gastric lumen.  There is vascular calcification.  There is moderate to large amount of stool in the colon.  There may be left pleural effusion.        Maryam Schuster MD  Palliative Medicine  [facility] - Intensive Care ([place] [address])    > 50% of 35   min visit spent in chart review, face to face discussion of goals of care,  symptom assessment, coordination of care and emotional support    Additional 18 minutes spent discussing ACP

## 2022-05-05 NOTE — ASSESSMENT & PLAN NOTE
R foot osteomyelitis with bone bx +MRSA, ID following peripherally   ID rec 6wk course of Vanc, with end  Date 5/5/2022

## 2022-05-05 NOTE — PLAN OF CARE
Patient is in need of inpatient hospice. MARIO has sent a referral to [facility]. MARIO will continue to follow up. MARIO spoke with Colin with inpatient hospice reporting that the referral will be reviewed and she will follow up with MARIO for a decision.    Updated 1:18pm  MARIO spoke with Rose Marie with [facility] verifying that they will not have a bed until tomorrow. MARIO contacted [facility] and spoke with Graciela verifying they will review the referral and contact MARIO back .         Beatriz Aguero, GADIEL  [facility]   [phone]

## 2022-05-09 LAB
POCT GLUCOSE: 185 MG/DL (ref 70–110)
POCT GLUCOSE: 194 MG/DL (ref 70–110)
POCT GLUCOSE: 204 MG/DL (ref 70–110)

## 2022-05-12 LAB
ACID FAST MOD KINY STN SPEC: NORMAL
ACID FAST MOD KINY STN SPEC: NORMAL
MYCOBACTERIUM SPEC QL CULT: NORMAL
MYCOBACTERIUM SPEC QL CULT: NORMAL

## 2024-01-05 NOTE — ASSESSMENT & PLAN NOTE
- R MCA stroke (2013) with residual LUE flaccid paralysis  - DAPT at home held due to new ICH  - Statin     [Nl] : Genitourinary

## 2024-03-27 NOTE — PT/OT/SLP EVAL
Physical Therapy Evaluation    Patient Name:  Beatriz Adame   MRN:  [id number]    Recommendations:     Discharge Recommendations:  nursing facility, skilled (return to)   Discharge Equipment Recommendations: slide board, hospital bed, bedside commode   Barriers to discharge: None (if pt returns to NH SNF)    Assessment:     Beatriz Adame is a 71 y.o. female admitted with a medical diagnosis of Osteomyelitis of right foot.  She presents with the following impairments/functional limitations:  weakness, impaired endurance, impaired sensation, impaired functional mobilty, impaired self care skills, gait instability, impaired balance, impaired cognition, decreased lower extremity function, decreased upper extremity function .     Pt melanie session well w/ good participation. She is currently limited by the above listed deficits requiring ModA for bed mobility. She is appropriate for acute PT services. Goals include slide board transfers to/from a w/c. Pt will begin PT POC.    Rehab Prognosis: Good; patient would benefit from acute skilled PT services to address these deficits and reach maximum level of function.    Recent Surgery: Procedure(s) (LRB):  AMPUTATION, FOOT, PARTIAL 4th and 5th ray (Right) 1 Day Post-Op    Plan:     During this hospitalization, patient to be seen 3 x/week to address the identified rehab impairments via therapeutic activities, therapeutic exercises, neuromuscular re-education, wheelchair management/training and progress toward the following goals:    · Plan of Care Expires:  04/23/22    Subjective     Chief Complaint: foot pain  Patient/Family Comments/goals: return to PLOF  Pain/Comfort:  · Pain Rating 1: 8/10  · Location - Side 1: Bilateral  · Location - Orientation 1: generalized  · Location 1: foot  · Pain Addressed 1: Pre-medicate for activity, Reposition  · Pain Rating Post-Intervention 1: 8/10    Patients cultural, spiritual, [employer] conflicts given the current situation:  no    Living Environment:  Pt has been staying at [facility] since January of this year. Prior to then she was living alone in a 1st floor apartment.  Prior to admission, patients level of function was non ambulatory. She required assistance for transfers and ADLs from NH staff.  Equipment used at home: wheelchair.  DME owned (not currently used): NH equipment.  Upon discharge, patient will have assistance from NH staff.    Objective:   Co-treatment performed due to patient's multiple deficits requiring two skilled therapists to appropriately and safely assess patient's strength and endurance while facilitating functional tasks in addition to accommodating for patient's activity tolerance.     Communicated with nursing prior to session.  Patient found supine with telemetry, delgado catheter  upon PT entry to room.    General Precautions: Standard, fall, contact   Orthopedic Precautions:RLE non weight bearing   Braces: N/A  Respiratory Status: Room air    Exams:  · Cognitive Exam:  Patient is oriented to Person  · Gross Motor Coordination:  WFL  · Postural Exam:  Patient presented with the following abnormalities:    · -       Rounded shoulders  · -       Forward head  · -       Posterior pelvic tilt  · Sensation:    · -       Impaired  light/touch (B) feet  · Skin Integrity/Edema:      · -       Skin integrity: Visible skin intact  · LUE flexion contracture from previous CVA  · RLE ROM: WFL except knee ext approx -20  · RLE Strength: HF 2+/5, knee; ankle not tested  · LLE ROM: WFL except knee ext approx -45  · LLE Strength: HF 2/5, knee 2/5, ankle 4/5    Functional Mobility:  · Bed Mobility:   · Supine<>sit on bed w/ ModA for trunk  · Cues and inc'd time required  · HOB elevated and w/ side rail  · Balance:   · Static sit at EOB w/ mostly SBA but occasional Min/CGA due to left lean  · Cues for upright/midline posture    Therapeutic Activities and Exercises:  Pt was educated on PT role and POC along w/  new RLE NWB restriction. Pt verb understanding but will need reinforcement.    AM-PAC 6 CLICK MOBILITY  Total Score:9     Patient left supine with all lines intact and call button in reach.    GOALS:   Multidisciplinary Problems     Physical Therapy Goals        Problem: Physical Therapy    Goal Priority Disciplines Outcome Goal Variances Interventions   Physical Therapy Goal     PT, PT/OT Ongoing, Progressing     Description: Goals to be met by: 22     Patient will increase functional independence with mobility by performin. Supine to sit with MInimal Assistance  2. Sit to supine with MInimal Assistance  3. Bed to chair transfer with Moderate Assistance using Slideboard  4. Wheelchair propulsion x50 feet with Stand-by Assistance using bilateral uppper extremities  5. Sitting at edge of bed x10 minutes with Stand-by Assistance                     History:     Past Medical History:   Diagnosis Date    Gastroparesis     GERD (gastroesophageal reflux disease)     History of Clostridium difficile colitis 2021    History of kidney stones     History of stroke     left side paralysis    Hyperlipidemia     Hypertension     Hypothyroidism     Iron deficiency anemia     Osteoarthritis     Peripheral neuropathy     Stage IV CKD     Type II diabetes mellitus        Past Surgical History:   Procedure Laterality Date    APPENDECTOMY      CYSTOSCOPY W/ URETERAL STENT PLACEMENT Right 10/13/2021    Procedure: CYSTOSCOPY, WITH URETERAL STENT INSERTION;  Surgeon: Wanda Arroyo MD;  Location: [facility];  Service: Urology;  Laterality: Right;    ESOPHAGOGASTRODUODENOSCOPY N/A 2021    Procedure: EGD (ESOPHAGOGASTRODUODENOSCOPY);  Surgeon: Obed Jeong MD;  Location: [address]);  Service: Endoscopy;  Laterality: N/A;    FOOT AMPUTATION THROUGH METATARSAL Right 3/24/2022    Procedure: AMPUTATION, FOOT, PARTIAL 4th and 5th ray;  Surgeon: Rodrigo Logan DPM;  Location: [address]  [facility];  Service: Podiatry;  Laterality: Right;    LAPAROSCOPIC APPENDECTOMY N/A 7/22/2021    Procedure: APPENDECTOMY, LAPAROSCOPIC;  Surgeon: Paulo Calvo Jr., MD;  Location: [facility];  Service: General;  Laterality: N/A;    TOE AMPUTATION Right 1/1/2022    Procedure: AMPUTATION, TOE;  Surgeon: Genaro Mason DPM;  Location: [facility];  Service: Podiatry;  Laterality: Right;    TUBAL LIGATION      URETEROSCOPIC REMOVAL OF URETERIC CALCULUS Right 12/22/2021    Procedure: REMOVAL, CALCULUS, URETER, URETEROSCOPIC;  Surgeon: Wanda Arroyo MD;  Location: [facility];  Service: Urology;  Laterality: Right;       Time Tracking:     PT Received On: 03/25/22  PT Start Time: 0935     PT Stop Time: 0955  PT Total Time (min): 20 min     Billable Minutes: Evaluation 10, Therapeutic Activity 10 and Total Time 20      03/25/2022   Him/He

## (undated) DEVICE — DRESSING N ADH OIL EMUL 3X3

## (undated) DEVICE — SEE MEDLINE ITEM 157171

## (undated) DEVICE — TRAY FOLEY 16FR INFECTION CONT

## (undated) DEVICE — DRESSING SURGICAL 3/4X3/4

## (undated) DEVICE — GLOVE BIOGEL SKINSENSE PI 6.5

## (undated) DEVICE — SEE MEDLINE ITEM 152515

## (undated) DEVICE — TUBE FRAZIER 5MM 2FT SOFT TIP

## (undated) DEVICE — TRAY MINOR ORTHO

## (undated) DEVICE — BIT DRILL PERFORATOR DISP 14MM

## (undated) DEVICE — DIFFUSER

## (undated) DEVICE — GAUZE SPONGE 4X4 12PLY

## (undated) DEVICE — SPONGE DERMACEA GAUZE 4X4

## (undated) DEVICE — DRESSING XEROFORM 1X8IN

## (undated) DEVICE — SEE MEDLINE ITEM 157131

## (undated) DEVICE — TROCAR KII FIOS 5MM X 100MM

## (undated) DEVICE — SEE MEDLINE ITEM 157103

## (undated) DEVICE — SOL CLEARIFY VISUALIZATION LAP

## (undated) DEVICE — SUT D SPECIAL VICRYL 2-0

## (undated) DEVICE — DRAPE CORETEMP FLD WRM 56X62IN

## (undated) DEVICE — KIT EVACUATOR 3-SPRING 1/8 DRN

## (undated) DEVICE — SOL PVP-I SCRUB 7.5% 4OZ

## (undated) DEVICE — SEE MEDLINE ITEM 147518

## (undated) DEVICE — SUT VICRYL PLUS 2-0 SH 27IN

## (undated) DEVICE — ELECTRODE REM PLYHSV RETURN 9

## (undated) DEVICE — SPONGE COTTON TRAY 4X4IN

## (undated) DEVICE — SUT VICRYL 0 27 CT-2

## (undated) DEVICE — STOCKINET 4INX48

## (undated) DEVICE — SPONGE SURGIFOAM 100 8.5X12X10

## (undated) DEVICE — CLIPS RANEY SCALP FFS/ASSY

## (undated) DEVICE — NDL HYPO REG 25G X 1 1/2

## (undated) DEVICE — FIBER 1000 MICRON HOLMIUM

## (undated) DEVICE — SUT PROLENE 3-0 30SH

## (undated) DEVICE — BLADE SURG CARBON STEEL SZ11

## (undated) DEVICE — SYR IRRIGATION BULB STER 60ML

## (undated) DEVICE — DRESSING SURGICAL 1X3

## (undated) DEVICE — SUT PROLENE 2-0 SH 36IN BLU

## (undated) DEVICE — CATH URETERAL DUAL LUMEN 10FR

## (undated) DEVICE — GLOVE BIOGEL SKINSENSE PI 8.0

## (undated) DEVICE — STAPLER SKIN PROXIMATE WIDE

## (undated) DEVICE — SYR 10CC LUER LOCK

## (undated) DEVICE — GLOVE BIOGEL SKINSENSE PI 7.5

## (undated) DEVICE — TAPE SILK 3IN

## (undated) DEVICE — SOL NACL STRL BOTTLE 1000ML

## (undated) DEVICE — DRESSING XEROFORM FOIL PK 1X8

## (undated) DEVICE — SEE MEDLINE ITEM 156930

## (undated) DEVICE — BAG TISSUE RETRIEVAL 225ML

## (undated) DEVICE — SUT MCRYL PLUS 4-0 PS2 27IN

## (undated) DEVICE — TRAY DRY SKIN SCRUB PREP

## (undated) DEVICE — DRESSING TRANS 2X2 TEGADERM

## (undated) DEVICE — CLOSURE SKIN STERI STRIP 1/4X4

## (undated) DEVICE — CART STAPLE FLEX ETX 3.5MM BLU

## (undated) DEVICE — SPONGE NEURO 1/4X1/4

## (undated) DEVICE — HOOK STAY ELAS 5MM 8EA/PK

## (undated) DEVICE — EVACUATOR WOUND BULB 100CC

## (undated) DEVICE — DRAIN CHANNEL ROUND 10FR

## (undated) DEVICE — DURAPREP SURG SCRUB 26ML

## (undated) DEVICE — BUR BONE CUT MICRO TPS 3X3.8MM

## (undated) DEVICE — HOOK LONE STAR BLUNT 12MM

## (undated) DEVICE — BANDAGE ESMARK ELASTIC ST 4X9

## (undated) DEVICE — SEE MEDLINE ITEM 156925

## (undated) DEVICE — SPONGE PATTY SURGICAL .5X3IN

## (undated) DEVICE — GUIDEWIRE NITINOL HYBRID 150CM

## (undated) DEVICE — SET EXT W/2 VLVE PORTS 40

## (undated) DEVICE — HEMOSTAT SURGICEL 4X8IN

## (undated) DEVICE — PAD UNDERPAD 30X30

## (undated) DEVICE — BANDAGE ELAS SOFTWRAP ST 4X5YD

## (undated) DEVICE — OXICLIQ ADULT 24/CASE

## (undated) DEVICE — BLADE SURG STAINLESS STEEL #11

## (undated) DEVICE — DRESSING SURGICAL 1/2X1/2

## (undated) DEVICE — SUT 4/0 18IN NUROLON BLK B

## (undated) DEVICE — EXTRACTOR STONE 4WR NIT 2.2F 1

## (undated) DEVICE — DRAPE INCISE IOBAN 2 23X17IN

## (undated) DEVICE — SOL SALINE STER BOTTLE 500ML

## (undated) DEVICE — SUT SILK 0 STRANDS 30IN BLK

## (undated) DEVICE — CATH BACTISEAL EVD CLEAR 1.9MM

## (undated) DEVICE — GUIDE WIRE MOTION .035 X 150CM

## (undated) DEVICE — SPONGE LAP 18X18 PREWASHED

## (undated) DEVICE — CORD BIPOLAR 12 FOOT

## (undated) DEVICE — SET BASIN 48X48IN 6000ML RING

## (undated) DEVICE — SPONGE GAUZE 16PLY 4X4

## (undated) DEVICE — APPLICATOR CHLORAPREP ORN 26ML

## (undated) DEVICE — KIT COLLECTION E SWAB REGULAR

## (undated) DEVICE — SET IRR URLGY 2LINE UNIV SPIKE

## (undated) DEVICE — SOL NS 1000CC

## (undated) DEVICE — SET EXTENSION 30 IN W/LL ROLLE

## (undated) DEVICE — TOURNIQUET SB QC DP 18X4IN

## (undated) DEVICE — BAG DRAINAGE(CRAINIAL) EDS

## (undated) DEVICE — Device

## (undated) DEVICE — SEE MEDLINE ITEM 157117

## (undated) DEVICE — TROCAR ENDO KII FIOS 12X100MM

## (undated) DEVICE — ROUTER STRAIGHT 1.1 X6.0MM

## (undated) DEVICE — CARTRIDGE OIL

## (undated) DEVICE — DRESSING TELFA STRL 4X3 LF

## (undated) DEVICE — PAD CAST SPECIALIST STRL 4

## (undated) DEVICE — SHEARS HARMONIC 36CM HD 1000I

## (undated) DEVICE — SET CYSTO IRRIGATION UNIV SPIK

## (undated) DEVICE — STOCKINETTE DBL PLY ST 4X

## (undated) DEVICE — SOL IRR NACL .9% 3000ML

## (undated) DEVICE — DRAPE STERI INSTRUMENT 1018

## (undated) DEVICE — DRESSING SURGICAL 1X1

## (undated) DEVICE — MARKER SKIN STND TIP BLUE BARR

## (undated) DEVICE — BANDAGE DERMACEA STRETCH 4X1IN

## (undated) DEVICE — SEE MEDLINE ITEM 156905

## (undated) DEVICE — NDL INSUF ULTRA VERESS 120MM

## (undated) DEVICE — SUT VICRYL+ 2-0 SH 18IN

## (undated) DEVICE — DRAPE THYROID WITH ARMBOARD

## (undated) DEVICE — CATH IV CATHLON W/HUB14GAX

## (undated) DEVICE — KIT EVACUATOR FULL PERF 100CC

## (undated) DEVICE — ADHESIVE DERMABOND ADVANCED

## (undated) DEVICE — FIBER 272 MICRON HOLMIUM

## (undated) DEVICE — STAPLER INT LINEAR ARTC 3.5-45

## (undated) DEVICE — ELECTRODE BLADE INSULATED 1 IN

## (undated) DEVICE — ROUTER TAPERED 2.3MM

## (undated) DEVICE — COVER MAYO STAND REINFRCD 30

## (undated) DEVICE — IRRIGATOR ENDOSCOPY DISP.

## (undated) DEVICE — SEE MEDLINE ITEM 146298

## (undated) DEVICE — DRESSING LEUKOPLAST FLEX 1X3IN

## (undated) DEVICE — BRUSH SCRUB SURGICALW/BETADINE

## (undated) DEVICE — UNDERGLOVES BIOGEL PI SIZE 7.5

## (undated) DEVICE — BURR ACORN 7.5MM

## (undated) DEVICE — SUT VICRYL PLUS 3-0 SH 18IN

## (undated) DEVICE — CATH URTRL OPEN END STR TIP 5F

## (undated) DEVICE — BLADE SURG STAINLESS STEEL #15

## (undated) DEVICE — DRAPE C-ARM MINI DISP

## (undated) DEVICE — SEE MEDLINE ITEM 154981

## (undated) DEVICE — BIT DRILL WIRE PASS 1.0MM

## (undated) DEVICE — PENCIL ELECTROSURG HOLST W/BLD